# Patient Record
Sex: FEMALE | Race: WHITE | NOT HISPANIC OR LATINO | Employment: OTHER | URBAN - METROPOLITAN AREA
[De-identification: names, ages, dates, MRNs, and addresses within clinical notes are randomized per-mention and may not be internally consistent; named-entity substitution may affect disease eponyms.]

---

## 2017-01-09 ENCOUNTER — HOSPITAL ENCOUNTER (OUTPATIENT)
Dept: ONCOLOGY | Facility: CLINIC | Age: 77
Discharge: HOME | End: 2017-01-09
Attending: INTERNAL MEDICINE | Admitting: INTERNAL MEDICINE

## 2017-01-09 LAB
ALBUMIN SERPL-MCNC: 3.9 G/DL (ref 3.5–4.8)
ALBUMIN/GLOB SERPL: 1.3 {RATIO} (ref 1–1.7)
ALP SERPL-CCNC: 76 IU/L (ref 32–91)
ALT SERPL-CCNC: 17 IU/L (ref 14–54)
ANION GAP SERPL CALC-SCNC: 13.2 MMOL/L (ref 10–20)
AST SERPL-CCNC: 24 IU/L (ref 15–41)
BILIRUB SERPL-MCNC: 0.4 MG/DL (ref 0.3–1.2)
BUN SERPL-MCNC: 29 MG/DL (ref 8–20)
BUN/CREAT SERPL: 26.4 (ref 5.4–26.2)
CALCIUM SERPL-MCNC: 9.6 MG/DL (ref 8.9–10.3)
CHLORIDE SERPL-SCNC: 108 MMOL/L (ref 101–111)
CONV CO2: 26 MMOL/L (ref 22–32)
CONV TOTAL PROTEIN: 6.9 G/DL (ref 6.1–7.9)
CREAT UR-MCNC: 1.1 MG/DL (ref 0.4–1)
GLOBULIN UR ELPH-MCNC: 3 G/DL (ref 2.5–3.8)
GLUCOSE SERPL-MCNC: 93 MG/DL (ref 65–99)
POTASSIUM SERPL-SCNC: 4.2 MMOL/L (ref 3.6–5.1)
SODIUM SERPL-SCNC: 143 MMOL/L (ref 136–144)

## 2017-01-21 ENCOUNTER — HOSPITAL ENCOUNTER (OUTPATIENT)
Dept: OTHER | Facility: HOSPITAL | Age: 77
Discharge: HOME | End: 2017-01-21
Attending: INTERNAL MEDICINE | Admitting: INTERNAL MEDICINE

## 2017-01-21 LAB
ANION GAP SERPL CALC-SCNC: 11.9 MMOL/L (ref 10–20)
APTT BLD: 24.2 SEC (ref 24–31)
BASOPHILS # BLD AUTO: 0 10*3/UL (ref 0–0.2)
BASOPHILS NFR BLD AUTO: 1 % (ref 0–2)
BUN SERPL-MCNC: 27 MG/DL (ref 8–20)
BUN/CREAT SERPL: 20.8 (ref 5.4–26.2)
CALCIUM SERPL-MCNC: 9.6 MG/DL (ref 8.9–10.3)
CHLORIDE SERPL-SCNC: 108 MMOL/L (ref 101–111)
CONV CO2: 27 MMOL/L (ref 22–32)
CREAT UR-MCNC: 1.3 MG/DL (ref 0.4–1)
DIFFERENTIAL METHOD BLD: (no result)
EOSINOPHIL # BLD AUTO: 0.1 10*3/UL (ref 0–0.3)
EOSINOPHIL # BLD AUTO: 2 % (ref 0–3)
ERYTHROCYTE [DISTWIDTH] IN BLOOD BY AUTOMATED COUNT: 13.6 % (ref 11.5–14.5)
GLUCOSE SERPL-MCNC: 107 MG/DL (ref 65–99)
HCT VFR BLD AUTO: 40.2 % (ref 35–49)
HGB BLD-MCNC: 13.2 G/DL (ref 12–15)
INR PPP: 1 (ref 2–3)
LYMPHOCYTES # BLD AUTO: 1.6 10*3/UL (ref 0.8–4.8)
LYMPHOCYTES NFR BLD AUTO: 29 % (ref 18–42)
MCH RBC QN AUTO: 31 PG (ref 26–32)
MCHC RBC AUTO-ENTMCNC: 32.9 G/DL (ref 32–36)
MCV RBC AUTO: 94.3 FL (ref 80–94)
MONOCYTES # BLD AUTO: 0.5 10*3/UL (ref 0.1–1.3)
MONOCYTES NFR BLD AUTO: 9 % (ref 2–11)
NEUTROPHILS # BLD AUTO: 3.4 10*3/UL (ref 2.3–8.6)
NEUTROPHILS NFR BLD AUTO: 59 % (ref 50–75)
NRBC BLD AUTO-RTO: 0 /100{WBCS}
NRBC/RBC NFR BLD MANUAL: 0 10*3/UL
PLATELET # BLD AUTO: 248 10*3/UL (ref 150–450)
PMV BLD AUTO: 7.7 FL (ref 7.4–10.4)
POTASSIUM SERPL-SCNC: 3.9 MMOL/L (ref 3.6–5.1)
PROTHROMBIN TIME: 13 SEC (ref 19.4–28.5)
RBC # BLD AUTO: 4.27 10*6/UL (ref 4–5.4)
SODIUM SERPL-SCNC: 143 MMOL/L (ref 136–144)
WBC # BLD AUTO: 5.7 10*3/UL (ref 4.5–11.5)

## 2017-02-21 ENCOUNTER — HOSPITAL ENCOUNTER (OUTPATIENT)
Dept: OTHER | Facility: HOSPITAL | Age: 77
Discharge: HOME | End: 2017-02-21
Attending: UROLOGY | Admitting: UROLOGY

## 2017-02-21 LAB
ANION GAP SERPL CALC-SCNC: 13.7 MMOL/L (ref 10–20)
BUN SERPL-MCNC: 35 MG/DL (ref 8–20)
BUN/CREAT SERPL: 26.9 (ref 5.4–26.2)
CALCIUM SERPL-MCNC: 9.7 MG/DL (ref 8.9–10.3)
CHLORIDE SERPL-SCNC: 111 MMOL/L (ref 101–111)
CONV CO2: 23 MMOL/L (ref 22–32)
CREAT UR-MCNC: 1.3 MG/DL (ref 0.4–1)
GLUCOSE SERPL-MCNC: 129 MG/DL (ref 65–99)
POTASSIUM SERPL-SCNC: 3.7 MMOL/L (ref 3.6–5.1)
SODIUM SERPL-SCNC: 144 MMOL/L (ref 136–144)

## 2017-08-29 ENCOUNTER — HOSPITAL ENCOUNTER (OUTPATIENT)
Dept: LAB | Facility: HOSPITAL | Age: 77
Discharge: HOME | End: 2017-08-29
Attending: INTERNAL MEDICINE | Admitting: INTERNAL MEDICINE

## 2017-08-29 LAB
INR PPP: ABNORMAL (ref 2–3)
PROTHROMBIN TIME: 61.3 SEC (ref 19.4–28.5)

## 2017-08-30 ENCOUNTER — HOSPITAL ENCOUNTER (OUTPATIENT)
Dept: CT IMAGING | Facility: HOSPITAL | Age: 77
Discharge: HOME | End: 2017-08-30
Attending: UROLOGY | Admitting: UROLOGY

## 2017-08-30 LAB — CREAT BLDA-MCNC: 1.2 MG/DL (ref 0.6–1.3)

## 2017-12-29 ENCOUNTER — HOSPITAL ENCOUNTER (OUTPATIENT)
Dept: MAMMOGRAPHY | Facility: HOSPITAL | Age: 77
Discharge: HOME | End: 2017-12-29
Attending: INTERNAL MEDICINE | Admitting: INTERNAL MEDICINE

## 2018-01-08 ENCOUNTER — HOSPITAL ENCOUNTER (OUTPATIENT)
Dept: ONCOLOGY | Facility: CLINIC | Age: 78
Setting detail: INFUSION SERIES
Discharge: HOME | End: 2018-01-08
Attending: INTERNAL MEDICINE | Admitting: INTERNAL MEDICINE

## 2018-01-08 ENCOUNTER — CLINICAL SUPPORT (OUTPATIENT)
Dept: ONCOLOGY | Facility: HOSPITAL | Age: 78
End: 2018-01-08

## 2018-01-08 NOTE — PROGRESS NOTES
PATIENTS ONCOLOGY RECORD LOCATED IN [facility]      Subjective     Name:  NELL LIU     Date:  2018  Address:  [place] [address]  Home: [phone]  :  1940 AGE:  77 y.o.        RECORDS OBTAINED:  Patients Oncology Record is located in [facility]

## 2018-01-19 ENCOUNTER — HOSPITAL ENCOUNTER (OUTPATIENT)
Dept: ONCOLOGY | Facility: HOSPITAL | Age: 78
Discharge: HOME | End: 2018-01-19
Attending: INTERNAL MEDICINE | Admitting: INTERNAL MEDICINE

## 2018-01-19 ENCOUNTER — CLINICAL SUPPORT (OUTPATIENT)
Dept: ONCOLOGY | Facility: HOSPITAL | Age: 78
End: 2018-01-19

## 2018-01-19 ENCOUNTER — HOSPITAL ENCOUNTER (OUTPATIENT)
Dept: ONCOLOGY | Facility: CLINIC | Age: 78
Setting detail: INFUSION SERIES
Discharge: HOME | End: 2018-01-19
Attending: INTERNAL MEDICINE | Admitting: INTERNAL MEDICINE

## 2018-01-19 LAB
ALBUMIN SERPL-MCNC: 3.8 G/DL (ref 3.5–4.8)
ALBUMIN/GLOB SERPL: 1.4 {RATIO} (ref 1–1.7)
ALP SERPL-CCNC: 81 IU/L (ref 32–91)
ALT SERPL-CCNC: 15 IU/L (ref 14–54)
ANION GAP SERPL CALC-SCNC: 16.5 MMOL/L (ref 10–20)
AST SERPL-CCNC: 25 IU/L (ref 15–41)
BILIRUB SERPL-MCNC: 0.6 MG/DL (ref 0.3–1.2)
BUN SERPL-MCNC: 27 MG/DL (ref 8–20)
BUN/CREAT SERPL: 22.5 (ref 5.4–26.2)
CALCIUM SERPL-MCNC: 9.4 MG/DL (ref 8.9–10.3)
CHLORIDE SERPL-SCNC: 104 MMOL/L (ref 101–111)
CONV CO2: 24 MMOL/L (ref 22–32)
CONV TOTAL PROTEIN: 6.5 G/DL (ref 6.1–7.9)
CREAT UR-MCNC: 1.2 MG/DL (ref 0.4–1)
GLOBULIN UR ELPH-MCNC: 2.7 G/DL (ref 2.5–3.8)
GLUCOSE SERPL-MCNC: 87 MG/DL (ref 65–99)
POTASSIUM SERPL-SCNC: 4.5 MMOL/L (ref 3.6–5.1)
SODIUM SERPL-SCNC: 140 MMOL/L (ref 136–144)

## 2018-01-19 NOTE — PROGRESS NOTES
PATIENTS ONCOLOGY RECORD LOCATED IN [facility]      Subjective     Name:  NELL LIU     Date:  2018  Address:  [place] [address]  Home: [phone]  :  1940 AGE:  77 y.o.        RECORDS OBTAINED:  Patients Oncology Record is located in [facility]

## 2018-09-04 ENCOUNTER — HOSPITAL ENCOUNTER (OUTPATIENT)
Dept: OTHER | Facility: HOSPITAL | Age: 78
Discharge: HOME | End: 2018-09-04
Attending: UROLOGY | Admitting: UROLOGY

## 2018-09-04 LAB
ANION GAP SERPL CALC-SCNC: 16.4 MMOL/L (ref 10–20)
BUN SERPL-MCNC: 40 MG/DL (ref 8–20)
BUN/CREAT SERPL: 36.4 (ref 5.4–26.2)
CALCIUM SERPL-MCNC: 9.7 MG/DL (ref 8.9–10.3)
CHLORIDE SERPL-SCNC: 107 MMOL/L (ref 101–111)
CONV CO2: 22 MMOL/L (ref 22–32)
CREAT BLDA-MCNC: 1.2 MG/DL (ref 0.6–1.3)
CREAT UR-MCNC: 1.1 MG/DL (ref 0.4–1)
GLUCOSE SERPL-MCNC: 135 MG/DL (ref 65–99)
POTASSIUM SERPL-SCNC: 4.4 MMOL/L (ref 3.6–5.1)
SODIUM SERPL-SCNC: 141 MMOL/L (ref 136–144)

## 2018-09-11 ENCOUNTER — HOSPITAL ENCOUNTER (OUTPATIENT)
Dept: INTERVENTIONAL RADIOLOGY/VASCULAR | Facility: HOSPITAL | Age: 78
Discharge: HOME | End: 2018-09-11
Attending: UROLOGY | Admitting: UROLOGY

## 2018-09-19 ENCOUNTER — HOSPITAL ENCOUNTER (OUTPATIENT)
Dept: OTHER | Facility: HOSPITAL | Age: 78
Setting detail: SPECIMEN
Discharge: HOME | End: 2018-09-19
Attending: INTERNAL MEDICINE | Admitting: INTERNAL MEDICINE

## 2018-09-19 ENCOUNTER — ON CAMPUS - OUTPATIENT (AMBULATORY)
Dept: URBAN - METROPOLITAN AREA HOSPITAL 2 | Facility: HOSPITAL | Age: 78
End: 2018-09-19
Payer: COMMERCIAL

## 2018-09-19 ENCOUNTER — OFFICE (AMBULATORY)
Dept: URBAN - METROPOLITAN AREA PATHOLOGY 4 | Facility: PATHOLOGY | Age: 78
End: 2018-09-19

## 2018-09-19 VITALS
SYSTOLIC BLOOD PRESSURE: 123 MMHG | SYSTOLIC BLOOD PRESSURE: 186 MMHG | OXYGEN SATURATION: 95 % | DIASTOLIC BLOOD PRESSURE: 73 MMHG | SYSTOLIC BLOOD PRESSURE: 172 MMHG | DIASTOLIC BLOOD PRESSURE: 71 MMHG | RESPIRATION RATE: 18 BRPM | OXYGEN SATURATION: 96 % | DIASTOLIC BLOOD PRESSURE: 70 MMHG | SYSTOLIC BLOOD PRESSURE: 146 MMHG | HEART RATE: 60 BPM | SYSTOLIC BLOOD PRESSURE: 142 MMHG | DIASTOLIC BLOOD PRESSURE: 65 MMHG | OXYGEN SATURATION: 97 % | RESPIRATION RATE: 16 BRPM | SYSTOLIC BLOOD PRESSURE: 157 MMHG | SYSTOLIC BLOOD PRESSURE: 182 MMHG | HEIGHT: 66 IN | DIASTOLIC BLOOD PRESSURE: 67 MMHG | DIASTOLIC BLOOD PRESSURE: 99 MMHG | DIASTOLIC BLOOD PRESSURE: 78 MMHG | RESPIRATION RATE: 20 BRPM | HEART RATE: 81 BPM | DIASTOLIC BLOOD PRESSURE: 75 MMHG | WEIGHT: 179 LBS | OXYGEN SATURATION: 98 % | TEMPERATURE: 98.6 F | DIASTOLIC BLOOD PRESSURE: 104 MMHG | SYSTOLIC BLOOD PRESSURE: 135 MMHG | DIASTOLIC BLOOD PRESSURE: 69 MMHG | OXYGEN SATURATION: 99 % | SYSTOLIC BLOOD PRESSURE: 178 MMHG | SYSTOLIC BLOOD PRESSURE: 203 MMHG

## 2018-09-19 DIAGNOSIS — D12.2 BENIGN NEOPLASM OF ASCENDING COLON: ICD-10-CM

## 2018-09-19 DIAGNOSIS — Z86.010 PERSONAL HISTORY OF COLONIC POLYPS: ICD-10-CM

## 2018-09-19 DIAGNOSIS — K62.1 RECTAL POLYP: ICD-10-CM

## 2018-09-19 DIAGNOSIS — D12.5 BENIGN NEOPLASM OF SIGMOID COLON: ICD-10-CM

## 2018-09-19 DIAGNOSIS — K57.30 DIVERTICULOSIS OF LARGE INTESTINE WITHOUT PERFORATION OR ABS: ICD-10-CM

## 2018-09-19 LAB
GI HISTOLOGY: A. UNSPECIFIED: (no result)
GI HISTOLOGY: B. UNSPECIFIED: (no result)
GI HISTOLOGY: C. UNSPECIFIED: (no result)
GI HISTOLOGY: PDF REPORT: (no result)

## 2018-09-19 PROCEDURE — 45385 COLONOSCOPY W/LESION REMOVAL: CPT | Mod: PT | Performed by: INTERNAL MEDICINE

## 2018-09-19 PROCEDURE — 45380 COLONOSCOPY AND BIOPSY: CPT | Mod: 59,PT | Performed by: INTERNAL MEDICINE

## 2018-09-19 PROCEDURE — 88305 TISSUE EXAM BY PATHOLOGIST: CPT | Mod: 26 | Performed by: INTERNAL MEDICINE

## 2018-09-19 PROCEDURE — 45380 COLONOSCOPY AND BIOPSY: CPT | Mod: PT,59 | Performed by: INTERNAL MEDICINE

## 2019-02-12 ENCOUNTER — HOSPITAL ENCOUNTER (OUTPATIENT)
Dept: ONCOLOGY | Facility: CLINIC | Age: 79
Setting detail: INFUSION SERIES
Discharge: HOME | End: 2019-02-12
Attending: INTERNAL MEDICINE | Admitting: INTERNAL MEDICINE

## 2019-02-12 ENCOUNTER — CLINICAL SUPPORT (OUTPATIENT)
Dept: ONCOLOGY | Facility: HOSPITAL | Age: 79
End: 2019-02-12

## 2019-02-12 NOTE — PROGRESS NOTES
PATIENTS ONCOLOGY RECORD LOCATED IN [facility]      Subjective     Name:  NELL LIU     Date:  2019  Address:  [place] [address]  Home: [unfilled]  :  1940 AGE:  78 y.o.        RECORDS OBTAINED:  Patients Oncology Record is located in [facility]

## 2019-03-11 ENCOUNTER — HOSPITAL ENCOUNTER (OUTPATIENT)
Dept: CT IMAGING | Facility: HOSPITAL | Age: 79
Discharge: HOME | End: 2019-03-11
Attending: UROLOGY | Admitting: UROLOGY

## 2019-03-11 LAB — CREAT BLDA-MCNC: 1.3 MG/DL (ref 0.6–1.3)

## 2019-06-01 ENCOUNTER — TRANSCRIBE ORDERS (OUTPATIENT)
Dept: ADMINISTRATIVE | Facility: HOSPITAL | Age: 79
End: 2019-06-01

## 2019-06-01 DIAGNOSIS — C67.9 MALIGNANT NEOPLASM OF URINARY BLADDER, UNSPECIFIED SITE (HCC): Primary | ICD-10-CM

## 2019-06-24 ENCOUNTER — OFFICE VISIT (OUTPATIENT)
Dept: CARDIOLOGY | Facility: CLINIC | Age: 79
End: 2019-06-24

## 2019-06-24 DIAGNOSIS — I48.20 CHRONIC ATRIAL FIBRILLATION (HCC): ICD-10-CM

## 2019-06-24 LAB — INR PPP: 3.4 (ref 0.9–1.1)

## 2019-06-24 PROCEDURE — 36416 COLLJ CAPILLARY BLOOD SPEC: CPT | Performed by: INTERNAL MEDICINE

## 2019-06-24 PROCEDURE — 85610 PROTHROMBIN TIME: CPT | Performed by: INTERNAL MEDICINE

## 2019-06-24 RX ORDER — FUROSEMIDE 20 MG/1
20 TABLET ORAL DAILY
Qty: 90 TABLET | Refills: 3 | Status: SHIPPED | OUTPATIENT
Start: 2019-06-24 | End: 2019-07-19

## 2019-06-26 PROBLEM — I48.91 ATRIAL FIBRILLATION: Status: ACTIVE | Noted: 2019-06-26

## 2019-06-28 ENCOUNTER — TELEPHONE (OUTPATIENT)
Dept: CARDIOLOGY | Facility: CLINIC | Age: 79
End: 2019-06-28

## 2019-06-28 RX ORDER — WARFARIN SODIUM 2 MG/1
2 TABLET ORAL EVERY 24 HOURS
Qty: 30 TABLET | Refills: 0 | Status: SHIPPED | OUTPATIENT
Start: 2019-06-28 | End: 2019-07-29 | Stop reason: SDUPTHER

## 2019-06-28 RX ORDER — WARFARIN SODIUM 2 MG/1
TABLET ORAL EVERY 24 HOURS
COMMUNITY
Start: 2018-05-14 | End: 2019-06-28 | Stop reason: SDUPTHER

## 2019-07-08 ENCOUNTER — ANTICOAGULATION VISIT (OUTPATIENT)
Dept: CARDIOLOGY | Facility: CLINIC | Age: 79
End: 2019-07-08

## 2019-07-08 DIAGNOSIS — I48.19 PERSISTENT ATRIAL FIBRILLATION (HCC): ICD-10-CM

## 2019-07-08 LAB — INR PPP: 2.6 (ref 2–3)

## 2019-07-08 PROCEDURE — 36416 COLLJ CAPILLARY BLOOD SPEC: CPT | Performed by: INTERNAL MEDICINE

## 2019-07-08 PROCEDURE — 85610 PROTHROMBIN TIME: CPT | Performed by: INTERNAL MEDICINE

## 2019-07-19 ENCOUNTER — HOSPITAL ENCOUNTER (EMERGENCY)
Facility: HOSPITAL | Age: 79
Discharge: HOME | End: 2019-07-19
Attending: EMERGENCY MEDICINE | Admitting: EMERGENCY MEDICINE

## 2019-07-19 ENCOUNTER — APPOINTMENT (OUTPATIENT)
Dept: GENERAL RADIOLOGY | Facility: HOSPITAL | Age: 79
End: 2019-07-19

## 2019-07-19 VITALS
BODY MASS INDEX: 26.66 KG/M2 | HEART RATE: 78 BPM | DIASTOLIC BLOOD PRESSURE: 87 MMHG | OXYGEN SATURATION: 98 % | TEMPERATURE: 98 F | SYSTOLIC BLOOD PRESSURE: 149 MMHG | RESPIRATION RATE: 18 BRPM | HEIGHT: 65 IN | WEIGHT: 160 LBS

## 2019-07-19 DIAGNOSIS — L03.116 CELLULITIS OF LEFT FOOT: Primary | ICD-10-CM

## 2019-07-19 PROCEDURE — 99284 EMERGENCY DEPT VISIT MOD MDM: CPT

## 2019-07-19 PROCEDURE — 73630 X-RAY EXAM OF FOOT: CPT

## 2019-07-19 RX ORDER — HYDROCODONE BITARTRATE AND ACETAMINOPHEN 5; 325 MG/1; MG/1
1 TABLET ORAL ONCE
Status: COMPLETED | OUTPATIENT
Start: 2019-07-19 | End: 2019-07-19

## 2019-07-19 RX ORDER — HYDRALAZINE HYDROCHLORIDE 10 MG/1
10 TABLET, FILM COATED ORAL 2 TIMES DAILY
COMMUNITY
End: 2019-11-04 | Stop reason: DRUGHIGH

## 2019-07-19 RX ORDER — LANSOPRAZOLE 30 MG/1
30 CAPSULE, DELAYED RELEASE ORAL 2 TIMES DAILY
Status: ON HOLD | COMMUNITY
End: 2020-09-07

## 2019-07-19 RX ORDER — CEPHALEXIN 500 MG/1
500 CAPSULE ORAL 3 TIMES DAILY
Qty: 21 CAPSULE | Refills: 0 | Status: SHIPPED | OUTPATIENT
Start: 2019-07-19 | End: 2019-07-26

## 2019-07-19 RX ORDER — FEXOFENADINE HCL 180 MG/1
180 TABLET ORAL DAILY
Status: ON HOLD | COMMUNITY
End: 2020-09-07

## 2019-07-19 RX ORDER — CHOLECALCIFEROL (VITAMIN D3) 125 MCG
1 CAPSULE ORAL DAILY
COMMUNITY
End: 2021-05-17 | Stop reason: ALTCHOICE

## 2019-07-19 RX ORDER — HYDROCODONE BITARTRATE AND ACETAMINOPHEN 5; 325 MG/1; MG/1
1 TABLET ORAL EVERY 6 HOURS PRN
Qty: 10 TABLET | Refills: 0 | Status: SHIPPED | OUTPATIENT
Start: 2019-07-19 | End: 2020-04-28

## 2019-07-19 RX ORDER — TOPIRAMATE 100 MG/1
100 TABLET, FILM COATED ORAL
COMMUNITY

## 2019-07-19 RX ORDER — VALSARTAN AND HYDROCHLOROTHIAZIDE 320; 12.5 MG/1; MG/1
1 TABLET, FILM COATED ORAL DAILY
COMMUNITY
End: 2020-05-01 | Stop reason: HOSPADM

## 2019-07-19 RX ORDER — GLIMEPIRIDE 1 MG/1
0.5 TABLET ORAL
COMMUNITY
End: 2019-11-04 | Stop reason: ALTCHOICE

## 2019-07-19 RX ORDER — ACETAMINOPHEN 325 MG/1
650 TABLET ORAL EVERY 6 HOURS PRN
COMMUNITY
End: 2020-08-31 | Stop reason: HOSPADM

## 2019-07-19 RX ADMIN — HYDROCODONE BITARTRATE AND ACETAMINOPHEN 1 TABLET: 5; 325 TABLET ORAL at 20:08

## 2019-07-26 ENCOUNTER — TRANSCRIBE ORDERS (OUTPATIENT)
Dept: ADMINISTRATIVE | Facility: HOSPITAL | Age: 79
End: 2019-07-26

## 2019-07-26 DIAGNOSIS — Z78.0 POSTMENOPAUSAL: Primary | ICD-10-CM

## 2019-07-29 RX ORDER — WARFARIN SODIUM 2 MG/1
2 TABLET ORAL EVERY 24 HOURS
Qty: 30 TABLET | Refills: 0 | Status: SHIPPED | OUTPATIENT
Start: 2019-07-29 | End: 2019-08-25 | Stop reason: SDUPTHER

## 2019-08-01 ENCOUNTER — HOSPITAL ENCOUNTER (OUTPATIENT)
Dept: BONE DENSITY | Facility: HOSPITAL | Age: 79
Discharge: HOME | End: 2019-08-01
Admitting: FAMILY MEDICINE

## 2019-08-01 DIAGNOSIS — Z78.0 POSTMENOPAUSAL: ICD-10-CM

## 2019-08-01 PROCEDURE — 77080 DXA BONE DENSITY AXIAL: CPT

## 2019-08-08 ENCOUNTER — ANTICOAGULATION VISIT (OUTPATIENT)
Dept: CARDIOLOGY | Facility: CLINIC | Age: 79
End: 2019-08-08

## 2019-08-08 DIAGNOSIS — I48.0 PAROXYSMAL ATRIAL FIBRILLATION (HCC): ICD-10-CM

## 2019-08-08 LAB — INR PPP: 6 (ref 2–3)

## 2019-08-08 PROCEDURE — 36416 COLLJ CAPILLARY BLOOD SPEC: CPT | Performed by: INTERNAL MEDICINE

## 2019-08-08 PROCEDURE — 85610 PROTHROMBIN TIME: CPT | Performed by: INTERNAL MEDICINE

## 2019-08-14 DIAGNOSIS — E53.8 B12 DEFICIENCY: Primary | ICD-10-CM

## 2019-08-14 RX ORDER — CYANOCOBALAMIN 1000 UG/ML
1000 INJECTION, SOLUTION INTRAMUSCULAR; SUBCUTANEOUS
Qty: 3 ML | Refills: 1 | Status: SHIPPED | OUTPATIENT
Start: 2019-08-14 | End: 2019-09-12 | Stop reason: SDUPTHER

## 2019-08-14 NOTE — TELEPHONE ENCOUNTER
Patient called stating her insurance changed, which also changed the pharmacy she needs to use. She is due for B12 injection refills and asked that they now be sent to [facility]. Updated her pharmacy info and sent rx.

## 2019-08-22 ENCOUNTER — CLINICAL SUPPORT NO REQUIREMENTS (OUTPATIENT)
Dept: CARDIOLOGY | Facility: CLINIC | Age: 79
End: 2019-08-22

## 2019-08-22 ENCOUNTER — ANTICOAGULATION VISIT (OUTPATIENT)
Dept: CARDIOLOGY | Facility: CLINIC | Age: 79
End: 2019-08-22

## 2019-08-22 DIAGNOSIS — I48.0 PAROXYSMAL ATRIAL FIBRILLATION (HCC): ICD-10-CM

## 2019-08-22 DIAGNOSIS — Z95.0 PRESENCE OF CARDIAC PACEMAKER: ICD-10-CM

## 2019-08-22 DIAGNOSIS — I49.5 SICK SINUS SYNDROME (HCC): Primary | ICD-10-CM

## 2019-08-22 LAB — INR PPP: 4 (ref 2–3)

## 2019-08-22 PROCEDURE — 36416 COLLJ CAPILLARY BLOOD SPEC: CPT | Performed by: INTERNAL MEDICINE

## 2019-08-22 PROCEDURE — 93296 REM INTERROG EVL PM/IDS: CPT | Performed by: NURSE PRACTITIONER

## 2019-08-22 PROCEDURE — 85610 PROTHROMBIN TIME: CPT | Performed by: INTERNAL MEDICINE

## 2019-08-22 PROCEDURE — 93294 REM INTERROG EVL PM/LDLS PM: CPT | Performed by: NURSE PRACTITIONER

## 2019-08-22 NOTE — PROGRESS NOTES
REMOTE DEVICE INTERROGATION    Remote device interrogation is reviewed.     Device Company: [employer]    Battery Stable.  Time to SACHIN 4.5 years    Presenting EGM: A paced V sensed    Arrhythmia Logbook Reviewed: PAF episodes detected: On sotalol and warfarin    Device function appears Stable    Continue remote device interrogations every 3 months.

## 2019-08-26 RX ORDER — WARFARIN SODIUM 2 MG/1
2 TABLET ORAL EVERY 24 HOURS
Qty: 30 TABLET | Refills: 0 | Status: SHIPPED | OUTPATIENT
Start: 2019-08-26 | End: 2019-09-24 | Stop reason: SDUPTHER

## 2019-09-12 DIAGNOSIS — E53.8 B12 DEFICIENCY: ICD-10-CM

## 2019-09-12 RX ORDER — CYANOCOBALAMIN 1000 UG/ML
1000 INJECTION, SOLUTION INTRAMUSCULAR; SUBCUTANEOUS
Qty: 3 ML | Refills: 1 | Status: SHIPPED | OUTPATIENT
Start: 2019-09-12 | End: 2020-03-23

## 2019-09-12 NOTE — TELEPHONE ENCOUNTER
Patient requesting B12 injections along with syringes and is [facility] [address] and not [facility].  Pharmacy location changed.  Patient with macrocytosis. Patient saw Dr. Church on 2-12-19 and is scheduled to see her on 2-11-20.

## 2019-09-19 ENCOUNTER — ANTICOAGULATION VISIT (OUTPATIENT)
Dept: CARDIOLOGY | Facility: CLINIC | Age: 79
End: 2019-09-19

## 2019-09-19 DIAGNOSIS — I48.0 PAROXYSMAL ATRIAL FIBRILLATION (HCC): ICD-10-CM

## 2019-09-19 LAB — INR PPP: 1.6 (ref 2–3)

## 2019-09-19 PROCEDURE — 85610 PROTHROMBIN TIME: CPT | Performed by: INTERNAL MEDICINE

## 2019-09-19 PROCEDURE — 36416 COLLJ CAPILLARY BLOOD SPEC: CPT | Performed by: INTERNAL MEDICINE

## 2019-09-24 RX ORDER — WARFARIN SODIUM 2 MG/1
2 TABLET ORAL EVERY 24 HOURS
Qty: 30 TABLET | Refills: 0 | Status: SHIPPED | OUTPATIENT
Start: 2019-09-24 | End: 2019-10-19 | Stop reason: SDUPTHER

## 2019-10-03 ENCOUNTER — ANTICOAGULATION VISIT (OUTPATIENT)
Dept: CARDIOLOGY | Facility: CLINIC | Age: 79
End: 2019-10-03

## 2019-10-03 DIAGNOSIS — I48.0 PAROXYSMAL ATRIAL FIBRILLATION (HCC): ICD-10-CM

## 2019-10-03 LAB — INR PPP: 1.3 (ref 2–3)

## 2019-10-03 PROCEDURE — 85610 PROTHROMBIN TIME: CPT | Performed by: INTERNAL MEDICINE

## 2019-10-03 PROCEDURE — 36416 COLLJ CAPILLARY BLOOD SPEC: CPT | Performed by: INTERNAL MEDICINE

## 2019-10-21 RX ORDER — WARFARIN SODIUM 2 MG/1
2 TABLET ORAL EVERY 24 HOURS
Qty: 30 TABLET | Refills: 0 | Status: SHIPPED | OUTPATIENT
Start: 2019-10-21 | End: 2019-10-22 | Stop reason: SDUPTHER

## 2019-10-22 RX ORDER — WARFARIN SODIUM 2 MG/1
2 TABLET ORAL EVERY 24 HOURS
Qty: 30 TABLET | Refills: 0 | Status: SHIPPED | OUTPATIENT
Start: 2019-10-22 | End: 2019-11-17 | Stop reason: SDUPTHER

## 2019-11-04 ENCOUNTER — CLINICAL SUPPORT NO REQUIREMENTS (OUTPATIENT)
Dept: CARDIOLOGY | Facility: CLINIC | Age: 79
End: 2019-11-04

## 2019-11-04 ENCOUNTER — OFFICE VISIT (OUTPATIENT)
Dept: CARDIOLOGY | Facility: CLINIC | Age: 79
End: 2019-11-04

## 2019-11-04 VITALS
BODY MASS INDEX: 26.83 KG/M2 | HEIGHT: 68 IN | SYSTOLIC BLOOD PRESSURE: 132 MMHG | OXYGEN SATURATION: 96 % | DIASTOLIC BLOOD PRESSURE: 72 MMHG | WEIGHT: 177 LBS | HEART RATE: 60 BPM

## 2019-11-04 DIAGNOSIS — Z79.01 LONG TERM CURRENT USE OF ANTICOAGULANT: ICD-10-CM

## 2019-11-04 DIAGNOSIS — I10 ESSENTIAL HYPERTENSION: ICD-10-CM

## 2019-11-04 DIAGNOSIS — I48.0 PAF (PAROXYSMAL ATRIAL FIBRILLATION) (HCC): Primary | ICD-10-CM

## 2019-11-04 DIAGNOSIS — Z95.0 PRESENCE OF CARDIAC PACEMAKER: ICD-10-CM

## 2019-11-04 DIAGNOSIS — I49.5 SICK SINUS SYNDROME (HCC): Primary | ICD-10-CM

## 2019-11-04 PROBLEM — E78.5 DYSLIPIDEMIA: Status: ACTIVE | Noted: 2017-01-17

## 2019-11-04 PROCEDURE — 93000 ELECTROCARDIOGRAM COMPLETE: CPT | Performed by: INTERNAL MEDICINE

## 2019-11-04 PROCEDURE — 99213 OFFICE O/P EST LOW 20 MIN: CPT | Performed by: INTERNAL MEDICINE

## 2019-11-04 PROCEDURE — 93280 PM DEVICE PROGR EVAL DUAL: CPT | Performed by: NURSE PRACTITIONER

## 2019-11-04 RX ORDER — FUROSEMIDE 20 MG/1
TABLET ORAL EVERY 24 HOURS
COMMUNITY
Start: 2019-04-18 | End: 2020-04-28

## 2019-11-04 RX ORDER — GLIMEPIRIDE 2 MG/1
TABLET ORAL
COMMUNITY
Start: 2014-11-03 | End: 2019-11-04 | Stop reason: ALTCHOICE

## 2019-11-04 RX ORDER — LORAZEPAM 0.5 MG/1
TABLET ORAL EVERY 24 HOURS
COMMUNITY
Start: 2017-11-12 | End: 2019-11-04

## 2019-11-04 RX ORDER — NEEDLES, DISPOSABLE 25GX5/8"
NEEDLE, DISPOSABLE MISCELLANEOUS
COMMUNITY
Start: 2019-08-14 | End: 2020-04-28

## 2019-11-04 RX ORDER — SOTALOL HYDROCHLORIDE 120 MG/1
120 TABLET ORAL EVERY 12 HOURS
Qty: 180 TABLET | Refills: 3 | Status: SHIPPED | OUTPATIENT
Start: 2019-11-04 | End: 2020-07-14

## 2019-11-04 RX ORDER — SOTALOL HYDROCHLORIDE 120 MG/1
TABLET ORAL EVERY 12 HOURS
COMMUNITY
Start: 2018-12-07 | End: 2019-11-04 | Stop reason: SDUPTHER

## 2019-11-04 NOTE — PROGRESS NOTES
DEVICE INTERROGATION:  IN OFFICE    DEVICE TYPE: [employer]    :   Chamber pacemaker    BATTERY:  Stable    TIME TO ELECTIVE REPLACEMENT INDICATORS:   4.5 years    CHARGE TIME:   Not applicable        LEAD DATA:    Atrial:   5.0 mV, 434 ohms, 0.5 V@0.4 ms    Ventricular:     19.2 mV, 700 ohms, 0.7 V@0.4 ms    LV:      Atrial pacing percentage: 40 %    Ventricular pacing percentage: 5 %      Arrhythmia Logbook Reviewed: 18% A. fib        Summary:    Stable Device Function    18% burden of A. fib    Battery status is stable.      NEXT IN OFFICE DEVICE CHECK DUE: 1 year    REMOTE DEVICE INTERROGATIONS: 3 months

## 2019-11-04 NOTE — PROGRESS NOTES
"Cardiology Office Visit Note      Referring physician:  Penny    Reason For Followup: 6 month with device check    HPI:  Hazel Bucio is a 79 y.o. female presents for FU history of sick sinus syndrome with BS PM placed 2014 with pocket revision 1/2017,PAF, peripheral neuropathy, DM  and hypertensive heart disease.     INR today 2.0    Interrogation of pacemaker today shows 2 episodes of PAF breakthroughs since her last device interrogation.  Battery status remains satisfactory.      She denies chest pain, dyspnea, PND, orthopnea, palpitations, near syncope,  or feelings of her heart racing.    She reports she has been compliant with prescribed medications.         Past Medical History:   Diagnosis Date   • Essential hypertension 1/17/2017   • PAF (paroxysmal atrial fibrillation) (CMS/[facility]) 6/26/2019   • Presence of cardiac pacemaker 11/03/2014    BS dual chamber PM placed 10/2014 with pocket revision 1/25/17.  HX tachy rob syndrome   • Sick sinus syndrome (CMS/HCC) 11/3/2014       History reviewed. No pertinent surgical history.      Current Outpatient Medications:   •  acetaminophen (TYLENOL) 325 MG tablet, Take 650 mg by mouth Every 6 (Six) Hours As Needed for Mild Pain ., Disp: , Rfl:   •  B-D DISP NEEDLE 25GX1\" 25G X 1\" misc, , Disp: , Rfl:   •  Calcium Carbonate-Vit D-Min (CALCIUM 1200 PO), Take  by mouth Daily., Disp: , Rfl:   •  Cholecalciferol (VITAMIN D3) 2000 units tablet, Take  by mouth., Disp: , Rfl:   •  Cyanocobalamin (VITAMIN B-12 IJ), Inject  as directed Every 30 (Thirty) Days., Disp: , Rfl:   •  cyanocobalamin 1000 MCG/ML injection, Inject 1 mL into the appropriate muscle as directed by prescriber Every 28 (Twenty-Eight) Days., Disp: 3 mL, Rfl: 1  •  fexofenadine (ALLEGRA) 180 MG tablet, Take 180 mg by mouth Daily., Disp: , Rfl:   •  furosemide (LASIX) 20 MG tablet, Daily., Disp: , Rfl:   •  HYDROcodone-acetaminophen (NORCO) 5-325 MG per tablet, Take 1 tablet by mouth Every 6 (Six) Hours As Needed " for Moderate Pain ., Disp: 10 tablet, Rfl: 0  •  lansoprazole (PREVACID) 30 MG capsule, Take 30 mg by mouth Daily., Disp: , Rfl:   •  Magnesium 400 MG capsule, Daily., Disp: , Rfl:   •  Polyethylene Glycol 3350 (MIRALAX PO), MIRALAX POWD, Disp: , Rfl:   •  psyllium (METAMUCIL) 0.52 g capsule, METAMUCIL CAPSULE, Disp: , Rfl:   •  topiramate (TOPAMAX) 100 MG tablet, Take 100 mg by mouth 2 (Two) Times a Day., Disp: , Rfl:   •  Valerian 450 MG capsule, Take 450 mg by mouth Every Night., Disp: , Rfl:   •  valsartan-hydrochlorothiazide (DIOVAN HCT) 320-12.5 MG per tablet, Take 1 tablet by mouth Daily., Disp: , Rfl:   •  verapamil SR (CALAN-SR) 180 MG CR tablet, Take 1 tablet by mouth 2 (Two) Times a Day., Disp: 180 tablet, Rfl: 3  •  warfarin (COUMADIN) 2 MG tablet, Take 1 tablet by mouth Daily., Disp: 30 tablet, Rfl: 0  •  sotalol (BETAPACE) 120 MG tablet, Take 1 tablet by mouth Every 12 (Twelve) Hours., Disp: 180 tablet, Rfl: 3    Social History     Socioeconomic History   • Marital status:      Spouse name: Not on file   • Number of children: Not on file   • Years of education: Not on file   • Highest education level: Not on file   Tobacco Use   • Smoking status: Never Smoker       No family history on file.      Review of Systems   General: denies fever, chills, anorexia, weight loss  Eyes: denies blurring, diplopia  Ear/Nose/Throat: denies ear pain, nosebleeds, hoarseness  Cardiovascular: See HPI  Respiratory: denies excessive sputum, hemoptysis, wheezing  Gastrointestinal: denies nausea, vomiting, change in bowel habits, abdominal pain  Genitourinary: denies dysuria and hematuria  Musculoskeletal: Has mild to moderate weightbearing arthralgias  Skin: denies rashes, itching, suspicious lesions  Neurologic: denies focal neuro deficits  Psychiatric: denies depression, anxiety  Endocrine: denies cold intolerance, heat intolerance  Hematologic/Lymphatic: denies abnormal bruising, bleeding  Allergic/Immunologic:  "denies urticaria or persistent infections      Objective     Visit Vitals  /72   Pulse 60   Ht 172.7 cm (68\")   Wt 80.3 kg (177 lb)   SpO2 96% Comment: room air   BMI 26.91 kg/m²           Physical Exam  General:    Mildly  Obese, well developed,, in no acute distress.    Head:     normocephalic and atraumatic.    Eyes:    PERRL/EOM intact, conjunctiva and sclera clear with out nystagmus.    Neck:    no jvd or bruits  Chest Wall:    no deformities   Lungs:    clear bilaterally to auscultation with adequate global airflow   Heart:    non-displaced PMI; regular rate and rhythm, normal S1, S2 without murmurs, rubs, or gallops  Abdomen:  Soft, nontender without HSM  Msk:    no deformity; adequate R OM; mildly antalgic gait; uses a walker routinely  Pulses:    pulses normal in all 4 extremities.    Extremities:    no clubbing, cyanosis, edema   Neurologic:    no focal sensory or motor deficits  Skin:    intact without lesions or rashes.    Psych:    alert and cooperative; normal mood and affect; normal attention span and concentration.            ECG 12 Lead  Date/Time: 11/4/2019 2:32 PM  Performed by: MARIELA Carrizales MD  Authorized by: MARIELA Carrizales MD   Comparison: compared with previous ECG from 4/18/2019  Similar to previous ECG  Comments: Exclusively atrial paced rhythm with 100% V sensing.  No other abnormalities; no change from previous              Assessment:   1. PAF (paroxysmal atrial fibrillation) (CMS/HCC)  -Modest A. fib burden, less than 1%.    2. Presence of cardiac pacemaker  -Satisfactory device site and functioning    3. Long term current use of anticoagulant  Will maintain in therapeutic range; INR today is 2.0    4. Essential hypertension  Is well regulated on current medications        Plan:  1.  Discontinue a Apresoline 10 mg p.o. twice daily  2.  Increase sotalol from 80 mg to 120 mg p.o. twice daily  3.  Return to clinic for device reinterrogation and clinical follow-up in 6 months or " sooner if needed    MARIELA Carrizales MD  11/4/2019 8:55 PM

## 2019-11-18 RX ORDER — WARFARIN SODIUM 2 MG/1
2 TABLET ORAL EVERY 24 HOURS
Qty: 30 TABLET | Refills: 0 | Status: SHIPPED | OUTPATIENT
Start: 2019-11-18 | End: 2020-04-28

## 2019-11-21 ENCOUNTER — CLINICAL SUPPORT NO REQUIREMENTS (OUTPATIENT)
Dept: CARDIOLOGY | Facility: CLINIC | Age: 79
End: 2019-11-21

## 2019-11-21 DIAGNOSIS — I49.5 SICK SINUS SYNDROME (HCC): Primary | ICD-10-CM

## 2019-11-21 DIAGNOSIS — Z95.0 PRESENCE OF CARDIAC PACEMAKER: ICD-10-CM

## 2019-11-21 PROCEDURE — 93294 REM INTERROG EVL PM/LDLS PM: CPT | Performed by: NURSE PRACTITIONER

## 2019-11-21 PROCEDURE — 93296 REM INTERROG EVL PM/IDS: CPT | Performed by: NURSE PRACTITIONER

## 2019-11-21 NOTE — PROGRESS NOTES
REMOTE DEVICE INTERROGATION    Remote device interrogation is reviewed.     Device Company: [employer]    Battery Stable.  Time to SACHIN for years    Presenting EGM: Ventricular pacing with underlying atrial fibrillation    Arrhythmia Logbook Reviewed: PAF noted on sotalol and warfarin    Device function appears Stable    Continue remote device interrogations every 3 months.

## 2019-12-04 ENCOUNTER — ANTICOAGULATION VISIT (OUTPATIENT)
Dept: CARDIOLOGY | Facility: CLINIC | Age: 79
End: 2019-12-04

## 2019-12-04 DIAGNOSIS — I48.0 PAF (PAROXYSMAL ATRIAL FIBRILLATION) (HCC): ICD-10-CM

## 2019-12-04 LAB — INR PPP: 2.1 (ref 2–3)

## 2019-12-04 PROCEDURE — 85610 PROTHROMBIN TIME: CPT | Performed by: INTERNAL MEDICINE

## 2019-12-04 PROCEDURE — 36416 COLLJ CAPILLARY BLOOD SPEC: CPT | Performed by: INTERNAL MEDICINE

## 2019-12-16 RX ORDER — WARFARIN SODIUM 2 MG/1
2 TABLET ORAL EVERY 24 HOURS
Qty: 30 TABLET | Refills: 0 | Status: SHIPPED | OUTPATIENT
Start: 2019-12-16 | End: 2020-04-28

## 2020-01-08 ENCOUNTER — ANTICOAGULATION VISIT (OUTPATIENT)
Dept: CARDIOLOGY | Facility: CLINIC | Age: 80
End: 2020-01-08

## 2020-01-08 DIAGNOSIS — I48.0 PAF (PAROXYSMAL ATRIAL FIBRILLATION) (HCC): ICD-10-CM

## 2020-01-08 LAB — INR PPP: 2.1 (ref 2–3)

## 2020-01-08 PROCEDURE — 85610 PROTHROMBIN TIME: CPT | Performed by: INTERNAL MEDICINE

## 2020-01-08 PROCEDURE — 36416 COLLJ CAPILLARY BLOOD SPEC: CPT | Performed by: INTERNAL MEDICINE

## 2020-01-14 DIAGNOSIS — Z12.31 SCREENING MAMMOGRAM FOR HIGH-RISK PATIENT: Primary | ICD-10-CM

## 2020-01-21 ENCOUNTER — APPOINTMENT (OUTPATIENT)
Dept: MAMMOGRAPHY | Facility: HOSPITAL | Age: 80
End: 2020-01-21

## 2020-02-10 ENCOUNTER — HOSPITAL ENCOUNTER (OUTPATIENT)
Dept: MAMMOGRAPHY | Facility: HOSPITAL | Age: 80
Discharge: HOME | End: 2020-02-10
Admitting: NURSE PRACTITIONER

## 2020-02-10 DIAGNOSIS — Z12.31 SCREENING MAMMOGRAM FOR HIGH-RISK PATIENT: ICD-10-CM

## 2020-02-10 PROCEDURE — 77063 BREAST TOMOSYNTHESIS BI: CPT

## 2020-02-10 PROCEDURE — 77067 SCR MAMMO BI INCL CAD: CPT

## 2020-02-13 ENCOUNTER — ANTICOAGULATION VISIT (OUTPATIENT)
Dept: CARDIOLOGY | Facility: CLINIC | Age: 80
End: 2020-02-13

## 2020-02-13 DIAGNOSIS — I48.0 PAF (PAROXYSMAL ATRIAL FIBRILLATION) (HCC): ICD-10-CM

## 2020-02-13 PROBLEM — H35.373 EPIRETINAL MEMBRANE (ERM) OF BOTH EYES: Status: ACTIVE | Noted: 2020-02-13

## 2020-02-13 PROBLEM — H04.123 TEAR FILM INSUFFICIENCY, BILATERAL: Status: ACTIVE | Noted: 2020-02-13

## 2020-02-13 PROBLEM — H35.373 EPIRETINAL MEMBRANE, BILATERAL: Status: ACTIVE | Noted: 2020-02-13

## 2020-02-13 PROBLEM — Z96.1 PSEUDOPHAKIA, BOTH EYES: Status: ACTIVE | Noted: 2020-02-13

## 2020-02-13 LAB
INR PPP: 1.1 (ref 2–3)
INR PPP: 1.1 (ref 2–3)

## 2020-02-13 RX ORDER — ATORVASTATIN CALCIUM 10 MG/1
TABLET, FILM COATED ORAL
COMMUNITY
End: 2020-04-28

## 2020-02-13 RX ORDER — HYDRALAZINE HYDROCHLORIDE 10 MG/1
TABLET, FILM COATED ORAL
COMMUNITY
Start: 2020-01-03 | End: 2020-04-28

## 2020-02-20 ENCOUNTER — CLINICAL SUPPORT NO REQUIREMENTS (OUTPATIENT)
Dept: CARDIOLOGY | Facility: CLINIC | Age: 80
End: 2020-02-20

## 2020-02-20 DIAGNOSIS — Z95.0 PRESENCE OF CARDIAC PACEMAKER: ICD-10-CM

## 2020-02-20 DIAGNOSIS — I49.5 SICK SINUS SYNDROME (HCC): Primary | ICD-10-CM

## 2020-02-20 PROCEDURE — 93294 REM INTERROG EVL PM/LDLS PM: CPT | Performed by: NURSE PRACTITIONER

## 2020-02-20 PROCEDURE — 93296 REM INTERROG EVL PM/IDS: CPT | Performed by: NURSE PRACTITIONER

## 2020-02-24 ENCOUNTER — ANTICOAGULATION VISIT (OUTPATIENT)
Dept: CARDIOLOGY | Facility: CLINIC | Age: 80
End: 2020-02-24

## 2020-02-24 DIAGNOSIS — I48.0 PAF (PAROXYSMAL ATRIAL FIBRILLATION) (HCC): ICD-10-CM

## 2020-02-24 LAB — INR PPP: 1.8 (ref 2–3)

## 2020-02-24 PROCEDURE — 85610 PROTHROMBIN TIME: CPT | Performed by: NURSE PRACTITIONER

## 2020-02-24 PROCEDURE — 36416 COLLJ CAPILLARY BLOOD SPEC: CPT | Performed by: NURSE PRACTITIONER

## 2020-02-25 NOTE — PROGRESS NOTES
REMOTE DEVICE INTERROGATION    Received and reviewed on:   February 20, 2020    Remote device interrogation is reviewed.     Device Company: [employer]    Battery Stable.  Time to SACHIN 4.5 years    Presenting EGM: A paced V sensed    Arrhythmia Logbook Reviewed: Brief PAF noted.  Remains on sotalol and warfarin.    Device function appears Stable    Continue remote device interrogations every 3 months.

## 2020-02-26 ENCOUNTER — APPOINTMENT (OUTPATIENT)
Dept: LAB | Facility: HOSPITAL | Age: 80
End: 2020-02-26

## 2020-03-02 ENCOUNTER — ANTICOAGULATION VISIT (OUTPATIENT)
Dept: CARDIOLOGY | Facility: CLINIC | Age: 80
End: 2020-03-02

## 2020-03-02 DIAGNOSIS — I48.0 PAF (PAROXYSMAL ATRIAL FIBRILLATION) (HCC): ICD-10-CM

## 2020-03-02 LAB — INR PPP: 2.7 (ref 2–3)

## 2020-03-02 PROCEDURE — 85610 PROTHROMBIN TIME: CPT | Performed by: NURSE PRACTITIONER

## 2020-03-02 PROCEDURE — 36416 COLLJ CAPILLARY BLOOD SPEC: CPT | Performed by: NURSE PRACTITIONER

## 2020-03-09 NOTE — PROGRESS NOTES
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 Right-sided breast cancer diagnosed in .  • 09 - Bilateral screening digital mammogram - 6 mm irregular density with ill-defined margins in the right breast medial position.  This is new compared to the mammogram from 2008.  • 09 - Ultrasound of the right breast - 6 mm nodule in the six o’clock position in the right breast.   • 09 - Ultrasound localization of the nodule.  The previously seen nodule was not identified.    • 06/15/09 - Right breast mass excision/biopsy - infiltrating ductal carcinoma of the breast, 6 mm in size, margin of resection focally incomplete, tumor is ER+, CA+, HER-2/mitul positive by FISH, grade 3 histologic grade.     • 09 - Right breast lumpectomy with sentinel node dissection - negative for residual tumor, surgical margins free of tumor, 2 sentinel lymph nodes benign.  The patient was sent to the [facility] for further management.     • 09 - MUGA scan - normal.  Left ventricular ejection fraction at 74% and right ventricle ejection fraction at 51%.    • 08/10/09 - pet/ct scan - mass at the inferior aspect of the right breast measures 3.5 x 6.5 x 5 cm with a small rim of increased activity.  Most likely post surgical changes and the lump most likely due to hematoma/seroma.    • 09 - Treatment with Taxotere, Cytoxan and Herceptin q 3 weeks was started and she completed it on 10/29/09 after 4 cycles.  • 09 - Treatment with weekly Herceptin was started.  • 09 - MUGA scan,  normal left ventricular EF of 79%.  • 12/7/09 - 1/19/10 - Received radiation treatment to right breast.   • 2/12/10 - MUGA scan normal, with LVEF at 79%. RVES quantitative at 50%.  • 2/17/10 - Patient started Femara 2.5mg qd, but was stopped in a week secondary to unable to sleep.  • 3/17/10 - DEXA scan (N).  No osteoporosis.    • 3/20/10 - Arimidex was initiated.   • 5/14/10 - Whole body PET/CT scan, negative for metastatic disease, unremarkable.   • 6/4/10 - MUGA scan showed EF of 85%.   • 8/25/10 - Completed one year of Herceptin treatment.  • 12/1/10 - PET scan with no evidence suggestive of metastatic disease.    • 6/23/11 - Bilateral screening mammogram, benign.   • 11/30/11 - Excision of the right breast mass showed a breast biopsy activity with surrounding reactive fibrosis and chronic inflammation, negative for residual malignancy.   • 6/29/12 - Bilateral screening mammogram showed abnormal 25 mm mass in the central right breast 11 cm from the nipple that was completely evaluated. Ultrasound was recommended.   • 7/19/12 - Ultrasound of the right breast showed an oval hypoechoic area with some internal debris, most likely a cyst or post procedural or treatment related hematoma, seroma. Short interval follow-up is recommended.   • 11/2/12 - Ultrasound of the right breast showed persistent findings that were seen on the ultrasound done six months ago, but it looked a little bigger than before, and six month follow-up is recommended.   • 11/3/12 - CT scan of the chest, abdomen and pelvis negative for evidence of metastatic disease.   • 5/10/13 - Abnormal mammogram on the right breast, followed by ultrasound. The lesion at 5 o’clock appeared to develop between 2010 and 2012, with a soft tissue component since November 2012.   • May 2013 - Biopsy of the abnormal lesion seen no ultrasound was negative for malignancy.  • 8/20/14 - Bilateral screening mammogram benign, stable exam, with no signs of malignancy by  mammography.   • March 2015 - Patient completed five years of antihormonal therapy and was taken off the Arimidex.    • 12/4/15 - Bilateral screening mammogram showed stable exam without mammographic evidence of malignancy.  Recommend screening in one year.    • 12/13/17 - Decreased Lisinopril to 5 mg a day secondary to dizziness.   • 12/13/17 - Vitamin B12 300.  Methylmalonic acid 225.  Iron studies showed iron 65.  Iron binding capacity 352, normal.  Iron saturation normal at 18.  Haptoglobin normal.  Reticulocyte count normal, not elevated.  CMP showed creatinine of 0.7.  FOBT negative x 3.    2. Invasive bladder cancer diagnosed in March 2011 by Dr. Gupta, and received BCG treatments. Patient is undergoing every three months cystoscopy.   • 9/10/10 - was diagnosed urothelial carcinoma in situ on bladder wall biopsy.    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 Gupta.   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  Treatment with BCG the 3rd time.    • October 2014 - Patient underwent cystectomy.    • January 2015 - Patient underwent cystectomy with nephrostomy tube placement.    • December 2015 - Patient underwent right kidney nephrectomy.  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  No malignancy.        Past Medical History:   Diagnosis Date   • Essential hypertension 1/17/2017   • PAF (paroxysmal atrial fibrillation) (CMS/HCC) 6/26/2019   • Presence of cardiac pacemaker 11/03/2014    BS dual chamber PM placed 10/2014 with pocket revision 1/25/17.  HX tachy rob syndrome   • Sick sinus syndrome (CMS/HCC) 11/3/2014       Past Surgical History:   Procedure Laterality Date   • BREAST LUMPECTOMY     • HYSTERECTOMY           Current Outpatient Medications:   •  acetaminophen (TYLENOL) 325 MG tablet, Take 650 mg by  "mouth Every 6 (Six) Hours As Needed for Mild Pain ., Disp: , Rfl:   •  cyanocobalamin 1000 MCG/ML injection, Inject 1 mL into the appropriate muscle as directed by prescriber Every 28 (Twenty-Eight) Days., Disp: 3 mL, Rfl: 1  •  lansoprazole (PREVACID) 30 MG capsule, Take 30 mg by mouth Daily., Disp: , Rfl:   •  Magnesium 400 MG capsule, Daily., Disp: , Rfl:   •  sotalol (BETAPACE) 120 MG tablet, Take 1 tablet by mouth Every 12 (Twelve) Hours., Disp: 180 tablet, Rfl: 3  •  topiramate (TOPAMAX) 100 MG tablet, Take 100 mg by mouth 2 (Two) Times a Day., Disp: , Rfl:   •  valsartan-hydrochlorothiazide (DIOVAN HCT) 320-12.5 MG per tablet, Take 1 tablet by mouth Daily., Disp: , Rfl:   •  verapamil SR (CALAN-SR) 180 MG CR tablet, Take 1 tablet by mouth 2 (Two) Times a Day., Disp: 180 tablet, Rfl: 3  •  warfarin (COUMADIN) 2 MG tablet, TAKE 1 TABLET BY MOUTH DAILY, Disp: 30 tablet, Rfl: 0  •  warfarin (COUMADIN) 2 MG tablet, TAKE 1 TABLET BY MOUTH DAILY, Disp: 30 tablet, Rfl: 0  •  atorvastatin (LIPITOR) 10 MG tablet, , Disp: , Rfl:   •  B-D DISP NEEDLE 25GX1\" 25G X 1\" misc, , Disp: , Rfl:   •  Calcium Carbonate-Vit D-Min (CALCIUM 1200 PO), Take  by mouth Daily., Disp: , Rfl:   •  Cholecalciferol (VITAMIN D3) 2000 units tablet, Take  by mouth., Disp: , Rfl:   •  Cyanocobalamin (VITAMIN B-12 IJ), Inject  as directed Every 30 (Thirty) Days., Disp: , Rfl:   •  fexofenadine (ALLEGRA) 180 MG tablet, Take 180 mg by mouth Daily., Disp: , Rfl:   •  furosemide (LASIX) 20 MG tablet, Daily., Disp: , Rfl:   •  hydrALAZINE (APRESOLINE) 10 MG tablet, , Disp: , Rfl:   •  HYDROcodone-acetaminophen (NORCO) 5-325 MG per tablet, Take 1 tablet by mouth Every 6 (Six) Hours As Needed for Moderate Pain ., Disp: 10 tablet, Rfl: 0  •  Polyethylene Glycol 3350 (MIRALAX PO), MIRALAX POWD, Disp: , Rfl:   •  psyllium (METAMUCIL) 0.52 g capsule, METAMUCIL CAPSULE, Disp: , Rfl:   •  Valerian 450 MG capsule, Take 450 mg by mouth Every Night., Disp: , Rfl: "     Allergies   Allergen Reactions   • Amoxicillin Shortness Of Breath   • Ciprofloxacin Hives   • Penicillins Rash   • Sulfa Antibiotics Hives   • Cephalexin Other (See Comments)   • Clavulanic Acid Other (See Comments)   • Codeine Other (See Comments)   • Contrast Dye Other (See Comments)   • Doxycycline Other (See Comments)   • Fluconazole Other (See Comments)   • Iodine Hives   • Macrobid [Nitrofurantoin] Hives   • Tobramycin Other (See Comments)   • Trimethoprim Other (See Comments)       No family history on file.    Cancer-related family history is not on file.    Social History     Tobacco Use   • Smoking status: Never Smoker   Substance Use Topics   • Alcohol use: Not on file   • Drug use: Not on file       I have reviewed the history of present illness, past medical history, family history, social history, lab results, all notes and other records since the patient was last seen at the cancer care center.  SUBJECTIVE:  Patient is my office for follow-up of her history of breast cancer.    She reports to neuropathy in her feet and fingertips.  She reports she is not diabetic anymore after weight loss.  No fevers night sweats        REVIEW OF SYSTEMS:  Review of Systems   Constitutional: Negative for fever.   HENT: Negative for nosebleeds and trouble swallowing.    Eyes: Negative for visual disturbance.   Respiratory: Negative for cough, shortness of breath and wheezing.    Cardiovascular: Negative for chest pain.   Gastrointestinal: Negative for abdominal pain and blood in stool.   Endocrine: Negative for cold intolerance.   Genitourinary: Negative for dysuria and hematuria.   Musculoskeletal: Negative for joint swelling.   Skin: Negative for rash.   Allergic/Immunologic: Negative for environmental allergies.   Neurological: Negative for seizures.   Hematological: Does not bruise/bleed easily.   Psychiatric/Behavioral: The patient is not nervous/anxious.        OBJECTIVE:    Vitals:    03/10/20 1330   BP:  "152/80   Pulse: 60   Resp: 20   Temp: 98.3 °F (36.8 °C)   Weight: 71.8 kg (158 lb 6.4 oz)   Height: 165.1 cm (65\")     Body mass index is 26.36 kg/m².        Physical Exam   Constitutional: She is oriented to person, place, and time. No distress.   Well-built well-nourished   HENT:   Head: Normocephalic and atraumatic.   Eyes: Conjunctivae and EOM are normal. Right eye exhibits no discharge. Left eye exhibits no discharge. No scleral icterus.   Neck: Normal range of motion. Neck supple. No thyromegaly present.   Cardiovascular: Normal rate, regular rhythm and normal heart sounds. Exam reveals no gallop and no friction rub.   Pulmonary/Chest: Effort normal. No stridor. No respiratory distress. She has no wheezes.   Abdominal: Soft. Bowel sounds are normal. She exhibits no mass. There is no tenderness. There is no rebound and no guarding.   Right-sided urostomy bag   Musculoskeletal: Normal range of motion. She exhibits no tenderness.   Lymphadenopathy:     She has no cervical adenopathy.   Neurological: She is alert and oriented to person, place, and time. She exhibits normal muscle tone.   Skin: Skin is warm. No rash noted. She is not diaphoretic. No erythema.   Psychiatric: She has a normal mood and affect. Her behavior is normal.   Nursing note and vitals reviewed.      RECENT LABS  WBC   Date Value Ref Range Status   03/10/2020 7.12 3.40 - 10.80 10*3/mm3 Final     RBC   Date Value Ref Range Status   03/10/2020 4.68 3.77 - 5.28 10*6/mm3 Final     Hemoglobin   Date Value Ref Range Status   03/10/2020 14.7 12.0 - 15.9 g/dL Final     Hematocrit   Date Value Ref Range Status   03/10/2020 46.3 34.0 - 46.6 % Final     MCV   Date Value Ref Range Status   03/10/2020 98.9 (H) 79.0 - 97.0 fL Final     MCH   Date Value Ref Range Status   03/10/2020 31.4 26.6 - 33.0 pg Final     MCHC   Date Value Ref Range Status   03/10/2020 31.7 31.5 - 35.7 g/dL Final     RDW   Date Value Ref Range Status   03/10/2020 14.1 12.3 - 15.4 % " Final     RDW-SD   Date Value Ref Range Status   03/10/2020 49.8 37.0 - 54.0 fl Final     MPV   Date Value Ref Range Status   03/10/2020 8.9 6.0 - 12.0 fL Final     Platelets   Date Value Ref Range Status   03/10/2020 228 140 - 450 10*3/mm3 Final     Neutrophil %   Date Value Ref Range Status   03/10/2020 71.8 42.7 - 76.0 % Final     Lymphocyte %   Date Value Ref Range Status   03/10/2020 20.2 19.6 - 45.3 % Final     Monocyte %   Date Value Ref Range Status   03/10/2020 7.2 5.0 - 12.0 % Final     Eosinophil %   Date Value Ref Range Status   03/10/2020 0.7 0.3 - 6.2 % Final     Basophil %   Date Value Ref Range Status   03/10/2020 0.1 0.0 - 1.5 % Final     Neutrophils, Absolute   Date Value Ref Range Status   03/10/2020 5.11 1.70 - 7.00 10*3/mm3 Final     Lymphocytes, Absolute   Date Value Ref Range Status   03/10/2020 1.44 0.70 - 3.10 10*3/mm3 Final     Monocytes, Absolute   Date Value Ref Range Status   03/10/2020 0.51 0.10 - 0.90 10*3/mm3 Final     Eosinophils, Absolute   Date Value Ref Range Status   03/10/2020 0.05 0.00 - 0.40 10*3/mm3 Final     Basophils, Absolute   Date Value Ref Range Status   03/10/2020 0.01 0.00 - 0.20 10*3/mm3 Final     nRBC   Date Value Ref Range Status   01/21/2017 0 0 /100[WBCs] Final       Lab Results   Component Value Date    GLUCOSE 135 (H) 09/04/2018    BUN 40 (H) 09/04/2018    CREATININE 1.1 (H) 09/04/2018    EGFRIFAFRI 48 (L) 03/11/2019    BCR 36.4 (H) 09/04/2018    K 4.4 09/04/2018    CO2 22 09/04/2018    CALCIUM 9.7 09/04/2018    ALBUMIN 3.8 01/19/2018    LABIL2 1.4 01/19/2018    AST 25 01/19/2018    ALT 15 01/19/2018         Assessment/Plan     There are no diagnoses linked to this encounter.    ASSESSMENT:  1. Right breast infiltrating ductal carcinoma ER positive MT positive HER-2/mitul positive history of  2. macrocytosis.   3. Invasive bladder carcinoma history of  4. Diabetic peripheral neuropathy  5. ECOG 1    PLAN:  1. I reviewed the CBC results with the patient CBC is  within normal range except elevated MCV.  Will observe at this time patient to continue multivitamin tablet with vitamin B12 and iron in it.  2. Patient undergoes MRI exam after her cystectomy and a right nephrectomy.  She follows with Dr. Gupta.  3. Check CMP CA-27-29  4. Continue to use Topamax for peripheral neuropathy she reports that it helps her during nighttime  5. I will see her back in my office in 1 year recheck CBC CMP and CA-27-29 at that time.  Electronically signed by Jammie Chairez MD, 03/10/20, 1:54 PM.           I have reviewed labs results, imaging, vitals, and medications with the patient today.    I counselled Hazel of the risks of continuing to use tobacco and cessation.    During this visit, I spent 3-10 minutes counseling the patient regarding tobacco cessation.     Patient verbalized understanding and is in agreement of the above plan.

## 2020-03-10 ENCOUNTER — OFFICE VISIT (OUTPATIENT)
Dept: ONCOLOGY | Facility: CLINIC | Age: 80
End: 2020-03-10

## 2020-03-10 ENCOUNTER — OFFICE VISIT (OUTPATIENT)
Dept: LAB | Facility: HOSPITAL | Age: 80
End: 2020-03-10

## 2020-03-10 VITALS
DIASTOLIC BLOOD PRESSURE: 80 MMHG | WEIGHT: 158.4 LBS | SYSTOLIC BLOOD PRESSURE: 152 MMHG | HEART RATE: 60 BPM | BODY MASS INDEX: 26.39 KG/M2 | TEMPERATURE: 98.3 F | HEIGHT: 65 IN | RESPIRATION RATE: 20 BRPM

## 2020-03-10 DIAGNOSIS — I48.0 PAF (PAROXYSMAL ATRIAL FIBRILLATION) (HCC): Primary | ICD-10-CM

## 2020-03-10 DIAGNOSIS — Z85.3 HISTORY OF RIGHT BREAST CANCER: Primary | ICD-10-CM

## 2020-03-10 LAB
BASOPHILS # BLD AUTO: 0.01 10*3/MM3 (ref 0–0.2)
BASOPHILS NFR BLD AUTO: 0.1 % (ref 0–1.5)
DEPRECATED RDW RBC AUTO: 49.8 FL (ref 37–54)
EOSINOPHIL # BLD AUTO: 0.05 10*3/MM3 (ref 0–0.4)
EOSINOPHIL NFR BLD AUTO: 0.7 % (ref 0.3–6.2)
ERYTHROCYTE [DISTWIDTH] IN BLOOD BY AUTOMATED COUNT: 14.1 % (ref 12.3–15.4)
HCT VFR BLD AUTO: 46.3 % (ref 34–46.6)
HGB BLD-MCNC: 14.7 G/DL (ref 12–15.9)
LYMPHOCYTES # BLD AUTO: 1.44 10*3/MM3 (ref 0.7–3.1)
LYMPHOCYTES NFR BLD AUTO: 20.2 % (ref 19.6–45.3)
MCH RBC QN AUTO: 31.4 PG (ref 26.6–33)
MCHC RBC AUTO-ENTMCNC: 31.7 G/DL (ref 31.5–35.7)
MCV RBC AUTO: 98.9 FL (ref 79–97)
MONOCYTES # BLD AUTO: 0.51 10*3/MM3 (ref 0.1–0.9)
MONOCYTES NFR BLD AUTO: 7.2 % (ref 5–12)
NEUTROPHILS # BLD AUTO: 5.11 10*3/MM3 (ref 1.7–7)
NEUTROPHILS NFR BLD AUTO: 71.8 % (ref 42.7–76)
PLATELET # BLD AUTO: 228 10*3/MM3 (ref 140–450)
PMV BLD AUTO: 8.9 FL (ref 6–12)
RBC # BLD AUTO: 4.68 10*6/MM3 (ref 3.77–5.28)
WBC NRBC COR # BLD: 7.12 10*3/MM3 (ref 3.4–10.8)

## 2020-03-10 PROCEDURE — 85025 COMPLETE CBC W/AUTO DIFF WBC: CPT

## 2020-03-10 PROCEDURE — 36415 COLL VENOUS BLD VENIPUNCTURE: CPT

## 2020-03-10 PROCEDURE — 99214 OFFICE O/P EST MOD 30 MIN: CPT | Performed by: INTERNAL MEDICINE

## 2020-03-13 ENCOUNTER — HOSPITAL ENCOUNTER (OUTPATIENT)
Dept: CT IMAGING | Facility: HOSPITAL | Age: 80
Discharge: HOME | End: 2020-03-13
Admitting: UROLOGY

## 2020-03-13 DIAGNOSIS — C67.9 MALIGNANT NEOPLASM OF URINARY BLADDER, UNSPECIFIED SITE (HCC): ICD-10-CM

## 2020-03-13 LAB — CREAT BLDA-MCNC: 1.2 MG/DL (ref 0.6–1.3)

## 2020-03-13 PROCEDURE — 74178 CT ABD&PLV WO CNTR FLWD CNTR: CPT

## 2020-03-13 PROCEDURE — 0 IOPAMIDOL PER 1 ML: Performed by: UROLOGY

## 2020-03-13 PROCEDURE — 82565 ASSAY OF CREATININE: CPT

## 2020-03-13 RX ADMIN — IOPAMIDOL 50 ML: 755 INJECTION, SOLUTION INTRAVENOUS at 15:03

## 2020-03-21 DIAGNOSIS — E53.8 B12 DEFICIENCY: ICD-10-CM

## 2020-03-23 RX ORDER — CYANOCOBALAMIN 1000 UG/ML
INJECTION, SOLUTION INTRAMUSCULAR; SUBCUTANEOUS
Qty: 3 ML | Refills: 1 | Status: SHIPPED | OUTPATIENT
Start: 2020-03-23 | End: 2020-04-28

## 2020-03-24 RX ORDER — WARFARIN SODIUM 2 MG/1
2 TABLET ORAL EVERY 24 HOURS
Qty: 30 TABLET | Refills: 5 | Status: SHIPPED | OUTPATIENT
Start: 2020-03-24 | End: 2020-05-01 | Stop reason: HOSPADM

## 2020-03-30 ENCOUNTER — TELEPHONE (OUTPATIENT)
Dept: CARDIOLOGY | Facility: CLINIC | Age: 80
End: 2020-03-30

## 2020-03-30 NOTE — TELEPHONE ENCOUNTER
Patient discussed that she was noted to have atrial fibrillation on home pacemaker monitor.  She remains on sotalol, warfarin and verapamil.  She reports she has been feeling well.  We will continue current treatment if anything changes have asked her to contact the office

## 2020-04-28 ENCOUNTER — HOSPITAL ENCOUNTER (OUTPATIENT)
Facility: HOSPITAL | Age: 80
Setting detail: OBSERVATION
Discharge: HOME HEALTH | End: 2020-05-01
Attending: HOSPITALIST | Admitting: HOSPITALIST

## 2020-04-28 ENCOUNTER — APPOINTMENT (OUTPATIENT)
Dept: GENERAL RADIOLOGY | Facility: HOSPITAL | Age: 80
End: 2020-04-28

## 2020-04-28 DIAGNOSIS — I48.91 ATRIAL FIBRILLATION, UNSPECIFIED TYPE (HCC): ICD-10-CM

## 2020-04-28 DIAGNOSIS — B96.20 E. COLI UTI (URINARY TRACT INFECTION): ICD-10-CM

## 2020-04-28 DIAGNOSIS — S20.212A CONTUSION OF LEFT CHEST WALL, INITIAL ENCOUNTER: ICD-10-CM

## 2020-04-28 DIAGNOSIS — R07.9 CHEST PAIN, UNSPECIFIED TYPE: Primary | ICD-10-CM
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
## 2020-04-28 DIAGNOSIS — N39.0 E. COLI UTI (URINARY TRACT INFECTION): ICD-10-CM

## 2020-04-28 PROBLEM — I48.0 PAF (PAROXYSMAL ATRIAL FIBRILLATION): Chronic | Status: ACTIVE | Noted: 2019-06-26

## 2020-04-28 PROBLEM — I10 ESSENTIAL HYPERTENSION: Chronic | Status: ACTIVE | Noted: 2017-01-17

## 2020-04-28 PROBLEM — E78.5 DYSLIPIDEMIA: Chronic | Status: ACTIVE | Noted: 2017-01-17

## 2020-04-28 LAB
ANION GAP SERPL CALCULATED.3IONS-SCNC: 14 MMOL/L (ref 5–15)
APTT PPP: 41.3 SECONDS (ref 24–31)
BASOPHILS # BLD AUTO: 0.1 10*3/MM3 (ref 0–0.2)
BASOPHILS NFR BLD AUTO: 0.7 % (ref 0–1.5)
BUN BLD-MCNC: 32 MG/DL (ref 8–23)
BUN/CREAT SERPL: 29.4 (ref 7–25)
CALCIUM SPEC-SCNC: 9.4 MG/DL (ref 8.6–10.5)
CHLORIDE SERPL-SCNC: 107 MMOL/L (ref 98–107)
CO2 SERPL-SCNC: 23 MMOL/L (ref 22–29)
CREAT BLD-MCNC: 1.09 MG/DL (ref 0.57–1)
DEPRECATED RDW RBC AUTO: 54.7 FL (ref 37–54)
EOSINOPHIL # BLD AUTO: 0.1 10*3/MM3 (ref 0–0.4)
EOSINOPHIL NFR BLD AUTO: 0.9 % (ref 0.3–6.2)
ERYTHROCYTE [DISTWIDTH] IN BLOOD BY AUTOMATED COUNT: 15.8 % (ref 12.3–15.4)
GFR SERPL CREATININE-BSD FRML MDRD: 48 ML/MIN/1.73
GLUCOSE BLD-MCNC: 119 MG/DL (ref 65–99)
HCT VFR BLD AUTO: 39.4 % (ref 34–46.6)
HGB BLD-MCNC: 13.2 G/DL (ref 12–15.9)
INR PPP: 3.95 (ref 2–3)
LYMPHOCYTES # BLD AUTO: 1.3 10*3/MM3 (ref 0.7–3.1)
LYMPHOCYTES NFR BLD AUTO: 12.3 % (ref 19.6–45.3)
MAGNESIUM SERPL-MCNC: 2.3 MG/DL (ref 1.6–2.4)
MCH RBC QN AUTO: 32.7 PG (ref 26.6–33)
MCHC RBC AUTO-ENTMCNC: 33.5 G/DL (ref 31.5–35.7)
MCV RBC AUTO: 97.7 FL (ref 79–97)
MONOCYTES # BLD AUTO: 0.9 10*3/MM3 (ref 0.1–0.9)
MONOCYTES NFR BLD AUTO: 8.8 % (ref 5–12)
NEUTROPHILS # BLD AUTO: 7.9 10*3/MM3 (ref 1.7–7)
NEUTROPHILS NFR BLD AUTO: 77.3 % (ref 42.7–76)
NRBC BLD AUTO-RTO: 0 /100 WBC (ref 0–0.2)
NT-PROBNP SERPL-MCNC: 914 PG/ML (ref 5–1800)
PLATELET # BLD AUTO: 248 10*3/MM3 (ref 140–450)
PMV BLD AUTO: 7.2 FL (ref 6–12)
POTASSIUM BLD-SCNC: 4.1 MMOL/L (ref 3.5–5.2)
PROTHROMBIN TIME: 37.1 SECONDS (ref 19.4–28.5)
RBC # BLD AUTO: 4.04 10*6/MM3 (ref 3.77–5.28)
SODIUM BLD-SCNC: 144 MMOL/L (ref 136–145)
TROPONIN T SERPL-MCNC: <0.01 NG/ML (ref 0–0.03)
TROPONIN T SERPL-MCNC: <0.01 NG/ML (ref 0–0.03)
WBC NRBC COR # BLD: 10.3 10*3/MM3 (ref 3.4–10.8)

## 2020-04-28 PROCEDURE — 99285 EMERGENCY DEPT VISIT HI MDM: CPT

## 2020-04-28 PROCEDURE — 85610 PROTHROMBIN TIME: CPT | Performed by: NURSE PRACTITIONER

## 2020-04-28 PROCEDURE — 99220 PR INITIAL OBSERVATION CARE/DAY 70 MINUTES: CPT | Performed by: HOSPITALIST

## 2020-04-28 PROCEDURE — 96365 THER/PROPH/DIAG IV INF INIT: CPT

## 2020-04-28 PROCEDURE — 71101 X-RAY EXAM UNILAT RIBS/CHEST: CPT

## 2020-04-28 PROCEDURE — 93005 ELECTROCARDIOGRAM TRACING: CPT | Performed by: HOSPITALIST

## 2020-04-28 PROCEDURE — 84484 ASSAY OF TROPONIN QUANT: CPT | Performed by: NURSE PRACTITIONER

## 2020-04-28 PROCEDURE — 96366 THER/PROPH/DIAG IV INF ADDON: CPT

## 2020-04-28 PROCEDURE — 93005 ELECTROCARDIOGRAM TRACING: CPT

## 2020-04-28 PROCEDURE — 84484 ASSAY OF TROPONIN QUANT: CPT | Performed by: PHYSICIAN ASSISTANT

## 2020-04-28 PROCEDURE — G0378 HOSPITAL OBSERVATION PER HR: HCPCS

## 2020-04-28 PROCEDURE — 83735 ASSAY OF MAGNESIUM: CPT | Performed by: NURSE PRACTITIONER

## 2020-04-28 PROCEDURE — 83880 ASSAY OF NATRIURETIC PEPTIDE: CPT | Performed by: NURSE PRACTITIONER

## 2020-04-28 PROCEDURE — 80048 BASIC METABOLIC PNL TOTAL CA: CPT | Performed by: NURSE PRACTITIONER

## 2020-04-28 PROCEDURE — 96368 THER/DIAG CONCURRENT INF: CPT

## 2020-04-28 PROCEDURE — 85730 THROMBOPLASTIN TIME PARTIAL: CPT | Performed by: NURSE PRACTITIONER

## 2020-04-28 PROCEDURE — 99214 OFFICE O/P EST MOD 30 MIN: CPT | Performed by: INTERNAL MEDICINE

## 2020-04-28 PROCEDURE — 85025 COMPLETE CBC W/AUTO DIFF WBC: CPT | Performed by: NURSE PRACTITIONER

## 2020-04-28 RX ORDER — SOTALOL HYDROCHLORIDE 120 MG/1
120 TABLET ORAL EVERY 12 HOURS SCHEDULED
Status: DISCONTINUED | OUTPATIENT
Start: 2020-04-28 | End: 2020-05-01 | Stop reason: HOSPADM

## 2020-04-28 RX ORDER — ALUMINA, MAGNESIA, AND SIMETHICONE 2400; 2400; 240 MG/30ML; MG/30ML; MG/30ML
15 SUSPENSION ORAL EVERY 6 HOURS PRN
Status: DISCONTINUED | OUTPATIENT
Start: 2020-04-28 | End: 2020-05-01 | Stop reason: HOSPADM

## 2020-04-28 RX ORDER — SODIUM CHLORIDE 0.9 % (FLUSH) 0.9 %
10 SYRINGE (ML) INJECTION AS NEEDED
Status: DISCONTINUED | OUTPATIENT
Start: 2020-04-28 | End: 2020-05-01 | Stop reason: HOSPADM

## 2020-04-28 RX ORDER — FUROSEMIDE 20 MG/1
20 TABLET ORAL AS NEEDED
COMMUNITY
End: 2020-06-01

## 2020-04-28 RX ORDER — ONDANSETRON 2 MG/ML
4 INJECTION INTRAMUSCULAR; INTRAVENOUS EVERY 6 HOURS PRN
Status: DISCONTINUED | OUTPATIENT
Start: 2020-04-28 | End: 2020-05-01 | Stop reason: HOSPADM

## 2020-04-28 RX ORDER — DILTIAZEM HCL IN NACL,ISO-OSM 125 MG/125
5-15 PLASTIC BAG, INJECTION (ML) INTRAVENOUS
Status: DISCONTINUED | OUTPATIENT
Start: 2020-04-28 | End: 2020-04-29

## 2020-04-28 RX ORDER — MELATONIN
2000 DAILY
Status: DISCONTINUED | OUTPATIENT
Start: 2020-04-28 | End: 2020-05-01 | Stop reason: HOSPADM

## 2020-04-28 RX ORDER — ONDANSETRON 4 MG/1
4 TABLET, FILM COATED ORAL EVERY 6 HOURS PRN
Status: DISCONTINUED | OUTPATIENT
Start: 2020-04-28 | End: 2020-05-01 | Stop reason: HOSPADM

## 2020-04-28 RX ORDER — BISACODYL 10 MG
10 SUPPOSITORY, RECTAL RECTAL DAILY PRN
Status: DISCONTINUED | OUTPATIENT
Start: 2020-04-28 | End: 2020-05-01 | Stop reason: HOSPADM

## 2020-04-28 RX ORDER — MAGNESIUM SULFATE HEPTAHYDRATE 40 MG/ML
2 INJECTION, SOLUTION INTRAVENOUS AS NEEDED
Status: DISCONTINUED | OUTPATIENT
Start: 2020-04-28 | End: 2020-05-01 | Stop reason: HOSPADM

## 2020-04-28 RX ORDER — MAGNESIUM SULFATE 1 G/100ML
1 INJECTION INTRAVENOUS AS NEEDED
Status: DISCONTINUED | OUTPATIENT
Start: 2020-04-28 | End: 2020-05-01 | Stop reason: HOSPADM

## 2020-04-28 RX ORDER — PANTOPRAZOLE SODIUM 40 MG/1
40 TABLET, DELAYED RELEASE ORAL EVERY MORNING
Status: DISCONTINUED | OUTPATIENT
Start: 2020-04-29 | End: 2020-05-01 | Stop reason: HOSPADM

## 2020-04-28 RX ORDER — CHOLECALCIFEROL (VITAMIN D3) 125 MCG
5 CAPSULE ORAL NIGHTLY PRN
Status: DISCONTINUED | OUTPATIENT
Start: 2020-04-28 | End: 2020-05-01 | Stop reason: HOSPADM

## 2020-04-28 RX ORDER — NITROGLYCERIN 0.4 MG/1
0.4 TABLET SUBLINGUAL
Status: COMPLETED | OUTPATIENT
Start: 2020-04-28 | End: 2020-04-28

## 2020-04-28 RX ORDER — NITROGLYCERIN 20 MG/100ML
5-200 INJECTION INTRAVENOUS
Status: DISCONTINUED | OUTPATIENT
Start: 2020-04-28 | End: 2020-04-29

## 2020-04-28 RX ORDER — ACETAMINOPHEN 325 MG/1
650 TABLET ORAL EVERY 6 HOURS PRN
Status: DISCONTINUED | OUTPATIENT
Start: 2020-04-28 | End: 2020-05-01 | Stop reason: HOSPADM

## 2020-04-28 RX ORDER — CETIRIZINE HYDROCHLORIDE 10 MG/1
10 TABLET ORAL DAILY
Status: DISCONTINUED | OUTPATIENT
Start: 2020-04-28 | End: 2020-04-28

## 2020-04-28 RX ORDER — POTASSIUM CHLORIDE 20 MEQ/1
40 TABLET, EXTENDED RELEASE ORAL AS NEEDED
Status: DISCONTINUED | OUTPATIENT
Start: 2020-04-28 | End: 2020-05-01 | Stop reason: HOSPADM

## 2020-04-28 RX ORDER — POLYETHYLENE GLYCOL 3350 17 G/17G
17 POWDER, FOR SOLUTION ORAL DAILY PRN
COMMUNITY
End: 2021-05-17

## 2020-04-28 RX ORDER — ACETAMINOPHEN 325 MG/1
650 TABLET ORAL EVERY 4 HOURS PRN
Status: DISCONTINUED | OUTPATIENT
Start: 2020-04-28 | End: 2020-05-01 | Stop reason: HOSPADM

## 2020-04-28 RX ORDER — POLYETHYLENE GLYCOL 3350 17 G/17G
17 POWDER, FOR SOLUTION ORAL DAILY PRN
Status: DISCONTINUED | OUTPATIENT
Start: 2020-04-28 | End: 2020-05-01 | Stop reason: HOSPADM

## 2020-04-28 RX ORDER — ACETAMINOPHEN 160 MG/5ML
650 SOLUTION ORAL EVERY 4 HOURS PRN
Status: DISCONTINUED | OUTPATIENT
Start: 2020-04-28 | End: 2020-05-01 | Stop reason: HOSPADM

## 2020-04-28 RX ORDER — ACETAMINOPHEN 650 MG/1
650 SUPPOSITORY RECTAL EVERY 4 HOURS PRN
Status: DISCONTINUED | OUTPATIENT
Start: 2020-04-28 | End: 2020-05-01 | Stop reason: HOSPADM

## 2020-04-28 RX ORDER — SODIUM CHLORIDE 0.9 % (FLUSH) 0.9 %
10 SYRINGE (ML) INJECTION EVERY 12 HOURS SCHEDULED
Status: DISCONTINUED | OUTPATIENT
Start: 2020-04-28 | End: 2020-05-01 | Stop reason: HOSPADM

## 2020-04-28 RX ORDER — FUROSEMIDE 20 MG/1
20 TABLET ORAL DAILY
Status: DISCONTINUED | OUTPATIENT
Start: 2020-04-28 | End: 2020-04-29

## 2020-04-28 RX ORDER — NITROGLYCERIN 0.4 MG/1
0.4 TABLET SUBLINGUAL
Status: DISCONTINUED | OUTPATIENT
Start: 2020-04-28 | End: 2020-05-01 | Stop reason: HOSPADM

## 2020-04-28 RX ORDER — TOPIRAMATE 100 MG/1
100 TABLET, FILM COATED ORAL NIGHTLY
Status: DISCONTINUED | OUTPATIENT
Start: 2020-04-28 | End: 2020-05-01 | Stop reason: HOSPADM

## 2020-04-28 RX ORDER — CALCIUM POLYCARBOPHIL 625 MG
625 TABLET ORAL DAILY PRN
Status: DISCONTINUED | OUTPATIENT
Start: 2020-04-28 | End: 2020-05-01 | Stop reason: HOSPADM

## 2020-04-28 RX ORDER — WARFARIN SODIUM 2 MG/1
2 TABLET ORAL EVERY 24 HOURS
Status: DISCONTINUED | OUTPATIENT
Start: 2020-04-28 | End: 2020-04-28

## 2020-04-28 RX ORDER — LIDOCAINE 50 MG/G
1 PATCH TOPICAL
Status: DISCONTINUED | OUTPATIENT
Start: 2020-04-28 | End: 2020-05-01 | Stop reason: HOSPADM

## 2020-04-28 RX ADMIN — LIDOCAINE 1 PATCH: 50 PATCH TOPICAL at 20:38

## 2020-04-28 RX ADMIN — NITROGLYCERIN 0.4 MG: 0.4 TABLET SUBLINGUAL at 11:06

## 2020-04-28 RX ADMIN — SOTALOL HYDROCHLORIDE 120 MG: 120 TABLET ORAL at 20:38

## 2020-04-28 RX ADMIN — CETIRIZINE HYDROCHLORIDE 10 MG: 10 TABLET, FILM COATED ORAL at 15:23

## 2020-04-28 RX ADMIN — Medication 10 ML: at 20:38

## 2020-04-28 RX ADMIN — DILTIAZEM HYDROCHLORIDE 5 MG/HR: 5 INJECTION INTRAVENOUS at 20:39

## 2020-04-28 RX ADMIN — MELATONIN 2000 UNITS: at 15:22

## 2020-04-28 RX ADMIN — OYSTER SHELL CALCIUM WITH VITAMIN D 1 TABLET: 500; 200 TABLET, FILM COATED ORAL at 15:22

## 2020-04-28 RX ADMIN — NITROGLYCERIN 5 MCG/MIN: 20 INJECTION INTRAVENOUS at 12:01

## 2020-04-28 RX ADMIN — FUROSEMIDE 20 MG: 20 TABLET ORAL at 15:23

## 2020-04-28 RX ADMIN — TOPIRAMATE 100 MG: 100 TABLET, FILM COATED ORAL at 20:38

## 2020-04-28 RX ADMIN — NITROGLYCERIN 0.4 MG: 0.4 TABLET SUBLINGUAL at 11:25

## 2020-04-28 RX ADMIN — HYDROCHLOROTHIAZIDE: 12.5 TABLET ORAL at 15:22

## 2020-04-28 RX ADMIN — MAGNESIUM OXIDE TAB 400 MG (241.3 MG ELEMENTAL MG) 400 MG: 400 (241.3 MG) TAB at 20:38

## 2020-04-28 RX ADMIN — NITROGLYCERIN 0.4 MG: 0.4 TABLET SUBLINGUAL at 11:00

## 2020-04-29 ENCOUNTER — APPOINTMENT (OUTPATIENT)
Dept: CARDIOLOGY | Facility: HOSPITAL | Age: 80
End: 2020-04-29

## 2020-04-29 ENCOUNTER — APPOINTMENT (OUTPATIENT)
Dept: GENERAL RADIOLOGY | Facility: HOSPITAL | Age: 80
End: 2020-04-29

## 2020-04-29 PROBLEM — T45.511A COUMADIN TOXICITY, ACCIDENTAL OR UNINTENTIONAL, INITIAL ENCOUNTER: Status: ACTIVE | Noted: 2020-04-29

## 2020-04-29 LAB
ANION GAP SERPL CALCULATED.3IONS-SCNC: 13 MMOL/L (ref 5–15)
BACTERIA UR QL AUTO: ABNORMAL /HPF
BASOPHILS # BLD AUTO: 0.2 10*3/MM3 (ref 0–0.2)
BASOPHILS NFR BLD AUTO: 1.4 % (ref 0–1.5)
BILIRUB UR QL STRIP: NEGATIVE
BUN BLD-MCNC: 28 MG/DL (ref 8–23)
BUN/CREAT SERPL: 25 (ref 7–25)
CALCIUM SPEC-SCNC: 9.7 MG/DL (ref 8.6–10.5)
CHLORIDE SERPL-SCNC: 105 MMOL/L (ref 98–107)
CLARITY UR: ABNORMAL
CO2 SERPL-SCNC: 23 MMOL/L (ref 22–29)
COLOR UR: YELLOW
CREAT BLD-MCNC: 1.12 MG/DL (ref 0.57–1)
DEPRECATED RDW RBC AUTO: 52.5 FL (ref 37–54)
EOSINOPHIL # BLD AUTO: 0 10*3/MM3 (ref 0–0.4)
EOSINOPHIL NFR BLD AUTO: 0.2 % (ref 0.3–6.2)
ERYTHROCYTE [DISTWIDTH] IN BLOOD BY AUTOMATED COUNT: 15.6 % (ref 12.3–15.4)
GFR SERPL CREATININE-BSD FRML MDRD: 47 ML/MIN/1.73
GLUCOSE BLD-MCNC: 148 MG/DL (ref 65–99)
GLUCOSE UR STRIP-MCNC: NEGATIVE MG/DL
HCT VFR BLD AUTO: 39 % (ref 34–46.6)
HGB BLD-MCNC: 13.2 G/DL (ref 12–15.9)
HGB UR QL STRIP.AUTO: ABNORMAL
HYALINE CASTS UR QL AUTO: ABNORMAL /LPF
INR PPP: 3.25 (ref 2–3)
KETONES UR QL STRIP: NEGATIVE
LEUKOCYTE ESTERASE UR QL STRIP.AUTO: ABNORMAL
LYMPHOCYTES # BLD AUTO: 1.6 10*3/MM3 (ref 0.7–3.1)
LYMPHOCYTES NFR BLD AUTO: 13 % (ref 19.6–45.3)
MAGNESIUM SERPL-MCNC: 2.2 MG/DL (ref 1.6–2.4)
MCH RBC QN AUTO: 32.3 PG (ref 26.6–33)
MCHC RBC AUTO-ENTMCNC: 33.7 G/DL (ref 31.5–35.7)
MCV RBC AUTO: 95.7 FL (ref 79–97)
MONOCYTES # BLD AUTO: 1.3 10*3/MM3 (ref 0.1–0.9)
MONOCYTES NFR BLD AUTO: 10.5 % (ref 5–12)
NEUTROPHILS # BLD AUTO: 9 10*3/MM3 (ref 1.7–7)
NEUTROPHILS NFR BLD AUTO: 74.9 % (ref 42.7–76)
NITRITE UR QL STRIP: NEGATIVE
NRBC BLD AUTO-RTO: 0.1 /100 WBC (ref 0–0.2)
PH UR STRIP.AUTO: 8.5 [PH] (ref 5–8)
PLATELET # BLD AUTO: 285 10*3/MM3 (ref 140–450)
PMV BLD AUTO: 7.4 FL (ref 6–12)
POTASSIUM BLD-SCNC: 4.4 MMOL/L (ref 3.5–5.2)
PROT UR QL STRIP: ABNORMAL
PROTHROMBIN TIME: 30.6 SECONDS (ref 19.4–28.5)
RBC # BLD AUTO: 4.08 10*6/MM3 (ref 3.77–5.28)
RBC # UR: ABNORMAL /HPF
REF LAB TEST METHOD: ABNORMAL
SODIUM BLD-SCNC: 141 MMOL/L (ref 136–145)
SP GR UR STRIP: 1.02 (ref 1–1.03)
SQUAMOUS #/AREA URNS HPF: ABNORMAL /HPF
TRI-PHOS CRY URNS QL MICRO: ABNORMAL /HPF
TROPONIN T SERPL-MCNC: <0.01 NG/ML (ref 0–0.03)
UNIDENT CRYS URNS QL MICRO: ABNORMAL /HPF
UROBILINOGEN UR QL STRIP: ABNORMAL
WBC NRBC COR # BLD: 12 10*3/MM3 (ref 3.4–10.8)
WBC UR QL AUTO: ABNORMAL /HPF

## 2020-04-29 PROCEDURE — 99215 OFFICE O/P EST HI 40 MIN: CPT | Performed by: INTERNAL MEDICINE

## 2020-04-29 PROCEDURE — G0378 HOSPITAL OBSERVATION PER HR: HCPCS

## 2020-04-29 PROCEDURE — 87186 SC STD MICRODIL/AGAR DIL: CPT | Performed by: NURSE PRACTITIONER

## 2020-04-29 PROCEDURE — 87040 BLOOD CULTURE FOR BACTERIA: CPT | Performed by: NURSE PRACTITIONER

## 2020-04-29 PROCEDURE — 71045 X-RAY EXAM CHEST 1 VIEW: CPT

## 2020-04-29 PROCEDURE — 25010000002 MEROPENEM PER 100 MG: Performed by: NURSE PRACTITIONER

## 2020-04-29 PROCEDURE — 93306 TTE W/DOPPLER COMPLETE: CPT

## 2020-04-29 PROCEDURE — 85025 COMPLETE CBC W/AUTO DIFF WBC: CPT | Performed by: PHYSICIAN ASSISTANT

## 2020-04-29 PROCEDURE — 96366 THER/PROPH/DIAG IV INF ADDON: CPT

## 2020-04-29 PROCEDURE — 96367 TX/PROPH/DG ADDL SEQ IV INF: CPT

## 2020-04-29 PROCEDURE — C1751 CATH, INF, PER/CENT/MIDLINE: HCPCS

## 2020-04-29 PROCEDURE — 84484 ASSAY OF TROPONIN QUANT: CPT | Performed by: PHYSICIAN ASSISTANT

## 2020-04-29 PROCEDURE — 87086 URINE CULTURE/COLONY COUNT: CPT | Performed by: NURSE PRACTITIONER

## 2020-04-29 PROCEDURE — 81001 URINALYSIS AUTO W/SCOPE: CPT | Performed by: NURSE PRACTITIONER

## 2020-04-29 PROCEDURE — 99225 PR SBSQ OBSERVATION CARE/DAY 25 MINUTES: CPT | Performed by: HOSPITALIST

## 2020-04-29 PROCEDURE — 85610 PROTHROMBIN TIME: CPT | Performed by: HOSPITALIST

## 2020-04-29 PROCEDURE — 83735 ASSAY OF MAGNESIUM: CPT | Performed by: PHYSICIAN ASSISTANT

## 2020-04-29 PROCEDURE — 80048 BASIC METABOLIC PNL TOTAL CA: CPT | Performed by: PHYSICIAN ASSISTANT

## 2020-04-29 RX ORDER — IBUPROFEN 200 MG
200 TABLET ORAL ONCE
Status: DISCONTINUED | OUTPATIENT
Start: 2020-04-29 | End: 2020-05-01 | Stop reason: HOSPADM

## 2020-04-29 RX ORDER — DILTIAZEM HYDROCHLORIDE 240 MG/1
240 CAPSULE, COATED, EXTENDED RELEASE ORAL
Status: DISCONTINUED | OUTPATIENT
Start: 2020-04-29 | End: 2020-05-01 | Stop reason: HOSPADM

## 2020-04-29 RX ADMIN — DILTIAZEM HYDROCHLORIDE 10 MG/HR: 5 INJECTION INTRAVENOUS at 08:20

## 2020-04-29 RX ADMIN — ACETAMINOPHEN 650 MG: 325 TABLET, FILM COATED ORAL at 18:28

## 2020-04-29 RX ADMIN — OYSTER SHELL CALCIUM WITH VITAMIN D 1 TABLET: 500; 200 TABLET, FILM COATED ORAL at 08:57

## 2020-04-29 RX ADMIN — MEROPENEM 500 MG: 500 INJECTION, POWDER, FOR SOLUTION INTRAVENOUS at 11:33

## 2020-04-29 RX ADMIN — PANTOPRAZOLE SODIUM 40 MG: 40 TABLET, DELAYED RELEASE ORAL at 08:57

## 2020-04-29 RX ADMIN — Medication 10 ML: at 20:22

## 2020-04-29 RX ADMIN — FUROSEMIDE 20 MG: 20 TABLET ORAL at 08:57

## 2020-04-29 RX ADMIN — MEROPENEM 500 MG: 500 INJECTION, POWDER, FOR SOLUTION INTRAVENOUS at 20:21

## 2020-04-29 RX ADMIN — SOTALOL HYDROCHLORIDE 120 MG: 120 TABLET ORAL at 08:57

## 2020-04-29 RX ADMIN — TOPIRAMATE 100 MG: 100 TABLET, FILM COATED ORAL at 20:21

## 2020-04-29 RX ADMIN — LIDOCAINE 1 PATCH: 50 PATCH TOPICAL at 08:57

## 2020-04-29 RX ADMIN — ACETAMINOPHEN 650 MG: 325 TABLET, FILM COATED ORAL at 03:07

## 2020-04-29 RX ADMIN — MAGNESIUM OXIDE TAB 400 MG (241.3 MG ELEMENTAL MG) 400 MG: 400 (241.3 MG) TAB at 20:21

## 2020-04-29 RX ADMIN — Medication 10 ML: at 08:57

## 2020-04-29 RX ADMIN — SOTALOL HYDROCHLORIDE 120 MG: 120 TABLET ORAL at 20:21

## 2020-04-29 RX ADMIN — MELATONIN 2000 UNITS: at 08:57

## 2020-04-29 RX ADMIN — ACETAMINOPHEN 650 MG: 325 TABLET, FILM COATED ORAL at 23:23

## 2020-04-29 RX ADMIN — DILTIAZEM HYDROCHLORIDE 240 MG: 240 CAPSULE, COATED, EXTENDED RELEASE ORAL at 11:29

## 2020-04-30 ENCOUNTER — APPOINTMENT (OUTPATIENT)
Dept: GENERAL RADIOLOGY | Facility: HOSPITAL | Age: 80
End: 2020-04-30

## 2020-04-30 ENCOUNTER — APPOINTMENT (OUTPATIENT)
Dept: NUCLEAR MEDICINE | Facility: HOSPITAL | Age: 80
End: 2020-04-30

## 2020-04-30 PROBLEM — B96.20 E. COLI URINARY TRACT INFECTION: Status: ACTIVE | Noted: 2020-04-30

## 2020-04-30 PROBLEM — N39.0 E. COLI URINARY TRACT INFECTION: Status: ACTIVE | Noted: 2020-04-30

## 2020-04-30 PROBLEM — R07.9 CHEST PAIN: Status: RESOLVED | Noted: 2020-04-28 | Resolved: 2020-04-30

## 2020-04-30 LAB
ANION GAP SERPL CALCULATED.3IONS-SCNC: 11 MMOL/L (ref 5–15)
BASOPHILS # BLD AUTO: 0.1 10*3/MM3 (ref 0–0.2)
BASOPHILS NFR BLD AUTO: 0.8 % (ref 0–1.5)
BH CV ECHO MEAS - ACS: 1.9 CM
BH CV ECHO MEAS - AO MAX PG (FULL): 1.8 MMHG
BH CV ECHO MEAS - AO MAX PG: 3.1 MMHG
BH CV ECHO MEAS - AO MEAN PG (FULL): 1.2 MMHG
BH CV ECHO MEAS - AO MEAN PG: 1.9 MMHG
BH CV ECHO MEAS - AO ROOT AREA (BSA CORRECTED): 2.2
BH CV ECHO MEAS - AO ROOT AREA: 12.3 CM^2
BH CV ECHO MEAS - AO ROOT DIAM: 4 CM
BH CV ECHO MEAS - AO V2 MAX: 88.5 CM/SEC
BH CV ECHO MEAS - AO V2 MEAN: 66.3 CM/SEC
BH CV ECHO MEAS - AO V2 VTI: 19.2 CM
BH CV ECHO MEAS - AORTIC HR: 90.1 BPM
BH CV ECHO MEAS - AORTIC R-R: 0.67 SEC
BH CV ECHO MEAS - AVA(I,A): 1.6 CM^2
BH CV ECHO MEAS - AVA(I,D): 1.6 CM^2
BH CV ECHO MEAS - AVA(V,A): 1.7 CM^2
BH CV ECHO MEAS - AVA(V,D): 1.7 CM^2
BH CV ECHO MEAS - BSA(HAYCOCK): 1.8 M^2
BH CV ECHO MEAS - BSA: 1.8 M^2
BH CV ECHO MEAS - BZI_BMI: 26 KILOGRAMS/M^2
BH CV ECHO MEAS - BZI_METRIC_HEIGHT: 165.1 CM
BH CV ECHO MEAS - BZI_METRIC_WEIGHT: 70.8 KG
BH CV ECHO MEAS - CI(AO): 12 L/MIN/M^2
BH CV ECHO MEAS - CI(LVOT): 1.5 L/MIN/M^2
BH CV ECHO MEAS - CO(AO): 21.3 L/MIN
BH CV ECHO MEAS - CO(LVOT): 2.8 L/MIN
BH CV ECHO MEAS - EDV(CUBED): 79.8 ML
BH CV ECHO MEAS - EDV(MOD-SP4): 86.5 ML
BH CV ECHO MEAS - EDV(TEICH): 83.3 ML
BH CV ECHO MEAS - EF(CUBED): 74.6 %
BH CV ECHO MEAS - EF(MOD-BP): 58 %
BH CV ECHO MEAS - EF(MOD-SP4): 58.4 %
BH CV ECHO MEAS - EF(TEICH): 66.8 %
BH CV ECHO MEAS - ESV(CUBED): 20.2 ML
BH CV ECHO MEAS - ESV(MOD-SP4): 35.9 ML
BH CV ECHO MEAS - ESV(TEICH): 27.6 ML
BH CV ECHO MEAS - FS: 36.7 %
BH CV ECHO MEAS - IVS/LVPW: 0.91
BH CV ECHO MEAS - IVSD: 1 CM
BH CV ECHO MEAS - LA DIMENSION(2D): 2.8 CM
BH CV ECHO MEAS - LV DIASTOLIC VOL/BSA (35-75): 48.6 ML/M^2
BH CV ECHO MEAS - LV MASS(C)D: 154.8 GRAMS
BH CV ECHO MEAS - LV MASS(C)DI: 87 GRAMS/M^2
BH CV ECHO MEAS - LV MAX PG: 1.4 MMHG
BH CV ECHO MEAS - LV MEAN PG: 0.72 MMHG
BH CV ECHO MEAS - LV SYSTOLIC VOL/BSA (12-30): 20.2 ML/M^2
BH CV ECHO MEAS - LV V1 MAX: 58.2 CM/SEC
BH CV ECHO MEAS - LV V1 MEAN: 40.3 CM/SEC
BH CV ECHO MEAS - LV V1 VTI: 12 CM
BH CV ECHO MEAS - LVIDD: 4.3 CM
BH CV ECHO MEAS - LVIDS: 2.7 CM
BH CV ECHO MEAS - LVOT AREA: 2.6 CM^2
BH CV ECHO MEAS - LVOT DIAM: 1.8 CM
BH CV ECHO MEAS - LVPWD: 1.1 CM
BH CV ECHO MEAS - MV DEC TIME: 0.15 SEC
BH CV ECHO MEAS - MV E MAX VEL: 108.3 CM/SEC
BH CV ECHO MEAS - MV MAX PG: 7 MMHG
BH CV ECHO MEAS - MV MEAN PG: 1.7 MMHG
BH CV ECHO MEAS - MV V2 MAX: 131.9 CM/SEC
BH CV ECHO MEAS - MV V2 MEAN: 49.3 CM/SEC
BH CV ECHO MEAS - MV V2 VTI: 27.4 CM
BH CV ECHO MEAS - MVA(VTI): 1.1 CM^2
BH CV ECHO MEAS - PA MAX PG (FULL): 1.4 MMHG
BH CV ECHO MEAS - PA MAX PG: 3 MMHG
BH CV ECHO MEAS - PA V2 MAX: 86.5 CM/SEC
BH CV ECHO MEAS - PVA(V,A): 2.5 CM^2
BH CV ECHO MEAS - PVA(V,D): 2.5 CM^2
BH CV ECHO MEAS - QP/QS: 1.2
BH CV ECHO MEAS - RAP SYSTOLE: 3 MMHG
BH CV ECHO MEAS - RV MAX PG: 1.6 MMHG
BH CV ECHO MEAS - RV MEAN PG: 0.71 MMHG
BH CV ECHO MEAS - RV V1 MAX: 62.3 CM/SEC
BH CV ECHO MEAS - RV V1 MEAN: 38.8 CM/SEC
BH CV ECHO MEAS - RV V1 VTI: 10.5 CM
BH CV ECHO MEAS - RVDD: 2.9 CM
BH CV ECHO MEAS - RVOT AREA: 3.5 CM^2
BH CV ECHO MEAS - RVOT DIAM: 2.1 CM
BH CV ECHO MEAS - RVSP: 35.9 MMHG
BH CV ECHO MEAS - SI(AO): 133.1 ML/M^2
BH CV ECHO MEAS - SI(CUBED): 33.5 ML/M^2
BH CV ECHO MEAS - SI(LVOT): 17.2 ML/M^2
BH CV ECHO MEAS - SI(MOD-SP4): 28.4 ML/M^2
BH CV ECHO MEAS - SI(TEICH): 31.3 ML/M^2
BH CV ECHO MEAS - SV(AO): 236.9 ML
BH CV ECHO MEAS - SV(CUBED): 59.6 ML
BH CV ECHO MEAS - SV(LVOT): 30.6 ML
BH CV ECHO MEAS - SV(MOD-SP4): 50.5 ML
BH CV ECHO MEAS - SV(RVOT): 37.1 ML
BH CV ECHO MEAS - SV(TEICH): 55.7 ML
BH CV ECHO MEAS - TR MAX VEL: 284.4 CM/SEC
BH CV NUCLEAR PRIOR STUDY: 3
BH CV STRESS BP STAGE 1: NORMAL
BH CV STRESS BP STAGE 2: NORMAL
BH CV STRESS COMMENTS STAGE 1: NORMAL
BH CV STRESS COMMENTS STAGE 2: NORMAL
BH CV STRESS DOSE REGADENOSON STAGE 1: 0.4
BH CV STRESS DURATION MIN STAGE 1: 0
BH CV STRESS DURATION MIN STAGE 2: 4
BH CV STRESS DURATION SEC STAGE 1: 10
BH CV STRESS DURATION SEC STAGE 2: 0
BH CV STRESS HR STAGE 1: 119
BH CV STRESS HR STAGE 2: 113
BH CV STRESS PROTOCOL 1: NORMAL
BH CV STRESS RECOVERY BP: NORMAL MMHG
BH CV STRESS RECOVERY HR: 108 BPM
BH CV STRESS STAGE 1: 1
BH CV STRESS STAGE 2: 2
BUN BLD-MCNC: 30 MG/DL (ref 8–23)
BUN/CREAT SERPL: 26.5 (ref 7–25)
CALCIUM SPEC-SCNC: 9.1 MG/DL (ref 8.6–10.5)
CHLORIDE SERPL-SCNC: 106 MMOL/L (ref 98–107)
CO2 SERPL-SCNC: 26 MMOL/L (ref 22–29)
CREAT BLD-MCNC: 1.13 MG/DL (ref 0.57–1)
DEPRECATED RDW RBC AUTO: 50.8 FL (ref 37–54)
EOSINOPHIL # BLD AUTO: 0 10*3/MM3 (ref 0–0.4)
EOSINOPHIL NFR BLD AUTO: 0.4 % (ref 0.3–6.2)
ERYTHROCYTE [DISTWIDTH] IN BLOOD BY AUTOMATED COUNT: 15.2 % (ref 12.3–15.4)
GFR SERPL CREATININE-BSD FRML MDRD: 46 ML/MIN/1.73
GLUCOSE BLD-MCNC: 125 MG/DL (ref 65–99)
HCT VFR BLD AUTO: 33.3 % (ref 34–46.6)
HGB BLD-MCNC: 11.5 G/DL (ref 12–15.9)
INR PPP: 2.11 (ref 2–3)
LV EF NUC BP: 70 %
LYMPHOCYTES # BLD AUTO: 1.5 10*3/MM3 (ref 0.7–3.1)
LYMPHOCYTES NFR BLD AUTO: 14.9 % (ref 19.6–45.3)
MAGNESIUM SERPL-MCNC: 2.2 MG/DL (ref 1.6–2.4)
MAXIMAL PREDICTED HEART RATE: 141 BPM
MCH RBC QN AUTO: 33 PG (ref 26.6–33)
MCHC RBC AUTO-ENTMCNC: 34.4 G/DL (ref 31.5–35.7)
MCV RBC AUTO: 95.9 FL (ref 79–97)
MONOCYTES # BLD AUTO: 1 10*3/MM3 (ref 0.1–0.9)
MONOCYTES NFR BLD AUTO: 10.1 % (ref 5–12)
NEUTROPHILS # BLD AUTO: 7.2 10*3/MM3 (ref 1.7–7)
NEUTROPHILS NFR BLD AUTO: 73.8 % (ref 42.7–76)
NRBC BLD AUTO-RTO: 0 /100 WBC (ref 0–0.2)
PERCENT MAX PREDICTED HR: 86.52 %
PLATELET # BLD AUTO: 223 10*3/MM3 (ref 140–450)
PMV BLD AUTO: 7.3 FL (ref 6–12)
POTASSIUM BLD-SCNC: 3.6 MMOL/L (ref 3.5–5.2)
PROTHROMBIN TIME: 20.1 SECONDS (ref 19.4–28.5)
RBC # BLD AUTO: 3.48 10*6/MM3 (ref 3.77–5.28)
SODIUM BLD-SCNC: 143 MMOL/L (ref 136–145)
STRESS BASELINE BP: NORMAL MMHG
STRESS BASELINE HR: 119 BPM
STRESS PERCENT HR: 102 %
STRESS POST PEAK BP: NORMAL MMHG
STRESS POST PEAK HR: 122 BPM
STRESS TARGET HR: 120 BPM
WBC NRBC COR # BLD: 9.7 10*3/MM3 (ref 3.4–10.8)

## 2020-04-30 PROCEDURE — 25010000002 REGADENOSON 0.4 MG/5ML SOLUTION: Performed by: HOSPITALIST

## 2020-04-30 PROCEDURE — 83735 ASSAY OF MAGNESIUM: CPT | Performed by: PHYSICIAN ASSISTANT

## 2020-04-30 PROCEDURE — 78452 HT MUSCLE IMAGE SPECT MULT: CPT

## 2020-04-30 PROCEDURE — 85610 PROTHROMBIN TIME: CPT | Performed by: HOSPITALIST

## 2020-04-30 PROCEDURE — 93017 CV STRESS TEST TRACING ONLY: CPT

## 2020-04-30 PROCEDURE — G0378 HOSPITAL OBSERVATION PER HR: HCPCS

## 2020-04-30 PROCEDURE — 25010000002 MEROPENEM PER 100 MG: Performed by: NURSE PRACTITIONER

## 2020-04-30 PROCEDURE — 93018 CV STRESS TEST I&R ONLY: CPT | Performed by: NURSE PRACTITIONER

## 2020-04-30 PROCEDURE — 96366 THER/PROPH/DIAG IV INF ADDON: CPT

## 2020-04-30 PROCEDURE — 99225 PR SBSQ OBSERVATION CARE/DAY 25 MINUTES: CPT | Performed by: HOSPITALIST

## 2020-04-30 PROCEDURE — 99215 OFFICE O/P EST HI 40 MIN: CPT | Performed by: INTERNAL MEDICINE

## 2020-04-30 PROCEDURE — 0 TECHNETIUM SESTAMIBI: Performed by: HOSPITALIST

## 2020-04-30 PROCEDURE — 73620 X-RAY EXAM OF FOOT: CPT

## 2020-04-30 PROCEDURE — 78452 HT MUSCLE IMAGE SPECT MULT: CPT | Performed by: INTERNAL MEDICINE

## 2020-04-30 PROCEDURE — 85025 COMPLETE CBC W/AUTO DIFF WBC: CPT | Performed by: HOSPITALIST

## 2020-04-30 PROCEDURE — 93306 TTE W/DOPPLER COMPLETE: CPT | Performed by: INTERNAL MEDICINE

## 2020-04-30 PROCEDURE — A9500 TC99M SESTAMIBI: HCPCS | Performed by: HOSPITALIST

## 2020-04-30 PROCEDURE — 80048 BASIC METABOLIC PNL TOTAL CA: CPT | Performed by: HOSPITALIST

## 2020-04-30 RX ORDER — LIDOCAINE 50 MG/G
1 PATCH TOPICAL
Qty: 30 PATCH | Refills: 0 | Status: SHIPPED | OUTPATIENT
Start: 2020-05-01 | End: 2020-05-19

## 2020-04-30 RX ORDER — DILTIAZEM HYDROCHLORIDE 240 MG/1
240 CAPSULE, COATED, EXTENDED RELEASE ORAL
Qty: 30 CAPSULE | Refills: 0 | Status: SHIPPED | OUTPATIENT
Start: 2020-05-01 | End: 2020-05-26 | Stop reason: SDUPTHER

## 2020-04-30 RX ORDER — WARFARIN SODIUM 3 MG/1
1.5 TABLET ORAL
Status: DISCONTINUED | OUTPATIENT
Start: 2020-04-30 | End: 2020-05-01 | Stop reason: HOSPADM

## 2020-04-30 RX ORDER — LIDOCAINE 50 MG/G
1 PATCH TOPICAL
Status: DISCONTINUED | OUTPATIENT
Start: 2020-04-30 | End: 2020-05-01 | Stop reason: HOSPADM

## 2020-04-30 RX ORDER — WARFARIN SODIUM 1 MG/1
1.5 TABLET ORAL NIGHTLY
Qty: 45 TABLET | Refills: 0 | OUTPATIENT
Start: 2020-04-30 | End: 2020-08-31 | Stop reason: HOSPADM

## 2020-04-30 RX ADMIN — MEROPENEM 500 MG: 500 INJECTION, POWDER, FOR SOLUTION INTRAVENOUS at 19:32

## 2020-04-30 RX ADMIN — DICLOFENAC SODIUM 2 G: 10 GEL TOPICAL at 16:16

## 2020-04-30 RX ADMIN — MEROPENEM 500 MG: 500 INJECTION, POWDER, FOR SOLUTION INTRAVENOUS at 13:27

## 2020-04-30 RX ADMIN — WARFARIN SODIUM 1.5 MG: 3 TABLET ORAL at 18:50

## 2020-04-30 RX ADMIN — MAGNESIUM OXIDE TAB 400 MG (241.3 MG ELEMENTAL MG) 400 MG: 400 (241.3 MG) TAB at 21:10

## 2020-04-30 RX ADMIN — DILTIAZEM HYDROCHLORIDE 240 MG: 240 CAPSULE, COATED, EXTENDED RELEASE ORAL at 11:03

## 2020-04-30 RX ADMIN — LIDOCAINE 1 PATCH: 50 PATCH TOPICAL at 11:04

## 2020-04-30 RX ADMIN — DICLOFENAC SODIUM 2 G: 10 GEL TOPICAL at 21:10

## 2020-04-30 RX ADMIN — PANTOPRAZOLE SODIUM 40 MG: 40 TABLET, DELAYED RELEASE ORAL at 11:03

## 2020-04-30 RX ADMIN — REGADENOSON 0.4 MG: 0.08 INJECTION, SOLUTION INTRAVENOUS at 12:35

## 2020-04-30 RX ADMIN — TOPIRAMATE 100 MG: 100 TABLET, FILM COATED ORAL at 21:10

## 2020-04-30 RX ADMIN — TECHNETIUM TC 99M SESTAMIBI 1 DOSE: 1 INJECTION INTRAVENOUS at 12:35

## 2020-04-30 RX ADMIN — MELATONIN 2000 UNITS: at 11:03

## 2020-04-30 RX ADMIN — SOTALOL HYDROCHLORIDE 120 MG: 120 TABLET ORAL at 21:10

## 2020-04-30 RX ADMIN — Medication 10 ML: at 21:10

## 2020-04-30 RX ADMIN — OYSTER SHELL CALCIUM WITH VITAMIN D 1 TABLET: 500; 200 TABLET, FILM COATED ORAL at 11:03

## 2020-04-30 RX ADMIN — MEROPENEM 500 MG: 500 INJECTION, POWDER, FOR SOLUTION INTRAVENOUS at 04:02

## 2020-04-30 RX ADMIN — Medication 10 ML: at 11:05

## 2020-04-30 RX ADMIN — SOTALOL HYDROCHLORIDE 120 MG: 120 TABLET ORAL at 11:21

## 2020-04-30 RX ADMIN — TECHNETIUM TC 99M SESTAMIBI 1 DOSE: 1 INJECTION INTRAVENOUS at 11:25

## 2020-05-01 VITALS
HEIGHT: 65 IN | DIASTOLIC BLOOD PRESSURE: 82 MMHG | SYSTOLIC BLOOD PRESSURE: 157 MMHG | BODY MASS INDEX: 27.11 KG/M2 | TEMPERATURE: 98 F | HEART RATE: 88 BPM | OXYGEN SATURATION: 97 % | WEIGHT: 162.7 LBS | RESPIRATION RATE: 20 BRPM

## 2020-05-01 LAB
BACTERIA SPEC AEROBE CULT: ABNORMAL
INR PPP: 1.44 (ref 2–3)
MAGNESIUM SERPL-MCNC: 2.3 MG/DL (ref 1.6–2.4)
POTASSIUM BLD-SCNC: 3.7 MMOL/L (ref 3.5–5.2)
PROTHROMBIN TIME: 14.2 SECONDS (ref 19.4–28.5)

## 2020-05-01 PROCEDURE — G0378 HOSPITAL OBSERVATION PER HR: HCPCS

## 2020-05-01 PROCEDURE — 83735 ASSAY OF MAGNESIUM: CPT | Performed by: PHYSICIAN ASSISTANT

## 2020-05-01 PROCEDURE — 25010000002 MEROPENEM PER 100 MG: Performed by: NURSE PRACTITIONER

## 2020-05-01 PROCEDURE — 96366 THER/PROPH/DIAG IV INF ADDON: CPT

## 2020-05-01 PROCEDURE — 99217 PR OBSERVATION CARE DISCHARGE MANAGEMENT: CPT | Performed by: HOSPITALIST

## 2020-05-01 PROCEDURE — 84132 ASSAY OF SERUM POTASSIUM: CPT | Performed by: PHYSICIAN ASSISTANT

## 2020-05-01 PROCEDURE — 85610 PROTHROMBIN TIME: CPT | Performed by: HOSPITALIST

## 2020-05-01 RX ORDER — LIDOCAINE 50 MG/G
1 PATCH TOPICAL
Status: DISCONTINUED | OUTPATIENT
Start: 2020-05-01 | End: 2020-05-01 | Stop reason: HOSPADM

## 2020-05-01 RX ORDER — ERTAPENEM 1 G/1
1 INJECTION, POWDER, LYOPHILIZED, FOR SOLUTION INTRAMUSCULAR; INTRAVENOUS EVERY 24 HOURS
Qty: 1000 MG | Refills: 0
Start: 2020-05-02 | End: 2020-05-01 | Stop reason: SDUPTHER

## 2020-05-01 RX ORDER — ERTAPENEM 1 G/1
1 INJECTION, POWDER, LYOPHILIZED, FOR SOLUTION INTRAMUSCULAR; INTRAVENOUS EVERY 24 HOURS
Qty: 1000 MG | Refills: 0
Start: 2020-05-02 | End: 2020-05-04

## 2020-05-01 RX ADMIN — DICLOFENAC SODIUM 2 G: 10 GEL TOPICAL at 08:13

## 2020-05-01 RX ADMIN — LIDOCAINE 1 PATCH: 50 PATCH TOPICAL at 08:13

## 2020-05-01 RX ADMIN — DILTIAZEM HYDROCHLORIDE 240 MG: 240 CAPSULE, COATED, EXTENDED RELEASE ORAL at 08:11

## 2020-05-01 RX ADMIN — MELATONIN 2000 UNITS: at 08:10

## 2020-05-01 RX ADMIN — OYSTER SHELL CALCIUM WITH VITAMIN D 1 TABLET: 500; 200 TABLET, FILM COATED ORAL at 08:11

## 2020-05-01 RX ADMIN — MEROPENEM 500 MG: 500 INJECTION, POWDER, FOR SOLUTION INTRAVENOUS at 03:32

## 2020-05-01 RX ADMIN — PANTOPRAZOLE SODIUM 40 MG: 40 TABLET, DELAYED RELEASE ORAL at 08:10

## 2020-05-01 RX ADMIN — MEROPENEM 500 MG: 500 INJECTION, POWDER, FOR SOLUTION INTRAVENOUS at 11:26

## 2020-05-01 RX ADMIN — ACETAMINOPHEN 650 MG: 325 TABLET, FILM COATED ORAL at 03:34

## 2020-05-01 RX ADMIN — LIDOCAINE 1 PATCH: 50 PATCH TOPICAL at 08:12

## 2020-05-01 RX ADMIN — ACETAMINOPHEN 650 MG: 325 TABLET, FILM COATED ORAL at 10:05

## 2020-05-01 RX ADMIN — Medication 10 ML: at 08:10

## 2020-05-01 RX ADMIN — SOTALOL HYDROCHLORIDE 120 MG: 120 TABLET ORAL at 08:10

## 2020-05-01 RX ADMIN — LIDOCAINE 1 PATCH: 50 PATCH TOPICAL at 11:26

## 2020-05-01 NOTE — NURSING NOTE
Talked with pharmacy pt will be going home later today.  I am to give Merrem dose today before discharge. And infusion therapy will deliver antibiotics here for the next 2 days and home health will be taking of

## 2020-05-01 NOTE — NURSING NOTE
Infusion nurse here to deliver her antibiotics. Per nurse she is ok for discharge and home health will be at house in am for first transfusion.  Pt will go home with right upper arm PICC 3 lumens

## 2020-05-01 NOTE — PROGRESS NOTES
"Pharmacy dosing service  Anticoagulant  Warfarin     Subjective:    Hazel Bucio is a 80 y.o.female being continued on warfarin for atrial fibrillation.    INR Goal: 2 - 3  Home medication?: Yes, warfarin 2 mg PO every day at 1800  Bridge Therapy Present?:  No  Interacting Medications Evaluation (New/Present/Discontinued): NA  Additional Contributing Factors: NA      Assessment/Plan:  Warfarin resumed yesterday, patient received warfarin 1.5 mg last night. INR has fallen to 1.41, likely from previously held doses. Will continue with current regimen of warfarin 1.5 mg daily, since patient did present at admission with an elevated INR on home dose.    Continue to monitor and adjust based on INR.         Date 4/28 4/29 4/30 5/1        INR 3.95 3.25 2.11 1.44        Dose -- -- 1.5 mg             Objective:  [Ht: 165.1 cm (65\"); Wt: 73.8 kg (162 lb 11.2 oz); BMI: Body mass index is 27.07 kg/m².]  Lab Results   Component Value Date    ALBUMIN 3.8 01/19/2018     Lab Results   Component Value Date    INR 1.44 (L) 05/01/2020    INR 2.11 04/30/2020    INR 3.25 (H) 04/29/2020    PROTIME 14.2 (L) 05/01/2020    PROTIME 20.1 04/30/2020    PROTIME 30.6 (H) 04/29/2020     Lab Results   Component Value Date    HGB 11.5 (L) 04/30/2020    HGB 13.2 04/29/2020    HGB 13.2 04/28/2020     Lab Results   Component Value Date    HCT 33.3 (L) 04/30/2020    HCT 39.0 04/29/2020    HCT 39.4 04/28/2020       Lm López, [facility]  05/01/20 08:48           "

## 2020-05-01 NOTE — PROGRESS NOTES
Continued Stay Note   [facility]     Patient Name: Hazel Bucio  MRN: [id number]  Today's Date: 5/1/2020    Admit Date: 4/28/2020    Discharge Plan     Row Name 05/01/20 0825       Plan    Plan  DC Plan Update: [facility] [place] [facility] (pending acceptance) and [facility] for IV antibiotics at home.    Plan Comments  Attempted to call son at home, not taking calls per recorded message. Spoke to pt per phone for selection.               Expected Discharge Date and Time     Expected Discharge Date Expected Discharge Time    Apr 30, 2020             Matt Elizabeth RN

## 2020-05-01 NOTE — PROGRESS NOTES
Continued Stay Note  [facility]     Patient Name: Hazel Bucio  MRN: [id number]  Today's Date: 4/30/2020    Admit Date: 4/28/2020    Discharge Plan     Row Name 04/30/20 1951       Plan    Plan  D/C pending Home IV Abx set up. Anticipate d/c on 5/1.    Plan Comments  SW attempted to arrange home IV antibiotics for pt, but no home infusion company was able to start a new patient set up this evening. CALIN placed calls to [facility], [facility], [facility], [facility], [facility] & [facility] All agencies stated they could assist with set up first thing on 5/1.  will arrange home IV infusion in the AM on Friday, 5/1. CALIN notified pt's RN & pt's son.      Floridalma Alfaro, MSSW, LSW  PRN   Phone: [phone]

## 2020-05-01 NOTE — PLAN OF CARE
Pt not discharged yesterday due to abx treatment. Will be discharged today when home infusion therapy is initiated per case management. Complaint of right foot pain throughout the night and was treated with Voltaren gel.  Vitals stable and no other complaints. Will continue to monitor.     Problem: Fall Risk (Adult)  Goal: Identify Related Risk Factors and Signs and Symptoms  Outcome: Ongoing (interventions implemented as appropriate)  Goal: Absence of Fall  Outcome: Ongoing (interventions implemented as appropriate)     Problem: Urinary Tract Infection (Adult)  Goal: Signs and Symptoms of Listed Potential Problems Will be Absent, Minimized or Managed (Urinary Tract Infection)  Outcome: Ongoing (interventions implemented as appropriate)

## 2020-05-01 NOTE — PROGRESS NOTES
"      [facility] Medicine Services Daily Progress Note      Hospitalist Team  LOS 0 days      Patient Care Team:  Lizzy Francis MD as PCP - General (Family Medicine)    Patient Location: 110/1      Subjective   Subjective     Chief Complaint / Subjective  Chief Complaint   Patient presents with   • Chest Pain         Brief Synopsis of Hospital Course/HPI  The patient is a 80-year-old female who presented to the emergency room planing of intermittent dull substernal chest pain radiating to her left arm that began the morning of admission and was relieved with nitroglycerin after arrival to the emergency department.  She reports no shortness of breath, sweats, nausea, fever or cough.  She reports that she had a fall on 4/17/2020 and sustained an injury to her left side rib cage.  X-rays on admission showed no evidence of a displaced fracture.  Initial troponin negative, and EKG atrial flutter with a rate of 95.      Date: 4/29/2020: The patient reports feeling better with no specific complaints at this time.  4/30/2020: Patient denies complaints. She will need home antibiotics arranged before she can discharge.      ROS  12 point review of systems was reviewed and was negative except as above.    Objective   Objective      Vital Signs  Temp:  [97.9 °F (36.6 °C)-98.4 °F (36.9 °C)] 98.1 °F (36.7 °C)  Heart Rate:  [] 83  Resp:  [16-18] 17  BP: (137-170)/(70-94) 155/78  Oxygen Therapy  SpO2: 97 %  Pulse Oximetry Type: Intermittent  Device (Oxygen Therapy): room air  Flowsheet Rows      First Filed Value   Admission Height  165.1 cm (65\") Documented at 04/28/2020 1033   Admission Weight  73 kg (160 lb 15 oz) Documented at 04/28/2020 1033        Intake & Output (last 3 days)       04/28 0701 - 04/29 0700 04/29 0701 - 04/30 0700 04/30 0701 - 05/01 0700 05/01 0701 - 05/02 0700    P.O. 240 700      I.V. (mL/kg)  32.2 (0.5)      Total Intake(mL/kg) 240 (3.3) 732.2 (10.3)      Urine (mL/kg/hr) 1300 " 1050 (0.6) 925 (0.5)     Total Output 1300 1050 925     Net -1060 -317.8 -925                 Lines, Drains & Airways    Active LDAs     Name:   Placement date:   Placement time:   Site:   Days:    PICC Triple Lumen 04/29/20 Left Brachial   04/29/20    1300    Brachial   less than 1    Urostomy RLQ   02/28/11    0000    RLQ   3348                  Physical Exam:    Physical Exam    Well-developed well-nourished female in no acute distress sitting up in bed awake and alert; mucous membranes moist; sclerae anicteric; lungs clear to auscultation bilaterally; CV irregular; abdomen soft nontender nondistended with active bowel sounds, right-sided urostomy bag; extremities with no edema, cyanosis or calf tenderness; palpable pedal pulses bilaterally; no Kaplan catheter.      Procedures:              Results Review:     I reviewed the patient's new clinical results.      Lab Results (last 24 hours)     Procedure Component Value Units Date/Time    Blood Culture - Blood, Arm, Left [[id number]] Collected:  04/29/20 0604    Specimen:  Blood from Arm, Left Updated:  05/01/20 0615     Blood Culture No growth at 2 days    Potassium [[id number]]  (Normal) Collected:  05/01/20 0345    Specimen:  Blood Updated:  05/01/20 0415     Potassium 3.7 mmol/L     Magnesium [[id number]]  (Normal) Collected:  05/01/20 0345    Specimen:  Blood Updated:  05/01/20 0414     Magnesium 2.3 mg/dL     Protime-INR [[id number]]  (Abnormal) Collected:  05/01/20 0345    Specimen:  Blood Updated:  05/01/20 0409     Protime 14.2 Seconds      Comment: Result checked         INR 1.44    Urine Culture - Urine, Urine, Clean Catch [[id number]]  (Abnormal)  (Susceptibility) Collected:  04/29/20 0503    Specimen:  Urine, Clean Catch Updated:  05/01/20 0246     Urine Culture >100,000 CFU/mL Escherichia coli    Susceptibility      Escherichia coli     ASHLEY     Ampicillin Susceptible     Ampicillin + Sulbactam Susceptible     Cefazolin Susceptible     Cefepime Susceptible        Ceftazidime Susceptible     Ceftriaxone Susceptible     Gentamicin Susceptible     Levofloxacin Susceptible     Nitrofurantoin Susceptible     Piperacillin + Tazobactam Susceptible     Tetracycline Resistant     Trimethoprim + Sulfamethoxazole Susceptible                        No results found for: HGBA1C  Results from last 7 days   Lab Units 05/01/20  0345 04/30/20  0400 04/29/20  0254   INR  1.44* 2.11 3.25*           No results found for: LIPASE  No results found for: CHOL, CHLPL, TRIG, HDL, LDL, LDLDIRECT    No results found for: INTRAOP, PREDX, FINALDX, COMDX    Microbiology Results (last 10 days)     Procedure Component Value - Date/Time    Blood Culture - Blood, Arm, Left [[id number]] Collected:  04/29/20 0604    Lab Status:  Preliminary result Specimen:  Blood from Arm, Left Updated:  05/01/20 0615     Blood Culture No growth at 2 days    Urine Culture - Urine, Urine, Clean Catch [[id number]]  (Abnormal)  (Susceptibility) Collected:  04/29/20 0503    Lab Status:  Final result Specimen:  Urine, Clean Catch Updated:  05/01/20 0246     Urine Culture >100,000 CFU/mL Escherichia coli    Susceptibility      Escherichia coli     ASHLEY     Ampicillin Susceptible     Ampicillin + Sulbactam Susceptible     Cefazolin Susceptible     Cefepime Susceptible     Ceftazidime Susceptible     Ceftriaxone Susceptible     Gentamicin Susceptible     Levofloxacin Susceptible     Nitrofurantoin Susceptible     Piperacillin + Tazobactam Susceptible     Tetracycline Resistant     Trimethoprim + Sulfamethoxazole Susceptible                          ECG/EMG Results (most recent)     Procedure Component Value Units Date/Time    ECG 12 Lead [[id number]] Resulted:  04/28/20 1229     Updated:  04/28/20 1229    ECG 12 Lead [[id number]] Collected:  04/28/20 1042     Updated:  04/29/20 0956    Narrative:       HEART RATE= 95  bpm  RR Interval= 600  ms  MA Interval=   ms  P Horizontal Axis=   deg  P Front Axis=   deg  QRSD Interval= 75  ms  QT  Interval= 385  ms  QRS Axis= 43  deg  T Wave Axis= 29  deg  - ABNORMAL ECG -  Atrial flutter  Low voltage, precordial leads  When compared with ECG of 11-Oct-2014 17:46:57,  Significant rate decrease  Significant axis, voltage or hypertrophy change  Electronically Signed By: Go Burnham (ANNY) 29-Apr-2020 09:55:51  Date and Time of Study: 2020-04-28 10:42:14    Adult Transthoracic Echo Complete W/ Cont if Necessary Per Protocol [[id number]] Collected:  04/29/20 0821     Updated:  04/30/20 2204     BSA 1.8 m^2      RVIDd 2.9 cm      IVSd 1.0 cm      LVIDd 4.3 cm      LVIDs 2.7 cm      LVPWd 1.1 cm      IVS/LVPW 0.91     FS 36.7 %      EDV(Teich) 83.3 ml      ESV(Teich) 27.6 ml      EF(Teich) 66.8 %      EDV(cubed) 79.8 ml      ESV(cubed) 20.2 ml      EF(cubed) 74.6 %      LV mass(C)d 154.8 grams      LV mass(C)dI 87.0 grams/m^2      SV(Teich) 55.7 ml      SI(Teich) 31.3 ml/m^2      SV(cubed) 59.6 ml      SI(cubed) 33.5 ml/m^2      Ao root diam 4.0 cm      Ao root area 12.3 cm^2      ACS 1.9 cm      LVOT diam 1.8 cm      LVOT area 2.6 cm^2      RVOT diam 2.1 cm      RVOT area 3.5 cm^2      EDV(MOD-sp4) 86.5 ml      ESV(MOD-sp4) 35.9 ml      EF(MOD-sp4) 58.4 %      SV(MOD-sp4) 50.5 ml      SI(MOD-sp4) 28.4 ml/m^2      Ao root area (BSA corrected) 2.2     LV Victoria Vol (BSA corrected) 48.6 ml/m^2      LV Sys Vol (BSA corrected) 20.2 ml/m^2      Aortic R-R 0.67 sec      Aortic HR 90.1 BPM      MV E max kenya 108.3 cm/sec      MV V2 max 131.9 cm/sec      MV max PG 7.0 mmHg      MV V2 mean 49.3 cm/sec      MV mean PG 1.7 mmHg      MV V2 VTI 27.4 cm      MVA(VTI) 1.1 cm^2      MV dec time 0.15 sec      Ao pk kenya 88.5 cm/sec      Ao max PG 3.1 mmHg      Ao max PG (full) 1.8 mmHg      Ao V2 mean 66.3 cm/sec      Ao mean PG 1.9 mmHg      Ao mean PG (full) 1.2 mmHg      Ao V2 VTI 19.2 cm      KRIS(I,A) 1.6 cm^2      KRIS(I,D) 1.6 cm^2      KRIS(V,A) 1.7 cm^2      KRIS(V,D) 1.7 cm^2      LV V1 max PG 1.4 mmHg      LV V1 mean PG 0.72 mmHg       LV V1 max 58.2 cm/sec      LV V1 mean 40.3 cm/sec      LV V1 VTI 12.0 cm      CO(Ao) 21.3 l/min      CI(Ao) 12.0 l/min/m^2      SV(Ao) 236.9 ml      SI(Ao) 133.1 ml/m^2      CO(LVOT) 2.8 l/min      CI(LVOT) 1.5 l/min/m^2      SV(LVOT) 30.6 ml      SV(RVOT) 37.1 ml      SI(LVOT) 17.2 ml/m^2      PA V2 max 86.5 cm/sec      PA max PG 3.0 mmHg      PA max PG (full) 1.4 mmHg       CV ECHO LEA - PVA(V,A) 2.5 cm^2       CV ECHO LEA - PVA(V,D) 2.5 cm^2      RV V1 max PG 1.6 mmHg      RV V1 mean PG 0.71 mmHg      RV V1 max 62.3 cm/sec      RV V1 mean 38.8 cm/sec      RV V1 VTI 10.5 cm      TR max kenya 284.4 cm/sec      RVSP(TR) 35.9 mmHg      RAP systole 3.0 mmHg      Qp/Qs 1.2      CV ECHO LEA - BZI_BMI 26.0 kilograms/m^2       CV ECHO LEA - BSA(HAYCOCK) 1.8 m^2       CV ECHO LEA - BZI_METRIC_WEIGHT 70.8 kg       CV ECHO LEA - BZI_METRIC_HEIGHT 165.1 cm      EF(MOD-bp) 58.0 %      LA dimension(2D) 2.8 cm     Narrative:         · Left ventricular systolic function is normal.  · Right ventricular cavity is borderline dilated.  · Mild pulmonic valve regurgitation is present.     Grossly normal echocardiographic exam for age demonstrating normal chamber   dimensions aside from borderline RVE with good global contractility no   specific valvular pathology.  Doppler exam shows mild pulmonic insufficiency and trivial to mild TR with   borderline RV systolic pressure, otherwise normal study for age                 Results for orders placed during the hospital encounter of 04/28/20   Adult Transthoracic Echo Complete W/ Cont if Necessary Per Protocol    Narrative · Left ventricular systolic function is normal.  · Right ventricular cavity is borderline dilated.  · Mild pulmonic valve regurgitation is present.     Grossly normal echocardiographic exam for age demonstrating normal chamber   dimensions aside from borderline RVE with good global contractility no   specific valvular pathology.  Doppler exam shows  mild pulmonic insufficiency and trivial to mild TR with   borderline RV systolic pressure, otherwise normal study for age         Xr Ribs Left With Pa Chest    Result Date: 4/28/2020  1.No acute displaced rib fractures are seen. Nondisplaced fractures can be missed on radiograph. 2.Subsegmental atelectasis lateral left lower lobe. 3.Cardiomegaly. 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  Electronically Signed By-Sonya Vital MD On:4/28/2020 12:14 PM This report was finalized on [id number] by  Sonya Vital MD.    Xr Foot 2 View Right    Result Date: 4/30/2020   1. No acute fracture or dislocation. 2. Degenerative changes and bony demineralization.  Electronically Signed By-Hilario Gonzalez On:4/30/2020 12:02 PM This report was finalized on [id number] by  Hilario Gonzalez, .    Xr Chest 1 View    Result Date: 4/29/2020  1.Tip of the left upper extremity PICC is obscured by pacemaker leads, but appears to terminate in the mid SVC. 2.Enlarged cardiac silhouette with suspected patchy left basilar opacities, likely atelectasis.  Electronically Signed By-Floridalma East On:4/29/2020 1:35 PM This report was finalized on [id number] by  Floridalma East, .          Xrays, labs reviewed personally by physician.    Medication Review:   I have reviewed the patient's current medication list      Scheduled Meds    calcium-vitamin D 1 tablet Oral Daily   cholecalciferol 2,000 Units Oral Daily   diclofenac 2 g Topical 4x Daily   dilTIAZem  mg Oral Q24H   ibuprofen 200 mg Oral Once   lidocaine 1 patch Transdermal Q24H   lidocaine 1 patch Transdermal Q24H   magnesium oxide 400 mg Oral Nightly   meropenem 500 mg Intravenous Q8H   pantoprazole 40 mg Oral QAM   sodium chloride 10 mL Intravenous Q12H   sotalol 120 mg Oral Q12H   topiramate 100 mg Oral Nightly   warfarin 1.5 mg Oral Daily       Meds Infusions    Pharmacy to Dose meropenem (MERREM)    Pharmacy to dose warfarin        Meds PRN  •  acetaminophen  **OR** acetaminophen **OR** acetaminophen  •  acetaminophen  •  aluminum-magnesium hydroxide-simethicone  •  bisacodyl  •  magnesium hydroxide  •  magnesium sulfate **OR** magnesium sulfate in D5W 1g/100mL (PREMIX)  •  melatonin  •  nitroglycerin  •  ondansetron **OR** ondansetron  •  [COMPLETED] meropenem **AND** Pharmacy to Dose meropenem (MERREM)  •  Pharmacy to dose warfarin  •  polycarbophil  •  polyethylene glycol  •  potassium chloride  •  [COMPLETED] Insert peripheral IV **AND** sodium chloride  •  sodium chloride      I personally reviewed patient's EKG strips.  Assessment/Plan   Assessment/Plan     Active Hospital Problems    Diagnosis  POA   • E. coli urinary tract infection [N39.0, B96.20]  Yes   • Coumadin toxicity, accidental or unintentional, initial encounter [T45.511A]  Yes   • PAF (paroxysmal atrial fibrillation) (CMS/HCC) [I48.0]  Yes   • Dyslipidemia [E78.5]  Yes   • Essential hypertension [I10]  Yes   • Long term current use of anticoagulant [Z79.01]  Not Applicable   • Presence of cardiac pacemaker [Z95.0]  Yes   • Sick sinus syndrome (CMS/HCC) [I49.5]  Yes   • Type 2 diabetes mellitus (CMS/HCC) [E11.9]  Yes      Resolved Hospital Problems    Diagnosis Date Resolved POA   • **Chest pain [R07.9] 04/30/2020 Yes     Priority: High       MEDICAL DECISION MAKING COMPLEXITY BY PROBLEM:     Acute chest pain rule out acute coronary syndrome  -Pain resolved  -Cardiology consulted  - echocardiogram and stress Myoview performed.  - per Dr. Carrizales, no further cardiac work-up indicated at this time.     Paroxysmal atrial fibrillation  -Now rate controlled on Cardizem drip  -Continue sotalol      Sick sinus syndrome status post PPM     Coumadin toxicity with no signs of bleeding  -Pharmacy dosing for INR 2-3  -home dose reduced from 2mg per day to 1.5mg per day    Acute urinary tract infection likely etiology of high-grade fever of 101.2 during the night  -Urinalysis abnormal  -Merrem ordered due to  patient's multiple drug allergies and await culture     Essential hypertension, chronic and controlled  -Continue valsartan, hydrochlorothiazide and verapamil     Hyperlipidemia  -Patient is not on a statin at home     Type 2 diabetes mellitus with diabetic peripheral neuropathy  -Patient is not on any diabetes medications at home  -SSI     GERD  -Continue PPI     Chronic constipation  -Continue Metamucil and MiraLAX     History of infiltrating ductal carcinoma right breast 2009     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      Chronic insomnia  -Continue Topamax    VTE Prophylaxis -   Mechanical Order History:      Ordered        04/28/20 1409  Place Sequential Compression Device  Once         04/28/20 1409  Maintain Sequential Compression Device  Continuous                 Pharmalogical Order History:     Ordered     Dose Route Frequency Stop    04/28/20 1409  warfarin (COUMADIN) tablet 2 mg  Status:  Discontinued     Question:  Target INR  Answer:  2 - 3    2 mg PO Every 24 Hours 04/28/20 1410    04/28/20 1409  Pharmacy to dose warfarin     Question:  Target INR  Answer:  2 - 3    -- XX Continuous PRN --            Code Status -   Code Status and Medical Interventions:   Ordered at: 04/28/20 1409     Code Status:    CPR     Medical Interventions (Level of Support Prior to Arrest):    Full       This patient has been examined wearing appropriate Personal Protective Equipment  4/30/2020        Discharge Planning    Home once home IV anibiotic is arranged.      Destination      Coordination has not been started for this encounter.      Durable Medical Equipment      Coordination has not been started for this encounter.      Dialysis/Infusion      Coordination has not been started for this encounter.      Home Medical Care      Coordination has not been started for this encounter.      Therapy      Coordination has not been started for this encounter.      [facility]         Coordination has not been started for this encounter.            Electronically signed by Tamara Palacios MD, 05/01/20, 09:01.  [facility] Hospitalist Team

## 2020-05-01 NOTE — NURSING NOTE
Talked with patient in regards to her foot and hip pain as well as did Dr Palacios.  Pt states that she does not want any further testing at this time states that she believes it is just from being stuck in bed.  Did let patient know that if it did not get any better to make sure and call her primary MD to get it looked at.  She states that she definitely would

## 2020-05-01 NOTE — PLAN OF CARE
Problem: Fall Risk (Adult)  Goal: Identify Related Risk Factors and Signs and Symptoms  Outcome: Ongoing (interventions implemented as appropriate)  Goal: Absence of Fall  Outcome: Ongoing (interventions implemented as appropriate)     Problem: Urinary Tract Infection (Adult)  Goal: Signs and Symptoms of Listed Potential Problems Will be Absent, Minimized or Managed (Urinary Tract Infection)  Outcome: Ongoing (interventions implemented as appropriate)

## 2020-05-04 ENCOUNTER — ANTICOAGULATION VISIT (OUTPATIENT)
Dept: CARDIOLOGY | Facility: CLINIC | Age: 80
End: 2020-05-04

## 2020-05-04 DIAGNOSIS — I48.0 PAF (PAROXYSMAL ATRIAL FIBRILLATION) (HCC): ICD-10-CM

## 2020-05-04 LAB
BACTERIA SPEC AEROBE CULT: NORMAL
INR PPP: 2.4 (ref 2–3)

## 2020-05-04 PROCEDURE — 85610 PROTHROMBIN TIME: CPT | Performed by: INTERNAL MEDICINE

## 2020-05-04 PROCEDURE — 36416 COLLJ CAPILLARY BLOOD SPEC: CPT | Performed by: INTERNAL MEDICINE

## 2020-05-04 NOTE — PROGRESS NOTES
Case Management Discharge Note      Final Note: d/c home with [facility] [place]    Provided Post Acute Provider List?: Yes  Delivered To: Support Person  Support Person: Son: Vitaly Bucio [phone]  Method of Delivery: Telephone         Final Discharge Disposition Code: 06 - home with home health care

## 2020-05-19 ENCOUNTER — OFFICE VISIT (OUTPATIENT)
Dept: CARDIOLOGY | Facility: CLINIC | Age: 80
End: 2020-05-19

## 2020-05-19 VITALS — BODY MASS INDEX: 26.99 KG/M2 | WEIGHT: 162 LBS | HEIGHT: 65 IN

## 2020-05-19 DIAGNOSIS — I48.0 PAF (PAROXYSMAL ATRIAL FIBRILLATION) (HCC): Primary | Chronic | ICD-10-CM

## 2020-05-19 DIAGNOSIS — Z79.01 LONG TERM CURRENT USE OF ANTICOAGULANT: Chronic | ICD-10-CM

## 2020-05-19 DIAGNOSIS — I49.5 SICK SINUS SYNDROME (HCC): Chronic | ICD-10-CM

## 2020-05-19 DIAGNOSIS — Z95.0 PRESENCE OF CARDIAC PACEMAKER: Chronic | ICD-10-CM

## 2020-05-19 DIAGNOSIS — I10 ESSENTIAL HYPERTENSION: Chronic | ICD-10-CM

## 2020-05-19 PROCEDURE — 99442 PR PHYS/QHP TELEPHONE EVALUATION 11-20 MIN: CPT | Performed by: INTERNAL MEDICINE

## 2020-05-19 NOTE — PROGRESS NOTES
Cardiology Office Visit Note      Referring physician:  Dr. Lizzy Francis     Reason For Followup: 6 month follow up/stress test/echo  You have chosen to receive care through a telephone visit. Do you consent to use a telephone visit for your medical care today? yes  HPI:  Hazel Bucio is a 80 y.o. female reports today for a telephone visit. Patient has  history of sick sinus syndrome with BS PM placed 2014 with pocket revision 1/2017,PAF, peripheral neuropathy, DM  and hypertensive heart disease.  Her last EKG in the hospital actually showed atrial flutter with heart rate in the 70s.  She was subsequently placed on diltiazem  mg in place of her previous verapamil prescription.  She is also continuing to take Betapace 120 mg p.o. twice daily.    Overall, Hazel claims she is doing well since discharge from [facility] around 1 May.  She is unaware of any breakthrough heart rhythm disorders, palpitations, dizziness or lightheadedness.  She has not had any additional chest pain or increased shortness of air and denies PND, orthopnea or bipedal edema.    Stress test 04/30/2020 showed low risk study, no reversible ischemia and an EF 70%    Echo 04/29/2020 shows left ventricular systolic function is normal, right ventricular cavity is borderline dilated,mild pulmonic valve regurgitation is present and EF 58%      She reports she has been compliant with prescribed medications. This visit has been rescheduled as a phone visit to comply with patient safety concerns in accordance with CDC recommendations. Total time of discussion was 14 minutes.      Past Medical History:   Diagnosis Date   • Essential hypertension 1/17/2017   • Essential hypertension 1/17/2017   • Long term current use of anticoagulant 1/9/2015   • PAF (paroxysmal atrial fibrillation) (CMS/[facility]) 6/26/2019   • Presence of cardiac pacemaker 11/03/2014    BS dual chamber PM placed 10/2014 with pocket revision 1/25/17.  HX tachy rob syndrome    • Sick sinus syndrome (CMS/HCC) 11/3/2014       Past Surgical History:   Procedure Laterality Date   • BREAST LUMPECTOMY     • HYSTERECTOMY     • INSERT / REPLACE / REMOVE PACEMAKER           Current Outpatient Medications:   •  acetaminophen (TYLENOL) 325 MG tablet, Take 650 mg by mouth Every 6 (Six) Hours As Needed for Mild Pain ., Disp: , Rfl:   •  Calcium Carbonate-Vit D-Min (CALCIUM 1200 PO), Take  by mouth Daily., Disp: , Rfl:   •  Cholecalciferol (VITAMIN D3) 2000 units tablet, Take  by mouth., Disp: , Rfl:   •  Cyanocobalamin (VITAMIN B-12 IJ), Inject  as directed Every 30 (Thirty) Days., Disp: , Rfl:   •  diclofenac (VOLTAREN) 1 % gel gel, Apply 2 g topically to the appropriate area as directed 4 (Four) Times a Day., Disp: , Rfl:   •  dilTIAZem CD (CARDIZEM CD) 240 MG 24 hr capsule, Take 1 capsule by mouth Daily., Disp: 30 capsule, Rfl: 0  •  fexofenadine (ALLEGRA) 180 MG tablet, Take 180 mg by mouth Daily., Disp: , Rfl:   •  furosemide (LASIX) 20 MG tablet, Take 20 mg by mouth As Needed., Disp: , Rfl:   •  lansoprazole (PREVACID) 30 MG capsule, Take 30 mg by mouth Daily., Disp: , Rfl:   •  Magnesium 400 MG capsule, Take 2 tablets by mouth every night at bedtime., Disp: , Rfl:   •  polyethylene glycol (MIRALAX) packet, Take 17 g by mouth Daily As Needed., Disp: , Rfl:   •  psyllium (METAMUCIL) 58.6 % packet, Take 1 packet by mouth Daily As Needed., Disp: , Rfl:   •  sotalol (BETAPACE) 120 MG tablet, Take 1 tablet by mouth Every 12 (Twelve) Hours., Disp: 180 tablet, Rfl: 3  •  topiramate (TOPAMAX) 100 MG tablet, Take 100 mg by mouth every night at bedtime., Disp: , Rfl:   •  warfarin (COUMADIN) 1 MG tablet, Take 1.5 tablets by mouth Every Night for 30 days. Indications: Atrial Fibrillation, Disp: 45 tablet, Rfl: 0    Social History     Socioeconomic History   • Marital status:      Spouse name: Not on file   • Number of children: Not on file   • Years of education: Not on file   • Highest education  "level: Not on file   Tobacco Use   • Smoking status: Never Smoker   • Smokeless tobacco: Never Used   Substance and Sexual Activity   • Alcohol use: Not Currently   • Drug use: Never   • Sexual activity: Defer       Family History   Problem Relation Age of Onset   • COPD Father          Review of Systems   General: denies fever, chills, anorexia, weight loss  Eyes: denies blurring, diplopia  Ear/Nose/Throat: denies ear pain, nosebleeds, hoarseness  Cardiovascular: See HPI  Respiratory: denies excessive sputum, hemoptysis, wheezing  Gastrointestinal: denies nausea, vomiting, change in bowel habits, abdominal pain  Genitourinary: No hematuria or dysuria  Musculoskeletal: Modest generalized arthralgias but maintaining satisfactory functional status.  Skin: denies rashes, itching, suspicious lesions  Neurologic: denies focal neuro deficits  Psychiatric: denies depression, anxiety  Endocrine: denies cold intolerance, heat intolerance  Hematologic/Lymphatic: denies abnormal bruising, bleeding  Allergic/Immunologic: denies urticaria or persistent infections      Objective     Visit Vitals  Ht 165.1 cm (65\")   Wt 73.5 kg (162 lb)   BMI 26.96 kg/m²           Physical Exam-this is a limited physical exam description based on the patient's own observations as she reported to me today on telephone interview.  General:    Mildly  Obese, well developed,, in no acute distress.    HEENT: Unremarkable without scleral icterus or changes in visual acuity.  Neck adequate ROM without JVD or neck masses noted.  Lungs: Satisfactory [place] rate and pattern with no conversational dyspnea.  Cardiac satisfactory heart rhythm and rate with no obvious ectopy or breakthrough dysrhythmias.  Pacemaker site satisfactory.  Abdomen: Soft without localized tenderness or masses.  Msk:    no deformity; adequate R OM  Pulses:    pulses normal in all 4 extremities.    Extremities:    no clubbing, cyanosis, edema   Neurologic:    no focal sensory or " motor deficits  Skin:    intact without lesions or rashes.    Psych:    alert and cooperative; normal mood and affect; normal attention span and concentration.          Procedures      Assessment:   Problems Addressed this Visit        Cardiovascular and Mediastinum    PAF (paroxysmal atrial fibrillation) (CMS/HCC) - Primary (Chronic)     Now maintained on diltiazem  mg plus sotalol 120 mg p.o. twice daily and has permanent AV sequential pacemaker.  She is fully anticoagulated using Coumadin.         Essential hypertension (Chronic)     Hypertension is thought to be well regulated on current medications though she does not have any recent home blood pressure readings to verify         Presence of cardiac pacemaker (Chronic)     Thought to have satisfactory device site and functioning.         Sick sinus syndrome (CMS/HCC) (Chronic)       Other    Long term current use of anticoagulant (Chronic)     Generally maintains satisfactory anticoagulation profile through this office.                 Plan:  Overall, Hazel is thought to be doing reasonably well post discharge from [facility] for breakthrough atrial flutter.  At discharge her verapamil was changed to diltiazem CD and she is sufficiently rate controlled and fully anticoagulated.  She is advised to return to clinic in 1 month to reassess heart rate rhythm, as well as blood pressure and INR.    MARIELA Carrizales MD  5/19/2020 14:59    This report was generated using the Dragon voice recognition system.

## 2020-05-19 NOTE — ASSESSMENT & PLAN NOTE
Now maintained on diltiazem  mg plus sotalol 120 mg p.o. twice daily and has permanent AV sequential pacemaker.  She is fully anticoagulated using Coumadin.

## 2020-05-19 NOTE — ASSESSMENT & PLAN NOTE
Hypertension is thought to be well regulated on current medications though she does not have any recent home blood pressure readings to verify

## 2020-05-20 ENCOUNTER — ANTICOAGULATION VISIT (OUTPATIENT)
Dept: CARDIOLOGY | Facility: CLINIC | Age: 80
End: 2020-05-20

## 2020-05-20 DIAGNOSIS — I48.0 PAF (PAROXYSMAL ATRIAL FIBRILLATION) (HCC): ICD-10-CM

## 2020-05-20 LAB — INR PPP: 2.9 (ref 2–3)

## 2020-05-21 ENCOUNTER — CLINICAL SUPPORT NO REQUIREMENTS (OUTPATIENT)
Dept: CARDIOLOGY | Facility: CLINIC | Age: 80
End: 2020-05-21

## 2020-05-21 DIAGNOSIS — Z95.0 PRESENCE OF CARDIAC PACEMAKER: Chronic | ICD-10-CM

## 2020-05-21 DIAGNOSIS — I49.5 SICK SINUS SYNDROME (HCC): Primary | Chronic | ICD-10-CM

## 2020-05-21 PROCEDURE — 93296 REM INTERROG EVL PM/IDS: CPT | Performed by: NURSE PRACTITIONER

## 2020-05-21 PROCEDURE — 93294 REM INTERROG EVL PM/LDLS PM: CPT | Performed by: NURSE PRACTITIONER

## 2020-05-21 NOTE — PROGRESS NOTES
REMOTE DEVICE INTERROGATION    Received and reviewed on:   May 21, 2020    Remote device interrogation is reviewed.     Device Company: [employer]    Battery Stable.  Time to SACHIN 3.5 years    Presenting EGM: A paced V sense    Arrhythmia Logbook Reviewed: PAF episodes noted.  On sotalol and warfarin.  A. fib burden is 28%    Device function appears Stable    Continue remote device interrogations every 3 months.

## 2020-05-26 RX ORDER — DILTIAZEM HYDROCHLORIDE 240 MG/1
240 CAPSULE, COATED, EXTENDED RELEASE ORAL
Qty: 30 CAPSULE | Refills: 1 | Status: SHIPPED | OUTPATIENT
Start: 2020-05-26 | End: 2020-07-14

## 2020-06-01 RX ORDER — FUROSEMIDE 20 MG/1
TABLET ORAL
Qty: 90 TABLET | Refills: 3 | Status: SHIPPED | OUTPATIENT
Start: 2020-06-01 | End: 2020-08-31 | Stop reason: HOSPADM

## 2020-06-19 ENCOUNTER — TRANSCRIBE ORDERS (OUTPATIENT)
Dept: ADMINISTRATIVE | Facility: HOSPITAL | Age: 80
End: 2020-06-19

## 2020-06-19 DIAGNOSIS — Z85.51 HISTORY OF BLADDER CANCER: Primary | ICD-10-CM

## 2020-07-10 NOTE — PROGRESS NOTES
Remote device interrogation reviewed.   Premature for billing  Had PAF with RVR on 7/6  Please review medications with patient and check compliance  With diltiazem  mg daily and sotalol 120mg po BID.  Thanks

## 2020-07-14 ENCOUNTER — ANTICOAGULATION VISIT (OUTPATIENT)
Dept: CARDIOLOGY | Facility: CLINIC | Age: 80
End: 2020-07-14

## 2020-07-14 ENCOUNTER — OFFICE VISIT (OUTPATIENT)
Dept: CARDIOLOGY | Facility: CLINIC | Age: 80
End: 2020-07-14

## 2020-07-14 VITALS
HEIGHT: 65 IN | WEIGHT: 160 LBS | HEART RATE: 60 BPM | OXYGEN SATURATION: 96 % | DIASTOLIC BLOOD PRESSURE: 80 MMHG | SYSTOLIC BLOOD PRESSURE: 172 MMHG | BODY MASS INDEX: 26.66 KG/M2

## 2020-07-14 DIAGNOSIS — I10 ESSENTIAL HYPERTENSION: Chronic | ICD-10-CM

## 2020-07-14 DIAGNOSIS — I48.0 PAF (PAROXYSMAL ATRIAL FIBRILLATION) (HCC): ICD-10-CM

## 2020-07-14 DIAGNOSIS — E78.5 DYSLIPIDEMIA: Chronic | ICD-10-CM

## 2020-07-14 DIAGNOSIS — I49.5 SICK SINUS SYNDROME (HCC): Chronic | ICD-10-CM

## 2020-07-14 DIAGNOSIS — I48.0 PAF (PAROXYSMAL ATRIAL FIBRILLATION) (HCC): Primary | Chronic | ICD-10-CM

## 2020-07-14 DIAGNOSIS — Z95.0 PRESENCE OF CARDIAC PACEMAKER: Chronic | ICD-10-CM

## 2020-07-14 LAB — INR PPP: 6.4 (ref 2–3)

## 2020-07-14 PROCEDURE — 36416 COLLJ CAPILLARY BLOOD SPEC: CPT | Performed by: INTERNAL MEDICINE

## 2020-07-14 PROCEDURE — 99214 OFFICE O/P EST MOD 30 MIN: CPT | Performed by: INTERNAL MEDICINE

## 2020-07-14 PROCEDURE — 85610 PROTHROMBIN TIME: CPT | Performed by: INTERNAL MEDICINE

## 2020-07-14 PROCEDURE — 93000 ELECTROCARDIOGRAM COMPLETE: CPT | Performed by: INTERNAL MEDICINE

## 2020-07-14 RX ORDER — WARFARIN SODIUM 2 MG/1
2 TABLET ORAL DAILY
COMMUNITY
Start: 2020-07-10 | End: 2020-08-31 | Stop reason: HOSPADM

## 2020-07-14 RX ORDER — DILTIAZEM HYDROCHLORIDE 300 MG/1
300 CAPSULE, COATED, EXTENDED RELEASE ORAL DAILY
Qty: 30 CAPSULE | Refills: 1 | Status: SHIPPED | OUTPATIENT
Start: 2020-07-14 | End: 2021-09-30 | Stop reason: HOSPADM

## 2020-07-15 NOTE — PROGRESS NOTES
She seen Sofie yesterday -note is not finished yet but looks as if he may have changed her med's in med list .    He sent in Diltiazem  mg QD and Sotalol HCI AF 80 mg 1 and 1/2 pill in the AM and 1 pill in the PM     Yesterday.

## 2020-07-22 RX ORDER — DILTIAZEM HYDROCHLORIDE 240 MG/1
240 CAPSULE, COATED, EXTENDED RELEASE ORAL
Qty: 30 CAPSULE | Refills: 1 | OUTPATIENT
Start: 2020-07-22

## 2020-08-03 ENCOUNTER — ANTICOAGULATION VISIT (OUTPATIENT)
Dept: CARDIOLOGY | Facility: CLINIC | Age: 80
End: 2020-08-03

## 2020-08-03 DIAGNOSIS — I48.0 PAF (PAROXYSMAL ATRIAL FIBRILLATION) (HCC): ICD-10-CM

## 2020-08-03 LAB — INR PPP: 2 (ref 2–3)

## 2020-08-03 PROCEDURE — 36416 COLLJ CAPILLARY BLOOD SPEC: CPT | Performed by: INTERNAL MEDICINE

## 2020-08-03 PROCEDURE — 85610 PROTHROMBIN TIME: CPT | Performed by: INTERNAL MEDICINE

## 2020-08-18 ENCOUNTER — ANTICOAGULATION VISIT (OUTPATIENT)
Dept: CARDIOLOGY | Facility: CLINIC | Age: 80
End: 2020-08-18

## 2020-08-18 DIAGNOSIS — I48.0 PAF (PAROXYSMAL ATRIAL FIBRILLATION) (HCC): ICD-10-CM

## 2020-08-18 LAB — INR PPP: 2.9 (ref 2–3)

## 2020-08-18 PROCEDURE — 85610 PROTHROMBIN TIME: CPT | Performed by: INTERNAL MEDICINE

## 2020-08-18 PROCEDURE — 36416 COLLJ CAPILLARY BLOOD SPEC: CPT | Performed by: INTERNAL MEDICINE

## 2020-08-27 ENCOUNTER — APPOINTMENT (OUTPATIENT)
Dept: GENERAL RADIOLOGY | Facility: HOSPITAL | Age: 80
End: 2020-08-27

## 2020-08-27 ENCOUNTER — HOSPITAL ENCOUNTER (INPATIENT)
Facility: HOSPITAL | Age: 80
LOS: 4 days | Discharge: NURSING, REHAB OR LONG-TERM CARE | End: 2020-08-31
Attending: EMERGENCY MEDICINE | Admitting: INTERNAL MEDICINE

## 2020-08-27 DIAGNOSIS — S82.201A CLOSED FRACTURE OF RIGHT TIBIA AND FIBULA, INITIAL ENCOUNTER: ICD-10-CM

## 2020-08-27 DIAGNOSIS — S82.401A FRACTURE TIBIA/FIBULA, RIGHT, CLOSED, INITIAL ENCOUNTER: Primary | ICD-10-CM

## 2020-08-27 DIAGNOSIS — S82.201A FRACTURE TIBIA/FIBULA, RIGHT, CLOSED, INITIAL ENCOUNTER: Primary | ICD-10-CM

## 2020-08-27 DIAGNOSIS — I48.20 ATRIAL FIBRILLATION, CHRONIC (HCC): ICD-10-CM

## 2020-08-27 DIAGNOSIS — S82.401A CLOSED FRACTURE OF RIGHT TIBIA AND FIBULA, INITIAL ENCOUNTER: ICD-10-CM

## 2020-08-27 DIAGNOSIS — N17.9 ACUTE KIDNEY INJURY (HCC): ICD-10-CM

## 2020-08-27 DIAGNOSIS — R13.10 DYSPHAGIA, UNSPECIFIED TYPE: ICD-10-CM

## 2020-08-27 DIAGNOSIS — Z79.01 LONG TERM CURRENT USE OF ANTICOAGULANT: Chronic | ICD-10-CM

## 2020-08-27 PROBLEM — C66.9 URETERAL CANCER: Chronic | Status: ACTIVE | Noted: 2020-08-27

## 2020-08-27 PROBLEM — Z86.000 HISTORY OF CARCINOMA IN SITU OF BREAST: Chronic | Status: ACTIVE | Noted: 2020-08-27

## 2020-08-27 PROBLEM — F51.04 CHRONIC INSOMNIA: Chronic | Status: ACTIVE | Noted: 2020-08-27

## 2020-08-27 PROBLEM — G93.40 ACUTE ENCEPHALOPATHY: Status: ACTIVE | Noted: 2020-08-27

## 2020-08-27 PROBLEM — J30.2 SEASONAL ALLERGIES: Chronic | Status: ACTIVE | Noted: 2020-08-27

## 2020-08-27 LAB
ANION GAP SERPL CALCULATED.3IONS-SCNC: 14 MMOL/L (ref 5–15)
BASOPHILS # BLD AUTO: 0.1 10*3/MM3 (ref 0–0.2)
BASOPHILS NFR BLD AUTO: 1 % (ref 0–1.5)
BUN SERPL-MCNC: 28 MG/DL (ref 8–23)
BUN SERPL-MCNC: ABNORMAL MG/DL
BUN/CREAT SERPL: ABNORMAL
CALCIUM SPEC-SCNC: 9.6 MG/DL (ref 8.6–10.5)
CHLORIDE SERPL-SCNC: 107 MMOL/L (ref 98–107)
CK SERPL-CCNC: 83 U/L (ref 20–180)
CO2 SERPL-SCNC: 23 MMOL/L (ref 22–29)
CREAT SERPL-MCNC: 0.98 MG/DL (ref 0.57–1)
DEPRECATED RDW RBC AUTO: 49.4 FL (ref 37–54)
EOSINOPHIL # BLD AUTO: 0 10*3/MM3 (ref 0–0.4)
EOSINOPHIL NFR BLD AUTO: 0.1 % (ref 0.3–6.2)
ERYTHROCYTE [DISTWIDTH] IN BLOOD BY AUTOMATED COUNT: 14.4 % (ref 12.3–15.4)
GFR SERPL CREATININE-BSD FRML MDRD: 55 ML/MIN/1.73
GLUCOSE BLDC GLUCOMTR-MCNC: 139 MG/DL (ref 70–105)
GLUCOSE SERPL-MCNC: 143 MG/DL (ref 65–99)
HCT VFR BLD AUTO: 47.8 % (ref 34–46.6)
HGB BLD-MCNC: 15.4 G/DL (ref 12–15.9)
INR PPP: 1.8 (ref 2–3)
LYMPHOCYTES # BLD AUTO: 1 10*3/MM3 (ref 0.7–3.1)
LYMPHOCYTES NFR BLD AUTO: 11.2 % (ref 19.6–45.3)
MAGNESIUM SERPL-MCNC: 2 MG/DL (ref 1.6–2.4)
MCH RBC QN AUTO: 31.4 PG (ref 26.6–33)
MCHC RBC AUTO-ENTMCNC: 32.3 G/DL (ref 31.5–35.7)
MCV RBC AUTO: 97.5 FL (ref 79–97)
MONOCYTES # BLD AUTO: 0.7 10*3/MM3 (ref 0.1–0.9)
MONOCYTES NFR BLD AUTO: 7.8 % (ref 5–12)
NEUTROPHILS NFR BLD AUTO: 7.3 10*3/MM3 (ref 1.7–7)
NEUTROPHILS NFR BLD AUTO: 79.9 % (ref 42.7–76)
NRBC BLD AUTO-RTO: 0.1 /100 WBC (ref 0–0.2)
PLATELET # BLD AUTO: 268 10*3/MM3 (ref 140–450)
PMV BLD AUTO: 6.9 FL (ref 6–12)
POTASSIUM SERPL-SCNC: 4.1 MMOL/L (ref 3.5–5.2)
PROTHROMBIN TIME: 18.8 SECONDS (ref 19.4–28.5)
RBC # BLD AUTO: 4.9 10*6/MM3 (ref 3.77–5.28)
SODIUM SERPL-SCNC: 144 MMOL/L (ref 136–145)
WBC # BLD AUTO: 9.1 10*3/MM3 (ref 3.4–10.8)

## 2020-08-27 PROCEDURE — 83735 ASSAY OF MAGNESIUM: CPT | Performed by: NURSE PRACTITIONER

## 2020-08-27 PROCEDURE — 93005 ELECTROCARDIOGRAM TRACING: CPT | Performed by: EMERGENCY MEDICINE

## 2020-08-27 PROCEDURE — 87635 SARS-COV-2 COVID-19 AMP PRB: CPT | Performed by: INTERNAL MEDICINE

## 2020-08-27 PROCEDURE — 73562 X-RAY EXAM OF KNEE 3: CPT

## 2020-08-27 PROCEDURE — 99223 1ST HOSP IP/OBS HIGH 75: CPT | Performed by: INTERNAL MEDICINE

## 2020-08-27 PROCEDURE — 85610 PROTHROMBIN TIME: CPT | Performed by: EMERGENCY MEDICINE

## 2020-08-27 PROCEDURE — 85025 COMPLETE CBC W/AUTO DIFF WBC: CPT | Performed by: EMERGENCY MEDICINE

## 2020-08-27 PROCEDURE — 25010000002 MORPHINE PER 10 MG: Performed by: NURSE PRACTITIONER

## 2020-08-27 PROCEDURE — 82550 ASSAY OF CK (CPK): CPT | Performed by: NURSE PRACTITIONER

## 2020-08-27 PROCEDURE — 25010000002 HYDROMORPHONE PER 4 MG: Performed by: EMERGENCY MEDICINE

## 2020-08-27 PROCEDURE — 83036 HEMOGLOBIN GLYCOSYLATED A1C: CPT | Performed by: NURSE PRACTITIONER

## 2020-08-27 PROCEDURE — 99285 EMERGENCY DEPT VISIT HI MDM: CPT

## 2020-08-27 PROCEDURE — 25010000002 ONDANSETRON PER 1 MG: Performed by: EMERGENCY MEDICINE

## 2020-08-27 PROCEDURE — 82962 GLUCOSE BLOOD TEST: CPT

## 2020-08-27 PROCEDURE — 71045 X-RAY EXAM CHEST 1 VIEW: CPT

## 2020-08-27 PROCEDURE — 80048 BASIC METABOLIC PNL TOTAL CA: CPT | Performed by: EMERGENCY MEDICINE

## 2020-08-27 RX ORDER — SOTALOL HYDROCHLORIDE 120 MG/1
120 TABLET ORAL DAILY
Status: DISCONTINUED | OUTPATIENT
Start: 2020-08-28 | End: 2020-08-31 | Stop reason: HOSPADM

## 2020-08-27 RX ORDER — TOPIRAMATE 100 MG/1
100 TABLET, FILM COATED ORAL NIGHTLY
Status: DISCONTINUED | OUTPATIENT
Start: 2020-08-27 | End: 2020-08-31 | Stop reason: HOSPADM

## 2020-08-27 RX ORDER — SODIUM CHLORIDE 0.9 % (FLUSH) 0.9 %
10 SYRINGE (ML) INJECTION AS NEEDED
Status: DISCONTINUED | OUTPATIENT
Start: 2020-08-27 | End: 2020-08-31 | Stop reason: HOSPADM

## 2020-08-27 RX ORDER — DEXTROSE MONOHYDRATE 25 G/50ML
25 INJECTION, SOLUTION INTRAVENOUS
Status: DISCONTINUED | OUTPATIENT
Start: 2020-08-27 | End: 2020-08-31 | Stop reason: HOSPADM

## 2020-08-27 RX ORDER — SOTALOL HYDROCHLORIDE 80 MG/1
120 TABLET ORAL EVERY MORNING
COMMUNITY
End: 2020-09-14 | Stop reason: HOSPADM

## 2020-08-27 RX ORDER — FUROSEMIDE 20 MG/1
20 TABLET ORAL DAILY
Status: DISCONTINUED | OUTPATIENT
Start: 2020-08-27 | End: 2020-08-30

## 2020-08-27 RX ORDER — ONDANSETRON 4 MG/1
4 TABLET, FILM COATED ORAL EVERY 6 HOURS PRN
Status: DISCONTINUED | OUTPATIENT
Start: 2020-08-27 | End: 2020-08-31 | Stop reason: HOSPADM

## 2020-08-27 RX ORDER — SOTALOL HYDROCHLORIDE 80 MG/1
80 TABLET ORAL NIGHTLY
COMMUNITY
End: 2020-09-14 | Stop reason: HOSPADM

## 2020-08-27 RX ORDER — VALSARTAN AND HYDROCHLOROTHIAZIDE 320; 12.5 MG/1; MG/1
1 TABLET, FILM COATED ORAL DAILY
COMMUNITY
End: 2020-08-31 | Stop reason: HOSPADM

## 2020-08-27 RX ORDER — MAGNESIUM SULFATE 1 G/100ML
1 INJECTION INTRAVENOUS AS NEEDED
Status: DISCONTINUED | OUTPATIENT
Start: 2020-08-27 | End: 2020-08-31 | Stop reason: HOSPADM

## 2020-08-27 RX ORDER — MAGNESIUM SULFATE HEPTAHYDRATE 40 MG/ML
2 INJECTION, SOLUTION INTRAVENOUS AS NEEDED
Status: DISCONTINUED | OUTPATIENT
Start: 2020-08-27 | End: 2020-08-31 | Stop reason: HOSPADM

## 2020-08-27 RX ORDER — ONDANSETRON 2 MG/ML
4 INJECTION INTRAMUSCULAR; INTRAVENOUS EVERY 6 HOURS PRN
Status: DISCONTINUED | OUTPATIENT
Start: 2020-08-27 | End: 2020-08-31 | Stop reason: HOSPADM

## 2020-08-27 RX ORDER — ACETAMINOPHEN 650 MG/1
650 SUPPOSITORY RECTAL EVERY 4 HOURS PRN
Status: DISCONTINUED | OUTPATIENT
Start: 2020-08-27 | End: 2020-08-31 | Stop reason: HOSPADM

## 2020-08-27 RX ORDER — MORPHINE SULFATE 4 MG/ML
2 INJECTION, SOLUTION INTRAMUSCULAR; INTRAVENOUS EVERY 4 HOURS PRN
Status: DISCONTINUED | OUTPATIENT
Start: 2020-08-27 | End: 2020-08-29

## 2020-08-27 RX ORDER — HYDRALAZINE HYDROCHLORIDE 10 MG/1
10 TABLET, FILM COATED ORAL 2 TIMES DAILY
Status: ON HOLD | COMMUNITY
End: 2020-09-07

## 2020-08-27 RX ORDER — NICOTINE POLACRILEX 4 MG
15 LOZENGE BUCCAL
Status: DISCONTINUED | OUTPATIENT
Start: 2020-08-27 | End: 2020-08-31 | Stop reason: HOSPADM

## 2020-08-27 RX ORDER — SOTALOL HYDROCHLORIDE 80 MG/1
80 TABLET ORAL NIGHTLY
Status: DISCONTINUED | OUTPATIENT
Start: 2020-08-27 | End: 2020-08-31 | Stop reason: HOSPADM

## 2020-08-27 RX ORDER — ACETAMINOPHEN 325 MG/1
650 TABLET ORAL EVERY 4 HOURS PRN
Status: DISCONTINUED | OUTPATIENT
Start: 2020-08-27 | End: 2020-08-31 | Stop reason: HOSPADM

## 2020-08-27 RX ORDER — POTASSIUM CHLORIDE 20 MEQ/1
40 TABLET, EXTENDED RELEASE ORAL AS NEEDED
Status: DISCONTINUED | OUTPATIENT
Start: 2020-08-27 | End: 2020-08-31 | Stop reason: HOSPADM

## 2020-08-27 RX ORDER — HYDRALAZINE HYDROCHLORIDE 10 MG/1
10 TABLET, FILM COATED ORAL EVERY 6 HOURS PRN
Status: DISCONTINUED | OUTPATIENT
Start: 2020-08-27 | End: 2020-08-31 | Stop reason: HOSPADM

## 2020-08-27 RX ORDER — HYDROMORPHONE HCL 110MG/55ML
0.5 PATIENT CONTROLLED ANALGESIA SYRINGE INTRAVENOUS ONCE
Status: COMPLETED | OUTPATIENT
Start: 2020-08-27 | End: 2020-08-27

## 2020-08-27 RX ORDER — SODIUM CHLORIDE 0.9 % (FLUSH) 0.9 %
10 SYRINGE (ML) INJECTION EVERY 12 HOURS SCHEDULED
Status: DISCONTINUED | OUTPATIENT
Start: 2020-08-27 | End: 2020-08-31 | Stop reason: HOSPADM

## 2020-08-27 RX ORDER — ONDANSETRON 2 MG/ML
4 INJECTION INTRAMUSCULAR; INTRAVENOUS ONCE
Status: COMPLETED | OUTPATIENT
Start: 2020-08-27 | End: 2020-08-27

## 2020-08-27 RX ORDER — HYDRALAZINE HYDROCHLORIDE 10 MG/1
10 TABLET, FILM COATED ORAL 2 TIMES DAILY
Status: DISCONTINUED | OUTPATIENT
Start: 2020-08-27 | End: 2020-08-31 | Stop reason: HOSPADM

## 2020-08-27 RX ORDER — PANTOPRAZOLE SODIUM 40 MG/1
40 TABLET, DELAYED RELEASE ORAL
Status: DISCONTINUED | OUTPATIENT
Start: 2020-08-28 | End: 2020-08-31 | Stop reason: HOSPADM

## 2020-08-27 RX ORDER — CETIRIZINE HYDROCHLORIDE 10 MG/1
10 TABLET ORAL DAILY
Status: DISCONTINUED | OUTPATIENT
Start: 2020-08-27 | End: 2020-08-31 | Stop reason: HOSPADM

## 2020-08-27 RX ORDER — ACETAMINOPHEN 160 MG/5ML
650 SOLUTION ORAL EVERY 4 HOURS PRN
Status: DISCONTINUED | OUTPATIENT
Start: 2020-08-27 | End: 2020-08-31 | Stop reason: HOSPADM

## 2020-08-27 RX ADMIN — TOPIRAMATE 100 MG: 100 TABLET, FILM COATED ORAL at 21:01

## 2020-08-27 RX ADMIN — CETIRIZINE HYDROCHLORIDE 10 MG: 10 TABLET, FILM COATED ORAL at 21:01

## 2020-08-27 RX ADMIN — HYDROCHLOROTHIAZIDE: 12.5 TABLET ORAL at 20:59

## 2020-08-27 RX ADMIN — Medication 10 ML: at 21:01

## 2020-08-27 RX ADMIN — SOTALOL HYDROCHLORIDE 80 MG: 80 TABLET ORAL at 21:01

## 2020-08-27 RX ADMIN — DILTIAZEM HYDROCHLORIDE 300 MG: 180 CAPSULE, COATED, EXTENDED RELEASE ORAL at 21:00

## 2020-08-27 RX ADMIN — MAGNESIUM OXIDE TAB 400 MG (241.3 MG ELEMENTAL MG) 400 MG: 400 (241.3 MG) TAB at 21:01

## 2020-08-27 RX ADMIN — HYDROMORPHONE HYDROCHLORIDE 0.5 MG: 2 INJECTION, SOLUTION INTRAMUSCULAR; INTRAVENOUS; SUBCUTANEOUS at 16:32

## 2020-08-27 RX ADMIN — MORPHINE SULFATE 2 MG: 4 INJECTION INTRAVENOUS at 21:31

## 2020-08-27 RX ADMIN — FUROSEMIDE 20 MG: 20 TABLET ORAL at 21:01

## 2020-08-27 RX ADMIN — HYDRALAZINE HYDROCHLORIDE 10 MG: 10 TABLET, FILM COATED ORAL at 21:01

## 2020-08-27 RX ADMIN — ONDANSETRON 4 MG: 2 INJECTION INTRAMUSCULAR; INTRAVENOUS at 16:31

## 2020-08-28 ENCOUNTER — APPOINTMENT (OUTPATIENT)
Dept: GENERAL RADIOLOGY | Facility: HOSPITAL | Age: 80
End: 2020-08-28

## 2020-08-28 ENCOUNTER — ANESTHESIA (OUTPATIENT)
Dept: PERIOP | Facility: HOSPITAL | Age: 80
End: 2020-08-28

## 2020-08-28 ENCOUNTER — ANESTHESIA EVENT (OUTPATIENT)
Dept: PERIOP | Facility: HOSPITAL | Age: 80
End: 2020-08-28

## 2020-08-28 LAB
ANION GAP SERPL CALCULATED.3IONS-SCNC: 15 MMOL/L (ref 5–15)
BACTERIA UR QL AUTO: ABNORMAL /HPF
BILIRUB UR QL STRIP: NEGATIVE
BUN SERPL-MCNC: 32 MG/DL (ref 8–23)
BUN SERPL-MCNC: ABNORMAL MG/DL
BUN/CREAT SERPL: ABNORMAL
CALCIUM SPEC-SCNC: 9.1 MG/DL (ref 8.6–10.5)
CHLORIDE SERPL-SCNC: 106 MMOL/L (ref 98–107)
CLARITY UR: CLEAR
CO2 SERPL-SCNC: 20 MMOL/L (ref 22–29)
COLOR UR: YELLOW
CREAT SERPL-MCNC: 1.08 MG/DL (ref 0.57–1)
DEPRECATED RDW RBC AUTO: 51.6 FL (ref 37–54)
ERYTHROCYTE [DISTWIDTH] IN BLOOD BY AUTOMATED COUNT: 14.8 % (ref 12.3–15.4)
GFR SERPL CREATININE-BSD FRML MDRD: 49 ML/MIN/1.73
GLUCOSE BLDC GLUCOMTR-MCNC: 121 MG/DL (ref 70–105)
GLUCOSE BLDC GLUCOMTR-MCNC: 145 MG/DL (ref 70–105)
GLUCOSE BLDC GLUCOMTR-MCNC: 203 MG/DL (ref 70–105)
GLUCOSE SERPL-MCNC: 126 MG/DL (ref 65–99)
GLUCOSE UR STRIP-MCNC: NEGATIVE MG/DL
HBA1C MFR BLD: 6.1 % (ref 3.5–5.6)
HCT VFR BLD AUTO: 43.3 % (ref 34–46.6)
HGB BLD-MCNC: 14 G/DL (ref 12–15.9)
HGB UR QL STRIP.AUTO: ABNORMAL
HYALINE CASTS UR QL AUTO: ABNORMAL /LPF
INR PPP: 1.84 (ref 2–3)
KETONES UR QL STRIP: NEGATIVE
LEUKOCYTE ESTERASE UR QL STRIP.AUTO: ABNORMAL
MAGNESIUM SERPL-MCNC: 2 MG/DL (ref 1.6–2.4)
MCH RBC QN AUTO: 31.7 PG (ref 26.6–33)
MCHC RBC AUTO-ENTMCNC: 32.3 G/DL (ref 31.5–35.7)
MCV RBC AUTO: 98.3 FL (ref 79–97)
NITRITE UR QL STRIP: NEGATIVE
PH UR STRIP.AUTO: 6 [PH] (ref 5–8)
PLATELET # BLD AUTO: 215 10*3/MM3 (ref 140–450)
PMV BLD AUTO: 7.3 FL (ref 6–12)
POTASSIUM SERPL-SCNC: 4.1 MMOL/L (ref 3.5–5.2)
PROT UR QL STRIP: ABNORMAL
PROTHROMBIN TIME: 19.2 SECONDS (ref 19.4–28.5)
RBC # BLD AUTO: 4.41 10*6/MM3 (ref 3.77–5.28)
RBC # UR: ABNORMAL /HPF
REF LAB TEST METHOD: ABNORMAL
SARS-COV-2 RNA PNL SPEC NAA+PROBE: NOT DETECTED
SODIUM SERPL-SCNC: 141 MMOL/L (ref 136–145)
SP GR UR STRIP: 1.01 (ref 1–1.03)
SQUAMOUS #/AREA URNS HPF: ABNORMAL /HPF
UROBILINOGEN UR QL STRIP: ABNORMAL
WBC # BLD AUTO: 10.1 10*3/MM3 (ref 3.4–10.8)
WBC UR QL AUTO: ABNORMAL /HPF

## 2020-08-28 PROCEDURE — 73590 X-RAY EXAM OF LOWER LEG: CPT

## 2020-08-28 PROCEDURE — 25010000002 ONDANSETRON PER 1 MG: Performed by: ANESTHESIOLOGY

## 2020-08-28 PROCEDURE — 85610 PROTHROMBIN TIME: CPT | Performed by: NURSE PRACTITIONER

## 2020-08-28 PROCEDURE — 25010000002 MORPHINE PER 10 MG: Performed by: ANESTHESIOLOGY

## 2020-08-28 PROCEDURE — 83735 ASSAY OF MAGNESIUM: CPT | Performed by: NURSE PRACTITIONER

## 2020-08-28 PROCEDURE — C1713 ANCHOR/SCREW BN/BN,TIS/BN: HCPCS | Performed by: ORTHOPAEDIC SURGERY

## 2020-08-28 PROCEDURE — 25010000002 PROPOFOL 10 MG/ML EMULSION: Performed by: ANESTHESIOLOGY

## 2020-08-28 PROCEDURE — 25010000002 MORPHINE PER 10 MG: Performed by: NURSE PRACTITIONER

## 2020-08-28 PROCEDURE — 76000 FLUOROSCOPY <1 HR PHYS/QHP: CPT

## 2020-08-28 PROCEDURE — 87086 URINE CULTURE/COLONY COUNT: CPT | Performed by: NURSE PRACTITIONER

## 2020-08-28 PROCEDURE — 85027 COMPLETE CBC AUTOMATED: CPT | Performed by: NURSE PRACTITIONER

## 2020-08-28 PROCEDURE — 25010000002 DEXAMETHASONE PER 1 MG: Performed by: ANESTHESIOLOGY

## 2020-08-28 PROCEDURE — 82962 GLUCOSE BLOOD TEST: CPT

## 2020-08-28 PROCEDURE — 80048 BASIC METABOLIC PNL TOTAL CA: CPT | Performed by: NURSE PRACTITIONER

## 2020-08-28 PROCEDURE — 25010000002 FENTANYL CITRATE (PF) 100 MCG/2ML SOLUTION: Performed by: ANESTHESIOLOGY

## 2020-08-28 PROCEDURE — C1769 GUIDE WIRE: HCPCS | Performed by: ORTHOPAEDIC SURGERY

## 2020-08-28 PROCEDURE — 87077 CULTURE AEROBIC IDENTIFY: CPT | Performed by: NURSE PRACTITIONER

## 2020-08-28 PROCEDURE — 99233 SBSQ HOSP IP/OBS HIGH 50: CPT | Performed by: INTERNAL MEDICINE

## 2020-08-28 PROCEDURE — 0QSG06Z REPOSITION RIGHT TIBIA WITH INTRAMEDULLARY INTERNAL FIXATION DEVICE, OPEN APPROACH: ICD-10-PCS | Performed by: ORTHOPAEDIC SURGERY

## 2020-08-28 PROCEDURE — 81001 URINALYSIS AUTO W/SCOPE: CPT | Performed by: NURSE PRACTITIONER

## 2020-08-28 PROCEDURE — 87186 SC STD MICRODIL/AGAR DIL: CPT | Performed by: NURSE PRACTITIONER

## 2020-08-28 DEVICE — TRIGEN LOW PROFILE SCREW 5.0MM X 32.5MM
Type: IMPLANTABLE DEVICE | Site: TIBIA | Status: FUNCTIONAL
Brand: TRIGEN

## 2020-08-28 DEVICE — TRIGEN META TIBIAL NAIL 13MM X 32CM
Type: IMPLANTABLE DEVICE | Site: TIBIA | Status: FUNCTIONAL
Brand: TRIGEN

## 2020-08-28 DEVICE — TRIGEN LOW PROFILE SCREW 5.0MM X 40MM
Type: IMPLANTABLE DEVICE | Site: TIBIA | Status: FUNCTIONAL
Brand: TRIGEN

## 2020-08-28 DEVICE — TRIGEN LOW PROFILE SCREW 5.0MM X 50MM
Type: IMPLANTABLE DEVICE | Site: TIBIA | Status: FUNCTIONAL
Brand: TRIGEN

## 2020-08-28 DEVICE — TRIGEN LOW PROFILE SCREW 5.0MM X 65MM
Type: IMPLANTABLE DEVICE | Site: TIBIA | Status: FUNCTIONAL
Brand: TRIGEN

## 2020-08-28 RX ORDER — CLINDAMYCIN PHOSPHATE 900 MG/50ML
900 INJECTION INTRAVENOUS ONCE
Status: COMPLETED | OUTPATIENT
Start: 2020-08-28 | End: 2020-08-28

## 2020-08-28 RX ORDER — DEXAMETHASONE SODIUM PHOSPHATE 4 MG/ML
INJECTION, SOLUTION INTRA-ARTICULAR; INTRALESIONAL; INTRAMUSCULAR; INTRAVENOUS; SOFT TISSUE AS NEEDED
Status: DISCONTINUED | OUTPATIENT
Start: 2020-08-28 | End: 2020-08-28 | Stop reason: SURG

## 2020-08-28 RX ORDER — ONDANSETRON 2 MG/ML
4 INJECTION INTRAMUSCULAR; INTRAVENOUS ONCE AS NEEDED
Status: DISCONTINUED | OUTPATIENT
Start: 2020-08-28 | End: 2020-08-28 | Stop reason: HOSPADM

## 2020-08-28 RX ORDER — ONDANSETRON 2 MG/ML
INJECTION INTRAMUSCULAR; INTRAVENOUS AS NEEDED
Status: DISCONTINUED | OUTPATIENT
Start: 2020-08-28 | End: 2020-08-28 | Stop reason: SURG

## 2020-08-28 RX ORDER — SODIUM CHLORIDE, SODIUM LACTATE, POTASSIUM CHLORIDE, AND CALCIUM CHLORIDE .6; .31; .03; .02 G/100ML; G/100ML; G/100ML; G/100ML
20 INJECTION, SOLUTION INTRAVENOUS CONTINUOUS
Status: DISCONTINUED | OUTPATIENT
Start: 2020-08-28 | End: 2020-08-29

## 2020-08-28 RX ORDER — ROCURONIUM BROMIDE 10 MG/ML
INJECTION, SOLUTION INTRAVENOUS AS NEEDED
Status: DISCONTINUED | OUTPATIENT
Start: 2020-08-28 | End: 2020-08-28 | Stop reason: SURG

## 2020-08-28 RX ORDER — CLINDAMYCIN PHOSPHATE 600 MG/50ML
600 INJECTION, SOLUTION INTRAVENOUS EVERY 8 HOURS
Status: COMPLETED | OUTPATIENT
Start: 2020-08-28 | End: 2020-08-29

## 2020-08-28 RX ORDER — BUPIVACAINE HYDROCHLORIDE AND EPINEPHRINE 2.5; 5 MG/ML; UG/ML
INJECTION, SOLUTION EPIDURAL; INFILTRATION; INTRACAUDAL; PERINEURAL AS NEEDED
Status: DISCONTINUED | OUTPATIENT
Start: 2020-08-28 | End: 2020-08-28 | Stop reason: HOSPADM

## 2020-08-28 RX ORDER — MAGNESIUM HYDROXIDE 1200 MG/15ML
LIQUID ORAL AS NEEDED
Status: DISCONTINUED | OUTPATIENT
Start: 2020-08-28 | End: 2020-08-28 | Stop reason: HOSPADM

## 2020-08-28 RX ORDER — FENTANYL CITRATE 50 UG/ML
25 INJECTION, SOLUTION INTRAMUSCULAR; INTRAVENOUS
Status: DISCONTINUED | OUTPATIENT
Start: 2020-08-28 | End: 2020-08-28 | Stop reason: HOSPADM

## 2020-08-28 RX ORDER — FENTANYL CITRATE 50 UG/ML
INJECTION, SOLUTION INTRAMUSCULAR; INTRAVENOUS AS NEEDED
Status: DISCONTINUED | OUTPATIENT
Start: 2020-08-28 | End: 2020-08-28 | Stop reason: SURG

## 2020-08-28 RX ORDER — HYDROCODONE BITARTRATE AND ACETAMINOPHEN 5; 325 MG/1; MG/1
1 TABLET ORAL EVERY 6 HOURS PRN
Status: DISCONTINUED | OUTPATIENT
Start: 2020-08-28 | End: 2020-08-31 | Stop reason: HOSPADM

## 2020-08-28 RX ORDER — MORPHINE SULFATE 4 MG/ML
0.5 INJECTION, SOLUTION INTRAMUSCULAR; INTRAVENOUS
Status: DISCONTINUED | OUTPATIENT
Start: 2020-08-28 | End: 2020-08-28 | Stop reason: HOSPADM

## 2020-08-28 RX ORDER — EPHEDRINE SULFATE/0.9% NACL/PF 25 MG/5 ML
SYRINGE (ML) INTRAVENOUS AS NEEDED
Status: DISCONTINUED | OUTPATIENT
Start: 2020-08-28 | End: 2020-08-28 | Stop reason: SURG

## 2020-08-28 RX ORDER — NEOSTIGMINE METHYLSULFATE 5 MG/5 ML
SYRINGE (ML) INTRAVENOUS AS NEEDED
Status: DISCONTINUED | OUTPATIENT
Start: 2020-08-28 | End: 2020-08-28 | Stop reason: SURG

## 2020-08-28 RX ORDER — ASPIRIN 325 MG
325 TABLET ORAL DAILY
Status: DISCONTINUED | OUTPATIENT
Start: 2020-08-29 | End: 2020-08-30

## 2020-08-28 RX ORDER — PROPOFOL 10 MG/ML
VIAL (ML) INTRAVENOUS AS NEEDED
Status: DISCONTINUED | OUTPATIENT
Start: 2020-08-28 | End: 2020-08-28 | Stop reason: SURG

## 2020-08-28 RX ORDER — GLYCOPYRROLATE 1 MG/5 ML
SYRINGE (ML) INTRAVENOUS AS NEEDED
Status: DISCONTINUED | OUTPATIENT
Start: 2020-08-28 | End: 2020-08-28 | Stop reason: SURG

## 2020-08-28 RX ADMIN — Medication 0.4 MG: at 13:57

## 2020-08-28 RX ADMIN — PANTOPRAZOLE SODIUM 40 MG: 40 TABLET, DELAYED RELEASE ORAL at 05:34

## 2020-08-28 RX ADMIN — Medication 5 MG: at 13:19

## 2020-08-28 RX ADMIN — HYDRALAZINE HYDROCHLORIDE 10 MG: 10 TABLET, FILM COATED ORAL at 20:08

## 2020-08-28 RX ADMIN — Medication 5 MG: at 13:14

## 2020-08-28 RX ADMIN — FUROSEMIDE 20 MG: 20 TABLET ORAL at 10:43

## 2020-08-28 RX ADMIN — SODIUM CHLORIDE, SODIUM LACTATE, POTASSIUM CHLORIDE, AND CALCIUM CHLORIDE 600 ML: 600; 310; 30; 20 INJECTION, SOLUTION INTRAVENOUS at 14:04

## 2020-08-28 RX ADMIN — CETIRIZINE HYDROCHLORIDE 10 MG: 10 TABLET, FILM COATED ORAL at 10:43

## 2020-08-28 RX ADMIN — HYDRALAZINE HYDROCHLORIDE 10 MG: 10 TABLET, FILM COATED ORAL at 10:43

## 2020-08-28 RX ADMIN — SUGAMMADEX 200 MG: 100 INJECTION, SOLUTION INTRAVENOUS at 14:04

## 2020-08-28 RX ADMIN — FENTANYL CITRATE 50 MCG: 50 INJECTION, SOLUTION INTRAMUSCULAR; INTRAVENOUS at 13:04

## 2020-08-28 RX ADMIN — ROCURONIUM BROMIDE 40 MG: 10 INJECTION, SOLUTION INTRAVENOUS at 13:04

## 2020-08-28 RX ADMIN — HYDROCODONE BITARTRATE AND ACETAMINOPHEN 1 TABLET: 5; 325 TABLET ORAL at 20:18

## 2020-08-28 RX ADMIN — Medication 10 ML: at 20:14

## 2020-08-28 RX ADMIN — CLINDAMYCIN PHOSPHATE 600 MG: 600 INJECTION, SOLUTION INTRAVENOUS at 20:12

## 2020-08-28 RX ADMIN — MORPHINE SULFATE 2 MG: 4 INJECTION INTRAVENOUS at 05:34

## 2020-08-28 RX ADMIN — MORPHINE SULFATE 2 MG: 4 INJECTION INTRAVENOUS at 10:49

## 2020-08-28 RX ADMIN — DEXAMETHASONE SODIUM PHOSPHATE 4 MG: 4 INJECTION, SOLUTION INTRAMUSCULAR; INTRAVENOUS at 13:51

## 2020-08-28 RX ADMIN — Medication 4 MG: at 13:57

## 2020-08-28 RX ADMIN — ONDANSETRON 4 MG: 2 INJECTION INTRAMUSCULAR; INTRAVENOUS at 13:51

## 2020-08-28 RX ADMIN — SOTALOL HYDROCHLORIDE 80 MG: 80 TABLET ORAL at 20:09

## 2020-08-28 RX ADMIN — TOPIRAMATE 100 MG: 100 TABLET, FILM COATED ORAL at 20:09

## 2020-08-28 RX ADMIN — Medication 10 ML: at 10:44

## 2020-08-28 RX ADMIN — MAGNESIUM OXIDE TAB 400 MG (241.3 MG ELEMENTAL MG) 400 MG: 400 (241.3 MG) TAB at 10:43

## 2020-08-28 RX ADMIN — HYDROCHLOROTHIAZIDE: 12.5 TABLET ORAL at 10:42

## 2020-08-28 RX ADMIN — PROPOFOL 100 MG: 10 INJECTION, EMULSION INTRAVENOUS at 13:04

## 2020-08-28 RX ADMIN — CLINDAMYCIN IN 5 PERCENT DEXTROSE 900 MG: 18 INJECTION, SOLUTION INTRAVENOUS at 13:12

## 2020-08-28 RX ADMIN — FENTANYL CITRATE 25 MCG: 50 INJECTION, SOLUTION INTRAMUSCULAR; INTRAVENOUS at 13:49

## 2020-08-28 RX ADMIN — FENTANYL CITRATE 25 MCG: 50 INJECTION, SOLUTION INTRAMUSCULAR; INTRAVENOUS at 13:59

## 2020-08-28 RX ADMIN — SODIUM CHLORIDE, SODIUM LACTATE, POTASSIUM CHLORIDE, AND CALCIUM CHLORIDE 20 ML/HR: 600; 310; 30; 20 INJECTION, SOLUTION INTRAVENOUS at 12:02

## 2020-08-28 RX ADMIN — MORPHINE SULFATE 2 MG: 4 INJECTION INTRAVENOUS at 14:46

## 2020-08-28 RX ADMIN — MORPHINE SULFATE 2 MG: 4 INJECTION INTRAVENOUS at 14:31

## 2020-08-28 RX ADMIN — DILTIAZEM HYDROCHLORIDE 300 MG: 180 CAPSULE, COATED, EXTENDED RELEASE ORAL at 10:44

## 2020-08-28 NOTE — ANESTHESIA PREPROCEDURE EVALUATION
Anesthesia Evaluation     Patient summary reviewed and Nursing notes reviewed   NPO Solid Status: > 8 hours  NPO Liquid Status: > 8 hours           Airway   Mallampati: I  TM distance: >3 FB  Neck ROM: full  No difficulty expected  Dental - normal exam   (+) upper dentures    Pulmonary - negative pulmonary ROS and normal exam   Cardiovascular - normal exam    (+) hypertension, dysrhythmias Atrial Fib, Atrial Flutter,       Neuro/Psych- negative ROS  GI/Hepatic/Renal/Endo    (+)   diabetes mellitus type 2,     Musculoskeletal (-) negative ROS    Abdominal  - normal exam    Bowel sounds: normal.   Substance History - negative use     OB/GYN negative ob/gyn ROS         Other                        Anesthesia Plan    ASA 3     general     intravenous induction     Anesthetic plan, all risks, benefits, and alternatives have been provided, discussed and informed consent has been obtained with: patient.

## 2020-08-28 NOTE — ANESTHESIA POSTPROCEDURE EVALUATION
Patient: Hazel Bucio    Procedure Summary     Date:  08/28/20 Room / Location:  [facility] OR 11 / [facility] MAIN OR    Anesthesia Start:  1259 Anesthesia Stop:  1411    Procedure:  TIBIA INTRAMEDULLARY NAIL/ABRAHAM INSERTION (Right Leg Lower) Diagnosis:       Fracture tibia/fibula, right, closed, initial encounter      (Fracture tibia/fibula, right, closed, initial encounter [S82.201A, S82.401A])    Surgeon:  Geoff Shah II, MD Provider:  Harry Uriarte MD    Anesthesia Type:  general ASA Status:  3          Anesthesia Type: general    Vitals  Vitals Value Taken Time   /69 8/28/2020  2:25 PM   Temp     Pulse 60 8/28/2020  2:29 PM   Resp     SpO2 100 % 8/28/2020  2:29 PM   Vitals shown include unvalidated device data.        Post Anesthesia Care and Evaluation    Patient location during evaluation: PACU  Patient participation: complete - patient participated  Level of consciousness: awake  Pain scale: See nurse's notes for pain score.  Pain management: adequate  Airway patency: patent  Anesthetic complications: No anesthetic complications  PONV Status: none  Cardiovascular status: acceptable  Respiratory status: acceptable  Hydration status: acceptable    Comments: Patient seen and examined postoperatively; vital signs stable; SpO2 greater than or equal to 90%; cardiopulmonary status stable; nausea/vomiting adequately controlled; pain adequately controlled; no apparent anesthesia complications; patient discharged from anesthesia care when discharge criteria were met

## 2020-08-28 NOTE — ANESTHESIA PROCEDURE NOTES
Airway  Urgency: elective    Date/Time: 8/28/2020 1:05 PM  Airway not difficult    General Information and Staff    Patient location during procedure: OR  Anesthesiologist: Harry Uriarte MD    Indications and Patient Condition  Indications for airway management: airway protection    Preoxygenated: yes  Mask difficulty assessment: 1 - vent by mask    Final Airway Details  Final airway type: endotracheal airway      Successful airway: ETT  Cuffed: yes   Successful intubation technique: direct laryngoscopy  Endotracheal tube insertion site: oral  Blade: Day  Blade size: 3  ETT size (mm): 7.0  Cormack-Lehane Classification: grade I - full view of glottis  Placement verified by: chest auscultation   Measured from: lips  ETT/EBT  to lips (cm): 19  Number of attempts at approach: 1  Assessment: lips, teeth, and gum same as pre-op and atraumatic intubation

## 2020-08-29 LAB
ANION GAP SERPL CALCULATED.3IONS-SCNC: 13 MMOL/L (ref 5–15)
BUN SERPL-MCNC: 36 MG/DL (ref 8–23)
BUN SERPL-MCNC: ABNORMAL MG/DL
BUN/CREAT SERPL: ABNORMAL
CALCIUM SPEC-SCNC: 8.8 MG/DL (ref 8.6–10.5)
CHLORIDE SERPL-SCNC: 101 MMOL/L (ref 98–107)
CO2 SERPL-SCNC: 22 MMOL/L (ref 22–29)
CREAT SERPL-MCNC: 1.46 MG/DL (ref 0.57–1)
GFR SERPL CREATININE-BSD FRML MDRD: 35 ML/MIN/1.73
GLUCOSE BLDC GLUCOMTR-MCNC: 130 MG/DL (ref 70–105)
GLUCOSE BLDC GLUCOMTR-MCNC: 133 MG/DL (ref 70–105)
GLUCOSE BLDC GLUCOMTR-MCNC: 140 MG/DL (ref 70–105)
GLUCOSE BLDC GLUCOMTR-MCNC: 143 MG/DL (ref 70–105)
GLUCOSE SERPL-MCNC: 168 MG/DL (ref 65–99)
HCT VFR BLD AUTO: 42.7 % (ref 34–46.6)
HGB BLD-MCNC: 13.6 G/DL (ref 12–15.9)
INR PPP: 2.43 (ref 2–3)
MAGNESIUM SERPL-MCNC: 2.1 MG/DL (ref 1.6–2.4)
POTASSIUM SERPL-SCNC: 4.3 MMOL/L (ref 3.5–5.2)
PROTHROMBIN TIME: 25.2 SECONDS (ref 19.4–28.5)
SODIUM SERPL-SCNC: 136 MMOL/L (ref 136–145)

## 2020-08-29 PROCEDURE — 80048 BASIC METABOLIC PNL TOTAL CA: CPT | Performed by: ORTHOPAEDIC SURGERY

## 2020-08-29 PROCEDURE — 85014 HEMATOCRIT: CPT | Performed by: ORTHOPAEDIC SURGERY

## 2020-08-29 PROCEDURE — 97162 PT EVAL MOD COMPLEX 30 MIN: CPT

## 2020-08-29 PROCEDURE — 85018 HEMOGLOBIN: CPT | Performed by: ORTHOPAEDIC SURGERY

## 2020-08-29 PROCEDURE — 82962 GLUCOSE BLOOD TEST: CPT

## 2020-08-29 PROCEDURE — 85610 PROTHROMBIN TIME: CPT | Performed by: INTERNAL MEDICINE

## 2020-08-29 PROCEDURE — 83735 ASSAY OF MAGNESIUM: CPT | Performed by: ORTHOPAEDIC SURGERY

## 2020-08-29 PROCEDURE — 99233 SBSQ HOSP IP/OBS HIGH 50: CPT | Performed by: INTERNAL MEDICINE

## 2020-08-29 PROCEDURE — 97530 THERAPEUTIC ACTIVITIES: CPT

## 2020-08-29 RX ORDER — WARFARIN SODIUM 2 MG/1
2 TABLET ORAL
Status: DISCONTINUED | OUTPATIENT
Start: 2020-08-30 | End: 2020-08-30 | Stop reason: DRUGHIGH

## 2020-08-29 RX ORDER — WARFARIN SODIUM 3 MG/1
1.5 TABLET ORAL
Status: COMPLETED | OUTPATIENT
Start: 2020-08-29 | End: 2020-08-29

## 2020-08-29 RX ORDER — WARFARIN SODIUM 2 MG/1
2 TABLET ORAL
Status: DISCONTINUED | OUTPATIENT
Start: 2020-08-29 | End: 2020-08-29

## 2020-08-29 RX ADMIN — CETIRIZINE HYDROCHLORIDE 10 MG: 10 TABLET, FILM COATED ORAL at 09:38

## 2020-08-29 RX ADMIN — SOTALOL HYDROCHLORIDE 80 MG: 80 TABLET ORAL at 20:31

## 2020-08-29 RX ADMIN — Medication 10 ML: at 20:30

## 2020-08-29 RX ADMIN — Medication 10 ML: at 09:38

## 2020-08-29 RX ADMIN — SODIUM CHLORIDE, SODIUM LACTATE, POTASSIUM CHLORIDE, AND CALCIUM CHLORIDE 20 ML/HR: 600; 310; 30; 20 INJECTION, SOLUTION INTRAVENOUS at 10:23

## 2020-08-29 RX ADMIN — CLINDAMYCIN PHOSPHATE 600 MG: 600 INJECTION, SOLUTION INTRAVENOUS at 05:54

## 2020-08-29 RX ADMIN — WARFARIN 1.5 MG: 3 TABLET ORAL at 17:11

## 2020-08-29 RX ADMIN — TOPIRAMATE 100 MG: 100 TABLET, FILM COATED ORAL at 20:35

## 2020-08-29 RX ADMIN — PANTOPRAZOLE SODIUM 40 MG: 40 TABLET, DELAYED RELEASE ORAL at 05:55

## 2020-08-29 RX ADMIN — FUROSEMIDE 20 MG: 20 TABLET ORAL at 09:37

## 2020-08-29 RX ADMIN — HYDROCHLOROTHIAZIDE: 12.5 TABLET ORAL at 09:36

## 2020-08-29 RX ADMIN — CLINDAMYCIN PHOSPHATE 600 MG: 600 INJECTION, SOLUTION INTRAVENOUS at 14:17

## 2020-08-29 RX ADMIN — DILTIAZEM HYDROCHLORIDE 300 MG: 180 CAPSULE, COATED, EXTENDED RELEASE ORAL at 09:37

## 2020-08-29 RX ADMIN — MAGNESIUM OXIDE TAB 400 MG (241.3 MG ELEMENTAL MG) 400 MG: 400 (241.3 MG) TAB at 09:38

## 2020-08-29 RX ADMIN — SOTALOL HYDROCHLORIDE 120 MG: 120 TABLET ORAL at 09:39

## 2020-08-29 RX ADMIN — HYDROCODONE BITARTRATE AND ACETAMINOPHEN 1 TABLET: 5; 325 TABLET ORAL at 09:40

## 2020-08-29 RX ADMIN — HYDRALAZINE HYDROCHLORIDE 10 MG: 10 TABLET, FILM COATED ORAL at 09:36

## 2020-08-29 RX ADMIN — HYDROCODONE BITARTRATE AND ACETAMINOPHEN 1 TABLET: 5; 325 TABLET ORAL at 21:54

## 2020-08-29 RX ADMIN — HYDRALAZINE HYDROCHLORIDE 10 MG: 10 TABLET, FILM COATED ORAL at 20:30

## 2020-08-29 RX ADMIN — ASPIRIN 325 MG ORAL TABLET 325 MG: 325 PILL ORAL at 09:36

## 2020-08-30 LAB
ANION GAP SERPL CALCULATED.3IONS-SCNC: 13 MMOL/L (ref 5–15)
BACTERIA SPEC AEROBE CULT: ABNORMAL
BACTERIA SPEC AEROBE CULT: ABNORMAL
BASOPHILS # BLD AUTO: 0 10*3/MM3 (ref 0–0.2)
BASOPHILS NFR BLD AUTO: 0.4 % (ref 0–1.5)
BUN SERPL-MCNC: 43 MG/DL (ref 8–23)
BUN SERPL-MCNC: ABNORMAL MG/DL
BUN/CREAT SERPL: ABNORMAL
CALCIUM SPEC-SCNC: 9.6 MG/DL (ref 8.6–10.5)
CHLORIDE SERPL-SCNC: 101 MMOL/L (ref 98–107)
CO2 SERPL-SCNC: 23 MMOL/L (ref 22–29)
CREAT SERPL-MCNC: 1.75 MG/DL (ref 0.57–1)
DEPRECATED RDW RBC AUTO: 49.4 FL (ref 37–54)
EOSINOPHIL # BLD AUTO: 0 10*3/MM3 (ref 0–0.4)
EOSINOPHIL NFR BLD AUTO: 0 % (ref 0.3–6.2)
ERYTHROCYTE [DISTWIDTH] IN BLOOD BY AUTOMATED COUNT: 14.6 % (ref 12.3–15.4)
GFR SERPL CREATININE-BSD FRML MDRD: 28 ML/MIN/1.73
GLUCOSE BLDC GLUCOMTR-MCNC: 108 MG/DL (ref 70–105)
GLUCOSE BLDC GLUCOMTR-MCNC: 128 MG/DL (ref 70–105)
GLUCOSE BLDC GLUCOMTR-MCNC: 149 MG/DL (ref 70–105)
GLUCOSE SERPL-MCNC: 128 MG/DL (ref 65–99)
HCT VFR BLD AUTO: 39.6 % (ref 34–46.6)
HGB BLD-MCNC: 13.1 G/DL (ref 12–15.9)
INR PPP: 2.73 (ref 2–3)
LYMPHOCYTES # BLD AUTO: 1 10*3/MM3 (ref 0.7–3.1)
LYMPHOCYTES NFR BLD AUTO: 9.4 % (ref 19.6–45.3)
MAGNESIUM SERPL-MCNC: 2.2 MG/DL (ref 1.6–2.4)
MCH RBC QN AUTO: 32 PG (ref 26.6–33)
MCHC RBC AUTO-ENTMCNC: 33.1 G/DL (ref 31.5–35.7)
MCV RBC AUTO: 96.8 FL (ref 79–97)
MONOCYTES # BLD AUTO: 1 10*3/MM3 (ref 0.1–0.9)
MONOCYTES NFR BLD AUTO: 9.1 % (ref 5–12)
NEUTROPHILS NFR BLD AUTO: 8.6 10*3/MM3 (ref 1.7–7)
NEUTROPHILS NFR BLD AUTO: 81.1 % (ref 42.7–76)
NRBC BLD AUTO-RTO: 0.1 /100 WBC (ref 0–0.2)
PLATELET # BLD AUTO: 276 10*3/MM3 (ref 140–450)
PMV BLD AUTO: 7.7 FL (ref 6–12)
POTASSIUM SERPL-SCNC: 4.2 MMOL/L (ref 3.5–5.2)
PROTHROMBIN TIME: 28.1 SECONDS (ref 19.4–28.5)
RBC # BLD AUTO: 4.09 10*6/MM3 (ref 3.77–5.28)
SODIUM SERPL-SCNC: 137 MMOL/L (ref 136–145)
WBC # BLD AUTO: 10.6 10*3/MM3 (ref 3.4–10.8)

## 2020-08-30 PROCEDURE — 82962 GLUCOSE BLOOD TEST: CPT

## 2020-08-30 PROCEDURE — 97166 OT EVAL MOD COMPLEX 45 MIN: CPT

## 2020-08-30 PROCEDURE — 97530 THERAPEUTIC ACTIVITIES: CPT

## 2020-08-30 PROCEDURE — 80048 BASIC METABOLIC PNL TOTAL CA: CPT | Performed by: INTERNAL MEDICINE

## 2020-08-30 PROCEDURE — 97110 THERAPEUTIC EXERCISES: CPT

## 2020-08-30 PROCEDURE — 83735 ASSAY OF MAGNESIUM: CPT | Performed by: INTERNAL MEDICINE

## 2020-08-30 PROCEDURE — 85610 PROTHROMBIN TIME: CPT | Performed by: INTERNAL MEDICINE

## 2020-08-30 PROCEDURE — 97535 SELF CARE MNGMENT TRAINING: CPT

## 2020-08-30 PROCEDURE — 92610 EVALUATE SWALLOWING FUNCTION: CPT

## 2020-08-30 PROCEDURE — 99232 SBSQ HOSP IP/OBS MODERATE 35: CPT | Performed by: INTERNAL MEDICINE

## 2020-08-30 PROCEDURE — 85025 COMPLETE CBC W/AUTO DIFF WBC: CPT | Performed by: INTERNAL MEDICINE

## 2020-08-30 RX ORDER — WARFARIN SODIUM 3 MG/1
1.5 TABLET ORAL
Status: DISCONTINUED | OUTPATIENT
Start: 2020-08-30 | End: 2020-08-31

## 2020-08-30 RX ORDER — LEVOFLOXACIN 500 MG/1
500 TABLET, FILM COATED ORAL EVERY OTHER DAY
Status: DISCONTINUED | OUTPATIENT
Start: 2020-08-30 | End: 2020-08-31 | Stop reason: HOSPADM

## 2020-08-30 RX ORDER — SODIUM CHLORIDE 9 MG/ML
75 INJECTION, SOLUTION INTRAVENOUS CONTINUOUS
Status: DISCONTINUED | OUTPATIENT
Start: 2020-08-30 | End: 2020-08-31 | Stop reason: HOSPADM

## 2020-08-30 RX ADMIN — CETIRIZINE HYDROCHLORIDE 10 MG: 10 TABLET, FILM COATED ORAL at 09:00

## 2020-08-30 RX ADMIN — HYDRALAZINE HYDROCHLORIDE 10 MG: 10 TABLET, FILM COATED ORAL at 21:07

## 2020-08-30 RX ADMIN — WARFARIN 1.5 MG: 3 TABLET ORAL at 17:51

## 2020-08-30 RX ADMIN — HYDROCODONE BITARTRATE AND ACETAMINOPHEN 1 TABLET: 5; 325 TABLET ORAL at 17:51

## 2020-08-30 RX ADMIN — FUROSEMIDE 20 MG: 20 TABLET ORAL at 09:00

## 2020-08-30 RX ADMIN — SODIUM CHLORIDE 75 ML/HR: 0.9 INJECTION, SOLUTION INTRAVENOUS at 14:35

## 2020-08-30 RX ADMIN — HYDRALAZINE HYDROCHLORIDE 10 MG: 10 TABLET, FILM COATED ORAL at 09:00

## 2020-08-30 RX ADMIN — PANTOPRAZOLE SODIUM 40 MG: 40 TABLET, DELAYED RELEASE ORAL at 05:03

## 2020-08-30 RX ADMIN — DILTIAZEM HYDROCHLORIDE 300 MG: 180 CAPSULE, COATED, EXTENDED RELEASE ORAL at 09:00

## 2020-08-30 RX ADMIN — HYDROCODONE BITARTRATE AND ACETAMINOPHEN 1 TABLET: 5; 325 TABLET ORAL at 05:03

## 2020-08-30 RX ADMIN — ASPIRIN 325 MG ORAL TABLET 325 MG: 325 PILL ORAL at 09:00

## 2020-08-30 RX ADMIN — LEVOFLOXACIN 500 MG: 500 TABLET, FILM COATED ORAL at 11:21

## 2020-08-30 RX ADMIN — SOTALOL HYDROCHLORIDE 120 MG: 120 TABLET ORAL at 09:00

## 2020-08-30 RX ADMIN — SOTALOL HYDROCHLORIDE 80 MG: 80 TABLET ORAL at 21:07

## 2020-08-30 RX ADMIN — Medication 10 ML: at 21:07

## 2020-08-30 RX ADMIN — HYDROCODONE BITARTRATE AND ACETAMINOPHEN 1 TABLET: 5; 325 TABLET ORAL at 11:22

## 2020-08-30 RX ADMIN — TOPIRAMATE 100 MG: 100 TABLET, FILM COATED ORAL at 21:07

## 2020-08-30 RX ADMIN — MAGNESIUM OXIDE TAB 400 MG (241.3 MG ELEMENTAL MG) 400 MG: 400 (241.3 MG) TAB at 09:00

## 2020-08-30 RX ADMIN — Medication 10 ML: at 09:00

## 2020-08-30 RX ADMIN — SODIUM CHLORIDE 75 ML/HR: 0.9 INJECTION, SOLUTION INTRAVENOUS at 20:30

## 2020-08-31 ENCOUNTER — APPOINTMENT (OUTPATIENT)
Dept: GENERAL RADIOLOGY | Facility: HOSPITAL | Age: 80
End: 2020-08-31

## 2020-08-31 VITALS
TEMPERATURE: 98.3 F | WEIGHT: 169.09 LBS | BODY MASS INDEX: 28.17 KG/M2 | DIASTOLIC BLOOD PRESSURE: 66 MMHG | RESPIRATION RATE: 16 BRPM | SYSTOLIC BLOOD PRESSURE: 117 MMHG | HEIGHT: 65 IN | OXYGEN SATURATION: 96 % | HEART RATE: 100 BPM

## 2020-08-31 LAB
ANION GAP SERPL CALCULATED.3IONS-SCNC: 11 MMOL/L (ref 5–15)
BUN SERPL-MCNC: 40 MG/DL (ref 8–23)
BUN SERPL-MCNC: ABNORMAL MG/DL
BUN/CREAT SERPL: ABNORMAL
CALCIUM SPEC-SCNC: 9.1 MG/DL (ref 8.6–10.5)
CHLORIDE SERPL-SCNC: 104 MMOL/L (ref 98–107)
CO2 SERPL-SCNC: 23 MMOL/L (ref 22–29)
CREAT SERPL-MCNC: 1.52 MG/DL (ref 0.57–1)
GFR SERPL CREATININE-BSD FRML MDRD: 33 ML/MIN/1.73
GLUCOSE BLDC GLUCOMTR-MCNC: 103 MG/DL (ref 70–105)
GLUCOSE BLDC GLUCOMTR-MCNC: 128 MG/DL (ref 70–105)
GLUCOSE BLDC GLUCOMTR-MCNC: 165 MG/DL (ref 70–105)
GLUCOSE SERPL-MCNC: 106 MG/DL (ref 65–99)
INR PPP: 3.37 (ref 2–3)
POTASSIUM SERPL-SCNC: 4.1 MMOL/L (ref 3.5–5.2)
PROTHROMBIN TIME: 34 SECONDS (ref 19.4–28.5)
SODIUM SERPL-SCNC: 138 MMOL/L (ref 136–145)

## 2020-08-31 PROCEDURE — 82962 GLUCOSE BLOOD TEST: CPT

## 2020-08-31 PROCEDURE — 85610 PROTHROMBIN TIME: CPT | Performed by: INTERNAL MEDICINE

## 2020-08-31 PROCEDURE — 92611 MOTION FLUOROSCOPY/SWALLOW: CPT

## 2020-08-31 PROCEDURE — 74230 X-RAY XM SWLNG FUNCJ C+: CPT

## 2020-08-31 PROCEDURE — 80048 BASIC METABOLIC PNL TOTAL CA: CPT | Performed by: INTERNAL MEDICINE

## 2020-08-31 PROCEDURE — 97530 THERAPEUTIC ACTIVITIES: CPT

## 2020-08-31 PROCEDURE — 99239 HOSP IP/OBS DSCHRG MGMT >30: CPT | Performed by: INTERNAL MEDICINE

## 2020-08-31 RX ORDER — WARFARIN SODIUM 3 MG/1
1.5 TABLET ORAL NIGHTLY
Status: ON HOLD
Start: 2020-08-31 | End: 2020-09-07

## 2020-08-31 RX ORDER — WARFARIN SODIUM 3 MG/1
1.5 TABLET ORAL
Status: DISCONTINUED | OUTPATIENT
Start: 2020-09-01 | End: 2020-08-31 | Stop reason: HOSPADM

## 2020-08-31 RX ORDER — HYDROCODONE BITARTRATE AND ACETAMINOPHEN 5; 325 MG/1; MG/1
1 TABLET ORAL EVERY 6 HOURS PRN
Qty: 15 TABLET | Refills: 0 | Status: ON HOLD | OUTPATIENT
Start: 2020-08-31 | End: 2020-09-07

## 2020-08-31 RX ORDER — LEVOFLOXACIN 500 MG/1
500 TABLET, FILM COATED ORAL EVERY OTHER DAY
Qty: 1 TABLET | Refills: 0 | Status: SHIPPED | OUTPATIENT
Start: 2020-09-01 | End: 2020-09-02

## 2020-08-31 RX ADMIN — BARIUM SULFATE 50 ML: 400 SUSPENSION ORAL at 11:00

## 2020-08-31 RX ADMIN — PANTOPRAZOLE SODIUM 40 MG: 40 TABLET, DELAYED RELEASE ORAL at 05:07

## 2020-08-31 RX ADMIN — Medication 10 ML: at 10:36

## 2020-08-31 RX ADMIN — HYDROCODONE BITARTRATE AND ACETAMINOPHEN 1 TABLET: 5; 325 TABLET ORAL at 06:30

## 2020-08-31 RX ADMIN — HYDROCODONE BITARTRATE AND ACETAMINOPHEN 1 TABLET: 5; 325 TABLET ORAL at 00:30

## 2020-08-31 RX ADMIN — CETIRIZINE HYDROCHLORIDE 10 MG: 10 TABLET, FILM COATED ORAL at 10:34

## 2020-08-31 RX ADMIN — DILTIAZEM HYDROCHLORIDE 300 MG: 180 CAPSULE, COATED, EXTENDED RELEASE ORAL at 10:32

## 2020-08-31 RX ADMIN — SOTALOL HYDROCHLORIDE 120 MG: 120 TABLET ORAL at 10:33

## 2020-08-31 RX ADMIN — MAGNESIUM OXIDE TAB 400 MG (241.3 MG ELEMENTAL MG) 400 MG: 400 (241.3 MG) TAB at 10:34

## 2020-08-31 RX ADMIN — HYDROCODONE BITARTRATE AND ACETAMINOPHEN 1 TABLET: 5; 325 TABLET ORAL at 16:59

## 2020-09-01 ENCOUNTER — LAB REQUISITION (OUTPATIENT)
Dept: LAB | Facility: HOSPITAL | Age: 80
End: 2020-09-01

## 2020-09-01 DIAGNOSIS — Z00.00 ENCOUNTER FOR GENERAL ADULT MEDICAL EXAMINATION WITHOUT ABNORMAL FINDINGS: ICD-10-CM

## 2020-09-01 LAB
ALBUMIN SERPL-MCNC: 2.7 G/DL (ref 3.5–5.2)
ALBUMIN/GLOB SERPL: 0.9 G/DL
ALP SERPL-CCNC: 109 U/L (ref 39–117)
ALT SERPL W P-5'-P-CCNC: 14 U/L (ref 1–33)
ANION GAP SERPL CALCULATED.3IONS-SCNC: 9 MMOL/L (ref 5–15)
AST SERPL-CCNC: 15 U/L (ref 1–32)
BASOPHILS # BLD AUTO: 0 10*3/MM3 (ref 0–0.2)
BASOPHILS NFR BLD AUTO: 0.4 % (ref 0–1.5)
BILIRUB SERPL-MCNC: 0.5 MG/DL (ref 0–1.2)
BUN SERPL-MCNC: 40 MG/DL (ref 8–23)
BUN SERPL-MCNC: ABNORMAL MG/DL
BUN/CREAT SERPL: ABNORMAL
CALCIUM SPEC-SCNC: 9.2 MG/DL (ref 8.6–10.5)
CHLORIDE SERPL-SCNC: 107 MMOL/L (ref 98–107)
CO2 SERPL-SCNC: 22 MMOL/L (ref 22–29)
CREAT SERPL-MCNC: 1.43 MG/DL (ref 0.57–1)
DEPRECATED RDW RBC AUTO: 49.4 FL (ref 37–54)
EOSINOPHIL # BLD AUTO: 0.1 10*3/MM3 (ref 0–0.4)
EOSINOPHIL NFR BLD AUTO: 0.9 % (ref 0.3–6.2)
ERYTHROCYTE [DISTWIDTH] IN BLOOD BY AUTOMATED COUNT: 14.4 % (ref 12.3–15.4)
GFR SERPL CREATININE-BSD FRML MDRD: 35 ML/MIN/1.73
GLOBULIN UR ELPH-MCNC: 2.9 GM/DL
GLUCOSE SERPL-MCNC: 133 MG/DL (ref 65–99)
HCT VFR BLD AUTO: 35.5 % (ref 34–46.6)
HGB BLD-MCNC: 11.3 G/DL (ref 12–15.9)
LYMPHOCYTES # BLD AUTO: 1.4 10*3/MM3 (ref 0.7–3.1)
LYMPHOCYTES NFR BLD AUTO: 20.2 % (ref 19.6–45.3)
MAGNESIUM SERPL-MCNC: 2.3 MG/DL (ref 1.6–2.4)
MCH RBC QN AUTO: 31.4 PG (ref 26.6–33)
MCHC RBC AUTO-ENTMCNC: 31.9 G/DL (ref 31.5–35.7)
MCV RBC AUTO: 98.3 FL (ref 79–97)
MONOCYTES # BLD AUTO: 0.9 10*3/MM3 (ref 0.1–0.9)
MONOCYTES NFR BLD AUTO: 12.5 % (ref 5–12)
NEUTROPHILS NFR BLD AUTO: 4.6 10*3/MM3 (ref 1.7–7)
NEUTROPHILS NFR BLD AUTO: 66 % (ref 42.7–76)
NRBC BLD AUTO-RTO: 0.1 /100 WBC (ref 0–0.2)
PHOSPHATE SERPL-MCNC: 2.8 MG/DL (ref 2.5–4.5)
PLATELET # BLD AUTO: 265 10*3/MM3 (ref 140–450)
PMV BLD AUTO: 7.8 FL (ref 6–12)
POTASSIUM SERPL-SCNC: 4.1 MMOL/L (ref 3.5–5.2)
PROT SERPL-MCNC: 5.6 G/DL (ref 6–8.5)
RBC # BLD AUTO: 3.61 10*6/MM3 (ref 3.77–5.28)
SODIUM SERPL-SCNC: 138 MMOL/L (ref 136–145)
WBC # BLD AUTO: 6.9 10*3/MM3 (ref 3.4–10.8)

## 2020-09-01 PROCEDURE — 84100 ASSAY OF PHOSPHORUS: CPT

## 2020-09-01 PROCEDURE — 83735 ASSAY OF MAGNESIUM: CPT

## 2020-09-01 PROCEDURE — 80053 COMPREHEN METABOLIC PANEL: CPT

## 2020-09-01 PROCEDURE — 85025 COMPLETE CBC W/AUTO DIFF WBC: CPT

## 2020-09-01 NOTE — PROGRESS NOTES
Case Management Discharge Note      Final Note: sirh acute    Provided Post Acute Provider List?: N/A  N/A Provider List Comment: pending post op surgical intervention    Destination - Selection Complete      Service Provider Request Status Selected Services Address Phone Number Fax Number    [facility] Selected Inpatient Rehabilitation [address] [phone]             Final Discharge Disposition Code: 62 - inpatient rehab facility

## 2020-09-03 ENCOUNTER — LAB REQUISITION (OUTPATIENT)
Dept: LAB | Facility: HOSPITAL | Age: 80
End: 2020-09-03

## 2020-09-03 DIAGNOSIS — Z00.00 ENCOUNTER FOR GENERAL ADULT MEDICAL EXAMINATION WITHOUT ABNORMAL FINDINGS: ICD-10-CM

## 2020-09-03 LAB
ANION GAP SERPL CALCULATED.3IONS-SCNC: 10 MMOL/L (ref 5–15)
BUN SERPL-MCNC: 38 MG/DL (ref 8–23)
BUN SERPL-MCNC: ABNORMAL MG/DL
BUN/CREAT SERPL: ABNORMAL
CALCIUM SPEC-SCNC: 9.1 MG/DL (ref 8.6–10.5)
CHLORIDE SERPL-SCNC: 107 MMOL/L (ref 98–107)
CO2 SERPL-SCNC: 25 MMOL/L (ref 22–29)
CREAT SERPL-MCNC: 1.27 MG/DL (ref 0.57–1)
GFR SERPL CREATININE-BSD FRML MDRD: 41 ML/MIN/1.73
GLUCOSE SERPL-MCNC: 129 MG/DL (ref 65–99)
POTASSIUM SERPL-SCNC: 4.9 MMOL/L (ref 3.5–5.2)
SODIUM SERPL-SCNC: 142 MMOL/L (ref 136–145)

## 2020-09-03 PROCEDURE — 80048 BASIC METABOLIC PNL TOTAL CA: CPT

## 2020-09-04 ENCOUNTER — HOSPITAL ENCOUNTER (OUTPATIENT)
Dept: GENERAL RADIOLOGY | Facility: HOSPITAL | Age: 80
Discharge: HOME | End: 2020-09-04

## 2020-09-04 ENCOUNTER — LAB REQUISITION (OUTPATIENT)
Dept: LAB | Facility: HOSPITAL | Age: 80
End: 2020-09-04

## 2020-09-04 DIAGNOSIS — Z00.00 ENCOUNTER FOR GENERAL ADULT MEDICAL EXAMINATION WITHOUT ABNORMAL FINDINGS: ICD-10-CM

## 2020-09-04 DIAGNOSIS — R09.02 HYPOXIA: ICD-10-CM

## 2020-09-04 LAB
ANION GAP SERPL CALCULATED.3IONS-SCNC: 11 MMOL/L (ref 5–15)
BUN SERPL-MCNC: 36 MG/DL (ref 8–23)
BUN SERPL-MCNC: ABNORMAL MG/DL
BUN/CREAT SERPL: ABNORMAL
CALCIUM SPEC-SCNC: 8.9 MG/DL (ref 8.6–10.5)
CHLORIDE SERPL-SCNC: 102 MMOL/L (ref 98–107)
CK SERPL-CCNC: 19 U/L (ref 20–180)
CO2 SERPL-SCNC: 23 MMOL/L (ref 22–29)
CREAT SERPL-MCNC: 1.24 MG/DL (ref 0.57–1)
DEPRECATED RDW RBC AUTO: 52.9 FL (ref 37–54)
ERYTHROCYTE [DISTWIDTH] IN BLOOD BY AUTOMATED COUNT: 15.1 % (ref 12.3–15.4)
GFR SERPL CREATININE-BSD FRML MDRD: 42 ML/MIN/1.73
GLUCOSE SERPL-MCNC: 131 MG/DL (ref 65–99)
HCT VFR BLD AUTO: 39.6 % (ref 34–46.6)
HGB BLD-MCNC: 12.5 G/DL (ref 12–15.9)
MCH RBC QN AUTO: 31.5 PG (ref 26.6–33)
MCHC RBC AUTO-ENTMCNC: 31.6 G/DL (ref 31.5–35.7)
MCV RBC AUTO: 99.7 FL (ref 79–97)
PLATELET # BLD AUTO: 328 10*3/MM3 (ref 140–450)
PMV BLD AUTO: 7.9 FL (ref 6–12)
POTASSIUM SERPL-SCNC: 4.9 MMOL/L (ref 3.5–5.2)
RBC # BLD AUTO: 3.97 10*6/MM3 (ref 3.77–5.28)
SODIUM SERPL-SCNC: 136 MMOL/L (ref 136–145)
URATE SERPL-MCNC: 8.9 MG/DL (ref 2.4–5.7)
WBC # BLD AUTO: 11.2 10*3/MM3 (ref 3.4–10.8)

## 2020-09-04 PROCEDURE — 82550 ASSAY OF CK (CPK): CPT | Performed by: PHYSICAL MEDICINE & REHABILITATION

## 2020-09-04 PROCEDURE — 84550 ASSAY OF BLOOD/URIC ACID: CPT | Performed by: PHYSICAL MEDICINE & REHABILITATION

## 2020-09-04 PROCEDURE — 71046 X-RAY EXAM CHEST 2 VIEWS: CPT

## 2020-09-04 PROCEDURE — 80048 BASIC METABOLIC PNL TOTAL CA: CPT | Performed by: PHYSICAL MEDICINE & REHABILITATION

## 2020-09-04 PROCEDURE — 81003 URINALYSIS AUTO W/O SCOPE: CPT | Performed by: PHYSICAL MEDICINE & REHABILITATION

## 2020-09-04 PROCEDURE — 85027 COMPLETE CBC AUTOMATED: CPT | Performed by: PHYSICAL MEDICINE & REHABILITATION

## 2020-09-05 ENCOUNTER — LAB REQUISITION (OUTPATIENT)
Dept: LAB | Facility: HOSPITAL | Age: 80
End: 2020-09-05

## 2020-09-05 DIAGNOSIS — S82.401A UNSPECIFIED FRACTURE OF SHAFT OF RIGHT FIBULA, INITIAL ENCOUNTER FOR CLOSED FRACTURE: ICD-10-CM

## 2020-09-05 LAB
ANION GAP SERPL CALCULATED.3IONS-SCNC: 10 MMOL/L (ref 5–15)
BILIRUB UR QL STRIP: NEGATIVE
BUN SERPL-MCNC: 54 MG/DL (ref 8–23)
BUN SERPL-MCNC: ABNORMAL MG/DL
BUN/CREAT SERPL: ABNORMAL
CALCIUM SPEC-SCNC: 8.8 MG/DL (ref 8.6–10.5)
CHLORIDE SERPL-SCNC: 100 MMOL/L (ref 98–107)
CLARITY UR: CLEAR
CO2 SERPL-SCNC: 25 MMOL/L (ref 22–29)
COLOR UR: YELLOW
CREAT SERPL-MCNC: 2.4 MG/DL (ref 0.57–1)
DEPRECATED RDW RBC AUTO: 51.6 FL (ref 37–54)
ERYTHROCYTE [DISTWIDTH] IN BLOOD BY AUTOMATED COUNT: 14.8 % (ref 12.3–15.4)
GFR SERPL CREATININE-BSD FRML MDRD: 19 ML/MIN/1.73
GLUCOSE SERPL-MCNC: 99 MG/DL (ref 65–99)
GLUCOSE UR STRIP-MCNC: NEGATIVE MG/DL
HCT VFR BLD AUTO: 33.3 % (ref 34–46.6)
HGB BLD-MCNC: 11 G/DL (ref 12–15.9)
HGB UR QL STRIP.AUTO: NEGATIVE
KETONES UR QL STRIP: NEGATIVE
LEUKOCYTE ESTERASE UR QL STRIP.AUTO: NEGATIVE
MCH RBC QN AUTO: 32.9 PG (ref 26.6–33)
MCHC RBC AUTO-ENTMCNC: 33.2 G/DL (ref 31.5–35.7)
MCV RBC AUTO: 99.1 FL (ref 79–97)
NITRITE UR QL STRIP: NEGATIVE
PH UR STRIP.AUTO: 7.5 [PH] (ref 5–8)
PLATELET # BLD AUTO: 318 10*3/MM3 (ref 140–450)
PMV BLD AUTO: 7.6 FL (ref 6–12)
POTASSIUM SERPL-SCNC: 4.5 MMOL/L (ref 3.5–5.2)
PROT UR QL STRIP: ABNORMAL
RBC # BLD AUTO: 3.36 10*6/MM3 (ref 3.77–5.28)
SODIUM SERPL-SCNC: 135 MMOL/L (ref 136–145)
SP GR UR STRIP: 1.01 (ref 1–1.03)
UROBILINOGEN UR QL STRIP: ABNORMAL
WBC # BLD AUTO: 9 10*3/MM3 (ref 3.4–10.8)

## 2020-09-05 PROCEDURE — 85027 COMPLETE CBC AUTOMATED: CPT | Performed by: PHYSICAL MEDICINE & REHABILITATION

## 2020-09-05 PROCEDURE — 80048 BASIC METABOLIC PNL TOTAL CA: CPT | Performed by: PHYSICAL MEDICINE & REHABILITATION

## 2020-09-06 ENCOUNTER — LAB REQUISITION (OUTPATIENT)
Dept: LAB | Facility: HOSPITAL | Age: 80
End: 2020-09-06

## 2020-09-06 DIAGNOSIS — Z00.00 ENCOUNTER FOR GENERAL ADULT MEDICAL EXAMINATION WITHOUT ABNORMAL FINDINGS: ICD-10-CM

## 2020-09-06 DIAGNOSIS — S82.401A UNSPECIFIED FRACTURE OF SHAFT OF RIGHT FIBULA, INITIAL ENCOUNTER FOR CLOSED FRACTURE: ICD-10-CM

## 2020-09-06 DIAGNOSIS — E11.8 TYPE 2 DIABETES MELLITUS WITH UNSPECIFIED COMPLICATIONS (HCC): ICD-10-CM

## 2020-09-06 LAB
ANION GAP SERPL CALCULATED.3IONS-SCNC: 10 MMOL/L (ref 5–15)
BUN SERPL-MCNC: 74 MG/DL (ref 8–23)
BUN SERPL-MCNC: ABNORMAL MG/DL
BUN/CREAT SERPL: ABNORMAL
CALCIUM SPEC-SCNC: 8.7 MG/DL (ref 8.6–10.5)
CHLORIDE SERPL-SCNC: 101 MMOL/L (ref 98–107)
CO2 SERPL-SCNC: 24 MMOL/L (ref 22–29)
CREAT SERPL-MCNC: 3.32 MG/DL (ref 0.57–1)
DEPRECATED RDW RBC AUTO: 51.2 FL (ref 37–54)
ERYTHROCYTE [DISTWIDTH] IN BLOOD BY AUTOMATED COUNT: 14.7 % (ref 12.3–15.4)
GFR SERPL CREATININE-BSD FRML MDRD: 13 ML/MIN/1.73
GFR SERPL CREATININE-BSD FRML MDRD: ABNORMAL ML/MIN/{1.73_M2}
GLUCOSE SERPL-MCNC: 130 MG/DL (ref 65–99)
HCT VFR BLD AUTO: 32 % (ref 34–46.6)
HGB BLD-MCNC: 10.4 G/DL (ref 12–15.9)
MCH RBC QN AUTO: 32.2 PG (ref 26.6–33)
MCHC RBC AUTO-ENTMCNC: 32.6 G/DL (ref 31.5–35.7)
MCV RBC AUTO: 98.8 FL (ref 79–97)
PLATELET # BLD AUTO: 297 10*3/MM3 (ref 140–450)
PMV BLD AUTO: 7.3 FL (ref 6–12)
POTASSIUM SERPL-SCNC: 5.1 MMOL/L (ref 3.5–5.2)
RBC # BLD AUTO: 3.23 10*6/MM3 (ref 3.77–5.28)
SODIUM SERPL-SCNC: 135 MMOL/L (ref 136–145)
WBC # BLD AUTO: 6.8 10*3/MM3 (ref 3.4–10.8)

## 2020-09-06 PROCEDURE — 85027 COMPLETE CBC AUTOMATED: CPT | Performed by: PHYSICAL MEDICINE & REHABILITATION

## 2020-09-06 PROCEDURE — 80048 BASIC METABOLIC PNL TOTAL CA: CPT

## 2020-09-07 ENCOUNTER — APPOINTMENT (OUTPATIENT)
Dept: CT IMAGING | Facility: HOSPITAL | Age: 80
End: 2020-09-07

## 2020-09-07 ENCOUNTER — APPOINTMENT (OUTPATIENT)
Dept: GENERAL RADIOLOGY | Facility: HOSPITAL | Age: 80
End: 2020-09-07

## 2020-09-07 ENCOUNTER — HOSPITAL ENCOUNTER (INPATIENT)
Facility: HOSPITAL | Age: 80
LOS: 5 days | Discharge: NURSING, REHAB OR LONG-TERM CARE | End: 2020-09-14
Attending: INTERNAL MEDICINE | Admitting: HOSPITALIST

## 2020-09-07 ENCOUNTER — APPOINTMENT (OUTPATIENT)
Dept: CARDIOLOGY | Facility: HOSPITAL | Age: 80
End: 2020-09-07

## 2020-09-07 ENCOUNTER — LAB REQUISITION (OUTPATIENT)
Dept: LAB | Facility: HOSPITAL | Age: 80
End: 2020-09-07

## 2020-09-07 DIAGNOSIS — N17.9 AKI (ACUTE KIDNEY INJURY) (HCC): ICD-10-CM

## 2020-09-07 DIAGNOSIS — G93.40 ENCEPHALOPATHY: ICD-10-CM

## 2020-09-07 DIAGNOSIS — R41.0 CONFUSION: Primary | ICD-10-CM

## 2020-09-07 DIAGNOSIS — Z00.00 ENCOUNTER FOR GENERAL ADULT MEDICAL EXAMINATION WITHOUT ABNORMAL FINDINGS: ICD-10-CM

## 2020-09-07 PROBLEM — R41.82 ALTERED MENTAL STATUS: Status: ACTIVE | Noted: 2020-09-07

## 2020-09-07 LAB
ABO GROUP BLD: NORMAL
ALBUMIN SERPL-MCNC: 3.1 G/DL (ref 3.5–5.2)
ALBUMIN/GLOB SERPL: 1.1 G/DL
ALP SERPL-CCNC: 136 U/L (ref 39–117)
ALT SERPL W P-5'-P-CCNC: 16 U/L (ref 1–33)
AMMONIA BLD-SCNC: 15 UMOL/L (ref 11–51)
ANION GAP SERPL CALCULATED.3IONS-SCNC: 13 MMOL/L (ref 5–15)
ANION GAP SERPL CALCULATED.3IONS-SCNC: 8 MMOL/L (ref 5–15)
ANION GAP SERPL CALCULATED.3IONS-SCNC: 9 MMOL/L (ref 5–15)
APTT PPP: 56.5 SECONDS (ref 24–31)
ARTERIAL PATENCY WRIST A: POSITIVE
AST SERPL-CCNC: 19 U/L (ref 1–32)
ATMOSPHERIC PRESS: ABNORMAL MM[HG]
BACTERIA UR QL AUTO: ABNORMAL /HPF
BASE EXCESS BLDA CALC-SCNC: -4.5 MMOL/L (ref 0–3)
BASOPHILS # BLD AUTO: 0.1 10*3/MM3 (ref 0–0.2)
BASOPHILS NFR BLD AUTO: 0.8 % (ref 0–1.5)
BDY SITE: ABNORMAL
BH CV XLRA MEAS LEFT CCA RATIO VEL: 81.4 CM/SEC
BH CV XLRA MEAS LEFT DIST CCA EDV: 21.1 CM/SEC
BH CV XLRA MEAS LEFT DIST CCA PSV: 64.6 CM/SEC
BH CV XLRA MEAS LEFT DIST ICA EDV: -26.6 CM/SEC
BH CV XLRA MEAS LEFT DIST ICA PSV: -89 CM/SEC
BH CV XLRA MEAS LEFT ICA RATIO VEL: -89 CM/SEC
BH CV XLRA MEAS LEFT ICA/CCA RATIO: -1.1
BH CV XLRA MEAS LEFT PROX CCA EDV: 20.5 CM/SEC
BH CV XLRA MEAS LEFT PROX CCA PSV: 81.4 CM/SEC
BH CV XLRA MEAS LEFT PROX ECA PSV: -77 CM/SEC
BH CV XLRA MEAS LEFT PROX ICA EDV: -23 CM/SEC
BH CV XLRA MEAS LEFT PROX ICA PSV: -85.1 CM/SEC
BH CV XLRA MEAS LEFT PROX SCLA PSV: 146 CM/SEC
BH CV XLRA MEAS LEFT VERTEBRAL A PSV: -67.3 CM/SEC
BH CV XLRA MEAS RIGHT CCA RATIO VEL: 74.9 CM/SEC
BH CV XLRA MEAS RIGHT DIST CCA EDV: 21.6 CM/SEC
BH CV XLRA MEAS RIGHT DIST CCA PSV: 74.9 CM/SEC
BH CV XLRA MEAS RIGHT DIST ICA EDV: -46.5 CM/SEC
BH CV XLRA MEAS RIGHT DIST ICA PSV: -174 CM/SEC
BH CV XLRA MEAS RIGHT ICA RATIO VEL: -174 CM/SEC
BH CV XLRA MEAS RIGHT ICA/CCA RATIO: -2.3
BH CV XLRA MEAS RIGHT PROX CCA EDV: 16.1 CM/SEC
BH CV XLRA MEAS RIGHT PROX CCA PSV: 57.6 CM/SEC
BH CV XLRA MEAS RIGHT PROX ECA PSV: -109 CM/SEC
BH CV XLRA MEAS RIGHT PROX ICA EDV: -24.9 CM/SEC
BH CV XLRA MEAS RIGHT PROX ICA PSV: -69.6 CM/SEC
BH CV XLRA MEAS RIGHT PROX SCLA PSV: 112 CM/SEC
BH CV XLRA MEAS RIGHT VERTEBRAL A PSV: -61.9 CM/SEC
BILIRUB SERPL-MCNC: 0.7 MG/DL (ref 0–1.2)
BILIRUB UR QL STRIP: NEGATIVE
BLD GP AB SCN SERPL QL: NEGATIVE
BUN SERPL-MCNC: 76 MG/DL (ref 8–23)
BUN SERPL-MCNC: 76 MG/DL (ref 8–23)
BUN SERPL-MCNC: ABNORMAL MG/DL
BUN/CREAT SERPL: ABNORMAL
CALCIUM SPEC-SCNC: 8.4 MG/DL (ref 8.6–10.5)
CALCIUM SPEC-SCNC: 8.7 MG/DL (ref 8.6–10.5)
CALCIUM SPEC-SCNC: 8.9 MG/DL (ref 8.6–10.5)
CHLORIDE SERPL-SCNC: 102 MMOL/L (ref 98–107)
CHLORIDE SERPL-SCNC: 103 MMOL/L (ref 98–107)
CHLORIDE SERPL-SCNC: 104 MMOL/L (ref 98–107)
CK SERPL-CCNC: 21 U/L (ref 20–180)
CLARITY UR: CLEAR
CO2 BLDA-SCNC: 23.5 MMOL/L (ref 22–29)
CO2 SERPL-SCNC: 21 MMOL/L (ref 22–29)
CO2 SERPL-SCNC: 22 MMOL/L (ref 22–29)
CO2 SERPL-SCNC: 24 MMOL/L (ref 22–29)
COLOR UR: YELLOW
CREAT SERPL-MCNC: 2.53 MG/DL (ref 0.57–1)
CREAT SERPL-MCNC: 2.72 MG/DL (ref 0.57–1)
CREAT SERPL-MCNC: 2.76 MG/DL (ref 0.57–1)
DEPRECATED RDW RBC AUTO: 50.3 FL (ref 37–54)
DEPRECATED RDW RBC AUTO: 52.5 FL (ref 37–54)
EOSINOPHIL # BLD AUTO: 0.1 10*3/MM3 (ref 0–0.4)
EOSINOPHIL NFR BLD AUTO: 1.5 % (ref 0.3–6.2)
ERYTHROCYTE [DISTWIDTH] IN BLOOD BY AUTOMATED COUNT: 14.6 % (ref 12.3–15.4)
ERYTHROCYTE [DISTWIDTH] IN BLOOD BY AUTOMATED COUNT: 14.8 % (ref 12.3–15.4)
GFR SERPL CREATININE-BSD FRML MDRD: 17 ML/MIN/1.73
GFR SERPL CREATININE-BSD FRML MDRD: 17 ML/MIN/1.73
GFR SERPL CREATININE-BSD FRML MDRD: 18 ML/MIN/1.73
GLOBULIN UR ELPH-MCNC: 2.8 GM/DL
GLUCOSE SERPL-MCNC: 115 MG/DL (ref 65–99)
GLUCOSE SERPL-MCNC: 177 MG/DL (ref 65–99)
GLUCOSE SERPL-MCNC: 97 MG/DL (ref 65–99)
GLUCOSE UR STRIP-MCNC: NEGATIVE MG/DL
HCO3 BLDA-SCNC: 22.1 MMOL/L (ref 21–28)
HCT VFR BLD AUTO: 32.5 % (ref 34–46.6)
HCT VFR BLD AUTO: 33.4 % (ref 34–46.6)
HEMODILUTION: NO
HGB BLD-MCNC: 10.5 G/DL (ref 12–15.9)
HGB BLD-MCNC: 10.8 G/DL (ref 12–15.9)
HGB UR QL STRIP.AUTO: ABNORMAL
HOLD SPECIMEN: NORMAL
HOLD SPECIMEN: NORMAL
HYALINE CASTS UR QL AUTO: ABNORMAL /LPF
INR PPP: 4.2 (ref 0.93–1.1)
KETONES UR QL STRIP: NEGATIVE
LEUKOCYTE ESTERASE UR QL STRIP.AUTO: NEGATIVE
LYMPHOCYTES # BLD AUTO: 1 10*3/MM3 (ref 0.7–3.1)
LYMPHOCYTES NFR BLD AUTO: 12.3 % (ref 19.6–45.3)
MAGNESIUM SERPL-MCNC: 3.2 MG/DL (ref 1.6–2.4)
MCH RBC QN AUTO: 32 PG (ref 26.6–33)
MCH RBC QN AUTO: 32 PG (ref 26.6–33)
MCHC RBC AUTO-ENTMCNC: 32.2 G/DL (ref 31.5–35.7)
MCHC RBC AUTO-ENTMCNC: 32.3 G/DL (ref 31.5–35.7)
MCV RBC AUTO: 99 FL (ref 79–97)
MCV RBC AUTO: 99.3 FL (ref 79–97)
MODALITY: ABNORMAL
MONOCYTES # BLD AUTO: 0.8 10*3/MM3 (ref 0.1–0.9)
MONOCYTES NFR BLD AUTO: 10.2 % (ref 5–12)
NEUTROPHILS NFR BLD AUTO: 5.9 10*3/MM3 (ref 1.7–7)
NEUTROPHILS NFR BLD AUTO: 75.2 % (ref 42.7–76)
NITRITE UR QL STRIP: NEGATIVE
NRBC BLD AUTO-RTO: 0.1 /100 WBC (ref 0–0.2)
NT-PROBNP SERPL-MCNC: 2405 PG/ML (ref 0–1800)
PCO2 BLDA: 46.1 MM HG (ref 35–48)
PH BLDA: 7.29 PH UNITS (ref 7.35–7.45)
PH UR STRIP.AUTO: 6.5 [PH] (ref 5–8)
PLATELET # BLD AUTO: 330 10*3/MM3 (ref 140–450)
PLATELET # BLD AUTO: 353 10*3/MM3 (ref 140–450)
PMV BLD AUTO: 6.5 FL (ref 6–12)
PMV BLD AUTO: 7.4 FL (ref 6–12)
PO2 BLDA: 82.5 MM HG (ref 83–108)
POTASSIUM SERPL-SCNC: 4.8 MMOL/L (ref 3.5–5.2)
POTASSIUM SERPL-SCNC: 5.2 MMOL/L (ref 3.5–5.2)
POTASSIUM SERPL-SCNC: 5.5 MMOL/L (ref 3.5–5.2)
PROT SERPL-MCNC: 5.9 G/DL (ref 6–8.5)
PROT UR QL STRIP: ABNORMAL
PROTHROMBIN TIME: 41.8 SECONDS (ref 9.6–11.7)
RBC # BLD AUTO: 3.27 10*6/MM3 (ref 3.77–5.28)
RBC # BLD AUTO: 3.38 10*6/MM3 (ref 3.77–5.28)
RBC # UR: ABNORMAL /HPF
REF LAB TEST METHOD: ABNORMAL
RH BLD: POSITIVE
SAO2 % BLDCOA: 94.6 % (ref 94–98)
SARS-COV-2 RNA PNL SPEC NAA+PROBE: NOT DETECTED
SODIUM SERPL-SCNC: 133 MMOL/L (ref 136–145)
SODIUM SERPL-SCNC: 136 MMOL/L (ref 136–145)
SODIUM SERPL-SCNC: 137 MMOL/L (ref 136–145)
SODIUM UR-SCNC: 29 MMOL/L
SP GR UR STRIP: 1.01 (ref 1–1.03)
SQUAMOUS #/AREA URNS HPF: ABNORMAL /HPF
T&S EXPIRATION DATE: NORMAL
TROPONIN T SERPL-MCNC: 0.01 NG/ML (ref 0–0.03)
UROBILINOGEN UR QL STRIP: ABNORMAL
WBC # BLD AUTO: 7.1 10*3/MM3 (ref 3.4–10.8)
WBC # BLD AUTO: 7.9 10*3/MM3 (ref 3.4–10.8)
WBC UR QL AUTO: ABNORMAL /HPF
WHOLE BLOOD HOLD SPECIMEN: NORMAL
WHOLE BLOOD HOLD SPECIMEN: NORMAL

## 2020-09-07 PROCEDURE — 80053 COMPREHEN METABOLIC PANEL: CPT | Performed by: PHYSICIAN ASSISTANT

## 2020-09-07 PROCEDURE — 85730 THROMBOPLASTIN TIME PARTIAL: CPT | Performed by: PHYSICIAN ASSISTANT

## 2020-09-07 PROCEDURE — 86901 BLOOD TYPING SEROLOGIC RH(D): CPT

## 2020-09-07 PROCEDURE — 81001 URINALYSIS AUTO W/SCOPE: CPT | Performed by: PHYSICIAN ASSISTANT

## 2020-09-07 PROCEDURE — 25010000002 METHYLPREDNISOLONE PER 40 MG: Performed by: PHYSICIAN ASSISTANT

## 2020-09-07 PROCEDURE — 82803 BLOOD GASES ANY COMBINATION: CPT

## 2020-09-07 PROCEDURE — 83880 ASSAY OF NATRIURETIC PEPTIDE: CPT | Performed by: PHYSICIAN ASSISTANT

## 2020-09-07 PROCEDURE — 99214 OFFICE O/P EST MOD 30 MIN: CPT | Performed by: INTERNAL MEDICINE

## 2020-09-07 PROCEDURE — 99285 EMERGENCY DEPT VISIT HI MDM: CPT

## 2020-09-07 PROCEDURE — 86900 BLOOD TYPING SEROLOGIC ABO: CPT | Performed by: PHYSICIAN ASSISTANT

## 2020-09-07 PROCEDURE — 99221 1ST HOSP IP/OBS SF/LOW 40: CPT | Performed by: PSYCHIATRY & NEUROLOGY

## 2020-09-07 PROCEDURE — G0378 HOSPITAL OBSERVATION PER HR: HCPCS

## 2020-09-07 PROCEDURE — 92610 EVALUATE SWALLOWING FUNCTION: CPT

## 2020-09-07 PROCEDURE — 86850 RBC ANTIBODY SCREEN: CPT | Performed by: PHYSICIAN ASSISTANT

## 2020-09-07 PROCEDURE — 84520 ASSAY OF UREA NITROGEN: CPT | Performed by: INTERNAL MEDICINE

## 2020-09-07 PROCEDURE — 70450 CT HEAD/BRAIN W/O DYE: CPT

## 2020-09-07 PROCEDURE — 87086 URINE CULTURE/COLONY COUNT: CPT | Performed by: PHYSICIAN ASSISTANT

## 2020-09-07 PROCEDURE — 93005 ELECTROCARDIOGRAM TRACING: CPT | Performed by: PHYSICIAN ASSISTANT

## 2020-09-07 PROCEDURE — 25010000002 DIPHENHYDRAMINE PER 50 MG: Performed by: PHYSICIAN ASSISTANT

## 2020-09-07 PROCEDURE — 80048 BASIC METABOLIC PNL TOTAL CA: CPT

## 2020-09-07 PROCEDURE — 71045 X-RAY EXAM CHEST 1 VIEW: CPT

## 2020-09-07 PROCEDURE — 85025 COMPLETE CBC W/AUTO DIFF WBC: CPT | Performed by: PHYSICIAN ASSISTANT

## 2020-09-07 PROCEDURE — 87635 SARS-COV-2 COVID-19 AMP PRB: CPT | Performed by: PHYSICIAN ASSISTANT

## 2020-09-07 PROCEDURE — 86901 BLOOD TYPING SEROLOGIC RH(D): CPT | Performed by: PHYSICIAN ASSISTANT

## 2020-09-07 PROCEDURE — 86900 BLOOD TYPING SEROLOGIC ABO: CPT

## 2020-09-07 PROCEDURE — 82140 ASSAY OF AMMONIA: CPT | Performed by: PHYSICIAN ASSISTANT

## 2020-09-07 PROCEDURE — P9612 CATHETERIZE FOR URINE SPEC: HCPCS

## 2020-09-07 PROCEDURE — 84484 ASSAY OF TROPONIN QUANT: CPT | Performed by: PHYSICIAN ASSISTANT

## 2020-09-07 PROCEDURE — 83735 ASSAY OF MAGNESIUM: CPT | Performed by: PHYSICIAN ASSISTANT

## 2020-09-07 PROCEDURE — 99220 PR INITIAL OBSERVATION CARE/DAY 70 MINUTES: CPT | Performed by: INTERNAL MEDICINE

## 2020-09-07 PROCEDURE — 36600 WITHDRAWAL OF ARTERIAL BLOOD: CPT

## 2020-09-07 PROCEDURE — 85027 COMPLETE CBC AUTOMATED: CPT

## 2020-09-07 PROCEDURE — 93880 EXTRACRANIAL BILAT STUDY: CPT

## 2020-09-07 PROCEDURE — 85610 PROTHROMBIN TIME: CPT | Performed by: PHYSICIAN ASSISTANT

## 2020-09-07 PROCEDURE — 82550 ASSAY OF CK (CPK): CPT

## 2020-09-07 PROCEDURE — 84300 ASSAY OF URINE SODIUM: CPT | Performed by: INTERNAL MEDICINE

## 2020-09-07 RX ORDER — BISACODYL 10 MG
10 SUPPOSITORY, RECTAL RECTAL DAILY PRN
Status: DISCONTINUED | OUTPATIENT
Start: 2020-09-07 | End: 2020-09-14 | Stop reason: HOSPADM

## 2020-09-07 RX ORDER — LACTULOSE 10 G/15ML
20 SOLUTION ORAL
COMMUNITY
End: 2021-05-17

## 2020-09-07 RX ORDER — DIPHENHYDRAMINE HCL 25 MG
12.5 TABLET ORAL EVERY 8 HOURS PRN
COMMUNITY
End: 2020-09-14 | Stop reason: HOSPADM

## 2020-09-07 RX ORDER — ALUMINA, MAGNESIA, AND SIMETHICONE 2400; 2400; 240 MG/30ML; MG/30ML; MG/30ML
15 SUSPENSION ORAL EVERY 6 HOURS PRN
Status: DISCONTINUED | OUTPATIENT
Start: 2020-09-07 | End: 2020-09-14 | Stop reason: HOSPADM

## 2020-09-07 RX ORDER — BISACODYL 5 MG/1
5 TABLET, DELAYED RELEASE ORAL DAILY PRN
Status: DISCONTINUED | OUTPATIENT
Start: 2020-09-07 | End: 2020-09-14 | Stop reason: HOSPADM

## 2020-09-07 RX ORDER — ACETAMINOPHEN 325 MG/1
650 TABLET ORAL EVERY 6 HOURS PRN
COMMUNITY

## 2020-09-07 RX ORDER — DOCUSATE SODIUM 100 MG/1
200 CAPSULE, LIQUID FILLED ORAL 2 TIMES DAILY
COMMUNITY
End: 2021-01-19

## 2020-09-07 RX ORDER — SOTALOL HYDROCHLORIDE 120 MG/1
120 TABLET ORAL EVERY MORNING
Status: DISCONTINUED | OUTPATIENT
Start: 2020-09-07 | End: 2020-09-07

## 2020-09-07 RX ORDER — ACETAMINOPHEN 650 MG/1
650 SUPPOSITORY RECTAL EVERY 4 HOURS PRN
Status: DISCONTINUED | OUTPATIENT
Start: 2020-09-07 | End: 2020-09-14 | Stop reason: HOSPADM

## 2020-09-07 RX ORDER — CETIRIZINE HYDROCHLORIDE 10 MG/1
10 TABLET ORAL DAILY
Status: DISCONTINUED | OUTPATIENT
Start: 2020-09-07 | End: 2020-09-14 | Stop reason: HOSPADM

## 2020-09-07 RX ORDER — SODIUM POLYSTYRENE SULFONATE 15 G/60ML
15 SUSPENSION ORAL; RECTAL ONCE
Status: COMPLETED | OUTPATIENT
Start: 2020-09-07 | End: 2020-09-07

## 2020-09-07 RX ORDER — ASPIRIN 81 MG
1 TABLET, DELAYED RELEASE (ENTERIC COATED) ORAL DAILY
COMMUNITY
End: 2021-05-17

## 2020-09-07 RX ORDER — SIMETHICONE 80 MG
80 TABLET,CHEWABLE ORAL EVERY 6 HOURS PRN
COMMUNITY
End: 2021-05-17

## 2020-09-07 RX ORDER — ONDANSETRON 2 MG/ML
4 INJECTION INTRAMUSCULAR; INTRAVENOUS EVERY 6 HOURS PRN
Status: DISCONTINUED | OUTPATIENT
Start: 2020-09-07 | End: 2020-09-14 | Stop reason: HOSPADM

## 2020-09-07 RX ORDER — ONDANSETRON 4 MG/1
4 TABLET, FILM COATED ORAL EVERY 6 HOURS PRN
Status: DISCONTINUED | OUTPATIENT
Start: 2020-09-07 | End: 2020-09-14 | Stop reason: HOSPADM

## 2020-09-07 RX ORDER — ACETAMINOPHEN 325 MG/1
650 TABLET ORAL EVERY 4 HOURS PRN
Status: DISCONTINUED | OUTPATIENT
Start: 2020-09-07 | End: 2020-09-14 | Stop reason: HOSPADM

## 2020-09-07 RX ORDER — SODIUM CHLORIDE 0.9 % (FLUSH) 0.9 %
10 SYRINGE (ML) INJECTION AS NEEDED
Status: DISCONTINUED | OUTPATIENT
Start: 2020-09-07 | End: 2020-09-14 | Stop reason: HOSPADM

## 2020-09-07 RX ORDER — ONDANSETRON 4 MG/1
4 TABLET, ORALLY DISINTEGRATING ORAL EVERY 4 HOURS PRN
COMMUNITY
End: 2021-08-17

## 2020-09-07 RX ORDER — PANTOPRAZOLE SODIUM 40 MG/1
40 TABLET, DELAYED RELEASE ORAL EVERY MORNING
COMMUNITY
End: 2021-05-17

## 2020-09-07 RX ORDER — BISACODYL 5 MG/1
5 TABLET, DELAYED RELEASE ORAL DAILY PRN
COMMUNITY
End: 2021-05-17

## 2020-09-07 RX ORDER — DOCUSATE SODIUM 100 MG/1
200 CAPSULE, LIQUID FILLED ORAL 2 TIMES DAILY
Status: DISCONTINUED | OUTPATIENT
Start: 2020-09-07 | End: 2020-09-14 | Stop reason: HOSPADM

## 2020-09-07 RX ORDER — SODIUM CHLORIDE 0.9 % (FLUSH) 0.9 %
10 SYRINGE (ML) INJECTION EVERY 12 HOURS SCHEDULED
Status: DISCONTINUED | OUTPATIENT
Start: 2020-09-07 | End: 2020-09-14 | Stop reason: HOSPADM

## 2020-09-07 RX ORDER — DIPHENHYDRAMINE HYDROCHLORIDE 50 MG/ML
50 INJECTION INTRAMUSCULAR; INTRAVENOUS ONCE
Status: COMPLETED | OUTPATIENT
Start: 2020-09-07 | End: 2020-09-07

## 2020-09-07 RX ORDER — POLYETHYLENE GLYCOL 3350 17 G/17G
17 POWDER, FOR SOLUTION ORAL DAILY PRN
Status: DISCONTINUED | OUTPATIENT
Start: 2020-09-07 | End: 2020-09-14 | Stop reason: HOSPADM

## 2020-09-07 RX ORDER — MULTIPLE VITAMINS W/ MINERALS TAB 2148-113
1 TAB ORAL DAILY
Status: DISCONTINUED | OUTPATIENT
Start: 2020-09-07 | End: 2020-09-14 | Stop reason: HOSPADM

## 2020-09-07 RX ORDER — ACETAMINOPHEN 160 MG/5ML
650 SOLUTION ORAL EVERY 4 HOURS PRN
Status: DISCONTINUED | OUTPATIENT
Start: 2020-09-07 | End: 2020-09-14 | Stop reason: HOSPADM

## 2020-09-07 RX ORDER — LACTULOSE 10 G/15ML
20 SOLUTION ORAL
Status: DISCONTINUED | OUTPATIENT
Start: 2020-09-07 | End: 2020-09-14 | Stop reason: HOSPADM

## 2020-09-07 RX ORDER — OXYCODONE HYDROCHLORIDE 5 MG/1
5 TABLET ORAL EVERY 4 HOURS PRN
COMMUNITY
End: 2020-10-10

## 2020-09-07 RX ORDER — ONDANSETRON 4 MG/1
4 TABLET, ORALLY DISINTEGRATING ORAL EVERY 4 HOURS PRN
Status: DISCONTINUED | OUTPATIENT
Start: 2020-09-07 | End: 2020-09-14 | Stop reason: HOSPADM

## 2020-09-07 RX ORDER — SOTALOL HYDROCHLORIDE 80 MG/1
80 TABLET ORAL NIGHTLY
Status: DISCONTINUED | OUTPATIENT
Start: 2020-09-07 | End: 2020-09-07

## 2020-09-07 RX ORDER — PANTOPRAZOLE SODIUM 40 MG/1
40 TABLET, DELAYED RELEASE ORAL EVERY MORNING
Status: DISCONTINUED | OUTPATIENT
Start: 2020-09-07 | End: 2020-09-14 | Stop reason: HOSPADM

## 2020-09-07 RX ORDER — CETIRIZINE HYDROCHLORIDE 10 MG/1
10 TABLET ORAL DAILY
COMMUNITY
End: 2021-01-19 | Stop reason: ALTCHOICE

## 2020-09-07 RX ORDER — BISACODYL 5 MG/1
5 TABLET, DELAYED RELEASE ORAL DAILY PRN
Status: DISCONTINUED | OUTPATIENT
Start: 2020-09-07 | End: 2020-09-07

## 2020-09-07 RX ORDER — CHOLECALCIFEROL (VITAMIN D3) 125 MCG
5 CAPSULE ORAL NIGHTLY PRN
Status: DISCONTINUED | OUTPATIENT
Start: 2020-09-07 | End: 2020-09-14 | Stop reason: HOSPADM

## 2020-09-07 RX ORDER — SODIUM CHLORIDE 9 MG/ML
75 INJECTION, SOLUTION INTRAVENOUS CONTINUOUS
Status: DISCONTINUED | OUTPATIENT
Start: 2020-09-07 | End: 2020-09-09

## 2020-09-07 RX ORDER — TOPIRAMATE 100 MG/1
100 TABLET, FILM COATED ORAL NIGHTLY
Status: DISCONTINUED | OUTPATIENT
Start: 2020-09-07 | End: 2020-09-14 | Stop reason: HOSPADM

## 2020-09-07 RX ORDER — BISACODYL 10 MG
10 SUPPOSITORY, RECTAL RECTAL DAILY PRN
COMMUNITY
End: 2021-05-17 | Stop reason: ALTCHOICE

## 2020-09-07 RX ORDER — CHOLECALCIFEROL (VITAMIN D3) 125 MCG
6 CAPSULE ORAL NIGHTLY PRN
COMMUNITY
End: 2021-05-17

## 2020-09-07 RX ORDER — METHYLPREDNISOLONE SODIUM SUCCINATE 40 MG/ML
40 INJECTION, POWDER, LYOPHILIZED, FOR SOLUTION INTRAMUSCULAR; INTRAVENOUS EVERY 4 HOURS
Status: DISCONTINUED | OUTPATIENT
Start: 2020-09-07 | End: 2020-09-08

## 2020-09-07 RX ORDER — OXYCODONE HYDROCHLORIDE 10 MG/1
10 TABLET ORAL EVERY 4 HOURS PRN
COMMUNITY
End: 2020-09-14 | Stop reason: HOSPADM

## 2020-09-07 RX ADMIN — TOPIRAMATE 100 MG: 100 TABLET, FILM COATED ORAL at 21:48

## 2020-09-07 RX ADMIN — METHYLPREDNISOLONE SODIUM SUCCINATE 40 MG: 40 INJECTION, POWDER, FOR SOLUTION INTRAMUSCULAR; INTRAVENOUS at 11:43

## 2020-09-07 RX ADMIN — SODIUM POLYSTYRENE SULFONATE 15 G: 15 SUSPENSION ORAL; RECTAL at 22:46

## 2020-09-07 RX ADMIN — METHYLPREDNISOLONE SODIUM SUCCINATE 40 MG: 40 INJECTION, POWDER, FOR SOLUTION INTRAMUSCULAR; INTRAVENOUS at 19:32

## 2020-09-07 RX ADMIN — MULTIPLE VITAMINS W/ MINERALS TAB 1 TABLET: TAB at 14:25

## 2020-09-07 RX ADMIN — MAGNESIUM OXIDE TAB 400 MG (241.3 MG ELEMENTAL MG) 400 MG: 400 (241.3 MG) TAB at 21:48

## 2020-09-07 RX ADMIN — SODIUM POLYSTYRENE SULFONATE 15 G: 15 SUSPENSION ORAL; RECTAL at 21:48

## 2020-09-07 RX ADMIN — DOCUSATE SODIUM 200 MG: 100 CAPSULE, LIQUID FILLED ORAL at 21:48

## 2020-09-07 RX ADMIN — DILTIAZEM HYDROCHLORIDE 300 MG: 180 CAPSULE, COATED, EXTENDED RELEASE ORAL at 14:25

## 2020-09-07 RX ADMIN — DIPHENHYDRAMINE HYDROCHLORIDE 50 MG: 50 INJECTION, SOLUTION INTRAMUSCULAR; INTRAVENOUS at 07:45

## 2020-09-07 RX ADMIN — DIPHENHYDRAMINE HYDROCHLORIDE 12.5 MG: 25 LIQUID ORAL at 21:48

## 2020-09-07 RX ADMIN — PANTOPRAZOLE SODIUM 40 MG: 40 TABLET, DELAYED RELEASE ORAL at 14:26

## 2020-09-07 RX ADMIN — METHYLPREDNISOLONE SODIUM SUCCINATE 40 MG: 40 INJECTION, POWDER, FOR SOLUTION INTRAMUSCULAR; INTRAVENOUS at 15:06

## 2020-09-07 RX ADMIN — METHYLPREDNISOLONE SODIUM SUCCINATE 40 MG: 40 INJECTION, POWDER, FOR SOLUTION INTRAMUSCULAR; INTRAVENOUS at 07:43

## 2020-09-07 RX ADMIN — SODIUM CHLORIDE 100 ML/HR: 900 INJECTION, SOLUTION INTRAVENOUS at 14:52

## 2020-09-07 RX ADMIN — Medication 10 ML: at 11:43

## 2020-09-07 RX ADMIN — DOCUSATE SODIUM 200 MG: 100 CAPSULE, LIQUID FILLED ORAL at 14:25

## 2020-09-07 RX ADMIN — Medication 10 ML: at 21:48

## 2020-09-07 RX ADMIN — METHYLPREDNISOLONE SODIUM SUCCINATE 40 MG: 40 INJECTION, POWDER, FOR SOLUTION INTRAMUSCULAR; INTRAVENOUS at 23:33

## 2020-09-07 RX ADMIN — CETIRIZINE HYDROCHLORIDE 10 MG: 10 TABLET, FILM COATED ORAL at 14:24

## 2020-09-07 RX ADMIN — ACETAMINOPHEN 650 MG: 160 SUSPENSION ORAL at 15:49

## 2020-09-08 ENCOUNTER — APPOINTMENT (OUTPATIENT)
Dept: ULTRASOUND IMAGING | Facility: HOSPITAL | Age: 80
End: 2020-09-08

## 2020-09-08 LAB
ANION GAP SERPL CALCULATED.3IONS-SCNC: 13 MMOL/L (ref 5–15)
APTT PPP: 55.1 SECONDS (ref 24–31)
BACTERIA SPEC AEROBE CULT: NO GROWTH
BASOPHILS # BLD AUTO: 0 10*3/MM3 (ref 0–0.2)
BASOPHILS NFR BLD AUTO: 0.1 % (ref 0–1.5)
BUN SERPL-MCNC: 74 MG/DL (ref 8–23)
BUN SERPL-MCNC: 77 MG/DL (ref 8–23)
BUN SERPL-MCNC: ABNORMAL MG/DL
BUN/CREAT SERPL: ABNORMAL
CALCIUM SPEC-SCNC: 8.7 MG/DL (ref 8.6–10.5)
CHLORIDE SERPL-SCNC: 105 MMOL/L (ref 98–107)
CHOLEST SERPL-MCNC: 189 MG/DL (ref 0–200)
CO2 SERPL-SCNC: 19 MMOL/L (ref 22–29)
CREAT SERPL-MCNC: 2.46 MG/DL (ref 0.57–1)
DEPRECATED RDW RBC AUTO: 50.8 FL (ref 37–54)
EOSINOPHIL # BLD AUTO: 0 10*3/MM3 (ref 0–0.4)
EOSINOPHIL NFR BLD AUTO: 0 % (ref 0.3–6.2)
ERYTHROCYTE [DISTWIDTH] IN BLOOD BY AUTOMATED COUNT: 14.5 % (ref 12.3–15.4)
GFR SERPL CREATININE-BSD FRML MDRD: 19 ML/MIN/1.73
GLUCOSE SERPL-MCNC: 155 MG/DL (ref 65–99)
HBA1C MFR BLD: 6.2 % (ref 3.5–5.6)
HCT VFR BLD AUTO: 33.9 % (ref 34–46.6)
HDLC SERPL-MCNC: 48 MG/DL (ref 40–60)
HGB BLD-MCNC: 10.8 G/DL (ref 12–15.9)
INR PPP: 5.29 (ref 0.93–1.1)
INR PPP: 5.61 (ref 2–3)
LDLC SERPL CALC-MCNC: 113 MG/DL (ref 0–100)
LDLC/HDLC SERPL: 2.36 {RATIO}
LYMPHOCYTES # BLD AUTO: 0.5 10*3/MM3 (ref 0.7–3.1)
LYMPHOCYTES NFR BLD AUTO: 7.6 % (ref 19.6–45.3)
MCH RBC QN AUTO: 31.6 PG (ref 26.6–33)
MCHC RBC AUTO-ENTMCNC: 31.8 G/DL (ref 31.5–35.7)
MCV RBC AUTO: 99.3 FL (ref 79–97)
MONOCYTES # BLD AUTO: 0.1 10*3/MM3 (ref 0.1–0.9)
MONOCYTES NFR BLD AUTO: 0.9 % (ref 5–12)
NEUTROPHILS NFR BLD AUTO: 6.1 10*3/MM3 (ref 1.7–7)
NEUTROPHILS NFR BLD AUTO: 91.4 % (ref 42.7–76)
NRBC BLD AUTO-RTO: 0.2 /100 WBC (ref 0–0.2)
PLATELET # BLD AUTO: 372 10*3/MM3 (ref 140–450)
PMV BLD AUTO: 7.4 FL (ref 6–12)
POTASSIUM SERPL-SCNC: 4.6 MMOL/L (ref 3.5–5.2)
PROTHROMBIN TIME: 52.5 SECONDS (ref 9.6–11.7)
PROTHROMBIN TIME: 55.5 SECONDS (ref 19.4–28.5)
RBC # BLD AUTO: 3.41 10*6/MM3 (ref 3.77–5.28)
SODIUM SERPL-SCNC: 137 MMOL/L (ref 136–145)
TRIGL SERPL-MCNC: 138 MG/DL (ref 0–150)
TSH SERPL DL<=0.05 MIU/L-ACNC: 0.45 UIU/ML (ref 0.27–4.2)
VIT B12 BLD-MCNC: >2000 PG/ML (ref 211–946)
VLDLC SERPL-MCNC: 27.6 MG/DL
WBC # BLD AUTO: 6.7 10*3/MM3 (ref 3.4–10.8)

## 2020-09-08 PROCEDURE — G0378 HOSPITAL OBSERVATION PER HR: HCPCS

## 2020-09-08 PROCEDURE — 82607 VITAMIN B-12: CPT | Performed by: PSYCHIATRY & NEUROLOGY

## 2020-09-08 PROCEDURE — 85025 COMPLETE CBC W/AUTO DIFF WBC: CPT | Performed by: INTERNAL MEDICINE

## 2020-09-08 PROCEDURE — 80048 BASIC METABOLIC PNL TOTAL CA: CPT | Performed by: INTERNAL MEDICINE

## 2020-09-08 PROCEDURE — 76775 US EXAM ABDO BACK WALL LIM: CPT

## 2020-09-08 PROCEDURE — 99214 OFFICE O/P EST MOD 30 MIN: CPT | Performed by: INTERNAL MEDICINE

## 2020-09-08 PROCEDURE — 25010000002 METHYLPREDNISOLONE PER 40 MG: Performed by: PHYSICIAN ASSISTANT

## 2020-09-08 PROCEDURE — 83036 HEMOGLOBIN GLYCOSYLATED A1C: CPT | Performed by: PSYCHIATRY & NEUROLOGY

## 2020-09-08 PROCEDURE — 92526 ORAL FUNCTION THERAPY: CPT

## 2020-09-08 PROCEDURE — 85730 THROMBOPLASTIN TIME PARTIAL: CPT | Performed by: INTERNAL MEDICINE

## 2020-09-08 PROCEDURE — 99231 SBSQ HOSP IP/OBS SF/LOW 25: CPT | Performed by: PSYCHIATRY & NEUROLOGY

## 2020-09-08 PROCEDURE — 85610 PROTHROMBIN TIME: CPT | Performed by: INTERNAL MEDICINE

## 2020-09-08 PROCEDURE — 99226 PR SBSQ OBSERVATION CARE/DAY 35 MINUTES: CPT | Performed by: INTERNAL MEDICINE

## 2020-09-08 PROCEDURE — 84443 ASSAY THYROID STIM HORMONE: CPT | Performed by: PSYCHIATRY & NEUROLOGY

## 2020-09-08 PROCEDURE — 25010000003 PHYTONADIONE 10 MG/ML SOLUTION: Performed by: INTERNAL MEDICINE

## 2020-09-08 PROCEDURE — 80061 LIPID PANEL: CPT | Performed by: PSYCHIATRY & NEUROLOGY

## 2020-09-08 RX ORDER — PHYTONADIONE 2 MG/ML
5 INJECTION, EMULSION INTRAMUSCULAR; INTRAVENOUS; SUBCUTANEOUS ONCE
Status: COMPLETED | OUTPATIENT
Start: 2020-09-08 | End: 2020-09-08

## 2020-09-08 RX ORDER — WARFARIN SODIUM 3 MG/1
1.5 TABLET ORAL
Status: ON HOLD | COMMUNITY
End: 2020-10-13 | Stop reason: SDUPTHER

## 2020-09-08 RX ADMIN — DOCUSATE SODIUM 200 MG: 100 CAPSULE, LIQUID FILLED ORAL at 08:24

## 2020-09-08 RX ADMIN — DILTIAZEM HYDROCHLORIDE 300 MG: 180 CAPSULE, COATED, EXTENDED RELEASE ORAL at 08:24

## 2020-09-08 RX ADMIN — MAGNESIUM OXIDE TAB 400 MG (241.3 MG ELEMENTAL MG) 400 MG: 400 (241.3 MG) TAB at 20:38

## 2020-09-08 RX ADMIN — METHYLPREDNISOLONE SODIUM SUCCINATE 40 MG: 40 INJECTION, POWDER, FOR SOLUTION INTRAMUSCULAR; INTRAVENOUS at 08:25

## 2020-09-08 RX ADMIN — MULTIPLE VITAMINS W/ MINERALS TAB 1 TABLET: TAB at 08:24

## 2020-09-08 RX ADMIN — CETIRIZINE HYDROCHLORIDE 10 MG: 10 TABLET, FILM COATED ORAL at 08:24

## 2020-09-08 RX ADMIN — PHYTONADIONE 5 MG: 10 INJECTION, EMULSION INTRAMUSCULAR; INTRAVENOUS; SUBCUTANEOUS at 12:29

## 2020-09-08 RX ADMIN — SODIUM CHLORIDE 100 ML/HR: 900 INJECTION, SOLUTION INTRAVENOUS at 00:00

## 2020-09-08 RX ADMIN — METHYLPREDNISOLONE SODIUM SUCCINATE 40 MG: 40 INJECTION, POWDER, FOR SOLUTION INTRAMUSCULAR; INTRAVENOUS at 03:21

## 2020-09-08 RX ADMIN — Medication 10 ML: at 08:25

## 2020-09-08 RX ADMIN — TOPIRAMATE 100 MG: 100 TABLET, FILM COATED ORAL at 20:38

## 2020-09-08 RX ADMIN — DOCUSATE SODIUM 200 MG: 100 CAPSULE, LIQUID FILLED ORAL at 20:38

## 2020-09-08 RX ADMIN — PANTOPRAZOLE SODIUM 40 MG: 40 TABLET, DELAYED RELEASE ORAL at 08:24

## 2020-09-09 ENCOUNTER — APPOINTMENT (OUTPATIENT)
Dept: GENERAL RADIOLOGY | Facility: HOSPITAL | Age: 80
End: 2020-09-09

## 2020-09-09 LAB
ANION GAP SERPL CALCULATED.3IONS-SCNC: 10 MMOL/L (ref 5–15)
APTT PPP: 42.7 SECONDS (ref 24–31)
BASOPHILS # BLD AUTO: 0 10*3/MM3 (ref 0–0.2)
BASOPHILS NFR BLD AUTO: 0.1 % (ref 0–1.5)
BUN SERPL-MCNC: 72 MG/DL (ref 8–23)
BUN SERPL-MCNC: ABNORMAL MG/DL
BUN/CREAT SERPL: ABNORMAL
CALCIUM SPEC-SCNC: 8.5 MG/DL (ref 8.6–10.5)
CHLORIDE SERPL-SCNC: 113 MMOL/L (ref 98–107)
CO2 SERPL-SCNC: 20 MMOL/L (ref 22–29)
CREAT SERPL-MCNC: 2.06 MG/DL (ref 0.57–1)
DEPRECATED RDW RBC AUTO: 52.1 FL (ref 37–54)
EOSINOPHIL # BLD AUTO: 0 10*3/MM3 (ref 0–0.4)
EOSINOPHIL NFR BLD AUTO: 0 % (ref 0.3–6.2)
ERYTHROCYTE [DISTWIDTH] IN BLOOD BY AUTOMATED COUNT: 14.8 % (ref 12.3–15.4)
GFR SERPL CREATININE-BSD FRML MDRD: 23 ML/MIN/1.73
GLUCOSE SERPL-MCNC: 150 MG/DL (ref 65–99)
HCT VFR BLD AUTO: 29.6 % (ref 34–46.6)
HGB BLD-MCNC: 9.7 G/DL (ref 12–15.9)
INR PPP: 2.69 (ref 2–3)
LYMPHOCYTES # BLD AUTO: 0.6 10*3/MM3 (ref 0.7–3.1)
LYMPHOCYTES NFR BLD AUTO: 6.2 % (ref 19.6–45.3)
MCH RBC QN AUTO: 32.6 PG (ref 26.6–33)
MCHC RBC AUTO-ENTMCNC: 32.8 G/DL (ref 31.5–35.7)
MCV RBC AUTO: 99.4 FL (ref 79–97)
MONOCYTES # BLD AUTO: 0.4 10*3/MM3 (ref 0.1–0.9)
MONOCYTES NFR BLD AUTO: 4.9 % (ref 5–12)
NEUTROPHILS NFR BLD AUTO: 7.9 10*3/MM3 (ref 1.7–7)
NEUTROPHILS NFR BLD AUTO: 88.8 % (ref 42.7–76)
NRBC BLD AUTO-RTO: 0.2 /100 WBC (ref 0–0.2)
NT-PROBNP SERPL-MCNC: ABNORMAL PG/ML (ref 0–1800)
PLATELET # BLD AUTO: 333 10*3/MM3 (ref 140–450)
PMV BLD AUTO: 7.3 FL (ref 6–12)
POTASSIUM SERPL-SCNC: 4.3 MMOL/L (ref 3.5–5.2)
PROTHROMBIN TIME: 27.7 SECONDS (ref 19.4–28.5)
RBC # BLD AUTO: 2.98 10*6/MM3 (ref 3.77–5.28)
SODIUM SERPL-SCNC: 143 MMOL/L (ref 136–145)
WBC # BLD AUTO: 8.9 10*3/MM3 (ref 3.4–10.8)

## 2020-09-09 PROCEDURE — 99232 SBSQ HOSP IP/OBS MODERATE 35: CPT | Performed by: INTERNAL MEDICINE

## 2020-09-09 PROCEDURE — 80048 BASIC METABOLIC PNL TOTAL CA: CPT | Performed by: INTERNAL MEDICINE

## 2020-09-09 PROCEDURE — 71045 X-RAY EXAM CHEST 1 VIEW: CPT

## 2020-09-09 PROCEDURE — 97162 PT EVAL MOD COMPLEX 30 MIN: CPT

## 2020-09-09 PROCEDURE — 85025 COMPLETE CBC W/AUTO DIFF WBC: CPT | Performed by: INTERNAL MEDICINE

## 2020-09-09 PROCEDURE — 63710000001 ONDANSETRON PER 8 MG: Performed by: NURSE PRACTITIONER

## 2020-09-09 PROCEDURE — 97167 OT EVAL HIGH COMPLEX 60 MIN: CPT

## 2020-09-09 PROCEDURE — 85730 THROMBOPLASTIN TIME PARTIAL: CPT | Performed by: INTERNAL MEDICINE

## 2020-09-09 PROCEDURE — 92526 ORAL FUNCTION THERAPY: CPT

## 2020-09-09 PROCEDURE — 83880 ASSAY OF NATRIURETIC PEPTIDE: CPT | Performed by: INTERNAL MEDICINE

## 2020-09-09 PROCEDURE — 85610 PROTHROMBIN TIME: CPT | Performed by: INTERNAL MEDICINE

## 2020-09-09 RX ORDER — LINEZOLID 600 MG/1
600 TABLET, FILM COATED ORAL EVERY 12 HOURS SCHEDULED
Status: COMPLETED | OUTPATIENT
Start: 2020-09-09 | End: 2020-09-13

## 2020-09-09 RX ORDER — WARFARIN SODIUM 1 MG/1
1 TABLET ORAL
Status: DISCONTINUED | OUTPATIENT
Start: 2020-09-09 | End: 2020-09-10

## 2020-09-09 RX ORDER — ALBUTEROL SULFATE 2.5 MG/3ML
2.5 SOLUTION RESPIRATORY (INHALATION) EVERY 4 HOURS PRN
Status: DISCONTINUED | OUTPATIENT
Start: 2020-09-09 | End: 2020-09-14 | Stop reason: HOSPADM

## 2020-09-09 RX ADMIN — DOCUSATE SODIUM 200 MG: 100 CAPSULE, LIQUID FILLED ORAL at 08:38

## 2020-09-09 RX ADMIN — Medication: at 21:54

## 2020-09-09 RX ADMIN — SODIUM CHLORIDE 75 ML/HR: 900 INJECTION, SOLUTION INTRAVENOUS at 02:37

## 2020-09-09 RX ADMIN — ACETAMINOPHEN 650 MG: 325 TABLET, FILM COATED ORAL at 14:23

## 2020-09-09 RX ADMIN — TOPIRAMATE 100 MG: 100 TABLET, FILM COATED ORAL at 21:42

## 2020-09-09 RX ADMIN — METOPROLOL TARTRATE 25 MG: 25 TABLET, FILM COATED ORAL at 21:42

## 2020-09-09 RX ADMIN — ACETAMINOPHEN 650 MG: 325 TABLET, FILM COATED ORAL at 21:49

## 2020-09-09 RX ADMIN — METOPROLOL TARTRATE 25 MG: 25 TABLET, FILM COATED ORAL at 13:41

## 2020-09-09 RX ADMIN — Medication 10 ML: at 08:39

## 2020-09-09 RX ADMIN — MULTIPLE VITAMINS W/ MINERALS TAB 1 TABLET: TAB at 08:38

## 2020-09-09 RX ADMIN — CETIRIZINE HYDROCHLORIDE 10 MG: 10 TABLET, FILM COATED ORAL at 08:39

## 2020-09-09 RX ADMIN — MAGNESIUM OXIDE TAB 400 MG (241.3 MG ELEMENTAL MG) 400 MG: 400 (241.3 MG) TAB at 21:42

## 2020-09-09 RX ADMIN — PANTOPRAZOLE SODIUM 40 MG: 40 TABLET, DELAYED RELEASE ORAL at 08:39

## 2020-09-09 RX ADMIN — DOCUSATE SODIUM 200 MG: 100 CAPSULE, LIQUID FILLED ORAL at 21:42

## 2020-09-09 RX ADMIN — Medication 10 ML: at 21:54

## 2020-09-09 RX ADMIN — WARFARIN SODIUM 1 MG: 1 TABLET ORAL at 17:44

## 2020-09-09 RX ADMIN — LINEZOLID 600 MG: 600 TABLET, FILM COATED ORAL at 12:15

## 2020-09-09 RX ADMIN — Medication: at 17:44

## 2020-09-09 RX ADMIN — Medication 5 MG: at 21:49

## 2020-09-09 RX ADMIN — LINEZOLID 600 MG: 600 TABLET, FILM COATED ORAL at 21:42

## 2020-09-09 RX ADMIN — ACETAMINOPHEN 650 MG: 325 TABLET, FILM COATED ORAL at 06:04

## 2020-09-09 RX ADMIN — ONDANSETRON HYDROCHLORIDE 4 MG: 4 TABLET, FILM COATED ORAL at 14:23

## 2020-09-09 RX ADMIN — DILTIAZEM HYDROCHLORIDE 300 MG: 180 CAPSULE, COATED, EXTENDED RELEASE ORAL at 08:38

## 2020-09-10 ENCOUNTER — APPOINTMENT (OUTPATIENT)
Dept: GENERAL RADIOLOGY | Facility: HOSPITAL | Age: 80
End: 2020-09-10

## 2020-09-10 LAB
AMYLASE SERPL-CCNC: 102 U/L (ref 28–100)
ANION GAP SERPL CALCULATED.3IONS-SCNC: 7 MMOL/L (ref 5–15)
ARTERIAL PATENCY WRIST A: POSITIVE
ATMOSPHERIC PRESS: ABNORMAL MM[HG]
BASE EXCESS BLDA CALC-SCNC: -4.7 MMOL/L (ref 0–3)
BASOPHILS # BLD AUTO: 0.1 10*3/MM3 (ref 0–0.2)
BASOPHILS NFR BLD AUTO: 1.2 % (ref 0–1.5)
BDY SITE: ABNORMAL
BUN SERPL-MCNC: 61 MG/DL (ref 8–23)
BUN SERPL-MCNC: ABNORMAL MG/DL
BUN/CREAT SERPL: ABNORMAL
CALCIUM SPEC-SCNC: 8.9 MG/DL (ref 8.6–10.5)
CHLORIDE SERPL-SCNC: 112 MMOL/L (ref 98–107)
CO2 BLDA-SCNC: 22.4 MMOL/L (ref 22–29)
CO2 SERPL-SCNC: 23 MMOL/L (ref 22–29)
CREAT SERPL-MCNC: 1.68 MG/DL (ref 0.57–1)
D-LACTATE SERPL-SCNC: 0.9 MMOL/L (ref 0.5–2)
DEPRECATED RDW RBC AUTO: 51.2 FL (ref 37–54)
EOSINOPHIL # BLD AUTO: 0 10*3/MM3 (ref 0–0.4)
EOSINOPHIL NFR BLD AUTO: 0.2 % (ref 0.3–6.2)
ERYTHROCYTE [DISTWIDTH] IN BLOOD BY AUTOMATED COUNT: 14.6 % (ref 12.3–15.4)
GFR SERPL CREATININE-BSD FRML MDRD: 29 ML/MIN/1.73
GLUCOSE SERPL-MCNC: 133 MG/DL (ref 65–99)
HCO3 BLDA-SCNC: 21.1 MMOL/L (ref 21–28)
HCT VFR BLD AUTO: 31.6 % (ref 34–46.6)
HEMODILUTION: NO
HGB BLD-MCNC: 10.2 G/DL (ref 12–15.9)
INHALED O2 CONCENTRATION: 30 %
INR PPP: 2.84 (ref 2–3)
LIPASE SERPL-CCNC: 58 U/L (ref 13–60)
LYMPHOCYTES # BLD AUTO: 0.7 10*3/MM3 (ref 0.7–3.1)
LYMPHOCYTES NFR BLD AUTO: 7 % (ref 19.6–45.3)
MCH RBC QN AUTO: 32.1 PG (ref 26.6–33)
MCHC RBC AUTO-ENTMCNC: 32.4 G/DL (ref 31.5–35.7)
MCV RBC AUTO: 99 FL (ref 79–97)
MODALITY: ABNORMAL
MONOCYTES # BLD AUTO: 0.9 10*3/MM3 (ref 0.1–0.9)
MONOCYTES NFR BLD AUTO: 8.4 % (ref 5–12)
NEUTROPHILS NFR BLD AUTO: 8.9 10*3/MM3 (ref 1.7–7)
NEUTROPHILS NFR BLD AUTO: 83.2 % (ref 42.7–76)
NRBC BLD AUTO-RTO: 0.2 /100 WBC (ref 0–0.2)
NT-PROBNP SERPL-MCNC: ABNORMAL PG/ML (ref 0–1800)
PCO2 BLDA: 41 MM HG (ref 35–48)
PH BLDA: 7.32 PH UNITS (ref 7.35–7.45)
PLATELET # BLD AUTO: 358 10*3/MM3 (ref 140–450)
PMV BLD AUTO: 7.1 FL (ref 6–12)
PO2 BLDA: 71.4 MM HG (ref 83–108)
POTASSIUM SERPL-SCNC: 4.4 MMOL/L (ref 3.5–5.2)
PROCALCITONIN SERPL-MCNC: 0.08 NG/ML (ref 0–0.25)
PROTHROMBIN TIME: 28.9 SECONDS (ref 19.4–28.5)
RBC # BLD AUTO: 3.19 10*6/MM3 (ref 3.77–5.28)
SAO2 % BLDCOA: 92.8 % (ref 94–98)
SODIUM SERPL-SCNC: 142 MMOL/L (ref 136–145)
WBC # BLD AUTO: 10.7 10*3/MM3 (ref 3.4–10.8)

## 2020-09-10 PROCEDURE — 85610 PROTHROMBIN TIME: CPT | Performed by: INTERNAL MEDICINE

## 2020-09-10 PROCEDURE — 83880 ASSAY OF NATRIURETIC PEPTIDE: CPT | Performed by: INTERNAL MEDICINE

## 2020-09-10 PROCEDURE — 71045 X-RAY EXAM CHEST 1 VIEW: CPT

## 2020-09-10 PROCEDURE — 84145 PROCALCITONIN (PCT): CPT | Performed by: INTERNAL MEDICINE

## 2020-09-10 PROCEDURE — 83605 ASSAY OF LACTIC ACID: CPT | Performed by: INTERNAL MEDICINE

## 2020-09-10 PROCEDURE — 83690 ASSAY OF LIPASE: CPT | Performed by: INTERNAL MEDICINE

## 2020-09-10 PROCEDURE — 74022 RADEX COMPL AQT ABD SERIES: CPT

## 2020-09-10 PROCEDURE — 80048 BASIC METABOLIC PNL TOTAL CA: CPT | Performed by: INTERNAL MEDICINE

## 2020-09-10 PROCEDURE — 85025 COMPLETE CBC W/AUTO DIFF WBC: CPT | Performed by: INTERNAL MEDICINE

## 2020-09-10 PROCEDURE — 99232 SBSQ HOSP IP/OBS MODERATE 35: CPT | Performed by: INTERNAL MEDICINE

## 2020-09-10 PROCEDURE — 36600 WITHDRAWAL OF ARTERIAL BLOOD: CPT

## 2020-09-10 PROCEDURE — 82803 BLOOD GASES ANY COMBINATION: CPT

## 2020-09-10 PROCEDURE — 97530 THERAPEUTIC ACTIVITIES: CPT

## 2020-09-10 PROCEDURE — 82150 ASSAY OF AMYLASE: CPT | Performed by: INTERNAL MEDICINE

## 2020-09-10 RX ORDER — METOPROLOL TARTRATE 50 MG/1
50 TABLET, FILM COATED ORAL EVERY 12 HOURS SCHEDULED
Status: DISCONTINUED | OUTPATIENT
Start: 2020-09-10 | End: 2020-09-10

## 2020-09-10 RX ORDER — METOPROLOL TARTRATE 50 MG/1
50 TABLET, FILM COATED ORAL EVERY 12 HOURS SCHEDULED
Status: DISCONTINUED | OUTPATIENT
Start: 2020-09-11 | End: 2020-09-10

## 2020-09-10 RX ORDER — WARFARIN SODIUM 1 MG/1
0.5 TABLET ORAL
Status: DISCONTINUED | OUTPATIENT
Start: 2020-09-11 | End: 2020-09-11

## 2020-09-10 RX ORDER — HYDRALAZINE HYDROCHLORIDE 20 MG/ML
20 INJECTION INTRAMUSCULAR; INTRAVENOUS EVERY 4 HOURS PRN
Status: DISCONTINUED | OUTPATIENT
Start: 2020-09-10 | End: 2020-09-14 | Stop reason: HOSPADM

## 2020-09-10 RX ORDER — METOPROLOL TARTRATE 5 MG/5ML
5 INJECTION INTRAVENOUS ONCE
Status: DISCONTINUED | OUTPATIENT
Start: 2020-09-10 | End: 2020-09-10

## 2020-09-10 RX ORDER — METOPROLOL TARTRATE 50 MG/1
50 TABLET, FILM COATED ORAL EVERY 12 HOURS SCHEDULED
Status: DISCONTINUED | OUTPATIENT
Start: 2020-09-11 | End: 2020-09-12

## 2020-09-10 RX ADMIN — Medication: at 21:42

## 2020-09-10 RX ADMIN — METOPROLOL TARTRATE 50 MG: 50 TABLET, FILM COATED ORAL at 23:58

## 2020-09-10 RX ADMIN — LINEZOLID 600 MG: 600 TABLET, FILM COATED ORAL at 21:41

## 2020-09-10 RX ADMIN — DOCUSATE SODIUM 200 MG: 100 CAPSULE, LIQUID FILLED ORAL at 08:40

## 2020-09-10 RX ADMIN — PANTOPRAZOLE SODIUM 40 MG: 40 TABLET, DELAYED RELEASE ORAL at 08:40

## 2020-09-10 RX ADMIN — Medication 10 ML: at 21:41

## 2020-09-10 RX ADMIN — MULTIPLE VITAMINS W/ MINERALS TAB 1 TABLET: TAB at 08:40

## 2020-09-10 RX ADMIN — CETIRIZINE HYDROCHLORIDE 10 MG: 10 TABLET, FILM COATED ORAL at 08:39

## 2020-09-10 RX ADMIN — TOPIRAMATE 100 MG: 100 TABLET, FILM COATED ORAL at 21:41

## 2020-09-10 RX ADMIN — Medication 10 ML: at 08:40

## 2020-09-10 RX ADMIN — ACETAMINOPHEN 650 MG: 325 TABLET, FILM COATED ORAL at 12:21

## 2020-09-10 RX ADMIN — METOPROLOL TARTRATE 50 MG: 50 TABLET, FILM COATED ORAL at 21:41

## 2020-09-10 RX ADMIN — Medication: at 08:41

## 2020-09-10 RX ADMIN — METOPROLOL TARTRATE 25 MG: 25 TABLET, FILM COATED ORAL at 08:40

## 2020-09-10 RX ADMIN — DOCUSATE SODIUM 200 MG: 100 CAPSULE, LIQUID FILLED ORAL at 21:41

## 2020-09-10 RX ADMIN — MAGNESIUM OXIDE TAB 400 MG (241.3 MG ELEMENTAL MG) 400 MG: 400 (241.3 MG) TAB at 21:41

## 2020-09-10 RX ADMIN — DILTIAZEM HYDROCHLORIDE 300 MG: 180 CAPSULE, COATED, EXTENDED RELEASE ORAL at 08:39

## 2020-09-10 RX ADMIN — LINEZOLID 600 MG: 600 TABLET, FILM COATED ORAL at 08:40

## 2020-09-11 LAB
ANION GAP SERPL CALCULATED.3IONS-SCNC: 11 MMOL/L (ref 5–15)
BACTERIA UR QL AUTO: NORMAL
BASOPHILS # BLD AUTO: 0.1 10*3/MM3 (ref 0–0.2)
BASOPHILS NFR BLD AUTO: 1 % (ref 0–1.5)
BILIRUB UR QL STRIP: NORMAL
BUN SERPL-MCNC: 47 MG/DL (ref 8–23)
BUN SERPL-MCNC: ABNORMAL MG/DL
BUN/CREAT SERPL: ABNORMAL
CALCIUM SPEC-SCNC: 9.1 MG/DL (ref 8.6–10.5)
CHLORIDE SERPL-SCNC: 110 MMOL/L (ref 98–107)
CLARITY UR: NORMAL
CO2 SERPL-SCNC: 22 MMOL/L (ref 22–29)
COLOR UR: NORMAL
CREAT SERPL-MCNC: 1.34 MG/DL (ref 0.57–1)
DEPRECATED RDW RBC AUTO: 51.2 FL (ref 37–54)
EOSINOPHIL # BLD AUTO: 0 10*3/MM3 (ref 0–0.4)
EOSINOPHIL NFR BLD AUTO: 0.2 % (ref 0.3–6.2)
ERYTHROCYTE [DISTWIDTH] IN BLOOD BY AUTOMATED COUNT: 14.7 % (ref 12.3–15.4)
GFR SERPL CREATININE-BSD FRML MDRD: 38 ML/MIN/1.73
GLUCOSE SERPL-MCNC: 132 MG/DL (ref 65–99)
GLUCOSE UR STRIP-MCNC: NORMAL MG/DL
HCT VFR BLD AUTO: 33.2 % (ref 34–46.6)
HGB BLD-MCNC: 10.9 G/DL (ref 12–15.9)
HGB UR QL STRIP.AUTO: NORMAL
INR PPP: 4.7 (ref 2–3)
KETONES UR QL STRIP: NORMAL
LEUKOCYTE ESTERASE UR QL STRIP.AUTO: NORMAL
LYMPHOCYTES # BLD AUTO: 0.9 10*3/MM3 (ref 0.7–3.1)
LYMPHOCYTES NFR BLD AUTO: 7.9 % (ref 19.6–45.3)
MCH RBC QN AUTO: 32.2 PG (ref 26.6–33)
MCHC RBC AUTO-ENTMCNC: 32.7 G/DL (ref 31.5–35.7)
MCV RBC AUTO: 98.3 FL (ref 79–97)
MONOCYTES # BLD AUTO: 0.7 10*3/MM3 (ref 0.1–0.9)
MONOCYTES NFR BLD AUTO: 5.8 % (ref 5–12)
NEUTROPHILS NFR BLD AUTO: 85.1 % (ref 42.7–76)
NEUTROPHILS NFR BLD AUTO: 9.6 10*3/MM3 (ref 1.7–7)
NITRITE UR QL STRIP: NORMAL
NRBC BLD AUTO-RTO: 0.4 /100 WBC (ref 0–0.2)
NT-PROBNP SERPL-MCNC: ABNORMAL PG/ML (ref 0–1800)
PH UR STRIP.AUTO: NORMAL [PH]
PLATELET # BLD AUTO: 382 10*3/MM3 (ref 140–450)
PMV BLD AUTO: 7.1 FL (ref 6–12)
POTASSIUM SERPL-SCNC: 4.1 MMOL/L (ref 3.5–5.2)
PROT UR QL STRIP: NORMAL
PROTHROMBIN TIME: 46.9 SECONDS (ref 19.4–28.5)
RBC # BLD AUTO: 3.37 10*6/MM3 (ref 3.77–5.28)
RBC # UR: NORMAL /UL
REF LAB TEST METHOD: NORMAL
SODIUM SERPL-SCNC: 143 MMOL/L (ref 136–145)
SP GR UR STRIP: NORMAL
SQUAMOUS #/AREA URNS HPF: NORMAL /[HPF]
UROBILINOGEN UR QL STRIP: NORMAL
WBC # BLD AUTO: 11.3 10*3/MM3 (ref 3.4–10.8)
WBC UR QL AUTO: NORMAL

## 2020-09-11 PROCEDURE — 80048 BASIC METABOLIC PNL TOTAL CA: CPT | Performed by: INTERNAL MEDICINE

## 2020-09-11 PROCEDURE — 92526 ORAL FUNCTION THERAPY: CPT

## 2020-09-11 PROCEDURE — 85610 PROTHROMBIN TIME: CPT | Performed by: INTERNAL MEDICINE

## 2020-09-11 PROCEDURE — 25010000002 ONDANSETRON PER 1 MG: Performed by: NURSE PRACTITIONER

## 2020-09-11 PROCEDURE — 83880 ASSAY OF NATRIURETIC PEPTIDE: CPT | Performed by: INTERNAL MEDICINE

## 2020-09-11 PROCEDURE — 85025 COMPLETE CBC W/AUTO DIFF WBC: CPT | Performed by: INTERNAL MEDICINE

## 2020-09-11 PROCEDURE — 25010000002 HYDRALAZINE PER 20 MG: Performed by: INTERNAL MEDICINE

## 2020-09-11 PROCEDURE — 99232 SBSQ HOSP IP/OBS MODERATE 35: CPT | Performed by: NURSE PRACTITIONER

## 2020-09-11 RX ADMIN — HYDRALAZINE HYDROCHLORIDE 20 MG: 20 INJECTION INTRAMUSCULAR; INTRAVENOUS at 02:30

## 2020-09-11 RX ADMIN — PANTOPRAZOLE SODIUM 40 MG: 40 TABLET, DELAYED RELEASE ORAL at 08:49

## 2020-09-11 RX ADMIN — Medication 10 ML: at 21:40

## 2020-09-11 RX ADMIN — CETIRIZINE HYDROCHLORIDE 10 MG: 10 TABLET, FILM COATED ORAL at 08:49

## 2020-09-11 RX ADMIN — LINEZOLID 600 MG: 600 TABLET, FILM COATED ORAL at 21:40

## 2020-09-11 RX ADMIN — DILTIAZEM HYDROCHLORIDE 300 MG: 180 CAPSULE, COATED, EXTENDED RELEASE ORAL at 08:48

## 2020-09-11 RX ADMIN — METOPROLOL TARTRATE 50 MG: 50 TABLET, FILM COATED ORAL at 08:49

## 2020-09-11 RX ADMIN — DOCUSATE SODIUM 200 MG: 100 CAPSULE, LIQUID FILLED ORAL at 21:40

## 2020-09-11 RX ADMIN — LINEZOLID 600 MG: 600 TABLET, FILM COATED ORAL at 08:49

## 2020-09-11 RX ADMIN — MULTIPLE VITAMINS W/ MINERALS TAB 1 TABLET: TAB at 08:49

## 2020-09-11 RX ADMIN — Medication: at 10:17

## 2020-09-11 RX ADMIN — TOPIRAMATE 100 MG: 100 TABLET, FILM COATED ORAL at 21:40

## 2020-09-11 RX ADMIN — ONDANSETRON 4 MG: 2 INJECTION INTRAMUSCULAR; INTRAVENOUS at 17:00

## 2020-09-11 RX ADMIN — Medication: at 21:41

## 2020-09-11 RX ADMIN — Medication 10 ML: at 08:48

## 2020-09-11 RX ADMIN — METOPROLOL TARTRATE 25 MG: 25 TABLET, FILM COATED ORAL at 23:23

## 2020-09-11 RX ADMIN — MAGNESIUM OXIDE TAB 400 MG (241.3 MG ELEMENTAL MG) 400 MG: 400 (241.3 MG) TAB at 21:40

## 2020-09-11 RX ADMIN — DOCUSATE SODIUM 200 MG: 100 CAPSULE, LIQUID FILLED ORAL at 08:49

## 2020-09-11 RX ADMIN — METOPROLOL TARTRATE 50 MG: 50 TABLET, FILM COATED ORAL at 21:40

## 2020-09-12 LAB
ANION GAP SERPL CALCULATED.3IONS-SCNC: 11 MMOL/L (ref 5–15)
BUN SERPL-MCNC: 40 MG/DL (ref 8–23)
BUN SERPL-MCNC: ABNORMAL MG/DL
BUN/CREAT SERPL: ABNORMAL
CALCIUM SPEC-SCNC: 9.2 MG/DL (ref 8.6–10.5)
CHLORIDE SERPL-SCNC: 110 MMOL/L (ref 98–107)
CO2 SERPL-SCNC: 23 MMOL/L (ref 22–29)
CREAT SERPL-MCNC: 1.26 MG/DL (ref 0.57–1)
DEPRECATED RDW RBC AUTO: 51.2 FL (ref 37–54)
ERYTHROCYTE [DISTWIDTH] IN BLOOD BY AUTOMATED COUNT: 14.8 % (ref 12.3–15.4)
GFR SERPL CREATININE-BSD FRML MDRD: 41 ML/MIN/1.73
GLUCOSE SERPL-MCNC: 135 MG/DL (ref 65–99)
HCT VFR BLD AUTO: 34 % (ref 34–46.6)
HGB BLD-MCNC: 11.1 G/DL (ref 12–15.9)
INR PPP: 3.8 (ref 2–3)
LYMPHOCYTES # BLD MANUAL: 0.97 10*3/MM3 (ref 0.7–3.1)
LYMPHOCYTES NFR BLD MANUAL: 1 % (ref 5–12)
LYMPHOCYTES NFR BLD MANUAL: 11 % (ref 19.6–45.3)
MCH RBC QN AUTO: 31.7 PG (ref 26.6–33)
MCHC RBC AUTO-ENTMCNC: 32.5 G/DL (ref 31.5–35.7)
MCV RBC AUTO: 97.6 FL (ref 79–97)
MONOCYTES # BLD AUTO: 0.09 10*3/MM3 (ref 0.1–0.9)
NEUTROPHILS # BLD AUTO: 7.66 10*3/MM3 (ref 1.7–7)
NEUTROPHILS NFR BLD MANUAL: 86 % (ref 42.7–76)
NEUTS BAND NFR BLD MANUAL: 1 % (ref 0–5)
NT-PROBNP SERPL-MCNC: ABNORMAL PG/ML (ref 0–1800)
OTHER CELLS %: 1 % (ref 0–0)
PLAT MORPH BLD: NORMAL
PLATELET # BLD AUTO: 407 10*3/MM3 (ref 140–450)
PMV BLD AUTO: 7.1 FL (ref 6–12)
POTASSIUM SERPL-SCNC: 4 MMOL/L (ref 3.5–5.2)
PROTHROMBIN TIME: 38.1 SECONDS (ref 19.4–28.5)
RBC # BLD AUTO: 3.49 10*6/MM3 (ref 3.77–5.28)
RBC MORPH BLD: NORMAL
SCAN SLIDE: NORMAL
SODIUM SERPL-SCNC: 144 MMOL/L (ref 136–145)
WBC # BLD AUTO: 8.8 10*3/MM3 (ref 3.4–10.8)
WBC MORPH BLD: NORMAL

## 2020-09-12 PROCEDURE — 97530 THERAPEUTIC ACTIVITIES: CPT

## 2020-09-12 PROCEDURE — 99233 SBSQ HOSP IP/OBS HIGH 50: CPT | Performed by: INTERNAL MEDICINE

## 2020-09-12 PROCEDURE — 97112 NEUROMUSCULAR REEDUCATION: CPT

## 2020-09-12 PROCEDURE — 85025 COMPLETE CBC W/AUTO DIFF WBC: CPT | Performed by: INTERNAL MEDICINE

## 2020-09-12 PROCEDURE — 85007 BL SMEAR W/DIFF WBC COUNT: CPT | Performed by: INTERNAL MEDICINE

## 2020-09-12 PROCEDURE — 85610 PROTHROMBIN TIME: CPT | Performed by: INTERNAL MEDICINE

## 2020-09-12 PROCEDURE — 25010000002 HYDRALAZINE PER 20 MG: Performed by: INTERNAL MEDICINE

## 2020-09-12 PROCEDURE — 83880 ASSAY OF NATRIURETIC PEPTIDE: CPT | Performed by: INTERNAL MEDICINE

## 2020-09-12 PROCEDURE — 80048 BASIC METABOLIC PNL TOTAL CA: CPT | Performed by: INTERNAL MEDICINE

## 2020-09-12 RX ORDER — METOPROLOL TARTRATE 50 MG/1
50 TABLET, FILM COATED ORAL EVERY 8 HOURS SCHEDULED
Status: DISCONTINUED | OUTPATIENT
Start: 2020-09-12 | End: 2020-09-14 | Stop reason: HOSPADM

## 2020-09-12 RX ORDER — HYDRALAZINE HYDROCHLORIDE 10 MG/1
10 TABLET, FILM COATED ORAL EVERY 6 HOURS SCHEDULED
Status: DISCONTINUED | OUTPATIENT
Start: 2020-09-12 | End: 2020-09-14 | Stop reason: HOSPADM

## 2020-09-12 RX ADMIN — Medication 10 ML: at 20:40

## 2020-09-12 RX ADMIN — Medication: at 08:49

## 2020-09-12 RX ADMIN — PANTOPRAZOLE SODIUM 40 MG: 40 TABLET, DELAYED RELEASE ORAL at 08:48

## 2020-09-12 RX ADMIN — ACETAMINOPHEN 650 MG: 325 TABLET, FILM COATED ORAL at 18:24

## 2020-09-12 RX ADMIN — LINEZOLID 600 MG: 600 TABLET, FILM COATED ORAL at 08:48

## 2020-09-12 RX ADMIN — HYDRALAZINE HYDROCHLORIDE 10 MG: 10 TABLET, FILM COATED ORAL at 13:15

## 2020-09-12 RX ADMIN — MULTIPLE VITAMINS W/ MINERALS TAB 1 TABLET: TAB at 08:48

## 2020-09-12 RX ADMIN — Medication 10 ML: at 08:48

## 2020-09-12 RX ADMIN — METOPROLOL TARTRATE 50 MG: 50 TABLET, FILM COATED ORAL at 20:39

## 2020-09-12 RX ADMIN — HYDRALAZINE HYDROCHLORIDE 10 MG: 10 TABLET, FILM COATED ORAL at 18:24

## 2020-09-12 RX ADMIN — ACETAMINOPHEN 650 MG: 325 TABLET, FILM COATED ORAL at 11:00

## 2020-09-12 RX ADMIN — DILTIAZEM HYDROCHLORIDE 300 MG: 180 CAPSULE, COATED, EXTENDED RELEASE ORAL at 08:47

## 2020-09-12 RX ADMIN — MAGNESIUM OXIDE TAB 400 MG (241.3 MG ELEMENTAL MG) 400 MG: 400 (241.3 MG) TAB at 20:39

## 2020-09-12 RX ADMIN — HYDRALAZINE HYDROCHLORIDE 20 MG: 20 INJECTION INTRAMUSCULAR; INTRAVENOUS at 20:39

## 2020-09-12 RX ADMIN — TOPIRAMATE 100 MG: 100 TABLET, FILM COATED ORAL at 20:39

## 2020-09-12 RX ADMIN — LINEZOLID 600 MG: 600 TABLET, FILM COATED ORAL at 20:52

## 2020-09-12 RX ADMIN — Medication: at 21:09

## 2020-09-12 RX ADMIN — METOPROLOL TARTRATE 50 MG: 50 TABLET, FILM COATED ORAL at 08:48

## 2020-09-12 RX ADMIN — METOPROLOL TARTRATE 50 MG: 50 TABLET, FILM COATED ORAL at 13:15

## 2020-09-12 RX ADMIN — DOCUSATE SODIUM 200 MG: 100 CAPSULE, LIQUID FILLED ORAL at 20:39

## 2020-09-12 RX ADMIN — DOCUSATE SODIUM 200 MG: 100 CAPSULE, LIQUID FILLED ORAL at 08:48

## 2020-09-12 RX ADMIN — CETIRIZINE HYDROCHLORIDE 10 MG: 10 TABLET, FILM COATED ORAL at 08:47

## 2020-09-13 LAB
ANION GAP SERPL CALCULATED.3IONS-SCNC: 7 MMOL/L (ref 5–15)
BUN SERPL-MCNC: 33 MG/DL (ref 8–23)
BUN SERPL-MCNC: ABNORMAL MG/DL
BUN/CREAT SERPL: ABNORMAL
CALCIUM SPEC-SCNC: 8.7 MG/DL (ref 8.6–10.5)
CHLORIDE SERPL-SCNC: 110 MMOL/L (ref 98–107)
CO2 SERPL-SCNC: 25 MMOL/L (ref 22–29)
CREAT SERPL-MCNC: 1.1 MG/DL (ref 0.57–1)
DEPRECATED RDW RBC AUTO: 51.2 FL (ref 37–54)
ERYTHROCYTE [DISTWIDTH] IN BLOOD BY AUTOMATED COUNT: 15 % (ref 12.3–15.4)
GFR SERPL CREATININE-BSD FRML MDRD: 48 ML/MIN/1.73
GLUCOSE SERPL-MCNC: 120 MG/DL (ref 65–99)
HCT VFR BLD AUTO: 32.8 % (ref 34–46.6)
HGB BLD-MCNC: 10.7 G/DL (ref 12–15.9)
INR PPP: 3.56 (ref 2–3)
LYMPHOCYTES # BLD MANUAL: 0.75 10*3/MM3 (ref 0.7–3.1)
LYMPHOCYTES NFR BLD MANUAL: 10 % (ref 19.6–45.3)
LYMPHOCYTES NFR BLD MANUAL: 2 % (ref 5–12)
MCH RBC QN AUTO: 31.5 PG (ref 26.6–33)
MCHC RBC AUTO-ENTMCNC: 32.5 G/DL (ref 31.5–35.7)
MCV RBC AUTO: 96.9 FL (ref 79–97)
MONOCYTES # BLD AUTO: 0.15 10*3/MM3 (ref 0.1–0.9)
NEUTROPHILS # BLD AUTO: 6.6 10*3/MM3 (ref 1.7–7)
NEUTROPHILS NFR BLD MANUAL: 84 % (ref 42.7–76)
NEUTS BAND NFR BLD MANUAL: 4 % (ref 0–5)
NT-PROBNP SERPL-MCNC: ABNORMAL PG/ML (ref 0–1800)
PLAT MORPH BLD: NORMAL
PLATELET # BLD AUTO: 404 10*3/MM3 (ref 140–450)
PMV BLD AUTO: 7.1 FL (ref 6–12)
POTASSIUM SERPL-SCNC: 3.7 MMOL/L (ref 3.5–5.2)
PROTHROMBIN TIME: 36.1 SECONDS (ref 19.4–28.5)
RBC # BLD AUTO: 3.38 10*6/MM3 (ref 3.77–5.28)
RBC MORPH BLD: NORMAL
SCAN SLIDE: NORMAL
SODIUM SERPL-SCNC: 142 MMOL/L (ref 136–145)
WBC # BLD AUTO: 7.5 10*3/MM3 (ref 3.4–10.8)
WBC MORPH BLD: NORMAL

## 2020-09-13 PROCEDURE — 85025 COMPLETE CBC W/AUTO DIFF WBC: CPT | Performed by: INTERNAL MEDICINE

## 2020-09-13 PROCEDURE — 80048 BASIC METABOLIC PNL TOTAL CA: CPT | Performed by: INTERNAL MEDICINE

## 2020-09-13 PROCEDURE — 85007 BL SMEAR W/DIFF WBC COUNT: CPT | Performed by: INTERNAL MEDICINE

## 2020-09-13 PROCEDURE — 99233 SBSQ HOSP IP/OBS HIGH 50: CPT | Performed by: INTERNAL MEDICINE

## 2020-09-13 PROCEDURE — 83880 ASSAY OF NATRIURETIC PEPTIDE: CPT | Performed by: INTERNAL MEDICINE

## 2020-09-13 PROCEDURE — 85610 PROTHROMBIN TIME: CPT | Performed by: INTERNAL MEDICINE

## 2020-09-13 PROCEDURE — 93005 ELECTROCARDIOGRAM TRACING: CPT | Performed by: HOSPITALIST

## 2020-09-13 RX ADMIN — LINEZOLID 600 MG: 600 TABLET, FILM COATED ORAL at 20:02

## 2020-09-13 RX ADMIN — METOPROLOL TARTRATE 50 MG: 50 TABLET, FILM COATED ORAL at 06:18

## 2020-09-13 RX ADMIN — Medication: at 08:48

## 2020-09-13 RX ADMIN — TOPIRAMATE 100 MG: 100 TABLET, FILM COATED ORAL at 20:02

## 2020-09-13 RX ADMIN — MAGNESIUM OXIDE TAB 400 MG (241.3 MG ELEMENTAL MG) 400 MG: 400 (241.3 MG) TAB at 20:02

## 2020-09-13 RX ADMIN — Medication: at 20:05

## 2020-09-13 RX ADMIN — HYDRALAZINE HYDROCHLORIDE 10 MG: 10 TABLET, FILM COATED ORAL at 06:18

## 2020-09-13 RX ADMIN — HYDRALAZINE HYDROCHLORIDE 10 MG: 10 TABLET, FILM COATED ORAL at 11:57

## 2020-09-13 RX ADMIN — DOCUSATE SODIUM 200 MG: 100 CAPSULE, LIQUID FILLED ORAL at 08:45

## 2020-09-13 RX ADMIN — Medication 10 ML: at 08:47

## 2020-09-13 RX ADMIN — DOCUSATE SODIUM 200 MG: 100 CAPSULE, LIQUID FILLED ORAL at 20:02

## 2020-09-13 RX ADMIN — CETIRIZINE HYDROCHLORIDE 10 MG: 10 TABLET, FILM COATED ORAL at 08:47

## 2020-09-13 RX ADMIN — DILTIAZEM HYDROCHLORIDE 300 MG: 180 CAPSULE, COATED, EXTENDED RELEASE ORAL at 08:45

## 2020-09-13 RX ADMIN — MULTIPLE VITAMINS W/ MINERALS TAB 1 TABLET: TAB at 08:45

## 2020-09-13 RX ADMIN — HYDRALAZINE HYDROCHLORIDE 10 MG: 10 TABLET, FILM COATED ORAL at 01:46

## 2020-09-13 RX ADMIN — METOPROLOL TARTRATE 50 MG: 50 TABLET, FILM COATED ORAL at 20:02

## 2020-09-13 RX ADMIN — HYDRALAZINE HYDROCHLORIDE 10 MG: 10 TABLET, FILM COATED ORAL at 17:15

## 2020-09-13 RX ADMIN — ACETAMINOPHEN 650 MG: 325 TABLET, FILM COATED ORAL at 17:15

## 2020-09-13 RX ADMIN — ACETAMINOPHEN 650 MG: 325 TABLET, FILM COATED ORAL at 08:45

## 2020-09-13 RX ADMIN — Medication 10 ML: at 20:03

## 2020-09-13 RX ADMIN — METOPROLOL TARTRATE 50 MG: 50 TABLET, FILM COATED ORAL at 14:51

## 2020-09-13 RX ADMIN — LINEZOLID 600 MG: 600 TABLET, FILM COATED ORAL at 08:47

## 2020-09-13 RX ADMIN — PANTOPRAZOLE SODIUM 40 MG: 40 TABLET, DELAYED RELEASE ORAL at 06:18

## 2020-09-14 VITALS
WEIGHT: 179.23 LBS | HEIGHT: 65 IN | DIASTOLIC BLOOD PRESSURE: 75 MMHG | TEMPERATURE: 98.1 F | OXYGEN SATURATION: 94 % | SYSTOLIC BLOOD PRESSURE: 140 MMHG | HEART RATE: 112 BPM | RESPIRATION RATE: 18 BRPM | BODY MASS INDEX: 29.86 KG/M2

## 2020-09-14 LAB
ANION GAP SERPL CALCULATED.3IONS-SCNC: 8 MMOL/L (ref 5–15)
BASOPHILS # BLD AUTO: 0.1 10*3/MM3 (ref 0–0.2)
BASOPHILS NFR BLD AUTO: 1.3 % (ref 0–1.5)
BUN SERPL-MCNC: 31 MG/DL (ref 8–23)
BUN SERPL-MCNC: ABNORMAL MG/DL
BUN/CREAT SERPL: ABNORMAL
CALCIUM SPEC-SCNC: 8.6 MG/DL (ref 8.6–10.5)
CHLORIDE SERPL-SCNC: 110 MMOL/L (ref 98–107)
CO2 SERPL-SCNC: 24 MMOL/L (ref 22–29)
CREAT SERPL-MCNC: 1.15 MG/DL (ref 0.57–1)
DEPRECATED RDW RBC AUTO: 51.2 FL (ref 37–54)
EOSINOPHIL # BLD AUTO: 0.1 10*3/MM3 (ref 0–0.4)
EOSINOPHIL NFR BLD AUTO: 1.5 % (ref 0.3–6.2)
ERYTHROCYTE [DISTWIDTH] IN BLOOD BY AUTOMATED COUNT: 15 % (ref 12.3–15.4)
GFR SERPL CREATININE-BSD FRML MDRD: 46 ML/MIN/1.73
GLUCOSE SERPL-MCNC: 104 MG/DL (ref 65–99)
HCT VFR BLD AUTO: 33 % (ref 34–46.6)
HGB BLD-MCNC: 10.7 G/DL (ref 12–15.9)
INR PPP: 3.21 (ref 2–3)
LYMPHOCYTES # BLD AUTO: 1.2 10*3/MM3 (ref 0.7–3.1)
LYMPHOCYTES NFR BLD AUTO: 16.7 % (ref 19.6–45.3)
MCH RBC QN AUTO: 31.9 PG (ref 26.6–33)
MCHC RBC AUTO-ENTMCNC: 32.5 G/DL (ref 31.5–35.7)
MCV RBC AUTO: 98.1 FL (ref 79–97)
MONOCYTES # BLD AUTO: 0.4 10*3/MM3 (ref 0.1–0.9)
MONOCYTES NFR BLD AUTO: 4.8 % (ref 5–12)
NEUTROPHILS NFR BLD AUTO: 5.6 10*3/MM3 (ref 1.7–7)
NEUTROPHILS NFR BLD AUTO: 75.7 % (ref 42.7–76)
NRBC BLD AUTO-RTO: 0.3 /100 WBC (ref 0–0.2)
NT-PROBNP SERPL-MCNC: 8109 PG/ML (ref 0–1800)
PLATELET # BLD AUTO: 385 10*3/MM3 (ref 140–450)
PMV BLD AUTO: 7.2 FL (ref 6–12)
POTASSIUM SERPL-SCNC: 3.9 MMOL/L (ref 3.5–5.2)
PROTHROMBIN TIME: 32.5 SECONDS (ref 19.4–28.5)
RBC # BLD AUTO: 3.36 10*6/MM3 (ref 3.77–5.28)
SODIUM SERPL-SCNC: 142 MMOL/L (ref 136–145)
WBC # BLD AUTO: 7.4 10*3/MM3 (ref 3.4–10.8)

## 2020-09-14 PROCEDURE — 93010 ELECTROCARDIOGRAM REPORT: CPT | Performed by: INTERNAL MEDICINE

## 2020-09-14 PROCEDURE — 97530 THERAPEUTIC ACTIVITIES: CPT

## 2020-09-14 PROCEDURE — 99232 SBSQ HOSP IP/OBS MODERATE 35: CPT | Performed by: NURSE PRACTITIONER

## 2020-09-14 PROCEDURE — 85025 COMPLETE CBC W/AUTO DIFF WBC: CPT | Performed by: INTERNAL MEDICINE

## 2020-09-14 PROCEDURE — 85610 PROTHROMBIN TIME: CPT | Performed by: INTERNAL MEDICINE

## 2020-09-14 PROCEDURE — 92526 ORAL FUNCTION THERAPY: CPT

## 2020-09-14 PROCEDURE — 80048 BASIC METABOLIC PNL TOTAL CA: CPT | Performed by: INTERNAL MEDICINE

## 2020-09-14 PROCEDURE — 83880 ASSAY OF NATRIURETIC PEPTIDE: CPT | Performed by: INTERNAL MEDICINE

## 2020-09-14 PROCEDURE — 97535 SELF CARE MNGMENT TRAINING: CPT

## 2020-09-14 RX ORDER — METOPROLOL TARTRATE 50 MG/1
50 TABLET, FILM COATED ORAL EVERY 8 HOURS SCHEDULED
Status: ON HOLD
Start: 2020-09-14 | End: 2020-10-13 | Stop reason: SDUPTHER

## 2020-09-14 RX ORDER — HYDRALAZINE HYDROCHLORIDE 10 MG/1
20 TABLET, FILM COATED ORAL 3 TIMES DAILY
Start: 2020-09-14 | End: 2021-05-17

## 2020-09-14 RX ADMIN — DOCUSATE SODIUM 200 MG: 100 CAPSULE, LIQUID FILLED ORAL at 08:58

## 2020-09-14 RX ADMIN — HYDRALAZINE HYDROCHLORIDE 10 MG: 10 TABLET, FILM COATED ORAL at 12:59

## 2020-09-14 RX ADMIN — METOPROLOL TARTRATE 50 MG: 50 TABLET, FILM COATED ORAL at 16:17

## 2020-09-14 RX ADMIN — Medication 10 ML: at 08:59

## 2020-09-14 RX ADMIN — CETIRIZINE HYDROCHLORIDE 10 MG: 10 TABLET, FILM COATED ORAL at 08:58

## 2020-09-14 RX ADMIN — HYDRALAZINE HYDROCHLORIDE 10 MG: 10 TABLET, FILM COATED ORAL at 01:21

## 2020-09-14 RX ADMIN — METOPROLOL TARTRATE 50 MG: 50 TABLET, FILM COATED ORAL at 05:21

## 2020-09-14 RX ADMIN — ACETAMINOPHEN 650 MG: 325 TABLET, FILM COATED ORAL at 08:57

## 2020-09-14 RX ADMIN — DILTIAZEM HYDROCHLORIDE 300 MG: 180 CAPSULE, COATED, EXTENDED RELEASE ORAL at 08:58

## 2020-09-14 RX ADMIN — Medication: at 08:59

## 2020-09-14 RX ADMIN — MULTIPLE VITAMINS W/ MINERALS TAB 1 TABLET: TAB at 08:58

## 2020-09-14 RX ADMIN — HYDRALAZINE HYDROCHLORIDE 10 MG: 10 TABLET, FILM COATED ORAL at 05:21

## 2020-09-14 RX ADMIN — PANTOPRAZOLE SODIUM 40 MG: 40 TABLET, DELAYED RELEASE ORAL at 05:21

## 2020-10-10 ENCOUNTER — HOSPITAL ENCOUNTER (OUTPATIENT)
Facility: HOSPITAL | Age: 80
Discharge: NURSING, REHAB OR LONG-TERM CARE | End: 2020-10-13
Attending: EMERGENCY MEDICINE | Admitting: SURGERY

## 2020-10-10 ENCOUNTER — APPOINTMENT (OUTPATIENT)
Dept: CT IMAGING | Facility: HOSPITAL | Age: 80
End: 2020-10-10

## 2020-10-10 DIAGNOSIS — Z79.01 LONG TERM CURRENT USE OF ANTICOAGULANT: Chronic | ICD-10-CM

## 2020-10-10 DIAGNOSIS — R10.9 ABDOMINAL PAIN, UNSPECIFIED ABDOMINAL LOCATION: Primary | ICD-10-CM

## 2020-10-10 DIAGNOSIS — K80.20 CALCULUS OF GALLBLADDER WITHOUT CHOLECYSTITIS WITHOUT OBSTRUCTION: ICD-10-CM

## 2020-10-10 DIAGNOSIS — N28.9 RENAL INSUFFICIENCY: ICD-10-CM

## 2020-10-10 DIAGNOSIS — K80.10 CHOLECYSTITIS WITH CHOLELITHIASIS: ICD-10-CM

## 2020-10-10 DIAGNOSIS — R10.11 RIGHT UPPER QUADRANT ABDOMINAL PAIN: ICD-10-CM

## 2020-10-10 LAB
ALBUMIN SERPL-MCNC: 3.1 G/DL (ref 3.5–5.2)
ALBUMIN/GLOB SERPL: 1.3 G/DL
ALP SERPL-CCNC: 146 U/L (ref 39–117)
ALT SERPL W P-5'-P-CCNC: 5 U/L (ref 1–33)
ANION GAP SERPL CALCULATED.3IONS-SCNC: 10 MMOL/L (ref 5–15)
AST SERPL-CCNC: 18 U/L (ref 1–32)
BASOPHILS # BLD AUTO: 0.2 10*3/MM3 (ref 0–0.2)
BASOPHILS NFR BLD AUTO: 2.8 % (ref 0–1.5)
BILIRUB SERPL-MCNC: 0.3 MG/DL (ref 0–1.2)
BUN SERPL-MCNC: 15 MG/DL (ref 8–23)
BUN SERPL-MCNC: 16 MG/DL (ref 8–23)
BUN SERPL-MCNC: ABNORMAL MG/DL
BUN/CREAT SERPL: ABNORMAL
CALCIUM SPEC-SCNC: 8.7 MG/DL (ref 8.6–10.5)
CHLORIDE SERPL-SCNC: 113 MMOL/L (ref 98–107)
CO2 SERPL-SCNC: 20 MMOL/L (ref 22–29)
CREAT SERPL-MCNC: 0.97 MG/DL (ref 0.57–1)
DEPRECATED RDW RBC AUTO: 59.5 FL (ref 37–54)
EOSINOPHIL # BLD AUTO: 0.1 10*3/MM3 (ref 0–0.4)
EOSINOPHIL NFR BLD AUTO: 1.1 % (ref 0.3–6.2)
ERYTHROCYTE [DISTWIDTH] IN BLOOD BY AUTOMATED COUNT: 17.2 % (ref 12.3–15.4)
GFR SERPL CREATININE-BSD FRML MDRD: 55 ML/MIN/1.73
GLOBULIN UR ELPH-MCNC: 2.3 GM/DL
GLUCOSE SERPL-MCNC: 189 MG/DL (ref 65–99)
HCT VFR BLD AUTO: 33.2 % (ref 34–46.6)
HGB BLD-MCNC: 10.6 G/DL (ref 12–15.9)
INR PPP: 1.02 (ref 2–3)
LIPASE SERPL-CCNC: 23 U/L (ref 13–60)
LYMPHOCYTES # BLD AUTO: 1.2 10*3/MM3 (ref 0.7–3.1)
LYMPHOCYTES NFR BLD AUTO: 18.2 % (ref 19.6–45.3)
MCH RBC QN AUTO: 31.9 PG (ref 26.6–33)
MCHC RBC AUTO-ENTMCNC: 31.9 G/DL (ref 31.5–35.7)
MCV RBC AUTO: 100 FL (ref 79–97)
MONOCYTES # BLD AUTO: 0.6 10*3/MM3 (ref 0.1–0.9)
MONOCYTES NFR BLD AUTO: 8.9 % (ref 5–12)
NEUTROPHILS NFR BLD AUTO: 4.5 10*3/MM3 (ref 1.7–7)
NEUTROPHILS NFR BLD AUTO: 69 % (ref 42.7–76)
NRBC BLD AUTO-RTO: 0.2 /100 WBC (ref 0–0.2)
PLATELET # BLD AUTO: 256 10*3/MM3 (ref 140–450)
PMV BLD AUTO: 7.1 FL (ref 6–12)
POTASSIUM SERPL-SCNC: 3.6 MMOL/L (ref 3.5–5.2)
PROT SERPL-MCNC: 5.4 G/DL (ref 6–8.5)
PROTHROMBIN TIME: 11.2 SECONDS (ref 19.4–28.5)
RBC # BLD AUTO: 3.32 10*6/MM3 (ref 3.77–5.28)
SODIUM SERPL-SCNC: 143 MMOL/L (ref 136–145)
WBC # BLD AUTO: 6.5 10*3/MM3 (ref 3.4–10.8)

## 2020-10-10 PROCEDURE — 96375 TX/PRO/DX INJ NEW DRUG ADDON: CPT

## 2020-10-10 PROCEDURE — 84520 ASSAY OF UREA NITROGEN: CPT | Performed by: EMERGENCY MEDICINE

## 2020-10-10 PROCEDURE — 85610 PROTHROMBIN TIME: CPT | Performed by: EMERGENCY MEDICINE

## 2020-10-10 PROCEDURE — G0378 HOSPITAL OBSERVATION PER HR: HCPCS

## 2020-10-10 PROCEDURE — 80053 COMPREHEN METABOLIC PANEL: CPT | Performed by: EMERGENCY MEDICINE

## 2020-10-10 PROCEDURE — 83690 ASSAY OF LIPASE: CPT | Performed by: EMERGENCY MEDICINE

## 2020-10-10 PROCEDURE — 25010000002 MORPHINE PER 10 MG: Performed by: EMERGENCY MEDICINE

## 2020-10-10 PROCEDURE — 96374 THER/PROPH/DIAG INJ IV PUSH: CPT

## 2020-10-10 PROCEDURE — 74176 CT ABD & PELVIS W/O CONTRAST: CPT

## 2020-10-10 PROCEDURE — 25010000002 ONDANSETRON PER 1 MG: Performed by: EMERGENCY MEDICINE

## 2020-10-10 PROCEDURE — 85025 COMPLETE CBC W/AUTO DIFF WBC: CPT | Performed by: EMERGENCY MEDICINE

## 2020-10-10 PROCEDURE — 99284 EMERGENCY DEPT VISIT MOD MDM: CPT

## 2020-10-10 RX ORDER — ACETAMINOPHEN 650 MG/1
650 SUPPOSITORY RECTAL EVERY 4 HOURS PRN
Status: DISCONTINUED | OUTPATIENT
Start: 2020-10-10 | End: 2020-10-12

## 2020-10-10 RX ORDER — SODIUM CHLORIDE 0.9 % (FLUSH) 0.9 %
10 SYRINGE (ML) INJECTION AS NEEDED
Status: DISCONTINUED | OUTPATIENT
Start: 2020-10-10 | End: 2020-10-13 | Stop reason: HOSPADM

## 2020-10-10 RX ORDER — ACETAMINOPHEN 325 MG/1
650 TABLET ORAL EVERY 4 HOURS PRN
Status: DISCONTINUED | OUTPATIENT
Start: 2020-10-10 | End: 2020-10-12

## 2020-10-10 RX ORDER — HYDROCODONE BITARTRATE AND ACETAMINOPHEN 5; 325 MG/1; MG/1
1 TABLET ORAL EVERY 6 HOURS PRN
COMMUNITY
End: 2021-05-17 | Stop reason: ALTCHOICE

## 2020-10-10 RX ORDER — ONDANSETRON 4 MG/1
4 TABLET, FILM COATED ORAL EVERY 6 HOURS PRN
Status: DISCONTINUED | OUTPATIENT
Start: 2020-10-10 | End: 2020-10-12

## 2020-10-10 RX ORDER — ACETAMINOPHEN 160 MG/5ML
650 SOLUTION ORAL EVERY 4 HOURS PRN
Status: DISCONTINUED | OUTPATIENT
Start: 2020-10-10 | End: 2020-10-12

## 2020-10-10 RX ORDER — TRAMADOL HYDROCHLORIDE 50 MG/1
50 TABLET ORAL EVERY 4 HOURS PRN
COMMUNITY
End: 2020-10-13 | Stop reason: HOSPADM

## 2020-10-10 RX ORDER — SODIUM CHLORIDE 0.9 % (FLUSH) 0.9 %
10 SYRINGE (ML) INJECTION EVERY 12 HOURS SCHEDULED
Status: DISCONTINUED | OUTPATIENT
Start: 2020-10-10 | End: 2020-10-13 | Stop reason: HOSPADM

## 2020-10-10 RX ORDER — ONDANSETRON 2 MG/ML
4 INJECTION INTRAMUSCULAR; INTRAVENOUS EVERY 6 HOURS PRN
Status: DISCONTINUED | OUTPATIENT
Start: 2020-10-10 | End: 2020-10-12

## 2020-10-10 RX ORDER — ONDANSETRON 2 MG/ML
4 INJECTION INTRAMUSCULAR; INTRAVENOUS ONCE
Status: COMPLETED | OUTPATIENT
Start: 2020-10-10 | End: 2020-10-10

## 2020-10-10 RX ORDER — MORPHINE SULFATE 4 MG/ML
4 INJECTION, SOLUTION INTRAMUSCULAR; INTRAVENOUS ONCE
Status: COMPLETED | OUTPATIENT
Start: 2020-10-10 | End: 2020-10-10

## 2020-10-10 RX ADMIN — MORPHINE SULFATE 4 MG: 4 INJECTION INTRAVENOUS at 20:19

## 2020-10-10 RX ADMIN — ONDANSETRON 4 MG: 2 INJECTION INTRAMUSCULAR; INTRAVENOUS at 20:19

## 2020-10-10 NOTE — ED PROVIDER NOTES
Subjective   Chief complaint: Abdominal pain    80-year-old female presents with abdominal pain.  Patient has a history of bladder cancer with urostomy presents with right-sided abdominal pain.  Patient states the pain started about 6 hours prior to arrival.  She has had no associated symptoms.  She denies any nausea, vomiting, diarrhea.  She has had no fever.  She denies any alleviating or exacerbating factors.  Pain is described as moderate in intensity.      History provided by:  Patient      Review of Systems   Constitutional: Negative for fever.   HENT: Negative for congestion and sore throat.    Eyes: Negative for redness.   Respiratory: Negative for cough and shortness of breath.    Cardiovascular: Negative for chest pain.   Gastrointestinal: Positive for abdominal pain. Negative for diarrhea and vomiting.   Genitourinary: Negative for flank pain.        Urostomy   Musculoskeletal: Negative for back pain.   Skin: Negative for rash.   Neurological: Negative for dizziness and headaches.   Psychiatric/Behavioral: Negative for confusion.       Past Medical History:   Diagnosis Date   • Cancer (CMS/[facility])     breast, bladder   • Essential hypertension 1/17/2017   • Essential hypertension 1/17/2017   • Fall 08/27/2020   • Long term current use of anticoagulant 1/9/2015   • PAF (paroxysmal atrial fibrillation) (CMS/[facility]) 6/26/2019   • Presence of cardiac pacemaker 11/03/2014    BS dual chamber PM placed 10/2014 with pocket revision 1/25/17.  HX tachy rob syndrome   • Sick sinus syndrome (CMS/[facility]) 11/3/2014       Allergies   Allergen Reactions   • Amoxicillin Shortness Of Breath   • Ciprofloxacin Hives   • Penicillins Rash   • Sulfa Antibiotics Hives   • Cephalexin Other (See Comments)   • Clavulanic Acid Other (See Comments)   • Codeine Other (See Comments)   • Contrast Dye Other (See Comments)   • Doxycycline Other (See Comments)   • Fluconazole Other (See Comments)   • Iodine Hives   • Macrobid [Nitrofurantoin]  "Hives   • Tobramycin Other (See Comments)   • Trimethoprim Other (See Comments)       Past Surgical History:   Procedure Laterality Date   • BREAST LUMPECTOMY     • HYSTERECTOMY     • INSERT / REPLACE / REMOVE PACEMAKER     • TIBIA IM NAILING/RODDING Right 8/28/2020    Procedure: TIBIA INTRAMEDULLARY NAIL/ABRAHAM INSERTION;  Surgeon: Geoff Shah II, MD;  Location: [facility] MAIN OR;  Service: Orthopedics;  Laterality: Right;       Family History   Problem Relation Age of Onset   • COPD Father        Social History     Socioeconomic History   • Marital status:      Spouse name: Not on file   • Number of children: Not on file   • Years of education: Not on file   • Highest education level: Not on file   Tobacco Use   • Smoking status: Never Smoker   • Smokeless tobacco: Never Used   Substance and Sexual Activity   • Alcohol use: Not Currently   • Drug use: Never   • Sexual activity: Defer       /65   Pulse 103   Temp 98.7 °F (37.1 °C) (Oral)   Resp 16   Ht 165.1 cm (65\")   Wt 71.7 kg (158 lb)   SpO2 96%   BMI 26.29 kg/m²       Objective   Physical Exam  Constitutional:       Appearance: She is well-developed.   HENT:      Head: Normocephalic and atraumatic.   Eyes:      Extraocular Movements: Extraocular movements intact.      Pupils: Pupils are equal, round, and reactive to light.   Neck:      Musculoskeletal: Normal range of motion and neck supple.   Cardiovascular:      Rate and Rhythm: Normal rate and regular rhythm.      Heart sounds: Normal heart sounds.   Pulmonary:      Effort: Pulmonary effort is normal. No respiratory distress.      Breath sounds: Normal breath sounds.   Abdominal:      General: Bowel sounds are normal.      Palpations: Abdomen is soft.      Tenderness: There is abdominal tenderness in the right upper quadrant and right lower quadrant. There is no right CVA tenderness, left CVA tenderness, guarding or rebound.      Comments: Urostomy bag in right lower abdomen "   Musculoskeletal: Normal range of motion.   Skin:     General: Skin is warm and dry.   Neurological:      General: No focal deficit present.      Mental Status: She is alert and oriented to person, place, and time.         Procedures           ED Course      Results for orders placed or performed during the hospital encounter of 10/10/20   Comprehensive Metabolic Panel    Specimen: Blood   Result Value Ref Range    Glucose 189 (H) 65 - 99 mg/dL    BUN      Creatinine 0.97 0.57 - 1.00 mg/dL    Sodium 143 136 - 145 mmol/L    Potassium 3.6 3.5 - 5.2 mmol/L    Chloride 113 (H) 98 - 107 mmol/L    CO2 20.0 (L) 22.0 - 29.0 mmol/L    Calcium 8.7 8.6 - 10.5 mg/dL    Total Protein 5.4 (L) 6.0 - 8.5 g/dL    Albumin 3.10 (L) 3.50 - 5.20 g/dL    ALT (SGPT) 5 1 - 33 U/L    AST (SGOT) 18 1 - 32 U/L    Alkaline Phosphatase 146 (H) 39 - 117 U/L    Total Bilirubin 0.3 0.0 - 1.2 mg/dL    eGFR Non African Amer 55 (L) >60 mL/min/1.73    Globulin 2.3 gm/dL    A/G Ratio 1.3 g/dL    BUN/Creatinine Ratio      Anion Gap 10.0 5.0 - 15.0 mmol/L   Protime-INR    Specimen: Blood   Result Value Ref Range    Protime 11.2 (L) 19.4 - 28.5 Seconds    INR 1.02 (L) 2.00 - 3.00   Lipase    Specimen: Blood   Result Value Ref Range    Lipase 23 13 - 60 U/L   CBC Auto Differential    Specimen: Blood   Result Value Ref Range    WBC 6.50 3.40 - 10.80 10*3/mm3    RBC 3.32 (L) 3.77 - 5.28 10*6/mm3    Hemoglobin 10.6 (L) 12.0 - 15.9 g/dL    Hematocrit 33.2 (L) 34.0 - 46.6 %    .0 (H) 79.0 - 97.0 fL    MCH 31.9 26.6 - 33.0 pg    MCHC 31.9 31.5 - 35.7 g/dL    RDW 17.2 (H) 12.3 - 15.4 %    RDW-SD 59.5 (H) 37.0 - 54.0 fl    MPV 7.1 6.0 - 12.0 fL    Platelets 256 140 - 450 10*3/mm3    Neutrophil % 69.0 42.7 - 76.0 %    Lymphocyte % 18.2 (L) 19.6 - 45.3 %    Monocyte % 8.9 5.0 - 12.0 %    Eosinophil % 1.1 0.3 - 6.2 %    Basophil % 2.8 (H) 0.0 - 1.5 %    Neutrophils, Absolute 4.50 1.70 - 7.00 10*3/mm3    Lymphocytes, Absolute 1.20 0.70 - 3.10 10*3/mm3     Monocytes, Absolute 0.60 0.10 - 0.90 10*3/mm3    Eosinophils, Absolute 0.10 0.00 - 0.40 10*3/mm3    Basophils, Absolute 0.20 0.00 - 0.20 10*3/mm3    nRBC 0.2 0.0 - 0.2 /100 WBC   BUN    Specimen: Blood   Result Value Ref Range    BUN 16 8 - 23 mg/dL   BUN    Specimen: Blood   Result Value Ref Range    BUN 15 8 - 23 mg/dL     Ct Abdomen Pelvis Without Contrast    Result Date: 10/10/2020  1. Distended, stone containing gallbladder with subtle pericholecystic edema. Correlation with LFTs is recommended. Consider ultrasound or HIDA scan for further evaluation. 2. Parastomal hernia containing nonobstructed large bowel. 3. Status post cystectomy with urinary diversion and right lower quadrant urostomy. 4. Status post right nephrectomy and hysterectomy as well. 5. Additional chronic findings as above. Electronically signed by:  Anoop Fernandez M.D.  10/10/2020 7:43 PM                                         MDM   Patient had the above evaluation.  Results were discussed with the patient.  CT scan is showing a distended, stone containing gallbladder with subtle pericholecystic edema.  Alk phos is elevated at 146.  LFTs are otherwise normal.  White blood cell count is normal.  Patient will be admitted for further evaluation and management.  I discussed with the nurse practitioner on-call for the hospitalist who agreed to admit.      Final diagnoses:   Abdominal pain, unspecified abdominal location   Calculus of gallbladder without cholecystitis without obstruction            Rudy Kolb MD  10/10/20 0048

## 2020-10-11 ENCOUNTER — APPOINTMENT (OUTPATIENT)
Dept: ULTRASOUND IMAGING | Facility: HOSPITAL | Age: 80
End: 2020-10-11

## 2020-10-11 LAB
ANION GAP SERPL CALCULATED.3IONS-SCNC: 10 MMOL/L (ref 5–15)
BASOPHILS # BLD AUTO: 0 10*3/MM3 (ref 0–0.2)
BASOPHILS NFR BLD AUTO: 0.6 % (ref 0–1.5)
BUN SERPL-MCNC: 17 MG/DL (ref 8–23)
BUN SERPL-MCNC: ABNORMAL MG/DL
BUN/CREAT SERPL: ABNORMAL
CALCIUM SPEC-SCNC: 8.7 MG/DL (ref 8.6–10.5)
CHLORIDE SERPL-SCNC: 113 MMOL/L (ref 98–107)
CHOLEST SERPL-MCNC: 156 MG/DL (ref 0–200)
CO2 SERPL-SCNC: 22 MMOL/L (ref 22–29)
CREAT SERPL-MCNC: 0.95 MG/DL (ref 0.57–1)
DEPRECATED RDW RBC AUTO: 62.6 FL (ref 37–54)
EOSINOPHIL # BLD AUTO: 0.1 10*3/MM3 (ref 0–0.4)
EOSINOPHIL NFR BLD AUTO: 0.9 % (ref 0.3–6.2)
ERYTHROCYTE [DISTWIDTH] IN BLOOD BY AUTOMATED COUNT: 17.8 % (ref 12.3–15.4)
GFR SERPL CREATININE-BSD FRML MDRD: 57 ML/MIN/1.73
GLUCOSE SERPL-MCNC: 158 MG/DL (ref 65–99)
HCT VFR BLD AUTO: 32 % (ref 34–46.6)
HDLC SERPL-MCNC: 63 MG/DL (ref 40–60)
HGB BLD-MCNC: 10.2 G/DL (ref 12–15.9)
LDLC SERPL CALC-MCNC: 76 MG/DL (ref 0–100)
LDLC/HDLC SERPL: 1.19 {RATIO}
LYMPHOCYTES # BLD AUTO: 1.1 10*3/MM3 (ref 0.7–3.1)
LYMPHOCYTES NFR BLD AUTO: 19 % (ref 19.6–45.3)
MCH RBC QN AUTO: 32.1 PG (ref 26.6–33)
MCHC RBC AUTO-ENTMCNC: 31.8 G/DL (ref 31.5–35.7)
MCV RBC AUTO: 100.8 FL (ref 79–97)
MONOCYTES # BLD AUTO: 0.6 10*3/MM3 (ref 0.1–0.9)
MONOCYTES NFR BLD AUTO: 9.5 % (ref 5–12)
NEUTROPHILS NFR BLD AUTO: 4.2 10*3/MM3 (ref 1.7–7)
NEUTROPHILS NFR BLD AUTO: 70 % (ref 42.7–76)
NRBC BLD AUTO-RTO: 0.1 /100 WBC (ref 0–0.2)
PLATELET # BLD AUTO: 228 10*3/MM3 (ref 140–450)
PMV BLD AUTO: 7.3 FL (ref 6–12)
POTASSIUM SERPL-SCNC: 3.6 MMOL/L (ref 3.5–5.2)
POTASSIUM SERPL-SCNC: 5.3 MMOL/L (ref 3.5–5.2)
RBC # BLD AUTO: 3.17 10*6/MM3 (ref 3.77–5.28)
SODIUM SERPL-SCNC: 145 MMOL/L (ref 136–145)
TRIGL SERPL-MCNC: 91 MG/DL (ref 0–150)
VLDLC SERPL-MCNC: 17 MG/DL (ref 5–40)
WBC # BLD AUTO: 6.1 10*3/MM3 (ref 3.4–10.8)

## 2020-10-11 PROCEDURE — 97162 PT EVAL MOD COMPLEX 30 MIN: CPT

## 2020-10-11 PROCEDURE — 99203 OFFICE O/P NEW LOW 30 MIN: CPT | Performed by: SURGERY

## 2020-10-11 PROCEDURE — 84132 ASSAY OF SERUM POTASSIUM: CPT | Performed by: INTERNAL MEDICINE

## 2020-10-11 PROCEDURE — 96361 HYDRATE IV INFUSION ADD-ON: CPT

## 2020-10-11 PROCEDURE — 96376 TX/PRO/DX INJ SAME DRUG ADON: CPT

## 2020-10-11 PROCEDURE — 25010000002 ONDANSETRON PER 1 MG: Performed by: NURSE PRACTITIONER

## 2020-10-11 PROCEDURE — 96375 TX/PRO/DX INJ NEW DRUG ADDON: CPT

## 2020-10-11 PROCEDURE — 85025 COMPLETE CBC W/AUTO DIFF WBC: CPT | Performed by: NURSE PRACTITIONER

## 2020-10-11 PROCEDURE — 25010000002 MORPHINE PER 10 MG: Performed by: NURSE PRACTITIONER

## 2020-10-11 PROCEDURE — 25010000002 DIPHENHYDRAMINE PER 50 MG: Performed by: NURSE PRACTITIONER

## 2020-10-11 PROCEDURE — 76705 ECHO EXAM OF ABDOMEN: CPT

## 2020-10-11 PROCEDURE — 83036 HEMOGLOBIN GLYCOSYLATED A1C: CPT | Performed by: NURSE PRACTITIONER

## 2020-10-11 PROCEDURE — 80048 BASIC METABOLIC PNL TOTAL CA: CPT | Performed by: NURSE PRACTITIONER

## 2020-10-11 PROCEDURE — 25010000002 MORPHINE PER 10 MG: Performed by: INTERNAL MEDICINE

## 2020-10-11 PROCEDURE — 99220 PR INITIAL OBSERVATION CARE/DAY 70 MINUTES: CPT | Performed by: INTERNAL MEDICINE

## 2020-10-11 PROCEDURE — G0378 HOSPITAL OBSERVATION PER HR: HCPCS

## 2020-10-11 PROCEDURE — 80061 LIPID PANEL: CPT | Performed by: NURSE PRACTITIONER

## 2020-10-11 RX ORDER — MAGNESIUM SULFATE HEPTAHYDRATE 40 MG/ML
4 INJECTION, SOLUTION INTRAVENOUS AS NEEDED
Status: DISCONTINUED | OUTPATIENT
Start: 2020-10-11 | End: 2020-10-13 | Stop reason: HOSPADM

## 2020-10-11 RX ORDER — DEXTROSE MONOHYDRATE 25 G/50ML
25 INJECTION, SOLUTION INTRAVENOUS
Status: DISCONTINUED | OUTPATIENT
Start: 2020-10-11 | End: 2020-10-13 | Stop reason: HOSPADM

## 2020-10-11 RX ORDER — POTASSIUM CHLORIDE 7.45 MG/ML
10 INJECTION INTRAVENOUS
Status: DISCONTINUED | OUTPATIENT
Start: 2020-10-11 | End: 2020-10-13 | Stop reason: HOSPADM

## 2020-10-11 RX ORDER — DEXTROSE AND SODIUM CHLORIDE 5; .9 G/100ML; G/100ML
100 INJECTION, SOLUTION INTRAVENOUS CONTINUOUS
Status: DISCONTINUED | OUTPATIENT
Start: 2020-10-11 | End: 2020-10-13 | Stop reason: HOSPADM

## 2020-10-11 RX ORDER — MAGNESIUM SULFATE HEPTAHYDRATE 40 MG/ML
2 INJECTION, SOLUTION INTRAVENOUS AS NEEDED
Status: DISCONTINUED | OUTPATIENT
Start: 2020-10-11 | End: 2020-10-13 | Stop reason: HOSPADM

## 2020-10-11 RX ORDER — DIPHENHYDRAMINE HYDROCHLORIDE 50 MG/ML
25 INJECTION INTRAMUSCULAR; INTRAVENOUS ONCE
Status: COMPLETED | OUTPATIENT
Start: 2020-10-11 | End: 2020-10-11

## 2020-10-11 RX ORDER — POTASSIUM CHLORIDE 20 MEQ/1
40 TABLET, EXTENDED RELEASE ORAL AS NEEDED
Status: DISCONTINUED | OUTPATIENT
Start: 2020-10-11 | End: 2020-10-13 | Stop reason: HOSPADM

## 2020-10-11 RX ORDER — NICOTINE POLACRILEX 4 MG
15 LOZENGE BUCCAL
Status: DISCONTINUED | OUTPATIENT
Start: 2020-10-11 | End: 2020-10-13 | Stop reason: HOSPADM

## 2020-10-11 RX ORDER — OXYCODONE HYDROCHLORIDE 5 MG/1
5 TABLET ORAL EVERY 4 HOURS PRN
Status: DISCONTINUED | OUTPATIENT
Start: 2020-10-11 | End: 2020-10-13 | Stop reason: HOSPADM

## 2020-10-11 RX ORDER — INSULIN LISPRO 100 [IU]/ML
0-7 INJECTION, SOLUTION INTRAVENOUS; SUBCUTANEOUS
Status: DISCONTINUED | OUTPATIENT
Start: 2020-10-11 | End: 2020-10-11

## 2020-10-11 RX ORDER — HYDRALAZINE HYDROCHLORIDE 10 MG/1
20 TABLET, FILM COATED ORAL EVERY 8 HOURS SCHEDULED
Status: DISCONTINUED | OUTPATIENT
Start: 2020-10-12 | End: 2020-10-13 | Stop reason: HOSPADM

## 2020-10-11 RX ORDER — TOPIRAMATE 100 MG/1
100 TABLET, FILM COATED ORAL NIGHTLY
Status: DISCONTINUED | OUTPATIENT
Start: 2020-10-12 | End: 2020-10-13 | Stop reason: HOSPADM

## 2020-10-11 RX ORDER — POTASSIUM CHLORIDE 1.5 G/1.77G
40 POWDER, FOR SOLUTION ORAL AS NEEDED
Status: DISCONTINUED | OUTPATIENT
Start: 2020-10-11 | End: 2020-10-13 | Stop reason: HOSPADM

## 2020-10-11 RX ORDER — METOPROLOL TARTRATE 50 MG/1
50 TABLET, FILM COATED ORAL EVERY 8 HOURS SCHEDULED
Status: DISCONTINUED | OUTPATIENT
Start: 2020-10-12 | End: 2020-10-13 | Stop reason: HOSPADM

## 2020-10-11 RX ORDER — INSULIN LISPRO 100 [IU]/ML
0-7 INJECTION, SOLUTION INTRAVENOUS; SUBCUTANEOUS AS NEEDED
Status: DISCONTINUED | OUTPATIENT
Start: 2020-10-11 | End: 2020-10-11

## 2020-10-11 RX ORDER — METOPROLOL TARTRATE 50 MG/1
50 TABLET, FILM COATED ORAL EVERY 8 HOURS SCHEDULED
Status: DISCONTINUED | OUTPATIENT
Start: 2020-10-12 | End: 2020-10-12 | Stop reason: SDUPTHER

## 2020-10-11 RX ORDER — MORPHINE SULFATE 4 MG/ML
2 INJECTION, SOLUTION INTRAMUSCULAR; INTRAVENOUS
Status: DISCONTINUED | OUTPATIENT
Start: 2020-10-11 | End: 2020-10-12

## 2020-10-11 RX ORDER — OXYCODONE HYDROCHLORIDE 5 MG/1
10 TABLET ORAL EVERY 4 HOURS PRN
Status: DISCONTINUED | OUTPATIENT
Start: 2020-10-11 | End: 2020-10-12

## 2020-10-11 RX ADMIN — Medication 10 ML: at 08:48

## 2020-10-11 RX ADMIN — Medication 10 ML: at 22:34

## 2020-10-11 RX ADMIN — MORPHINE SULFATE 2 MG: 4 INJECTION INTRAVENOUS at 03:16

## 2020-10-11 RX ADMIN — DEXTROSE MONOHYDRATE AND SODIUM CHLORIDE 100 ML/HR: 5; .9 INJECTION, SOLUTION INTRAVENOUS at 16:40

## 2020-10-11 RX ADMIN — OXYCODONE 10 MG: 5 TABLET ORAL at 15:18

## 2020-10-11 RX ADMIN — POTASSIUM CHLORIDE 40 MEQ: 1500 TABLET, EXTENDED RELEASE ORAL at 09:41

## 2020-10-11 RX ADMIN — OXYCODONE 5 MG: 5 TABLET ORAL at 02:03

## 2020-10-11 RX ADMIN — DIPHENHYDRAMINE HYDROCHLORIDE 25 MG: 50 INJECTION, SOLUTION INTRAMUSCULAR; INTRAVENOUS at 04:37

## 2020-10-11 RX ADMIN — MORPHINE SULFATE 2 MG: 4 INJECTION INTRAVENOUS at 18:21

## 2020-10-11 RX ADMIN — ONDANSETRON 4 MG: 2 INJECTION INTRAMUSCULAR; INTRAVENOUS at 03:17

## 2020-10-11 NOTE — PLAN OF CARE
Goal Outcome Evaluation:  Plan of Care Reviewed With: patient  Progress: improving  Patient is alert and able to make needs known to staff. Patient has no complaints of pain or discomfort at this time. Patient remains NPO at this time.   Problem: Adult Inpatient Plan of Care  Goal: Plan of Care Review  Outcome: Ongoing, Progressing   Will continue to monitor.

## 2020-10-11 NOTE — CONSULTS
Subjective   Hazel Bucio is a 80 y.o. female.     History of present illness  I was asked to see this pleasant 80-year-old in consultation for right upper quadrant pain consistent with cholecystitis.  Her work-up shows scant amount of pericholecystic fluid with sludge and small stones.  He has a normal bilirubin and normal common duct size.    Past Medical History:   Diagnosis Date   • Cancer (CMS/Formerly [facility])     breast, bladder   • Essential hypertension 1/17/2017   • Essential hypertension 1/17/2017   • Fall 08/27/2020   • Long term current use of anticoagulant 1/9/2015   • PAF (paroxysmal atrial fibrillation) (CMS/Formerly [facility]) 6/26/2019   • Presence of cardiac pacemaker 11/03/2014    BS dual chamber PM placed 10/2014 with pocket revision 1/25/17.  HX tachy rob syndrome   • Sick sinus syndrome (CMS/Formerly [facility]) 11/3/2014       Past Surgical History:   Procedure Laterality Date   • BREAST LUMPECTOMY     • HYSTERECTOMY     • INSERT / REPLACE / REMOVE PACEMAKER     • TIBIA IM NAILING/RODDING Right 8/28/2020    Procedure: TIBIA INTRAMEDULLARY NAIL/ABRAHAM INSERTION;  Surgeon: Geoff Shah II, MD;  Location: [facility] OR;  Service: Orthopedics;  Laterality: Right;       [unfilled]    Allergies   Allergen Reactions   • Amoxicillin Shortness Of Breath   • Ciprofloxacin Hives   • Penicillins Rash   • Sulfa Antibiotics Hives   • Cephalexin Other (See Comments)   • Clavulanic Acid Other (See Comments)   • Codeine Other (See Comments)   • Contrast Dye Other (See Comments)   • Doxycycline Other (See Comments)   • Fluconazole Other (See Comments)   • Iodine Hives   • Macrobid [Nitrofurantoin] Hives   • Tobramycin Other (See Comments)   • Trimethoprim Other (See Comments)       Family History   Problem Relation Age of Onset   • COPD Father        Social History     Socioeconomic History   • Marital status:      Spouse name: Not on file   • Number of children: Not on file   • Years of education: Not on file   • Highest education  level: Not on file   Tobacco Use   • Smoking status: Never Smoker   • Smokeless tobacco: Never Used   Substance and Sexual Activity   • Alcohol use: Not Currently   • Drug use: Never   • Sexual activity: Defer       The following portions of the patient's history were reviewed and updated as appropriate: allergies, current medications, past family history, past medical history, past social history, past surgical history and problem list.    Objective       Assessment/Plan   [unfilled]    Complete review of systems is done and unremarkable with exception of the chief complaint    Physical exam shows a pleasant 80-year-old female.  HEENT is negative.  Heart regular.  Lungs clear.  Abdomen is soft.  She does have tenderness in the right upper quadrant.  There is no palpable mass other than her parastomal hernia.  Extremities show equal range of motion in the upper and lower extremities.  She has symmetrical strength and usage.  Neuro shows no obvious focal deficit.    Impression: Cholecystitis cholelithiasis, parastomal hernia    Recommendation: Laparoscopic cholecystectomy.  I would not repair her hernia at the same setting as the gallbladder that is inflamed.  She could come back at a later time for a hernia repair if that bothers her.  We will add her to the schedule for tomorrow           Go Pereira,   10/11/2020  18:32 EDT

## 2020-10-11 NOTE — H&P
[facility] Medicine Services      Patient Name: Hazel Bucio  : 1940  MRN: [id number]  Primary Care Physician: Lizzy Francis MD  Date of admission: 10/10/2020    Patient Care Team:  Lizzy Francis MD as PCP - General (Family Medicine)          Subjective   History Present Illness     Chief Complaint:   Chief Complaint   Patient presents with   • Abdominal Pain         Ms. Bucio is a 80 y.o.  p[facility] complaining of right-sided abdominal pain.          80-year-old female presents to the ER with a chief complaint of moderate to severe waxing and waning right sided abdominal pain which began in the right lower quadrant and radiated to the right upper quadrant starting Saturday evening as the patient was going about her usual activity.  Denies any nausea, vomiting or diarrhea.  The patient has a history of bladder cancer  with removal of the bladder and ureter with subsequent right nephrectomy approximately 1 year later.  She states she got a few radiation treatments for the bladder cancer.  The patient also has a history of breast cancer with lumpectomy status post chemotherapy and radiation.  The patient denies any recent subjective fever or chills.      Review of records with summary: The patient is currently a resident at an inpatient physical rehab center after falling several months ago and fracturing her right lower extremity in 4 places with hospitalization on 2020.  Prior to that the patient lived in her home alone.  She has a grandson who is around 32 years old who plans to move in with her and assist her once she is discharged home.  She also had a recent hospitalization 2020 through 2020 with length of stay 5 days for confusion, encephalopathy acute kidney injury.  Started on hydralazine, Roxicodone, stop taking Benadryl, sotalol, follow-up with Lizzy Holloway, pacemaker placed , [employer] in office   Jack Carrizales.  She also had recent hospitalization echo 4/28/2020 showing normal LV function.            Review of Systems   Constitution: Negative for chills and fever.   Endocrine: Positive for cold intolerance.   Gastrointestinal: Negative for constipation, diarrhea, nausea and vomiting.   Genitourinary:        The patient has an ileal conduit urostomy tube   All other systems reviewed and are negative.          Personal History     Past Medical History:   Past Medical History:   Diagnosis Date   • Cancer (CMS/Formerly [facility])     breast, bladder   • Essential hypertension 1/17/2017   • Essential hypertension 1/17/2017   • Fall 08/27/2020   • Long term current use of anticoagulant 1/9/2015   • PAF (paroxysmal atrial fibrillation) (CMS/Formerly [facility]) 6/26/2019   • Presence of cardiac pacemaker 11/03/2014    BS dual chamber PM placed 10/2014 with pocket revision 1/25/17.  HX tachy rob syndrome   • Sick sinus syndrome (CMS/Formerly [facility]) 11/3/2014       Surgical History:      Past Surgical History:   Procedure Laterality Date   • BREAST LUMPECTOMY     • HYSTERECTOMY     • INSERT / REPLACE / REMOVE PACEMAKER     • TIBIA IM NAILING/RODDING Right 8/28/2020    Procedure: TIBIA INTRAMEDULLARY NAIL/ABRAHAM INSERTION;  Surgeon: Geoff Shah II, MD;  Location: [facility];  Service: Orthopedics;  Laterality: Right;           Family History: family history includes COPD in her father. Otherwise pertinent FHx was reviewed and unremarkable.     Social History:  reports that she has never smoked. She has never used smokeless tobacco. She reports previous alcohol use. She reports that she does not use drugs.      Medications:  Prior to Admission medications    Medication Sig Start Date End Date Taking? Authorizing Provider   acetaminophen (TYLENOL) 325 MG tablet Take 650 mg by mouth Every 6 (Six) Hours As Needed for Mild Pain .   Yes Provider, MD Gino   bisacodyl (Bisacodyl Laxative) 10 MG suppository Insert 10 mg into the rectum Daily As  Needed for Constipation.   Yes Gino Leos MD   bisacodyl (DULCOLAX) 5 MG EC tablet Take 5 mg by mouth Daily As Needed for Constipation.   Yes Gino Leos MD   cetirizine (zyrTEC) 10 MG tablet Take 10 mg by mouth Daily.   Yes Gino Leos MD   Cholecalciferol (VITAMIN D3) 2000 units tablet Take 1 tablet by mouth Daily.   Yes Gino Leos MD   dilTIAZem CD (Cardizem CD) 300 MG 24 hr capsule Take 1 capsule by mouth Daily. 7/14/20  Yes MARIELA Carrizales MD   diphenhydrAMINE-zinc acetate (BENADRYL ITCH RELIEF) 2-0.1 % stick stick Apply 1 application topically to the appropriate area as directed 2 (Two) Times a Day As Needed.   Yes Gino Leos MD   docusate sodium (COLACE) 100 MG capsule Take 200 mg by mouth 2 (Two) Times a Day.   Yes Gino Leos MD   hydrALAZINE (APRESOLINE) 10 MG tablet Take 2 tablets by mouth 3 (Three) Times a Day. 9/14/20  Yes Taisha Serrato MD   HYDROcodone-acetaminophen (NORCO) 5-325 MG per tablet Take 1 tablet by mouth Every 6 (Six) Hours As Needed.   Yes Gino Leos MD   lactulose (CHRONULAC) 10 GM/15ML solution Take 20 g by mouth Every 3 (Three) Hours As Needed.   Yes Gino Leos MD   Magnesium 400 MG capsule Take 1 tablet by mouth every night at bedtime. 4/18/19  Yes Gino Leos MD   melatonin 5 MG tablet tablet Take 6 mg by mouth At Night As Needed.   Yes Gino Leos MD   metoprolol tartrate (LOPRESSOR) 50 MG tablet Take 1 tablet by mouth Every 8 (Eight) Hours.  Patient taking differently: Take 50 mg by mouth 2 (Two) Times a Day. 9/14/20  Yes Taisha Serrato MD   multivitamin-minerals (THERA-M) tablet tablet Take 1 tablet by mouth Daily.   Yes Gino Leos MD   ondansetron ODT (ZOFRAN-ODT) 4 MG disintegrating tablet Place 4 mg on the tongue Every 4 (Four) Hours As Needed for Nausea or Vomiting.   Yes Gino Leos MD   pantoprazole (PROTONIX) 40 MG EC tablet Take 40 mg by  mouth Every Morning.   Yes Gino Leos MD   polyethylene glycol (MIRALAX) packet Take 17 g by mouth Daily As Needed.   Yes Gino Leos MD   simethicone (MYLICON) 80 MG chewable tablet Chew 80 mg Every 6 (Six) Hours As Needed for Flatulence.   Yes Gino Leos MD   topiramate (TOPAMAX) 100 MG tablet Take 100 mg by mouth every night at bedtime.   Yes Gino Leos MD   traMADol (ULTRAM) 50 MG tablet Take 50 mg by mouth Every 4 (Four) Hours As Needed for Moderate Pain .   Yes Gino Leos MD   warfarin (COUMADIN) 3 MG tablet Take 1.5 mg by mouth Daily. Had been on hold at rehab for several days d/t elevated INR.  (Last dose was 1.5mg on 9/4, w/VitK 5mg given 9/6.)   Yes Gino Leos MD   insulin lispro (humaLOG) 100 UNIT/ML injection Inject  under the skin into the appropriate area as directed 4 (Four) Times a Day Before Meals & at Bedtime. SSI- 101-150=0 u, 151-200=2u, 201-250=4u, 251-300=6u, 301-350=8u, 351-400=10u, Above 400 give 12 u and notify physician  10/10/20  Gino Leos MD   oxyCODONE (ROXICODONE) 5 MG immediate release tablet Take 5 mg by mouth Every 4 (Four) Hours As Needed for Moderate Pain  (Scale 4-6).  10/10/20  Gino Leos MD       Allergies:    Allergies   Allergen Reactions   • Amoxicillin Shortness Of Breath   • Ciprofloxacin Hives   • Penicillins Rash   • Sulfa Antibiotics Hives   • Cephalexin Other (See Comments)   • Clavulanic Acid Other (See Comments)   • Codeine Other (See Comments)   • Contrast Dye Other (See Comments)   • Doxycycline Other (See Comments)   • Fluconazole Other (See Comments)   • Iodine Hives   • Macrobid [Nitrofurantoin] Hives   • Tobramycin Other (See Comments)   • Trimethoprim Other (See Comments)       Objective   Objective     Vital Signs  Temp:  [98.7 °F (37.1 °C)] 98.7 °F (37.1 °C)  Heart Rate:  [] 107  Resp:  [16] 16  BP: (122-132)/(63-79) 132/75  SpO2:  [96 %-98 %] 96 %  on   ;   Device  (Oxygen Therapy): room air  Body mass index is 26.29 kg/m².    Physical Exam  Vitals signs reviewed.   Constitutional:       General: She is not in acute distress.     Appearance: Normal appearance. She is normal weight. She is not ill-appearing, toxic-appearing or diaphoretic.   HENT:      Head: Normocephalic and atraumatic.      Nose: Nose normal. No congestion or rhinorrhea.      Mouth/Throat:      Mouth: Mucous membranes are moist.   Eyes:      General: No scleral icterus.     Extraocular Movements: Extraocular movements intact.      Pupils: Pupils are equal, round, and reactive to light.   Cardiovascular:      Rate and Rhythm: Normal rate and regular rhythm.      Pulses: Normal pulses.      Heart sounds: Normal heart sounds. No murmur.   Pulmonary:      Effort: Pulmonary effort is normal. No respiratory distress.      Breath sounds: Normal breath sounds.   Abdominal:      General: Bowel sounds are normal. There is no distension.      Palpations: Abdomen is soft.      Tenderness: There is abdominal tenderness. There is no rebound.   Musculoskeletal:         General: Tenderness present.      Right lower leg: Edema present.      Comments: There is a well-healed surgical incision on the upper right kneecap without any erythema or drainage.  There is mild bruising at the area.  There is mild swelling to the right ankle.  The entire lower extremity is tender to palpation without positive Homans sign.   Skin:     General: Skin is warm and dry.      Capillary Refill: Capillary refill takes less than 2 seconds.   Neurological:      General: No focal deficit present.      Mental Status: She is alert and oriented to person, place, and time.   Psychiatric:         Mood and Affect: Mood normal.         Behavior: Behavior normal.         Thought Content: Thought content normal.         Judgment: Judgment normal.         Results Review:  I have personally reviewed most recent lab results and radiology images and  interpretations and agree with findings.    Results from last 7 days   Lab Units 10/10/20  2018   WBC 10*3/mm3 6.50   HEMOGLOBIN g/dL 10.6*   HEMATOCRIT % 33.2*   PLATELETS 10*3/mm3 256   INR  1.02*     Results from last 7 days   Lab Units 10/10/20  2107   SODIUM mmol/L 143   POTASSIUM mmol/L 3.6   CHLORIDE mmol/L 113*   CO2 mmol/L 20.0*   BUN  15   CREATININE mg/dL 0.97   GLUCOSE mg/dL 189*   CALCIUM mg/dL 8.7   ALT (SGPT) U/L 5   AST (SGOT) U/L 18     Estimated Creatinine Clearance: 46.7 mL/min (by C-G formula based on SCr of 0.97 mg/dL).  Brief Urine Lab Results  (Last result in the past 365 days)      Color   Clarity   Blood   Leuk Est   Nitrite   Protein   CREAT   Urine HCG        09/11/20 1822 --  Comment:  Specimen double labeled with two different patients  Corrected result. Previous result was Yellow on 9/11/2020 at 1843 EDT.[C] --  Comment:  Specimen double labeled with two different patients  Corrected result. Previous result was Clear on 9/11/2020 at 1843 EDT.[C] --  Comment:  Specimen double labeled with two different patients  Corrected result. Previous result was Negative on 9/11/2020 at 1843 EDT.[C] --  Comment:  Specimen double labeled with two different patients  Corrected result. Previous result was Trace on 9/11/2020 at 1843 EDT.[C] --  Comment:  Specimen double labeled with two different patients  Corrected result. Previous result was Negative on 9/11/2020 at 1843 EDT.[C] --  Comment:  Specimen double labeled with two different patients  Corrected result. Previous result was Negative on 9/11/2020 at 1843 EDT.[C]               Microbiology Results (last 10 days)     ** No results found for the last 240 hours. **          ECG/EMG Results (most recent)     None          Results for orders placed during the hospital encounter of 09/07/20   Duplex Carotid Ultrasound CAR    Narrative · Right internal carotid artery is normal. Elevated velocities are noted   in the distal right internal carotid artery.  However, this is felt to be   related to tortuosity as opposed to a diseased segment.  · Left internal carotid artery is normal.          Results for orders placed during the hospital encounter of 04/28/20   Adult Transthoracic Echo Complete W/ Cont if Necessary Per Protocol    Narrative · Left ventricular systolic function is normal.  · Right ventricular cavity is borderline dilated.  · Mild pulmonic valve regurgitation is present.     Grossly normal echocardiographic exam for age demonstrating normal chamber   dimensions aside from borderline RVE with good global contractility no   specific valvular pathology.  Doppler exam shows mild pulmonic insufficiency and trivial to mild TR with   borderline RV systolic pressure, otherwise normal study for age         Ct Abdomen Pelvis Without Contrast    Result Date: 10/10/2020  1. Distended, stone containing gallbladder with subtle pericholecystic edema. Correlation with LFTs is recommended. Consider ultrasound or HIDA scan for further evaluation. 2. Parastomal hernia containing nonobstructed large bowel. 3. Status post cystectomy with urinary diversion and right lower quadrant urostomy. 4. Status post right nephrectomy and hysterectomy as well. 5. Additional chronic findings as above. Electronically signed by:  Anoop Fernandez M.D.  10/10/2020 7:43 PM        Estimated Creatinine Clearance: 46.7 mL/min (by C-G formula based on SCr of 0.97 mg/dL).    Assessment/Plan   Assessment/Plan       Active Hospital Problems    Diagnosis  POA   • Abdominal pain [R10.9]  Yes      Resolved Hospital Problems   No resolved problems to display.     1.  Acute abdominal pain, right-sided--Likely secondary to acute cholelithiasis: Ultrasound in a.m.; analgesics and antiemetics    --CT abdomen pelvis also shows a parastomal hernia containing nonobstructed large bowel.             -Surgery consult has been placed.  The patient also has gallstones present            -Surgery is asked that the  patient remain n.p.o.  We will start her on D5 half-normal saline to run at 100 mL an hour.    --No recent subjective fever or chills; normal white count  Hemoglobin 10.6 with comparison 10.7 on 9/14/2020    2.  Diabetes type 2, chronic, diet controlled:   -Blood glucose 189;   -check hemoglobin A1c;  -add sliding scale    3.  History of bladder cancer  -Status post right nephrectomy and ileal conduit  -Patient was treated with chemotherapy as well as radiation    4.  Atrial fibrillation  -Controlled ventricular response  -Patient had been on Coumadin back in September but the Coumadin was placed on hold due to an elevated INR.  The patient has a normal INR currently.  Will need to evaluate for anticoagulation after the surgery decision has been made.  -She does have a history of recent falling for which she is now in rehab so that may need to be taken into consideration.    5.  History of renal insufficiency  -Currently patient has normal renal function.    VTE Prophylaxis -   Mechanical Order History:      Ordered        10/10/20 2251  Place Sequential Compression Device  Once         10/10/20 2251  Maintain Sequential Compression Device  Continuous                 Pharmalogical Order History:     None          CODE STATUS:    Code Status and Medical Interventions:   Ordered at: 10/10/20 2251     Code Status:    CPR     Medical Interventions (Level of Support Prior to Arrest):    Full       This patient has been examined wearing appropriate Personal Protective Equipment and discussed with hospital infection control department. 10/10/20      I discussed the patient's findings and my recommendations with patient who agrees to proceed as outlined above..        Electronically signed by HERO Talavera, 10/10/20, 10:51 PM EDT.  [facility] Hospitalist Team

## 2020-10-11 NOTE — PLAN OF CARE
Problem: Adult Inpatient Plan of Care  Goal: Plan of Care Review  Recent Flowsheet Documentation  Taken 10/11/2020 1111 by Aylin Matias PT  Progress: improving  Plan of Care Reviewed With: patient    Pt is a 79 yo female ADMM with calculus of gallbladder, cholecystitis, altered mental status. Pt was in LTC since right LE FX and surgical repair.  Pt was having therapy at [facility] but reports surgeon has not released her for wt bearing yet. Prior to FX pt reports she lived with her spouse. Pt was independent with mobility in the home, dressing, grooming, bathing. Today pt was alert and oriented x 3. Pt c/o moderate pain in abdomen Pt needed MIN A rolling, MOD A supine to sit and MOD  A pivot to chair NWB right LE.  Pt is below baseline recommendation is IP Rehab. PPE: Mask, gloves, eyeshield.

## 2020-10-11 NOTE — THERAPY EVALUATION
Patient Name: Hazel Bucio  : 1940    MRN: [id number]                              Today's Date: 10/11/2020       Admit Date: 10/10/2020    Visit Dx:     ICD-10-CM ICD-9-CM   1. Abdominal pain, unspecified abdominal location  R10.9 789.00   2. Calculus of gallbladder without cholecystitis without obstruction  K80.20 574.20     Patient Active Problem List   Diagnosis   • PAF (paroxysmal atrial fibrillation) (CMS/Formerly [facility])   • Dyslipidemia   • Essential hypertension   • Long term current use of anticoagulant   • Presence of cardiac pacemaker   • Sick sinus syndrome (CMS/Formerly [facility])   • Type 2 diabetes mellitus (CMS/Formerly [facility])   • Tear film insufficiency   • Presbyopia   • Macular puckering of retina   • Tear film insufficiency, bilateral   • Pseudophakia, both eyes   • Lens replaced by other means   • Epiretinal membrane (ERM) of both eyes   • Epiretinal membrane, bilateral   • Pseudophakia   • Coumadin toxicity, accidental or unintentional, initial encounter   • E. 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[facility])   • Chronic insomnia   • Seasonal allergies   • Altered mental status   • MACRINA (acute kidney injury) (CMS/Formerly [facility])   • Abdominal pain     Past Medical History:   Diagnosis Date   • Cancer (CMS/Formerly [facility])     breast, bladder   • Essential hypertension 2017   • Essential hypertension 2017   • Fall 2020   • Long term current use of anticoagulant 2015   • PAF (paroxysmal atrial fibrillation) (CMS/Formerly [facility]) 2019   • Presence of cardiac pacemaker 2014    BS dual chamber PM placed 10/2014 with pocket revision 17.  HX tachy rob syndrome   • Sick sinus syndrome (CMS/Formerly [facility]) 11/3/2014     Past Surgical History:   Procedure Laterality Date   • BREAST LUMPECTOMY     • HYSTERECTOMY     • INSERT / REPLACE / REMOVE PACEMAKER     • TIBIA IM NAILING/RODDING Right 2020     Procedure: TIBIA INTRAMEDULLARY NAIL/ABRAHAM INSERTION;  Surgeon: Geoff Shah II, MD;  Location: [facility] MAIN OR;  Service: Orthopedics;  Laterality: Right;     General Information     Row Name 10/11/20 1104          Physical Therapy Time and Intention    Document Type  evaluation  -     Mode of Treatment  physical therapy  -     Row Name 10/11/20 1106 10/11/20 1104       General Information    Patient Profile Reviewed  --  yes  -    Prior Level of Function  independent:;transfer;gait;bed mobility;ADL's  -  --    Row Name 10/11/20 1106          Living Environment    Lives With  facility resident  -     Row Name 10/11/20 1106          Home Main Entrance    Number of Stairs, Main Entrance  none  -     Stair Railings, Main Entrance  none  -     Row Name 10/11/20 1106          Stairs Within Home, Primary    Number of Stairs, Within Home, Primary  none  -     Row Name 10/11/20 1106          Cognition    Orientation Status (Cognition)  oriented x 3  -     Row Name 10/11/20 1106          Safety Issues, Functional Mobility    Impairments Affecting Function (Mobility)  cognition;coordination;endurance/activity tolerance;balance;strength;pain;motor planning  -       User Key  (r) = Recorded By, (t) = Taken By, (c) = Cosigned By    Initials Name Provider Type    Aylin Gross, REKHA Physical Therapist        Mobility     Row Name 10/11/20 1107          Bed-Chair Transfer    Bed-Chair [place] (Transfers)  moderate assist (50% patient effort)  -[facility] Name 10/11/20 1107          Sit-Stand Transfer    Sit-Stand [place] (Transfers)  unable to assess  -[facility] Name 10/11/20 1107          Gait/Stairs (Locomotion)    [place] Level (Gait)  unable to assess  -[facility] Name 10/11/20 1107          Mobility    Extremity Weight-bearing Status  right lower extremity  -     Right Lower Extremity (Weight-bearing Status)  non weight-bearing (NWB) Per pt she is stil NWB right LE since FX  8/27/20 and sugical repair.  -       User Key  (r) = Recorded By, (t) = Taken By, (c) = Cosigned By    Initials Name Provider Type    Aylin Gross PT Physical Therapist        Obj/Interventions     Row Name 10/11/20 1109          Range of Motion Comprehensive    General Range of Motion  bilateral lower extremity ROM WFL  -     Row Name 10/11/20 1109          Strength Comprehensive (MMT)    Comment, General Manual Muscle Testing (MMT) Assessment  Left LE 4/5 grossly, right  3-/5 hips and knees.  -     Row Name 10/11/20 1109          Motor Skills    Therapeutic Exercise  ankle  -     Row Name 10/11/20 1109          Ankle (Therapeutic Exercise)    Ankle (Therapeutic Exercise)  AROM (active range of motion)  -     Ankle AROM (Therapeutic Exercise)  bilateral;dorsiflexion;plantarflexion;10 repetitions  -     Row Name 10/11/20 1109          Balance    Balance Assessment  sitting static balance;sitting dynamic balance  -     Static Sitting Balance  WFL  -     Dynamic Sitting Balance  mild impairment  -     Balance Interventions  sitting;supported;static;dynamic;minimal challenge  -       User Key  (r) = Recorded By, (t) = Taken By, (c) = Cosigned By    Initials Name Provider Type    Aylin Gross PT Physical Therapist        Goals/Plan     Row Name 10/11/20 1112          Bed Mobility Goal 1 (PT)    Activity/Assistive Device (Bed Mobility Goal 1, PT)  rolling to left;rolling to right;sit to supine;supine to sit;scooting  -     [place] Level/Cues Needed (Bed Mobility Goal 1, PT)  modified independence  -     Time Frame (Bed Mobility Goal 1, PT)  short term goal (STG);2 weeks  -     Row Name 10/11/20 1112          Transfer Goal 1 (PT)    Activity/Assistive Device (Transfer Goal 1, PT)  sit-to-stand/stand-to-sit;bed-to-chair/chair-to-bed;toilet  -     [place] Level/Cues Needed (Transfer Goal 1, PT)  standby assist  -     Time Frame (Transfer Goal 1, PT)  short term goal (STG);2  weeks  -WC     Row Name 10/11/20 1112          Patient Education Goal (PT)    Activity (Patient Education Goal, PT)  LE exercises  -     [place]/Cues/Accuracy (Memory Goal 2, PT)  demonstrates adequately;verbalizes understanding  -WC     Time Frame (Patient Education Goal, PT)  short term goal (STG);2 weeks  -WC       User Key  (r) = Recorded By, (t) = Taken By, (c) = Cosigned By    Initials Name Provider Type    Aylin Gross, PT Physical Therapist        Clinical Impression     Row Name 10/11/20 1111          Pain    Additional Documentation  Pain Scale: FACES Pre/Post-Treatment (Group)  -WC     Row Name 10/11/20 1111          Pain Scale: FACES Pre/Post-Treatment    Pain: FACES Scale, Pretreatment  2-->hurts little bit  -WC     Posttreatment Pain Rating  2-->hurts little bit  -WC     Pain Location  abdomen  -WC     Row Name 10/11/20 1111          Plan of Care Review    Plan of Care Reviewed With  patient  -WC     Progress  improving  -     Row Name 10/11/20 1111          Therapy Assessment/Plan (PT)    Rehab Potential (PT)  fair, will monitor progress closely  -     Predicted Duration of Therapy Intervention (PT)  Until D/C  -WC     Row Name 10/11/20 1111          Vital Signs    Pre Patient Position  Supine  -WC     Intra Patient Position  Sitting  -WC     Post Patient Position  Sitting  -WC     Row Name 10/11/20 1111          Positioning and Restraints    Pre-Treatment Position  in bed  -WC     Post Treatment Position  chair  -WC       User Key  (r) = Recorded By, (t) = Taken By, (c) = Cosigned By    Initials Name Provider Type    Aylin Gross, REKHA Physical Therapist        Outcome Measures     Row Name 10/11/20 1113          How much help from another person do you currently need...    Turning from your back to your side while in flat bed without using bedrails?  3  -WC     Moving from lying on back to sitting on the side of a flat bed without bedrails?  2  -WC     Moving to and from a bed to a  chair (including a wheelchair)?  2  -WC     Standing up from a chair using your arms (e.g., wheelchair, bedside chair)?  1  -WC     Climbing 3-5 steps with a railing?  1  -WC     To walk in hospital room?  1  -     AM-PAC 6 Clicks Score (PT)  10  -     Row Name 10/11/20 1113          Modified [place] Scale    Pre-Stroke Modified [place] Scale  5 - Severe disability.  Bedridden, incontinent, and requiring constant nursing care and attention.  -     Row Name 10/11/20 [facility]3          Functional Assessment    Outcome Measure Options  AM-PAC 6 Clicks Basic Mobility (PT);Modified [place]  -       User Key  (r) = Recorded By, (t) = Taken By, (c) = Cosigned By    Initials Name Provider Type     Aylin Matias PT Physical Therapist        Physical Therapy Education                 Title: PT OT SLP Therapies (Done)     Topic: Physical Therapy (Done)     Point: Mobility training (Done)     Learning Progress Summary           Patient Acceptance, E,TB, VU by  at 10/11/2020 1114                   Point: Home exercise program (Done)     Learning Progress Summary           Patient Acceptance, E,TB, VU by  at 10/11/2020 1114                   Point: Body mechanics (Done)     Learning Progress Summary           Patient Acceptance, E,TB, VU by  at 10/11/2020 1114                   Point: Precautions (Done)     Learning Progress Summary           Patient Acceptance, E,TB, VU by  at 10/11/2020 1114                               User Key     Initials Effective Dates Name Provider Type Discipline     01/07/20 -  Aylin Matias PT Physical Therapist PT              PT Recommendation and Plan  Pt is a 79 yo female ADMM with calculus of gallbladder, cholecystitis, altered mental status. Pt was in LTC since right LE FX and surgical repair.  Pt was having therapy at [place] but reports surgeon has not released her for wt bearing yet. Prior to FX pt reports she lived with her spouse. Pt was independent with mobility in the  home, dressing, grooming, bathing. Today pt was alert and oriented x 3. Pt c/o moderate pain in abdomen Pt needed MIN A rolling, MOD A supine to sit and MOD  A pivot to chair NWB right LE.  Pt is below baseline.  Recommendation is IP Rehab. PPE: Mask, gloves, eyeshield.     Planned Therapy Interventions (PT): balance training, bed mobility training, gait training, home exercise program, strengthening, postural re-education, neuromuscular re-education  Plan of Care Reviewed With: patient  Progress: improving     Time Calculation:   PT Charges     Row Name 10/11/20 1120             Time Calculation    Start Time  1001  -      Stop Time  1020  -      Time Calculation (min)  19 min  -      PT Received On  10/11/20  -      PT - Next Appointment  10/12/20  -      PT Goal Re-Cert Due Date  10/25/20  -        User Key  (r) = Recorded By, (t) = Taken By, (c) = Cosigned By    Initials Name Provider Type     Aylin Matias PT Physical Therapist        Therapy Charges for Today     Code Description Service Date Service Provider Modifiers Qty    64290285458 HC PT EVAL MOD COMPLEXITY 3 10/11/2020 Aylin Matias, PT GP 1          PT G-Codes  Outcome Measure Options: AM-PAC 6 Clicks Basic Mobility (PT), Modified [place]  AM-PAC 6 Clicks Score (PT): 10    Aylin Matias PT  10/11/2020

## 2020-10-12 ENCOUNTER — ANESTHESIA EVENT (OUTPATIENT)
Dept: PERIOP | Facility: HOSPITAL | Age: 80
End: 2020-10-12

## 2020-10-12 ENCOUNTER — APPOINTMENT (OUTPATIENT)
Dept: GENERAL RADIOLOGY | Facility: HOSPITAL | Age: 80
End: 2020-10-12

## 2020-10-12 ENCOUNTER — ANESTHESIA (OUTPATIENT)
Dept: PERIOP | Facility: HOSPITAL | Age: 80
End: 2020-10-12

## 2020-10-12 LAB
ABO GROUP BLD: NORMAL
ANION GAP SERPL CALCULATED.3IONS-SCNC: 9 MMOL/L (ref 5–15)
ANISOCYTOSIS BLD QL: NORMAL
APTT PPP: 25.9 SECONDS (ref 24–31)
BASOPHILS # BLD MANUAL: 0.07 10*3/MM3 (ref 0–0.2)
BASOPHILS NFR BLD AUTO: 1 % (ref 0–1.5)
BLD GP AB SCN SERPL QL: NEGATIVE
BUN SERPL-MCNC: 12 MG/DL (ref 8–23)
BUN SERPL-MCNC: ABNORMAL MG/DL
BUN/CREAT SERPL: ABNORMAL
CALCIUM SPEC-SCNC: 8.8 MG/DL (ref 8.6–10.5)
CHLORIDE SERPL-SCNC: 112 MMOL/L (ref 98–107)
CO2 SERPL-SCNC: 22 MMOL/L (ref 22–29)
CREAT SERPL-MCNC: 0.94 MG/DL (ref 0.57–1)
DEPRECATED RDW RBC AUTO: 62.1 FL (ref 37–54)
EOSINOPHIL # BLD MANUAL: 0.13 10*3/MM3 (ref 0–0.4)
EOSINOPHIL NFR BLD MANUAL: 2 % (ref 0.3–6.2)
ERYTHROCYTE [DISTWIDTH] IN BLOOD BY AUTOMATED COUNT: 17.5 % (ref 12.3–15.4)
GFR SERPL CREATININE-BSD FRML MDRD: 57 ML/MIN/1.73
GLUCOSE BLDC GLUCOMTR-MCNC: 126 MG/DL (ref 70–105)
GLUCOSE SERPL-MCNC: 124 MG/DL (ref 65–99)
HBA1C MFR BLD: 5.4 % (ref 3.5–5.6)
HCT VFR BLD AUTO: 33.2 % (ref 34–46.6)
HGB BLD-MCNC: 10.5 G/DL (ref 12–15.9)
INR PPP: 1.06 (ref 2–3)
LYMPHOCYTES # BLD MANUAL: 1.72 10*3/MM3 (ref 0.7–3.1)
LYMPHOCYTES NFR BLD MANUAL: 26 % (ref 19.6–45.3)
LYMPHOCYTES NFR BLD MANUAL: 8 % (ref 5–12)
MCH RBC QN AUTO: 31.8 PG (ref 26.6–33)
MCHC RBC AUTO-ENTMCNC: 31.7 G/DL (ref 31.5–35.7)
MCV RBC AUTO: 100.1 FL (ref 79–97)
MONOCYTES # BLD AUTO: 0.53 10*3/MM3 (ref 0.1–0.9)
NEUTROPHILS # BLD AUTO: 4.16 10*3/MM3 (ref 1.7–7)
NEUTROPHILS NFR BLD MANUAL: 63 % (ref 42.7–76)
PLAT MORPH BLD: NORMAL
PLATELET # BLD AUTO: 203 10*3/MM3 (ref 140–450)
PMV BLD AUTO: 7.3 FL (ref 6–12)
POTASSIUM SERPL-SCNC: 4.1 MMOL/L (ref 3.5–5.2)
PROTHROMBIN TIME: 11.6 SECONDS (ref 19.4–28.5)
RBC # BLD AUTO: 3.31 10*6/MM3 (ref 3.77–5.28)
RH BLD: POSITIVE
SCAN SLIDE: NORMAL
SODIUM SERPL-SCNC: 143 MMOL/L (ref 136–145)
T&S EXPIRATION DATE: NORMAL
WBC # BLD AUTO: 6.6 10*3/MM3 (ref 3.4–10.8)
WBC MORPH BLD: NORMAL

## 2020-10-12 PROCEDURE — A9270 NON-COVERED ITEM OR SERVICE: HCPCS | Performed by: SURGERY

## 2020-10-12 PROCEDURE — 85007 BL SMEAR W/DIFF WBC COUNT: CPT | Performed by: INTERNAL MEDICINE

## 2020-10-12 PROCEDURE — 96361 HYDRATE IV INFUSION ADD-ON: CPT

## 2020-10-12 PROCEDURE — 63710000001 METOPROLOL TARTRATE 50 MG TABLET: Performed by: SURGERY

## 2020-10-12 PROCEDURE — 25010000002 KETOROLAC TROMETHAMINE PER 15 MG: Performed by: STUDENT IN AN ORGANIZED HEALTH CARE EDUCATION/TRAINING PROGRAM

## 2020-10-12 PROCEDURE — 99226 PR SBSQ OBSERVATION CARE/DAY 35 MINUTES: CPT | Performed by: INTERNAL MEDICINE

## 2020-10-12 PROCEDURE — 88304 TISSUE EXAM BY PATHOLOGIST: CPT | Performed by: SURGERY

## 2020-10-12 PROCEDURE — 85025 COMPLETE CBC W/AUTO DIFF WBC: CPT | Performed by: INTERNAL MEDICINE

## 2020-10-12 PROCEDURE — 82962 GLUCOSE BLOOD TEST: CPT

## 2020-10-12 PROCEDURE — 25010000002 ONDANSETRON PER 1 MG: Performed by: NURSE PRACTITIONER

## 2020-10-12 PROCEDURE — 47562 LAPAROSCOPIC CHOLECYSTECTOMY: CPT | Performed by: SURGERY

## 2020-10-12 PROCEDURE — 71045 X-RAY EXAM CHEST 1 VIEW: CPT

## 2020-10-12 PROCEDURE — 63710000001 DILTIAZEM CD 120 MG CAPSULE SUSTAINED-RELEASE 24 HR: Performed by: SURGERY

## 2020-10-12 PROCEDURE — 80048 BASIC METABOLIC PNL TOTAL CA: CPT | Performed by: INTERNAL MEDICINE

## 2020-10-12 PROCEDURE — 96376 TX/PRO/DX INJ SAME DRUG ADON: CPT

## 2020-10-12 PROCEDURE — 99214 OFFICE O/P EST MOD 30 MIN: CPT | Performed by: INTERNAL MEDICINE

## 2020-10-12 PROCEDURE — 25010000002 HEPARIN (PORCINE) PER 1000 UNITS: Performed by: SURGERY

## 2020-10-12 PROCEDURE — G0378 HOSPITAL OBSERVATION PER HR: HCPCS

## 2020-10-12 PROCEDURE — 25010000002 ONDANSETRON PER 1 MG: Performed by: SURGERY

## 2020-10-12 PROCEDURE — 63710000001 TOPIRAMATE 100 MG TABLET: Performed by: SURGERY

## 2020-10-12 PROCEDURE — 25010000002 FENTANYL CITRATE (PF) 100 MCG/2ML SOLUTION: Performed by: ANESTHESIOLOGY

## 2020-10-12 PROCEDURE — 86901 BLOOD TYPING SEROLOGIC RH(D): CPT | Performed by: SURGERY

## 2020-10-12 PROCEDURE — 85610 PROTHROMBIN TIME: CPT | Performed by: SURGERY

## 2020-10-12 PROCEDURE — 63710000001 HYDRALAZINE 10 MG TABLET: Performed by: SURGERY

## 2020-10-12 PROCEDURE — 86900 BLOOD TYPING SEROLOGIC ABO: CPT | Performed by: SURGERY

## 2020-10-12 PROCEDURE — 85730 THROMBOPLASTIN TIME PARTIAL: CPT | Performed by: SURGERY

## 2020-10-12 PROCEDURE — 63710000001 HYDROCODONE-ACETAMINOPHEN 5-325 MG TABLET: Performed by: SURGERY

## 2020-10-12 PROCEDURE — 86850 RBC ANTIBODY SCREEN: CPT | Performed by: SURGERY

## 2020-10-12 PROCEDURE — 25010000002 PROPOFOL 10 MG/ML EMULSION: Performed by: ANESTHESIOLOGY

## 2020-10-12 PROCEDURE — 25010000002 MORPHINE PER 10 MG: Performed by: INTERNAL MEDICINE

## 2020-10-12 DEVICE — LIGACLIP 10-M/L, 10MM ENDOSCOPIC ROTATING MULTIPLE CLIP APPLIERS
Type: IMPLANTABLE DEVICE | Site: ABDOMEN | Status: FUNCTIONAL
Brand: LIGACLIP

## 2020-10-12 RX ORDER — ACETAMINOPHEN 325 MG/1
650 TABLET ORAL EVERY 4 HOURS PRN
Status: DISCONTINUED | OUTPATIENT
Start: 2020-10-12 | End: 2020-10-13 | Stop reason: HOSPADM

## 2020-10-12 RX ORDER — ACETAMINOPHEN 650 MG/1
650 SUPPOSITORY RECTAL EVERY 4 HOURS PRN
Status: DISCONTINUED | OUTPATIENT
Start: 2020-10-12 | End: 2020-10-13 | Stop reason: HOSPADM

## 2020-10-12 RX ORDER — SODIUM CHLORIDE 9 MG/ML
20 INJECTION, SOLUTION INTRAVENOUS ONCE
Status: COMPLETED | OUTPATIENT
Start: 2020-10-12 | End: 2020-10-12

## 2020-10-12 RX ORDER — MORPHINE SULFATE 4 MG/ML
4 INJECTION, SOLUTION INTRAMUSCULAR; INTRAVENOUS
Status: DISCONTINUED | OUTPATIENT
Start: 2020-10-12 | End: 2020-10-13 | Stop reason: HOSPADM

## 2020-10-12 RX ORDER — PROMETHAZINE HYDROCHLORIDE 12.5 MG/1
12.5 TABLET ORAL EVERY 6 HOURS PRN
Status: DISCONTINUED | OUTPATIENT
Start: 2020-10-12 | End: 2020-10-13 | Stop reason: HOSPADM

## 2020-10-12 RX ORDER — ONDANSETRON 4 MG/1
4 TABLET, FILM COATED ORAL EVERY 6 HOURS PRN
Status: DISCONTINUED | OUTPATIENT
Start: 2020-10-12 | End: 2020-10-13 | Stop reason: HOSPADM

## 2020-10-12 RX ORDER — NALOXONE HCL 0.4 MG/ML
0.4 VIAL (ML) INJECTION
Status: DISCONTINUED | OUTPATIENT
Start: 2020-10-12 | End: 2020-10-13 | Stop reason: HOSPADM

## 2020-10-12 RX ORDER — PROMETHAZINE HYDROCHLORIDE 12.5 MG/1
12.5 SUPPOSITORY RECTAL EVERY 6 HOURS PRN
Status: DISCONTINUED | OUTPATIENT
Start: 2020-10-12 | End: 2020-10-13 | Stop reason: HOSPADM

## 2020-10-12 RX ORDER — SODIUM CHLORIDE 9 MG/ML
INJECTION, SOLUTION INTRAVENOUS CONTINUOUS PRN
Status: DISCONTINUED | OUTPATIENT
Start: 2020-10-12 | End: 2020-10-12 | Stop reason: SURG

## 2020-10-12 RX ORDER — BUPIVACAINE HYDROCHLORIDE 2.5 MG/ML
INJECTION, SOLUTION EPIDURAL; INFILTRATION; INTRACAUDAL AS NEEDED
Status: DISCONTINUED | OUTPATIENT
Start: 2020-10-12 | End: 2020-10-12 | Stop reason: HOSPADM

## 2020-10-12 RX ORDER — PHENYLEPHRINE HCL IN 0.9% NACL 0.5 MG/5ML
SYRINGE (ML) INTRAVENOUS AS NEEDED
Status: DISCONTINUED | OUTPATIENT
Start: 2020-10-12 | End: 2020-10-12 | Stop reason: SURG

## 2020-10-12 RX ORDER — HYDROCODONE BITARTRATE AND ACETAMINOPHEN 5; 325 MG/1; MG/1
1 TABLET ORAL EVERY 4 HOURS PRN
Status: DISCONTINUED | OUTPATIENT
Start: 2020-10-12 | End: 2020-10-13 | Stop reason: HOSPADM

## 2020-10-12 RX ORDER — PROPOFOL 10 MG/ML
VIAL (ML) INTRAVENOUS AS NEEDED
Status: DISCONTINUED | OUTPATIENT
Start: 2020-10-12 | End: 2020-10-12 | Stop reason: SURG

## 2020-10-12 RX ORDER — FENTANYL CITRATE 50 UG/ML
25 INJECTION, SOLUTION INTRAMUSCULAR; INTRAVENOUS
Status: DISCONTINUED | OUTPATIENT
Start: 2020-10-12 | End: 2020-10-12 | Stop reason: HOSPADM

## 2020-10-12 RX ORDER — CLINDAMYCIN PHOSPHATE 150 MG/ML
300 INJECTION, SOLUTION INTRAVENOUS EVERY 8 HOURS
Status: DISCONTINUED | OUTPATIENT
Start: 2020-10-12 | End: 2020-10-13 | Stop reason: HOSPADM

## 2020-10-12 RX ORDER — NALOXONE HCL 0.4 MG/ML
0.1 VIAL (ML) INJECTION
Status: DISCONTINUED | OUTPATIENT
Start: 2020-10-12 | End: 2020-10-13 | Stop reason: HOSPADM

## 2020-10-12 RX ORDER — HYDROMORPHONE HCL 110MG/55ML
0.5 PATIENT CONTROLLED ANALGESIA SYRINGE INTRAVENOUS
Status: DISCONTINUED | OUTPATIENT
Start: 2020-10-12 | End: 2020-10-13 | Stop reason: HOSPADM

## 2020-10-12 RX ORDER — OXYCODONE HYDROCHLORIDE 5 MG/1
10 TABLET ORAL EVERY 4 HOURS PRN
Status: DISCONTINUED | OUTPATIENT
Start: 2020-10-12 | End: 2020-10-13 | Stop reason: HOSPADM

## 2020-10-12 RX ORDER — FENTANYL CITRATE 50 UG/ML
INJECTION, SOLUTION INTRAMUSCULAR; INTRAVENOUS AS NEEDED
Status: DISCONTINUED | OUTPATIENT
Start: 2020-10-12 | End: 2020-10-12 | Stop reason: SURG

## 2020-10-12 RX ORDER — LIDOCAINE HYDROCHLORIDE 20 MG/ML
INJECTION, SOLUTION EPIDURAL; INFILTRATION; INTRACAUDAL; PERINEURAL AS NEEDED
Status: DISCONTINUED | OUTPATIENT
Start: 2020-10-12 | End: 2020-10-12 | Stop reason: SURG

## 2020-10-12 RX ORDER — KETOROLAC TROMETHAMINE 30 MG/ML
INJECTION, SOLUTION INTRAMUSCULAR; INTRAVENOUS AS NEEDED
Status: DISCONTINUED | OUTPATIENT
Start: 2020-10-12 | End: 2020-10-12 | Stop reason: SURG

## 2020-10-12 RX ORDER — DILTIAZEM HYDROCHLORIDE 5 MG/ML
10 INJECTION INTRAVENOUS
Status: DISCONTINUED | OUTPATIENT
Start: 2020-10-12 | End: 2020-10-13 | Stop reason: HOSPADM

## 2020-10-12 RX ORDER — ROCURONIUM BROMIDE 10 MG/ML
INJECTION, SOLUTION INTRAVENOUS AS NEEDED
Status: DISCONTINUED | OUTPATIENT
Start: 2020-10-12 | End: 2020-10-12 | Stop reason: SURG

## 2020-10-12 RX ORDER — ONDANSETRON 2 MG/ML
4 INJECTION INTRAMUSCULAR; INTRAVENOUS ONCE AS NEEDED
Status: DISCONTINUED | OUTPATIENT
Start: 2020-10-12 | End: 2020-10-12 | Stop reason: HOSPADM

## 2020-10-12 RX ORDER — ONDANSETRON 2 MG/ML
4 INJECTION INTRAMUSCULAR; INTRAVENOUS EVERY 6 HOURS PRN
Status: DISCONTINUED | OUTPATIENT
Start: 2020-10-12 | End: 2020-10-13 | Stop reason: HOSPADM

## 2020-10-12 RX ORDER — PANTOPRAZOLE SODIUM 40 MG/1
40 TABLET, DELAYED RELEASE ORAL
Status: DISCONTINUED | OUTPATIENT
Start: 2020-10-13 | End: 2020-10-13 | Stop reason: HOSPADM

## 2020-10-12 RX ADMIN — ROCURONIUM BROMIDE 40 MG: 10 INJECTION, SOLUTION INTRAVENOUS at 16:58

## 2020-10-12 RX ADMIN — METOPROLOL TARTRATE 50 MG: 50 TABLET, FILM COATED ORAL at 00:08

## 2020-10-12 RX ADMIN — CLINDAMYCIN 300 MG: 150 INJECTION, SOLUTION INTRAMUSCULAR; INTRAVENOUS at 21:33

## 2020-10-12 RX ADMIN — DILTIAZEM HYDROCHLORIDE 300 MG: 180 CAPSULE, COATED, EXTENDED RELEASE ORAL at 00:08

## 2020-10-12 RX ADMIN — SUGAMMADEX 200 MG: 100 INJECTION, SOLUTION INTRAVENOUS at 17:55

## 2020-10-12 RX ADMIN — METOPROLOL TARTRATE 50 MG: 50 TABLET, FILM COATED ORAL at 07:27

## 2020-10-12 RX ADMIN — SODIUM CHLORIDE: 0.9 INJECTION, SOLUTION INTRAVENOUS at 16:50

## 2020-10-12 RX ADMIN — ROCURONIUM BROMIDE 10 MG: 10 INJECTION, SOLUTION INTRAVENOUS at 17:23

## 2020-10-12 RX ADMIN — MORPHINE SULFATE 2 MG: 4 INJECTION INTRAVENOUS at 01:40

## 2020-10-12 RX ADMIN — FENTANYL CITRATE 50 MCG: 50 INJECTION, SOLUTION INTRAMUSCULAR; INTRAVENOUS at 17:26

## 2020-10-12 RX ADMIN — DILTIAZEM HYDROCHLORIDE 300 MG: 180 CAPSULE, COATED, EXTENDED RELEASE ORAL at 21:53

## 2020-10-12 RX ADMIN — Medication 10 ML: at 21:34

## 2020-10-12 RX ADMIN — KETOROLAC TROMETHAMINE 30 MG: 30 INJECTION, SOLUTION INTRAMUSCULAR at 17:59

## 2020-10-12 RX ADMIN — DEXTROSE MONOHYDRATE AND SODIUM CHLORIDE 100 ML/HR: 5; .9 INJECTION, SOLUTION INTRAVENOUS at 05:58

## 2020-10-12 RX ADMIN — LIDOCAINE HYDROCHLORIDE 100 MG: 20 INJECTION, SOLUTION EPIDURAL; INFILTRATION; INTRACAUDAL; PERINEURAL at 16:58

## 2020-10-12 RX ADMIN — METOPROLOL TARTRATE 50 MG: 50 TABLET, FILM COATED ORAL at 21:32

## 2020-10-12 RX ADMIN — FENTANYL CITRATE 25 MCG: 50 INJECTION, SOLUTION INTRAMUSCULAR; INTRAVENOUS at 18:01

## 2020-10-12 RX ADMIN — TOPIRAMATE 100 MG: 100 TABLET, FILM COATED ORAL at 00:08

## 2020-10-12 RX ADMIN — HYDRALAZINE HYDROCHLORIDE 20 MG: 10 TABLET, FILM COATED ORAL at 21:32

## 2020-10-12 RX ADMIN — SODIUM CHLORIDE 20 ML/HR: 9 INJECTION, SOLUTION INTRAVENOUS at 14:44

## 2020-10-12 RX ADMIN — PHENYLEPHRINE HYDROCHLORIDE 100 MCG: 10 INJECTION INTRAVENOUS at 17:08

## 2020-10-12 RX ADMIN — TOPIRAMATE 100 MG: 100 TABLET, FILM COATED ORAL at 21:36

## 2020-10-12 RX ADMIN — HYDRALAZINE HYDROCHLORIDE 20 MG: 10 TABLET, FILM COATED ORAL at 07:26

## 2020-10-12 RX ADMIN — HYDRALAZINE HYDROCHLORIDE 20 MG: 10 TABLET, FILM COATED ORAL at 00:07

## 2020-10-12 RX ADMIN — ONDANSETRON 4 MG: 2 INJECTION INTRAMUSCULAR; INTRAVENOUS at 17:56

## 2020-10-12 RX ADMIN — HYDROCODONE BITARTRATE AND ACETAMINOPHEN 1 TABLET: 5; 325 TABLET ORAL at 21:33

## 2020-10-12 RX ADMIN — MORPHINE SULFATE 2 MG: 4 INJECTION INTRAVENOUS at 04:44

## 2020-10-12 RX ADMIN — Medication 10 ML: at 07:28

## 2020-10-12 RX ADMIN — PROPOFOL 120 MG: 10 INJECTION, EMULSION INTRAVENOUS at 16:58

## 2020-10-12 RX ADMIN — FENTANYL CITRATE 25 MCG: 50 INJECTION, SOLUTION INTRAMUSCULAR; INTRAVENOUS at 17:57

## 2020-10-12 NOTE — PLAN OF CARE
Goal Outcome Evaluation:  Plan of Care Reviewed With: patient  Progress: no change     Patient lying quietly with eyes closed. Pt is in A-fib, but has been asymptomatic. HR is now in the 90 - 100s, but was in the 160s earlier. B/P has improved. Pt is now a 2 on the MEWS, but was a 4 earlier. Home meds had not been reviewed and/or ordered since patient was admitted on 10/10/20 at 1934. Pt pain has been high tonight, but has been controlled by IV pain med. Will continue to monitor.

## 2020-10-12 NOTE — PLAN OF CARE
Defer OT eval until tomorrow, after cholecystectomy. Pt still having ABD pain & going for Sx in 30 min. Provided oral & denture care items to pt & grndson at bedside. PPE worn: mask, gloves, safety glasses.

## 2020-10-12 NOTE — PROGRESS NOTES
Discharge Planning Assessment   Luis Felipe     Patient Name: Hazel Bucio  MRN: [id number]  Today's Date: 10/12/2020    Admit Date: 10/10/2020    Discharge Needs Assessment     Row Name 10/12/20 4586       Living Environment    Lives With  facility resident    Name(s) of Who Lives With Patient  Patient is from [facility]    Current Living Arrangements  extended care facility    Primary Care Provided by  other (see comments)    Provides Primary Care For  no one;no one, unable/limited ability to care for self    Family Caregiver if Needed  none    Able to Return to Prior Arrangements  yes       Resource/Environmental Concerns    Resource/Environmental Concerns  none    Transportation Concerns  car, none       Transition Planning    Patient/Family Anticipates Transition to  inpatient rehabilitation facility    Patient/Family Anticipated Services at Transition  none    Transportation Anticipated  health plan transportation       Discharge Needs Assessment    Readmission Within the Last 30 Days  no previous admission in last 30 days    Equipment Currently Used at Home  none    Concerns to be Addressed  discharge planning    Anticipated Changes Related to Illness  inability to care for self    Equipment Needed After Discharge  none    Patient's Choice of Community Agency(s)  Patient is a return to [facility].    Discharge Coordination/Progress  Patient is a return to [facility]. No PASRR or Precert required.        Discharge Plan     Row Name 10/12/20 0515       Plan    Plan  Patient is a return to [facility]. No PASRR or Precert required.    Patient/Family in Agreement with Plan  yes    Plan Comments  Spoke with patient's son over the telephone. Son verified that patient is at [facility] for Rehab. He also verified that she is supposed to return to [facility] for rehab. Barriers to discharge: surgery.        Continued Care and Services - Admitted Since 10/10/2020     Destination     Service Provider Request Status Selected  Services Address Phone Fax    [facility]  [facility] [address] [facility] [place] [address] [phone]                    Expected Discharge Date and Time     Expected Discharge Date Expected Discharge Time    Oct 13, 2020         Demographic Summary     Row Name 10/12/20 1303       General Information    Admission Type  observation    Arrived From  emergency department    Required Notices Provided  Observation Status Notice    Referral Source  admission list    Preferred Language  English     Used During This Interaction  no       Contact Information    Permission Granted to Share Info With              Patient Forms     Row Name 10/12/20 1316       Patient Forms    Patient Observation Letter  Delivered Moon given 10/10              Anna Naegele RN Case Manager  [address]  [address] [phone]  office  [phone]  fax  [email or web address]  [email or web address]

## 2020-10-12 NOTE — PROGRESS NOTES
[facility] Daily Progress Note      Hospitalist Team  LOS 0 days      Patient Care Team:  Lizzy Francis MD as PCP - General (Family Medicine)    Patient Location: 4101/1      Subjective   Subjective     Chief Complaint / Subjective  Chief Complaint   Patient presents with   • Abdominal Pain         Brief Synopsis of Hospital Course/HPI    80-year-old woman with multiple comorbidities including diabetes mellitus type 2-diet controlled, bladder cancer status post right nephrectomy and cystectomy and paroxysmal atrial fibrillation.    She presented to the emergency room on 10/11/2020 with complaints of abdominal pain.  Abdominal pain is mostly in the right upper and lower quadrants.  It is intermittent and of moderate intensity.  Pain started a day prior to presentation.  No known aggravating or relieving factors.  After evaluating in the ED, patient was admitted for further care    Review of records with summary: The patient is currently a resident at an inpatient physical rehab center after falling several months ago and fracturing her right lower extremity in 4 places with hospitalization on 8/27/2020.  Prior to that the patient lived in her home alone.  She has a grandson who is around 32 years old who plans to move in with her and assist her once she is discharged home.  She also had a recent hospitalization 9/7/2020 through 9/14/2020 with length of stay 5 days for confusion, encephalopathy acute kidney injury.  Started on hydralazine, Roxicodone, stop taking Benadryl, sotalol, follow-up with Lizzy Holloway, pacemaker placed 2014, [employer] in office November 17, Jack Carrizales.  She also had recent hospitalization echo 4/28/2020 showing normal LV function      Date::    10/12/2020  Patient seen and examined  Continues with intermittent right upper quadrant abdominal pain  Has been evaluated by the general surgeon and laparoscopic cholecystectomy planned for  "today      Review of Systems   Constitution: Negative for chills and fever.   HENT: Negative for congestion.    Eyes: Negative for blurred vision.   Cardiovascular: Negative for chest pain.   Respiratory: Negative for shortness of breath.    Endocrine: Negative for cold intolerance and heat intolerance.   Musculoskeletal: Positive for arthritis.   Gastrointestinal: Positive for abdominal pain. Negative for nausea and vomiting.   Genitourinary: Negative for dysuria.   Neurological: Positive for weakness.   Psychiatric/Behavioral: Negative for altered mental status.         Objective   Objective      Vital Signs  Temp:  [98.4 °F (36.9 °C)-98.8 °F (37.1 °C)] 98.4 °F (36.9 °C)  Heart Rate:  [100-166] 100  Resp:  [16-18] 16  BP: (135-157)/() 157/81  Oxygen Therapy  SpO2: 96 %  Pulse Oximetry Type: Intermittent  Device (Oxygen Therapy): room air  Flowsheet Rows      First Filed Value   Admission Height  165.1 cm (65\") Documented at 10/10/2020 1937   Admission Weight  71.7 kg (158 lb) Documented at 10/10/2020 1937        Intake & Output (last 3 days)       10/09 0701 - 10/10 0700 10/10 0701 - 10/11 0700 10/11 0701 - 10/12 0700 10/12 0701 - 10/13 0700    P.O.  240      I.V. (mL/kg)   985 (13.6)     Total Intake(mL/kg)  240 (3.2) 985 (13.6)     Urine (mL/kg/hr)   1250 (0.7)     Total Output   1250     Net  +240 -265                 Lines, Drains & Airways    Active LDAs     Name:   Placement date:   Placement time:   Site:   Days:    Peripheral IV 10/12/20 0135 Anterior;Distal;Left;Upper Arm   10/12/20    0135    Arm   less than 1    Urostomy RLQ   02/28/11    0000    RLQ   3514                  Physical Exam:    Physical Exam  Vitals signs reviewed.   Constitutional:       General: She is not in acute distress.  HENT:      Head: Normocephalic and atraumatic.      Nose: Nose normal.      Mouth/Throat:      Mouth: Mucous membranes are moist.   Eyes:      Extraocular Movements: Extraocular movements intact.      " Conjunctiva/sclera: Conjunctivae normal.      Pupils: Pupils are equal, round, and reactive to light.   Neck:      Musculoskeletal: Neck supple.   Cardiovascular:      Rate and Rhythm: Normal rate and regular rhythm.   Pulmonary:      Effort: No respiratory distress.      Breath sounds: Normal breath sounds.   Abdominal:      General: Bowel sounds are normal.      Palpations: Abdomen is soft.      Tenderness: There is abdominal tenderness.      Comments: Urostomy bag in place with yellow urine.  Also has parastomal hernia   Musculoskeletal:      Comments: Degenerative changes   Skin:     General: Skin is warm and dry.   Neurological:      Mental Status: She is alert and oriented to person, place, and time.   Psychiatric:         Mood and Affect: Mood normal.              Wounds (last 24 hours)      LDA Wound     Row Name 10/12/20 0716 10/12/20 0319 10/11/20 1917       Wound 08/28/20 1720 sacral spine    Wound - Properties Group Placement Date: 08/28/20  -LB Placement Time: 1720  -LB Present on Hospital Admission: --  -LB, this was discovered when pt arrived to floor from surgery, pt was a transfer from [facility]   Location: sacral spine  -LB    Dressing Appearance  no drainage  -JE  --  no drainage  -CP    Closure  Adhesive bandage  -JE  --  Adhesive bandage  -CP    Base  moist;pink  -JE  --  moist;pink  -CP    Periwound  moist;pink  -JE  --  --    Periwound Temperature  warm  -JE  --  --    Drainage Amount  --  --  none  -CP    Dressing Care  --  --  dressing changed;foam;silicone  -CP    Retired Wound - Properties Group Date first assessed: 08/28/20  -LB Time first assessed: 1720  -LB Present on Hospital Admission: --  -LB, this was discovered when pt arrived to floor from surgery, pt was a transfer from [facility]   Location: sacral spine  -LB Retired Stage, Pressure Injury : Stage 2  -LB       Wound 10/11/20 2230 Left lower arm Other (comment)    Wound - Properties Group Placement Date: 10/11/20  -CP Placement Time: 2230   -CP Present on Hospital Admission: N  -CP Side: Left  -CP Orientation: lower  -CP Location: arm  -CP Primary Wound Type: Other  -CP, Infiltrated IV site     Dressing Appearance  open to air  -JE  open to air  -CP  --    Closure  Open to air  -JE  Open to air  -CP  --    Base  blanchable;moist;other (see comments)  -JE  blanchable;moist;other (see comments) swollen  -CP  --    Retired Wound - Properties Group Date first assessed: 10/11/20  -CP Time first assessed: 2230  -CP Present on Hospital Admission: N  -CP Side: Left  -CP Location: arm  -CP Primary Wound Type: Other  -CP, Infiltrated IV site       User Key  (r) = Recorded By, (t) = Taken By, (c) = Cosigned By    Initials Name Provider Type    Bella Macias RN Registered Nurse    Alexander Whitehead RN Registered Nurse    Mary Ellen Patterson RN Registered Nurse          Procedures:    Procedure(s):  CHOLECYSTECTOMY LAPAROSCOPIC          Results Review:     I reviewed the patient's new clinical results.      Lab Results (last 24 hours)     Procedure Component Value Units Date/Time    Protime-INR [[id number]] Updated: 10/12/20 0806    Specimen: Blood     aPTT [[id number]] Updated: 10/12/20 0806    Specimen: Blood     CBC & Differential [[id number]]  (Abnormal) Collected: 10/12/20 0710    Specimen: Blood Updated: 10/12/20 0802    Narrative:      The following orders were created for panel order CBC & Differential.  Procedure                               Abnormality         Status                     ---------                               -----------         ------                     CBC Auto Differential[[id number]]        Abnormal            Final result                 Please view results for these tests on the individual orders.    CBC Auto Differential [[id number]]  (Abnormal) Collected: 10/12/20 0710    Specimen: Blood Updated: 10/12/20 0802     WBC 6.60 10*3/mm3      RBC 3.31 10*6/mm3      Hemoglobin 10.5 g/dL      Hematocrit 33.2 %      MCV  100.1 fL      MCH 31.8 pg      MCHC 31.7 g/dL      RDW 17.5 %      RDW-SD 62.1 fl      MPV 7.3 fL      Platelets 203 10*3/mm3     Scan Slide [[id number]] Collected: 10/12/20 0710    Specimen: Blood Updated: 10/12/20 0802     Scan Slide --     Comment: See Manual Differential Results       Manual Differential [[id number]] Collected: 10/12/20 0710    Specimen: Blood Updated: 10/12/20 0802     Neutrophil % 63.0 %      Lymphocyte % 26.0 %      Monocyte % 8.0 %      Eosinophil % 2.0 %      Basophil % 1.0 %      Neutrophils Absolute 4.16 10*3/mm3      Lymphocytes Absolute 1.72 10*3/mm3      Monocytes Absolute 0.53 10*3/mm3      Eosinophils Absolute 0.13 10*3/mm3      Basophils Absolute 0.07 10*3/mm3      Anisocytosis Slight/1+     WBC Morphology Normal     Platelet Morphology Normal    Basic Metabolic Panel [[id number]]  (Abnormal) Collected: 10/12/20 0710    Specimen: Blood Updated: 10/12/20 0750     Glucose 124 mg/dL      BUN --     Comment: Testing performed by alternate method        Creatinine 0.94 mg/dL      Sodium 143 mmol/L      Potassium 4.1 mmol/L      Chloride 112 mmol/L      CO2 22.0 mmol/L      Calcium 8.8 mg/dL      eGFR Non African Amer 57 mL/min/1.73      BUN/Creatinine Ratio --     Comment: Testing not performed        Anion Gap 9.0 mmol/L     Narrative:      GFR Normal >60  Chronic Kidney Disease <60  Kidney Failure <15      BUN [[id number]] Collected: 10/12/20 0710    Specimen: Blood Updated: 10/12/20 0750    Potassium [[id number]]  (Abnormal) Collected: 10/11/20 1503    Specimen: Blood Updated: 10/11/20 1601     Potassium 5.3 mmol/L      Comment: Result checked            No results found for: HGBA1C  Results from last 7 days   Lab Units 10/10/20  2018   INR  1.02*           Lab Results   Component Value Date    LIPASE 23 10/10/2020     Lab Results   Component Value Date    CHOL 156 10/11/2020    TRIG 91 10/11/2020    HDL 63 (H) 10/11/2020    LDL 76 10/11/2020       No results found for: INTRAOP, PREDX,  FINALDX, COMDX    Microbiology Results (last 10 days)     ** No results found for the last 240 hours. **          ECG/EMG Results (most recent)     None          Results for orders placed during the hospital encounter of 09/07/20   Duplex Carotid Ultrasound CAR    Narrative · Right internal carotid artery is normal. Elevated velocities are noted   in the distal right internal carotid artery. However, this is felt to be   related to tortuosity as opposed to a diseased segment.  · Left internal carotid artery is normal.          Results for orders placed during the hospital encounter of 04/28/20   Adult Transthoracic Echo Complete W/ Cont if Necessary Per Protocol    Narrative · Left ventricular systolic function is normal.  · Right ventricular cavity is borderline dilated.  · Mild pulmonic valve regurgitation is present.     Grossly normal echocardiographic exam for age demonstrating normal chamber   dimensions aside from borderline RVE with good global contractility no   specific valvular pathology.  Doppler exam shows mild pulmonic insufficiency and trivial to mild TR with   borderline RV systolic pressure, otherwise normal study for age         Ct Abdomen Pelvis Without Contrast    Result Date: 10/10/2020  1. Distended, stone containing gallbladder with subtle pericholecystic edema. Correlation with LFTs is recommended. Consider ultrasound or HIDA scan for further evaluation. 2. Parastomal hernia containing nonobstructed large bowel. 3. Status post cystectomy with urinary diversion and right lower quadrant urostomy. 4. Status post right nephrectomy and hysterectomy as well. 5. Additional chronic findings as above. Electronically signed by:  Anoop Fernandez M.D.  10/10/2020 7:43 PM    Xr Chest 1 View    Result Date: 10/12/2020  1.Enlarged cardiac silhouette. 2.No acute pulmonary abnormality is seen.  Electronically Signed By-Floridalma East On:10/12/2020 8:39 AM This report was finalized on [id number] by  Floridalma  Cruzito, .    Us Abdomen Limited    Result Date: 10/11/2020   1. There is echogenic sludge and small stones within the gallbladder. 2. The right kidney is surgically absent.  Electronically Signed By-Hilario Gonzalez On:10/11/2020 10:20 AM This report was finalized on [id number] by  Hilario Gonzalez, .          Xrays, labs reviewed personally by physician.    Medication Review:   I have reviewed the patient's current medication list      Scheduled Meds  dilTIAZem CD, 300 mg, Oral, Daily  hydrALAZINE, 20 mg, Oral, Q8H  metoprolol tartrate, 50 mg, Oral, Q8H  sodium chloride, 10 mL, Intravenous, Q12H  topiramate, 100 mg, Oral, Nightly        Meds Infusions  dextrose 5 % and sodium chloride 0.9 %, 100 mL/hr, Last Rate: 100 mL/hr (10/12/20 0558)        Meds PRN  •  acetaminophen **OR** acetaminophen **OR** acetaminophen  •  dextrose  •  dextrose  •  dilTIAZem  •  glucagon (human recombinant)  •  influenza vaccine  •  magnesium sulfate **OR** magnesium sulfate **OR** magnesium sulfate  •  Morphine  •  ondansetron **OR** ondansetron  •  oxyCODONE  •  oxyCODONE  •  potassium chloride **OR** potassium chloride **OR** potassium chloride  •  [COMPLETED] Insert peripheral IV **AND** sodium chloride  •  sodium chloride        Assessment/Plan   Assessment/Plan     Active Hospital Problems    Diagnosis  POA   • Abdominal pain [R10.9]  Yes      Resolved Hospital Problems   No resolved problems to display.       MEDICAL DECISION MAKING COMPLEXITY BY PROBLEM:     Abdominal pain  Due to acute cholecystitis with cholelithiasis   She has multi-antibiotics allergies  Will give clindamycin  General surgeon following and plans laparoscopic cholecystectomy later today today    Reported diabetes mellitus type 2-diet controlled  A1c 5.4  Discontinue sliding scale insulin     History of bladder cancer  Status post right nephrectomy, cystectomy and ileal conduit  Patient was treated with chemotherapy as well as radiation     Paroxysmal Atrial  fibrillation  Rate controlled  Patient had been on Coumadin back in September but the Coumadin was placed on hold due to an elevated INR.   INR on presentation 1.02  We reevaluate need for continued anticoagulation after laparoscopic cholecystectomy especially as patient has a history of falls         VTE Prophylaxis -SCD      Mechanical Order History:      Ordered        10/11/20 1850  Place Sequential Compression Device  Once         10/11/20 1850  Maintain Sequential Compression Device  Continuous         10/10/20 2251  Place Sequential Compression Device  Once         10/10/20 2251  Maintain Sequential Compression Device  Continuous                 Pharmalogical Order History:     None            Code Status -   Code Status and Medical Interventions:   Ordered at: 10/10/20 2251     Code Status:    CPR     Medical Interventions (Level of Support Prior to Arrest):    Full       This patient has been examined with appropriate PPE . 10/12/20        Discharge Planning  Pending clinical course          Electronically signed by Brian Burnett MD, 10/12/20, 10:01 EDT.  [facility] Hospitalist Team

## 2020-10-12 NOTE — ANESTHESIA PROCEDURE NOTES
Airway  Urgency: elective    Date/Time: 10/12/2020 5:00 PM  Airway not difficult    General Information and Staff    Patient location during procedure: OR    Indications and Patient Condition  Indications for airway management: airway protection    Preoxygenated: yes  Mask difficulty assessment: 0 - not attempted    Final Airway Details  Final airway type: endotracheal airway      Successful airway: ETT  Cuffed: yes   Successful intubation technique: direct laryngoscopy  Facilitating devices/methods: intubating stylet  Endotracheal tube insertion site: oral  Blade: Day  Blade size: 3  ETT size (mm): 7.0  Cormack-Lehane Classification: grade I - full view of glottis  Placement verified by: capnometry   Cuff volume (mL): 7  Measured from: lips  ETT/EBT  to lips (cm): 21  Number of attempts at approach: 1  Assessment: lips, teeth, and gum same as pre-op and atraumatic intubation

## 2020-10-12 NOTE — DISCHARGE PLACEMENT REQUEST
"Nell Liu (80 y.o. Female)     Date of Birth Social Security Number Address Home Phone MRN    1940  [address] [place] [address]  [address]   [place] [address] [phone] [id number]    Amish Marital Status          Buddhism        Admission Date Admission Type Admitting Provider Attending Provider Department, Room/Bed    10/10/20 Emergency Brian Burnett MD Ozor, Uchenna, MD [facility] SURGICAL INPATIENT, 4101/1    Discharge Date Discharge Disposition Discharge Destination                       Attending Provider: Brian Burnett MD    Allergies: Amoxicillin, Ciprofloxacin, Penicillins, Sulfa Antibiotics, Cephalexin, Clavulanic Acid, Codeine, Contrast Dye, Doxycycline, Fluconazole, Iodine, Macrobid [Nitrofurantoin], Tobramycin, Trimethoprim    Isolation: None   Infection: None   Code Status: CPR    Ht: 165.1 cm (65\")   Wt: 72.4 kg (159 lb 9.8 oz)    Admission Cmt: None   Principal Problem: None                Active Insurance as of 10/10/2020     Primary Coverage     Payor Plan Insurance Group Employer/Plan Group    MEDICARE MEDICARE A & B      Payor Plan Address Payor Plan Phone Number Payor Plan Fax Number Effective Dates    [address] [phone]  4/1/2006 - None Entered    [facility] [id number]       Subscriber Name Subscriber Birth Date Member ID       NELL LIU 1940 [id number]           Secondary Coverage     Payor Plan Insurance Group Employer/Plan Group    ANTHEM BLUE CROSS ANTHEM BLUE CROSS BLUE SHIELD PPO [id number]     Payor Plan Address Payor Plan Phone Number Payor Plan Fax Number Effective Dates    [address] [phone]  7/16/2016 - None Entered    [facility] [id number]       Subscriber Name Subscriber Birth Date Member ID       NELL LIU 1940 [id number]                 Emergency Contacts      (Rel.) Home Phone Work Phone Mobile Phone    OZZY GARAY (Friend) [phone] -- [phone]    Manish Liu (Son) [phone] -- [phone]          "         Emergency Contact Information     Name Relation Home Work Mobile    OZZY GARAY Friend [phone]    Manish Bucio Son [phone]          Insurance Information                MEDICARE/MEDICARE A & B Phone: [phone]    Subscriber: Hazel Bucio Subscriber#: [id number]    Group#:  Precert#:         ANTHEM BLUE CROSS/ANTHEM BLUE CROSS BLUE SHIELD PPO Phone: [phone]    Subscriber: Hzael Bucio Subscriber#: [id number]    Group#: [id number] Precert#:              History & Physical      Iris Rodriguez MD at 10/11/20 1706                [facility] Medicine Services      Patient Name: Hazel Bucio  : 1940  MRN: [id number]  Primary Care Physician: Lizzy Francis MD  Date of admission: 10/10/2020    Patient Care Team:  Lizzy Francis MD as PCP - General (Family Medicine)          Subjective   History Present Illness     Chief Complaint:   Chief Complaint   Patient presents with   • Abdominal Pain         Ms. Bucio is a 80 y.o.  presents to [facility] complaining of right-sided abdominal pain.          80-year-old female presents to the ER with a chief complaint of moderate to severe waxing and waning right sided abdominal pain which began in the right lower quadrant and radiated to the right upper quadrant starting Saturday evening as the patient was going about her usual activity.  Denies any nausea, vomiting or diarrhea.  The patient has a history of bladder cancer  with removal of the bladder and ureter with subsequent right nephrectomy approximately 1 year later.  She states she got a few radiation treatments for the bladder cancer.  The patient also has a history of breast cancer with lumpectomy status post chemotherapy and radiation.  The patient denies any recent subjective fever or chills.      Review of records with summary: The patient is currently a resident at an inpatient physical rehab center after falling  several months ago and fracturing her right lower extremity in 4 places with hospitalization on 8/27/2020.  Prior to that the patient lived in her home alone.  She has a grandson who is around 32 years old who plans to move in with her and assist her once she is discharged home.  She also had a recent hospitalization 9/7/2020 through 9/14/2020 with length of stay 5 days for confusion, encephalopathy acute kidney injury.  Started on hydralazine, Roxicodone, stop taking Benadryl, sotalol, follow-up with Lizzy Holloway, pacemaker placed 2014, [employer] in office November 17, Jack Carrizales.  She also had recent hospitalization echo 4/28/2020 showing normal LV function.            Review of Systems   Constitution: Negative for chills and fever.   Endocrine: Positive for cold intolerance.   Gastrointestinal: Negative for constipation, diarrhea, nausea and vomiting.   Genitourinary:        The patient has an ileal conduit urostomy tube   All other systems reviewed and are negative.          Personal History     Past Medical History:   Past Medical History:   Diagnosis Date   • Cancer (CMS/HCC)     breast, bladder   • Essential hypertension 1/17/2017   • Essential hypertension 1/17/2017   • Fall 08/27/2020   • Long term current use of anticoagulant 1/9/2015   • PAF (paroxysmal atrial fibrillation) (CMS/HCC) 6/26/2019   • Presence of cardiac pacemaker 11/03/2014    BS dual chamber PM placed 10/2014 with pocket revision 1/25/17.  HX tachy rob syndrome   • Sick sinus syndrome (CMS/HCC) 11/3/2014       Surgical History:      Past Surgical History:   Procedure Laterality Date   • BREAST LUMPECTOMY     • HYSTERECTOMY     • INSERT / REPLACE / REMOVE PACEMAKER     • TIBIA IM NAILING/RODDING Right 8/28/2020    Procedure: TIBIA INTRAMEDULLARY NAIL/ABRAHAM INSERTION;  Surgeon: Geoff Shah II, MD;  Location: [facility] MAIN OR;  Service: Orthopedics;  Laterality: Right;           Family History: family history includes  COPD in her father. Otherwise pertinent FHx was reviewed and unremarkable.     Social History:  reports that she has never smoked. She has never used smokeless tobacco. She reports previous alcohol use. She reports that she does not use drugs.      Medications:  Prior to Admission medications    Medication Sig Start Date End Date Taking? Authorizing Provider   acetaminophen (TYLENOL) 325 MG tablet Take 650 mg by mouth Every 6 (Six) Hours As Needed for Mild Pain .   Yes Gino Leos MD   bisacodyl (Bisacodyl Laxative) 10 MG suppository Insert 10 mg into the rectum Daily As Needed for Constipation.   Yes Gino Leos MD   bisacodyl (DULCOLAX) 5 MG EC tablet Take 5 mg by mouth Daily As Needed for Constipation.   Yes Gino Leos MD   cetirizine (zyrTEC) 10 MG tablet Take 10 mg by mouth Daily.   Yes Gino Leos MD   Cholecalciferol (VITAMIN D3) 2000 units tablet Take 1 tablet by mouth Daily.   Yes Gino Leos MD   dilTIAZem CD (Cardizem CD) 300 MG 24 hr capsule Take 1 capsule by mouth Daily. 7/14/20  Yes MARIELA Carrizales MD   diphenhydrAMINE-zinc acetate (BENADRYL ITCH RELIEF) 2-0.1 % stick stick Apply 1 application topically to the appropriate area as directed 2 (Two) Times a Day As Needed.   Yes Gino Leos MD   docusate sodium (COLACE) 100 MG capsule Take 200 mg by mouth 2 (Two) Times a Day.   Yes Gino Leos MD   hydrALAZINE (APRESOLINE) 10 MG tablet Take 2 tablets by mouth 3 (Three) Times a Day. 9/14/20  Yes Taisha Serrato MD   HYDROcodone-acetaminophen (NORCO) 5-325 MG per tablet Take 1 tablet by mouth Every 6 (Six) Hours As Needed.   Yes Gino Leos MD   lactulose (CHRONULAC) 10 GM/15ML solution Take 20 g by mouth Every 3 (Three) Hours As Needed.   Yes Gino Leos MD   Magnesium 400 MG capsule Take 1 tablet by mouth every night at bedtime. 4/18/19  Yes Gino Leos MD   melatonin 5 MG tablet tablet Take 6 mg by  mouth At Night As Needed.   Yes Gino Leos MD   metoprolol tartrate (LOPRESSOR) 50 MG tablet Take 1 tablet by mouth Every 8 (Eight) Hours.  Patient taking differently: Take 50 mg by mouth 2 (Two) Times a Day. 9/14/20  Yes Taisha Serrato MD   multivitamin-minerals (THERA-M) tablet tablet Take 1 tablet by mouth Daily.   Yes Gino Leos MD   ondansetron ODT (ZOFRAN-ODT) 4 MG disintegrating tablet Place 4 mg on the tongue Every 4 (Four) Hours As Needed for Nausea or Vomiting.   Yes Gino Leos MD   pantoprazole (PROTONIX) 40 MG EC tablet Take 40 mg by mouth Every Morning.   Yes Gino Leos MD   polyethylene glycol (MIRALAX) packet Take 17 g by mouth Daily As Needed.   Yes Gino Leos MD   simethicone (MYLICON) 80 MG chewable tablet Chew 80 mg Every 6 (Six) Hours As Needed for Flatulence.   Yes Gino Leos MD   topiramate (TOPAMAX) 100 MG tablet Take 100 mg by mouth every night at bedtime.   Yes Gino Leos MD   traMADol (ULTRAM) 50 MG tablet Take 50 mg by mouth Every 4 (Four) Hours As Needed for Moderate Pain .   Yes Gino Leos MD   warfarin (COUMADIN) 3 MG tablet Take 1.5 mg by mouth Daily. Had been on hold at rehab for several days d/t elevated INR.  (Last dose was 1.5mg on 9/4, w/VitK 5mg given 9/6.)   Yes Gino Leos MD   insulin lispro (humaLOG) 100 UNIT/ML injection Inject  under the skin into the appropriate area as directed 4 (Four) Times a Day Before Meals & at Bedtime. SSI- 101-150=0 u, 151-200=2u, 201-250=4u, 251-300=6u, 301-350=8u, 351-400=10u, Above 400 give 12 u and notify physician  10/10/20  Gino Leos MD   oxyCODONE (ROXICODONE) 5 MG immediate release tablet Take 5 mg by mouth Every 4 (Four) Hours As Needed for Moderate Pain  (Scale 4-6).  10/10/20  Gino Leos MD       Allergies:    Allergies   Allergen Reactions   • Amoxicillin Shortness Of Breath   • Ciprofloxacin Hives   • Penicillins  Rash   • Sulfa Antibiotics Hives   • Cephalexin Other (See Comments)   • Clavulanic Acid Other (See Comments)   • Codeine Other (See Comments)   • Contrast Dye Other (See Comments)   • Doxycycline Other (See Comments)   • Fluconazole Other (See Comments)   • Iodine Hives   • Macrobid [Nitrofurantoin] Hives   • Tobramycin Other (See Comments)   • Trimethoprim Other (See Comments)       Objective   Objective     Vital Signs  Temp:  [98.7 °F (37.1 °C)] 98.7 °F (37.1 °C)  Heart Rate:  [] 107  Resp:  [16] 16  BP: (122-132)/(63-79) 132/75  SpO2:  [96 %-98 %] 96 %  on   ;   Device (Oxygen Therapy): room air  Body mass index is 26.29 kg/m².    Physical Exam  Vitals signs reviewed.   Constitutional:       General: She is not in acute distress.     Appearance: Normal appearance. She is normal weight. She is not ill-appearing, toxic-appearing or diaphoretic.   HENT:      Head: Normocephalic and atraumatic.      Nose: Nose normal. No congestion or rhinorrhea.      Mouth/Throat:      Mouth: Mucous membranes are moist.   Eyes:      General: No scleral icterus.     Extraocular Movements: Extraocular movements intact.      Pupils: Pupils are equal, round, and reactive to light.   Cardiovascular:      Rate and Rhythm: Normal rate and regular rhythm.      Pulses: Normal pulses.      Heart sounds: Normal heart sounds. No murmur.   Pulmonary:      Effort: Pulmonary effort is normal. No respiratory distress.      Breath sounds: Normal breath sounds.   Abdominal:      General: Bowel sounds are normal. There is no distension.      Palpations: Abdomen is soft.      Tenderness: There is abdominal tenderness. There is no rebound.   Musculoskeletal:         General: Tenderness present.      Right lower leg: Edema present.      Comments: There is a well-healed surgical incision on the upper right kneecap without any erythema or drainage.  There is mild bruising at the area.  There is mild swelling to the right ankle.  The entire lower  extremity is tender to palpation without positive Homans sign.   Skin:     General: Skin is warm and dry.      Capillary Refill: Capillary refill takes less than 2 seconds.   Neurological:      General: No focal deficit present.      Mental Status: She is alert and oriented to person, place, and time.   Psychiatric:         Mood and Affect: Mood normal.         Behavior: Behavior normal.         Thought Content: Thought content normal.         Judgment: Judgment normal.         Results Review:  I have personally reviewed most recent lab results and radiology images and interpretations and agree with findings.    Results from last 7 days   Lab Units 10/10/20  2018   WBC 10*3/mm3 6.50   HEMOGLOBIN g/dL 10.6*   HEMATOCRIT % 33.2*   PLATELETS 10*3/mm3 256   INR  1.02*     Results from last 7 days   Lab Units 10/10/20  2107   SODIUM mmol/L 143   POTASSIUM mmol/L 3.6   CHLORIDE mmol/L 113*   CO2 mmol/L 20.0*   BUN  15   CREATININE mg/dL 0.97   GLUCOSE mg/dL 189*   CALCIUM mg/dL 8.7   ALT (SGPT) U/L 5   AST (SGOT) U/L 18     Estimated Creatinine Clearance: 46.7 mL/min (by C-G formula based on SCr of 0.97 mg/dL).  Brief Urine Lab Results  (Last result in the past 365 days)      Color   Clarity   Blood   Leuk Est   Nitrite   Protein   CREAT   Urine HCG        09/11/20 1822 --  Comment:  Specimen double labeled with two different patients  Corrected result. Previous result was Yellow on 9/11/2020 at 1843 EDT.[C] --  Comment:  Specimen double labeled with two different patients  Corrected result. Previous result was Clear on 9/11/2020 at 1843 EDT.[C] --  Comment:  Specimen double labeled with two different patients  Corrected result. Previous result was Negative on 9/11/2020 at 1843 EDT.[C] --  Comment:  Specimen double labeled with two different patients  Corrected result. Previous result was Trace on 9/11/2020 at 1843 EDT.[C] --  Comment:  Specimen double labeled with two different patients  Corrected result. Previous result  was Negative on 9/11/2020 at 1843 EDT.[C] --  Comment:  Specimen double labeled with two different patients  Corrected result. Previous result was Negative on 9/11/2020 at 1843 EDT.[C]               Microbiology Results (last 10 days)     ** No results found for the last 240 hours. **          ECG/EMG Results (most recent)     None          Results for orders placed during the hospital encounter of 09/07/20   Duplex Carotid Ultrasound CAR    Narrative · Right internal carotid artery is normal. Elevated velocities are noted   in the distal right internal carotid artery. However, this is felt to be   related to tortuosity as opposed to a diseased segment.  · Left internal carotid artery is normal.          Results for orders placed during the hospital encounter of 04/28/20   Adult Transthoracic Echo Complete W/ Cont if Necessary Per Protocol    Narrative · Left ventricular systolic function is normal.  · Right ventricular cavity is borderline dilated.  · Mild pulmonic valve regurgitation is present.     Grossly normal echocardiographic exam for age demonstrating normal chamber   dimensions aside from borderline RVE with good global contractility no   specific valvular pathology.  Doppler exam shows mild pulmonic insufficiency and trivial to mild TR with   borderline RV systolic pressure, otherwise normal study for age         Ct Abdomen Pelvis Without Contrast    Result Date: 10/10/2020  1. Distended, stone containing gallbladder with subtle pericholecystic edema. Correlation with LFTs is recommended. Consider ultrasound or HIDA scan for further evaluation. 2. Parastomal hernia containing nonobstructed large bowel. 3. Status post cystectomy with urinary diversion and right lower quadrant urostomy. 4. Status post right nephrectomy and hysterectomy as well. 5. Additional chronic findings as above. Electronically signed by:  Anoop Fernandez M.D.  10/10/2020 7:43 PM        Estimated Creatinine Clearance: 46.7 mL/min (by  C-G formula based on SCr of 0.97 mg/dL).    Assessment/Plan   Assessment/Plan       Active Hospital Problems    Diagnosis  POA   • Abdominal pain [R10.9]  Yes      Resolved Hospital Problems   No resolved problems to display.     1.  Acute abdominal pain, right-sided--Likely secondary to acute cholelithiasis: Ultrasound in a.m.; analgesics and antiemetics    --CT abdomen pelvis also shows a parastomal hernia containing nonobstructed large bowel.             -Surgery consult has been placed.  The patient also has gallstones present            -Surgery is asked that the patient remain n.p.o.  We will start her on D5 half-normal saline to run at 100 mL an hour.    --No recent subjective fever or chills; normal white count  Hemoglobin 10.6 with comparison 10.7 on 9/14/2020    2.  Diabetes type 2, chronic, diet controlled:   -Blood glucose 189;   -check hemoglobin A1c;  -add sliding scale    3.  History of bladder cancer  -Status post right nephrectomy and ileal conduit  -Patient was treated with chemotherapy as well as radiation    4.  Atrial fibrillation  -Controlled ventricular response  -Patient had been on Coumadin back in September but the Coumadin was placed on hold due to an elevated INR.  The patient has a normal INR currently.  Will need to evaluate for anticoagulation after the surgery decision has been made.  -She does have a history of recent falling for which she is now in rehab so that may need to be taken into consideration.    5.  History of renal insufficiency  -Currently patient has normal renal function.    VTE Prophylaxis -   Mechanical Order History:      Ordered        10/10/20 2251  Place Sequential Compression Device  Once         10/10/20 2251  Maintain Sequential Compression Device  Continuous                 Pharmalogical Order History:     None          CODE STATUS:    Code Status and Medical Interventions:   Ordered at: 10/10/20 2251     Code Status:    CPR     Medical Interventions (Level  of Support Prior to Arrest):    Full       This patient has been examined wearing appropriate Personal Protective Equipment and discussed with hospital infection control department. 10/10/20      I discussed the patient's findings and my recommendations with patient who agrees to proceed as outlined above..        Electronically signed by HERO Talavera, 10/10/20, 10:51 PM EDT.  [facility] Hospitalist Team          Electronically signed by Iris Rodriguez MD at 10/11/20 0829       {Outbreak/Travel/Exposure Documentation......;  Question Available Choices Patient Response   Outbreak Screen: Do you currently have a new onset of the following symptoms?        Fever/Chils, Cough, Shortness of air, Loss of taste or smell, No, Unknown  No (10/10/20 3829)   Outbreak Screen: In the last 14 days, have you had contact with anyone who is ill, has show any of the symptoms listed above and/or has been diagnosis with the 2019 Novel Coronavirus? This includes any immediate household members but excludes any patients with whom you have been in contact within your normal work duties wearing proper PPE, if you are a healthcare worker.  Yes, No, Unknown              No (10/10/20 9898)   Outbreak Screen: Who was notified?    Free text  (not recorded)   Travel Screen: Have you traveled in the last month? If so, to what country have you traveled? If [place] what state? Yes, No, Unknown  List of all countries  List of all States No (10/10/20 8686)  (not recorded)  (not recorded)   Infection Risk: Do you currently have the following symptoms?  (If cough is selected, the Tuberculosis Screen is performed.) Cough, Fever, Rash, No No (10/10/20 4221)   Tuberculosis Screen: Do you have any of the following Tuberculosis Risks?  · Have you lived or spent time with anyone who had or may have TB?  · Have you lived in or visited any of the following areas for more than one month: [place], [place], [place], [place] or [place], [place] or [place]?  · Do you have HIV/AIDS?  · Have you lived in or worked in a nursing home, homeless shelter, correctional facility, or substance abuse treatment facility?   · No    If Yes do you have any of the following symptoms? Yes responses display to the right    If Yes, symptoms listed are:  Cough greater than or equal to 3 weeks, Loss of appetite, Unexplained weight loss, Night sweats, Bloody sputum or hemoptysis, Hoarseness, Fever, Fatigue, Chest pain, No (not recorded)  (not recorded)   Exposure Screen: Have you been exposed to any of these contagious diseases in the last month? Measles, Chickenpox, Meningitis, Pertussis, Whooping Cough, No No (10/10/20 4091)         Current Facility-Administered Medications   Medication Dose Route Frequency Provider Last Rate Last Dose   • acetaminophen (TYLENOL) tablet 650 mg  650 mg Oral Q4H PRN Phoebe Abraham FNP        Or   • acetaminophen (TYLENOL) 160 MG/5ML solution 650 mg  650 mg Oral Q4H PRN Phoebe Abraham FNP        Or   • acetaminophen (TYLENOL) suppository 650 mg  650 mg Rectal Q4H PRN Phoebe Abraham FNP       • dextrose (D50W) 25 g/ 50mL Intravenous Solution 25 g  25 g Intravenous Q15 Min PRN Phoebe Abraham FNP       • dextrose (GLUTOSE) oral gel 15 g  15 g Oral Q15 Min PRN Phoebe Abraham FNP       • dextrose 5 % and sodium chloride 0.9 % infusion  100 mL/hr Intravenous Continuous Iris Rodriguez  mL/hr at 10/12/20 0558 100 mL/hr at 10/12/20 0558   • dilTIAZem (CARDIZEM) injection 10 mg  10 mg Intravenous Q2H PRN Angela Lozada APRN       • dilTIAZem CD (CARDIZEM CD) 24 hr capsule 300 mg  300 mg Oral Daily Ananya Wray APRN       • glucagon (human recombinant) (GLUCAGEN DIAGNOSTIC) injection 1 mg  1 mg Subcutaneous Q15 Min PRN Phoebe Abraham FNP       • hydrALAZINE (APRESOLINE) tablet 20 mg  20 mg Oral Q8H Ananya Wray APRN   20 mg at 10/12/20 0726   • influenza vac split quad (FLUZONE,FLUARIX,AFLURIA,FLULAVAL) injection 0.5 mL  0.5 mL Intramuscular  During Hospitalization Iris Rodriguez MD       • Magnesium Sulfate 2 gram Bolus, followed by 8 gram infusion (total Mg dose 10 grams)- Mg less than or equal to 1mg/dL  2 g Intravenous PRN Iris Rodriguez MD        Or   • Magnesium Sulfate 2 gram / 50mL Infusion (GIVE X 3 BAGS TO EQUAL 6GM TOTAL DOSE) - Mg 1.1 - 1.5 mg/dl  2 g Intravenous PRN Iris Rodriguez MD        Or   • Magnesium Sulfate 4 gram infusion- Mg 1.6-1.9 mg/dL  4 g Intravenous PRN Iris Rodriguez MD       • metoprolol tartrate (LOPRESSOR) tablet 50 mg  50 mg Oral Q8H Ananya Wray APRN   50 mg at 10/12/20 0727   • Morphine sulfate (PF) injection 2 mg  2 mg Intravenous Q2H PRN Iris Rodriguez MD   2 mg at 10/12/20 0444   • ondansetron (ZOFRAN) tablet 4 mg  4 mg Oral Q6H PRN Phoebe Abraham FNP        Or   • ondansetron (ZOFRAN) injection 4 mg  4 mg Intravenous Q6H PRN Phoebe Abraham FNP   4 mg at 10/11/20 0317   • oxyCODONE (ROXICODONE) immediate release tablet 10 mg  10 mg Oral Q4H PRN Phoebe Abraham FNP   10 mg at 10/11/20 1518   • oxyCODONE (ROXICODONE) immediate release tablet 5 mg  5 mg Oral Q4H PRN Phoebe Abraham FNP   5 mg at 10/11/20 0203   • potassium chloride (K-DUR,KLOR-CON) CR tablet 40 mEq  40 mEq Oral PRN Iris Rodriguez MD   40 mEq at 10/11/20 0941    Or   • potassium chloride (KLOR-CON) packet 40 mEq  40 mEq Oral PRN Iris Rodriguez MD        Or   • potassium chloride 10 mEq in 100 mL IVPB  10 mEq Intravenous Q1H PRN Iris Rodriguez MD       • sodium chloride 0.9 % flush 10 mL  10 mL Intravenous PRN Rudy Kolb MD       • sodium chloride 0.9 % flush 10 mL  10 mL Intravenous Q12H Phoebe Abraham FNP   10 mL at 10/12/20 0728   • sodium chloride 0.9 % flush 10 mL  10 mL Intravenous PRN Phoebe Abraham FNP       • topiramate (TOPAMAX) tablet 100 mg  100 mg Oral Nightly Ananya Wray APRN   100 mg at 10/12/20 0008     Physician Progress Notes (last 24 hours) (Notes from 10/11/20 0903 through 10/12/20 0903)    No notes of this type exist for  this encounter.            Physical Therapy Notes (most recent note)      Aylin Matias, PT at 10/11/20 1121  Version 1 of 1         Patient Name: Hazel Bucio  : 1940    MRN: [id number]                              Today's Date: 10/11/2020       Admit Date: 10/10/2020    Visit Dx:     ICD-10-CM ICD-9-CM   1. Abdominal pain, unspecified abdominal location  R10.9 789.00   2. Calculus of gallbladder without cholecystitis without obstruction  K80.20 574.20     Patient Active Problem List   Diagnosis   • PAF (paroxysmal atrial fibrillation) (CMS/HCC)   • Dyslipidemia   • Essential hypertension   • Long term current use of anticoagulant   • Presence of cardiac pacemaker   • Sick sinus syndrome (CMS/HCC)   • Type 2 diabetes mellitus (CMS/HCC)   • Tear film insufficiency   • Presbyopia   • Macular puckering of retina   • Tear film insufficiency, bilateral   • Pseudophakia, both eyes   • Lens replaced by other means   • Epiretinal membrane (ERM) of both eyes   • Epiretinal membrane, bilateral   • Pseudophakia   • Coumadin toxicity, accidental or unintentional, initial encounter   • E. coli urinary tract infection   • Fracture tibia/fibula, right, closed, initial encounter   • Acute encephalopathy   • History of carcinoma in situ of breast   • Ureteral cancer (CMS/HCC)   • Chronic insomnia   • Seasonal allergies   • Altered mental status   • MACRINA (acute kidney injury) (CMS/HCC)   • Abdominal pain     Past Medical History:   Diagnosis Date   • Cancer (CMS/HCC)     breast, bladder   • Essential hypertension 2017   • Essential hypertension 2017   • Fall 2020   • Long term current use of anticoagulant 2015   • PAF (paroxysmal atrial fibrillation) (CMS/HCC) 2019   • Presence of cardiac pacemaker 2014    BS dual chamber PM placed 10/2014 with pocket revision 17.  HX tachy rob syndrome   • Sick sinus syndrome (CMS/HCC) 11/3/2014     Past Surgical History:   Procedure Laterality Date   •  BREAST LUMPECTOMY     • HYSTERECTOMY     • INSERT / REPLACE / REMOVE PACEMAKER     • TIBIA IM NAILING/RODDING Right 8/28/2020    Procedure: TIBIA INTRAMEDULLARY NAIL/ABRAHAM INSERTION;  Surgeon: Geoff Shah II, MD;  Location: [facility] MAIN OR;  Service: Orthopedics;  Laterality: Right;     General Information     Row Name 10/11/20 1104          Physical Therapy Time and Intention    Document Type  evaluation  -     Mode of Treatment  physical therapy  -     Row Name 10/11/20 1106 10/11/20 1104       General Information    Patient Profile Reviewed  --  yes  -    Prior Level of Function  independent:;transfer;gait;bed mobility;ADL's  -  --    Row Name 10/11/20 1106          Living Environment    Lives With  facility resident  -     Row Name 10/11/20 1106          Home Main Entrance    Number of Stairs, Main Entrance  none  -     Stair Railings, Main Entrance  none  -     Row Name 10/11/20 1106          Stairs Within Home, Primary    Number of Stairs, Within Home, Primary  none  -     Row Name 10/11/20 1106          Cognition    Orientation Status (Cognition)  oriented x 3  -     Row Name 10/11/20 1106          Safety Issues, Functional Mobility    Impairments Affecting Function (Mobility)  cognition;coordination;endurance/activity tolerance;balance;strength;pain;motor planning  -       User Key  (r) = Recorded By, (t) = Taken By, (c) = Cosigned By    Initials Name Provider Type     Aylin Matias PT Physical Therapist        Mobility     Row Name 10/11/20 1107          Bed-Chair Transfer    Bed-Chair [place] (Transfers)  moderate assist (50% patient effort)  -     Row Name 10/11/20 1107          Sit-Stand Transfer    Sit-Stand [place] (Transfers)  unable to assess  -     Row Name 10/11/20 1107          Gait/Stairs (Locomotion)    [place] Level (Gait)  unable to assess  -     Row Name 10/11/20 1107          Mobility    Extremity Weight-bearing Status  right lower  extremity  -     Right Lower Extremity (Weight-bearing Status)  non weight-bearing (NWB) Per pt she is stil NWB right LE since FX 8/27/20 and sugical repair.  -       User Key  (r) = Recorded By, (t) = Taken By, (c) = Cosigned By    Initials Name Provider Type     Aylin Matias PT Physical Therapist        Obj/Interventions     Row Name 10/11/20 1109          Range of Motion Comprehensive    General Range of Motion  bilateral lower extremity ROM WFL  -     Row Name 10/11/20 1109          Strength Comprehensive (MMT)    Comment, General Manual Muscle Testing (MMT) Assessment  Left LE 4/5 grossly, right  3-/5 hips and knees.  -     Row Name 10/11/20 1109          Motor Skills    Therapeutic Exercise  ankle  -     Row Name 10/11/20 1109          Ankle (Therapeutic Exercise)    Ankle (Therapeutic Exercise)  AROM (active range of motion)  -     Ankle AROM (Therapeutic Exercise)  bilateral;dorsiflexion;plantarflexion;10 repetitions  -     Row Name 10/11/20 1109          Balance    Balance Assessment  sitting static balance;sitting dynamic balance  -     Static Sitting Balance  WFL  -     Dynamic Sitting Balance  mild impairment  -     Balance Interventions  sitting;supported;static;dynamic;minimal challenge  -       User Key  (r) = Recorded By, (t) = Taken By, (c) = Cosigned By    Initials Name Provider Type     Aylin Matias PT Physical Therapist        Goals/Plan     Row Name 10/11/20 1112          Bed Mobility Goal 1 (PT)    Activity/Assistive Device (Bed Mobility Goal 1, PT)  rolling to left;rolling to right;sit to supine;supine to sit;scooting  -     [place] Level/Cues Needed (Bed Mobility Goal 1, PT)  modified independence  -     Time Frame (Bed Mobility Goal 1, PT)  short term goal (STG);2 weeks  -     Row Name 10/11/20 1112          Transfer Goal 1 (PT)    Activity/Assistive Device (Transfer Goal 1, PT)  sit-to-stand/stand-to-sit;bed-to-chair/chair-to-bed;toilet  -      [place] Level/Cues Needed (Transfer Goal 1, PT)  standby assist  -     Time Frame (Transfer Goal 1, PT)  short term goal (STG);2 weeks  -     Row Name 10/11/20 1112          Patient Education Goal (PT)    Activity (Patient Education Goal, PT)  LE exercises  -     [place]/Cues/Accuracy (Memory Goal 2, PT)  demonstrates adequately;verbalizes understanding  -     Time Frame (Patient Education Goal, PT)  short term goal (STG);2 weeks  -       User Key  (r) = Recorded By, (t) = Taken By, (c) = Cosigned By    Initials Name Provider Type    Aylin Gross, PT Physical Therapist        Clinical Impression     Row Name 10/11/20 1111          Pain    Additional Documentation  Pain Scale: FACES Pre/Post-Treatment (Group)  -     Row Name 10/11/20 1111          Pain Scale: FACES Pre/Post-Treatment    Pain: FACES Scale, Pretreatment  2-->hurts little bit  -     Posttreatment Pain Rating  2-->hurts little bit  -     Pain Location  abdomen  -     Row Name 10/11/20 1111          Plan of Care Review    Plan of Care Reviewed With  patient  -     Progress  improving  -     Row Name 10/11/20 1111          Therapy Assessment/Plan (PT)    Rehab Potential (PT)  fair, will monitor progress closely  -     Predicted Duration of Therapy Intervention (PT)  Until D/C  -     Row Name 10/11/20 1111          Vital Signs    Pre Patient Position  Supine  -     Intra Patient Position  Sitting  -     Post Patient Position  Sitting  -     Row Name 10/11/20 1111          Positioning and Restraints    Pre-Treatment Position  in bed  -     Post Treatment Position  chair  -       User Key  (r) = Recorded By, (t) = Taken By, (c) = Cosigned By    Initials Name Provider Type    Aylin Gross, REKHA Physical Therapist        Outcome Measures     Row Name 10/11/20 1113          How much help from another person do you currently need...    Turning from your back to your side while in flat bed without using bedrails?   3  -WC     Moving from lying on back to sitting on the side of a flat bed without bedrails?  2  -WC     Moving to and from a bed to a chair (including a wheelchair)?  2  -WC     Standing up from a chair using your arms (e.g., wheelchair, bedside chair)?  1  -WC     Climbing 3-5 steps with a railing?  1  -WC     To walk in hospital room?  1  -     AM-PAC 6 Clicks Score (PT)  10  -     Row Name 10/11/20 1113          Modified Marshall Scale    Pre-Stroke Modified Marshall Scale  5 - Severe disability.  Bedridden, incontinent, and requiring constant nursing care and attention.  -     Row Name 10/11/20 1113          Functional Assessment    Outcome Measure Options  AM-PAC 6 Clicks Basic Mobility (PT);Modified [place]  -       User Key  (r) = Recorded By, (t) = Taken By, (c) = Cosigned By    Initials Name Provider Type     Aylin Matias PT Physical Therapist        Physical Therapy Education                 Title: PT OT SLP Therapies (Done)     Topic: Physical Therapy (Done)     Point: Mobility training (Done)     Learning Progress Summary           Patient Acceptance, E,TB, VU by  at 10/11/2020 1114                   Point: Home exercise program (Done)     Learning Progress Summary           Patient Acceptance, E,TB, VU by  at 10/11/2020 1114                   Point: Body mechanics (Done)     Learning Progress Summary           Patient Acceptance, E,TB, VU by  at 10/11/2020 1114                   Point: Precautions (Done)     Learning Progress Summary           Patient Acceptance, E,TB, VU by  at 10/11/2020 1114                               User Key     Initials Effective Dates Name Provider Type Discipline     01/07/20 -  Aylin Matias PT Physical Therapist PT              PT Recommendation and Plan  Pt is a 79 yo female ADMM with calculus of gallbladder, cholecystitis, altered mental status. Pt was in LTC since right LE FX and surgical repair.  Pt was having therapy at [facility] but reports surgeon  has not released her for wt bearing yet. Prior to FX pt reports she lived with her spouse. Pt was independent with mobility in the home, dressing, grooming, bathing. Today pt was alert and oriented x 3. Pt c/o moderate pain in abdomen Pt needed MIN A rolling, MOD A supine to sit and MOD  A pivot to chair NWB right LE.  Pt is below baseline.  Recommendation is IP Rehab. PPE: Mask, gloves, eyeshield.     Planned Therapy Interventions (PT): balance training, bed mobility training, gait training, home exercise program, strengthening, postural re-education, neuromuscular re-education  Plan of Care Reviewed With: patient  Progress: improving     Time Calculation:   PT Charges     Row Name 10/11/20 1120             Time Calculation    Start Time  1001  -WC      Stop Time  1020  -      Time Calculation (min)  19 min  -      PT Received On  10/11/20  -      PT - Next Appointment  10/12/20  -      PT Goal Re-Cert Due Date  10/25/20  -        User Key  (r) = Recorded By, (t) = Taken By, (c) = Cosigned By    Initials Name Provider Type     Aylin Matias PT Physical Therapist        Therapy Charges for Today     Code Description Service Date Service Provider Modifiers Qty    01388009106 HC PT EVAL MOD COMPLEXITY 3 10/11/2020 Aylin Matias PT GP 1          PT G-Codes  Outcome Measure Options: AM-PAC 6 Clicks Basic Mobility (PT), Modified [place]  AM-PAC 6 Clicks Score (PT): 10    Aylin Matias PT  10/11/2020      Electronically signed by Aylin Matias PT at 10/11/20 1122       Occupational Therapy Notes (most recent note)    No notes exist for this encounter.

## 2020-10-12 NOTE — CONSULTS
CARDIOLOGY CONSULT NOTE      Referring Provider: Dr. Burnett    Reason for Consultation: Preop cardiovascular risk assessment requested    Attending: Brian Burnett MD    Chief complaint    Abdominal pain    Subjective .     History of present illness:  Hazel Bucio is a 80 y.o. female who presents with complaints of abdominal pain to the emergency room.  The patient reports about 6 hours of right-sided abdominal pain prior to arrival in the ER.  There is no associated fever, nausea, vomiting or diarrhea.    The patient has a history of sick sinus syndrome with dual-chamber pacemaker implanted in 2014 with pocket revision in 2017, paroxysmal atrial fibrillation, diabetes and hypertension.    Her last stress test was in April 2020 that showed no reversible ischemia.  Echocardiogram at that time also showed preserved LV function with EF of 58%.  Mild pulmonic valve regurgitation and borderline right ventricular dilatation.    CT scan on admission showed distended stone containing gallbladder with subtle pericholecystic edema.  There is a parastomal hernia containing nonobstructed large bowel.  There is evidence of previous right nephrectomy and hysterectomy as well as cystectomy with urinary diversion and right lower quadrant urostomy.  .  Admission labs have been reviewed.  There is no leukocytosis.  LFTs are within normal limits.  INR is 1.0    The patient denies any current or recent chest pain, worsening dyspnea, PND, orthopnea, she reports compliance with medication.    Review of Systems   Constitution: Negative for decreased appetite and diaphoresis.   HENT: Negative for congestion, hearing loss and nosebleeds.    Cardiovascular: Negative for chest pain, claudication, dyspnea on exertion, leg swelling, near-syncope, orthopnea, palpitations, paroxysmal nocturnal dyspnea and syncope.   Respiratory: Negative for cough, shortness of breath and sleep disturbances due to breathing.    Endocrine: Negative for polyuria.    Hematologic/Lymphatic: Does not bruise/bleed easily.   Skin: Negative for itching and rash.   Musculoskeletal: Negative for back pain, muscle weakness and myalgias.   Gastrointestinal: Positive for bloating and abdominal pain. Negative for change in bowel habit and nausea.   Genitourinary: Negative for dysuria, flank pain, frequency and hesitancy.   Neurological: Negative for dizziness, tremors and weakness.   Psychiatric/Behavioral: Negative for altered mental status. The patient does not have insomnia.        History  Past Medical History:   Diagnosis Date   • Cancer (CMS/[facility])     breast, bladder   • Essential hypertension 1/17/2017   • Essential hypertension 1/17/2017   • Fall 08/27/2020   • Long term current use of anticoagulant 1/9/2015   • PAF (paroxysmal atrial fibrillation) (CMS/[facility]) 6/26/2019   • Presence of cardiac pacemaker 11/03/2014    BS dual chamber PM placed 10/2014 with pocket revision 1/25/17.  HX tachy rob syndrome   • Sick sinus syndrome (CMS/[facility]) 11/3/2014       Past Surgical History:   Procedure Laterality Date   • BREAST LUMPECTOMY     • HYSTERECTOMY     • INSERT / REPLACE / REMOVE PACEMAKER     • TIBIA IM NAILING/RODDING Right 8/28/2020    Procedure: TIBIA INTRAMEDULLARY NAIL/ABRAHAM INSERTION;  Surgeon: Geoff Shah II, MD;  Location: [facility] MAIN OR;  Service: Orthopedics;  Laterality: Right;       Family History   Problem Relation Age of Onset   • COPD Father        Social History     Tobacco Use   • Smoking status: Never Smoker   • Smokeless tobacco: Never Used   Substance Use Topics   • Alcohol use: Not Currently   • Drug use: Never        Medications Prior to Admission   Medication Sig Dispense Refill Last Dose   • cetirizine (zyrTEC) 10 MG tablet Take 10 mg by mouth Daily. Pt states she does not take   Patient Taking Differently at Unknown time   • Cholecalciferol (VITAMIN D3) 2000 units tablet Take 1 tablet by mouth Daily.   Past Week at Unknown time   • dilTIAZem CD  (Cardizem CD) 300 MG 24 hr capsule Take 1 capsule by mouth Daily. 30 capsule 1 Past Week at Unknown time   • Magnesium 400 MG capsule Take 1 tablet by mouth every night at bedtime.   Past Week at Unknown time   • metoprolol tartrate (LOPRESSOR) 50 MG tablet Take 1 tablet by mouth Every 8 (Eight) Hours. (Patient taking differently: Take 50 mg by mouth 2 (Two) Times a Day.)   Past Week at Unknown time   • multivitamin-minerals (THERA-M) tablet tablet Take 1 tablet by mouth Daily.   Past Week at Unknown time   • ondansetron ODT (ZOFRAN-ODT) 4 MG disintegrating tablet Place 4 mg on the tongue Every 4 (Four) Hours As Needed for Nausea or Vomiting.   Past Week at Unknown time   • pantoprazole (PROTONIX) 40 MG EC tablet Take 40 mg by mouth Every Morning.   Past Week at Unknown time   • polyethylene glycol (MIRALAX) packet Take 17 g by mouth Daily As Needed.   Past Week at Unknown time   • simethicone (MYLICON) 80 MG chewable tablet Chew 80 mg Every 6 (Six) Hours As Needed for Flatulence.   Past Week at Unknown time   • topiramate (TOPAMAX) 100 MG tablet Take 100 mg by mouth every night at bedtime.   Past Week at Unknown time   • warfarin (COUMADIN) 3 MG tablet Take 1.5 mg by mouth Daily. Had been on hold at rehab for several days d/t elevated INR.  (Last dose was 1.5mg on 9/4, w/VitK 5mg given 9/6.)   10/10/2020 at Unknown time   • acetaminophen (TYLENOL) 325 MG tablet Take 650 mg by mouth Every 6 (Six) Hours As Needed for Mild Pain .   More than a month at Unknown time   • bisacodyl (Bisacodyl Laxative) 10 MG suppository Insert 10 mg into the rectum Daily As Needed for Constipation.   Unknown at Unknown time   • bisacodyl (DULCOLAX) 5 MG EC tablet Take 5 mg by mouth Daily As Needed for Constipation.   Unknown at Unknown time   • diphenhydrAMINE-zinc acetate (BENADRYL ITCH RELIEF) 2-0.1 % stick stick Apply 1 application topically to the appropriate area as directed 2 (Two) Times a Day As Needed.   Unknown at Unknown time    • docusate sodium (COLACE) 100 MG capsule Take 200 mg by mouth 2 (Two) Times a Day.   More than a month at Unknown time   • hydrALAZINE (APRESOLINE) 10 MG tablet Take 2 tablets by mouth 3 (Three) Times a Day.   Unknown at Unknown time   • HYDROcodone-acetaminophen (NORCO) 5-325 MG per tablet Take 1 tablet by mouth Every 6 (Six) Hours As Needed.   Unknown at Unknown time   • lactulose (CHRONULAC) 10 GM/15ML solution Take 20 g by mouth Every 3 (Three) Hours As Needed.   Unknown at Unknown time   • melatonin 5 MG tablet tablet Take 6 mg by mouth At Night As Needed.   More than a month at Unknown time   • traMADol (ULTRAM) 50 MG tablet Take 50 mg by mouth Every 4 (Four) Hours As Needed for Moderate Pain .   Unknown at Unknown time         Amoxicillin, Ciprofloxacin, Penicillins, Sulfa antibiotics, Cephalexin, Clavulanic acid, Codeine, Contrast dye, Doxycycline, Fluconazole, Iodine, Macrobid [nitrofurantoin], Tobramycin, and Trimethoprim    Scheduled Meds:dilTIAZem CD, 300 mg, Oral, Daily  hydrALAZINE, 20 mg, Oral, Q8H  metoprolol tartrate, 50 mg, Oral, Q8H  sodium chloride, 10 mL, Intravenous, Q12H  topiramate, 100 mg, Oral, Nightly      Continuous Infusions:dextrose 5 % and sodium chloride 0.9 %, 100 mL/hr, Last Rate: 100 mL/hr (10/12/20 0558)      PRN Meds:.•  acetaminophen **OR** acetaminophen **OR** acetaminophen  •  dextrose  •  dextrose  •  glucagon (human recombinant)  •  influenza vaccine  •  magnesium sulfate **OR** magnesium sulfate **OR** magnesium sulfate  •  Morphine  •  ondansetron **OR** ondansetron  •  oxyCODONE  •  oxyCODONE  •  potassium chloride **OR** potassium chloride **OR** potassium chloride  •  [COMPLETED] Insert peripheral IV **AND** sodium chloride  •  sodium chloride    Objective     VITAL SIGNS  Vitals:    10/12/20 0108 10/12/20 0457 10/12/20 0500 10/12/20 0752   BP: 137/83 135/83  157/81   BP Location: Left leg Right leg  Right arm   Patient Position: Lying Lying  Lying   Pulse: 120 105   "100   Resp: 16 18  16   Temp: 98.8 °F (37.1 °C) 98.4 °F (36.9 °C)  98.4 °F (36.9 °C)   TempSrc: Oral Oral  Oral   SpO2: 96% 95%  96%   Weight:   72.4 kg (159 lb 9.8 oz)    Height:           Flowsheet Rows      First Filed Value   Admission Height  165.1 cm (65\") Documented at 10/10/2020 1937   Admission Weight  71.7 kg (158 lb) Documented at 10/10/2020 1937           TELEMETRY: afib    Physical Exam:  Constitutional:       General: Not in acute distress.     Appearance: Normal appearance. Well-developed.   Eyes:      Pupils: Pupils are equal, round, and reactive to light.   HENT:      Head: Normocephalic and atraumatic.   Neck:      Musculoskeletal: Normal range of motion and neck supple.      Vascular: No JVD.   Pulmonary:      Effort: Pulmonary effort is normal.      Breath sounds: Normal breath sounds.   Cardiovascular:      Normal rate. Irregularly irregular rhythm.   Pulses:     Intact distal pulses.   Edema:     Peripheral edema absent.   Abdominal:      General: There is no distension.      Palpations: Abdomen is soft.      Tenderness: There is abdominal tenderness.   Musculoskeletal: Normal range of motion.   Skin:     General: Skin is warm and dry.   Neurological:      Mental Status: Alert and oriented to person, place, and time.          Results Review:   I reviewed the patient's new clinical results.    CBC    Results from last 7 days   Lab Units 10/12/20  0710 10/11/20  0235 10/10/20  2018   WBC 10*3/mm3 6.60 6.10 6.50   HEMOGLOBIN g/dL 10.5* 10.2* 10.6*   PLATELETS 10*3/mm3 203 228 256     BMP   Results from last 7 days   Lab Units 10/12/20  0710 10/11/20  1503 10/11/20  0235 10/10/20  2107 10/10/20  2018   SODIUM mmol/L 143  --  145 143  --    POTASSIUM mmol/L 4.1 5.3* 3.6 3.6  --    CHLORIDE mmol/L 112*  --  113* 113*  --    CO2 mmol/L 22.0  --  22.0 20.0*  --    BUN   --   --  17 15 16   CREATININE mg/dL 0.94  --  0.95 0.97  --    GLUCOSE mg/dL 124*  --  158* 189*  --      Cr Clearance Estimated " Creatinine Clearance: 48.4 mL/min (by C-G formula based on SCr of 0.94 mg/dL).  Coag   Results from last 7 days   Lab Units 10/10/20  2018   INR  1.02*     HbA1C   Lab Results   Component Value Date    HGBA1C 6.2 (H) 09/08/2020    HGBA1C 6.1 (H) 08/27/2020     Blood Glucose No results found for: POCGLU  Infection     CMP   Results from last 7 days   Lab Units 10/12/20  0710 10/11/20  1503 10/11/20  0235 10/10/20  2107 10/10/20  2018   SODIUM mmol/L 143  --  145 143  --    POTASSIUM mmol/L 4.1 5.3* 3.6 3.6  --    CHLORIDE mmol/L 112*  --  113* 113*  --    CO2 mmol/L 22.0  --  22.0 20.0*  --    BUN   --   --  17 15 16   CREATININE mg/dL 0.94  --  0.95 0.97  --    GLUCOSE mg/dL 124*  --  158* 189*  --    ALBUMIN g/dL  --   --   --  3.10*  --    BILIRUBIN mg/dL  --   --   --  0.3  --    ALK PHOS U/L  --   --   --  146*  --    AST (SGOT) U/L  --   --   --  18  --    ALT (SGPT) U/L  --   --   --  5  --    LIPASE U/L  --   --   --   --  23     ABG      UA      DEUCE  No results found for: POCMETH, POCAMPHET, POCBARBITUR, POCBENZO, POCCOCAINE, POCOPIATES, POCOXYCODO, POCPHENCYC, POCPROPOXY, POCTHC, POCTRICYC  Lysis Labs   Results from last 7 days   Lab Units 10/12/20  0710 10/11/20  0235 10/10/20  2107 10/10/20  2018   INR   --   --   --  1.02*   HEMOGLOBIN g/dL 10.5* 10.2*  --  10.6*   PLATELETS 10*3/mm3 203 228  --  256   CREATININE mg/dL 0.94 0.95 0.97  --      Radiology(recent) Ct Abdomen Pelvis Without Contrast    Result Date: 10/10/2020  1. Distended, stone containing gallbladder with subtle pericholecystic edema. Correlation with LFTs is recommended. Consider ultrasound or HIDA scan for further evaluation. 2. Parastomal hernia containing nonobstructed large bowel. 3. Status post cystectomy with urinary diversion and right lower quadrant urostomy. 4. Status post right nephrectomy and hysterectomy as well. 5. Additional chronic findings as above. Electronically signed by:  Anoop Fernandez M.D.  10/10/2020 7:43 PM    Us  Abdomen Limited    Result Date: 10/11/2020   1. There is echogenic sludge and small stones within the gallbladder. 2. The right kidney is surgically absent.  Electronically Signed ByTobin Gonzalez On:10/11/2020 10:20 AM This report was finalized on [id number] by  Hilario Gonzalez, .            Imaging Results (Last 24 Hours)     Procedure Component Value Units Date/Time    XR Chest 1 View [[id number]] Resulted: 10/12/20 0715     Updated: 10/12/20 0721    US Abdomen Limited [[id number]] Collected: 10/11/20 1018     Updated: 10/11/20 1022    Narrative:      DATE OF EXAM:  10/11/2020 8:08 AM     PROCEDURE:  US ABDOMEN LIMITED-     INDICATIONS:  Cholelithiasis, abnormal abdominal CT, right-sided abdominal pain.     COMPARISON:  CT of the abdomen performed on 10/10/2020.     TECHNIQUE:   Grayscale and color Doppler ultrasound evaluation of the limited abdomen  was performed.     FINDINGS:  The liver parenchyma is normal. There are no focal hepatic masses. There  is hepatopedal flow in the main portal vein and hepatofugal flow in the  hepatic veins. There are echogenic shadowing stones and layering sludge  within the gallbladder. The common bile duct measures 4 mm. The right  kidney is surgically absent. The visualized pancreas is normal.          Impression:         1. There is echogenic sludge and small stones within the gallbladder.  2. The right kidney is surgically absent.     Electronically Signed ByTobin Gonzalez On:10/11/2020 10:20 AM  This report was finalized on [id number] by  Hilario Gonzalez, .          EKG      I personally viewed and interpreted the patient's EKG/Telemetry data:    ECHOCARDIOGRAM:   4/2020  Interpretation Summary       · Left ventricular systolic function is normal.  · Right ventricular cavity is borderline dilated.  · Mild pulmonic valve regurgitation is present.     Grossly normal echocardiographic exam for age demonstrating normal chamber dimensions aside from borderline RVE with good global  contractility no specific valvular pathology.  Doppler exam shows mild pulmonic insufficiency and trivial to mild TR with borderline RV systolic pressure, otherwise normal study for age            STRESS MYOVIEW:   4/2020    Interpretation Summary       · Findings consistent with a normal ECG stress test.  · Left ventricular ejection fraction is normal (Calculated EF = 70%).  · Impressions are consistent with a low risk study.  · Relatively small septal and lateral wall defects appear fixed with no reversible ischemia appreciated.     Moderate defect involving the septal and lateral wall segments with no reversible ischemia appreciated.  Global LV systolic function appears normal with no segmental abnormalities; LVEF 70% or greater by gated SPECT analysis            CARDIAC CATHETERIZATION:    OTHER:         Assessment/Plan       Abdominal pain      Assessment:    Preop cardiovascular risk assessment requested  [place] cystitis/cholelithiasis  Sick sinus syndrome  Paroxysmal atrial fibrillation appears to currently be in A. fib with controlled rates  Long-term anticoagulation with warfarin but is recently been off due to elevated INR.  INR 1.0 on admission  Dual-chamber [employer] pacemaker  Preserved LV function  Recent stress Myoview negative for ischemia in April 2020  Hypertension    Plan:    Twelve-lead EKG  Patient has no signs of acute cardiac decompensation.  No signs or symptoms to suggest unstable angina, recent MI or heart failure.    The patient will be an acceptable risk to proceed with cholecystectomy.    We will continue cardiac monitoring and use IV beta-blockers in interim while n.p.o. for HR control.     Additional recommendations per Dr. Carrizales    I discussed the patients findings and my recommendations with patient and RN    CHELLE Monte  10/12/20  08:30 EDT     Cardiology attending addendum note:    Ms. Bucio is very pleasant 80-year-old female well-known to me from longstanding  evaluation of sick sinus syndrome with PAF on chronic anticoagulation and AV sequential pacemaker with hypertensive heart disease, who presented night before last with acute cholecystitis.  We are asked to provide cardiac preop clearance.    I have examined the patient personally and reviewed clinical events as well as data and images earlier today.  I also reviewed for accuracy the cardiology consultation note scribed for me by GISSELL CORBETT, and concur with clinical exam description as well as assessment and plan as outlined and discussed with her earlier today.  Hazel is no evidence of unstable angina pectoris, acute myocardial infarction or congestive heart failure that would prohibit elective cholecystectomy.  Moreover, she had negative stress Myoview examination in April of this year.    Hazel was seen by me postoperatively and appears to be doing well at this time with stable vital signs and paced rhythm.  We will continue to follow heart rate/rhythm and hemodynamics closely and reinitiate anticoagulant therapy when okay with Dr. Pereira.

## 2020-10-12 NOTE — PLAN OF CARE
Goal Outcome Evaluation:  Plan of Care Reviewed With: patient  Progress: no change  Outcome Summary: Possible surgery today. Cardiology consult with Dr. Carrizales.

## 2020-10-12 NOTE — ANESTHESIA PREPROCEDURE EVALUATION
Anesthesia Evaluation     Patient summary reviewed and Nursing notes reviewed   no history of anesthetic complications:  NPO Solid Status: > 8 hours  NPO Liquid Status: > 8 hours           Airway   Dental      Pulmonary    Cardiovascular     ECG reviewed  PT is on anticoagulation therapy  Patient on routine beta blocker    (+) hypertension, dysrhythmias Atrial Fib, hyperlipidemia,       Neuro/Psych  GI/Hepatic/Renal/Endo    (+)   renal disease ARF, diabetes mellitus,     Musculoskeletal     Abdominal    Substance History      OB/GYN          Other   blood dyscrasia anemia,   history of cancer    ROS/Med Hx Other: Sick sinus syndrome, tachy-rob syndrome, allergies, encephalopathy, breast cancer, abd pain, altered mental status, insomnia, uterine cancer    Echo  Echocardiogram Findings    Left Ventricle Left ventricular systolic function is normal. Calculated EF = 58.0%. Estimated EF was in disagreement with the calculated EF. Estimated EF appears to be in the range of 66 - 70%. Normal left ventricular cavity size and wall thickness noted. All left ventricular wall segments contract normally. Septal wall motion is normal. Left ventricular diastolic function is normal.  Right Ventricle Normal right ventricular wall thickness, systolic function and septal motion noted. Right ventricular cavity is borderline dilated.  Left Atrium Normal left atrial size and volume noted.  Right Atrium Normal right atrial size noted.  Aortic Valve The aortic valve is structurally normal. No aortic valve regurgitation is present. No aortic valve stenosis is present.  Mitral Valve The mitral valve is normal in structure. No mitral valve regurgitation is present. No significant mitral valve stenosis is present.  Tricuspid Valve The tricuspid valve is normal.  No evidence of tricuspid valve stenosis is present. No tricuspid valve regurgitation is present. Estimated right ventricular systolic pressure from tricuspid regurgitation is normal  (<35 mmHg).  Pulmonic Valve The pulmonic valve is grossly normal in structure. There is no significant pulmonic valve stenosis present. There is mild pulmonic valve regurgitation present.  Greater Vessels No dilation of the aortic root is present.  Pericardium The pericardium is normal. There is no evidence of pericardial effusion.    Stress  Interpretation Summary       · Findings consistent with a normal ECG stress test.  · Left ventricular ejection fraction is normal (Calculated EF = 70%).  · Impressions are consistent with a low risk study.  · Relatively small septal and lateral wall defects appear fixed with no reversible ischemia appreciated.     Moderate defect involving the septal and lateral wall segments with no reversible ischemia appreciated.  Global LV systolic function appears normal with no segmental abnormalities; LVEF 70% or greater by gated SPECT analysis        PSH  HYSTERECTOMY BREAST LUMPECTOMY  INSERT / REPLACE / REMOVE PACEMAKER TIBIA IM NAILING/RODDING                Anesthesia Plan    ASA 4     general   (Patient identified; pre-operative vital signs, all relevant labs/studies, complete medical/surgical/anesthetic history, full medication list, full allergy list, and NPO status obtained/reviewed; physical assessment performed; anesthetic options, side effects, potential complications, risks, and benefits discussed; questions answered; written anesthesia consent obtained; patient cleared for procedure; anesthesia machine and equipment checked and functioning)  intravenous induction     Anesthetic plan, all risks, benefits, and alternatives have been provided, discussed and informed consent has been obtained with: patient.

## 2020-10-12 NOTE — OP NOTE
CHOLECYSTECTOMY LAPAROSCOPIC  Procedure Report    Patient Name:  Hazel Bucio  YOB: 1940    Date of Surgery:  10/12/2020     Indications: Cholecystitis cholelithiasis    Pre-op Diagnosis:   Same       Post-Op Diagnosis Codes:  Same    Procedure/CPT® Codes:      Procedure(s):  CHOLECYSTECTOMY LAPAROSCOPIC    Staff:  Surgeon(s):  Go Pereira DO    Assistant: Shantelle Cabrales, RN    Anesthesia: General    Anesthetist: Dr. Barnes    Estimated Blood Loss: minimal    Implants:    Nothing was implanted during the procedure    Specimen:          Specimens     ID Source Type Tests Collected By Collected At Frozen?      A Gallbladder Tissue · TISSUE PATHOLOGY EXAM   Go Pereira DO 10/12/20 7167 No     Description: gallbladder    This specimen was not marked as sent.              Findings: Cholecystitis cholelithiasis    Complications: None    Description of Procedure: Hazel is a pleasant 80-year-old I was asked to see in consultation for right upper quadrant pain consistent with gallbladder disease with stones.  Ultrasound showed sludge and layering small stones with some pericholecystic fluid.  We recommended that she consider a lap angie.  After discussion of the procedure and the risks she agreed to proceed and is for that reason presents.    Patient was taken operating room placed in supine position.  General was done by Dr. Barnes.  18-minute wait time was done for COVID protocol.  Abdomen was then prepped and draped after 3-minute dry time.  A supraumbilical incision was made.  Varies needle passed through all layers and saline drop test was done.  It was negative and the abdomen insufflated with CO2 to 15 mmHg pressure.  Disposable 11 mm trocar sheath was passed and the laparoscope introduced confirming intra-abdominal positioning with no injury.  Subxiphoid and 2 lateral ports were then placed under direct vision.  Gallbladder is markedly distended and was quite abnormal in its appearance.  We grasped  at about mid body retracted in a cephalad manner the area and a Cuevas's pouch was then grasped tenting the cholecystoduodenal ligament.  This was explored and the cystic duct identified and traced to its junction with the gallbladder.  2 clips were placed proximally to distally and the duct divided.  Artery was likewise identified clipped and divided.  Gallbladder then removed from liver bed using J-hook electrocautery.  It was placed in an Endo Catch bag and retrieved out the subxiphoid incision.  Liver bed was inspected it was found to be hemostatic with no bile leak.  We then removed all trochars allowing CO2 to escape to the atmosphere proving no bleeding from the trocar sites.  We used 0 Vicryl stitches to close the fascia at both the subxiphoid area and the umbilicus.  We had to enlarge the subxiphoid incision 3 times as normal sized to the size of the gallbladder.  Skin was then closed with 4-0 Vicryl in a subcu manner.  Sterile OpSite dressings applied over Mastisol and Steri-Strips.  Quarter percent Marcaine used for topical pain control.  She was awakened and transferred recovery in satisfactory condition.  Final sponge instrument needle counts were correct.    Assistant: Shantelle Cabrales, RN  was responsible for performing the following activities: Retraction, Suturing, Closing and Held/Positioned Camera and their skilled assistance was necessary for the success of this case.    Go Pereira,      Date: 10/12/2020  Time: 18:00 EDT

## 2020-10-12 NOTE — ANESTHESIA POSTPROCEDURE EVALUATION
Patient: Hazel Bucio    Procedure Summary     Date: 10/12/20 Room / Location: [facility] OR 08 / [facility] MAIN OR    Anesthesia Start: 1650 Anesthesia Stop: 1837    Procedure: CHOLECYSTECTOMY LAPAROSCOPIC (N/A Abdomen) Diagnosis:     Surgeon: Go Pereira DO Provider: Stephan Newell MD    Anesthesia Type: general ASA Status: 4          Anesthesia Type: general    Vitals  No vitals data found for the desired time range.          Post Anesthesia Care and Evaluation    Patient location during evaluation: PACU  Patient participation: complete - patient participated  Level of consciousness: awake  Pain score: 0  Pain management: adequate  Airway patency: patent  Anesthetic complications: No anesthetic complications  PONV Status: none  Cardiovascular status: acceptable  Respiratory status: acceptable  Hydration status: acceptable    Comments: Patient seen and examined postoperatively; vital signs stable; SpO2 greater than or equal to 90%; cardiopulmonary status stable; nausea/vomiting adequately controlled; pain adequately controlled; no apparent anesthesia complications; patient discharged from anesthesia care when discharge criteria were met

## 2020-10-13 VITALS
WEIGHT: 161.16 LBS | DIASTOLIC BLOOD PRESSURE: 70 MMHG | OXYGEN SATURATION: 93 % | BODY MASS INDEX: 26.85 KG/M2 | RESPIRATION RATE: 16 BRPM | TEMPERATURE: 98.6 F | HEART RATE: 68 BPM | SYSTOLIC BLOOD PRESSURE: 129 MMHG | HEIGHT: 65 IN

## 2020-10-13 LAB
ANION GAP SERPL CALCULATED.3IONS-SCNC: 6 MMOL/L (ref 5–15)
BASOPHILS # BLD AUTO: 0.1 10*3/MM3 (ref 0–0.2)
BASOPHILS NFR BLD AUTO: 0.9 % (ref 0–1.5)
BUN SERPL-MCNC: 13 MG/DL (ref 8–23)
BUN SERPL-MCNC: ABNORMAL MG/DL
BUN/CREAT SERPL: ABNORMAL
CALCIUM SPEC-SCNC: 8.3 MG/DL (ref 8.6–10.5)
CHLORIDE SERPL-SCNC: 116 MMOL/L (ref 98–107)
CO2 SERPL-SCNC: 21 MMOL/L (ref 22–29)
CREAT SERPL-MCNC: 1.09 MG/DL (ref 0.57–1)
DEPRECATED RDW RBC AUTO: 62.6 FL (ref 37–54)
EOSINOPHIL # BLD AUTO: 0.1 10*3/MM3 (ref 0–0.4)
EOSINOPHIL NFR BLD AUTO: 0.8 % (ref 0.3–6.2)
ERYTHROCYTE [DISTWIDTH] IN BLOOD BY AUTOMATED COUNT: 17.5 % (ref 12.3–15.4)
GFR SERPL CREATININE-BSD FRML MDRD: 48 ML/MIN/1.73
GLUCOSE SERPL-MCNC: 135 MG/DL (ref 65–99)
HCT VFR BLD AUTO: 29.9 % (ref 34–46.6)
HGB BLD-MCNC: 9.4 G/DL (ref 12–15.9)
LYMPHOCYTES # BLD AUTO: 1.3 10*3/MM3 (ref 0.7–3.1)
LYMPHOCYTES NFR BLD AUTO: 17.8 % (ref 19.6–45.3)
MCH RBC QN AUTO: 32.1 PG (ref 26.6–33)
MCHC RBC AUTO-ENTMCNC: 31.3 G/DL (ref 31.5–35.7)
MCV RBC AUTO: 102.4 FL (ref 79–97)
MONOCYTES # BLD AUTO: 0.7 10*3/MM3 (ref 0.1–0.9)
MONOCYTES NFR BLD AUTO: 9 % (ref 5–12)
NEUTROPHILS NFR BLD AUTO: 5.3 10*3/MM3 (ref 1.7–7)
NEUTROPHILS NFR BLD AUTO: 71.5 % (ref 42.7–76)
NRBC BLD AUTO-RTO: 0.2 /100 WBC (ref 0–0.2)
PLATELET # BLD AUTO: 198 10*3/MM3 (ref 140–450)
PMV BLD AUTO: 7 FL (ref 6–12)
POTASSIUM SERPL-SCNC: 4.9 MMOL/L (ref 3.5–5.2)
RBC # BLD AUTO: 2.92 10*6/MM3 (ref 3.77–5.28)
SODIUM SERPL-SCNC: 143 MMOL/L (ref 136–145)
WBC # BLD AUTO: 7.4 10*3/MM3 (ref 3.4–10.8)

## 2020-10-13 PROCEDURE — 63710000001 DILTIAZEM CD 180 MG CAPSULE SUSTAINED-RELEASE 24 HR: Performed by: SURGERY

## 2020-10-13 PROCEDURE — 80048 BASIC METABOLIC PNL TOTAL CA: CPT | Performed by: SURGERY

## 2020-10-13 PROCEDURE — 99217 PR OBSERVATION CARE DISCHARGE MANAGEMENT: CPT | Performed by: INTERNAL MEDICINE

## 2020-10-13 PROCEDURE — 85025 COMPLETE CBC W/AUTO DIFF WBC: CPT | Performed by: SURGERY

## 2020-10-13 PROCEDURE — A9270 NON-COVERED ITEM OR SERVICE: HCPCS | Performed by: SURGERY

## 2020-10-13 PROCEDURE — 63710000001 HYDROCODONE-ACETAMINOPHEN 5-325 MG TABLET: Performed by: SURGERY

## 2020-10-13 PROCEDURE — 63710000001 DILTIAZEM CD 120 MG CAPSULE SUSTAINED-RELEASE 24 HR: Performed by: SURGERY

## 2020-10-13 PROCEDURE — 63710000001 PANTOPRAZOLE 40 MG TABLET DELAYED-RELEASE: Performed by: SURGERY

## 2020-10-13 PROCEDURE — G0378 HOSPITAL OBSERVATION PER HR: HCPCS

## 2020-10-13 PROCEDURE — 63710000001 METOPROLOL TARTRATE 50 MG TABLET: Performed by: SURGERY

## 2020-10-13 RX ORDER — WARFARIN SODIUM 3 MG/1
1.5 TABLET ORAL
Start: 2020-10-13 | End: 2021-01-04 | Stop reason: SDUPTHER

## 2020-10-13 RX ORDER — METOPROLOL TARTRATE 50 MG/1
50 TABLET, FILM COATED ORAL 2 TIMES DAILY
Start: 2020-10-13 | End: 2021-04-23 | Stop reason: SDUPTHER

## 2020-10-13 RX ADMIN — PANTOPRAZOLE SODIUM 40 MG: 40 TABLET, DELAYED RELEASE ORAL at 05:19

## 2020-10-13 RX ADMIN — CLINDAMYCIN 300 MG: 150 INJECTION, SOLUTION INTRAMUSCULAR; INTRAVENOUS at 05:19

## 2020-10-13 RX ADMIN — DEXTROSE MONOHYDRATE AND SODIUM CHLORIDE 100 ML/HR: 5; .9 INJECTION, SOLUTION INTRAVENOUS at 10:52

## 2020-10-13 RX ADMIN — HYDROCODONE BITARTRATE AND ACETAMINOPHEN 1 TABLET: 5; 325 TABLET ORAL at 05:19

## 2020-10-13 RX ADMIN — Medication 10 ML: at 11:31

## 2020-10-13 RX ADMIN — METOPROLOL TARTRATE 50 MG: 50 TABLET, FILM COATED ORAL at 05:19

## 2020-10-13 RX ADMIN — DEXTROSE MONOHYDRATE AND SODIUM CHLORIDE 100 ML/HR: 5; .9 INJECTION, SOLUTION INTRAVENOUS at 00:41

## 2020-10-13 RX ADMIN — METOPROLOL TARTRATE 50 MG: 50 TABLET, FILM COATED ORAL at 17:34

## 2020-10-13 RX ADMIN — DILTIAZEM HYDROCHLORIDE 300 MG: 180 CAPSULE, COATED, EXTENDED RELEASE ORAL at 11:33

## 2020-10-13 NOTE — PROGRESS NOTES
LOS: 0 days   Patient Care Team:  Lizzy Francis MD as PCP - General (Family Medicine)    Reason for follow-up: Postop    Subjective   Patient seen and examined.  Feels better    Objective   Dressings are clean dry and intact.  Abdomen is soft.  Tolerating diet    Vital Signs  Vitals:    10/13/20 0434 10/13/20 0546 10/13/20 0805 10/13/20 1215   BP: 101/64  121/75 118/56   BP Location: Left arm  Right arm Left arm   Patient Position: Lying  Lying Lying   Pulse: 73  99 75   Resp: 16  16 16   Temp: 98.1 °F (36.7 °C)  98.3 °F (36.8 °C) 98.2 °F (36.8 °C)   TempSrc: Oral  Oral Oral   SpO2: 98%  99% 94%   Weight:  73.1 kg (161 lb 2.5 oz)     Height:             Results Review:       Lab Results (last 24 hours)     Procedure Component Value Units Date/Time    Tissue Pathology Exam [[id number]] Collected: 10/12/20 1742    Specimen: Tissue from Gallbladder Updated: 10/13/20 0840    BUN [[id number]]  (Normal) Collected: 10/13/20 0224    Specimen: Blood Updated: 10/13/20 0710     BUN 13 mg/dL     Basic Metabolic Panel [[id number]]  (Abnormal) Collected: 10/13/20 0224    Specimen: Blood Updated: 10/13/20 0259     Glucose 135 mg/dL      BUN --     Comment: Testing performed by alternate method        Creatinine 1.09 mg/dL      Sodium 143 mmol/L      Potassium 4.9 mmol/L      Chloride 116 mmol/L      CO2 21.0 mmol/L      Calcium 8.3 mg/dL      eGFR Non African Amer 48 mL/min/1.73      BUN/Creatinine Ratio --     Comment: Testing not performed        Anion Gap 6.0 mmol/L     Narrative:      GFR Normal >60  Chronic Kidney Disease <60  Kidney Failure <15      CBC & Differential [[id number]]  (Abnormal) Collected: 10/13/20 0224    Specimen: Blood Updated: 10/13/20 0238    Narrative:      The following orders were created for panel order CBC & Differential.  Procedure                               Abnormality         Status                     ---------                               -----------         ------                     CBC  Auto Differential[[id number]]        Abnormal            Final result                 Please view results for these tests on the individual orders.    CBC Auto Differential [[id number]]  (Abnormal) Collected: 10/13/20 0224    Specimen: Blood Updated: 10/13/20 0238     WBC 7.40 10*3/mm3      RBC 2.92 10*6/mm3      Hemoglobin 9.4 g/dL      Hematocrit 29.9 %      .4 fL      MCH 32.1 pg      MCHC 31.3 g/dL      RDW 17.5 %      RDW-SD 62.6 fl      MPV 7.0 fL      Platelets 198 10*3/mm3      Neutrophil % 71.5 %      Lymphocyte % 17.8 %      Monocyte % 9.0 %      Eosinophil % 0.8 %      Basophil % 0.9 %      Neutrophils, Absolute 5.30 10*3/mm3      Lymphocytes, Absolute 1.30 10*3/mm3      Monocytes, Absolute 0.70 10*3/mm3      Eosinophils, Absolute 0.10 10*3/mm3      Basophils, Absolute 0.10 10*3/mm3      nRBC 0.2 /100 WBC            Imaging Results (Last 24 Hours)     ** No results found for the last 24 hours. **          Medication Review:   Current Facility-Administered Medications:   •  acetaminophen (TYLENOL) tablet 650 mg, 650 mg, Oral, Q4H PRN **OR** acetaminophen (TYLENOL) suppository 650 mg, 650 mg, Rectal, Q4H PRN, Go Pereira DO  •  clindamycin (CLEOCIN) injection 300 mg, 300 mg, Intramuscular, Q8H, Go Pereira DO, 300 mg at 10/13/20 0519  •  dextrose (D50W) 25 g/ 50mL Intravenous Solution 25 g, 25 g, Intravenous, Q15 Min PRN, Go Pereira DO  •  dextrose (GLUTOSE) oral gel 15 g, 15 g, Oral, Q15 Min PRN, Go Pereira DO  •  dextrose 5 % and sodium chloride 0.9 % infusion, 100 mL/hr, Intravenous, Continuous, Go Pereira DO, Last Rate: 100 mL/hr at 10/13/20 1052, 100 mL/hr at 10/13/20 1052  •  dilTIAZem (CARDIZEM) injection 10 mg, 10 mg, Intravenous, Q2H PRN, Go Pereira DO  •  dilTIAZem CD (CARDIZEM CD) 24 hr capsule 300 mg, 300 mg, Oral, Daily, Go Pereira DO, 300 mg at 10/13/20 1133  •  glucagon (human recombinant) (GLUCAGEN DIAGNOSTIC) injection 1 mg, 1 mg, Subcutaneous,  Q15 Min PRN, Go Pereira,   •  hydrALAZINE (APRESOLINE) tablet 20 mg, 20 mg, Oral, Q8H, Go Pereira DO, 20 mg at 10/12/20 2132  •  HYDROcodone-acetaminophen (NORCO) 5-325 MG per tablet 1 tablet, 1 tablet, Oral, Q4H PRN, Go Pereira, , 1 tablet at 10/13/20 0519  •  HYDROmorphone (DILAUDID) injection 0.5 mg, 0.5 mg, Intravenous, Q2H PRN **AND** naloxone (NARCAN) injection 0.1 mg, 0.1 mg, Intravenous, Q5 Min PRN, Go Pereira DO  •  influenza vac split quad (FLUZONE,FLUARIX,AFLURIA,FLULAVAL) injection 0.5 mL, 0.5 mL, Intramuscular, During Hospitalization, Go Pereira,   •  Magnesium Sulfate 2 gram Bolus, followed by 8 gram infusion (total Mg dose 10 grams)- Mg less than or equal to 1mg/dL, 2 g, Intravenous, PRN **OR** Magnesium Sulfate 2 gram / 50mL Infusion (GIVE X 3 BAGS TO EQUAL 6GM TOTAL DOSE) - Mg 1.1 - 1.5 mg/dl, 2 g, Intravenous, PRN **OR** Magnesium Sulfate 4 gram infusion- Mg 1.6-1.9 mg/dL, 4 g, Intravenous, PRN, Go Pereira, DO  •  metoprolol tartrate (LOPRESSOR) tablet 50 mg, 50 mg, Oral, Q8H, Go Pereira DO, 50 mg at 10/13/20 0519  •  Morphine sulfate (PF) injection 4 mg, 4 mg, Intravenous, Q2H PRN **AND** naloxone (NARCAN) injection 0.4 mg, 0.4 mg, Intravenous, Q5 Min PRN, Go Pereira,   •  ondansetron (ZOFRAN) tablet 4 mg, 4 mg, Oral, Q6H PRN **OR** ondansetron (ZOFRAN) injection 4 mg, 4 mg, Intravenous, Q6H PRN, Go Pereira, DO  •  oxyCODONE (ROXICODONE) immediate release tablet 10 mg, 10 mg, Oral, Q4H PRN, Go Pereira, DO  •  oxyCODONE (ROXICODONE) immediate release tablet 5 mg, 5 mg, Oral, Q4H PRN, Go Pereira, DO, 5 mg at 10/11/20 0203  •  pantoprazole (PROTONIX) EC tablet 40 mg, 40 mg, Oral, Q AM, Go Pereira, DO, 40 mg at 10/13/20 0519  •  potassium chloride (K-DUR,KLOR-CON) CR tablet 40 mEq, 40 mEq, Oral, PRN, 40 mEq at 10/11/20 0941 **OR** potassium chloride (KLOR-CON) packet 40 mEq, 40 mEq, Oral, PRN **OR** potassium chloride 10 mEq in 100  mL IVPB, 10 mEq, Intravenous, Q1H PRN, Go Pereira DO  •  promethazine (PHENERGAN) tablet 12.5 mg, 12.5 mg, Oral, Q6H PRN **OR** promethazine (PHENERGAN) suppository 12.5 mg, 12.5 mg, Rectal, Q6H PRN, Go Pereira DO  •  [COMPLETED] Insert peripheral IV, , , Once **AND** sodium chloride 0.9 % flush 10 mL, 10 mL, Intravenous, PRN, Go Pereira DO  •  sodium chloride 0.9 % flush 10 mL, 10 mL, Intravenous, Q12H, Go Pereira DO, 10 mL at 10/13/20 1131  •  sodium chloride 0.9 % flush 10 mL, 10 mL, Intravenous, PRN, Go Pereira DO  •  topiramate (TOPAMAX) tablet 100 mg, 100 mg, Oral, Nightly, Go Pereira DO, 100 mg at 10/12/20 4697    Assessment/Plan         Abdominal pain    Impression: Postop day #1 laparoscopic cholecystectomy    Plan: Okay to go back to rehab.  See me in the office in 2 weeks          Go Pereira DO  10/13/20  15:31 EDT

## 2020-10-13 NOTE — DISCHARGE SUMMARY
[facility]  DISCHARGE SUMMARY        Prepared For PCP:  Lizzy Francis MD    Patient Name: Hazel Bucio  : 1940  MRN: [id number]      Date of Admission:   10/10/2020    Date of Discharge:  10/13/2020    Length of stay:  LOS: 0 days     Hospital Course     Presenting Problem:   Calculus of gallbladder without cholecystitis without obstruction [K80.20]  Abdominal pain, unspecified abdominal location [R10.9]  Abdominal pain [R10.9]      Active Hospital Problems    Diagnosis  POA   • Abdominal pain [R10.9]  Yes      Resolved Hospital Problems   No resolved problems to display.           Hospital Course:    80-year-old woman with multiple comorbidities including diabetes mellitus type 2-diet controlled, bladder cancer status post right nephrectomy and cystectomy and paroxysmal atrial fibrillation.     She presented to the emergency room on 10/11/2020 with complaints of abdominal pain.  Abdominal pain is mostly in the right upper and lower quadrants.  It is intermittent and of moderate intensity.  Pain started a day prior to presentation.  No known aggravating or relieving factors.  After evaluating in the ED, patient was admitted for further care     Review of records with summary: The patient is currently a resident at an inpatient physical rehab center after falling several months ago and fracturing her right lower extremity in 4 places with hospitalization on 2020.  Prior to that the patient lived in her home alone.  She has a grandson who is around 32 years old who plans to move in with her and assist her once she is discharged home.  She also had a recent hospitalization 2020 through 2020 with length of stay 5 days for confusion, encephalopathy acute kidney injury.  Started on hydralazine, Roxicodone, stop taking Benadryl, sotalol, follow-up with Lizzy Holloway, pacemaker placed , [employer] in office , Jack Carrizales.  She also  had recent hospitalization echo 4/28/2020 showing normal LV function       Conditions addressed while inpatient as stated below    Abdominal pain  Due to acute cholecystitis with cholelithiasis   She has multi-antibiotics allergies  Was on clindamycin while inpatient  Status post laparoscopic cholecystectomy 10/12/2020  Tolerated procedure well       Reported diabetes mellitus type 2-diet controlled  A1c 5.4       History of bladder cancer  Status post right nephrectomy, cystectomy and ileal conduit  Patient was treated with chemotherapy as well as radiation     Paroxysmal Atrial fibrillation  Rate controlled  Patient had been on Coumadin back in September but the Coumadin was placed on hold due to an elevated INR.   INR on presentation 1.02  Restarted on Coumadin at discharge  INR check in the rehab facility       Recommendation for Outpatient Providers:       Mildly elevated serum creatinine-repeat BMP in 1 week recommended    Reasons For Change In Medications and Indications for New Medications:        Day of Discharge     HPI:   On the morning of discharge, patient was seen and examined  Tolerating diet  Postsurgical pain optimally controlled  She had no new complaints    Vital Signs:   Temp:  [97.7 °F (36.5 °C)-98.5 °F (36.9 °C)] 98.2 °F (36.8 °C)  Heart Rate:  [] 75  Resp:  [12-18] 16  BP: (101-142)/(56-96) 118/56     Physical Exam:  Physical Exam  Vitals signs reviewed.   Constitutional:       General: She is not in acute distress.  HENT:      Head: Normocephalic and atraumatic.      Nose: Nose normal.      Mouth/Throat:      Mouth: Mucous membranes are moist.   Eyes:      Extraocular Movements: Extraocular movements intact.      Conjunctiva/sclera: Conjunctivae normal.      Pupils: Pupils are equal, round, and reactive to light.   Neck:      Musculoskeletal: Neck supple.   Cardiovascular:      Rate and Rhythm: Normal rate and regular rhythm.   Pulmonary:      Effort: Pulmonary effort is normal. No  respiratory distress.      Breath sounds: Normal breath sounds.   Abdominal:      General: Bowel sounds are normal. There is no distension.      Palpations: Abdomen is soft.      Comments: Appropriate post incisional tenderness  Urostomy bag intact   Musculoskeletal:      Comments: Degenerative changes   Skin:     General: Skin is warm and dry.   Neurological:      Mental Status: She is alert and oriented to person, place, and time.   Psychiatric:         Mood and Affect: Mood normal.         Pertinent  and/or Most Recent Results     Results from last 7 days   Lab Units 10/13/20  0224 10/12/20  0710 10/11/20  1503 10/11/20  0235 10/10/20  2107 10/10/20  2018   WBC 10*3/mm3 7.40 6.60  --  6.10  --  6.50   HEMOGLOBIN g/dL 9.4* 10.5*  --  10.2*  --  10.6*   HEMATOCRIT % 29.9* 33.2*  --  32.0*  --  33.2*   PLATELETS 10*3/mm3 198 203  --  228  --  256   SODIUM mmol/L 143 143  --  145 143  --    POTASSIUM mmol/L 4.9 4.1 5.3* 3.6 3.6  --    CHLORIDE mmol/L 116* 112*  --  113* 113*  --    CO2 mmol/L 21.0* 22.0  --  22.0 20.0*  --    BUN  13 12  --  17 15 16   CREATININE mg/dL 1.09* 0.94  --  0.95 0.97  --    GLUCOSE mg/dL 135* 124*  --  158* 189*  --    CALCIUM mg/dL 8.3* 8.8  --  8.7 8.7  --      Results from last 7 days   Lab Units 10/12/20  1044 10/10/20  2107 10/10/20  2018   BILIRUBIN mg/dL  --  0.3  --    ALK PHOS U/L  --  146*  --    ALT (SGPT) U/L  --  5  --    AST (SGOT) U/L  --  18  --    PROTIME Seconds 11.6*  --  11.2*   INR  1.06*  --  1.02*   APTT seconds 25.9  --   --      Results from last 7 days   Lab Units 10/11/20  0235   CHOLESTEROL mg/dL 156   TRIGLYCERIDES mg/dL 91   HDL CHOL mg/dL 63*     Results from last 7 days   Lab Units 10/11/20  0235   HEMOGLOBIN A1C % 5.4       Brief Urine Lab Results  (Last result in the past 365 days)      Color   Clarity   Blood   Leuk Est   Nitrite   Protein   CREAT   Urine HCG        09/11/20 1822 --  Comment:  Specimen double labeled with two different patients  Corrected  result. Previous result was Yellow on 9/11/2020 at 1843 EDT.[C] --  Comment:  Specimen double labeled with two different patients  Corrected result. Previous result was Clear on 9/11/2020 at 1843 EDT.[C] --  Comment:  Specimen double labeled with two different patients  Corrected result. Previous result was Negative on 9/11/2020 at 1843 EDT.[C] --  Comment:  Specimen double labeled with two different patients  Corrected result. Previous result was Trace on 9/11/2020 at 1843 EDT.[C] --  Comment:  Specimen double labeled with two different patients  Corrected result. Previous result was Negative on 9/11/2020 at 1843 EDT.[C] --  Comment:  Specimen double labeled with two different patients  Corrected result. Previous result was Negative on 9/11/2020 at 1843 EDT.[C]               Microbiology Results Abnormal     None          Ct Abdomen Pelvis Without Contrast    Result Date: 10/10/2020  Impression: 1. Distended, stone containing gallbladder with subtle pericholecystic edema. Correlation with LFTs is recommended. Consider ultrasound or HIDA scan for further evaluation. 2. Parastomal hernia containing nonobstructed large bowel. 3. Status post cystectomy with urinary diversion and right lower quadrant urostomy. 4. Status post right nephrectomy and hysterectomy as well. 5. Additional chronic findings as above. Electronically signed by:  Anoop Fernandez M.D.  10/10/2020 7:43 PM    Xr Chest 1 View    Result Date: 10/12/2020  Impression: 1.Enlarged cardiac silhouette. 2.No acute pulmonary abnormality is seen.  Electronically Signed By-Floridalma East On:10/12/2020 8:39 AM This report was finalized on [id number] by  Floridalma East, .    Us Abdomen Limited    Result Date: 10/11/2020  Impression:  1. There is echogenic sludge and small stones within the gallbladder. 2. The right kidney is surgically absent.  Electronically Signed By-Hilario Gonzalez On:10/11/2020 10:20 AM This report was finalized on [id number] by  Hilario Gonzalez,  .      Results for orders placed during the hospital encounter of 09/07/20   Duplex Carotid Ultrasound CAR    Narrative · Right internal carotid artery is normal. Elevated velocities are noted   in the distal right internal carotid artery. However, this is felt to be   related to tortuosity as opposed to a diseased segment.  · Left internal carotid artery is normal.          Results for orders placed during the hospital encounter of 09/07/20   Duplex Carotid Ultrasound CAR    Narrative · Right internal carotid artery is normal. Elevated velocities are noted   in the distal right internal carotid artery. However, this is felt to be   related to tortuosity as opposed to a diseased segment.  · Left internal carotid artery is normal.          Results for orders placed during the hospital encounter of 04/28/20   Adult Transthoracic Echo Complete W/ Cont if Necessary Per Protocol    Narrative · Left ventricular systolic function is normal.  · Right ventricular cavity is borderline dilated.  · Mild pulmonic valve regurgitation is present.     Grossly normal echocardiographic exam for age demonstrating normal chamber   dimensions aside from borderline RVE with good global contractility no   specific valvular pathology.  Doppler exam shows mild pulmonic insufficiency and trivial to mild TR with   borderline RV systolic pressure, otherwise normal study for age                 Test Results Pending at Discharge  Pending Labs     Order Current Status    Tissue Pathology Exam In process            Procedures Performed  Procedure(s):  CHOLECYSTECTOMY LAPAROSCOPIC         Consults:   Consults     Date and Time Order Name Status Description    10/12/2020 0642 Inpatient Cardiology Consult Completed     10/11/2020 1510 Inpatient General Surgery Consult      10/10/2020 2147 Hospitalist (on-call MD unless specified) Completed     9/7/2020 1148 Inpatient Cardiology Consult Completed     9/7/2020 1137 Inpatient Nephrology Consult  Completed     9/7/2020 0900 Hospitalist (on-call MD unless specified) Completed             Discharge Details        Discharge Medications      Changes to Medications      Instructions Start Date   warfarin 3 MG tablet  Commonly known as: COUMADIN  What changed: additional instructions   1.5 mg, Oral, Daily Warfarin         Continue These Medications      Instructions Start Date   acetaminophen 325 MG tablet  Commonly known as: TYLENOL   650 mg, Oral, Every 6 Hours PRN      bisacodyl 5 MG EC tablet  Commonly known as: DULCOLAX   5 mg, Oral, Daily PRN      Bisacodyl Laxative 10 MG suppository  Generic drug: bisacodyl   10 mg, Rectal, Daily PRN      cetirizine 10 MG tablet  Commonly known as: zyrTEC   10 mg, Oral, Daily, Pt states she does not take      dilTIAZem  MG 24 hr capsule  Commonly known as: Cardizem CD   300 mg, Oral, Daily      diphenhydrAMINE-zinc acetate 2-0.1 % stick stick  Commonly known as: BENADRYL ITCH RELIEF   1 application, Topical, 2 Times Daily PRN      docusate sodium 100 MG capsule  Commonly known as: COLACE   200 mg, Oral, 2 Times Daily      hydrALAZINE 10 MG tablet  Commonly known as: APRESOLINE   20 mg, Oral, 3 Times Daily      HYDROcodone-acetaminophen 5-325 MG per tablet  Commonly known as: NORCO   1 tablet, Oral, Every 6 Hours PRN      lactulose 10 GM/15ML solution  Commonly known as: CHRONULAC   20 g, Oral, Every 3 Hours PRN      Magnesium 400 MG capsule   1 tablet, Oral, Every Night at Bedtime      melatonin 5 MG tablet tablet   6 mg, Oral, Nightly PRN      metoprolol tartrate 50 MG tablet  Commonly known as: LOPRESSOR   50 mg, Oral, 2 Times Daily      multivitamin-minerals tablet tablet   1 tablet, Oral, Daily      ondansetron ODT 4 MG disintegrating tablet  Commonly known as: ZOFRAN-ODT   4 mg, Translingual, Every 4 Hours PRN      pantoprazole 40 MG EC tablet  Commonly known as: PROTONIX   40 mg, Oral, Every Morning      polyethylene glycol packet  Commonly known as: MIRALAX    17 g, Oral, Daily PRN      simethicone 80 MG chewable tablet  Commonly known as: MYLICON   80 mg, Oral, Every 6 Hours PRN      topiramate 100 MG tablet  Commonly known as: TOPAMAX   100 mg, Oral, Every Night at Bedtime      Vitamin D3 50 MCG (2000 UT) tablet   1 tablet, Oral, Daily         Stop These Medications    traMADol 50 MG tablet  Commonly known as: ULTRAM            Allergies   Allergen Reactions   • Amoxicillin Shortness Of Breath   • Ciprofloxacin Hives   • Penicillins Rash   • Sulfa Antibiotics Hives   • Cephalexin Other (See Comments)   • Clavulanic Acid Other (See Comments)   • Codeine Other (See Comments)   • Contrast Dye Other (See Comments)   • Doxycycline Other (See Comments)   • Fluconazole Other (See Comments)   • Iodine Hives   • Macrobid [Nitrofurantoin] Hives   • Tobramycin Other (See Comments)   • Trimethoprim Other (See Comments)         Discharge Disposition:  Rehab Facility or Unit (DC - External)    Diet:  Hospital:  Diet Order   Procedures   • Diet Gastrointestinal; Low Residue         Discharge Activity:   Activity Instructions     Gradually Increase Activity Until at Pre-Hospitalization Level              CODE STATUS:    Code Status and Medical Interventions:   Ordered at: 10/12/20 1942     Code Status:    CPR     Medical Interventions (Level of Support Prior to Arrest):    Full         Follow-up Appointments  Future Appointments   Date Time Provider Department Center   11/17/2020  1:00 PM MARIELA Carrizales MD [facility] CAR NA P [facility] NA   [facility]  [facility]   3/9/2021  1:30 PM Jammie Chairez MD [facility] ONC NA [facility]   3/9/2021  1:30 PM LAB MD [facility] ONC LAB NA  [facility] ONAL ANNY   6/7/2021  3:00 PM ANNY US 2 BH ANNY US ANNY       Additional Instructions for the Follow-ups that You Need to Schedule     Discharge Follow-up with PCP   As directed       Currently Documented PCP:    Lizzy Francis MD    PCP Phone Number:    [phone]      Follow Up Details: Follow-up with PCP as needed         Discharge Follow-up with Specified Provider: Follow-up with general surgeon next scheduled appointment   As directed      To: Follow-up with general surgeon next scheduled appointment         Basic Metabolic Panel    Oct 18, 2020 (Approximate)      Protime-INR    Oct 18, 2020 (Approximate)      Is Patient on anti-coag: Yes                 Condition on Discharge:      Stable      This patient has been examined with appropriate PPE 10/13/20      Electronically signed by Brian Burnett MD, 10/13/20, 3:28 PM EDT.      Time: I spent  32  minutes on this discharge activity which included face-to-face encounter with the patient/reviewing the data in the system/coordination of the care with the nursing staff as well as consultants/documentation/entering orders.

## 2020-10-13 NOTE — PLAN OF CARE
Goal Outcome Evaluation:  Plan of Care Reviewed With: patient  Progress: no change  Outcome Summary: Can return to [facility] when ready. No PASRR or Precert required

## 2020-10-13 NOTE — PLAN OF CARE
Goal Outcome Evaluation:  Plan of Care Reviewed With: patient  Progress: no change  Outcome Summary: Patient had a lap angie and was placed on a full liquid diet. Tolerating diet without complications. P.O. pain rx. Plan of care ongoing

## 2020-10-13 NOTE — NURSING NOTE
0600 hydralazine held r/t patients  and metoprolol and hydrocodone being given at the time it was due.   Patient is resting without complication.    Will continue to monitor.

## 2020-10-13 NOTE — PLAN OF CARE
Goal Outcome Evaluation:  Plan of Care Reviewed With: patient  Progress: no change  Outcome Summary: Can return to [facility] when ready. No PASRR or Precert required. Patient doing well this morning.

## 2020-10-14 LAB
LAB AP CASE REPORT: NORMAL
PATH REPORT.FINAL DX SPEC: NORMAL
PATH REPORT.GROSS SPEC: NORMAL

## 2020-10-14 NOTE — PROGRESS NOTES
Case Management Discharge Note      Final Note: return to [facility]. No PASRR or Precert required         Selected Continued Care - Discharged on 10/13/2020 Admission date: 10/10/2020 - Discharge disposition: Rehab Facility or Unit (DC - External)    Destination Coordination complete    Service Provider Selected Services Address Phone Fax    [facility] [address], [place] [address] [phone]                             Final Discharge Disposition Code: 03 - skilled nursing facility (SNF)

## 2020-11-08 ENCOUNTER — HOSPITAL ENCOUNTER (EMERGENCY)
Facility: HOSPITAL | Age: 80
Discharge: HOME | End: 2020-11-08
Admitting: EMERGENCY MEDICINE

## 2020-11-08 VITALS
DIASTOLIC BLOOD PRESSURE: 74 MMHG | HEIGHT: 65 IN | TEMPERATURE: 98.1 F | BODY MASS INDEX: 27.16 KG/M2 | RESPIRATION RATE: 16 BRPM | OXYGEN SATURATION: 96 % | HEART RATE: 89 BPM | SYSTOLIC BLOOD PRESSURE: 108 MMHG | WEIGHT: 163 LBS

## 2020-11-08 DIAGNOSIS — M25.561 ACUTE PAIN OF RIGHT KNEE: Primary | ICD-10-CM

## 2020-11-08 DIAGNOSIS — T85.848A PAIN FROM IMPLANTED HARDWARE, INITIAL ENCOUNTER: ICD-10-CM

## 2020-11-08 PROCEDURE — 99283 EMERGENCY DEPT VISIT LOW MDM: CPT

## 2020-11-08 RX ORDER — HYDROCODONE BITARTRATE AND ACETAMINOPHEN 7.5; 325 MG/1; MG/1
1 TABLET ORAL ONCE
Status: COMPLETED | OUTPATIENT
Start: 2020-11-08 | End: 2020-11-08

## 2020-11-08 RX ADMIN — HYDROCODONE BITARTRATE AND ACETAMINOPHEN 1 TABLET: 7.5; 325 TABLET ORAL at 10:47

## 2020-11-08 NOTE — DISCHARGE INSTRUCTIONS
Take pain medication as prescribed and follow-up with Dr. Shah in the a.m.  May use ice for discomfort 20 minutes at a time every 3-4 hours.

## 2020-11-08 NOTE — ED PROVIDER NOTES
Subjective   History: Patient is a 80-year-old female complains of pain to right knee where there is a visible screw.  Patient states she had surgical intervention to repair right lower extremity fracture a few months ago.  She states she currently resides at [facility] is doing rehab when she had increased pain to right knee area last night.  She states there is a screw working its way out.  States she is supposed to be getting pain medication at the facility but is unsure of what she is getting or how often.    Ortho: dr becker    Onset: 1 day  Location: Right knee  Duration: Constant  Character: Ache  Aggravating/Alleviating factors: None  Radiation not applicable  Severity: Moderate            Review of Systems   Constitutional: Negative for chills, fatigue and fever.   HENT: Negative for congestion, sore throat, tinnitus and trouble swallowing.    Eyes: Negative for photophobia, discharge and visual disturbance.   Respiratory: Negative for cough and shortness of breath.    Cardiovascular: Negative for chest pain.   Gastrointestinal: Negative for abdominal pain, diarrhea, nausea and vomiting.   Genitourinary: Negative for dysuria, frequency and urgency.   Musculoskeletal: Positive for myalgias. Negative for back pain.   Skin: Negative for rash.   Neurological: Negative for dizziness and headaches.   Psychiatric/Behavioral: Negative for confusion.       Past Medical History:   Diagnosis Date   • Cancer (CMS/[facility])     breast, bladder   • Essential hypertension 1/17/2017   • Essential hypertension 1/17/2017   • Fall 08/27/2020   • Long term current use of anticoagulant 1/9/2015   • PAF (paroxysmal atrial fibrillation) (CMS/[facility]) 6/26/2019   • Presence of cardiac pacemaker 11/03/2014    BS dual chamber PM placed 10/2014 with pocket revision 1/25/17.  HX tachy rob syndrome   • Sick sinus syndrome (CMS/[facility]) 11/3/2014       Allergies   Allergen Reactions   • Amoxicillin Shortness Of Breath   • Ciprofloxacin Hives   •  Penicillins Rash   • Sulfa Antibiotics Hives   • Cephalexin Other (See Comments)   • Clavulanic Acid Other (See Comments)   • Codeine Other (See Comments)   • Contrast Dye Other (See Comments)   • Doxycycline Other (See Comments)   • Fluconazole Other (See Comments)   • Iodine Hives   • Macrobid [Nitrofurantoin] Hives   • Tobramycin Other (See Comments)   • Trimethoprim Other (See Comments)       Past Surgical History:   Procedure Laterality Date   • BREAST LUMPECTOMY     • CHOLECYSTECTOMY N/A 10/12/2020    Procedure: CHOLECYSTECTOMY LAPAROSCOPIC;  Surgeon: Go Pereira DO;  Location: [facility] MAIN OR;  Service: General;  Laterality: N/A;   • HYSTERECTOMY     • INSERT / REPLACE / REMOVE PACEMAKER     • TIBIA IM NAILING/RODDING Right 8/28/2020    Procedure: TIBIA INTRAMEDULLARY NAIL/ABRAHAM INSERTION;  Surgeon: Geoff Shah II, MD;  Location: [facility] MAIN OR;  Service: Orthopedics;  Laterality: Right;       Family History   Problem Relation Age of Onset   • COPD Father        Social History     Socioeconomic History   • Marital status:      Spouse name: Not on file   • Number of children: Not on file   • Years of education: Not on file   • Highest education level: Not on file   Tobacco Use   • Smoking status: Never Smoker   • Smokeless tobacco: Never Used   Substance and Sexual Activity   • Alcohol use: Not Currently   • Drug use: Never   • Sexual activity: Defer           Objective   Physical Exam  Constitutional:       Appearance: Normal appearance. She is not toxic-appearing.   HENT:      Head: Normocephalic.   Eyes:      Pupils: Pupils are equal, round, and reactive to light.   Neck:      Musculoskeletal: Normal range of motion.   Cardiovascular:      Rate and Rhythm: Normal rate.      Heart sounds: Normal heart sounds. No murmur.   Pulmonary:      Effort: Pulmonary effort is normal.      Breath sounds: Normal breath sounds.   Musculoskeletal:         General: Swelling present.      Comments: Mild  "edema right knee, no erythema warmth crepitus.  No open wound surface trauma or drainage.  Visible hardware proximal tibia has not broken skin surface.  Tenderness around screw.  Distal neurovascular sensation intact.  2+ palpable dorsalis pedal pulse.  Dorsiflexion and extension strength appropriate.   Skin:     General: Skin is warm and dry.      Findings: No rash.   Neurological:      Mental Status: She is alert and oriented to person, place, and time.   Psychiatric:         Mood and Affect: Mood normal.         Behavior: Behavior normal.         Thought Content: Thought content normal.         Judgment: Judgment normal.         Procedures           ED Course    /78 (BP Location: Left arm, Patient Position: Lying)   Pulse 89   Temp 98.4 °F (36.9 °C) (Oral)   Resp 16   Ht 165.1 cm (65\")   Wt 73.9 kg (163 lb)   SpO2 97%   BMI 27.12 kg/m²   Labs Reviewed - No data to display  Medications   HYDROcodone-acetaminophen (NORCO) 7.5-325 MG per tablet 1 tablet (1 tablet Oral Given 11/8/20 1047)     No radiology results for the last day                                             MDM     I examined the patient using the appropriate personal protective equipment.      DISPOSITION:     Chart Review: 8/28/2020: Right intramedullary nailing of tibial fracture with 3 proximal screws and 2 distal screws performed by Dr. Shah.      Comorbidity:  has a past medical history of Cancer (CMS/[facility]), Essential hypertension (1/17/2017), Essential hypertension (1/17/2017), Fall (08/27/2020), Long term current use of anticoagulant (1/9/2015), PAF (paroxysmal atrial fibrillation) (CMS/[facility]) (6/26/2019), Presence of cardiac pacemaker (11/03/2014), and Sick sinus syndrome (CMS/[facility]) (11/3/2014).      No radiology results for the last day    Disposition/Treatment:    Patient had right intramedullary nailing of tibia fracture with proximal and distal interlocking screws placed on 8/28/2020 by Dr. Shah. Patient presents to ER from " [facility] with increased pain to proximal tibial region where there is a visible screw attempting to work its way out but has not broken the skin yet.   There is mild edema to the right knee patient states that is unchanged from baseline.  Denies any injury or recent trauma to right knee.  There is no warmth or erythema, no open wound no drainage.  Patient denies any fever chills nausea vomiting.  Spoke with on-call Ortho, Dr. Banuelos stated follow-up in the a.m. in the office was appropriate.  Discussed with patient continue taking pain medication until able to call Dr. Shah's office in the a.m. for follow-up.    Prescription: None    Final diagnoses:   Acute pain of right knee   Pain from implanted hardware, initial encounter            Sudha Kaplan, APRN  11/08/20 1709

## 2020-11-08 NOTE — ED NOTES
Pt c/o pain around the R knee since having surgery and states it has since worsened last night.      Ruth Plunkett, PCT  11/08/20 1026

## 2020-11-12 ENCOUNTER — OFFICE VISIT (OUTPATIENT)
Dept: SURGERY | Facility: CLINIC | Age: 80
End: 2020-11-12

## 2020-11-12 VITALS
OXYGEN SATURATION: 97 % | WEIGHT: 160 LBS | HEIGHT: 65 IN | DIASTOLIC BLOOD PRESSURE: 73 MMHG | BODY MASS INDEX: 26.66 KG/M2 | HEART RATE: 70 BPM | TEMPERATURE: 97.3 F | SYSTOLIC BLOOD PRESSURE: 117 MMHG

## 2020-11-12 DIAGNOSIS — K80.10 CALCULUS OF GALLBLADDER WITH CHOLECYSTITIS WITHOUT BILIARY OBSTRUCTION, UNSPECIFIED CHOLECYSTITIS ACUITY: Primary | ICD-10-CM

## 2020-11-12 PROCEDURE — 99024 POSTOP FOLLOW-UP VISIT: CPT | Performed by: SURGERY

## 2020-11-12 NOTE — PROGRESS NOTES
SUBJECTIVE:    Hazel is a pleasant 80-year-old seen in the office today after a lap angie for acute disease in the hospital.  She is doing well.  No fevers or chills.  No nausea or vomiting.    OBJECTIVE:    Incisions have healed nicely.  There is no sign of infection    ASSESSMENT:    Satisfactory postop progress    PLAN:    Recheck in the office as needed

## 2020-11-17 ENCOUNTER — CLINICAL SUPPORT NO REQUIREMENTS (OUTPATIENT)
Dept: CARDIOLOGY | Facility: CLINIC | Age: 80
End: 2020-11-17

## 2020-11-17 ENCOUNTER — OFFICE VISIT (OUTPATIENT)
Dept: CARDIOLOGY | Facility: CLINIC | Age: 80
End: 2020-11-17

## 2020-11-17 VITALS
OXYGEN SATURATION: 98 % | HEIGHT: 65 IN | BODY MASS INDEX: 23.66 KG/M2 | DIASTOLIC BLOOD PRESSURE: 70 MMHG | HEART RATE: 78 BPM | WEIGHT: 142 LBS | SYSTOLIC BLOOD PRESSURE: 118 MMHG

## 2020-11-17 DIAGNOSIS — Z95.0 PRESENCE OF CARDIAC PACEMAKER: Chronic | ICD-10-CM

## 2020-11-17 DIAGNOSIS — I48.0 PAF (PAROXYSMAL ATRIAL FIBRILLATION) (HCC): Primary | Chronic | ICD-10-CM

## 2020-11-17 DIAGNOSIS — I49.5 SICK SINUS SYNDROME (HCC): Primary | Chronic | ICD-10-CM

## 2020-11-17 DIAGNOSIS — I49.5 SICK SINUS SYNDROME (HCC): Chronic | ICD-10-CM

## 2020-11-17 DIAGNOSIS — I10 ESSENTIAL HYPERTENSION: Chronic | ICD-10-CM

## 2020-11-17 PROCEDURE — 93000 ELECTROCARDIOGRAM COMPLETE: CPT | Performed by: INTERNAL MEDICINE

## 2020-11-17 PROCEDURE — 93280 PM DEVICE PROGR EVAL DUAL: CPT | Performed by: NURSE PRACTITIONER

## 2020-11-17 PROCEDURE — 99213 OFFICE O/P EST LOW 20 MIN: CPT | Performed by: INTERNAL MEDICINE

## 2020-11-18 NOTE — PROGRESS NOTES
DEVICE INTERROGATION:  IN OFFICE    DEVICE TYPE:   Dual-chamber pacemaker    :   Roost    BATTERY:  Stable    TIME TO ELECTIVE REPLACEMENT INDICATORS:   2.5 years    CHARGE TIME:   Nonapplicable        LEAD DATA:       DEVICE REPROGRAMMED FOR TESTING      Atrial:   0.7 mV, 460 ohms, currently in A. fib    Ventricular:     22.2 mV, 606 ohms, 0.7 V@0.4 ms    LV:      Atrial pacing percentage: 18%    Ventricular pacing percentage: 9%      Arrhythmia Logbook Reviewed: A. fib burden 48% of the time        Summary:    Stable Device Function    No significant arhythmia burden.     Battery status is stable.    Reviewed with: Dr. Carrizales      NEXT IN OFFICE DEVICE CHECK DUE: 1 year    REMOTE DEVICE INTERROGATIONS: 3 months

## 2020-12-12 ENCOUNTER — LAB REQUISITION (OUTPATIENT)
Dept: LAB | Facility: HOSPITAL | Age: 80
End: 2020-12-12

## 2020-12-12 DIAGNOSIS — Z00.00 ROUTINE GENERAL MEDICAL EXAMINATION AT A HEALTH CARE FACILITY: ICD-10-CM

## 2020-12-12 LAB
INR PPP: 1.78 (ref 0.9–1.1)
PROTHROMBIN TIME: 20.5 SECONDS (ref 11.7–14.2)

## 2020-12-12 PROCEDURE — 85610 PROTHROMBIN TIME: CPT

## 2020-12-21 ENCOUNTER — TELEPHONE (OUTPATIENT)
Dept: CARDIOLOGY | Facility: CLINIC | Age: 80
End: 2020-12-21

## 2020-12-21 ENCOUNTER — ANTICOAGULATION VISIT (OUTPATIENT)
Dept: CARDIOLOGY | Facility: CLINIC | Age: 80
End: 2020-12-21

## 2020-12-21 DIAGNOSIS — I48.0 PAF (PAROXYSMAL ATRIAL FIBRILLATION) (HCC): ICD-10-CM

## 2020-12-21 LAB — INR PPP: 6.7 (ref 2–3)

## 2021-01-04 RX ORDER — WARFARIN SODIUM 2 MG/1
2 TABLET ORAL
Qty: 90 TABLET | Refills: 0
Start: 2021-01-04 | End: 2021-08-16 | Stop reason: SDUPTHER

## 2021-01-05 ENCOUNTER — TELEPHONE (OUTPATIENT)
Dept: CARDIOLOGY | Facility: CLINIC | Age: 81
End: 2021-01-05

## 2021-01-05 NOTE — TELEPHONE ENCOUNTER
[facility] requesting refill for Sotalol. Medication is not on medication list or on list from nursing facility in October. Per Dr Carrizales- patient needs to come in for a follow up to determine if medication needs to be resumed. Message left for patient to call.

## 2021-01-08 ENCOUNTER — ANTICOAGULATION VISIT (OUTPATIENT)
Dept: CARDIOLOGY | Facility: CLINIC | Age: 81
End: 2021-01-08

## 2021-01-08 ENCOUNTER — TELEPHONE (OUTPATIENT)
Dept: CARDIOLOGY | Facility: CLINIC | Age: 81
End: 2021-01-08

## 2021-01-08 DIAGNOSIS — I48.0 PAF (PAROXYSMAL ATRIAL FIBRILLATION) (HCC): ICD-10-CM

## 2021-01-08 LAB — INR PPP: 3.4 (ref 2–3)

## 2021-01-08 NOTE — TELEPHONE ENCOUNTER
Pt/INR 3.4  Suppose to be taking 1.5mg and she got confused and started to taking 3mg at some point in the past 10 days.

## 2021-01-15 ENCOUNTER — TELEPHONE (OUTPATIENT)
Dept: CARDIOLOGY | Facility: CLINIC | Age: 81
End: 2021-01-15

## 2021-01-15 ENCOUNTER — ANTICOAGULATION VISIT (OUTPATIENT)
Dept: CARDIOLOGY | Facility: CLINIC | Age: 81
End: 2021-01-15

## 2021-01-15 DIAGNOSIS — I48.0 PAF (PAROXYSMAL ATRIAL FIBRILLATION) (HCC): ICD-10-CM

## 2021-01-15 LAB — INR PPP: 2.2 (ref 2–3)

## 2021-01-19 ENCOUNTER — OFFICE VISIT (OUTPATIENT)
Dept: CARDIOLOGY | Facility: CLINIC | Age: 81
End: 2021-01-19

## 2021-01-19 VITALS
HEIGHT: 65 IN | HEART RATE: 110 BPM | BODY MASS INDEX: 23.63 KG/M2 | OXYGEN SATURATION: 98 % | DIASTOLIC BLOOD PRESSURE: 96 MMHG | SYSTOLIC BLOOD PRESSURE: 170 MMHG

## 2021-01-19 DIAGNOSIS — Z95.0 PRESENCE OF CARDIAC PACEMAKER: Chronic | ICD-10-CM

## 2021-01-19 DIAGNOSIS — I49.5 SICK SINUS SYNDROME (HCC): Chronic | ICD-10-CM

## 2021-01-19 DIAGNOSIS — I48.0 PAF (PAROXYSMAL ATRIAL FIBRILLATION) (HCC): Primary | Chronic | ICD-10-CM

## 2021-01-19 DIAGNOSIS — I10 ESSENTIAL HYPERTENSION: Chronic | ICD-10-CM

## 2021-01-19 PROCEDURE — 93000 ELECTROCARDIOGRAM COMPLETE: CPT | Performed by: INTERNAL MEDICINE

## 2021-01-19 PROCEDURE — 99214 OFFICE O/P EST MOD 30 MIN: CPT | Performed by: INTERNAL MEDICINE

## 2021-01-19 RX ORDER — FUROSEMIDE 20 MG/1
TABLET ORAL
COMMUNITY
Start: 2020-11-27 | End: 2021-01-19

## 2021-01-19 RX ORDER — LANSOPRAZOLE 30 MG/1
30 CAPSULE, DELAYED RELEASE ORAL 2 TIMES DAILY
COMMUNITY
End: 2021-11-02

## 2021-01-19 RX ORDER — SOTALOL HYDROCHLORIDE 80 MG/1
80 TABLET ORAL 2 TIMES DAILY
COMMUNITY
End: 2021-04-23

## 2021-01-19 RX ORDER — FEXOFENADINE HCL 180 MG/1
180 TABLET ORAL DAILY PRN
COMMUNITY
End: 2021-09-30 | Stop reason: HOSPADM

## 2021-01-19 NOTE — PROGRESS NOTES
Cardiology Office Visit Note      Referring physician:  Lizzy Francis MD    Reason For Followup: Follow up from Rehab    HPI:  Hazel Bucio is a 80 y.o. female with a known history of Afib, pacemaker placement and hypertention who presents today for a follow up. She was released from Rehab in December and has been doing well. She has some questions about medications she had been taking prior to going into the hospital. She denies any recent falls, no chest pain, shortness of air or dizziness. She has rare episodes of palpitations and some swelling in her leg.    Her medication list was reviewed at today's visit.     Past Medical History:   Diagnosis Date   • Cancer (CMS/HCC)     breast, bladder   • Deep vein thrombosis (CMS/[facility])    • Essential hypertension 1/17/2017   • Essential hypertension 1/17/2017   • Fall 08/27/2020   • Long term current use of anticoagulant 1/9/2015   • PAF (paroxysmal atrial fibrillation) (CMS/[facility]) 6/26/2019   • Presence of cardiac pacemaker 11/03/2014    BS dual chamber PM placed 10/2014 with pocket revision 1/25/17.  HX tachy rob syndrome   • Sick sinus syndrome (CMS/[facility]) 11/3/2014       Past Surgical History:   Procedure Laterality Date   • BREAST LUMPECTOMY     • CHOLECYSTECTOMY N/A 10/12/2020    Procedure: CHOLECYSTECTOMY LAPAROSCOPIC;  Surgeon: Go Pereira DO;  Location: [facility];  Service: General;  Laterality: N/A;   • HYSTERECTOMY     • INSERT / REPLACE / REMOVE PACEMAKER     • TIBIA IM NAILING/RODDING Right 8/28/2020    Procedure: TIBIA INTRAMEDULLARY NAIL/ABRAHAM INSERTION;  Surgeon: Geoff Shah II, MD;  Location: [facility] OR;  Service: Orthopedics;  Laterality: Right;         Current Outpatient Medications:   •  acetaminophen (TYLENOL) 325 MG tablet, Take 650 mg by mouth Every 6 (Six) Hours As Needed for Mild Pain ., Disp: , Rfl:   •  Calcium Carb-Cholecalciferol (Calcium 1000 + D) 1000-800 MG-UNIT tablet, Take  by mouth Daily., Disp: , Rfl:   •   dilTIAZem CD (Cardizem CD) 300 MG 24 hr capsule, Take 1 capsule by mouth Daily., Disp: 30 capsule, Rfl: 1  •  fexofenadine (ALLEGRA) 180 MG tablet, Take 180 mg by mouth Daily., Disp: , Rfl:   •  lansoprazole (PREVACID) 30 MG capsule, Take 30 mg by mouth Daily., Disp: , Rfl:   •  sotalol (BETAPACE) 80 MG tablet, Take 80 mg by mouth 2 (Two) Times a Day. 120mg in the AM, 80mg in the PM, Disp: , Rfl:   •  topiramate (TOPAMAX) 100 MG tablet, Take 100 mg by mouth every night at bedtime., Disp: , Rfl:   •  warfarin (COUMADIN) 2 MG tablet, Take 1 tablet by mouth Daily., Disp: 90 tablet, Rfl: 0  •  bisacodyl (Bisacodyl Laxative) 10 MG suppository, Insert 10 mg into the rectum Daily As Needed for Constipation., Disp: , Rfl:   •  bisacodyl (DULCOLAX) 5 MG EC tablet, Take 5 mg by mouth Daily As Needed for Constipation., Disp: , Rfl:   •  Cholecalciferol (VITAMIN D3) 2000 units tablet, Take 1 tablet by mouth Daily., Disp: , Rfl:   •  hydrALAZINE (APRESOLINE) 10 MG tablet, Take 2 tablets by mouth 3 (Three) Times a Day., Disp: , Rfl:   •  HYDROcodone-acetaminophen (NORCO) 5-325 MG per tablet, Take 1 tablet by mouth Every 6 (Six) Hours As Needed., Disp: , Rfl:   •  lactulose (CHRONULAC) 10 GM/15ML solution, Take 20 g by mouth Every 3 (Three) Hours As Needed., Disp: , Rfl:   •  Magnesium 400 MG capsule, Take 1 tablet by mouth every night at bedtime., Disp: , Rfl:   •  melatonin 5 MG tablet tablet, Take 6 mg by mouth At Night As Needed., Disp: , Rfl:   •  metoprolol tartrate (LOPRESSOR) 50 MG tablet, Take 1 tablet by mouth 2 (Two) Times a Day., Disp: , Rfl:   •  multivitamin-minerals (THERA-M) tablet tablet, Take 1 tablet by mouth Daily., Disp: , Rfl:   •  ondansetron ODT (ZOFRAN-ODT) 4 MG disintegrating tablet, Place 4 mg on the tongue Every 4 (Four) Hours As Needed for Nausea or Vomiting., Disp: , Rfl:   •  pantoprazole (PROTONIX) 40 MG EC tablet, Take 40 mg by mouth Every Morning., Disp: , Rfl:   •  polyethylene glycol (MIRALAX)  "packet, Take 17 g by mouth Daily As Needed., Disp: , Rfl:   •  simethicone (MYLICON) 80 MG chewable tablet, Chew 80 mg Every 6 (Six) Hours As Needed for Flatulence., Disp: , Rfl:     Social History     Socioeconomic History   • Marital status:      Spouse name: Not on file   • Number of children: Not on file   • Years of education: Not on file   • Highest education level: Not on file   Tobacco Use   • Smoking status: Never Smoker   • Smokeless tobacco: Never Used   Substance and Sexual Activity   • Alcohol use: Not Currently   • Drug use: Never   • Sexual activity: Defer       Family History   Problem Relation Age of Onset   • COPD Father          Review of Systems   General: denies fever, chills, anorexia, weight loss  Eyes: denies blurring, diplopia  Ear/Nose/Throat: denies ear pain, nosebleeds, hoarseness  Cardiovascular: See HPI  Respiratory: denies excessive sputum, hemoptysis, wheezing  Gastrointestinal: denies nausea, vomiting, change in bowel habits, abdominal pain  Genitourinary: Denies hematuria dysuria  Musculoskeletal: Modest arthralgias; mostly affecting shoulders and weightbearing lower extremity joints.  Mild to moderate functional limitations  Skin: denies rashes, itching, suspicious lesions  Neurologic: denies focal neuro deficits  Psychiatric: denies depression, anxiety  Endocrine: denies cold intolerance, heat intolerance  Hematologic/Lymphatic: denies abnormal bruising, bleeding  Allergic/Immunologic: denies urticaria or persistent infections      Objective     Visit Vitals  /96   Pulse 110   Ht 165.1 cm (65\")   SpO2 98%   BMI 23.63 kg/m²           Physical Exam  General:     Obese, well developed,, in no acute distress.    Head:     normocephalic and atraumatic.    Eyes:    PERRL/EOM intact, conjunctiva and sclera clear with out nystagmus.    Neck:    no jvd or bruits  Chest Wall:    no deformities   Lungs:    clear bilaterally to auscultation with adequate global airflow   Heart:   "  non-displaced PMI; regular rate and rhythm, normal S1, S2 without murmurs, rubs, or gallops  Abdomen:  Soft, nontender without HSM  Msk:    no deformity; adequate R OM  Pulses:    pulses normal in all 4 extremities.    Extremities:    no clubbing, cyanosis, edema   Neurologic:    no focal sensory or motor deficits  Skin:    intact without lesions or rashes.    Psych:    alert and cooperative; normal mood and affect; normal attention span and concentration.            ECG 12 Lead    Date/Time: 1/19/2021 7:55 AM  Performed by: MARIELA Carrizales MD  Authorized by: MARIELA Carrizales MD   Comparison: compared with previous ECG from 11/17/2020                Assessment:   Problems Addressed this Visit        Other    PAF (paroxysmal atrial fibrillation) (CMS/HCC) - Primary (Chronic)    -Currently maintaining rate control strategy not rhythm control; consequently, will change to metoprolol and discontinue sotalol today  -Fully anticoagulated on Coumadin therapy                Essential hypertension (Chronic)    -Marginally well-regulated; will change sotalol to metoprolol tartrate 100 mg p.o. twice daily                Presence of cardiac pacemaker (Chronic)    -Satisfactory device site and functioning        Sick sinus syndrome (CMS/HCC) (Chronic)    -Adequately regulated with beta-blocker diltiazem and permanent pacemaker            ECG 12 Lead      Diagnoses       Codes Comments    PAF (paroxysmal atrial fibrillation) (CMS/HCC)    -  Primary ICD-10-CM: I48.0  ICD-9-CM: 427.31     Essential hypertension     ICD-10-CM: I10  ICD-9-CM: 401.9     Sick sinus syndrome (CMS/HCC)     ICD-10-CM: I49.5  ICD-9-CM: 427.81     Presence of cardiac pacemaker     ICD-10-CM: Z95.0  ICD-9-CM: V45.01             Plan:  I am recommending discontinuing sotalol; will change to metoprolol tartrate 100 mg p.o. twice daily  Return to clinic 3 months for clinical follow-up with pacemaker check  Continue anticoagulation profiles; currently being  checked at home by JG Carrizales MD  1/19/2021 10:52 EST    This report was generated using the Dragon voice recognition system.

## 2021-01-21 ENCOUNTER — TELEPHONE (OUTPATIENT)
Dept: CARDIOLOGY | Facility: CLINIC | Age: 81
End: 2021-01-21

## 2021-01-21 NOTE — TELEPHONE ENCOUNTER
Ashley with [facility] calling  She takes melatonin 5 mg nightly  She has been doing this since Monday  She is still not sleeping Can she increase to 10 mg  If not Is there something else she can take

## 2021-01-21 NOTE — TELEPHONE ENCOUNTER
Spoke with [facility]- needs to call PCP to discuss. There were medications she was given for sleep that she was not taking and I removed from her list at her visit.

## 2021-01-22 ENCOUNTER — TELEPHONE (OUTPATIENT)
Dept: CARDIOLOGY | Facility: CLINIC | Age: 81
End: 2021-01-22

## 2021-01-22 NOTE — TELEPHONE ENCOUNTER
Patient was in and saw Dr Carrizales on Tuesday she stated that Dr Carrizales was going to call her in two new medications she stated she called her pharmacy and nothing has been called in yet.

## 2021-02-12 ENCOUNTER — ANTICOAGULATION VISIT (OUTPATIENT)
Dept: CARDIOLOGY | Facility: CLINIC | Age: 81
End: 2021-02-12

## 2021-02-12 DIAGNOSIS — I48.0 PAF (PAROXYSMAL ATRIAL FIBRILLATION) (HCC): ICD-10-CM

## 2021-02-12 LAB — INR PPP: 3.3 (ref 2–3)

## 2021-02-12 NOTE — PATIENT INSTRUCTIONS
Spoke with Neida at [facility]- hold todays dose then resume 2mg daily, check again in 1 month per Dr Carrizales

## 2021-02-22 ENCOUNTER — TELEPHONE (OUTPATIENT)
Dept: ONCOLOGY | Facility: CLINIC | Age: 81
End: 2021-02-22

## 2021-02-22 NOTE — TELEPHONE ENCOUNTER
Contacted patient regarding appointment. She states that she has had an accident and is unable to walk. She is currently in rehab. Patient would like to wait to see Dr. Griffin as she is unable to move around and is hopeful she can regain her strength. She will not be able to come to the scheduled lab appointment, either.

## 2021-03-09 ENCOUNTER — APPOINTMENT (OUTPATIENT)
Dept: LAB | Facility: HOSPITAL | Age: 81
End: 2021-03-09

## 2021-03-09 ENCOUNTER — ANTICOAGULATION VISIT (OUTPATIENT)
Dept: CARDIOLOGY | Facility: CLINIC | Age: 81
End: 2021-03-09

## 2021-03-09 DIAGNOSIS — I48.0 PAF (PAROXYSMAL ATRIAL FIBRILLATION) (HCC): ICD-10-CM

## 2021-03-09 LAB — INR PPP: 1.7 (ref 2–3)

## 2021-03-15 ENCOUNTER — TELEPHONE (OUTPATIENT)
Dept: ONCOLOGY | Facility: CLINIC | Age: 81
End: 2021-03-15

## 2021-03-15 NOTE — TELEPHONE ENCOUNTER
----- Message from Nicole Alarcon sent at 3/15/2021  2:41 PM EDT -----  Regarding: FW: NADIR  Check to see how this one is doing and transfer her to Nadir's schedule   ----- Message -----  From: Martha Youngblood MA  Sent: 2/22/2021   1:05 PM EDT  To: Nicole Alarcon  Subject: NADIR                                             Contacted patient regarding appointment. She states that she has had an accident and is unable to walk. She is currently in rehab. Patient would like to wait to see Dr. Griffin as she is unable to move around and is hopeful she can regain her strength. She will not be able to come to the scheduled lab appointment, either.

## 2021-03-23 ENCOUNTER — ANTICOAGULATION VISIT (OUTPATIENT)
Dept: CARDIOLOGY | Facility: CLINIC | Age: 81
End: 2021-03-23

## 2021-03-23 DIAGNOSIS — I48.0 PAF (PAROXYSMAL ATRIAL FIBRILLATION) (HCC): ICD-10-CM

## 2021-03-23 LAB — INR PPP: 1.5 (ref 2–3)

## 2021-03-30 ENCOUNTER — TELEPHONE (OUTPATIENT)
Dept: CARDIOLOGY | Facility: CLINIC | Age: 81
End: 2021-03-30

## 2021-03-30 ENCOUNTER — ANTICOAGULATION VISIT (OUTPATIENT)
Dept: CARDIOLOGY | Facility: CLINIC | Age: 81
End: 2021-03-30

## 2021-03-30 DIAGNOSIS — I48.0 PAF (PAROXYSMAL ATRIAL FIBRILLATION) (HCC): ICD-10-CM

## 2021-03-30 LAB — INR PPP: 2.2 (ref 2–3)

## 2021-04-06 ENCOUNTER — ANTICOAGULATION VISIT (OUTPATIENT)
Dept: CARDIOLOGY | Facility: CLINIC | Age: 81
End: 2021-04-06

## 2021-04-06 DIAGNOSIS — I48.0 PAF (PAROXYSMAL ATRIAL FIBRILLATION) (HCC): ICD-10-CM

## 2021-04-06 LAB — INR PPP: 2.2 (ref 2–3)

## 2021-04-21 RX ORDER — FUROSEMIDE 20 MG/1
TABLET ORAL
Qty: 90 TABLET | Refills: 3 | OUTPATIENT
Start: 2021-04-21

## 2021-04-23 ENCOUNTER — ANTICOAGULATION VISIT (OUTPATIENT)
Dept: CARDIOLOGY | Facility: CLINIC | Age: 81
End: 2021-04-23

## 2021-04-23 DIAGNOSIS — I48.0 PAF (PAROXYSMAL ATRIAL FIBRILLATION) (HCC): Primary | ICD-10-CM

## 2021-04-23 LAB — INR PPP: 4 (ref 2–3)

## 2021-04-23 RX ORDER — METOPROLOL TARTRATE 100 MG/1
100 TABLET ORAL 2 TIMES DAILY
Start: 2021-04-23 | End: 2021-06-03 | Stop reason: SDUPTHER

## 2021-04-23 NOTE — TELEPHONE ENCOUNTER
INR today was 4.0 on 3mg MWF, 2mg AOD. Per Dr Carrizales hold x2 doses then resume at 2mg daily. Patient is scheduled for an appt on Monday 4/26/21.

## 2021-04-23 NOTE — TELEPHONE ENCOUNTER
Per Home Health- HR up to 122 over the last couple of weeks She has been short of air at time her heart is out of rhythm today and she has been feeling lightheaded. Her next appt isnt until May. Any new orders ? I did ask them to check her INR and do a remote device transmission

## 2021-04-29 DIAGNOSIS — I48.0 PAF (PAROXYSMAL ATRIAL FIBRILLATION) (HCC): Primary | ICD-10-CM

## 2021-04-30 ENCOUNTER — TELEPHONE (OUTPATIENT)
Dept: CARDIOLOGY | Facility: CLINIC | Age: 81
End: 2021-04-30

## 2021-04-30 NOTE — TELEPHONE ENCOUNTER
----- Message from CHELLE Monte sent at 4/29/2021  3:49 PM EDT -----  I ordered a bmp  Have them collect please  I would like to add digoxin but I don't see a recent BMP in epic.   IF her kidney function is normal will add dig  ----- Message -----  From: Fransisca Snyder MA  Sent: 4/29/2021   3:32 PM EDT  To: CHELLE Monte    I reviewed her medication with the home health nurse on the 23rd- what is on the chart is correct.   ----- Message -----  From: Angela Lozada APRN  Sent: 4/29/2021   2:51 PM EDT  To: Fransisca Snyder MA    Please confirm with pt what med for afib she is currently taking and dose    Metoprolol, diltiazem?  Has she been out of anything?

## 2021-05-04 ENCOUNTER — ANTICOAGULATION VISIT (OUTPATIENT)
Dept: CARDIOLOGY | Facility: CLINIC | Age: 81
End: 2021-05-04

## 2021-05-04 DIAGNOSIS — I48.0 PAF (PAROXYSMAL ATRIAL FIBRILLATION) (HCC): Primary | ICD-10-CM

## 2021-05-04 LAB — INR PPP: 2.1 (ref 2–3)

## 2021-05-17 ENCOUNTER — CLINICAL SUPPORT NO REQUIREMENTS (OUTPATIENT)
Dept: CARDIOLOGY | Facility: CLINIC | Age: 81
End: 2021-05-17

## 2021-05-17 ENCOUNTER — OFFICE VISIT (OUTPATIENT)
Dept: CARDIOLOGY | Facility: CLINIC | Age: 81
End: 2021-05-17

## 2021-05-17 VITALS
OXYGEN SATURATION: 98 % | HEART RATE: 109 BPM | SYSTOLIC BLOOD PRESSURE: 138 MMHG | DIASTOLIC BLOOD PRESSURE: 98 MMHG | BODY MASS INDEX: 23.63 KG/M2 | HEIGHT: 65 IN

## 2021-05-17 DIAGNOSIS — I49.5 SICK SINUS SYNDROME (HCC): Primary | Chronic | ICD-10-CM

## 2021-05-17 DIAGNOSIS — I48.0 PAF (PAROXYSMAL ATRIAL FIBRILLATION) (HCC): Primary | Chronic | ICD-10-CM

## 2021-05-17 DIAGNOSIS — Z95.0 PRESENCE OF CARDIAC PACEMAKER: Chronic | ICD-10-CM

## 2021-05-17 DIAGNOSIS — I10 ESSENTIAL HYPERTENSION: Chronic | ICD-10-CM

## 2021-05-17 DIAGNOSIS — I49.5 SICK SINUS SYNDROME (HCC): Chronic | ICD-10-CM

## 2021-05-17 PROCEDURE — 93000 ELECTROCARDIOGRAM COMPLETE: CPT | Performed by: INTERNAL MEDICINE

## 2021-05-17 PROCEDURE — 99214 OFFICE O/P EST MOD 30 MIN: CPT | Performed by: INTERNAL MEDICINE

## 2021-05-17 PROCEDURE — 93280 PM DEVICE PROGR EVAL DUAL: CPT | Performed by: NURSE PRACTITIONER

## 2021-05-17 RX ORDER — DIGOXIN 250 MCG
TABLET ORAL
Qty: 90 TABLET | Refills: 3 | Status: SHIPPED | OUTPATIENT
Start: 2021-05-17 | End: 2021-09-25

## 2021-05-17 NOTE — PROGRESS NOTES
Cardiology Office Visit Note      Referring physician:  Lizzy Francis MD    Reason For Followup: Afib, Pacemaker check    HPI:  Hazel Bucio is a 81 y.o. female who presents to the office today for device check and follow up. She has a known Afib, pacemaker placement, hypertention and breast cancer. Stress test 04/30/2020 showed low risk study, no reversible ischemia and an EF 70%    Echo 04/29/2020 shows left ventricular systolic function is normal, right ventricular cavity is borderline dilated,mild pulmonic valve regurgitation is present and EF 58%     Device interrogation today shows permanent A. fib with frequent episodes of RVR.  Satisfactory battery status and lead function.    She is doing well clinically; claims she is getting stronger and doing more in rehab with no specific constitutional symptoms.  She is unaware of rapid heart rates at times.  She denies any dizziness, lightheadedness or near syncopal episodes.  She is improving in terms of return to ADLs.    Her medications were reviewed in detail today      Past Medical History:   Diagnosis Date   • Cancer (CMS/HCC)     breast, bladder   • Deep vein thrombosis (CMS/HCC)    • Essential hypertension 1/17/2017   • Essential hypertension 1/17/2017   • Fall 08/27/2020   • Long term current use of anticoagulant 1/9/2015   • PAF (paroxysmal atrial fibrillation) (CMS/HCC) 6/26/2019   • Presence of cardiac pacemaker 11/03/2014    BS dual chamber PM placed 10/2014 with pocket revision 1/25/17.  HX tachy rob syndrome   • Sick sinus syndrome (CMS/HCC) 11/3/2014       Past Surgical History:   Procedure Laterality Date   • BREAST LUMPECTOMY     • CHOLECYSTECTOMY N/A 10/12/2020    Procedure: CHOLECYSTECTOMY LAPAROSCOPIC;  Surgeon: Go Pereira DO;  Location: [facility] MAIN OR;  Service: General;  Laterality: N/A;   • HYSTERECTOMY     • INSERT / REPLACE / REMOVE PACEMAKER     • TIBIA IM NAILING/RODDING Right 8/28/2020    Procedure: TIBIA INTRAMEDULLARY  NAIL/ABRAHAM INSERTION;  Surgeon: Geoff Shah II, MD;  Location: [facility] MAIN OR;  Service: Orthopedics;  Laterality: Right;         Current Outpatient Medications:   •  acetaminophen (TYLENOL) 325 MG tablet, Take 650 mg by mouth Every 6 (Six) Hours As Needed for Mild Pain ., Disp: , Rfl:   •  Diclofenac Sodium (VOLTAREN) 1 % gel gel, Apply 4 g topically to the appropriate area as directed 4 (Four) Times a Day As Needed., Disp: , Rfl:   •  dilTIAZem CD (Cardizem CD) 300 MG 24 hr capsule, Take 1 capsule by mouth Daily., Disp: 30 capsule, Rfl: 1  •  fexofenadine (ALLEGRA) 180 MG tablet, Take 180 mg by mouth Daily., Disp: , Rfl:   •  lansoprazole (PREVACID) 30 MG capsule, Take 30 mg by mouth Daily., Disp: , Rfl:   •  metoprolol tartrate (LOPRESSOR) 100 MG tablet, Take 1 tablet by mouth 2 (Two) Times a Day., Disp: , Rfl:   •  topiramate (TOPAMAX) 100 MG tablet, Take 100 mg by mouth every night at bedtime., Disp: , Rfl:   •  warfarin (COUMADIN) 2 MG tablet, Take 1 tablet by mouth Daily., Disp: 90 tablet, Rfl: 0  •  digoxin (LANOXIN) 250 MCG tablet, Take 2 tablets today and tomorrow, then take 1 tablet daily, except skip sundays, Disp: 90 tablet, Rfl: 3  •  ondansetron ODT (ZOFRAN-ODT) 4 MG disintegrating tablet, Place 4 mg on the tongue Every 4 (Four) Hours As Needed for Nausea or Vomiting., Disp: , Rfl:     Social History     Socioeconomic History   • Marital status:      Spouse name: Not on file   • Number of children: Not on file   • Years of education: Not on file   • Highest education level: Not on file   Tobacco Use   • Smoking status: Never Smoker   • Smokeless tobacco: Never Used   Vaping Use   • Vaping Use: Never used   Substance and Sexual Activity   • Alcohol use: Not Currently   • Drug use: Never   • Sexual activity: Defer       Family History   Problem Relation Age of Onset   • COPD Father          Review of Systems   General: denies fever, chills, anorexia, weight loss  Eyes: denies blurring,  "diplopia  Ear/Nose/Throat: denies ear pain, nosebleeds, hoarseness  Cardiovascular: See HPI  Respiratory: denies excessive sputum, hemoptysis, wheezing  Gastrointestinal: denies nausea, vomiting, change in bowel habits, abdominal pain  Genitourinary: Denies hematuria dysuria  Musculoskeletal: Moderate weightbearing joint arthralgias with antalgic gait, presents today in a wheelchair though claims she is improving in rehab-see HPI  Skin: denies rashes, itching, suspicious lesions  Neurologic: denies focal neuro deficits  Psychiatric: denies depression, anxiety  Endocrine: denies cold intolerance, heat intolerance  Hematologic/Lymphatic: denies abnormal bruising, bleeding  Allergic/Immunologic: denies urticaria or persistent infections      Objective     Visit Vitals  /98   Pulse 109   Ht 165.1 cm (65\")   SpO2 98%   BMI 23.63 kg/m²           Physical Exam  General:     Pleasant, elderly lady appears well-nourished, well developed,, in no acute distress.    Head:     normocephalic and atraumatic.    Eyes:    PERRL/EOM intact, conjunctiva and sclera clear with out nystagmus.    Neck:    no jvd or bruits  Chest Wall:    no deformities   Lungs:    clear bilaterally to auscultation with adequate global airflow   Heart:    non-displaced PMI; irregularly irregular rhythm with RVR, normal S1, S2 without murmurs, rubs, or gallops  Abdomen:  Soft, nontender without HSM  Msk:    no deformity; adequate R OM?  Patient presents in wheelchair; Station and gait were not tested  Pulses:    pulses normal in all 4 extremities.    Extremities:    no clubbing, cyanosis, edema   Neurologic:    no focal sensory or motor deficits  Skin:    intact without lesions or rashes.    Psych:    alert and cooperative; normal mood and affect; normal attention span and concentration.            ECG 12 Lead    Date/Time: 5/17/2021 9:43 AM  Performed by: MARIELA Carrizales MD  Authorized by: MARIELA Carrizales MD   Comparison: compared with previous ECG " from 1/19/2021  Similar to previous ECG  Rhythm: atrial fibrillation  Rate: tachycardic  Other findings: non-specific ST-T wave changes    Clinical impression: abnormal EKG              Assessment:   Problems Addressed this Visit        Other    PAF (paroxysmal atrial fibrillation) (CMS/HCC) - Primary (Chronic)  -Now appears to be in permanent atrial fibrillation with episodic RVR despite substantial doses of both metoprolol and diltiazem CD  -We will add digoxin for rate control today      Essential hypertension (Chronic)  -Well-regulated on current medication listed and reviewed in detail      Presence of cardiac pacemaker (Chronic)  -Satisfactory device site and functioning though interrogation today shows frequent episodes of RVR with what appears to be permanent atrial fibrillation      Sick sinus syndrome (CMS/HCC) (Chronic)  -Marginally well rate controlled on combination diltiazem plus metoprolol tartrate with VVI pacemaker  -Remains anticoagulated on Coumadin therapy      Diagnoses       Codes Comments    PAF (paroxysmal atrial fibrillation) (CMS/HCC)    -  Primary ICD-10-CM: I48.0  ICD-9-CM: 427.31     Essential hypertension     ICD-10-CM: I10  ICD-9-CM: 401.9     Sick sinus syndrome (CMS/HCC)     ICD-10-CM: I49.5  ICD-9-CM: 427.81     Presence of cardiac pacemaker     ICD-10-CM: Z95.0  ICD-9-CM: V45.01             Plan:  Overall Ms. Bucio looks good from a clinical standpoint with improvement at rehab from recent fall resulting in hip fracture.  Hemodynamically she is stable though has now what appears to be permanent atrial fibrillation with current RVR despite metoprolol tartrate and diltiazem CD and substantial doses.  She will remain on current dose of Coumadin with close anticoagulation profiles.  I am adding digoxin 0.25 mg 2 p.o. today and to p.o. tomorrow followed by 1 p.o. daily with drug holiday on Sundays to avoid toxicity in this age group.  Return to clinic 3 months to reassess heart  rate/rhythm management.    She should be started back on vitamin D3 at least 2000 international units a day and perhaps more pending vitamin D3 levels.   I am also recommending biphosphonate therapy with either Fosamax or Prolia.    D Jack Carrizales MD  5/17/2021 20:32 EDT    This report was generated using the Dragon voice recognition system.

## 2021-05-20 NOTE — PROGRESS NOTES
DEVICE INTERROGATION:  IN OFFICE    DEVICE TYPE:   Dual-chamber pacemaker    :   Makoti Jemstep    BATTERY:  Stable    TIME TO ELECTIVE REPLACEMENT INDICATORS:   2 years    CHARGE TIME:   Not applicable        LEAD DATA:       DEVICE REPROGRAMMED FOR TESTING      Atrial:   1.7 mV, 434 ohms, 0.5 V@0.4 ms    Ventricular:     25 mV, 583 ohms, 1.4 V@0.4 ms    LV:      Atrial pacing percentage: Less than 1%    Ventricular pacing percentage: 17%      Arrhythmia Logbook Reviewed: ROLANDA fib burden 98%        Summary:    Stable Device Function    Ongoing atrial fibrillation with intermittent RVR    Battery status is stable.    Reviewed with: Dr. Carrizales      NEXT IN OFFICE DEVICE CHECK DUE: 1 year    REMOTE DEVICE INTERROGATIONS: 3 months

## 2021-06-03 ENCOUNTER — LAB (OUTPATIENT)
Dept: LAB | Facility: HOSPITAL | Age: 81
End: 2021-06-03

## 2021-06-03 RX ORDER — METOPROLOL TARTRATE 100 MG/1
100 TABLET ORAL 2 TIMES DAILY
Qty: 180 TABLET | Refills: 2
Start: 2021-06-03 | End: 2021-07-13 | Stop reason: SDUPTHER

## 2021-06-03 NOTE — PRE-PROCEDURE INSTRUCTIONS
Pt stated that she was told by Dr Shah that she did not need to hold Warfarin for this surgery.  6/2 Call to Flores at Dr Shah office by Hortensia seeking warfarin stop date with no response.  Pt was instructed NPO and meds. Pt stated she wrote everything down. Dates for testing and meds to take/hold. Pt verbalized understanding of all instructions for her surgery.

## 2021-06-07 ENCOUNTER — LAB (OUTPATIENT)
Dept: LAB | Facility: HOSPITAL | Age: 81
End: 2021-06-07

## 2021-06-07 ENCOUNTER — HOSPITAL ENCOUNTER (OUTPATIENT)
Dept: CARDIOLOGY | Facility: HOSPITAL | Age: 81
Discharge: HOME | End: 2021-06-07

## 2021-06-07 ENCOUNTER — HOSPITAL ENCOUNTER (OUTPATIENT)
Dept: GENERAL RADIOLOGY | Facility: HOSPITAL | Age: 81
Discharge: HOME | End: 2021-06-07

## 2021-06-07 ENCOUNTER — HOSPITAL ENCOUNTER (OUTPATIENT)
Dept: ULTRASOUND IMAGING | Facility: HOSPITAL | Age: 81
Discharge: HOME | End: 2021-06-07

## 2021-06-07 DIAGNOSIS — Z85.51 HISTORY OF BLADDER CANCER: ICD-10-CM

## 2021-06-07 LAB
ANION GAP SERPL CALCULATED.3IONS-SCNC: 7.9 MMOL/L (ref 5–15)
APTT PPP: 34.2 SECONDS (ref 24–31)
BASOPHILS # BLD AUTO: 0.02 10*3/MM3 (ref 0–0.2)
BASOPHILS NFR BLD AUTO: 0.2 % (ref 0–1.5)
BUN SERPL-MCNC: 45 MG/DL (ref 8–23)
BUN/CREAT SERPL: 41.7 (ref 7–25)
CALCIUM SPEC-SCNC: 9.3 MG/DL (ref 8.6–10.5)
CHLORIDE SERPL-SCNC: 110 MMOL/L (ref 98–107)
CO2 SERPL-SCNC: 23.1 MMOL/L (ref 22–29)
CREAT SERPL-MCNC: 1.08 MG/DL (ref 0.57–1)
DEPRECATED RDW RBC AUTO: 47.5 FL (ref 37–54)
EOSINOPHIL # BLD AUTO: 0.05 10*3/MM3 (ref 0–0.4)
EOSINOPHIL NFR BLD AUTO: 0.6 % (ref 0.3–6.2)
ERYTHROCYTE [DISTWIDTH] IN BLOOD BY AUTOMATED COUNT: 15.3 % (ref 12.3–15.4)
GFR SERPL CREATININE-BSD FRML MDRD: 49 ML/MIN/1.73
GLUCOSE SERPL-MCNC: 134 MG/DL (ref 65–99)
HCT VFR BLD AUTO: 38.8 % (ref 34–46.6)
HGB BLD-MCNC: 12.5 G/DL (ref 12–15.9)
IMM GRANULOCYTES # BLD AUTO: 0.03 10*3/MM3 (ref 0–0.05)
IMM GRANULOCYTES NFR BLD AUTO: 0.4 % (ref 0–0.5)
INR PPP: 2.01 (ref 2–3)
LYMPHOCYTES # BLD AUTO: 1.53 10*3/MM3 (ref 0.7–3.1)
LYMPHOCYTES NFR BLD AUTO: 18.1 % (ref 19.6–45.3)
MCH RBC QN AUTO: 28 PG (ref 26.6–33)
MCHC RBC AUTO-ENTMCNC: 32.2 G/DL (ref 31.5–35.7)
MCV RBC AUTO: 86.8 FL (ref 79–97)
MONOCYTES # BLD AUTO: 0.54 10*3/MM3 (ref 0.1–0.9)
MONOCYTES NFR BLD AUTO: 6.4 % (ref 5–12)
NEUTROPHILS NFR BLD AUTO: 6.27 10*3/MM3 (ref 1.7–7)
NEUTROPHILS NFR BLD AUTO: 74.3 % (ref 42.7–76)
NRBC BLD AUTO-RTO: 0.1 /100 WBC (ref 0–0.2)
PLATELET # BLD AUTO: 272 10*3/MM3 (ref 140–450)
PMV BLD AUTO: 9.6 FL (ref 6–12)
POTASSIUM SERPL-SCNC: 4.4 MMOL/L (ref 3.5–5.2)
PROTHROMBIN TIME: 21.4 SECONDS (ref 19.4–28.5)
RBC # BLD AUTO: 4.47 10*6/MM3 (ref 3.77–5.28)
SODIUM SERPL-SCNC: 141 MMOL/L (ref 136–145)
WBC # BLD AUTO: 8.44 10*3/MM3 (ref 3.4–10.8)

## 2021-06-07 PROCEDURE — 93005 ELECTROCARDIOGRAM TRACING: CPT | Performed by: ORTHOPAEDIC SURGERY

## 2021-06-07 PROCEDURE — 85610 PROTHROMBIN TIME: CPT | Performed by: ORTHOPAEDIC SURGERY

## 2021-06-07 PROCEDURE — 80048 BASIC METABOLIC PNL TOTAL CA: CPT

## 2021-06-07 PROCEDURE — 76775 US EXAM ABDO BACK WALL LIM: CPT

## 2021-06-07 PROCEDURE — 85730 THROMBOPLASTIN TIME PARTIAL: CPT | Performed by: ORTHOPAEDIC SURGERY

## 2021-06-07 PROCEDURE — 85025 COMPLETE CBC W/AUTO DIFF WBC: CPT

## 2021-06-07 PROCEDURE — 93010 ELECTROCARDIOGRAM REPORT: CPT | Performed by: INTERNAL MEDICINE

## 2021-06-07 PROCEDURE — 71046 X-RAY EXAM CHEST 2 VIEWS: CPT

## 2021-06-09 ENCOUNTER — LAB (OUTPATIENT)
Dept: LAB | Facility: HOSPITAL | Age: 81
End: 2021-06-09

## 2021-06-09 LAB
QT INTERVAL: 395 MS
SARS-COV-2 ORF1AB RESP QL NAA+PROBE: NOT DETECTED

## 2021-06-09 PROCEDURE — C9803 HOPD COVID-19 SPEC COLLECT: HCPCS

## 2021-06-09 PROCEDURE — U0005 INFEC AGEN DETEC AMPLI PROBE: HCPCS

## 2021-06-09 PROCEDURE — U0004 COV-19 TEST NON-CDC HGH THRU: HCPCS

## 2021-06-11 ENCOUNTER — ANESTHESIA EVENT (OUTPATIENT)
Dept: PERIOP | Facility: HOSPITAL | Age: 81
End: 2021-06-11

## 2021-06-11 ENCOUNTER — HOSPITAL ENCOUNTER (OUTPATIENT)
Facility: HOSPITAL | Age: 81
Setting detail: HOSPITAL OUTPATIENT SURGERY
Discharge: HOME | End: 2021-06-11
Attending: ORTHOPAEDIC SURGERY | Admitting: ORTHOPAEDIC SURGERY

## 2021-06-11 ENCOUNTER — ANESTHESIA (OUTPATIENT)
Dept: PERIOP | Facility: HOSPITAL | Age: 81
End: 2021-06-11

## 2021-06-11 ENCOUNTER — ANTICOAGULATION VISIT (OUTPATIENT)
Dept: CARDIOLOGY | Facility: CLINIC | Age: 81
End: 2021-06-11

## 2021-06-11 VITALS
BODY MASS INDEX: 24.16 KG/M2 | DIASTOLIC BLOOD PRESSURE: 89 MMHG | HEIGHT: 65 IN | OXYGEN SATURATION: 94 % | RESPIRATION RATE: 14 BRPM | WEIGHT: 145 LBS | HEART RATE: 123 BPM | TEMPERATURE: 98.1 F | SYSTOLIC BLOOD PRESSURE: 140 MMHG

## 2021-06-11 DIAGNOSIS — I48.0 PAF (PAROXYSMAL ATRIAL FIBRILLATION) (HCC): Primary | ICD-10-CM

## 2021-06-11 LAB
APTT PPP: 33.3 SECONDS (ref 24–31)
INR PPP: 1.9 (ref 2–3)
INR PPP: 1.93 (ref 2–3)
PROTHROMBIN TIME: 20.6 SECONDS (ref 19.4–28.5)

## 2021-06-11 PROCEDURE — 25010000002 PROPOFOL 10 MG/ML EMULSION: Performed by: NURSE ANESTHETIST, CERTIFIED REGISTERED

## 2021-06-11 PROCEDURE — 85610 PROTHROMBIN TIME: CPT | Performed by: ORTHOPAEDIC SURGERY

## 2021-06-11 PROCEDURE — 85730 THROMBOPLASTIN TIME PARTIAL: CPT | Performed by: ORTHOPAEDIC SURGERY

## 2021-06-11 RX ORDER — LIDOCAINE HYDROCHLORIDE 10 MG/ML
0.5 INJECTION, SOLUTION INFILTRATION; PERINEURAL ONCE AS NEEDED
Status: DISCONTINUED | OUTPATIENT
Start: 2021-06-11 | End: 2021-06-11 | Stop reason: HOSPADM

## 2021-06-11 RX ORDER — CLINDAMYCIN PHOSPHATE 600 MG/50ML
INJECTION, SOLUTION INTRAVENOUS AS NEEDED
Status: DISCONTINUED | OUTPATIENT
Start: 2021-06-11 | End: 2021-06-11 | Stop reason: SURG

## 2021-06-11 RX ORDER — SODIUM CHLORIDE, SODIUM LACTATE, POTASSIUM CHLORIDE, CALCIUM CHLORIDE 600; 310; 30; 20 MG/100ML; MG/100ML; MG/100ML; MG/100ML
1000 INJECTION, SOLUTION INTRAVENOUS CONTINUOUS
Status: DISCONTINUED | OUTPATIENT
Start: 2021-06-11 | End: 2021-06-11 | Stop reason: HOSPADM

## 2021-06-11 RX ORDER — ACETAMINOPHEN 650 MG/1
650 SUPPOSITORY RECTAL ONCE AS NEEDED
Status: DISCONTINUED | OUTPATIENT
Start: 2021-06-11 | End: 2021-06-11 | Stop reason: HOSPADM

## 2021-06-11 RX ORDER — HYDROMORPHONE HCL 110MG/55ML
0.2 PATIENT CONTROLLED ANALGESIA SYRINGE INTRAVENOUS
Status: DISCONTINUED | OUTPATIENT
Start: 2021-06-11 | End: 2021-06-11 | Stop reason: HOSPADM

## 2021-06-11 RX ORDER — PROPOFOL 10 MG/ML
VIAL (ML) INTRAVENOUS AS NEEDED
Status: DISCONTINUED | OUTPATIENT
Start: 2021-06-11 | End: 2021-06-11 | Stop reason: SURG

## 2021-06-11 RX ORDER — CLINDAMYCIN PHOSPHATE 600 MG/50ML
600 INJECTION, SOLUTION INTRAVENOUS
Status: DISCONTINUED | OUTPATIENT
Start: 2021-06-12 | End: 2021-06-11 | Stop reason: HOSPADM

## 2021-06-11 RX ORDER — ACETAMINOPHEN 325 MG/1
650 TABLET ORAL ONCE AS NEEDED
Status: DISCONTINUED | OUTPATIENT
Start: 2021-06-11 | End: 2021-06-11 | Stop reason: HOSPADM

## 2021-06-11 RX ORDER — HYDROCODONE BITARTRATE AND ACETAMINOPHEN 5; 325 MG/1; MG/1
1 TABLET ORAL ONCE AS NEEDED
Status: DISCONTINUED | OUTPATIENT
Start: 2021-06-11 | End: 2021-06-11 | Stop reason: HOSPADM

## 2021-06-11 RX ORDER — BUPIVACAINE HYDROCHLORIDE 2.5 MG/ML
INJECTION, SOLUTION EPIDURAL; INFILTRATION; INTRACAUDAL AS NEEDED
Status: DISCONTINUED | OUTPATIENT
Start: 2021-06-11 | End: 2021-06-11 | Stop reason: HOSPADM

## 2021-06-11 RX ORDER — SODIUM CHLORIDE 0.9 % (FLUSH) 0.9 %
10 SYRINGE (ML) INJECTION AS NEEDED
Status: DISCONTINUED | OUTPATIENT
Start: 2021-06-11 | End: 2021-06-11 | Stop reason: HOSPADM

## 2021-06-11 RX ORDER — HYDROCODONE BITARTRATE AND ACETAMINOPHEN 5; 325 MG/1; MG/1
1 TABLET ORAL EVERY 4 HOURS PRN
Qty: 20 TABLET | Refills: 0 | Status: SHIPPED | OUTPATIENT
Start: 2021-06-11 | End: 2021-09-30 | Stop reason: HOSPADM

## 2021-06-11 RX ORDER — ONDANSETRON 2 MG/ML
4 INJECTION INTRAMUSCULAR; INTRAVENOUS ONCE AS NEEDED
Status: DISCONTINUED | OUTPATIENT
Start: 2021-06-11 | End: 2021-06-11 | Stop reason: HOSPADM

## 2021-06-11 RX ADMIN — PROPOFOL 20 MG: 10 INJECTION, EMULSION INTRAVENOUS at 11:46

## 2021-06-11 RX ADMIN — PROPOFOL 20 MG: 10 INJECTION, EMULSION INTRAVENOUS at 11:44

## 2021-06-11 RX ADMIN — SODIUM CHLORIDE, SODIUM LACTATE, POTASSIUM CHLORIDE, AND CALCIUM CHLORIDE 1000 ML: .6; .31; .03; .02 INJECTION, SOLUTION INTRAVENOUS at 10:23

## 2021-06-11 RX ADMIN — SODIUM CHLORIDE, SODIUM LACTATE, POTASSIUM CHLORIDE, AND CALCIUM CHLORIDE: .6; .31; .03; .02 INJECTION, SOLUTION INTRAVENOUS at 11:34

## 2021-06-11 RX ADMIN — PROPOFOL 20 MG: 10 INJECTION, EMULSION INTRAVENOUS at 11:42

## 2021-06-11 RX ADMIN — PROPOFOL 20 MG: 10 INJECTION, EMULSION INTRAVENOUS at 11:40

## 2021-06-11 RX ADMIN — CLINDAMYCIN PHOSPHATE 600 MG: 600 INJECTION, SOLUTION INTRAVENOUS at 11:39

## 2021-06-11 NOTE — H&P
Orthopaedic Surgery  History & Physical  Dr. LENCHO Shah II  [phone]    HPI:  Patient is a 81 y.o. Not  or  female who presents with Symptomatic hardware in her right tibia with one of her interlocking screws of the tibial nail from prior surgery beginning to back out being prominent through the skin.  She is here today for hardware removal    MEDICAL HISTORY  Past Medical History:   Diagnosis Date   • Bladder cancer (CMS/HCC)    • Cancer (CMS/HCC)     breast, bladder   • Deep vein thrombosis (CMS/HCC)    • Essential hypertension 1/17/2017   • Essential hypertension 1/17/2017   • Fall 08/27/2020   • GERD (gastroesophageal reflux disease)    • History of urostomy    • Immobility 08/27/2020    r/t broken Tibia    • Kidney carcinoma, right (CMS/HCC)     removed    • Long term current use of anticoagulant 1/9/2015   • PAF (paroxysmal atrial fibrillation) (CMS/HCC) 6/26/2019   • Presence of cardiac pacemaker 11/03/2014    BS dual chamber PM placed 10/2014 with pocket revision 1/25/17.  HX tachy rob syndrome   • Sick sinus syndrome (CMS/HCC) 11/3/2014   ·   Past Surgical History:   Procedure Laterality Date   • BREAST LUMPECTOMY     • CHOLECYSTECTOMY N/A 10/12/2020    Procedure: CHOLECYSTECTOMY LAPAROSCOPIC;  Surgeon: Go Pereira DO;  Location: [facility] OR;  Service: General;  Laterality: N/A;   • CYSTECTOMY W/ URETEROILEAL CONDUIT     • HERNIA REPAIR     • HYSTERECTOMY     • INSERT / REPLACE / REMOVE PACEMAKER     • NEPHRECTOMY Right    • TIBIA IM NAILING/RODDING Right 8/28/2020    Procedure: TIBIA INTRAMEDULLARY NAIL/ABRAHAM INSERTION;  Surgeon: Geoff Shah II, MD;  Location: [facility] MAIN OR;  Service: Orthopedics;  Laterality: Right;   ·   Prior to Admission medications    Medication Sig Start Date End Date Taking? Authorizing Provider   acetaminophen (TYLENOL) 325 MG tablet Take 650 mg by mouth Every 6 (Six) Hours As Needed for Mild Pain .   Yes Provider, MD Gino    Calcium Carbonate-Vitamin D (CALCIUM-VITAMIN D3 PO) Take 1 capsule by mouth Daily.   Yes Gino Leos MD   Diclofenac Sodium (VOLTAREN) 1 % gel gel Apply 4 g topically to the appropriate area as directed 4 (Four) Times a Day As Needed.   Yes Gino Leos MD   dilTIAZem CD (Cardizem CD) 300 MG 24 hr capsule Take 1 capsule by mouth Daily.  Patient taking differently: Take 300 mg by mouth Daily. Take DOS 7/14/20  Yes MARIELA Carrizales MD   fexofenadine (ALLEGRA) 180 MG tablet Take 180 mg by mouth Daily.   Yes Gino Leos MD   lansoprazole (PREVACID) 30 MG capsule Take 30 mg by mouth Daily. Take DOS   Yes Gino Leos MD   ondansetron ODT (ZOFRAN-ODT) 4 MG disintegrating tablet Place 4 mg on the tongue Every 4 (Four) Hours As Needed for Nausea or Vomiting.   Yes Gino Leos MD   topiramate (TOPAMAX) 100 MG tablet Take 100 mg by mouth every night at bedtime.   Yes Gino Leos MD   warfarin (COUMADIN) 2 MG tablet Take 1 tablet by mouth Daily.  Patient taking differently: Take 2 mg by mouth Daily. Pt stated she was told by Dr Shah to continue her Warfarin not to hold 1/4/21  Yes MARIELA Carrizales MD   digoxin (LANOXIN) 250 MCG tablet Take 2 tablets today and tomorrow, then take 1 tablet daily, except skip sundays  Patient taking differently: Pt states no longer taking 5/17/21   MARIELA Carrizales MD   metoprolol tartrate (LOPRESSOR) 100 MG tablet Take 1 tablet by mouth 2 (Two) Times a Day. 6/3/21   MARIELA Carrizales MD   ·   Allergies   Allergen Reactions   • Amoxicillin Shortness Of Breath   • Ciprofloxacin Hives   • Penicillins Rash   • Sulfa Antibiotics Hives   • Cephalexin Other (See Comments)   • Clavulanic Acid Other (See Comments)   • Codeine Other (See Comments)   • Contrast Dye Other (See Comments)   • Doxycycline Other (See Comments)   • Fluconazole Other (See Comments)   • Iodine Hives   • Macrobid [Nitrofurantoin] Hives   • Tobramycin Other (See Comments)  "  • Trimethoprim Other (See Comments)   ·   · There is no immunization history for the selected administration types on file for this patient.  Social History     Tobacco Use   • Smoking status: Never Smoker   • Smokeless tobacco: Never Used   Substance Use Topics   • Alcohol use: Not Currently   ·    Social History     Substance and Sexual Activity   Drug Use Never   ·     REVIEW OF SYSTEMS:  · Head: negative for headache  · Respiratory: negative for shortness of breath.   · Cardiovascular: negative for chest pain.   · Gastrointestinal: negative abdominal pain.   · Neurological: negative for LOC  · Psychiatric/Behavioral: negative for memory loss.   · All other systems reviewed and are negative    VITALS: Ht 165.1 cm (65\")   Wt 65.8 kg (145 lb)   BMI 24.13 kg/m²  Body mass index is 24.13 kg/m².    PHYSICAL EXAM:   · CONSTITUTIONAL: A&Ox3, No acute distress  · LUNGS: Equal chest rise, no shortness of air  · CARDIOVASCULAR: palpable peripheral pulses  · SKIN: no skin lesions in the area examined  · LYMPH: no lymphadenopathy in the area examined    RADIOLOGY REVIEW:   US Renal Bilateral    Result Date: 6/7/2021  1. Status post right nephrectomy and cystectomy. 2. No acute sonographic abnormality of the left kidney.  Electronically Signed By-Jacoby Elise MD On:6/7/2021 3:35 PM This report was finalized on [id number] by  Jacoby Elise MD.    XR Chest PA & Lateral    Result Date: 6/7/2021  Stable cardiac enlargement. No acute chest findings.  Electronically Signed By-Ivy Valenzuela MD On:6/7/2021 3:41 PM This report was finalized on [id number] by  Ivy Valenzuela MD.      LABS:   Results for the past 24 hours: No results found for this or any previous visit (from the past 24 hour(s)).    IMPRESSION:  Patient is a 81 y.o. Not  or  female with  right tibia symptomatic hardware    PLAN:   · Admited to: Geoff Shah II, MD   · Disposition:  surgery today for right tibia hardware removal    R " "\"Quoc\" Alyssa العراقي MD  Orthopaedic Surgery  [facility]  [phone] - [place] Office  [phone] - [place] Office    "

## 2021-06-11 NOTE — OP NOTE
Hardware Removal Operative Note  Dr. LENCHO Shah II  [phone]    PATIENT NAME: Hazel Bucio  MRN: [id number]  : 1940 AGE: 81 y.o. GENDER: female  DATE OF OPERATION: 2021  PREOPERATIVE DIAGNOSIS: Symptomatic Hardware Right Lower Leg  POSTOPERATIVE DIAGNOSIS: Same  OPERATION PERFORMED: Hardware Removal Right Lower Leg  SURGEON: Geoff Shah MD  Circulator: Ashely Walker RN; Iman Hough RN  Scrub Person: Faisal Holman  ANESTHESIA: General  ASSISTANT: None   ESTIMATED BLOOD LOSS: Minimal  SPONGE AND NEEDLE COUNT: Correct  INDICATIONS: Symptomatic: This patient was found to have symptomatic hardware that failed conservative management.. The risks of surgery were discussed and included the risk of anesthesia, infection, damage to neurovascular structures, fracture, inability to removal all hardware, the need for further procedures, medical complications, and others. No guarantees were made. The patient wished to proceed with surgery and a surgical consent was signed.    PERTINENT FINDINGS: Symptomatic Hardware    DETAILS OF PROCEDURE:  The patient was met in the preoperative area. The site was marked. The consent and H&P were reviewed. The patient was then wheeled back to the operative suite and transferred to the operative table. The patient underwent anesthesia. Surgical alcohol was used to thoroughly clean the entire operative extremity.    The extremity was then prepped in the normal sterile fashion, which included Chloroprep and multiple layers of sterile drapes. After a surgical timeout in which administration of preoperative antibiotics and the surgical site were confirmed, the surgery was begun.     The symptomatic screw could be palpated under the skin.  A small incision was made and I was easily able to identify the head.  Using the power screwdriver, I was able to easily extract the screw.  It was inspected to be intact.  The incision was then closed and a sterile dressing  "was applied.    The patient was awoken from anesthesia, [place] and taken to the recovery room in stable condition. Sponge and needle count were correct. There were no complications. Patient tolerated the procedure well.    R \"Quoc\"Alyssa العراقي MD  Orthopaedic Surgery  [facility]  [phone]    "

## 2021-06-11 NOTE — ANESTHESIA PREPROCEDURE EVALUATION
Anesthesia Evaluation     Patient summary reviewed and Nursing notes reviewed   NPO Solid Status: > 8 hours  NPO Liquid Status: > 8 hours           Airway   Mallampati: III  TM distance: >3 FB  Neck ROM: full  No difficulty expected  Dental    (+) upper dentures    Pulmonary    Cardiovascular     (+) pacemaker pacemaker, hypertension, dysrhythmias Atrial Fib, DVT,       Neuro/Psych  GI/Hepatic/Renal/Endo    (+)  GERD,  renal disease CRI, diabetes mellitus type 2,     Musculoskeletal     Abdominal    Substance History      OB/GYN          Other      history of cancer    ROS/Med Hx Other: One kidney with urostomy bag (bladder ca), never dialysis                  Anesthesia Plan    ASA 4     general     intravenous induction     Anesthetic plan, all risks, benefits, and alternatives have been provided, discussed and informed consent has been obtained with: patient.

## 2021-06-11 NOTE — ANESTHESIA POSTPROCEDURE EVALUATION
Patient: Hazel Bucio    Procedure Summary     Date: 06/11/21 Room / [facility] 12 [facility]    Anesthesia Start: 1134 Anesthesia Stop: 1201    Procedure: TIBIA HARDWARE REMOVAL (Right Thigh) Diagnosis:       Delayed union of tibial shaft fracture      (RIGHT PROXIMAL TIBIAL SHAFT FRACTURE STATUS POST INTRAMEDULLARY NAIL WITH FAILURE OF THE MOST PROXIMAL SCREW AND BACKING OUT)    Surgeons: Geoff Shah II, MD Provider: Yoel Dewey MD    Anesthesia Type: general ASA Status: 4          Anesthesia Type: general    Vitals  Vitals Value Taken Time   /89 06/11/21 1219   Temp 98.1 °F (36.7 °C) 06/11/21 1219   Pulse 123 06/11/21 1219   Resp 14 06/11/21 1219   SpO2 94 % 06/11/21 1219           Post Anesthesia Care and Evaluation    Patient location during evaluation: PACU  Patient participation: complete - patient participated  Level of consciousness: awake  Pain scale: See nurse's notes for pain score.  Pain management: adequate  Airway patency: patent  Anesthetic complications: No anesthetic complications  PONV Status: none  Cardiovascular status: acceptable  Respiratory status: acceptable  Hydration status: acceptable    Comments: Patient seen and examined postoperatively; vital signs stable; SpO2 greater than or equal to 90%; cardiopulmonary status stable; nausea/vomiting adequately controlled; pain adequately controlled; no apparent anesthesia complications; patient discharged from anesthesia care when discharge criteria were met

## 2021-06-18 PROCEDURE — 93296 REM INTERROG EVL PM/IDS: CPT | Performed by: NURSE PRACTITIONER

## 2021-06-18 PROCEDURE — 93294 REM INTERROG EVL PM/LDLS PM: CPT | Performed by: NURSE PRACTITIONER

## 2021-07-14 RX ORDER — METOPROLOL TARTRATE 100 MG/1
100 TABLET ORAL 2 TIMES DAILY
Qty: 180 TABLET | Refills: 2
Start: 2021-07-14 | End: 2021-07-29 | Stop reason: SDUPTHER

## 2021-07-29 RX ORDER — METOPROLOL TARTRATE 100 MG/1
100 TABLET ORAL 2 TIMES DAILY
Qty: 180 TABLET | Refills: 0 | Status: SHIPPED | OUTPATIENT
Start: 2021-07-29 | End: 2021-12-07

## 2021-08-16 RX ORDER — WARFARIN SODIUM 2 MG/1
2 TABLET ORAL
Qty: 90 TABLET | Refills: 0
Start: 2021-08-16 | End: 2021-08-17 | Stop reason: SDUPTHER

## 2021-08-17 ENCOUNTER — ANTICOAGULATION VISIT (OUTPATIENT)
Dept: CARDIOLOGY | Facility: CLINIC | Age: 81
End: 2021-08-17

## 2021-08-17 ENCOUNTER — OFFICE VISIT (OUTPATIENT)
Dept: CARDIOLOGY | Facility: CLINIC | Age: 81
End: 2021-08-17

## 2021-08-17 VITALS
OXYGEN SATURATION: 100 % | SYSTOLIC BLOOD PRESSURE: 114 MMHG | HEIGHT: 65 IN | DIASTOLIC BLOOD PRESSURE: 80 MMHG | WEIGHT: 151 LBS | BODY MASS INDEX: 25.16 KG/M2 | HEART RATE: 76 BPM

## 2021-08-17 DIAGNOSIS — I48.0 PAF (PAROXYSMAL ATRIAL FIBRILLATION) (HCC): Primary | ICD-10-CM

## 2021-08-17 DIAGNOSIS — I48.91 ATRIAL FIBRILLATION WITH RVR (HCC): Primary | ICD-10-CM

## 2021-08-17 PROBLEM — N39.0 E. COLI URINARY TRACT INFECTION: Status: RESOLVED | Noted: 2020-04-30 | Resolved: 2021-08-17

## 2021-08-17 PROBLEM — B96.20 E. COLI URINARY TRACT INFECTION: Status: RESOLVED | Noted: 2020-04-30 | Resolved: 2021-08-17

## 2021-08-17 PROBLEM — R10.9 ABDOMINAL PAIN: Status: RESOLVED | Noted: 2020-10-10 | Resolved: 2021-08-17

## 2021-08-17 PROBLEM — T45.511A COUMADIN TOXICITY, ACCIDENTAL OR UNINTENTIONAL, INITIAL ENCOUNTER: Status: RESOLVED | Noted: 2020-04-29 | Resolved: 2021-08-17

## 2021-08-17 PROBLEM — S82.401A FRACTURE TIBIA/FIBULA, RIGHT, CLOSED, INITIAL ENCOUNTER: Status: RESOLVED | Noted: 2020-08-27 | Resolved: 2021-08-17

## 2021-08-17 PROBLEM — S82.201A FRACTURE TIBIA/FIBULA, RIGHT, CLOSED, INITIAL ENCOUNTER: Status: RESOLVED | Noted: 2020-08-27 | Resolved: 2021-08-17

## 2021-08-17 LAB — INR PPP: 2.6 (ref 2–3)

## 2021-08-17 PROCEDURE — 93000 ELECTROCARDIOGRAM COMPLETE: CPT | Performed by: INTERNAL MEDICINE

## 2021-08-17 PROCEDURE — 85610 PROTHROMBIN TIME: CPT | Performed by: INTERNAL MEDICINE

## 2021-08-17 PROCEDURE — 99214 OFFICE O/P EST MOD 30 MIN: CPT | Performed by: INTERNAL MEDICINE

## 2021-08-17 PROCEDURE — 36416 COLLJ CAPILLARY BLOOD SPEC: CPT | Performed by: INTERNAL MEDICINE

## 2021-08-17 RX ORDER — WARFARIN SODIUM 2 MG/1
2 TABLET ORAL
Qty: 90 TABLET | Refills: 0
Start: 2021-08-17 | End: 2021-08-17 | Stop reason: SDUPTHER

## 2021-08-17 RX ORDER — WARFARIN SODIUM 2 MG/1
2 TABLET ORAL
Qty: 90 TABLET | Refills: 0
Start: 2021-08-17 | End: 2021-08-19 | Stop reason: SDUPTHER

## 2021-08-17 NOTE — PROGRESS NOTES
Cardiology Office Visit Note      Referring physician:  Lizzy Francis MD    Reason For Followup: Afib, HTN    HPI:  Hazel Bucio is a 81 y.o. female who presents to he office for follow up. She has a known Afib, pacemaker placement, hypertention and breast cancer. Stress test 04/30/2020 showed low risk study, no reversible ischemia and an EF 70%    Echo 04/29/2020 shows left ventricular systolic function is normal, right ventricular cavity is borderline dilated,mild pulmonic valve regurgitation is present and EF 58%.    Hazel currently has no specific cardiopulmonary symptoms and is now recovering nicely from complex orthopedic surgery with removal of hardware from left tibia in June.    INR today is 2.6 in 2mg daily.  Ms. Bucio is compliant with medications as listed and reviewed in detail today.          Past Medical History:   Diagnosis Date   • Bladder cancer (CMS/HCC)    • Cancer (CMS/HCC)     breast, bladder   • Deep vein thrombosis (CMS/HCC)    • Essential hypertension 1/17/2017   • Fracture tibia/fibula, right, closed, initial encounter 8/27/2020   • GERD (gastroesophageal reflux disease)    • History of urostomy    • Immobility 08/27/2020    r/t broken Tibia    • Kidney carcinoma, right (CMS/HCC)     removed    • Long term current use of anticoagulant 1/9/2015   • PAF (paroxysmal atrial fibrillation) (CMS/HCC) 6/26/2019   • Presence of cardiac pacemaker 11/03/2014    BS dual chamber PM placed 10/2014 with pocket revision 1/25/17.  HX tachy rob syndrome   • Sick sinus syndrome (CMS/HCC) 11/3/2014       Past Surgical History:   Procedure Laterality Date   • BREAST LUMPECTOMY     • CHOLECYSTECTOMY N/A 10/12/2020    Procedure: CHOLECYSTECTOMY LAPAROSCOPIC;  Surgeon: Go Pereira DO;  Location: [facility] MAIN OR;  Service: General;  Laterality: N/A;   • CYSTECTOMY W/ URETEROILEAL CONDUIT     • HARDWARE REMOVAL Right 6/11/2021    Procedure: TIBIA HARDWARE REMOVAL;  Surgeon: Geoff Shah II, MD;   Location: [facility] MAIN OR;  Service: Orthopedics;  Laterality: Right;   • HERNIA REPAIR     • HYSTERECTOMY     • INSERT / REPLACE / REMOVE PACEMAKER     • NEPHRECTOMY Right    • TIBIA IM NAILING/RODDING Right 8/28/2020    Procedure: TIBIA INTRAMEDULLARY NAIL/ABRAHAM INSERTION;  Surgeon: Geoff Shah II, MD;  Location: [facility] MAIN OR;  Service: Orthopedics;  Laterality: Right;         Current Outpatient Medications:   •  acetaminophen (TYLENOL) 325 MG tablet, Take 650 mg by mouth Every 6 (Six) Hours As Needed for Mild Pain ., Disp: , Rfl:   •  Calcium Carbonate-Vitamin D (CALCIUM-VITAMIN D3 PO), Take 1 capsule by mouth Daily., Disp: , Rfl:   •  digoxin (LANOXIN) 250 MCG tablet, Take 2 tablets today and tomorrow, then take 1 tablet daily, except skip sundays (Patient taking differently: No sig reported), Disp: 90 tablet, Rfl: 3  •  dilTIAZem CD (Cardizem CD) 300 MG 24 hr capsule, Take 1 capsule by mouth Daily., Disp: 30 capsule, Rfl: 1  •  fexofenadine (ALLEGRA) 180 MG tablet, Take 180 mg by mouth Daily., Disp: , Rfl:   •  HYDROcodone-acetaminophen (NORCO) 5-325 MG per tablet, Take 1 tablet by mouth Every 4 (Four) Hours As Needed for Moderate Pain ., Disp: 20 tablet, Rfl: 0  •  lansoprazole (PREVACID) 30 MG capsule, Take 30 mg by mouth 2 (two) times a day. Take DOS, Disp: , Rfl:   •  metoprolol tartrate (LOPRESSOR) 100 MG tablet, Take 1 tablet by mouth 2 (Two) Times a Day., Disp: 180 tablet, Rfl: 0  •  topiramate (TOPAMAX) 100 MG tablet, Take 100 mg by mouth every night at bedtime., Disp: , Rfl:   •  warfarin (COUMADIN) 2 MG tablet, Take 1 tablet by mouth Daily., Disp: 90 tablet, Rfl: 0    Social History     Socioeconomic History   • Marital status:      Spouse name: Not on file   • Number of children: Not on file   • Years of education: Not on file   • Highest education level: Not on file   Tobacco Use   • Smoking status: Never Smoker   • Smokeless tobacco: Never Used   Vaping Use   • Vaping Use: Never  "used   Substance and Sexual Activity   • Alcohol use: Not Currently   • Drug use: Never   • Sexual activity: Defer       Family History   Problem Relation Age of Onset   • COPD Father          Review of Systems   General: denies fever, chills, anorexia, weight loss  Eyes: denies blurring, diplopia  Ear/Nose/Throat: denies ear pain, nosebleeds, hoarseness  Cardiovascular: See HPI  Respiratory: denies excessive sputum, hemoptysis, wheezing  Gastrointestinal: denies nausea, vomiting, change in bowel habits, abdominal pain  Genitourinary: Denies hematuria dysuria  Musculoskeletal: Minor arthralgias with recent complex tibia surgery-see above  Skin: denies rashes, itching, suspicious lesions  Neurologic: denies focal neuro deficits  Psychiatric: denies depression, anxiety  Endocrine: denies cold intolerance, heat intolerance  Hematologic/Lymphatic: denies abnormal bruising, bleeding  Allergic/Immunologic: denies urticaria or persistent infections      Objective     Visit Vitals  /80   Pulse 76   Ht 165.1 cm (65\")   Wt 68.5 kg (151 lb)   SpO2 100%   BMI 25.13 kg/m²           Physical Exam  General:     Very pleasant, well-nourished, well developed,, in no acute distress.    Head:     normocephalic and atraumatic.    Eyes:    PERRL/EOM intact, conjunctiva and sclera clear with out nystagmus.    Neck:    no jvd or bruits  Chest Wall:    no deformities   Lungs:    clear bilaterally to auscultation with adequate global airflow   Heart:    non-displaced PMI; regular rate and rhythm, normal S1, S2 without murmurs, rubs, or gallops  Abdomen:  Soft, nontender without HSM  Msk:    no deformity; marginally adequate R OM, convalescing from recent tibia surgery  Pulses:    pulses normal in all 4 extremities.    Extremities:    no clubbing, cyanosis, edema   Neurologic:    no focal sensory or motor deficits  Skin:    intact without lesions or rashes.    Psych:    alert and cooperative; normal mood and affect; normal attention " span and concentration.            ECG 12 Lead    Date/Time: 8/17/2021 12:13 PM  Performed by: MARIELA Carrizales MD  Authorized by: MARIELA Carrizales MD   Comparison: compared with previous ECG from 5/17/2021  Similar to previous ECG  Rhythm: atrial fibrillation and paced  Pacing: ventricular pacing  Clinical impression: abnormal EKG  Comments: Underlying atrial fibrillation with appropriate demand V pacing              Assessment:   Problems Addressed this Visit     None      Diagnoses    None.     Persistent atrial fibrillation-sufficiently rate controlled and fully anticoagulated on Coumadin therapy    Long-term anticoagulant management-on Coumadin with well managed INR through this office    Essential hypertension-remains well-regulated on current medication listed reviewed    Test post VVI pacemaker in situ with appropriate demand pacing    Status post recent complex tibia surgery with hardware removal-convalescing satisfactorily      Plan:  Cardiac chen, Hazel appears to be doing well with satisfactory ventricular rate control using metoprolol and demand VVI pacemaker.  She is maintaining satisfactory anticoagulation profiles through our office per usual.  She is advised to continue the current medication listed reviewed detail today and return to clinic for follow-up in 6 months or sooner if needed.    MARIELA Carrizales MD  8/30/2021 21:47 EDT    This report was generated using the Dragon voice recognition system.

## 2021-08-19 ENCOUNTER — TELEPHONE (OUTPATIENT)
Dept: CARDIOLOGY | Facility: CLINIC | Age: 81
End: 2021-08-19

## 2021-08-19 RX ORDER — WARFARIN SODIUM 2 MG/1
2 TABLET ORAL
Qty: 90 TABLET | Refills: 0
Start: 2021-08-19 | End: 2021-08-19 | Stop reason: SDUPTHER

## 2021-08-19 RX ORDER — WARFARIN SODIUM 2 MG/1
2 TABLET ORAL
Qty: 90 TABLET | Refills: 0 | Status: SHIPPED | OUTPATIENT
Start: 2021-08-19 | End: 2021-09-30 | Stop reason: HOSPADM

## 2021-08-19 NOTE — TELEPHONE ENCOUNTER
She was in on the 17 th  Warfarin 2 mg was suppose to be sent to [facility] on [address]  They haven't received it Can this be resent

## 2021-09-13 ENCOUNTER — ANTICOAGULATION VISIT (OUTPATIENT)
Dept: CARDIOLOGY | Facility: CLINIC | Age: 81
End: 2021-09-13

## 2021-09-13 DIAGNOSIS — I48.0 PAF (PAROXYSMAL ATRIAL FIBRILLATION) (HCC): Primary | ICD-10-CM

## 2021-09-13 LAB — INR PPP: 1.6 (ref 2–3)

## 2021-09-13 PROCEDURE — 36416 COLLJ CAPILLARY BLOOD SPEC: CPT | Performed by: INTERNAL MEDICINE

## 2021-09-13 PROCEDURE — 85610 PROTHROMBIN TIME: CPT | Performed by: INTERNAL MEDICINE

## 2021-09-17 PROCEDURE — 93296 REM INTERROG EVL PM/IDS: CPT | Performed by: NURSE PRACTITIONER

## 2021-09-17 PROCEDURE — 93294 REM INTERROG EVL PM/LDLS PM: CPT | Performed by: NURSE PRACTITIONER

## 2021-09-22 ENCOUNTER — TELEPHONE (OUTPATIENT)
Dept: CARDIOLOGY | Facility: CLINIC | Age: 81
End: 2021-09-22

## 2021-09-22 NOTE — TELEPHONE ENCOUNTER
Noted to have periods of Afib with RVR starting yesterday.     Please move up f/u with Dr. Ellsworth due to AF RVR    She is a/c with warfarin

## 2021-09-23 ENCOUNTER — ANTICOAGULATION VISIT (OUTPATIENT)
Dept: CARDIOLOGY | Facility: CLINIC | Age: 81
End: 2021-09-23

## 2021-09-23 DIAGNOSIS — I48.0 PAF (PAROXYSMAL ATRIAL FIBRILLATION) (HCC): Primary | ICD-10-CM

## 2021-09-23 LAB — INR PPP: 2.5 (ref 2–3)

## 2021-09-23 PROCEDURE — 85610 PROTHROMBIN TIME: CPT | Performed by: NURSE PRACTITIONER

## 2021-09-23 PROCEDURE — 36416 COLLJ CAPILLARY BLOOD SPEC: CPT | Performed by: NURSE PRACTITIONER

## 2021-09-24 ENCOUNTER — APPOINTMENT (OUTPATIENT)
Dept: GENERAL RADIOLOGY | Facility: HOSPITAL | Age: 81
End: 2021-09-24

## 2021-09-24 ENCOUNTER — APPOINTMENT (OUTPATIENT)
Dept: CT IMAGING | Facility: HOSPITAL | Age: 81
End: 2021-09-24

## 2021-09-24 ENCOUNTER — HOSPITAL ENCOUNTER (INPATIENT)
Facility: HOSPITAL | Age: 81
LOS: 3 days | Discharge: HOME HEALTH | End: 2021-09-30
Attending: INTERNAL MEDICINE | Admitting: INTERNAL MEDICINE

## 2021-09-24 DIAGNOSIS — I48.0 PAF (PAROXYSMAL ATRIAL FIBRILLATION) (HCC): ICD-10-CM

## 2021-09-24 DIAGNOSIS — R55 NEAR SYNCOPE: ICD-10-CM

## 2021-09-24 DIAGNOSIS — N39.0 ACUTE UTI: Primary | ICD-10-CM

## 2021-09-24 LAB
ALBUMIN SERPL-MCNC: 4.2 G/DL (ref 3.5–5.2)
ALBUMIN/GLOB SERPL: 1.3 G/DL
ALP SERPL-CCNC: 149 U/L (ref 39–117)
ALT SERPL W P-5'-P-CCNC: 21 U/L (ref 1–33)
ANION GAP SERPL CALCULATED.3IONS-SCNC: 14 MMOL/L (ref 5–15)
AST SERPL-CCNC: 23 U/L (ref 1–32)
BACTERIA UR QL AUTO: ABNORMAL /HPF
BASOPHILS # BLD AUTO: 0.1 10*3/MM3 (ref 0–0.2)
BASOPHILS NFR BLD AUTO: 1 % (ref 0–1.5)
BILIRUB SERPL-MCNC: 0.2 MG/DL (ref 0–1.2)
BILIRUB UR QL STRIP: NEGATIVE
BUN SERPL-MCNC: 29 MG/DL (ref 8–23)
BUN/CREAT SERPL: 32.6 (ref 7–25)
CALCIUM SPEC-SCNC: 9.2 MG/DL (ref 8.6–10.5)
CHLORIDE SERPL-SCNC: 110 MMOL/L (ref 98–107)
CLARITY UR: CLEAR
CO2 SERPL-SCNC: 20 MMOL/L (ref 22–29)
COLOR UR: YELLOW
CREAT SERPL-MCNC: 0.89 MG/DL (ref 0.57–1)
DEPRECATED RDW RBC AUTO: 52.9 FL (ref 37–54)
DIGOXIN SERPL-MCNC: <0.3 NG/ML (ref 0.6–1.2)
EOSINOPHIL # BLD AUTO: 0.1 10*3/MM3 (ref 0–0.4)
EOSINOPHIL NFR BLD AUTO: 0.7 % (ref 0.3–6.2)
ERYTHROCYTE [DISTWIDTH] IN BLOOD BY AUTOMATED COUNT: 16.8 % (ref 12.3–15.4)
GFR SERPL CREATININE-BSD FRML MDRD: 61 ML/MIN/1.73
GLOBULIN UR ELPH-MCNC: 3.3 GM/DL
GLUCOSE SERPL-MCNC: 106 MG/DL (ref 65–99)
GLUCOSE UR STRIP-MCNC: NEGATIVE MG/DL
HCT VFR BLD AUTO: 46.1 % (ref 34–46.6)
HGB BLD-MCNC: 14.9 G/DL (ref 12–15.9)
HGB UR QL STRIP.AUTO: ABNORMAL
HOLD SPECIMEN: NORMAL
HYALINE CASTS UR QL AUTO: ABNORMAL /LPF
INR PPP: 2.47 (ref 2–3)
KETONES UR QL STRIP: NEGATIVE
LEUKOCYTE ESTERASE UR QL STRIP.AUTO: ABNORMAL
LYMPHOCYTES # BLD AUTO: 1.5 10*3/MM3 (ref 0.7–3.1)
LYMPHOCYTES NFR BLD AUTO: 17.4 % (ref 19.6–45.3)
MCH RBC QN AUTO: 29.3 PG (ref 26.6–33)
MCHC RBC AUTO-ENTMCNC: 32.3 G/DL (ref 31.5–35.7)
MCV RBC AUTO: 90.8 FL (ref 79–97)
MONOCYTES # BLD AUTO: 0.7 10*3/MM3 (ref 0.1–0.9)
MONOCYTES NFR BLD AUTO: 8.3 % (ref 5–12)
NEUTROPHILS NFR BLD AUTO: 6.5 10*3/MM3 (ref 1.7–7)
NEUTROPHILS NFR BLD AUTO: 72.6 % (ref 42.7–76)
NITRITE UR QL STRIP: POSITIVE
NRBC BLD AUTO-RTO: 0 /100 WBC (ref 0–0.2)
NT-PROBNP SERPL-MCNC: 1974 PG/ML (ref 0–1800)
PH UR STRIP.AUTO: 7 [PH] (ref 5–8)
PLATELET # BLD AUTO: 261 10*3/MM3 (ref 140–450)
PMV BLD AUTO: 7.1 FL (ref 6–12)
POTASSIUM SERPL-SCNC: 4 MMOL/L (ref 3.5–5.2)
PROT SERPL-MCNC: 7.5 G/DL (ref 6–8.5)
PROT UR QL STRIP: ABNORMAL
PROTHROMBIN TIME: 25.6 SECONDS (ref 19.4–28.5)
RBC # BLD AUTO: 5.07 10*6/MM3 (ref 3.77–5.28)
RBC # UR: ABNORMAL /HPF
REF LAB TEST METHOD: ABNORMAL
SODIUM SERPL-SCNC: 144 MMOL/L (ref 136–145)
SP GR UR STRIP: 1.01 (ref 1–1.03)
SQUAMOUS #/AREA URNS HPF: ABNORMAL /HPF
TROPONIN T SERPL-MCNC: <0.01 NG/ML (ref 0–0.03)
UROBILINOGEN UR QL STRIP: ABNORMAL
WBC # BLD AUTO: 8.9 10*3/MM3 (ref 3.4–10.8)
WBC UR QL AUTO: ABNORMAL /HPF

## 2021-09-24 PROCEDURE — 83880 ASSAY OF NATRIURETIC PEPTIDE: CPT | Performed by: NURSE PRACTITIONER

## 2021-09-24 PROCEDURE — 99285 EMERGENCY DEPT VISIT HI MDM: CPT

## 2021-09-24 PROCEDURE — 87186 SC STD MICRODIL/AGAR DIL: CPT | Performed by: NURSE PRACTITIONER

## 2021-09-24 PROCEDURE — 87086 URINE CULTURE/COLONY COUNT: CPT | Performed by: NURSE PRACTITIONER

## 2021-09-24 PROCEDURE — 85025 COMPLETE CBC W/AUTO DIFF WBC: CPT | Performed by: NURSE PRACTITIONER

## 2021-09-24 PROCEDURE — 84443 ASSAY THYROID STIM HORMONE: CPT | Performed by: INTERNAL MEDICINE

## 2021-09-24 PROCEDURE — 80162 ASSAY OF DIGOXIN TOTAL: CPT | Performed by: NURSE PRACTITIONER

## 2021-09-24 PROCEDURE — 85610 PROTHROMBIN TIME: CPT | Performed by: NURSE PRACTITIONER

## 2021-09-24 PROCEDURE — 74176 CT ABD & PELVIS W/O CONTRAST: CPT

## 2021-09-24 PROCEDURE — 80053 COMPREHEN METABOLIC PANEL: CPT | Performed by: NURSE PRACTITIONER

## 2021-09-24 PROCEDURE — 93005 ELECTROCARDIOGRAM TRACING: CPT | Performed by: NURSE PRACTITIONER

## 2021-09-24 PROCEDURE — 84484 ASSAY OF TROPONIN QUANT: CPT | Performed by: NURSE PRACTITIONER

## 2021-09-24 PROCEDURE — 71045 X-RAY EXAM CHEST 1 VIEW: CPT

## 2021-09-24 PROCEDURE — 87077 CULTURE AEROBIC IDENTIFY: CPT | Performed by: NURSE PRACTITIONER

## 2021-09-24 PROCEDURE — 81001 URINALYSIS AUTO W/SCOPE: CPT | Performed by: NURSE PRACTITIONER

## 2021-09-25 PROBLEM — N39.0 ACUTE UTI: Status: ACTIVE | Noted: 2021-09-25

## 2021-09-25 LAB
GLUCOSE BLDC GLUCOMTR-MCNC: 146 MG/DL (ref 70–105)
QT INTERVAL: 411 MS
SARS-COV-2 RNA PNL SPEC NAA+PROBE: NOT DETECTED
TSH SERPL DL<=0.05 MIU/L-ACNC: 1.22 UIU/ML (ref 0.27–4.2)

## 2021-09-25 PROCEDURE — G0378 HOSPITAL OBSERVATION PER HR: HCPCS

## 2021-09-25 PROCEDURE — 93005 ELECTROCARDIOGRAM TRACING: CPT | Performed by: INTERNAL MEDICINE

## 2021-09-25 PROCEDURE — 25010000002 MEROPENEM PER 100 MG: Performed by: INTERNAL MEDICINE

## 2021-09-25 PROCEDURE — 25010000002 ONDANSETRON PER 1 MG: Performed by: INTERNAL MEDICINE

## 2021-09-25 PROCEDURE — 25010000002 HYDROMORPHONE PER 4 MG: Performed by: INTERNAL MEDICINE

## 2021-09-25 PROCEDURE — 87635 SARS-COV-2 COVID-19 AMP PRB: CPT | Performed by: INTERNAL MEDICINE

## 2021-09-25 PROCEDURE — 82962 GLUCOSE BLOOD TEST: CPT

## 2021-09-25 PROCEDURE — 93010 ELECTROCARDIOGRAM REPORT: CPT | Performed by: INTERNAL MEDICINE

## 2021-09-25 RX ORDER — TOPIRAMATE 100 MG/1
100 TABLET, FILM COATED ORAL NIGHTLY
Status: DISCONTINUED | OUTPATIENT
Start: 2021-09-25 | End: 2021-09-30 | Stop reason: HOSPADM

## 2021-09-25 RX ORDER — MAGNESIUM SULFATE HEPTAHYDRATE 40 MG/ML
4 INJECTION, SOLUTION INTRAVENOUS AS NEEDED
Status: DISCONTINUED | OUTPATIENT
Start: 2021-09-25 | End: 2021-09-30 | Stop reason: HOSPADM

## 2021-09-25 RX ORDER — DICYCLOMINE HYDROCHLORIDE 10 MG/1
10 CAPSULE ORAL 4 TIMES DAILY
Status: DISCONTINUED | OUTPATIENT
Start: 2021-09-25 | End: 2021-09-30 | Stop reason: HOSPADM

## 2021-09-25 RX ORDER — WARFARIN SODIUM 2 MG/1
2 TABLET ORAL
Status: DISCONTINUED | OUTPATIENT
Start: 2021-09-25 | End: 2021-09-30

## 2021-09-25 RX ORDER — LINEZOLID 600 MG/1
600 TABLET, FILM COATED ORAL ONCE
Status: COMPLETED | OUTPATIENT
Start: 2021-09-25 | End: 2021-09-25

## 2021-09-25 RX ORDER — CHOLECALCIFEROL (VITAMIN D3) 125 MCG
5 CAPSULE ORAL NIGHTLY PRN
Status: DISCONTINUED | OUTPATIENT
Start: 2021-09-25 | End: 2021-09-30 | Stop reason: HOSPADM

## 2021-09-25 RX ORDER — METOPROLOL TARTRATE 50 MG/1
100 TABLET, FILM COATED ORAL 2 TIMES DAILY
Status: DISCONTINUED | OUTPATIENT
Start: 2021-09-25 | End: 2021-09-30 | Stop reason: HOSPADM

## 2021-09-25 RX ORDER — MAGNESIUM SULFATE HEPTAHYDRATE 40 MG/ML
2 INJECTION, SOLUTION INTRAVENOUS AS NEEDED
Status: DISCONTINUED | OUTPATIENT
Start: 2021-09-25 | End: 2021-09-30 | Stop reason: HOSPADM

## 2021-09-25 RX ORDER — SODIUM CHLORIDE 0.9 % (FLUSH) 0.9 %
3 SYRINGE (ML) INJECTION EVERY 12 HOURS SCHEDULED
Status: DISCONTINUED | OUTPATIENT
Start: 2021-09-25 | End: 2021-09-30 | Stop reason: HOSPADM

## 2021-09-25 RX ORDER — POTASSIUM CHLORIDE 20 MEQ/1
40 TABLET, EXTENDED RELEASE ORAL AS NEEDED
Status: DISCONTINUED | OUTPATIENT
Start: 2021-09-25 | End: 2021-09-30 | Stop reason: HOSPADM

## 2021-09-25 RX ORDER — ACETAMINOPHEN 325 MG/1
650 TABLET ORAL EVERY 6 HOURS PRN
Status: DISCONTINUED | OUTPATIENT
Start: 2021-09-25 | End: 2021-09-30 | Stop reason: HOSPADM

## 2021-09-25 RX ORDER — ONDANSETRON 2 MG/ML
4 INJECTION INTRAMUSCULAR; INTRAVENOUS EVERY 6 HOURS PRN
Status: DISCONTINUED | OUTPATIENT
Start: 2021-09-25 | End: 2021-09-30 | Stop reason: HOSPADM

## 2021-09-25 RX ORDER — POTASSIUM CHLORIDE 1.5 G/1.77G
40 POWDER, FOR SOLUTION ORAL AS NEEDED
Status: DISCONTINUED | OUTPATIENT
Start: 2021-09-25 | End: 2021-09-30 | Stop reason: HOSPADM

## 2021-09-25 RX ORDER — SODIUM CHLORIDE 0.9 % (FLUSH) 0.9 %
3-10 SYRINGE (ML) INJECTION AS NEEDED
Status: DISCONTINUED | OUTPATIENT
Start: 2021-09-25 | End: 2021-09-30 | Stop reason: HOSPADM

## 2021-09-25 RX ORDER — HYDROCODONE BITARTRATE AND ACETAMINOPHEN 5; 325 MG/1; MG/1
1 TABLET ORAL EVERY 4 HOURS PRN
Status: DISCONTINUED | OUTPATIENT
Start: 2021-09-25 | End: 2021-09-28

## 2021-09-25 RX ORDER — HYDROMORPHONE HCL 110MG/55ML
0.5 PATIENT CONTROLLED ANALGESIA SYRINGE INTRAVENOUS
Status: DISCONTINUED | OUTPATIENT
Start: 2021-09-25 | End: 2021-09-28

## 2021-09-25 RX ORDER — PANTOPRAZOLE SODIUM 40 MG/1
40 TABLET, DELAYED RELEASE ORAL
Status: DISCONTINUED | OUTPATIENT
Start: 2021-09-25 | End: 2021-09-30 | Stop reason: HOSPADM

## 2021-09-25 RX ADMIN — ONDANSETRON 4 MG: 2 INJECTION INTRAMUSCULAR; INTRAVENOUS at 21:28

## 2021-09-25 RX ADMIN — DILTIAZEM HYDROCHLORIDE 300 MG: 180 CAPSULE, COATED, EXTENDED RELEASE ORAL at 13:29

## 2021-09-25 RX ADMIN — METOPROLOL TARTRATE 100 MG: 50 TABLET, FILM COATED ORAL at 21:05

## 2021-09-25 RX ADMIN — PANTOPRAZOLE SODIUM 40 MG: 40 TABLET, DELAYED RELEASE ORAL at 13:29

## 2021-09-25 RX ADMIN — MEROPENEM 500 MG: 500 INJECTION, POWDER, FOR SOLUTION INTRAVENOUS at 21:05

## 2021-09-25 RX ADMIN — TOPIRAMATE 100 MG: 100 TABLET, FILM COATED ORAL at 21:05

## 2021-09-25 RX ADMIN — WARFARIN 2 MG: 2 TABLET ORAL at 17:03

## 2021-09-25 RX ADMIN — Medication 3 ML: at 21:05

## 2021-09-25 RX ADMIN — ACETAMINOPHEN 650 MG: 325 TABLET, FILM COATED ORAL at 12:09

## 2021-09-25 RX ADMIN — HYDROCODONE BITARTRATE AND ACETAMINOPHEN 1 TABLET: 5; 325 TABLET ORAL at 16:30

## 2021-09-25 RX ADMIN — DICYCLOMINE HYDROCHLORIDE 10 MG: 10 CAPSULE ORAL at 21:05

## 2021-09-25 RX ADMIN — Medication 3 ML: at 13:30

## 2021-09-25 RX ADMIN — MEROPENEM 500 MG: 500 INJECTION, POWDER, FOR SOLUTION INTRAVENOUS at 16:30

## 2021-09-25 RX ADMIN — HYDROMORPHONE HYDROCHLORIDE 0.5 MG: 2 INJECTION, SOLUTION INTRAMUSCULAR; INTRAVENOUS; SUBCUTANEOUS at 21:11

## 2021-09-25 RX ADMIN — LINEZOLID 600 MG: 600 TABLET, FILM COATED ORAL at 01:16

## 2021-09-25 RX ADMIN — METOPROLOL TARTRATE 100 MG: 50 TABLET, FILM COATED ORAL at 12:09

## 2021-09-25 RX ADMIN — DICYCLOMINE HYDROCHLORIDE 10 MG: 10 CAPSULE ORAL at 17:03

## 2021-09-25 RX ADMIN — HYDROMORPHONE HYDROCHLORIDE 0.5 MG: 2 INJECTION, SOLUTION INTRAMUSCULAR; INTRAVENOUS; SUBCUTANEOUS at 18:40

## 2021-09-26 LAB
ANION GAP SERPL CALCULATED.3IONS-SCNC: 11 MMOL/L (ref 5–15)
BACTERIA SPEC AEROBE CULT: ABNORMAL
BASOPHILS # BLD AUTO: 0 10*3/MM3 (ref 0–0.2)
BASOPHILS NFR BLD AUTO: 0.6 % (ref 0–1.5)
BUN SERPL-MCNC: 26 MG/DL (ref 8–23)
BUN/CREAT SERPL: 27.7 (ref 7–25)
CALCIUM SPEC-SCNC: 8.8 MG/DL (ref 8.6–10.5)
CHLORIDE SERPL-SCNC: 109 MMOL/L (ref 98–107)
CO2 SERPL-SCNC: 22 MMOL/L (ref 22–29)
CREAT SERPL-MCNC: 0.94 MG/DL (ref 0.57–1)
DEPRECATED RDW RBC AUTO: 53.4 FL (ref 37–54)
EOSINOPHIL # BLD AUTO: 0 10*3/MM3 (ref 0–0.4)
EOSINOPHIL NFR BLD AUTO: 0.2 % (ref 0.3–6.2)
ERYTHROCYTE [DISTWIDTH] IN BLOOD BY AUTOMATED COUNT: 16.7 % (ref 12.3–15.4)
GFR SERPL CREATININE-BSD FRML MDRD: 57 ML/MIN/1.73
GLUCOSE SERPL-MCNC: 135 MG/DL (ref 65–99)
HCT VFR BLD AUTO: 40.6 % (ref 34–46.6)
HGB BLD-MCNC: 13.1 G/DL (ref 12–15.9)
INR PPP: 1.66 (ref 2–3)
LYMPHOCYTES # BLD AUTO: 1.2 10*3/MM3 (ref 0.7–3.1)
LYMPHOCYTES NFR BLD AUTO: 16.7 % (ref 19.6–45.3)
MAGNESIUM SERPL-MCNC: 1.8 MG/DL (ref 1.6–2.4)
MCH RBC QN AUTO: 29.3 PG (ref 26.6–33)
MCHC RBC AUTO-ENTMCNC: 32.1 G/DL (ref 31.5–35.7)
MCV RBC AUTO: 91.3 FL (ref 79–97)
MONOCYTES # BLD AUTO: 0.6 10*3/MM3 (ref 0.1–0.9)
MONOCYTES NFR BLD AUTO: 8 % (ref 5–12)
NEUTROPHILS NFR BLD AUTO: 5.3 10*3/MM3 (ref 1.7–7)
NEUTROPHILS NFR BLD AUTO: 74.5 % (ref 42.7–76)
NRBC BLD AUTO-RTO: 0.1 /100 WBC (ref 0–0.2)
PLATELET # BLD AUTO: 247 10*3/MM3 (ref 140–450)
PMV BLD AUTO: 6.9 FL (ref 6–12)
POTASSIUM SERPL-SCNC: 4.4 MMOL/L (ref 3.5–5.2)
PROTHROMBIN TIME: 17.7 SECONDS (ref 19.4–28.5)
RBC # BLD AUTO: 4.45 10*6/MM3 (ref 3.77–5.28)
SODIUM SERPL-SCNC: 142 MMOL/L (ref 136–145)
WBC # BLD AUTO: 7.1 10*3/MM3 (ref 3.4–10.8)

## 2021-09-26 PROCEDURE — 25010000002 PROMETHAZINE PER 50 MG: Performed by: NURSE PRACTITIONER

## 2021-09-26 PROCEDURE — 80048 BASIC METABOLIC PNL TOTAL CA: CPT | Performed by: INTERNAL MEDICINE

## 2021-09-26 PROCEDURE — 85025 COMPLETE CBC W/AUTO DIFF WBC: CPT | Performed by: INTERNAL MEDICINE

## 2021-09-26 PROCEDURE — 99214 OFFICE O/P EST MOD 30 MIN: CPT | Performed by: INTERNAL MEDICINE

## 2021-09-26 PROCEDURE — 36415 COLL VENOUS BLD VENIPUNCTURE: CPT | Performed by: INTERNAL MEDICINE

## 2021-09-26 PROCEDURE — 85610 PROTHROMBIN TIME: CPT | Performed by: INTERNAL MEDICINE

## 2021-09-26 PROCEDURE — 25010000002 HYDROMORPHONE PER 4 MG: Performed by: INTERNAL MEDICINE

## 2021-09-26 PROCEDURE — 25010000002 MEROPENEM PER 100 MG: Performed by: INTERNAL MEDICINE

## 2021-09-26 PROCEDURE — G0378 HOSPITAL OBSERVATION PER HR: HCPCS

## 2021-09-26 PROCEDURE — 83735 ASSAY OF MAGNESIUM: CPT | Performed by: INTERNAL MEDICINE

## 2021-09-26 RX ADMIN — DICYCLOMINE HYDROCHLORIDE 10 MG: 10 CAPSULE ORAL at 08:42

## 2021-09-26 RX ADMIN — MEROPENEM 500 MG: 500 INJECTION, POWDER, FOR SOLUTION INTRAVENOUS at 04:07

## 2021-09-26 RX ADMIN — HYDROCODONE BITARTRATE AND ACETAMINOPHEN 1 TABLET: 5; 325 TABLET ORAL at 20:27

## 2021-09-26 RX ADMIN — HYDROCODONE BITARTRATE AND ACETAMINOPHEN 1 TABLET: 5; 325 TABLET ORAL at 08:42

## 2021-09-26 RX ADMIN — METOPROLOL TARTRATE 100 MG: 50 TABLET, FILM COATED ORAL at 20:25

## 2021-09-26 RX ADMIN — MEROPENEM 500 MG: 500 INJECTION, POWDER, FOR SOLUTION INTRAVENOUS at 17:43

## 2021-09-26 RX ADMIN — MEROPENEM 500 MG: 500 INJECTION, POWDER, FOR SOLUTION INTRAVENOUS at 22:06

## 2021-09-26 RX ADMIN — DICYCLOMINE HYDROCHLORIDE 10 MG: 10 CAPSULE ORAL at 11:49

## 2021-09-26 RX ADMIN — TOPIRAMATE 100 MG: 100 TABLET, FILM COATED ORAL at 20:25

## 2021-09-26 RX ADMIN — Medication 3 ML: at 20:23

## 2021-09-26 RX ADMIN — WARFARIN 2 MG: 2 TABLET ORAL at 17:43

## 2021-09-26 RX ADMIN — DICYCLOMINE HYDROCHLORIDE 10 MG: 10 CAPSULE ORAL at 17:43

## 2021-09-26 RX ADMIN — METOPROLOL TARTRATE 100 MG: 50 TABLET, FILM COATED ORAL at 08:42

## 2021-09-26 RX ADMIN — DICYCLOMINE HYDROCHLORIDE 10 MG: 10 CAPSULE ORAL at 20:25

## 2021-09-26 RX ADMIN — PROMETHAZINE HYDROCHLORIDE 12.5 MG: 25 INJECTION INTRAMUSCULAR; INTRAVENOUS at 11:49

## 2021-09-26 RX ADMIN — Medication 3 ML: at 08:44

## 2021-09-26 RX ADMIN — HYDROMORPHONE HYDROCHLORIDE 0.5 MG: 2 INJECTION, SOLUTION INTRAMUSCULAR; INTRAVENOUS; SUBCUTANEOUS at 11:49

## 2021-09-26 RX ADMIN — PROMETHAZINE HYDROCHLORIDE 12.5 MG: 25 INJECTION INTRAMUSCULAR; INTRAVENOUS at 00:17

## 2021-09-26 RX ADMIN — PANTOPRAZOLE SODIUM 40 MG: 40 TABLET, DELAYED RELEASE ORAL at 04:07

## 2021-09-26 RX ADMIN — HYDROCODONE BITARTRATE AND ACETAMINOPHEN 1 TABLET: 5; 325 TABLET ORAL at 16:31

## 2021-09-26 RX ADMIN — DILTIAZEM HYDROCHLORIDE 300 MG: 180 CAPSULE, COATED, EXTENDED RELEASE ORAL at 08:42

## 2021-09-27 ENCOUNTER — INPATIENT HOSPITAL (AMBULATORY)
Dept: URBAN - METROPOLITAN AREA HOSPITAL 84 | Facility: HOSPITAL | Age: 81
End: 2021-09-27

## 2021-09-27 DIAGNOSIS — G89.29 OTHER CHRONIC PAIN: ICD-10-CM

## 2021-09-27 DIAGNOSIS — N39.0 URINARY TRACT INFECTION, SITE NOT SPECIFIED: ICD-10-CM

## 2021-09-27 DIAGNOSIS — Z93.6 OTHER ARTIFICIAL OPENINGS OF URINARY TRACT STATUS: ICD-10-CM

## 2021-09-27 DIAGNOSIS — R19.4 CHANGE IN BOWEL HABIT: ICD-10-CM

## 2021-09-27 DIAGNOSIS — Z85.51 PERSONAL HISTORY OF MALIGNANT NEOPLASM OF BLADDER: ICD-10-CM

## 2021-09-27 DIAGNOSIS — R10.30 LOWER ABDOMINAL PAIN, UNSPECIFIED: ICD-10-CM

## 2021-09-27 LAB
ANION GAP SERPL CALCULATED.3IONS-SCNC: 12 MMOL/L (ref 5–15)
BUN SERPL-MCNC: 32 MG/DL (ref 8–23)
BUN/CREAT SERPL: 29.4 (ref 7–25)
CALCIUM SPEC-SCNC: 9.1 MG/DL (ref 8.6–10.5)
CHLORIDE SERPL-SCNC: 108 MMOL/L (ref 98–107)
CO2 SERPL-SCNC: 23 MMOL/L (ref 22–29)
CREAT SERPL-MCNC: 1.09 MG/DL (ref 0.57–1)
GFR SERPL CREATININE-BSD FRML MDRD: 48 ML/MIN/1.73
GLUCOSE SERPL-MCNC: 118 MG/DL (ref 65–99)
INR PPP: 1.95 (ref 2–3)
POTASSIUM SERPL-SCNC: 4.4 MMOL/L (ref 3.5–5.2)
PROTHROMBIN TIME: 20.6 SECONDS (ref 19.4–28.5)
SODIUM SERPL-SCNC: 143 MMOL/L (ref 136–145)

## 2021-09-27 PROCEDURE — 85610 PROTHROMBIN TIME: CPT | Performed by: INTERNAL MEDICINE

## 2021-09-27 PROCEDURE — 99222 1ST HOSP IP/OBS MODERATE 55: CPT | Performed by: NURSE PRACTITIONER

## 2021-09-27 PROCEDURE — 25010000002 MEROPENEM PER 100 MG: Performed by: INTERNAL MEDICINE

## 2021-09-27 PROCEDURE — 25010000002 HYDROMORPHONE PER 4 MG: Performed by: INTERNAL MEDICINE

## 2021-09-27 PROCEDURE — 80048 BASIC METABOLIC PNL TOTAL CA: CPT | Performed by: INTERNAL MEDICINE

## 2021-09-27 PROCEDURE — 99214 OFFICE O/P EST MOD 30 MIN: CPT | Performed by: INTERNAL MEDICINE

## 2021-09-27 RX ORDER — SORBITOL SOLUTION 70 %
50 SOLUTION, ORAL MISCELLANEOUS ONCE
Status: COMPLETED | OUTPATIENT
Start: 2021-09-27 | End: 2021-09-27

## 2021-09-27 RX ORDER — POLYETHYLENE GLYCOL 3350 17 G/17G
17 POWDER, FOR SOLUTION ORAL DAILY
Status: DISCONTINUED | OUTPATIENT
Start: 2021-09-27 | End: 2021-09-30 | Stop reason: HOSPADM

## 2021-09-27 RX ORDER — BISACODYL 10 MG
10 SUPPOSITORY, RECTAL RECTAL ONCE
Status: COMPLETED | OUTPATIENT
Start: 2021-09-27 | End: 2021-09-27

## 2021-09-27 RX ADMIN — POLYETHYLENE GLYCOL 3350 17 G: 17 POWDER, FOR SOLUTION ORAL at 12:00

## 2021-09-27 RX ADMIN — Medication 3 ML: at 09:11

## 2021-09-27 RX ADMIN — HYDROMORPHONE HYDROCHLORIDE 0.5 MG: 2 INJECTION, SOLUTION INTRAMUSCULAR; INTRAVENOUS; SUBCUTANEOUS at 19:24

## 2021-09-27 RX ADMIN — BISACODYL 10 MG: 10 SUPPOSITORY RECTAL at 17:30

## 2021-09-27 RX ADMIN — Medication 3 ML: at 19:59

## 2021-09-27 RX ADMIN — SORBITOL SOLUTION (BULK) 50 ML: 70 SOLUTION at 12:00

## 2021-09-27 RX ADMIN — WARFARIN 2 MG: 2 TABLET ORAL at 17:30

## 2021-09-27 RX ADMIN — MEROPENEM 500 MG: 500 INJECTION, POWDER, FOR SOLUTION INTRAVENOUS at 09:11

## 2021-09-27 RX ADMIN — PANTOPRAZOLE SODIUM 40 MG: 40 TABLET, DELAYED RELEASE ORAL at 06:01

## 2021-09-27 RX ADMIN — SORBITOL SOLUTION (BULK) 50 ML: 70 SOLUTION at 17:29

## 2021-09-27 RX ADMIN — HYDROCODONE BITARTRATE AND ACETAMINOPHEN 1 TABLET: 5; 325 TABLET ORAL at 21:30

## 2021-09-27 RX ADMIN — DICYCLOMINE HYDROCHLORIDE 10 MG: 10 CAPSULE ORAL at 09:00

## 2021-09-27 RX ADMIN — HYDROCODONE BITARTRATE AND ACETAMINOPHEN 1 TABLET: 5; 325 TABLET ORAL at 06:01

## 2021-09-27 RX ADMIN — HYDROMORPHONE HYDROCHLORIDE 0.5 MG: 2 INJECTION, SOLUTION INTRAMUSCULAR; INTRAVENOUS; SUBCUTANEOUS at 23:22

## 2021-09-27 RX ADMIN — HYDROMORPHONE HYDROCHLORIDE 0.5 MG: 2 INJECTION, SOLUTION INTRAMUSCULAR; INTRAVENOUS; SUBCUTANEOUS at 09:14

## 2021-09-27 RX ADMIN — DICYCLOMINE HYDROCHLORIDE 10 MG: 10 CAPSULE ORAL at 17:30

## 2021-09-27 RX ADMIN — METOPROLOL TARTRATE 100 MG: 50 TABLET, FILM COATED ORAL at 09:10

## 2021-09-27 RX ADMIN — MEROPENEM 500 MG: 500 INJECTION, POWDER, FOR SOLUTION INTRAVENOUS at 04:18

## 2021-09-27 RX ADMIN — DILTIAZEM HYDROCHLORIDE 300 MG: 180 CAPSULE, COATED, EXTENDED RELEASE ORAL at 09:10

## 2021-09-27 RX ADMIN — TOPIRAMATE 100 MG: 100 TABLET, FILM COATED ORAL at 20:00

## 2021-09-27 RX ADMIN — DICYCLOMINE HYDROCHLORIDE 10 MG: 10 CAPSULE ORAL at 12:35

## 2021-09-27 RX ADMIN — MEROPENEM 500 MG: 500 INJECTION, POWDER, FOR SOLUTION INTRAVENOUS at 17:29

## 2021-09-27 RX ADMIN — METOPROLOL TARTRATE 100 MG: 50 TABLET, FILM COATED ORAL at 20:00

## 2021-09-27 RX ADMIN — Medication 3 ML: at 20:07

## 2021-09-27 RX ADMIN — HYDROCODONE BITARTRATE AND ACETAMINOPHEN 1 TABLET: 5; 325 TABLET ORAL at 17:30

## 2021-09-27 RX ADMIN — DICYCLOMINE HYDROCHLORIDE 10 MG: 10 CAPSULE ORAL at 20:00

## 2021-09-28 ENCOUNTER — APPOINTMENT (OUTPATIENT)
Dept: CARDIOLOGY | Facility: HOSPITAL | Age: 81
End: 2021-09-28

## 2021-09-28 ENCOUNTER — INPATIENT HOSPITAL (AMBULATORY)
Dept: URBAN - METROPOLITAN AREA HOSPITAL 84 | Facility: HOSPITAL | Age: 81
End: 2021-09-28

## 2021-09-28 ENCOUNTER — APPOINTMENT (OUTPATIENT)
Dept: GENERAL RADIOLOGY | Facility: HOSPITAL | Age: 81
End: 2021-09-28

## 2021-09-28 DIAGNOSIS — K59.00 CONSTIPATION, UNSPECIFIED: ICD-10-CM

## 2021-09-28 DIAGNOSIS — R19.4 CHANGE IN BOWEL HABIT: ICD-10-CM

## 2021-09-28 DIAGNOSIS — R10.30 LOWER ABDOMINAL PAIN, UNSPECIFIED: ICD-10-CM

## 2021-09-28 DIAGNOSIS — Z93.6 OTHER ARTIFICIAL OPENINGS OF URINARY TRACT STATUS: ICD-10-CM

## 2021-09-28 DIAGNOSIS — N39.0 URINARY TRACT INFECTION, SITE NOT SPECIFIED: ICD-10-CM

## 2021-09-28 DIAGNOSIS — Z85.51 PERSONAL HISTORY OF MALIGNANT NEOPLASM OF BLADDER: ICD-10-CM

## 2021-09-28 DIAGNOSIS — G89.29 OTHER CHRONIC PAIN: ICD-10-CM

## 2021-09-28 LAB
ALBUMIN SERPL-MCNC: 3.6 G/DL (ref 3.5–5.2)
ALBUMIN/GLOB SERPL: 1.3 G/DL
ALP SERPL-CCNC: 145 U/L (ref 39–117)
ALT SERPL W P-5'-P-CCNC: 70 U/L (ref 1–33)
ANION GAP SERPL CALCULATED.3IONS-SCNC: 14 MMOL/L (ref 5–15)
AST SERPL-CCNC: 80 U/L (ref 1–32)
BASOPHILS # BLD AUTO: 0.1 10*3/MM3 (ref 0–0.2)
BASOPHILS NFR BLD AUTO: 0.5 % (ref 0–1.5)
BILIRUB SERPL-MCNC: 0.6 MG/DL (ref 0–1.2)
BUN SERPL-MCNC: 30 MG/DL (ref 8–23)
BUN/CREAT SERPL: 23.6 (ref 7–25)
CALCIUM SPEC-SCNC: 8.6 MG/DL (ref 8.6–10.5)
CHLORIDE SERPL-SCNC: 110 MMOL/L (ref 98–107)
CO2 SERPL-SCNC: 21 MMOL/L (ref 22–29)
CREAT SERPL-MCNC: 1.27 MG/DL (ref 0.57–1)
DEPRECATED RDW RBC AUTO: 53.8 FL (ref 37–54)
EOSINOPHIL # BLD AUTO: 0 10*3/MM3 (ref 0–0.4)
EOSINOPHIL NFR BLD AUTO: 0 % (ref 0.3–6.2)
ERYTHROCYTE [DISTWIDTH] IN BLOOD BY AUTOMATED COUNT: 16.6 % (ref 12.3–15.4)
GFR SERPL CREATININE-BSD FRML MDRD: 40 ML/MIN/1.73
GLOBULIN UR ELPH-MCNC: 2.8 GM/DL
GLUCOSE SERPL-MCNC: 128 MG/DL (ref 65–99)
HCT VFR BLD AUTO: 43.9 % (ref 34–46.6)
HGB BLD-MCNC: 13.9 G/DL (ref 12–15.9)
INR PPP: 2.13 (ref 2–3)
LYMPHOCYTES # BLD AUTO: 1.8 10*3/MM3 (ref 0.7–3.1)
LYMPHOCYTES NFR BLD AUTO: 16.1 % (ref 19.6–45.3)
MCH RBC QN AUTO: 29.2 PG (ref 26.6–33)
MCHC RBC AUTO-ENTMCNC: 31.6 G/DL (ref 31.5–35.7)
MCV RBC AUTO: 92.5 FL (ref 79–97)
MONOCYTES # BLD AUTO: 1.2 10*3/MM3 (ref 0.1–0.9)
MONOCYTES NFR BLD AUTO: 10.5 % (ref 5–12)
NEUTROPHILS NFR BLD AUTO: 72.9 % (ref 42.7–76)
NEUTROPHILS NFR BLD AUTO: 8.2 10*3/MM3 (ref 1.7–7)
NRBC BLD AUTO-RTO: 0.1 /100 WBC (ref 0–0.2)
PLATELET # BLD AUTO: 270 10*3/MM3 (ref 140–450)
PMV BLD AUTO: 7.6 FL (ref 6–12)
POTASSIUM SERPL-SCNC: 3.6 MMOL/L (ref 3.5–5.2)
PROT SERPL-MCNC: 6.4 G/DL (ref 6–8.5)
PROTHROMBIN TIME: 22.4 SECONDS (ref 19.4–28.5)
RBC # BLD AUTO: 4.75 10*6/MM3 (ref 3.77–5.28)
SODIUM SERPL-SCNC: 145 MMOL/L (ref 136–145)
WBC # BLD AUTO: 11.2 10*3/MM3 (ref 3.4–10.8)

## 2021-09-28 PROCEDURE — 85025 COMPLETE CBC W/AUTO DIFF WBC: CPT | Performed by: NURSE PRACTITIONER

## 2021-09-28 PROCEDURE — 93306 TTE W/DOPPLER COMPLETE: CPT | Performed by: INTERNAL MEDICINE

## 2021-09-28 PROCEDURE — 25010000002 MEROPENEM PER 100 MG: Performed by: INTERNAL MEDICINE

## 2021-09-28 PROCEDURE — 36415 COLL VENOUS BLD VENIPUNCTURE: CPT | Performed by: INTERNAL MEDICINE

## 2021-09-28 PROCEDURE — 85610 PROTHROMBIN TIME: CPT | Performed by: INTERNAL MEDICINE

## 2021-09-28 PROCEDURE — 99232 SBSQ HOSP IP/OBS MODERATE 35: CPT | Performed by: NURSE PRACTITIONER

## 2021-09-28 PROCEDURE — 99233 SBSQ HOSP IP/OBS HIGH 50: CPT | Performed by: INTERNAL MEDICINE

## 2021-09-28 PROCEDURE — 25010000002 ONDANSETRON PER 1 MG: Performed by: INTERNAL MEDICINE

## 2021-09-28 PROCEDURE — 74018 RADEX ABDOMEN 1 VIEW: CPT

## 2021-09-28 PROCEDURE — 93306 TTE W/DOPPLER COMPLETE: CPT

## 2021-09-28 PROCEDURE — 80053 COMPREHEN METABOLIC PANEL: CPT | Performed by: NURSE PRACTITIONER

## 2021-09-28 PROCEDURE — 25010000002 METHYLNALTREXONE 12 MG/0.6ML SOLUTION: Performed by: NURSE PRACTITIONER

## 2021-09-28 RX ORDER — SODIUM, POTASSIUM,MAG SULFATES 17.5-3.13G
1 SOLUTION, RECONSTITUTED, ORAL ORAL EVERY 12 HOURS
Status: COMPLETED | OUTPATIENT
Start: 2021-09-28 | End: 2021-09-29

## 2021-09-28 RX ORDER — DILTIAZEM HYDROCHLORIDE 60 MG/1
60 TABLET, FILM COATED ORAL EVERY 8 HOURS SCHEDULED
Status: DISCONTINUED | OUTPATIENT
Start: 2021-09-28 | End: 2021-09-28

## 2021-09-28 RX ORDER — BISACODYL 10 MG
10 SUPPOSITORY, RECTAL RECTAL ONCE
Status: COMPLETED | OUTPATIENT
Start: 2021-09-28 | End: 2021-09-28

## 2021-09-28 RX ADMIN — PANTOPRAZOLE SODIUM 40 MG: 40 TABLET, DELAYED RELEASE ORAL at 05:34

## 2021-09-28 RX ADMIN — METHYLNALTREXONE BROMIDE 12 MG: 12 INJECTION, SOLUTION SUBCUTANEOUS at 13:09

## 2021-09-28 RX ADMIN — MEROPENEM 500 MG: 500 INJECTION, POWDER, FOR SOLUTION INTRAVENOUS at 17:05

## 2021-09-28 RX ADMIN — Medication 3 ML: at 19:48

## 2021-09-28 RX ADMIN — SODIUM SULFATE, POTASSIUM SULFATE, MAGNESIUM SULFATE 1 BOTTLE: 17.5; 3.13; 1.6 SOLUTION, CONCENTRATE ORAL at 14:11

## 2021-09-28 RX ADMIN — ONDANSETRON 4 MG: 2 INJECTION INTRAMUSCULAR; INTRAVENOUS at 16:58

## 2021-09-28 RX ADMIN — METOPROLOL TARTRATE 100 MG: 50 TABLET, FILM COATED ORAL at 19:48

## 2021-09-28 RX ADMIN — POLYETHYLENE GLYCOL 3350 17 G: 17 POWDER, FOR SOLUTION ORAL at 09:18

## 2021-09-28 RX ADMIN — SODIUM CHLORIDE 1000 ML: 0.9 INJECTION, SOLUTION INTRAVENOUS at 01:04

## 2021-09-28 RX ADMIN — TOPIRAMATE 100 MG: 100 TABLET, FILM COATED ORAL at 19:48

## 2021-09-28 RX ADMIN — ONDANSETRON 4 MG: 2 INJECTION INTRAMUSCULAR; INTRAVENOUS at 09:12

## 2021-09-28 RX ADMIN — BISACODYL 10 MG: 10 SUPPOSITORY RECTAL at 16:58

## 2021-09-28 RX ADMIN — Medication 3 ML: at 09:18

## 2021-09-28 RX ADMIN — MEROPENEM 500 MG: 500 INJECTION, POWDER, FOR SOLUTION INTRAVENOUS at 09:19

## 2021-09-28 RX ADMIN — MEROPENEM 500 MG: 500 INJECTION, POWDER, FOR SOLUTION INTRAVENOUS at 01:15

## 2021-09-28 RX ADMIN — WARFARIN 2 MG: 2 TABLET ORAL at 17:05

## 2021-09-28 RX ADMIN — DICYCLOMINE HYDROCHLORIDE 10 MG: 10 CAPSULE ORAL at 19:49

## 2021-09-29 LAB
ALBUMIN SERPL-MCNC: 3.5 G/DL (ref 3.5–5.2)
ALBUMIN/GLOB SERPL: 1.1 G/DL
ALP SERPL-CCNC: 158 U/L (ref 39–117)
ALT SERPL W P-5'-P-CCNC: 62 U/L (ref 1–33)
ANION GAP SERPL CALCULATED.3IONS-SCNC: 17 MMOL/L (ref 5–15)
AST SERPL-CCNC: 47 U/L (ref 1–32)
BASOPHILS # BLD AUTO: 0.1 10*3/MM3 (ref 0–0.2)
BASOPHILS NFR BLD AUTO: 0.7 % (ref 0–1.5)
BH CV ECHO MEAS - % IVS THICK: 49.2 %
BH CV ECHO MEAS - % LVPW THICK: 33.6 %
BH CV ECHO MEAS - ACS: 2 CM
BH CV ECHO MEAS - AO MAX PG (FULL): 1.4 MMHG
BH CV ECHO MEAS - AO MAX PG: 3.6 MMHG
BH CV ECHO MEAS - AO MEAN PG (FULL): 0.83 MMHG
BH CV ECHO MEAS - AO MEAN PG: 1.9 MMHG
BH CV ECHO MEAS - AO ROOT AREA (BSA CORRECTED): 2.1
BH CV ECHO MEAS - AO ROOT AREA: 11.2 CM^2
BH CV ECHO MEAS - AO ROOT DIAM: 3.8 CM
BH CV ECHO MEAS - AO V2 MAX: 94.9 CM/SEC
BH CV ECHO MEAS - AO V2 MEAN: 65.5 CM/SEC
BH CV ECHO MEAS - AO V2 VTI: 17 CM
BH CV ECHO MEAS - ASC AORTA: 2.9 CM
BH CV ECHO MEAS - AVA(I,A): 2.7 CM^2
BH CV ECHO MEAS - AVA(I,D): 2.7 CM^2
BH CV ECHO MEAS - AVA(V,A): 2.7 CM^2
BH CV ECHO MEAS - AVA(V,D): 2.7 CM^2
BH CV ECHO MEAS - BSA(HAYCOCK): 1.8 M^2
BH CV ECHO MEAS - BSA: 1.8 M^2
BH CV ECHO MEAS - BZI_BMI: 27.5 KILOGRAMS/M^2
BH CV ECHO MEAS - BZI_METRIC_HEIGHT: 162.6 CM
BH CV ECHO MEAS - BZI_METRIC_WEIGHT: 72.6 KG
BH CV ECHO MEAS - EDV(CUBED): 98.2 ML
BH CV ECHO MEAS - EDV(MOD-SP4): 47 ML
BH CV ECHO MEAS - EDV(TEICH): 98 ML
BH CV ECHO MEAS - EF(CUBED): 75.1 %
BH CV ECHO MEAS - EF(MOD-BP): 62 %
BH CV ECHO MEAS - EF(MOD-SP4): 61.5 %
BH CV ECHO MEAS - EF(TEICH): 67 %
BH CV ECHO MEAS - ESV(CUBED): 24.5 ML
BH CV ECHO MEAS - ESV(MOD-SP4): 18.1 ML
BH CV ECHO MEAS - ESV(TEICH): 32.3 ML
BH CV ECHO MEAS - FS: 37 %
BH CV ECHO MEAS - IVS/LVPW: 1
BH CV ECHO MEAS - IVSD: 1.1 CM
BH CV ECHO MEAS - IVSS: 1.6 CM
BH CV ECHO MEAS - LA DIMENSION(2D): 4.6 CM
BH CV ECHO MEAS - LV DIASTOLIC VOL/BSA (35-75): 26.4 ML/M^2
BH CV ECHO MEAS - LV MASS(C)D: 178.4 GRAMS
BH CV ECHO MEAS - LV MASS(C)DI: 100.3 GRAMS/M^2
BH CV ECHO MEAS - LV MASS(C)S: 157.3 GRAMS
BH CV ECHO MEAS - LV MASS(C)SI: 88.4 GRAMS/M^2
BH CV ECHO MEAS - LV MAX PG: 2.2 MMHG
BH CV ECHO MEAS - LV MEAN PG: 1.1 MMHG
BH CV ECHO MEAS - LV SYSTOLIC VOL/BSA (12-30): 10.2 ML/M^2
BH CV ECHO MEAS - LV V1 MAX: 73.8 CM/SEC
BH CV ECHO MEAS - LV V1 MEAN: 49.9 CM/SEC
BH CV ECHO MEAS - LV V1 VTI: 13.3 CM
BH CV ECHO MEAS - LVIDD: 4.6 CM
BH CV ECHO MEAS - LVIDS: 2.9 CM
BH CV ECHO MEAS - LVOT AREA: 3.4 CM^2
BH CV ECHO MEAS - LVOT DIAM: 2.1 CM
BH CV ECHO MEAS - LVPWD: 1.1 CM
BH CV ECHO MEAS - LVPWS: 1.4 CM
BH CV ECHO MEAS - MR MAX PG: 110.6 MMHG
BH CV ECHO MEAS - MR MAX VEL: 525.9 CM/SEC
BH CV ECHO MEAS - MV A MAX VEL: 0.44 CM/SEC
BH CV ECHO MEAS - MV DEC SLOPE: 755.8 CM/SEC^2
BH CV ECHO MEAS - MV DEC TIME: 0.16 SEC
BH CV ECHO MEAS - MV E MAX VEL: 122.5 CM/SEC
BH CV ECHO MEAS - MV E/A: 280.1
BH CV ECHO MEAS - MV MAX PG: 6 MMHG
BH CV ECHO MEAS - MV MEAN PG: 2 MMHG
BH CV ECHO MEAS - MV V2 MAX: 122.8 CM/SEC
BH CV ECHO MEAS - MV V2 MEAN: 62.7 CM/SEC
BH CV ECHO MEAS - MV V2 VTI: 20.2 CM
BH CV ECHO MEAS - MVA(VTI): 2.2 CM^2
BH CV ECHO MEAS - PA ACC TIME: 0.11 SEC
BH CV ECHO MEAS - PA MAX PG (FULL): 0.9 MMHG
BH CV ECHO MEAS - PA MAX PG: 2.4 MMHG
BH CV ECHO MEAS - PA MEAN PG (FULL): 0.59 MMHG
BH CV ECHO MEAS - PA MEAN PG: 1.4 MMHG
BH CV ECHO MEAS - PA PR(ACCEL): 29.2 MMHG
BH CV ECHO MEAS - PA V2 MAX: 77.9 CM/SEC
BH CV ECHO MEAS - PA V2 MEAN: 56.8 CM/SEC
BH CV ECHO MEAS - PA V2 VTI: 13.6 CM
BH CV ECHO MEAS - RAP SYSTOLE: 3 MMHG
BH CV ECHO MEAS - RV MAX PG: 1.5 MMHG
BH CV ECHO MEAS - RV MEAN PG: 0.79 MMHG
BH CV ECHO MEAS - RV V1 MAX: 61.9 CM/SEC
BH CV ECHO MEAS - RV V1 MEAN: 42.2 CM/SEC
BH CV ECHO MEAS - RV V1 VTI: 11.4 CM
BH CV ECHO MEAS - RVDD: 2.5 CM
BH CV ECHO MEAS - RVSP: 33.8 MMHG
BH CV ECHO MEAS - SI(AO): 107 ML/M^2
BH CV ECHO MEAS - SI(CUBED): 41.4 ML/M^2
BH CV ECHO MEAS - SI(LVOT): 25.5 ML/M^2
BH CV ECHO MEAS - SI(MOD-SP4): 16.3 ML/M^2
BH CV ECHO MEAS - SI(TEICH): 36.9 ML/M^2
BH CV ECHO MEAS - SV(AO): 190.3 ML
BH CV ECHO MEAS - SV(CUBED): 73.7 ML
BH CV ECHO MEAS - SV(LVOT): 45.4 ML
BH CV ECHO MEAS - SV(MOD-SP4): 28.9 ML
BH CV ECHO MEAS - SV(TEICH): 65.7 ML
BH CV ECHO MEAS - TR MAX VEL: 277.4 CM/SEC
BILIRUB SERPL-MCNC: 0.5 MG/DL (ref 0–1.2)
BUN SERPL-MCNC: 33 MG/DL (ref 8–23)
BUN/CREAT SERPL: 36.3 (ref 7–25)
CALCIUM SPEC-SCNC: 9.4 MG/DL (ref 8.6–10.5)
CHLORIDE SERPL-SCNC: 104 MMOL/L (ref 98–107)
CO2 SERPL-SCNC: 19 MMOL/L (ref 22–29)
CREAT SERPL-MCNC: 0.91 MG/DL (ref 0.57–1)
DEPRECATED RDW RBC AUTO: 55.6 FL (ref 37–54)
EOSINOPHIL # BLD AUTO: 0 10*3/MM3 (ref 0–0.4)
EOSINOPHIL NFR BLD AUTO: 0 % (ref 0.3–6.2)
ERYTHROCYTE [DISTWIDTH] IN BLOOD BY AUTOMATED COUNT: 16.9 % (ref 12.3–15.4)
GFR SERPL CREATININE-BSD FRML MDRD: 59 ML/MIN/1.73
GLOBULIN UR ELPH-MCNC: 3.2 GM/DL
GLUCOSE SERPL-MCNC: 143 MG/DL (ref 65–99)
HCT VFR BLD AUTO: 47.6 % (ref 34–46.6)
HGB BLD-MCNC: 15 G/DL (ref 12–15.9)
INR PPP: 3.06 (ref 2–3)
LYMPHOCYTES # BLD AUTO: 0.9 10*3/MM3 (ref 0.7–3.1)
LYMPHOCYTES NFR BLD AUTO: 5.3 % (ref 19.6–45.3)
MAGNESIUM SERPL-MCNC: 2 MG/DL (ref 1.6–2.4)
MAXIMAL PREDICTED HEART RATE: 140 BPM
MCH RBC QN AUTO: 29.2 PG (ref 26.6–33)
MCHC RBC AUTO-ENTMCNC: 31.5 G/DL (ref 31.5–35.7)
MCV RBC AUTO: 92.9 FL (ref 79–97)
MONOCYTES # BLD AUTO: 1 10*3/MM3 (ref 0.1–0.9)
MONOCYTES NFR BLD AUTO: 5.9 % (ref 5–12)
NEUTROPHILS NFR BLD AUTO: 15.1 10*3/MM3 (ref 1.7–7)
NEUTROPHILS NFR BLD AUTO: 88.1 % (ref 42.7–76)
NRBC BLD AUTO-RTO: 0 /100 WBC (ref 0–0.2)
PLATELET # BLD AUTO: 292 10*3/MM3 (ref 140–450)
PMV BLD AUTO: 7.5 FL (ref 6–12)
POTASSIUM SERPL-SCNC: 2.9 MMOL/L (ref 3.5–5.2)
PROT SERPL-MCNC: 6.7 G/DL (ref 6–8.5)
PROTHROMBIN TIME: 31.3 SECONDS (ref 19.4–28.5)
RBC # BLD AUTO: 5.12 10*6/MM3 (ref 3.77–5.28)
SODIUM SERPL-SCNC: 140 MMOL/L (ref 136–145)
STRESS TARGET HR: 119 BPM
WBC # BLD AUTO: 17.1 10*3/MM3 (ref 3.4–10.8)

## 2021-09-29 PROCEDURE — 25010000002 MEROPENEM PER 100 MG: Performed by: INTERNAL MEDICINE

## 2021-09-29 PROCEDURE — 99231 SBSQ HOSP IP/OBS SF/LOW 25: CPT | Performed by: NURSE PRACTITIONER

## 2021-09-29 PROCEDURE — 99233 SBSQ HOSP IP/OBS HIGH 50: CPT | Performed by: INTERNAL MEDICINE

## 2021-09-29 PROCEDURE — 93010 ELECTROCARDIOGRAM REPORT: CPT | Performed by: INTERNAL MEDICINE

## 2021-09-29 PROCEDURE — 25010000002 HYDRALAZINE PER 20 MG: Performed by: FAMILY MEDICINE

## 2021-09-29 PROCEDURE — 36415 COLL VENOUS BLD VENIPUNCTURE: CPT | Performed by: INTERNAL MEDICINE

## 2021-09-29 PROCEDURE — 83735 ASSAY OF MAGNESIUM: CPT | Performed by: INTERNAL MEDICINE

## 2021-09-29 PROCEDURE — 85610 PROTHROMBIN TIME: CPT | Performed by: INTERNAL MEDICINE

## 2021-09-29 PROCEDURE — 85025 COMPLETE CBC W/AUTO DIFF WBC: CPT | Performed by: NURSE PRACTITIONER

## 2021-09-29 PROCEDURE — 80053 COMPREHEN METABOLIC PANEL: CPT | Performed by: NURSE PRACTITIONER

## 2021-09-29 PROCEDURE — 93005 ELECTROCARDIOGRAM TRACING: CPT | Performed by: INTERNAL MEDICINE

## 2021-09-29 RX ORDER — HYDRALAZINE HYDROCHLORIDE 20 MG/ML
10 INJECTION INTRAMUSCULAR; INTRAVENOUS EVERY 6 HOURS PRN
Status: DISCONTINUED | OUTPATIENT
Start: 2021-09-29 | End: 2021-09-30 | Stop reason: HOSPADM

## 2021-09-29 RX ORDER — DILTIAZEM HYDROCHLORIDE 60 MG/1
60 TABLET, FILM COATED ORAL EVERY 8 HOURS SCHEDULED
Status: DISCONTINUED | OUTPATIENT
Start: 2021-09-29 | End: 2021-09-30 | Stop reason: HOSPADM

## 2021-09-29 RX ORDER — POTASSIUM CHLORIDE 20 MEQ/1
40 TABLET, EXTENDED RELEASE ORAL ONCE
Status: DISCONTINUED | OUTPATIENT
Start: 2021-09-29 | End: 2021-09-29 | Stop reason: SDUPTHER

## 2021-09-29 RX ORDER — POTASSIUM CHLORIDE 20 MEQ/1
40 TABLET, EXTENDED RELEASE ORAL ONCE
Status: COMPLETED | OUTPATIENT
Start: 2021-09-29 | End: 2021-09-29

## 2021-09-29 RX ADMIN — DICYCLOMINE HYDROCHLORIDE 10 MG: 10 CAPSULE ORAL at 21:49

## 2021-09-29 RX ADMIN — SODIUM SULFATE, POTASSIUM SULFATE, MAGNESIUM SULFATE 1 BOTTLE: 17.5; 3.13; 1.6 SOLUTION, CONCENTRATE ORAL at 02:04

## 2021-09-29 RX ADMIN — DICYCLOMINE HYDROCHLORIDE 10 MG: 10 CAPSULE ORAL at 11:36

## 2021-09-29 RX ADMIN — METOPROLOL TARTRATE 100 MG: 50 TABLET, FILM COATED ORAL at 21:49

## 2021-09-29 RX ADMIN — DILTIAZEM HYDROCHLORIDE 60 MG: 60 TABLET, FILM COATED ORAL at 13:17

## 2021-09-29 RX ADMIN — Medication 3 ML: at 21:49

## 2021-09-29 RX ADMIN — DICYCLOMINE HYDROCHLORIDE 10 MG: 10 CAPSULE ORAL at 08:43

## 2021-09-29 RX ADMIN — POLYETHYLENE GLYCOL 3350 17 G: 17 POWDER, FOR SOLUTION ORAL at 08:43

## 2021-09-29 RX ADMIN — METOPROLOL TARTRATE 100 MG: 50 TABLET, FILM COATED ORAL at 08:43

## 2021-09-29 RX ADMIN — DICYCLOMINE HYDROCHLORIDE 10 MG: 10 CAPSULE ORAL at 17:00

## 2021-09-29 RX ADMIN — POTASSIUM CHLORIDE 40 MEQ: 1500 TABLET, EXTENDED RELEASE ORAL at 16:36

## 2021-09-29 RX ADMIN — WARFARIN 2 MG: 2 TABLET ORAL at 17:00

## 2021-09-29 RX ADMIN — MEROPENEM 500 MG: 500 INJECTION, POWDER, FOR SOLUTION INTRAVENOUS at 08:42

## 2021-09-29 RX ADMIN — POTASSIUM CHLORIDE 40 MEQ: 1500 TABLET, EXTENDED RELEASE ORAL at 13:00

## 2021-09-29 RX ADMIN — MEROPENEM 500 MG: 500 INJECTION, POWDER, FOR SOLUTION INTRAVENOUS at 02:05

## 2021-09-29 RX ADMIN — ACETAMINOPHEN 650 MG: 325 TABLET, FILM COATED ORAL at 09:11

## 2021-09-29 RX ADMIN — DILTIAZEM HYDROCHLORIDE 60 MG: 60 TABLET, FILM COATED ORAL at 21:49

## 2021-09-29 RX ADMIN — PANTOPRAZOLE SODIUM 40 MG: 40 TABLET, DELAYED RELEASE ORAL at 05:23

## 2021-09-29 RX ADMIN — MEROPENEM 500 MG: 500 INJECTION, POWDER, FOR SOLUTION INTRAVENOUS at 18:46

## 2021-09-29 RX ADMIN — Medication 3 ML: at 08:43

## 2021-09-29 RX ADMIN — TOPIRAMATE 100 MG: 100 TABLET, FILM COATED ORAL at 21:49

## 2021-09-29 RX ADMIN — HYDRALAZINE HYDROCHLORIDE 10 MG: 20 INJECTION INTRAMUSCULAR; INTRAVENOUS at 03:35

## 2021-09-30 VITALS
SYSTOLIC BLOOD PRESSURE: 99 MMHG | TEMPERATURE: 98.5 F | DIASTOLIC BLOOD PRESSURE: 63 MMHG | HEIGHT: 64 IN | HEART RATE: 73 BPM | WEIGHT: 156.31 LBS | OXYGEN SATURATION: 97 % | BODY MASS INDEX: 26.69 KG/M2 | RESPIRATION RATE: 18 BRPM

## 2021-09-30 LAB
ANION GAP SERPL CALCULATED.3IONS-SCNC: 14 MMOL/L (ref 5–15)
BASOPHILS # BLD AUTO: 0.2 10*3/MM3 (ref 0–0.2)
BASOPHILS NFR BLD AUTO: 1.1 % (ref 0–1.5)
BUN SERPL-MCNC: 38 MG/DL (ref 8–23)
BUN/CREAT SERPL: 36.9 (ref 7–25)
CALCIUM SPEC-SCNC: 9.1 MG/DL (ref 8.6–10.5)
CHLORIDE SERPL-SCNC: 108 MMOL/L (ref 98–107)
CO2 SERPL-SCNC: 17 MMOL/L (ref 22–29)
CREAT SERPL-MCNC: 1.03 MG/DL (ref 0.57–1)
DEPRECATED RDW RBC AUTO: 54.3 FL (ref 37–54)
EOSINOPHIL # BLD AUTO: 0.1 10*3/MM3 (ref 0–0.4)
EOSINOPHIL NFR BLD AUTO: 0.4 % (ref 0.3–6.2)
ERYTHROCYTE [DISTWIDTH] IN BLOOD BY AUTOMATED COUNT: 17 % (ref 12.3–15.4)
GFR SERPL CREATININE-BSD FRML MDRD: 52 ML/MIN/1.73
GLUCOSE SERPL-MCNC: 104 MG/DL (ref 65–99)
HCT VFR BLD AUTO: 47.5 % (ref 34–46.6)
HGB BLD-MCNC: 15.2 G/DL (ref 12–15.9)
INR PPP: 4.06 (ref 2–3)
LYMPHOCYTES # BLD AUTO: 1.5 10*3/MM3 (ref 0.7–3.1)
LYMPHOCYTES NFR BLD AUTO: 10 % (ref 19.6–45.3)
MCH RBC QN AUTO: 28.9 PG (ref 26.6–33)
MCHC RBC AUTO-ENTMCNC: 32 G/DL (ref 31.5–35.7)
MCV RBC AUTO: 90.3 FL (ref 79–97)
MONOCYTES # BLD AUTO: 0.8 10*3/MM3 (ref 0.1–0.9)
MONOCYTES NFR BLD AUTO: 5.6 % (ref 5–12)
NEUTROPHILS NFR BLD AUTO: 12.5 10*3/MM3 (ref 1.7–7)
NEUTROPHILS NFR BLD AUTO: 82.9 % (ref 42.7–76)
NRBC BLD AUTO-RTO: 0.1 /100 WBC (ref 0–0.2)
PLATELET # BLD AUTO: 314 10*3/MM3 (ref 140–450)
PMV BLD AUTO: 7.2 FL (ref 6–12)
POTASSIUM SERPL-SCNC: 3.5 MMOL/L (ref 3.5–5.2)
PROTHROMBIN TIME: 40.6 SECONDS (ref 19.4–28.5)
RBC # BLD AUTO: 5.26 10*6/MM3 (ref 3.77–5.28)
SODIUM SERPL-SCNC: 139 MMOL/L (ref 136–145)
WBC # BLD AUTO: 15.1 10*3/MM3 (ref 3.4–10.8)

## 2021-09-30 PROCEDURE — 85025 COMPLETE CBC W/AUTO DIFF WBC: CPT | Performed by: NURSE PRACTITIONER

## 2021-09-30 PROCEDURE — 80048 BASIC METABOLIC PNL TOTAL CA: CPT | Performed by: INTERNAL MEDICINE

## 2021-09-30 PROCEDURE — 97530 THERAPEUTIC ACTIVITIES: CPT

## 2021-09-30 PROCEDURE — 97161 PT EVAL LOW COMPLEX 20 MIN: CPT

## 2021-09-30 PROCEDURE — 85610 PROTHROMBIN TIME: CPT | Performed by: INTERNAL MEDICINE

## 2021-09-30 PROCEDURE — 25010000002 MEROPENEM PER 100 MG: Performed by: INTERNAL MEDICINE

## 2021-09-30 RX ORDER — DILTIAZEM HYDROCHLORIDE 120 MG/1
120 CAPSULE, COATED, EXTENDED RELEASE ORAL DAILY
Qty: 30 CAPSULE | Refills: 0 | Status: SHIPPED | OUTPATIENT
Start: 2021-09-30 | End: 2021-11-02 | Stop reason: SDUPTHER

## 2021-09-30 RX ORDER — WARFARIN SODIUM 2 MG/1
2 TABLET ORAL
Status: DISCONTINUED | OUTPATIENT
Start: 2021-10-02 | End: 2021-09-30 | Stop reason: HOSPADM

## 2021-09-30 RX ORDER — WARFARIN SODIUM 2 MG/1
TABLET ORAL
Qty: 30 TABLET | Refills: 1 | Status: SHIPPED | OUTPATIENT
Start: 2021-10-02 | End: 2021-11-12 | Stop reason: SDUPTHER

## 2021-09-30 RX ORDER — DICYCLOMINE HYDROCHLORIDE 10 MG/1
10 CAPSULE ORAL 4 TIMES DAILY
Qty: 120 CAPSULE | Refills: 0 | Status: SHIPPED | OUTPATIENT
Start: 2021-09-30 | End: 2021-11-02 | Stop reason: SINTOL

## 2021-09-30 RX ADMIN — DILTIAZEM HYDROCHLORIDE 60 MG: 60 TABLET, FILM COATED ORAL at 13:34

## 2021-09-30 RX ADMIN — METOPROLOL TARTRATE 100 MG: 50 TABLET, FILM COATED ORAL at 09:37

## 2021-09-30 RX ADMIN — MEROPENEM 500 MG: 500 INJECTION, POWDER, FOR SOLUTION INTRAVENOUS at 09:37

## 2021-09-30 RX ADMIN — MEROPENEM 500 MG: 500 INJECTION, POWDER, FOR SOLUTION INTRAVENOUS at 03:29

## 2021-09-30 RX ADMIN — Medication 3 ML: at 09:38

## 2021-09-30 RX ADMIN — PANTOPRAZOLE SODIUM 40 MG: 40 TABLET, DELAYED RELEASE ORAL at 05:33

## 2021-09-30 RX ADMIN — DILTIAZEM HYDROCHLORIDE 60 MG: 60 TABLET, FILM COATED ORAL at 05:33

## 2021-09-30 RX ADMIN — DICYCLOMINE HYDROCHLORIDE 10 MG: 10 CAPSULE ORAL at 12:07

## 2021-09-30 RX ADMIN — DICYCLOMINE HYDROCHLORIDE 10 MG: 10 CAPSULE ORAL at 09:37

## 2021-09-30 RX ADMIN — POLYETHYLENE GLYCOL 3350 17 G: 17 POWDER, FOR SOLUTION ORAL at 09:37

## 2021-10-01 ENCOUNTER — READMISSION MANAGEMENT (OUTPATIENT)
Dept: CALL CENTER | Facility: HOSPITAL | Age: 81
End: 2021-10-01

## 2021-10-01 NOTE — OUTREACH NOTE
Prep Survey      Responses   [facility] patient discharged from?  [facility]   Is LACE score < 7 ?  No   Emergency Room discharge w/ pulse ox?  No   Eligibility  Readm Mgmt   Discharge diagnosis  UTI-    Does the patient have one of the following disease processes/diagnoses(primary or secondary)?  Other   Does the patient have Home health ordered?  Yes   What is the Home health agency?   [facility]    Is there a DME ordered?  No   Prep survey completed?  Yes          Ciera Vilchis RN

## 2021-10-01 NOTE — CASE MANAGEMENT/SOCIAL WORK
Case Management Discharge Note      Final Note: [facility] - notified of DC.    Provided Post Acute Provider List?: Yes  Post Acute Provider List: Nursing Home, Home Health, Inpatient Rehab  Provided Post Acute Provider Quality & Resource List?: Yes  Post Acute Provider Quality and Resource List: Home Health, Inpatient Rehab, Nursing Home  Delivered To: Patient  Method of Delivery: In person    Selected Continued Care - Discharged on 9/30/2021 Admission date: 9/24/2021 - Discharge disposition: Home-Health Care [facility]                      Home Medical Care Coordination complete.    Service Provider Selected Services Address Phone Fax Patient Preferred    [facility] [address] [place] [address] [phone] --                       Final Discharge Disposition Code: 06 - home with home health care

## 2021-10-04 ENCOUNTER — READMISSION MANAGEMENT (OUTPATIENT)
Dept: CALL CENTER | Facility: HOSPITAL | Age: 81
End: 2021-10-04

## 2021-10-04 NOTE — OUTREACH NOTE
Medical Week 1 Survey      Responses   [facility] patient discharged from?  [facility]   Does the patient have one of the following disease processes/diagnoses(primary or secondary)?  Other   Week 1 attempt successful?  Yes   Call start time  1609   Call end time  1612   Discharge diagnosis  UTI-    Meds reviewed with patient/caregiver?  Yes   Is the patient having any side effects they believe may be caused by any medication additions or changes?  No   Does the patient have all medications ordered at discharge?  Yes   Is the patient taking all medications as directed (includes completed medication regime)?  Yes   Does the patient have a primary care provider?   Yes   Does the patient have an appointment with their PCP within 7 days of discharge?  No   What is preventing the patient from scheduling follow up appointments within 7 days of discharge?  Haven't had time   Nursing Interventions  Advised patient to make appointment   Has the patient kept scheduled appointments due by today?  N/A   What is the Home health agency?   [facility]     Has home health visited the patient within 72 hours of discharge?  Yes   Psychosocial issues?  No   Did the patient receive a copy of their discharge instructions?  Yes   What is the patient's perception of their health status since discharge?  Improving   Is the patient/caregiver able to teach back the hierarchy of who to call/visit for symptoms/problems? PCP, Specialist, Home health nurse, Urgent Care, ED, 911  Yes   If the patient is a current smoker, are they able to teach back resources for cessation?  Not a smoker   Week 1 call completed?  Yes   Wrap up additional comments  Improving.          Ananya Burch RN

## 2021-10-06 LAB — QT INTERVAL: 323 MS

## 2021-10-13 ENCOUNTER — READMISSION MANAGEMENT (OUTPATIENT)
Dept: CALL CENTER | Facility: HOSPITAL | Age: 81
End: 2021-10-13

## 2021-10-13 NOTE — OUTREACH NOTE
Medical Week 2 Survey      Responses   [facility] patient discharged from? [facility]   Does the patient have one of the following disease processes/diagnoses(primary or secondary)? Other   Week 2 attempt successful? Yes   Call start time 1756   Discharge diagnosis UTI-    Call end time 1800   Is the patient taking all medications as directed (includes completed medication regime)? Yes   Does the patient have an appointment with their PCP within 7 days of discharge? No   Nursing Interventions Advised patient to make appointment   Has the patient kept scheduled appointments due by today? N/A   Comments States she has tried several times to make an appt and has not been able to.     What is the Home health agency?  [facility]    Has home health visited the patient within 72 hours of discharge? Yes   Psychosocial issues? No   What is the patient's perception of their health status since discharge? New symptoms unrelated to diagnosis   Is the patient/caregiver able to teach back signs and symptoms related to disease process for when to call PCP? Yes   Is the patient/caregiver able to teach back signs and symptoms related to disease process for when to call 911? Yes   Is the patient/caregiver able to teach back the hierarchy of who to call/visit for symptoms/problems? PCP, Specialist, Home health nurse, Urgent Care, ED, 911 Yes   If the patient is a current smoker, are they able to teach back resources for cessation? Not a smoker   Additional teach back comments States since she has been home she has had diarrhea.  States she was given enemas durring her stay.  She states [facility] is aware of issues and PCP office.  She is drinking fluids and vitamin water to stay hydrated.  Also had complaints durring her stay but declined to speak with a pt representative.     Week 2 Call Completed? Yes   Wrap up additional comments Denies any questions or needs.          Phoebe Frazier LPN

## 2021-10-14 LAB — QT INTERVAL: 415 MS

## 2021-10-20 ENCOUNTER — READMISSION MANAGEMENT (OUTPATIENT)
Dept: CALL CENTER | Facility: HOSPITAL | Age: 81
End: 2021-10-20

## 2021-10-20 NOTE — OUTREACH NOTE
Medical Week 3 Survey      Responses   [facility] patient discharged from? [facility]   Does the patient have one of the following disease processes/diagnoses(primary or secondary)? Other   Week 3 attempt successful? No   Unsuccessful attempts Attempt 1          Traci Shafer LPN

## 2021-10-22 ENCOUNTER — READMISSION MANAGEMENT (OUTPATIENT)
Dept: CALL CENTER | Facility: HOSPITAL | Age: 81
End: 2021-10-22

## 2021-10-22 NOTE — OUTREACH NOTE
Medical Week 3 Survey      Responses   [facility] patient discharged from? [facility]   Does the patient have one of the following disease processes/diagnoses(primary or secondary)? Other   Week 3 attempt successful? Yes   Call start time 1056   Call end time 1058   Discharge diagnosis UTI-    Meds reviewed with patient/caregiver? Yes   Is the patient having any side effects they believe may be caused by any medication additions or changes? No   Does the patient have all medications ordered at discharge? Yes   Is the patient taking all medications as directed (includes completed medication regime)? Yes   Does the patient have a primary care provider?  Yes   Does the patient have an appointment with their PCP within 7 days of discharge? No   What is preventing the patient from scheduling follow up appointments within 7 days of discharge? Haven't had time   Nursing Interventions Advised patient to make appointment,  Educated patient on importance of making appointment   Has the patient kept scheduled appointments due by today? N/A   What is the Home health agency?  [facility]    Has home health visited the patient within 72 hours of discharge? Yes   Psychosocial issues? No   Did the patient receive a copy of their discharge instructions? Yes   What is the patient's perception of their health status since discharge? Improving   Week 3 Call Completed? Yes   Is the patient interested in additional calls from an ambulatory ?  NOTE:  applies to high risk patients requiring additional follow-up. No   Revoked No further contact(revokes)-requires comment   Wrap up additional comments PATIENT KEPT CALL BRIEF, AND VOICED NO NEED FOR FOURTH WEEK CALL.           Traci Shafer LPN

## 2021-11-02 ENCOUNTER — OFFICE VISIT (OUTPATIENT)
Dept: CARDIOLOGY | Facility: CLINIC | Age: 81
End: 2021-11-02

## 2021-11-02 ENCOUNTER — ANTICOAGULATION VISIT (OUTPATIENT)
Dept: CARDIOLOGY | Facility: CLINIC | Age: 81
End: 2021-11-02

## 2021-11-02 ENCOUNTER — CLINICAL SUPPORT NO REQUIREMENTS (OUTPATIENT)
Dept: CARDIOLOGY | Facility: CLINIC | Age: 81
End: 2021-11-02

## 2021-11-02 VITALS
HEART RATE: 119 BPM | DIASTOLIC BLOOD PRESSURE: 80 MMHG | BODY MASS INDEX: 25.52 KG/M2 | RESPIRATION RATE: 18 BRPM | SYSTOLIC BLOOD PRESSURE: 146 MMHG | HEIGHT: 65 IN | WEIGHT: 153.2 LBS

## 2021-11-02 DIAGNOSIS — I48.0 PAF (PAROXYSMAL ATRIAL FIBRILLATION) (HCC): Primary | ICD-10-CM

## 2021-11-02 DIAGNOSIS — Z79.01 LONG TERM CURRENT USE OF ANTICOAGULANT: ICD-10-CM

## 2021-11-02 DIAGNOSIS — I49.5 SICK SINUS SYNDROME (HCC): ICD-10-CM

## 2021-11-02 DIAGNOSIS — I49.5 SICK SINUS SYNDROME (HCC): Primary | Chronic | ICD-10-CM

## 2021-11-02 DIAGNOSIS — Z95.0 PRESENCE OF CARDIAC PACEMAKER: Chronic | ICD-10-CM

## 2021-11-02 DIAGNOSIS — Z95.0 PRESENCE OF CARDIAC PACEMAKER: ICD-10-CM

## 2021-11-02 DIAGNOSIS — I10 ESSENTIAL HYPERTENSION: ICD-10-CM

## 2021-11-02 LAB — INR PPP: 3 (ref 2–3)

## 2021-11-02 PROCEDURE — 36416 COLLJ CAPILLARY BLOOD SPEC: CPT | Performed by: NURSE PRACTITIONER

## 2021-11-02 PROCEDURE — 93280 PM DEVICE PROGR EVAL DUAL: CPT | Performed by: NURSE PRACTITIONER

## 2021-11-02 PROCEDURE — 99214 OFFICE O/P EST MOD 30 MIN: CPT | Performed by: INTERNAL MEDICINE

## 2021-11-02 PROCEDURE — 85610 PROTHROMBIN TIME: CPT | Performed by: NURSE PRACTITIONER

## 2021-11-02 RX ORDER — CHOLECALCIFEROL (VITAMIN D3) 125 MCG
2000 CAPSULE ORAL DAILY
COMMUNITY

## 2021-11-02 RX ORDER — DILTIAZEM HYDROCHLORIDE 240 MG/1
240 CAPSULE, COATED, EXTENDED RELEASE ORAL DAILY
Qty: 30 CAPSULE | Refills: 5 | Status: SHIPPED | OUTPATIENT
Start: 2021-11-02 | End: 2022-03-02 | Stop reason: DRUGHIGH

## 2021-11-02 RX ORDER — HYDROCODONE BITARTRATE AND ACETAMINOPHEN 5; 325 MG/1; MG/1
1 TABLET ORAL EVERY 8 HOURS PRN
Status: ON HOLD | COMMUNITY
End: 2023-03-21

## 2021-11-02 RX ORDER — DIGOXIN 125 MCG
125 TABLET ORAL DAILY
Qty: 30 TABLET | Refills: 3 | Status: SHIPPED | OUTPATIENT
Start: 2021-11-02 | End: 2022-03-11

## 2021-11-03 NOTE — PROGRESS NOTES
Cardiology clinic note  Augustin Ellsworth MD, PhD  Subjective:     Encounter Date:11/02/2021      Patient ID: Hazel Bucio is a 81 y.o. female.    Chief Complaint:  Chief Complaint   Patient presents with   • Hospital Follow Up Visit       HPI:  History of Present Illness  I the pleasure to see this 81-year-old female today in follow-up from hospitalization who was admitted with urinary tract infection abdominal pain now on antibiotics.  She has cardiac history of dual-chamber pacemaker placed back in 2014 with revision 2017 history of tachybradycardia syndrome is presently in atrial fibrillation which is rate controlled with underlying backup ventricular pacing intermittently as reviewed and interpreted by me with respect to her telemetry.  She reports some occasional tachycardia and palpitations.  Her INR is slightly subtherapeutic at 1.6-1.7 at that time she is found to be in A. fib RVR.  Her medicines were optimized and she was ultimately discharged.  Device was interrogated as well which revealed normal functioning.  She was mildly hypotensive and Cardizem was held temporarily which was ultimately then restarted.  She is back in clinic today for follow-up blood pressure 146/80 heart rates are in the 1 teens to 120s and she is has uncontrolled atrial fibrillation on her device interrogation and on EKG today.  2D echo reviewed interpreted by me recently demonstrated preserved LV systolic function 60 to 65%, moderately increased biatrial enlargement, normal pulmonary pressures with some diastolic dysfunction.    Review of systems otherwise negative as of 14 point review of systems except was mentioned above    Historical data copied forward from previous encounters numerous unchanged    Assessment plan per my encounter  No heart failure signs or symptoms today  Uncontrolled atrial fibrillation    Device interrogation performed, intermittent A. fib RVR, presently rate uncontrolled backup ventricular pacing on demand  VVI  Blood pressures are slightly high today 146/80,   Restart Cardizem 240 daily with increase appropriately  Continue metoprolol  Continue Coumadin, management per pharmacy, INR therapeutic goal 2-3  Start digoxin 0.125 daily  Follow-up labs for digoxin level on high risk medicine, avoid hypokalemia  Repeat labs in 7 days BMP and digoxin level  Return to clinic in 2 weeks for further follow-up responsiveness to therapy, titrate rate control further if indicated        2D echo reviewed interpreted by me demonstrates normal EF 60 to 65%, normal RV size and function  Moderate biatrial enlargement  Mild to moderate posterior directed MR, mild AI, mild to moderate TR  Grade 1 diastolic dysfunction most likely     Augustin FRIEDMAN PhD        The following portions of the patient's history were reviewed and updated as appropriate: allergies, current medications, past family history, past medical history, past social history, past surgical history and problem list.    Problem List:  Patient Active Problem List   Diagnosis   • PAF (paroxysmal atrial fibrillation) ([facility])   • Dyslipidemia   • Essential hypertension   • Long term current use of anticoagulant   • Presence of cardiac pacemaker   • Sick sinus syndrome (HCC)   • Type 2 diabetes mellitus (HCC)   • Tear film insufficiency   • Presbyopia   • Macular puckering of retina   • Tear film insufficiency, bilateral   • Pseudophakia, both eyes   • Lens replaced by other means   • Epiretinal membrane, bilateral   • Acute encephalopathy   • History of carcinoma in situ of breast   • Ureteral cancer (HCC)   • Chronic insomnia   • Seasonal allergies   • Altered mental status   • MACRINA (acute kidney injury) (HCC)   • Cholecystitis with cholelithiasis   • Acute UTI       Past Medical History:  Past Medical History:   Diagnosis Date   • Bladder cancer (HCC)    • Cancer (HCC)     breast, bladder   • Deep vein thrombosis (HCC)    • Essential hypertension 1/17/2017   • Fracture  "tibia/fibula, right, closed, initial encounter 8/27/2020   • GERD (gastroesophageal reflux disease)    • History of urostomy    • Immobility 08/27/2020    r/t broken Tibia    • Kidney carcinoma, right (HCC)     removed    • Long term current use of anticoagulant 1/9/2015   • PAF (paroxysmal atrial fibrillation) (HCC) 6/26/2019   • Presence of cardiac pacemaker 11/03/2014    BS dual chamber PM placed 10/2014 with pocket revision 1/25/17.  HX tachy rob syndrome   • Sick sinus syndrome (HCC) 11/3/2014       Past Surgical History:  Past Surgical History:   Procedure Laterality Date   • BREAST LUMPECTOMY     • CHOLECYSTECTOMY N/A 10/12/2020    Procedure: CHOLECYSTECTOMY LAPAROSCOPIC;  Surgeon: Go Pereira DO;  Location: [facility];  Service: General;  Laterality: N/A;   • CYSTECTOMY W/ URETEROILEAL CONDUIT     • HARDWARE REMOVAL Right 6/11/2021    Procedure: TIBIA HARDWARE REMOVAL;  Surgeon: Geoff Shah II, MD;  Location: [facility];  Service: Orthopedics;  Laterality: Right;   • HERNIA REPAIR     • HYSTERECTOMY     • INSERT / REPLACE / REMOVE PACEMAKER     • NEPHRECTOMY Right    • TIBIA IM NAILING/RODDING Right 8/28/2020    Procedure: TIBIA INTRAMEDULLARY NAIL/ABRAHAM INSERTION;  Surgeon: Geoff Shah II, MD;  Location: [facility];  Service: Orthopedics;  Laterality: Right;       Social History:  Social History     Socioeconomic History   • Marital status:    Tobacco Use   • Smoking status: Never Smoker   • Smokeless tobacco: Never Used   Vaping Use   • Vaping Use: Never used   Substance and Sexual Activity   • Alcohol use: Not Currently   • Drug use: Never   • Sexual activity: Defer       Allergies:  Allergies   Allergen Reactions   • Amoxicillin Shortness Of Breath   • Ciprofloxacin Hives   • Oxycodone-Acetaminophen Unknown - High Severity     Pt's son stated when pt fell and broke her leg she was put on oxycodone; pt's son stated pt's \"vitals went all out of wack, she " "couldn't remember things.\"   • Penicillins Rash   • Sulfa Antibiotics Hives   • Cephalexin Other (See Comments)   • Clavulanic Acid Other (See Comments)   • Codeine Other (See Comments)   • Contrast Dye Other (See Comments)   • Doxycycline Other (See Comments)   • Fluconazole Other (See Comments)   • Iodine Hives   • Macrobid [Nitrofurantoin] Hives   • Tobramycin Other (See Comments)   • Trimethoprim Other (See Comments)       Immunizations:  Immunization History   Administered Date(s) Administered   • FLUAD TRI 65YR+ 09/07/2012, 09/24/2014   • Fluzone High Dose =>65 Years ([employer] ONLY) 12/16/2015, 11/04/2016, 10/31/2017, 10/18/2018, 09/27/2019   • Pneumococcal Conjugate 13-Valent (PCV13) 11/04/2016   • Tdap 04/01/2016       ROS:  ROS       Objective:         /80 (BP Location: Left arm, Patient Position: Sitting)   Pulse 119   Resp 18   Ht 165.1 cm (65\")   Wt 69.5 kg (153 lb 3.2 oz)   BMI 25.49 kg/m²     Physical Exam    In-Office Procedure(s):  Procedures    ASCVD RIsk Score::  The ASCVD Risk score (Rob NANCY Jr., et al., 2013) failed to calculate for the following reasons:    The 2013 ASCVD risk score is only valid for ages 40 to 79    The patient has a prior MI or stroke diagnosis    Recent Radiology:  Imaging Results (Most Recent)     None          Lab Review:   Anticoagulation Visit on 11/02/2021   Component Date Value   • INR 11/02/2021 3.00    No results displayed because visit has over 200 results.      Anticoagulation Visit on 09/23/2021   Component Date Value   • INR 09/23/2021 2.50    Anticoagulation Visit on 09/13/2021   Component Date Value   • INR 09/13/2021 1.60*   Anticoagulation Visit on 08/17/2021   Component Date Value   • INR 08/17/2021 2.60    Anticoagulation Visit on 06/11/2021   Component Date Value   • INR 06/11/2021 1.90*   Admission on 06/11/2021, Discharged on 06/11/2021   Component Date Value   • Protime 06/07/2021 21.4    • INR 06/07/2021 2.01    • PTT 06/07/2021 34.2*   • " Protime 06/11/2021 20.6    • INR 06/11/2021 1.93*   • PTT 06/11/2021 33.3*   Lab on 06/09/2021   Component Date Value   • COVID19 06/09/2021 Not Detected    Hospital Outpatient Visit on 06/07/2021   Component Date Value   • QT Interval 06/07/2021 395    Lab on 06/07/2021   Component Date Value   • Glucose 06/07/2021 134*   • BUN 06/07/2021 45*   • Creatinine 06/07/2021 1.08*   • Sodium 06/07/2021 141    • Potassium 06/07/2021 4.4    • Chloride 06/07/2021 110*   • CO2 06/07/2021 23.1    • Calcium 06/07/2021 9.3    • eGFR Non  Amer 06/07/2021 49*   • BUN/Creatinine Ratio 06/07/2021 41.7*   • Anion Gap 06/07/2021 7.9    • WBC 06/07/2021 8.44    • RBC 06/07/2021 4.47    • Hemoglobin 06/07/2021 12.5    • Hematocrit 06/07/2021 38.8    • MCV 06/07/2021 86.8    • MCH 06/07/2021 28.0    • MCHC 06/07/2021 32.2    • RDW 06/07/2021 15.3    • RDW-SD 06/07/2021 47.5    • MPV 06/07/2021 9.6    • Platelets 06/07/2021 272    • Neutrophil % 06/07/2021 74.3    • Lymphocyte % 06/07/2021 18.1*   • Monocyte % 06/07/2021 6.4    • Eosinophil % 06/07/2021 0.6    • Basophil % 06/07/2021 0.2    • Immature Grans % 06/07/2021 0.4    • Neutrophils, Absolute 06/07/2021 6.27    • Lymphocytes, Absolute 06/07/2021 1.53    • Monocytes, Absolute 06/07/2021 0.54    • Eosinophils, Absolute 06/07/2021 0.05    • Basophils, Absolute 06/07/2021 0.02    • Immature Grans, Absolute 06/07/2021 0.03    • nRBC 06/07/2021 0.1    There may be more visits with results that are not included.               Augustin Ellsworth MD  11/03/21  .

## 2021-11-04 NOTE — PROGRESS NOTES
DEVICE INTERROGATION:  IN OFFICE    DEVICE TYPE:   Dual-chamber pacemaker    :   [email or web address]    BATTERY:  Stable    TIME TO ELECTIVE REPLACEMENT INDICATORS:   2 years    CHARGE TIME:   Not applicable        LEAD DATA:       DEVICE REPROGRAMMED FOR TESTING      Atrial:   0.7 mV, 464 ohms, in A. fib    Ventricular:     25 mV, 631 ohms, not tested due to RVR    LV:      Atrial pacing percentage: Less than 1%    Ventricular pacing percentage: 3%      Arrhythmia Logbook Reviewed: Currently in A. fib with RVR        Summary:    Stable Device Function    Ongoing A. fib with RVR    Battery status is stable.    Reviewed with: Dr. Ellsworth      NEXT IN OFFICE DEVICE CHECK DUE: As scheduled    REMOTE DEVICE INTERROGATIONS: Ongoing

## 2021-11-11 ENCOUNTER — TELEPHONE (OUTPATIENT)
Dept: CARDIOLOGY | Facility: CLINIC | Age: 81
End: 2021-11-11

## 2021-11-12 RX ORDER — WARFARIN SODIUM 2 MG/1
TABLET ORAL
Qty: 30 TABLET | Refills: 1 | Status: ON HOLD | OUTPATIENT
Start: 2021-11-12 | End: 2021-12-09

## 2021-11-15 ENCOUNTER — TRANSCRIBE ORDERS (OUTPATIENT)
Dept: ADMINISTRATIVE | Facility: HOSPITAL | Age: 81
End: 2021-11-15

## 2021-11-15 DIAGNOSIS — M85.89 OSTEOPENIA OF MULTIPLE SITES: Primary | ICD-10-CM

## 2021-11-17 ENCOUNTER — ANTICOAGULATION VISIT (OUTPATIENT)
Dept: PHARMACY | Facility: HOSPITAL | Age: 81
End: 2021-11-17

## 2021-11-17 ENCOUNTER — CLINICAL SUPPORT NO REQUIREMENTS (OUTPATIENT)
Dept: CARDIOLOGY | Facility: CLINIC | Age: 81
End: 2021-11-17

## 2021-11-17 DIAGNOSIS — I48.0 PAF (PAROXYSMAL ATRIAL FIBRILLATION) (HCC): Primary | ICD-10-CM

## 2021-11-17 DIAGNOSIS — I49.5 SICK SINUS SYNDROME (HCC): Primary | Chronic | ICD-10-CM

## 2021-11-17 DIAGNOSIS — Z95.0 PRESENCE OF CARDIAC PACEMAKER: Chronic | ICD-10-CM

## 2021-11-17 LAB — INR PPP: 1.7 (ref 2–3)

## 2021-11-17 PROCEDURE — 36416 COLLJ CAPILLARY BLOOD SPEC: CPT

## 2021-11-17 PROCEDURE — G0463 HOSPITAL OUTPT CLINIC VISIT: HCPCS

## 2021-11-17 PROCEDURE — 93280 PM DEVICE PROGR EVAL DUAL: CPT | Performed by: NURSE PRACTITIONER

## 2021-11-17 PROCEDURE — 85610 PROTHROMBIN TIME: CPT

## 2021-11-17 NOTE — PROGRESS NOTES
DEVICE INTERROGATION:  IN OFFICE    DEVICE TYPE:   Dual-chamber pacemaker    :   [employer]    BATTERY:  Stable    TIME TO ELECTIVE REPLACEMENT INDICATORS:   2    CHARGE TIME:   Not applicable        LEAD DATA:       DEVICE REPROGRAMMED FOR TESTING      Atrial:   0.3 mV, 474 ohms, in atrial fibrillation    Ventricular:     18.5 mV, 573 ohms, 0.7 V@0.4 ms    LV:      Atrial pacing percentage: 0%    Ventricular pacing percentage:  7%      Arrhythmia Logbook Reviewed: Ventricular rates persist at times but less than last        Summary:    Stable Device Function    No significant arhythmia burden.     Battery status is stable.    Reviewed with: Dr. Ellsworth      NEXT IN OFFICE DEVICE CHECK DUE: At next appointment    REMOTE DEVICE INTERROGATIONS: Not applicable

## 2021-11-17 NOTE — PROGRESS NOTES
Anticoagulation Clinic Initial Visit Progress Note    Today was Hazel Bucio's first visit to the Anticoagulation Clinic following transition from Dr. Ellsworth's office for anticoagulation monitoring regarding atrial fibrillation.    Warfarin Initiation Date: 19    Planned Duration of Therapy: Indefinite    INR Goal: 2-3    Today's INR: 1.7 (subtherapeutic)    Current warfarin dose: warfarin 2 mg PO daily. Current total weekly dose = 14 mg per week.     Clinic Interview:   Patient Findings     Negatives:  Signs/symptoms of thrombosis, Signs/symptoms of bleeding,   Laboratory test error suspected, Change in health, Change in alcohol use,   Change in activity, Upcoming invasive procedure, Emergency department   visit, Upcoming dental procedure, Missed doses, Extra doses, Change in   medications, Change in diet/appetite, Hospital admission, Bruising, Other   complaints      Clinical Outcomes     Negatives:  Major bleeding event, Thromboembolic event,   Anticoagulation-related hospital admission, Anticoagulation-related ED   visit, Anticoagulation-related fatality        INR History:  Anticoagulation Monitoring 2021   INR 2.50 3.00 1.70   INR Date 2021   INR Goal 2.0-3.0 2.0-3.0 2.0-3.0   Trend Same Same Up   Last Week Total 14 mg 14 mg 14 mg   Next Week Total 14 mg 14 mg 15 mg   Sun 2 mg 2 mg 2 mg   Mon 2 mg 2 mg 2 mg   Tue 2 mg 2 mg 2 mg   Wed 2 mg 2 mg 3 mg   Thu 2 mg 2 mg 2 mg   Fri 2 mg 2 mg 2 mg   Sat 2 mg 2 mg 2 mg   Visit Report - - -   Some recent data might be hidden       Anticoagulation Summary  As of 2021    INR goal:  2.0-3.0   TTR:  41.2 % (2.2 y)   INR used for dosin.70 (2021)   Warfarin maintenance plan:  3 mg every Wed; 2 mg all other days   Weekly warfarin total:  15 mg   Plan last modified:  Kayleigh Horvath, PharmD (2021)   Next INR check:  2021   Priority:  Maintenance   Target end date:      Indications    PAF  (paroxysmal atrial fibrillation) ([facility]) [I48.0]             Anticoagulation Episode Summary     INR check location:      Preferred lab:      Send INR reminders to:  [facility] CLINICAL POOL    Comments:  Patient contract signed 11/17/21.      Anticoagulation Care Providers     Provider Role Specialty Phone number    Augustin Ellsworth MD  Cardiology [phone]          Current Medications:   Prior to Admission medications    Medication Sig Start Date End Date Taking? Authorizing Provider   acetaminophen (TYLENOL) 325 MG tablet Take 650 mg by mouth Every 6 (Six) Hours As Needed for Mild Pain .    ProviderGino MD   Cholecalciferol (Vitamin D3) 50 MCG (2000 UT) tablet Take 2,000 Units by mouth Daily.    Gino Leos MD   digoxin (LANOXIN) 125 MCG tablet Take 1 tablet by mouth Daily. 11/2/21   Augustin Ellsworth MD   dilTIAZem CD (Cardizem CD) 240 MG 24 hr capsule Take 1 capsule by mouth Daily. 11/2/21   Augustin Ellsworth MD   HYDROcodone-acetaminophen (NORCO) 5-325 MG per tablet Take 1 tablet by mouth Every 6 (Six) Hours As Needed.    ProviderGino MD   metoprolol tartrate (LOPRESSOR) 100 MG tablet Take 1 tablet by mouth 2 (Two) Times a Day. 7/29/21   MARIELA Carrizales MD   Probiotic Product (PROBIOTIC PO) Take  by mouth Daily.    ProviderGino MD   topiramate (TOPAMAX) 100 MG tablet Take 100 mg by mouth every night at bedtime.    ProviderGino MD   warfarin (COUMADIN) 2 MG tablet Hold coumadin thurs and Friday INR Saturday at [facility] with OPTUM- restart at 2 mg Saturday  Indications: Atrial Fibrillation 11/12/21   Augustin Ellsworth MD       Allergies:    Amoxicillin, Ciprofloxacin, Oxycodone-acetaminophen, Penicillins, Sulfa antibiotics, Cephalexin, Clavulanic acid, Codeine, Contrast dye, Doxycycline, Fluconazole, Iodine, Macrobid [nitrofurantoin], Tobramycin, and Trimethoprim    This patient is managed via a collaborative agreement,  pursuant to [id number]. The signed protocol is kept in the [facility] at [address] [place] [address].    Medical history:   Past Medical History:   Diagnosis Date   • Bladder cancer (HCC)    • Cancer (HCC)     breast, bladder   • Deep vein thrombosis (HCC)    • Essential hypertension 1/17/2017   • Fracture tibia/fibula, right, closed, initial encounter 8/27/2020   • GERD (gastroesophageal reflux disease)    • History of urostomy    • Immobility 08/27/2020    r/t broken Tibia    • Kidney carcinoma, right (HCC)     removed    • Long term current use of anticoagulant 1/9/2015   • PAF (paroxysmal atrial fibrillation) ([facility]) 6/26/2019   • Presence of cardiac pacemaker 11/03/2014    BS dual chamber PM placed 10/2014 with pocket revision 1/25/17.  HX tachy rob syndrome   • Sick sinus syndrome (HCC) 11/3/2014     CHADS2-VASc score for atrial fibrillation:  Age >75 (2)  Sex Female (1)  CHF history  No (0)  HTN history  Yes (1)  Vascular disease history  No (0)  DM history  Yes (1)  Stroke/TIA/Thromboembolism history No (0)  Total Score 5  Adjusted Stroke Risk (% per year): 5: 6.7%    HAS-BLED score for atrial fibrillation:  HTN Yes (1)  Abnormal renal/liver function None (0)  Stroke No (0)  Bleeding tendency or predisposition No (0)  Labile INR No (0)  Age >65 (1)  Drugs or alcohol No (0)  Total Score 2  Bleeding Risk: Moderate Risk (2)    Social history:   Social History     Tobacco Use   • Smoking status: Never Smoker   • Smokeless tobacco: Never Used   Substance Use Topics   • Alcohol use: Not Currently       Vaccine History:   Immunization History   Administered Date(s) Administered   • FLUAD TRI 65YR+ 09/07/2012, 09/24/2014   • Fluzone High Dose =>65 Years ([employer] ONLY) 12/16/2015, 11/04/2016, 10/31/2017, 10/18/2018, 09/27/2019   • Pneumococcal Conjugate 13-Valent (PCV13) 11/04/2016   • Tdap 04/01/2016       Education: Provided verbal and written education that included, but is not  limited to, dosing, side effects, signs/symptoms of bleeding and clotting, when to seek medical attention, food/drug interactions, and importance of wearing medical identification bracelet.    Clinic Contract:  Reviewed the clinic's patient contract; patient/caregiver verbalized understanding and signed contract.    Plan:  1. increase by 7 %; warfarin 2 mg PO daily, except warfarin 3 mg PO on Wednesday. . New total weekly dose = 15 mg per week.  - Educated patient on signs/symptoms to monitor for and when to seek medical attention.  2. RTC in 2 week(s).      Kayleigh Horvath, Annie  11/17/2021  15:14 EST

## 2021-11-18 ENCOUNTER — TELEPHONE (OUTPATIENT)
Dept: CARDIOLOGY | Facility: CLINIC | Age: 81
End: 2021-11-18

## 2021-11-18 NOTE — TELEPHONE ENCOUNTER
Spoke with patient. She was very upset still on the phone with her experience yesterday. I spoke with dr taylor he would like her to continue present therapy. Her afib is better controlled on her device check yesterday. He would like to see her in 3 months to see how she is doing and will check her device again then. If there is in increase in her afib then will refer her at that time for an ablation. He would like to give meds time to work.

## 2021-11-18 NOTE — TELEPHONE ENCOUNTER
Patient was seen in the office yesterday just for a pacemaker check patient said she left here very disappointed and confused on weather or not she is going to need a procedure and she never even saw the doctor and she doesn't know what she should be doing.

## 2021-11-30 ENCOUNTER — TELEPHONE (OUTPATIENT)
Dept: PHARMACY | Facility: HOSPITAL | Age: 81
End: 2021-11-30

## 2021-11-30 NOTE — TELEPHONE ENCOUNTER
Patient called to state she was started on Bactrim x10 days yesterday and is having her INR checked at Dr. Francis's office tomorrow. She is unsure if Dr. Francis is going to adjust her INR dose, but she will call back tomorrow and let us know. I told her that her appointment for the clinic tomorrow can be changed based on what her INR is and if Dr. Francis is going to adjust her dose.

## 2021-12-01 ENCOUNTER — APPOINTMENT (OUTPATIENT)
Dept: PHARMACY | Facility: HOSPITAL | Age: 81
End: 2021-12-01

## 2021-12-01 ENCOUNTER — ANTICOAGULATION VISIT (OUTPATIENT)
Dept: PHARMACY | Facility: HOSPITAL | Age: 81
End: 2021-12-01

## 2021-12-01 DIAGNOSIS — I48.0 PAF (PAROXYSMAL ATRIAL FIBRILLATION) (HCC): Primary | ICD-10-CM

## 2021-12-01 LAB — INR PPP: 3 (ref 2–3)

## 2021-12-01 NOTE — PROGRESS NOTES
Anticoagulation Clinic Progress Note - Home INR Testing     INR Goal: 2-3  Today's INR: 3.0 (therapeutic). INR result was obtained at Dr. Francis's office today and called in to clinic by someone at their office. Patient also called result in to clinic.   Current warfarin dose: warfarin 2 mg PO daily. Of note, this differs from what patient was previously instructed which was warfarin 2 mg daily except 3 mg on Wednesday. Current total weekly dose = 14 mg per week.     Of note, patient was originally scheduled to have INR checked in clinic today. Patient called yesterday to report she had been prescribed Bactrim x 10 days and was having INR checked at Dr. Francis's office. When [facility] spoke with staff at Dr. Francis's office today they said they did not see where the patient was prescribed Bactrim but spoke with patient again who said she had been taking Bactrim since 11/29 and that Dr. Francis had prescribed it.     INR History:  Anticoagulation Monitoring 11/2/2021 11/17/2021 12/1/2021   INR 3.00 1.70 3.00   INR Date 11/2/2021 11/17/2021 12/1/2021   INR Goal 2.0-3.0 2.0-3.0 2.0-3.0   Trend Same Up Down   Last Week Total 14 mg 14 mg 14 mg   Next Week Total 14 mg 15 mg 13 mg   Sun 2 mg 2 mg -   Mon 2 mg 2 mg -   Tue 2 mg 2 mg -   Wed 2 mg 3 mg 1 mg (12/1)   Thu 2 mg 2 mg 2 mg   Fri 2 mg 2 mg -   Sat 2 mg 2 mg -   Visit Report - - -   Some recent data might be hidden       Anticoagulation Summary  As of 12/1/2021    INR goal:  2.0-3.0   TTR:  41.8 % (2.2 y)   INR used for dosing:  3.00 (12/1/2021)   Warfarin maintenance plan:  2 mg every day   Weekly warfarin total:  14 mg   Plan last modified:  Kayleigh Horvath, PharmD (12/1/2021)   Next INR check:  12/3/2021   Priority:  Maintenance   Target end date:      Indications    PAF (paroxysmal atrial fibrillation) (HCC) [I48.0]             Anticoagulation Episode Summary     INR check location:      Preferred lab:      Send INR reminders to:  [facility]    Comments:  Patient contract signed 11/17/21.      Anticoagulation Care Providers     Provider Role Specialty Phone number    Augustin Ellsworth MD  Cardiology [phone]          Clinic Interview:   Patient Findings     Positives:  Change in medications (Patient states she is now taking   Bactrim x 10 days (first dose on 11/29, may increase INR).)    Negatives:  Signs/symptoms of thrombosis, Signs/symptoms of bleeding,   Laboratory test error suspected, Change in health, Change in alcohol use,   Change in activity, Upcoming invasive procedure, Emergency department   visit, Upcoming dental procedure, Missed doses, Extra doses, Change in   diet/appetite, Hospital admission, Bruising, Other complaints      Clinical Outcomes     Negatives:  Major bleeding event, Thromboembolic event,   Anticoagulation-related hospital admission, Anticoagulation-related ED   visit, Anticoagulation-related fatality        Current Medications:   Prior to Admission medications    Medication Sig Start Date End Date Taking? Authorizing Provider   acetaminophen (TYLENOL) 325 MG tablet Take 650 mg by mouth Every 6 (Six) Hours As Needed for Mild Pain .    ProviderGino MD   Cholecalciferol (Vitamin D3) 50 MCG (2000 UT) tablet Take 2,000 Units by mouth Daily.    ProviderGino MD   digoxin (LANOXIN) 125 MCG tablet Take 1 tablet by mouth Daily. 11/2/21   Augustin Ellsworth MD   dilTIAZem CD (Cardizem CD) 240 MG 24 hr capsule Take 1 capsule by mouth Daily. 11/2/21   Augustin Ellsworth MD   HYDROcodone-acetaminophen (NORCO) 5-325 MG per tablet Take 1 tablet by mouth Every 6 (Six) Hours As Needed.    ProviderGino MD   metoprolol tartrate (LOPRESSOR) 100 MG tablet Take 1 tablet by mouth 2 (Two) Times a Day. 7/29/21   MARIELA Carrizales MD   Probiotic Product (PROBIOTIC PO) Take  by mouth Daily.    Gino Leos MD   topiramate (TOPAMAX) 100 MG tablet Take 100 mg by mouth every  night at bedtime.    Provider, MD Gino   warfarin (COUMADIN) 2 MG tablet Hold coumadin thurs and Friday INR Saturday at [facility] care with [facility]- restart at 2 mg Saturday  Indications: Atrial Fibrillation 11/12/21   Augustin Ellsworth MD       Medical history:   Past Medical History:   Diagnosis Date   • Bladder cancer (HCC)    • Cancer (HCC)     breast, bladder   • Deep vein thrombosis (HCC)    • Essential hypertension 1/17/2017   • Fracture tibia/fibula, right, closed, initial encounter 8/27/2020   • GERD (gastroesophageal reflux disease)    • History of urostomy    • Immobility 08/27/2020    r/t broken Tibia    • Kidney carcinoma, right (HCC)     removed    • Long term current use of anticoagulant 1/9/2015   • PAF (paroxysmal atrial fibrillation) (HCC) 6/26/2019   • Presence of cardiac pacemaker 11/03/2014    BS dual chamber PM placed 10/2014 with pocket revision 1/25/17.  HX tachy rob syndrome   • Sick sinus syndrome (HCC) 11/3/2014       Social history:   Social History     Tobacco Use   • Smoking status: Never Smoker   • Smokeless tobacco: Never Used   Substance Use Topics   • Alcohol use: Not Currently       Allergies:    Amoxicillin, Ciprofloxacin, Oxycodone-acetaminophen, Penicillins, Sulfa antibiotics, Cephalexin, Clavulanic acid, Codeine, Contrast dye, Doxycycline, Fluconazole, Iodine, Macrobid [nitrofurantoin], Tobramycin, and Trimethoprim    This patient is managed via a collaborative agreement, pursuant to IC [id number]. The signed protocol is kept in the [facility] at [address] [place] [address].    Education: Hazel Bucio has been instructed to call if any changes in medications, doses, concerns, etc. Patient was provided dosing instructions and expressed verbal understanding and has no further questions at this time.    Plan:  1. Instructed patient to take lower dose of 1 mg today then warfarin 2 mg PO daily. Total weekly dose of next 7 days = 13 mg (~7%  decrease in total weekly dose from previous regimen).   - Educated patient on signs/symptoms to monitor for and when to seek medical attention.  2. Given large increase in INR and since patient states she is taking Bactrim, INR should be tested in 2 days. Appointment scheduled in clinic and patient informed.     Kayleigh Horvath, PharmD  12/1/2021  16:11 EST

## 2021-12-03 ENCOUNTER — ANTICOAGULATION VISIT (OUTPATIENT)
Dept: PHARMACY | Facility: HOSPITAL | Age: 81
End: 2021-12-03

## 2021-12-03 DIAGNOSIS — I48.0 PAF (PAROXYSMAL ATRIAL FIBRILLATION) (HCC): Primary | ICD-10-CM

## 2021-12-03 LAB — INR PPP: 2.5 (ref 2–3)

## 2021-12-03 PROCEDURE — 85610 PROTHROMBIN TIME: CPT

## 2021-12-03 PROCEDURE — 36416 COLLJ CAPILLARY BLOOD SPEC: CPT

## 2021-12-03 PROCEDURE — G0463 HOSPITAL OUTPT CLINIC VISIT: HCPCS

## 2021-12-03 NOTE — PROGRESS NOTES
Anticoagulation Clinic Progress Note    INR Goal: 2 - 3  Today's INR: 2.5 (therapeutic)  Current warfarin dose: warfarin 2 mg PO daily, except warfarin 1 mg PO on Wednesday.  Of note, patient was previously taking warfarin 2 mg daily but was instructed to take 1 mg on Wednesday due to interaction with Bactrim. Total weekly dose of past 7 days = 13 mg (~8% decrease from previous regimen).     INR History:  Anticoagulation Monitoring 2021 2021 12/3/2021   INR 1.70 3.00 2.50   INR Date 2021 2021 12/3/2021   INR Goal 2.0-3.0 2.0-3.0 2.0-3.0   Trend Up Down Same   Last Week Total 14 mg 14 mg 13 mg   Next Week Total 15 mg 13 mg 13 mg   Sun 2 mg - 1 mg ()   Mon 2 mg - -   Tue 2 mg - -   Wed 3 mg 1 mg () -   Thu 2 mg 2 mg -   Fri 2 mg - 2 mg   Sat 2 mg - 2 mg   Visit Report - - -   Some recent data might be hidden       Anticoagulation Summary  As of 12/3/2021    INR goal:  2.0-3.0   TTR:  41.9 % (2.2 y)   INR used for dosin.50 (12/3/2021)   Warfarin maintenance plan:  2 mg every day   Weekly warfarin total:  14 mg   Plan last modified:  Kayleigh Horvath, PharmD (2021)   Next INR check:  2021   Priority:  Maintenance   Target end date:      Indications    PAF (paroxysmal atrial fibrillation) (HCC) [I48.0]             Anticoagulation Episode Summary     INR check location:      Preferred lab:      Send INR reminders to:  [facility] CLINICAL POOL    Comments:  Patient contract signed 21.      Anticoagulation Care Providers     Provider Role Specialty Phone number    Augustin Ellsworth MD  Cardiology [phone]          Clinic Interview:   Patient Findings     Positives:  Change in medications (Patient prescribed Bactrim on 11/29 x   10 days (may increase INR).)    Negatives:  Signs/symptoms of thrombosis, Signs/symptoms of bleeding,   Laboratory test error suspected, Change in health, Change in alcohol use,   Change in activity, Upcoming invasive procedure,  Emergency department   visit, Upcoming dental procedure, Missed doses, Extra doses, Change in   diet/appetite, Hospital admission, Bruising, Other complaints      Clinical Outcomes     Negatives:  Major bleeding event, Thromboembolic event,   Anticoagulation-related hospital admission, Anticoagulation-related ED   visit, Anticoagulation-related fatality        Current Medications:   Prior to Admission medications    Medication Sig Start Date End Date Taking? Authorizing Provider   acetaminophen (TYLENOL) 325 MG tablet Take 650 mg by mouth Every 6 (Six) Hours As Needed for Mild Pain .    Gino Leos MD   Cholecalciferol (Vitamin D3) 50 MCG (2000 UT) tablet Take 2,000 Units by mouth Daily.    Gino Leos MD   digoxin (LANOXIN) 125 MCG tablet Take 1 tablet by mouth Daily. 11/2/21   Augustin Ellsworth MD   dilTIAZem CD (Cardizem CD) 240 MG 24 hr capsule Take 1 capsule by mouth Daily. 11/2/21   Augustin Ellsworth MD   HYDROcodone-acetaminophen (NORCO) 5-325 MG per tablet Take 1 tablet by mouth Every 6 (Six) Hours As Needed.    ProviderGino MD   metoprolol tartrate (LOPRESSOR) 100 MG tablet Take 1 tablet by mouth 2 (Two) Times a Day. 7/29/21   MARIELA Carrizales MD   Probiotic Product (PROBIOTIC PO) Take  by mouth Daily.    ProviderGino MD   topiramate (TOPAMAX) 100 MG tablet Take 100 mg by mouth every night at bedtime.    Gino Leos MD   warfarin (COUMADIN) 2 MG tablet Hold coumadin thurs and Friday INR Saturday at [facility] with [facility]- restart at 2 mg Saturday  Indications: Atrial Fibrillation 11/12/21   Augustin Ellsworth MD       Medical history:   Past Medical History:   Diagnosis Date   • Bladder cancer (HCC)    • Cancer (HCC)     breast, bladder   • Deep vein thrombosis (HCC)    • Essential hypertension 1/17/2017   • Fracture tibia/fibula, right, closed, initial encounter 8/27/2020   • GERD (gastroesophageal reflux disease)    • History of urostomy     • Immobility 08/27/2020    r/t broken Tibia    • Kidney carcinoma, right (HCC)     removed    • Long term current use of anticoagulant 1/9/2015   • PAF (paroxysmal atrial fibrillation) (HCC) 6/26/2019   • Presence of cardiac pacemaker 11/03/2014    BS dual chamber PM placed 10/2014 with pocket revision 1/25/17.  HX tachy rob syndrome   • Sick sinus syndrome (HCC) 11/3/2014       Social history:   Social History     Tobacco Use   • Smoking status: Never Smoker   • Smokeless tobacco: Never Used   Substance Use Topics   • Alcohol use: Not Currently       Allergies:    Amoxicillin, Ciprofloxacin, Oxycodone-acetaminophen, Penicillins, Sulfa antibiotics, Cephalexin, Clavulanic acid, Codeine, Contrast dye, Doxycycline, Fluconazole, Iodine, Macrobid [nitrofurantoin], Tobramycin, and Trimethoprim    Vaccine History:   Immunization History   Administered Date(s) Administered   • FLUAD TRI 65YR+ 09/07/2012, 09/24/2014   • Fluzone High Dose =>65 Years ([employer] ONLY) 12/16/2015, 11/04/2016, 10/31/2017, 10/18/2018, 09/27/2019   • Pneumococcal Conjugate 13-Valent (PCV13) 11/04/2016   • Tdap 04/01/2016       This patient is managed via a collaborative agreement, pursuant to IC [id number]. The signed protocol is kept in the [facility] at [address] [place] [address].    Education: Hazel Bucio has been instructed to call if any changes in medications, doses, concerns, etc. Patient was provided dosing instructions and expressed verbal understanding and has no further questions at this time.    Plan:  1. Anticoagulation   - warfarin 2 mg PO daily, except warfarin 1 mg PO on Sunday.    - Educated patient on signs/symptoms to monitor for and when to seek medical attention (given present interaction with Bactrim)  - RTC in 4 days    Kayleigh Horvath PharmD  12/3/2021  12:12 EST

## 2021-12-06 ENCOUNTER — APPOINTMENT (OUTPATIENT)
Dept: CT IMAGING | Facility: HOSPITAL | Age: 81
End: 2021-12-06

## 2021-12-06 ENCOUNTER — HOSPITAL ENCOUNTER (INPATIENT)
Facility: HOSPITAL | Age: 81
LOS: 1 days | Discharge: HOME | End: 2021-12-10
Attending: INTERNAL MEDICINE | Admitting: INTERNAL MEDICINE

## 2021-12-06 DIAGNOSIS — I48.91 ATRIAL FIBRILLATION, UNSPECIFIED TYPE (HCC): ICD-10-CM

## 2021-12-06 DIAGNOSIS — N39.0 ACUTE UTI: ICD-10-CM

## 2021-12-06 DIAGNOSIS — R10.9 ABDOMINAL PAIN, UNSPECIFIED ABDOMINAL LOCATION: Primary | ICD-10-CM

## 2021-12-06 LAB
ALBUMIN SERPL-MCNC: 3.8 G/DL (ref 3.5–5.2)
ALBUMIN/GLOB SERPL: 1 G/DL
ALP SERPL-CCNC: 143 U/L (ref 39–117)
ALT SERPL W P-5'-P-CCNC: 68 U/L (ref 1–33)
ANION GAP SERPL CALCULATED.3IONS-SCNC: 14 MMOL/L (ref 5–15)
AST SERPL-CCNC: 60 U/L (ref 1–32)
BACTERIA UR QL AUTO: ABNORMAL /HPF
BASOPHILS # BLD AUTO: 0 10*3/MM3 (ref 0–0.2)
BASOPHILS NFR BLD AUTO: 0.3 % (ref 0–1.5)
BILIRUB SERPL-MCNC: 0.5 MG/DL (ref 0–1.2)
BILIRUB UR QL STRIP: NEGATIVE
BUN SERPL-MCNC: 31 MG/DL (ref 8–23)
BUN/CREAT SERPL: 32.3 (ref 7–25)
CALCIUM SPEC-SCNC: 9.3 MG/DL (ref 8.6–10.5)
CHLORIDE SERPL-SCNC: 105 MMOL/L (ref 98–107)
CLARITY UR: ABNORMAL
CO2 SERPL-SCNC: 18 MMOL/L (ref 22–29)
COLOR UR: YELLOW
CREAT SERPL-MCNC: 0.96 MG/DL (ref 0.57–1)
D-LACTATE SERPL-SCNC: 1.1 MMOL/L (ref 0.5–2)
DEPRECATED RDW RBC AUTO: 53.4 FL (ref 37–54)
EOSINOPHIL # BLD AUTO: 0 10*3/MM3 (ref 0–0.4)
EOSINOPHIL NFR BLD AUTO: 0 % (ref 0.3–6.2)
ERYTHROCYTE [DISTWIDTH] IN BLOOD BY AUTOMATED COUNT: 16.2 % (ref 12.3–15.4)
GFR SERPL CREATININE-BSD FRML MDRD: 56 ML/MIN/1.73
GLOBULIN UR ELPH-MCNC: 3.7 GM/DL
GLUCOSE SERPL-MCNC: 112 MG/DL (ref 65–99)
GLUCOSE UR STRIP-MCNC: NEGATIVE MG/DL
HCT VFR BLD AUTO: 48 % (ref 34–46.6)
HGB BLD-MCNC: 15.8 G/DL (ref 12–15.9)
HGB UR QL STRIP.AUTO: ABNORMAL
HOLD SPECIMEN: NORMAL
HYALINE CASTS UR QL AUTO: ABNORMAL /LPF
INR PPP: 2.75 (ref 2–3)
KETONES UR QL STRIP: ABNORMAL
LEUKOCYTE ESTERASE UR QL STRIP.AUTO: ABNORMAL
LIPASE SERPL-CCNC: 40 U/L (ref 13–60)
LYMPHOCYTES # BLD AUTO: 0.7 10*3/MM3 (ref 0.7–3.1)
LYMPHOCYTES NFR BLD AUTO: 5.9 % (ref 19.6–45.3)
MCH RBC QN AUTO: 31.4 PG (ref 26.6–33)
MCHC RBC AUTO-ENTMCNC: 32.9 G/DL (ref 31.5–35.7)
MCV RBC AUTO: 95.6 FL (ref 79–97)
MONOCYTES # BLD AUTO: 0.3 10*3/MM3 (ref 0.1–0.9)
MONOCYTES NFR BLD AUTO: 2.7 % (ref 5–12)
NEUTROPHILS NFR BLD AUTO: 10.3 10*3/MM3 (ref 1.7–7)
NEUTROPHILS NFR BLD AUTO: 91.1 % (ref 42.7–76)
NITRITE UR QL STRIP: POSITIVE
NRBC BLD AUTO-RTO: 0 /100 WBC (ref 0–0.2)
PH UR STRIP.AUTO: 5.5 [PH] (ref 5–8)
PLATELET # BLD AUTO: 138 10*3/MM3 (ref 140–450)
PMV BLD AUTO: 7.3 FL (ref 6–12)
POTASSIUM SERPL-SCNC: 4.1 MMOL/L (ref 3.5–5.2)
PROT SERPL-MCNC: 7.5 G/DL (ref 6–8.5)
PROT UR QL STRIP: ABNORMAL
PROTHROMBIN TIME: 28.3 SECONDS (ref 19.4–28.5)
RBC # BLD AUTO: 5.02 10*6/MM3 (ref 3.77–5.28)
RBC # UR STRIP: ABNORMAL /HPF
REF LAB TEST METHOD: ABNORMAL
SODIUM SERPL-SCNC: 137 MMOL/L (ref 136–145)
SP GR UR STRIP: 1.02 (ref 1–1.03)
SQUAMOUS #/AREA URNS HPF: ABNORMAL /HPF
TROPONIN T SERPL-MCNC: <0.01 NG/ML (ref 0–0.03)
UROBILINOGEN UR QL STRIP: ABNORMAL
WBC # UR STRIP: ABNORMAL /HPF
WBC NRBC COR # BLD: 11.3 10*3/MM3 (ref 3.4–10.8)

## 2021-12-06 PROCEDURE — 85025 COMPLETE CBC W/AUTO DIFF WBC: CPT | Performed by: PHYSICIAN ASSISTANT

## 2021-12-06 PROCEDURE — 87040 BLOOD CULTURE FOR BACTERIA: CPT | Performed by: PHYSICIAN ASSISTANT

## 2021-12-06 PROCEDURE — 25010000002 METHYLPREDNISOLONE PER 40 MG: Performed by: PHYSICIAN ASSISTANT

## 2021-12-06 PROCEDURE — 85610 PROTHROMBIN TIME: CPT | Performed by: PHYSICIAN ASSISTANT

## 2021-12-06 PROCEDURE — 87186 SC STD MICRODIL/AGAR DIL: CPT | Performed by: PHYSICIAN ASSISTANT

## 2021-12-06 PROCEDURE — 83605 ASSAY OF LACTIC ACID: CPT

## 2021-12-06 PROCEDURE — 25010000002 DIPHENHYDRAMINE PER 50 MG: Performed by: PHYSICIAN ASSISTANT

## 2021-12-06 PROCEDURE — 87086 URINE CULTURE/COLONY COUNT: CPT | Performed by: PHYSICIAN ASSISTANT

## 2021-12-06 PROCEDURE — 0 IOPAMIDOL PER 1 ML: Performed by: PHYSICIAN ASSISTANT

## 2021-12-06 PROCEDURE — 87077 CULTURE AEROBIC IDENTIFY: CPT | Performed by: PHYSICIAN ASSISTANT

## 2021-12-06 PROCEDURE — 81001 URINALYSIS AUTO W/SCOPE: CPT | Performed by: PHYSICIAN ASSISTANT

## 2021-12-06 PROCEDURE — 84484 ASSAY OF TROPONIN QUANT: CPT | Performed by: PHYSICIAN ASSISTANT

## 2021-12-06 PROCEDURE — 63710000001 ONDANSETRON ODT 4 MG TABLET DISPERSIBLE: Performed by: PHYSICIAN ASSISTANT

## 2021-12-06 PROCEDURE — 83690 ASSAY OF LIPASE: CPT | Performed by: PHYSICIAN ASSISTANT

## 2021-12-06 PROCEDURE — 80053 COMPREHEN METABOLIC PANEL: CPT | Performed by: PHYSICIAN ASSISTANT

## 2021-12-06 PROCEDURE — 74177 CT ABD & PELVIS W/CONTRAST: CPT

## 2021-12-06 PROCEDURE — 93005 ELECTROCARDIOGRAM TRACING: CPT | Performed by: PHYSICIAN ASSISTANT

## 2021-12-06 PROCEDURE — 99285 EMERGENCY DEPT VISIT HI MDM: CPT

## 2021-12-06 RX ORDER — DILTIAZEM HYDROCHLORIDE 5 MG/ML
10 INJECTION INTRAVENOUS ONCE
Status: COMPLETED | OUTPATIENT
Start: 2021-12-06 | End: 2021-12-06

## 2021-12-06 RX ORDER — MORPHINE SULFATE 4 MG/ML
4 INJECTION, SOLUTION INTRAMUSCULAR; INTRAVENOUS ONCE
Status: DISCONTINUED | OUTPATIENT
Start: 2021-12-06 | End: 2021-12-06

## 2021-12-06 RX ORDER — DIPHENHYDRAMINE HYDROCHLORIDE 50 MG/ML
50 INJECTION INTRAMUSCULAR; INTRAVENOUS ONCE
Status: COMPLETED | OUTPATIENT
Start: 2021-12-06 | End: 2021-12-06

## 2021-12-06 RX ORDER — METHYLPREDNISOLONE SODIUM SUCCINATE 40 MG/ML
40 INJECTION, POWDER, LYOPHILIZED, FOR SOLUTION INTRAMUSCULAR; INTRAVENOUS EVERY 4 HOURS
Status: DISCONTINUED | OUTPATIENT
Start: 2021-12-06 | End: 2021-12-07

## 2021-12-06 RX ORDER — SODIUM CHLORIDE 0.9 % (FLUSH) 0.9 %
10 SYRINGE (ML) INJECTION AS NEEDED
Status: DISCONTINUED | OUTPATIENT
Start: 2021-12-06 | End: 2021-12-10 | Stop reason: HOSPADM

## 2021-12-06 RX ORDER — ONDANSETRON 4 MG/1
4 TABLET, ORALLY DISINTEGRATING ORAL ONCE
Status: COMPLETED | OUTPATIENT
Start: 2021-12-06 | End: 2021-12-06

## 2021-12-06 RX ADMIN — METHYLPREDNISOLONE SODIUM SUCCINATE 40 MG: 40 INJECTION, POWDER, FOR SOLUTION INTRAMUSCULAR; INTRAVENOUS at 22:22

## 2021-12-06 RX ADMIN — ONDANSETRON 4 MG: 4 TABLET, ORALLY DISINTEGRATING ORAL at 22:23

## 2021-12-06 RX ADMIN — DIPHENHYDRAMINE HYDROCHLORIDE 50 MG: 50 INJECTION, SOLUTION INTRAMUSCULAR; INTRAVENOUS at 22:22

## 2021-12-06 RX ADMIN — DILTIAZEM HYDROCHLORIDE 10 MG: 5 INJECTION INTRAVENOUS at 23:28

## 2021-12-06 RX ADMIN — IOPAMIDOL 100 ML: 755 INJECTION, SOLUTION INTRAVENOUS at 23:54

## 2021-12-07 ENCOUNTER — APPOINTMENT (OUTPATIENT)
Dept: PHARMACY | Facility: HOSPITAL | Age: 81
End: 2021-12-07

## 2021-12-07 PROBLEM — R10.9 ABDOMINAL PAIN: Status: ACTIVE | Noted: 2021-12-07

## 2021-12-07 LAB — SARS-COV-2 RNA PNL SPEC NAA+PROBE: DETECTED

## 2021-12-07 PROCEDURE — G0378 HOSPITAL OBSERVATION PER HR: HCPCS

## 2021-12-07 PROCEDURE — 87635 SARS-COV-2 COVID-19 AMP PRB: CPT | Performed by: INTERNAL MEDICINE

## 2021-12-07 PROCEDURE — 0 MORPHINE SULFATE 4 MG/ML SOLUTION: Performed by: PHYSICIAN ASSISTANT

## 2021-12-07 PROCEDURE — 25010000002 MEROPENEM PER 100 MG: Performed by: PHYSICIAN ASSISTANT

## 2021-12-07 RX ORDER — TOPIRAMATE 100 MG/1
100 TABLET, FILM COATED ORAL NIGHTLY
Status: DISCONTINUED | OUTPATIENT
Start: 2021-12-07 | End: 2021-12-10 | Stop reason: HOSPADM

## 2021-12-07 RX ORDER — NITROGLYCERIN 0.4 MG/1
0.4 TABLET SUBLINGUAL
Status: DISCONTINUED | OUTPATIENT
Start: 2021-12-07 | End: 2021-12-10 | Stop reason: HOSPADM

## 2021-12-07 RX ORDER — DILTIAZEM HYDROCHLORIDE 240 MG/1
240 CAPSULE, COATED, EXTENDED RELEASE ORAL DAILY
Status: DISCONTINUED | OUTPATIENT
Start: 2021-12-07 | End: 2021-12-10 | Stop reason: HOSPADM

## 2021-12-07 RX ORDER — DIGOXIN 125 MCG
125 TABLET ORAL DAILY
Status: DISCONTINUED | OUTPATIENT
Start: 2021-12-07 | End: 2021-12-10 | Stop reason: HOSPADM

## 2021-12-07 RX ORDER — ONDANSETRON 4 MG/1
4 TABLET, FILM COATED ORAL EVERY 6 HOURS PRN
Status: DISCONTINUED | OUTPATIENT
Start: 2021-12-07 | End: 2021-12-10 | Stop reason: HOSPADM

## 2021-12-07 RX ORDER — MORPHINE SULFATE 4 MG/ML
4 INJECTION, SOLUTION INTRAMUSCULAR; INTRAVENOUS ONCE
Status: COMPLETED | OUTPATIENT
Start: 2021-12-07 | End: 2021-12-07

## 2021-12-07 RX ORDER — SODIUM CHLORIDE 0.9 % (FLUSH) 0.9 %
3-10 SYRINGE (ML) INJECTION AS NEEDED
Status: DISCONTINUED | OUTPATIENT
Start: 2021-12-07 | End: 2021-12-10 | Stop reason: HOSPADM

## 2021-12-07 RX ORDER — METOPROLOL TARTRATE 50 MG/1
100 TABLET, FILM COATED ORAL 2 TIMES DAILY
Status: DISCONTINUED | OUTPATIENT
Start: 2021-12-07 | End: 2021-12-10 | Stop reason: HOSPADM

## 2021-12-07 RX ORDER — ONDANSETRON 2 MG/ML
4 INJECTION INTRAMUSCULAR; INTRAVENOUS EVERY 6 HOURS PRN
Status: DISCONTINUED | OUTPATIENT
Start: 2021-12-07 | End: 2021-12-10 | Stop reason: HOSPADM

## 2021-12-07 RX ORDER — SODIUM CHLORIDE 0.9 % (FLUSH) 0.9 %
3 SYRINGE (ML) INJECTION EVERY 12 HOURS SCHEDULED
Status: DISCONTINUED | OUTPATIENT
Start: 2021-12-07 | End: 2021-12-10 | Stop reason: HOSPADM

## 2021-12-07 RX ORDER — WARFARIN SODIUM 2 MG/1
2 TABLET ORAL
Status: DISCONTINUED | OUTPATIENT
Start: 2021-12-07 | End: 2021-12-09

## 2021-12-07 RX ORDER — SACCHAROMYCES BOULARDII 250 MG
250 CAPSULE ORAL 2 TIMES DAILY
Status: DISCONTINUED | OUTPATIENT
Start: 2021-12-07 | End: 2021-12-10 | Stop reason: HOSPADM

## 2021-12-07 RX ADMIN — WARFARIN 2 MG: 2 TABLET ORAL at 20:47

## 2021-12-07 RX ADMIN — METOPROLOL TARTRATE 100 MG: 50 TABLET, FILM COATED ORAL at 20:47

## 2021-12-07 RX ADMIN — DILTIAZEM HYDROCHLORIDE 240 MG: 240 CAPSULE, COATED, EXTENDED RELEASE ORAL at 20:47

## 2021-12-07 RX ADMIN — TOPIRAMATE 100 MG: 100 TABLET, FILM COATED ORAL at 20:47

## 2021-12-07 RX ADMIN — DIGOXIN 125 MCG: 125 TABLET ORAL at 20:47

## 2021-12-07 RX ADMIN — MEROPENEM 1 G: 1 INJECTION INTRAVENOUS at 00:41

## 2021-12-07 RX ADMIN — SODIUM CHLORIDE, PRESERVATIVE FREE 3 ML: 5 INJECTION INTRAVENOUS at 20:48

## 2021-12-07 RX ADMIN — Medication 250 MG: at 20:47

## 2021-12-07 RX ADMIN — MORPHINE SULFATE 4 MG: 4 INJECTION INTRAVENOUS at 00:32

## 2021-12-07 RX ADMIN — SODIUM CHLORIDE, PRESERVATIVE FREE 3 ML: 5 INJECTION INTRAVENOUS at 11:00

## 2021-12-07 NOTE — PLAN OF CARE
Problem: Adult Inpatient Plan of Care  Goal: Plan of Care Review  Outcome: Ongoing, Progressing  Flowsheets (Taken 12/7/2021 1300)  Plan of Care Reviewed With: patient  Goal: Patient-Specific Goal (Individualized)  Outcome: Ongoing, Progressing  Goal: Absence of Hospital-Acquired Illness or Injury  Outcome: Ongoing, Progressing  Intervention: Identify and Manage Fall Risk  Recent Flowsheet Documentation  Taken 12/7/2021 1300 by April Fuentes RN  Safety Promotion/Fall Prevention:   safety round/check completed   room organization consistent   clutter free environment maintained   assistive device/personal items within reach   nonskid shoes/slippers when out of bed   fall prevention program maintained  Intervention: Prevent Skin Injury  Recent Flowsheet Documentation  Taken 12/7/2021 1300 by April Fuentes RN  Body Position:   weight shift assistance provided   turned   tilted, left  Skin Protection:   tubing/devices free from skin contact   skin-to-device areas padded  Intervention: Prevent and Manage VTE (venous thromboembolism) Risk  Recent Flowsheet Documentation  Taken 12/7/2021 1300 by April Fuentes RN  VTE Prevention/Management: (not available in ED) sequential compression devices off  Intervention: Prevent Infection  Recent Flowsheet Documentation  Taken 12/7/2021 1300 by April Fuentes RN  Infection Prevention:   single patient room provided   rest/sleep promoted   hand hygiene promoted  Goal: Optimal Comfort and Wellbeing  Outcome: Ongoing, Progressing  Intervention: Provide Person-Centered Care  Recent Flowsheet Documentation  Taken 12/7/2021 1300 by April Fuentes RN  Trust Relationship/Rapport:   care explained   questions encouraged   questions answered   thoughts/feelings acknowledged   reassurance provided  Goal: Readiness for Transition of Care  Outcome: Ongoing, Progressing     Problem: UTI (Urinary Tract Infection)  Goal: Improved Infection Symptoms  Outcome: Ongoing, Progressing   Goal  Outcome Evaluation:  Plan of Care Reviewed With: patient         Pt brought to ED for c/o abdominal pain in RLQ/LLQ. Pt has urostomy on RLQ that was placed in 2012 per patient. Pt feet are cold to touch and maroon/purple color present. Pt is wheelchair bound at home and lives with her nephew whom assist with her daily living. Pt is A & O x4. Pt COVID positive and asymptomatic. Pt placed on hospital bed for comfort with a waffle mat placed on left side.

## 2021-12-07 NOTE — ED PROVIDER NOTES
Subjective     Patient is an 81-year-old female comes in complaining of abdominal pain for the past 2 days.  Patient states that she has been having pain in the area of her urostomy and follows with urology, Dr. Gupta, as patient has a history of bladder cancer.  Patient states that she is currently on Bactrim for UTI in which she is on her eighth day of antibiotics for this.  Patient has a history of recurrent UTIs.  Patient states the pain in prior UTIs are not as bad as her symptoms today.  Patient states her pain is a 9 out of town that radiates bilaterally in her lower abdomen from her urostomy site.  Patient reports some chills at home but otherwise denies any fever at home.  Patient denies any nausea, vomiting, diarrhea or swelling in her legs bilaterally.  Patient denies any chest pain or shortness of breath or cough.      Upon chart review, patient has a history of A. fib status post pacemaker is on warfarin.      Review of Systems   Constitutional: Negative for chills, fatigue and fever.   HENT: Negative for congestion, sore throat, tinnitus and trouble swallowing.    Eyes: Negative for photophobia, discharge and visual disturbance.   Respiratory: Negative for cough, chest tightness, shortness of breath and wheezing.    Cardiovascular: Negative for chest pain, palpitations and leg swelling.   Gastrointestinal: Positive for abdominal pain. Negative for blood in stool, diarrhea, nausea and vomiting.   Genitourinary: Negative for dysuria, flank pain, frequency and urgency.   Musculoskeletal: Negative for arthralgias and myalgias.   Skin: Negative for rash.   Neurological: Negative for dizziness, syncope, speech difficulty, weakness, light-headedness, numbness and headaches.   Psychiatric/Behavioral: Negative for confusion.       Past Medical History:   Diagnosis Date   • Bladder cancer (HCC)    • Cancer (HCC)     breast, bladder   • Deep vein thrombosis (HCC)    • Essential hypertension 1/17/2017   • Fracture  "tibia/fibula, right, closed, initial encounter 8/27/2020   • GERD (gastroesophageal reflux disease)    • History of urostomy    • Immobility 08/27/2020    r/t broken Tibia    • Kidney carcinoma, right ([facility])     removed    • Long term current use of anticoagulant 1/9/2015   • PAF (paroxysmal atrial fibrillation) ([facility]) 6/26/2019   • Presence of cardiac pacemaker 11/03/2014    BS dual chamber PM placed 10/2014 with pocket revision 1/25/17.  HX tachy rob syndrome   • Sick sinus syndrome ([facility]) 11/3/2014       Allergies   Allergen Reactions   • Amoxicillin Shortness Of Breath   • Ciprofloxacin Hives   • Oxycodone-Acetaminophen Unknown - High Severity     Pt's son stated when pt fell and broke her leg she was put on oxycodone; pt's son stated pt's \"vitals went all out of wack, she couldn't remember things.\"   • Penicillins Rash   • Sulfa Antibiotics Hives   • Cephalexin Other (See Comments)   • Clavulanic Acid Other (See Comments)   • Codeine Other (See Comments)   • Contrast Dye Other (See Comments)   • Doxycycline Other (See Comments)   • Fluconazole Other (See Comments)   • Iodine Hives   • Macrobid [Nitrofurantoin] Hives   • Tobramycin Other (See Comments)   • Trimethoprim Other (See Comments)       Past Surgical History:   Procedure Laterality Date   • BREAST LUMPECTOMY     • CHOLECYSTECTOMY N/A 10/12/2020    Procedure: CHOLECYSTECTOMY LAPAROSCOPIC;  Surgeon: Go Pereira DO;  Location: [facility] MAIN OR;  Service: General;  Laterality: N/A;   • CYSTECTOMY W/ URETEROILEAL CONDUIT     • HARDWARE REMOVAL Right 6/11/2021    Procedure: TIBIA HARDWARE REMOVAL;  Surgeon: Geoff Shah II, MD;  Location: [facility] MAIN OR;  Service: Orthopedics;  Laterality: Right;   • HERNIA REPAIR     • HYSTERECTOMY     • INSERT / REPLACE / REMOVE PACEMAKER     • NEPHRECTOMY Right    • TIBIA IM NAILING/RODDING Right 8/28/2020    Procedure: TIBIA INTRAMEDULLARY NAIL/ABRAHAM INSERTION;  Surgeon: Geoff Shah II, MD;  " Location: [facility] MAIN OR;  Service: Orthopedics;  Laterality: Right;       Family History   Problem Relation Age of Onset   • COPD Father        Social History     Socioeconomic History   • Marital status:    Tobacco Use   • Smoking status: Never Smoker   • Smokeless tobacco: Never Used   Vaping Use   • Vaping Use: Never used   Substance and Sexual Activity   • Alcohol use: Not Currently   • Drug use: Never   • Sexual activity: Defer           Objective   Physical Exam  Vitals and nursing note reviewed.   Constitutional:       General: She is not in acute distress.     Appearance: She is well-developed. She is not diaphoretic.   HENT:      Head: Normocephalic and atraumatic.      Nose: Nose normal.      Mouth/Throat:      Pharynx: No oropharyngeal exudate.   Eyes:      Extraocular Movements: Extraocular movements intact.      Conjunctiva/sclera: Conjunctivae normal.      Pupils: Pupils are equal, round, and reactive to light.   Cardiovascular:      Rate and Rhythm: Normal rate and regular rhythm.      Heart sounds: Normal heart sounds.      Comments: S1, S2 audible.  Pulmonary:      Effort: Pulmonary effort is normal. No respiratory distress.      Breath sounds: Normal breath sounds. No wheezing.      Comments: On room air.  Abdominal:      General: Bowel sounds are normal. There is no distension.      Palpations: Abdomen is soft.      Tenderness: There is generalized abdominal tenderness and tenderness in the periumbilical area and suprapubic area. There is no right CVA tenderness, left CVA tenderness, guarding or rebound. Negative signs include Zurita's sign, Rovsing's sign and McBurney's sign.   Musculoskeletal:         General: No tenderness or deformity. Normal range of motion.      Cervical back: Normal range of motion.   Skin:     General: Skin is warm.      Capillary Refill: Capillary refill takes less than 2 seconds.      Findings: No erythema or rash.   Neurological:      Mental Status: She is alert  "and oriented to person, place, and time.      Cranial Nerves: No cranial nerve deficit.   Psychiatric:         Mood and Affect: Mood normal.         Behavior: Behavior normal.         Procedures           ED Course      /94   Pulse 118   Temp 97.9 °F (36.6 °C)   Resp 16   Ht 162.6 cm (64\")   Wt 69.4 kg (153 lb)   SpO2 96%   BMI 26.26 kg/m²   Labs Reviewed   COMPREHENSIVE METABOLIC PANEL - Abnormal; Notable for the following components:       Result Value    Glucose 112 (*)     BUN 31 (*)     CO2 18.0 (*)     ALT (SGPT) 68 (*)     AST (SGOT) 60 (*)     Alkaline Phosphatase 143 (*)     eGFR Non African Amer 56 (*)     BUN/Creatinine Ratio 32.3 (*)     All other components within normal limits    Narrative:     GFR Normal >60  Chronic Kidney Disease <60  Kidney Failure <15     URINALYSIS W/ CULTURE IF INDICATED - Abnormal; Notable for the following components:    Appearance, UA Turbid (*)     Ketones, UA 15 mg/dL (1+) (*)     Blood, UA Trace (*)     Protein,  mg/dL (2+) (*)     Leuk Esterase, UA Trace (*)     Nitrite, UA Positive (*)     All other components within normal limits   CBC WITH AUTO DIFFERENTIAL - Abnormal; Notable for the following components:    WBC 11.30 (*)     Hematocrit 48.0 (*)     RDW 16.2 (*)     Platelets 138 (*)     Neutrophil % 91.1 (*)     Lymphocyte % 5.9 (*)     Monocyte % 2.7 (*)     Eosinophil % 0.0 (*)     Neutrophils, Absolute 10.30 (*)     All other components within normal limits   URINALYSIS, MICROSCOPIC ONLY - Abnormal; Notable for the following components:    RBC, UA 0-2 (*)     WBC, UA Too Numerous to Count (*)     Bacteria, UA 2+ (*)     All other components within normal limits   PROTIME-INR - Normal   LIPASE - Normal   TROPONIN (IN-HOUSE) - Normal    Narrative:     Troponin T Reference Range:  <= 0.03 ng/mL-   Negative for AMI  >0.03 ng/mL-     Abnormal for myocardial necrosis.  Clinicians would have to utilize clinical acumen, EKG, Troponin and serial changes " to determine if it is an Acute Myocardial Infarction or myocardial injury due to an underlying chronic condition.       Results may be falsely decreased if patient taking Biotin.     POC LACTATE - Normal   BLOOD CULTURE   BLOOD CULTURE   URINE CULTURE   POC LACTATE   CBC AND DIFFERENTIAL    Narrative:     The following orders were created for panel order CBC & Differential.  Procedure                               Abnormality         Status                     ---------                               -----------         ------                     CBC Auto Differential[[id number]]        Abnormal            Final result                 Please view results for these tests on the individual orders.   EXTRA TUBES    Narrative:     The following orders were created for panel order Extra Tubes.  Procedure                               Abnormality         Status                     ---------                               -----------         ------                     Gold Top - SST[[id number]]                                   Final result                 Please view results for these tests on the individual orders.   GOLD TOP - SST     CT Abdomen Pelvis With Contrast   Final Result      No acute process seen within the abdomen or pelvis with multiple incidental findings and chronic changes as above.               Electronically signed by:  Denys Mendosa D.O.     12/6/2021 10:10 PM                                                   MDM  Number of Diagnoses or Management Options     Amount and/or Complexity of Data Reviewed  Clinical lab tests: reviewed  Tests in the radiology section of CPT®: reviewed  Tests in the medicine section of CPT®: reviewed    Risk of Complications, Morbidity, and/or Mortality  Presenting problems: low  Diagnostic procedures: low  Management options: low    Patient Progress  Patient progress: stable       Chart review:    EKG: EKG reviewed by myself and interpreted by Dr. Rudy Kolb shows atrial  "fibrillation rate 117 bpm, no ST elevation apparent, similar to previous.    Imaging:    CT Abdomen Pelvis With Contrast   Final Result      No acute process seen within the abdomen or pelvis with multiple incidental findings and chronic changes as above.               Electronically signed by:  Denys Mendosa D.O.     12/6/2021 10:10 PM          Labs: UA shows trace leukocyte esterase, positive nitrites, 2+ bacteria and too numerous to count WBCs.  Lipase normal.  Urine culture pending.  Initial troponin negative, blood cultures x2 pending.  Initial lactic normal 1.1.  CBC shows slightly low platelets at 138 and slightly elevated white blood cell count 11.3 otherwise unremarkable.  CMP shows slightly elevated AST and ALT as well as alk phos.    Vitals:  /94   Pulse 118   Temp 97.9 °F (36.6 °C)   Resp 16   Ht 162.6 cm (64\")   Wt 69.4 kg (153 lb)   SpO2 96%   BMI 26.26 kg/m²     Medications given:    Medications   sodium chloride 0.9 % flush 10 mL (has no administration in time range)   methylPREDNISolone sodium succinate (SOLU-Medrol) injection 40 mg (40 mg Intravenous Given 12/6/21 2222)   meropenem (MERREM) 1 g in sodium chloride 0.9 % 100 mL IVPB (has no administration in time range)   Morphine sulfate (PF) injection 4 mg (has no administration in time range)   ondansetron ODT (ZOFRAN-ODT) disintegrating tablet 4 mg (4 mg Oral Given 12/6/21 2223)   diphenhydrAMINE (BENADRYL) injection 50 mg (50 mg Intravenous Given 12/6/21 2222)   dilTIAZem (CARDIZEM) injection 10 mg (10 mg Intravenous Given 12/6/21 2328)   iopamidol (ISOVUE-370) 76 % injection 100 mL (100 mL Intravenous Given 12/6/21 8094)       Procedures:    MDM: Patient is an 81-year-old female comes in complaining of abdominal pain near her urostomy which she has had recurrent UTIs and history of bladder cancer and follows with Dr. Gupta with urology.  UA shows positive nitrites, trace leukocyte esterase, 2+ bacteria, too numerous to count WBCs " as well as trace ketones.  White blood cell count was essentially normal however patient was tachycardic during ER stay.  Initial lactic normal 1.1, blood cultures x2 pending.  AST and ALT slightly elevated at 60 and 68 respectively.  Alk phos slightly elevated at 143.  Otherwise largely unremarkable CMP.  Initial troponin negative, lipase negative, urine culture pending.  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  Antibiotics given her several allergies to antibiotics.  Spoke with pharmacy who agreed with this plan of action at this time in regards to antibiotics.  Patient was given morphine for pain control with little relief.  Patient was given Zofran for nausea as well.  Patient was given Cardizem for heart rate over 130 and A. fib as she has a history of this.  Patient's INR was 2.75 in which she is on warfarin for history of A. fib.  Platelets slightly low at 138.  Patient was treated with Benadryl as well as Solu-Medrol prior to giving her IV contrast for CT abdomen pelvis.  CT abdomen pelvis was unremarkable for acute findings.  Patient to be admitted to Dr. Knowles who is on-call for Lizzy Holloway NP, who is the patient's primary care provider.  Patient will need IV antibiotics as she has failed outpatient therapy as she has been on Bactrim the past 8 days.  Results and plan discussed with patient and is agreeable with plan.    Final diagnoses:   Abdominal pain, unspecified abdominal location   Acute UTI   Atrial fibrillation, unspecified type (HCC)       ED Disposition  ED Disposition     ED Disposition Condition Comment    Decision to Admit  Level of Care: Med/Surg [1]   Admitting Physician: BRIELLE KNOWLES [[id number]]   Attending Physician: BRIELLE KNOWLES [[id number]]            No follow-up provider specified.       Medication List      No changes were made to your prescriptions during this visit.          Josias Anders PA  12/07/21 0031

## 2021-12-07 NOTE — ED NOTES
Having troubles getting an IV. Another nurse is going to try to get one     Jeyson Lozano RN  12/06/21 8309

## 2021-12-07 NOTE — ED NOTES
Pt reports Abdominal discomfort since this morning.States she has been being treated for a UTI the past couple weeks.     Jeyson Lozano RN  12/06/21 2041

## 2021-12-07 NOTE — H&P
Patient Care Team:  Lizzy Francis MD as PCP - General (Family Medicine)    Chief complaint Abdominal discomfort    Subjective     Patient is a 81 y.o. 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 She presented to the ED with abdominal pain. She has been taking bactrim for recurrent uti and is on day 8. Of note patient was recently discharged on 9/30 due to same diagnosis and treatment regimen. She has multiple allergies and was treated with merrem and currently taking merrem on this admission. She states that she has been fully vaccinated with covid 19 but has tested positive on this admission will require isolation. Bilateral feet with discoloration and cool. Patient states this is normal state of poor perfusion and requires her to use a wheelchair. She lives with a family member.    Review of Systems   Constitutional: Positive for activity change, chills and fatigue.   Gastrointestinal: Positive for abdominal pain.          History  Past Medical History:   Diagnosis Date   • Bladder cancer (HCC)    • Cancer (HCC)     breast, bladder   • Deep vein thrombosis (HCC)    • Essential hypertension 1/17/2017   • Fracture tibia/fibula, right, closed, initial encounter 8/27/2020   • GERD (gastroesophageal reflux disease)    • History of urostomy    • Immobility 08/27/2020    r/t broken Tibia    • Kidney carcinoma, right (HCC)     removed    • Long term current use of anticoagulant 1/9/2015   • PAF (paroxysmal atrial fibrillation) ([facility]) 6/26/2019   • Presence of cardiac pacemaker 11/03/2014    BS dual chamber PM placed 10/2014 with pocket revision 1/25/17.  HX tachy rob syndrome   • Sick sinus syndrome (HCC) 11/3/2014     Past Surgical History:   Procedure Laterality Date   • BREAST LUMPECTOMY     • CHOLECYSTECTOMY N/A 10/12/2020    Procedure: CHOLECYSTECTOMY  LAPAROSCOPIC;  Surgeon: Go Pereira DO;  Location: [facility] MAIN OR;  Service: General;  Laterality: N/A;   • CYSTECTOMY W/ URETEROILEAL CONDUIT     • HARDWARE REMOVAL Right 6/11/2021    Procedure: TIBIA HARDWARE REMOVAL;  Surgeon: Geoff Shah II, MD;  Location: [facility] MAIN OR;  Service: Orthopedics;  Laterality: Right;   • HERNIA REPAIR     • HYSTERECTOMY     • INSERT / REPLACE / REMOVE PACEMAKER     • NEPHRECTOMY Right    • TIBIA IM NAILING/RODDING Right 8/28/2020    Procedure: TIBIA INTRAMEDULLARY NAIL/ABRAHAM INSERTION;  Surgeon: Geoff Shah II, MD;  Location: [facility] MAIN OR;  Service: Orthopedics;  Laterality: Right;     Family History   Problem Relation Age of Onset   • COPD Father      Social History     Tobacco Use   • Smoking status: Never Smoker   • Smokeless tobacco: Never Used   Vaping Use   • Vaping Use: Never used   Substance Use Topics   • Alcohol use: Not Currently   • Drug use: Never     (Not in a hospital admission)    Allergies:  Amoxicillin, Ciprofloxacin, Oxycodone-acetaminophen, Penicillins, Sulfa antibiotics, Cephalexin, Clavulanic acid, Codeine, Contrast dye, Doxycycline, Fluconazole, Iodine, Macrobid [nitrofurantoin], Tobramycin, and Trimethoprim    Objective     Vital Signs  Temp:  [97.9 °F (36.6 °C)] 97.9 °F (36.6 °C)  Heart Rate:  [103-128] 112  Resp:  [16-26] 18  BP: (117-172)/(69-96) 141/91       Physical Exam  Vitals reviewed.   Constitutional:       Appearance: Normal appearance.   HENT:      Head: Normocephalic and atraumatic.      Right Ear: External ear normal.      Left Ear: External ear normal.      Nose: Nose normal.      Mouth/Throat:      Mouth: Mucous membranes are moist.      Pharynx: Oropharynx is clear.   Eyes:      Conjunctiva/sclera: Conjunctivae normal.   Cardiovascular:      Rate and Rhythm: Normal rate and regular rhythm.      Pulses: Normal pulses.      Heart sounds: Normal heart sounds.   Pulmonary:      Effort: Pulmonary effort is normal.       Breath sounds: Normal breath sounds.   Abdominal:      General: Bowel sounds are normal.      Palpations: Abdomen is soft.   Musculoskeletal:         General: Swelling present. Normal range of motion.      Cervical back: Normal range of motion.   Feet:      Comments: Bilateral feet ends and toes bluish and cool, pt states that is normal for her with poor circulation   Skin:     General: Skin is warm and dry.   Neurological:      Mental Status: She is alert and oriented to person, place, and time.   Psychiatric:         Behavior: Behavior normal.          Results Review:     Imaging Results (Last 24 Hours)     Procedure Component Value Units Date/Time    CT Abdomen Pelvis With Contrast [[id number]] Collected: 12/07/21 0006     Updated: 12/07/21 0012    Narrative:      EXAMINATION: CT ABDOMEN AND PELVIS WITH IV CONTRAST       DATE OF EXAMINATION: 12/6/2021.    COMPARISON: 9/24/2021.    INDICATION: Abdominal and suprapubic pain.    PROCEDURE:  Axial CT of the abdomen and pelvis was performed with contrast and sagittal and coronal reformatted images were performed.  100 mL of Isovue-370 was given intravenously. CT dose lowering techniques were used, to include: automated exposure   control, adjustment for patient size, and/or use of iterative reconstruction.    FINDINGS:    LOWER CHEST :  There are some patchy and linear bands of opacities at the lung bases bilaterally which are likely atelectasis or scarring. There are no pleural or pericardial effusions.    ABDOMEN:    Liver and Biliary system:  Normal.    Adrenal glands:  Normal.    Kidneys and ureters: Prior right nephrectomy. The left kidney and ureter otherwise appear unremarkable. There is a right lower quadrant loop ileostomy with prior cystectomy.    Spleen:  Several calcified granulomas are seen in the spleen.    Pancreas:  Normal.    Gallbladder:  Surgically absent.    Lymph nodes, Peritoneum and mesentery:  There is no mesenteric or retroperitoneal  lymphadenopathy.    Gastrointestinal tract:  There are no dilated loops of bowel or free intraperitoneal air.    The appendix is not clearly seen.     Aorta/IVC:   There is moderate vascular calcification throughout the abdominal aorta without evidence of aneurysmal dilation or dissection.  IVC normal.    Abdominal wall:  Multiple abdominal wall hernias are unchanged.    PELVIS:    Fluid: There is no free fluid in the pelvis.    Lymph Nodes:  There is no pelvic or inguinal lymphadenopathy..    Urinary bladder:  Prior cystectomy with loop ileostomy in the right lower quadrant..    BONES:  There are no osseous destructive lesions..    ADDITIONAL  SIGNIFICANT FINDINGS:  None.      Impression:        No acute process seen within the abdomen or pelvis with multiple incidental findings and chronic changes as above.          Electronically signed by:  Denys Mendosa D.O.    12/6/2021 10:10 PM           Lab Results (last 24 hours)     Procedure Component Value Units Date/Time    COVID PRE-OP / PRE-PROCEDURE SCREENING ORDER (NO ISOLATION) - Swab, Nasopharynx [[id number]]  (Abnormal) Collected: 12/07/21 0050    Specimen: Swab from Nasopharynx Updated: 12/07/21 0155    Narrative:      The following orders were created for panel order COVID PRE-OP / PRE-PROCEDURE SCREENING ORDER (NO ISOLATION) - Swab, Nasopharynx.  Procedure                               Abnormality         Status                     ---------                               -----------         ------                     COVID-19,CEPHEID/OSORIO/BD...[[id number]]  Abnormal            Final result                 Please view results for these tests on the individual orders.    COVID-19,CEPHEID/OSORIO/BDMAX,COR/ANNY/PAD/GENNY IN-HOUSE(OR EMERGENT/ADD-ON),NP SWAB IN TRANSPORT MEDIA 3-4 HR TAT, RT-PCR - Swab, Nasopharynx [[id number]]  (Abnormal) Collected: 12/07/21 0050    Specimen: Swab from Nasopharynx Updated: 12/07/21 0155     COVID19 Detected    Narrative:      Fact sheet  for providers: https://www.fda.gov/media/908893/download     Fact sheet for patients: https://www.fda.gov/media/149429/download  Fact sheet for providers: https://www.fda.gov/media/323040/download    Fact sheet for patients: https://www.fda.gov/media/117963/download    Test performed by PCR.    Urinalysis, Microscopic Only - Urine, Clean Catch [[id number]]  (Abnormal) Collected: 12/06/21 2315    Specimen: Urine, Clean Catch Updated: 12/06/21 2342     RBC, UA 0-2 /HPF      WBC, UA Too Numerous to Count /HPF      Bacteria, UA 2+ /HPF      Squamous Epithelial Cells, UA 0-2 /HPF      Hyaline Casts, UA 3-6 /LPF      Methodology Manual Light Microscopy    Urine Culture - Urine, Urine, Clean Catch [[id number]] Collected: 12/06/21 2315    Specimen: Urine, Clean Catch Updated: 12/06/21 2342    Extra Tubes [[id number]] Collected: 12/06/21 2226    Specimen: Blood, Venous Line Updated: 12/06/21 2330    Narrative:      The following orders were created for panel order Extra Tubes.  Procedure                               Abnormality         Status                     ---------                               -----------         ------                     Gold Top - SST[[id number]]                                   Final result                 Please view results for these tests on the individual orders.    Gold Top - SST [[id number]] Collected: 12/06/21 2226    Specimen: Blood Updated: 12/06/21 2330     Extra Tube Hold for add-ons.     Comment: Auto resulted.       Urinalysis With Culture If Indicated - Urine, Clean Catch [[id number]]  (Abnormal) Collected: 12/06/21 2315    Specimen: Urine, Clean Catch Updated: 12/06/21 2327     Color, UA Yellow     Appearance, UA Turbid     Comment: Result checked         pH, UA 5.5     Specific Gravity, UA 1.025     Glucose, UA Negative     Ketones, UA 15 mg/dL (1+)     Bilirubin, UA Negative     Blood, UA Trace     Protein,  mg/dL (2+)     Leuk Esterase, UA Trace     Nitrite, UA Positive      Urobilinogen, UA 0.2 E.U./dL    Comprehensive Metabolic Panel [[id number]]  (Abnormal) Collected: 12/06/21 2226    Specimen: Blood Updated: 12/06/21 2305     Glucose 112 mg/dL      BUN 31 mg/dL      Creatinine 0.96 mg/dL      Sodium 137 mmol/L      Potassium 4.1 mmol/L      Chloride 105 mmol/L      CO2 18.0 mmol/L      Calcium 9.3 mg/dL      Total Protein 7.5 g/dL      Albumin 3.80 g/dL      ALT (SGPT) 68 U/L      AST (SGOT) 60 U/L      Alkaline Phosphatase 143 U/L      Total Bilirubin 0.5 mg/dL      eGFR Non African Amer 56 mL/min/1.73      Globulin 3.7 gm/dL      A/G Ratio 1.0 g/dL      BUN/Creatinine Ratio 32.3     Anion Gap 14.0 mmol/L     Narrative:      GFR Normal >60  Chronic Kidney Disease <60  Kidney Failure <15      Lipase [[id number]]  (Normal) Collected: 12/06/21 2226    Specimen: Blood Updated: 12/06/21 2305     Lipase 40 U/L     Troponin [[id number]]  (Normal) Collected: 12/06/21 2226    Specimen: Blood Updated: 12/06/21 2305     Troponin T <0.010 ng/mL     Narrative:      Troponin T Reference Range:  <= 0.03 ng/mL-   Negative for AMI  >0.03 ng/mL-     Abnormal for myocardial necrosis.  Clinicians would have to utilize clinical acumen, EKG, Troponin and serial changes to determine if it is an Acute Myocardial Infarction or myocardial injury due to an underlying chronic condition.       Results may be falsely decreased if patient taking Biotin.      Protime-INR [[id number]]  (Normal) Collected: 12/06/21 2226    Specimen: Blood Updated: 12/06/21 2259     Protime 28.3 Seconds      INR 2.75    CBC & Differential [[id number]]  (Abnormal) Collected: 12/06/21 2226    Specimen: Blood Updated: 12/06/21 2235    Narrative:      The following orders were created for panel order CBC & Differential.  Procedure                               Abnormality         Status                     ---------                               -----------         ------                     CBC Auto Differential[[id number]]        Abnormal             Final result                 Please view results for these tests on the individual orders.    CBC Auto Differential [[id number]]  (Abnormal) Collected: 12/06/21 2226    Specimen: Blood Updated: 12/06/21 2235     WBC 11.30 10*3/mm3      RBC 5.02 10*6/mm3      Hemoglobin 15.8 g/dL      Hematocrit 48.0 %      MCV 95.6 fL      MCH 31.4 pg      MCHC 32.9 g/dL      RDW 16.2 %      RDW-SD 53.4 fl      MPV 7.3 fL      Platelets 138 10*3/mm3      Neutrophil % 91.1 %      Lymphocyte % 5.9 %      Monocyte % 2.7 %      Eosinophil % 0.0 %      Basophil % 0.3 %      Neutrophils, Absolute 10.30 10*3/mm3      Lymphocytes, Absolute 0.70 10*3/mm3      Monocytes, Absolute 0.30 10*3/mm3      Eosinophils, Absolute 0.00 10*3/mm3      Basophils, Absolute 0.00 10*3/mm3      nRBC 0.0 /100 WBC     Blood Culture - Blood, Arm, Left [[id number]] Collected: 12/06/21 2226    Specimen: Blood from Arm, Left Updated: 12/06/21 2231    Blood Culture - Blood, Arm, Left [[id number]] Collected: 12/06/21 2226    Specimen: Blood from Arm, Left Updated: 12/06/21 2231    POC Lactate [[id number]]  (Normal) Collected: 12/06/21 2122    Specimen: Blood Updated: 12/06/21 2124     Lactate 1.1 mmol/L      Comment: Serial Number: [id number]:  [id number]              I reviewed the patient's new clinical results.    Assessment/Plan     UTI with abdominal discomfort  -continue merrem  -urine culture pending  -CT abdomen without acute finding  -previous UTI admission has associated abdominal pain    COVID 19 + continue isolation    HTN  -continue home medication    Atrial fib  -continue home medication    Diet: HH  DVT prophylaxis: warfarin  GI prophylaxis: protonix  Code status: full      I discussed the patient's findings and my recommendations with patient.     CHELLE Harrison  12/07/21  11:28 EST

## 2021-12-07 NOTE — PROGRESS NOTES
81-year-old female comes in complaining of abdominal pain for last 2 days.  Patient does have a urostomy the pouch is intact with yellow urine.  Consult received to assess patient sacral area.    Patient has nonblanchable erythema to the sacrum she does have a stage I pressure injury patient states she spends most of her time in the wheelchair and she thinks that how this is started.  She does complain of the area being tender.  No induration warmth noted.      Silicone foam border dressing was applied to the area patient is currently on a stretcher will recommend getting patient changed over to a hospital bed where she can be turned and positioned or at least place a waffle chair cushion under the stretcher.  Will implement pressure injury prevention strategies for patient such as frequent turning position changes.  Will follow as needed

## 2021-12-08 LAB
ANION GAP SERPL CALCULATED.3IONS-SCNC: 9 MMOL/L (ref 5–15)
BACTERIA UR QL AUTO: ABNORMAL /HPF
BASOPHILS # BLD AUTO: 0.1 10*3/MM3 (ref 0–0.2)
BASOPHILS NFR BLD AUTO: 0.6 % (ref 0–1.5)
BILIRUB UR QL STRIP: NEGATIVE
BUN SERPL-MCNC: 54 MG/DL (ref 8–23)
BUN/CREAT SERPL: 54 (ref 7–25)
CALCIUM SPEC-SCNC: 8.4 MG/DL (ref 8.6–10.5)
CHLORIDE SERPL-SCNC: 110 MMOL/L (ref 98–107)
CLARITY UR: ABNORMAL
CO2 SERPL-SCNC: 16 MMOL/L (ref 22–29)
COLOR UR: YELLOW
CREAT SERPL-MCNC: 1 MG/DL (ref 0.57–1)
DEPRECATED RDW RBC AUTO: 54.3 FL (ref 37–54)
EOSINOPHIL # BLD AUTO: 0 10*3/MM3 (ref 0–0.4)
EOSINOPHIL NFR BLD AUTO: 0.2 % (ref 0.3–6.2)
ERYTHROCYTE [DISTWIDTH] IN BLOOD BY AUTOMATED COUNT: 16.6 % (ref 12.3–15.4)
GFR SERPL CREATININE-BSD FRML MDRD: 53 ML/MIN/1.73
GLUCOSE SERPL-MCNC: 167 MG/DL (ref 65–99)
GLUCOSE UR STRIP-MCNC: NEGATIVE MG/DL
HCT VFR BLD AUTO: 45.6 % (ref 34–46.6)
HGB BLD-MCNC: 15.2 G/DL (ref 12–15.9)
HGB UR QL STRIP.AUTO: ABNORMAL
HYALINE CASTS UR QL AUTO: ABNORMAL /LPF
INR PPP: 3.92 (ref 2–3)
KETONES UR QL STRIP: NEGATIVE
LEUKOCYTE ESTERASE UR QL STRIP.AUTO: NEGATIVE
LYMPHOCYTES # BLD AUTO: 1 10*3/MM3 (ref 0.7–3.1)
LYMPHOCYTES NFR BLD AUTO: 10.4 % (ref 19.6–45.3)
MCH RBC QN AUTO: 31.4 PG (ref 26.6–33)
MCHC RBC AUTO-ENTMCNC: 33.3 G/DL (ref 31.5–35.7)
MCV RBC AUTO: 94.4 FL (ref 79–97)
MONOCYTES # BLD AUTO: 0.4 10*3/MM3 (ref 0.1–0.9)
MONOCYTES NFR BLD AUTO: 4 % (ref 5–12)
NEUTROPHILS NFR BLD AUTO: 7.8 10*3/MM3 (ref 1.7–7)
NEUTROPHILS NFR BLD AUTO: 84.8 % (ref 42.7–76)
NITRITE UR QL STRIP: NEGATIVE
NRBC BLD AUTO-RTO: 0.1 /100 WBC (ref 0–0.2)
PH UR STRIP.AUTO: 6 [PH] (ref 5–8)
PLATELET # BLD AUTO: 164 10*3/MM3 (ref 140–450)
PMV BLD AUTO: 7.7 FL (ref 6–12)
POTASSIUM SERPL-SCNC: 4.4 MMOL/L (ref 3.5–5.2)
PROT UR QL STRIP: ABNORMAL
PROTHROMBIN TIME: 39.3 SECONDS (ref 19.4–28.5)
RBC # BLD AUTO: 4.83 10*6/MM3 (ref 3.77–5.28)
RBC # UR STRIP: ABNORMAL /HPF
REF LAB TEST METHOD: ABNORMAL
SODIUM SERPL-SCNC: 135 MMOL/L (ref 136–145)
SP GR UR STRIP: 1.02 (ref 1–1.03)
SQUAMOUS #/AREA URNS HPF: ABNORMAL /HPF
TRANS CELLS #/AREA URNS HPF: ABNORMAL /HPF
UROBILINOGEN UR QL STRIP: ABNORMAL
WBC # UR STRIP: ABNORMAL /HPF
WBC NRBC COR # BLD: 9.2 10*3/MM3 (ref 3.4–10.8)

## 2021-12-08 PROCEDURE — 25010000002 MORPHINE PER 10 MG: Performed by: FAMILY MEDICINE

## 2021-12-08 PROCEDURE — 81001 URINALYSIS AUTO W/SCOPE: CPT | Performed by: UROLOGY

## 2021-12-08 PROCEDURE — 87086 URINE CULTURE/COLONY COUNT: CPT | Performed by: UROLOGY

## 2021-12-08 PROCEDURE — 80048 BASIC METABOLIC PNL TOTAL CA: CPT | Performed by: NURSE PRACTITIONER

## 2021-12-08 PROCEDURE — 85610 PROTHROMBIN TIME: CPT | Performed by: NURSE PRACTITIONER

## 2021-12-08 PROCEDURE — 36415 COLL VENOUS BLD VENIPUNCTURE: CPT | Performed by: NURSE PRACTITIONER

## 2021-12-08 PROCEDURE — 99223 1ST HOSP IP/OBS HIGH 75: CPT | Performed by: SURGERY

## 2021-12-08 PROCEDURE — G0378 HOSPITAL OBSERVATION PER HR: HCPCS

## 2021-12-08 PROCEDURE — 85025 COMPLETE CBC W/AUTO DIFF WBC: CPT | Performed by: NURSE PRACTITIONER

## 2021-12-08 RX ORDER — MORPHINE SULFATE 2 MG/ML
2 INJECTION, SOLUTION INTRAMUSCULAR; INTRAVENOUS
Status: DISCONTINUED | OUTPATIENT
Start: 2021-12-08 | End: 2021-12-10 | Stop reason: HOSPADM

## 2021-12-08 RX ORDER — MORPHINE SULFATE 2 MG/ML
1 INJECTION, SOLUTION INTRAMUSCULAR; INTRAVENOUS
Status: DISCONTINUED | OUTPATIENT
Start: 2021-12-08 | End: 2021-12-10 | Stop reason: HOSPADM

## 2021-12-08 RX ORDER — LINEZOLID 600 MG/1
600 TABLET, FILM COATED ORAL EVERY 12 HOURS SCHEDULED
Status: DISCONTINUED | OUTPATIENT
Start: 2021-12-08 | End: 2021-12-10 | Stop reason: HOSPADM

## 2021-12-08 RX ORDER — ACETAMINOPHEN 325 MG/1
650 TABLET ORAL EVERY 6 HOURS PRN
Status: DISCONTINUED | OUTPATIENT
Start: 2021-12-08 | End: 2021-12-10 | Stop reason: HOSPADM

## 2021-12-08 RX ORDER — MORPHINE SULFATE 2 MG/ML
2 INJECTION, SOLUTION INTRAMUSCULAR; INTRAVENOUS
Status: DISCONTINUED | OUTPATIENT
Start: 2021-12-08 | End: 2021-12-08

## 2021-12-08 RX ADMIN — MORPHINE SULFATE 2 MG: 2 INJECTION, SOLUTION INTRAMUSCULAR; INTRAVENOUS at 12:36

## 2021-12-08 RX ADMIN — METOPROLOL TARTRATE 100 MG: 50 TABLET, FILM COATED ORAL at 20:18

## 2021-12-08 RX ADMIN — ACETAMINOPHEN 650 MG: 325 TABLET, FILM COATED ORAL at 01:00

## 2021-12-08 RX ADMIN — MORPHINE SULFATE 2 MG: 2 INJECTION, SOLUTION INTRAMUSCULAR; INTRAVENOUS at 16:40

## 2021-12-08 RX ADMIN — DILTIAZEM HYDROCHLORIDE 240 MG: 240 CAPSULE, COATED, EXTENDED RELEASE ORAL at 08:40

## 2021-12-08 RX ADMIN — SODIUM CHLORIDE, PRESERVATIVE FREE 3 ML: 5 INJECTION INTRAVENOUS at 08:45

## 2021-12-08 RX ADMIN — MORPHINE SULFATE 2 MG: 2 INJECTION, SOLUTION INTRAMUSCULAR; INTRAVENOUS at 02:06

## 2021-12-08 RX ADMIN — DIGOXIN 125 MCG: 125 TABLET ORAL at 12:36

## 2021-12-08 RX ADMIN — SODIUM CHLORIDE, PRESERVATIVE FREE 3 ML: 5 INJECTION INTRAVENOUS at 20:18

## 2021-12-08 RX ADMIN — MORPHINE SULFATE 2 MG: 2 INJECTION, SOLUTION INTRAMUSCULAR; INTRAVENOUS at 23:50

## 2021-12-08 RX ADMIN — TOPIRAMATE 100 MG: 100 TABLET, FILM COATED ORAL at 20:18

## 2021-12-08 RX ADMIN — Medication 250 MG: at 20:18

## 2021-12-08 RX ADMIN — WARFARIN 2 MG: 2 TABLET ORAL at 17:38

## 2021-12-08 RX ADMIN — Medication 250 MG: at 08:40

## 2021-12-08 RX ADMIN — METOPROLOL TARTRATE 100 MG: 50 TABLET, FILM COATED ORAL at 08:40

## 2021-12-08 RX ADMIN — MORPHINE SULFATE 2 MG: 2 INJECTION, SOLUTION INTRAMUSCULAR; INTRAVENOUS at 09:07

## 2021-12-08 RX ADMIN — LINEZOLID 600 MG: 600 TABLET, FILM COATED ORAL at 17:38

## 2021-12-08 NOTE — PROGRESS NOTES
LOS: 0 days   Patient Care Team:  Lizzy Francis MD as PCP - General (Family Medicine)    Subjective     Patient states continues to have pain at ileostomy site    Review of Systems        Objective     Vital Signs  Temp:  [97.8 °F (36.6 °C)-98.3 °F (36.8 °C)] 98 °F (36.7 °C)  Heart Rate:  [] 86  Resp:  [16-20] 16  BP: (136-166)/(70-90) 136/70      Physical Exam         Results Review:    Lab Results (last 24 hours)     Procedure Component Value Units Date/Time    Urine Culture - Urine, Urine, Clean Catch [[id number]]  (Abnormal)  (Susceptibility) Collected: 12/06/21 2315    Specimen: Urine, Clean Catch Updated: 12/08/21 0943     Urine Culture >100,000 CFU/mL Enterococcus faecalis      25,000 CFU/mL Mixed Becca Isolated    Narrative:      Specimen contains mixed organisms of questionable pathogenicity which indicates contamination with commensal becca.  Further identification is unlikely to provide clinically useful information.  Suggest recollection.    Susceptibility      Enterococcus faecalis     ASHLEY     Ampicillin Susceptible     Levofloxacin Susceptible     Nitrofurantoin Susceptible     Tetracycline Resistant     Vancomycin Susceptible                  Linear View                   Blood Culture - Blood, Arm, Left [[id number]]  (Normal) Collected: 12/06/21 2226    Specimen: Blood from Arm, Left Updated: 12/07/21 2245     Blood Culture No growth at 24 hours    Blood Culture - Blood, Arm, Left [[id number]]  (Normal) Collected: 12/06/21 2226    Specimen: Blood from Arm, Left Updated: 12/07/21 2245     Blood Culture No growth at 24 hours           Imaging Results (Last 24 Hours)     ** No results found for the last 24 hours. **               I reviewed the patient's new clinical results.    Medication Review:   Scheduled Meds:digoxin, 125 mcg, Oral, Daily  dilTIAZem CD, 240 mg, Oral, Daily  metoprolol tartrate, 100 mg, Oral, BID  saccharomyces boulardii, 250 mg, Oral, BID  sodium chloride, 3 mL,  Intravenous, Q12H  topiramate, 100 mg, Oral, Nightly  warfarin, 2 mg, Oral, Daily      Continuous Infusions:   PRN Meds:.•  acetaminophen  •  Morphine **OR** Morphine  •  nitroglycerin  •  ondansetron **OR** ondansetron  •  sodium chloride  •  sodium chloride     Interval History:    Assessment/Plan     UTI with abdominal discomfort  -continue merrem  -urine culture pending  -CT abdomen without acute finding  -previous UTI admission has associated abdominal pain  -urology following  -consult to general surgery to determine conduit is associated with pain     COVID 19 + continue isolation     HTN  -continue home medication     Atrial fib  -continue home medication     Diet: HH  DVT prophylaxis: warfarin  GI prophylaxis: protonix  Code status: full    Plan for disposition: SRINIVAS James, CHELLE  12/08/21  13:39 EST

## 2021-12-08 NOTE — CASE MANAGEMENT/SOCIAL WORK
Discharge Planning Assessment   Luis Felipe     Patient Name: Hazel Bucio  MRN: [id number]  Today's Date: 12/8/2021    Admit Date: 12/6/2021     Discharge Needs Assessment     Row Name 12/08/21 1506       Living Environment    Lives With other (see comments)    Unique Family Situation Nephew Wm Agudelo lives with pt.    Current Living Arrangements home/apartment/condo    Primary Care Provided by self; other (see comments)    Provides Primary Care For no one, unable/limited ability to care for self    Caregiving Concerns Pt spends most of time in wheelchair for mobility.    Family Caregiver if Needed other (see comments)    Quality of Family Relationships unable to assess    Able to Return to Prior Arrangements yes       Resource/Environmental Concerns    Resource/Environmental Concerns none    Transportation Concerns car, none       Transition Planning    Patient/Family Anticipates Transition to home with family    Patient/Family Anticipated Services at Transition     Transportation Anticipated family or friend will provide       Discharge Needs Assessment    Readmission Within the Last 30 Days no previous admission in last 30 days    Equipment Currently Used at Home shower chair; wheelchair; walker, rolling    Concerns to be Addressed discharge planning    Concerns Comments Family members assist with groceries.    Anticipated Changes Related to Illness inability to care for self    Equipment Needed After Discharge --  consider hospital bed with specialized mattress, agility bed?    Patient's Choice of Community Agency(s) Pt has used [facility] in the past.    Discharge Coordination/Progress DC Plan: From home with nephew, primarily wheelchair bound. Currently on agility bed. IV antibiotics.               Discharge Plan     Row Name 12/08/21 4408       Plan    Plan DC Plan: From home with nephew, primarily wheelchair bound. Currently on agility bed. IV antibiotics.    Plan Comments Urostomy, Stage II  decubiti per buttocks, wound care following. General Surgery consult. Pt has used [facility] in he past. Pharmacy [facility] [id number].              Continued Care and Services - Admitted Since 12/6/2021    Coordination has not been started for this encounter.     Selected Continued Care - Prior Encounters Includes selections from prior encounters from 9/7/2021 to 12/8/2021    Discharged on 9/30/2021 Admission date: 9/24/2021 - Discharge disposition: Home-Health Care [facility]    Home Medical Care     Service Provider Selected Services Address Phone Fax Patient Preferred    [facility] [address] [place] [address] [phone] --                       Demographic Summary     Row Name 12/08/21 1502       General Information    Admission Type observation    Arrived From emergency department    Required Notices Provided Observation Status Notice    Referral Source admission list    Reason for Consult discharge planning    Preferred Language English     Used During This Interaction no    General Information Comments Spoke to friend Sawyer Louise per phone. Attempted to call pt in room, no answer. VM left for granddaughter Rachel Cuevas, no answer at sonManish's phone.               Functional Status     Row Name 12/08/21 1505       Functional Status, IADL    Medications assistive person; assistive equipment and person    Meal Preparation assistive equipment and person    Housekeeping assistive equipment and person    Laundry assistive equipment and person    Shopping assistive equipment and person       Mental Status    General Appearance WDL --  COVID 19 isolation              Phone communication or documentation only - no physical contact with patient or family.           Matt Elizabeth RN

## 2021-12-08 NOTE — PLAN OF CARE
Goal Outcome Evaluation:  Plan of Care Reviewed With: patient        Progress: no change  Outcome Summary: pt c/o pain, called  got an order for meds, gave the prn med, pt has not rested during the shift

## 2021-12-08 NOTE — CONSULTS
GENERAL SURGERY CONSULTATION NOTE    Consult requested by: Dr. Gupta    Patient Care Team:  Lizzy Francis MD as PCP - General (Family Medicine)    Reason for consult: abdominal pain    Subjective     Patient is a 81 y.o. female presents with pain in her suprapubic region below her ileal conduit. She reports that she has been taking antibiotics for presumed urinary tract infections when she presents with abdominal pain like this in the past. She has a history of bladder cancer status post cystectomy with urostomy, hypertension, GERD, right nephrectomy, sick sinus syndrome with pacemaker, atrial fibrillation on Coumadin and immobility due to peripheral vascular disease. The patient was admitted as recently as 930 for similar symptoms and treated with Merrem. On her admission, the patient tested positive for COVID-19, but is asymptomatic. She is fully vaccinated. Patient was evaluated by urology who examined the patient and reviewed her imaging, and asked for my opinion with regards to her lower abdominal discomfort.    Review of Systems   Constitutional: Positive for fatigue. Negative for appetite change, chills and fever.   HENT: Negative for congestion and sore throat.    Respiratory: Negative for cough and shortness of breath.    Cardiovascular: Negative for chest pain and palpitations.   Gastrointestinal: Positive for abdominal pain. Negative for constipation, diarrhea, nausea, vomiting and GERD.   Genitourinary: Negative for difficulty urinating, dysuria and frequency.   Musculoskeletal: Negative for arthralgias and back pain.   Skin: Negative for rash and skin lesions.   Neurological: Negative for dizziness, seizures and memory problem.   Hematological: Negative for adenopathy. Does not bruise/bleed easily.   Psychiatric/Behavioral: Negative for sleep disturbance and depressed mood.        History  Past Medical History:   Diagnosis Date   • Bladder cancer (HCC)    • Cancer (HCC)     breast, bladder   • Deep  vein thrombosis (HCC)    • Essential hypertension 1/17/2017   • Fracture tibia/fibula, right, closed, initial encounter 8/27/2020   • GERD (gastroesophageal reflux disease)    • History of urostomy    • Immobility 08/27/2020    r/t broken Tibia    • Kidney carcinoma, right (HCC)     removed    • Long term current use of anticoagulant 1/9/2015   • PAF (paroxysmal atrial fibrillation) ([facility]) 6/26/2019   • Presence of cardiac pacemaker 11/03/2014    BS dual chamber PM placed 10/2014 with pocket revision 1/25/17.  HX tachy rob syndrome   • Sick sinus syndrome ([facility]) 11/3/2014     Past Surgical History:   Procedure Laterality Date   • BREAST LUMPECTOMY     • CHOLECYSTECTOMY N/A 10/12/2020    Procedure: CHOLECYSTECTOMY LAPAROSCOPIC;  Surgeon: Go Pereira DO;  Location: [facility] MAIN OR;  Service: General;  Laterality: N/A;   • CYSTECTOMY W/ URETEROILEAL CONDUIT     • HARDWARE REMOVAL Right 6/11/2021    Procedure: TIBIA HARDWARE REMOVAL;  Surgeon: Geoff Shah II, MD;  Location: [facility] MAIN OR;  Service: Orthopedics;  Laterality: Right;   • HERNIA REPAIR     • HYSTERECTOMY     • INSERT / REPLACE / REMOVE PACEMAKER     • NEPHRECTOMY Right    • TIBIA IM NAILING/RODDING Right 8/28/2020    Procedure: TIBIA INTRAMEDULLARY NAIL/ABRAHAM INSERTION;  Surgeon: Geoff Shah II, MD;  Location: [facility] MAIN OR;  Service: Orthopedics;  Laterality: Right;     Family History   Problem Relation Age of Onset   • COPD Father      Social History     Tobacco Use   • Smoking status: Never Smoker   • Smokeless tobacco: Never Used   Vaping Use   • Vaping Use: Never used   Substance Use Topics   • Alcohol use: Not Currently   • Drug use: Never     Medications Prior to Admission   Medication Sig Dispense Refill Last Dose   • acetaminophen (TYLENOL) 325 MG tablet Take 650 mg by mouth Every 6 (Six) Hours As Needed for Mild Pain .      • Cholecalciferol (Vitamin D3) 50 MCG (2000 UT) tablet Take 2,000 Units by mouth Daily.      •  digoxin (LANOXIN) 125 MCG tablet Take 1 tablet by mouth Daily. 30 tablet 3    • dilTIAZem CD (Cardizem CD) 240 MG 24 hr capsule Take 1 capsule by mouth Daily. 30 capsule 5    • HYDROcodone-acetaminophen (NORCO) 5-325 MG per tablet Take 1 tablet by mouth Every 6 (Six) Hours As Needed.      • Probiotic Product (PROBIOTIC PO) Take  by mouth Daily.      • topiramate (TOPAMAX) 100 MG tablet Take 100 mg by mouth every night at bedtime.      • warfarin (COUMADIN) 2 MG tablet Hold coumadin thurs and Friday INR Saturday at [facility] with OPTUM- restart at 2 mg Saturday  Indications: Atrial Fibrillation 30 tablet 1      Allergies:  Amoxicillin, Ciprofloxacin, Oxycodone-acetaminophen, Penicillins, Sulfa antibiotics, Cephalexin, Clavulanic acid, Codeine, Contrast dye, Doxycycline, Fluconazole, Iodine, Macrobid [nitrofurantoin], Tobramycin, and Trimethoprim    Objective     Vital Signs  Temp:  [97.8 °F (36.6 °C)-98.3 °F (36.8 °C)] 98 °F (36.7 °C)  Heart Rate:  [] 86  Resp:  [16-20] 16  BP: (136-166)/(70-90) 136/70    Physical Exam  Vitals reviewed.   Constitutional:       Appearance: She is well-developed.   HENT:      Head: Normocephalic and atraumatic.   Eyes:      Pupils: Pupils are equal, round, and reactive to light.   Cardiovascular:      Rate and Rhythm: Normal rate and regular rhythm.   Pulmonary:      Effort: Pulmonary effort is normal.      Breath sounds: Normal breath sounds.   Abdominal:      General: There is no distension.      Palpations: Abdomen is soft.      Tenderness: There is abdominal tenderness in the right lower quadrant, suprapubic area and left lower quadrant. There is no guarding or rebound.      Hernia: A hernia is present. Hernia is present in the ventral area.      Comments: Patient has a urostomy in the right midabdomen which is productive of clear yellow urine. Mildly tender to palpation around the urostomy. No overlying skin changes such as maceration, bleeding, bruising, or breakdown.    Musculoskeletal:         General: Normal range of motion.      Cervical back: Normal range of motion.   Lymphadenopathy:      Cervical: No cervical adenopathy.   Skin:     General: Skin is warm and dry.      Findings: No rash.   Neurological:      Mental Status: She is alert and oriented to person, place, and time.         Results Review:   Lab Results (last 24 hours)     Procedure Component Value Units Date/Time    Urine Culture - Urine, Urine, Clean Catch [[id number]]  (Abnormal)  (Susceptibility) Collected: 12/06/21 2315    Specimen: Urine, Clean Catch Updated: 12/08/21 0943     Urine Culture >100,000 CFU/mL Enterococcus faecalis      25,000 CFU/mL Mixed Becca Isolated    Narrative:      Specimen contains mixed organisms of questionable pathogenicity which indicates contamination with commensal becca.  Further identification is unlikely to provide clinically useful information.  Suggest recollection.    Susceptibility      Enterococcus faecalis     ASHLEY     Ampicillin Susceptible     Levofloxacin Susceptible     Nitrofurantoin Susceptible     Tetracycline Resistant     Vancomycin Susceptible                  Linear View                   Blood Culture - Blood, Arm, Left [[id number]]  (Normal) Collected: 12/06/21 2226    Specimen: Blood from Arm, Left Updated: 12/07/21 2245     Blood Culture No growth at 24 hours    Blood Culture - Blood, Arm, Left [[id number]]  (Normal) Collected: 12/06/21 2226    Specimen: Blood from Arm, Left Updated: 12/07/21 2245     Blood Culture No growth at 24 hours        CT Abdomen Pelvis With Contrast    Result Date: 12/7/2021  No acute process seen within the abdomen or pelvis with multiple incidental findings and chronic changes as above. Electronically signed by:  Denys Mendosa D.O.  12/6/2021 10:10 PM        I reviewed the patient's new imaging results and agree with the interpretation.  I reviewed the patient's other test results and agree with the interpretation    Assessment/Plan      Active Problems:    Abdominal pain  Sick sinus syndrome  GERD  Peripheral vascular disease  Atrial fibrillation on Coumadin  History of bladder cancer status post urostomy    Upon review of the patient's CT scan images the patient does have some hernias from her prior incision. These appear to be wide-based, and without any evidence of obstruction. I doubt that these are the source of her abdominal pain. Nothing else stands out which would account for the patient's lower abdominal pain.  Regardless, could consider placing patient in an abdominal binder as this may help with her abdominal pain. If these hernias are the source of her abdominal pain.  No plans for acute surgical intervention as the CT scan has essentially ruled out any acute findings which would necessitate urgent surgical intervention in her lower abdomen. She has multiple medical comorbidities which make her high risk for any surgical intervention on her abdomen. She also has COVID-19, which makes her not a surgical candidate for elective surgeries at this time.  Continue to treat urinary tract infection, although agree with urology that this may not be a true urinary tract infection.    I discussed the patients findings and my recommendations with the patient.     Go Wadsworth MD  12/08/21  15:16 EST

## 2021-12-08 NOTE — CONSULTS
Urology Consult Note    Patient:Hazel Bucio :1940  Room:[facility]  Admit Date2021  Age:81 y.o.     SEX:female     DOS:2021     MR:[id number]     Visit:[id number]       Attending: Lora Henderson MD  Referring Provider: Dr. Henderson  Reason for Consultation: UTI    Patient Care Team:  Lizzy Francis MD as PCP - General (Family Medicine)    Chief complaint abdominal pain    Subjective .     History of present illness: 81-year-old woman with history of bladder cancer for which she had a cystectomy.  She then had a right nephrectomy for recurrent cancer as well as chronic pyelonephritis.  Patient presented with pain in the suprapubic region below her ileal conduit.  Patient states she has been taking multiple courses of antibiotics for presumed urinary tract infections.  She states that the urine specimens for her cultures have always been taken from her back and not from the stoma with a catheter as is necessary.  Current urine culture is growing Enterococcus.  Sensitivities are pending.  She has been treated with Merrem.  I am not sure that this is a true urinary tract infection given the collection method.  CT scan does not show any obstruction or stones or other abnormalities to the kidney or conduit.    Review of Systems  10 point review of systems were reviewed and are negative except for:  Constitution:  positive for See HPI    History  Past Medical History:   Diagnosis Date   • Bladder cancer (HCC)    • Cancer (HCC)     breast, bladder   • Deep vein thrombosis (HCC)    • Essential hypertension 2017   • Fracture tibia/fibula, right, closed, initial encounter 2020   • GERD (gastroesophageal reflux disease)    • History of urostomy    • Immobility 2020    r/t broken Tibia    • Kidney carcinoma, right (HCC)     removed    • Long term current use of anticoagulant 2015   • PAF (paroxysmal atrial fibrillation) (HCC) 2019   • Presence of cardiac pacemaker 2014    BS dual  "chamber PM placed 10/2014 with pocket revision 1/25/17.  HX tachy rob syndrome   • Sick sinus syndrome (HCC) 11/3/2014     Past Surgical History:   Procedure Laterality Date   • BREAST LUMPECTOMY     • CHOLECYSTECTOMY N/A 10/12/2020    Procedure: CHOLECYSTECTOMY LAPAROSCOPIC;  Surgeon: Go Pereira DO;  Location: [facility] MAIN OR;  Service: General;  Laterality: N/A;   • CYSTECTOMY W/ URETEROILEAL CONDUIT     • HARDWARE REMOVAL Right 6/11/2021    Procedure: TIBIA HARDWARE REMOVAL;  Surgeon: Geoff Shah II, MD;  Location: [facility] MAIN OR;  Service: Orthopedics;  Laterality: Right;   • HERNIA REPAIR     • HYSTERECTOMY     • INSERT / REPLACE / REMOVE PACEMAKER     • NEPHRECTOMY Right    • TIBIA IM NAILING/RODDING Right 8/28/2020    Procedure: TIBIA INTRAMEDULLARY NAIL/ABRAHAM INSERTION;  Surgeon: Geoff Shah II, MD;  Location: [facility] MAIN OR;  Service: Orthopedics;  Laterality: Right;     Social History     Socioeconomic History   • Marital status:    Tobacco Use   • Smoking status: Never Smoker   • Smokeless tobacco: Never Used   Vaping Use   • Vaping Use: Never used   Substance and Sexual Activity   • Alcohol use: Not Currently   • Drug use: Never   • Sexual activity: Defer     Family History   Problem Relation Age of Onset   • COPD Father      Allergy  Allergies   Allergen Reactions   • Amoxicillin Shortness Of Breath   • Ciprofloxacin Hives   • Oxycodone-Acetaminophen Unknown - High Severity     Pt's son stated when pt fell and broke her leg she was put on oxycodone; pt's son stated pt's \"vitals went all out of wack, she couldn't remember things.\"   • Penicillins Rash   • Sulfa Antibiotics Hives   • Cephalexin Other (See Comments)   • Clavulanic Acid Other (See Comments)   • Codeine Other (See Comments)   • Contrast Dye Other (See Comments)   • Doxycycline Other (See Comments)   • Fluconazole Other (See Comments)   • Iodine Hives   • Macrobid [Nitrofurantoin] Hives   • Tobramycin " Other (See Comments)   • Trimethoprim Other (See Comments)     Prior to Admission medications    Medication Sig Start Date End Date Taking? Authorizing Provider   acetaminophen (TYLENOL) 325 MG tablet Take 650 mg by mouth Every 6 (Six) Hours As Needed for Mild Pain .    Gino Leos MD   Cholecalciferol (Vitamin D3) 50 MCG (2000 UT) tablet Take 2,000 Units by mouth Daily.    Gino Leos MD   digoxin (LANOXIN) 125 MCG tablet Take 1 tablet by mouth Daily. 21   Augustin Ellsworth MD   dilTIAZem CD (Cardizem CD) 240 MG 24 hr capsule Take 1 capsule by mouth Daily. 21   Augustin Ellsworth MD   HYDROcodone-acetaminophen (NORCO) 5-325 MG per tablet Take 1 tablet by mouth Every 6 (Six) Hours As Needed.    Gino Leos MD   Probiotic Product (PROBIOTIC PO) Take  by mouth Daily.    Gino Leos MD   topiramate (TOPAMAX) 100 MG tablet Take 100 mg by mouth every night at bedtime.    Gino Leos MD   warfarin (COUMADIN) 2 MG tablet Hold coumadin thurs and Friday INR Saturday at [facility] with OPTUM- restart at 2 mg Saturday  Indications: Atrial Fibrillation 21   Augustin Ellsworth MD         Objective     tMax 24 hours:  Temp (24hrs), Av °F (36.7 °C), Min:97.8 °F (36.6 °C), Max:98.3 °F (36.8 °C)    Vital Sign Ranges:  Temp:  [97.8 °F (36.6 °C)-98.3 °F (36.8 °C)] 97.8 °F (36.6 °C)  Heart Rate:  [] 87  Resp:  [16-20] 16  BP: (138-166)/(77-90) 145/80  Intake and Output Last 3 Shifts:  I/O last 3 completed shifts:  In: 480 [P.O.:480]  Out: 250 [Urine:250]      Physical Exam:   General Appearance: alert, appears stated age and cooperative  Head: normocephalic, without obvious abnormality and atraumatic  Eyes: lids and lashes normal, conjunctivae and sclerae normal, no icterus, no pallor and corneas clear  Lungs: respirations regular, respirations even and respirations unlabored  Heart: regular rhythm & normal rate  Abdomen: no guarding, no  rebound tenderness and Tender to palpation in the right lower quadrant below the conduit.  Extremities: moves extremities well, no edema, no cyanosis and no redness  Skin: no bleeding, bruising or rash  Neurologic: Mental Status orientated to person, place, time and situation    Results Review:     Lab Results (last 24 hours)     Procedure Component Value Units Date/Time    Blood Culture - Blood, Arm, Left [[id number]]  (Normal) Collected: 12/06/21 2226    Specimen: Blood from Arm, Left Updated: 12/07/21 2245     Blood Culture No growth at 24 hours    Blood Culture - Blood, Arm, Left [[id number]]  (Normal) Collected: 12/06/21 2226    Specimen: Blood from Arm, Left Updated: 12/07/21 2245     Blood Culture No growth at 24 hours    Urine Culture - Urine, Urine, Clean Catch [[id number]]  (Abnormal) Collected: 12/06/21 2315    Specimen: Urine, Clean Catch Updated: 12/07/21 1740     Urine Culture >100,000 CFU/mL Enterococcus species           Urine Culture   Date Value Ref Range Status   12/06/2021 >100,000 CFU/mL Enterococcus species (A)  Preliminary        Imaging Results (Last 7 Days)     Procedure Component Value Units Date/Time    CT Abdomen Pelvis With Contrast [[id number]] Collected: 12/07/21 0006     Updated: 12/07/21 0012    Narrative:      EXAMINATION: CT ABDOMEN AND PELVIS WITH IV CONTRAST       DATE OF EXAMINATION: 12/6/2021.    COMPARISON: 9/24/2021.    INDICATION: Abdominal and suprapubic pain.    PROCEDURE:  Axial CT of the abdomen and pelvis was performed with contrast and sagittal and coronal reformatted images were performed.  100 mL of Isovue-370 was given intravenously. CT dose lowering techniques were used, to include: automated exposure   control, adjustment for patient size, and/or use of iterative reconstruction.    FINDINGS:    LOWER CHEST :  There are some patchy and linear bands of opacities at the lung bases bilaterally which are likely atelectasis or scarring. There are no pleural or pericardial  effusions.    ABDOMEN:    Liver and Biliary system:  Normal.    Adrenal glands:  Normal.    Kidneys and ureters: Prior right nephrectomy. The left kidney and ureter otherwise appear unremarkable. There is a right lower quadrant loop ileostomy with prior cystectomy.    Spleen:  Several calcified granulomas are seen in the spleen.    Pancreas:  Normal.    Gallbladder:  Surgically absent.    Lymph nodes, Peritoneum and mesentery:  There is no mesenteric or retroperitoneal lymphadenopathy.    Gastrointestinal tract:  There are no dilated loops of bowel or free intraperitoneal air.    The appendix is not clearly seen.     Aorta/IVC:   There is moderate vascular calcification throughout the abdominal aorta without evidence of aneurysmal dilation or dissection.  IVC normal.    Abdominal wall:  Multiple abdominal wall hernias are unchanged.    PELVIS:    Fluid: There is no free fluid in the pelvis.    Lymph Nodes:  There is no pelvic or inguinal lymphadenopathy..    Urinary bladder:  Prior cystectomy with loop ileostomy in the right lower quadrant..    BONES:  There are no osseous destructive lesions..    ADDITIONAL  SIGNIFICANT FINDINGS:  None.      Impression:        No acute process seen within the abdomen or pelvis with multiple incidental findings and chronic changes as above.          Electronically signed by:  Denys Mendosa D.O.    12/6/2021 10:10 PM          Inpatient Meds:   Scheduled Meds:digoxin, 125 mcg, Oral, Daily  dilTIAZem CD, 240 mg, Oral, Daily  metoprolol tartrate, 100 mg, Oral, BID  saccharomyces boulardii, 250 mg, Oral, BID  sodium chloride, 3 mL, Intravenous, Q12H  topiramate, 100 mg, Oral, Nightly  warfarin, 2 mg, Oral, Daily       Continuous Infusions:    PRN Meds:.•  acetaminophen  •  Morphine **OR** Morphine  •  nitroglycerin  •  ondansetron **OR** ondansetron  •  sodium chloride  •  sodium chloride      Assessment/Plan     Active Problems:    Abdominal pain    UTI which may be chronic.  Specimen  not collected properly but would still treat the Enterococcus infection if no other cause for her pain is found  Lower abdominal pain which does not seem to be associated with the kidney or conduit    Plan  We will get nursing staff to catheterize stoma for a urinalysis.  This may be negative because of the antibiotic she is already received also.  We will ask general surgery to see the patient given that she has abdominal pain that does not appear to be associated with the conduit itself.      I discussed the patients findings and my recommendations with patient    Thank you for this  consult    Sarath Gupta MD  12/08/21  07:36 EST

## 2021-12-09 LAB
ANION GAP SERPL CALCULATED.3IONS-SCNC: 10 MMOL/L (ref 5–15)
BACTERIA SPEC AEROBE CULT: ABNORMAL
BACTERIA SPEC AEROBE CULT: ABNORMAL
BACTERIA SPEC AEROBE CULT: NO GROWTH
BASOPHILS # BLD AUTO: 0 10*3/MM3 (ref 0–0.2)
BASOPHILS NFR BLD AUTO: 0.1 % (ref 0–1.5)
BUN SERPL-MCNC: 49 MG/DL (ref 8–23)
BUN/CREAT SERPL: 51.6 (ref 7–25)
CALCIUM SPEC-SCNC: 9.4 MG/DL (ref 8.6–10.5)
CHLORIDE SERPL-SCNC: 107 MMOL/L (ref 98–107)
CO2 SERPL-SCNC: 22 MMOL/L (ref 22–29)
CREAT SERPL-MCNC: 0.95 MG/DL (ref 0.57–1)
DEPRECATED RDW RBC AUTO: 54.3 FL (ref 37–54)
EOSINOPHIL # BLD AUTO: 0 10*3/MM3 (ref 0–0.4)
EOSINOPHIL NFR BLD AUTO: 0.5 % (ref 0.3–6.2)
ERYTHROCYTE [DISTWIDTH] IN BLOOD BY AUTOMATED COUNT: 16.3 % (ref 12.3–15.4)
GFR SERPL CREATININE-BSD FRML MDRD: 57 ML/MIN/1.73
GLUCOSE SERPL-MCNC: 124 MG/DL (ref 65–99)
HCT VFR BLD AUTO: 45.7 % (ref 34–46.6)
HGB BLD-MCNC: 14.9 G/DL (ref 12–15.9)
INR PPP: 4.57 (ref 2–3)
LYMPHOCYTES # BLD AUTO: 0.9 10*3/MM3 (ref 0.7–3.1)
LYMPHOCYTES NFR BLD AUTO: 14.1 % (ref 19.6–45.3)
MCH RBC QN AUTO: 31.1 PG (ref 26.6–33)
MCHC RBC AUTO-ENTMCNC: 32.5 G/DL (ref 31.5–35.7)
MCV RBC AUTO: 95.6 FL (ref 79–97)
MONOCYTES # BLD AUTO: 0.3 10*3/MM3 (ref 0.1–0.9)
MONOCYTES NFR BLD AUTO: 5.6 % (ref 5–12)
NEUTROPHILS NFR BLD AUTO: 4.9 10*3/MM3 (ref 1.7–7)
NEUTROPHILS NFR BLD AUTO: 79.7 % (ref 42.7–76)
NRBC BLD AUTO-RTO: 0.2 /100 WBC (ref 0–0.2)
PLATELET # BLD AUTO: 181 10*3/MM3 (ref 140–450)
PMV BLD AUTO: 7.6 FL (ref 6–12)
POTASSIUM SERPL-SCNC: 4.6 MMOL/L (ref 3.5–5.2)
POTASSIUM SERPL-SCNC: 5.5 MMOL/L (ref 3.5–5.2)
PROTHROMBIN TIME: 45.3 SECONDS (ref 19.4–28.5)
QT INTERVAL: 310 MS
RBC # BLD AUTO: 4.79 10*6/MM3 (ref 3.77–5.28)
SODIUM SERPL-SCNC: 139 MMOL/L (ref 136–145)
WBC NRBC COR # BLD: 6.2 10*3/MM3 (ref 3.4–10.8)

## 2021-12-09 PROCEDURE — 80048 BASIC METABOLIC PNL TOTAL CA: CPT | Performed by: NURSE PRACTITIONER

## 2021-12-09 PROCEDURE — 25010000002 MORPHINE PER 10 MG: Performed by: FAMILY MEDICINE

## 2021-12-09 PROCEDURE — 85610 PROTHROMBIN TIME: CPT | Performed by: NURSE PRACTITIONER

## 2021-12-09 PROCEDURE — 0 PHYTONADIONE 10 MG/ML SOLUTION 1 ML AMPULE: Performed by: FAMILY MEDICINE

## 2021-12-09 PROCEDURE — 84132 ASSAY OF SERUM POTASSIUM: CPT | Performed by: INTERNAL MEDICINE

## 2021-12-09 PROCEDURE — 85025 COMPLETE CBC W/AUTO DIFF WBC: CPT | Performed by: NURSE PRACTITIONER

## 2021-12-09 RX ORDER — WARFARIN SODIUM 2 MG/1
2 TABLET ORAL
COMMUNITY
End: 2022-01-12

## 2021-12-09 RX ORDER — DICYCLOMINE HYDROCHLORIDE 10 MG/1
10 CAPSULE ORAL
COMMUNITY
End: 2022-02-22

## 2021-12-09 RX ORDER — METOPROLOL TARTRATE 100 MG/1
100 TABLET ORAL 2 TIMES DAILY
COMMUNITY
End: 2022-02-22

## 2021-12-09 RX ORDER — HYDRALAZINE HYDROCHLORIDE 20 MG/ML
10 INJECTION INTRAMUSCULAR; INTRAVENOUS EVERY 6 HOURS PRN
Status: DISCONTINUED | OUTPATIENT
Start: 2021-12-09 | End: 2021-12-10 | Stop reason: HOSPADM

## 2021-12-09 RX ADMIN — Medication 250 MG: at 08:29

## 2021-12-09 RX ADMIN — DIGOXIN 125 MCG: 125 TABLET ORAL at 12:14

## 2021-12-09 RX ADMIN — LINEZOLID 600 MG: 600 TABLET, FILM COATED ORAL at 20:04

## 2021-12-09 RX ADMIN — MORPHINE SULFATE 2 MG: 2 INJECTION, SOLUTION INTRAMUSCULAR; INTRAVENOUS at 20:14

## 2021-12-09 RX ADMIN — DILTIAZEM HYDROCHLORIDE 240 MG: 240 CAPSULE, COATED, EXTENDED RELEASE ORAL at 08:29

## 2021-12-09 RX ADMIN — LINEZOLID 600 MG: 600 TABLET, FILM COATED ORAL at 08:29

## 2021-12-09 RX ADMIN — Medication 250 MG: at 20:05

## 2021-12-09 RX ADMIN — METOPROLOL TARTRATE 100 MG: 50 TABLET, FILM COATED ORAL at 20:05

## 2021-12-09 RX ADMIN — SODIUM CHLORIDE, PRESERVATIVE FREE 3 ML: 5 INJECTION INTRAVENOUS at 08:33

## 2021-12-09 RX ADMIN — SODIUM CHLORIDE, PRESERVATIVE FREE 3 ML: 5 INJECTION INTRAVENOUS at 20:05

## 2021-12-09 RX ADMIN — METOPROLOL TARTRATE 100 MG: 50 TABLET, FILM COATED ORAL at 08:29

## 2021-12-09 RX ADMIN — TOPIRAMATE 100 MG: 100 TABLET, FILM COATED ORAL at 20:05

## 2021-12-09 RX ADMIN — PHYTONADIONE 5 MG: 10 INJECTION, EMULSION INTRAMUSCULAR; INTRAVENOUS; SUBCUTANEOUS at 11:02

## 2021-12-09 NOTE — CONSULTS
Infectious Diseases Consult Note    Referring Provider:     Reason for Consultation: UTI    Patient Care Team:  Lizzy Francis MD as PCP - General (Family Medicine)    Chief complaint abdominal pain around urostomy site    Subjective     The patient has been afebrile for the last 24 hours.  The patient is on room air, hemodynamically stable, and is tolerating antimicrobial therapy.    History of present illness:      This is a 81-year-old female presents the hospital 12/06/2021 with complaints of abdominal discomfort this been going on for approximately a week.  The pain is centered around her urostomy site.  Patient states that she is has frequent UTIs and is been on a course of p.o. Bactrim before admission.  Patient was also tested positive for COVID-19 on 12/07/2021 and has had both vaccinations and the booster.  She is currently not having any respiratory symptoms.  Patient denies fever, chills, diaphoresis, or GI symptoms.    Patient is currently on room air and does not appear to be in acute distress.  Patient's been afebrile since admission.  Patient has a creatinine 0.95, white blood cell count of 6.2, hemoglobin 14.9, platelets of 181.  Patient's first urinalysis shows 2+ bacteria with pyuria but the repeat urinalysis was negative.  The patient's first urinalysis cultures grew Enterococcus faecalis.  Blood cultures are negative.  CT did not show any acute processes in the abdomen to explain the abdominal pain patient has chronic abdominal wall hernias.  She is currently on p.o. Zyvox and has allergies to amoxicillin, Cipro, penicillin, Keflex, doxycycline, Diflucan, Macrobid, tobramycin, and sulfa antibiotics.    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 fib, pacemaker placement, sick sinus syndrome, and recent tibial hardware removal.  Patient denies tobacco,  alcohol, illicit drug abuse.    Review of Systems   Review of Systems   Constitutional: Negative.    HENT: Negative.    Eyes: Negative.    Respiratory: Negative.    Cardiovascular: Negative.    Gastrointestinal: Positive for abdominal pain.   Endocrine: Negative.    Genitourinary: Negative.    Musculoskeletal: Negative.    Skin: Negative.    Neurological: Negative.    Psychiatric/Behavioral: Negative.    All other systems reviewed and are negative.      Medications  Medications Prior to Admission   Medication Sig Dispense Refill Last Dose   • dicyclomine (BENTYL) 10 MG capsule Take 10 mg by mouth 4 (Four) Times a Day Before Meals & at Bedtime.      • metoprolol tartrate (LOPRESSOR) 100 MG tablet Take 100 mg by mouth 2 (Two) Times a Day.      • warfarin (COUMADIN) 2 MG tablet Take 2 mg by mouth Daily.      • acetaminophen (TYLENOL) 325 MG tablet Take 650 mg by mouth Every 6 (Six) Hours As Needed for Mild Pain .      • Cholecalciferol (Vitamin D3) 50 MCG (2000 UT) tablet Take 2,000 Units by mouth Daily.      • digoxin (LANOXIN) 125 MCG tablet Take 1 tablet by mouth Daily. 30 tablet 3    • dilTIAZem CD (Cardizem CD) 240 MG 24 hr capsule Take 1 capsule by mouth Daily. 30 capsule 5    • HYDROcodone-acetaminophen (NORCO) 5-325 MG per tablet Take 1 tablet by mouth Every 8 (Eight) Hours As Needed.      • Probiotic Product (PROBIOTIC PO) Take  by mouth Daily.      • topiramate (TOPAMAX) 100 MG tablet Take 100 mg by mouth every night at bedtime.          History  Past Medical History:   Diagnosis Date   • Bladder cancer (HCC)    • Cancer (HCC)     breast, bladder   • Deep vein thrombosis (HCC)    • Essential hypertension 1/17/2017   • Fracture tibia/fibula, right, closed, initial encounter 8/27/2020   • GERD (gastroesophageal reflux disease)    • History of urostomy    • Immobility 08/27/2020    r/t broken Tibia    • Kidney carcinoma, right (HCC)     removed    • Long term current use of anticoagulant 1/9/2015   • PAF  (paroxysmal atrial fibrillation) ([facility]) 6/26/2019   • Presence of cardiac pacemaker 11/03/2014    BS dual chamber PM placed 10/2014 with pocket revision 1/25/17.  HX tachy rob syndrome   • Sick sinus syndrome ([facility]) 11/3/2014     Past Surgical History:   Procedure Laterality Date   • BREAST LUMPECTOMY     • CHOLECYSTECTOMY N/A 10/12/2020    Procedure: CHOLECYSTECTOMY LAPAROSCOPIC;  Surgeon: Go Pereira DO;  Location: [facility] MAIN OR;  Service: General;  Laterality: N/A;   • CYSTECTOMY W/ URETEROILEAL CONDUIT     • HARDWARE REMOVAL Right 6/11/2021    Procedure: TIBIA HARDWARE REMOVAL;  Surgeon: Geoff Shah II, MD;  Location: [facility] MAIN OR;  Service: Orthopedics;  Laterality: Right;   • HERNIA REPAIR     • HYSTERECTOMY     • INSERT / REPLACE / REMOVE PACEMAKER     • NEPHRECTOMY Right    • TIBIA IM NAILING/RODDING Right 8/28/2020    Procedure: TIBIA INTRAMEDULLARY NAIL/ABRAHAM INSERTION;  Surgeon: Geoff Shah II, MD;  Location: [facility] MAIN OR;  Service: Orthopedics;  Laterality: Right;       Family History  Family History   Problem Relation Age of Onset   • COPD Father        Social History   reports that she has never smoked. She has never used smokeless tobacco. She reports previous alcohol use. She reports that she does not use drugs.    Allergies  Amoxicillin, Ciprofloxacin, Oxycodone-acetaminophen, Penicillins, Sulfa antibiotics, Cephalexin, Clavulanic acid, Codeine, Contrast dye, Doxycycline, Fluconazole, Iodine, Macrobid [nitrofurantoin], Tobramycin, and Trimethoprim    Objective     Vital Signs   Vital Signs (last 24 hours)       12/08 0700  12/09 0659 12/09 0700  12/09 1426   Most Recent      Temp (°F) 97.4 -  98.2    97.4 -  97.6     97.4 (36.3) 12/09 1219    Heart Rate 59 -  92      62     62 12/09 1219    Resp 16 -  18      16     16 12/09 1219    /71 -  138/77    124/57 -  197/68     124/57 12/09 1219    SpO2 (%) 96 -  98    93 -  95     95 12/09 1219          Physical  Exam:  Physical Exam  Vitals and nursing note reviewed.   Constitutional:       General: She is not in acute distress.     Appearance: Normal appearance. She is well-developed and normal weight. She is not diaphoretic.   HENT:      Head: Normocephalic and atraumatic.   Eyes:      General: No scleral icterus.     Extraocular Movements: Extraocular movements intact.      Conjunctiva/sclera: Conjunctivae normal.      Pupils: Pupils are equal, round, and reactive to light.   Cardiovascular:      Rate and Rhythm: Normal rate and regular rhythm.      Heart sounds: Normal heart sounds, S1 normal and S2 normal. No murmur heard.      Pulmonary:      Effort: Pulmonary effort is normal. No respiratory distress.      Breath sounds: Normal breath sounds. No stridor. No wheezing or rales.   Chest:      Chest wall: No tenderness.   Abdominal:      General: Bowel sounds are normal. There is no distension.      Palpations: Abdomen is soft. There is no mass.      Tenderness: There is no abdominal tenderness. There is no guarding.      Comments: Urostomy in place with pink healthy looking stoma.  Patient has a lot of tenderness around the urostomy site  Urine is clear yellow   Musculoskeletal:         General: No swelling, tenderness or deformity. Normal range of motion.      Cervical back: Neck supple.   Skin:     General: Skin is warm and dry.      Coloration: Skin is not pale.      Findings: No bruising, erythema or rash.      Comments: Patient has discoloration to the distal aspects of both feet due to poor circulation-pulses are very difficult to palpate   Neurological:      General: No focal deficit present.      Mental Status: She is alert and oriented to person, place, and time. Mental status is at baseline.      Cranial Nerves: No cranial nerve deficit.   Psychiatric:         Mood and Affect: Mood normal.         Microbiology  Microbiology Results (last 10 days)     Procedure Component Value - Date/Time    Urine Culture -  Urine, Urine, Catheter In/Out [[id number]]  (Normal) Collected: 12/08/21 1742    Lab Status: Preliminary result Specimen: Urine, Catheter In/Out Updated: 12/09/21 1038     Urine Culture No growth    COVID PRE-OP / PRE-PROCEDURE SCREENING ORDER (NO ISOLATION) - Swab, Nasopharynx [[id number]]  (Abnormal) Collected: 12/07/21 0050    Lab Status: Final result Specimen: Swab from Nasopharynx Updated: 12/07/21 0155    Narrative:      The following orders were created for panel order COVID PRE-OP / PRE-PROCEDURE SCREENING ORDER (NO ISOLATION) - Swab, Nasopharynx.  Procedure                               Abnormality         Status                     ---------                               -----------         ------                     COVID-19,CEPHEID/OSORIO/BD...[[id number]]  Abnormal            Final result                 Please view results for these tests on the individual orders.    COVID-19,CEPHEID/OSORIO/BDMAX,COR/ANNY/PAD/GENNY IN-HOUSE(OR EMERGENT/ADD-ON),NP SWAB IN TRANSPORT MEDIA 3-4 HR TAT, RT-PCR - Swab, Nasopharynx [[id number]]  (Abnormal) Collected: 12/07/21 0050    Lab Status: Final result Specimen: Swab from Nasopharynx Updated: 12/07/21 0155     COVID19 Detected    Narrative:      Fact sheet for providers: https://www.fda.gov/media/099199/download     Fact sheet for patients: https://www.fda.gov/media/715420/download  Fact sheet for providers: https://www.fda.gov/media/397402/download    Fact sheet for patients: https://www.fda.gov/media/478206/download    Test performed by PCR.    Urine Culture - Urine, Urine, Clean Catch [[id number]]  (Abnormal)  (Susceptibility) Collected: 12/06/21 2315    Lab Status: Final result Specimen: Urine, Clean Catch Updated: 12/08/21 0943     Urine Culture >100,000 CFU/mL Enterococcus faecalis      25,000 CFU/mL Mixed Becca Isolated    Narrative:      Specimen contains mixed organisms of questionable pathogenicity which indicates contamination with commensal becca.  Further  identification is unlikely to provide clinically useful information.  Suggest recollection.    Susceptibility      Enterococcus faecalis     ASHLEY     Ampicillin Susceptible     Levofloxacin Susceptible     Nitrofurantoin Susceptible     Tetracycline Resistant     Vancomycin Susceptible                         Blood Culture - Blood, Arm, Left [[id number]]  (Normal) Collected: 12/06/21 2226    Lab Status: Preliminary result Specimen: Blood from Arm, Left Updated: 12/08/21 2245     Blood Culture No growth at 2 days    Blood Culture - Blood, Arm, Left [[id number]]  (Normal) Collected: 12/06/21 2226    Lab Status: Preliminary result Specimen: Blood from Arm, Left Updated: 12/08/21 2245     Blood Culture No growth at 2 days          Laboratory  Results from last 7 days   Lab Units 12/09/21  0438   WBC 10*3/mm3 6.20   HEMOGLOBIN g/dL 14.9   HEMATOCRIT % 45.7   PLATELETS 10*3/mm3 181     Results from last 7 days   Lab Units 12/09/21  0438   SODIUM mmol/L 139   POTASSIUM mmol/L 5.5*   CHLORIDE mmol/L 107   CO2 mmol/L 22.0   BUN mg/dL 49*   CREATININE mg/dL 0.95   GLUCOSE mg/dL 124*   CALCIUM mg/dL 9.4     Results from last 7 days   Lab Units 12/09/21  0438   SODIUM mmol/L 139   POTASSIUM mmol/L 5.5*   CHLORIDE mmol/L 107   CO2 mmol/L 22.0   BUN mg/dL 49*   CREATININE mg/dL 0.95   GLUCOSE mg/dL 124*   CALCIUM mg/dL 9.4                   Radiology  Imaging Results (Last 72 Hours)     Procedure Component Value Units Date/Time    CT Abdomen Pelvis With Contrast [[id number]] Collected: 12/07/21 0006     Updated: 12/07/21 0012    Narrative:      EXAMINATION: CT ABDOMEN AND PELVIS WITH IV CONTRAST       DATE OF EXAMINATION: 12/6/2021.    COMPARISON: 9/24/2021.    INDICATION: Abdominal and suprapubic pain.    PROCEDURE:  Axial CT of the abdomen and pelvis was performed with contrast and sagittal and coronal reformatted images were performed.  100 mL of Isovue-370 was given intravenously. CT dose lowering techniques were used, to  include: automated exposure   control, adjustment for patient size, and/or use of iterative reconstruction.    FINDINGS:    LOWER CHEST :  There are some patchy and linear bands of opacities at the lung bases bilaterally which are likely atelectasis or scarring. There are no pleural or pericardial effusions.    ABDOMEN:    Liver and Biliary system:  Normal.    Adrenal glands:  Normal.    Kidneys and ureters: Prior right nephrectomy. The left kidney and ureter otherwise appear unremarkable. There is a right lower quadrant loop ileostomy with prior cystectomy.    Spleen:  Several calcified granulomas are seen in the spleen.    Pancreas:  Normal.    Gallbladder:  Surgically absent.    Lymph nodes, Peritoneum and mesentery:  There is no mesenteric or retroperitoneal lymphadenopathy.    Gastrointestinal tract:  There are no dilated loops of bowel or free intraperitoneal air.    The appendix is not clearly seen.     Aorta/IVC:   There is moderate vascular calcification throughout the abdominal aorta without evidence of aneurysmal dilation or dissection.  IVC normal.    Abdominal wall:  Multiple abdominal wall hernias are unchanged.    PELVIS:    Fluid: There is no free fluid in the pelvis.    Lymph Nodes:  There is no pelvic or inguinal lymphadenopathy..    Urinary bladder:  Prior cystectomy with loop ileostomy in the right lower quadrant..    BONES:  There are no osseous destructive lesions..    ADDITIONAL  SIGNIFICANT FINDINGS:  None.      Impression:        No acute process seen within the abdomen or pelvis with multiple incidental findings and chronic changes as above.          Electronically signed by:  Denys Mendosa D.O.    12/6/2021 10:10 PM          Cardiology      Results Review:  I have reviewed all clinical data, test, lab, and imaging results.       Schedule Meds  digoxin, 125 mcg, Oral, Daily  dilTIAZem CD, 240 mg, Oral, Daily  linezolid, 600 mg, Oral, Q12H  metoprolol tartrate, 100 mg, Oral,  BID  saccharomyces boulardii, 250 mg, Oral, BID  sodium chloride, 3 mL, Intravenous, Q12H  topiramate, 100 mg, Oral, Nightly        Infusion Meds       PRN Meds  •  acetaminophen  •  hydrALAZINE  •  Morphine **OR** Morphine  •  nitroglycerin  •  ondansetron **OR** ondansetron  •  sodium chloride  •  sodium chloride      Assessment/Plan       Assessment    Urinary tract infection with Enterococcus faecalis.  Patient has a urostomy so it is difficult to tell if this is a real infection.  Patient's only symptom is fairly severe abdominal pain around the urostomy site.  Patient's first urinalysis showed 2+ bacteria with pyuria and grew Enterococcus faecalis however the second culture done 2 days later showed no significant infection.  However patient had been on antibiotics  -CT the abdomen pelvis did not show any acute findings  -She states she had been on a course of p.o. Bactrim before admission    COVID-19 infection in a vaccinated patient.  Patient tested positive on 12/07/2021 but currently has no symptoms.  She has had both oxygenation and a booster.  Patient is currently on room air.    History of bladder cancer with a cystectomy and ureteroileal conduit    Right renal cancer with nephrectomy    Breast cancer    Significant peripheral vascular disease with immobility syndrome    History of DVT    A. fib, sick sinus syndrome, pacemaker placement    Recent hardware removal-right tibia in June 2021    Plan    Continue p.o. Zyvox for 7 days  No need for treatment for COVID-19 since patient is asymptomatic  Continue supportive care  Patient will need to be on enhanced airborne isolation for 10 days from the first COVID-19 positive test on 12/07/2021  Not much more to add from infectious disease standpoint-we will sign off at this time-please call with any questions.    Appropriate PPE's disease during this assessment    Sarina Jon, APRN  12/09/21  14:26 EST    Note is dictated utilizing voice recognition  software/Vin

## 2021-12-09 NOTE — PLAN OF CARE
Goal Outcome Evaluation:  Plan of Care Reviewed With: patient        Progress: no change  Outcome Summary: pt c/o pain, gave prn med, pt did not rest well

## 2021-12-09 NOTE — PROGRESS NOTES
Urology Progress Note    Patient Identification:  Name:  Hazel Bucio  Age:  81 y.o.  Sex:  female  :  1940  MRN:  [id number]    Chief Complaint: Feels a little better    History of Present Illness: Still having some right lower quadrant pain.  Her urine is very clear.  The catheterized specimen was sent for urinalysis.  This does show 6-12 white cells and trace of bacteria.  Culture is pending.  Previous culture from the bag is growing Enterococcus that is sensitive to mostly antibiotics she is allergic to.  The only antibiotic she is not allergic to is vancomycin.  Appreciate general surgery's input.  They do not feel that there is an acute issue though there are some hernias present.    Problem List:  Patient Active Problem List   Diagnosis   • PAF (paroxysmal atrial fibrillation) ([facility])   • Dyslipidemia   • Essential hypertension   • Long term current use of anticoagulant   • Presence of cardiac pacemaker   • Sick sinus syndrome (HCC)   • Type 2 diabetes mellitus (HCC)   • Tear film insufficiency   • Presbyopia   • Macular puckering of retina   • Tear film insufficiency, bilateral   • Pseudophakia, both eyes   • Lens replaced by other means   • Epiretinal membrane, bilateral   • Acute encephalopathy   • History of carcinoma in situ of breast   • Ureteral cancer (HCC)   • Chronic insomnia   • Seasonal allergies   • Altered mental status   • MACRINA (acute kidney injury) ([facility])   • Cholecystitis with cholelithiasis   • Acute UTI   • Abdominal pain     Past Medical History:  Past Medical History:   Diagnosis Date   • Bladder cancer (HCC)    • Cancer (HCC)     breast, bladder   • Deep vein thrombosis (HCC)    • Essential hypertension 2017   • Fracture tibia/fibula, right, closed, initial encounter 2020   • GERD (gastroesophageal reflux disease)    • History of urostomy    • Immobility 2020    r/t broken Tibia    • Kidney carcinoma, right (HCC)     removed    • Long term current use of anticoagulant  1/9/2015   • PAF (paroxysmal atrial fibrillation) ([facility]) 6/26/2019   • Presence of cardiac pacemaker 11/03/2014    BS dual chamber PM placed 10/2014 with pocket revision 1/25/17.  HX tachy rob syndrome   • Sick sinus syndrome ([facility]) 11/3/2014     Past Surgical History:  Past Surgical History:   Procedure Laterality Date   • BREAST LUMPECTOMY     • CHOLECYSTECTOMY N/A 10/12/2020    Procedure: CHOLECYSTECTOMY LAPAROSCOPIC;  Surgeon: Go Pereira DO;  Location: [facility] MAIN OR;  Service: General;  Laterality: N/A;   • CYSTECTOMY W/ URETEROILEAL CONDUIT     • HARDWARE REMOVAL Right 6/11/2021    Procedure: TIBIA HARDWARE REMOVAL;  Surgeon: Geoff Shah II, MD;  Location: [facility] MAIN OR;  Service: Orthopedics;  Laterality: Right;   • HERNIA REPAIR     • HYSTERECTOMY     • INSERT / REPLACE / REMOVE PACEMAKER     • NEPHRECTOMY Right    • TIBIA IM NAILING/RODDING Right 8/28/2020    Procedure: TIBIA INTRAMEDULLARY NAIL/ABRAAHM INSERTION;  Surgeon: Geoff Shah II, MD;  Location: [facility] MAIN OR;  Service: Orthopedics;  Laterality: Right;     Home Meds:  Medications Prior to Admission   Medication Sig Dispense Refill Last Dose   • acetaminophen (TYLENOL) 325 MG tablet Take 650 mg by mouth Every 6 (Six) Hours As Needed for Mild Pain .      • Cholecalciferol (Vitamin D3) 50 MCG (2000 UT) tablet Take 2,000 Units by mouth Daily.      • digoxin (LANOXIN) 125 MCG tablet Take 1 tablet by mouth Daily. 30 tablet 3    • dilTIAZem CD (Cardizem CD) 240 MG 24 hr capsule Take 1 capsule by mouth Daily. 30 capsule 5    • HYDROcodone-acetaminophen (NORCO) 5-325 MG per tablet Take 1 tablet by mouth Every 6 (Six) Hours As Needed.      • Probiotic Product (PROBIOTIC PO) Take  by mouth Daily.      • topiramate (TOPAMAX) 100 MG tablet Take 100 mg by mouth every night at bedtime.      • warfarin (COUMADIN) 2 MG tablet Hold coumadin thurs and Friday INR Saturday at [facility] with OPTUM- restart at 2 mg Saturday  Indications:  Atrial Fibrillation 30 tablet 1      Current Meds:    Current Facility-Administered Medications:   •  acetaminophen (TYLENOL) tablet 650 mg, 650 mg, Oral, Q6H PRN, Hilario Esqueda MD, 650 mg at 12/08/21 0100  •  digoxin (LANOXIN) tablet 125 mcg, 125 mcg, Oral, Daily, [place] Preeti M, APRN, 125 mcg at 12/08/21 1236  •  dilTIAZem CD (CARDIZEM CD) 24 hr capsule 240 mg, 240 mg, Oral, Daily, [place], Preeti M, APRN, 240 mg at 12/08/21 0840  •  linezolid (ZYVOX) tablet 600 mg, 600 mg, Oral, Q12H, [place] M, APRN, 600 mg at 12/08/21 1738  •  metoprolol tartrate (LOPRESSOR) tablet 100 mg, 100 mg, Oral, BID, [place], Preeti M, APRN, 100 mg at 12/08/21 2018  •  morphine injection 1 mg, 1 mg, Intravenous, Q2H PRN **OR** morphine injection 2 mg, 2 mg, Intravenous, Q2H PRN, Hilario Esqueda MD, 2 mg at 12/08/21 2350  •  nitroglycerin (NITROSTAT) SL tablet 0.4 mg, 0.4 mg, Sublingual, Q5 Min PRN, [place] Preeti M, APRN  •  ondansetron (ZOFRAN) tablet 4 mg, 4 mg, Oral, Q6H PRN **OR** ondansetron (ZOFRAN) injection 4 mg, 4 mg, Intravenous, Q6H PRN, [place] Preeti M, APRN  •  saccharomyces boulardii (FLORASTOR) capsule 250 mg, 250 mg, Oral, BID, [place], Preeti M, APRN, 250 mg at 12/08/21 2018  •  sodium chloride 0.9 % flush 10 mL, 10 mL, Intravenous, PRN, Josias Anders PA  •  sodium chloride 0.9 % flush 3 mL, 3 mL, Intravenous, Q12H, [place], Preeti M, APRN, 3 mL at 12/08/21 2018  •  sodium chloride 0.9 % flush 3-10 mL, 3-10 mL, Intravenous, PRN, Preeti James APRN  •  topiramate (TOPAMAX) tablet 100 mg, 100 mg, Oral, Nightly, Preeti Jamse APRN, 100 mg at 12/08/21 2018  •  warfarin (COUMADIN) tablet 2 mg, 2 mg, Oral, Daily, Preeti James APRN, 2 mg at 12/08/21 1738  Allergies:  Amoxicillin, Ciprofloxacin, Oxycodone-acetaminophen, Penicillins, Sulfa antibiotics, Cephalexin, Clavulanic acid, Codeine, Contrast dye, Doxycycline, Fluconazole, Iodine, Macrobid [nitrofurantoin], Tobramycin, and Trimethoprim    Review of Systems no  "fevers or chills.  No congestion or nasal discharge.    Objective:  tMax 24 hours:  Temp (24hrs), Av.8 °F (36.6 °C), Min:97.4 °F (36.3 °C), Max:98.2 °F (36.8 °C)    Vital Sign Ranges:  Temp:  [97.4 °F (36.3 °C)-98.2 °F (36.8 °C)] 97.6 °F (36.4 °C)  Heart Rate:  [59-92] 59  Resp:  [16-18] 16  BP: (120-138)/(70-81) 133/77  Intake and Output Last 3 Shifts:  I/O last 3 completed shifts:  In: 2400 [P.O.:2400]  Out: 900 [Urine:900]    Exam:  /77 (BP Location: Left arm, Patient Position: Lying)   Pulse 59   Temp 97.6 °F (36.4 °C) (Oral)   Resp 16   Ht 162.6 cm (64\")   Wt 69.1 kg (152 lb 6.4 oz)   SpO2 96%   BMI 26.16 kg/m²    General Appearance:    Alert, cooperative, no acute distress, general         appearance is normal   Head:    Normocephalic, without obvious abnormality, atraumatic   Eyes:            Pupils/Irises normal. Exterior inspection conjunctivae       and lids normal.   Ears:    Normal external inspection   Nose:   Exterior inspection of nose is normal   Throat:   Lips, mucosa, and tongue normal   Lungs:     Respirations unlabored; normal effort, no audible     abnormality   CV:   Regular rhythm and normal rate, no edema   Abdomen:    Mild tenderness in the right lower quadrant suprapubic region on the right.  Stoma is draining clear urine.   :        Data Review:  All labs (24hrs):    Lab Results (last 24 hours)     Procedure Component Value Units Date/Time    Basic Metabolic Panel [[id number]]  (Abnormal) Collected: 21 0438    Specimen: Blood Updated: 21 0608     Glucose 124 mg/dL      BUN 49 mg/dL      Creatinine 0.95 mg/dL      Sodium 139 mmol/L      Potassium 5.5 mmol/L      Comment: Slight hemolysis detected by analyzer. Results may be affected.  Result checked         Chloride 107 mmol/L      CO2 22.0 mmol/L      Calcium 9.4 mg/dL      eGFR Non African Amer 57 mL/min/1.73      BUN/Creatinine Ratio 51.6     Anion Gap 10.0 mmol/L     Narrative:      GFR Normal >60  Chronic " Kidney Disease <60  Kidney Failure <15      Protime-INR [[id number]]  (Abnormal) Collected: 12/09/21 0438    Specimen: Blood Updated: 12/09/21 0537     Protime 45.3 Seconds      INR 4.57    CBC & Differential [[id number]]  (Abnormal) Collected: 12/09/21 0438    Specimen: Blood Updated: 12/09/21 0527    Narrative:      The following orders were created for panel order CBC & Differential.  Procedure                               Abnormality         Status                     ---------                               -----------         ------                     CBC Auto Differential[[id number]]        Abnormal            Final result                 Please view results for these tests on the individual orders.    CBC Auto Differential [[id number]]  (Abnormal) Collected: 12/09/21 0438    Specimen: Blood Updated: 12/09/21 0527     WBC 6.20 10*3/mm3      RBC 4.79 10*6/mm3      Hemoglobin 14.9 g/dL      Hematocrit 45.7 %      MCV 95.6 fL      MCH 31.1 pg      MCHC 32.5 g/dL      RDW 16.3 %      RDW-SD 54.3 fl      MPV 7.6 fL      Platelets 181 10*3/mm3      Neutrophil % 79.7 %      Lymphocyte % 14.1 %      Monocyte % 5.6 %      Eosinophil % 0.5 %      Basophil % 0.1 %      Neutrophils, Absolute 4.90 10*3/mm3      Lymphocytes, Absolute 0.90 10*3/mm3      Monocytes, Absolute 0.30 10*3/mm3      Eosinophils, Absolute 0.00 10*3/mm3      Basophils, Absolute 0.00 10*3/mm3      nRBC 0.2 /100 WBC     Blood Culture - Blood, Arm, Left [[id number]]  (Normal) Collected: 12/06/21 2226    Specimen: Blood from Arm, Left Updated: 12/08/21 2245     Blood Culture No growth at 2 days    Blood Culture - Blood, Arm, Left [[id number]]  (Normal) Collected: 12/06/21 2226    Specimen: Blood from Arm, Left Updated: 12/08/21 2245     Blood Culture No growth at 2 days    Basic Metabolic Panel [[id number]]  (Abnormal) Collected: 12/08/21 2134    Specimen: Blood Updated: 12/08/21 2215     Glucose 167 mg/dL      BUN 54 mg/dL      Creatinine 1.00 mg/dL       Sodium 135 mmol/L      Potassium 4.4 mmol/L      Comment: Slight hemolysis detected by analyzer. Results may be affected.        Chloride 110 mmol/L      CO2 16.0 mmol/L      Calcium 8.4 mg/dL      eGFR Non African Amer 53 mL/min/1.73      BUN/Creatinine Ratio 54.0     Anion Gap 9.0 mmol/L     Narrative:      GFR Normal >60  Chronic Kidney Disease <60  Kidney Failure <15      Protime-INR [[id number]]  (Abnormal) Collected: 12/08/21 1912    Specimen: Blood Updated: 12/08/21 1957     Protime 39.3 Seconds      INR 3.92    Urinalysis, Microscopic Only - Urine, Catheter In/Out [[id number]]  (Abnormal) Collected: 12/08/21 1742    Specimen: Urine, Catheter In/Out Updated: 12/08/21 1939     RBC, UA 0-2 /HPF      WBC, UA 6-12 /HPF      Bacteria, UA Trace /HPF      Squamous Epithelial Cells, UA 0-2 /HPF      Transitional Epithelial Cells, UA 0-2 /HPF      Hyaline Casts, UA 0-2 /LPF      Methodology Manual Light Microscopy    Urine Culture - Urine, Urine, Catheter In/Out [[id number]] Collected: 12/08/21 1742    Specimen: Urine, Catheter In/Out Updated: 12/08/21 1939    Urinalysis With Culture If Indicated - Urine, Catheter In/Out [[id number]]  (Abnormal) Collected: 12/08/21 1742    Specimen: Urine, Catheter In/Out Updated: 12/08/21 1930     Color, UA Yellow     Appearance, UA Slightly Cloudy     Comment: Result checked         pH, UA 6.0     Specific Gravity, UA 1.017     Glucose, UA Negative     Ketones, UA Negative     Bilirubin, UA Negative     Blood, UA Small (1+)     Protein, UA Trace     Leuk Esterase, UA Negative     Nitrite, UA Negative     Urobilinogen, UA 0.2 E.U./dL    CBC & Differential [[id number]]  (Abnormal) Collected: 12/08/21 1912    Specimen: Blood Updated: 12/08/21 1929    Narrative:      The following orders were created for panel order CBC & Differential.  Procedure                               Abnormality         Status                     ---------                               -----------         ------                      CBC Auto Differential[[id number]]        Abnormal            Final result                 Please view results for these tests on the individual orders.    CBC Auto Differential [[id number]]  (Abnormal) Collected: 12/08/21 1912    Specimen: Blood Updated: 12/08/21 1929     WBC 9.20 10*3/mm3      RBC 4.83 10*6/mm3      Hemoglobin 15.2 g/dL      Hematocrit 45.6 %      MCV 94.4 fL      MCH 31.4 pg      MCHC 33.3 g/dL      RDW 16.6 %      RDW-SD 54.3 fl      MPV 7.7 fL      Platelets 164 10*3/mm3      Neutrophil % 84.8 %      Lymphocyte % 10.4 %      Monocyte % 4.0 %      Eosinophil % 0.2 %      Basophil % 0.6 %      Neutrophils, Absolute 7.80 10*3/mm3      Lymphocytes, Absolute 1.00 10*3/mm3      Monocytes, Absolute 0.40 10*3/mm3      Eosinophils, Absolute 0.00 10*3/mm3      Basophils, Absolute 0.10 10*3/mm3      nRBC 0.1 /100 WBC     Urine Culture - Urine, Urine, Clean Catch [[id number]]  (Abnormal)  (Susceptibility) Collected: 12/06/21 2315    Specimen: Urine, Clean Catch Updated: 12/08/21 0943     Urine Culture >100,000 CFU/mL Enterococcus faecalis      25,000 CFU/mL Mixed Becca Isolated    Narrative:      Specimen contains mixed organisms of questionable pathogenicity which indicates contamination with commensal becca.  Further identification is unlikely to provide clinically useful information.  Suggest recollection.    Susceptibility      Enterococcus faecalis     ASHLEY     Ampicillin Susceptible     Levofloxacin Susceptible     Nitrofurantoin Susceptible     Tetracycline Resistant     Vancomycin Susceptible                  Linear View                       Radiology:   Imaging Results (Last 72 Hours)     Procedure Component Value Units Date/Time    CT Abdomen Pelvis With Contrast [[id number]] Collected: 12/07/21 0006     Updated: 12/07/21 0012    Narrative:      EXAMINATION: CT ABDOMEN AND PELVIS WITH IV CONTRAST       DATE OF EXAMINATION: 12/6/2021.    COMPARISON: 9/24/2021.    INDICATION: Abdominal  and suprapubic pain.    PROCEDURE:  Axial CT of the abdomen and pelvis was performed with contrast and sagittal and coronal reformatted images were performed.  100 mL of Isovue-370 was given intravenously. CT dose lowering techniques were used, to include: automated exposure   control, adjustment for patient size, and/or use of iterative reconstruction.    FINDINGS:    LOWER CHEST :  There are some patchy and linear bands of opacities at the lung bases bilaterally which are likely atelectasis or scarring. There are no pleural or pericardial effusions.    ABDOMEN:    Liver and Biliary system:  Normal.    Adrenal glands:  Normal.    Kidneys and ureters: Prior right nephrectomy. The left kidney and ureter otherwise appear unremarkable. There is a right lower quadrant loop ileostomy with prior cystectomy.    Spleen:  Several calcified granulomas are seen in the spleen.    Pancreas:  Normal.    Gallbladder:  Surgically absent.    Lymph nodes, Peritoneum and mesentery:  There is no mesenteric or retroperitoneal lymphadenopathy.    Gastrointestinal tract:  There are no dilated loops of bowel or free intraperitoneal air.    The appendix is not clearly seen.     Aorta/IVC:   There is moderate vascular calcification throughout the abdominal aorta without evidence of aneurysmal dilation or dissection.  IVC normal.    Abdominal wall:  Multiple abdominal wall hernias are unchanged.    PELVIS:    Fluid: There is no free fluid in the pelvis.    Lymph Nodes:  There is no pelvic or inguinal lymphadenopathy..    Urinary bladder:  Prior cystectomy with loop ileostomy in the right lower quadrant..    BONES:  There are no osseous destructive lesions..    ADDITIONAL  SIGNIFICANT FINDINGS:  None.      Impression:        No acute process seen within the abdomen or pelvis with multiple incidental findings and chronic changes as above.          Electronically signed by:  Denys Mendosa D.O.    12/6/2021 10:10 PM           Assessment/Plan:    Active Problems:    Abdominal pain     Right lower quadrant pain of unknown etiology.  Would not be due to a urinary tract infection even if there is one.  Possibly due to hernias but nothing acute on CT scan and she would be a very difficult surgical case.  Possible urinary tract infection.  Await results of catheterized specimen.  Consider switching to vancomycin.  Consider ID consult.    Plan  Await results of culture on catheterized specimen.      Sarath Gupta MD  12/9/2021  07:44 EST

## 2021-12-09 NOTE — PROGRESS NOTES
LOS: 0 days   Patient Care Team:  Lizzy Farncis MD as PCP - General (Family Medicine)    Subjective     Interval History:     Patient Complaints: pt is feeling a little better but still rates her pain 7/10.  She has been seen by general surgery who have r/o cause for acute abd and need for surgery    History taken from: patient    Review of Systems   Constitutional: Positive for activity change, appetite change and fatigue. Negative for fever.   Gastrointestinal: Positive for abdominal pain.   Musculoskeletal: Positive for arthralgias.   Neurological: Positive for weakness.           Objective     Vital Signs  Temp:  [97.4 °F (36.3 °C)-98 °F (36.7 °C)] 97.6 °F (36.4 °C)  Heart Rate:  [59-86] 62  Resp:  [16] 16  BP: (120-197)/(68-81) 197/68    Physical Exam:     General Appearance:    Alert, cooperative, in no acute distress   Head:    Normocephalic, without obvious abnormality, atraumatic   Eyes:            Lids and lashes normal, conjunctivae and sclerae normal, no   icterus, no pallor, corneas clear, PERRLA   Ears:    Ears appear intact with no abnormalities noted   Throat:   No oral lesions, no thrush, oral mucosa moist   Neck:   No adenopathy, supple, trachea midline, no thyromegaly, no   carotid bruit, no JVD   Lungs:     Clear to auscultation,respirations regular, even and                  unlabored    Heart:    Regular rhythm and normal rate, normal S1 and S2, no            murmur, no gallop, no rub, no click   Chest Wall:    No abnormalities observed   Abdomen:     Normal bowel sounds, no masses, no organomegaly, soft        tender, non-distended, no guarding, no rebound                tenderness   Extremities:   Moves all extremities well, no edema, no cyanosis, no             redness   Pulses:   Pulses palpable and equal bilaterally   Skin:   No bleeding, bruising or rash   Lymph nodes:   No palpable adenopathy   Neurologic:   Cranial nerves 2 - 12 grossly intact, sensation intact, DTR       present  and equal bilaterally        Results Review:    Lab Results (last 24 hours)     Procedure Component Value Units Date/Time    Basic Metabolic Panel [[id number]]  (Abnormal) Collected: 12/09/21 0438    Specimen: Blood Updated: 12/09/21 0608     Glucose 124 mg/dL      BUN 49 mg/dL      Creatinine 0.95 mg/dL      Sodium 139 mmol/L      Potassium 5.5 mmol/L      Comment: Slight hemolysis detected by analyzer. Results may be affected.  Result checked         Chloride 107 mmol/L      CO2 22.0 mmol/L      Calcium 9.4 mg/dL      eGFR Non African Amer 57 mL/min/1.73      BUN/Creatinine Ratio 51.6     Anion Gap 10.0 mmol/L     Narrative:      GFR Normal >60  Chronic Kidney Disease <60  Kidney Failure <15      Protime-INR [[id number]]  (Abnormal) Collected: 12/09/21 0438    Specimen: Blood Updated: 12/09/21 0537     Protime 45.3 Seconds      INR 4.57    CBC & Differential [[id number]]  (Abnormal) Collected: 12/09/21 0438    Specimen: Blood Updated: 12/09/21 0527    Narrative:      The following orders were created for panel order CBC & Differential.  Procedure                               Abnormality         Status                     ---------                               -----------         ------                     CBC Auto Differential[[id number]]        Abnormal            Final result                 Please view results for these tests on the individual orders.    CBC Auto Differential [[id number]]  (Abnormal) Collected: 12/09/21 0438    Specimen: Blood Updated: 12/09/21 0527     WBC 6.20 10*3/mm3      RBC 4.79 10*6/mm3      Hemoglobin 14.9 g/dL      Hematocrit 45.7 %      MCV 95.6 fL      MCH 31.1 pg      MCHC 32.5 g/dL      RDW 16.3 %      RDW-SD 54.3 fl      MPV 7.6 fL      Platelets 181 10*3/mm3      Neutrophil % 79.7 %      Lymphocyte % 14.1 %      Monocyte % 5.6 %      Eosinophil % 0.5 %      Basophil % 0.1 %      Neutrophils, Absolute 4.90 10*3/mm3      Lymphocytes, Absolute 0.90 10*3/mm3      Monocytes, Absolute  0.30 10*3/mm3      Eosinophils, Absolute 0.00 10*3/mm3      Basophils, Absolute 0.00 10*3/mm3      nRBC 0.2 /100 WBC     Blood Culture - Blood, Arm, Left [[id number]]  (Normal) Collected: 12/06/21 2226    Specimen: Blood from Arm, Left Updated: 12/08/21 2245     Blood Culture No growth at 2 days    Blood Culture - Blood, Arm, Left [[id number]]  (Normal) Collected: 12/06/21 2226    Specimen: Blood from Arm, Left Updated: 12/08/21 2245     Blood Culture No growth at 2 days    Basic Metabolic Panel [[id number]]  (Abnormal) Collected: 12/08/21 2134    Specimen: Blood Updated: 12/08/21 2215     Glucose 167 mg/dL      BUN 54 mg/dL      Creatinine 1.00 mg/dL      Sodium 135 mmol/L      Potassium 4.4 mmol/L      Comment: Slight hemolysis detected by analyzer. Results may be affected.        Chloride 110 mmol/L      CO2 16.0 mmol/L      Calcium 8.4 mg/dL      eGFR Non African Amer 53 mL/min/1.73      BUN/Creatinine Ratio 54.0     Anion Gap 9.0 mmol/L     Narrative:      GFR Normal >60  Chronic Kidney Disease <60  Kidney Failure <15      Protime-INR [[id number]]  (Abnormal) Collected: 12/08/21 1912    Specimen: Blood Updated: 12/08/21 1957     Protime 39.3 Seconds      INR 3.92    Urinalysis, Microscopic Only - Urine, Catheter In/Out [[id number]]  (Abnormal) Collected: 12/08/21 1742    Specimen: Urine, Catheter In/Out Updated: 12/08/21 1939     RBC, UA 0-2 /HPF      WBC, UA 6-12 /HPF      Bacteria, UA Trace /HPF      Squamous Epithelial Cells, UA 0-2 /HPF      Transitional Epithelial Cells, UA 0-2 /HPF      Hyaline Casts, UA 0-2 /LPF      Methodology Manual Light Microscopy    Urine Culture - Urine, Urine, Catheter In/Out [[id number]] Collected: 12/08/21 1742    Specimen: Urine, Catheter In/Out Updated: 12/08/21 1939    Urinalysis With Culture If Indicated - Urine, Catheter In/Out [[id number]]  (Abnormal) Collected: 12/08/21 1742    Specimen: Urine, Catheter In/Out Updated: 12/08/21 1930     Color, UA Yellow     Appearance, UA  Slightly Cloudy     Comment: Result checked         pH, UA 6.0     Specific Gravity, UA 1.017     Glucose, UA Negative     Ketones, UA Negative     Bilirubin, UA Negative     Blood, UA Small (1+)     Protein, UA Trace     Leuk Esterase, UA Negative     Nitrite, UA Negative     Urobilinogen, UA 0.2 E.U./dL    CBC & Differential [[id number]]  (Abnormal) Collected: 12/08/21 1912    Specimen: Blood Updated: 12/08/21 1929    Narrative:      The following orders were created for panel order CBC & Differential.  Procedure                               Abnormality         Status                     ---------                               -----------         ------                     CBC Auto Differential[[id number]]        Abnormal            Final result                 Please view results for these tests on the individual orders.    CBC Auto Differential [[id number]]  (Abnormal) Collected: 12/08/21 1912    Specimen: Blood Updated: 12/08/21 1929     WBC 9.20 10*3/mm3      RBC 4.83 10*6/mm3      Hemoglobin 15.2 g/dL      Hematocrit 45.6 %      MCV 94.4 fL      MCH 31.4 pg      MCHC 33.3 g/dL      RDW 16.6 %      RDW-SD 54.3 fl      MPV 7.7 fL      Platelets 164 10*3/mm3      Neutrophil % 84.8 %      Lymphocyte % 10.4 %      Monocyte % 4.0 %      Eosinophil % 0.2 %      Basophil % 0.6 %      Neutrophils, Absolute 7.80 10*3/mm3      Lymphocytes, Absolute 1.00 10*3/mm3      Monocytes, Absolute 0.40 10*3/mm3      Eosinophils, Absolute 0.00 10*3/mm3      Basophils, Absolute 0.10 10*3/mm3      nRBC 0.1 /100 WBC     Urine Culture - Urine, Urine, Clean Catch [[id number]]  (Abnormal)  (Susceptibility) Collected: 12/06/21 2315    Specimen: Urine, Clean Catch Updated: 12/08/21 0943     Urine Culture >100,000 CFU/mL Enterococcus faecalis      25,000 CFU/mL Mixed Becca Isolated    Narrative:      Specimen contains mixed organisms of questionable pathogenicity which indicates contamination with commensal becca.  Further identification is  unlikely to provide clinically useful information.  Suggest recollection.    Susceptibility      Enterococcus faecalis     ASHLEY     Ampicillin Susceptible     Levofloxacin Susceptible     Nitrofurantoin Susceptible     Tetracycline Resistant     Vancomycin Susceptible                  Linear View                          Imaging Results (Last 24 Hours)     ** No results found for the last 24 hours. **               I reviewed the patient's new clinical results.    Medication Review:   Scheduled Meds:digoxin, 125 mcg, Oral, Daily  dilTIAZem CD, 240 mg, Oral, Daily  linezolid, 600 mg, Oral, Q12H  metoprolol tartrate, 100 mg, Oral, BID  phytonadione (VITAMIN K) IVPB, 5 mg, Intravenous, Once  saccharomyces boulardii, 250 mg, Oral, BID  sodium chloride, 3 mL, Intravenous, Q12H  topiramate, 100 mg, Oral, Nightly      Continuous Infusions:   PRN Meds:.•  acetaminophen  •  hydrALAZINE  •  Morphine **OR** Morphine  •  nitroglycerin  •  ondansetron **OR** ondansetron  •  sodium chloride  •  sodium chloride     Assessment/Plan       Abdominal pain    UTI with abdominal discomfort  -continue merrem  -urine culture pending  -CT abdomen without acute finding  -urology following - don't think that UTI is the cause of her pain  -general surgery evaluated pt - no surgical need  - consult ID     COVID 19 + continue isolation     HTN  -continue home medication     Atrial fib  -continue home medication     Diet: HH  DVT prophylaxis: warfarin  GI prophylaxis: protonix  Code status: full        Plan for disposition:SRINIVAS Mena MD  12/09/21  08:47 EST

## 2021-12-09 NOTE — NURSING NOTE
Pt was given abdominal binder but the height is too long to fit due to the placement of her urostomy

## 2021-12-10 ENCOUNTER — READMISSION MANAGEMENT (OUTPATIENT)
Dept: CALL CENTER | Facility: HOSPITAL | Age: 81
End: 2021-12-10

## 2021-12-10 VITALS
HEART RATE: 62 BPM | OXYGEN SATURATION: 98 % | TEMPERATURE: 97.5 F | DIASTOLIC BLOOD PRESSURE: 71 MMHG | BODY MASS INDEX: 26.8 KG/M2 | SYSTOLIC BLOOD PRESSURE: 127 MMHG | RESPIRATION RATE: 18 BRPM | HEIGHT: 64 IN | WEIGHT: 157 LBS

## 2021-12-10 PROBLEM — N39.0 ACUTE UTI: Status: ACTIVE | Noted: 2021-12-10

## 2021-12-10 LAB
ANION GAP SERPL CALCULATED.3IONS-SCNC: 9 MMOL/L (ref 5–15)
BASOPHILS # BLD AUTO: 0 10*3/MM3 (ref 0–0.2)
BASOPHILS NFR BLD AUTO: 0.1 % (ref 0–1.5)
BUN SERPL-MCNC: 41 MG/DL (ref 8–23)
BUN/CREAT SERPL: 42.3 (ref 7–25)
CALCIUM SPEC-SCNC: 8.8 MG/DL (ref 8.6–10.5)
CHLORIDE SERPL-SCNC: 106 MMOL/L (ref 98–107)
CO2 SERPL-SCNC: 22 MMOL/L (ref 22–29)
CREAT SERPL-MCNC: 0.97 MG/DL (ref 0.57–1)
DEPRECATED RDW RBC AUTO: 53.8 FL (ref 37–54)
EOSINOPHIL # BLD AUTO: 0 10*3/MM3 (ref 0–0.4)
EOSINOPHIL NFR BLD AUTO: 0.2 % (ref 0.3–6.2)
ERYTHROCYTE [DISTWIDTH] IN BLOOD BY AUTOMATED COUNT: 16 % (ref 12.3–15.4)
GFR SERPL CREATININE-BSD FRML MDRD: 55 ML/MIN/1.73
GLUCOSE SERPL-MCNC: 127 MG/DL (ref 65–99)
HCT VFR BLD AUTO: 45.4 % (ref 34–46.6)
HGB BLD-MCNC: 14.8 G/DL (ref 12–15.9)
INR PPP: 1.19 (ref 2–3)
LYMPHOCYTES # BLD AUTO: 0.9 10*3/MM3 (ref 0.7–3.1)
LYMPHOCYTES NFR BLD AUTO: 16.3 % (ref 19.6–45.3)
MCH RBC QN AUTO: 30.9 PG (ref 26.6–33)
MCHC RBC AUTO-ENTMCNC: 32.7 G/DL (ref 31.5–35.7)
MCV RBC AUTO: 94.7 FL (ref 79–97)
MONOCYTES # BLD AUTO: 0.3 10*3/MM3 (ref 0.1–0.9)
MONOCYTES NFR BLD AUTO: 5.9 % (ref 5–12)
NEUTROPHILS NFR BLD AUTO: 4.4 10*3/MM3 (ref 1.7–7)
NEUTROPHILS NFR BLD AUTO: 77.5 % (ref 42.7–76)
NRBC BLD AUTO-RTO: 0.2 /100 WBC (ref 0–0.2)
PLATELET # BLD AUTO: 199 10*3/MM3 (ref 140–450)
PMV BLD AUTO: 7.2 FL (ref 6–12)
POTASSIUM SERPL-SCNC: 4.7 MMOL/L (ref 3.5–5.2)
PROTHROMBIN TIME: 13 SECONDS (ref 19.4–28.5)
RBC # BLD AUTO: 4.8 10*6/MM3 (ref 3.77–5.28)
SODIUM SERPL-SCNC: 137 MMOL/L (ref 136–145)
WBC NRBC COR # BLD: 5.7 10*3/MM3 (ref 3.4–10.8)

## 2021-12-10 PROCEDURE — 80048 BASIC METABOLIC PNL TOTAL CA: CPT | Performed by: NURSE PRACTITIONER

## 2021-12-10 PROCEDURE — 85025 COMPLETE CBC W/AUTO DIFF WBC: CPT | Performed by: NURSE PRACTITIONER

## 2021-12-10 PROCEDURE — 25010000002 MORPHINE PER 10 MG: Performed by: FAMILY MEDICINE

## 2021-12-10 PROCEDURE — 36415 COLL VENOUS BLD VENIPUNCTURE: CPT | Performed by: NURSE PRACTITIONER

## 2021-12-10 PROCEDURE — 85610 PROTHROMBIN TIME: CPT | Performed by: NURSE PRACTITIONER

## 2021-12-10 RX ORDER — LINEZOLID 600 MG/1
600 TABLET, FILM COATED ORAL EVERY 12 HOURS SCHEDULED
Qty: 10 TABLET | Refills: 0 | Status: SHIPPED | OUTPATIENT
Start: 2021-12-10 | End: 2021-12-15

## 2021-12-10 RX ADMIN — SODIUM CHLORIDE, PRESERVATIVE FREE 3 ML: 5 INJECTION INTRAVENOUS at 08:13

## 2021-12-10 RX ADMIN — METOPROLOL TARTRATE 100 MG: 50 TABLET, FILM COATED ORAL at 08:13

## 2021-12-10 RX ADMIN — Medication 250 MG: at 08:13

## 2021-12-10 RX ADMIN — MORPHINE SULFATE 2 MG: 2 INJECTION, SOLUTION INTRAMUSCULAR; INTRAVENOUS at 08:35

## 2021-12-10 RX ADMIN — DIGOXIN 125 MCG: 125 TABLET ORAL at 12:38

## 2021-12-10 RX ADMIN — MORPHINE SULFATE 2 MG: 2 INJECTION, SOLUTION INTRAMUSCULAR; INTRAVENOUS at 04:37

## 2021-12-10 RX ADMIN — MORPHINE SULFATE 2 MG: 2 INJECTION, SOLUTION INTRAMUSCULAR; INTRAVENOUS at 01:47

## 2021-12-10 RX ADMIN — LINEZOLID 600 MG: 600 TABLET, FILM COATED ORAL at 08:13

## 2021-12-10 RX ADMIN — DILTIAZEM HYDROCHLORIDE 240 MG: 240 CAPSULE, COATED, EXTENDED RELEASE ORAL at 08:13

## 2021-12-10 NOTE — CASE MANAGEMENT/SOCIAL WORK
Continued Stay Note   Luis Felipe     Patient Name: Hazel Bucio  MRN: [id number]  Today's Date: 12/10/2021    Admit Date: 12/6/2021     Discharge Plan     Row Name 12/10/21 1517       Plan    Plan Comments Pt declines hospital bed per nsg, will not fit in her home.    Row Name 12/10/21 1421       Plan    Plan Comments VM left for both son and granddaughter. Lives with nephew.    Row Name 12/10/21 3664       Plan    Plan DC Plan: return home with nephew, declines home health. Pt does not qualify for agility bed at home.    Plan Comments Pt may want gel overlay hospital bed at home, pending.            .Chart review only.                Expected Discharge Date and Time     Expected Discharge Date Expected Discharge Time    Dec 10, 2021             Matt Elizabeth RN

## 2021-12-10 NOTE — PROGRESS NOTES
Urology Progress Note    Patient Identification:  Name:  Hazel Bucio  Age:  81 y.o.  Sex:  female  :  1940  MRN:  [id number]    Chief Complaint: Feels a little better    History of Present Illness: Still having right lower quadrant pain.  Now radiating down her right leg.  Her urine is very clear.  The catheterized specimen was sent for urinalysis.  This does show 6-12 white cells and trace of bacteria.  Culture is no growth.  Previous culture from the bag is growing Enterococcus that is sensitive to mostly antibiotics she is allergic to.  The only antibiotic she is not allergic to is vancomycin.  Appreciate general surgery's input.  They do not feel that there is an acute issue though there are some hernias present.    Problem List:  Patient Active Problem List   Diagnosis   • PAF (paroxysmal atrial fibrillation) (HCC)   • Dyslipidemia   • Essential hypertension   • Long term current use of anticoagulant   • Presence of cardiac pacemaker   • Sick sinus syndrome (HCC)   • Type 2 diabetes mellitus (HCC)   • Tear film insufficiency   • Presbyopia   • Macular puckering of retina   • Tear film insufficiency, bilateral   • Pseudophakia, both eyes   • Lens replaced by other means   • Epiretinal membrane, bilateral   • Acute encephalopathy   • History of carcinoma in situ of breast   • Ureteral cancer (HCC)   • Chronic insomnia   • Seasonal allergies   • Altered mental status   • MACRINA (acute kidney injury) (HCC)   • Cholecystitis with cholelithiasis   • Acute UTI   • Abdominal pain     Past Medical History:  Past Medical History:   Diagnosis Date   • Bladder cancer (HCC)    • Cancer (HCC)     breast, bladder   • Deep vein thrombosis (HCC)    • Essential hypertension 2017   • Fracture tibia/fibula, right, closed, initial encounter 2020   • GERD (gastroesophageal reflux disease)    • History of urostomy    • Immobility 2020    r/t broken Tibia    • Kidney carcinoma, right (HCC)     removed    • Long  term current use of anticoagulant 1/9/2015   • PAF (paroxysmal atrial fibrillation) ([facility]) 6/26/2019   • Presence of cardiac pacemaker 11/03/2014    BS dual chamber PM placed 10/2014 with pocket revision 1/25/17.  HX tachy orb syndrome   • Sick sinus syndrome (HCC) 11/3/2014     Past Surgical History:  Past Surgical History:   Procedure Laterality Date   • BREAST LUMPECTOMY     • CHOLECYSTECTOMY N/A 10/12/2020    Procedure: CHOLECYSTECTOMY LAPAROSCOPIC;  Surgeon: Go Pereira DO;  Location: [facility] MAIN OR;  Service: General;  Laterality: N/A;   • CYSTECTOMY W/ URETEROILEAL CONDUIT     • HARDWARE REMOVAL Right 6/11/2021    Procedure: TIBIA HARDWARE REMOVAL;  Surgeon: Geoff Shah II, MD;  Location: [facility] MAIN OR;  Service: Orthopedics;  Laterality: Right;   • HERNIA REPAIR     • HYSTERECTOMY     • INSERT / REPLACE / REMOVE PACEMAKER     • NEPHRECTOMY Right    • TIBIA IM NAILING/RODDING Right 8/28/2020    Procedure: TIBIA INTRAMEDULLARY NAIL/ABRAHAM INSERTION;  Surgeon: Geoff Shah II, MD;  Location: [facility] MAIN OR;  Service: Orthopedics;  Laterality: Right;     Home Meds:  Medications Prior to Admission   Medication Sig Dispense Refill Last Dose   • dicyclomine (BENTYL) 10 MG capsule Take 10 mg by mouth 4 (Four) Times a Day Before Meals & at Bedtime.      • metoprolol tartrate (LOPRESSOR) 100 MG tablet Take 100 mg by mouth 2 (Two) Times a Day.      • warfarin (COUMADIN) 2 MG tablet Take 2 mg by mouth Daily. Pt takes 2mg daily except 1mg on Wednesday as of 12/9/21      • acetaminophen (TYLENOL) 325 MG tablet Take 650 mg by mouth Every 6 (Six) Hours As Needed for Mild Pain .      • Cholecalciferol (Vitamin D3) 50 MCG (2000 UT) tablet Take 2,000 Units by mouth Daily.      • digoxin (LANOXIN) 125 MCG tablet Take 1 tablet by mouth Daily. 30 tablet 3    • dilTIAZem CD (Cardizem CD) 240 MG 24 hr capsule Take 1 capsule by mouth Daily. 30 capsule 5    • HYDROcodone-acetaminophen (NORCO) 5-325 MG per  tablet Take 1 tablet by mouth Every 8 (Eight) Hours As Needed.      • Probiotic Product (PROBIOTIC PO) Take  by mouth Daily.      • topiramate (TOPAMAX) 100 MG tablet Take 100 mg by mouth every night at bedtime.        Current Meds:    Current Facility-Administered Medications:   •  acetaminophen (TYLENOL) tablet 650 mg, 650 mg, Oral, Q6H PRN, Hilario Esqueda MD, 650 mg at 12/08/21 0100  •  digoxin (LANOXIN) tablet 125 mcg, 125 mcg, Oral, Daily, Preeti James, APRN, 125 mcg at 12/09/21 1214  •  dilTIAZem CD (CARDIZEM CD) 24 hr capsule 240 mg, 240 mg, Oral, Daily, Preeti James, APRN, 240 mg at 12/09/21 0829  •  hydrALAZINE (APRESOLINE) injection 10 mg, 10 mg, Intravenous, Q6H PRN, Bethanie Mena MD  •  linezolid (ZYVOX) tablet 600 mg, 600 mg, Oral, Q12H, Sarina Jon, APRN, 600 mg at 12/09/21 2004  •  metoprolol tartrate (LOPRESSOR) tablet 100 mg, 100 mg, Oral, BID, Preeti James, APRN, 100 mg at 12/09/21 2005  •  morphine injection 1 mg, 1 mg, Intravenous, Q2H PRN **OR** morphine injection 2 mg, 2 mg, Intravenous, Q2H PRN, Hilario Esqueda MD, 2 mg at 12/10/21 0437  •  nitroglycerin (NITROSTAT) SL tablet 0.4 mg, 0.4 mg, Sublingual, Q5 Min PRN, Preeti James, APRN  •  ondansetron (ZOFRAN) tablet 4 mg, 4 mg, Oral, Q6H PRN **OR** ondansetron (ZOFRAN) injection 4 mg, 4 mg, Intravenous, Q6H PRN, Preeti James, APRN  •  saccharomyces boulardii (FLORASTOR) capsule 250 mg, 250 mg, Oral, BID, Preeti James, APRN, 250 mg at 12/09/21 2005  •  sodium chloride 0.9 % flush 10 mL, 10 mL, Intravenous, PRN, Josias Anders PA  •  sodium chloride 0.9 % flush 3 mL, 3 mL, Intravenous, Q12H, Preeti James APRN, 3 mL at 12/09/21 2005  •  sodium chloride 0.9 % flush 3-10 mL, 3-10 mL, Intravenous, PRN, Preeti James, APRN  •  topiramate (TOPAMAX) tablet 100 mg, 100 mg, Oral, Nightly, Preeti James APRN, 100 mg at 12/09/21 2005  Allergies:  Amoxicillin, Ciprofloxacin, Oxycodone-acetaminophen, Penicillins, Sulfa  "antibiotics, Cephalexin, Clavulanic acid, Codeine, Contrast dye, Doxycycline, Fluconazole, Iodine, Macrobid [nitrofurantoin], Tobramycin, and Trimethoprim    Review of Systems   no fevers or chills.  No congestion or nasal discharge.    Objective:  tMax 24 hours:  Temp (24hrs), Av.6 °F (36.4 °C), Min:97.3 °F (36.3 °C), Max:98 °F (36.7 °C)    Vital Sign Ranges:  Temp:  [97.3 °F (36.3 °C)-98 °F (36.7 °C)] 98 °F (36.7 °C)  Heart Rate:  [60-66] 60  Resp:  [16] 16  BP: (124-197)/(57-80) 154/80  Intake and Output Last 3 Shifts:  I/O last 3 completed shifts:  In:  [P.O.:1920; IV Piggyback:50]  Out: 650 [Urine:650]    Exam:  /80 (BP Location: Left arm, Patient Position: Lying)   Pulse 60   Temp 98 °F (36.7 °C) (Oral)   Resp 16   Ht 162.6 cm (64\")   Wt 71.2 kg (157 lb)   SpO2 96%   BMI 26.95 kg/m²    General Appearance:    Alert, cooperative, no acute distress, general         appearance is normal   Head:    Normocephalic, without obvious abnormality, atraumatic   Eyes:            Pupils/Irises normal. Exterior inspection conjunctivae       and lids normal.   Ears:    Normal external inspection   Nose:   Exterior inspection of nose is normal   Throat:   Lips, mucosa, and tongue normal   Lungs:     Respirations unlabored; normal effort, no audible     abnormality   CV:   Regular rhythm and normal rate, no edema   Abdomen:    Mild tenderness in the right lower quadrant suprapubic region on the right.  Stoma is draining clear urine.   :        Data Review:  All labs (24hrs):    Lab Results (last 24 hours)     Procedure Component Value Units Date/Time    Protime-INR [[id number]]  (Abnormal) Collected: 12/10/21 0426    Specimen: Blood Updated: 12/10/21 0522     Protime 13.0 Seconds      INR 1.19    Basic Metabolic Panel [[id number]]  (Abnormal) Collected: 12/10/21 0426    Specimen: Blood Updated: 12/10/21 0520     Glucose 127 mg/dL      BUN 41 mg/dL      Creatinine 0.97 mg/dL      Sodium 137 mmol/L      " Potassium 4.7 mmol/L      Chloride 106 mmol/L      CO2 22.0 mmol/L      Calcium 8.8 mg/dL      eGFR Non African Amer 55 mL/min/1.73      BUN/Creatinine Ratio 42.3     Anion Gap 9.0 mmol/L     Narrative:      GFR Normal >60  Chronic Kidney Disease <60  Kidney Failure <15      CBC & Differential [[id number]]  (Abnormal) Collected: 12/10/21 0426    Specimen: Blood Updated: 12/10/21 0501    Narrative:      The following orders were created for panel order CBC & Differential.  Procedure                               Abnormality         Status                     ---------                               -----------         ------                     CBC Auto Differential[[id number]]        Abnormal            Final result                 Please view results for these tests on the individual orders.    CBC Auto Differential [[id number]]  (Abnormal) Collected: 12/10/21 0426    Specimen: Blood Updated: 12/10/21 0501     WBC 5.70 10*3/mm3      RBC 4.80 10*6/mm3      Hemoglobin 14.8 g/dL      Hematocrit 45.4 %      MCV 94.7 fL      MCH 30.9 pg      MCHC 32.7 g/dL      RDW 16.0 %      RDW-SD 53.8 fl      MPV 7.2 fL      Platelets 199 10*3/mm3      Neutrophil % 77.5 %      Lymphocyte % 16.3 %      Monocyte % 5.9 %      Eosinophil % 0.2 %      Basophil % 0.1 %      Neutrophils, Absolute 4.40 10*3/mm3      Lymphocytes, Absolute 0.90 10*3/mm3      Monocytes, Absolute 0.30 10*3/mm3      Eosinophils, Absolute 0.00 10*3/mm3      Basophils, Absolute 0.00 10*3/mm3      nRBC 0.2 /100 WBC     Blood Culture - Blood, Arm, Left [[id number]]  (Normal) Collected: 12/06/21 2226    Specimen: Blood from Arm, Left Updated: 12/09/21 2245     Blood Culture No growth at 3 days    Blood Culture - Blood, Arm, Left [[id number]]  (Normal) Collected: 12/06/21 2226    Specimen: Blood from Arm, Left Updated: 12/09/21 2245     Blood Culture No growth at 3 days    Potassium [[id number]]  (Normal) Collected: 12/09/21 2033    Specimen: Blood Updated: 12/09/21 2055      Potassium 4.6 mmol/L      Comment: Slight hemolysis detected by analyzer. Results may be affected.       Urine Culture - Urine, Urine, Catheter In/Out [[id number]]  (Normal) Collected: 12/08/21 1742    Specimen: Urine, Catheter In/Out Updated: 12/09/21 1914     Urine Culture No growth    Urine Culture - Urine, Urine, Clean Catch [[id number]]  (Abnormal)  (Susceptibility) Collected: 12/06/21 2315    Specimen: Urine, Clean Catch Updated: 12/09/21 1439     Urine Culture >100,000 CFU/mL Enterococcus faecalis      25,000 CFU/mL Mixed Becca Isolated    Narrative:      Specimen contains mixed organisms of questionable pathogenicity which indicates contamination with commensal becca.  Further identification is unlikely to provide clinically useful information.  Suggest recollection.    Susceptibility      Enterococcus faecalis     ASHLEY     Ampicillin Susceptible     Levofloxacin Susceptible     Linezolid Susceptible (C)  [1]      Nitrofurantoin Susceptible     Tetracycline Resistant     Vancomycin Susceptible                 [1]  Appended report. These results have been appended to a previously final verified report.          Linear View                       Radiology:   Imaging Results (Last 72 Hours)     ** No results found for the last 72 hours. **          Assessment/Plan:    Active Problems:    Abdominal pain     Right lower quadrant pain of unknown etiology.  Would not be due to a urinary tract infection even if there is one.  Possibly due to hernias but nothing acute on CT scan and she would be a very difficult surgical case.  Possible urinary tract infection.  Repeat urine culture is negative so it has been treated if it was present..    Plan  Nothing further to add  I will sign off  Patient to follow-up with me in 1 month to recheck urine  Please let me know if I can be of further assistance.      Sarath Gupta MD  12/10/2021  06:56 EST

## 2021-12-10 NOTE — CASE MANAGEMENT/SOCIAL WORK
Continued Stay Note  NYDIA Briscoe     Patient Name: Hazel Bucio  MRN: [id number]  Today's Date: 12/10/2021    Admit Date: 12/6/2021     Discharge Plan     Row Name 12/10/21 1429       Plan    Plan Comments VM left for both son and granddaughter. Lives with nephew.    Row Name 12/10/21 3924       Plan    Plan DC Plan: return home with nephew, declines home health. Pt does not qualify for agility bed at home.    Plan Comments Pt may want gel overlay hospital bed at home, pending.               Discharge Codes    No documentation.                     Matt Elizabeth RN

## 2021-12-10 NOTE — PLAN OF CARE
Pharmacist Future Drug COST Assessment ([facility]):     Pharmacy ran test claim for future high-cost drug therapy. Indication: Infection    Drug Covered/PA required Patient Copay per month   Zyvox Covered without PA $ 5     For questions, reach out to [facility] at [phone]    Denise Tao PharmD   12/10/2021 10:17 EST

## 2021-12-10 NOTE — CASE MANAGEMENT/SOCIAL WORK
Continued Stay Note  NYDIA Briscoe     Patient Name: Hazel Bucio  MRN: [id number]  Today's Date: 12/10/2021    Admit Date: 12/6/2021     Discharge Plan     Row Name 12/10/21 1404       Plan    Plan DC Plan: return home with nephew, declines home health. Pt does not qualify for agility bed at home.    Plan Comments Pt may want gel overlay hospital bed at home, pending.         Spoke to pt per phone re Home Health and hospital bed. Pt is not eligible for the mattress she requested, attempted to reach pt once again, no answer in [facility] isolation room from pt.      Phone communication or documentation only - no physical contact with patient or family.              Matt Elizabeth RN

## 2021-12-10 NOTE — PLAN OF CARE
Goal Outcome Evaluation:  Plan of Care Reviewed With: patient        Progress: no change  Outcome Summary: pt c/o pain gave prn med, did not rest at all during the shift

## 2021-12-10 NOTE — DISCHARGE SUMMARY
Date of Discharge:  12/10/2021    Discharge Diagnosis:   UTI  Abdominal pain  Atrial fibrillation  Hypertension  SSS with pacemaker      Presenting Problem/History of Present Illness  Active Hospital Problems    Diagnosis  POA   • Acute UTI [N39.0]  Unknown   • Abdominal pain [R10.9]  Yes   • Atrial fibrillation (HCC) [I48.91]  Unknown   • Essential hypertension [I10]  Yes   • Long term current use of anticoagulant [Z79.01]  Not Applicable   • Sick sinus syndrome (HCC) [I49.5]  Yes      Resolved Hospital Problems   No resolved problems to display.          Hospital Course    Patient is a 81 y.o. 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 She presented to the ED with abdominal pain. She has been taking bactrim for recurrent uti and is on day 8. Of note patient was recently discharged on 9/30 due to same diagnosis and treatment regimen. She has multiple allergies and was treated with merrem and currently taking merrem on this admission. She states that she has been fully vaccinated with covid 19 but has tested positive on this admission will require isolation. Bilateral feet with discoloration and cool. Patient states this is normal state of poor perfusion and requires her to use a wheelchair. She lives with a family member. Urology was consulted due to given history of patient and previous UTI history. General surgery was consulted for possible abdominal pain related to conduit from ileostomy but it was not related and CT abdomen did not find any acute findings. Coumadin was initially held for possible procedures. Urine was likely contaminated due to ileostomy with a second culture obtained that did not yield any contaminates. She was treated prophylactically. She will follow up with urology in one month and PCP in one week for follow up and INR check. Today  patient is alert oriented and eager to be discharged home.     Procedures Performed      EXAMINATION: CT ABDOMEN AND PELVIS WITH IV CONTRAST        DATE OF EXAMINATION: 12/6/2021.     COMPARISON: 9/24/2021.     INDICATION: Abdominal and suprapubic pain.     PROCEDURE:  Axial CT of the abdomen and pelvis was performed with contrast and sagittal and coronal reformatted images were performed.  100 mL of Isovue-370 was given intravenously. CT dose lowering techniques were used, to include: automated exposure   control, adjustment for patient size, and/or use of iterative reconstruction.     FINDINGS:     LOWER CHEST :  There are some patchy and linear bands of opacities at the lung bases bilaterally which are likely atelectasis or scarring. There are no pleural or pericardial effusions.     ABDOMEN:     Liver and Biliary system:  Normal.     Adrenal glands:  Normal.     Kidneys and ureters: Prior right nephrectomy. The left kidney and ureter otherwise appear unremarkable. There is a right lower quadrant loop ileostomy with prior cystectomy.     Spleen:  Several calcified granulomas are seen in the spleen.     Pancreas:  Normal.     Gallbladder:  Surgically absent.     Lymph nodes, Peritoneum and mesentery:  There is no mesenteric or retroperitoneal lymphadenopathy.     Gastrointestinal tract:  There are no dilated loops of bowel or free intraperitoneal air.    The appendix is not clearly seen.      Aorta/IVC:   There is moderate vascular calcification throughout the abdominal aorta without evidence of aneurysmal dilation or dissection.  IVC normal.     Abdominal wall:  Multiple abdominal wall hernias are unchanged.     PELVIS:     Fluid: There is no free fluid in the pelvis.     Lymph Nodes:  There is no pelvic or inguinal lymphadenopathy..     Urinary bladder:  Prior cystectomy with loop ileostomy in the right lower quadrant..     BONES:  There are no osseous destructive lesions..     ADDITIONAL  SIGNIFICANT FINDINGS:   None.     IMPRESSION:     No acute process seen within the abdomen or pelvis with multiple incidental findings and chronic changes as above    Consults:   Consults     Date and Time Order Name Status Description    12/9/2021  8:47 AM Inpatient Infectious Diseases Consult Completed     12/8/2021  7:29 AM Inpatient General Surgery Consult Completed           Pertinent Test Results:    Lab Results (most recent)     Procedure Component Value Units Date/Time    Protime-INR [[id number]]  (Abnormal) Collected: 12/10/21 0426    Specimen: Blood Updated: 12/10/21 0522     Protime 13.0 Seconds      INR 1.19    Basic Metabolic Panel [[id number]]  (Abnormal) Collected: 12/10/21 0426    Specimen: Blood Updated: 12/10/21 0520     Glucose 127 mg/dL      BUN 41 mg/dL      Creatinine 0.97 mg/dL      Sodium 137 mmol/L      Potassium 4.7 mmol/L      Chloride 106 mmol/L      CO2 22.0 mmol/L      Calcium 8.8 mg/dL      eGFR Non African Amer 55 mL/min/1.73      BUN/Creatinine Ratio 42.3     Anion Gap 9.0 mmol/L     Narrative:      GFR Normal >60  Chronic Kidney Disease <60  Kidney Failure <15      CBC & Differential [[id number]]  (Abnormal) Collected: 12/10/21 0426    Specimen: Blood Updated: 12/10/21 0501    Narrative:      The following orders were created for panel order CBC & Differential.  Procedure                               Abnormality         Status                     ---------                               -----------         ------                     CBC Auto Differential[[id number]]        Abnormal            Final result                 Please view results for these tests on the individual orders.    CBC Auto Differential [[id number]]  (Abnormal) Collected: 12/10/21 0426    Specimen: Blood Updated: 12/10/21 0501     WBC 5.70 10*3/mm3      RBC 4.80 10*6/mm3      Hemoglobin 14.8 g/dL      Hematocrit 45.4 %      MCV 94.7 fL      MCH 30.9 pg      MCHC 32.7 g/dL      RDW 16.0 %      RDW-SD 53.8 fl      MPV 7.2 fL      Platelets 199  10*3/mm3      Neutrophil % 77.5 %      Lymphocyte % 16.3 %      Monocyte % 5.9 %      Eosinophil % 0.2 %      Basophil % 0.1 %      Neutrophils, Absolute 4.40 10*3/mm3      Lymphocytes, Absolute 0.90 10*3/mm3      Monocytes, Absolute 0.30 10*3/mm3      Eosinophils, Absolute 0.00 10*3/mm3      Basophils, Absolute 0.00 10*3/mm3      nRBC 0.2 /100 WBC     Blood Culture - Blood, Arm, Left [[id number]]  (Normal) Collected: 12/06/21 2226    Specimen: Blood from Arm, Left Updated: 12/09/21 2245     Blood Culture No growth at 3 days    Blood Culture - Blood, Arm, Left [[id number]]  (Normal) Collected: 12/06/21 2226    Specimen: Blood from Arm, Left Updated: 12/09/21 2245     Blood Culture No growth at 3 days    Potassium [[id number]]  (Normal) Collected: 12/09/21 2033    Specimen: Blood Updated: 12/09/21 2055     Potassium 4.6 mmol/L      Comment: Slight hemolysis detected by analyzer. Results may be affected.       Urine Culture - Urine, Urine, Catheter In/Out [[id number]]  (Normal) Collected: 12/08/21 1742    Specimen: Urine, Catheter In/Out Updated: 12/09/21 1914     Urine Culture No growth    Urine Culture - Urine, Urine, Clean Catch [[id number]]  (Abnormal)  (Susceptibility) Collected: 12/06/21 2315    Specimen: Urine, Clean Catch Updated: 12/09/21 1439     Urine Culture >100,000 CFU/mL Enterococcus faecalis      25,000 CFU/mL Mixed Becca Isolated    Narrative:      Specimen contains mixed organisms of questionable pathogenicity which indicates contamination with commensal becca.  Further identification is unlikely to provide clinically useful information.  Suggest recollection.    Susceptibility      Enterococcus faecalis     ASHLEY     Ampicillin Susceptible     Levofloxacin Susceptible     Linezolid Susceptible (C)  [1]      Nitrofurantoin Susceptible     Tetracycline Resistant     Vancomycin Susceptible                 [1]  Appended report. These results have been appended to a previously final verified report.           Linear View                   Basic Metabolic Panel [[id number]]  (Abnormal) Collected: 12/09/21 0438    Specimen: Blood Updated: 12/09/21 0608     Glucose 124 mg/dL      BUN 49 mg/dL      Creatinine 0.95 mg/dL      Sodium 139 mmol/L      Potassium 5.5 mmol/L      Comment: Slight hemolysis detected by analyzer. Results may be affected.  Result checked         Chloride 107 mmol/L      CO2 22.0 mmol/L      Calcium 9.4 mg/dL      eGFR Non African Amer 57 mL/min/1.73      BUN/Creatinine Ratio 51.6     Anion Gap 10.0 mmol/L     Narrative:      GFR Normal >60  Chronic Kidney Disease <60  Kidney Failure <15      Protime-INR [[id number]]  (Abnormal) Collected: 12/09/21 0438    Specimen: Blood Updated: 12/09/21 0537     Protime 45.3 Seconds      INR 4.57    CBC & Differential [[id number]]  (Abnormal) Collected: 12/09/21 0438    Specimen: Blood Updated: 12/09/21 0527    Narrative:      The following orders were created for panel order CBC & Differential.  Procedure                               Abnormality         Status                     ---------                               -----------         ------                     CBC Auto Differential[[id number]]        Abnormal            Final result                 Please view results for these tests on the individual orders.    CBC Auto Differential [[id number]]  (Abnormal) Collected: 12/09/21 0438    Specimen: Blood Updated: 12/09/21 0527     WBC 6.20 10*3/mm3      RBC 4.79 10*6/mm3      Hemoglobin 14.9 g/dL      Hematocrit 45.7 %      MCV 95.6 fL      MCH 31.1 pg      MCHC 32.5 g/dL      RDW 16.3 %      RDW-SD 54.3 fl      MPV 7.6 fL      Platelets 181 10*3/mm3      Neutrophil % 79.7 %      Lymphocyte % 14.1 %      Monocyte % 5.6 %      Eosinophil % 0.5 %      Basophil % 0.1 %      Neutrophils, Absolute 4.90 10*3/mm3      Lymphocytes, Absolute 0.90 10*3/mm3      Monocytes, Absolute 0.30 10*3/mm3      Eosinophils, Absolute 0.00 10*3/mm3      Basophils, Absolute 0.00 10*3/mm3       nRBC 0.2 /100 WBC     Urinalysis, Microscopic Only - Urine, Catheter In/Out [[id number]]  (Abnormal) Collected: 12/08/21 1742    Specimen: Urine, Catheter In/Out Updated: 12/08/21 1939     RBC, UA 0-2 /HPF      WBC, UA 6-12 /HPF      Bacteria, UA Trace /HPF      Squamous Epithelial Cells, UA 0-2 /HPF      Transitional Epithelial Cells, UA 0-2 /HPF      Hyaline Casts, UA 0-2 /LPF      Methodology Manual Light Microscopy    Urinalysis With Culture If Indicated - Urine, Catheter In/Out [[id number]]  (Abnormal) Collected: 12/08/21 1742    Specimen: Urine, Catheter In/Out Updated: 12/08/21 1930     Color, UA Yellow     Appearance, UA Slightly Cloudy     Comment: Result checked         pH, UA 6.0     Specific Gravity, UA 1.017     Glucose, UA Negative     Ketones, UA Negative     Bilirubin, UA Negative     Blood, UA Small (1+)     Protein, UA Trace     Leuk Esterase, UA Negative     Nitrite, UA Negative     Urobilinogen, UA 0.2 E.U./dL    COVID PRE-OP / PRE-PROCEDURE SCREENING ORDER (NO ISOLATION) - Swab, Nasopharynx [[id number]]  (Abnormal) Collected: 12/07/21 0050    Specimen: Swab from Nasopharynx Updated: 12/07/21 0155    Narrative:      The following orders were created for panel order COVID PRE-OP / PRE-PROCEDURE SCREENING ORDER (NO ISOLATION) - Swab, Nasopharynx.  Procedure                               Abnormality         Status                     ---------                               -----------         ------                     COVID-19,CEPHEID/OSORIO/BD...[[id number]]  Abnormal            Final result                 Please view results for these tests on the individual orders.    COVID-19,CEPHEID/OSORIO/BDMAX,COR/ANNY/PAD/GENNY IN-HOUSE(OR EMERGENT/ADD-ON),NP SWAB IN TRANSPORT MEDIA 3-4 HR TAT, RT-PCR - Swab, Nasopharynx [[id number]]  (Abnormal) Collected: 12/07/21 0050    Specimen: Swab from Nasopharynx Updated: 12/07/21 0155     COVID19 Detected    Narrative:      Fact sheet for providers:  https://www.fda.gov/media/182978/download     Fact sheet for patients: https://www.fda.gov/media/962431/download  Fact sheet for providers: https://www.fda.gov/media/855688/download    Fact sheet for patients: https://www.fda.gov/media/643055/download    Test performed by PCR.    Urinalysis, Microscopic Only - Urine, Clean Catch [[id number]]  (Abnormal) Collected: 12/06/21 2315    Specimen: Urine, Clean Catch Updated: 12/06/21 2342     RBC, UA 0-2 /HPF      WBC, UA Too Numerous to Count /HPF      Bacteria, UA 2+ /HPF      Squamous Epithelial Cells, UA 0-2 /HPF      Hyaline Casts, UA 3-6 /LPF      Methodology Manual Light Microscopy    Extra Tubes [[id number]] Collected: 12/06/21 2226    Specimen: Blood, Venous Line Updated: 12/06/21 2330    Narrative:      The following orders were created for panel order Extra Tubes.  Procedure                               Abnormality         Status                     ---------                               -----------         ------                     Gold Top - SST[[id number]]                                   Final result                 Please view results for these tests on the individual orders.    Gold Top - SST [[id number]] Collected: 12/06/21 2226    Specimen: Blood Updated: 12/06/21 2330     Extra Tube Hold for add-ons.     Comment: Auto resulted.       Urinalysis With Culture If Indicated - Urine, Clean Catch [[id number]]  (Abnormal) Collected: 12/06/21 2315    Specimen: Urine, Clean Catch Updated: 12/06/21 2327     Color, UA Yellow     Appearance, UA Turbid     Comment: Result checked         pH, UA 5.5     Specific Gravity, UA 1.025     Glucose, UA Negative     Ketones, UA 15 mg/dL (1+)     Bilirubin, UA Negative     Blood, UA Trace     Protein,  mg/dL (2+)     Leuk Esterase, UA Trace     Nitrite, UA Positive     Urobilinogen, UA 0.2 E.U./dL    Comprehensive Metabolic Panel [[id number]]  (Abnormal) Collected: 12/06/21 2226    Specimen: Blood Updated: 12/06/21 2305      Glucose 112 mg/dL      BUN 31 mg/dL      Creatinine 0.96 mg/dL      Sodium 137 mmol/L      Potassium 4.1 mmol/L      Chloride 105 mmol/L      CO2 18.0 mmol/L      Calcium 9.3 mg/dL      Total Protein 7.5 g/dL      Albumin 3.80 g/dL      ALT (SGPT) 68 U/L      AST (SGOT) 60 U/L      Alkaline Phosphatase 143 U/L      Total Bilirubin 0.5 mg/dL      eGFR Non African Amer 56 mL/min/1.73      Globulin 3.7 gm/dL      A/G Ratio 1.0 g/dL      BUN/Creatinine Ratio 32.3     Anion Gap 14.0 mmol/L     Narrative:      GFR Normal >60  Chronic Kidney Disease <60  Kidney Failure <15      Lipase [[id number]]  (Normal) Collected: 12/06/21 2226    Specimen: Blood Updated: 12/06/21 2305     Lipase 40 U/L     Troponin [[id number]]  (Normal) Collected: 12/06/21 2226    Specimen: Blood Updated: 12/06/21 2305     Troponin T <0.010 ng/mL     Narrative:      Troponin T Reference Range:  <= 0.03 ng/mL-   Negative for AMI  >0.03 ng/mL-     Abnormal for myocardial necrosis.  Clinicians would have to utilize clinical acumen, EKG, Troponin and serial changes to determine if it is an Acute Myocardial Infarction or myocardial injury due to an underlying chronic condition.       Results may be falsely decreased if patient taking Biotin.      POC Lactate [[id number]]  (Normal) Collected: 12/06/21 2122    Specimen: Blood Updated: 12/06/21 2124     Lactate 1.1 mmol/L      Comment: Serial Number: [id number]:  [id number]              Results for orders placed during the hospital encounter of 09/24/21    Adult Transthoracic Echo Complete W/ Cont if Necessary Per Protocol    Interpretation Summary  · Estimated left ventricular EF was in agreement with the calculated left ventricular EF. Left ventricular ejection fraction appears to be 61 - 65%. Left ventricular systolic function is normal.  · Left atrial volume is moderately increased.  · The right atrial cavity is moderately dilated.  · Estimated right ventricular systolic pressure from tricuspid  regurgitation is normal (<35 mmHg).       Condition on Discharge:  Stable    Vital Signs  Temp:  [97.3 °F (36.3 °C)-98 °F (36.7 °C)] 97.5 °F (36.4 °C)  Heart Rate:  [60-66] 62  Resp:  [16-18] 18  BP: (124-154)/(63-80) 127/71      Physical Exam  Vitals reviewed.   Constitutional:       Appearance: She is obese.   HENT:      Head: Normocephalic and atraumatic.      Right Ear: External ear normal.      Left Ear: External ear normal.      Nose: Nose normal.      Mouth/Throat:      Mouth: Mucous membranes are moist.   Eyes:      Conjunctiva/sclera: Conjunctivae normal.   Cardiovascular:      Rate and Rhythm: Normal rate and regular rhythm.      Pulses: Normal pulses.      Heart sounds: Normal heart sounds.   Pulmonary:      Effort: Pulmonary effort is normal.      Breath sounds: Normal breath sounds.   Abdominal:      General: Bowel sounds are normal.      Palpations: Abdomen is soft.   Musculoskeletal:         General: Normal range of motion.      Cervical back: Normal range of motion.   Skin:     General: Skin is warm and dry.   Neurological:      Mental Status: She is alert and oriented to person, place, and time.   Psychiatric:         Behavior: Behavior normal.              Discharge Disposition  Home or Self Care    Discharge Medications     Discharge Medications      New Medications      Instructions Start Date   linezolid 600 MG tablet  Commonly known as: ZYVOX   600 mg, Oral, Every 12 Hours Scheduled         Continue These Medications      Instructions Start Date   acetaminophen 325 MG tablet  Commonly known as: TYLENOL   650 mg, Oral, Every 6 Hours PRN      dicyclomine 10 MG capsule  Commonly known as: BENTYL   10 mg, Oral, 4 Times Daily Before Meals & Nightly      digoxin 125 MCG tablet  Commonly known as: LANOXIN   125 mcg, Oral, Daily      dilTIAZem  MG 24 hr capsule  Commonly known as: Cardizem CD   240 mg, Oral, Daily      HYDROcodone-acetaminophen 5-325 MG per tablet  Commonly known as: NORCO   1  tablet, Oral, Every 8 Hours PRN      metoprolol tartrate 100 MG tablet  Commonly known as: LOPRESSOR   100 mg, Oral, 2 Times Daily      PROBIOTIC PO   Oral, Daily      topiramate 100 MG tablet  Commonly known as: TOPAMAX   100 mg, Oral, Every Night at Bedtime      Vitamin D3 50 MCG (2000 UT) tablet   2,000 Units, Oral, Daily      warfarin 2 MG tablet  Commonly known as: COUMADIN   2 mg, Oral, Daily Warfarin, Pt takes 2mg daily except 1mg on Wednesday as of 12/9/21             Discharge Diet: HH    Activity at Discharge: as tolerated    Follow-up Appointments  Future Appointments   Date Time Provider Department Center   12/16/2021  1:30 PM [facility]   2/22/2022  1:00 PM Augustin Ellsworth MD [facility]     Additional Instructions for the Follow-ups that You Need to Schedule     Discharge Follow-up with PCP   As directed       Currently Documented PCP:    Lizzy Francis MD    PCP Phone Number:    [phone]     Follow Up Details: one week have INR checked         Discharge Follow-up with Specified Provider: Dr Gupta; 1 Month   As directed      To: Dr Gupta    Follow Up: 1 Month               Test Results Pending at Discharge  Pending Labs     Order Current Status    Blood Culture - Blood, Arm, Left Preliminary result    Blood Culture - Blood, Arm, Left Preliminary result           CHELLE Harrison  12/10/21  15:09 EST    Time: Discharge 25 min

## 2021-12-11 LAB
BACTERIA SPEC AEROBE CULT: NORMAL
BACTERIA SPEC AEROBE CULT: NORMAL

## 2021-12-11 NOTE — OUTREACH NOTE
Prep Survey      Responses   Religion facility patient discharged from? [facility]   Is LACE score < 7 ? No   Emergency Room discharge w/ pulse ox? No   Eligibility Readm Mgmt   Discharge diagnosis UTI   Does the patient have one of the following disease processes/diagnoses(primary or secondary)? Other   Does the patient have Home health ordered? Yes   [facility]   Is there a DME ordered? 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 Yes          Myrna Valentine RN

## 2021-12-13 ENCOUNTER — TELEPHONE (OUTPATIENT)
Dept: PHARMACY | Facility: HOSPITAL | Age: 81
End: 2021-12-13

## 2021-12-13 ENCOUNTER — READMISSION MANAGEMENT (OUTPATIENT)
Dept: CALL CENTER | Facility: HOSPITAL | Age: 81
End: 2021-12-13

## 2021-12-13 NOTE — TELEPHONE ENCOUNTER
Patient hospitalized at [facility] 12/6-12/10. Discharge note says patient needs follow-up with PCP and INR check in 1 week. Spoke with patient who has called PCP's office and is waiting on call back to set appointment. Instructed patient to have INR checked at appointment with PCP to avoid multiple trips for patient. Patient discharged from hospital on linezolid (may increase INR). Informed patient of this interaction and instructed patient to continue previous regimen of warfarin 2 mg daily. Also educated patient on signs/symptoms of abnormal bleeding/bruising to monitor for. Instructed patient to contact [facility] if these occur. Patient expressed understanding.

## 2021-12-13 NOTE — OUTREACH NOTE
Medical Week 1 Survey      Responses   [facility] patient discharged from? [facility]   Does the patient have one of the following disease processes/diagnoses(primary or secondary)? Other   Week 1 attempt successful? 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 Yes   Is the patient having any side effects they believe may be caused by any medication additions or changes? No   Does the patient have all medications ordered at discharge? Yes   Is the patient taking all medications as directed (includes completed medication regime)? Yes   Does the patient have a primary care provider?  Yes   Does the patient have an appointment with their PCP within 7 days of discharge? No   What is preventing the patient from scheduling follow up appointments within 7 days of discharge? Waiting on return call   Has the patient kept scheduled appointments due by today? N/A   [facility]   Has home health visited the patient within 72 hours of discharge? No   Home health comments PATIENT HAS DECIDED SHE DOESN'T NEED HOME HEALTH   Psychosocial issues? No   Did the patient receive a copy of their discharge instructions? Yes   Nursing interventions Reviewed instructions with patient   What is the patient's perception of their health status since discharge? Improving   Is the patient/caregiver able to teach back signs and symptoms related to disease process for when to call PCP? Yes   Is the patient/caregiver able to teach back signs and symptoms related to disease process for when to call 911? Yes   Is the patient/caregiver able to teach back the hierarchy of who to call/visit for symptoms/problems? PCP, Specialist, Home health nurse, Urgent Care, ED, 911 Yes   If the patient is a current smoker, are they able to teach back  resources for cessation? Not a smoker   Week 1 call completed? Yes          Traci Shafer LPN

## 2021-12-16 ENCOUNTER — APPOINTMENT (OUTPATIENT)
Dept: BONE DENSITY | Facility: HOSPITAL | Age: 81
End: 2021-12-16

## 2021-12-17 PROCEDURE — 93296 REM INTERROG EVL PM/IDS: CPT | Performed by: NURSE PRACTITIONER

## 2021-12-17 PROCEDURE — 93294 REM INTERROG EVL PM/LDLS PM: CPT | Performed by: NURSE PRACTITIONER

## 2021-12-20 ENCOUNTER — ANTICOAGULATION VISIT (OUTPATIENT)
Dept: PHARMACY | Facility: HOSPITAL | Age: 81
End: 2021-12-20

## 2021-12-20 LAB — INR PPP: 4.1 (ref 2–3)

## 2021-12-20 PROCEDURE — G0463 HOSPITAL OUTPT CLINIC VISIT: HCPCS

## 2021-12-20 PROCEDURE — 85610 PROTHROMBIN TIME: CPT

## 2021-12-20 PROCEDURE — 36416 COLLJ CAPILLARY BLOOD SPEC: CPT

## 2021-12-20 NOTE — PROGRESS NOTES
Anticoagulation Clinic Progress Note    INR Goal: 2 - 3  Today's INR: 4.1 (supratherapeutic)  Current warfarin dose: warfarin 2 mg PO daily Current total weekly dose = 14 mg per week.   -Completed 5 DOT of linezolid     INR History:  Anticoagulation Monitoring 2021 12/3/2021 2021   INR 3.00 2.50 4.10   INR Date 2021 12/3/2021 2021   INR Goal 2.0-3.0 2.0-3.0 2.0-3.0   Trend Down Same Same   Last Week Total 14 mg 13 mg 14 mg   Next Week Total 13 mg 13 mg 12 mg   Sun - 1 mg () 2 mg   Mon - - Hold ()   Tu - - 2 mg   Wed 1 mg () - 2 mg   Thu 2 mg - 2 mg   Fri - 2 mg 2 mg   Sat - 2 mg 2 mg   Visit Report - - -   Some recent data might be hidden       Anticoagulation Summary  As of 2021    INR goal:  2.0-3.0   TTR:  42.1 % (2.2 y)   INR used for dosin.10 (2021)   Warfarin maintenance plan:  2 mg every day   Weekly warfarin total:  14 mg   Plan last modified:  Kayleigh Horvath, PharmD (2021)   Next INR check:  2021   Priority:  Maintenance   Target end date:      Indications    Atrial fibrillation (HCC) [I48.91]             Anticoagulation Episode Summary     INR check location:      Preferred lab:      Send INR reminders to:  [facility] CLINICAL POOL    Comments:  Patient contract signed 21.      Anticoagulation Care Providers     Provider Role Specialty Phone number    Seng Augustin Cm Barnes MD  Cardiology [phone]          Clinic Interview:   Patient Findings     Positives:  Change in medications    Negatives:  Signs/symptoms of thrombosis, Signs/symptoms of bleeding,   Laboratory test error suspected, Change in health, Change in alcohol use,   Change in activity, Upcoming invasive procedure, Emergency department   visit, Upcoming dental procedure, Missed doses, Extra doses, Change in   diet/appetite, Hospital admission, Bruising, Other complaints    Comments:  Took last dose of Linezolid Saturday for UTI ( drug-drug   interactions -  increases INR)      Clinical Outcomes     Negatives:  Major bleeding event, Thromboembolic event,   Anticoagulation-related hospital admission, Anticoagulation-related ED   visit, Anticoagulation-related fatality    Comments:  Took last dose of Linezolid Saturday for UTI ( drug-drug   interactions - increases INR)        Current Medications:   Prior to Admission medications    Medication Sig Start Date End Date Taking? Authorizing Provider   acetaminophen (TYLENOL) 325 MG tablet Take 650 mg by mouth Every 6 (Six) Hours As Needed for Mild Pain .    Gino Leos MD   Cholecalciferol (Vitamin D3) 50 MCG (2000 UT) tablet Take 2,000 Units by mouth Daily.    Gino Leos MD   dicyclomine (BENTYL) 10 MG capsule Take 10 mg by mouth 4 (Four) Times a Day Before Meals & at Bedtime.    Gino Leos MD   digoxin (LANOXIN) 125 MCG tablet Take 1 tablet by mouth Daily. 11/2/21   Augustin Ellsworth MD   dilTIAZem CD (Cardizem CD) 240 MG 24 hr capsule Take 1 capsule by mouth Daily. 11/2/21   Augustin Ellsworth MD   HYDROcodone-acetaminophen (NORCO) 5-325 MG per tablet Take 1 tablet by mouth Every 8 (Eight) Hours As Needed.    Gino Leos MD   metoprolol tartrate (LOPRESSOR) 100 MG tablet Take 100 mg by mouth 2 (Two) Times a Day.    Gino Leos MD   Probiotic Product (PROBIOTIC PO) Take  by mouth Daily.    Gino Leos MD   topiramate (TOPAMAX) 100 MG tablet Take 100 mg by mouth every night at bedtime.    Gino Leos MD   warfarin (COUMADIN) 2 MG tablet Take 2 mg by mouth Daily. Pt takes 2mg daily except 1mg on Wednesday as of 12/9/21    Gino Leos MD       Medical history:   Past Medical History:   Diagnosis Date   • Bladder cancer (HCC)    • Cancer (HCC)     breast, bladder   • Deep vein thrombosis (HCC)    • Essential hypertension 1/17/2017   • Fracture tibia/fibula, right, closed, initial encounter 8/27/2020   • GERD (gastroesophageal reflux  disease)    • History of urostomy    • Immobility 08/27/2020    r/t broken Tibia    • Kidney carcinoma, right (HCC)     removed    • Long term current use of anticoagulant 1/9/2015   • PAF (paroxysmal atrial fibrillation) (HCC) 6/26/2019   • Presence of cardiac pacemaker 11/03/2014    BS dual chamber PM placed 10/2014 with pocket revision 1/25/17.  HX tachy rob syndrome   • Sick sinus syndrome (HCC) 11/3/2014       Social history:   Social History     Tobacco Use   • Smoking status: Never Smoker   • Smokeless tobacco: Never Used   Substance Use Topics   • Alcohol use: Not Currently       Allergies:    Amoxicillin, Ciprofloxacin, Oxycodone-acetaminophen, Penicillins, Sulfa antibiotics, Cephalexin, Clavulanic acid, Codeine, Contrast dye, Doxycycline, Fluconazole, Iodine, Macrobid [nitrofurantoin], Tobramycin, and Trimethoprim    Vaccine History:   Immunization History   Administered Date(s) Administered   • FLUAD TRI 65YR+ 09/07/2012, 09/24/2014   • Fluzone High Dose =>65 Years ([employer] ONLY) 12/16/2015, 11/04/2016, 10/31/2017, 10/18/2018, 09/27/2019   • Pneumococcal Conjugate 13-Valent (PCV13) 11/04/2016   • Tdap 04/01/2016       This patient is managed via a collaborative agreement, pursuant to IC 25-26-16. The signed protocol is kept in the [facility] at [address] [place] [address].    Education: Hazel Bucio has been instructed to call if any changes in medications, doses, concerns, etc. Patient was provided dosing instructions and expressed verbal understanding and has no further questions at this time.    Plan:  1. Anticoagulation   - Hold today 12/20; and restart 2 mg daily Tuesday  -Instructed to eat greens today and watch for signs/symptoms of bleeding  - RTC in 1 week(s)        Basil Mcmillan, PharmD  12/20/2021  14:08 EST

## 2021-12-27 ENCOUNTER — ANTICOAGULATION VISIT (OUTPATIENT)
Dept: PHARMACY | Facility: HOSPITAL | Age: 81
End: 2021-12-27

## 2021-12-27 LAB — INR PPP: 1.1 (ref 2–3)

## 2021-12-27 PROCEDURE — 36416 COLLJ CAPILLARY BLOOD SPEC: CPT

## 2021-12-27 PROCEDURE — G0463 HOSPITAL OUTPT CLINIC VISIT: HCPCS

## 2021-12-27 PROCEDURE — 85610 PROTHROMBIN TIME: CPT

## 2021-12-27 NOTE — PROGRESS NOTES
Anticoagulation Clinic Progress Note    INR Goal: 2 - 3  Today's INR: 1.1 (subtherapeutic)  Current warfarin dose: Held , then 2mg daily except 1mg on Wednesday.  Missed dose on .      INR History:  Anticoagulation Monitoring 12/3/2021 2021 2021   INR 2.50 4.10 1.10   INR Date 12/3/2021 2021 2021   INR Goal 2.0-3.0 2.0-3.0 2.0-3.0   Trend Same Same Same   Last Week Total 13 mg 14 mg 12 mg   Next Week Total 13 mg 12 mg 15 mg   Sun 1 mg () 2 mg 2 mg   Mon - Hold () 4 mg ()   Tue - 2 mg 2 mg   Wed - 2 mg 1 mg ()   Thu - 2 mg 2 mg   Fri 2 mg 2 mg 2 mg   Sat 2 mg 2 mg 2 mg   Visit Report - - -   Some recent data might be hidden       Anticoagulation Summary  As of 2021    INR goal:  2.0-3.0   TTR:  42.1 % (2.2 y)   INR used for dosin.10 (2021)   Warfarin maintenance plan:  2 mg every day   Weekly warfarin total:  14 mg   Plan last modified:  Kayleigh Horvath, PharmD (2021)   Next INR check:  1/3/2022   Priority:  Maintenance   Target end date:      Indications    Atrial fibrillation (HCC) [I48.91]             Anticoagulation Episode Summary     INR check location:      Preferred lab:      Send INR reminders to:  [facility] CLINICAL POOL    Comments:  Patient contract signed 21.      Anticoagulation Care Providers     Provider Role Specialty Phone number    Augustin Ellsworth MD  Cardiology [phone]          Clinic Interview:   Patient Findings     Positives:  Missed doses    Comments:  Missed dose on , but picked up warfarin today so   should be good with supply      Clinical Outcomes     Comments:  Missed dose on , but picked up warfarin today so   should be good with supply        Current Medications:   Prior to Admission medications    Medication Sig Start Date End Date Taking? Authorizing Provider   acetaminophen (TYLENOL) 325 MG tablet Take 650 mg by mouth Every 6 (Six) Hours As Needed for Mild  Pain .    Gino Leos MD   Cholecalciferol (Vitamin D3) 50 MCG (2000 UT) tablet Take 2,000 Units by mouth Daily.    Gino Leos MD   dicyclomine (BENTYL) 10 MG capsule Take 10 mg by mouth 4 (Four) Times a Day Before Meals & at Bedtime.    Gino Leos MD   digoxin (LANOXIN) 125 MCG tablet Take 1 tablet by mouth Daily. 11/2/21   Augustin Ellsworth MD   dilTIAZem CD (Cardizem CD) 240 MG 24 hr capsule Take 1 capsule by mouth Daily. 11/2/21   Augustin Ellsworth MD   HYDROcodone-acetaminophen (NORCO) 5-325 MG per tablet Take 1 tablet by mouth Every 8 (Eight) Hours As Needed.    Gino Leos MD   metoprolol tartrate (LOPRESSOR) 100 MG tablet Take 100 mg by mouth 2 (Two) Times a Day.    Gino Leos MD   Probiotic Product (PROBIOTIC PO) Take  by mouth Daily.    Gino Leos MD   topiramate (TOPAMAX) 100 MG tablet Take 100 mg by mouth every night at bedtime.    Gino Leos MD   warfarin (COUMADIN) 2 MG tablet Take 2 mg by mouth Daily. Pt takes 2mg daily except 1mg on Wednesday as of 12/9/21    Gino Leos MD       Medical history:   Past Medical History:   Diagnosis Date   • Bladder cancer (HCC)    • Cancer (HCC)     breast, bladder   • Deep vein thrombosis (HCC)    • Essential hypertension 1/17/2017   • Fracture tibia/fibula, right, closed, initial encounter 8/27/2020   • GERD (gastroesophageal reflux disease)    • History of urostomy    • Immobility 08/27/2020    r/t broken Tibia    • Kidney carcinoma, right (HCC)     removed    • Long term current use of anticoagulant 1/9/2015   • PAF (paroxysmal atrial fibrillation) (HCC) 6/26/2019   • Presence of cardiac pacemaker 11/03/2014    BS dual chamber PM placed 10/2014 with pocket revision 1/25/17.  HX tachy rob syndrome   • Sick sinus syndrome (HCC) 11/3/2014       Social history:   Social History     Tobacco Use   • Smoking status: Never Smoker   • Smokeless tobacco: Never Used    Substance Use Topics   • Alcohol use: Not Currently       Allergies:    Amoxicillin, Ciprofloxacin, Oxycodone-acetaminophen, Penicillins, Sulfa antibiotics, Cephalexin, Clavulanic acid, Codeine, Contrast dye, Doxycycline, Fluconazole, Iodine, Macrobid [nitrofurantoin], Tobramycin, and Trimethoprim    Vaccine History:   Immunization History   Administered Date(s) Administered   • FLUAD TRI 65YR+ 09/07/2012, 09/24/2014   • Fluzone High Dose =>65 Years ([employer] ONLY) 12/16/2015, 11/04/2016, 10/31/2017, 10/18/2018, 09/27/2019   • Pneumococcal Conjugate 13-Valent (PCV13) 11/04/2016   • Tdap 04/01/2016       This patient is managed via a collaborative agreement, pursuant to IC 25-26-16. The signed protocol is kept in the [facility] at [address] [place] [address].    Education: Hazel CASILLAS Alysonmann has been instructed to call if any changes in medications, doses, concerns, etc. Patient was provided dosing instructions and expressed verbal understanding and has no further questions at this time.    Plan:  1. Anticoagulation   - Loaded 4 mg today 12/27; warfarin 2 mg PO daily, except warfarin 1 mg PO on Wednesday.  Total weekly dose = 15 mg.   - RTC in 1 week(s)        Basil Mcmillan, PharmD  12/27/2021  14:29 EST

## 2022-01-03 ENCOUNTER — ANTICOAGULATION VISIT (OUTPATIENT)
Dept: PHARMACY | Facility: HOSPITAL | Age: 82
End: 2022-01-03

## 2022-01-03 LAB — INR PPP: 2.1 (ref 2–3)

## 2022-01-03 PROCEDURE — 85610 PROTHROMBIN TIME: CPT

## 2022-01-03 PROCEDURE — G0463 HOSPITAL OUTPT CLINIC VISIT: HCPCS

## 2022-01-03 PROCEDURE — 36416 COLLJ CAPILLARY BLOOD SPEC: CPT

## 2022-01-03 NOTE — PROGRESS NOTES
Anticoagulation Clinic Progress Note    INR Goal: 2 - 3  Today's INR: 2.1 (therapeutic)  Current warfarin dose:  took 4 mg then continued 2mg daily except 1 mg wednesday Current total weekly dose = 15 mg per week.     INR History:  Anticoagulation Monitoring 2021 2021 1/3/2022   INR 4.10 1.10 2.10   INR Date 2021 2021 1/3/2022   INR Goal 2.0-3.0 2.0-3.0 2.0-3.0   Trend Same Same Same   Last Week Total 14 mg 12 mg 15 mg   Next Week Total 12 mg 15 mg 15 mg   Sun 2 mg 2 mg 2 mg   Mon Hold () 4 mg () 3 mg (1/3)   Tue 2 mg 2 mg 2 mg   Wed 2 mg 1 mg () 2 mg   Thu 2 mg 2 mg 2 mg   Fri 2 mg 2 mg 2 mg   Sat 2 mg 2 mg 2 mg   Visit Report - - -   Some recent data might be hidden       Anticoagulation Summary  As of 1/3/2022    INR goal:  2.0-3.0   TTR:  41.8 % (2.3 y)   INR used for dosin.10 (1/3/2022)   Warfarin maintenance plan:  2 mg every day   Weekly warfarin total:  14 mg   Plan last modified:  Kayleigh Horvath, PharmD (2021)   Next INR check:     Priority:  Maintenance   Target end date:      Indications    Atrial fibrillation (HCC) [I48.91]             Anticoagulation Episode Summary     INR check location:      Preferred lab:      Send INR reminders to:  [facility] CLINICAL POOL    Comments:  Patient contract signed 21.      Anticoagulation Care Providers     Provider Role Specialty Phone number    Augustin Ellsworth MD  Cardiology [phone]          Clinic Interview:   Patient Findings     Negatives:  Signs/symptoms of thrombosis, Signs/symptoms of bleeding,   Laboratory test error suspected, Change in health, Change in alcohol use,   Change in activity, Upcoming invasive procedure, Emergency department   visit, Upcoming dental procedure, Missed doses, Extra doses, Change in   medications, Change in diet/appetite, Hospital admission, Bruising, Other   complaints      Clinical Outcomes     Negatives:  Major bleeding event, Thromboembolic  event,   Anticoagulation-related hospital admission, Anticoagulation-related ED   visit, Anticoagulation-related fatality        Current Medications:   Prior to Admission medications    Medication Sig Start Date End Date Taking? Authorizing Provider   acetaminophen (TYLENOL) 325 MG tablet Take 650 mg by mouth Every 6 (Six) Hours As Needed for Mild Pain .    Gino Leos MD   Cholecalciferol (Vitamin D3) 50 MCG (2000 UT) tablet Take 2,000 Units by mouth Daily.    Gino Leos MD   dicyclomine (BENTYL) 10 MG capsule Take 10 mg by mouth 4 (Four) Times a Day Before Meals & at Bedtime.    Gino Leos MD   digoxin (LANOXIN) 125 MCG tablet Take 1 tablet by mouth Daily. 11/2/21   Augustin Ellsworth MD   dilTIAZem CD (Cardizem CD) 240 MG 24 hr capsule Take 1 capsule by mouth Daily. 11/2/21   Augustin Ellsworth MD   HYDROcodone-acetaminophen (NORCO) 5-325 MG per tablet Take 1 tablet by mouth Every 8 (Eight) Hours As Needed.    Gino Leos MD   metoprolol tartrate (LOPRESSOR) 100 MG tablet Take 100 mg by mouth 2 (Two) Times a Day.    Gino Leos MD   Probiotic Product (PROBIOTIC PO) Take  by mouth Daily.    Gino Leos MD   topiramate (TOPAMAX) 100 MG tablet Take 100 mg by mouth every night at bedtime.    Gino Leos MD   warfarin (COUMADIN) 2 MG tablet Take 2 mg by mouth Daily. Pt takes 2mg daily except 1mg on Wednesday as of 12/9/21    Gino Leos MD       Medical history:   Past Medical History:   Diagnosis Date   • Bladder cancer (HCC)    • Cancer (HCC)     breast, bladder   • Deep vein thrombosis (HCC)    • Essential hypertension 1/17/2017   • Fracture tibia/fibula, right, closed, initial encounter 8/27/2020   • GERD (gastroesophageal reflux disease)    • History of urostomy    • Immobility 08/27/2020    r/t broken Tibia    • Kidney carcinoma, right (HCC)     removed    • Long term current use of anticoagulant 1/9/2015   • PAF  (paroxysmal atrial fibrillation) ([facility]) 6/26/2019   • Presence of cardiac pacemaker 11/03/2014    BS dual chamber PM placed 10/2014 with pocket revision 1/25/17.  HX tachy rob syndrome   • Sick sinus syndrome (HCC) 11/3/2014       Social history:   Social History     Tobacco Use   • Smoking status: Never Smoker   • Smokeless tobacco: Never Used   Substance Use Topics   • Alcohol use: Not Currently       Allergies:    Amoxicillin, Ciprofloxacin, Oxycodone-acetaminophen, Penicillins, Sulfa antibiotics, Cephalexin, Clavulanic acid, Codeine, Contrast dye, Doxycycline, Fluconazole, Iodine, Macrobid [nitrofurantoin], Tobramycin, and Trimethoprim    Vaccine History:   Immunization History   Administered Date(s) Administered   • FLUAD TRI 65YR+ 09/07/2012, 09/24/2014   • Fluzone High Dose =>65 Years ([employer] ONLY) 12/16/2015, 11/04/2016, 10/31/2017, 10/18/2018, 09/27/2019   • Pneumococcal Conjugate 13-Valent (PCV13) 11/04/2016   • Tdap 04/01/2016       This patient is managed via a collaborative agreement, pursuant to IC 25-26-16. The signed protocol is kept in the [facility] at [address] [place] [address].    Education: Hazel Bucio has been instructed to call if any changes in medications, doses, concerns, etc. Patient was provided dosing instructions and expressed verbal understanding and has no further questions at this time.    Plan:  1. Anticoagulation   - increase by 1% ; warfarin 2 mg PO daily, except warfarin 3 mg PO on Mondays.   Total weekly dose = 15 mg.   - RTC in 4 week(s)    2  Basil Mcmillan, PharmD  1/3/2022  11:58 EST

## 2022-01-12 RX ORDER — WARFARIN SODIUM 2 MG/1
TABLET ORAL
Qty: 30 TABLET | Refills: 0 | Status: SHIPPED | OUTPATIENT
Start: 2022-01-12 | End: 2022-02-24

## 2022-01-31 ENCOUNTER — APPOINTMENT (OUTPATIENT)
Dept: PHARMACY | Facility: HOSPITAL | Age: 82
End: 2022-01-31

## 2022-02-10 ENCOUNTER — ANTICOAGULATION VISIT (OUTPATIENT)
Dept: PHARMACY | Facility: HOSPITAL | Age: 82
End: 2022-02-10

## 2022-02-10 DIAGNOSIS — I48.91 ATRIAL FIBRILLATION, UNSPECIFIED TYPE: Primary | ICD-10-CM

## 2022-02-10 LAB — INR PPP: 2.3 (ref 2–3)

## 2022-02-10 PROCEDURE — 85610 PROTHROMBIN TIME: CPT

## 2022-02-10 PROCEDURE — 36416 COLLJ CAPILLARY BLOOD SPEC: CPT

## 2022-02-10 PROCEDURE — G0463 HOSPITAL OUTPT CLINIC VISIT: HCPCS

## 2022-02-10 NOTE — PROGRESS NOTES
Anticoagulation Clinic Progress Note    INR Goal: 2 - 3  Today's INR: 2.3 (therapeutic)  Current warfarin dose: warfarin 2 mg PO daily. Patient took one time dose of 3 mg on 1/3. Current total weekly dose = 14 mg per week.     INR History:  Anticoagulation Monitoring 2021 1/3/2022 2/10/2022   INR 1.10 2.10 2.30   INR Date 2021 1/3/2022 2/10/2022   INR Goal 2.0-3.0 2.0-3.0 2.0-3.0   Trend Same Same Same   Last Week Total 12 mg 15 mg 14 mg   Next Week Total 15 mg 15 mg 14 mg   Sun 2 mg 2 mg 2 mg   Mon 4 mg () 3 mg (1/3, 1/10, ); Otherwise 2 mg 2 mg   Tue 2 mg 2 mg 2 mg   Wed 1 mg () 2 mg 2 mg   Thu 2 mg 2 mg 2 mg   Fri 2 mg 2 mg 2 mg   Sat 2 mg 2 mg 2 mg   Visit Report - - -   Some recent data might be hidden       Anticoagulation Summary  As of 2/10/2022    INR goal:  2.0-3.0   TTR:  44.3 % (2.4 y)   INR used for dosin.30 (2/10/2022)   Warfarin maintenance plan:  2 mg every day   Weekly warfarin total:  14 mg   No change documented:  Kayleigh Horvath, PharmD   Plan last modified:  Kayleigh Horvath, PharmD (2021)   Next INR check:  3/10/2022   Priority:  Maintenance   Target end date:      Indications    Atrial fibrillation (HCC) [I48.91]             Anticoagulation Episode Summary     INR check location:      Preferred lab:      Send INR reminders to:  [facility] CLINICAL POOL    Comments:  Patient contract signed 21.      Anticoagulation Care Providers     Provider Role Specialty Phone number    Augustin Ellsworth MD  Cardiology [phone]          Clinic Interview:   Patient Findings     Negatives:  Signs/symptoms of thrombosis, Signs/symptoms of bleeding,   Laboratory test error suspected, Change in health, Change in alcohol use,   Change in activity, Upcoming invasive procedure, Emergency department   visit, Upcoming dental procedure, Missed doses, Extra doses, Change in   medications, Change in diet/appetite, Hospital admission, Bruising, Other   complaints       Clinical Outcomes     Negatives:  Major bleeding event, Thromboembolic event,   Anticoagulation-related hospital admission, Anticoagulation-related ED   visit, Anticoagulation-related fatality        Current Medications:   Prior to Admission medications    Medication Sig Start Date End Date Taking? Authorizing Provider   acetaminophen (TYLENOL) 325 MG tablet Take 650 mg by mouth Every 6 (Six) Hours As Needed for Mild Pain .    Gino Leos MD   Cholecalciferol (Vitamin D3) 50 MCG (2000 UT) tablet Take 2,000 Units by mouth Daily.    Gino Leos MD   dicyclomine (BENTYL) 10 MG capsule Take 10 mg by mouth 4 (Four) Times a Day Before Meals & at Bedtime.    Gino Leos MD   digoxin (LANOXIN) 125 MCG tablet Take 1 tablet by mouth Daily. 11/2/21   Augustin Ellsworth MD   dilTIAZem CD (Cardizem CD) 240 MG 24 hr capsule Take 1 capsule by mouth Daily. 11/2/21   Augustin Ellsworth MD   HYDROcodone-acetaminophen (NORCO) 5-325 MG per tablet Take 1 tablet by mouth Every 8 (Eight) Hours As Needed.    Gino Leos MD   metoprolol tartrate (LOPRESSOR) 100 MG tablet Take 100 mg by mouth 2 (Two) Times a Day.    Gino Leos MD   Probiotic Product (PROBIOTIC PO) Take  by mouth Daily.    Gino Leos MD   topiramate (TOPAMAX) 100 MG tablet Take 100 mg by mouth every night at bedtime.    Gino Leos MD   warfarin (COUMADIN) 2 MG tablet HOLD COUMADIN THURSDAY AND FRIDAY. INR SATURDAY AT [facility] WITH [facility]. RESTART AT 2 MG SATURDAY 1/12/22   Augustni Ellsworth MD       Medical history:   Past Medical History:   Diagnosis Date   • Bladder cancer (HCC)    • Cancer (HCC)     breast, bladder   • Deep vein thrombosis (HCC)    • Essential hypertension 1/17/2017   • Fracture tibia/fibula, right, closed, initial encounter 8/27/2020   • GERD (gastroesophageal reflux disease)    • History of urostomy    • Immobility 08/27/2020    r/t broken Tibia    •  Kidney carcinoma, right (HCC)     removed    • Long term current use of anticoagulant 1/9/2015   • PAF (paroxysmal atrial fibrillation) (HCC) 6/26/2019   • Presence of cardiac pacemaker 11/03/2014    BS dual chamber PM placed 10/2014 with pocket revision 1/25/17.  HX tachy rob syndrome   • Sick sinus syndrome (HCC) 11/3/2014       Social history:   Social History     Tobacco Use   • Smoking status: Never Smoker   • Smokeless tobacco: Never Used   Substance Use Topics   • Alcohol use: Not Currently       Allergies:    Amoxicillin, Ciprofloxacin, Oxycodone-acetaminophen, Penicillins, Sulfa antibiotics, Cephalexin, Clavulanic acid, Codeine, Contrast dye, Doxycycline, Fluconazole, Iodine, Macrobid [nitrofurantoin], Tobramycin, and Trimethoprim    Vaccine History:   Immunization History   Administered Date(s) Administered   • FLUAD TRI 65YR+ 09/07/2012, 09/24/2014   • Fluzone High Dose =>65 Years ([employer] ONLY) 12/16/2015, 11/04/2016, 10/31/2017, 10/18/2018, 09/27/2019   • Pneumococcal Conjugate 13-Valent (PCV13) 11/04/2016   • Tdap 04/01/2016       This patient is managed via a collaborative agreement, pursuant to IC 25-26-16. The signed protocol is kept in the [facility] at [address] [place] [address].    Education: Hazel Bucio has been instructed to call if any changes in medications, doses, concerns, etc. Patient was provided dosing instructions and expressed verbal understanding and has no further questions at this time.    Plan:  1. Anticoagulation   - no change; warfarin 2 mg PO daily. Total weekly dose = 14 mg.   - RTC in 4 week(s)    Ulices AmbroseD  2/10/2022  15:40 EST

## 2022-02-22 ENCOUNTER — OFFICE VISIT (OUTPATIENT)
Dept: CARDIOLOGY | Facility: CLINIC | Age: 82
End: 2022-02-22

## 2022-02-22 VITALS
RESPIRATION RATE: 18 BRPM | SYSTOLIC BLOOD PRESSURE: 158 MMHG | DIASTOLIC BLOOD PRESSURE: 82 MMHG | WEIGHT: 152.6 LBS | HEART RATE: 68 BPM | BODY MASS INDEX: 26.05 KG/M2 | HEIGHT: 64 IN

## 2022-02-22 DIAGNOSIS — I10 ESSENTIAL HYPERTENSION: ICD-10-CM

## 2022-02-22 DIAGNOSIS — Z79.01 LONG TERM CURRENT USE OF ANTICOAGULANT: ICD-10-CM

## 2022-02-22 DIAGNOSIS — I49.5 SICK SINUS SYNDROME: Primary | ICD-10-CM

## 2022-02-22 DIAGNOSIS — I48.21 PERMANENT ATRIAL FIBRILLATION: ICD-10-CM

## 2022-02-22 PROCEDURE — 99214 OFFICE O/P EST MOD 30 MIN: CPT | Performed by: INTERNAL MEDICINE

## 2022-02-22 RX ORDER — METOPROLOL SUCCINATE 50 MG/1
50 TABLET, EXTENDED RELEASE ORAL 2 TIMES DAILY
Qty: 60 TABLET | Refills: 5 | Status: SHIPPED | OUTPATIENT
Start: 2022-02-22 | End: 2022-05-19 | Stop reason: SDUPTHER

## 2022-02-22 NOTE — PROGRESS NOTES
Cardiology Clinic Note  Augustin Ellsworth MD, PhD    Subjective:     Encounter Date:02/22/2022      Patient ID: Hazel Bucio is a 81 y.o. female.    Chief Complaint:  Chief Complaint   Patient presents with   • Follow-up       HPI:    I the pleasure to see this 81-year-old female today in follow-up from hospitalization who was admitted with urinary tract infection abdominal pain now on antibiotics.  She has cardiac history of dual-chamber pacemaker placed back in 2014 with revision 2017 history of tachybradycardia syndrome is presently in atrial fibrillation which is rate controlled with underlying backup ventricular pacing intermittently as reviewed and interpreted by me with respect to her telemetry.  She reports some occasional tachycardia and palpitations.  Her INR is slightly subtherapeutic at 1.6-1.7 at that time she is found to be in A. fib RVR.  Her medicines were optimized and she was ultimately discharged.  Device was interrogated as well which revealed normal functioning.  She was mildly hypotensive and Cardizem was held temporarily which was ultimately then restarted.  She is back in clinic today for follow-up blood pressure 146/80 heart rates are in the 1 teens to 120s and she is has uncontrolled atrial fibrillation on her device interrogation and on EKG today.  2D echo reviewed interpreted by me recently demonstrated preserved LV systolic function 60 to 65%, moderately increased biatrial enlargement, normal pulmonary pressures with some diastolic dysfunction.    Since she was last seen ultimately she has had runs of paroxysmal A. fib RVR, she is irregular today with heart rates in the 110s to 120s again.  She is not on beta-blockade and was previously on metoprolol but this was discontinued secondary to hypotension.  She denies any chest pain shortness of breath heart failure signs or symptoms, blood pressure is elevated 158/82 today and has been this way at home as well.  She is on diltiazem and digoxin  "and low-dose     Review of systems otherwise negative as of 14 point review of systems except was mentioned above     Historical data copied forward from previous encounters numerous unchanged     Assessment plan per my encounter  No heart failure signs or symptoms today  Uncontrolled atrial fibrillation     Device interrogation performed, intermittent A. fib RVR, presently rate uncontrolled backup ventricular pacing on demand VVI  Blood pressures are slightly high today 150 systolic  Continue Cardizem 240 daily with increase appropriately  Restart metoprolol XL 50 twice daily  Continue Coumadin, management per pharmacy, INR therapeutic goal 2-3  Continue digoxin 0.125 daily  Normal renal function, normal potassium  Follow-up 30 days for device interrogation and assessment of her heart rates  Further medical titration based on A. fib RVR with uncontrolled rates on follow-up encounter        2D echo reviewed interpreted by me demonstrates normal EF 60 to 65%, normal RV size and function  Moderate biatrial enlargement  Mild to moderate posterior directed MR, mild AI, mild to moderate TR  Grade 1 diastolic dysfunction most likely     Augustin FRIEDMAN PhD         Historical data copied forward from previous encounters in EMR including the history, exam, and assessment/plan has been reviewed and is unchanged unless noted otherwise.    Cardiac medicines reviewed with risk, benefits, and necessity of each discussed.    Risk and benefit of cardiac testing reviewed including death heart attack stroke pain bleeding infection need for vascular /cardiovascular surgery were discussed and the patient     Objective:         /82 (BP Location: Left arm, Patient Position: Sitting)   Pulse 68   Resp 18   Ht 162.6 cm (64\")   Wt 69.2 kg (152 lb 9.6 oz)   BMI 26.19 kg/m²     Physical Exam    Assessment:         There are no diagnoses linked to this encounter.       Plan:              The pleasure to be involved in this " patient's cardiovascular care.  Please call with any questions or concerns  Augustin Ellsworth MD, PhD    Most recent EKG as reviewed and interpreted by me:  Procedures     Most recent echo as reviewed and interpreted by me:  Results for orders placed during the hospital encounter of 09/24/21    Adult Transthoracic Echo Complete W/ Cont if Necessary Per Protocol    Interpretation Summary  · Estimated left ventricular EF was in agreement with the calculated left ventricular EF. Left ventricular ejection fraction appears to be 61 - 65%. Left ventricular systolic function is normal.  · Left atrial volume is moderately increased.  · The right atrial cavity is moderately dilated.  · Estimated right ventricular systolic pressure from tricuspid regurgitation is normal (<35 mmHg).      Most recent stress test as reviewed and interpreted by me:  Results for orders placed during the hospital encounter of 04/28/20    Stress Test With Myocardial Perfusion One Day    Interpretation Summary  · Findings consistent with a normal ECG stress test.  · Left ventricular ejection fraction is normal (Calculated EF = 70%).  · Impressions are consistent with a low risk study.  · Relatively small septal and lateral wall defects appear fixed with no reversible ischemia appreciated.    Moderate defect involving the septal and lateral wall segments with no reversible ischemia appreciated.  Global LV systolic function appears normal with no segmental abnormalities; LVEF 70% or greater by gated SPECT analysis      Most recent cardiac catheterization as reviewed interpreted by me:  No results found for this or any previous visit.    The following portions of the patient's history were reviewed and updated as appropriate: allergies, current medications, past family history, past medical history, past social history, past surgical history and problem list.      ROS:  14 point review of systems negative except as mentioned above    Current Outpatient  Medications:   •  acetaminophen (TYLENOL) 325 MG tablet, Take 650 mg by mouth Every 6 (Six) Hours As Needed for Mild Pain ., Disp: , Rfl:   •  Cholecalciferol (Vitamin D3) 50 MCG (2000 UT) tablet, Take 2,000 Units by mouth Daily., Disp: , Rfl:   •  digoxin (LANOXIN) 125 MCG tablet, Take 1 tablet by mouth Daily., Disp: 30 tablet, Rfl: 3  •  dilTIAZem CD (Cardizem CD) 240 MG 24 hr capsule, Take 1 capsule by mouth Daily., Disp: 30 capsule, Rfl: 5  •  HYDROcodone-acetaminophen (NORCO) 5-325 MG per tablet, Take 1 tablet by mouth Every 8 (Eight) Hours As Needed., Disp: , Rfl:   •  Probiotic Product (PROBIOTIC PO), Take  by mouth Daily., Disp: , Rfl:   •  topiramate (TOPAMAX) 100 MG tablet, Take 100 mg by mouth every night at bedtime., Disp: , Rfl:   •  warfarin (COUMADIN) 2 MG tablet, HOLD COUMADIN THURSDAY AND FRIDAY. INR SATURDAY AT [facility] WITH OPTUM. RESTART AT 2 MG SATURDAY, Disp: 30 tablet, Rfl: 0  •  metoprolol succinate XL (TOPROL-XL) 50 MG 24 hr tablet, Take 1 tablet by mouth 2 (Two) Times a Day., Disp: 60 tablet, Rfl: 5    Problem List:  Patient Active Problem List   Diagnosis   • Atrial fibrillation (HCC)   • Dyslipidemia   • Essential hypertension   • Long term current use of anticoagulant   • Presence of cardiac pacemaker   • Sick sinus syndrome (HCC)   • Type 2 diabetes mellitus (HCC)   • Tear film insufficiency   • Presbyopia   • Macular puckering of retina   • Tear film insufficiency, bilateral   • Pseudophakia, both eyes   • Lens replaced by other means   • Epiretinal membrane, bilateral   • Acute encephalopathy   • History of carcinoma in situ of breast   • Ureteral cancer (HCC)   • Chronic insomnia   • Seasonal allergies   • Altered mental status   • MACRINA (acute kidney injury) (HCC)   • Cholecystitis with cholelithiasis   • Acute UTI   • Abdominal pain   • Acute UTI     Past Medical History:  Past Medical History:   Diagnosis Date   • Bladder cancer (HCC)    • Cancer (HCC)     breast, bladder   •  Deep vein thrombosis (HCC)    • Essential hypertension 1/17/2017   • Fracture tibia/fibula, right, closed, initial encounter 8/27/2020   • GERD (gastroesophageal reflux disease)    • History of urostomy    • Immobility 08/27/2020    r/t broken Tibia    • Kidney carcinoma, right (HCC)     removed    • Long term current use of anticoagulant 1/9/2015   • PAF (paroxysmal atrial fibrillation) ([facility]) 6/26/2019   • Presence of cardiac pacemaker 11/03/2014    BS dual chamber PM placed 10/2014 with pocket revision 1/25/17.  HX tachy rob syndrome   • Sick sinus syndrome ([facility]) 11/3/2014     Past Surgical History:  Past Surgical History:   Procedure Laterality Date   • BREAST LUMPECTOMY     • CHOLECYSTECTOMY N/A 10/12/2020    Procedure: CHOLECYSTECTOMY LAPAROSCOPIC;  Surgeon: Go Pereira DO;  Location: [facility];  Service: General;  Laterality: N/A;   • CYSTECTOMY W/ URETEROILEAL CONDUIT     • HARDWARE REMOVAL Right 6/11/2021    Procedure: TIBIA HARDWARE REMOVAL;  Surgeon: Geoff Shah II, MD;  Location: [facility] OR;  Service: Orthopedics;  Laterality: Right;   • HERNIA REPAIR     • HYSTERECTOMY     • INSERT / REPLACE / REMOVE PACEMAKER     • NEPHRECTOMY Right    • TIBIA IM NAILING/RODDING Right 8/28/2020    Procedure: TIBIA INTRAMEDULLARY NAIL/ABRAHAM INSERTION;  Surgeon: Geoff Shah II, MD;  Location: [facility];  Service: Orthopedics;  Laterality: Right;     Social History:  Social History     Socioeconomic History   • Marital status:    Tobacco Use   • Smoking status: Never Smoker   • Smokeless tobacco: Never Used   Vaping Use   • Vaping Use: Never used   Substance and Sexual Activity   • Alcohol use: Not Currently   • Drug use: Never   • Sexual activity: Defer     Allergies:  Allergies   Allergen Reactions   • Amoxicillin Shortness Of Breath   • Ciprofloxacin Hives   • Oxycodone-Acetaminophen Unknown - High Severity     Pt's son stated when pt fell and broke her leg she was put  "on oxycodone; pt's son stated pt's \"vitals went all out of wack, she couldn't remember things.\"   • Penicillins Rash   • Sulfa Antibiotics Hives   • Cephalexin Other (See Comments)   • Clavulanic Acid Other (See Comments)   • Codeine Other (See Comments)   • Contrast Dye Other (See Comments)   • Doxycycline Other (See Comments)   • Fluconazole Other (See Comments)   • Iodine Hives   • Macrobid [Nitrofurantoin] Hives   • Tobramycin Other (See Comments)   • Trimethoprim Other (See Comments)     Immunizations:  Immunization History   Administered Date(s) Administered   • FLUAD TRI 65YR+ 09/07/2012, 09/24/2014   • Fluzone High Dose =>65 Years ([employer] ONLY) 12/16/2015, 11/04/2016, 10/31/2017, 10/18/2018, 09/27/2019   • Pneumococcal Conjugate 13-Valent (PCV13) 11/04/2016   • Tdap 04/01/2016            In-Office Procedure(s):  No orders to display        ASCVD RIsk Score::  The ASCVD Risk score (Rob NANCY Jr., et al., 2013) failed to calculate for the following reasons:    The 2013 ASCVD risk score is only valid for ages 40 to 79    The patient has a prior MI or stroke diagnosis    Imaging:    Results for orders placed during the hospital encounter of 09/24/21    XR Abdomen KUB    Narrative  DATE OF EXAM:  9/28/2021 10:36 AM    PROCEDURE:  XR ABDOMEN KUB-    INDICATIONS:  abdominal distention, constipation, nausea withvomiting; N39.0-Urinary  tract infection, site not specified; R55-Syncope and collapse    COMPARISON:  KUB 9/10/2020.    TECHNIQUE:  Single radiographic view of the abdomen was obtained.      FINDINGS:  Large stool burden is seen within the colon particularly in the  ascending and the distal transverse segments. There is moderate gaseous  distention of the colon particularly in the proximal transverse segment,  descending segment and sigmoid segments. No pneumatosis is identified.  No gross free air is seen on this supine examination. Surgical clips are  present in the right upper quadrant. Osteopenic changes " are present.  Marginal spurring is present in the lumbar spine.    Impression  Large colonic stool burden with moderate generalized gaseous distention  of the colon. Correlate for symptoms of constipation.    Electronically Signed By-Ivy Valenzuela MD On:9/28/2021 10:41 AM  This report was finalized on [id number] by  Ivy Valenzuela MD.       Results for orders placed during the hospital encounter of 12/06/21    CT Abdomen Pelvis With Contrast    Narrative  EXAMINATION: CT ABDOMEN AND PELVIS WITH IV CONTRAST    DATE OF EXAMINATION: 12/6/2021.    COMPARISON: 9/24/2021.    INDICATION: Abdominal and suprapubic pain.    PROCEDURE:  Axial CT of the abdomen and pelvis was performed with contrast and sagittal and coronal reformatted images were performed.  100 mL of Isovue-370 was given intravenously. CT dose lowering techniques were used, to include: automated exposure  control, adjustment for patient size, and/or use of iterative reconstruction.    FINDINGS:    LOWER CHEST :  There are some patchy and linear bands of opacities at the lung bases bilaterally which are likely atelectasis or scarring. There are no pleural or pericardial effusions.    ABDOMEN:    Liver and Biliary system:  Normal.    Adrenal glands:  Normal.    Kidneys and ureters: Prior right nephrectomy. The left kidney and ureter otherwise appear unremarkable. There is a right lower quadrant loop ileostomy with prior cystectomy.    Spleen:  Several calcified granulomas are seen in the spleen.    Pancreas:  Normal.    Gallbladder:  Surgically absent.    Lymph nodes, Peritoneum and mesentery:  There is no mesenteric or retroperitoneal lymphadenopathy.    Gastrointestinal tract:  There are no dilated loops of bowel or free intraperitoneal air.    The appendix is not clearly seen.    Aorta/IVC:   There is moderate vascular calcification throughout the abdominal aorta without evidence of aneurysmal dilation or dissection.  IVC normal.    Abdominal wall:   Multiple abdominal wall hernias are unchanged.    PELVIS:    Fluid: There is no free fluid in the pelvis.    Lymph Nodes:  There is no pelvic or inguinal lymphadenopathy..    Urinary bladder:  Prior cystectomy with loop ileostomy in the right lower quadrant..    BONES:  There are no osseous destructive lesions..    ADDITIONAL  SIGNIFICANT FINDINGS:  None.    Impression  No acute process seen within the abdomen or pelvis with multiple incidental findings and chronic changes as above.          Electronically signed by:  Denys Mendosa D.O.  12/6/2021 10:10 PM      Results for orders placed during the hospital encounter of 12/06/21    CT Abdomen Pelvis With Contrast    Narrative  EXAMINATION: CT ABDOMEN AND PELVIS WITH IV CONTRAST    DATE OF EXAMINATION: 12/6/2021.    COMPARISON: 9/24/2021.    INDICATION: Abdominal and suprapubic pain.    PROCEDURE:  Axial CT of the abdomen and pelvis was performed with contrast and sagittal and coronal reformatted images were performed.  100 mL of Isovue-370 was given intravenously. CT dose lowering techniques were used, to include: automated exposure  control, adjustment for patient size, and/or use of iterative reconstruction.    FINDINGS:    LOWER CHEST :  There are some patchy and linear bands of opacities at the lung bases bilaterally which are likely atelectasis or scarring. There are no pleural or pericardial effusions.    ABDOMEN:    Liver and Biliary system:  Normal.    Adrenal glands:  Normal.    Kidneys and ureters: Prior right nephrectomy. The left kidney and ureter otherwise appear unremarkable. There is a right lower quadrant loop ileostomy with prior cystectomy.    Spleen:  Several calcified granulomas are seen in the spleen.    Pancreas:  Normal.    Gallbladder:  Surgically absent.    Lymph nodes, Peritoneum and mesentery:  There is no mesenteric or retroperitoneal lymphadenopathy.    Gastrointestinal tract:  There are no dilated loops of bowel or free intraperitoneal  air.    The appendix is not clearly seen.    Aorta/IVC:   There is moderate vascular calcification throughout the abdominal aorta without evidence of aneurysmal dilation or dissection.  IVC normal.    Abdominal wall:  Multiple abdominal wall hernias are unchanged.    PELVIS:    Fluid: There is no free fluid in the pelvis.    Lymph Nodes:  There is no pelvic or inguinal lymphadenopathy..    Urinary bladder:  Prior cystectomy with loop ileostomy in the right lower quadrant..    BONES:  There are no osseous destructive lesions..    ADDITIONAL  SIGNIFICANT FINDINGS:  None.    Impression  No acute process seen within the abdomen or pelvis with multiple incidental findings and chronic changes as above.          Electronically signed by:  Denys Mendosa D.O.  12/6/2021 10:10 PM      Lab Review:   Anticoagulation Visit on 02/10/2022   Component Date Value   • INR 02/10/2022 2.30    Anticoagulation Visit on 01/03/2022   Component Date Value   • INR 01/03/2022 2.10*   Anticoagulation Visit on 12/27/2021   Component Date Value   • INR 12/27/2021 1.10*   Anticoagulation Visit on 12/20/2021   Component Date Value   • INR 12/20/2021 4.10*   Admission on 12/06/2021, Discharged on 12/10/2021   Component Date Value   • Protime 12/06/2021 28.3    • INR 12/06/2021 2.75    • Glucose 12/06/2021 112*   • BUN 12/06/2021 31*   • Creatinine 12/06/2021 0.96    • Sodium 12/06/2021 137    • Potassium 12/06/2021 4.1    • Chloride 12/06/2021 105    • CO2 12/06/2021 18.0*   • Calcium 12/06/2021 9.3    • Total Protein 12/06/2021 7.5    • Albumin 12/06/2021 3.80    • ALT (SGPT) 12/06/2021 68*   • AST (SGOT) 12/06/2021 60*   • Alkaline Phosphatase 12/06/2021 143*   • Total Bilirubin 12/06/2021 0.5    • eGFR Non  Amer 12/06/2021 56*   • Globulin 12/06/2021 3.7    • A/G Ratio 12/06/2021 1.0    • BUN/Creatinine Ratio 12/06/2021 32.3*   • Anion Gap 12/06/2021 14.0    • Lipase 12/06/2021 40    • Color, UA 12/06/2021 Yellow    • Appearance, UA  12/06/2021 Turbid*   • pH, UA 12/06/2021 5.5    • Specific Gravity, UA 12/06/2021 1.025    • Glucose, UA 12/06/2021 Negative    • Ketones, UA 12/06/2021 15 mg/dL (1+)*   • Bilirubin, UA 12/06/2021 Negative    • Blood, UA 12/06/2021 Trace*   • Protein, UA 12/06/2021 100 mg/dL (2+)*   • Leuk Esterase, UA 12/06/2021 Trace*   • Nitrite, UA 12/06/2021 Positive*   • Urobilinogen, UA 12/06/2021 0.2 E.U./dL    • Blood Culture 12/06/2021 No growth at 5 days    • Blood Culture 12/06/2021 No growth at 5 days    • QT Interval 12/06/2021 310    • Troponin T 12/06/2021 <0.010    • WBC 12/06/2021 11.30*   • RBC 12/06/2021 5.02    • Hemoglobin 12/06/2021 15.8    • Hematocrit 12/06/2021 48.0*   • MCV 12/06/2021 95.6    • MCH 12/06/2021 31.4    • MCHC 12/06/2021 32.9    • RDW 12/06/2021 16.2*   • RDW-SD 12/06/2021 53.4    • MPV 12/06/2021 7.3    • Platelets 12/06/2021 138*   • Neutrophil % 12/06/2021 91.1*   • Lymphocyte % 12/06/2021 5.9*   • Monocyte % 12/06/2021 2.7*   • Eosinophil % 12/06/2021 0.0*   • Basophil % 12/06/2021 0.3    • Neutrophils, Absolute 12/06/2021 10.30*   • Lymphocytes, Absolute 12/06/2021 0.70    • Monocytes, Absolute 12/06/2021 0.30    • Eosinophils, Absolute 12/06/2021 0.00    • Basophils, Absolute 12/06/2021 0.00    • nRBC 12/06/2021 0.0    • Lactate 12/06/2021 1.1    • Extra Tube 12/06/2021 Hold for add-ons.    • RBC, UA 12/06/2021 0-2*   • WBC, UA 12/06/2021 Too Numerous to Count*   • Bacteria, UA 12/06/2021 2+*   • Squamous Epithelial Cell* 12/06/2021 0-2    • Hyaline Casts, UA 12/06/2021 3-6    • Methodology 12/06/2021 Manual Light Microscopy    • Urine Culture 12/06/2021 >100,000 CFU/mL Enterococcus faecalis*   • Urine Culture 12/06/2021 25,000 CFU/mL Mixed Hien Isolated    • COVID19 12/07/2021 Detected*   • Glucose 12/08/2021 167*   • BUN 12/08/2021 54*   • Creatinine 12/08/2021 1.00    • Sodium 12/08/2021 135*   • Potassium 12/08/2021 4.4    • Chloride 12/08/2021 110*   • CO2 12/08/2021 16.0*   •  Calcium 12/08/2021 8.4*   • eGFR Non  Amer 12/08/2021 53*   • BUN/Creatinine Ratio 12/08/2021 54.0*   • Anion Gap 12/08/2021 9.0    • Protime 12/08/2021 39.3*   • INR 12/08/2021 3.92*   • WBC 12/08/2021 9.20    • RBC 12/08/2021 4.83    • Hemoglobin 12/08/2021 15.2    • Hematocrit 12/08/2021 45.6    • MCV 12/08/2021 94.4    • MCH 12/08/2021 31.4    • MCHC 12/08/2021 33.3    • RDW 12/08/2021 16.6*   • RDW-SD 12/08/2021 54.3*   • MPV 12/08/2021 7.7    • Platelets 12/08/2021 164    • Neutrophil % 12/08/2021 84.8*   • Lymphocyte % 12/08/2021 10.4*   • Monocyte % 12/08/2021 4.0*   • Eosinophil % 12/08/2021 0.2*   • Basophil % 12/08/2021 0.6    • Neutrophils, Absolute 12/08/2021 7.80*   • Lymphocytes, Absolute 12/08/2021 1.00    • Monocytes, Absolute 12/08/2021 0.40    • Eosinophils, Absolute 12/08/2021 0.00    • Basophils, Absolute 12/08/2021 0.10    • nRBC 12/08/2021 0.1    • Color, UA 12/08/2021 Yellow    • Appearance, UA 12/08/2021 Slightly Cloudy*   • pH, UA 12/08/2021 6.0    • Specific Gravity, UA 12/08/2021 1.017    • Glucose, UA 12/08/2021 Negative    • Ketones, UA 12/08/2021 Negative    • Bilirubin, UA 12/08/2021 Negative    • Blood, UA 12/08/2021 Small (1+)*   • Protein, UA 12/08/2021 Trace*   • Leuk Esterase, UA 12/08/2021 Negative    • Nitrite, UA 12/08/2021 Negative    • Urobilinogen, UA 12/08/2021 0.2 E.U./dL    • RBC, UA 12/08/2021 0-2*   • WBC, UA 12/08/2021 6-12*   • Bacteria, UA 12/08/2021 Trace*   • Squamous Epithelial Cell* 12/08/2021 0-2    • Transitional Epithelial * 12/08/2021 0-2    • Hyaline Casts, UA 12/08/2021 0-2    • Methodology 12/08/2021 Manual Light Microscopy    • Urine Culture 12/08/2021 No growth    • Glucose 12/09/2021 124*   • BUN 12/09/2021 49*   • Creatinine 12/09/2021 0.95    • Sodium 12/09/2021 139    • Potassium 12/09/2021 5.5*   • Chloride 12/09/2021 107    • CO2 12/09/2021 22.0    • Calcium 12/09/2021 9.4    • eGFR Non African Amer 12/09/2021 57*   • BUN/Creatinine Ratio  12/09/2021 51.6*   • Anion Gap 12/09/2021 10.0    • Protime 12/09/2021 45.3*   • INR 12/09/2021 4.57*   • WBC 12/09/2021 6.20    • RBC 12/09/2021 4.79    • Hemoglobin 12/09/2021 14.9    • Hematocrit 12/09/2021 45.7    • MCV 12/09/2021 95.6    • MCH 12/09/2021 31.1    • MCHC 12/09/2021 32.5    • RDW 12/09/2021 16.3*   • RDW-SD 12/09/2021 54.3*   • MPV 12/09/2021 7.6    • Platelets 12/09/2021 181    • Neutrophil % 12/09/2021 79.7*   • Lymphocyte % 12/09/2021 14.1*   • Monocyte % 12/09/2021 5.6    • Eosinophil % 12/09/2021 0.5    • Basophil % 12/09/2021 0.1    • Neutrophils, Absolute 12/09/2021 4.90    • Lymphocytes, Absolute 12/09/2021 0.90    • Monocytes, Absolute 12/09/2021 0.30    • Eosinophils, Absolute 12/09/2021 0.00    • Basophils, Absolute 12/09/2021 0.00    • nRBC 12/09/2021 0.2    • Potassium 12/09/2021 4.6    • Glucose 12/10/2021 127*   • BUN 12/10/2021 41*   • Creatinine 12/10/2021 0.97    • Sodium 12/10/2021 137    • Potassium 12/10/2021 4.7    • Chloride 12/10/2021 106    • CO2 12/10/2021 22.0    • Calcium 12/10/2021 8.8    • eGFR Non  Amer 12/10/2021 55*   • BUN/Creatinine Ratio 12/10/2021 42.3*   • Anion Gap 12/10/2021 9.0    • Protime 12/10/2021 13.0*   • INR 12/10/2021 1.19*   • WBC 12/10/2021 5.70    • RBC 12/10/2021 4.80    • Hemoglobin 12/10/2021 14.8    • Hematocrit 12/10/2021 45.4    • MCV 12/10/2021 94.7    • MCH 12/10/2021 30.9    • MCHC 12/10/2021 32.7    • RDW 12/10/2021 16.0*   • RDW-SD 12/10/2021 53.8    • MPV 12/10/2021 7.2    • Platelets 12/10/2021 199    • Neutrophil % 12/10/2021 77.5*   • Lymphocyte % 12/10/2021 16.3*   • Monocyte % 12/10/2021 5.9    • Eosinophil % 12/10/2021 0.2*   • Basophil % 12/10/2021 0.1    • Neutrophils, Absolute 12/10/2021 4.40    • Lymphocytes, Absolute 12/10/2021 0.90    • Monocytes, Absolute 12/10/2021 0.30    • Eosinophils, Absolute 12/10/2021 0.00    • Basophils, Absolute 12/10/2021 0.00    • nRBC 12/10/2021 0.2    Anticoagulation Visit on  12/03/2021   Component Date Value   • INR 12/03/2021 2.50*   Anticoagulation Visit on 12/01/2021   Component Date Value   • INR 12/01/2021 3.00    Anticoagulation Visit on 11/17/2021   Component Date Value   • INR 11/17/2021 1.70*   Anticoagulation Visit on 11/02/2021   Component Date Value   • INR 11/02/2021 3.00    No results displayed because visit has over 200 results.      There may be more visits with results that are not included.     Recent labs reviewed and interpreted for clinical significance and application            Level of Care:           Augustin Ellsworth MD  02/22/22  .

## 2022-02-23 DIAGNOSIS — I48.21 PERMANENT ATRIAL FIBRILLATION: Primary | ICD-10-CM

## 2022-02-23 RX ORDER — WARFARIN SODIUM 2 MG/1
TABLET ORAL
Qty: 30 TABLET | Refills: 0 | Status: CANCELLED | OUTPATIENT
Start: 2022-02-23

## 2022-02-24 RX ORDER — WARFARIN SODIUM 2 MG/1
TABLET ORAL
Qty: 90 TABLET | Refills: 1 | Status: SHIPPED | OUTPATIENT
Start: 2022-02-24 | End: 2022-05-27 | Stop reason: SDUPTHER

## 2022-03-01 ENCOUNTER — TELEPHONE (OUTPATIENT)
Dept: CARDIOLOGY | Facility: CLINIC | Age: 82
End: 2022-03-01

## 2022-03-01 NOTE — TELEPHONE ENCOUNTER
Patient was in last week and saw Dr. Ellsworth and she stated that he started her on metoprolol succinate xl 50 mg and she stated that all she wants to do now is sleep and she was wondering what she should do.

## 2022-03-02 RX ORDER — DILTIAZEM HYDROCHLORIDE 360 MG/1
360 CAPSULE, EXTENDED RELEASE ORAL DAILY
Qty: 30 CAPSULE | Refills: 5 | Status: SHIPPED | OUTPATIENT
Start: 2022-03-02 | End: 2022-05-19 | Stop reason: SDUPTHER

## 2022-03-02 NOTE — TELEPHONE ENCOUNTER
Metoprolol decrease to one time a day and diltiazem increase to 360mg daily. Spoke with patient. Will send to pharmacy.

## 2022-03-09 ENCOUNTER — ANTICOAGULATION VISIT (OUTPATIENT)
Dept: PHARMACY | Facility: HOSPITAL | Age: 82
End: 2022-03-09

## 2022-03-09 LAB — INR PPP: 3.4 (ref 2–3)

## 2022-03-09 PROCEDURE — 36416 COLLJ CAPILLARY BLOOD SPEC: CPT

## 2022-03-09 PROCEDURE — G0463 HOSPITAL OUTPT CLINIC VISIT: HCPCS

## 2022-03-09 PROCEDURE — 85610 PROTHROMBIN TIME: CPT

## 2022-03-09 NOTE — PROGRESS NOTES
Anticoagulation Clinic Progress Note    INR Goal: 2 - 3  Today's INR: 3.4 (supratherapeutic)  Current warfarin dose: warfarin 2 mg PO daily. Current total weekly dose = 14 mg per week.     Patient was prescribed fosfomycin x 1 on 2/22 which should not affect INR. Infection could potentially affect INR but since this was ~2 weeks ago this is unlikely.    INR History:  Anticoagulation Monitoring 1/3/2022 2/10/2022 3/9/2022   INR 2.10 2.30 3.40   INR Date 1/3/2022 2/10/2022 3/9/2022   INR Goal 2.0-3.0 2.0-3.0 2.0-3.0   Trend Same Same Down   Last Week Total 15 mg 14 mg 14 mg   Next Week Total 15 mg 14 mg 13 mg   Sun 2 mg 2 mg 2 mg   Mon 3 mg (1/3, 1/10, 1/17); Otherwise 2 mg 2 mg 2 mg   Tue 2 mg 2 mg 2 mg   Wed 2 mg 2 mg 1 mg   Thu 2 mg 2 mg 2 mg   Fri 2 mg 2 mg 2 mg   Sat 2 mg 2 mg 2 mg   Visit Report - - -   Some recent data might be hidden       Anticoagulation Summary  As of 3/9/2022    INR goal:  2.0-3.0   TTR:  44.7 % (2.4 y)   INR used for dosing:  3.40 (3/9/2022)   Warfarin maintenance plan:  1 mg every Wed; 2 mg all other days   Weekly warfarin total:  13 mg   Plan last modified:  Kayleigh Horvath, PharmD (3/9/2022)   Next INR check:  3/23/2022   Priority:  Maintenance   Target end date:      Indications    Atrial fibrillation (HCC) [I48.91]             Anticoagulation Episode Summary     INR check location:      Preferred lab:      Send INR reminders to:  [facility] CLINICAL POOL    Comments:  Patient contract signed 11/17/21.      Anticoagulation Care Providers     Provider Role Specialty Phone number    Augustin Ellsworth MD  Cardiology [phone]          Clinic Interview:   Patient Findings     Positives:  Change in health (Recent infection (may affect INR)), Change   in medications (Fosfomycin x 1 on 2/22 (should not have effect on INR)),   Bruising (Patient reports increased bruising.)    Negatives:  Signs/symptoms of thrombosis, Signs/symptoms of bleeding,   Laboratory test error  suspected, Change in alcohol use, Change in   activity, Upcoming invasive procedure, Emergency department visit,   Upcoming dental procedure, Missed doses, Extra doses, Change in   diet/appetite, Hospital admission, Other complaints      Clinical Outcomes     Negatives:  Major bleeding event, Thromboembolic event,   Anticoagulation-related hospital admission, Anticoagulation-related ED   visit, Anticoagulation-related fatality        Current Medications:   Prior to Admission medications    Medication Sig Start Date End Date Taking? Authorizing Provider   acetaminophen (TYLENOL) 325 MG tablet Take 650 mg by mouth Every 6 (Six) Hours As Needed for Mild Pain .    Gino Leos MD   Cholecalciferol (Vitamin D3) 50 MCG (2000 UT) tablet Take 2,000 Units by mouth Daily.    Gino Leos MD   digoxin (LANOXIN) 125 MCG tablet Take 1 tablet by mouth Daily. 11/2/21   Augustin Ellsworth MD   dilTIAZem CD (Cardizem CD) 360 MG 24 hr capsule Take 1 capsule by mouth Daily. 3/2/22   Augustin Ellsworth MD   HYDROcodone-acetaminophen (NORCO) 5-325 MG per tablet Take 1 tablet by mouth Every 8 (Eight) Hours As Needed.    ProviderGino MD   metoprolol succinate XL (TOPROL-XL) 50 MG 24 hr tablet Take 1 tablet by mouth 2 (Two) Times a Day. 2/22/22   Augustin Ellsworth MD   Probiotic Product (PROBIOTIC PO) Take  by mouth Daily.    Gino Leos MD   topiramate (TOPAMAX) 100 MG tablet Take 100 mg by mouth every night at bedtime.    Gino Leos MD   warfarin (COUMADIN) 2 MG tablet Take 2 mg by mouth daily or as otherwise directed. 2/24/22   Augustin Ellsworth MD       Medical history:   Past Medical History:   Diagnosis Date   • Bladder cancer (HCC)    • Cancer (HCC)     breast, bladder   • Deep vein thrombosis (HCC)    • Essential hypertension 1/17/2017   • Fracture tibia/fibula, right, closed, initial encounter 8/27/2020   • GERD (gastroesophageal reflux disease)    • History  of urostomy    • Immobility 08/27/2020    r/t broken Tibia    • Kidney carcinoma, right (HCC)     removed    • Long term current use of anticoagulant 1/9/2015   • PAF (paroxysmal atrial fibrillation) (HCC) 6/26/2019   • Presence of cardiac pacemaker 11/03/2014    BS dual chamber PM placed 10/2014 with pocket revision 1/25/17.  HX tachy rob syndrome   • Sick sinus syndrome (HCC) 11/3/2014       Social history:   Social History     Tobacco Use   • Smoking status: Never Smoker   • Smokeless tobacco: Never Used   Substance Use Topics   • Alcohol use: Not Currently       Allergies:    Amoxicillin, Ciprofloxacin, Oxycodone-acetaminophen, Penicillins, Sulfa antibiotics, Cephalexin, Clavulanic acid, Codeine, Contrast dye, Doxycycline, Fluconazole, Iodine, Macrobid [nitrofurantoin], Tobramycin, and Trimethoprim    Vaccine History:   Immunization History   Administered Date(s) Administered   • FLUAD TRI 65YR+ 09/07/2012, 09/24/2014   • Fluzone High Dose =>65 Years ([employer] ONLY) 12/16/2015, 11/04/2016, 10/31/2017, 10/18/2018, 09/27/2019   • Pneumococcal Conjugate 13-Valent (PCV13) 11/04/2016   • Tdap 04/01/2016       This patient is managed via a collaborative agreement, pursuant to IC [id number]. The signed protocol is kept in the [facility] at [address] [place] [address].    Education: Hazel Bucio has been instructed to call if any changes in medications, doses, concerns, etc. Patient was provided dosing instructions and expressed verbal understanding and has no further questions at this time.    Plan:  1. Anticoagulation   - decrease by 7 %; warfarin 2 mg PO daily, except warfarin 1 mg PO on Wednesday.  New total weekly dose = 13 mg.   - Instructed patient to monitor bruising to ensure it is healing appropriately. If this does not occur, instructed patient to contact [facility].  - RTC in 2 week(s)    Kayleigh Horvath, PharmD  3/9/2022  15:00 EST

## 2022-03-10 ENCOUNTER — APPOINTMENT (OUTPATIENT)
Dept: PHARMACY | Facility: HOSPITAL | Age: 82
End: 2022-03-10

## 2022-03-11 PROBLEM — Z93.6 OTHER ARTIFICIAL OPENINGS OF URINARY TRACT STATUS: Status: ACTIVE | Noted: 2021-10-04

## 2022-03-11 PROBLEM — Z85.828 PERSONAL HISTORY OF OTHER MALIGNANT NEOPLASM OF SKIN: Status: ACTIVE | Noted: 2021-10-04

## 2022-03-11 PROBLEM — Z90.5 ACQUIRED ABSENCE OF KIDNEY: Status: ACTIVE | Noted: 2021-10-04

## 2022-03-11 RX ORDER — GRANULES FOR ORAL 3 G/1
POWDER ORAL
COMMUNITY
Start: 2022-02-23 | End: 2022-03-22

## 2022-03-11 RX ORDER — ESCITALOPRAM OXALATE 5 MG/1
TABLET ORAL
COMMUNITY
Start: 2022-01-11 | End: 2022-03-22

## 2022-03-11 RX ORDER — DIGOXIN 125 MCG
125 TABLET ORAL DAILY
Qty: 30 TABLET | Refills: 3 | Status: SHIPPED | OUTPATIENT
Start: 2022-03-11 | End: 2022-05-19 | Stop reason: SDUPTHER

## 2022-03-18 PROCEDURE — 93296 REM INTERROG EVL PM/IDS: CPT | Performed by: NURSE PRACTITIONER

## 2022-03-18 PROCEDURE — 93294 REM INTERROG EVL PM/LDLS PM: CPT | Performed by: NURSE PRACTITIONER

## 2022-03-22 ENCOUNTER — CLINICAL SUPPORT NO REQUIREMENTS (OUTPATIENT)
Dept: CARDIOLOGY | Facility: CLINIC | Age: 82
End: 2022-03-22

## 2022-03-22 ENCOUNTER — APPOINTMENT (OUTPATIENT)
Dept: PHARMACY | Facility: HOSPITAL | Age: 82
End: 2022-03-22

## 2022-03-22 ENCOUNTER — ANTICOAGULATION VISIT (OUTPATIENT)
Dept: PHARMACY | Facility: HOSPITAL | Age: 82
End: 2022-03-22

## 2022-03-22 ENCOUNTER — OFFICE VISIT (OUTPATIENT)
Dept: CARDIOLOGY | Facility: CLINIC | Age: 82
End: 2022-03-22

## 2022-03-22 VITALS
OXYGEN SATURATION: 98 % | HEART RATE: 60 BPM | WEIGHT: 154 LBS | DIASTOLIC BLOOD PRESSURE: 76 MMHG | BODY MASS INDEX: 26.29 KG/M2 | SYSTOLIC BLOOD PRESSURE: 120 MMHG | HEIGHT: 64 IN

## 2022-03-22 DIAGNOSIS — Z95.0 PRESENCE OF CARDIAC PACEMAKER: Chronic | ICD-10-CM

## 2022-03-22 DIAGNOSIS — I48.0 PAROXYSMAL ATRIAL FIBRILLATION: Primary | ICD-10-CM

## 2022-03-22 DIAGNOSIS — I48.21 PERMANENT ATRIAL FIBRILLATION: Primary | ICD-10-CM

## 2022-03-22 DIAGNOSIS — I10 ESSENTIAL HYPERTENSION: Chronic | ICD-10-CM

## 2022-03-22 DIAGNOSIS — I49.5 SICK SINUS SYNDROME: Chronic | ICD-10-CM

## 2022-03-22 LAB — INR PPP: 2.9 (ref 2–3)

## 2022-03-22 PROCEDURE — 93280 PM DEVICE PROGR EVAL DUAL: CPT | Performed by: NURSE PRACTITIONER

## 2022-03-22 PROCEDURE — 93000 ELECTROCARDIOGRAM COMPLETE: CPT | Performed by: NURSE PRACTITIONER

## 2022-03-22 PROCEDURE — G0463 HOSPITAL OUTPT CLINIC VISIT: HCPCS

## 2022-03-22 PROCEDURE — 85610 PROTHROMBIN TIME: CPT

## 2022-03-22 PROCEDURE — 36416 COLLJ CAPILLARY BLOOD SPEC: CPT

## 2022-03-22 PROCEDURE — 99213 OFFICE O/P EST LOW 20 MIN: CPT | Performed by: NURSE PRACTITIONER

## 2022-03-22 NOTE — PROGRESS NOTES
Anticoagulation Clinic Progress Note    INR Goal: 2 - 3  Today's INR: 2.9 (therapeutic)  Current warfarin dose: warfarin 2 mg PO daily, except warfarin 1 mg PO on Wednesday.  Current total weekly dose = 13 mg per week.     INR History:  Anticoagulation Monitoring 2/10/2022 3/9/2022 3/22/2022   INR 2.30 3.40 2.90   INR Date 2/10/2022 3/9/2022 3/22/2022   INR Goal 2.0-3.0 2.0-3.0 2.0-3.0   Trend Same Down Same   Last Week Total 14 mg 14 mg 13 mg   Next Week Total 14 mg 13 mg 13 mg   Sun 2 mg 2 mg 2 mg   Mon 2 mg 2 mg 2 mg   Tue 2 mg 2 mg 2 mg   Wed 2 mg 1 mg 1 mg   Thu 2 mg 2 mg 2 mg   Fri 2 mg 2 mg 2 mg   Sat 2 mg 2 mg 2 mg   Visit Report - - -   Some recent data might be hidden       Anticoagulation Summary  As of 3/22/2022    INR goal:  2.0-3.0   TTR:  44.3 % (2.5 y)   INR used for dosin.90 (3/22/2022)   Warfarin maintenance plan:  1 mg every Wed; 2 mg all other days   Weekly warfarin total:  13 mg   No change documented:  Kayleigh Horvath, PharmD   Plan last modified:  Kayleigh Horvath, PharmD (3/9/2022)   Next INR check:  2022   Priority:  Maintenance   Target end date:      Indications    Atrial fibrillation (HCC) [I48.91]             Anticoagulation Episode Summary     INR check location:      Preferred lab:      Send INR reminders to:  [facility] CLINICAL POOL    Comments:  Patient contract signed 21.      Anticoagulation Care Providers     Provider Role Specialty Phone number    Augustin Ellsworth MD  Cardiology [phone]          Clinic Interview:   Patient Findings     Negatives:  Signs/symptoms of thrombosis, Signs/symptoms of bleeding,   Laboratory test error suspected, Change in health, Change in alcohol use,   Change in activity, Upcoming invasive procedure, Emergency department   visit, Upcoming dental procedure, Missed doses, Extra doses, Change in   medications, Change in diet/appetite, Hospital admission, Bruising, Other   complaints      Clinical Outcomes     Negatives:   Major bleeding event, Thromboembolic event,   Anticoagulation-related hospital admission, Anticoagulation-related ED   visit, Anticoagulation-related fatality        Current Medications:   Prior to Admission medications    Medication Sig Start Date End Date Taking? Authorizing Provider   acetaminophen (TYLENOL) 325 MG tablet Take 650 mg by mouth Every 6 (Six) Hours As Needed for Mild Pain .    Gino Leos MD   Cholecalciferol (Vitamin D3) 50 MCG (2000 UT) tablet Take 2,000 Units by mouth Daily.    Gino Leos MD   digoxin (LANOXIN) 125 MCG tablet TAKE 1 TABLET BY MOUTH DAILY 3/11/22   Augustin Ellsworth MD   dilTIAZem CD (Cardizem CD) 360 MG 24 hr capsule Take 1 capsule by mouth Daily. 3/2/22   Augustin Ellsworth MD   HYDROcodone-acetaminophen (NORCO) 5-325 MG per tablet Take 1 tablet by mouth Every 8 (Eight) Hours As Needed.    Gion Leos MD   metoprolol succinate XL (TOPROL-XL) 50 MG 24 hr tablet Take 1 tablet by mouth 2 (Two) Times a Day. 2/22/22   Augustin Ellsworth MD   Probiotic Product (PROBIOTIC PO) Take  by mouth Daily.    Gino Leos MD   topiramate (TOPAMAX) 100 MG tablet Take 100 mg by mouth every night at bedtime.    Gino Leos MD   warfarin (COUMADIN) 2 MG tablet Take 2 mg by mouth daily or as otherwise directed. 2/24/22   Augustin Ellsworth MD   escitalopram (LEXAPRO) 5 MG tablet  1/11/22 3/22/22  Gino Leos MD   fosfomycin (MONUROL) 3 g pack MIX AND USE 1 PACKET ACCORDING TO PACKAGE DIRECTIONS 2/23/22 3/22/22  Gino Leos MD       Medical history:   Past Medical History:   Diagnosis Date   • Bladder cancer (HCC)    • Cancer (HCC)     breast, bladder   • Deep vein thrombosis (HCC)    • Essential hypertension 1/17/2017   • Fracture tibia/fibula, right, closed, initial encounter 8/27/2020   • GERD (gastroesophageal reflux disease)    • History of urostomy    • Immobility 08/27/2020    r/t broken Tibia    •  Kidney carcinoma, right (HCC)     removed    • Long term current use of anticoagulant 1/9/2015   • PAF (paroxysmal atrial fibrillation) (HCC) 6/26/2019   • Presence of cardiac pacemaker 11/03/2014    BS dual chamber PM placed 10/2014 with pocket revision 1/25/17.  HX tachy rob syndrome   • Sick sinus syndrome (HCC) 11/3/2014       Social history:   Social History     Tobacco Use   • Smoking status: Never Smoker   • Smokeless tobacco: Never Used   Substance Use Topics   • Alcohol use: Not Currently       Allergies:    Amoxicillin, Ciprofloxacin, Oxycodone-acetaminophen, Penicillins, Sulfa antibiotics, Cephalexin, Clavulanic acid, Codeine, Contrast dye, Doxycycline, Fluconazole, Iodine, Macrobid [nitrofurantoin], Tobramycin, and Trimethoprim    Vaccine History:   Immunization History   Administered Date(s) Administered   • COVID-19 (PFIZER) PURPLE CAP 02/03/2021, 02/26/2021   • FLUAD TRI 65YR+ 09/07/2012, 09/24/2014   • Fluzone High Dose =>65 Years ([employer] ONLY) 12/16/2015, 11/04/2016, 10/31/2017, 10/18/2018, 09/27/2019   • Pneumococcal Conjugate 13-Valent (PCV13) 11/04/2016   • Tdap 04/01/2016       This patient is managed via a collaborative agreement, pursuant to IC [id number]. The signed protocol is kept in the [facility] at [address] [place] [address].    Education: Hazel Bucio has been instructed to call if any changes in medications, doses, concerns, etc. Patient was provided dosing instructions and expressed verbal understanding and has no further questions at this time.    Plan:  1. Anticoagulation   - no change; warfarin 2 mg PO daily, except warfarin 1 mg PO on Wednesday.  Total weekly dose = 13 mg.   - RTC in 3 week(s)    Kayleigh Horvath PharmD  3/22/2022  14:35 EDT

## 2022-03-23 ENCOUNTER — APPOINTMENT (OUTPATIENT)
Dept: PHARMACY | Facility: HOSPITAL | Age: 82
End: 2022-03-23

## 2022-03-27 NOTE — PROGRESS NOTES
Cardiology Office Follow Up Visit      Primary Care Provider:  Lizzy Francis MD    Reason for f/u:     Paroxysmal atrial fibrillation  Hypertension  Sick sinus syndrome  Dual-chamber pacemaker      Subjective     CC:    Denies chest pain or dyspnea    History of Present Illness       Hazel Bucio is a 81 y.o. female.  Patient is known to have sick sinus syndrome, paroxysmal atrial fibrillation, hypertension.  She has a previous [employer] dual-chamber pacemaker implanted in October 2014.    She is recently evaluated by Dr. Ellsworth due to recurrent episodes of atrial fibrillation with RVR.  On her last visit she was placed back on metoprolol XL 50 mg twice daily.  Digoxin and Cardizem were continued.  She is anticoagulated with warfarin.    Last echocardiogram showed her ejection fraction to be 60 to 65% with normal RV size and function.  Moderate biatrial enlargement.  Mild to moderate MR, mild AI, mild to moderate TR, diastolic dysfunction.    ASSESSMENT/PLAN:        Diagnoses and all orders for this visit:    1. Paroxysmal atrial fibrillation (HCC) (Primary)  Comments:  No recent recurrent atrial fibrillation or rapid rates    2. Essential hypertension  Comments:  Stable on current medical therapy    3. Sick sinus syndrome (HCC)  Comments:  Stable since pacemaker implant    4. Presence of cardiac pacemaker  Comments:  Dual-chamber [employer] pacemaker with stable device function           MEDICAL DECISION MAKING:    Patient is here to follow-up after recent medication adjustments for her A. fib with RVR.  She is in sinus rhythm currently.  Her pacemaker is working well.  She has had no recurrent atrial fibrillation or RVR.    We will continue current medications as prescribed.  Have made no changes.  She will have follow-up with Dr. Ellsworth in 6 months.  We will continue to monitor her pacemaker via [employer].  If there are any new or worsening problems of asked her to contact the office      Past  Medical History:   Diagnosis Date   • Bladder cancer (HCC)    • Cancer (HCC)     breast, bladder   • Deep vein thrombosis (HCC)    • Essential hypertension 1/17/2017   • Fracture tibia/fibula, right, closed, initial encounter 8/27/2020   • GERD (gastroesophageal reflux disease)    • History of urostomy    • Immobility 08/27/2020    r/t broken Tibia    • Kidney carcinoma, right (HCC)     removed    • Long term current use of anticoagulant 1/9/2015   • PAF (paroxysmal atrial fibrillation) (HCC) 6/26/2019   • Presence of cardiac pacemaker 11/03/2014    BS dual chamber PM placed 10/2014 with pocket revision 1/25/17.  HX tachy rob syndrome   • Sick sinus syndrome (HCC) 11/3/2014       Past Surgical History:   Procedure Laterality Date   • BREAST LUMPECTOMY     • CHOLECYSTECTOMY N/A 10/12/2020    Procedure: CHOLECYSTECTOMY LAPAROSCOPIC;  Surgeon: Go Pereira DO;  Location: [facility] OR;  Service: General;  Laterality: N/A;   • CYSTECTOMY W/ URETEROILEAL CONDUIT     • HARDWARE REMOVAL Right 6/11/2021    Procedure: TIBIA HARDWARE REMOVAL;  Surgeon: Geoff Shah II, MD;  Location: [facility] OR;  Service: Orthopedics;  Laterality: Right;   • HERNIA REPAIR     • HYSTERECTOMY     • INSERT / REPLACE / REMOVE PACEMAKER     • NEPHRECTOMY Right    • TIBIA IM NAILING/RODDING Right 8/28/2020    Procedure: TIBIA INTRAMEDULLARY NAIL/ABRAHAM INSERTION;  Surgeon: Geoff Shah II, MD;  Location: [facility] OR;  Service: Orthopedics;  Laterality: Right;         Current Outpatient Medications:   •  acetaminophen (TYLENOL) 325 MG tablet, Take 650 mg by mouth Every 6 (Six) Hours As Needed for Mild Pain ., Disp: , Rfl:   •  Cholecalciferol (Vitamin D3) 50 MCG (2000 UT) tablet, Take 2,000 Units by mouth Daily., Disp: , Rfl:   •  digoxin (LANOXIN) 125 MCG tablet, TAKE 1 TABLET BY MOUTH DAILY, Disp: 30 tablet, Rfl: 3  •  dilTIAZem CD (Cardizem CD) 360 MG 24 hr capsule, Take 1 capsule by mouth Daily., Disp: 30  capsule, Rfl: 5  •  HYDROcodone-acetaminophen (NORCO) 5-325 MG per tablet, Take 1 tablet by mouth Every 8 (Eight) Hours As Needed., Disp: , Rfl:   •  metoprolol succinate XL (TOPROL-XL) 50 MG 24 hr tablet, Take 1 tablet by mouth 2 (Two) Times a Day., Disp: 60 tablet, Rfl: 5  •  Probiotic Product (PROBIOTIC PO), Take  by mouth Daily., Disp: , Rfl:   •  topiramate (TOPAMAX) 100 MG tablet, Take 100 mg by mouth every night at bedtime., Disp: , Rfl:   •  warfarin (COUMADIN) 2 MG tablet, Take 2 mg by mouth daily or as otherwise directed., Disp: 90 tablet, Rfl: 1    Social History     Socioeconomic History   • Marital status:    Tobacco Use   • Smoking status: Never Smoker   • Smokeless tobacco: Never Used   Vaping Use   • Vaping Use: Never used   Substance and Sexual Activity   • Alcohol use: Not Currently   • Drug use: Never   • Sexual activity: Defer       Family History   Problem Relation Age of Onset   • COPD Father        The following portions of the patient's history were reviewed and updated as appropriate: allergies, current medications, past family history, past medical history, past social history, past surgical history and problem list.    Review of Systems   Constitutional: Positive for malaise/fatigue. Negative for decreased appetite and diaphoresis.   HENT: Negative for congestion, hearing loss and nosebleeds.    Cardiovascular: Positive for dyspnea on exertion. Negative for chest pain, claudication, irregular heartbeat, leg swelling, near-syncope, orthopnea, palpitations, paroxysmal nocturnal dyspnea and syncope.   Respiratory: Negative for cough, shortness of breath and sleep disturbances due to breathing.    Endocrine: Negative for polyuria.   Hematologic/Lymphatic: Does not bruise/bleed easily.   Skin: Negative for itching and rash.   Musculoskeletal: Negative for back pain, muscle weakness and myalgias.   Gastrointestinal: Negative for abdominal pain, change in bowel habit and nausea.  "  Genitourinary: Negative for dysuria, flank pain, frequency and hesitancy.   Neurological: Positive for weakness. Negative for dizziness and tremors.   Psychiatric/Behavioral: Negative for altered mental status. The patient does not have insomnia.        /76   Pulse 60   Ht 162.6 cm (64.02\")   Wt 69.9 kg (154 lb)   SpO2 98%   BMI 26.42 kg/m² .    Objective     Vitals reviewed.   Constitutional:       General: Not in acute distress.     Appearance: Normal appearance. Well-developed.   Eyes:      Pupils: Pupils are equal, round, and reactive to light.   HENT:      Head: Normocephalic and atraumatic.   Neck:      Vascular: No JVD.   Pulmonary:      Effort: Pulmonary effort is normal.      Breath sounds: Normal breath sounds.   Cardiovascular:      Normal rate. Regular rhythm.   Pulses:     Intact distal pulses.   Edema:     Peripheral edema absent.   Abdominal:      General: There is no distension.      Palpations: Abdomen is soft.      Tenderness: There is no abdominal tenderness.   Musculoskeletal: Normal range of motion.      Cervical back: Normal range of motion and neck supple. Skin:     General: Skin is warm and dry.   Neurological:      Mental Status: Alert and oriented to person, place, and time.           EKG ordered and reviewed by me in office    ECG 12 Lead    Date/Time: 3/22/2022 9:06 AM  Performed by: Angela Lozada APRN  Authorized by: Angela Lozada APRN   Rhythm: paced                In Office Device Interrogation: Reviewed    DEVICE INTERROGATION:  IN OFFICE    DEVICE TYPE:   Dual-chamber pacemaker    :   [employer]    BATTERY:  Stable    TIME TO ELECTIVE REPLACEMENT INDICATORS:   1.5 years    CHARGE TIME:   Not applicable        LEAD DATA:   LEADS Reprogrammed for testing purposes    Atrial:   1.3 mV, 503 ohms, 0.5 V@0.4 ms    Ventricular:     22.5 mV, 634 ohms, 0.6 V@0.4 ms    LV:      Pacemaker Dependent: No      Atrial pacing percentage: Less than " 1%    Ventricular pacing percentage: 12%      Arrhythmia Logbook Reviewed: No recurrent RVR        Summary:    Stable Device Function    No significant arhythmia burden.     Battery status is stable.      NEXT IN OFFICE DEVICE CHECK DUE: At next visit    REMOTE DEVICE INTERROGATIONS: 3 months

## 2022-04-13 ENCOUNTER — ANTICOAGULATION VISIT (OUTPATIENT)
Dept: PHARMACY | Facility: HOSPITAL | Age: 82
End: 2022-04-13

## 2022-04-13 DIAGNOSIS — I48.0 PAROXYSMAL ATRIAL FIBRILLATION: Primary | ICD-10-CM

## 2022-04-13 LAB — INR PPP: 2.9 (ref 2–3)

## 2022-04-13 PROCEDURE — G0463 HOSPITAL OUTPT CLINIC VISIT: HCPCS

## 2022-04-13 PROCEDURE — 85610 PROTHROMBIN TIME: CPT

## 2022-04-13 PROCEDURE — 36416 COLLJ CAPILLARY BLOOD SPEC: CPT

## 2022-04-13 NOTE — PROGRESS NOTES
Anticoagulation Clinic Progress Note    INR Goal: 2 - 3  Today's INR: 2.9 (therapeutic)  Current warfarin dose: warfarin 2 mg PO daily, except warfarin 1 mg PO on .  Current total weekly dose = 13 mg per week.     INR History:  Anticoagulation Monitoring 3/9/2022 3/22/2022 2022   INR 3.40 2.90 2.90   INR Date 3/9/2022 3/22/2022 2022   INR Goal 2.0-3.0 2.0-3.0 2.0-3.0   Trend Down Same Same   Last Week Total 14 mg 13 mg 13 mg   Next Week Total 13 mg 13 mg 13 mg   Sun 2 mg 2 mg 2 mg   Mon 2 mg 2 mg 2 mg   Tue 2 mg 2 mg 2 mg   Wed 1 mg 1 mg 1 mg   Thu 2 mg 2 mg 2 mg   Fri 2 mg 2 mg 2 mg   Sat 2 mg 2 mg 2 mg   Visit Report - - -   Some recent data might be hidden       Anticoagulation Summary  As of 2022    INR goal:  2.0-3.0   TTR:  45.7 % (2.5 y)   INR used for dosin.90 (2022)   Warfarin maintenance plan:  1 mg every Wed; 2 mg all other days   Weekly warfarin total:  13 mg   No change documented:  Renetta Mcmillan, PharmD   Plan last modified:  Kayleigh Horvath, PharmD (3/9/2022)   Next INR check:  2022   Priority:  Maintenance   Target end date:      Indications    Atrial fibrillation (HCC) [I48.91]             Anticoagulation Episode Summary     INR check location:      Preferred lab:      Send INR reminders to:  [facility] CLINICAL POOL    Comments:  Patient contract signed 21.      Anticoagulation Care Providers     Provider Role Specialty Phone number    Augustin Ellsworth MD  Cardiology [phone]          Clinic Interview:   Patient Findings     Negatives:  Signs/symptoms of thrombosis, Signs/symptoms of bleeding,   Laboratory test error suspected, Change in health, Change in alcohol use,   Change in activity, Upcoming invasive procedure, Emergency department   visit, Upcoming dental procedure, Missed doses, Extra doses, Change in   medications, Change in diet/appetite, Hospital admission, Bruising, Other   complaints      Clinical Outcomes      Negatives:  Major bleeding event, Thromboembolic event,   Anticoagulation-related hospital admission, Anticoagulation-related ED   visit, Anticoagulation-related fatality        Current Medications:   Prior to Admission medications    Medication Sig Start Date End Date Taking? Authorizing Provider   acetaminophen (TYLENOL) 325 MG tablet Take 650 mg by mouth Every 6 (Six) Hours As Needed for Mild Pain .    Gino Leos MD   Cholecalciferol (Vitamin D3) 50 MCG (2000 UT) tablet Take 2,000 Units by mouth Daily.    Gino Leos MD   digoxin (LANOXIN) 125 MCG tablet TAKE 1 TABLET BY MOUTH DAILY 3/11/22   Augustin Ellsworth MD   dilTIAZem CD (Cardizem CD) 360 MG 24 hr capsule Take 1 capsule by mouth Daily. 3/2/22   Augustin Ellsworth MD   HYDROcodone-acetaminophen (NORCO) 5-325 MG per tablet Take 1 tablet by mouth Every 8 (Eight) Hours As Needed.    Gino Leos MD   metoprolol succinate XL (TOPROL-XL) 50 MG 24 hr tablet Take 1 tablet by mouth 2 (Two) Times a Day. 2/22/22   Augustin Ellsworth MD   Probiotic Product (PROBIOTIC PO) Take  by mouth Daily.    Gino Leos MD   topiramate (TOPAMAX) 100 MG tablet Take 100 mg by mouth every night at bedtime.    Gino Leos MD   warfarin (COUMADIN) 2 MG tablet Take 2 mg by mouth daily or as otherwise directed. 2/24/22   Augustin Ellsworth MD       Medical history:   Past Medical History:   Diagnosis Date   • Bladder cancer (HCC)    • Cancer (HCC)     breast, bladder   • Deep vein thrombosis (HCC)    • Essential hypertension 1/17/2017   • Fracture tibia/fibula, right, closed, initial encounter 8/27/2020   • GERD (gastroesophageal reflux disease)    • History of urostomy    • Immobility 08/27/2020    r/t broken Tibia    • Kidney carcinoma, right (HCC)     removed    • Long term current use of anticoagulant 1/9/2015   • PAF (paroxysmal atrial fibrillation) (HCC) 6/26/2019   • Presence of cardiac pacemaker 11/03/2014     BS dual chamber PM placed 10/2014 with pocket revision 1/25/17.  HX tachy rob syndrome   • Sick sinus syndrome (HCC) 11/3/2014       Social history:   Social History     Tobacco Use   • Smoking status: Never Smoker   • Smokeless tobacco: Never Used   Substance Use Topics   • Alcohol use: Not Currently       Allergies:    Amoxicillin, Ciprofloxacin, Oxycodone-acetaminophen, Penicillins, Sulfa antibiotics, Cephalexin, Clavulanic acid, Codeine, Contrast dye, Doxycycline, Fluconazole, Iodine, Macrobid [nitrofurantoin], Tobramycin, and Trimethoprim    Vaccine History:   Immunization History   Administered Date(s) Administered   • COVID-19 (PFIZER) PURPLE CAP 02/03/2021, 02/26/2021   • FLUAD TRI 65YR+ 09/07/2012, 09/24/2014   • Fluzone High Dose =>65 Years ([employer] ONLY) 12/16/2015, 11/04/2016, 10/31/2017, 10/18/2018, 09/27/2019   • Pneumococcal Conjugate 13-Valent (PCV13) 11/04/2016   • Tdap 04/01/2016       This patient is managed via a collaborative agreement, pursuant to IC 25-26-16. The signed protocol is kept in the [facility] at [address] [place] [address].    Education: Hazel Bucio has been instructed to call if any changes in medications, doses, concerns, etc. Patient was provided dosing instructions and expressed verbal understanding and has no further questions at this time.    Plan:  1. Anticoagulation   - no change; warfarin 2 mg PO daily, except warfarin 1 mg PO on Wednesdays.   Total weekly dose = 13 mg.   - RTC in 4 week(s)      Basil Mcmillan, PharmD  4/13/2022  12:31 EDT

## 2022-05-12 ENCOUNTER — APPOINTMENT (OUTPATIENT)
Dept: PHARMACY | Facility: HOSPITAL | Age: 82
End: 2022-05-12

## 2022-05-12 ENCOUNTER — ANTICOAGULATION VISIT (OUTPATIENT)
Dept: PHARMACY | Facility: HOSPITAL | Age: 82
End: 2022-05-12

## 2022-05-12 DIAGNOSIS — I48.0 PAROXYSMAL ATRIAL FIBRILLATION: Primary | ICD-10-CM

## 2022-05-12 LAB — INR PPP: 3.3 (ref 2–3)

## 2022-05-12 PROCEDURE — 85610 PROTHROMBIN TIME: CPT

## 2022-05-12 PROCEDURE — 36416 COLLJ CAPILLARY BLOOD SPEC: CPT

## 2022-05-12 PROCEDURE — G0463 HOSPITAL OUTPT CLINIC VISIT: HCPCS

## 2022-05-12 NOTE — PROGRESS NOTES
Anticoagulation Clinic Progress Note    INR Goal: 2 - 3  Today's INR: 3.3 (supratherapeutic)  Current warfarin dose: warfarin 2 mg PO daily, except warfarin 0 mg PO on Wednesday.  Of note, this differs from what was previously instructed. Current total weekly dose = 12 mg per week.     Of note, despite INR being supratherapeutic today patient states her bruising has been less than usual.    INR History:  Anticoagulation Monitoring 3/22/2022 4/13/2022 5/12/2022   INR 2.90 2.90 3.30   INR Date 3/22/2022 4/13/2022 5/12/2022   INR Goal 2.0-3.0 2.0-3.0 2.0-3.0   Trend Same Same Down   Last Week Total 13 mg 13 mg 12 mg   Next Week Total 13 mg 13 mg 11 mg   Sun 2 mg 2 mg 2 mg   Mon 2 mg 2 mg 2 mg   Tue 2 mg 2 mg 1 mg   Wed 1 mg 1 mg 2 mg   Thu 2 mg 2 mg 1 mg   Fri 2 mg 2 mg 2 mg   Sat 2 mg 2 mg 1 mg   Visit Report - - -   Some recent data might be hidden       Anticoagulation Summary  As of 5/12/2022    INR goal:  2.0-3.0   TTR:  45.1 % (2.6 y)   INR used for dosing:  3.30 (5/12/2022)   Warfarin maintenance plan:  1 mg every Tue, Thu, Sat; 2 mg all other days   Weekly warfarin total:  11 mg   Plan last modified:  Kayleigh Horvath, PharmD (5/12/2022)   Next INR check:  6/2/2022   Priority:  Maintenance   Target end date:      Indications    Atrial fibrillation (HCC) [I48.91]             Anticoagulation Episode Summary     INR check location:      Preferred lab:      Send INR reminders to:  [facility] CLINICAL POOL    Comments:  Patient contract signed 11/17/21.      Anticoagulation Care Providers     Provider Role Specialty Phone number    Augustin Ellsworth MD  Cardiology [phone]          Clinic Interview:   Patient Findings     Positives:  Missed doses (Patient has been skipping doses on Wednesday   instead of taking 1 mg because she thought that was what was instructed.)    Negatives:  Signs/symptoms of thrombosis, Signs/symptoms of bleeding,   Laboratory test error suspected, Change in health, Change  in alcohol use,   Change in activity, Upcoming invasive procedure, Emergency department   visit, Upcoming dental procedure, Extra doses, Change in medications,   Change in diet/appetite, Hospital admission, Bruising, Other complaints      Clinical Outcomes     Negatives:  Major bleeding event, Thromboembolic event,   Anticoagulation-related hospital admission, Anticoagulation-related ED   visit, Anticoagulation-related fatality        Current Medications:   Prior to Admission medications    Medication Sig Start Date End Date Taking? Authorizing Provider   acetaminophen (TYLENOL) 325 MG tablet Take 650 mg by mouth Every 6 (Six) Hours As Needed for Mild Pain .    Gino Leos MD   Cholecalciferol (Vitamin D3) 50 MCG (2000 UT) tablet Take 2,000 Units by mouth Daily.    Gino Leos MD   digoxin (LANOXIN) 125 MCG tablet TAKE 1 TABLET BY MOUTH DAILY 3/11/22   Augustin Ellsworth MD   dilTIAZem CD (Cardizem CD) 360 MG 24 hr capsule Take 1 capsule by mouth Daily. 3/2/22   Augustin Ellsworth MD   HYDROcodone-acetaminophen (NORCO) 5-325 MG per tablet Take 1 tablet by mouth Every 8 (Eight) Hours As Needed.    Gino Leos MD   metoprolol succinate XL (TOPROL-XL) 50 MG 24 hr tablet Take 1 tablet by mouth 2 (Two) Times a Day. 2/22/22   Augustin Ellsworth MD   Probiotic Product (PROBIOTIC PO) Take  by mouth Daily.    Gino Leos MD   topiramate (TOPAMAX) 100 MG tablet Take 100 mg by mouth every night at bedtime.    Gino Leos MD   warfarin (COUMADIN) 2 MG tablet Take 2 mg by mouth daily or as otherwise directed. 2/24/22   Augustin Ellsworth MD       Medical history:   Past Medical History:   Diagnosis Date   • Bladder cancer (HCC)    • Cancer (HCC)     breast, bladder   • Deep vein thrombosis (HCC)    • Essential hypertension 1/17/2017   • Fracture tibia/fibula, right, closed, initial encounter 8/27/2020   • GERD (gastroesophageal reflux disease)    • History  of urostomy    • Immobility 08/27/2020    r/t broken Tibia    • Kidney carcinoma, right (HCC)     removed    • Long term current use of anticoagulant 1/9/2015   • PAF (paroxysmal atrial fibrillation) (HCC) 6/26/2019   • Presence of cardiac pacemaker 11/03/2014    BS dual chamber PM placed 10/2014 with pocket revision 1/25/17.  HX tachy rob syndrome   • Sick sinus syndrome (HCC) 11/3/2014       Social history:   Social History     Tobacco Use   • Smoking status: Never Smoker   • Smokeless tobacco: Never Used   Substance Use Topics   • Alcohol use: Not Currently       Allergies:    Amoxicillin, Ciprofloxacin, Oxycodone-acetaminophen, Penicillins, Sulfa antibiotics, Cephalexin, Clavulanic acid, Codeine, Contrast dye, Doxycycline, Fluconazole, Iodine, Macrobid [nitrofurantoin], Tobramycin, and Trimethoprim    Vaccine History:   Immunization History   Administered Date(s) Administered   • COVID-19 (PFIZER) PURPLE CAP 02/03/2021, 02/26/2021   • FLUAD TRI 65YR+ 09/07/2012, 09/24/2014   • Fluzone High Dose =>65 Years ([employer] ONLY) 12/16/2015, 11/04/2016, 10/31/2017, 10/18/2018, 09/27/2019   • Pneumococcal Conjugate 13-Valent (PCV13) 11/04/2016   • Tdap 04/01/2016       This patient is managed via a collaborative agreement, pursuant to IC 25-26-16. The signed protocol is kept in the [facility] at [address] [place] [address].    Education: Hazel Bucio has been instructed to call if any changes in medications, doses, concerns, etc. Patient was provided dosing instructions and expressed verbal understanding and has no further questions at this time.    Plan:  1. Anticoagulation   - decrease by 8 % from total weekly dose patient has been taking; warfarin 2 mg PO daily, except warfarin 1 mg PO on Tuesday, Thursday, and Saturday.  New total weekly dose = 11 mg.   - Discussed with patient to monitor for signs/symptoms of abnormal bleeding/bruising and to contact [facility] if  this occurs.  - RTC in 3 week(s)    Kayleigh Horvath PharmD  5/12/2022  12:17 EDT

## 2022-05-19 RX ORDER — METOPROLOL SUCCINATE 50 MG/1
50 TABLET, EXTENDED RELEASE ORAL 2 TIMES DAILY
Qty: 180 TABLET | Refills: 1 | Status: SHIPPED | OUTPATIENT
Start: 2022-05-19 | End: 2022-10-28

## 2022-05-19 RX ORDER — DIGOXIN 125 MCG
125 TABLET ORAL DAILY
Qty: 90 TABLET | Refills: 1 | Status: SHIPPED | OUTPATIENT
Start: 2022-05-19 | End: 2022-10-28

## 2022-05-19 RX ORDER — DILTIAZEM HYDROCHLORIDE 360 MG/1
360 CAPSULE, EXTENDED RELEASE ORAL DAILY
Qty: 90 CAPSULE | Refills: 1 | Status: SHIPPED | OUTPATIENT
Start: 2022-05-19 | End: 2022-10-24

## 2022-05-26 ENCOUNTER — TELEPHONE (OUTPATIENT)
Dept: CARDIOLOGY | Facility: CLINIC | Age: 82
End: 2022-05-26

## 2022-05-26 NOTE — TELEPHONE ENCOUNTER
Pt called asking about the status of her [facility]in medication through [facility]. I see where the pharmacy has not yet changed on the medication, so the pt wanted to request a short term order to hold her over if possible.

## 2022-05-27 DIAGNOSIS — I48.21 PERMANENT ATRIAL FIBRILLATION: ICD-10-CM

## 2022-05-27 RX ORDER — WARFARIN SODIUM 2 MG/1
TABLET ORAL
Qty: 22 TABLET | Refills: 1 | Status: SHIPPED | OUTPATIENT
Start: 2022-05-27 | End: 2022-06-09 | Stop reason: SDUPTHER

## 2022-06-02 ENCOUNTER — ANTICOAGULATION VISIT (OUTPATIENT)
Dept: PHARMACY | Facility: HOSPITAL | Age: 82
End: 2022-06-02

## 2022-06-02 DIAGNOSIS — I48.0 PAROXYSMAL ATRIAL FIBRILLATION: Primary | ICD-10-CM

## 2022-06-02 LAB — INR PPP: 1.1 (ref 2–3)

## 2022-06-02 PROCEDURE — 85610 PROTHROMBIN TIME: CPT

## 2022-06-02 PROCEDURE — G0463 HOSPITAL OUTPT CLINIC VISIT: HCPCS

## 2022-06-02 PROCEDURE — 36416 COLLJ CAPILLARY BLOOD SPEC: CPT

## 2022-06-02 NOTE — PROGRESS NOTES
Anticoagulation Clinic Progress Note    INR Goal: 2 - 3  Today's INR: 1.1 (subtherapeutic)  Current warfarin dose: warfarin 2 mg PO daily but patient missed doses on  and  due to running out of medication. Total weekly dose of past 7 days = 10 mg.     - Of note, regimen reported above differs from previously instructed regimen of warfarin 2 mg daily except 1 mg on Tuesday, Thursday, and Saturday (total weekly dose of 11 mg). Prior to missing doses, patient was taking total weekly dose of 14 mg. Therefore, with missed doses patient had a 29% unintentional decrease. Unable to assess whether 2 mg PO daily is too high of a dose for patient given missed doses.     INR History:  Anticoagulation Monitoring 2022   INR 2.90 3.30 1.10   INR Date 2022   INR Goal 2.0-3.0 2.0-3.0 2.0-3.0   Trend Same Down Up   Last Week Total 13 mg 12 mg 10 mg   Next Week Total 13 mg 11 mg 14 mg   Sun 2 mg 2 mg 2 mg   Mon 2 mg 2 mg 2 mg   Tue 2 mg 1 mg 2 mg   Wed 1 mg 2 mg 2 mg   Thu 2 mg 1 mg 2 mg   Fri 2 mg 2 mg 2 mg   Sat 2 mg 1 mg 2 mg   Visit Report - - -   Some recent data might be hidden       Anticoagulation Summary  As of 2022    INR goal:  2.0-3.0   TTR:  45.1 % (2.7 y)   INR used for dosin.10 (2022)   Warfarin maintenance plan:  2 mg every day   Weekly warfarin total:  14 mg   Plan last modified:  Kayleigh Horvath, PharmD (2022)   Next INR check:  2022   Priority:  Maintenance   Target end date:      Indications    Atrial fibrillation (HCC) [I48.91]             Anticoagulation Episode Summary     INR check location:      Preferred lab:      Send INR reminders to:  [facility] CLINICAL POOL    Comments:  Patient contract signed 21.      Anticoagulation Care Providers     Provider Role Specialty Phone number    Seng Augustin Cm Barnes MD  Cardiology [phone]          Clinic Interview:   Patient Findings     Positives:  Missed doses (Patient  missed doses on 5/30 and 5/31 due to   running out of medication.), Extra doses (Patient has been taking 2 mg   daily instead of previous instructed regimen.), Change in medications   (Patient has been getting injections in her shoulder.)    Negatives:  Signs/symptoms of thrombosis, Signs/symptoms of bleeding,   Laboratory test error suspected, Change in health, Change in alcohol use,   Change in activity, Upcoming invasive procedure, Emergency department   visit, Upcoming dental procedure, Change in diet/appetite, Hospital   admission, Bruising, Other complaints      Clinical Outcomes     Negatives:  Major bleeding event, Thromboembolic event,   Anticoagulation-related hospital admission, Anticoagulation-related ED   visit, Anticoagulation-related fatality        Current Medications:   Prior to Admission medications    Medication Sig Start Date End Date Taking? Authorizing Provider   acetaminophen (TYLENOL) 325 MG tablet Take 650 mg by mouth Every 6 (Six) Hours As Needed for Mild Pain .    Gino Leos MD   Cholecalciferol (Vitamin D3) 50 MCG (2000 UT) tablet Take 2,000 Units by mouth Daily.    Gino Leos MD   digoxin (LANOXIN) 125 MCG tablet Take 1 tablet by mouth Daily. 5/19/22   Augustin Ellsworth MD   dilTIAZem CD (Cardizem CD) 360 MG 24 hr capsule Take 1 capsule by mouth Daily. 5/19/22   Augustin Ellsworth MD   HYDROcodone-acetaminophen (NORCO) 5-325 MG per tablet Take 1 tablet by mouth Every 8 (Eight) Hours As Needed.    Gino Leos MD   metoprolol succinate XL (TOPROL-XL) 50 MG 24 hr tablet Take 1 tablet by mouth 2 (Two) Times a Day. 5/19/22   Augustin Ellsworth MD   Probiotic Product (PROBIOTIC PO) Take  by mouth Daily.    Gino Leos MD   topiramate (TOPAMAX) 100 MG tablet Take 100 mg by mouth every night at bedtime.    Gino Leos MD   warfarin (COUMADIN) 2 MG tablet Take 2 mg by  Mouth daily except 1 mg Tuesday, Thursday and Saturdays or  as directed by [facility] ([phone]) 5/27/22   Augustin Ellsworth MD       Medical history:   Past Medical History:   Diagnosis Date   • Bladder cancer (HCC)    • Cancer (HCC)     breast, bladder   • Deep vein thrombosis (HCC)    • Essential hypertension 1/17/2017   • Fracture tibia/fibula, right, closed, initial encounter 8/27/2020   • GERD (gastroesophageal reflux disease)    • History of urostomy    • Immobility 08/27/2020    r/t broken Tibia    • Kidney carcinoma, right (HCC)     removed    • Long term current use of anticoagulant 1/9/2015   • PAF (paroxysmal atrial fibrillation) (HCC) 6/26/2019   • Presence of cardiac pacemaker 11/03/2014    BS dual chamber PM placed 10/2014 with pocket revision 1/25/17.  HX tachy rob syndrome   • Sick sinus syndrome (HCC) 11/3/2014       Social history:   Social History     Tobacco Use   • Smoking status: Never Smoker   • Smokeless tobacco: Never Used   Substance Use Topics   • Alcohol use: Not Currently       Allergies:    Amoxicillin, Ciprofloxacin, Oxycodone-acetaminophen, Penicillins, Sulfa antibiotics, Cephalexin, Clavulanic acid, Codeine, Contrast dye, Doxycycline, Fluconazole, Iodine, Macrobid [nitrofurantoin], Tobramycin, and Trimethoprim    Vaccine History:   Immunization History   Administered Date(s) Administered   • COVID-19 (PFIZER) PURPLE CAP 02/03/2021, 02/26/2021   • FLUAD TRI 65YR+ 09/07/2012, 09/24/2014   • Fluzone High Dose =>65 Years ([employer] ONLY) 12/16/2015, 11/04/2016, 10/31/2017, 10/18/2018, 09/27/2019   • Pneumococcal Conjugate 13-Valent (PCV13) 11/04/2016   • Tdap 04/01/2016       This patient is managed via a collaborative agreement, pursuant to IC 25-26-16. The signed protocol is kept in the [facility] at [address] [place] [address].    Education: Hazel Bucio has been instructed to call if any changes in medications, doses, concerns, etc. Patient was provided dosing instructions and  expressed verbal understanding and has no further questions at this time.    Plan:  1. Anticoagulation   - Continue regimen patient reported taking; warfarin 2 mg PO daily. Total weekly dose = 14 mg.   - Discussed with patient to monitor for signs/symptoms of stroke which include, but are not limited to, one-sided weakness, slurred, speech, and visual disturbances, but also to monitor for abnormal bleeding or bruising given uncertainty of dose for this patient due to patient taking higher than instructed dose but also missing doses.  - RTC in 2 week(s)    Ulices AmbroseD  6/2/2022  13:38 EDT

## 2022-06-09 DIAGNOSIS — I48.21 PERMANENT ATRIAL FIBRILLATION: ICD-10-CM

## 2022-06-09 RX ORDER — WARFARIN SODIUM 2 MG/1
TABLET ORAL
Qty: 90 TABLET | Refills: 1 | Status: SHIPPED | OUTPATIENT
Start: 2022-06-09 | End: 2022-10-11

## 2022-06-11 ENCOUNTER — APPOINTMENT (OUTPATIENT)
Dept: GENERAL RADIOLOGY | Facility: HOSPITAL | Age: 82
End: 2022-06-11

## 2022-06-11 ENCOUNTER — HOSPITAL ENCOUNTER (INPATIENT)
Facility: HOSPITAL | Age: 82
LOS: 5 days | Discharge: HOME HEALTH | End: 2022-06-17
Attending: EMERGENCY MEDICINE | Admitting: INTERNAL MEDICINE

## 2022-06-11 DIAGNOSIS — A41.9 SEPSIS, DUE TO UNSPECIFIED ORGANISM, UNSPECIFIED WHETHER ACUTE ORGAN DYSFUNCTION PRESENT: Primary | ICD-10-CM

## 2022-06-11 DIAGNOSIS — R53.1 WEAKNESS: ICD-10-CM

## 2022-06-11 DIAGNOSIS — N39.0 ACUTE UTI: ICD-10-CM

## 2022-06-11 LAB
BASOPHILS # BLD AUTO: 0 10*3/MM3 (ref 0–0.2)
BASOPHILS NFR BLD AUTO: 0.2 % (ref 0–1.5)
D-LACTATE SERPL-SCNC: 2.4 MMOL/L (ref 0.5–2)
DEPRECATED RDW RBC AUTO: 50.8 FL (ref 37–54)
EOSINOPHIL # BLD AUTO: 0 10*3/MM3 (ref 0–0.4)
EOSINOPHIL NFR BLD AUTO: 0.1 % (ref 0.3–6.2)
ERYTHROCYTE [DISTWIDTH] IN BLOOD BY AUTOMATED COUNT: 14.8 % (ref 12.3–15.4)
HCT VFR BLD AUTO: 45.3 % (ref 34–46.6)
HGB BLD-MCNC: 14.5 G/DL (ref 12–15.9)
INR PPP: 2.86 (ref 2–3)
LYMPHOCYTES # BLD AUTO: 0.6 10*3/MM3 (ref 0.7–3.1)
LYMPHOCYTES NFR BLD AUTO: 4 % (ref 19.6–45.3)
MCH RBC QN AUTO: 31.1 PG (ref 26.6–33)
MCHC RBC AUTO-ENTMCNC: 31.9 G/DL (ref 31.5–35.7)
MCV RBC AUTO: 97.5 FL (ref 79–97)
MONOCYTES # BLD AUTO: 0.9 10*3/MM3 (ref 0.1–0.9)
MONOCYTES NFR BLD AUTO: 5.8 % (ref 5–12)
NEUTROPHILS NFR BLD AUTO: 14.6 10*3/MM3 (ref 1.7–7)
NEUTROPHILS NFR BLD AUTO: 89.9 % (ref 42.7–76)
NRBC BLD AUTO-RTO: 0.1 /100 WBC (ref 0–0.2)
PLATELET # BLD AUTO: 206 10*3/MM3 (ref 140–450)
PMV BLD AUTO: 8.2 FL (ref 6–12)
PROTHROMBIN TIME: 27.8 SECONDS (ref 19.4–28.5)
RBC # BLD AUTO: 4.65 10*6/MM3 (ref 3.77–5.28)
WBC NRBC COR # BLD: 16.2 10*3/MM3 (ref 3.4–10.8)

## 2022-06-11 PROCEDURE — 93005 ELECTROCARDIOGRAM TRACING: CPT | Performed by: EMERGENCY MEDICINE

## 2022-06-11 PROCEDURE — 71045 X-RAY EXAM CHEST 1 VIEW: CPT

## 2022-06-11 PROCEDURE — 83605 ASSAY OF LACTIC ACID: CPT

## 2022-06-11 PROCEDURE — 85025 COMPLETE CBC W/AUTO DIFF WBC: CPT | Performed by: EMERGENCY MEDICINE

## 2022-06-11 PROCEDURE — 81001 URINALYSIS AUTO W/SCOPE: CPT | Performed by: EMERGENCY MEDICINE

## 2022-06-11 PROCEDURE — 99285 EMERGENCY DEPT VISIT HI MDM: CPT

## 2022-06-11 PROCEDURE — 0202U NFCT DS 22 TRGT SARS-COV-2: CPT | Performed by: EMERGENCY MEDICINE

## 2022-06-11 PROCEDURE — 87086 URINE CULTURE/COLONY COUNT: CPT | Performed by: EMERGENCY MEDICINE

## 2022-06-11 PROCEDURE — 85610 PROTHROMBIN TIME: CPT | Performed by: EMERGENCY MEDICINE

## 2022-06-11 RX ORDER — ACETAMINOPHEN 500 MG
1000 TABLET ORAL ONCE
Status: COMPLETED | OUTPATIENT
Start: 2022-06-11 | End: 2022-06-11

## 2022-06-11 RX ORDER — SODIUM CHLORIDE 0.9 % (FLUSH) 0.9 %
10 SYRINGE (ML) INJECTION AS NEEDED
Status: DISCONTINUED | OUTPATIENT
Start: 2022-06-11 | End: 2022-06-17 | Stop reason: HOSPADM

## 2022-06-11 RX ADMIN — SODIUM CHLORIDE 1000 ML: 9 INJECTION, SOLUTION INTRAVENOUS at 23:36

## 2022-06-11 RX ADMIN — ACETAMINOPHEN 1000 MG: 500 TABLET ORAL at 23:35

## 2022-06-12 PROBLEM — A41.9 SEPSIS, DUE TO UNSPECIFIED ORGANISM, UNSPECIFIED WHETHER ACUTE ORGAN DYSFUNCTION PRESENT: Status: ACTIVE | Noted: 2022-06-12

## 2022-06-12 LAB
ALBUMIN SERPL-MCNC: 3.4 G/DL (ref 3.5–5.2)
ALBUMIN/GLOB SERPL: 1.1 G/DL
ALP SERPL-CCNC: 98 U/L (ref 39–117)
ALT SERPL W P-5'-P-CCNC: 16 U/L (ref 1–33)
ANION GAP SERPL CALCULATED.3IONS-SCNC: 12 MMOL/L (ref 5–15)
ANION GAP SERPL CALCULATED.3IONS-SCNC: 14 MMOL/L (ref 5–15)
AST SERPL-CCNC: 21 U/L (ref 1–32)
B PARAPERT DNA SPEC QL NAA+PROBE: NOT DETECTED
B PERT DNA SPEC QL NAA+PROBE: NOT DETECTED
BACTERIA UR QL AUTO: ABNORMAL /HPF
BILIRUB SERPL-MCNC: 0.8 MG/DL (ref 0–1.2)
BILIRUB UR QL STRIP: NEGATIVE
BUN SERPL-MCNC: 18 MG/DL (ref 8–23)
BUN SERPL-MCNC: 20 MG/DL (ref 8–23)
BUN/CREAT SERPL: 18.9 (ref 7–25)
BUN/CREAT SERPL: 22 (ref 7–25)
C PNEUM DNA NPH QL NAA+NON-PROBE: NOT DETECTED
CALCIUM SPEC-SCNC: 8.7 MG/DL (ref 8.6–10.5)
CALCIUM SPEC-SCNC: 9 MG/DL (ref 8.6–10.5)
CHLORIDE SERPL-SCNC: 106 MMOL/L (ref 98–107)
CHLORIDE SERPL-SCNC: 108 MMOL/L (ref 98–107)
CLARITY UR: ABNORMAL
CO2 SERPL-SCNC: 17 MMOL/L (ref 22–29)
CO2 SERPL-SCNC: 19 MMOL/L (ref 22–29)
COLOR UR: YELLOW
CREAT SERPL-MCNC: 0.82 MG/DL (ref 0.57–1)
CREAT SERPL-MCNC: 1.06 MG/DL (ref 0.57–1)
D-LACTATE SERPL-SCNC: 1.4 MMOL/L (ref 0.5–2)
D-LACTATE SERPL-SCNC: 1.7 MMOL/L (ref 0.5–2)
DEPRECATED RDW RBC AUTO: 49.4 FL (ref 37–54)
DIGOXIN SERPL-MCNC: 0.6 NG/ML (ref 0.6–1.2)
EGFRCR SERPLBLD CKD-EPI 2021: 52.9 ML/MIN/1.73
EGFRCR SERPLBLD CKD-EPI 2021: 72 ML/MIN/1.73
ERYTHROCYTE [DISTWIDTH] IN BLOOD BY AUTOMATED COUNT: 14.6 % (ref 12.3–15.4)
FLUAV SUBTYP SPEC NAA+PROBE: NOT DETECTED
FLUBV RNA ISLT QL NAA+PROBE: NOT DETECTED
GLOBULIN UR ELPH-MCNC: 3 GM/DL
GLUCOSE SERPL-MCNC: 142 MG/DL (ref 65–99)
GLUCOSE SERPL-MCNC: 177 MG/DL (ref 65–99)
GLUCOSE UR STRIP-MCNC: NEGATIVE MG/DL
HADV DNA SPEC NAA+PROBE: NOT DETECTED
HCOV 229E RNA SPEC QL NAA+PROBE: NOT DETECTED
HCOV HKU1 RNA SPEC QL NAA+PROBE: NOT DETECTED
HCOV NL63 RNA SPEC QL NAA+PROBE: NOT DETECTED
HCOV OC43 RNA SPEC QL NAA+PROBE: NOT DETECTED
HCT VFR BLD AUTO: 43.7 % (ref 34–46.6)
HGB BLD-MCNC: 13.9 G/DL (ref 12–15.9)
HGB UR QL STRIP.AUTO: ABNORMAL
HMPV RNA NPH QL NAA+NON-PROBE: NOT DETECTED
HOLD SPECIMEN: NORMAL
HPIV1 RNA ISLT QL NAA+PROBE: NOT DETECTED
HPIV2 RNA SPEC QL NAA+PROBE: NOT DETECTED
HPIV3 RNA NPH QL NAA+PROBE: NOT DETECTED
HPIV4 P GENE NPH QL NAA+PROBE: NOT DETECTED
HYALINE CASTS UR QL AUTO: ABNORMAL /LPF
INR PPP: 2.14 (ref 2–3)
KETONES UR QL STRIP: NEGATIVE
LEUKOCYTE ESTERASE UR QL STRIP.AUTO: ABNORMAL
LYMPHOCYTES # BLD MANUAL: 0.83 10*3/MM3 (ref 0.7–3.1)
LYMPHOCYTES NFR BLD MANUAL: 4 % (ref 5–12)
M PNEUMO IGG SER IA-ACNC: NOT DETECTED
MCH RBC QN AUTO: 30.9 PG (ref 26.6–33)
MCHC RBC AUTO-ENTMCNC: 31.8 G/DL (ref 31.5–35.7)
MCV RBC AUTO: 97.3 FL (ref 79–97)
METAMYELOCYTES NFR BLD MANUAL: 1 % (ref 0–0)
MONOCYTES # BLD: 0.66 10*3/MM3 (ref 0.1–0.9)
NEUTROPHILS # BLD AUTO: 14.85 10*3/MM3 (ref 1.7–7)
NEUTROPHILS NFR BLD MANUAL: 86 % (ref 42.7–76)
NEUTS BAND NFR BLD MANUAL: 4 % (ref 0–5)
NITRITE UR QL STRIP: POSITIVE
PH UR STRIP.AUTO: 6 [PH] (ref 5–8)
PLAT MORPH BLD: NORMAL
PLATELET # BLD AUTO: 167 10*3/MM3 (ref 140–450)
PMV BLD AUTO: 7.5 FL (ref 6–12)
POTASSIUM SERPL-SCNC: 3.7 MMOL/L (ref 3.5–5.2)
POTASSIUM SERPL-SCNC: 4 MMOL/L (ref 3.5–5.2)
PROCALCITONIN SERPL-MCNC: 0.94 NG/ML (ref 0–0.25)
PROT SERPL-MCNC: 6.4 G/DL (ref 6–8.5)
PROT UR QL STRIP: ABNORMAL
PROTHROMBIN TIME: 21.1 SECONDS (ref 19.4–28.5)
QT INTERVAL: 428 MS
RBC # BLD AUTO: 4.49 10*6/MM3 (ref 3.77–5.28)
RBC # UR STRIP: ABNORMAL /HPF
RBC MORPH BLD: NORMAL
REF LAB TEST METHOD: ABNORMAL
RHINOVIRUS RNA SPEC NAA+PROBE: NOT DETECTED
RSV RNA NPH QL NAA+NON-PROBE: NOT DETECTED
SARS-COV-2 RNA NPH QL NAA+NON-PROBE: NOT DETECTED
SCAN SLIDE: NORMAL
SODIUM SERPL-SCNC: 137 MMOL/L (ref 136–145)
SODIUM SERPL-SCNC: 139 MMOL/L (ref 136–145)
SP GR UR STRIP: 1.01 (ref 1–1.03)
SQUAMOUS #/AREA URNS HPF: ABNORMAL /HPF
TROPONIN T SERPL-MCNC: <0.01 NG/ML (ref 0–0.03)
UROBILINOGEN UR QL STRIP: ABNORMAL
VARIANT LYMPHS NFR BLD MANUAL: 5 % (ref 19.6–45.3)
WBC # UR STRIP: ABNORMAL /HPF
WBC MORPH BLD: NORMAL
WBC NRBC COR # BLD: 16.5 10*3/MM3 (ref 3.4–10.8)
WHOLE BLOOD HOLD COAG: NORMAL
WHOLE BLOOD HOLD SPECIMEN: NORMAL

## 2022-06-12 PROCEDURE — 87077 CULTURE AEROBIC IDENTIFY: CPT | Performed by: EMERGENCY MEDICINE

## 2022-06-12 PROCEDURE — 84145 PROCALCITONIN (PCT): CPT | Performed by: EMERGENCY MEDICINE

## 2022-06-12 PROCEDURE — 85007 BL SMEAR W/DIFF WBC COUNT: CPT | Performed by: NURSE PRACTITIONER

## 2022-06-12 PROCEDURE — 87150 DNA/RNA AMPLIFIED PROBE: CPT | Performed by: EMERGENCY MEDICINE

## 2022-06-12 PROCEDURE — 85025 COMPLETE CBC W/AUTO DIFF WBC: CPT | Performed by: NURSE PRACTITIONER

## 2022-06-12 PROCEDURE — 25010000002 VANCOMYCIN HCL 1.25 G RECONSTITUTED SOLUTION 1 EACH VIAL: Performed by: EMERGENCY MEDICINE

## 2022-06-12 PROCEDURE — 36415 COLL VENOUS BLD VENIPUNCTURE: CPT

## 2022-06-12 PROCEDURE — 84484 ASSAY OF TROPONIN QUANT: CPT | Performed by: EMERGENCY MEDICINE

## 2022-06-12 PROCEDURE — 87186 SC STD MICRODIL/AGAR DIL: CPT | Performed by: EMERGENCY MEDICINE

## 2022-06-12 PROCEDURE — 83605 ASSAY OF LACTIC ACID: CPT

## 2022-06-12 PROCEDURE — 87040 BLOOD CULTURE FOR BACTERIA: CPT | Performed by: EMERGENCY MEDICINE

## 2022-06-12 PROCEDURE — 25010000002 VANCOMYCIN 1 G RECONSTITUTED SOLUTION 1 EACH VIAL: Performed by: NURSE PRACTITIONER

## 2022-06-12 PROCEDURE — 80053 COMPREHEN METABOLIC PANEL: CPT | Performed by: EMERGENCY MEDICINE

## 2022-06-12 PROCEDURE — 25010000002 MEROPENEM PER 100 MG: Performed by: EMERGENCY MEDICINE

## 2022-06-12 PROCEDURE — G0378 HOSPITAL OBSERVATION PER HR: HCPCS

## 2022-06-12 PROCEDURE — 80162 ASSAY OF DIGOXIN TOTAL: CPT | Performed by: EMERGENCY MEDICINE

## 2022-06-12 PROCEDURE — 85610 PROTHROMBIN TIME: CPT | Performed by: NURSE PRACTITIONER

## 2022-06-12 RX ORDER — DIGOXIN 125 MCG
125 TABLET ORAL DAILY
Status: DISCONTINUED | OUTPATIENT
Start: 2022-06-12 | End: 2022-06-17 | Stop reason: HOSPADM

## 2022-06-12 RX ORDER — SODIUM CHLORIDE 0.9 % (FLUSH) 0.9 %
3 SYRINGE (ML) INJECTION EVERY 12 HOURS SCHEDULED
Status: DISCONTINUED | OUTPATIENT
Start: 2022-06-12 | End: 2022-06-17 | Stop reason: HOSPADM

## 2022-06-12 RX ORDER — WARFARIN SODIUM 2 MG/1
2 TABLET ORAL
Status: DISCONTINUED | OUTPATIENT
Start: 2022-06-12 | End: 2022-06-13

## 2022-06-12 RX ORDER — TOPIRAMATE 100 MG/1
100 TABLET, FILM COATED ORAL NIGHTLY
Status: DISCONTINUED | OUTPATIENT
Start: 2022-06-12 | End: 2022-06-17 | Stop reason: HOSPADM

## 2022-06-12 RX ORDER — FAMOTIDINE 20 MG/1
40 TABLET, FILM COATED ORAL DAILY
Status: DISCONTINUED | OUTPATIENT
Start: 2022-06-12 | End: 2022-06-12

## 2022-06-12 RX ORDER — SODIUM CHLORIDE 0.9 % (FLUSH) 0.9 %
3-10 SYRINGE (ML) INJECTION AS NEEDED
Status: DISCONTINUED | OUTPATIENT
Start: 2022-06-12 | End: 2022-06-17 | Stop reason: HOSPADM

## 2022-06-12 RX ORDER — ACETAMINOPHEN 325 MG/1
650 TABLET ORAL EVERY 6 HOURS PRN
Status: DISCONTINUED | OUTPATIENT
Start: 2022-06-12 | End: 2022-06-17 | Stop reason: HOSPADM

## 2022-06-12 RX ORDER — ONDANSETRON 2 MG/ML
4 INJECTION INTRAMUSCULAR; INTRAVENOUS EVERY 6 HOURS PRN
Status: DISCONTINUED | OUTPATIENT
Start: 2022-06-12 | End: 2022-06-17 | Stop reason: HOSPADM

## 2022-06-12 RX ORDER — DILTIAZEM HYDROCHLORIDE 180 MG/1
360 CAPSULE, COATED, EXTENDED RELEASE ORAL
Refills: 1 | Status: DISCONTINUED | OUTPATIENT
Start: 2022-06-12 | End: 2022-06-17 | Stop reason: HOSPADM

## 2022-06-12 RX ORDER — HYDROCODONE BITARTRATE AND ACETAMINOPHEN 5; 325 MG/1; MG/1
1 TABLET ORAL EVERY 8 HOURS PRN
Status: DISCONTINUED | OUTPATIENT
Start: 2022-06-12 | End: 2022-06-17 | Stop reason: HOSPADM

## 2022-06-12 RX ORDER — NITROGLYCERIN 0.4 MG/1
0.4 TABLET SUBLINGUAL
Status: DISCONTINUED | OUTPATIENT
Start: 2022-06-12 | End: 2022-06-17 | Stop reason: HOSPADM

## 2022-06-12 RX ORDER — METOPROLOL SUCCINATE 50 MG/1
50 TABLET, EXTENDED RELEASE ORAL 2 TIMES DAILY
Status: DISCONTINUED | OUTPATIENT
Start: 2022-06-12 | End: 2022-06-17 | Stop reason: HOSPADM

## 2022-06-12 RX ADMIN — AZTREONAM 1 G: 1 INJECTION, POWDER, LYOPHILIZED, FOR SOLUTION INTRAMUSCULAR; INTRAVENOUS at 11:42

## 2022-06-12 RX ADMIN — DILTIAZEM HYDROCHLORIDE 360 MG: 180 CAPSULE, COATED, EXTENDED RELEASE ORAL at 11:42

## 2022-06-12 RX ADMIN — VANCOMYCIN HYDROCHLORIDE 1000 MG: 1 INJECTION, POWDER, LYOPHILIZED, FOR SOLUTION INTRAVENOUS at 21:44

## 2022-06-12 RX ADMIN — HYDROCODONE BITARTRATE AND ACETAMINOPHEN 1 TABLET: 5; 325 TABLET ORAL at 12:33

## 2022-06-12 RX ADMIN — Medication 3 ML: at 21:44

## 2022-06-12 RX ADMIN — Medication 3 ML: at 11:47

## 2022-06-12 RX ADMIN — METOPROLOL SUCCINATE 50 MG: 50 TABLET, EXTENDED RELEASE ORAL at 21:44

## 2022-06-12 RX ADMIN — MEROPENEM 1 G: 1 INJECTION, POWDER, FOR SOLUTION INTRAVENOUS at 02:00

## 2022-06-12 RX ADMIN — METOPROLOL SUCCINATE 50 MG: 50 TABLET, EXTENDED RELEASE ORAL at 11:42

## 2022-06-12 RX ADMIN — DIGOXIN 125 MCG: 125 TABLET ORAL at 11:42

## 2022-06-12 RX ADMIN — HYDROCODONE BITARTRATE AND ACETAMINOPHEN 1 TABLET: 5; 325 TABLET ORAL at 21:47

## 2022-06-12 RX ADMIN — TOPIRAMATE 100 MG: 100 TABLET, FILM COATED ORAL at 21:44

## 2022-06-12 RX ADMIN — VANCOMYCIN HYDROCHLORIDE 1250 MG: 1.25 INJECTION, POWDER, LYOPHILIZED, FOR SOLUTION INTRAVENOUS at 02:01

## 2022-06-12 RX ADMIN — AZTREONAM 1 G: 1 INJECTION, POWDER, LYOPHILIZED, FOR SOLUTION INTRAMUSCULAR; INTRAVENOUS at 17:41

## 2022-06-12 RX ADMIN — WARFARIN 2 MG: 2 TABLET ORAL at 17:41

## 2022-06-12 RX ADMIN — ACETAMINOPHEN 650 MG: 325 TABLET, FILM COATED ORAL at 11:42

## 2022-06-12 NOTE — ED PROVIDER NOTES
"Subjective   82-year-old female with 1 week of fatigue associated with nausea and vomiting.  No known sick contacts, patient was unaware she was febrile.  Patient denies any cough or congestion or chest pain or shortness of air.  No abdominal pain or diarrhea.  Patient has urostomy secondary to remote bladder cancer.  Patient is wheelchair-bound and was transitioning from her recliner to her wheelchair and fell onto her buttocks.  Patient denies any injury or head trauma.  Fatigue has been moderate, no clear aggravating alleviating factors.          Review of Systems   Constitutional: Positive for fatigue and fever.   HENT: Negative.    Respiratory: Negative.    Cardiovascular: Negative.    Gastrointestinal: Positive for nausea and vomiting.   Genitourinary:        History of urostomy   Musculoskeletal: Negative.    Skin: Negative.    Neurological: Negative.    Psychiatric/Behavioral: Negative.    All other systems reviewed and are negative.      Past Medical History:   Diagnosis Date   • Bladder cancer (HCC)    • Cancer (HCC)     breast, bladder   • Deep vein thrombosis (HCC)    • Essential hypertension 1/17/2017   • Fracture tibia/fibula, right, closed, initial encounter 8/27/2020   • GERD (gastroesophageal reflux disease)    • History of urostomy    • Immobility 08/27/2020    r/t broken Tibia    • Kidney carcinoma, right (HCC)     removed    • Long term current use of anticoagulant 1/9/2015   • PAF (paroxysmal atrial fibrillation) ([facility]) 6/26/2019   • Presence of cardiac pacemaker 11/03/2014    BS dual chamber PM placed 10/2014 with pocket revision 1/25/17.  HX tachy rob syndrome   • Sick sinus syndrome ([facility]) 11/3/2014       Allergies   Allergen Reactions   • Amoxicillin Shortness Of Breath   • Ciprofloxacin Hives   • Oxycodone-Acetaminophen Unknown - High Severity     Pt's son stated when pt fell and broke her leg she was put on oxycodone; pt's son stated pt's \"vitals went all out of wack, she couldn't remember " "things.\"   • Penicillins Rash   • Sulfa Antibiotics Hives   • Cephalexin Other (See Comments)   • Clavulanic Acid Other (See Comments)   • Codeine Other (See Comments)   • Contrast Dye Other (See Comments)   • Doxycycline Other (See Comments)   • Fluconazole Other (See Comments)   • Iodine Hives   • Macrobid [Nitrofurantoin] Hives   • Tobramycin Other (See Comments)   • Trimethoprim Other (See Comments)       Past Surgical History:   Procedure Laterality Date   • BREAST LUMPECTOMY     • CHOLECYSTECTOMY N/A 10/12/2020    Procedure: CHOLECYSTECTOMY LAPAROSCOPIC;  Surgeon: Go Pereira DO;  Location: [facility] MAIN OR;  Service: General;  Laterality: N/A;   • CYSTECTOMY W/ URETEROILEAL CONDUIT     • HARDWARE REMOVAL Right 6/11/2021    Procedure: TIBIA HARDWARE REMOVAL;  Surgeon: Geoff Shah II, MD;  Location: [facility] MAIN OR;  Service: Orthopedics;  Laterality: Right;   • HERNIA REPAIR     • HYSTERECTOMY     • INSERT / REPLACE / REMOVE PACEMAKER     • NEPHRECTOMY Right    • TIBIA IM NAILING/RODDING Right 8/28/2020    Procedure: TIBIA INTRAMEDULLARY NAIL/ABRAHAM INSERTION;  Surgeon: Geoff Shah II, MD;  Location: [facility] MAIN OR;  Service: Orthopedics;  Laterality: Right;       Family History   Problem Relation Age of Onset   • COPD Father        Social History     Socioeconomic History   • Marital status:    Tobacco Use   • Smoking status: Never Smoker   • Smokeless tobacco: Never Used   Vaping Use   • Vaping Use: Never used   Substance and Sexual Activity   • Alcohol use: Not Currently   • Drug use: Never   • Sexual activity: Defer           Objective   Physical Exam  Constitutional:       Comments: Alert elderly female no acute distress   HENT:      Head: Normocephalic and atraumatic.      Mouth/Throat:      Mouth: Mucous membranes are moist.      Pharynx: Oropharynx is clear.   Eyes:      Extraocular Movements: Extraocular movements intact.      Conjunctiva/sclera: Conjunctivae normal.    "   Pupils: Pupils are equal, round, and reactive to light.   Cardiovascular:      Rate and Rhythm: Rhythm irregular.   Pulmonary:      Effort: Pulmonary effort is normal.      Breath sounds: Normal breath sounds.   Abdominal:      General: Bowel sounds are normal. There is no distension.      Palpations: Abdomen is soft.      Tenderness: There is no abdominal tenderness.   Musculoskeletal:         General: Normal range of motion.      Cervical back: Normal range of motion and neck supple.   Skin:     General: Skin is warm and dry.      Capillary Refill: Capillary refill takes less than 2 seconds.   Neurological:      General: No focal deficit present.      Mental Status: She is oriented to person, place, and time.   Psychiatric:         Mood and Affect: Mood normal.         Behavior: Behavior normal.         Procedures           ED Course  ED Course as of 06/12/22 0235   Sun Jun 12, 2022   0030 EKG interpretation: Atrial fibrillation, rate 96, T wave inversion, no STEMI seen [JR]      ED Course User Index  [JR] Go Vidal MD                                                 MDM  Number of Diagnoses or Management Options  Acute UTI  Sepsis, due to unspecified organism, unspecified whether acute organ dysfunction present (HCC)  Diagnosis management comments: Results for orders placed or performed during the hospital encounter of 06/11/22  -Respiratory Panel PCR w/COVID-19(SARS-CoV-2) [facility]/STEFANY/ANNY/[facility]/MAD/MANASA In-House, NP Swab in UTM/VTM, 3-4 HR TAT - Swab, Nasopharynx:   Specimen: Nasopharynx; Swab       Result                      Value             Ref Range           ADENOVIRUS, PCR             Not Detected      Not Detected        Coronavirus 229E            Not Detected      Not Detected        Coronavirus HKU1            Not Detected      Not Detected        Coronavirus NL63            Not Detected      Not Detected        Coronavirus OC43            Not Detected      Not Detected        COVID19                      Not Detected      Not Detected*       Human Metapneumovirus       Not Detected      Not Detected        Human Rhinovirus/Enter*     Not Detected      Not Detected        Influenza A PCR             Not Detected      Not Detected        Influenza B PCR             Not Detected      Not Detected        Parainfluenza Virus 1       Not Detected      Not Detected        Parainfluenza Virus 2       Not Detected      Not Detected        Parainfluenza Virus 3       Not Detected      Not Detected        Parainfluenza Virus 4       Not Detected      Not Detected        RSV, PCR                    Not Detected      Not Detected        Bordetella pertussis p*     Not Detected      Not Detected        Bordetella parapertuss*     Not Detected      Not Detected        Chlamydophila pneumoni*     Not Detected      Not Detected        Mycoplasma pneumo by P*     Not Detected      Not Detected   -Protime-INR:   Specimen: Blood       Result                      Value             Ref Range           Protime                     27.8              19.4 - 28.5 *       INR                         2.86              2.00 - 3.00    -Procalcitonin:   Specimen: Arm, Left; Blood       Result                      Value             Ref Range           Procalcitonin               0.94 (H)          0.00 - 0.25 *  -Comprehensive Metabolic Panel:   Specimen: Arm, Left; Blood       Result                      Value             Ref Range           Glucose                     177 (H)           65 - 99 mg/dL       BUN                         20                8 - 23 mg/dL        Creatinine                  1.06 (H)          0.57 - 1.00 *       Sodium                      137               136 - 145 mm*       Potassium                   3.7               3.5 - 5.2 mm*       Chloride                    106               98 - 107 mmo*       CO2                         17.0 (L)          22.0 - 29.0 *       Calcium                     8.7                8.6 - 10.5 m*       Total Protein               6.4               6.0 - 8.5 g/*       Albumin                     3.40 (L)          3.50 - 5.20 *       ALT (SGPT)                  16                1 - 33 U/L          AST (SGOT)                  21                1 - 32 U/L          Alkaline Phosphatase        98                39 - 117 U/L        Total Bilirubin             0.8               0.0 - 1.2 mg*       Globulin                    3.0               gm/dL               A/G Ratio                   1.1               g/dL                BUN/Creatinine Ratio        18.9              7.0 - 25.0          Anion Gap                   14.0              5.0 - 15.0 m*       eGFR                        52.9 (L)          >60.0 mL/min*  -Urinalysis With Culture If Indicated - Urine, Clean Catch:   Specimen: Urine, Clean Catch       Result                      Value             Ref Range           Color, UA                   Yellow            Yellow, Straw       Appearance, UA              Cloudy (A)        Clear               pH, UA                      6.0               5.0 - 8.0           Specific [place], UA        1.010             1.005 - 1.030       Glucose, UA                 Negative          Negative            Ketones, UA                 Negative          Negative            Bilirubin, UA               Negative          Negative            Blood, UA                                     Negative        Small (1+) (A)       Protein, UA                                   Negative        100 mg/dL (2+) (A)       Leuk Esterase, UA                             Negative        Large (3+) (A)       Nitrite, UA                 Positive (A)      Negative            Urobilinogen, UA            1.0 E.U./dL       0.2 - 1.0 E.*  -Digoxin Level:   Specimen: Arm, Left; Blood       Result                      Value             Ref Range           Digoxin                     0.60              0.60 - 1.20 *  -Troponin:   Specimen:  Arm, Left; Blood       Result                      Value             Ref Range           Troponin T                  <0.010            0.000 - 0.03*  -CBC Auto Differential:   Specimen: Blood       Result                      Value             Ref Range           WBC                         16.20 (H)         3.40 - 10.80*       RBC                         4.65              3.77 - 5.28 *       Hemoglobin                  14.5              12.0 - 15.9 *       Hematocrit                  45.3              34.0 - 46.6 %       MCV                         97.5 (H)          79.0 - 97.0 *       MCH                         31.1              26.6 - 33.0 *       MCHC                        31.9              31.5 - 35.7 *       RDW                         14.8              12.3 - 15.4 %       RDW-SD                      50.8              37.0 - 54.0 *       MPV                         8.2               6.0 - 12.0 fL       Platelets                   206               140 - 450 10*       Neutrophil %                89.9 (H)          42.7 - 76.0 %       Lymphocyte %                4.0 (L)           19.6 - 45.3 %       Monocyte %                  5.8               5.0 - 12.0 %        Eosinophil %                0.1 (L)           0.3 - 6.2 %         Basophil %                  0.2               0.0 - 1.5 %         Neutrophils, Absolute       14.60 (H)         1.70 - 7.00 *       Lymphocytes, Absolute       0.60 (L)          0.70 - 3.10 *       Monocytes, Absolute         0.90              0.10 - 0.90 *       Eosinophils, Absolute       0.00              0.00 - 0.40 *       Basophils, Absolute         0.00              0.00 - 0.20 *       nRBC                        0.1               0.0 - 0.2 /1*  -Urinalysis, Microscopic Only - Urine, Clean Catch:   Specimen: Urine, Clean Catch       Result                      Value             Ref Range           RBC, UA                     3-5 (A)           None Seen /H*       WBC, UA                                        None Seen /H*   Too Numerous to Count (A)       Bacteria, UA                4+ (A)            None Seen /H*       Squamous Epithelial Ce*     0-2               None Seen, 0*       Hyaline Casts, UA           None Seen         None Seen /L*       Methodology                                                   Manual Light Microscopy  -POC Lactate:   Specimen: Blood       Result                      Value             Ref Range           Lactate                     2.4 (C)           0.5 - 2.0 mm*  -POC Lactate:   Specimen: Blood       Result                      Value             Ref Range           Lactate                     1.7               0.5 - 2.0 mm*  -ECG 12 Lead:        Result                      Value             Ref Range           QT Interval                 428               ms             -Green Top (Gel):        Result                      Value             Ref Range           Extra Tube                                                    Hold for add-ons.  -Lavender Top:        Result                      Value             Ref Range           Extra Tube                                                    hold for add-on  -Light Blue Top:        Result                      Value             Ref Range           Extra Tube                                                    Hold for add-ons.    Patient with history of Enterococcus as well as Klebsiella/E. coli.  Patient with multiple antibiotic allergies/intolerances.  Will cover with meropenem and vancomycin until culture available.  Case discussed with Anne Luciano       Amount and/or Complexity of Data Reviewed  Clinical lab tests: ordered and reviewed  Tests in the radiology section of CPT®: ordered and reviewed  Tests in the medicine section of CPT®: reviewed and ordered  Decide to obtain previous medical records or to obtain history from someone other than the patient: yes  Discuss the patient with other providers:  yes        Final diagnoses:   Sepsis, due to unspecified organism, unspecified whether acute organ dysfunction present (HCC)   Acute UTI       ED Disposition  ED Disposition     ED Disposition   Decision to Admit    Condition   --    Comment   Level of Care: Telemetry [5]   Admitting Physician: BRIELLE KNOWLES [[id number]]               No follow-up provider specified.       Medication List      No changes were made to your prescriptions during this visit.          Go Vidal MD  06/12/22 0235

## 2022-06-12 NOTE — ED NOTES
Pt stuck several times to obtain blood- pt understandable- denies complaints at this time pt very pleasant

## 2022-06-12 NOTE — PROGRESS NOTES
"Pharmacy Antimicrobial Dosing Service    Subjective:  Hazel Bucio is a 82 y.o.female admitted with urosepsis. Pharmacy has been consulted to dose Vancomycin and Aztreonam for possible UTI.    PMH: bladder cancer + kidney cancer s/p R nephrectomy      Assessment/Plan    1. Day #1 Vancomycin: Goal -600 mcg*h/mL. Patient received 1250 mg (~19 mg/kg ABW) IV x1 in the ER this morning. Will continue now on 1000 mg (~15 mg/kg ABW) IV q24h. Bayesian modeling software predicts  with trough 15.1 mcg/mL on this regimen. Will obtain a random level on 6/14 with am labs.    2. Day #1 Aztreonam: 1g IV q8h for estCrCl > 30 mL/min.    Will continue to monitor drug levels, renal function, culture and sensitivities, and patient clinical status.       Objective:  Relevant clinical data and objective history reviewed:  170.2 cm (67\")   65.8 kg (145 lb)   Ideal body weight: 61.6 kg (135 lb 12.9 oz)  Adjusted ideal body weight: 63.3 kg (139 lb 7.7 oz)  Body mass index is 22.71 kg/m².        Results from last 7 days   Lab Units 06/12/22  0036   CREATININE mg/dL 1.06*     Estimated Creatinine Clearance: 43.2 mL/min (A) (by C-G formula based on SCr of 1.06 mg/dL (H)).  No intake/output data recorded.    Results from last 7 days   Lab Units 06/11/22  2331   WBC 10*3/mm3 16.20*     Temperature    06/11/22 2235 06/12/22 0747 06/12/22 1015   Temp: (!) 100.7 °F (38.2 °C) 98.4 °F (36.9 °C) 98.4 °F (36.9 °C)     Baseline culture/source/susceptibility:  Microbiology Results (last 10 days)       Procedure Component Value - Date/Time    Respiratory Panel PCR w/COVID-19(SARS-CoV-2) [facility]/STEFANY/ANNY/[facility] In-House, NP Swab in UTM/VTM, 3-4 HR TAT - Swab, Nasopharynx [[id number]]  (Normal) Collected: 06/11/22 2332    Lab Status: Final result Specimen: Swab from Nasopharynx Updated: 06/12/22 0028     ADENOVIRUS, PCR Not Detected     Coronavirus 229E Not Detected     Coronavirus HKU1 Not Detected     Coronavirus NL63 Not Detected     " Coronavirus OC43 Not Detected     COVID19 Not Detected     Human Metapneumovirus Not Detected     Human Rhinovirus/Enterovirus Not Detected     Influenza A PCR Not Detected     Influenza B PCR Not Detected     Parainfluenza Virus 1 Not Detected     Parainfluenza Virus 2 Not Detected     Parainfluenza Virus 3 Not Detected     Parainfluenza Virus 4 Not Detected     RSV, PCR Not Detected     Bordetella pertussis pcr Not Detected     Bordetella parapertussis PCR Not Detected     Chlamydophila pneumoniae PCR Not Detected     Mycoplasma pneumo by PCR Not Detected    Narrative:      In the setting of a positive respiratory panel with a viral infection PLUS a negative procalcitonin without other underlying concern for bacterial infection, consider observing off antibiotics or discontinuation of antibiotics and continue supportive care. If the respiratory panel is positive for atypical bacterial infection (Bordetella pertussis, Chlamydophila pneumoniae, or Mycoplasma pneumoniae), consider antibiotic de-escalation to target atypical bacterial infection.            Anti-Infectives (From admission, onward)      Ordered     Dose/Rate Route Frequency Start Stop    06/12/22 1045  aztreonam (AZACTAM) 1 g in sodium chloride 0.9 % 100 mL IVPB-MBP        Ordering Provider: Madelaine Cabral APRN    1 g  over 4 Hours Intravenous Every 8 Hours 06/12/22 1730 06/19/22 1729    06/12/22 1045  aztreonam (AZACTAM) 1 g in sodium chloride 0.9 % 100 mL IVPB-MBP        Ordering Provider: Madelaine Cabral APRN    1 g  200 mL/hr over 30 Minutes Intravenous Once 06/12/22 1145      06/12/22 1027  Pharmacy to dose vancomycin        Ordering Provider: Madelaine Cabral APRN     Does not apply Continuous PRN 06/12/22 1022 06/19/22 1021    06/12/22 0042  meropenem (MERREM) 1 g in sodium chloride 0.9 % 100 mL IVPB        Ordering Provider: Go Vidal MD    1 g  over 30 Minutes Intravenous Once 06/12/22 0044 06/12/22 0342    06/12/22 0042   Vancomycin HCl 1,250 mg in sodium chloride 0.9 % 250 mL IVPB        Ordering Provider: Go Vidal MD    20 mg/kg × 65.8 kg Intravenous Once 06/12/22 0044 06/12/22 0201            Marcy Kiran, PharmD  06/12/22 10:48 EDT

## 2022-06-12 NOTE — PLAN OF CARE
Goal Outcome Evaluation:  Plan of Care Reviewed With: patient        Progress: improving   Alert and oriented x 4. Able to verbalize needs and wants. Takes medication whole and tolerates well. Hard of hearing, hearing aides not present. Hx of breast cancer and lymph node removal to right breast, arm band placed. Does not require O2 therapy at this time. States she uses wheel chair at home for ambulation d/t hx of brake to right leg. Discoloration to bilateral toes noted. No c/o numbness/tingling at this time. C/o headache, PRN pain medication administered with positive effect. Currently in bed, eyes closed. Rise and fall of chest observed. Call bell in reach.

## 2022-06-12 NOTE — H&P
Patient Care Team:  Lizzy Francis MD as PCP - General (Family Medicine)    Chief Conplaint  Subjective    The patient is a 82 y.o. female presents with fatigue, nausea, and vomiting for 1 week.  She has a history of bladder cancer s/p urostomy and right kidney cancer s/p right nephrectomy.  She is followed as an outpatient by [facility].  She reports she has frequent UTIs.  She was not on oral antibiotics prior to this admission and is not able to recall last round of antibiotics.     Pt has a history of Afib and on warfarin therapy.   She is WC bound- lives at home with her nephew who assists with her care.           Review of Systems  Review of Systems   Constitutional: Positive for appetite change and fatigue. Negative for activity change and fever.   HENT: Negative for sinus pressure, sinus pain, sore throat and tinnitus.    Eyes: Negative for photophobia and visual disturbance.   Respiratory: Negative for cough, chest tightness, shortness of breath, wheezing and stridor.    Cardiovascular: Negative for chest pain, palpitations and leg swelling.   Gastrointestinal: Positive for abdominal pain, nausea and vomiting. Negative for abdominal distention, blood in stool, constipation and diarrhea.   Endocrine: Negative for cold intolerance, heat intolerance, polydipsia, polyphagia and polyuria.   Genitourinary: Negative for decreased urine volume, difficulty urinating, dysuria, flank pain and hematuria.   Musculoskeletal: Positive for arthralgias, back pain, gait problem and myalgias. Negative for neck pain and neck stiffness.   Skin: Negative.    Allergic/Immunologic: Positive for environmental allergies.   Neurological: Positive for weakness and headaches. Negative for dizziness, syncope and light-headedness.   Hematological: Negative for adenopathy. Bruises/bleeds easily.   Psychiatric/Behavioral: Negative for agitation, behavioral problems, confusion and sleep disturbance.       History  Past Medical  History:   Diagnosis Date   • Bladder cancer (HCC)    • Cancer (HCC)     breast, bladder   • Deep vein thrombosis (HCC)    • Essential hypertension 1/17/2017   • Fracture tibia/fibula, right, closed, initial encounter 8/27/2020   • GERD (gastroesophageal reflux disease)    • History of urostomy    • Immobility 08/27/2020    r/t broken Tibia    • Kidney carcinoma, right (HCC)     removed    • Long term current use of anticoagulant 1/9/2015   • PAF (paroxysmal atrial fibrillation) (HCC) 6/26/2019   • Presence of cardiac pacemaker 11/03/2014    BS dual chamber PM placed 10/2014 with pocket revision 1/25/17.  HX tachy rob syndrome   • Sick sinus syndrome (HCC) 11/3/2014     Past Surgical History:   Procedure Laterality Date   • BREAST LUMPECTOMY     • CHOLECYSTECTOMY N/A 10/12/2020    Procedure: CHOLECYSTECTOMY LAPAROSCOPIC;  Surgeon: Go Pereira DO;  Location: [facility];  Service: General;  Laterality: N/A;   • CYSTECTOMY W/ URETEROILEAL CONDUIT     • HARDWARE REMOVAL Right 6/11/2021    Procedure: TIBIA HARDWARE REMOVAL;  Surgeon: Geoff Shah II, MD;  Location: [facility] OR;  Service: Orthopedics;  Laterality: Right;   • HERNIA REPAIR     • HYSTERECTOMY     • INSERT / REPLACE / REMOVE PACEMAKER     • NEPHRECTOMY Right    • TIBIA IM NAILING/RODDING Right 8/28/2020    Procedure: TIBIA INTRAMEDULLARY NAIL/ABRAHAM INSERTION;  Surgeon: Geoff Shah II, MD;  Location: [facility] OR;  Service: Orthopedics;  Laterality: Right;     Family History   Problem Relation Age of Onset   • COPD Father      Social History     Tobacco Use   • Smoking status: Never Smoker   • Smokeless tobacco: Never Used   Vaping Use   • Vaping Use: Never used   Substance Use Topics   • Alcohol use: Not Currently   • Drug use: Never     Allergies:  Amoxicillin, Ciprofloxacin, Oxycodone-acetaminophen, Penicillins, Sulfa antibiotics, Cephalexin, Clavulanic acid, Codeine, Contrast dye, Doxycycline, Fluconazole, Iodine,  Macrobid [nitrofurantoin], Tobramycin, and Trimethoprim    Objective     Vital Signs  Temp:  [98.4 °F (36.9 °C)-100.7 °F (38.2 °C)] 98.4 °F (36.9 °C)  Heart Rate:  [] 109  Resp:  [18-20] 20  BP: (142-195)/() 195/98      Physical Exam:   Physical Exam  Constitutional:       General: She is not in acute distress.     Appearance: She is ill-appearing. She is not toxic-appearing or diaphoretic.   HENT:      Head: Normocephalic and atraumatic.      Nose: Nose normal.      Mouth/Throat:      Mouth: Mucous membranes are moist.   Eyes:      Conjunctiva/sclera: Conjunctivae normal.      Pupils: Pupils are equal, round, and reactive to light.   Cardiovascular:      Rate and Rhythm: Normal rate and regular rhythm.   Pulmonary:      Effort: Pulmonary effort is normal. No respiratory distress.      Breath sounds: Normal breath sounds. No rhonchi.   Chest:      Chest wall: No tenderness.   Abdominal:      General: Bowel sounds are normal.      Palpations: Abdomen is soft.      Tenderness: There is no abdominal tenderness. There is no right CVA tenderness, left CVA tenderness, guarding or rebound.      Comments: Urostomy intact and yellow urine noted in bag.    Genitourinary:     Comments: Urostomy  Musculoskeletal:         General: No swelling or tenderness.      Cervical back: No tenderness.   Lymphadenopathy:      Cervical: No cervical adenopathy.   Skin:     General: Skin is warm and dry.      Capillary Refill: Capillary refill takes less than 2 seconds.   Neurological:      General: No focal deficit present.      Mental Status: She is alert and oriented to person, place, and time. Mental status is at baseline.   Psychiatric:         Mood and Affect: Mood normal.         Behavior: Behavior normal.              Results Review:   CBC    Results from last 7 days   Lab Units 06/11/22  2331   WBC 10*3/mm3 16.20*   HEMOGLOBIN g/dL 14.5   PLATELETS 10*3/mm3 206     BMP   Results from last 7 days   Lab Units 06/12/22  0036    SODIUM mmol/L 137   POTASSIUM mmol/L 3.7   CHLORIDE mmol/L 106   CO2 mmol/L 17.0*   BUN mg/dL 20   CREATININE mg/dL 1.06*   GLUCOSE mg/dL 177*     Cr Clearance Estimated Creatinine Clearance: 43.2 mL/min (A) (by C-G formula based on SCr of 1.06 mg/dL (H)).  Coag   Results from last 7 days   Lab Units 06/11/22  2331   INR  2.86     HbA1C   Lab Results   Component Value Date    HGBA1C 5.4 10/11/2020    HGBA1C 6.2 (H) 09/08/2020    HGBA1C 6.1 (H) 08/27/2020     Blood Glucose No results found for: POCGLU  Infection   Results from last 7 days   Lab Units 06/12/22  0036   PROCALCITONIN ng/mL 0.94*     CMP   Results from last 7 days   Lab Units 06/12/22  0036   SODIUM mmol/L 137   POTASSIUM mmol/L 3.7   CHLORIDE mmol/L 106   CO2 mmol/L 17.0*   BUN mg/dL 20   CREATININE mg/dL 1.06*   GLUCOSE mg/dL 177*   ALBUMIN g/dL 3.40*   BILIRUBIN mg/dL 0.8   ALK PHOS U/L 98   AST (SGOT) U/L 21   ALT (SGPT) U/L 16     UA    Results from last 7 days   Lab Units 06/11/22  2332   NITRITE UA  Positive*   WBC UA /HPF Too Numerous to Count*   BACTERIA UA /HPF 4+*   SQUAM EPITHEL UA /HPF 0-2     Radiology(recent) XR Chest 1 View    Result Date: 6/12/2022  1. Cardiomegaly without acute pulmonary process. Linear subsegmental atelectasis or scarring within the left lung base.  Electronically Signed By-Aneudy Gupta MD On:6/12/2022 8:25 AM This report was finalized on [id number] by  Aneudy Gupta MD.       Assessment/PLAN:      Sepsis, due to unspecified organism, unspecified whether acute organ dysfunction present (HCC)  -Treating for UTI.  Urine cultures are pending.  Continue IV Vanc.  Stopped IV meropenum.  Start aztreonam.  Pharmacy is dosing antibiotics.      Afib:  On warfarin therapy.  Goal INR 2.0-3.0.  Held warfarin today due to INR 2.86 and on IV abx.  Requested pharmacy to dose warfarin while in hospital.  INR to be checked 6/12/2022.      HTN:  Home medications have been restarted.  Her BP's have been elevated- believe  this is due to patient not having her medications for 2 days.      History of Bladder Cancer:  S/p urostomy and is followed routinely by [facility]    History of right kidney caner: s/p right nephrectomy and is followed routinely by [facility].      DVT prophylaxis- warfarin  Stress Ulcer Management- famotidine.     Expected Length of Stay 2 days    I discussed the patients findings and my recommendations with patient and nursing staff.     Madelaine Cabral, CHELLE  06/12/22  10:29 EDT         None

## 2022-06-12 NOTE — PROGRESS NOTES
"Pharmacy dosing service  Anticoagulant  Warfarin     Subjective:    Hazel Bucio is a 82 y.o.female being continued on warfarin for atrial fibrillation.    INR Goal: 2 - 3  Home medication?: Yes, warfarin 2 mg PO every day at 1800  Bridge Therapy Present?:  No  Interacting Medications Evaluation (New/Present/Discontinued): none  Additional Contributing Factors: warfarin managed outpatient by [facility]. Last visit was 6/2      Assessment/Plan:    Unclear if pt received dose last night since would have been due prior to admission and pt having N/V. INR therapeutic today, will continue home dose.    Continue to monitor and adjust based on INR.         Date 6/11 6/12          INR 2.86 2.14          Dose ?, PTA 2 mg              Objective:  [Ht: 170.2 cm (67\"); Wt: 65.8 kg (145 lb); BMI: Body mass index is 22.71 kg/m².]    Lab Results   Component Value Date    ALBUMIN 3.40 (L) 06/12/2022     Lab Results   Component Value Date    INR 2.14 06/12/2022    INR 2.86 06/11/2022    INR 1.10 (A) 06/02/2022    PROTIME 21.1 06/12/2022    PROTIME 27.8 06/11/2022    PROTIME 13.0 (L) 12/10/2021     Lab Results   Component Value Date    HGB 13.9 06/12/2022    HGB 14.5 06/11/2022    HGB 14.8 12/10/2021     Lab Results   Component Value Date    HCT 43.7 06/12/2022    HCT 45.3 06/11/2022    HCT 45.4 12/10/2021       Marcy Kiran, PharmD  06/12/22 15:07 EDT   "

## 2022-06-13 LAB
ANION GAP SERPL CALCULATED.3IONS-SCNC: 15 MMOL/L (ref 5–15)
BACTERIA SPEC AEROBE CULT: NORMAL
BASOPHILS # BLD AUTO: 0.1 10*3/MM3 (ref 0–0.2)
BASOPHILS NFR BLD AUTO: 0.9 % (ref 0–1.5)
BUN SERPL-MCNC: 23 MG/DL (ref 8–23)
BUN/CREAT SERPL: 22.5 (ref 7–25)
CALCIUM SPEC-SCNC: 9.2 MG/DL (ref 8.6–10.5)
CHLORIDE SERPL-SCNC: 105 MMOL/L (ref 98–107)
CO2 SERPL-SCNC: 19 MMOL/L (ref 22–29)
CREAT SERPL-MCNC: 1.02 MG/DL (ref 0.57–1)
DEPRECATED RDW RBC AUTO: 49.9 FL (ref 37–54)
EGFRCR SERPLBLD CKD-EPI 2021: 55.4 ML/MIN/1.73
EOSINOPHIL # BLD AUTO: 0.1 10*3/MM3 (ref 0–0.4)
EOSINOPHIL NFR BLD AUTO: 0.6 % (ref 0.3–6.2)
ERYTHROCYTE [DISTWIDTH] IN BLOOD BY AUTOMATED COUNT: 14.9 % (ref 12.3–15.4)
GLUCOSE SERPL-MCNC: 114 MG/DL (ref 65–99)
HCT VFR BLD AUTO: 41.8 % (ref 34–46.6)
HGB BLD-MCNC: 13.5 G/DL (ref 12–15.9)
HOLD SPECIMEN: NORMAL
INR PPP: 1.84 (ref 2–3)
LYMPHOCYTES # BLD AUTO: 1.2 10*3/MM3 (ref 0.7–3.1)
LYMPHOCYTES NFR BLD AUTO: 8.4 % (ref 19.6–45.3)
MCH RBC QN AUTO: 31.3 PG (ref 26.6–33)
MCHC RBC AUTO-ENTMCNC: 32.4 G/DL (ref 31.5–35.7)
MCV RBC AUTO: 96.8 FL (ref 79–97)
MONOCYTES # BLD AUTO: 1.1 10*3/MM3 (ref 0.1–0.9)
MONOCYTES NFR BLD AUTO: 8.1 % (ref 5–12)
NEUTROPHILS NFR BLD AUTO: 11.3 10*3/MM3 (ref 1.7–7)
NEUTROPHILS NFR BLD AUTO: 82 % (ref 42.7–76)
NRBC BLD AUTO-RTO: 0 /100 WBC (ref 0–0.2)
PLATELET # BLD AUTO: 162 10*3/MM3 (ref 140–450)
PMV BLD AUTO: 7.7 FL (ref 6–12)
POTASSIUM SERPL-SCNC: 3.6 MMOL/L (ref 3.5–5.2)
PROTHROMBIN TIME: 18.3 SECONDS (ref 19.4–28.5)
RBC # BLD AUTO: 4.32 10*6/MM3 (ref 3.77–5.28)
SARS-COV-2 RNA PNL SPEC NAA+PROBE: NOT DETECTED
SODIUM SERPL-SCNC: 139 MMOL/L (ref 136–145)
WBC NRBC COR # BLD: 13.7 10*3/MM3 (ref 3.4–10.8)

## 2022-06-13 PROCEDURE — 85025 COMPLETE CBC W/AUTO DIFF WBC: CPT | Performed by: NURSE PRACTITIONER

## 2022-06-13 PROCEDURE — 85610 PROTHROMBIN TIME: CPT | Performed by: NURSE PRACTITIONER

## 2022-06-13 PROCEDURE — 80048 BASIC METABOLIC PNL TOTAL CA: CPT | Performed by: NURSE PRACTITIONER

## 2022-06-13 PROCEDURE — 97162 PT EVAL MOD COMPLEX 30 MIN: CPT

## 2022-06-13 PROCEDURE — 87635 SARS-COV-2 COVID-19 AMP PRB: CPT | Performed by: INTERNAL MEDICINE

## 2022-06-13 RX ORDER — WARFARIN SODIUM 2 MG/1
2 TABLET ORAL
Status: DISCONTINUED | OUTPATIENT
Start: 2022-06-14 | End: 2022-06-14

## 2022-06-13 RX ORDER — WARFARIN SODIUM 2.5 MG/1
2.5 TABLET ORAL
Status: COMPLETED | OUTPATIENT
Start: 2022-06-13 | End: 2022-06-13

## 2022-06-13 RX ADMIN — AZTREONAM 1 G: 1 INJECTION, POWDER, LYOPHILIZED, FOR SOLUTION INTRAMUSCULAR; INTRAVENOUS at 18:04

## 2022-06-13 RX ADMIN — HYDROCODONE BITARTRATE AND ACETAMINOPHEN 1 TABLET: 5; 325 TABLET ORAL at 05:31

## 2022-06-13 RX ADMIN — Medication 3 ML: at 20:05

## 2022-06-13 RX ADMIN — TOPIRAMATE 100 MG: 100 TABLET, FILM COATED ORAL at 20:05

## 2022-06-13 RX ADMIN — AZTREONAM 1 G: 1 INJECTION, POWDER, LYOPHILIZED, FOR SOLUTION INTRAMUSCULAR; INTRAVENOUS at 10:02

## 2022-06-13 RX ADMIN — METOPROLOL SUCCINATE 50 MG: 50 TABLET, EXTENDED RELEASE ORAL at 10:01

## 2022-06-13 RX ADMIN — Medication 3 ML: at 10:01

## 2022-06-13 RX ADMIN — AZTREONAM 1 G: 1 INJECTION, POWDER, LYOPHILIZED, FOR SOLUTION INTRAMUSCULAR; INTRAVENOUS at 00:35

## 2022-06-13 RX ADMIN — HYDROCODONE BITARTRATE AND ACETAMINOPHEN 1 TABLET: 5; 325 TABLET ORAL at 22:49

## 2022-06-13 RX ADMIN — DILTIAZEM HYDROCHLORIDE 360 MG: 180 CAPSULE, COATED, EXTENDED RELEASE ORAL at 10:01

## 2022-06-13 RX ADMIN — METOPROLOL SUCCINATE 50 MG: 50 TABLET, EXTENDED RELEASE ORAL at 20:05

## 2022-06-13 RX ADMIN — DIGOXIN 125 MCG: 125 TABLET ORAL at 10:01

## 2022-06-13 RX ADMIN — WARFARIN 2.5 MG: 2.5 TABLET ORAL at 18:04

## 2022-06-13 RX ADMIN — ACETAMINOPHEN 650 MG: 325 TABLET, FILM COATED ORAL at 11:25

## 2022-06-13 NOTE — DISCHARGE PLACEMENT REQUEST
"Nell Liu (82 y.o. Female)             Date of Birth   1940    Social Security Number       Address   [address]  [place] [address]    Home Phone   [phone]    MRN   [id number]       [facility]    Marital Status                               Admission Date   6/11/22    Admission Type   Emergency    Admitting Provider   Lora Henderson MD    Attending Provider   Lora Henderson MD    Department, Room/Bed   [address] MEDICAL INPATIENT, 377/1       Discharge Date       Discharge Disposition       Discharge Destination                               Attending Provider: Lora Henderson MD    Allergies: Amoxicillin, Ciprofloxacin, Oxycodone-acetaminophen, Penicillins, Sulfa Antibiotics, Cephalexin, Clavulanic Acid, Codeine, Contrast Dye, Doxycycline, Fluconazole, Iodine, Macrobid [Nitrofurantoin], Tobramycin, Trimethoprim    Isolation: None   Infection: None   Code Status: CPR   Advance Care Planning Activity    Ht: 170.2 cm (67\")   Wt: 65.8 kg (145 lb)    Admission Cmt: None   Principal Problem: None                Active Insurance as of 6/11/2022     Primary Coverage     Payor Plan Insurance Group Employer/Plan Group    MEDICARE MEDICARE A & B      Payor Plan Address Payor Plan Phone Number Payor Plan Fax Number Effective Dates    [address] [phone]  4/1/2006 - None Entered    [place] [address]       Subscriber Name Subscriber Birth Date Member ID       NELL LIU 1940 [id number]                 Emergency Contacts      (Rel.) Home Phone Work Phone Mobile Phone    OZZY GARAY (Friend) [phone] -- [phone]    Manish Liu (Son) [phone] -- [phone]    ROLAND RANJITH (Grandchild) -- -- [phone]        "

## 2022-06-13 NOTE — PLAN OF CARE
Goal Outcome Evaluation:  Plan of Care Reviewed With: patient        Progress: improving  Outcome Evaluation: Patient is alert and oriented.  C/o abdominal pain and nausea this shift; treated per MAR.  Falls precautions remain in place.

## 2022-06-13 NOTE — PLAN OF CARE
Goal Outcome Evaluation:              Outcome Evaluation: Pt is an 83 y/o female presenting to [facility] on 6/11 with fatigue, nausea/vomiting x1 week. PMH significant for bladder cancer, R nephrectomy, frequent UTIs.   Pt reports PLOF living in [facility] with ramp entry with her nephew who assists her; PLOF of ambulating up to 15-20 ft with Rwx and using w/c primarily at baseline (due to fear of falling with history of fall and RLE fractures). Pt with functional decline at time of PT eval requiring MIN A with bed mobility, SPS transfers and gait x10 ft with Rwx; gait distance limited by dizziness and BLE weakness. Due to fall history, functional decline, and to allow for increased safety with all mobility, pt would benefit from acute IP rehab at time of d/c from [facility] to allow for safe d/c home at [facility]; however, pt desires d/c home with follow-up HHPT.

## 2022-06-13 NOTE — PROGRESS NOTES
"Pharmacy dosing service  Anticoagulant  Warfarin     Subjective:    Hazel Bucio is a 82 y.o.female being continued on warfarin for atrial fibrillation (CHADS-VASc = 5).  PMH: HTN, DM, kidney CA s/p R nephrectomy    INR Goal: 2 - 3  Home medication?: Yes, warfarin 2 mg PO every day at 1800  Bridge Therapy Present?:  No  Interacting Medications Evaluation (New/Present/Discontinued): none  Additional Contributing Factors: nausea/vomiting PTA  Warfarin managed outpatient by [facility] clinic. Last visit was 6/2      Assessment/Plan:    INR slightly subtherapeutic today and trending down. Will give slightly higher dose today of 2.5 mg and anticipate resuming home dose tomorrow.    Continue to monitor and adjust based on INR.         Date 6/11 6/12 6/13         INR 2.86 2.14 1.84         Dose ?, PTA 2 mg 2.5 mg             Objective:  [Ht: 170.2 cm (67\"); Wt: 65.8 kg (145 lb); BMI: Body mass index is 22.71 kg/m².]    Lab Results   Component Value Date    ALBUMIN 3.40 (L) 06/12/2022     Lab Results   Component Value Date    INR 1.84 (L) 06/13/2022    INR 2.14 06/12/2022    INR 2.86 06/11/2022    PROTIME 18.3 (L) 06/13/2022    PROTIME 21.1 06/12/2022    PROTIME 27.8 06/11/2022     Lab Results   Component Value Date    HGB 13.5 06/13/2022    HGB 13.9 06/12/2022    HGB 14.5 06/11/2022     Lab Results   Component Value Date    HCT 41.8 06/13/2022    HCT 43.7 06/12/2022    HCT 45.3 06/11/2022       Erica Cedillo PharmD, BCPS  06/13/22 10:53 EDT    "

## 2022-06-13 NOTE — PROGRESS NOTES
LOS: 1 day   Patient Care Team:  Lizzy Francis MD as PCP - General (Family Medicine)    Subjective     Interval History:     Patient Complaints: pt still feels badly but is some better    History taken from: patient    Review of Systems   Constitutional: Positive for activity change, appetite change and fatigue. Negative for fever.   Respiratory: Negative.    Cardiovascular: Negative.    Gastrointestinal: Positive for abdominal pain and nausea.   Musculoskeletal: Positive for arthralgias and back pain.   Neurological: Positive for weakness.           Objective     Vital Signs  Temp:  [98.1 °F (36.7 °C)-99.8 °F (37.7 °C)] 98.2 °F (36.8 °C)  Heart Rate:  [] 67  Resp:  [16-20] 16  BP: (126-180)/() 148/76    Physical Exam:     General Appearance:    Alert, cooperative, in no acute distress, sitting up in bed   Head:    Normocephalic, without obvious abnormality, atraumatic   Eyes:            Lids and lashes normal, conjunctivae and sclerae normal, no   icterus, no pallor, corneas clear, PERRLA   Ears:    Ears appear intact with no abnormalities noted   Throat:   No oral lesions, no thrush, oral mucosa moist   Neck:   No adenopathy, supple, trachea midline, no thyromegaly, no   carotid bruit, no JVD   Lungs:     Clear to auscultation,respirations regular, even and                  unlabored    Heart:    Regular rhythm and normal rate, normal S1 and S2, no            murmur, no gallop, no rub, no click   Chest Wall:    No abnormalities observed   Abdomen:     Normal bowel sounds, no masses, no organomegaly, soft        Mild tenderness lower abdomen, non-distended, no guarding, no rebound tenderness   Extremities:   Moves all extremities well, no edema, no cyanosis, no             redness   Pulses:   Pulses palpable and equal bilaterally   Skin:   No bleeding, bruising or rash   Lymph nodes:   No palpable adenopathy   Neurologic:   Cranial nerves 2 - 12 grossly intact, sensation intact, DTR       present  and equal bilaterally        Results Review:    Lab Results (last 24 hours)     Procedure Component Value Units Date/Time    Blood Culture - Blood, Arm, Left [[id number]]  (Normal) Collected: 06/12/22 0047    Specimen: Blood from Arm, Left Updated: 06/13/22 1027     Blood Culture No growth at 24 hours    COVID PRE-OP / PRE-PROCEDURE SCREENING ORDER (NO ISOLATION) - Swab, Nasopharynx [[id number]]  (Normal) Collected: 06/13/22 0738    Specimen: Swab from Nasopharynx Updated: 06/13/22 0902    Narrative:      The following orders were created for panel order COVID PRE-OP / PRE-PROCEDURE SCREENING ORDER (NO ISOLATION) - Swab, Nasopharynx.  Procedure                               Abnormality         Status                     ---------                               -----------         ------                     COVID-19,CEPHEID/OSORIO,CO...[[id number]]  Normal              Final result                 Please view results for these tests on the individual orders.    COVID-19,CEPHEID/OSORIO,COR/ANNY/PAD/GENNY IN-HOUSE(OR EMERGENT/ADD-ON),NP SWAB IN TRANSPORT MEDIA 3-4 HR TAT, RT-PCR - Swab, Nasopharynx [[id number]]  (Normal) Collected: 06/13/22 0738    Specimen: Swab from Nasopharynx Updated: 06/13/22 0902     COVID19 Not Detected    Narrative:      Fact sheet for providers: https://www.fda.gov/media/051550/download     Fact sheet for patients: https://www.fda.gov/media/710547/download  Fact sheet for providers: https://www.fda.gov/media/823681/download    Fact sheet for patients: https://www.fda.gov/media/015391/download    Test performed by PCR.    Basic Metabolic Panel [[id number]]  (Abnormal) Collected: 06/13/22 0448    Specimen: Blood Updated: 06/13/22 0610     Glucose 114 mg/dL      BUN 23 mg/dL      Creatinine 1.02 mg/dL      Sodium 139 mmol/L      Potassium 3.6 mmol/L      Comment: Slight hemolysis detected by analyzer. Results may be affected.        Chloride 105 mmol/L      CO2 19.0 mmol/L      Calcium 9.2 mg/dL       BUN/Creatinine Ratio 22.5     Anion Gap 15.0 mmol/L      eGFR 55.4 mL/min/1.73      Comment: National Kidney Foundation and American Society of Nephrology (ASN) Task Force recommended calculation based on the Chronic Kidney Disease Epidemiology Collaboration (CKD-EPI) equation refit without adjustment for race.       Narrative:      GFR Normal >60  Chronic Kidney Disease <60  Kidney Failure <15      Extra Tubes [[id number]] Collected: 06/13/22 0448    Specimen: Blood, Venous Line Updated: 06/13/22 0603    Narrative:      The following orders were created for panel order Extra Tubes.  Procedure                               Abnormality         Status                     ---------                               -----------         ------                     Gold Top - SST[[id number]]                                   Final result                 Please view results for these tests on the individual orders.    Gold Top - SST [[id number]] Collected: 06/13/22 0448    Specimen: Blood Updated: 06/13/22 0603     Extra Tube Hold for add-ons.     Comment: Auto resulted.       CBC & Differential [[id number]]  (Abnormal) Collected: 06/13/22 0248    Specimen: Blood Updated: 06/13/22 0337    Narrative:      The following orders were created for panel order CBC & Differential.  Procedure                               Abnormality         Status                     ---------                               -----------         ------                     CBC Auto Differential[[id number]]        Abnormal            Final result                 Please view results for these tests on the individual orders.    CBC Auto Differential [[id number]]  (Abnormal) Collected: 06/13/22 0248    Specimen: Blood Updated: 06/13/22 0337     WBC 13.70 10*3/mm3      RBC 4.32 10*6/mm3      Hemoglobin 13.5 g/dL      Hematocrit 41.8 %      MCV 96.8 fL      MCH 31.3 pg      MCHC 32.4 g/dL      RDW 14.9 %      RDW-SD 49.9 fl      MPV 7.7 fL      Platelets 162 10*3/mm3       Neutrophil % 82.0 %      Lymphocyte % 8.4 %      Monocyte % 8.1 %      Eosinophil % 0.6 %      Basophil % 0.9 %      Neutrophils, Absolute 11.30 10*3/mm3      Lymphocytes, Absolute 1.20 10*3/mm3      Monocytes, Absolute 1.10 10*3/mm3      Eosinophils, Absolute 0.10 10*3/mm3      Basophils, Absolute 0.10 10*3/mm3      nRBC 0.0 /100 WBC     Protime-INR [[id number]]  (Abnormal) Collected: 06/13/22 0132    Specimen: Blood Updated: 06/13/22 0233     Protime 18.3 Seconds      INR 1.84    Blood Culture - Blood, Arm, Left [[id number]]  (Normal) Collected: 06/12/22 0036    Specimen: Blood from Arm, Left Updated: 06/13/22 0102     Blood Culture No growth at 24 hours    Manual Differential [[id number]]  (Abnormal) Collected: 06/12/22 1304    Specimen: Blood Updated: 06/12/22 1341     Neutrophil % 86.0 %      Lymphocyte % 5.0 %      Monocyte % 4.0 %      Bands %  4.0 %      Metamyelocyte % 1.0 %      Neutrophils Absolute 14.85 10*3/mm3      Lymphocytes Absolute 0.83 10*3/mm3      Monocytes Absolute 0.66 10*3/mm3      RBC Morphology Normal     WBC Morphology Normal     Platelet Morphology Normal    CBC & Differential [[id number]]  (Abnormal) Collected: 06/12/22 1304    Specimen: Blood Updated: 06/12/22 1341    Narrative:      The following orders were created for panel order CBC & Differential.  Procedure                               Abnormality         Status                     ---------                               -----------         ------                     CBC Auto Differential[[id number]]        Abnormal            Final result               Scan Slide[[id number]]                                       Final result                 Please view results for these tests on the individual orders.    Scan Slide [[id number]] Collected: 06/12/22 1304    Specimen: Blood Updated: 06/12/22 1341     Scan Slide --     Comment: See Manual Differential Results       CBC Auto Differential [[id number]]  (Abnormal) Collected: 06/12/22 1304     Specimen: Blood Updated: 06/12/22 1341     WBC 16.50 10*3/mm3      RBC 4.49 10*6/mm3      Hemoglobin 13.9 g/dL      Hematocrit 43.7 %      MCV 97.3 fL      MCH 30.9 pg      MCHC 31.8 g/dL      RDW 14.6 %      RDW-SD 49.4 fl      MPV 7.5 fL      Platelets 167 10*3/mm3     Basic Metabolic Panel [[id number]]  (Abnormal) Collected: 06/12/22 1304    Specimen: Blood Updated: 06/12/22 1341     Glucose 142 mg/dL      BUN 18 mg/dL      Creatinine 0.82 mg/dL      Sodium 139 mmol/L      Potassium 4.0 mmol/L      Comment: Slight hemolysis detected by analyzer. Results may be affected.        Chloride 108 mmol/L      CO2 19.0 mmol/L      Calcium 9.0 mg/dL      BUN/Creatinine Ratio 22.0     Anion Gap 12.0 mmol/L      eGFR 72.0 mL/min/1.73      Comment: National Kidney Foundation and American Society of Nephrology (ASN) Task Force recommended calculation based on the Chronic Kidney Disease Epidemiology Collaboration (CKD-EPI) equation refit without adjustment for race.       Narrative:      GFR Normal >60  Chronic Kidney Disease <60  Kidney Failure <15      Protime-INR [[id number]]  (Normal) Collected: 06/12/22 1304    Specimen: Blood Updated: 06/12/22 1334     Protime 21.1 Seconds      Comment: Result checked         INR 2.14           Imaging Results (Last 24 Hours)     ** No results found for the last 24 hours. **               I reviewed the patient's new clinical results.    Medication Review:   Scheduled Meds:aztreonam, 1 g, Intravenous, Q8H  digoxin, 125 mcg, Oral, Daily  dilTIAZem CD, 360 mg, Oral, Q24H  metoprolol succinate XL, 50 mg, Oral, BID  sodium chloride, 3 mL, Intravenous, Q12H  topiramate, 100 mg, Oral, Nightly  vancomycin, 1,000 mg, Intravenous, Q24H  [START ON 6/14/2022] warfarin, 2 mg, Oral, Daily  warfarin, 2.5 mg, Oral, Once      Continuous Infusions:Pharmacy to dose vancomycin,   Pharmacy to dose warfarin,       PRN Meds:.•  acetaminophen  •  HYDROcodone-acetaminophen  •  nitroglycerin  •  ondansetron  •   Pharmacy to dose vancomycin  •  Pharmacy to dose warfarin  •  sodium chloride  •  sodium chloride     Assessment & Plan       Sepsis, due to unspecified organism, unspecified whether acute organ dysfunction present (HCC)  -Treating for UTI.  Urine cultures are pending.  WBC improved.  Continue IV Vanc and aztreonam.  Pharmacy is dosing antibiotics.       Afib:  On warfarin therapy.  Goal INR 2.0-3.0.       HTN:  Home medications        History of Bladder Cancer:  S/p urostomy and is followed routinely by [facility].  Will consult Dr. Gupta     History of right kidney caner: s/p right nephrectomy and is followed routinely by [facility].       DVT prophylaxis- warfarin  Stress Ulcer Management- famotidine.        Plan for disposition:SRINIVAS Mena MD  06/13/22  11:23 EDT

## 2022-06-13 NOTE — CASE MANAGEMENT/SOCIAL WORK
Discharge Planning Assessment   Luis Felipe     Patient Name: Hazel Bucio  MRN: [id number]  Today's Date: 6/13/2022    Admit Date: 6/11/2022     Discharge Needs Assessment     Row Name 06/13/22 1659       Living Environment    People in Home other relative(s)    Name(s) of People in Home Andreia    Current Living Arrangements home    Primary Care Provided by self    Provides Primary Care For no one, unable/limited ability to care for self    Family Caregiver if Needed other relative(s)    Family Caregiver Names Nephew-Papito, Niece-Brianna    Quality of Family Relationships helpful;involved;supportive    Able to Return to Prior Arrangements yes       Resource/Environmental Concerns    Resource/Environmental Concerns none    Transportation Concerns none       Transition Planning    Patient/Family Anticipates Transition to home with family;home with help/services    Patient/Family Anticipated Services at Transition home health care    Transportation Anticipated family or friend will provide       Discharge Needs Assessment    Readmission Within the Last 30 Days no previous admission in last 30 days    Equipment Currently Used at Home wheelchair;walker, rolling;shower chair    Concerns to be Addressed care coordination/care conferences;discharge planning    Anticipated Changes Related to Illness none    Equipment Needed After Discharge none    Outpatient/Agency/Support Group Needs homecare agency    Discharge Facility/Level of Care Needs home with home health    Provided Post Acute Provider List? Yes    Post Acute Provider List Home Health    Delivered To Patient    Method of Delivery In person               Discharge Plan     Row Name 06/13/22 1701       Plan    Plan DC plan: home with nephew. Referral for [facility] pending.    Patient/Family in Agreement with Plan yes    Plan Comments Met with patient at bedside. Pt states she lives at home with her nephew Papito. She does not drive but is independent with ADL's.  PCP and pharmacy confirmed. Pt uses a wheelchair and has walker and shower chair. Pt is agreeable to [facility] services- provided agency list and pt chose [facility] as she has used them previously. Also provided REAL letter. Referral sent in [facility] and to liaison [place] for [facility].              Continued Care and Services - Admitted Since 6/11/2022     Home Medical Care     Service Provider Request Status Selected Services Address Phone Fax Patient Preferred    [facility]  Pending - Request Sent N/A [address] [address] [phone]                Demographic Summary     Row Name 06/13/22 1651       General Information    Admission Type observation    Arrived From emergency department    Required Notices Provided Observation Status Notice    Referral Source admission list    Reason for Consult discharge planning;care coordination/care conference    Preferred Language English       Contact Information    Permission Granted to Share Info With                Functional Status     Row Name 06/13/22 1658       Functional Status    Usual Activity Tolerance moderate    Current Activity Tolerance moderate       Functional Status, IADL    Medications assistive person    Meal Preparation assistive person    Housekeeping assistive person    Laundry assistive person    Shopping assistive person               Patient Forms     Row Name 06/13/22 1700       Patient Forms    Important Message from Medicare (IMM) Delivered  REAL 6/13 per CM    Delivered to Patient    Method of delivery In person              Met with patient in room wearing PPE: mask, face shield/goggles.      Maintained distance greater than six feet and spent less than 15 minutes in the room.      Megan Naegele, RN      Office Phone: [phone]  Office Cell: [phone]

## 2022-06-13 NOTE — CONSULTS
The pt shared an overview of why she was here.  She explained in detail the support she has from [facility] and family.  The pt wasn't feeling well this morning and requested prayer for her discomfort.  We prayed and she invited me back to for support.  I shared my availability and if needed to let the Rn know I would return.

## 2022-06-13 NOTE — NURSING NOTE
WOCN note:    82 yr old female admitted 6/11/22 with complaints of fatigue, nausea and vomiting. WOCN consult received for a sacral pressure injury noted upon admission.     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 There is a small area of dry skin/scabbing measuring approximately 0.4x0.4cm. No erythema or open areas are noted at this time.   Patient states she does not ambulate and spends most of her time in a wheelchair. She reports she has a cushion for the wheelchair and uses a barrier paste to her skin.     The patient was provided a waffle seat cushion and skin barrier paste to her coccyx. Continue pressure injury prevention measures and we will continue to follow as needed.

## 2022-06-13 NOTE — THERAPY EVALUATION
Patient Name: Hazel Bucio  : 1940    MRN: [id number]                              Today's Date: 2022       Admit Date: 2022    Visit Dx:     ICD-10-CM ICD-9-CM   1. Sepsis, due to unspecified organism, unspecified whether acute organ dysfunction present ([facility])  A41.9 038.9     995.91   2. Acute UTI  N39.0 599.0     Patient Active Problem List   Diagnosis   • Atrial fibrillation ([facility])   • Dyslipidemia   • Essential hypertension   • Long term current use of anticoagulant   • Presence of cardiac pacemaker   • Sick sinus syndrome ([facility])   • Type 2 diabetes mellitus (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)   • Chronic insomnia   • Seasonal allergies   • Altered mental status   • MACRINA (acute kidney injury) ([facility])   • Cholecystitis with cholelithiasis   • Acute UTI   • Abdominal pain   • Acute UTI   • Acquired absence of kidney   • Other artificial openings of urinary tract status ([facility])   • Personal history of other malignant neoplasm of skin   • Sepsis, due to unspecified organism, unspecified whether acute organ dysfunction present ([facility])     Past Medical History:   Diagnosis Date   • Bladder cancer ([facility])    • Cancer ([facility])     breast, bladder   • Deep vein thrombosis (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)     removed    • Long term current use of anticoagulant 2015   • PAF (paroxysmal atrial fibrillation) ([facility]) 2019   • Presence of cardiac pacemaker 2014    BS dual chamber PM placed 10/2014 with pocket  revision 1/25/17.  HX tachy rob syndrome   • Sick sinus syndrome ([facility]) 11/3/2014     Past Surgical History:   Procedure Laterality Date   • BREAST LUMPECTOMY     • CHOLECYSTECTOMY N/A 10/12/2020    Procedure: CHOLECYSTECTOMY LAPAROSCOPIC;  Surgeon: Go Pereira DO;  Location: [facility] MAIN OR;  Service: General;  Laterality: N/A;   • CYSTECTOMY W/ URETEROILEAL CONDUIT     • HARDWARE REMOVAL Right 6/11/2021    Procedure: TIBIA HARDWARE REMOVAL;  Surgeon: Geoff Shah II, MD;  Location: [facility] MAIN OR;  Service: Orthopedics;  Laterality: Right;   • HERNIA REPAIR     • HYSTERECTOMY     • INSERT / REPLACE / REMOVE PACEMAKER     • NEPHRECTOMY Right    • TIBIA IM NAILING/RODDING Right 8/28/2020    Procedure: TIBIA INTRAMEDULLARY NAIL/ABRAHAM INSERTION;  Surgeon: Geoff Sahh II, MD;  Location: [facility] MAIN OR;  Service: Orthopedics;  Laterality: Right;      General Information     Row Name 06/13/22 1057          Physical Therapy Time and Intention    Document Type evaluation  -JV     Mode of Treatment physical therapy  -JV     Row Name 06/13/22 1050          General Information    Patient Profile Reviewed yes  -JV     Prior Level of Function independent:;w/c or scooter;transfer;bed mobility;gait  primaily used w/c; could ambulate 15-20 ft with Rwx and Mod(I)  -JV     Existing Precautions/Restrictions fall;cardiac  HTN and afib  -JV     Barriers to Rehab hearing deficit;medically complex;previous functional deficit  -JV     Row Name 06/13/22 1054          Living Environment    People in Home other (see comments)  lives with nephew, good support from neighbors  -JV     Row Name 06/13/22 1059          Home Main Entrance    Number of Stairs, Main Entrance none  -JV     Row Name 06/13/22 1059          Stairs Within Home, Primary    Number of Stairs, Within Home, Primary none  -JV     Row Name 06/13/22 1058          Cognition    Orientation Status (Cognition) oriented x 4  -JV     Row Name 06/13/22 1056           Safety Issues, Functional Mobility    Impairments Affecting Function (Mobility) balance;endurance/activity tolerance;pain;strength  -JV           User Key  (r) = Recorded By, (t) = Taken By, (c) = Cosigned By    Initials Name Provider Type    Maryuri Wallace Physical Therapist               Mobility     Row Name 06/13/22 1319          Bed Mobility    Bed Mobility supine-sit  -JV     Supine-Sit Mathews (Bed Mobility) minimum assist (75% patient effort);verbal cues  -JV     Assistive Device (Bed Mobility) bed rails;head of bed elevated  -JV     Row Name 06/13/22 1319          Bed-Chair Transfer    Bed-Chair Mathews (Transfers) minimum assist (75% patient effort);verbal cues;nonverbal cues (demo/gesture)  -JV     Assistive Device (Bed-Chair Transfers) walker, front-wheeled  -JV     Row Name 06/13/22 1319          Sit-Stand Transfer    Sit-Stand Mathews (Transfers) verbal cues;nonverbal cues (demo/gesture);minimum assist (75% patient effort)  -JV     Assistive Device (Sit-Stand Transfers) walker, front-wheeled  -JV     Row Name 06/13/22 1319          Gait/Stairs (Locomotion)    Mathews Level (Gait) minimum assist (75% patient effort);verbal cues;nonverbal cues (demo/gesture)  -JV     Assistive Device (Gait) walker, platform  -JV     Distance in Feet (Gait) 10 ft with distance limited by dizziness - pt with HTN as noted below  -JV     Deviations/Abnormal Patterns (Gait) base of support, narrow;coco decreased;stride length decreased  -JV     Bilateral Gait Deviations forward flexed posture  -JV     Right Sided Gait Deviations weight shift ability decreased  -JV           User Key  (r) = Recorded By, (t) = Taken By, (c) = Cosigned By    Initials Name Provider Type    Maryuri Wallace Physical Therapist               Obj/Interventions     Row Name 06/13/22 1321          Range of Motion Comprehensive    General Range of Motion bilateral lower extremity ROM WFL  -JV     Row Name  06/13/22 1321          Strength Comprehensive (MMT)    Comment, General Manual Muscle Testing (MMT) Assessment RLE 3/5; LLE 3+/5 grossly  -JV     Row Name 06/13/22 1321          Balance    Balance Assessment standing static balance;standing dynamic balance  -JV     Static Standing Balance minimal assist  -JV     Dynamic Standing Balance minimal assist  -JV     Position/Device Used, Standing Balance supported;walker, rolling  -JV     Row Name 06/13/22 1321          Sensory Assessment (Somatosensory)    Sensory Assessment (Somatosensory) LE sensation intact  -JV           User Key  (r) = Recorded By, (t) = Taken By, (c) = Cosigned By    Initials Name Provider Type    Maryuri Wallace Physical Therapist               Goals/Plan     Row Name 06/13/22 1327          Bed Mobility Goal 1 (PT)    Activity/Assistive Device (Bed Mobility Goal 1, PT) bed mobility activities, all  -JV     Lynn Level/Cues Needed (Bed Mobility Goal 1, PT) modified independence  -JV     Time Frame (Bed Mobility Goal 1, PT) long term goal (LTG);2 weeks  -JV     Row Name 06/13/22 1327          Transfer Goal 1 (PT)    Activity/Assistive Device (Transfer Goal 1, PT) transfers, all;walker, rolling  -JV     Lynn Level/Cues Needed (Transfer Goal 1, PT) modified independence  -JV     Time Frame (Transfer Goal 1, PT) long term goal (LTG);2 weeks  -JV     Row Name 06/13/22 1327          Gait Training Goal 1 (PT)    Activity/Assistive Device (Gait Training Goal 1, PT) gait (walking locomotion);assistive device use  -JV     Lynn Level (Gait Training Goal 1, PT) modified independence  -JV     Distance (Gait Training Goal 1, PT) 30 ft  -JV     Time Frame (Gait Training Goal 1, PT) long term goal (LTG);2 weeks  -JV     Row Name 06/13/22 1327          Therapy Assessment/Plan (PT)    Planned Therapy Interventions (PT) balance training;bed mobility training;gait training;home exercise program;patient/family  education;strengthening;transfer training  -JV           User Key  (r) = Recorded By, (t) = Taken By, (c) = Cosigned By    Initials Name Provider Type    Maryuri Wallace Physical Therapist               Clinical Impression     Row Name 06/13/22 1112 06/13/22 1105       Pain    Pretreatment Pain Rating 8/10  -JV 8/10  -JV    Posttreatment Pain Rating 8/10  -JV 8/10  -JV    Pain Location - head  -JV head  -JV    Row Name 06/13/22 1112          Plan of Care Review    Outcome Evaluation Pt is an 81 y/o female presenting to [facility] on 6/11 with fatigue, nausea/vomiting x1 week. PMH significant for bladder cancer, R nephrectomy, frequent UTIs.   Pt reports PLOF living in [facility] with ramp entry with her nephew who assists her; PLOF of ambulating up to 15-20 ft with Rwx and using w/c primarily at baseline (due to fear of falling with history of fall and RLE fractures). Pt with functional decline at time of PT eval requiring MIN A with bed mobility, SPS transfers and gait x10 ft with Rwx; gait distance limited by dizziness and BLE weakness. Due to fall history, functional decline, and to allow for increased safety with all mobility, pt would benefit from acute IP rehab at time of d/c from [facility] to allow for safe d/c home at [facility]; however, pt desires d/c home with follow-up HHPT.  -JV     Row Name 06/13/22 1112          Therapy Assessment/Plan (PT)    Therapy Frequency (PT) 5 times/wk  -JV     Predicted Duration of Therapy Intervention (PT) until d/c  -JV     Row Name 06/13/22 1112          Vital Signs    Pre Systolic BP Rehab 157  -JV     Pre Treatment Diastolic BP 77  -JV     Post Systolic BP Rehab 163  -JV     Post Treatment Diastolic BP 73  -JV     Pretreatment Heart Rate (beats/min) 68  -JV     Posttreatment Heart Rate (beats/min) 72  -JV     Pre Patient Position Supine  -JV     Post Patient Position Sitting  -JV     Row Name 06/13/22 1112          Positioning and Restraints    Pre-Treatment Position in bed  -JV     Post  Treatment Position chair  -JV     In Chair notified nsg;reclined;call light within reach;encouraged to call for assist;exit alarm on  -JV           User Key  (r) = Recorded By, (t) = Taken By, (c) = Cosigned By    Initials Name Provider Type    Maryuri Wallace Physical Therapist               Outcome Measures     Row Name 06/13/22 0815          How much help from another person do you currently need...    Turning from your back to your side while in flat bed without using bedrails? 4  -DM     Moving from lying on back to sitting on the side of a flat bed without bedrails? 3  -DM     Moving to and from a bed to a chair (including a wheelchair)? 2  -DM     Standing up from a chair using your arms (e.g., wheelchair, bedside chair)? 1  -DM     Climbing 3-5 steps with a railing? 1  -DM     To walk in hospital room? 1  -DM     AM-PAC 6 Clicks Score (PT) 12  -DM     Highest level of mobility 4 --> Transferred to chair/commode  -DM           User Key  (r) = Recorded By, (t) = Taken By, (c) = Cosigned By    Initials Name Provider Type    Angeles Gil LPN Licensed Nurse                             Physical Therapy Education                 Title: PT OT SLP Therapies (Done)     Topic: Physical Therapy (Done)     Point: Mobility training (Done)     Learning Progress Summary           Patient Acceptance, E,TB, VU by ELIUD at 6/13/2022 1328                   Point: Precautions (Done)     Learning Progress Summary           Patient Acceptance, E,TB, VU by  at 6/13/2022 1328                               User Key     Initials Effective Dates Name Provider Type Discipline    PARKER 03/30/21 -  Maryuri Merino Physical Therapist PT              PT Recommendation and Plan  Planned Therapy Interventions (PT): balance training, bed mobility training, gait training, home exercise program, patient/family education, strengthening, transfer training  Outcome Evaluation: Pt is an 83 y/o female presenting to [facility] on 6/11 with  fatigue, nausea/vomiting x1 week. PMH significant for bladder cancer, R nephrectomy, frequent UTIs.   Pt reports PLOF living in [facility] with ramp entry with her nephew who assists her; PLOF of ambulating up to 15-20 ft with Rwx and using w/c primarily at baseline (due to fear of falling with history of fall and RLE fractures). Pt with functional decline at time of PT eval requiring MIN A with bed mobility, SPS transfers and gait x10 ft with Rwx; gait distance limited by dizziness and BLE weakness. Due to fall history, functional decline, and to allow for increased safety with all mobility, pt would benefit from acute IP rehab at time of d/c from [facility] to allow for safe d/c home at [facility]; however, pt desires d/c home with follow-up HHPT.     Time Calculation:    PT Charges     Row Name 06/13/22 1329             Time Calculation    Start Time 1054  -JV      Stop Time 1122  -JV      Time Calculation (min) 28 min  -JV      PT Received On 06/13/22  -ELIUDV      PT - Next Appointment 06/14/22  -PARKER      PT Goal Re-Cert Due Date 06/27/22  -JBERNIE              Time Calculation- PT    Total Timed Code Minutes- PT 0 minute(s)  -JV            User Key  (r) = Recorded By, (t) = Taken By, (c) = Cosigned By    Initials Name Provider Type    Maryuri Wallace Physical Therapist              Therapy Charges for Today     Code Description Service Date Service Provider Modifiers Qty    [id number] HC PT EVAL MOD COMPLEXITY 4 6/13/2022 Maryuri Merino GP 1          PT G-Codes  AM-PAC 6 Clicks Score (PT): 12    Maryuri Merino  6/13/2022

## 2022-06-13 NOTE — PLAN OF CARE
Goal Outcome Evaluation:      Pt slept well during the night.  Call bell within reach and pt instructed to call for assistance.  Will continue to monitor pt status.

## 2022-06-14 ENCOUNTER — TELEPHONE (OUTPATIENT)
Dept: PHARMACY | Facility: HOSPITAL | Age: 82
End: 2022-06-14

## 2022-06-14 ENCOUNTER — APPOINTMENT (OUTPATIENT)
Dept: ULTRASOUND IMAGING | Facility: HOSPITAL | Age: 82
End: 2022-06-14

## 2022-06-14 LAB
ANION GAP SERPL CALCULATED.3IONS-SCNC: 12 MMOL/L (ref 5–15)
BACTERIA BLD CULT: NORMAL
BASOPHILS # BLD AUTO: 0 10*3/MM3 (ref 0–0.2)
BASOPHILS NFR BLD AUTO: 0.2 % (ref 0–1.5)
BOTTLE TYPE: NORMAL
BUN SERPL-MCNC: 29 MG/DL (ref 8–23)
BUN/CREAT SERPL: 30.2 (ref 7–25)
CALCIUM SPEC-SCNC: 8.6 MG/DL (ref 8.6–10.5)
CHLORIDE SERPL-SCNC: 109 MMOL/L (ref 98–107)
CO2 SERPL-SCNC: 18 MMOL/L (ref 22–29)
CREAT SERPL-MCNC: 0.96 MG/DL (ref 0.57–1)
DEPRECATED RDW RBC AUTO: 50.3 FL (ref 37–54)
EGFRCR SERPLBLD CKD-EPI 2021: 59.6 ML/MIN/1.73
EOSINOPHIL # BLD AUTO: 0.1 10*3/MM3 (ref 0–0.4)
EOSINOPHIL NFR BLD AUTO: 1.5 % (ref 0.3–6.2)
ERYTHROCYTE [DISTWIDTH] IN BLOOD BY AUTOMATED COUNT: 14.7 % (ref 12.3–15.4)
GLUCOSE SERPL-MCNC: 122 MG/DL (ref 65–99)
HCT VFR BLD AUTO: 41 % (ref 34–46.6)
HGB BLD-MCNC: 13.4 G/DL (ref 12–15.9)
INR PPP: 2.4 (ref 2–3)
LYMPHOCYTES # BLD AUTO: 1.3 10*3/MM3 (ref 0.7–3.1)
LYMPHOCYTES NFR BLD AUTO: 15.8 % (ref 19.6–45.3)
MCH RBC QN AUTO: 31.6 PG (ref 26.6–33)
MCHC RBC AUTO-ENTMCNC: 32.8 G/DL (ref 31.5–35.7)
MCV RBC AUTO: 96.5 FL (ref 79–97)
MONOCYTES # BLD AUTO: 0.9 10*3/MM3 (ref 0.1–0.9)
MONOCYTES NFR BLD AUTO: 11.3 % (ref 5–12)
NEUTROPHILS NFR BLD AUTO: 5.8 10*3/MM3 (ref 1.7–7)
NEUTROPHILS NFR BLD AUTO: 71.2 % (ref 42.7–76)
NRBC BLD AUTO-RTO: 0 /100 WBC (ref 0–0.2)
PLATELET # BLD AUTO: 170 10*3/MM3 (ref 140–450)
PMV BLD AUTO: 7.7 FL (ref 6–12)
POTASSIUM SERPL-SCNC: 3.5 MMOL/L (ref 3.5–5.2)
PROTHROMBIN TIME: 23.5 SECONDS (ref 19.4–28.5)
RBC # BLD AUTO: 4.25 10*6/MM3 (ref 3.77–5.28)
SODIUM SERPL-SCNC: 139 MMOL/L (ref 136–145)
WBC NRBC COR # BLD: 8.1 10*3/MM3 (ref 3.4–10.8)

## 2022-06-14 PROCEDURE — 80048 BASIC METABOLIC PNL TOTAL CA: CPT | Performed by: NURSE PRACTITIONER

## 2022-06-14 PROCEDURE — 85610 PROTHROMBIN TIME: CPT | Performed by: NURSE PRACTITIONER

## 2022-06-14 PROCEDURE — 97530 THERAPEUTIC ACTIVITIES: CPT

## 2022-06-14 PROCEDURE — 76775 US EXAM ABDO BACK WALL LIM: CPT

## 2022-06-14 PROCEDURE — 85025 COMPLETE CBC W/AUTO DIFF WBC: CPT | Performed by: NURSE PRACTITIONER

## 2022-06-14 RX ORDER — WARFARIN SODIUM 3 MG/1
1.5 TABLET ORAL
Status: COMPLETED | OUTPATIENT
Start: 2022-06-14 | End: 2022-06-14

## 2022-06-14 RX ORDER — WARFARIN SODIUM 2 MG/1
2 TABLET ORAL
Status: DISCONTINUED | OUTPATIENT
Start: 2022-06-15 | End: 2022-06-15

## 2022-06-14 RX ADMIN — TOPIRAMATE 100 MG: 100 TABLET, FILM COATED ORAL at 21:00

## 2022-06-14 RX ADMIN — METOPROLOL SUCCINATE 50 MG: 50 TABLET, EXTENDED RELEASE ORAL at 21:00

## 2022-06-14 RX ADMIN — HYDROCODONE BITARTRATE AND ACETAMINOPHEN 1 TABLET: 5; 325 TABLET ORAL at 10:40

## 2022-06-14 RX ADMIN — AZTREONAM 1 G: 1 INJECTION, POWDER, LYOPHILIZED, FOR SOLUTION INTRAMUSCULAR; INTRAVENOUS at 02:43

## 2022-06-14 RX ADMIN — DILTIAZEM HYDROCHLORIDE 360 MG: 180 CAPSULE, COATED, EXTENDED RELEASE ORAL at 10:40

## 2022-06-14 RX ADMIN — AZTREONAM 2 G: 2 INJECTION, POWDER, LYOPHILIZED, FOR SOLUTION INTRAMUSCULAR; INTRAVENOUS at 10:45

## 2022-06-14 RX ADMIN — Medication 3 ML: at 10:41

## 2022-06-14 RX ADMIN — METOPROLOL SUCCINATE 50 MG: 50 TABLET, EXTENDED RELEASE ORAL at 10:40

## 2022-06-14 RX ADMIN — DIGOXIN 125 MCG: 125 TABLET ORAL at 10:40

## 2022-06-14 RX ADMIN — AZTREONAM 2 G: 2 INJECTION, POWDER, LYOPHILIZED, FOR SOLUTION INTRAMUSCULAR; INTRAVENOUS at 17:27

## 2022-06-14 RX ADMIN — WARFARIN 1.5 MG: 3 TABLET ORAL at 17:27

## 2022-06-14 RX ADMIN — Medication 3 ML: at 21:00

## 2022-06-14 NOTE — THERAPY TREATMENT NOTE
"Subjective: Pt agreeable to therapeutic plan of care.    Objective:     Bed mobility - Supervision  Transfers - Min-A and with rolling walker with additional time and cued on hand placement.   Ambulation - 40 feet CGA and with rolling walker. Pt presents with decreased coco, discontinuous steps, and B LE weakness, R>L.     Vitals: WNL    Pain: 6 VAS abdomen.     Education: Provided education on importance of mobility and skilled verbal / tactile cueing throughout intervention.     Assessment: Hazel Bucio presents with functional mobility impairments which indicate the need for skilled intervention. Tolerating session today without incident. Pt requires min A with functional mobility this date and is at a high risk for falls, and is not safe to return home. Pt would benefit from skilled rehab upon discharge; however, pt expresses she wishes to return home with Home Health. Will continue to follow and progress as tolerated.     Plan/Recommendations:   Moderate Intensity Therapy recommended post-acute care. This is recommended as therapy feels the patient would require 3-4 days per week and wouldn't tolerate \"3 hour daily\" rehab intensity. SNF would be the preferred choice. If the patient does not agree to SNF, arrange HH or OP depending on home bound status. If patient is medically complex, consider LTACH.. Pt requires no DME at discharge.     Pt desires Home with Home Health at discharge. Pt cooperative; agreeable to therapeutic recommendations and plan of care.         Basic Mobility 6-click:  Rollin = Total, A lot = 2, A little = 3; 4 = None  Supine>Sit:   1 = Total, A lot = 2, A little = 3; 4 = None   Sit>Stand with arms:  1 = Total, A lot = 2, A little = 3; 4 = None  Bed>Chair:   1 = Total, A lot = 2, A little = 3; 4 = None  Ambulate in room:  1 = Total, A lot = 2, A little = 3; 4 = None  3-5 Steps with railin = Total, A lot = 2, A little = 3; 4 = None  Score: 19    Modified [place]: N/A = No " pre-op stroke/TIA    Post-Tx Position: Up in Chair, Alarms activated and Call light and personal items within reach  PPE: gloves, surgical mask, eyewear protection

## 2022-06-14 NOTE — TELEPHONE ENCOUNTER
Patient called clinic to inform that she is currently admitted at [facility]. She is scheduled for an appointment on 6/16 that we canceled due to hospitalization. Patient will call to reschedule appointment upon discharge.

## 2022-06-14 NOTE — PLAN OF CARE
"Hazel Bucio presents with functional mobility impairments which indicate the need for skilled intervention. Tolerating session today without incident. Pt requires min A with functional mobility this date and is at a high risk for falls, and is not safe to return home. Pt would benefit from skilled rehab upon discharge; however, pt expresses she wishes to return home with Home Health. Will continue to follow and progress as tolerated.     Plan/Recommendations:   Moderate Intensity Therapy recommended post-acute care. This is recommended as therapy feels the patient would require 3-4 days per week and wouldn't tolerate \"3 hour daily\" rehab intensity. SNF would be the preferred choice. If the patient does not agree to SNF, arrange HH or OP depending on home bound status. If patient is medically complex, consider LTACH.. Pt requires no DME at discharge.     Pt desires Home with Home Health at discharge. Pt cooperative; agreeable to therapeutic recommendations and plan of care.   "

## 2022-06-14 NOTE — PLAN OF CARE
Goal Outcome Evaluation:      Pt rested well during the night.  Call bell within reach.  Will continue to monitor pt status.

## 2022-06-14 NOTE — PROGRESS NOTES
LOS: 2 days   Patient Care Team:  Lizzy Francis MD as PCP - General (Family Medicine)    Subjective     Interval History: Right LQ discomfort    Patient Complaints: Ongoing pain.    History taken from: patient    Review of Systems   Constitutional: Positive for activity change and appetite change.   HENT: Negative for facial swelling.    Eyes: Negative for visual disturbance.   Respiratory: Negative for cough and shortness of breath.    Gastrointestinal: Positive for abdominal pain. Negative for constipation, diarrhea, nausea and vomiting.   Endocrine: Negative for polyuria.   Genitourinary: Negative for dysuria.   Musculoskeletal: Negative for arthralgias and back pain.   Skin: Negative for rash.   Neurological: Negative for dizziness and headaches.   Psychiatric/Behavioral: Negative for confusion.           Objective     Vital Signs  Temp:  [97.3 °F (36.3 °C)-99 °F (37.2 °C)] 98.4 °F (36.9 °C)  Heart Rate:  [62-80] 64  Resp:  [18] 18  BP: (128-162)/(69-92) 128/71    Physical Exam:     General Appearance:    Alert, cooperative, in no acute distress,   Head:    Normocephalic, without obvious abnormality, atraumatic   Eyes:            Lids and lashes normal, conjunctivae and sclerae normal, no   icterus, no pallor, corneas clear, PERRLA   Ears:    Ears appear intact with no abnormalities noted   Throat:   No oral lesions, no thrush, oral mucosa moist   Neck:   No adenopathy, supple, trachea midline, no thyromegaly, no   carotid bruit, no JVD   Lungs:     Clear to auscultation,respirations regular, even and                  unlabored    Heart:    Regular rhythm and normal rate, normal S1 and S2, no            murmur, no gallop, no rub, no click   Chest Wall:    No abnormalities observed   Abdomen:     RLQ tenderness; clear urine out of ileal conduit,   Extremities:   Moves all extremities well, no edema, no cyanosis, no             Redness   Pulses:   Pulses palpable and equal bilaterally   Skin:   No  bleeding, bruising or rash   Lymph nodes:   No palpable adenopathy   Neurologic:   Cranial nerves 2 - 12 grossly intact, sensation intact, DTR       present and equal bilaterally        Results Review:    Lab Results (last 24 hours)     Procedure Component Value Units Date/Time    Blood Culture - Blood, Arm, Left [[id number]]  (Abnormal) Collected: 06/12/22 0047    Specimen: Blood from Arm, Left Updated: 06/14/22 1431     Blood Culture Culture in progress     Gram Stain Anaerobic Bottle Gram negative coccobacilli     Comment:         Blood Culture ID, PCR - Blood, Arm, Left [[id number]] Collected: 06/12/22 0047    Specimen: Blood from Arm, Left Updated: 06/14/22 0823     BCID, PCR Negative by BCID PCR. Culture to Follow.     BOTTLE TYPE Anaerobic Bottle    Basic Metabolic Panel [[id number]]  (Abnormal) Collected: 06/14/22 0527    Specimen: Blood Updated: 06/14/22 0637     Glucose 122 mg/dL      BUN 29 mg/dL      Creatinine 0.96 mg/dL      Sodium 139 mmol/L      Potassium 3.5 mmol/L      Chloride 109 mmol/L      CO2 18.0 mmol/L      Calcium 8.6 mg/dL      BUN/Creatinine Ratio 30.2     Anion Gap 12.0 mmol/L      eGFR 59.6 mL/min/1.73      Comment: National Kidney Foundation and American Society of Nephrology (ASN) Task Force recommended calculation based on the Chronic Kidney Disease Epidemiology Collaboration (CKD-EPI) equation refit without adjustment for race.       Narrative:      GFR Normal >60  Chronic Kidney Disease <60  Kidney Failure <15      Protime-INR [[id number]]  (Normal) Collected: 06/14/22 0527    Specimen: Blood Updated: 06/14/22 0635     Protime 23.5 Seconds      INR 2.40    CBC & Differential [[id number]]  (Abnormal) Collected: 06/14/22 0527    Specimen: Blood Updated: 06/14/22 0610    Narrative:      The following orders were created for panel order CBC & Differential.  Procedure                               Abnormality         Status                     ---------                                -----------         ------                     CBC Auto Differential[[id number]]        Abnormal            Final result                 Please view results for these tests on the individual orders.    CBC Auto Differential [[id number]]  (Abnormal) Collected: 06/14/22 0527    Specimen: Blood Updated: 06/14/22 0610     WBC 8.10 10*3/mm3      RBC 4.25 10*6/mm3      Hemoglobin 13.4 g/dL      Hematocrit 41.0 %      MCV 96.5 fL      MCH 31.6 pg      MCHC 32.8 g/dL      RDW 14.7 %      RDW-SD 50.3 fl      MPV 7.7 fL      Platelets 170 10*3/mm3      Neutrophil % 71.2 %      Lymphocyte % 15.8 %      Monocyte % 11.3 %      Eosinophil % 1.5 %      Basophil % 0.2 %      Neutrophils, Absolute 5.80 10*3/mm3      Lymphocytes, Absolute 1.30 10*3/mm3      Monocytes, Absolute 0.90 10*3/mm3      Eosinophils, Absolute 0.10 10*3/mm3      Basophils, Absolute 0.00 10*3/mm3      nRBC 0.0 /100 WBC     Blood Culture - Blood, Arm, Left [[id number]]  (Normal) Collected: 06/12/22 0036    Specimen: Blood from Arm, Left Updated: 06/14/22 0100     Blood Culture No growth at 2 days           Imaging Results (Last 24 Hours)     Procedure Component Value Units Date/Time    US Renal Bilateral [[id number]] Collected: 06/14/22 1114     Updated: 06/14/22 1118    Narrative:      DATE OF EXAM:  6/14/2022 10:26 AM     PROCEDURE:  US RENAL BILATERAL-     INDICATIONS:  Sepsis; A41.9-Sepsis, unspecified organism; N39.0-Urinary tract  infection, site not specified     COMPARISON:  CT abdomen and pelvis 12/6/2021.     TECHNIQUE:   Grayscale and color Doppler ultrasound evaluation of the kidneys and  urinary bladder was performed.        FINDINGS:  The right kidney is surgically absent. The urinary bladder is surgically  absent.     The left kidney measures 11.3 x 6.6 x 5.6 cm. The left kidney maintains  normal cortical thickness and cortical echotexture. 3 mm echogenic  structure without shadowing is seen at the left lower renal pole,  thought to correspond to a  small nonobstructing stone which can be seen  on prior CT abdomen. There is no left hydronephrosis.       Impression:         1. 3 mm nonobstructing stone in the left lower renal pole. Otherwise,  normal sonographic appearance of the left kidney.  2. Right nephrectomy. Cystectomy.     Electronically Signed By-Ivy Valenzuela MD On:6/14/2022 11:16 AM  This report was finalized on [id number] by  Ivy Valenzuela MD.               I reviewed the patient's new clinical results.    Medication Review:   Scheduled Meds:aztreonam, 2 g, Intravenous, Q8H  digoxin, 125 mcg, Oral, Daily  dilTIAZem CD, 360 mg, Oral, Q24H  metoprolol succinate XL, 50 mg, Oral, BID  sodium chloride, 3 mL, Intravenous, Q12H  topiramate, 100 mg, Oral, Nightly  [START ON 6/15/2022] warfarin, 2 mg, Oral, Daily      Continuous Infusions:Pharmacy to dose warfarin,       PRN Meds:.•  acetaminophen  •  HYDROcodone-acetaminophen  •  nitroglycerin  •  ondansetron  •  Pharmacy to dose warfarin  •  sodium chloride  •  sodium chloride     Assessment & Plan       Sepsis, due to unspecified organism, unspecified whether acute organ dysfunction present (HCC)  - urine culture shows mixed becca; pain not improving and urine appears clear.  Will order ct scan with oral contrast to evaluate for other pathology (aside from UTI).    Atrial fib - cardizem, digoxin, warfarin    RLS - topiramate        Plan for disposition:SRINIVAS Henderson MD  06/14/22  19:11 EDT

## 2022-06-14 NOTE — PROGRESS NOTES
"Pharmacy dosing service  Anticoagulant  Warfarin     Subjective:    Hazel Bucio is a 82 y.o.female being continued on warfarin for atrial fibrillation (CHADS-VASc = 5).  PMH: HTN, DM, kidney CA s/p R nephrectomy    INR Goal: 2 - 3  Home medication?: Yes, warfarin 2 mg PO every day at 1800  Bridge Therapy Present?:  No  Interacting Medications Evaluation (New/Present/Discontinued): none  Additional Contributing Factors: nausea/vomiting PTA  Warfarin managed outpatient by [facility] clinic. Last visit was 6/2      Assessment/Plan:    INR therapeutic today, significant increase from yesterday.  Will give lower dose today of 1.5 mg to hopefully help prevent increase to supratherapeutic level. Hopefully will be abl to resume home dose tomorrow.    Continue to monitor and adjust based on INR.         Date 6/11 6/12 6/13 6/14        INR 2.86 2.14 1.84 2.4        Dose ?, PTA 2 mg 2.5 mg 2 mg            Objective:  [Ht: 170.2 cm (67\"); Wt: 70.8 kg (156 lb 1.4 oz); BMI: Body mass index is 24.45 kg/m².]    Lab Results   Component Value Date    ALBUMIN 3.40 (L) 06/12/2022     Lab Results   Component Value Date    INR 2.40 06/14/2022    INR 1.84 (L) 06/13/2022    INR 2.14 06/12/2022    PROTIME 23.5 06/14/2022    PROTIME 18.3 (L) 06/13/2022    PROTIME 21.1 06/12/2022     Lab Results   Component Value Date    HGB 13.4 06/14/2022    HGB 13.5 06/13/2022    HGB 13.9 06/12/2022     Lab Results   Component Value Date    HCT 41.0 06/14/2022    HCT 41.8 06/13/2022    HCT 43.7 06/12/2022       Erica Cedillo PharmD, BCPS  06/14/22 09:39 EDT    "

## 2022-06-15 ENCOUNTER — APPOINTMENT (OUTPATIENT)
Dept: CT IMAGING | Facility: HOSPITAL | Age: 82
End: 2022-06-15

## 2022-06-15 LAB
ANION GAP SERPL CALCULATED.3IONS-SCNC: 14 MMOL/L (ref 5–15)
BASOPHILS # BLD AUTO: 0 10*3/MM3 (ref 0–0.2)
BASOPHILS NFR BLD AUTO: 0.5 % (ref 0–1.5)
BUN SERPL-MCNC: 30 MG/DL (ref 8–23)
BUN/CREAT SERPL: 27.5 (ref 7–25)
CALCIUM SPEC-SCNC: 8.5 MG/DL (ref 8.6–10.5)
CHLORIDE SERPL-SCNC: 108 MMOL/L (ref 98–107)
CO2 SERPL-SCNC: 17 MMOL/L (ref 22–29)
CREAT SERPL-MCNC: 1.09 MG/DL (ref 0.57–1)
DEPRECATED RDW RBC AUTO: 52.5 FL (ref 37–54)
EGFRCR SERPLBLD CKD-EPI 2021: 51.1 ML/MIN/1.73
EOSINOPHIL # BLD AUTO: 0.1 10*3/MM3 (ref 0–0.4)
EOSINOPHIL NFR BLD AUTO: 1.7 % (ref 0.3–6.2)
ERYTHROCYTE [DISTWIDTH] IN BLOOD BY AUTOMATED COUNT: 15.2 % (ref 12.3–15.4)
GLUCOSE SERPL-MCNC: 159 MG/DL (ref 65–99)
HCT VFR BLD AUTO: 42.2 % (ref 34–46.6)
HGB BLD-MCNC: 13.7 G/DL (ref 12–15.9)
INR PPP: 3.81 (ref 2–3)
LYMPHOCYTES # BLD AUTO: 1.2 10*3/MM3 (ref 0.7–3.1)
LYMPHOCYTES NFR BLD AUTO: 16.6 % (ref 19.6–45.3)
MCH RBC QN AUTO: 31.6 PG (ref 26.6–33)
MCHC RBC AUTO-ENTMCNC: 32.3 G/DL (ref 31.5–35.7)
MCV RBC AUTO: 97.7 FL (ref 79–97)
MONOCYTES # BLD AUTO: 0.7 10*3/MM3 (ref 0.1–0.9)
MONOCYTES NFR BLD AUTO: 9.9 % (ref 5–12)
NEUTROPHILS NFR BLD AUTO: 5 10*3/MM3 (ref 1.7–7)
NEUTROPHILS NFR BLD AUTO: 71.3 % (ref 42.7–76)
NRBC BLD AUTO-RTO: 0 /100 WBC (ref 0–0.2)
PLATELET # BLD AUTO: 189 10*3/MM3 (ref 140–450)
PMV BLD AUTO: 7.4 FL (ref 6–12)
POTASSIUM SERPL-SCNC: 3.5 MMOL/L (ref 3.5–5.2)
PROTHROMBIN TIME: 36.4 SECONDS (ref 19.4–28.5)
RBC # BLD AUTO: 4.32 10*6/MM3 (ref 3.77–5.28)
SODIUM SERPL-SCNC: 139 MMOL/L (ref 136–145)
WBC NRBC COR # BLD: 7.1 10*3/MM3 (ref 3.4–10.8)

## 2022-06-15 PROCEDURE — 80048 BASIC METABOLIC PNL TOTAL CA: CPT | Performed by: NURSE PRACTITIONER

## 2022-06-15 PROCEDURE — 97116 GAIT TRAINING THERAPY: CPT

## 2022-06-15 PROCEDURE — 85610 PROTHROMBIN TIME: CPT | Performed by: NURSE PRACTITIONER

## 2022-06-15 PROCEDURE — 85025 COMPLETE CBC W/AUTO DIFF WBC: CPT | Performed by: NURSE PRACTITIONER

## 2022-06-15 PROCEDURE — 74176 CT ABD & PELVIS W/O CONTRAST: CPT

## 2022-06-15 RX ORDER — MAGNESIUM CARB/ALUMINUM HYDROX 105-160MG
296 TABLET,CHEWABLE ORAL ONCE
Status: COMPLETED | OUTPATIENT
Start: 2022-06-15 | End: 2022-06-15

## 2022-06-15 RX ADMIN — AZTREONAM 2 G: 2 INJECTION, POWDER, LYOPHILIZED, FOR SOLUTION INTRAMUSCULAR; INTRAVENOUS at 18:23

## 2022-06-15 RX ADMIN — TOPIRAMATE 100 MG: 100 TABLET, FILM COATED ORAL at 21:09

## 2022-06-15 RX ADMIN — METOPROLOL SUCCINATE 50 MG: 50 TABLET, EXTENDED RELEASE ORAL at 09:14

## 2022-06-15 RX ADMIN — DIGOXIN 125 MCG: 125 TABLET ORAL at 09:15

## 2022-06-15 RX ADMIN — HYDROCODONE BITARTRATE AND ACETAMINOPHEN 1 TABLET: 5; 325 TABLET ORAL at 09:19

## 2022-06-15 RX ADMIN — Medication 296 ML: at 11:56

## 2022-06-15 RX ADMIN — METOPROLOL SUCCINATE 50 MG: 50 TABLET, EXTENDED RELEASE ORAL at 21:09

## 2022-06-15 RX ADMIN — Medication 3 ML: at 09:14

## 2022-06-15 RX ADMIN — DILTIAZEM HYDROCHLORIDE 360 MG: 180 CAPSULE, COATED, EXTENDED RELEASE ORAL at 09:14

## 2022-06-15 RX ADMIN — AZTREONAM 2 G: 2 INJECTION, POWDER, LYOPHILIZED, FOR SOLUTION INTRAMUSCULAR; INTRAVENOUS at 00:58

## 2022-06-15 RX ADMIN — AZTREONAM 2 G: 2 INJECTION, POWDER, LYOPHILIZED, FOR SOLUTION INTRAMUSCULAR; INTRAVENOUS at 09:14

## 2022-06-15 NOTE — PLAN OF CARE
Goal Outcome Evaluation:         Hazel Bucio presents with functional mobility impairments which indicate the need for skilled intervention. Pt required CGA-min A during supine to sitting eob. Pt performed STS with RW/CGA. Pt ambulated ~40' requiring RW/CGA with cuing for increasing hip/knee flexion and upright posture. Tolerating session today without incident. Will continue to follow and progress as tolerated.

## 2022-06-15 NOTE — PROGRESS NOTES
"Pharmacy dosing service  Anticoagulant  Warfarin     Subjective:    Hazel Bucio is a 82 y.o.female being continued on warfarin for atrial fibrillation (CHADS-VASc = 5).  PMH: HTN, DM, kidney CA s/p R nephrectomy    INR Goal: 2 - 3  Home medication?: Yes, warfarin 2 mg PO every day at 1800  Bridge Therapy Present?:  No  Interacting Medications Evaluation (New/Present/Discontinued): none  Additional Contributing Factors: nausea/vomiting PTA  Warfarin managed outpatient by [facility] clinic. Last visit was 6/2      Assessment/Plan:    INR supratherapeutic today, significant increase from yesterday. Will hold today's dose. Will obtain INR tomorrow and resume if INR < 3.    Continue to monitor and adjust based on INR.         Date 6/11 6/12 6/13 6/14 6/15       INR 2.86 2.14 1.84 2.4 3.81       Dose ?, PTA 2 mg 2.5 mg 1.5 mg HOLD           Objective:  [Ht: 170.2 cm (67\"); Wt: 69.8 kg (153 lb 14.1 oz); BMI: Body mass index is 24.1 kg/m².]    Lab Results   Component Value Date    ALBUMIN 3.40 (L) 06/12/2022     Lab Results   Component Value Date    INR 3.81 (H) 06/15/2022    INR 2.40 06/14/2022    INR 1.84 (L) 06/13/2022    PROTIME 36.4 (H) 06/15/2022    PROTIME 23.5 06/14/2022    PROTIME 18.3 (L) 06/13/2022     Lab Results   Component Value Date    HGB 13.7 06/15/2022    HGB 13.4 06/14/2022    HGB 13.5 06/13/2022     Lab Results   Component Value Date    HCT 42.2 06/15/2022    HCT 41.0 06/14/2022    HCT 41.8 06/13/2022       Bella Dixon, PharmD  06/15/22 14:43 EDT   "

## 2022-06-15 NOTE — CASE MANAGEMENT/SOCIAL WORK
Continued Stay Note  NYDIA Briscoe     Patient Name: Hazel Bucio  MRN: [id number]  Today's Date: 6/15/2022    Admit Date: 6/11/2022     Discharge Plan     Row Name 06/15/22 1520       Plan    Plan DC plan: home with nephew, accepted by [facility] (will need order).    Plan Comments Confirmed acceptance by [facility] liaison [place]. Awaiting final cultures. Watch for need for IV abx.              Phone communication or documentation only - no physical contact with patient or family.      Megan Naegele, RN      Office Phone: [phone]  Office Cell: [phone]

## 2022-06-15 NOTE — THERAPY TREATMENT NOTE
"Subjective: Pt agreeable to therapeutic plan of care.    Objective:     Bed mobility - CGA-min A  Transfers - CGA and with rolling walker  Ambulation - 40 feet CGA and with rolling walker    Vitals: WNL    Pain: 7 VAS in stomach  Education: Provided education on importance of mobility and skilled verbal / tactile cueing throughout intervention.     Assessment: Hazel Bucio presents with functional mobility impairments which indicate the need for skilled intervention. Pt required CGA-min A during supine to sitting eob. Pt performed STS with RW/CGA. Pt ambulated ~40' requiring RW/CGA with cuing for increasing hip/knee flexion and upright posture. Tolerating session today without incident. Will continue to follow and progress as tolerated.     Plan/Recommendations:   Moderate Intensity Therapy recommended post-acute care. This is recommended as therapy feels the patient would require 3-4 days per week and wouldn't tolerate \"3 hour daily\" rehab intensity. SNF would be the preferred choice. If the patient does not agree to SNF, arrange HH or OP depending on home bound status. If patient is medically complex, consider LTACH.. Pt requires no DME at discharge.     Pt desires Home with Home Health at discharge. Pt cooperative; agreeable to therapeutic recommendations and plan of care.         Basic Mobility 6-click:  Rollin = Total, A lot = 2, A little = 3; 4 = None  Supine>Sit:   1 = Total, A lot = 2, A little = 3; 4 = None   Sit>Stand with arms:  1 = Total, A lot = 2, A little = 3; 4 = None  Bed>Chair:   1 = Total, A lot = 2, A little = 3; 4 = None  Ambulate in room:  1 = Total, A lot = 2, A little = 3; 4 = None  3-5 Steps with railin = Total, A lot = 2, A little = 3; 4 = None  Score: 18      Post-Tx Position: Up in Chair, Alarms activated and Call light and personal items within reach  PPE: gloves, surgical mask, eyewear protection      "

## 2022-06-15 NOTE — PROGRESS NOTES
"     LOS: 3 days   Patient Care Team:  Lizzy Francis MD as PCP - General (Family Medicine)    Subjective     Interval History: No significant change    Patient Complaints: Continues to have \"deep\" abdominal pain around her urostomy site.  Also complains of some left lower quadrant pain.  She is tolerating regular diet.  She denies constipation but CT scan showed retained stool.    History taken from: patient    Review of Systems   Constitutional: Positive for activity change. Negative for appetite change, chills, diaphoresis, fatigue and fever.   HENT: Negative for facial swelling.    Eyes: Negative for visual disturbance.   Respiratory: Negative for cough, shortness of breath, wheezing and stridor.    Cardiovascular: Negative for chest pain, palpitations and leg swelling.   Gastrointestinal: Positive for abdominal pain. Negative for constipation, diarrhea, nausea and vomiting.   Endocrine: Negative for polyuria.   Genitourinary: Negative for dysuria.   Musculoskeletal: Negative for arthralgias and back pain.   Skin: Negative for rash and wound.   Neurological: Negative for dizziness, tremors, weakness, light-headedness and headaches.   Psychiatric/Behavioral: Negative for confusion.           Objective     Vital Signs  Temp:  [98.3 °F (36.8 °C)-99.1 °F (37.3 °C)] 98.9 °F (37.2 °C)  Heart Rate:  [60-71] 68  Resp:  [18] 18  BP: (128-162)/(71-92) 130/77    Physical Exam:     General Appearance:    Alert, cooperative, in no acute distress,   Head:    Normocephalic, without obvious abnormality, atraumatic   Eyes:            Lids and lashes normal, conjunctivae and sclerae normal, no   icterus, no pallor, corneas clear, PERRLA   Ears:    Ears appear intact with no abnormalities noted   Throat:   No oral lesions, no thrush, oral mucosa moist   Neck:   No adenopathy, supple, trachea midline, no thyromegaly, no   carotid bruit, no JVD   Lungs:     Clear to auscultation,respirations regular, even and                  " unlabored    Heart:   Irregularly irregular   Chest Wall:    No abnormalities observed   Abdomen:    Soft, mild right lower quadrant and left lower quadrant tenderness without rebound or guarding.  Ostomy is patent with clear urine output she has easily reducible parastomal hernia   Extremities:   Moves all extremities well, no edema, no cyanosis, no             Redness   Pulses:   Pulses palpable and equal bilaterally   Skin:   No bleeding, bruising or rash   Lymph nodes:   No palpable adenopathy   Neurologic:   Cranial nerves 2 - 12 grossly intact, sensation intact, DTR       present and equal bilaterally        Results Review:    Lab Results (last 24 hours)     Procedure Component Value Units Date/Time    Blood Culture - Blood, Arm, Left [[id number]]  (Abnormal) Collected: 06/12/22 0047    Specimen: Blood from Arm, Left Updated: 06/15/22 0924     Blood Culture Culture in progress     Gram Stain Anaerobic Bottle Gram negative coccobacilli     Comment:         Basic Metabolic Panel [[id number]]  (Abnormal) Collected: 06/15/22 0146    Specimen: Blood Updated: 06/15/22 0252     Glucose 159 mg/dL      BUN 30 mg/dL      Creatinine 1.09 mg/dL      Sodium 139 mmol/L      Potassium 3.5 mmol/L      Chloride 108 mmol/L      CO2 17.0 mmol/L      Calcium 8.5 mg/dL      BUN/Creatinine Ratio 27.5     Anion Gap 14.0 mmol/L      eGFR 51.1 mL/min/1.73      Comment: National Kidney Foundation and American Society of Nephrology (ASN) Task Force recommended calculation based on the Chronic Kidney Disease Epidemiology Collaboration (CKD-EPI) equation refit without adjustment for race.       Narrative:      GFR Normal >60  Chronic Kidney Disease <60  Kidney Failure <15      Protime-INR [[id number]]  (Abnormal) Collected: 06/15/22 0146    Specimen: Blood Updated: 06/15/22 0241     Protime 36.4 Seconds      INR 3.81    CBC & Differential [[id number]]  (Abnormal) Collected: 06/15/22 0145    Specimen: Blood Updated: 06/15/22 0219    Narrative:       The following orders were created for panel order CBC & Differential.  Procedure                               Abnormality         Status                     ---------                               -----------         ------                     CBC Auto Differential[[id number]]        Abnormal            Final result                 Please view results for these tests on the individual orders.    CBC Auto Differential [[id number]]  (Abnormal) Collected: 06/15/22 0145    Specimen: Blood Updated: 06/15/22 0219     WBC 7.10 10*3/mm3      RBC 4.32 10*6/mm3      Hemoglobin 13.7 g/dL      Hematocrit 42.2 %      MCV 97.7 fL      MCH 31.6 pg      MCHC 32.3 g/dL      RDW 15.2 %      RDW-SD 52.5 fl      MPV 7.4 fL      Platelets 189 10*3/mm3      Neutrophil % 71.3 %      Lymphocyte % 16.6 %      Monocyte % 9.9 %      Eosinophil % 1.7 %      Basophil % 0.5 %      Neutrophils, Absolute 5.00 10*3/mm3      Lymphocytes, Absolute 1.20 10*3/mm3      Monocytes, Absolute 0.70 10*3/mm3      Eosinophils, Absolute 0.10 10*3/mm3      Basophils, Absolute 0.00 10*3/mm3      nRBC 0.0 /100 WBC     Blood Culture - Blood, Arm, Left [[id number]]  (Normal) Collected: 06/12/22 0036    Specimen: Blood from Arm, Left Updated: 06/15/22 0102     Blood Culture No growth at 3 days           Imaging Results (Last 24 Hours)     Procedure Component Value Units Date/Time    CT Abdomen Pelvis Without Contrast [[id number]] Collected: 06/15/22 0119     Updated: 06/15/22 0125    Narrative:      Exam: CT abdomen and pelvis without contrast    Date: Johanna 15, 2022    History: Lower abdominal pain    Comparison: December 6, 2021    Technique: Contiguous axial CT images obtained of the abdomen and pelvis without contrast. Additionally, sagittal and coronal reformatted images were obtained. Oral contrast was administered. CT dose lowering techniques were used, to include: automated   exposure control, adjustment for patient size, and or use of iterative  reconstruction.    Findings:    Lung bases: The heart is enlarged. The lung bases are free of focal airspace consolidation.    Liver: There are a few calcified hepatic granulomas.    Spleen: There are calcified splenic granulomas.    Pancreas: Normal.    Adrenal glands: Normal.    Gallbladder: The gallbladder is surgically absent.    Kidneys: The right kidney is surgically absent. There is a nonobstructing 4 mm left renal calculus. There is mild perinephric edema around the left kidney. There is mild left-sided hydronephrosis. There is a diverting ileostomy in the right side of the   abdomen.    Abdominal mesentery: There is rectus abdominis diastasis. There is an ileostomy in the right lower quadrant with an associated parastomal hernia containing a portion of large bowel. The appearance is unchanged. There are additional small fat-containing   ventral abdominal wall hernias.    Bowel loops: There is a moderate to large amount of retained colonic fecal debris. There is oral contrast within the large bowel loops. There is no small bowel obstruction. There is no evidence of acute appendicitis.    CT PELVIS:  The uterus and urinary bladder are not identified.    Abdominal aorta: There are severe atherosclerotic changes. There is no aneurysm.    Osseous structures: Osseous structures are markedly demineralized. No acute fractures are identified.      Impression:      1. Mild left-sided hydronephrosis. Etiology is uncertain.  2. Prior right-sided nephrectomy.  3. Nonobstructing 4 mm left renal calculus.  5. Cardiomegaly.  6. Stable parastomal hernia in the right lower quadrant.  7. Constipation.  8. Extensive surgical changes as described.          Slot 67    Electronically signed by:  Kashmir Diaz M.D.    6/14/2022 11:24 PM    US Renal Bilateral [[id number]] Collected: 06/14/22 1114     Updated: 06/14/22 1118    Narrative:      DATE OF EXAM:  6/14/2022 10:26 AM     PROCEDURE:  US RENAL BILATERAL-      INDICATIONS:  Sepsis; A41.9-Sepsis, unspecified organism; N39.0-Urinary tract  infection, site not specified     COMPARISON:  CT abdomen and pelvis 12/6/2021.     TECHNIQUE:   Grayscale and color Doppler ultrasound evaluation of the kidneys and  urinary bladder was performed.        FINDINGS:  The right kidney is surgically absent. The urinary bladder is surgically  absent.     The left kidney measures 11.3 x 6.6 x 5.6 cm. The left kidney maintains  normal cortical thickness and cortical echotexture. 3 mm echogenic  structure without shadowing is seen at the left lower renal pole,  thought to correspond to a small nonobstructing stone which can be seen  on prior CT abdomen. There is no left hydronephrosis.       Impression:         1. 3 mm nonobstructing stone in the left lower renal pole. Otherwise,  normal sonographic appearance of the left kidney.  2. Right nephrectomy. Cystectomy.     Electronically Signed By-Ivy Valenzuela MD On:6/14/2022 11:16 AM  This report was finalized on [id number] by  Ivy Valenzuela MD.               I reviewed the patient's new clinical results.    Medication Review:   Scheduled Meds:aztreonam, 2 g, Intravenous, Q8H  digoxin, 125 mcg, Oral, Daily  dilTIAZem CD, 360 mg, Oral, Q24H  magnesium citrate, 296 mL, Oral, Once  metoprolol succinate XL, 50 mg, Oral, BID  sodium chloride, 3 mL, Intravenous, Q12H  topiramate, 100 mg, Oral, Nightly  warfarin, 2 mg, Oral, Daily      Continuous Infusions:Pharmacy to dose warfarin,       PRN Meds:.•  acetaminophen  •  HYDROcodone-acetaminophen  •  nitroglycerin  •  ondansetron  •  Pharmacy to dose warfarin  •  sodium chloride  •  sodium chloride     Assessment & Plan       Sepsis, due to unspecified organism, unspecified whether acute organ dysfunction present (HCC)  -Urine culture shows mixed becca and symptoms are not consistent with UTI.  1 out of 4 blood cultures is growing gram-negative coccobacilli.  I am considering this a contaminant as  she is not showing any signs of infection/bacteremia and it is 1 out of 4 bottles.  Will DC Azactam.    Abdominal pain -CT scan shows chronic hernias that clinically are easily reduced and not painful.  CT shows retained stool so I am giving her mag citrate today for bowel cleanout.  Physical exam is fairly     Left hydronephrosis -mild, etiology unclear; urology has been reconsulted.    Atrial fib - cardizem, digoxin, warfarin     RLS - topiramate    Patient appears weak and deconditioned.  We will pursue skilled rehab per physical therapy recommendations.    Plan for disposition: Skilled rehab    Lora Henderson MD  06/15/22  10:33 EDT

## 2022-06-15 NOTE — CONSULTS
[facility] CONSULT      Patient Identification:  NAME:  Hazel Bucio  Age:  82 y.o.   Sex:  female   :  1940   MRN:  [id number]       Chief complaint/Reason for consult: UTI    History of present illness:  82 y.o. female known to Dr Gupta, solitary kidney, admitted for poss UTI and sepsis. Having mod to severe abd pain as well. Urine culture mixed becca (contaminated) ,blood cultures prelim +. DANIEL no hydro, renal stone, CT with mild hydro and non obs stone.      Past medical history:  Past Medical History:   Diagnosis Date   • Bladder cancer (HCC)    • Cancer (HCC)     breast, bladder   • Deep vein thrombosis (HCC)    • Essential hypertension 2017   • Fracture tibia/fibula, right, closed, initial encounter 2020   • GERD (gastroesophageal reflux disease)    • History of urostomy    • Immobility 2020    r/t broken Tibia    • Kidney carcinoma, right (HCC)     removed    • Long term current use of anticoagulant 2015   • PAF (paroxysmal atrial fibrillation) ([facility]) 2019   • Presence of cardiac pacemaker 2014    BS dual chamber PM placed 10/2014 with pocket revision 17.  HX tachy rob syndrome   • Sick sinus syndrome ([facility]) 11/3/2014       Past surgical history:  Past Surgical History:   Procedure Laterality Date   • BREAST LUMPECTOMY     • CHOLECYSTECTOMY N/A 10/12/2020    Procedure: CHOLECYSTECTOMY LAPAROSCOPIC;  Surgeon: Go Pereira DO;  Location: [facility] MAIN OR;  Service: General;  Laterality: N/A;   • CYSTECTOMY W/ URETEROILEAL CONDUIT     • HARDWARE REMOVAL Right 2021    Procedure: TIBIA HARDWARE REMOVAL;  Surgeon: Geoff Shah II, MD;  Location: [facility] MAIN OR;  Service: Orthopedics;  Laterality: Right;   • HERNIA REPAIR     • HYSTERECTOMY     • INSERT / REPLACE / REMOVE PACEMAKER     • NEPHRECTOMY Right    • TIBIA IM NAILING/RODDING Right 2020    Procedure: TIBIA INTRAMEDULLARY NAIL/ABRAHAM INSERTION;  Surgeon: Geoff Shah II  MD;  Location: [facility] MAIN OR;  Service: Orthopedics;  Laterality: Right;       Allergies:  Amoxicillin, Ciprofloxacin, Oxycodone-acetaminophen, Penicillins, Sulfa antibiotics, Cephalexin, Clavulanic acid, Codeine, Contrast dye, Doxycycline, Fluconazole, Iodine, Macrobid [nitrofurantoin], Tobramycin, and Trimethoprim    Home medications:  Medications Prior to Admission   Medication Sig Dispense Refill Last Dose   • acetaminophen (TYLENOL) 325 MG tablet Take 650 mg by mouth Every 6 (Six) Hours As Needed for Mild Pain .   6/11/2022 at Unknown time   • digoxin (LANOXIN) 125 MCG tablet Take 1 tablet by mouth Daily. 90 tablet 1 6/10/2022 at 2100   • dilTIAZem CD (Cardizem CD) 360 MG 24 hr capsule Take 1 capsule by mouth Daily. 90 capsule 1    • HYDROcodone-acetaminophen (NORCO) 5-325 MG per tablet Take 1 tablet by mouth Every 8 (Eight) Hours As Needed.   Past Week at Unknown time   • metoprolol succinate XL (TOPROL-XL) 50 MG 24 hr tablet Take 1 tablet by mouth 2 (Two) Times a Day. 180 tablet 1    • topiramate (TOPAMAX) 100 MG tablet Take 100 mg by mouth every night at bedtime.      • warfarin (COUMADIN) 2 MG tablet Take 2 mg by mouth daily or as directed by [facility] ([phone]) 90 tablet 1    • Cholecalciferol (Vitamin D3) 50 MCG (2000 UT) tablet Take 2,000 Units by mouth Daily.   More than a month at Unknown time   • Probiotic Product (PROBIOTIC PO) Take  by mouth Daily.   6/9/2022 at 0900        Hospital medications:  aztreonam, 2 g, Intravenous, Q8H  digoxin, 125 mcg, Oral, Daily  dilTIAZem CD, 360 mg, Oral, Q24H  magnesium citrate, 296 mL, Oral, Once  metoprolol succinate XL, 50 mg, Oral, BID  sodium chloride, 3 mL, Intravenous, Q12H  topiramate, 100 mg, Oral, Nightly  warfarin, 2 mg, Oral, Daily      Pharmacy to dose warfarin,       •  acetaminophen  •  HYDROcodone-acetaminophen  •  nitroglycerin  •  ondansetron  •  Pharmacy to dose warfarin  •  sodium chloride  •  sodium chloride    Family  history:  Family History   Problem Relation Age of Onset   • COPD Father        Social history:  Social History     Tobacco Use   • Smoking status: Never Smoker   • Smokeless tobacco: Never Used   Vaping Use   • Vaping Use: Never used   Substance Use Topics   • Alcohol use: Not Currently   • Drug use: Never       REVIEW OF SYSTEMS:  Constitutional - Negative for fevers, chills, lethargy  Eyes/Ears/Nose/Mouth/Throat - Negative for changes in vision or hearing  Cardiovascular - Negative for increased edema or cyanosis  Respiratory - Negative for dyspnea or wheezing  Gastrointestinal - Negative for nausea or vomiting  Genitourinary - Negative for dysuria or hematuria  Hematologic/Lymphatic - Negative for bruising or cyanosis  Skin - Negative for erythema or rash  Psych - Negative     Objective:  TMax 24 hours:   Temp (24hrs), Av.6 °F (37 °C), Min:98 °F (36.7 °C), Max:99.1 °F (37.3 °C)      Vitals Ranges:   Temp:  [98 °F (36.7 °C)-99.1 °F (37.3 °C)] 98 °F (36.7 °C)  Heart Rate:  [60-71] 67  Resp:  [16-18] 16  BP: (120-162)/(61-92) 120/61    Intake/Output Last 3 shifts:  I/O last 3 completed shifts:  In:  [P.O.:1796; IV Piggyback:300]  Out: 1075 [Urine:1075]     Physical Exam:    General Appearance:    Alert, cooperative, NAD   HEENT:    No trauma, pupils reactive, hearing intact   Lungs:     Respirations unlabored, no audible wheezing    Heart:    No cyanosis, No significant edema   Abdomen:     Soft, ND    :   Urine clear   Extremities:   No significant edema, no deformity   Lymphatic:   No neck or groin LAD     Results review:   I reviewed the patient's new clinical results.    Data review:  Lab Results (last 24 hours)     Procedure Component Value Units Date/Time    Blood Culture - Blood, Arm, Left [[id number]]  (Abnormal) Collected: 22    Specimen: Blood from Arm, Left Updated: 06/15/22 0924     Blood Culture Culture in progress     Gram Stain Anaerobic Bottle Gram negative coccobacilli      Comment:         Basic Metabolic Panel [[id number]]  (Abnormal) Collected: 06/15/22 0146    Specimen: Blood Updated: 06/15/22 0252     Glucose 159 mg/dL      BUN 30 mg/dL      Creatinine 1.09 mg/dL      Sodium 139 mmol/L      Potassium 3.5 mmol/L      Chloride 108 mmol/L      CO2 17.0 mmol/L      Calcium 8.5 mg/dL      BUN/Creatinine Ratio 27.5     Anion Gap 14.0 mmol/L      eGFR 51.1 mL/min/1.73      Comment: National Kidney Foundation and American Society of Nephrology (ASN) Task Force recommended calculation based on the Chronic Kidney Disease Epidemiology Collaboration (CKD-EPI) equation refit without adjustment for race.       Narrative:      GFR Normal >60  Chronic Kidney Disease <60  Kidney Failure <15      Protime-INR [[id number]]  (Abnormal) Collected: 06/15/22 0146    Specimen: Blood Updated: 06/15/22 0241     Protime 36.4 Seconds      INR 3.81    CBC & Differential [[id number]]  (Abnormal) Collected: 06/15/22 0145    Specimen: Blood Updated: 06/15/22 0219    Narrative:      The following orders were created for panel order CBC & Differential.  Procedure                               Abnormality         Status                     ---------                               -----------         ------                     CBC Auto Differential[[id number]]        Abnormal            Final result                 Please view results for these tests on the individual orders.    CBC Auto Differential [[id number]]  (Abnormal) Collected: 06/15/22 0145    Specimen: Blood Updated: 06/15/22 0219     WBC 7.10 10*3/mm3      RBC 4.32 10*6/mm3      Hemoglobin 13.7 g/dL      Hematocrit 42.2 %      MCV 97.7 fL      MCH 31.6 pg      MCHC 32.3 g/dL      RDW 15.2 %      RDW-SD 52.5 fl      MPV 7.4 fL      Platelets 189 10*3/mm3      Neutrophil % 71.3 %      Lymphocyte % 16.6 %      Monocyte % 9.9 %      Eosinophil % 1.7 %      Basophil % 0.5 %      Neutrophils, Absolute 5.00 10*3/mm3      Lymphocytes, Absolute 1.20 10*3/mm3       Monocytes, Absolute 0.70 10*3/mm3      Eosinophils, Absolute 0.10 10*3/mm3      Basophils, Absolute 0.00 10*3/mm3      nRBC 0.0 /100 WBC     Blood Culture - Blood, Arm, Left [[id number]]  (Normal) Collected: 06/12/22 0036    Specimen: Blood from Arm, Left Updated: 06/15/22 0102     Blood Culture No growth at 3 days           Imaging:  Imaging Results (Last 24 Hours)     Procedure Component Value Units Date/Time    CT Abdomen Pelvis Without Contrast [[id number]] Collected: 06/15/22 0119     Updated: 06/15/22 0125    Narrative:      Exam: CT abdomen and pelvis without contrast    Date: Johanna 15, 2022    History: Lower abdominal pain    Comparison: December 6, 2021    Technique: Contiguous axial CT images obtained of the abdomen and pelvis without contrast. Additionally, sagittal and coronal reformatted images were obtained. Oral contrast was administered. CT dose lowering techniques were used, to include: automated   exposure control, adjustment for patient size, and or use of iterative reconstruction.    Findings:    Lung bases: The heart is enlarged. The lung bases are free of focal airspace consolidation.    Liver: There are a few calcified hepatic granulomas.    Spleen: There are calcified splenic granulomas.    Pancreas: Normal.    Adrenal glands: Normal.    Gallbladder: The gallbladder is surgically absent.    Kidneys: The right kidney is surgically absent. There is a nonobstructing 4 mm left renal calculus. There is mild perinephric edema around the left kidney. There is mild left-sided hydronephrosis. There is a diverting ileostomy in the right side of the   abdomen.    Abdominal mesentery: There is rectus abdominis diastasis. There is an ileostomy in the right lower quadrant with an associated parastomal hernia containing a portion of large bowel. The appearance is unchanged. There are additional small fat-containing   ventral abdominal wall hernias.    Bowel loops: There is a moderate to large amount of  retained colonic fecal debris. There is oral contrast within the large bowel loops. There is no small bowel obstruction. There is no evidence of acute appendicitis.    CT PELVIS:  The uterus and urinary bladder are not identified.    Abdominal aorta: There are severe atherosclerotic changes. There is no aneurysm.    Osseous structures: Osseous structures are markedly demineralized. No acute fractures are identified.      Impression:      1. Mild left-sided hydronephrosis. Etiology is uncertain.  2. Prior right-sided nephrectomy.  3. Nonobstructing 4 mm left renal calculus.  5. Cardiomegaly.  6. Stable parastomal hernia in the right lower quadrant.  7. Constipation.  8. Extensive surgical changes as described.          Slot 67    Electronically signed by:  Kashmir Diaz M.D.    6/14/2022 11:24 PM             Assessment:       Sepsis, due to unspecified organism, unspecified whether acute organ dysfunction present (HCC)    Solitary kidney  4mm Non obs stone  Bacteremia    Plan:     DANIEL and CT images reviewed, no obstruction, mild hydro expected with normal reflux and poss UTI  Await final cultures and complete two weeks of culture specific antibiotics  Follow up with Dr Gupta as planned    David Talley MD  [facility]  [facility], [address]  [place] [address]  [phone]  06/15/22  11:41 EDT

## 2022-06-15 NOTE — PLAN OF CARE
Problem: Skin Injury Risk Increased  Goal: Skin Health and Integrity  Outcome: Ongoing, Progressing     Problem: Pain Acute  Goal: Acceptable Pain Control and Functional Ability  Outcome: Ongoing, Progressing  Intervention: Prevent or Manage Pain  Recent Flowsheet Documentation  Taken 6/15/2022 1100 by Yasmeen Carey RN  Medication Review/Management: medications reviewed     Problem: Adult Inpatient Plan of Care  Goal: Plan of Care Review  Outcome: Ongoing, Progressing  Goal: Patient-Specific Goal (Individualized)  Outcome: Ongoing, Progressing  Goal: Absence of Hospital-Acquired Illness or Injury  Outcome: Ongoing, Progressing  Intervention: Identify and Manage Fall Risk  Recent Flowsheet Documentation  Taken 6/15/2022 1100 by Yasmeen Carey RN  Safety Promotion/Fall Prevention:   safety round/check completed   nonskid shoes/slippers when out of bed  Intervention: Prevent Infection  Recent Flowsheet Documentation  Taken 6/15/2022 1300 by Yasmeen Carey RN  Infection Prevention: personal protective equipment utilized  Goal: Optimal Comfort and Wellbeing  Outcome: Ongoing, Progressing  Goal: Readiness for Transition of Care  Outcome: Ongoing, Progressing     Problem: Fall Injury Risk  Goal: Absence of Fall and Fall-Related Injury  Outcome: Ongoing, Progressing  Intervention: Identify and Manage Contributors  Recent Flowsheet Documentation  Taken 6/15/2022 1100 by Yasmeen Carey RN  Medication Review/Management: medications reviewed  Intervention: Promote Injury-Free Environment  Recent Flowsheet Documentation  Taken 6/15/2022 1100 by Yasmeen Carey RN  Safety Promotion/Fall Prevention:   safety round/check completed   nonskid shoes/slippers when out of bed     Problem: UTI (Urinary Tract Infection)  Goal: Improved Infection Symptoms  Outcome: Ongoing, Progressing     Problem: Communication Impairment  Goal: Effective Communication Skills  Outcome: Ongoing, Progressing     Problem: Diabetes  Comorbidity  Goal: Blood Glucose Level Within Targeted Range  Outcome: Ongoing, Progressing     Problem: Hypertension Comorbidity  Goal: Blood Pressure in Desired Range  Outcome: Ongoing, Progressing  Intervention: Maintain Blood Pressure Management  Recent Flowsheet Documentation  Taken 6/15/2022 1100 by Yasmeen Carey RN  Medication Review/Management: medications reviewed   Goal Outcome Evaluation:

## 2022-06-15 NOTE — PLAN OF CARE
Goal Outcome Evaluation:              Outcome Evaluation: Pt rested well during the night.  CT of the abd completed.  PT stable at this time.

## 2022-06-16 ENCOUNTER — APPOINTMENT (OUTPATIENT)
Dept: PHARMACY | Facility: HOSPITAL | Age: 82
End: 2022-06-16

## 2022-06-16 LAB
ANION GAP SERPL CALCULATED.3IONS-SCNC: 12 MMOL/L (ref 5–15)
BASOPHILS # BLD AUTO: 0.1 10*3/MM3 (ref 0–0.2)
BASOPHILS NFR BLD AUTO: 0.8 % (ref 0–1.5)
BUN SERPL-MCNC: 28 MG/DL (ref 8–23)
BUN/CREAT SERPL: 31.1 (ref 7–25)
CALCIUM SPEC-SCNC: 8.4 MG/DL (ref 8.6–10.5)
CHLORIDE SERPL-SCNC: 111 MMOL/L (ref 98–107)
CO2 SERPL-SCNC: 19 MMOL/L (ref 22–29)
CREAT SERPL-MCNC: 0.9 MG/DL (ref 0.57–1)
DEPRECATED RDW RBC AUTO: 50.3 FL (ref 37–54)
EGFRCR SERPLBLD CKD-EPI 2021: 64.4 ML/MIN/1.73
EOSINOPHIL # BLD AUTO: 0.1 10*3/MM3 (ref 0–0.4)
EOSINOPHIL NFR BLD AUTO: 1.9 % (ref 0.3–6.2)
ERYTHROCYTE [DISTWIDTH] IN BLOOD BY AUTOMATED COUNT: 14.6 % (ref 12.3–15.4)
GLUCOSE SERPL-MCNC: 117 MG/DL (ref 65–99)
HCT VFR BLD AUTO: 39.9 % (ref 34–46.6)
HGB BLD-MCNC: 12.8 G/DL (ref 12–15.9)
INR PPP: 4.02 (ref 2–3)
LYMPHOCYTES # BLD AUTO: 1.4 10*3/MM3 (ref 0.7–3.1)
LYMPHOCYTES NFR BLD AUTO: 19.7 % (ref 19.6–45.3)
MCH RBC QN AUTO: 31.3 PG (ref 26.6–33)
MCHC RBC AUTO-ENTMCNC: 32 G/DL (ref 31.5–35.7)
MCV RBC AUTO: 97.6 FL (ref 79–97)
MONOCYTES # BLD AUTO: 0.7 10*3/MM3 (ref 0.1–0.9)
MONOCYTES NFR BLD AUTO: 9.2 % (ref 5–12)
NEUTROPHILS NFR BLD AUTO: 5 10*3/MM3 (ref 1.7–7)
NEUTROPHILS NFR BLD AUTO: 68.4 % (ref 42.7–76)
NRBC BLD AUTO-RTO: 0.2 /100 WBC (ref 0–0.2)
PLATELET # BLD AUTO: 227 10*3/MM3 (ref 140–450)
PMV BLD AUTO: 7.7 FL (ref 6–12)
POTASSIUM SERPL-SCNC: 4.1 MMOL/L (ref 3.5–5.2)
PROTHROMBIN TIME: 38.3 SECONDS (ref 19.4–28.5)
RBC # BLD AUTO: 4.08 10*6/MM3 (ref 3.77–5.28)
SODIUM SERPL-SCNC: 142 MMOL/L (ref 136–145)
WBC NRBC COR # BLD: 7.3 10*3/MM3 (ref 3.4–10.8)

## 2022-06-16 PROCEDURE — 80048 BASIC METABOLIC PNL TOTAL CA: CPT | Performed by: NURSE PRACTITIONER

## 2022-06-16 PROCEDURE — 85025 COMPLETE CBC W/AUTO DIFF WBC: CPT | Performed by: NURSE PRACTITIONER

## 2022-06-16 PROCEDURE — 97116 GAIT TRAINING THERAPY: CPT

## 2022-06-16 PROCEDURE — 85610 PROTHROMBIN TIME: CPT | Performed by: NURSE PRACTITIONER

## 2022-06-16 RX ADMIN — METOPROLOL SUCCINATE 50 MG: 50 TABLET, EXTENDED RELEASE ORAL at 07:17

## 2022-06-16 RX ADMIN — TOPIRAMATE 100 MG: 100 TABLET, FILM COATED ORAL at 20:48

## 2022-06-16 RX ADMIN — DILTIAZEM HYDROCHLORIDE 360 MG: 180 CAPSULE, COATED, EXTENDED RELEASE ORAL at 07:17

## 2022-06-16 RX ADMIN — HYDROCODONE BITARTRATE AND ACETAMINOPHEN 1 TABLET: 5; 325 TABLET ORAL at 15:48

## 2022-06-16 RX ADMIN — Medication 3 ML: at 08:30

## 2022-06-16 RX ADMIN — ACETAMINOPHEN 650 MG: 325 TABLET, FILM COATED ORAL at 19:28

## 2022-06-16 RX ADMIN — METOPROLOL SUCCINATE 50 MG: 50 TABLET, EXTENDED RELEASE ORAL at 20:48

## 2022-06-16 RX ADMIN — DIGOXIN 125 MCG: 125 TABLET ORAL at 07:17

## 2022-06-16 RX ADMIN — AZTREONAM 2 G: 2 INJECTION, POWDER, LYOPHILIZED, FOR SOLUTION INTRAMUSCULAR; INTRAVENOUS at 01:59

## 2022-06-16 RX ADMIN — HYDROCODONE BITARTRATE AND ACETAMINOPHEN 1 TABLET: 5; 325 TABLET ORAL at 07:17

## 2022-06-16 NOTE — PROGRESS NOTES
"Pharmacy dosing service  Anticoagulant  Warfarin     Subjective:    Hazel Bucio is a 82 y.o.female being continued on warfarin for atrial fibrillation (CHADS-VASc = 5).  PMH: HTN, DM, kidney CA s/p R nephrectomy    INR Goal: 2 - 3  Home medication?: Yes, warfarin 2 mg PO every day at 1800  Bridge Therapy Present?:  No  Interacting Medications Evaluation (New/Present/Discontinued): none  Additional Contributing Factors: nausea/vomiting PTA  Warfarin managed outpatient by [facility]. Last visit was 6/2      Assessment/Plan:    INR remains supratherapeutic today, so will continue to hold warfarin. Recommend resuming home dose when INR is <3.    Continue to monitor and adjust based on INR.         Date 6/11 6/12 6/13 6/14 6/15 6/16      INR 2.86 2.14 1.84 2.4 3.81 4.02      Dose ?, PTA 2 mg 2.5 mg 1.5 mg hold hold          Objective:  [Ht: 170.2 cm (67\"); Wt: 70.8 kg (156 lb 1.4 oz); BMI: Body mass index is 24.45 kg/m².]    Lab Results   Component Value Date    ALBUMIN 3.40 (L) 06/12/2022     Lab Results   Component Value Date    INR 4.02 (H) 06/16/2022    INR 3.81 (H) 06/15/2022    INR 2.40 06/14/2022    PROTIME 38.3 (H) 06/16/2022    PROTIME 36.4 (H) 06/15/2022    PROTIME 23.5 06/14/2022     Lab Results   Component Value Date    HGB 12.8 06/16/2022    HGB 13.7 06/15/2022    HGB 13.4 06/14/2022     Lab Results   Component Value Date    HCT 39.9 06/16/2022    HCT 42.2 06/15/2022    HCT 41.0 06/14/2022       Marcy Kiran, PharmD  06/16/22 09:15 EDT   "

## 2022-06-16 NOTE — THERAPY TREATMENT NOTE
"Subjective: Pt agreeable to therapeutic plan of care.    Objective:     Bed mobility - CGA  Transfers - Min-A and with rolling walker pt required extended time due to fwd flexed posture  Ambulation - 80 feet CGA and with rolling walker    Vitals: WNL    Pain: 7 VAS in abdomen   Education: Provided education on importance of mobility and skilled verbal / tactile cueing throughout intervention.     Assessment: Hazel Bucio presents with functional mobility impairments which indicate the need for skilled intervention. Pt ambulated ~80' requiring CGA/RW with verbal cuing for upright posture, proximity to RW, and increasing hip/knee flexion. Pt completed seated ther ex x10 upon sitting in bedside chair. Tolerating session today without incident. Will continue to follow and progress as tolerated.     Plan/Recommendations:   Moderate Intensity Therapy recommended post-acute care. This is recommended as therapy feels the patient would require 3-4 days per week and wouldn't tolerate \"3 hour daily\" rehab intensity. SNF would be the preferred choice. If the patient does not agree to SNF, arrange HH or OP depending on home bound status. If patient is medically complex, consider LTACH.. Pt requires no DME at discharge.     Pt desires Home with family assist and and Home Health at discharge. Pt cooperative; agreeable to therapeutic recommendations and plan of care.        Basic Mobility 6-click:  Rollin = Total, A lot = 2, A little = 3; 4 = None  Supine>Sit:   1 = Total, A lot = 2, A little = 3; 4 = None   Sit>Stand with arms:  1 = Total, A lot = 2, A little = 3; 4 = None  Bed>Chair:   1 = Total, A lot = 2, A little = 3; 4 = None  Ambulate in room:  1 = Total, A lot = 2, A little = 3; 4 = None  3-5 Steps with railin = Total, A lot = 2, A little = 3; 4 = None  Score: 18      Post-Tx Position: Up in Chair, Alarms activated and Call light and personal items within reach  PPE: gloves, surgical mask, eyewear " protection

## 2022-06-16 NOTE — PROGRESS NOTES
LOS: 4 days   Patient Care Team:  Lizzy Francis MD as PCP - General (Family Medicine)    Subjective     Interval History: Good result from bowel purge.     Patient Complaints: Abdominal pain resolved with bowel purge.  Urostomy functioning well.  Only complaint is just of generalized weakness.    History taken from: patient    Review of Systems   Constitutional: Positive for activity change and fatigue. Negative for appetite change, diaphoresis and fever.   HENT: Negative for nosebleeds.    Eyes: Negative for visual disturbance.   Respiratory: Negative for cough, shortness of breath, wheezing and stridor.    Cardiovascular: Negative for chest pain, palpitations and leg swelling.   Gastrointestinal: Negative for abdominal pain, constipation, diarrhea, nausea and vomiting.   Endocrine: Negative for polyuria.   Genitourinary: Negative for dysuria and flank pain.   Musculoskeletal: Negative for arthralgias, back pain and gait problem.   Skin: Negative for rash and wound.   Neurological: Positive for weakness. Negative for dizziness, numbness and headaches.   Psychiatric/Behavioral: Negative for confusion.           Objective     Vital Signs  Temp:  [97.8 °F (36.6 °C)-98.6 °F (37 °C)] 97.9 °F (36.6 °C)  Heart Rate:  [60-65] 65  Resp:  [17-19] 19  BP: (135-158)/(71-77) 149/76    Physical Exam:     General Appearance:    Alert, cooperative, in no acute distress,   Head:    Normocephalic, without obvious abnormality, atraumatic   Eyes:            Lids and lashes normal, conjunctivae and sclerae normal, no   icterus, no pallor, corneas clear, PERRLA   Ears:    Ears appear intact with no abnormalities noted   Throat:   No oral lesions, no thrush, oral mucosa moist   Neck:   No adenopathy, supple, trachea midline, no thyromegaly, no   carotid bruit, no JVD   Lungs:     Clear to auscultation,respirations regular, even and                  unlabored    Heart:   Irregularly irregular   Chest Wall:    No abnormalities  observed   Abdomen:    Urostomy patent with clear yellow urine output   Extremities:   Moves all extremities well, no edema, no cyanosis, no             Redness   Pulses:   Pulses palpable and equal bilaterally   Skin:   No bleeding, bruising or rash   Lymph nodes:   No palpable adenopathy   Neurologic:   Cranial nerves 2 - 12 grossly intact, sensation intact, DTR       present and equal bilaterally        Results Review:    Lab Results (last 24 hours)     Procedure Component Value Units Date/Time    Blood Culture - Blood, Arm, Left [[id number]]  (Abnormal) Collected: 06/12/22 0047    Specimen: Blood from Arm, Left Updated: 06/16/22 1148     Blood Culture Gram Negative Coccobacillus     Isolated from Anaerobic Bottle     Gram Stain Anaerobic Bottle Gram negative coccobacilli     Comment:         Narrative:      Organism under investigation.      Basic Metabolic Panel [[id number]]  (Abnormal) Collected: 06/16/22 0258    Specimen: Blood Updated: 06/16/22 0358     Glucose 117 mg/dL      BUN 28 mg/dL      Creatinine 0.90 mg/dL      Sodium 142 mmol/L      Potassium 4.1 mmol/L      Chloride 111 mmol/L      CO2 19.0 mmol/L      Calcium 8.4 mg/dL      BUN/Creatinine Ratio 31.1     Anion Gap 12.0 mmol/L      eGFR 64.4 mL/min/1.73      Comment: National Kidney Foundation and American Society of Nephrology (ASN) Task Force recommended calculation based on the Chronic Kidney Disease Epidemiology Collaboration (CKD-EPI) equation refit without adjustment for race.       Narrative:      GFR Normal >60  Chronic Kidney Disease <60  Kidney Failure <15      Protime-INR [[id number]]  (Abnormal) Collected: 06/16/22 0258    Specimen: Blood Updated: 06/16/22 0348     Protime 38.3 Seconds      INR 4.02    CBC & Differential [[id number]]  (Abnormal) Collected: 06/16/22 0258    Specimen: Blood Updated: 06/16/22 0336    Narrative:      The following orders were created for panel order CBC & Differential.  Procedure                                Abnormality         Status                     ---------                               -----------         ------                     CBC Auto Differential[[id number]]        Abnormal            Final result                 Please view results for these tests on the individual orders.    CBC Auto Differential [[id number]]  (Abnormal) Collected: 06/16/22 0258    Specimen: Blood Updated: 06/16/22 0336     WBC 7.30 10*3/mm3      RBC 4.08 10*6/mm3      Hemoglobin 12.8 g/dL      Hematocrit 39.9 %      MCV 97.6 fL      MCH 31.3 pg      MCHC 32.0 g/dL      RDW 14.6 %      RDW-SD 50.3 fl      MPV 7.7 fL      Platelets 227 10*3/mm3      Neutrophil % 68.4 %      Lymphocyte % 19.7 %      Monocyte % 9.2 %      Eosinophil % 1.9 %      Basophil % 0.8 %      Neutrophils, Absolute 5.00 10*3/mm3      Lymphocytes, Absolute 1.40 10*3/mm3      Monocytes, Absolute 0.70 10*3/mm3      Eosinophils, Absolute 0.10 10*3/mm3      Basophils, Absolute 0.10 10*3/mm3      nRBC 0.2 /100 WBC     Blood Culture - Blood, Arm, Left [[id number]]  (Normal) Collected: 06/12/22 0036    Specimen: Blood from Arm, Left Updated: 06/16/22 0102     Blood Culture No growth at 4 days           Imaging Results (Last 24 Hours)     ** No results found for the last 24 hours. **               I reviewed the patient's new clinical results.    Medication Review:   Scheduled Meds:digoxin, 125 mcg, Oral, Daily  dilTIAZem CD, 360 mg, Oral, Q24H  metoprolol succinate XL, 50 mg, Oral, BID  sodium chloride, 3 mL, Intravenous, Q12H  topiramate, 100 mg, Oral, Nightly      Continuous Infusions:Pharmacy to dose warfarin,       PRN Meds:.•  acetaminophen  •  HYDROcodone-acetaminophen  •  nitroglycerin  •  ondansetron  •  Pharmacy to dose warfarin  •  sodium chloride  •  sodium chloride     Assessment & Plan       Sepsis, due to unspecified organism, unspecified whether acute organ dysfunction present (HCC)  -Sepsis ruled out; antibiotics discontinued    Abdominal pain -resolved with  bowel purge  Generalized weakness -increase activity level today, PT involved, will arrange home health at discharge  Permanent atrial fib -continue digoxin, diltiazem, metoprolol, warfarin  Restless leg syndrome -Topamax          Plan for disposition: Patient has declined skilled rehab.  She will be discharged tomorrow with home health.    Lora Henderson MD  06/16/22  15:50 EDT

## 2022-06-16 NOTE — PLAN OF CARE
Goal Outcome Evaluation:         Hazel Bucio presents with functional mobility impairments which indicate the need for skilled intervention. Pt ambulated ~80' requiring CGA/RW with verbal cuing for upright posture, proximity to RW, and increasing hip/knee flexion. Pt completed seated ther ex x10 upon sitting in bedside chair. Tolerating session today without incident. Will continue to follow and progress as tolerated.

## 2022-06-16 NOTE — PLAN OF CARE
Goal Outcome Evaluation:            Patient is here due to UTI and sepsis. Plan is for patient to return home with home health.

## 2022-06-16 NOTE — PLAN OF CARE
Goal Outcome Evaluation:  Plan of Care Reviewed With: patient        Progress: no change  Outcome Evaluation: Patient has slept on and off tonight, still having some abd pain, one small BM after mag.citrate, no new complaints at this time, pt reminded to turn throughout the night.

## 2022-06-17 VITALS
DIASTOLIC BLOOD PRESSURE: 72 MMHG | RESPIRATION RATE: 16 BRPM | TEMPERATURE: 98.2 F | BODY MASS INDEX: 24.01 KG/M2 | SYSTOLIC BLOOD PRESSURE: 149 MMHG | WEIGHT: 153 LBS | HEIGHT: 67 IN | HEART RATE: 61 BPM | OXYGEN SATURATION: 97 %

## 2022-06-17 PROBLEM — N13.30 HYDRONEPHROSIS: Status: ACTIVE | Noted: 2022-06-17

## 2022-06-17 PROBLEM — A41.9 SEPSIS, DUE TO UNSPECIFIED ORGANISM, UNSPECIFIED WHETHER ACUTE ORGAN DYSFUNCTION PRESENT: Status: RESOLVED | Noted: 2022-06-12 | Resolved: 2022-06-17

## 2022-06-17 PROBLEM — G25.81 RLS (RESTLESS LEGS SYNDROME): Status: ACTIVE | Noted: 2022-06-17

## 2022-06-17 PROBLEM — Z85.51 HISTORY OF BLADDER CANCER: Status: ACTIVE | Noted: 2021-10-04

## 2022-06-17 PROBLEM — R53.1 WEAKNESS: Status: ACTIVE | Noted: 2022-06-17

## 2022-06-17 LAB
ANION GAP SERPL CALCULATED.3IONS-SCNC: 12 MMOL/L (ref 5–15)
BACTERIA SPEC AEROBE CULT: ABNORMAL
BACTERIA SPEC AEROBE CULT: NORMAL
BASOPHILS # BLD AUTO: 0.1 10*3/MM3 (ref 0–0.2)
BASOPHILS NFR BLD AUTO: 1.1 % (ref 0–1.5)
BUN SERPL-MCNC: 31 MG/DL (ref 8–23)
BUN/CREAT SERPL: 28.2 (ref 7–25)
CALCIUM SPEC-SCNC: 8.6 MG/DL (ref 8.6–10.5)
CHLORIDE SERPL-SCNC: 107 MMOL/L (ref 98–107)
CO2 SERPL-SCNC: 18 MMOL/L (ref 22–29)
CREAT SERPL-MCNC: 1.1 MG/DL (ref 0.57–1)
DEPRECATED RDW RBC AUTO: 47.7 FL (ref 37–54)
EGFRCR SERPLBLD CKD-EPI 2021: 50.6 ML/MIN/1.73
EOSINOPHIL # BLD AUTO: 0.1 10*3/MM3 (ref 0–0.4)
EOSINOPHIL NFR BLD AUTO: 1.8 % (ref 0.3–6.2)
ERYTHROCYTE [DISTWIDTH] IN BLOOD BY AUTOMATED COUNT: 14.3 % (ref 12.3–15.4)
GLUCOSE SERPL-MCNC: 112 MG/DL (ref 65–99)
GRAM STN SPEC: ABNORMAL
HCT VFR BLD AUTO: 40 % (ref 34–46.6)
HGB BLD-MCNC: 13 G/DL (ref 12–15.9)
INR PPP: 2.56 (ref 2–3)
ISOLATED FROM: ABNORMAL
LYMPHOCYTES # BLD AUTO: 1.6 10*3/MM3 (ref 0.7–3.1)
LYMPHOCYTES NFR BLD AUTO: 21.4 % (ref 19.6–45.3)
MCH RBC QN AUTO: 31.2 PG (ref 26.6–33)
MCHC RBC AUTO-ENTMCNC: 32.5 G/DL (ref 31.5–35.7)
MCV RBC AUTO: 95.8 FL (ref 79–97)
MONOCYTES # BLD AUTO: 0.7 10*3/MM3 (ref 0.1–0.9)
MONOCYTES NFR BLD AUTO: 9.9 % (ref 5–12)
NEUTROPHILS NFR BLD AUTO: 4.9 10*3/MM3 (ref 1.7–7)
NEUTROPHILS NFR BLD AUTO: 65.8 % (ref 42.7–76)
NRBC BLD AUTO-RTO: 0.1 /100 WBC (ref 0–0.2)
PLATELET # BLD AUTO: 222 10*3/MM3 (ref 140–450)
PMV BLD AUTO: 7.9 FL (ref 6–12)
POTASSIUM SERPL-SCNC: 4.2 MMOL/L (ref 3.5–5.2)
PROTHROMBIN TIME: 25 SECONDS (ref 19.4–28.5)
RBC # BLD AUTO: 4.17 10*6/MM3 (ref 3.77–5.28)
SODIUM SERPL-SCNC: 137 MMOL/L (ref 136–145)
WBC NRBC COR # BLD: 7.4 10*3/MM3 (ref 3.4–10.8)

## 2022-06-17 PROCEDURE — 80048 BASIC METABOLIC PNL TOTAL CA: CPT | Performed by: NURSE PRACTITIONER

## 2022-06-17 PROCEDURE — 85610 PROTHROMBIN TIME: CPT | Performed by: NURSE PRACTITIONER

## 2022-06-17 PROCEDURE — 85025 COMPLETE CBC W/AUTO DIFF WBC: CPT | Performed by: NURSE PRACTITIONER

## 2022-06-17 RX ORDER — WARFARIN SODIUM 2 MG/1
2 TABLET ORAL
Status: DISCONTINUED | OUTPATIENT
Start: 2022-06-17 | End: 2022-06-17 | Stop reason: HOSPADM

## 2022-06-17 RX ADMIN — DIGOXIN 125 MCG: 125 TABLET ORAL at 08:43

## 2022-06-17 RX ADMIN — DILTIAZEM HYDROCHLORIDE 360 MG: 180 CAPSULE, COATED, EXTENDED RELEASE ORAL at 08:43

## 2022-06-17 RX ADMIN — METOPROLOL SUCCINATE 50 MG: 50 TABLET, EXTENDED RELEASE ORAL at 08:43

## 2022-06-17 RX ADMIN — HYDROCODONE BITARTRATE AND ACETAMINOPHEN 1 TABLET: 5; 325 TABLET ORAL at 08:43

## 2022-06-17 NOTE — DISCHARGE SUMMARY
Date of Discharge:  6/17/2022    Discharge Diagnosis:    Weakness- home with PT/ OT for     Sepsis was dx on admission and was ruled out - Infections ruled out. Pt had no true sepsis- sepsis dx was not supported. Pt had no evidence of bacteriemia the organism identified in 1 out of 4 blood cultures  was considered a contaminant     RLS- she takes topamax for this     Hydronephrosis- Urology evaluated- this is a stable finding    HTN - stable continue meds      Presenting Problem/History of Present Illness  Active Hospital Problems    Diagnosis  POA   • Hydronephrosis [N13.30]  Yes   • RLS (restless legs syndrome) [G25.81]  Yes   • Weakness [R53.1]  Yes   • Sepsis, due to unspecified organism, unspecified whether acute organ dysfunction present (HCC) [A41.9]  Yes   • History of bladder cancer [Z85.51]  Not Applicable   • Atrial fibrillation (HCC) [I48.91]  Yes   • Essential hypertension [I10]  Yes      Resolved Hospital Problems   No resolved problems to display.          Hospital Course  Patient is a 82 y.o. female presented with 1 week of fatigue associated with n/v. She has a hx of bladder and renal cancer and has urostomy. She presented to the ER 6/11 after she fell on her buttocks transferring from chair to wheelchair. Er found UTI and she was admitted. She triggered sepsis and was admitted and vanc and aztreonam was continued. Her INR was elevated so coumadin was held and pharmacy managed her dosing. She had no n/v/d while inpatient and urine culture was followed. With the recent fall out of the wheel chair and weakness, PT was consulted. With hx of bladder and kidney cancer, Dr Gupta with urology was consulted. CT scan abd/ pelvis was done and found L hydronephrosis , R sided nephrectomy and 4 mm stone with constipation .  Urology reported that the hydronephrosis was mild and expected and stable. The pt had a bowel purge on the night of 6/15 and by 6/16 the abd pain was much improved. Today she is with very  mild pain in the abd. No N/V/D. Vitals and labs are stable and she will dc home with  Home health.     Procedures Performed         Consults:   Consults     Date and Time Order Name Status Description    6/13/2022 11:27 AM Inpatient Urology Consult Completed     6/12/2022  2:34 AM IP Consult to Family Practice            Pertinent Test Results:    Lab Results (most recent)     Procedure Component Value Units Date/Time    Blood Culture - Blood, Arm, Left [[id number]]  (Abnormal)  (Susceptibility) Collected: 06/12/22 0047    Specimen: Blood from Arm, Left Updated: 06/17/22 0654     Blood Culture Brevundimonas diminuta / vesicularis     Isolated from Anaerobic Bottle     Gram Stain Anaerobic Bottle Gram negative coccobacilli     Comment:         Narrative:            Susceptibility      Brevundimonas diminuta / vesicularis      ASHLEY      Amikacin Intermediate      Cefepime Susceptible      Ceftazidime Susceptible      Gentamicin Intermediate      Levofloxacin Susceptible      Meropenem Susceptible      Piperacillin + Tazobactam Susceptible      Tetracycline Susceptible      Tobramycin Resistant                   Linear View                   Protime-INR [[id number]]  (Normal) Collected: 06/17/22 0602    Specimen: Blood Updated: 06/17/22 0647     Protime 25.0 Seconds      INR 2.56    Basic Metabolic Panel [[id number]]  (Abnormal) Collected: 06/17/22 0345    Specimen: Blood Updated: 06/17/22 0450     Glucose 112 mg/dL      BUN 31 mg/dL      Creatinine 1.10 mg/dL      Sodium 137 mmol/L      Potassium 4.2 mmol/L      Comment: Slight hemolysis detected by analyzer. Results may be affected.        Chloride 107 mmol/L      CO2 18.0 mmol/L      Calcium 8.6 mg/dL      BUN/Creatinine Ratio 28.2     Anion Gap 12.0 mmol/L      eGFR 50.6 mL/min/1.73      Comment: National Kidney Foundation and American Society of Nephrology (ASN) Task Force recommended calculation based on the Chronic Kidney Disease Epidemiology Collaboration (CKD-EPI)  equation refit without adjustment for race.       Narrative:      GFR Normal >60  Chronic Kidney Disease <60  Kidney Failure <15      CBC & Differential [[id number]]  (Normal) Collected: 06/17/22 0345    Specimen: Blood Updated: 06/17/22 0425    Narrative:      The following orders were created for panel order CBC & Differential.  Procedure                               Abnormality         Status                     ---------                               -----------         ------                     CBC Auto Differential[[id number]]        Normal              Final result                 Please view results for these tests on the individual orders.    CBC Auto Differential [[id number]]  (Normal) Collected: 06/17/22 0345    Specimen: Blood Updated: 06/17/22 0425     WBC 7.40 10*3/mm3      RBC 4.17 10*6/mm3      Hemoglobin 13.0 g/dL      Hematocrit 40.0 %      MCV 95.8 fL      MCH 31.2 pg      MCHC 32.5 g/dL      RDW 14.3 %      RDW-SD 47.7 fl      MPV 7.9 fL      Platelets 222 10*3/mm3      Neutrophil % 65.8 %      Lymphocyte % 21.4 %      Monocyte % 9.9 %      Eosinophil % 1.8 %      Basophil % 1.1 %      Neutrophils, Absolute 4.90 10*3/mm3      Lymphocytes, Absolute 1.60 10*3/mm3      Monocytes, Absolute 0.70 10*3/mm3      Eosinophils, Absolute 0.10 10*3/mm3      Basophils, Absolute 0.10 10*3/mm3      nRBC 0.1 /100 WBC     Blood Culture - Blood, Arm, Left [[id number]]  (Normal) Collected: 06/12/22 0036    Specimen: Blood from Arm, Left Updated: 06/17/22 0102     Blood Culture No growth at 5 days    Basic Metabolic Panel [[id number]]  (Abnormal) Collected: 06/16/22 0258    Specimen: Blood Updated: 06/16/22 0358     Glucose 117 mg/dL      BUN 28 mg/dL      Creatinine 0.90 mg/dL      Sodium 142 mmol/L      Potassium 4.1 mmol/L      Chloride 111 mmol/L      CO2 19.0 mmol/L      Calcium 8.4 mg/dL      BUN/Creatinine Ratio 31.1     Anion Gap 12.0 mmol/L      eGFR 64.4 mL/min/1.73      Comment: National Kidney Foundation and  American Society of Nephrology (ASN) Task Force recommended calculation based on the Chronic Kidney Disease Epidemiology Collaboration (CKD-EPI) equation refit without adjustment for race.       Narrative:      GFR Normal >60  Chronic Kidney Disease <60  Kidney Failure <15      Protime-INR [[id number]]  (Abnormal) Collected: 06/16/22 0258    Specimen: Blood Updated: 06/16/22 0348     Protime 38.3 Seconds      INR 4.02    CBC & Differential [[id number]]  (Abnormal) Collected: 06/16/22 0258    Specimen: Blood Updated: 06/16/22 0336    Narrative:      The following orders were created for panel order CBC & Differential.  Procedure                               Abnormality         Status                     ---------                               -----------         ------                     CBC Auto Differential[[id number]]        Abnormal            Final result                 Please view results for these tests on the individual orders.    CBC Auto Differential [[id number]]  (Abnormal) Collected: 06/16/22 0258    Specimen: Blood Updated: 06/16/22 0336     WBC 7.30 10*3/mm3      RBC 4.08 10*6/mm3      Hemoglobin 12.8 g/dL      Hematocrit 39.9 %      MCV 97.6 fL      MCH 31.3 pg      MCHC 32.0 g/dL      RDW 14.6 %      RDW-SD 50.3 fl      MPV 7.7 fL      Platelets 227 10*3/mm3      Neutrophil % 68.4 %      Lymphocyte % 19.7 %      Monocyte % 9.2 %      Eosinophil % 1.9 %      Basophil % 0.8 %      Neutrophils, Absolute 5.00 10*3/mm3      Lymphocytes, Absolute 1.40 10*3/mm3      Monocytes, Absolute 0.70 10*3/mm3      Eosinophils, Absolute 0.10 10*3/mm3      Basophils, Absolute 0.10 10*3/mm3      nRBC 0.2 /100 WBC     Blood Culture ID, PCR - Blood, Arm, Left [[id number]] Collected: 06/12/22 0047    Specimen: Blood from Arm, Left Updated: 06/14/22 0823     BCID, PCR Negative by BCID PCR. Culture to Follow.     BOTTLE TYPE Anaerobic Bottle    Urine Culture - Urine, Urine, Clean Catch [[id number]] Collected: 06/11/22 6612     Specimen: Urine, Clean Catch Updated: 06/13/22 1204     Urine Culture >100,000 CFU/mL Mixed Hien Isolated    Narrative:      Specimen contains mixed organisms of questionable pathogenicity suggestive of contamination. If symptoms persist, suggest recollection.  Colonization of the urinary tract without infection is common. Treatment is discouraged unless the patient is symptomatic, pregnant, or undergoing an invasive urologic procedure.    COVID PRE-OP / PRE-PROCEDURE SCREENING ORDER (NO ISOLATION) - Swab, Nasopharynx [[id number]]  (Normal) Collected: 06/13/22 0738    Specimen: Swab from Nasopharynx Updated: 06/13/22 0902    Narrative:      The following orders were created for panel order COVID PRE-OP / PRE-PROCEDURE SCREENING ORDER (NO ISOLATION) - Swab, Nasopharynx.  Procedure                               Abnormality         Status                     ---------                               -----------         ------                     COVID-19,CEPHEID/OSORIO,CO...[[id number]]  Normal              Final result                 Please view results for these tests on the individual orders.    COVID-19,CEPHEID/OSORIO,COR/ANNY/PAD/GENNY IN-HOUSE(OR EMERGENT/ADD-ON),NP SWAB IN TRANSPORT MEDIA 3-4 HR TAT, RT-PCR - Swab, Nasopharynx [[id number]]  (Normal) Collected: 06/13/22 0738    Specimen: Swab from Nasopharynx Updated: 06/13/22 0902     COVID19 Not Detected    Narrative:      Fact sheet for providers: https://www.fda.gov/media/986994/download     Fact sheet for patients: https://www.fda.gov/media/176603/download  Fact sheet for providers: https://www.fda.gov/media/446731/download    Fact sheet for patients: https://www.fda.gov/media/952387/download    Test performed by PCR.    Extra Tubes [[id number]] Collected: 06/13/22 0448    Specimen: Blood, Venous Line Updated: 06/13/22 0603    Narrative:      The following orders were created for panel order Extra Tubes.  Procedure                               Abnormality          Status                     ---------                               -----------         ------                     Gold Top - SST[[id number]]                                   Final result                 Please view results for these tests on the individual orders.    Gold Top - SST [[id number]] Collected: 06/13/22 0448    Specimen: Blood Updated: 06/13/22 0603     Extra Tube Hold for add-ons.     Comment: Auto resulted.       Manual Differential [[id number]]  (Abnormal) Collected: 06/12/22 1304    Specimen: Blood Updated: 06/12/22 1341     Neutrophil % 86.0 %      Lymphocyte % 5.0 %      Monocyte % 4.0 %      Bands %  4.0 %      Metamyelocyte % 1.0 %      Neutrophils Absolute 14.85 10*3/mm3      Lymphocytes Absolute 0.83 10*3/mm3      Monocytes Absolute 0.66 10*3/mm3      RBC Morphology Normal     WBC Morphology Normal     Platelet Morphology Normal    Scan Slide [[id number]] Collected: 06/12/22 1304    Specimen: Blood Updated: 06/12/22 1341     Scan Slide --     Comment: See Manual Differential Results       STAT Lactic Acid, Reflex [[id number]]  (Normal) Collected: 06/12/22 0758    Specimen: Blood Updated: 06/12/22 0839     Lactate 1.4 mmol/L     [place] Draw [[id number]] Collected: 06/11/22 2331    Specimen: Blood Updated: 06/12/22 0148    Narrative:      The following orders were created for panel order [place] Draw.  Procedure                               Abnormality         Status                     ---------                               -----------         ------                     Green Top (Gel)[[id number]]                                  Final result               Lavender Top[[id number]]                                     Final result               Gold Top - SST[[id number]]                                                              Light Blue Top[[id number]]                                   Final result                 Please view results for these tests on the individual orders.    Green Top (Gel)  "[[id number]] Collected: 06/12/22 0036    Specimen: Blood from Arm, Left Updated: 06/12/22 0148     Extra Tube Hold for add-ons.     Comment: Auto resulted.       Procalcitonin [[id number]]  (Abnormal) Collected: 06/12/22 0036    Specimen: Blood from Arm, Left Updated: 06/12/22 0147     Procalcitonin 0.94 ng/mL     Narrative:      As a Marker for Sepsis (Non-Neonates):    1. <0.5 ng/mL represents a low risk of severe sepsis and/or septic shock.  2. >2 ng/mL represents a high risk of severe sepsis and/or septic shock.    As a Marker for Lower Respiratory Tract Infections that require antibiotic therapy:    PCT on Admission    Antibiotic Therapy       6-12 Hrs later    >0.5                Strongly Recommended  >0.25 - <0.5        Recommended   0.1 - 0.25          Discouraged              Remeasure/reassess PCT  <0.1                Strongly Discouraged     Remeasure/reassess PCT    As 28 day mortality risk marker: \"Change in Procalcitonin Result\" (>80% or <=80%) if Day 0 (or Day 1) and Day 4 values are available. Refer to http://www.[facility]-pct-calculator.com    Change in PCT <=80%  A decrease of PCT levels below or equal to 80% defines a positive change in PCT test result representing a higher risk for 28-day all-cause mortality of patients diagnosed with severe sepsis for septic shock.    Change in PCT >80%  A decrease of PCT levels of more than 80% defines a negative change in PCT result representing a lower risk for 28-day all-cause mortality of patients diagnosed with severe sepsis or septic shock.       Digoxin Level [[id number]]  (Normal) Collected: 06/12/22 0036    Specimen: Blood from Arm, Left Updated: 06/12/22 0142     Digoxin 0.60 ng/mL     Comprehensive Metabolic Panel [[id number]]  (Abnormal) Collected: 06/12/22 0036    Specimen: Blood from Arm, Left Updated: 06/12/22 0140     Glucose 177 mg/dL      BUN 20 mg/dL      Creatinine 1.06 mg/dL      Sodium 137 mmol/L      Potassium 3.7 mmol/L      Chloride 106 mmol/L "      CO2 17.0 mmol/L      Calcium 8.7 mg/dL      Total Protein 6.4 g/dL      Albumin 3.40 g/dL      ALT (SGPT) 16 U/L      AST (SGOT) 21 U/L      Alkaline Phosphatase 98 U/L      Total Bilirubin 0.8 mg/dL      Globulin 3.0 gm/dL      A/G Ratio 1.1 g/dL      BUN/Creatinine Ratio 18.9     Anion Gap 14.0 mmol/L      eGFR 52.9 mL/min/1.73      Comment: National Kidney Foundation and American Society of Nephrology (ASN) Task Force recommended calculation based on the Chronic Kidney Disease Epidemiology Collaboration (CKD-EPI) equation refit without adjustment for race.       Narrative:      GFR Normal >60  Chronic Kidney Disease <60  Kidney Failure <15      Troponin [[id number]]  (Normal) Collected: 06/12/22 0036    Specimen: Blood from Arm, Left Updated: 06/12/22 0140     Troponin T <0.010 ng/mL     Narrative:      Troponin T Reference Range:  <= 0.03 ng/mL-   Negative for AMI  >0.03 ng/mL-     Abnormal for myocardial necrosis.  Clinicians would have to utilize clinical acumen, EKG, Troponin and serial changes to determine if it is an Acute Myocardial Infarction or myocardial injury due to an underlying chronic condition.       Results may be falsely decreased if patient taking Biotin.      POC Lactate [[id number]]  (Normal) Collected: 06/12/22 0051    Specimen: Blood Updated: 06/12/22 0052     Lactate 1.7 mmol/L      Comment: Serial Number: [id number]:  [id number]       Lavender Top [[id number]] Collected: 06/11/22 2331    Specimen: Blood Updated: 06/12/22 0047     Extra Tube hold for add-on     Comment: Auto resulted       Light Blue Top [[id number]] Collected: 06/11/22 2331    Specimen: Blood Updated: 06/12/22 0047     Extra Tube Hold for add-ons.     Comment: Auto resulted       Respiratory Panel PCR w/COVID-19(SARS-CoV-2) VINCENT/STEFANY/ANNY/PAD/COR/MAD/MANASA In-House, NP Swab in UTM/VTM, 3-4 HR TAT - Swab, Nasopharynx [[id number]]  (Normal) Collected: 06/11/22 2332    Specimen: Swab from Nasopharynx Updated: 06/12/22 0028      ADENOVIRUS, PCR Not Detected     Coronavirus 229E Not Detected     Coronavirus HKU1 Not Detected     Coronavirus NL63 Not Detected     Coronavirus OC43 Not Detected     COVID19 Not Detected     Human Metapneumovirus Not Detected     Human Rhinovirus/Enterovirus Not Detected     Influenza A PCR Not Detected     Influenza B PCR Not Detected     Parainfluenza Virus 1 Not Detected     Parainfluenza Virus 2 Not Detected     Parainfluenza Virus 3 Not Detected     Parainfluenza Virus 4 Not Detected     RSV, PCR Not Detected     Bordetella pertussis pcr Not Detected     Bordetella parapertussis PCR Not Detected     Chlamydophila pneumoniae PCR Not Detected     Mycoplasma pneumo by PCR Not Detected    Narrative:      In the setting of a positive respiratory panel with a viral infection PLUS a negative procalcitonin without other underlying concern for bacterial infection, consider observing off antibiotics or discontinuation of antibiotics and continue supportive care. If the respiratory panel is positive for atypical bacterial infection (Bordetella pertussis, Chlamydophila pneumoniae, or Mycoplasma pneumoniae), consider antibiotic de-escalation to target atypical bacterial infection.    Urinalysis With Culture If Indicated - Urine, Clean Catch [[id number]]  (Abnormal) Collected: 06/11/22 2332    Specimen: Urine, Clean Catch Updated: 06/12/22 0009     Color, UA Yellow     Appearance, UA Cloudy     pH, UA 6.0     Specific Gravity, UA 1.010     Glucose, UA Negative     Ketones, UA Negative     Bilirubin, UA Negative     Blood, UA Small (1+)     Protein,  mg/dL (2+)     Leuk Esterase, UA Large (3+)     Nitrite, UA Positive     Urobilinogen, UA 1.0 E.U./dL    Narrative:      In absence of clinical symptoms, the presence of pyuria, bacteria, and/or nitrites on the urinalysis result does not correlate with infection.    Urinalysis, Microscopic Only - Urine, Clean Catch [[id number]]  (Abnormal) Collected: 06/11/22 2332     Specimen: Urine, Clean Catch Updated: 06/12/22 0009     RBC, UA 3-5 /HPF      WBC, UA Too Numerous to Count /HPF      Bacteria, UA 4+ /HPF      Squamous Epithelial Cells, UA 0-2 /HPF      Hyaline Casts, UA None Seen /LPF      Methodology Manual Light Microscopy    POC Lactate [[id number]]  (Abnormal) Collected: 06/11/22 2355    Specimen: Blood Updated: 06/11/22 2356     Lactate 2.4 mmol/L      Comment: Serial Number: [id number]:  [id number]              Results for orders placed during the hospital encounter of 09/24/21    Adult Transthoracic Echo Complete W/ Cont if Necessary Per Protocol    Interpretation Summary  · Estimated left ventricular EF was in agreement with the calculated left ventricular EF. Left ventricular ejection fraction appears to be 61 - 65%. Left ventricular systolic function is normal.  · Left atrial volume is moderately increased.  · The right atrial cavity is moderately dilated.  · Estimated right ventricular systolic pressure from tricuspid regurgitation is normal (<35 mmHg).          Condition on Discharge:  Stable     Vital Signs  Temp:  [97.9 °F (36.6 °C)-98.9 °F (37.2 °C)] 98.2 °F (36.8 °C)  Heart Rate:  [59-63] 61  Resp:  [16-18] 16  BP: (124-149)/(69-77) 149/72    Physical Exam:     General Appearance:    Alert, cooperative, in no acute distress   Head:    Normocephalic, without obvious abnormality, atraumatic   Eyes:            Lids and lashes normal, conjunctivae and sclerae normal, no   icterus, no pallor, corneas clear, PERRLA   Ears:    Ears appear intact with no abnormalities noted   Throat:   No oral lesions, no thrush, oral mucosa moist   Neck:   No adenopathy, supple, trachea midline, no thyromegaly, no   carotid bruit, no JVD   Lungs:     Clear to auscultation,respirations regular, even and                  unlabored    Heart:    Regular rhythm and normal rate, normal S1 and S2, no            murmur, no gallop, no rub, no click   Chest Wall:    No abnormalities observed    Abdomen:     - patent urostomy with yellow urine . Normal bowel sounds, no masses, no organomegaly, soft        non-tender, non-distended, no guarding, no rebound                tenderness   Extremities:   Moves all extremities well, no edema, no cyanosis, no             redness   Pulses:   Pulses palpable and equal bilaterally   Skin:   No bleeding, bruising or rash   Lymph nodes:   No palpable adenopathy   Neurologic:   Cranial nerves 2 - 12 grossly intact, sensation intact, DTR       present and equal bilaterally       Discharge Disposition  Home-Health Care [facility] – [place], [place]    Discharge Medications     Discharge Medications      Continue These Medications      Instructions Start Date   acetaminophen 325 MG tablet  Commonly known as: TYLENOL   650 mg, Oral, Every 6 Hours PRN      digoxin 125 MCG tablet  Commonly known as: LANOXIN   125 mcg, Oral, Daily      dilTIAZem  MG 24 hr capsule  Commonly known as: Cardizem CD   360 mg, Oral, Daily      HYDROcodone-acetaminophen 5-325 MG per tablet  Commonly known as: NORCO   1 tablet, Oral, Every 8 Hours PRN      metoprolol succinate XL 50 MG 24 hr tablet  Commonly known as: TOPROL-XL   50 mg, Oral, 2 Times Daily      PROBIOTIC PO   Oral, Daily      topiramate 100 MG tablet  Commonly known as: TOPAMAX   100 mg, Oral, Every Night at Bedtime      Vitamin D3 50 MCG (2000 UT) tablet   2,000 Units, Oral, Daily      warfarin 2 MG tablet  Commonly known as: COUMADIN   Take 2 mg by mouth daily or as directed by [facility] ([phone])             Discharge Diet:     Activity at Discharge:     Follow-up Appointments  Future Appointments   Date Time Provider Department Center   6/28/2022  1:45 PM Augustin Ellsworth MD [facility]   [facility]20[facility]  [facility] [place] [facility]     Additional Instructions for the Follow-ups that You Need to Schedule     Ambulatory Referral to Home Health (Hospital)   As directed      Face to  Face Visit Date: 6/17/2022    Follow-up provider for Plan of Care?: I treated the patient in an acute care facility and will not continue treatment after discharge.    Follow-up provider: SAE FRANCIS [[id number]]    Reason/Clinical Findings: weakness    Describe mobility limitations that make leaving home difficult: weakness    Nursing/Therapeutic Services Requested: Physical Therapy Occupational Therapy    Frequency: 1 Week 1         Ambulatory Referral to Home Health (Hospital)   As directed      Face to Face Visit Date: 6/17/2022    Follow-up provider for Plan of Care?: I treated the patient in an acute care facility and will not continue treatment after discharge.    Follow-up provider: SAE FRANCIS [[id number]]    Reason/Clinical Findings: weakness    Describe mobility limitations that make leaving home difficult: weakness    Nursing/Therapeutic Services Requested: Physical Therapy Occupational Therapy    Frequency: 1 Week 1         Discharge Follow-up with PCP   As directed       Currently Documented PCP:    Sae Francis MD    PCP Phone Number:    [phone]     Follow Up Details: You have an appointment with Dr francis on June 30 at 340 pm Please call the office and change this appointment if you cannot make this  one.         Discharge Follow-up with Specified Provider: Please call Dr Gupta ( urology ) office for hospital follow up appointment.   As directed      To: Please call Dr Gupta ( urology ) office for hospital follow up appointment.               Test Results Pending at Discharge       CHELLE Perez  06/17/22  12:53 EDT    Time: Discharge 30 min

## 2022-06-17 NOTE — PLAN OF CARE
Goal Outcome Evaluation:         Plan is for patient to discharge home today with home health. Family is unable to come patient until after 1500.

## 2022-06-17 NOTE — CASE MANAGEMENT/SOCIAL WORK
Continued Stay Note  NYDIA Briscoe     Patient Name: Hazel Bucio  MRN: [id number]  Today's Date: 6/17/2022    Admit Date: 6/11/2022     Discharge Plan     Row Name 06/17/22 1330       Plan    Plan DC plan: home with nephew and A [facility] (accepted, order in).    Plan Comments Notified [facility] liaison Gogo of discharge orders; she confirmed speaking with patient this morning regarding services.              Met with patient in room wearing PPE: mask, face shield/goggles.      Maintained distance greater than six feet and spent less than 15 minutes in the room.      Megan Naegele, RN      Office Phone: [phone]  Office Cell: [phone]

## 2022-06-17 NOTE — PROGRESS NOTES
"Pharmacy dosing service  Anticoagulant  Warfarin     Subjective:    Hazel Bucio is a 82 y.o.female being continued on warfarin for atrial fibrillation (CHADS-VASc = 5).  PMH: HTN, DM, kidney CA s/p R nephrectomy    INR Goal: 2 - 3  Home medication?: Yes, warfarin 2 mg PO every day at 1800  Bridge Therapy Present?:  No  Interacting Medications Evaluation (New/Present/Discontinued): none  Additional Contributing Factors: nausea/vomiting PTA  Warfarin managed outpatient by [facility] clinic. Last visit was 6/2      Assessment/Plan:    Warfarin was on hold due to supratherapeutic INR. INR now within therapeutic range, so resume home dose.    Continue to monitor and adjust based on INR.         Date 6/11 6/12 6/13 6/14 6/15 6/16 6/17     INR 2.86 2.14 1.84 2.4 3.81 4.02 2.56     Dose ?, PTA 2 mg 2.5 mg 1.5 mg hold hold 2 mg         Objective:  [Ht: 170.2 cm (67\"); Wt: 69.4 kg (153 lb); BMI: Body mass index is 23.96 kg/m².]    Lab Results   Component Value Date    ALBUMIN 3.40 (L) 06/12/2022     Lab Results   Component Value Date    INR 2.56 06/17/2022    INR 4.02 (H) 06/16/2022    INR 3.81 (H) 06/15/2022    PROTIME 25.0 06/17/2022    PROTIME 38.3 (H) 06/16/2022    PROTIME 36.4 (H) 06/15/2022     Lab Results   Component Value Date    HGB 13.0 06/17/2022    HGB 12.8 06/16/2022    HGB 13.7 06/15/2022     Lab Results   Component Value Date    HCT 40.0 06/17/2022    HCT 39.9 06/16/2022    HCT 42.2 06/15/2022       Marcy Kiran, PharmD  06/17/22 08:51 EDT   "

## 2022-06-17 NOTE — PLAN OF CARE
Goal Outcome Evaluation:  Plan of Care Reviewed With: patient        Progress: no change  Outcome Evaluation: Patient has slept on and off tonight, no new complaints at this time, pt reminded to turn throughout the night, to D/C home with home health

## 2022-06-18 ENCOUNTER — READMISSION MANAGEMENT (OUTPATIENT)
Dept: CALL CENTER | Facility: HOSPITAL | Age: 82
End: 2022-06-18

## 2022-06-18 NOTE — OUTREACH NOTE
Prep Survey    Flowsheet Row Responses   [facility] patient discharged from? [facility]   Is LACE score < 7 ? No   Emergency Room discharge w/ pulse ox? No   Eligibility Readm Mgmt   Discharge diagnosis weakness, sepsis, Hydronephrosis   Does the patient have one of the following disease processes/diagnoses(primary or secondary)? Sepsis   Does the patient have Home health ordered? Yes   What is the Home health agency?  [facility]    Is there a DME ordered? No   Prep survey completed? Yes          MELIA FRIEDMAN - Registered Nurse

## 2022-06-20 ENCOUNTER — TELEPHONE (OUTPATIENT)
Dept: PHARMACY | Facility: HOSPITAL | Age: 82
End: 2022-06-20

## 2022-06-20 NOTE — TELEPHONE ENCOUNTER
Left voicemail with Yari, the manager at [facility]. Patient called the [facility] and stated that home health would be drawing her INR. Calling home health to provide them with our contact information so they can call or fax us results.

## 2022-06-20 NOTE — CASE MANAGEMENT/SOCIAL WORK
Case Management Discharge Note      Final Note: [facility].      Selected Continued Care - Discharged on 6/17/2022 Admission date: 6/11/2022 - Discharge disposition: Home-Health Care Svc       Home Medical Care Coordination complete.    Service Provider Selected Services Address Phone Fax Patient Preferred    [facility]  [facility] Health Services [facility] [place]note 2[facility] [place] [id number] [phone]            Transportation Services  Private: Car    Final Discharge Disposition Code: 06 - home with home health care

## 2022-06-21 ENCOUNTER — READMISSION MANAGEMENT (OUTPATIENT)
Dept: CALL CENTER | Facility: HOSPITAL | Age: 82
End: 2022-06-21

## 2022-06-21 ENCOUNTER — ANTICOAGULATION VISIT (OUTPATIENT)
Dept: PHARMACY | Facility: HOSPITAL | Age: 82
End: 2022-06-21

## 2022-06-21 LAB — INR PPP: 3.1 (ref 2–3)

## 2022-06-21 NOTE — PROGRESS NOTES
Anticoagulation Clinic Progress Note - Home INR Testing     INR Goal: 2 - 3  Today's INR: 3.1 (supratherapeutic). INR result was obtained by [facility] home health nurse yesterday but was not communicated to [facility] until today. Spoke with Winifred with A today to provide dosing instructions and when next INR needs to be checked. Also contacted patient regarding dosing change.  Current warfarin dose: While admitted to [facility], patient was given the following doses:    6/12 - 2 mg  6/13 - 2.5 mg  6/14 - 1.5 mg  6/15 - 0 mg  6/16 - 0 mg  6/17 - 2 mg    Total weekly dose of past 7 days = 9.5 mg.      INR History:  Anticoagulation Monitoring 5/12/2022 6/2/2022 6/21/2022   INR 3.30 1.10 3.10   INR Date 5/12/2022 6/2/2022 6/20/2022   INR Goal 2.0-3.0 2.0-3.0 2.0-3.0   Trend Down Up Same   Last Week Total 12 mg 10 mg 9.5 mg   Next Week Total 11 mg 14 mg 14 mg   Sun 2 mg 2 mg -   Mon 2 mg 2 mg -   Tue 1 mg 2 mg 2 mg   Wed 2 mg 2 mg 2 mg   Thu 1 mg 2 mg -   Fri 2 mg 2 mg -   Sat 1 mg 2 mg -   Visit Report - - -   Some recent data might be hidden       Anticoagulation Summary  As of 6/21/2022    INR goal:  2.0-3.0   TTR:  45.1 % (2.7 y)   INR used for dosing:  3.10 (6/20/2022)   Warfarin maintenance plan:  2 mg every day   Weekly warfarin total:  14 mg   Plan last modified:  Kayleigh Horvath, PharmD (6/2/2022)   Next INR check:  6/23/2022   Priority:  Maintenance   Target end date:      Indications    Atrial fibrillation (HCC) [I48.91]             Anticoagulation Episode Summary     INR check location:      Preferred lab:      Send INR reminders to:  [facility] CLINICAL POOL    Comments:  Patient contract signed 11/17/21.      Anticoagulation Care Providers     Provider Role Specialty Phone number    Augustin Ellsworth MD  Cardiology [phone]          Clinic Interview:   Patient Findings     Positives:  Hospital admission ([facility] 6/11-6/17)    Negatives:  Signs/symptoms of thrombosis, Signs/symptoms  of bleeding,   Laboratory test error suspected, Change in health, Change in alcohol use,   Change in activity, Upcoming invasive procedure, Emergency department   visit, Upcoming dental procedure, Missed doses, Extra doses, Change in   medications, Change in diet/appetite, Bruising, Other complaints      Clinical Outcomes     Negatives:  Major bleeding event, Thromboembolic event,   Anticoagulation-related hospital admission, Anticoagulation-related ED   visit, Anticoagulation-related fatality        Current Medications:   Prior to Admission medications    Medication Sig Start Date End Date Taking? Authorizing Provider   acetaminophen (TYLENOL) 325 MG tablet Take 650 mg by mouth Every 6 (Six) Hours As Needed for Mild Pain .    Gino Leos MD   Cholecalciferol (Vitamin D3) 50 MCG (2000 UT) tablet Take 2,000 Units by mouth Daily.    Gino Leos MD   digoxin (LANOXIN) 125 MCG tablet Take 1 tablet by mouth Daily. 5/19/22   Augustin Ellsworth MD   dilTIAZem CD (Cardizem CD) 360 MG 24 hr capsule Take 1 capsule by mouth Daily. 5/19/22   Augustin Ellsworth MD   HYDROcodone-acetaminophen (NORCO) 5-325 MG per tablet Take 1 tablet by mouth Every 8 (Eight) Hours As Needed.    Gino Leos MD   metoprolol succinate XL (TOPROL-XL) 50 MG 24 hr tablet Take 1 tablet by mouth 2 (Two) Times a Day. 5/19/22   Augustin Ellsworth MD   Probiotic Product (PROBIOTIC PO) Take  by mouth Daily.    Gino Leos MD   topiramate (TOPAMAX) 100 MG tablet Take 100 mg by mouth every night at bedtime.    Gino Leos MD   warfarin (COUMADIN) 2 MG tablet Take 2 mg by mouth daily or as directed by [facility] ([phone]) 6/9/22   Augustin Ellsworth MD       Medical history:   Past Medical History:   Diagnosis Date   • Bladder cancer (HCC)    • Cancer (HCC)     breast, bladder   • Deep vein thrombosis (HCC)    • Essential hypertension 1/17/2017   • Fracture  tibia/fibula, right, closed, initial encounter 8/27/2020   • GERD (gastroesophageal reflux disease)    • History of urostomy    • Immobility 08/27/2020    r/t broken Tibia    • Kidney carcinoma, right (HCC)     removed    • Long term current use of anticoagulant 1/9/2015   • PAF (paroxysmal atrial fibrillation) ([facility]) 6/26/2019   • Presence of cardiac pacemaker 11/03/2014    BS dual chamber PM placed 10/2014 with pocket revision 1/25/17.  HX tachy rob syndrome   • Sick sinus syndrome ([facility]) 11/3/2014       Social history:   Social History     Tobacco Use   • Smoking status: Never Smoker   • Smokeless tobacco: Never Used   Substance Use Topics   • Alcohol use: Not Currently       Allergies:    Amoxicillin, Ciprofloxacin, Oxycodone-acetaminophen, Penicillins, Sulfa antibiotics, Cephalexin, Clavulanic acid, Codeine, Contrast dye, Doxycycline, Fluconazole, Iodine, Macrobid [nitrofurantoin], Tobramycin, and Trimethoprim    This patient is managed via a collaborative agreement, pursuant to IC 25-26-16. The signed protocol is kept in the [facility] at [address] [place] [address].    Education: Hazel Bucio has been instructed to call if any changes in medications, doses, concerns, etc. Patient was provided dosing instructions and expressed verbal understanding and has no further questions at this time.    Plan:  1. One time lower dose of warfarin 1 mg today then warfarin 2 mg PO daily.   2. INR should be tested by home health in 3 days and called into clinic.     Kayleigh Horvath, Annie  6/21/2022  08:51 EDT

## 2022-06-21 NOTE — OUTREACH NOTE
Sepsis Week 1 Survey    Flowsheet Row Responses   [facility] patient discharged from? [facility]   Does the patient have one of the following disease processes/diagnoses(primary or secondary)? Sepsis   Week 1 attempt successful? Yes   Call start time 1101   Call end time 1107   Discharge diagnosis weakness, sepsis, Hydronephrosis   Person spoke with today (if not patient) and relationship patient   Meds reviewed with patient/caregiver? Yes   Does the patient have all medications related to this admission filled (includes all antibiotics, inhalers, nebulizers,steroids,etc.) Yes   Is the patient taking all medications as directed (includes completed medication regime)? Yes   Medication comments Patient reports that  nurse advised for her to start fiber supplement.    Comments regarding appointments Patient seeing Dr Gupta urology, tomorrow 6/22   Does the patient have a primary care provider?  Yes   Comments regarding PCP Lizzy Francis MD June 30 at 340 pm   Does the patient have an appointment with their PCP within 7 days of discharge? Greater than 7 days   Nursing Interventions Verified appointment date/time/provider   Has the patient kept scheduled appointments due by today? N/A   What is the Home health agency?  [facility]    Has home health visited the patient within 72 hours of discharge? Yes   Psychosocial issues? No   Did the patient receive a copy of their discharge instructions? Yes   Nursing interventions Reviewed instructions with patient   What is the patient's perception of their health status since discharge? Same   Nursing interventions Nurse provided patient education   Is the patient/caregiver able to teach back Sepsis? S - Shivering,fever or very cold, E - Extreme pain or generalized discomfort (worst ever,especially abdomen)   Nursing interventions Nurse provided reassurance to patient, Nurse provided patient education   Is patient/caregiver able to teach back steps to recovery at home? Set  small, achievable goals for return to baseline health, Rest and regain strength, Eat a balanced diet   Is the patient/caregiver able to teach back signs and symptoms of worsening condition: Fever   If the patient is a current smoker, are they able to teach back resources for cessation? Not a smoker   Is the patient/caregiver able to teach back the hierarchy of who to call/visit for symptoms/problems? PCP, Specialist, Home health nurse, Urgent Care, ED, 911 Yes   Week 1 call completed? Yes          NELL MURRAY - Registered Nurse

## 2022-06-23 ENCOUNTER — ANTICOAGULATION VISIT (OUTPATIENT)
Dept: PHARMACY | Facility: HOSPITAL | Age: 82
End: 2022-06-23

## 2022-06-23 ENCOUNTER — TELEPHONE (OUTPATIENT)
Dept: CARDIOLOGY | Facility: CLINIC | Age: 82
End: 2022-06-23

## 2022-06-23 LAB — INR PPP: 4.4 (ref 2–3)

## 2022-06-23 NOTE — PROGRESS NOTES
Anticoagulation Clinic Progress Note - Home INR Testing     INR Goal: 2 - 3  Today's INR: 4.4 (supratherapeutic). INR result was called in today by Neida (home health nurse).   Current warfarin dose: warfarin 1 mg PO on  then warfarin 2 mg PO yesterday. Total weekly dose of past 7 days = 11 mg.     INR History:  Anticoagulation Monitoring 2022   INR 1.10 3.10 4.40   INR Date 2022   INR Goal 2.0-3.0 2.0-3.0 2.0-3.0   Trend Up Same Down   Last Week Total 10 mg 9.5 mg 11 mg   Next Week Total 14 mg 13 mg 6 mg   Sun 2 mg - 1 mg   Mon 2 mg - -   Tue 2 mg 1 mg () -   Wed 2 mg 2 mg -   Thu 2 mg - Hold ()   Fri 2 mg - 1 mg   Sat 2 mg - 1 mg   Visit Report - - -   Some recent data might be hidden       Anticoagulation Summary  As of 2022    INR goal:  2.0-3.0   TTR:  44.9 % (2.7 y)   INR used for dosin.40 (2022)   Warfarin maintenance plan:  1 mg every day   Weekly warfarin total:  7 mg   Plan last modified:  Kayleigh Horvath, PharmD (2022)   Next INR check:  2022   Priority:  Maintenance   Target end date:      Indications    Atrial fibrillation (HCC) [I48.91]             Anticoagulation Episode Summary     INR check location:      Preferred lab:      Send INR reminders to:  [facility] CLINICAL POOL    Comments:  Patient contract signed 21.      Anticoagulation Care Providers     Provider Role Specialty Phone number    Augustin Ellsworth MD  Cardiology [phone]          Clinic Interview:   Patient Findings     Negatives:  Signs/symptoms of thrombosis, Signs/symptoms of bleeding,   Laboratory test error suspected, Change in health, Change in alcohol use,   Change in activity, Upcoming invasive procedure, Emergency department   visit, Upcoming dental procedure, Missed doses, Extra doses, Change in   medications, Change in diet/appetite, Hospital admission, Bruising, Other   complaints      Clinical Outcomes      Negatives:  Major bleeding event, Thromboembolic event,   Anticoagulation-related hospital admission, Anticoagulation-related ED   visit, Anticoagulation-related fatality        Current Medications:   Prior to Admission medications    Medication Sig Start Date End Date Taking? Authorizing Provider   acetaminophen (TYLENOL) 325 MG tablet Take 650 mg by mouth Every 6 (Six) Hours As Needed for Mild Pain .    Gino Leos MD   Cholecalciferol (Vitamin D3) 50 MCG (2000 UT) tablet Take 2,000 Units by mouth Daily.    Gino Leos MD   digoxin (LANOXIN) 125 MCG tablet Take 1 tablet by mouth Daily. 5/19/22   Augustin Ellsworth MD   dilTIAZem CD (Cardizem CD) 360 MG 24 hr capsule Take 1 capsule by mouth Daily. 5/19/22   Augustin Ellsworth MD   HYDROcodone-acetaminophen (NORCO) 5-325 MG per tablet Take 1 tablet by mouth Every 8 (Eight) Hours As Needed.    Gino Leos MD   metoprolol succinate XL (TOPROL-XL) 50 MG 24 hr tablet Take 1 tablet by mouth 2 (Two) Times a Day. 5/19/22   Augustin Ellsworth MD   Probiotic Product (PROBIOTIC PO) Take  by mouth Daily.    ProviderGino MD   topiramate (TOPAMAX) 100 MG tablet Take 100 mg by mouth every night at bedtime.    Gino Leos MD   warfarin (COUMADIN) 2 MG tablet Take 2 mg by mouth daily or as directed by [facility] ([phone]) 6/9/22   Augustin Ellsworth MD       Medical history:   Past Medical History:   Diagnosis Date   • Bladder cancer (HCC)    • Cancer (HCC)     breast, bladder   • Deep vein thrombosis (HCC)    • Essential hypertension 1/17/2017   • Fracture tibia/fibula, right, closed, initial encounter 8/27/2020   • GERD (gastroesophageal reflux disease)    • History of urostomy    • Immobility 08/27/2020    r/t broken Tibia    • Kidney carcinoma, right (HCC)     removed    • Long term current use of anticoagulant 1/9/2015   • PAF (paroxysmal atrial fibrillation) (HCC) 6/26/2019   •  Presence of cardiac pacemaker 11/03/2014    BS dual chamber PM placed 10/2014 with pocket revision 1/25/17.  HX tachy rob syndrome   • Sick sinus syndrome (HCC) 11/3/2014       Social history:   Social History     Tobacco Use   • Smoking status: Never Smoker   • Smokeless tobacco: Never Used   Substance Use Topics   • Alcohol use: Not Currently       Allergies:    Amoxicillin, Ciprofloxacin, Oxycodone-acetaminophen, Penicillins, Sulfa antibiotics, Cephalexin, Clavulanic acid, Codeine, Contrast dye, Doxycycline, Fluconazole, Iodine, Macrobid [nitrofurantoin], Tobramycin, and Trimethoprim    This patient is managed via a collaborative agreement, pursuant to IC [id number]. The signed protocol is kept in the [facility] at [address] [place] [address].    Education: Hazel Bucio has been instructed to call if any changes in medications, doses, concerns, etc. Patient was provided dosing instructions and expressed verbal understanding and has no further questions at this time.    Plan:  1. Hold dose today then decrease to warfarin 1 mg PO daily. Total weekly dose for next 7 days = 6 mg.   - Patient was educated by home health nurse on signs/symptoms to monitor for and when to seek medical attention.  2. INR should be tested in 4 days by home health nurse and called into clinic.     Kayleigh Horvath, PharmD  6/23/2022  11:17 EDT

## 2022-06-27 ENCOUNTER — ANTICOAGULATION VISIT (OUTPATIENT)
Dept: PHARMACY | Facility: HOSPITAL | Age: 82
End: 2022-06-27

## 2022-06-27 LAB — INR PPP: 1.4 (ref 2–3)

## 2022-06-27 NOTE — PROGRESS NOTES
Anticoagulation Clinic Progress Note - Home INR Testing     INR Goal: 2 - 3  Today's INR: 1.4 (subtherapeutic). INR result was called in today by Neida (home health nurse).   Current warfarin dose: Held dose on  as instructed then warfarin 1 mg PO daily. Total weekly dose of past 7 days = 8 mg.     Discussed with home health regarding large fluctuations in INR. Patient has not had any medication, diet, or activity changes.    INR History:  Anticoagulation Monitoring 2022   INR 3.10 4.40 1.40   INR Date 2022   INR Goal 2.0-3.0 2.0-3.0 2.0-3.0   Trend Same Down Up   Last Week Total 9.5 mg 11 mg 8 mg   Next Week Total 13 mg 6 mg 10 mg   Sun - 1 mg -   Mon - - 2 mg   Tue 1 mg () - 1 mg   Wed 2 mg - 2 mg   Thu - Hold () -   Fri - 1 mg -   Sat - 1 mg -   Visit Report - - -   Some recent data might be hidden       Anticoagulation Summary  As of 2022    INR goal:  2.0-3.0   TTR:  44.8 % (2.7 y)   INR used for dosin.40 (2022)   Warfarin maintenance plan:  2 mg every Mon, Wed, Fri; 1 mg all other days   Weekly warfarin total:  10 mg   Plan last modified:  Kayleigh Horvath, PharmD (2022)   Next INR check:  2022   Priority:  Maintenance   Target end date:      Indications    Atrial fibrillation (HCC) [I48.91]             Anticoagulation Episode Summary     INR check location:      Preferred lab:      Send INR reminders to:  [facility] CLINICAL POOL    Comments:  Patient contract signed 21.  [facility] nurse Neida's phone number = [phone]      Anticoagulation Care Providers     Provider Role Specialty Phone number    Augustin Ellsworth MD  Cardiology [phone]          Clinic Interview:   Patient Findings     Negatives:  Signs/symptoms of thrombosis, Signs/symptoms of bleeding,   Laboratory test error suspected, Change in health, Change in alcohol use,   Change in activity, Upcoming invasive procedure, Emergency department    visit, Upcoming dental procedure, Missed doses, Extra doses, Change in   medications, Change in diet/appetite, Hospital admission, Bruising, Other   complaints      Clinical Outcomes     Negatives:  Major bleeding event, Thromboembolic event,   Anticoagulation-related hospital admission, Anticoagulation-related ED   visit, Anticoagulation-related fatality        Current Medications:   Prior to Admission medications    Medication Sig Start Date End Date Taking? Authorizing Provider   acetaminophen (TYLENOL) 325 MG tablet Take 650 mg by mouth Every 6 (Six) Hours As Needed for Mild Pain .    Gino Leos MD   Cholecalciferol (Vitamin D3) 50 MCG (2000 UT) tablet Take 2,000 Units by mouth Daily.    Gino Leos MD   digoxin (LANOXIN) 125 MCG tablet Take 1 tablet by mouth Daily. 5/19/22   Augustin Ellsworth MD   dilTIAZem CD (Cardizem CD) 360 MG 24 hr capsule Take 1 capsule by mouth Daily. 5/19/22   Augustin Ellsworth MD   HYDROcodone-acetaminophen (NORCO) 5-325 MG per tablet Take 1 tablet by mouth Every 8 (Eight) Hours As Needed.    Gino Leos MD   metoprolol succinate XL (TOPROL-XL) 50 MG 24 hr tablet Take 1 tablet by mouth 2 (Two) Times a Day. 5/19/22   Augustin Ellsworth MD   Probiotic Product (PROBIOTIC PO) Take  by mouth Daily.    Gino Leos MD   topiramate (TOPAMAX) 100 MG tablet Take 100 mg by mouth every night at bedtime.    Gino Leos MD   warfarin (COUMADIN) 2 MG tablet Take 2 mg by mouth daily or as directed by [facility] ([phone]) 6/9/22   Augustin Ellsworth MD       Medical history:   Past Medical History:   Diagnosis Date   • Bladder cancer (HCC)    • Cancer (HCC)     breast, bladder   • Deep vein thrombosis (HCC)    • Essential hypertension 1/17/2017   • Fracture tibia/fibula, right, closed, initial encounter 8/27/2020   • GERD (gastroesophageal reflux disease)    • History of urostomy    • Immobility  08/27/2020    r/t broken Tibia    • Kidney carcinoma, right (HCC)     removed    • Long term current use of anticoagulant 1/9/2015   • PAF (paroxysmal atrial fibrillation) (HCC) 6/26/2019   • Presence of cardiac pacemaker 11/03/2014    BS dual chamber PM placed 10/2014 with pocket revision 1/25/17.  HX tachy rob syndrome   • Sick sinus syndrome (HCC) 11/3/2014       Social history:   Social History     Tobacco Use   • Smoking status: Never Smoker   • Smokeless tobacco: Never Used   Substance Use Topics   • Alcohol use: Not Currently       Allergies:    Amoxicillin, Ciprofloxacin, Oxycodone-acetaminophen, Penicillins, Sulfa antibiotics, Cephalexin, Clavulanic acid, Codeine, Contrast dye, Doxycycline, Fluconazole, Iodine, Macrobid [nitrofurantoin], Tobramycin, and Trimethoprim    This patient is managed via a collaborative agreement, pursuant to IC [id number]. The signed protocol is kept in the [facility] at [address] [place] [address].    Education: Hazel Bucio has been instructed to call if any changes in medications, doses, concerns, etc. Patient was provided dosing instructions and expressed verbal understanding and has no further questions at this time.    Plan:  1. increase by 43 % (still 33% less than previous regimen of warfarin 2 mg PO daily); warfarin 1 mg PO daily, except warfarin 2 mg PO on Monday, Wednesday, and Friday.  New total weekly dose = 10 mg.   - Instructed patient's nurse to discuss with patient signs/symptoms to monitor for and when to seek medical attention.  2. INR should be tested in 3 days by home health and called into clinic.     Kayleigh Horvath, Annie  6/27/2022  14:05 EDT

## 2022-06-28 ENCOUNTER — OFFICE VISIT (OUTPATIENT)
Dept: CARDIOLOGY | Facility: CLINIC | Age: 82
End: 2022-06-28

## 2022-06-28 ENCOUNTER — CLINICAL SUPPORT NO REQUIREMENTS (OUTPATIENT)
Dept: CARDIOLOGY | Facility: CLINIC | Age: 82
End: 2022-06-28

## 2022-06-28 VITALS
DIASTOLIC BLOOD PRESSURE: 82 MMHG | WEIGHT: 154.2 LBS | SYSTOLIC BLOOD PRESSURE: 148 MMHG | HEIGHT: 64 IN | RESPIRATION RATE: 18 BRPM | BODY MASS INDEX: 26.32 KG/M2 | HEART RATE: 61 BPM

## 2022-06-28 DIAGNOSIS — Z95.0 PRESENCE OF CARDIAC PACEMAKER: Chronic | ICD-10-CM

## 2022-06-28 DIAGNOSIS — I49.5 SICK SINUS SYNDROME: ICD-10-CM

## 2022-06-28 DIAGNOSIS — I49.5 SICK SINUS SYNDROME: Primary | Chronic | ICD-10-CM

## 2022-06-28 DIAGNOSIS — I48.91 ATRIAL FIBRILLATION WITH RVR: ICD-10-CM

## 2022-06-28 DIAGNOSIS — I10 ESSENTIAL HYPERTENSION: ICD-10-CM

## 2022-06-28 DIAGNOSIS — I48.21 PERMANENT ATRIAL FIBRILLATION: ICD-10-CM

## 2022-06-28 DIAGNOSIS — Z95.0 PRESENCE OF CARDIAC PACEMAKER: ICD-10-CM

## 2022-06-28 DIAGNOSIS — I48.0 PAROXYSMAL ATRIAL FIBRILLATION: Primary | ICD-10-CM

## 2022-06-28 PROCEDURE — 93280 PM DEVICE PROGR EVAL DUAL: CPT | Performed by: NURSE PRACTITIONER

## 2022-06-28 PROCEDURE — 99214 OFFICE O/P EST MOD 30 MIN: CPT | Performed by: INTERNAL MEDICINE

## 2022-06-28 RX ORDER — FUROSEMIDE 20 MG/1
20 TABLET ORAL DAILY
Qty: 30 TABLET | Refills: 5 | Status: ON HOLD | OUTPATIENT
Start: 2022-06-28 | End: 2023-03-21

## 2022-06-28 NOTE — PROGRESS NOTES
Cardiology Clinic Note  Augustin Ellsworth MD, PhD    Subjective:     Encounter Date:06/28/2022      Patient ID: Hazel Bucio is a 82 y.o. female.    Chief Complaint:  Chief Complaint   Patient presents with   • Follow-up       HPI:    I the pleasure to see this 81-year-old female today in follow-up from hospitalization who was admitted with urinary tract infection abdominal pain now on antibiotics.  She has cardiac history of dual-chamber pacemaker placed back in 2014 with revision 2017 history of tachybradycardia syndrome is presently in atrial fibrillation which is rate controlled with underlying backup ventricular pacing intermittently as reviewed and interpreted by me with respect to her telemetry.  She reports some occasional tachycardia and palpitations.  Her INR is slightly subtherapeutic at 1.6-1.7 at that time she is found to be in A. fib RVR.  Her medicines were optimized and she was ultimately discharged.  Device was interrogated as well which revealed normal functioning.  She was mildly hypotensive and Cardizem was held temporarily which was ultimately then restarted.  She is back in clinic today for follow-up blood pressure 146/80 heart rates are in the 1 teens to 120s and she is has uncontrolled atrial fibrillation on her device interrogation and on EKG today.  2D echo reviewed interpreted by me recently demonstrated preserved LV systolic function 60 to 65%, moderately increased biatrial enlargement, normal pulmonary pressures with some diastolic dysfunction.    Since she was last seen ultimately she has had runs of paroxysmal A. fib RVR, she is irregular today with heart rates much improved today 70s to 80s, device interrogation are also reveals much improved heart rates on present medical therapy.  She is drinking a lot of water and has peripheral edema as well as some crackles on exam.  She is on a loop diuretics which will ultimately start today and increase her water intake by 20 to 30%.  Blood  pressure well controlled on present medical therapy which includes metoprolol extended release, digoxin diltiazem, Coumadin for stroke risk reduction.  She will be seen in 3 months, if her edema does not resolve or she is more short of breath she is to take extra Lasix in combination with decreasing her water intake, daughter present, voiced understanding    Review of systems otherwise negative as of 14 point review of systems except was mentioned above     Historical data copied forward from previous encounters numerous unchanged     Assessment plan per my encounter  Atrial fibrillation  Acute on chronic diastolic CHF  Shortness of breath dyspnea on exertion  Presents cardiac pacemaker  Sick sinus syndrome, tachybradycardia syndrome  Long-term use of anticoagulants  Essential hypertension  Dyslipidemia       Device interrogation performed, intermittent A. fib much better controlled, presently rate uncontrolled backup ventricular pacing on demand VVI  Blood pressures are much improved 1 30-1 40 systolic  Continue Cardizem 360 daily   Continue metoprolol XL 50 twice daily  Continue Coumadin, management per pharmacy, INR therapeutic goal 2-3  Continue digoxin 0.125 daily  Normal renal function, normal potassium  Start Lasix 20 daily, 40 daily for the first 3 days in combination with free water restriction given worsening lower extremity edema, acute on chronic diastolic CHF    Follow-up 30 days for device interrogation and assessment of her heart rates  Further medical titration based on A. fib RVR with uncontrolled rates on follow-up encounter        2D echo reviewed interpreted by me demonstrates normal EF 60 to 65%, normal RV size and function  Moderate biatrial enlargement  Mild to moderate posterior directed MR, mild AI, mild to moderate TR  Grade 1 diastolic dysfunction most likely     Augustin FRIEDMAN PhD         Historical data copied forward from previous encounters in EMR including the history, exam, and  "assessment/plan has been reviewed and is unchanged unless noted otherwise.    Cardiac medicines reviewed with risk, benefits, and necessity of each discussed.    Risk and benefit of cardiac testing reviewed including death heart attack stroke pain bleeding infection need for vascular /cardiovascular surgery were discussed and the patient     Objective:         /82 (BP Location: Left arm, Patient Position: Sitting)   Pulse 61   Resp 18   Ht 162.6 cm (64\")   Wt 69.9 kg (154 lb 3.2 oz)   BMI 26.47 kg/m²     Physical Exam    Assessment:         There are no diagnoses linked to this encounter.       Plan:              The pleasure to be involved in this patient's cardiovascular care.  Please call with any questions or concerns  Augustin Ellsworth MD, PhD    Most recent EKG as reviewed and interpreted by me:  Procedures     Most recent echo as reviewed and interpreted by me:  Results for orders placed during the hospital encounter of 09/24/21    Adult Transthoracic Echo Complete W/ Cont if Necessary Per Protocol    Interpretation Summary  · Estimated left ventricular EF was in agreement with the calculated left ventricular EF. Left ventricular ejection fraction appears to be 61 - 65%. Left ventricular systolic function is normal.  · Left atrial volume is moderately increased.  · The right atrial cavity is moderately dilated.  · Estimated right ventricular systolic pressure from tricuspid regurgitation is normal (<35 mmHg).      Most recent stress test as reviewed and interpreted by me:  Results for orders placed during the hospital encounter of 04/28/20    Stress Test With Myocardial Perfusion One Day    Interpretation Summary  · Findings consistent with a normal ECG stress test.  · Left ventricular ejection fraction is normal (Calculated EF = 70%).  · Impressions are consistent with a low risk study.  · Relatively small septal and lateral wall defects appear fixed with no reversible ischemia " appreciated.    Moderate defect involving the septal and lateral wall segments with no reversible ischemia appreciated.  Global LV systolic function appears normal with no segmental abnormalities; LVEF 70% or greater by gated SPECT analysis      Most recent cardiac catheterization as reviewed interpreted by me:  No results found for this or any previous visit.    The following portions of the patient's history were reviewed and updated as appropriate: allergies, current medications, past family history, past medical history, past social history, past surgical history and problem list.      ROS:  14 point review of systems negative except as mentioned above    Current Outpatient Medications:   •  acetaminophen (TYLENOL) 325 MG tablet, Take 650 mg by mouth Every 6 (Six) Hours As Needed for Mild Pain ., Disp: , Rfl:   •  Cholecalciferol (Vitamin D3) 50 MCG (2000 UT) tablet, Take 2,000 Units by mouth Daily., Disp: , Rfl:   •  digoxin (LANOXIN) 125 MCG tablet, Take 1 tablet by mouth Daily., Disp: 90 tablet, Rfl: 1  •  dilTIAZem CD (Cardizem CD) 360 MG 24 hr capsule, Take 1 capsule by mouth Daily., Disp: 90 capsule, Rfl: 1  •  HYDROcodone-acetaminophen (NORCO) 5-325 MG per tablet, Take 1 tablet by mouth Every 8 (Eight) Hours As Needed., Disp: , Rfl:   •  metoprolol succinate XL (TOPROL-XL) 50 MG 24 hr tablet, Take 1 tablet by mouth 2 (Two) Times a Day., Disp: 180 tablet, Rfl: 1  •  Probiotic Product (PROBIOTIC PO), Take  by mouth Daily., Disp: , Rfl:   •  topiramate (TOPAMAX) 100 MG tablet, Take 100 mg by mouth every night at bedtime., Disp: , Rfl:   •  warfarin (COUMADIN) 2 MG tablet, Take 2 mg by mouth daily or as directed by [facility] ([phone]), Disp: 90 tablet, Rfl: 1  •  furosemide (LASIX) 20 MG tablet, Take 1 tablet by mouth Daily., Disp: 30 tablet, Rfl: 5    Problem List:  Patient Active Problem List   Diagnosis   • Atrial fibrillation (HCC)   • Dyslipidemia   • Essential hypertension   •  Long term current use of anticoagulant   • Presence of cardiac pacemaker   • Sick sinus syndrome (HCC)   • Type 2 diabetes mellitus (HCC)   • Tear film insufficiency   • Presbyopia   • Macular puckering of retina   • Tear film insufficiency, bilateral   • Pseudophakia, both eyes   • Lens replaced by other means   • Epiretinal membrane, bilateral   • Acute encephalopathy   • History of carcinoma in situ of breast   • Ureteral cancer (HCC)   • Chronic insomnia   • Seasonal allergies   • Altered mental status   • MACRINA (acute kidney injury) ([facility])   • Cholecystitis with cholelithiasis   • Acute UTI   • Abdominal pain   • Acute UTI   • Acquired absence of kidney   • Other artificial openings of urinary tract status ([facility])   • History of bladder cancer   • Hydronephrosis   • RLS (restless legs syndrome)   • Weakness     Past Medical History:  Past Medical History:   Diagnosis Date   • Bladder cancer (HCC)    • Cancer (HCC)     breast, bladder   • Deep vein thrombosis ([facility])    • Essential hypertension 1/17/2017   • Fracture tibia/fibula, right, closed, initial encounter 8/27/2020   • GERD (gastroesophageal reflux disease)    • History of urostomy    • Immobility 08/27/2020    r/t broken Tibia    • Kidney carcinoma, right (HCC)     removed    • Long term current use of anticoagulant 1/9/2015   • PAF (paroxysmal atrial fibrillation) ([facility]) 6/26/2019   • Presence of cardiac pacemaker 11/03/2014    BS dual chamber PM placed 10/2014 with pocket revision 1/25/17.  HX tachy rob syndrome   • Sick sinus syndrome ([facility]) 11/3/2014     Past Surgical History:  Past Surgical History:   Procedure Laterality Date   • BREAST LUMPECTOMY     • CHOLECYSTECTOMY N/A 10/12/2020    Procedure: CHOLECYSTECTOMY LAPAROSCOPIC;  Surgeon: Go Pereira DO;  Location: [facility] MAIN OR;  Service: General;  Laterality: N/A;   • CYSTECTOMY W/ URETEROILEAL CONDUIT     • HARDWARE REMOVAL Right 6/11/2021    Procedure: TIBIA HARDWARE REMOVAL;  Surgeon: Alyssa  "Geoff Feng II, MD;  Location: [facility] MAIN OR;  Service: Orthopedics;  Laterality: Right;   • HERNIA REPAIR     • HYSTERECTOMY     • INSERT / REPLACE / REMOVE PACEMAKER     • NEPHRECTOMY Right    • TIBIA IM NAILING/RODDING Right 8/28/2020    Procedure: TIBIA INTRAMEDULLARY NAIL/ABRAHAM INSERTION;  Surgeon: Geoff Shah II, MD;  Location: [facility] MAIN OR;  Service: Orthopedics;  Laterality: Right;     Social History:  Social History     Socioeconomic History   • Marital status:    Tobacco Use   • Smoking status: Never Smoker   • Smokeless tobacco: Never Used   Vaping Use   • Vaping Use: Never used   Substance and Sexual Activity   • Alcohol use: Not Currently   • Drug use: Never   • Sexual activity: Defer     Allergies:  Allergies   Allergen Reactions   • Amoxicillin Shortness Of Breath   • Ciprofloxacin Hives   • Oxycodone-Acetaminophen Unknown - High Severity     Pt's son stated when pt fell and broke her leg she was put on oxycodone; pt's son stated pt's \"vitals went all out of wack, she couldn't remember things.\"   • Penicillins Rash   • Sulfa Antibiotics Hives   • Cephalexin Other (See Comments)   • Clavulanic Acid Other (See Comments)   • Codeine Other (See Comments)   • Contrast Dye Other (See Comments)   • Doxycycline Other (See Comments)   • Fluconazole Other (See Comments)   • Iodine Hives   • Macrobid [Nitrofurantoin] Hives   • Tobramycin Other (See Comments)   • Trimethoprim Other (See Comments)     Immunizations:  Immunization History   Administered Date(s) Administered   • COVID-19 (PFIZER) PURPLE CAP 02/03/2021, 02/26/2021   • FLUAD TRI 65YR+ 09/07/2012, 09/24/2014   • Fluzone High Dose =>65 Years ([employer] ONLY) 12/16/2015, 11/04/2016, 10/31/2017, 10/18/2018, 09/27/2019   • Pneumococcal Conjugate 13-Valent (PCV13) 11/04/2016   • Tdap 04/01/2016            In-Office Procedure(s):  No orders to display        ASCVD RIsk Score::  The ASCVD Risk score (Rob CRUZ Jr., et al., 2013) failed " to calculate for the following reasons:    The 2013 ASCVD risk score is only valid for ages 40 to 79    The patient has a prior MI or stroke diagnosis    Imaging:    Results for orders placed during the hospital encounter of 06/11/22    XR Chest 1 View    Narrative  EXAMINATION: XR CHEST 1 VW-    DATE OF EXAM: 6/11/2022 11:36 PM    INDICATION: Sepsis, atrial fibrillation, hypertension    COMPARISON: Chest radiograph dated 09/24/2021    TECHNIQUE: Portable AP view of the chest was obtained.    FINDINGS:  There is left chest wall pacemaker with leads in expected position.  There is mild cardiomegaly. There is aortic arch atherosclerotic  calcification. Pulmonary vascularity appears normal. There is no focal  airspace consolidation, pleural effusion, or pneumothorax. There is  linear subsegmental atelectasis or scarring within the left lung base.  There is no pneumothorax. There are degenerative changes of the thoracic  spine.    Impression  1. Cardiomegaly without acute pulmonary process. Linear subsegmental  atelectasis or scarring within the left lung base.    Electronically Signed By-Aneudy Gupta MD On:6/12/2022 8:25 AM  This report was finalized on [id number] by  Aneudy Gupta MD.       Results for orders placed during the hospital encounter of 06/11/22    CT Abdomen Pelvis Without Contrast    Narrative  Exam: CT abdomen and pelvis without contrast    Date: Johanna 15, 2022    History: Lower abdominal pain    Comparison: December 6, 2021    Technique: Contiguous axial CT images obtained of the abdomen and pelvis without contrast. Additionally, sagittal and coronal reformatted images were obtained. Oral contrast was administered. CT dose lowering techniques were used, to include: automated  exposure control, adjustment for patient size, and or use of iterative reconstruction.    Findings:    Lung bases: The heart is enlarged. The lung bases are free of focal airspace consolidation.    Liver: There are a few  calcified hepatic granulomas.    Spleen: There are calcified splenic granulomas.    Pancreas: Normal.    Adrenal glands: Normal.    Gallbladder: The gallbladder is surgically absent.    Kidneys: The right kidney is surgically absent. There is a nonobstructing 4 mm left renal calculus. There is mild perinephric edema around the left kidney. There is mild left-sided hydronephrosis. There is a diverting ileostomy in the right side of the  abdomen.    Abdominal mesentery: There is rectus abdominis diastasis. There is an ileostomy in the right lower quadrant with an associated parastomal hernia containing a portion of large bowel. The appearance is unchanged. There are additional small fat-containing  ventral abdominal wall hernias.    Bowel loops: There is a moderate to large amount of retained colonic fecal debris. There is oral contrast within the large bowel loops. There is no small bowel obstruction. There is no evidence of acute appendicitis.    CT PELVIS:  The uterus and urinary bladder are not identified.    Abdominal aorta: There are severe atherosclerotic changes. There is no aneurysm.    Osseous structures: Osseous structures are markedly demineralized. No acute fractures are identified.    Impression  1. Mild left-sided hydronephrosis. Etiology is uncertain.  2. Prior right-sided nephrectomy.  3. Nonobstructing 4 mm left renal calculus.  5. Cardiomegaly.  6. Stable parastomal hernia in the right lower quadrant.  7. Constipation.  8. Extensive surgical changes as described.          Slot 67    Electronically signed by:  Kashmir Diaz M.D.  6/14/2022 11:24 PM      Results for orders placed during the hospital encounter of 06/11/22    CT Abdomen Pelvis Without Contrast    Narrative  Exam: CT abdomen and pelvis without contrast    Date: Johanna 15, 2022    History: Lower abdominal pain    Comparison: December 6, 2021    Technique: Contiguous axial CT images obtained of the abdomen and pelvis without contrast.  Additionally, sagittal and coronal reformatted images were obtained. Oral contrast was administered. CT dose lowering techniques were used, to include: automated  exposure control, adjustment for patient size, and or use of iterative reconstruction.    Findings:    Lung bases: The heart is enlarged. The lung bases are free of focal airspace consolidation.    Liver: There are a few calcified hepatic granulomas.    Spleen: There are calcified splenic granulomas.    Pancreas: Normal.    Adrenal glands: Normal.    Gallbladder: The gallbladder is surgically absent.    Kidneys: The right kidney is surgically absent. There is a nonobstructing 4 mm left renal calculus. There is mild perinephric edema around the left kidney. There is mild left-sided hydronephrosis. There is a diverting ileostomy in the right side of the  abdomen.    Abdominal mesentery: There is rectus abdominis diastasis. There is an ileostomy in the right lower quadrant with an associated parastomal hernia containing a portion of large bowel. The appearance is unchanged. There are additional small fat-containing  ventral abdominal wall hernias.    Bowel loops: There is a moderate to large amount of retained colonic fecal debris. There is oral contrast within the large bowel loops. There is no small bowel obstruction. There is no evidence of acute appendicitis.    CT PELVIS:  The uterus and urinary bladder are not identified.    Abdominal aorta: There are severe atherosclerotic changes. There is no aneurysm.    Osseous structures: Osseous structures are markedly demineralized. No acute fractures are identified.    Impression  1. Mild left-sided hydronephrosis. Etiology is uncertain.  2. Prior right-sided nephrectomy.  3. Nonobstructing 4 mm left renal calculus.  5. Cardiomegaly.  6. Stable parastomal hernia in the right lower quadrant.  7. Constipation.  8. Extensive surgical changes as described.          Slot 67    Electronically signed by:  Kashmir  ANGELA Diaz  6/14/2022 11:24 PM      Lab Review:   Anticoagulation Visit on 06/27/2022   Component Date Value   • INR 06/27/2022 1.40 (A)   Anticoagulation Visit on 06/23/2022   Component Date Value   • INR 06/23/2022 4.40 (A)   Anticoagulation Visit on 06/21/2022   Component Date Value   • INR 06/20/2022 3.10 (A)   No results displayed because visit has over 200 results.      Anticoagulation Visit on 06/02/2022   Component Date Value   • INR 06/02/2022 1.10 (A)   Anticoagulation Visit on 05/12/2022   Component Date Value   • INR 05/12/2022 3.30 (A)   Anticoagulation Visit on 04/13/2022   Component Date Value   • INR 04/13/2022 2.90    Anticoagulation Visit on 03/22/2022   Component Date Value   • INR 03/22/2022 2.90    Anticoagulation Visit on 03/09/2022   Component Date Value   • INR 03/09/2022 3.40 (A)   Anticoagulation Visit on 02/10/2022   Component Date Value   • INR 02/10/2022 2.30    There may be more visits with results that are not included.     Recent labs reviewed and interpreted for clinical significance and application            Level of Care:           Augustin Ellsworth MD  06/28/22  .

## 2022-06-29 ENCOUNTER — READMISSION MANAGEMENT (OUTPATIENT)
Dept: CALL CENTER | Facility: HOSPITAL | Age: 82
End: 2022-06-29

## 2022-06-29 NOTE — PROGRESS NOTES
DEVICE INTERROGATION:  IN OFFICE    DEVICE TYPE:   Dual-chamber pacemaker    :   [employer]    BATTERY:  Stable    TIME TO ELECTIVE REPLACEMENT INDICATORS:   1.5 years    CHARGE TIME:   Not applicable        LEAD DATA:       DEVICE REPROGRAMMED FOR TESTING      Atrial:   0.4 mV, 454 ohms, and A. fib  Ventricular:     Paced mV, 600 ohms, 1.0 V@0.4 ms    LV:      Pacemaker dependent:   No      Atrial pacing percentage: A. fib %    Ventricular pacing percentage: 46%      Arrhythmia Logbook Reviewed: In A. fib.  Ventricular rates are stable        Summary:    Stable Device Function    In A. fib.  Ventricular rate stable    Battery status is stable.    Reviewed with: Dr. Ellsworth      NEXT IN OFFICE DEVICE CHECK DUE: At next visit    REMOTE DEVICE INTERROGATIONS: Ongoing

## 2022-06-29 NOTE — OUTREACH NOTE
Sepsis Week 2 Survey    Flowsheet Row Responses   [facility] patient discharged from? [facility]   Does the patient have one of the following disease processes/diagnoses(primary or secondary)? Sepsis   Week 2 attempt successful? Yes   Call start time 1203   Call end time 1204   Discharge diagnosis weakness, sepsis, Hydronephrosis   Meds reviewed with patient/caregiver? Yes   Is the patient having any side effects they believe may be caused by any medication additions or changes? No   Does the patient have all medications related to this admission filled (includes all antibiotics, inhalers, nebulizers,steroids,etc.) Yes   Is the patient taking all medications as directed (includes completed medication regime)? Yes   Does the patient have a primary care provider?  Yes   Comments regarding PCP Lizzy Francis MD June 30 at 340 pm   Has the patient kept scheduled appointments due by today? N/A   What is the Home health agency?  [facility]    Psychosocial issues? No   What is the patient's perception of their health status since discharge? Same  [Sore]   Is patient/caregiver able to teach back steps to recovery at home? Rest and regain strength, Eat a balanced diet   Is the patient/caregiver able to teach back signs and symptoms of worsening condition: Fever, Rapid heart rate (>90), Shortness of breath/rapid respiratory rate, Altered mental status(confusion/coma), Edema   Week 2 call completed? Yes          LLOYD RICHMOND - Registered Nurse

## 2022-06-30 ENCOUNTER — ANTICOAGULATION VISIT (OUTPATIENT)
Dept: PHARMACY | Facility: HOSPITAL | Age: 82
End: 2022-06-30

## 2022-06-30 LAB — INR PPP: 1.1 (ref 2–3)

## 2022-06-30 NOTE — PROGRESS NOTES
Anticoagulation Clinic Progress Note - Home INR Testing     INR Goal: 2 - 3  Today's INR: 1.1 (subtherapeutic). INR result was obtained and called in by home health nurseNeida. Appointment conducted with Neida via phone.   Current warfarin dose: warfarin 1 mg PO daily, except warfarin 2 mg PO on Monday, Wednesday, and Friday.  Total weekly dose of past 7 days = 8 mg.     INR remains subtherapeutic despite increase in dose on . Patient previously taking warfarin 2 mg PO daily prior to hospitalization.       INR History:  Anticoagulation Monitoring 2022   INR 4.40 1.40 1.10   INR Date 2022   INR Goal 2.0-3.0 2.0-3.0 2.0-3.0   Trend Down Up Same   Last Week Total 11 mg 8 mg 8 mg   Next Week Total 6 mg 10 mg 13 mg   Sun 1 mg - 2 mg (7/3)   Mon - 2 mg 2 mg   Tu - 1 mg -   Wed - 2 mg -   Thu Hold () - 2 mg ()   Fri 1 mg - 2 mg   Sat 1 mg - 2 mg ()   Visit Report - - -   Some recent data might be hidden       Anticoagulation Summary  As of 2022    INR goal:  2.0-3.0   TTR:  44.7 % (2.7 y)   INR used for dosin.10 (2022)   Warfarin maintenance plan:  2 mg every Mon, Wed, Fri; 1 mg all other days   Weekly warfarin total:  10 mg   Plan last modified:  Kayleigh Horvath, PharmD (2022)   Next INR check:  2022   Priority:  Maintenance   Target end date:      Indications    Atrial fibrillation (HCC) [I48.91]             Anticoagulation Episode Summary     INR check location:      Preferred lab:      Send INR reminders to:  [facility] CLINICAL POOL    Comments:  Patient contract signed 21.  [facility] nurse Neida's phone number = [phone]      Anticoagulation Care Providers     Provider Role Specialty Phone number    Augustin Ellsworth MD  Cardiology [phone]          Clinic Interview:   Patient Findings     Positives:  Change in medications (Started taking furosemide on  (no   effect on INR))    Negatives:   Signs/symptoms of thrombosis, Signs/symptoms of bleeding,   Laboratory test error suspected, Change in health, Change in alcohol use,   Change in activity, Upcoming invasive procedure, Emergency department   visit, Upcoming dental procedure, Missed doses, Extra doses, Change in   diet/appetite, Hospital admission, Bruising, Other complaints      Clinical Outcomes     Negatives:  Major bleeding event, Thromboembolic event,   Anticoagulation-related hospital admission, Anticoagulation-related ED   visit, Anticoagulation-related fatality        Current Medications:   Prior to Admission medications    Medication Sig Start Date End Date Taking? Authorizing Provider   acetaminophen (TYLENOL) 325 MG tablet Take 650 mg by mouth Every 6 (Six) Hours As Needed for Mild Pain .    Gino Leos MD   Cholecalciferol (Vitamin D3) 50 MCG (2000 UT) tablet Take 2,000 Units by mouth Daily.    Gino Leos MD   digoxin (LANOXIN) 125 MCG tablet Take 1 tablet by mouth Daily. 5/19/22   Augustin Ellsworth MD   dilTIAZem CD (Cardizem CD) 360 MG 24 hr capsule Take 1 capsule by mouth Daily. 5/19/22   Augustin Ellsworth MD   furosemide (LASIX) 20 MG tablet Take 1 tablet by mouth Daily. 6/28/22   Augustin Ellsworth MD   HYDROcodone-acetaminophen (NORCO) 5-325 MG per tablet Take 1 tablet by mouth Every 8 (Eight) Hours As Needed.    Gino Leos MD   metoprolol succinate XL (TOPROL-XL) 50 MG 24 hr tablet Take 1 tablet by mouth 2 (Two) Times a Day. 5/19/22   Augustin Ellsworth MD   Probiotic Product (PROBIOTIC PO) Take  by mouth Daily.    Gino Leos MD   topiramate (TOPAMAX) 100 MG tablet Take 100 mg by mouth every night at bedtime.    Gino Leos MD   warfarin (COUMADIN) 2 MG tablet Take 2 mg by mouth daily or as directed by [facility] ([phone]) 6/9/22   Augustin Ellsworth MD       Medical history:   Past Medical History:   Diagnosis Date   •  Bladder cancer (HCC)    • Cancer (HCC)     breast, bladder   • Deep vein thrombosis (HCC)    • Essential hypertension 1/17/2017   • Fracture tibia/fibula, right, closed, initial encounter 8/27/2020   • GERD (gastroesophageal reflux disease)    • History of urostomy    • Immobility 08/27/2020    r/t broken Tibia    • Kidney carcinoma, right (HCC)     removed    • Long term current use of anticoagulant 1/9/2015   • PAF (paroxysmal atrial fibrillation) (HCC) 6/26/2019   • Presence of cardiac pacemaker 11/03/2014    BS dual chamber PM placed 10/2014 with pocket revision 1/25/17.  HX tachy rob syndrome   • Sick sinus syndrome (HCC) 11/3/2014       Social history:   Social History     Tobacco Use   • Smoking status: Never Smoker   • Smokeless tobacco: Never Used   Substance Use Topics   • Alcohol use: Not Currently       Allergies:    Amoxicillin, Ciprofloxacin, Oxycodone-acetaminophen, Penicillins, Sulfa antibiotics, Cephalexin, Clavulanic acid, Codeine, Contrast dye, Doxycycline, Fluconazole, Iodine, Macrobid [nitrofurantoin], Tobramycin, and Trimethoprim    This patient is managed via a collaborative agreement, pursuant to IC [id number]. The signed protocol is kept in the [facility] at [address] [place] [address].    Education: Hazel Bucio has been instructed to call if any changes in medications, doses, concerns, etc. Patient was provided dosing instructions and expressed verbal understanding and has no further questions at this time.    Plan:  1. warfarin 2 mg PO daily until next INR check.  - Instructed Neida to discuss with patient signs/symptoms to monitor for and when to seek medical attention.  2. INR should be tested on 7/5 by home health nurse and called into clinic.     Kayleigh Horvath, UlicesD  6/30/2022  10:46 EDT

## 2022-07-05 ENCOUNTER — ANTICOAGULATION VISIT (OUTPATIENT)
Dept: PHARMACY | Facility: HOSPITAL | Age: 82
End: 2022-07-05

## 2022-07-05 LAB — INR PPP: 1.7 (ref 2–3)

## 2022-07-05 NOTE — PROGRESS NOTES
Anticoagulation Clinic Progress Note - Home INR Testing     INR Goal: 2 - 3  Today's INR: 1.7 (subtherapeutic). INR result was called in today by Neida (home health nurse).   Current warfarin dose: warfarin 2 mg PO daily, except warfarin 1 mg PO on Tuesday.  Total weekly dose of past 7 days = 13 mg.     Home health nurse states patient is no longer taking any antibiotics and has no other changes.     INR History:  Anticoagulation Monitoring 2022   INR 1.40 1.10 1.70   INR Date 2022   INR Goal 2.0-3.0 2.0-3.0 2.0-3.0   Trend Up Same Up   Last Week Total 8 mg 8 mg 13 mg   Next Week Total 10 mg 13 mg 14 mg   Sun - 2 mg (7/3) 2 mg   Mon 2 mg 2 mg -   Tue 1 mg - 2 mg   Wed 2 mg - 2 mg   Thu - 2 mg () 2 mg   Fri - 2 mg 2 mg   Sat - 2 mg () 2 mg   Visit Report - - -   Some recent data might be hidden       Anticoagulation Summary  As of 2022    INR goal:  2.0-3.0   TTR:  44.5 % (2.7 y)   INR used for dosin.70 (2022)   Warfarin maintenance plan:  2 mg every day   Weekly warfarin total:  14 mg   Plan last modified:  Kayleigh Horvath, PharmD (2022)   Next INR check:  2022   Priority:  Maintenance   Target end date:      Indications    Atrial fibrillation (HCC) [I48.91]             Anticoagulation Episode Summary     INR check location:      Preferred lab:      Send INR reminders to:  [facility] CLINICAL POOL    Comments:  Patient contract signed 21.  [facility] nurse Neida's phone number = [phone]      Anticoagulation Care Providers     Provider Role Specialty Phone number    Augustin Ellsworth MD  Cardiology [phone]          Clinic Interview:   Patient Findings     Negatives:  Signs/symptoms of thrombosis, Signs/symptoms of bleeding,   Laboratory test error suspected, Change in health, Change in alcohol use,   Change in activity, Upcoming invasive procedure, Emergency department   visit, Upcoming dental procedure, Missed  doses, Extra doses, Change in   medications, Change in diet/appetite, Hospital admission, Bruising, Other   complaints      Clinical Outcomes     Negatives:  Major bleeding event, Thromboembolic event,   Anticoagulation-related hospital admission, Anticoagulation-related ED   visit, Anticoagulation-related fatality        Current Medications:   Prior to Admission medications    Medication Sig Start Date End Date Taking? Authorizing Provider   acetaminophen (TYLENOL) 325 MG tablet Take 650 mg by mouth Every 6 (Six) Hours As Needed for Mild Pain .    ProviderGino MD   Cholecalciferol (Vitamin D3) 50 MCG (2000 UT) tablet Take 2,000 Units by mouth Daily.    Gino Leos MD   digoxin (LANOXIN) 125 MCG tablet Take 1 tablet by mouth Daily. 5/19/22   Augustin Ellsworth MD   dilTIAZem CD (Cardizem CD) 360 MG 24 hr capsule Take 1 capsule by mouth Daily. 5/19/22   Augustin Ellsworth MD   furosemide (LASIX) 20 MG tablet Take 1 tablet by mouth Daily. 6/28/22   Augustni Ellsworth MD   HYDROcodone-acetaminophen (NORCO) 5-325 MG per tablet Take 1 tablet by mouth Every 8 (Eight) Hours As Needed.    ProviderGino MD   metoprolol succinate XL (TOPROL-XL) 50 MG 24 hr tablet Take 1 tablet by mouth 2 (Two) Times a Day. 5/19/22   Augustin Ellsworth MD   Probiotic Product (PROBIOTIC PO) Take  by mouth Daily.    ProviderGino MD   topiramate (TOPAMAX) 100 MG tablet Take 100 mg by mouth every night at bedtime.    ProviderGino MD   warfarin (COUMADIN) 2 MG tablet Take 2 mg by mouth daily or as directed by [facility] ([phone]) 6/9/22   Augustin Ellsworth MD       Medical history:   Past Medical History:   Diagnosis Date   • Bladder cancer (HCC)    • Cancer (HCC)     breast, bladder   • Deep vein thrombosis (HCC)    • Essential hypertension 1/17/2017   • Fracture tibia/fibula, right, closed, initial encounter 8/27/2020   • GERD (gastroesophageal  reflux disease)    • History of urostomy    • Immobility 08/27/2020    r/t broken Tibia    • Kidney carcinoma, right (HCC)     removed    • Long term current use of anticoagulant 1/9/2015   • PAF (paroxysmal atrial fibrillation) (HCC) 6/26/2019   • Presence of cardiac pacemaker 11/03/2014    BS dual chamber PM placed 10/2014 with pocket revision 1/25/17.  HX tachy rob syndrome   • Sick sinus syndrome (HCC) 11/3/2014       Social history:   Social History     Tobacco Use   • Smoking status: Never Smoker   • Smokeless tobacco: Never Used   Substance Use Topics   • Alcohol use: Not Currently       Allergies:    Amoxicillin, Ciprofloxacin, Oxycodone-acetaminophen, Penicillins, Sulfa antibiotics, Cephalexin, Clavulanic acid, Codeine, Contrast dye, Doxycycline, Fluconazole, Iodine, Macrobid [nitrofurantoin], Tobramycin, and Trimethoprim    This patient is managed via a collaborative agreement, pursuant to IC [id number]. The signed protocol is kept in the [facility] at [address] [place] [address].    Education: Hazel Bucio has been instructed to call if any changes in medications, doses, concerns, etc. Patient was provided dosing instructions and expressed verbal understanding and has no further questions at this time.    Plan:  1. Continue previous therapeutic regimen at this time; warfarin 2 mg PO daily. New total weekly dose = 14 mg.   - Instructed home health nurse to discuss with patient signs/symptoms to monitor for and when to seek medical attention.  2. INR should be tested on 7/11 by home health nurse and called into clinic.     Kayleigh Horvath, PharmD  7/5/2022  16:57 EDT

## 2022-07-06 ENCOUNTER — READMISSION MANAGEMENT (OUTPATIENT)
Dept: CALL CENTER | Facility: HOSPITAL | Age: 82
End: 2022-07-06

## 2022-07-06 NOTE — OUTREACH NOTE
Sepsis Week 3 Survey    Flowsheet Row Responses   [facility] patient discharged from? [facility]   Does the patient have one of the following disease processes/diagnoses(primary or secondary)? Sepsis   Week 3 attempt successful? Yes   Call start time 1017   Call end time 1020   Discharge diagnosis weakness, sepsis, Hydronephrosis   Is the patient taking all medications as directed (includes completed medication regime)? Yes   Has the patient kept scheduled appointments due by today? Yes   Home health comments HH coming in to see her   Psychosocial issues? No   What is the patient's perception of their health status since discharge? Improving  [Improving health wise but lost her best friend today and she is very sad]   Is the patient/caregiver able to teach back Sepsis? S - Shivering,fever or very cold   Nursing interventions Nurse provided patient education   Is patient/caregiver able to teach back steps to recovery at home? Rest and regain strength, Eat a balanced diet   Is the patient/caregiver able to teach back signs and symptoms of worsening condition: Fever, Shortness of breath/rapid respiratory rate, Altered mental status(confusion/coma)   Is the patient/caregiver able to teach back the hierarchy of who to call/visit for symptoms/problems? PCP, Specialist, Home health nurse, Urgent Care, ED, 911 Yes   Week 3 call completed? Yes          CELSO WHITING - Registered Nurse

## 2022-07-11 ENCOUNTER — TELEPHONE (OUTPATIENT)
Dept: CARDIOLOGY | Facility: CLINIC | Age: 82
End: 2022-07-11

## 2022-07-11 ENCOUNTER — ANTICOAGULATION VISIT (OUTPATIENT)
Dept: PHARMACY | Facility: HOSPITAL | Age: 82
End: 2022-07-11

## 2022-07-11 LAB — INR PPP: 2.8 (ref 2–3)

## 2022-07-11 NOTE — TELEPHONE ENCOUNTER
----- Message from CHELLE Monte sent at 7/11/2022 12:56 PM EDT -----  She is in afib but rates do not look terribly fast  If symptoms of nausea etc can see pcp or schedule f/u with dr. Ellsworth   ----- Message -----  From: Sanna Peña MA  Sent: 7/11/2022  12:14 PM EDT  To: CHELLE Monte    Patients home health nurse Neida called to report that when she got to her house that the patient reported she had an episode of dizzy/lightheadedness and nauseated today. This lasted about 5-10 minutes. She says today she has felt like she has had periods of her heart racing. Her b/p is 160/78 HR 62 the nurse says it is irregular. They are going to send a transmission over for review.     Neida ALECIA [phone]

## 2022-07-11 NOTE — TELEPHONE ENCOUNTER
Patients home health nurse Neida called to report that when she got to her house that the patient reported she had an episode of dizzy/lightheadedness and nauseated today. This lasted about 5-10 minutes. She says today she has felt like she has had periods of her heart racing. Her b/p is 160/78 HR 62 the nurse says it is irregular. They are going to send a transmission over for review.     Neida [facility] [phone]

## 2022-07-11 NOTE — PROGRESS NOTES
Anticoagulation Clinic Progress Note - Home INR Testing     INR Goal: 2 - 3  Today's INR: 2.8 (therapeutic). INR result was called in today by Neida (home health nurse).   Current warfarin dose: warfarin 2 mg PO daily. Current total weekly dose = 14 mg per week.     INR History:  Anticoagulation Monitoring 2022   INR 1.10 1.70 2.80   INR Date 2022   INR Goal 2.0-3.0 2.0-3.0 2.0-3.0   Trend Same Up Down   Last Week Total 8 mg 13 mg 14 mg   Next Week Total 13 mg 14 mg 13 mg   Sun 2 mg (7/3) 2 mg 2 mg   Mon 2 mg - 1 mg   Tue - 2 mg 2 mg   Wed - 2 mg 2 mg   Thu 2 mg () 2 mg 2 mg   Fri 2 mg 2 mg 2 mg   Sat 2 mg () 2 mg 2 mg   Visit Report - - -   Some recent data might be hidden       Anticoagulation Summary  As of 2022    INR goal:  2.0-3.0   TTR:  44.7 % (2.8 y)   INR used for dosin.80 (2022)   Warfarin maintenance plan:  1 mg every Mon; 2 mg all other days   Weekly warfarin total:  13 mg   Plan last modified:  Kayleigh Horvath, PharmD (2022)   Next INR check:  2022   Priority:  Maintenance   Target end date:      Indications    Atrial fibrillation (HCC) [I48.91]             Anticoagulation Episode Summary     INR check location:      Preferred lab:      Send INR reminders to:  [facility] CLINICAL POOL    Comments:  Patient contract signed 21.  [facility] nurse Neida's phone number = [phone]      Anticoagulation Care Providers     Provider Role Specialty Phone number    Augustin Ellsworth MD  Cardiology [phone]          Clinic Interview:   Patient Findings     Negatives:  Signs/symptoms of thrombosis, Signs/symptoms of bleeding,   Laboratory test error suspected, Change in health, Change in alcohol use,   Change in activity, Upcoming invasive procedure, Emergency department   visit, Upcoming dental procedure, Missed doses, Extra doses, Change in   medications, Change in diet/appetite, Hospital admission, Bruising,  Other   complaints      Clinical Outcomes     Negatives:  Major bleeding event, Thromboembolic event,   Anticoagulation-related hospital admission, Anticoagulation-related ED   visit, Anticoagulation-related fatality        Current Medications:   Prior to Admission medications    Medication Sig Start Date End Date Taking? Authorizing Provider   acetaminophen (TYLENOL) 325 MG tablet Take 650 mg by mouth Every 6 (Six) Hours As Needed for Mild Pain .    Gino Leos MD   Cholecalciferol (Vitamin D3) 50 MCG (2000 UT) tablet Take 2,000 Units by mouth Daily.    Gino Leos MD   digoxin (LANOXIN) 125 MCG tablet Take 1 tablet by mouth Daily. 5/19/22   Augustin Ellsworth MD   dilTIAZem CD (Cardizem CD) 360 MG 24 hr capsule Take 1 capsule by mouth Daily. 5/19/22   Augustin Ellsworth MD   furosemide (LASIX) 20 MG tablet Take 1 tablet by mouth Daily. 6/28/22   Augustin Ellsworth MD   HYDROcodone-acetaminophen (NORCO) 5-325 MG per tablet Take 1 tablet by mouth Every 8 (Eight) Hours As Needed.    Gino Leos MD   metoprolol succinate XL (TOPROL-XL) 50 MG 24 hr tablet Take 1 tablet by mouth 2 (Two) Times a Day. 5/19/22   Augustin Ellsworth MD   Probiotic Product (PROBIOTIC PO) Take  by mouth Daily.    Gino Leos MD   topiramate (TOPAMAX) 100 MG tablet Take 100 mg by mouth every night at bedtime.    Gino Leos MD   warfarin (COUMADIN) 2 MG tablet Take 2 mg by mouth daily or as directed by [facility] ([phone]) 6/9/22   Augustin Ellsworth MD       Medical history:   Past Medical History:   Diagnosis Date   • Bladder cancer (HCC)    • Cancer (HCC)     breast, bladder   • Deep vein thrombosis (HCC)    • Essential hypertension 1/17/2017   • Fracture tibia/fibula, right, closed, initial encounter 8/27/2020   • GERD (gastroesophageal reflux disease)    • History of urostomy    • Immobility 08/27/2020    r/t broken Tibia    • Kidney  carcinoma, right (HCC)     removed    • Long term current use of anticoagulant 1/9/2015   • PAF (paroxysmal atrial fibrillation) (HCC) 6/26/2019   • Presence of cardiac pacemaker 11/03/2014    BS dual chamber PM placed 10/2014 with pocket revision 1/25/17.  HX tachy rob syndrome   • Sick sinus syndrome (HCC) 11/3/2014       Social history:   Social History     Tobacco Use   • Smoking status: Never Smoker   • Smokeless tobacco: Never Used   Substance Use Topics   • Alcohol use: Not Currently       Allergies:    Amoxicillin, Ciprofloxacin, Oxycodone-acetaminophen, Penicillins, Sulfa antibiotics, Cephalexin, Clavulanic acid, Codeine, Contrast dye, Doxycycline, Fluconazole, Iodine, Macrobid [nitrofurantoin], Tobramycin, and Trimethoprim    This patient is managed via a collaborative agreement, pursuant to IC 25-26-16. The signed protocol is kept in the [facility] at [address] [place] [address].    Education: Hazel Bucio has been instructed to call if any changes in medications, doses, concerns, etc. Patient was provided dosing instructions and expressed verbal understanding and has no further questions at this time.    Plan:  1. decrease by 7 % given large increase in INR (1.7 -> 2.8 in 6 days); warfarin 2 mg PO daily, except warfarin 1 mg PO on Monday.  New total weekly dose = 13 mg.   2. INR should be tested in 1 week by home health nurse and called into clinic.     Kayleigh Horvath, Annie  7/11/2022  12:32 EDT

## 2022-07-18 ENCOUNTER — ANTICOAGULATION VISIT (OUTPATIENT)
Dept: PHARMACY | Facility: HOSPITAL | Age: 82
End: 2022-07-18

## 2022-07-18 DIAGNOSIS — I48.0 PAROXYSMAL ATRIAL FIBRILLATION: Primary | ICD-10-CM

## 2022-07-18 LAB — INR PPP: 4 (ref 2–3)

## 2022-07-18 NOTE — PROGRESS NOTES
Anticoagulation Clinic Progress Note - Home INR Testing     INR Goal: 2 - 3  Today's INR: 4.0 (supratherapeutic). INR result was called in today by Home Health nurse, Ashley.   Current warfarin dose: warfarin 2 mg PO daily, except warfarin 1 mg PO on .  Current total weekly dose = 13 mg per week.     INR History:  Anticoagulation Monitoring 2022   INR 1.70 2.80 4.00   INR Date 2022   INR Goal 2.0-3.0 2.0-3.0 2.0-3.0   Trend Up Down Same   Last Week Total 13 mg 14 mg 13 mg   Next Week Total 14 mg 13 mg 12 mg   Sun 2 mg 2 mg -   Mon - 1 mg Hold ()   Tue 2 mg 2 mg 2 mg   Wed 2 mg 2 mg 2 mg   Thu 2 mg 2 mg 2 mg   Fri 2 mg 2 mg -   Sat 2 mg 2 mg -   Visit Report - - -   Some recent data might be hidden       Anticoagulation Summary  As of 2022    INR goal:  2.0-3.0   TTR:  44.5 % (2.8 y)   INR used for dosin.00 (2022)   Warfarin maintenance plan:  1 mg every Mon; 2 mg all other days   Weekly warfarin total:  13 mg   Plan last modified:  Kayleigh Horvath, PharmD (2022)   Next INR check:  2022   Priority:  Maintenance   Target end date:      Indications    Atrial fibrillation (HCC) [I48.91]             Anticoagulation Episode Summary     INR check location:      Preferred lab:      Send INR reminders to:  [facility] CLINICAL POOL    Comments:  Patient contract signed 21.  [facility] nurse Neida's phone number = [phone]      Anticoagulation Care Providers     Provider Role Specialty Phone number    Augustin Ellsworth MD  Cardiology [phone]          Clinic Interview:   Patient Findings     Negatives:  Signs/symptoms of thrombosis, Signs/symptoms of bleeding,   Laboratory test error suspected, Change in health, Change in alcohol use,   Change in activity, Upcoming invasive procedure, Emergency department   visit, Upcoming dental procedure, Missed doses, Extra doses, Change in   medications, Change in diet/appetite,  Hospital admission, Bruising, Other   complaints      Clinical Outcomes     Negatives:  Major bleeding event, Thromboembolic event,   Anticoagulation-related hospital admission, Anticoagulation-related ED   visit, Anticoagulation-related fatality        Current Medications:   Prior to Admission medications    Medication Sig Start Date End Date Taking? Authorizing Provider   acetaminophen (TYLENOL) 325 MG tablet Take 650 mg by mouth Every 6 (Six) Hours As Needed for Mild Pain .    Gino Leos MD   Cholecalciferol (Vitamin D3) 50 MCG (2000 UT) tablet Take 2,000 Units by mouth Daily.    Gino Leos MD   digoxin (LANOXIN) 125 MCG tablet Take 1 tablet by mouth Daily. 5/19/22   Augustin Ellsworth MD   dilTIAZem CD (Cardizem CD) 360 MG 24 hr capsule Take 1 capsule by mouth Daily. 5/19/22   Augustin Ellsworth MD   furosemide (LASIX) 20 MG tablet Take 1 tablet by mouth Daily. 6/28/22   Augustin Ellsworth MD   HYDROcodone-acetaminophen (NORCO) 5-325 MG per tablet Take 1 tablet by mouth Every 8 (Eight) Hours As Needed.    ProviderGino MD   metoprolol succinate XL (TOPROL-XL) 50 MG 24 hr tablet Take 1 tablet by mouth 2 (Two) Times a Day. 5/19/22   Augustin Ellsworth MD   Probiotic Product (PROBIOTIC PO) Take  by mouth Daily.    ProviderGino MD   topiramate (TOPAMAX) 100 MG tablet Take 100 mg by mouth every night at bedtime.    Gino Leos MD   warfarin (COUMADIN) 2 MG tablet Take 2 mg by mouth daily or as directed by [facility] ([phone]) 6/9/22   Augustin Ellsworth MD       Medical history:   Past Medical History:   Diagnosis Date   • Bladder cancer (HCC)    • Cancer (HCC)     breast, bladder   • Deep vein thrombosis (HCC)    • Essential hypertension 1/17/2017   • Fracture tibia/fibula, right, closed, initial encounter 8/27/2020   • GERD (gastroesophageal reflux disease)    • History of urostomy    • Immobility 08/27/2020    r/t  broken Tibia    • Kidney carcinoma, right (HCC)     removed    • Long term current use of anticoagulant 1/9/2015   • PAF (paroxysmal atrial fibrillation) (HCC) 6/26/2019   • Presence of cardiac pacemaker 11/03/2014    BS dual chamber PM placed 10/2014 with pocket revision 1/25/17.  HX tachy rob syndrome   • Sick sinus syndrome (HCC) 11/3/2014       Social history:   Social History     Tobacco Use   • Smoking status: Never Smoker   • Smokeless tobacco: Never Used   Substance Use Topics   • Alcohol use: Not Currently       Allergies:    Amoxicillin, Ciprofloxacin, Oxycodone-acetaminophen, Penicillins, Sulfa antibiotics, Cephalexin, Clavulanic acid, Codeine, Contrast dye, Doxycycline, Fluconazole, Iodine, Macrobid [nitrofurantoin], Tobramycin, and Trimethoprim    This patient is managed via a collaborative agreement, pursuant to IC [id number]. The signed protocol is kept in the [facility] at [address] [place] [facility].    Education: Hazel Bucio has been instructed to call if any changes in medications, doses, concerns, etc. Patient was provided dosing instructions and expressed verbal understanding and has no further questions at this time.    Plan:  1. Hold warfarin today; then 2 mg daily until INR re-check on Friday by Home Health  -Instructed patient to monitor for signs/symptoms of bleeding and eat leafy greens.          Basil Mcmillan, PharmD  7/18/2022  13:25 EDT

## 2022-07-22 ENCOUNTER — ANTICOAGULATION VISIT (OUTPATIENT)
Dept: PHARMACY | Facility: HOSPITAL | Age: 82
End: 2022-07-22

## 2022-07-22 LAB — INR PPP: 3.3 (ref 2–3)

## 2022-07-22 NOTE — PROGRESS NOTES
Anticoagulation Clinic Progress Note - Home INR Testing     INR Goal: 2 - 3  Today's INR: 3.3 (supratherapeutic). INR result was obtained by home health nurse today and [facility]ed to [facility]. Appointment conducted via phone today with home health nurse, Ashley. Home health nurse to discuss with patient.  Current warfarin dose: Held dose on 7/18 and warfarin 2 mg PO daily. Total weekly dose of past 7 days = 12 mg.    Of note, home health nurse states she scheduled appointment with patient's PCP for later next week in regards to increased shoulder pain. She also requested a steroid dose pack be prescribed for this pain but is unsure if this will occur or not. Discussed that most steroids can increase INR.    INR History:  Anticoagulation Monitoring 7/11/2022 7/18/2022 7/22/2022   INR 2.80 4.00 3.30   INR Date 7/11/2022 7/18/2022 7/22/2022   INR Goal 2.0-3.0 2.0-3.0 2.0-3.0   Trend Down Same Down   Last Week Total 14 mg 13 mg 12 mg   Next Week Total 13 mg 12 mg 12 mg   Sun 2 mg - 2 mg   Mon 1 mg Hold (7/18) 1 mg   Tue 2 mg 2 mg 2 mg   Wed 2 mg 2 mg -   Thu 2 mg 2 mg -   Fri 2 mg - 1 mg   Sat 2 mg - 2 mg   Visit Report - - -   Some recent data might be hidden       Anticoagulation Summary  As of 7/22/2022    INR goal:  2.0-3.0   TTR:  44.3 % (2.8 y)   INR used for dosing:  3.30 (7/22/2022)   Warfarin maintenance plan:  1 mg every Mon, Fri; 2 mg all other days   Weekly warfarin total:  12 mg   Plan last modified:  Kayleigh Horvath, PharmD (7/22/2022)   Next INR check:  7/27/2022   Priority:  Maintenance   Target end date:      Indications    Atrial fibrillation (HCC) [I48.91]             Anticoagulation Episode Summary     INR check location:      Preferred lab:      Send INR reminders to:  [facility] CLINICAL POOL    Comments:  Patient contract signed 11/17/21.  [facility] nurse Neida's phone number = [phone]      Anticoagulation Care Providers     Provider Role Specialty Phone number    Seng  Augustin Barnes MD  Cardiology [phone]          Clinic Interview:   Patient Findings     Positives:  Change in health (Increased shoulder pain (may increase INR))      Negatives:  Signs/symptoms of thrombosis, Signs/symptoms of bleeding,   Laboratory test error suspected, Change in alcohol use, Change in   activity, Upcoming invasive procedure, Emergency department visit,   Upcoming dental procedure, Missed doses, Extra doses, Change in   medications, Change in diet/appetite, Hospital admission, Bruising, Other   complaints      Clinical Outcomes     Negatives:  Major bleeding event, Thromboembolic event,   Anticoagulation-related hospital admission, Anticoagulation-related ED   visit, Anticoagulation-related fatality        Current Medications:   Prior to Admission medications    Medication Sig Start Date End Date Taking? Authorizing Provider   acetaminophen (TYLENOL) 325 MG tablet Take 650 mg by mouth Every 6 (Six) Hours As Needed for Mild Pain .    Gino Leos MD   Cholecalciferol (Vitamin D3) 50 MCG (2000 UT) tablet Take 2,000 Units by mouth Daily.    Gino Leos MD   digoxin (LANOXIN) 125 MCG tablet Take 1 tablet by mouth Daily. 5/19/22   Augustin Ellsworth MD   dilTIAZem CD (Cardizem CD) 360 MG 24 hr capsule Take 1 capsule by mouth Daily. 5/19/22   Augustin Ellsworth MD   furosemide (LASIX) 20 MG tablet Take 1 tablet by mouth Daily. 6/28/22   Augustin Ellsworth MD   HYDROcodone-acetaminophen (NORCO) 5-325 MG per tablet Take 1 tablet by mouth Every 8 (Eight) Hours As Needed.    Gino Leos MD   metoprolol succinate XL (TOPROL-XL) 50 MG 24 hr tablet Take 1 tablet by mouth 2 (Two) Times a Day. 5/19/22   Augustin Ellsworth MD   Probiotic Product (PROBIOTIC PO) Take  by mouth Daily.    Gino Leos MD   topiramate (TOPAMAX) 100 MG tablet Take 100 mg by mouth every night at bedtime.    Gino Leos MD   warfarin (COUMADIN) 2 MG tablet  Take 2 mg by mouth daily or as directed by [facility] ([phone]) 6/9/22   Augustin Ellsworth MD       Medical history:   Past Medical History:   Diagnosis Date   • Bladder cancer (HCC)    • Cancer (HCC)     breast, bladder   • Deep vein thrombosis (HCC)    • Essential hypertension 1/17/2017   • Fracture tibia/fibula, right, closed, initial encounter 8/27/2020   • GERD (gastroesophageal reflux disease)    • History of urostomy    • Immobility 08/27/2020    r/t broken Tibia    • Kidney carcinoma, right (HCC)     removed    • Long term current use of anticoagulant 1/9/2015   • PAF (paroxysmal atrial fibrillation) ([facility]) 6/26/2019   • Presence of cardiac pacemaker 11/03/2014    BS dual chamber PM placed 10/2014 with pocket revision 1/25/17.  HX tachy rob syndrome   • Sick sinus syndrome (HCC) 11/3/2014       Social history:   Social History     Tobacco Use   • Smoking status: Never Smoker   • Smokeless tobacco: Never Used   Substance Use Topics   • Alcohol use: Not Currently       Allergies:    Amoxicillin, Ciprofloxacin, Oxycodone-acetaminophen, Penicillins, Sulfa antibiotics, Cephalexin, Clavulanic acid, Codeine, Contrast dye, Doxycycline, Fluconazole, Iodine, Macrobid [nitrofurantoin], Tobramycin, and Trimethoprim    This patient is managed via a collaborative agreement, pursuant to IC [id number]. The signed protocol is kept in the [facility] at [address] [place] [address].    Education: Hazel Bucio has been instructed to call if any changes in medications, doses, concerns, etc. Patient was provided dosing instructions and expressed verbal understanding and has no further questions at this time.    Plan:  1. Continue total weekly dose of past 7 days; warfarin 2 mg PO daily, except warfarin 1 mg PO on Monday and Friday.  Total weekly dose = 12 mg.   2. Discussed with home health nurse that if steroid is initiated, INR should be checked on 7/25 but if not,  INR should be checked on 7/27.    Kayleigh Horvath PharmD  7/22/2022  14:17 EDT

## 2022-07-25 ENCOUNTER — ANTICOAGULATION VISIT (OUTPATIENT)
Dept: PHARMACY | Facility: HOSPITAL | Age: 82
End: 2022-07-25

## 2022-07-25 DIAGNOSIS — I48.0 PAROXYSMAL ATRIAL FIBRILLATION: Primary | ICD-10-CM

## 2022-07-25 LAB — INR PPP: 4.3 (ref 2–3)

## 2022-07-25 NOTE — PROGRESS NOTES
Anticoagulation Clinic Progress Note - Home INR Testing     INR Goal: 2 - 3  Today's INR: 4.3 (supratherapeutic). INR result was called in today by Neida home health nurse.   Current warfarin dose: warfarin 2 mg PO daily, except warfarin 1 mg PO on Monday and .  Current total weekly dose = 11 mg per week.   -Patient stated never started steroid and no other changes since last INR    INR History:  Anticoagulation Monitoring 2022   INR 4.00 3.30 4.30   INR Date 2022   INR Goal 2.0-3.0 2.0-3.0 2.0-3.0   Trend Same Down Same   Last Week Total 13 mg 12 mg 11 mg   Next Week Total 12 mg 12 mg 9 mg   Sun - 2 mg -   Mon Hold () 1 mg Hold ()   Tue 2 mg 2 mg Hold ()   Wed 2 mg - -   Thu 2 mg - -   Fri - 1 mg -   Sat - 2 mg -   Visit Report - - -   Some recent data might be hidden       Anticoagulation Summary  As of 2022    INR goal:  2.0-3.0   TTR:  44.2 % (2.8 y)   INR used for dosin.30 (2022)   Warfarin maintenance plan:  1 mg every Mon, Fri; 2 mg all other days   Weekly warfarin total:  12 mg   Plan last modified:  Kayleigh Horvath, PharmD (2022)   Next INR check:  2022   Priority:  Maintenance   Target end date:      Indications    Atrial fibrillation (HCC) [I48.91]             Anticoagulation Episode Summary     INR check location:      Preferred lab:      Send INR reminders to:  [facility] CLINICAL POOL    Comments:  Patient contract signed 21.  [facility] nurse Neida's phone number = [phone]      Anticoagulation Care Providers     Provider Role Specialty Phone number    Augustin Ellsworth MD  Cardiology [phone]          Clinic Interview:   Patient Findings     Negatives:  Signs/symptoms of thrombosis, Signs/symptoms of bleeding,   Laboratory test error suspected, Change in health, Change in alcohol use,   Change in activity, Upcoming invasive procedure, Emergency department   visit, Upcoming dental  procedure, Missed doses, Extra doses, Change in   medications, Change in diet/appetite, Hospital admission, Bruising, Other   complaints      Clinical Outcomes     Negatives:  Major bleeding event, Thromboembolic event,   Anticoagulation-related hospital admission, Anticoagulation-related ED   visit, Anticoagulation-related fatality        Current Medications:   Prior to Admission medications    Medication Sig Start Date End Date Taking? Authorizing Provider   acetaminophen (TYLENOL) 325 MG tablet Take 650 mg by mouth Every 6 (Six) Hours As Needed for Mild Pain .    ProviderGino MD   Cholecalciferol (Vitamin D3) 50 MCG (2000 UT) tablet Take 2,000 Units by mouth Daily.    ProviderGino MD   digoxin (LANOXIN) 125 MCG tablet Take 1 tablet by mouth Daily. 5/19/22   Augustin Ellsworth MD   dilTIAZem CD (Cardizem CD) 360 MG 24 hr capsule Take 1 capsule by mouth Daily. 5/19/22   Augustin Ellsworth MD   furosemide (LASIX) 20 MG tablet Take 1 tablet by mouth Daily. 6/28/22   Augustin Ellsworth MD   HYDROcodone-acetaminophen (NORCO) 5-325 MG per tablet Take 1 tablet by mouth Every 8 (Eight) Hours As Needed.    ProviderGino MD   metoprolol succinate XL (TOPROL-XL) 50 MG 24 hr tablet Take 1 tablet by mouth 2 (Two) Times a Day. 5/19/22   Augustin Ellsworth MD   Probiotic Product (PROBIOTIC PO) Take  by mouth Daily.    ProviderGino MD   topiramate (TOPAMAX) 100 MG tablet Take 100 mg by mouth every night at bedtime.    ProviderGino MD   warfarin (COUMADIN) 2 MG tablet Take 2 mg by mouth daily or as directed by [facility] ([phone]) 6/9/22   Augustin Ellsworth MD       Medical history:   Past Medical History:   Diagnosis Date   • Bladder cancer (HCC)    • Cancer (HCC)     breast, bladder   • Deep vein thrombosis (HCC)    • Essential hypertension 1/17/2017   • Fracture tibia/fibula, right, closed, initial encounter 8/27/2020   • GERD  (gastroesophageal reflux disease)    • History of urostomy    • Immobility 08/27/2020    r/t broken Tibia    • Kidney carcinoma, right (HCC)     removed    • Long term current use of anticoagulant 1/9/2015   • PAF (paroxysmal atrial fibrillation) ([facility]) 6/26/2019   • Presence of cardiac pacemaker 11/03/2014    BS dual chamber PM placed 10/2014 with pocket revision 1/25/17.  HX tachy rob syndrome   • Sick sinus syndrome ([facility]) 11/3/2014       Social history:   Social History     Tobacco Use   • Smoking status: Never Smoker   • Smokeless tobacco: Never Used   Substance Use Topics   • Alcohol use: Not Currently       Allergies:    Amoxicillin, Ciprofloxacin, Oxycodone-acetaminophen, Penicillins, Sulfa antibiotics, Cephalexin, Clavulanic acid, Codeine, Contrast dye, Doxycycline, Fluconazole, Iodine, Macrobid [nitrofurantoin], Tobramycin, and Trimethoprim    This patient is managed via a collaborative agreement, pursuant to IC [id number]. The signed protocol is kept in the [facility] at [address] [place] [facility].    Education: Hazel Bucio has been instructed to call if any changes in medications, doses, concerns, etc. Patient was provided dosing instructions and expressed verbal understanding and has no further questions at this time.    Plan:  1. Hold today and tomorrow then home health to test on Wednesday   2. INR should be tested Wednesday by home health and called into clinic.       Basil Mcmillan, PharmD  7/25/2022  12:50 EDT

## 2022-07-27 ENCOUNTER — ANTICOAGULATION VISIT (OUTPATIENT)
Dept: PHARMACY | Facility: HOSPITAL | Age: 82
End: 2022-07-27

## 2022-07-27 DIAGNOSIS — I48.0 PAROXYSMAL ATRIAL FIBRILLATION: Primary | ICD-10-CM

## 2022-07-27 LAB — INR PPP: 2.4 (ref 2–3)

## 2022-07-27 NOTE — PROGRESS NOTES
Anticoagulation Clinic Progress Note - Home INR Testing     INR Goal: 2 - 3  Today's INR: 2.4 (therapeutic). INR result was called in today by Neida Home health nurse.   Current warfarin dose:  (Held doses  &  ( Last 7 days = 10 mg)).      INR History:  Anticoagulation Monitoring 2022   INR 3.30 4.30 2.40   INR Date 2022   INR Goal 2.0-3.0 2.0-3.0 2.0-3.0   Trend Down Same Same   Last Week Total 12 mg 11 mg 9 mg   Next Week Total 12 mg 9 mg 12 mg   Sun 2 mg - 2 mg   Mon 1 mg Hold () 1 mg   Tue 2 mg Hold () -   Wed - - 2 mg   Thu - - 2 mg   Fri 1 mg - 1 mg   Sat 2 mg - 2 mg   Visit Report - - -   Some recent data might be hidden       Anticoagulation Summary  As of 2022    INR goal:  2.0-3.0   TTR:  44.2 % (2.8 y)   INR used for dosin.40 (2022)   Warfarin maintenance plan:  1 mg every Mon, Fri; 2 mg all other days   Weekly warfarin total:  12 mg   Plan last modified:  Kayleigh Horvath, PharmD (2022)   Next INR check:  2022   Priority:  Maintenance   Target end date:      Indications    Atrial fibrillation (HCC) [I48.91]             Anticoagulation Episode Summary     INR check location:      Preferred lab:      Send INR reminders to:  [facility] CLINICAL POOL    Comments:  Patient contract signed 21.  [facility] nurse Neida's phone number = [phone]      Anticoagulation Care Providers     Provider Role Specialty Phone number    Augustin Ellsworth MD  Cardiology [phone]          Clinic Interview:       Current Medications:   Prior to Admission medications    Medication Sig Start Date End Date Taking? Authorizing Provider   acetaminophen (TYLENOL) 325 MG tablet Take 650 mg by mouth Every 6 (Six) Hours As Needed for Mild Pain .    Provider, MD Gino   Cholecalciferol (Vitamin D3) 50 MCG (2000 UT) tablet Take 2,000 Units by mouth Daily.    Provider, MD Gino   digoxin (LANOXIN) 125 MCG tablet Take  1 tablet by mouth Daily. 5/19/22   Augustin Ellsworth MD   dilTIAZem CD (Cardizem CD) 360 MG 24 hr capsule Take 1 capsule by mouth Daily. 5/19/22   Augustin Ellsworth MD   furosemide (LASIX) 20 MG tablet Take 1 tablet by mouth Daily. 6/28/22   Augustin Ellsworth MD   HYDROcodone-acetaminophen (NORCO) 5-325 MG per tablet Take 1 tablet by mouth Every 8 (Eight) Hours As Needed.    Provider, MD Gino   metoprolol succinate XL (TOPROL-XL) 50 MG 24 hr tablet Take 1 tablet by mouth 2 (Two) Times a Day. 5/19/22   Augustin Ellsworth MD   Probiotic Product (PROBIOTIC PO) Take  by mouth Daily.    Provider, MD Gino   topiramate (TOPAMAX) 100 MG tablet Take 100 mg by mouth every night at bedtime.    Provider, MD Gino   warfarin (COUMADIN) 2 MG tablet Take 2 mg by mouth daily or as directed by [facility] ([phone]) 6/9/22   Augustin Ellsworth MD       Medical history:   Past Medical History:   Diagnosis Date   • Bladder cancer (HCC)    • Cancer (HCC)     breast, bladder   • Deep vein thrombosis (HCC)    • Essential hypertension 1/17/2017   • Fracture tibia/fibula, right, closed, initial encounter 8/27/2020   • GERD (gastroesophageal reflux disease)    • History of urostomy    • Immobility 08/27/2020    r/t broken Tibia    • Kidney carcinoma, right (HCC)     removed    • Long term current use of anticoagulant 1/9/2015   • PAF (paroxysmal atrial fibrillation) (HCC) 6/26/2019   • Presence of cardiac pacemaker 11/03/2014    BS dual chamber PM placed 10/2014 with pocket revision 1/25/17.  HX tachy rob syndrome   • Sick sinus syndrome (HCC) 11/3/2014       Social history:   Social History     Tobacco Use   • Smoking status: Never Smoker   • Smokeless tobacco: Never Used   Substance Use Topics   • Alcohol use: Not Currently       Allergies:    Amoxicillin, Ciprofloxacin, Oxycodone-acetaminophen, Penicillins, Sulfa antibiotics, Cephalexin, Clavulanic acid, Codeine,  Contrast dye, Doxycycline, Fluconazole, Iodine, Macrobid [nitrofurantoin], Tobramycin, and Trimethoprim    This patient is managed via a collaborative agreement, pursuant to IC [id number]. The signed protocol is kept in the [facility] at [address] [place] [address].    Education: Hazel Bucio has been instructed to call if any changes in medications, doses, concerns, etc. Patient was provided dosing instructions and expressed verbal understanding and has no further questions at this time.    Plan:  1. Warfarin 2 mg daily except 1 mg on Monday and Friday;  NEW Total weekly dose = 10 mg.   2. INR should be tested Monday and called into clinic.       Basil Mcmillan, PharmD  7/27/2022  13:43 EDT

## 2022-08-02 ENCOUNTER — ANTICOAGULATION VISIT (OUTPATIENT)
Dept: PHARMACY | Facility: HOSPITAL | Age: 82
End: 2022-08-02

## 2022-08-02 LAB — INR PPP: 3.4 (ref 2–3)

## 2022-08-02 NOTE — PROGRESS NOTES
I have reviewed the notes, assessments, and/or procedures performed by the pharmacy intern, I concur with her/his documentation of Hazel Bucio.

## 2022-08-02 NOTE — PROGRESS NOTES
Anticoagulation Clinic Progress Note - Home INR Testing     INR Goal: 2 - 3  Today's INR: 3.4 (supratherapeutic). INR result was called in today by Neida (home health nurse).   Current warfarin dose: doses of past 7 days are as follows:     7/25 - 7/26: 0 mg  7/27 - 7/28: warfarin 2 mg  7/29 : warfarin 1 mg  7/30 - 7/3: warfarin 2 mg  8/1: warfarin 1 mg    Total weekly dose of past 7 days = 10 mg    INR History:  Anticoagulation Monitoring 7/25/2022 7/27/2022 8/2/2022   INR 4.30 2.40 3.40   INR Date 7/25/2022 7/27/2022 8/2/2022   INR Goal 2.0-3.0 2.0-3.0 2.0-3.0   Trend Same Same Down   Last Week Total 11 mg 9 mg 10 mg   Next Week Total 9 mg 12 mg 10 mg   Sun - 2 mg 2 mg   Mon Hold (7/25) 1 mg 1 mg   Tue Hold (7/26) - 2 mg   Wed - 2 mg 1 mg   Thu - 2 mg 2 mg   Fri - 1 mg 1 mg   Sat - 2 mg 1 mg   Visit Report - - -   Some recent data might be hidden       Anticoagulation Summary  As of 8/2/2022    INR goal:  2.0-3.0   TTR:  44.3 % (2.8 y)   INR used for dosing:  3.40 (8/2/2022)   Warfarin maintenance plan:  2 mg every Sun, Tue, Thu; 1 mg all other days   Weekly warfarin total:  10 mg   Plan last modified:  Lindsay Finney, Pharmacy Intern (8/2/2022)   Next INR check:  8/9/2022   Priority:  Maintenance   Target end date:      Indications    Atrial fibrillation (HCC) [I48.91]             Anticoagulation Episode Summary     INR check location:      Preferred lab:      Send INR reminders to:  [facility] CLINICAL POOL    Comments:  Patient contract signed 11/17/21.  [facility] nurse Neida's phone number = [phone]      Anticoagulation Care Providers     Provider Role Specialty Phone number    Augustin Ellsworth MD  Cardiology [phone]          Clinic Interview:   Patient Findings     Positives:  Change in health (patient has been more stressed (may   increase INR), has not been feeling well due to stomach issues and hernias   (may increase INR)), Change in activity (less active (may increase INR))     Negatives:  Signs/symptoms of thrombosis, Signs/symptoms of bleeding,   Laboratory test error suspected, Change in alcohol use, Upcoming invasive   procedure, Emergency department visit, Upcoming dental procedure, Missed   doses, Extra doses, Change in medications, Change in diet/appetite,   Hospital admission, Bruising, Other complaints      Clinical Outcomes     Negatives:  Major bleeding event, Thromboembolic event,   Anticoagulation-related hospital admission, Anticoagulation-related ED   visit, Anticoagulation-related fatality        Current Medications:   Prior to Admission medications    Medication Sig Start Date End Date Taking? Authorizing Provider   acetaminophen (TYLENOL) 325 MG tablet Take 650 mg by mouth Every 6 (Six) Hours As Needed for Mild Pain .    Gino Leos MD   Cholecalciferol (Vitamin D3) 50 MCG (2000 UT) tablet Take 2,000 Units by mouth Daily.    Gino Leos MD   digoxin (LANOXIN) 125 MCG tablet Take 1 tablet by mouth Daily. 5/19/22   Augustin Ellsworth MD   dilTIAZem CD (Cardizem CD) 360 MG 24 hr capsule Take 1 capsule by mouth Daily. 5/19/22   Augustin Ellsworth MD   furosemide (LASIX) 20 MG tablet Take 1 tablet by mouth Daily. 6/28/22   Augustin Ellsworth MD   HYDROcodone-acetaminophen (NORCO) 5-325 MG per tablet Take 1 tablet by mouth Every 8 (Eight) Hours As Needed.    Gino Leos MD   metoprolol succinate XL (TOPROL-XL) 50 MG 24 hr tablet Take 1 tablet by mouth 2 (Two) Times a Day. 5/19/22   Augustin Ellsworth MD   Probiotic Product (PROBIOTIC PO) Take  by mouth Daily.    Gino Leos MD   topiramate (TOPAMAX) 100 MG tablet Take 100 mg by mouth every night at bedtime.    Gino Leos MD   warfarin (COUMADIN) 2 MG tablet Take 2 mg by mouth daily or as directed by [facility] ([phone]) 6/9/22   Augustin Ellsworth MD       Medical history:   Past Medical History:   Diagnosis Date   • Bladder  cancer (HCC)    • Cancer (HCC)     breast, bladder   • Deep vein thrombosis (HCC)    • Essential hypertension 1/17/2017   • Fracture tibia/fibula, right, closed, initial encounter 8/27/2020   • GERD (gastroesophageal reflux disease)    • History of urostomy    • Immobility 08/27/2020    r/t broken Tibia    • Kidney carcinoma, right (HCC)     removed    • Long term current use of anticoagulant 1/9/2015   • PAF (paroxysmal atrial fibrillation) (HCC) 6/26/2019   • Presence of cardiac pacemaker 11/03/2014    BS dual chamber PM placed 10/2014 with pocket revision 1/25/17.  HX tachy rob syndrome   • Sick sinus syndrome (HCC) 11/3/2014       Social history:   Social History     Tobacco Use   • Smoking status: Never Smoker   • Smokeless tobacco: Never Used   Substance Use Topics   • Alcohol use: Not Currently       Allergies:    Amoxicillin, Ciprofloxacin, Oxycodone-acetaminophen, Penicillins, Sulfa antibiotics, Cephalexin, Clavulanic acid, Codeine, Contrast dye, Doxycycline, Fluconazole, Iodine, Macrobid [nitrofurantoin], Tobramycin, and Trimethoprim    This patient is managed via a collaborative agreement, pursuant to IC [id number]. The signed protocol is kept in the [facility] at [address] [place] [address].    Education: Hazel Bucio has been instructed to call if any changes in medications, doses, concerns, etc. Patient was provided dosing instructions and expressed verbal understanding and has no further questions at this time.    Plan:  1. Given large increase in INR in 6 days (2.4 -> 3.4), will decrease by 20% from last 7 day dose; warfarin 1 mg PO daily except warfarin 2 mg PO on Sunday. New total weekly dose = 8 mg  - Patient has appt with surgeon tomorrow 8/3 in regards to hernias. Will let us know of any medication changes, date of surgery, and plan for warfarin in regards to surgery.   - Encouraged patient to consume extra dark leafy greens in the next few days to help lower  INR.  - Counseled patient on signs/symptoms to monitor for and when to seek medical attention.  2. INR should be tested in 1 week by home health and called into clinic.     RILEY CHAWLA, Pharmacy Intern  8/2/2022  15:38 EDT

## 2022-08-05 ENCOUNTER — TRANSCRIBE ORDERS (OUTPATIENT)
Dept: ADMINISTRATIVE | Facility: HOSPITAL | Age: 82
End: 2022-08-05

## 2022-08-05 DIAGNOSIS — K94.19 PARA-ILEOSTOMY HERNIA: Primary | ICD-10-CM

## 2022-08-05 DIAGNOSIS — K43.5 PARA-ILEOSTOMY HERNIA: Primary | ICD-10-CM

## 2022-08-09 ENCOUNTER — ANTICOAGULATION VISIT (OUTPATIENT)
Dept: PHARMACY | Facility: HOSPITAL | Age: 82
End: 2022-08-09

## 2022-08-09 LAB — INR PPP: 4.3 (ref 2–3)

## 2022-08-09 NOTE — PROGRESS NOTES
"Anticoagulation Clinic Progress Note - Home INR Testing     INR Goal: 2 - 3  Today's INR: 4.3 (supratherapeutic). INR result was called in today by Neida (home health nurse).   Current warfarin dose: warfarin 1 mg PO daily, except warfarin 2 mg PO on Tuesday, Thursday, and .  This differs from instructed regimen of warfarin 1 mg PO daily except 2 mg PO on . Patient states she forgot to write down correct regimen when home health nurse called her last week with updated regimen. Total weekly dose of past 7 days = 10 mg (25% increase from instructed regimen from last week).    Patient will be having hernia surgery at some point but is unsure of when. Per home health nurse, \"a few things\" have to occur prior to surgery but patient was unsure of exactly what. Assuming cardiac clearance will be obtained but will monitor for cardiac clearance form to be uploaded in Epic.    INR History:  Anticoagulation Monitoring 2022   INR 2.40 3.4 4.30   INR Date 2022   INR Goal 2.0-3.0 2.0-3.0 2.0-3.0   Trend Same Down Same   Last Week Total 9 mg 10 mg 8 mg   Next Week Total 12 mg 8 mg 6 mg   Sun 2 mg 2 mg -   Mon 1 mg 1 mg -   Tue - 1 mg Hold ()   Wed 2 mg 1 mg Hold (8/10)   Thu 2 mg 1 mg 1 mg   Fri 1 mg 1 mg -   Sat 2 mg 1 mg -   Visit Report - - -   Some recent data might be hidden       Anticoagulation Summary  As of 2022    INR goal:  2.0-3.0   TTR:  44.0 % (2.8 y)   INR used for dosin.30 (2022)   Warfarin maintenance plan:  2 mg every Sun; 1 mg all other days   Weekly warfarin total:  8 mg   Plan last modified:  Lindsay Finney, Pharmacy Intern (2022)   Next INR check:  2022   Priority:  Maintenance   Target end date:      Indications    Atrial fibrillation (HCC) [I48.91]             Anticoagulation Episode Summary     INR check location:      Preferred lab:      Send INR reminders to:  [facility] CLINICAL POOL    Comments:  Patient " contract signed 11/17/21.  ANAIDA nurse Neida's phone number = [phone]      Anticoagulation Care Providers     Provider Role Specialty Phone number    Augustin Ellsworth MD  Cardiology [phone]          Clinic Interview:   Patient Findings     Positives:  Upcoming invasive procedure (will have hernia surgery, most   likely next month), Extra doses (patient took 2mg on Tuesday and Thursday   last week instead of 1mg (may increase INR))    Negatives:  Signs/symptoms of thrombosis, Signs/symptoms of bleeding,   Laboratory test error suspected, Change in health, Change in alcohol use,   Change in activity, Emergency department visit, Upcoming dental procedure,   Missed doses, Change in medications, Change in diet/appetite, Hospital   admission, Bruising, Other complaints      Clinical Outcomes     Negatives:  Major bleeding event, Thromboembolic event,   Anticoagulation-related hospital admission, Anticoagulation-related ED   visit, Anticoagulation-related fatality        Current Medications:   Prior to Admission medications    Medication Sig Start Date End Date Taking? Authorizing Provider   acetaminophen (TYLENOL) 325 MG tablet Take 650 mg by mouth Every 6 (Six) Hours As Needed for Mild Pain .    ProviderGino MD   Cholecalciferol (Vitamin D3) 50 MCG (2000 UT) tablet Take 2,000 Units by mouth Daily.    ProviderGino MD   digoxin (LANOXIN) 125 MCG tablet Take 1 tablet by mouth Daily. 5/19/22   Augustin Ellsworth MD   dilTIAZem CD (Cardizem CD) 360 MG 24 hr capsule Take 1 capsule by mouth Daily. 5/19/22   Augustin Ellsworth MD   furosemide (LASIX) 20 MG tablet Take 1 tablet by mouth Daily. 6/28/22   Augustin Ellsworth MD   HYDROcodone-acetaminophen (NORCO) 5-325 MG per tablet Take 1 tablet by mouth Every 8 (Eight) Hours As Needed.    ProviderGino MD   metoprolol succinate XL (TOPROL-XL) 50 MG 24 hr tablet Take 1 tablet by mouth 2 (Two) Times a Day. 5/19/22   Seng  Augustin Barnes MD   Probiotic Product (PROBIOTIC PO) Take  by mouth Daily.    Provider, MD Gino   topiramate (TOPAMAX) 100 MG tablet Take 100 mg by mouth every night at bedtime.    Provider, MD Gino   warfarin (COUMADIN) 2 MG tablet Take 2 mg by mouth daily or as directed by [facility] ([phone]) 6/9/22   Augustin Ellsworth MD       Medical history:   Past Medical History:   Diagnosis Date   • Bladder cancer (HCC)    • Cancer (HCC)     breast, bladder   • Deep vein thrombosis (HCC)    • Essential hypertension 1/17/2017   • Fracture tibia/fibula, right, closed, initial encounter 8/27/2020   • GERD (gastroesophageal reflux disease)    • History of urostomy    • Immobility 08/27/2020    r/t broken Tibia    • Kidney carcinoma, right (HCC)     removed    • Long term current use of anticoagulant 1/9/2015   • PAF (paroxysmal atrial fibrillation) ([facility]) 6/26/2019   • Presence of cardiac pacemaker 11/03/2014    BS dual chamber PM placed 10/2014 with pocket revision 1/25/17.  HX tachy rob syndrome   • Sick sinus syndrome ([facility]) 11/3/2014       Social history:   Social History     Tobacco Use   • Smoking status: Never Smoker   • Smokeless tobacco: Never Used   Substance Use Topics   • Alcohol use: Not Currently       Allergies:    Amoxicillin, Ciprofloxacin, Oxycodone-acetaminophen, Penicillins, Sulfa antibiotics, Cephalexin, Clavulanic acid, Codeine, Contrast dye, Doxycycline, Fluconazole, Iodine, Macrobid [nitrofurantoin], Tobramycin, and Trimethoprim    This patient is managed via a collaborative agreement, pursuant to IC [id number]. The signed protocol is kept in the [facility] at [address] [place] [address].    Education: Hazel Bucio has been instructed to call if any changes in medications, doses, concerns, etc. Patient was provided dosing instructions and expressed verbal understanding and has no further questions at this time.    Plan:  1.  Hold doses today and tomorrow then take warfarin 1 mg PO on 8/11.   - Likely will plan to initiate previously instructed regimen of warfarin 1 mg PO daily except 2 mg PO on Sunday which is a 20% decrease from total weekly dose of past 7 days.  - Instructed home health nurse to discuss with patient signs/symptoms to monitor for and when to seek medical attention.  2. INR should be tested in 3 days by home health nurse and called into clinic.     RILEY CHAWLA, Pharmacy Intern  8/9/2022  13:41 EDT

## 2022-08-12 ENCOUNTER — ANTICOAGULATION VISIT (OUTPATIENT)
Dept: PHARMACY | Facility: HOSPITAL | Age: 82
End: 2022-08-12

## 2022-08-12 DIAGNOSIS — I48.0 PAROXYSMAL ATRIAL FIBRILLATION: Primary | ICD-10-CM

## 2022-08-12 LAB — INR PPP: 1.6 (ref 2–3)

## 2022-08-12 NOTE — PROGRESS NOTES
Anticoagulation Clinic Progress Note - Home INR Testing     INR Goal: 2 - 3  Today's INR: 1.6 (subtherapeutic). INR result was called in today by Neida home health nurse.   Current warfarin dose: Held dose  & 8/10 then 1 mg  Current total weekly dose = 6 mg in last 7 days.     INR History:  Anticoagulation Monitoring 2022   INR 3.4 4.3 1.60   INR Date 2022   INR Goal 2.0-3.0 2.0-3.0 2.0-3.0   Trend Down Same Same   Last Week Total 10 mg 10 mg 6 mg   Next Week Total 8 mg 6 mg 8 mg   Sun 2 mg - 2 mg   Mon 1 mg - 1 mg   Tue 1 mg Hold () -   Wed 1 mg Hold (8/10) -   Thu 1 mg 1 mg -   Fri 1 mg - 1 mg   Sat 1 mg - 1 mg   Visit Report - - -   Some recent data might be hidden       Anticoagulation Summary  As of 2022    INR goal:  2.0-3.0   TTR:  44.0 % (2.8 y)   INR used for dosin.60 (2022)   Warfarin maintenance plan:  2 mg every Sun; 1 mg all other days   Weekly warfarin total:  8 mg   Plan last modified:  Lindsay Finney, Pharmacy Intern (2022)   Next INR check:  2022   Priority:  Maintenance   Target end date:      Indications    Atrial fibrillation (HCC) [I48.91]             Anticoagulation Episode Summary     INR check location:      Preferred lab:      Send INR reminders to:  [facility] CLINICAL POOL    Comments:  Patient contract signed 21.  VITOR nurse Neida's phone number = [phone]      Anticoagulation Care Providers     Provider Role Specialty Phone number    Augustin Ellsworth MD  Cardiology [phone]          Clinic Interview:   Patient Findings     Negatives:  Signs/symptoms of thrombosis, Signs/symptoms of bleeding,   Laboratory test error suspected, Change in health, Change in alcohol use,   Change in activity, Upcoming invasive procedure, Emergency department   visit, Upcoming dental procedure, Missed doses, Extra doses, Change in   medications, Change in diet/appetite, Hospital admission, Bruising,  Other   complaints      Clinical Outcomes     Negatives:  Major bleeding event, Thromboembolic event,   Anticoagulation-related hospital admission, Anticoagulation-related ED   visit, Anticoagulation-related fatality        Current Medications:   Prior to Admission medications    Medication Sig Start Date End Date Taking? Authorizing Provider   acetaminophen (TYLENOL) 325 MG tablet Take 650 mg by mouth Every 6 (Six) Hours As Needed for Mild Pain .    Gino Leos MD   Cholecalciferol (Vitamin D3) 50 MCG (2000 UT) tablet Take 2,000 Units by mouth Daily.    Gino Leos MD   digoxin (LANOXIN) 125 MCG tablet Take 1 tablet by mouth Daily. 5/19/22   Augustin Ellsworth MD   dilTIAZem CD (Cardizem CD) 360 MG 24 hr capsule Take 1 capsule by mouth Daily. 5/19/22   Augustin Ellsworth MD   furosemide (LASIX) 20 MG tablet Take 1 tablet by mouth Daily. 6/28/22   Augustin Ellsworth MD   HYDROcodone-acetaminophen (NORCO) 5-325 MG per tablet Take 1 tablet by mouth Every 8 (Eight) Hours As Needed.    Gino Leos MD   metoprolol succinate XL (TOPROL-XL) 50 MG 24 hr tablet Take 1 tablet by mouth 2 (Two) Times a Day. 5/19/22   Augustin Ellsworth MD   Probiotic Product (PROBIOTIC PO) Take  by mouth Daily.    Gino Leos MD   topiramate (TOPAMAX) 100 MG tablet Take 100 mg by mouth every night at bedtime.    Gino Leos MD   warfarin (COUMADIN) 2 MG tablet Take 2 mg by mouth daily or as directed by [facility] ([phone]) 6/9/22   Augustin Ellsworth MD       Medical history:   Past Medical History:   Diagnosis Date   • Bladder cancer (HCC)    • Cancer (HCC)     breast, bladder   • Deep vein thrombosis (HCC)    • Essential hypertension 1/17/2017   • Fracture tibia/fibula, right, closed, initial encounter 8/27/2020   • GERD (gastroesophageal reflux disease)    • History of urostomy    • Immobility 08/27/2020    r/t broken Tibia    • Kidney  carcinoma, right (HCC)     removed    • Long term current use of anticoagulant 1/9/2015   • PAF (paroxysmal atrial fibrillation) (HCC) 6/26/2019   • Presence of cardiac pacemaker 11/03/2014    BS dual chamber PM placed 10/2014 with pocket revision 1/25/17.  HX tachy rob syndrome   • Sick sinus syndrome (HCC) 11/3/2014       Social history:   Social History     Tobacco Use   • Smoking status: Never Smoker   • Smokeless tobacco: Never Used   Substance Use Topics   • Alcohol use: Not Currently       Allergies:    Amoxicillin, Ciprofloxacin, Oxycodone-acetaminophen, Penicillins, Sulfa antibiotics, Cephalexin, Clavulanic acid, Codeine, Contrast dye, Doxycycline, Fluconazole, Iodine, Macrobid [nitrofurantoin], Tobramycin, and Trimethoprim    This patient is managed via a collaborative agreement, pursuant to IC [id number]. The signed protocol is kept in the [facility] at [address] [place] [address].    Education: Hazel Bucio has been instructed to call if any changes in medications, doses, concerns, etc. Patient was provided dosing instructions and expressed verbal understanding and has no further questions at this time.    Plan:  1. warfarin 1 mg PO daily, except warfarin 2 mg PO on Sundays.   Total weekly dose = 8 mg.   2. INR should be tested next Tuesday by Home Health and called into clinic.   .     Basil Mcmillan, PharmD  8/12/2022  12:11 EDT

## 2022-08-16 ENCOUNTER — ANTICOAGULATION VISIT (OUTPATIENT)
Dept: PHARMACY | Facility: HOSPITAL | Age: 82
End: 2022-08-16

## 2022-08-16 DIAGNOSIS — I48.0 PAROXYSMAL ATRIAL FIBRILLATION: Primary | ICD-10-CM

## 2022-08-16 LAB — INR PPP: 1.4 (ref 2–3)

## 2022-08-16 NOTE — PROGRESS NOTES
Anticoagulation Clinic Progress Note - Home Health INR Testing     INR Goal: 2 - 3  Today's INR: 1.4 (subtherapeutic). INR result was called in today by Neida Home Health Nurse.   Current warfarin dose: Warfarin 1 mg daily except 2 mg on Sundays.  Current total weekly dose = 6 mg per week.     INR History:  Anticoagulation Monitoring 2022   INR 4.3 1.60 1.40   INR Date 2022   INR Goal 2.0-3.0 2.0-3.0 2.0-3.0   Trend Same Same Up   Last Week Total 10 mg 6 mg 6 mg   Next Week Total 6 mg 8 mg 10 mg   Sun - 2 mg 2 mg   Mon - 1 mg 1 mg   Tue Hold () - 2 mg   Wed Hold (8/10) - 1 mg   Thu 1 mg - 2 mg   Fri - 1 mg 1 mg   Sat - 1 mg 1 mg   Visit Report - - -   Some recent data might be hidden       Anticoagulation Summary  As of 2022    INR goal:  2.0-3.0   TTR:  43.7 % (2.9 y)   INR used for dosin.40 (2022)   Warfarin maintenance plan:  2 mg every Sun, Tue, Thu; 1 mg all other days   Weekly warfarin total:  10 mg   Plan last modified:  Renetta Mcmillan, PharmD (2022)   Next INR check:  2022   Priority:  Maintenance   Target end date:      Indications    Atrial fibrillation (HCC) [I48.91]             Anticoagulation Episode Summary     INR check location:      Preferred lab:      Send INR reminders to:  [facility] CLINICAL POOL    Comments:  Patient contract signed 21.  [facility] nurse Neida's phone number = [phone]      Anticoagulation Care Providers     Provider Role Specialty Phone number    Augustin Ellsworth MD  Cardiology [phone]          Clinic Interview:   Patient Findings     Negatives:  Signs/symptoms of thrombosis, Signs/symptoms of bleeding,   Laboratory test error suspected, Change in health, Change in alcohol use,   Change in activity, Upcoming invasive procedure, Emergency department   visit, Upcoming dental procedure, Missed doses, Extra doses, Change in   medications, Change in diet/appetite, Hospital  admission, Bruising, Other   complaints      Clinical Outcomes     Negatives:  Major bleeding event, Thromboembolic event,   Anticoagulation-related hospital admission, Anticoagulation-related ED   visit, Anticoagulation-related fatality        Current Medications:   Prior to Admission medications    Medication Sig Start Date End Date Taking? Authorizing Provider   acetaminophen (TYLENOL) 325 MG tablet Take 650 mg by mouth Every 6 (Six) Hours As Needed for Mild Pain .    Gino Leos MD   Cholecalciferol (Vitamin D3) 50 MCG (2000 UT) tablet Take 2,000 Units by mouth Daily.    Gino Leos MD   digoxin (LANOXIN) 125 MCG tablet Take 1 tablet by mouth Daily. 5/19/22   Augustin Ellsworth MD   dilTIAZem CD (Cardizem CD) 360 MG 24 hr capsule Take 1 capsule by mouth Daily. 5/19/22   Augustin Ellsworth MD   furosemide (LASIX) 20 MG tablet Take 1 tablet by mouth Daily. 6/28/22   Augustin Ellsworth MD   HYDROcodone-acetaminophen (NORCO) 5-325 MG per tablet Take 1 tablet by mouth Every 8 (Eight) Hours As Needed.    ProviderGino MD   metoprolol succinate XL (TOPROL-XL) 50 MG 24 hr tablet Take 1 tablet by mouth 2 (Two) Times a Day. 5/19/22   Augustin Ellsworth MD   Probiotic Product (PROBIOTIC PO) Take  by mouth Daily.    Gino Leos MD   topiramate (TOPAMAX) 100 MG tablet Take 100 mg by mouth every night at bedtime.    Gino Leos MD   warfarin (COUMADIN) 2 MG tablet Take 2 mg by mouth daily or as directed by [facility] ([phone]) 6/9/22   Augustin Ellsworth MD       Medical history:   Past Medical History:   Diagnosis Date   • Bladder cancer (HCC)    • Cancer (HCC)     breast, bladder   • Deep vein thrombosis (HCC)    • Essential hypertension 1/17/2017   • Fracture tibia/fibula, right, closed, initial encounter 8/27/2020   • GERD (gastroesophageal reflux disease)    • History of urostomy    • Immobility 08/27/2020    r/t broken  Tibia    • Kidney carcinoma, right (HCC)     removed    • Long term current use of anticoagulant 1/9/2015   • PAF (paroxysmal atrial fibrillation) (HCC) 6/26/2019   • Presence of cardiac pacemaker 11/03/2014    BS dual chamber PM placed 10/2014 with pocket revision 1/25/17.  HX tachy rob syndrome   • Sick sinus syndrome (HCC) 11/3/2014       Social history:   Social History     Tobacco Use   • Smoking status: Never Smoker   • Smokeless tobacco: Never Used   Substance Use Topics   • Alcohol use: Not Currently       Allergies:    Amoxicillin, Ciprofloxacin, Oxycodone-acetaminophen, Penicillins, Sulfa antibiotics, Cephalexin, Clavulanic acid, Codeine, Contrast dye, Doxycycline, Fluconazole, Iodine, Macrobid [nitrofurantoin], Tobramycin, and Trimethoprim    This patient is managed via a collaborative agreement, pursuant to IC [id number]. The signed protocol is kept in the [facility] at [address] [place] [address].    Education: Hazel Bucio has been instructed to call if any changes in medications, doses, concerns, etc. Patient was provided dosing instructions and expressed verbal understanding and has no further questions at this time.    Plan:  1. increase by 25 %; warfarin 1 mg PO daily, except warfarin 2 mg PO on Tuesday, Thursday, and Sundays.  NEW Total weekly dose = 10 mg.   2. INR should be tested weekly and called into clinic.       Basil Mcmillan, PharmD  8/16/2022  14:46 EDT

## 2022-08-18 ENCOUNTER — HOSPITAL ENCOUNTER (OUTPATIENT)
Dept: CT IMAGING | Facility: HOSPITAL | Age: 82
Discharge: HOME | End: 2022-08-18
Admitting: SURGERY

## 2022-08-18 DIAGNOSIS — K43.5 PARA-ILEOSTOMY HERNIA: ICD-10-CM

## 2022-08-18 DIAGNOSIS — K94.19 PARA-ILEOSTOMY HERNIA: ICD-10-CM

## 2022-08-18 PROCEDURE — 74176 CT ABD & PELVIS W/O CONTRAST: CPT

## 2022-08-23 ENCOUNTER — ANTICOAGULATION VISIT (OUTPATIENT)
Dept: PHARMACY | Facility: HOSPITAL | Age: 82
End: 2022-08-23

## 2022-08-23 DIAGNOSIS — I48.0 PAROXYSMAL ATRIAL FIBRILLATION: Primary | ICD-10-CM

## 2022-08-23 LAB — INR PPP: 2 (ref 2–3)

## 2022-08-23 NOTE — PROGRESS NOTES
Anticoagulation Clinic Progress Note - Home INR Testing     INR Goal: 2 - 3  Today's INR: 2.0 (therapeutic). INR result was called in today by Neida (home health nurse).   Current warfarin dose: warfarin 1 mg PO daily, except warfarin 2 mg PO on Tuesday, Thursday, and .  Current total weekly dose = 10 mg per week.     INR History:  Anticoagulation Monitoring 2022   INR 1.60 1.40 2.00   INR Date 2022   INR Goal 2.0-3.0 2.0-3.0 2.0-3.0   Trend Same Up Same   Last Week Total 6 mg 6 mg 10 mg   Next Week Total 8 mg 10 mg 10 mg   Sun 2 mg 2 mg -   Mon 1 mg 1 mg -   Tue - 2 mg 2 mg   Wed - 1 mg 1 mg   Thu - 2 mg 2 mg   Fri 1 mg 1 mg -   Sat 1 mg 1 mg -   Visit Report - - -   Some recent data might be hidden       Anticoagulation Summary  As of 2022    INR goal:  2.0-3.0   TTR:  43.5 % (2.9 y)   INR used for dosin.00 (2022)   Warfarin maintenance plan:  2 mg every Sun, Tue, Thu; 1 mg all other days   Weekly warfarin total:  10 mg   No change documented:  Kayleigh Horvath, Annie   Plan last modified:  Renetta Mcmillan, PharmD (2022)   Next INR check:  2022   Priority:  Maintenance   Target end date:      Indications    Atrial fibrillation (HCC) [I48.91]             Anticoagulation Episode Summary     INR check location:      Preferred lab:      Send INR reminders to:  [facility] CLINICAL POOL    Comments:  Patient contract signed 21.  [facility] nurse Neida's phone number = [phone]      Anticoagulation Care Providers     Provider Role Specialty Phone number    Augustin Ellsworth MD  Cardiology [phone]          Clinic Interview:   Patient Findings     Negatives:  Signs/symptoms of thrombosis, Signs/symptoms of bleeding,   Laboratory test error suspected, Change in health, Change in alcohol use,   Change in activity, Upcoming invasive procedure, Emergency department   visit, Upcoming dental procedure, Missed doses, Extra  doses, Change in   medications, Change in diet/appetite, Hospital admission, Bruising, Other   complaints      Clinical Outcomes     Negatives:  Major bleeding event, Thromboembolic event,   Anticoagulation-related hospital admission, Anticoagulation-related ED   visit, Anticoagulation-related fatality        Current Medications:   Prior to Admission medications    Medication Sig Start Date End Date Taking? Authorizing Provider   acetaminophen (TYLENOL) 325 MG tablet Take 650 mg by mouth Every 6 (Six) Hours As Needed for Mild Pain .    Gino Leos MD   Cholecalciferol (Vitamin D3) 50 MCG (2000 UT) tablet Take 2,000 Units by mouth Daily.    Gino Leos MD   digoxin (LANOXIN) 125 MCG tablet Take 1 tablet by mouth Daily. 5/19/22   Augustin Ellsworth MD   dilTIAZem CD (Cardizem CD) 360 MG 24 hr capsule Take 1 capsule by mouth Daily. 5/19/22   Augustin Ellsworth MD   furosemide (LASIX) 20 MG tablet Take 1 tablet by mouth Daily. 6/28/22   Augustin Ellsworth MD   HYDROcodone-acetaminophen (NORCO) 5-325 MG per tablet Take 1 tablet by mouth Every 8 (Eight) Hours As Needed.    ProviderGino MD   metoprolol succinate XL (TOPROL-XL) 50 MG 24 hr tablet Take 1 tablet by mouth 2 (Two) Times a Day. 5/19/22   Augustin Ellsworth MD   Probiotic Product (PROBIOTIC PO) Take  by mouth Daily.    ProviderGino MD   topiramate (TOPAMAX) 100 MG tablet Take 100 mg by mouth every night at bedtime.    ProviderGino MD   warfarin (COUMADIN) 2 MG tablet Take 2 mg by mouth daily or as directed by [facility] ([phone]) 6/9/22   Augustin Ellsworth MD       Medical history:   Past Medical History:   Diagnosis Date   • Bladder cancer (HCC)    • Cancer (HCC)     breast, bladder   • Deep vein thrombosis (HCC)    • Essential hypertension 1/17/2017   • Fracture tibia/fibula, right, closed, initial encounter 8/27/2020   • GERD (gastroesophageal reflux disease)     • History of urostomy    • Immobility 08/27/2020    r/t broken Tibia    • Kidney carcinoma, right (HCC)     removed    • Long term current use of anticoagulant 1/9/2015   • PAF (paroxysmal atrial fibrillation) (HCC) 6/26/2019   • Presence of cardiac pacemaker 11/03/2014    BS dual chamber PM placed 10/2014 with pocket revision 1/25/17.  HX tachy rob syndrome   • Sick sinus syndrome (HCC) 11/3/2014       Social history:   Social History     Tobacco Use   • Smoking status: Never Smoker   • Smokeless tobacco: Never Used   Substance Use Topics   • Alcohol use: Not Currently       Allergies:    Amoxicillin, Ciprofloxacin, Oxycodone-acetaminophen, Penicillins, Sulfa antibiotics, Cephalexin, Clavulanic acid, Codeine, Contrast dye, Doxycycline, Fluconazole, Iodine, Macrobid [nitrofurantoin], Tobramycin, and Trimethoprim    This patient is managed via a collaborative agreement, pursuant to IC 25-26-16. The signed protocol is kept in the [facility] at [address] [place] [address].    Education: Hazel Bucio has been instructed to call if any changes in medications, doses, concerns, etc. Patient was provided dosing instructions and expressed verbal understanding and has no further questions at this time.    Plan:  1. no change; warfarin 1 mg PO daily, except warfarin 2 mg PO on Tuesday, Thursday, and Sunday.  Total weekly dose = 10 mg.   2. INR should be tested in 3 days and called into clinic. Having home health nurse check INR sooner due to large increase in INR in one week (1.4 -> 2.0) and recent supratherapeutic INR on 8/9 after taking total weekly dose of 10 mg.    Kayleigh Horvath, PharmD  8/23/2022  15:25 EDT

## 2022-08-26 ENCOUNTER — ANTICOAGULATION VISIT (OUTPATIENT)
Dept: PHARMACY | Facility: HOSPITAL | Age: 82
End: 2022-08-26

## 2022-08-26 DIAGNOSIS — I48.0 PAROXYSMAL ATRIAL FIBRILLATION: Primary | ICD-10-CM

## 2022-08-26 LAB — INR PPP: 2.4 (ref 2–3)

## 2022-08-26 NOTE — PROGRESS NOTES
Anticoagulation Clinic Progress Note - Home INR Testing     INR Goal: 2 - 3  Today's INR: 2.4 (therapeutic). INR result was called in today by Ashley (home health nurse).   Current warfarin dose: warfarin 1 mg PO daily, except warfarin 2 mg PO on Tuesday, Thursday, and .  Current total weekly dose = 10 mg per week.     INR History:  Anticoagulation Monitoring 2022   INR 1.40 2.00 2.40   INR Date 2022   INR Goal 2.0-3.0 2.0-3.0 2.0-3.0   Trend Up Same Down   Last Week Total 6 mg 10 mg 10 mg   Next Week Total 10 mg 10 mg 9 mg   Sun 2 mg - 1 mg   Mon 1 mg - 1 mg   Tue 2 mg 2 mg 2 mg   Wed 1 mg 1 mg -   Thu 2 mg 2 mg -   Fri 1 mg - 1 mg   Sat 1 mg - 1 mg   Visit Report - - -   Some recent data might be hidden       Anticoagulation Summary  As of 2022    INR goal:  2.0-3.0   TTR:  43.7 % (2.9 y)   INR used for dosin.40 (2022)   Warfarin maintenance plan:  2 mg every e, Thu; 1 mg all other days   Weekly warfarin total:  9 mg   Plan last modified:  Kayleigh Horvath, PharmD (2022)   Next INR check:  2022   Priority:  Maintenance   Target end date:      Indications    Atrial fibrillation (HCC) [I48.91]             Anticoagulation Episode Summary     INR check location:      Preferred lab:      Send INR reminders to:  [facility] CLINICAL POOL    Comments:  Patient contract signed 21.  [facility] nurse Neida's phone number = [phone]      Anticoagulation Care Providers     Provider Role Specialty Phone number    Augustin Ellsworth MD  Cardiology [phone]          Clinic Interview:   Patient Findings     Positives:  Change in health (Increased diarrhea lately (may affect   absorption therefore affecting INR))    Negatives:  Signs/symptoms of thrombosis, Signs/symptoms of bleeding,   Laboratory test error suspected, Change in alcohol use, Change in   activity, Upcoming invasive procedure, Emergency department visit,    Upcoming dental procedure, Missed doses, Extra doses, Change in   medications, Change in diet/appetite, Hospital admission, Bruising, Other   complaints      Clinical Outcomes     Negatives:  Major bleeding event, Thromboembolic event,   Anticoagulation-related hospital admission, Anticoagulation-related ED   visit, Anticoagulation-related fatality        Current Medications:   Prior to Admission medications    Medication Sig Start Date End Date Taking? Authorizing Provider   acetaminophen (TYLENOL) 325 MG tablet Take 650 mg by mouth Every 6 (Six) Hours As Needed for Mild Pain .    ProviderGino MD   Cholecalciferol (Vitamin D3) 50 MCG (2000 UT) tablet Take 2,000 Units by mouth Daily.    ProviderGino MD   digoxin (LANOXIN) 125 MCG tablet Take 1 tablet by mouth Daily. 5/19/22   Augustin Ellsworth MD   dilTIAZem CD (Cardizem CD) 360 MG 24 hr capsule Take 1 capsule by mouth Daily. 5/19/22   Augustin Ellsworth MD   furosemide (LASIX) 20 MG tablet Take 1 tablet by mouth Daily. 6/28/22   Augustin Ellsworth MD   HYDROcodone-acetaminophen (NORCO) 5-325 MG per tablet Take 1 tablet by mouth Every 8 (Eight) Hours As Needed.    ProviderGino MD   metoprolol succinate XL (TOPROL-XL) 50 MG 24 hr tablet Take 1 tablet by mouth 2 (Two) Times a Day. 5/19/22   Augustin Ellsworth MD   Probiotic Product (PROBIOTIC PO) Take  by mouth Daily.    ProviderGino MD   topiramate (TOPAMAX) 100 MG tablet Take 100 mg by mouth every night at bedtime.    ProviderGino MD   warfarin (COUMADIN) 2 MG tablet Take 2 mg by mouth daily or as directed by [facility] ([phone]) 6/9/22   Augustin Ellsworth MD       Medical history:   Past Medical History:   Diagnosis Date   • Bladder cancer (HCC)    • Cancer (HCC)     breast, bladder   • Deep vein thrombosis (HCC)    • Essential hypertension 1/17/2017   • Fracture tibia/fibula, right, closed, initial encounter  8/27/2020   • GERD (gastroesophageal reflux disease)    • History of urostomy    • Immobility 08/27/2020    r/t broken Tibia    • Kidney carcinoma, right (HCC)     removed    • Long term current use of anticoagulant 1/9/2015   • PAF (paroxysmal atrial fibrillation) ([facility]) 6/26/2019   • Presence of cardiac pacemaker 11/03/2014    BS dual chamber PM placed 10/2014 with pocket revision 1/25/17.  HX tachy rob syndrome   • Sick sinus syndrome ([facility]) 11/3/2014       Social history:   Social History     Tobacco Use   • Smoking status: Never Smoker   • Smokeless tobacco: Never Used   Substance Use Topics   • Alcohol use: Not Currently       Allergies:    Amoxicillin, Ciprofloxacin, Oxycodone-acetaminophen, Penicillins, Sulfa antibiotics, Cephalexin, Clavulanic acid, Codeine, Contrast dye, Doxycycline, Fluconazole, Iodine, Macrobid [nitrofurantoin], Tobramycin, and Trimethoprim    This patient is managed via a collaborative agreement, pursuant to IC [id number]. The signed protocol is kept in the [facility] at [address] [place] [address].    Education: Hazel Bucio has been instructed to call if any changes in medications, doses, concerns, etc. Patient was provided dosing instructions and expressed verbal understanding and has no further questions at this time.    Plan:  1. Due to INR continuing to trend up, will make slight dose adjustment (10% decrease); warfarin 1 mg PO daily, except warfarin 2 mg PO on Tuesday and Thursday.  New total weekly dose = 9 mg.   2. INR should be tested on 8/31 by home health nurse and called into clinic.     Kayleigh Horvath, UlicesD  8/26/2022  09:59 EDT

## 2022-08-31 ENCOUNTER — ANTICOAGULATION VISIT (OUTPATIENT)
Dept: PHARMACY | Facility: HOSPITAL | Age: 82
End: 2022-08-31

## 2022-08-31 DIAGNOSIS — I48.0 PAROXYSMAL ATRIAL FIBRILLATION: Primary | ICD-10-CM

## 2022-08-31 LAB — INR PPP: 2.4 (ref 2–3)

## 2022-08-31 NOTE — PROGRESS NOTES
Anticoagulation Clinic Progress Note - Home INR Testing     INR Goal: 2 - 3  Today's INR: 2.4 (therapeutic). INR result was called in today by Ashley (home health nurse).   Current warfarin dose: warfarin 1 mg PO daily, except warfarin 2 mg PO on Tuesday and Thursday.  Current total weekly dose = 9 mg per week.     Per Ashley, patient having hernia repair surgery on  but patient has not been given instructions on holding warfarin. Discussed with Dr. Ellsworth's office who said cardiac clearance form was faxed to their office but has not been signed by Dr. Ellsworth. Patient has appointment with CHELLE Lozada tomorrow. Per office staff, patient will be given instructions on holding tomorrow at appointment.    INR History:  Anticoagulation Monitoring 2022   INR 2.00 2.40 2.40   INR Date 2022   INR Goal 2.0-3.0 2.0-3.0 2.0-3.0   Trend Same Down Same   Last Week Total 10 mg 10 mg 9 mg   Next Week Total 10 mg 9 mg 9 mg   Sun - 1 mg 1 mg   Mon - 1 mg 1 mg   Tue 2 mg 2 mg -   Wed 1 mg - 1 mg   Thu 2 mg - 2 mg   Fri - 1 mg 1 mg   Sat - 1 mg 1 mg   Visit Report - - -   Some recent data might be hidden       Anticoagulation Summary  As of 2022    INR goal:  2.0-3.0   TTR:  43.9 % (2.9 y)   INR used for dosin.40 (2022)   Warfarin maintenance plan:  2 mg every Tue, Thu; 1 mg all other days   Weekly warfarin total:  9 mg   No change documented:  Kayleigh Horvath, PharmD   Plan last modified:  Kayleigh Horvath, PharmD (2022)   Next INR check:  2022   Priority:  Maintenance   Target end date:      Indications    Atrial fibrillation (HCC) [I48.91]             Anticoagulation Episode Summary     INR check location:      Preferred lab:      Send INR reminders to:  [facility] CLINICAL POOL    Comments:  Patient contract signed 21.  A nurse Neida's phone number = [phone]      Anticoagulation Care Providers     Provider Role Specialty Phone  number    Augustin Ellsworth MD  Cardiology [phone]          Clinic Interview:   Patient Findings     Positives:  Upcoming invasive procedure (Hernia repair 9/6)    Negatives:  Signs/symptoms of thrombosis, Signs/symptoms of bleeding,   Laboratory test error suspected, Change in health, Change in alcohol use,   Change in activity, Emergency department visit, Upcoming dental procedure,   Missed doses, Extra doses, Change in medications, Change in diet/appetite,   Hospital admission, Bruising, Other complaints      Clinical Outcomes     Negatives:  Major bleeding event, Thromboembolic event,   Anticoagulation-related hospital admission, Anticoagulation-related ED   visit, Anticoagulation-related fatality        Current Medications:   Prior to Admission medications    Medication Sig Start Date End Date Taking? Authorizing Provider   acetaminophen (TYLENOL) 325 MG tablet Take 650 mg by mouth Every 6 (Six) Hours As Needed for Mild Pain .    Gino Leos MD   Cholecalciferol (Vitamin D3) 50 MCG (2000 UT) tablet Take 2,000 Units by mouth Daily.    Gino Leos MD   digoxin (LANOXIN) 125 MCG tablet Take 1 tablet by mouth Daily. 5/19/22   Augustin Ellsworth MD   dilTIAZem CD (Cardizem CD) 360 MG 24 hr capsule Take 1 capsule by mouth Daily. 5/19/22   Augustin Ellsworth MD   furosemide (LASIX) 20 MG tablet Take 1 tablet by mouth Daily. 6/28/22   Augustin Ellsworth MD   HYDROcodone-acetaminophen (NORCO) 5-325 MG per tablet Take 1 tablet by mouth Every 8 (Eight) Hours As Needed.    Gino Leos MD   metoprolol succinate XL (TOPROL-XL) 50 MG 24 hr tablet Take 1 tablet by mouth 2 (Two) Times a Day. 5/19/22   Augustin Ellsworth MD   Probiotic Product (PROBIOTIC PO) Take  by mouth Daily.    Gino Leos MD   topiramate (TOPAMAX) 100 MG tablet Take 100 mg by mouth every night at bedtime.    Gino Leos MD   warfarin (COUMADIN) 2 MG tablet Take 2 mg by  mouth daily or as directed by [facility] ([phone]) 6/9/22   Augustin Ellsworth MD       Medical history:   Past Medical History:   Diagnosis Date   • Bladder cancer (HCC)    • Cancer (HCC)     breast, bladder   • Deep vein thrombosis (HCC)    • Essential hypertension 1/17/2017   • Fracture tibia/fibula, right, closed, initial encounter 8/27/2020   • GERD (gastroesophageal reflux disease)    • History of urostomy    • Immobility 08/27/2020    r/t broken Tibia    • Kidney carcinoma, right (HCC)     removed    • Long term current use of anticoagulant 1/9/2015   • PAF (paroxysmal atrial fibrillation) (HCC) 6/26/2019   • Presence of cardiac pacemaker 11/03/2014    BS dual chamber PM placed 10/2014 with pocket revision 1/25/17.  HX tachy rob syndrome   • Sick sinus syndrome (HCC) 11/3/2014       Social history:   Social History     Tobacco Use   • Smoking status: Never Smoker   • Smokeless tobacco: Never Used   Substance Use Topics   • Alcohol use: Not Currently       Allergies:    Amoxicillin, Ciprofloxacin, Oxycodone-acetaminophen, Penicillins, Sulfa antibiotics, Cephalexin, Clavulanic acid, Codeine, Contrast dye, Doxycycline, Fluconazole, Iodine, Macrobid [nitrofurantoin], Tobramycin, and Trimethoprim    This patient is managed via a collaborative agreement, pursuant to IC [id number]. The signed protocol is kept in the [facility] at [address] [place] [address].    Education: Hazel VINNY Shieldsmann has been instructed to call if any changes in medications, doses, concerns, etc. Patient was provided dosing instructions and expressed verbal understanding and has no further questions at this time.    Plan:  1. no change; warfarin 1 mg PO daily, except warfarin 2 mg PO on Tuesday and Thursday.  Total weekly dose = 9 mg.   2. Unsure of when next INR should be checked given uncertainty of when warfarin will be resumed post procedure. Informed Ashley of above and will call  her with further instructions after plan is in place regarding albaro-procedural anticoagulation.      Kayleigh Horvath, PharmMARIELA  8/31/2022  14:36 EDT

## 2022-09-01 ENCOUNTER — OFFICE VISIT (OUTPATIENT)
Dept: CARDIOLOGY | Facility: CLINIC | Age: 82
End: 2022-09-01

## 2022-09-01 ENCOUNTER — LAB (OUTPATIENT)
Dept: LAB | Facility: HOSPITAL | Age: 82
End: 2022-09-01

## 2022-09-01 ENCOUNTER — CLINICAL SUPPORT NO REQUIREMENTS (OUTPATIENT)
Dept: CARDIOLOGY | Facility: CLINIC | Age: 82
End: 2022-09-01

## 2022-09-01 ENCOUNTER — TRANSCRIBE ORDERS (OUTPATIENT)
Dept: ADMINISTRATIVE | Facility: HOSPITAL | Age: 82
End: 2022-09-01

## 2022-09-01 VITALS
HEART RATE: 60 BPM | HEIGHT: 64 IN | OXYGEN SATURATION: 98 % | DIASTOLIC BLOOD PRESSURE: 79 MMHG | BODY MASS INDEX: 25.61 KG/M2 | SYSTOLIC BLOOD PRESSURE: 158 MMHG | WEIGHT: 150 LBS

## 2022-09-01 DIAGNOSIS — I49.5 SICK SINUS SYNDROME: Chronic | ICD-10-CM

## 2022-09-01 DIAGNOSIS — Z01.818 PRE-OP TESTING: Primary | ICD-10-CM

## 2022-09-01 DIAGNOSIS — I10 ESSENTIAL HYPERTENSION: Chronic | ICD-10-CM

## 2022-09-01 DIAGNOSIS — I48.21 PERMANENT ATRIAL FIBRILLATION: ICD-10-CM

## 2022-09-01 DIAGNOSIS — Z01.810 PREOP CARDIOVASCULAR EXAM: Primary | ICD-10-CM

## 2022-09-01 DIAGNOSIS — Z95.0 PRESENCE OF CARDIAC PACEMAKER: Chronic | ICD-10-CM

## 2022-09-01 DIAGNOSIS — Z01.818 PRE-OP TESTING: ICD-10-CM

## 2022-09-01 DIAGNOSIS — K45.8 OTHER SPECIFIED ABDOMINAL HERNIA WITHOUT OBSTRUCTION OR GANGRENE: ICD-10-CM

## 2022-09-01 LAB
ALBUMIN SERPL-MCNC: 4.4 G/DL (ref 3.5–5.2)
ALBUMIN/GLOB SERPL: 1.6 G/DL
ALP SERPL-CCNC: 111 U/L (ref 39–117)
ALT SERPL W P-5'-P-CCNC: 20 U/L (ref 1–33)
ANION GAP SERPL CALCULATED.3IONS-SCNC: 11.8 MMOL/L (ref 5–15)
AST SERPL-CCNC: 20 U/L (ref 1–32)
BILIRUB SERPL-MCNC: 0.3 MG/DL (ref 0–1.2)
BUN SERPL-MCNC: 27 MG/DL (ref 8–23)
BUN/CREAT SERPL: 22.9 (ref 7–25)
CALCIUM SPEC-SCNC: 9.8 MG/DL (ref 8.6–10.5)
CHLORIDE SERPL-SCNC: 109 MMOL/L (ref 98–107)
CO2 SERPL-SCNC: 23.2 MMOL/L (ref 22–29)
CREAT SERPL-MCNC: 1.18 MG/DL (ref 0.57–1)
EGFRCR SERPLBLD CKD-EPI 2021: 46.5 ML/MIN/1.73
GLOBULIN UR ELPH-MCNC: 2.8 GM/DL
GLUCOSE SERPL-MCNC: 99 MG/DL (ref 65–99)
POTASSIUM SERPL-SCNC: 4.5 MMOL/L (ref 3.5–5.2)
PROT SERPL-MCNC: 7.2 G/DL (ref 6–8.5)
SODIUM SERPL-SCNC: 144 MMOL/L (ref 136–145)

## 2022-09-01 PROCEDURE — 99214 OFFICE O/P EST MOD 30 MIN: CPT | Performed by: NURSE PRACTITIONER

## 2022-09-01 PROCEDURE — 80053 COMPREHEN METABOLIC PANEL: CPT

## 2022-09-01 PROCEDURE — 36415 COLL VENOUS BLD VENIPUNCTURE: CPT

## 2022-09-01 PROCEDURE — 93280 PM DEVICE PROGR EVAL DUAL: CPT | Performed by: NURSE PRACTITIONER

## 2022-09-02 PROBLEM — K45.8 OTHER SPECIFIED ABDOMINAL HERNIA WITHOUT OBSTRUCTION OR GANGRENE: Status: ACTIVE | Noted: 2022-09-02

## 2022-09-02 PROBLEM — Z01.810 PREOP CARDIOVASCULAR EXAM: Status: ACTIVE | Noted: 2022-09-02

## 2022-09-02 PROCEDURE — 93000 ELECTROCARDIOGRAM COMPLETE: CPT | Performed by: NURSE PRACTITIONER

## 2022-09-02 NOTE — PROGRESS NOTES
Cardiology Office Follow Up Visit      Primary Care Provider:  Lizzy Francis MD    Reason for f/u:     Preoperative cardiovascular risk assessment  Abdominal hernia  Permanent atrial fibrillation  Sick sinus syndrome  Dual-chamber pacemaker  Hypertension      Subjective     CC:    Denies chest pain or dyspnea    History of Present Illness       Hazel Bucio is a 82 y.o. female.  Patient is a very pleasant 82-year-old female who presents the office today requesting preoperative cardiovascular risk assessment prior to upcomingAbdominal hernia repair planned by Dr. Devine on September 6.    Patient is known to have a history of sick sinus syndrome requiring previous dual-chamber [employer] pacemaker implant in October 2014.  She also has permanent atrial fibrillation.    Last echocardiogram showed her ejection fraction to be 60 to 65% with normal RV size and function.  There was moderate biatrial enlargement, mild to moderate MR, mild AI, mild to moderate TR, diastolic dysfunction.    In April 2020 the patient had a stress Myoview that showed no reversible ischemia.    She denies any current or recent chest pain, worsening dyspnea, PND, orthopnea, palpitations, near syncope, lower extremity edema or feelings of her heart racing.  She reports compliance with medical therapy    She has an abdominal hernia that has been growing in size and causing her significant symptoms.  She is also anticoagulated with Coumadin due to her A. fib        ASSESSMENT/PLAN:      Diagnoses and all orders for this visit:    1. Preop cardiovascular exam (Primary)  Comments:  Reoperative cardiovascular risk assessment requested prior to upcoming hernia repair by Dr. Devine    2. Other specified abdominal hernia without obstruction or gangrene    3. Permanent atrial fibrillation (HCC)  Comments:  Currently maintaining sinus rhythm    4. Sick sinus syndrome (HCC)  Comments:  Stable since pacemaker implant    5. Presence of cardiac  pacemaker  Comments:  Dual-chamber [employer] pacemaker with stable device function by an office interrogation    6. Essential hypertension  Comments:  Blood pressure is not ideally controlled    Other orders  -     ECG 12 Lead            MEDICAL DECISION MAKING:    Preoperative cardiovascular risk assessment is requested.  The patient does not have any signs or symptoms to suggest unstable angina, heart failure on exam or recent MI.    She does not appear to have any acute cardiac decompensation.    The patient appears to have no prohibitive risk for proceeding with hernia repair as planned.    Patient's been advised to hold her warfarin for 5 days prior to surgery.  I would recommend resuming warfarin as soon as possible postoperatively.    In addition the patient has a dual-chamber pacemaker.  I would recommend placing a magnet over the device during the surgical case and removing the magnet upon completion of surgery.    Patient will keep her scheduled follow-up otherwise in our office.  If the patient develops any cardiac issues in the perioperative period we would be happy to assist.        Past Medical History:   Diagnosis Date   • Bladder cancer (HCC)    • Cancer (HCC)     breast, bladder   • Deep vein thrombosis (HCC)    • Essential hypertension 1/17/2017   • Fracture tibia/fibula, right, closed, initial encounter 8/27/2020   • GERD (gastroesophageal reflux disease)    • History of urostomy    • Immobility 08/27/2020    r/t broken Tibia    • Kidney carcinoma, right (HCC)     removed    • Long term current use of anticoagulant 1/9/2015   • PAF (paroxysmal atrial fibrillation) ([facility]) 6/26/2019   • Presence of cardiac pacemaker 11/03/2014    BS dual chamber PM placed 10/2014 with pocket revision 1/25/17.  HX tachy rob syndrome   • Sick sinus syndrome (HCC) 11/3/2014       Past Surgical History:   Procedure Laterality Date   • BREAST LUMPECTOMY     • CHOLECYSTECTOMY N/A 10/12/2020    Procedure:  CHOLECYSTECTOMY LAPAROSCOPIC;  Surgeon: Go Pereira DO;  Location: [facility] MAIN OR;  Service: General;  Laterality: N/A;   • CYSTECTOMY W/ URETEROILEAL CONDUIT     • HARDWARE REMOVAL Right 6/11/2021    Procedure: TIBIA HARDWARE REMOVAL;  Surgeon: Geoff Shah II, MD;  Location: [facility] MAIN OR;  Service: Orthopedics;  Laterality: Right;   • HERNIA REPAIR     • HYSTERECTOMY     • INSERT / REPLACE / REMOVE PACEMAKER     • NEPHRECTOMY Right    • TIBIA IM NAILING/RODDING Right 8/28/2020    Procedure: TIBIA INTRAMEDULLARY NAIL/ABRAHAM INSERTION;  Surgeon: Geoff Shah II, MD;  Location: [facility] MAIN OR;  Service: Orthopedics;  Laterality: Right;         Current Outpatient Medications:   •  acetaminophen (TYLENOL) 325 MG tablet, Take 650 mg by mouth Every 6 (Six) Hours As Needed for Mild Pain ., Disp: , Rfl:   •  Cholecalciferol (Vitamin D3) 50 MCG (2000 UT) tablet, Take 2,000 Units by mouth Daily., Disp: , Rfl:   •  digoxin (LANOXIN) 125 MCG tablet, Take 1 tablet by mouth Daily., Disp: 90 tablet, Rfl: 1  •  dilTIAZem CD (Cardizem CD) 360 MG 24 hr capsule, Take 1 capsule by mouth Daily., Disp: 90 capsule, Rfl: 1  •  furosemide (LASIX) 20 MG tablet, Take 1 tablet by mouth Daily., Disp: 30 tablet, Rfl: 5  •  HYDROcodone-acetaminophen (NORCO) 5-325 MG per tablet, Take 1 tablet by mouth Every 8 (Eight) Hours As Needed., Disp: , Rfl:   •  metoprolol succinate XL (TOPROL-XL) 50 MG 24 hr tablet, Take 1 tablet by mouth 2 (Two) Times a Day., Disp: 180 tablet, Rfl: 1  •  Probiotic Product (PROBIOTIC PO), Take  by mouth Daily., Disp: , Rfl:   •  topiramate (TOPAMAX) 100 MG tablet, Take 100 mg by mouth every night at bedtime., Disp: , Rfl:   •  warfarin (COUMADIN) 2 MG tablet, Take 2 mg by mouth daily or as directed by [facility] ([phone]), Disp: 90 tablet, Rfl: 1    Social History     Socioeconomic History   • Marital status:    Tobacco Use   • Smoking status: Never Smoker   •  "Smokeless tobacco: Never Used   Vaping Use   • Vaping Use: Never used   Substance and Sexual Activity   • Alcohol use: Not Currently   • Drug use: Never   • Sexual activity: Defer       Family History   Problem Relation Age of Onset   • COPD Father        The following portions of the patient's history were reviewed and updated as appropriate: allergies, current medications, past family history, past medical history, past social history, past surgical history and problem list.    Review of Systems   Constitutional: Negative for decreased appetite and diaphoresis.   HENT: Negative for congestion, hearing loss and nosebleeds.    Cardiovascular: Negative for chest pain, claudication, dyspnea on exertion, irregular heartbeat, leg swelling, near-syncope, orthopnea, palpitations, paroxysmal nocturnal dyspnea and syncope.   Respiratory: Negative for cough, shortness of breath and sleep disturbances due to breathing.    Endocrine: Negative for polyuria.   Hematologic/Lymphatic: Does not bruise/bleed easily.   Skin: Negative for itching and rash.   Musculoskeletal: Negative for back pain, muscle weakness and myalgias.   Gastrointestinal: Positive for abdominal pain. Negative for change in bowel habit and nausea.   Genitourinary: Negative for dysuria, flank pain, frequency and hesitancy.   Neurological: Negative for dizziness, tremors and weakness.   Psychiatric/Behavioral: Negative for altered mental status. The patient does not have insomnia.      /79   Pulse 60   Ht 162.6 cm (64.02\")   Wt 68 kg (150 lb)   SpO2 98%   BMI 25.73 kg/m² .  Objective     Constitutional:       General: Not in acute distress.     Appearance: Normal appearance. Well-developed.   Eyes:      Pupils: Pupils are equal, round, and reactive to light.   HENT:      Head: Normocephalic and atraumatic.   Neck:      Vascular: No JVD.   Pulmonary:      Effort: Pulmonary effort is normal.      Breath sounds: Normal breath sounds.   Cardiovascular:    "   Normal rate. Regular rhythm.   Pulses:     Intact distal pulses.   Edema:     Peripheral edema absent.   Abdominal:      General: Abdomen is protuberant. There is no distension.      Palpations: Abdomen is soft.      Tenderness: There is no abdominal tenderness.   Musculoskeletal: Normal range of motion.      Cervical back: Normal range of motion and neck supple. Skin:     General: Skin is warm and dry.   Neurological:      Mental Status: Alert and oriented to person, place, and time.             ECG 12 Lead    Date/Time: 9/2/2022 1:49 PM  Performed by: Angela Lozada APRN  Authorized by: Angela Lozada APRN   Rhythm: atrial fibrillation and paced  BPM: 60              EKG ordered by and reviewed by me in office           DEVICE INTERROGATION:  IN OFFICE    DEVICE TYPE:   Dual-chamber pacemaker    :   [employer]    BATTERY:  Stable    TIME TO ELECTIVE REPLACEMENT INDICATORS:   1 year    CHARGE TIME:   Not applicable        LEAD DATA:       DEVICE REPROGRAMMED FOR TESTING      Atrial:   0.3 mV, 476 ohms,      Ventricular:     24.2 mV, 619 ohms, 1.1 V@0.4 ms    LV:      Pacemaker dependent:   No      Atrial pacing percentage: A. fib %    Ventricular pacing percentage: 62%      Arrhythmia Logbook Reviewed: No ventricular high rate episodes        Summary:    Stable Device Function    Ongoing atrial fibrillation    Battery status is stable.          NEXT IN OFFICE DEVICE CHECK DUE: At next visit    REMOTE DEVICE INTERROGATIONS: Ongoing

## 2022-09-07 ENCOUNTER — TELEPHONE (OUTPATIENT)
Dept: PHARMACY | Facility: HOSPITAL | Age: 82
End: 2022-09-07

## 2022-09-07 NOTE — TELEPHONE ENCOUNTER
Spoke with patient regarding procedure yesterday and she states she was not given instructions on when to resume taking warfarin. Call placed to Dr. Mike's office to determine this. Left message with MA.     Also contacted patient's home health nurse, Ashley ([phone]), to inform her of above. Told her [facility] will call her and patient with instructions as soon as surgery calls back.

## 2022-09-08 NOTE — TELEPHONE ENCOUNTER
Call received from Ashley (home health nurse) who states patient was admitted at [facility] after hernia surgery on 9/6 (patient did not report this yesterday when [facility] spoke with her). Instructed Ashley to contact [facility] once patient is discharged to determine plan for warfarin dosing and INR check.

## 2022-09-16 ENCOUNTER — ANTICOAGULATION VISIT (OUTPATIENT)
Dept: PHARMACY | Facility: HOSPITAL | Age: 82
End: 2022-09-16

## 2022-09-16 DIAGNOSIS — I48.21 PERMANENT ATRIAL FIBRILLATION: Primary | ICD-10-CM

## 2022-09-16 LAB — INR PPP: 5.6 (ref 2–3)

## 2022-09-16 PROCEDURE — 93296 REM INTERROG EVL PM/IDS: CPT | Performed by: NURSE PRACTITIONER

## 2022-09-16 PROCEDURE — 93294 REM INTERROG EVL PM/LDLS PM: CPT | Performed by: NURSE PRACTITIONER

## 2022-09-16 NOTE — PROGRESS NOTES
Anticoagulation Clinic Progress Note - Home INR Testing     INR Goal: 2 - 3  Today's INR: 5.6 (supratherapeutic). INR result was called in today by Neida (home health nurse).     Patient had procedure on 22. Was instructed to hold warfarin 5 days prior to . Was admitted after procedure and discharged on . Patient reports she does not believe she received any warfarin while admitted until , . She does not know what dose was given on . She states she did resume her prior regimen when discharged on  of warfarin 1 mg PO daily except warfarin 2 mg PO on Tuesday and Thursday.    INR History:  Anticoagulation Monitoring 2022   INR 2.40 2.40 5.6   INR Date 2022   INR Goal 2.0-3.0 2.0-3.0 2.0-3.0   Trend Down Same Down   Last Week Total 10 mg 9 mg 9 mg   Next Week Total 9 mg 9 mg 6 mg   Sun 1 mg 1 mg 0 mg   Mon 1 mg 1 mg -   Tue 2 mg - -   Wed - 1 mg -   Thu - 2 mg -   Fri 1 mg 1 mg 0 mg   Sat 1 mg 1 mg 0 mg   Visit Report - - -   Some recent data might be hidden       Anticoagulation Summary  As of 2022    INR goal:  2.0-3.0   TTR:  43.6 % (2.9 y)   INR used for dosin.6 (2022)   Warfarin maintenance plan:  1 mg every Mon, Wed; 2 mg every Tue, Thu; 0 mg all other days   Weekly warfarin total:  6 mg   Plan last modified:  David Brooks RPH (2022)   Next INR check:  2022   Priority:  Maintenance   Target end date:      Indications    Atrial fibrillation (HCC) [I48.91]             Anticoagulation Episode Summary     INR check location:      Preferred lab:      Send INR reminders to:  [facility] CLINICAL POOL    Comments:  Patient contract signed 21.  [facility] nurse Neida's phone number = [phone]      Anticoagulation Care Providers     Provider Role Specialty Phone number    Augustin Ellsworth MD  Cardiology [phone]          Clinic Interview:   Patient Findings     Negatives:  Signs/symptoms  of thrombosis, Signs/symptoms of bleeding,   Laboratory test error suspected, Change in health, Change in alcohol use,   Change in activity, Upcoming invasive procedure, Emergency department   visit, Upcoming dental procedure, Missed doses, Extra doses, Change in   medications, Change in diet/appetite, Hospital admission, Bruising, Other   complaints      Clinical Outcomes     Negatives:  Major bleeding event, Thromboembolic event,   Anticoagulation-related hospital admission, Anticoagulation-related ED   visit, Anticoagulation-related fatality        Current Medications:   Prior to Admission medications    Medication Sig Start Date End Date Taking? Authorizing Provider   acetaminophen (TYLENOL) 325 MG tablet Take 650 mg by mouth Every 6 (Six) Hours As Needed for Mild Pain .    Gino Leos MD   Cholecalciferol (Vitamin D3) 50 MCG (2000 UT) tablet Take 2,000 Units by mouth Daily.    Gino Leos MD   digoxin (LANOXIN) 125 MCG tablet Take 1 tablet by mouth Daily. 5/19/22   Augustin Ellsworth MD   dilTIAZem CD (Cardizem CD) 360 MG 24 hr capsule Take 1 capsule by mouth Daily. 5/19/22   Augustin Ellsworth MD   furosemide (LASIX) 20 MG tablet Take 1 tablet by mouth Daily. 6/28/22   Augustin Ellsworth MD   HYDROcodone-acetaminophen (NORCO) 5-325 MG per tablet Take 1 tablet by mouth Every 8 (Eight) Hours As Needed.    Gino Leos MD   metoprolol succinate XL (TOPROL-XL) 50 MG 24 hr tablet Take 1 tablet by mouth 2 (Two) Times a Day. 5/19/22   Augustin Ellsworth MD   Probiotic Product (PROBIOTIC PO) Take  by mouth Daily.    Gino Leos MD   topiramate (TOPAMAX) 100 MG tablet Take 100 mg by mouth every night at bedtime.    Gino Leos MD   warfarin (COUMADIN) 2 MG tablet Take 2 mg by mouth daily or as directed by [facility] ([phone]) 6/9/22   Augustin Ellsworth MD       Medical history:   Past Medical History:   Diagnosis Date    • Bladder cancer (HCC)    • Cancer (HCC)     breast, bladder   • Deep vein thrombosis (HCC)    • Essential hypertension 1/17/2017   • Fracture tibia/fibula, right, closed, initial encounter 8/27/2020   • GERD (gastroesophageal reflux disease)    • History of urostomy    • Immobility 08/27/2020    r/t broken Tibia    • Kidney carcinoma, right (HCC)     removed    • Long term current use of anticoagulant 1/9/2015   • PAF (paroxysmal atrial fibrillation) (HCC) 6/26/2019   • Presence of cardiac pacemaker 11/03/2014    BS dual chamber PM placed 10/2014 with pocket revision 1/25/17.  HX tachy rob syndrome   • Sick sinus syndrome (HCC) 11/3/2014       Social history:   Social History     Tobacco Use   • Smoking status: Never Smoker   • Smokeless tobacco: Never Used   Substance Use Topics   • Alcohol use: Not Currently       Allergies:    Amoxicillin, Ciprofloxacin, Oxycodone-acetaminophen, Penicillins, Sulfa antibiotics, Cephalexin, Clavulanic acid, Codeine, Contrast dye, Doxycycline, Fluconazole, Iodine, Macrobid [nitrofurantoin], Tobramycin, and Trimethoprim    This patient is managed via a collaborative agreement, pursuant to IC 25-26-16. The signed protocol is kept in the [facility] at [address] [place] [address].    Education: Hazel Bucio has been instructed to call if any changes in medications, doses, concerns, etc. Patient was provided dosing instructions and expressed verbal understanding and has no further questions at this time.    Plan:  1. Hold warfarin for 3 days (Friday, Saturday, and Sunday)  - Instructed home health nurse to discuss with patient signs/symptoms to monitor for and when to seek medical attention.  2. Recheck INR on Monday, 9/19 and call results into clinic.     David Brooks RPH  9/16/2022  12:47 EDT

## 2022-09-19 ENCOUNTER — ANTICOAGULATION VISIT (OUTPATIENT)
Dept: PHARMACY | Facility: HOSPITAL | Age: 82
End: 2022-09-19

## 2022-09-19 DIAGNOSIS — I48.21 PERMANENT ATRIAL FIBRILLATION: Primary | ICD-10-CM

## 2022-09-19 LAB — INR PPP: 3.9 (ref 2–3)

## 2022-09-19 NOTE — PROGRESS NOTES
Anticoagulation Clinic Progress Note - Home INR Testing     INR Goal: 2 - 3  Today's INR: 3.9 (supratherapeutic). INR result was called in today by Neida (home health nurse).   Current warfarin dose: Held doses 9/16-9/18. Total weekly dose of past 7 days = 4 mg (56% decrease from previous regimen with total weekly dose of 9 mg).     INR History:  Anticoagulation Monitoring 8/31/2022 9/16/2022 9/19/2022   INR 2.40 5.6 3.90   INR Date 8/31/2022 9/16/2022 9/19/2022   INR Goal 2.0-3.0 2.0-3.0 2.0-3.0   Trend Same Down Same   Last Week Total 9 mg 7 mg 6 mg   Next Week Total 9 mg 6 mg 4 mg   Sun 1 mg 0 mg -   Mon 1 mg - Hold (9/19)   Tue - - 1 mg (9/20)   Wed 1 mg - 1 mg   Thu 2 mg - -   Fri 1 mg 0 mg -   Sat 1 mg 0 mg -   Visit Report - - -   Some recent data might be hidden       Anticoagulation Summary  As of 9/19/2022    INR goal:  2.0-3.0   TTR:  43.5 % (2.9 y)   INR used for dosing:  3.90 (9/19/2022)   Warfarin maintenance plan:  1 mg every Mon, Wed; 2 mg every Tue, Thu; 0 mg all other days   Weekly warfarin total:  6 mg   Plan last modified:  David Brooks RPH (9/16/2022)   Next INR check:  9/22/2022   Priority:  Maintenance   Target end date:      Indications    Atrial fibrillation (HCC) [I48.91]             Anticoagulation Episode Summary     INR check location:      Preferred lab:      Send INR reminders to:  [facility] CLINICAL POOL    Comments:  Patient contract signed 11/17/21.  [facility] nurse Neida's phone number = [phone]      Anticoagulation Care Providers     Provider Role Specialty Phone number    Augustin Ellsworth MD  Cardiology [phone]          Clinic Interview:   Patient Findings     Positives:  Missed doses (Held doses 9/16-9/18 as instructed)    Negatives:  Signs/symptoms of thrombosis, Signs/symptoms of bleeding,   Laboratory test error suspected, Change in health, Change in alcohol use,   Change in activity, Upcoming invasive procedure, Emergency department   visit,  Upcoming dental procedure, Extra doses, Change in medications,   Change in diet/appetite, Hospital admission, Bruising, Other complaints      Clinical Outcomes     Negatives:  Major bleeding event, Thromboembolic event,   Anticoagulation-related hospital admission, Anticoagulation-related ED   visit, Anticoagulation-related fatality        Current Medications:   Prior to Admission medications    Medication Sig Start Date End Date Taking? Authorizing Provider   acetaminophen (TYLENOL) 325 MG tablet Take 650 mg by mouth Every 6 (Six) Hours As Needed for Mild Pain .    Gino Leos MD   Cholecalciferol (Vitamin D3) 50 MCG (2000 UT) tablet Take 2,000 Units by mouth Daily.    Gino Leos MD   digoxin (LANOXIN) 125 MCG tablet Take 1 tablet by mouth Daily. 5/19/22   Augustin Ellsworth MD   dilTIAZem CD (Cardizem CD) 360 MG 24 hr capsule Take 1 capsule by mouth Daily. 5/19/22   Augustin Ellsworth MD   furosemide (LASIX) 20 MG tablet Take 1 tablet by mouth Daily. 6/28/22   Augustin Ellsworth MD   HYDROcodone-acetaminophen (NORCO) 5-325 MG per tablet Take 1 tablet by mouth Every 8 (Eight) Hours As Needed.    Gino Leos MD   metoprolol succinate XL (TOPROL-XL) 50 MG 24 hr tablet Take 1 tablet by mouth 2 (Two) Times a Day. 5/19/22   Augustin Ellsworth MD   Probiotic Product (PROBIOTIC PO) Take  by mouth Daily.    ProviderGino MD   topiramate (TOPAMAX) 100 MG tablet Take 100 mg by mouth every night at bedtime.    ProviderGino MD   warfarin (COUMADIN) 2 MG tablet Take 2 mg by mouth daily or as directed by [facility] ([phone]) 6/9/22   Augustin Ellsworth MD       Medical history:   Past Medical History:   Diagnosis Date   • Bladder cancer (HCC)    • Cancer (HCC)     breast, bladder   • Deep vein thrombosis (HCC)    • Essential hypertension 1/17/2017   • Fracture tibia/fibula, right, closed, initial encounter 8/27/2020   • GERD  (gastroesophageal reflux disease)    • History of urostomy    • Immobility 08/27/2020    r/t broken Tibia    • Kidney carcinoma, right (HCC)     removed    • Long term current use of anticoagulant 1/9/2015   • PAF (paroxysmal atrial fibrillation) ([facility]) 6/26/2019   • Presence of cardiac pacemaker 11/03/2014    BS dual chamber PM placed 10/2014 with pocket revision 1/25/17.  HX tachy rob syndrome   • Sick sinus syndrome ([facility]) 11/3/2014       Social history:   Social History     Tobacco Use   • Smoking status: Never Smoker   • Smokeless tobacco: Never Used   Substance Use Topics   • Alcohol use: Not Currently       Allergies:    Amoxicillin, Ciprofloxacin, Oxycodone-acetaminophen, Penicillins, Sulfa antibiotics, Cephalexin, Clavulanic acid, Codeine, Contrast dye, Doxycycline, Fluconazole, Iodine, Macrobid [nitrofurantoin], Tobramycin, and Trimethoprim    This patient is managed via a collaborative agreement, pursuant to IC [id number]. The signed protocol is kept in the [facility] at [address] [place] [address].    Education: Hazel Bucio has been instructed to call if any changes in medications, doses, concerns, etc. Patient was provided dosing instructions and expressed verbal understanding and has no further questions at this time.    Plan:  1. Hold today then decreased dose of warfarin 1 mg PO daily .   - Instructed home health nurse to discuss with patient signs/symptoms to monitor for and when to seek medical attention.  2. INR should be tested in 3 days by home health nurse and called into clinic.     Kayleigh Horvath, UlicesD  9/19/2022  15:56 EDT

## 2022-09-22 ENCOUNTER — ANTICOAGULATION VISIT (OUTPATIENT)
Dept: PHARMACY | Facility: HOSPITAL | Age: 82
End: 2022-09-22

## 2022-09-22 DIAGNOSIS — I48.21 PERMANENT ATRIAL FIBRILLATION: Primary | ICD-10-CM

## 2022-09-22 LAB — INR PPP: 1.7 (ref 2–3)

## 2022-09-22 NOTE — PROGRESS NOTES
Anticoagulation Clinic Progress Note - Home INR Testing     INR Goal: 2 - 3  Today's INR: 1.7 (subtherapeutic). INR result was called in today by Neida (home health nurse).   Current warfarin dose: Held doses - then 1 mg PO daily -. Total weekly dose of past 7 days = 4 mg.     INR History:  Anticoagulation Monitoring 2022   INR 5.6 3.90 1.70   INR Date 2022   INR Goal 2.0-3.0 2.0-3.0 2.0-3.0   Trend Down Same Up   Last Week Total 7 mg 6 mg 4 mg   Next Week Total 6 mg 4 mg 8 mg   Sun 0 mg - 1 mg   Mon - Hold () 1 mg   Tue - 1 mg () 1 mg   Wed - 1 mg -   Thu - - 2 mg   Fri 0 mg - 1 mg   Sat 0 mg - 1 mg   Visit Report - - -   Some recent data might be hidden       Anticoagulation Summary  As of 2022    INR goal:  2.0-3.0   TTR:  43.5 % (3 y)   INR used for dosin.70 (2022)   Warfarin maintenance plan:  2 mg every Thu; 1 mg all other days   Weekly warfarin total:  8 mg   Plan last modified:  Kayleigh Horvath, PharmD (2022)   Next INR check:  2022   Priority:  Maintenance   Target end date:      Indications    Atrial fibrillation (HCC) [I48.91]             Anticoagulation Episode Summary     INR check location:      Preferred lab:      Send INR reminders to:  [facility] CLINICAL POOL    Comments:  Patient contract signed 21.  [facility] nurse Neida's phone number = [phone]      Anticoagulation Care Providers     Provider Role Specialty Phone number    Augustin Ellsworth MD  Cardiology [phone]          Clinic Interview:   Patient Findings     Positives:  Missed doses (Held dose  as instructed)    Negatives:  Signs/symptoms of thrombosis, Signs/symptoms of bleeding,   Laboratory test error suspected, Change in health, Change in alcohol use,   Change in activity, Upcoming invasive procedure, Emergency department   visit, Upcoming dental procedure, Extra doses, Change in medications,   Change in  diet/appetite, Hospital admission, Bruising, Other complaints      Clinical Outcomes     Negatives:  Major bleeding event, Thromboembolic event,   Anticoagulation-related hospital admission, Anticoagulation-related ED   visit, Anticoagulation-related fatality        Current Medications:   Prior to Admission medications    Medication Sig Start Date End Date Taking? Authorizing Provider   acetaminophen (TYLENOL) 325 MG tablet Take 650 mg by mouth Every 6 (Six) Hours As Needed for Mild Pain .    Gino Leos MD   Cholecalciferol (Vitamin D3) 50 MCG (2000 UT) tablet Take 2,000 Units by mouth Daily.    Gino Leos MD   digoxin (LANOXIN) 125 MCG tablet Take 1 tablet by mouth Daily. 5/19/22   Augustin Ellsworth MD   dilTIAZem CD (Cardizem CD) 360 MG 24 hr capsule Take 1 capsule by mouth Daily. 5/19/22   Augustin Ellsworth MD   furosemide (LASIX) 20 MG tablet Take 1 tablet by mouth Daily. 6/28/22   Augustin Ellsworth MD   HYDROcodone-acetaminophen (NORCO) 5-325 MG per tablet Take 1 tablet by mouth Every 8 (Eight) Hours As Needed.    ProviderGino MD   metoprolol succinate XL (TOPROL-XL) 50 MG 24 hr tablet Take 1 tablet by mouth 2 (Two) Times a Day. 5/19/22   Augustin Ellsworth MD   Probiotic Product (PROBIOTIC PO) Take  by mouth Daily.    ProviderGino MD   topiramate (TOPAMAX) 100 MG tablet Take 100 mg by mouth every night at bedtime.    Gino Leos MD   warfarin (COUMADIN) 2 MG tablet Take 2 mg by mouth daily or as directed by [facility] ([phone]) 6/9/22   Augustin Ellsworth MD       Medical history:   Past Medical History:   Diagnosis Date   • Bladder cancer (HCC)    • Cancer (HCC)     breast, bladder   • Deep vein thrombosis (HCC)    • Essential hypertension 1/17/2017   • Fracture tibia/fibula, right, closed, initial encounter 8/27/2020   • GERD (gastroesophageal reflux disease)    • History of urostomy    • Immobility  08/27/2020    r/t broken Tibia    • Kidney carcinoma, right (HCC)     removed    • Long term current use of anticoagulant 1/9/2015   • PAF (paroxysmal atrial fibrillation) (HCC) 6/26/2019   • Presence of cardiac pacemaker 11/03/2014    BS dual chamber PM placed 10/2014 with pocket revision 1/25/17.  HX tachy rob syndrome   • Sick sinus syndrome (HCC) 11/3/2014       Social history:   Social History     Tobacco Use   • Smoking status: Never Smoker   • Smokeless tobacco: Never Used   Substance Use Topics   • Alcohol use: Not Currently       Allergies:    Amoxicillin, Ciprofloxacin, Oxycodone-acetaminophen, Penicillins, Sulfa antibiotics, Cephalexin, Clavulanic acid, Codeine, Contrast dye, Doxycycline, Fluconazole, Iodine, Macrobid [nitrofurantoin], Tobramycin, and Trimethoprim    This patient is managed via a collaborative agreement, pursuant to IC 25-26-16. The signed protocol is kept in the [facility] at [address] [place] [address].    Education: Hazel Bucio has been instructed to call if any changes in medications, doses, concerns, etc. Patient was provided dosing instructions and expressed verbal understanding and has no further questions at this time.    Plan:  1. Bolus dose of warfarin 2 mg today then warfarin 1 mg PO daily. Total weekly dose of next 7 days = 8 mg.   - Instructed home health nurse to discuss with patient signs/symptoms to monitor for and when to seek medical attention.  2. INR should be tested on 9/28 by home health nurse and called into clinic.     Kayleigh Horvath PharmD  9/22/2022  12:34 EDT

## 2022-09-28 ENCOUNTER — ANTICOAGULATION VISIT (OUTPATIENT)
Dept: PHARMACY | Facility: HOSPITAL | Age: 82
End: 2022-09-28

## 2022-09-28 DIAGNOSIS — I48.0 PAROXYSMAL ATRIAL FIBRILLATION: Primary | ICD-10-CM

## 2022-09-28 LAB — INR PPP: 6.3 (ref 2–3)

## 2022-09-28 NOTE — PROGRESS NOTES
Anticoagulation Clinic Progress Note - Home Health INR Testing     INR Goal: 2 - 3  Today's INR: 6.3 (supratherapeutic). INR result was called in today by Neida ([facility] Nurse).   Current warfarin dose: warfarin 1 mg PO daily, except warfarin 2 mg PO on Thursday.  Last 7 days = 8 mg.     INR History:  Anticoagulation Monitoring 2022   INR 3.90 1.70 6.30   INR Date 2022   INR Goal 2.0-3.0 2.0-3.0 2.0-3.0   Trend Same Up Same   Last Week Total 6 mg 4 mg 8 mg   Next Week Total 4 mg 8 mg 5 mg   Sun - 1 mg -   Mon Hold () 1 mg -   Tue 1 mg () 1 mg -   Wed 1 mg - Hold ()   Thu - 2 mg Hold ()   Fri - 1 mg -   Sat - 1 mg -   Visit Report - - -   Some recent data might be hidden       Anticoagulation Summary  As of 2022    INR goal:  2.0-3.0   TTR:  43.3 % (3 y)   INR used for dosin.30 (2022)   Warfarin maintenance plan:  2 mg every Thu; 1 mg all other days   Weekly warfarin total:  8 mg   Plan last modified:  Kayleigh Horvath, PharmD (2022)   Next INR check:  2022   Priority:  Maintenance   Target end date:      Indications    Atrial fibrillation (HCC) [I48.91]             Anticoagulation Episode Summary     INR check location:      Preferred lab:      Send INR reminders to:  [facility] CLINICAL POOL    Comments:  Patient contract signed 21.  [facility] nurse Neida's phone number = [phone]      Anticoagulation Care Providers     Provider Role Specialty Phone number    Augustin Ellsworth MD  Cardiology [phone]          Clinic Interview:   Patient Findings     Negatives:  Signs/symptoms of thrombosis, Signs/symptoms of bleeding,   Laboratory test error suspected, Change in health, Change in alcohol use,   Change in activity, Upcoming invasive procedure, Emergency department   visit, Upcoming dental procedure, Missed doses, Extra doses, Change in   medications, Change in diet/appetite, Hospital admission,  Bruising, Other   complaints      Clinical Outcomes     Negatives:  Major bleeding event, Thromboembolic event,   Anticoagulation-related hospital admission, Anticoagulation-related ED   visit, Anticoagulation-related fatality        Current Medications:   Prior to Admission medications    Medication Sig Start Date End Date Taking? Authorizing Provider   acetaminophen (TYLENOL) 325 MG tablet Take 650 mg by mouth Every 6 (Six) Hours As Needed for Mild Pain .    Gino Leos MD   Cholecalciferol (Vitamin D3) 50 MCG (2000 UT) tablet Take 2,000 Units by mouth Daily.    Gino Leos MD   digoxin (LANOXIN) 125 MCG tablet Take 1 tablet by mouth Daily. 5/19/22   Augustin Ellsworth MD   dilTIAZem CD (Cardizem CD) 360 MG 24 hr capsule Take 1 capsule by mouth Daily. 5/19/22   Augustin Ellsworth MD   furosemide (LASIX) 20 MG tablet Take 1 tablet by mouth Daily. 6/28/22   Augustin Ellsworth MD   HYDROcodone-acetaminophen (NORCO) 5-325 MG per tablet Take 1 tablet by mouth Every 8 (Eight) Hours As Needed.    Gino Leos MD   metoprolol succinate XL (TOPROL-XL) 50 MG 24 hr tablet Take 1 tablet by mouth 2 (Two) Times a Day. 5/19/22   Augustin Ellsworth MD   Probiotic Product (PROBIOTIC PO) Take  by mouth Daily.    Gino Leos MD   topiramate (TOPAMAX) 100 MG tablet Take 100 mg by mouth every night at bedtime.    Gino Leos MD   warfarin (COUMADIN) 2 MG tablet Take 2 mg by mouth daily or as directed by [facility] ([phone]) 6/9/22   Augustin Ellsworth MD       Medical history:   Past Medical History:   Diagnosis Date   • Bladder cancer (HCC)    • Cancer (HCC)     breast, bladder   • Deep vein thrombosis (HCC)    • Essential hypertension 1/17/2017   • Fracture tibia/fibula, right, closed, initial encounter 8/27/2020   • GERD (gastroesophageal reflux disease)    • History of urostomy    • Immobility 08/27/2020    r/t broken Tibia    •  Kidney carcinoma, right (HCC)     removed    • Long term current use of anticoagulant 1/9/2015   • PAF (paroxysmal atrial fibrillation) (HCC) 6/26/2019   • Presence of cardiac pacemaker 11/03/2014    BS dual chamber PM placed 10/2014 with pocket revision 1/25/17.  HX tachy rob syndrome   • Sick sinus syndrome (HCC) 11/3/2014       Social history:   Social History     Tobacco Use   • Smoking status: Never Smoker   • Smokeless tobacco: Never Used   Substance Use Topics   • Alcohol use: Not Currently       Allergies:    Amoxicillin, Ciprofloxacin, Oxycodone-acetaminophen, Penicillins, Sulfa antibiotics, Cephalexin, Clavulanic acid, Codeine, Contrast dye, Doxycycline, Fluconazole, Iodine, Macrobid [nitrofurantoin], Tobramycin, and Trimethoprim    This patient is managed via a collaborative agreement, pursuant to IC 25-26-16. The signed protocol is kept in the [facility] at [address] [place] [address].    Education: Hazel Bucio has been instructed to call if any changes in medications, doses, concerns, etc. Patient was provided dosing instructions and expressed verbal understanding and has no further questions at this time.    Plan:  1. Hold today and tomorrow  2. INR should be tested 9/30 (Friday) and call into clinic by [facility] [employer].   -Instructed to monitor for signs/symptoms of bleeding     Basil Mcmillan, PharmMARIELA  9/28/2022  12:11 EDT

## 2022-09-30 ENCOUNTER — ANTICOAGULATION VISIT (OUTPATIENT)
Dept: PHARMACY | Facility: HOSPITAL | Age: 82
End: 2022-09-30

## 2022-09-30 DIAGNOSIS — I48.21 PERMANENT ATRIAL FIBRILLATION: Primary | ICD-10-CM

## 2022-09-30 LAB — INR PPP: 4 (ref 2–3)

## 2022-09-30 NOTE — PROGRESS NOTES
Anticoagulation Clinic Progress Note - Home INR Testing     INR Goal: 2 - 3  Today's INR: 4.0 (supratherapeutic). INR result was called in today by Neida ([facility] home health nurse).   Current warfarin dose: warfarin 1 mg PO daily, except warfarin 2 mg PO on Thursday.  Current total weekly dose = 8 mg per week.     Patient held warfarin  and  as instructed due to elevated INR. Last 7 day total = 5 mg (28% decrease)    Of note, patient passed kidney stone today    INR History:  Anticoagulation Monitoring 2022   INR 1.70 6.30 4.00   INR Date 2022   INR Goal 2.0-3.0 2.0-3.0 2.0-3.0   Trend Up Same Same   Last Week Total 4 mg 8 mg 5 mg   Next Week Total 8 mg 5 mg 6 mg   Sun 1 mg - 1 mg   Mon 1 mg - -   Tue 1 mg - -   Wed - Hold () -   Thu 2 mg Hold () -   Fri 1 mg - Hold ()   Sat 1 mg - Hold (10/1)   Visit Report - - -   Some recent data might be hidden       Anticoagulation Summary  As of 2022    INR goal:  2.0-3.0   TTR:  43.3 % (3 y)   INR used for dosin.00 (2022)   Warfarin maintenance plan:  2 mg every Thu; 1 mg all other days   Weekly warfarin total:  8 mg   Plan last modified:  Kayleigh Horvath, PharmD (2022)   Next INR check:  10/3/2022   Priority:  Maintenance   Target end date:      Indications    Atrial fibrillation (HCC) [I48.91]             Anticoagulation Episode Summary     INR check location:      Preferred lab:      Send INR reminders to:  [facility] CLINICAL POOL    Comments:  Patient contract signed 21.  [facility] nurse Neida's phone number = [phone]      Anticoagulation Care Providers     Provider Role Specialty Phone number    Augustin Ellsworth MD  Cardiology [phone]          Clinic Interview:   Patient Findings     Positives:  Missed doses (Held doses x2 days as instructed), Other   complaints (Patient passed kidney stone today)    Negatives:  Signs/symptoms of thrombosis, Signs/symptoms  of bleeding,   Laboratory test error suspected, Change in health, Change in alcohol use,   Change in activity, Upcoming invasive procedure, Emergency department   visit, Upcoming dental procedure, Extra doses, Change in medications,   Change in diet/appetite, Hospital admission, Bruising      Clinical Outcomes     Negatives:  Major bleeding event, Thromboembolic event,   Anticoagulation-related hospital admission, Anticoagulation-related ED   visit, Anticoagulation-related fatality        Current Medications:   Prior to Admission medications    Medication Sig Start Date End Date Taking? Authorizing Provider   acetaminophen (TYLENOL) 325 MG tablet Take 650 mg by mouth Every 6 (Six) Hours As Needed for Mild Pain .    Gino Leos MD   Cholecalciferol (Vitamin D3) 50 MCG (2000 UT) tablet Take 2,000 Units by mouth Daily.    Gino Leos MD   digoxin (LANOXIN) 125 MCG tablet Take 1 tablet by mouth Daily. 5/19/22   Augustin Ellsworth MD   dilTIAZem CD (Cardizem CD) 360 MG 24 hr capsule Take 1 capsule by mouth Daily. 5/19/22   Augustin Ellsworth MD   furosemide (LASIX) 20 MG tablet Take 1 tablet by mouth Daily. 6/28/22   Augustin Ellsworth MD   HYDROcodone-acetaminophen (NORCO) 5-325 MG per tablet Take 1 tablet by mouth Every 8 (Eight) Hours As Needed.    Gino Leos MD   metoprolol succinate XL (TOPROL-XL) 50 MG 24 hr tablet Take 1 tablet by mouth 2 (Two) Times a Day. 5/19/22   Augustin Ellsworth MD   Probiotic Product (PROBIOTIC PO) Take  by mouth Daily.    ProviderGino MD   topiramate (TOPAMAX) 100 MG tablet Take 100 mg by mouth every night at bedtime.    Gino Leos MD   warfarin (COUMADIN) 2 MG tablet Take 2 mg by mouth daily or as directed by [facility] ([phone]) 6/9/22   Augustin Ellsworth MD       Medical history:   Past Medical History:   Diagnosis Date   • Bladder cancer (HCC)    • Cancer (HCC)     breast, bladder    • Deep vein thrombosis (HCC)    • Essential hypertension 1/17/2017   • Fracture tibia/fibula, right, closed, initial encounter 8/27/2020   • GERD (gastroesophageal reflux disease)    • History of urostomy    • Immobility 08/27/2020    r/t broken Tibia    • Kidney carcinoma, right (HCC)     removed    • Long term current use of anticoagulant 1/9/2015   • PAF (paroxysmal atrial fibrillation) ([facility]) 6/26/2019   • Presence of cardiac pacemaker 11/03/2014    BS dual chamber PM placed 10/2014 with pocket revision 1/25/17.  HX tachy rob syndrome   • Sick sinus syndrome ([facility]) 11/3/2014       Social history:   Social History     Tobacco Use   • Smoking status: Never Smoker   • Smokeless tobacco: Never Used   Substance Use Topics   • Alcohol use: Not Currently       Allergies:    Amoxicillin, Ciprofloxacin, Oxycodone-acetaminophen, Penicillins, Sulfa antibiotics, Cephalexin, Clavulanic acid, Codeine, Contrast dye, Doxycycline, Fluconazole, Iodine, Macrobid [nitrofurantoin], Tobramycin, and Trimethoprim    This patient is managed via a collaborative agreement, pursuant to IC [id number]. The signed protocol is kept in the [facility] at [address] [place] [address].    Education: Hazel Bucio has been instructed to call if any changes in medications, doses, concerns, etc. Patient was provided dosing instructions and expressed verbal understanding and has no further questions at this time.    Plan:  1. Hold warfarin x2 days (9/30 and 10/1) then take warfarin 1 mg PO on Sunday. Retest INR on Monday 10/3. Next 7 day total = 6 mg.  2. INR should be tested weekly and called into clinic.     David Brooks RPH  9/30/2022  13:54 EDT

## 2022-10-04 ENCOUNTER — ANTICOAGULATION VISIT (OUTPATIENT)
Dept: PHARMACY | Facility: HOSPITAL | Age: 82
End: 2022-10-04

## 2022-10-04 ENCOUNTER — TELEPHONE (OUTPATIENT)
Dept: PHARMACY | Facility: HOSPITAL | Age: 82
End: 2022-10-04

## 2022-10-04 DIAGNOSIS — I48.21 PERMANENT ATRIAL FIBRILLATION: Primary | ICD-10-CM

## 2022-10-04 LAB — INR PPP: 4.3 (ref 2–3)

## 2022-10-04 NOTE — PROGRESS NOTES
Anticoagulation Clinic Progress Note - Home INR Testing     INR Goal: 2 - 3  Today's INR: 4.3 (supratherapeutic). INR result was called in today by Ashley ([facility] home health nurse).   Current warfarin dose: Previous regimen was warfarin 1 mg daily except 2 mg on . However, patient held -10/1 d/t supratherapeutic INR. Last 7 day total = 3 mg    INR History:  Anticoagulation Monitoring 2022 2022 10/4/2022   INR 6.30 4.00 4.30   INR Date 2022 2022 10/4/2022   INR Goal 2.0-3.0 2.0-3.0 2.0-3.0   Trend Same Same Same   Last Week Total 8 mg 5 mg 3 mg   Next Week Total 5 mg 6 mg 6 mg   Sun - 1 mg 1 mg   Mon - - 1 mg   Tue - - Hold (10/4)   Wed Hold () - Hold (10/5)   Thu Hold () - 2 mg   Fri - Hold () 1 mg   Sat - Hold (10/1) 1 mg   Visit Report - - -   Some recent data might be hidden       Anticoagulation Summary  As of 10/4/2022    INR goal:  2.0-3.0   TTR:  43.1 % (3 y)   INR used for dosin.30 (10/4/2022)   Warfarin maintenance plan:  2 mg every Thu; 1 mg all other days   Weekly warfarin total:  8 mg   Plan last modified:  Kayleigh Horvath, PharmD (2022)   Next INR check:     Priority:  Maintenance   Target end date:      Indications    Atrial fibrillation (HCC) [I48.91]             Anticoagulation Episode Summary     INR check location:      Preferred lab:      Send INR reminders to:  [facility] CLINICAL POOL    Comments:  Patient contract signed 21.  [facility] nurse Neida's phone number = [phone]      Anticoagulation Care Providers     Provider Role Specialty Phone number    Augustin Ellsworth MD  Cardiology [phone]          Clinic Interview:   Patient Findings     Negatives:  Signs/symptoms of thrombosis, Signs/symptoms of bleeding,   Laboratory test error suspected, Change in health, Change in alcohol use,   Change in activity, Upcoming invasive procedure, Emergency department   visit, Upcoming dental procedure, Missed doses, Extra doses,  Change in   medications, Change in diet/appetite, Hospital admission, Bruising, Other   complaints      Clinical Outcomes     Negatives:  Major bleeding event, Thromboembolic event,   Anticoagulation-related hospital admission, Anticoagulation-related ED   visit, Anticoagulation-related fatality        Current Medications:   Prior to Admission medications    Medication Sig Start Date End Date Taking? Authorizing Provider   acetaminophen (TYLENOL) 325 MG tablet Take 650 mg by mouth Every 6 (Six) Hours As Needed for Mild Pain .    ProviderGino MD   Cholecalciferol (Vitamin D3) 50 MCG (2000 UT) tablet Take 2,000 Units by mouth Daily.    ProviderGino MD   digoxin (LANOXIN) 125 MCG tablet Take 1 tablet by mouth Daily. 5/19/22   Augustin Ellsworth MD   dilTIAZem CD (Cardizem CD) 360 MG 24 hr capsule Take 1 capsule by mouth Daily. 5/19/22   Augustin Ellsworth MD   furosemide (LASIX) 20 MG tablet Take 1 tablet by mouth Daily. 6/28/22   Augustin Ellsworth MD   HYDROcodone-acetaminophen (NORCO) 5-325 MG per tablet Take 1 tablet by mouth Every 8 (Eight) Hours As Needed.    ProviderGino MD   metoprolol succinate XL (TOPROL-XL) 50 MG 24 hr tablet Take 1 tablet by mouth 2 (Two) Times a Day. 5/19/22   Augustin Ellsworth MD   Probiotic Product (PROBIOTIC PO) Take  by mouth Daily.    ProviderGino MD   topiramate (TOPAMAX) 100 MG tablet Take 100 mg by mouth every night at bedtime.    ProviderGino MD   warfarin (COUMADIN) 2 MG tablet Take 2 mg by mouth daily or as directed by [facility] ([phone]) 6/9/22   Augustin Ellsworth MD       Medical history:   Past Medical History:   Diagnosis Date   • Bladder cancer (HCC)    • Cancer (HCC)     breast, bladder   • Deep vein thrombosis (HCC)    • Essential hypertension 1/17/2017   • Fracture tibia/fibula, right, closed, initial encounter 8/27/2020   • GERD (gastroesophageal reflux disease)    •  History of urostomy    • Immobility 08/27/2020    r/t broken Tibia    • Kidney carcinoma, right (HCC)     removed    • Long term current use of anticoagulant 1/9/2015   • PAF (paroxysmal atrial fibrillation) (HCC) 6/26/2019   • Presence of cardiac pacemaker 11/03/2014    BS dual chamber PM placed 10/2014 with pocket revision 1/25/17.  HX tachy rob syndrome   • Sick sinus syndrome (HCC) 11/3/2014       Social history:   Social History     Tobacco Use   • Smoking status: Never Smoker   • Smokeless tobacco: Never Used   Substance Use Topics   • Alcohol use: Not Currently       Allergies:    Amoxicillin, Ciprofloxacin, Oxycodone-acetaminophen, Penicillins, Sulfa antibiotics, Cephalexin, Clavulanic acid, Codeine, Contrast dye, Doxycycline, Fluconazole, Iodine, Macrobid [nitrofurantoin], Tobramycin, and Trimethoprim    This patient is managed via a collaborative agreement, pursuant to IC [id number]. The signed protocol is kept in the [facility] at [address] [place] [facility].    Education: Hazel Bucio has been instructed to call if any changes in medications, doses, concerns, etc. Patient was provided dosing instructions and expressed verbal understanding and has no further questions at this time.    Plan:  - Hold x 2 days (10/4 and 10/5) then recheck INR on 10/6.   - Discussed signs and symptoms of bleeding/bruising. Instructed to call with any concerns.    David Brooks RPH  10/4/2022  13:13 EDT

## 2022-10-04 NOTE — TELEPHONE ENCOUNTER
Contacted Dr. Ellsworth's office to discuss patient's very labile INR recently. Of note, most recently INR increased to 6.3 (9/28) and patient was instructed to hold two doses at which point INR decreased to 4.0 (9/30). Patient was then instructed to hold two additional doses and take warfarin 1 mg on 10/2. Home Health supposed to check INR on 10/3 but did not go to patient's home until today so patient took warfarin 1 mg on 10/3. INR has now increased to 4.3. As of today patient has received a total of only 3 mg in the past 7 days and INR remains supratherapeutic. Unsure of reasoning for large increases in INR despite dose continuing to be decreased. Test claims ran for Eliquis and Xarelto and both are $15 for 30 day supply through patient's insurance.

## 2022-10-06 ENCOUNTER — ANTICOAGULATION VISIT (OUTPATIENT)
Dept: PHARMACY | Facility: HOSPITAL | Age: 82
End: 2022-10-06

## 2022-10-06 DIAGNOSIS — I48.0 PAROXYSMAL ATRIAL FIBRILLATION: Primary | ICD-10-CM

## 2022-10-06 LAB — INR PPP: 4.1 (ref 2–3)

## 2022-10-06 NOTE — PROGRESS NOTES
Anticoagulation Clinic Progress Note - Home Health INR Testing     INR Goal: 2 - 3  Today's INR: 4.1 (supratherapeutic). INR result was called in today by JORDAN Hare from [facility] ([phone]).   Current warfarin dose: Holding   -VITOR Hare did a POC and called us and INR was 4.4.  Gave verbal order to do a venipuncture to correlate since INR Is not decreasing as  Patient is currently holding.      INR History:  Anticoagulation Monitoring 2022 10/4/2022 10/6/2022   INR 4.00 4.30 4.10   INR Date 2022 10/4/2022 10/6/2022   INR Goal 2.0-3.0 2.0-3.0 2.0-3.0   Trend Same Same Same   Last Week Total 5 mg 3 mg 2 mg   Next Week Total 6 mg 6 mg 3 mg   Sun 1 mg - Hold (10/9)   Mon - - -   Tue - Hold (10/4) -   Wed - Hold (10/5) -   Thu - - Hold (10/6)   Fri Hold () - Hold (10/7)   Sat Hold (10/1) - Hold (10/8)   Visit Report - - -   Some recent data might be hidden       Anticoagulation Summary  As of 10/6/2022    INR goal:  2.0-3.0   TTR:  43.1 % (3 y)   INR used for dosin.10 (10/6/2022)   Warfarin maintenance plan:  2 mg every Thu; 1 mg all other days   Weekly warfarin total:  8 mg   Plan last modified:  Kayleigh Horvath, PharmD (2022)   Next INR check:  10/10/2022   Priority:  Maintenance   Target end date:      Indications    Atrial fibrillation (HCC) [I48.91]             Anticoagulation Episode Summary     INR check location:      Preferred lab:      Send INR reminders to:  [facility] CLINICAL POOL    Comments:  Patient contract signed 21.  [facility] nurse Neida's phone number = [phone]      Anticoagulation Care Providers     Provider Role Specialty Phone number    Augustin Ellsworth MD  Cardiology [phone]          Clinic Interview:   Patient Findings     Negatives:  Signs/symptoms of thrombosis, Signs/symptoms of bleeding,   Laboratory test error suspected, Change in health, Change in alcohol use,   Change in activity, Upcoming invasive procedure, Emergency department   visit,  Upcoming dental procedure, Missed doses, Extra doses, Change in   medications, Change in diet/appetite, Hospital admission, Bruising, Other   complaints      Clinical Outcomes     Negatives:  Major bleeding event, Thromboembolic event,   Anticoagulation-related hospital admission, Anticoagulation-related ED   visit, Anticoagulation-related fatality        Current Medications:   Prior to Admission medications    Medication Sig Start Date End Date Taking? Authorizing Provider   acetaminophen (TYLENOL) 325 MG tablet Take 650 mg by mouth Every 6 (Six) Hours As Needed for Mild Pain .    ProviderGino MD   Cholecalciferol (Vitamin D3) 50 MCG (2000 UT) tablet Take 2,000 Units by mouth Daily.    ProviderGino MD   digoxin (LANOXIN) 125 MCG tablet Take 1 tablet by mouth Daily. 5/19/22   Augustin Ellsworth MD   dilTIAZem CD (Cardizem CD) 360 MG 24 hr capsule Take 1 capsule by mouth Daily. 5/19/22   Augustin Ellsworth MD   furosemide (LASIX) 20 MG tablet Take 1 tablet by mouth Daily. 6/28/22   Augustin Ellsworth MD   HYDROcodone-acetaminophen (NORCO) 5-325 MG per tablet Take 1 tablet by mouth Every 8 (Eight) Hours As Needed.    ProviderGino MD   metoprolol succinate XL (TOPROL-XL) 50 MG 24 hr tablet Take 1 tablet by mouth 2 (Two) Times a Day. 5/19/22   Augustin Ellsworth MD   Probiotic Product (PROBIOTIC PO) Take  by mouth Daily.    ProviderGino MD   topiramate (TOPAMAX) 100 MG tablet Take 100 mg by mouth every night at bedtime.    ProviderGino MD   warfarin (COUMADIN) 2 MG tablet Take 2 mg by mouth daily or as directed by [facility] ([phone]) 6/9/22   Augustin Ellsworth MD       Medical history:   Past Medical History:   Diagnosis Date   • Bladder cancer (HCC)    • Cancer (HCC)     breast, bladder   • Deep vein thrombosis (HCC)    • Essential hypertension 1/17/2017   • Fracture tibia/fibula, right, closed, initial encounter  8/27/2020   • GERD (gastroesophageal reflux disease)    • History of urostomy    • Immobility 08/27/2020    r/t broken Tibia    • Kidney carcinoma, right (HCC)     removed    • Long term current use of anticoagulant 1/9/2015   • PAF (paroxysmal atrial fibrillation) ([facility]) 6/26/2019   • Presence of cardiac pacemaker 11/03/2014    BS dual chamber PM placed 10/2014 with pocket revision 1/25/17.  HX tachy rob syndrome   • Sick sinus syndrome ([facility]) 11/3/2014       Social history:   Social History     Tobacco Use   • Smoking status: Never Smoker   • Smokeless tobacco: Never Used   Substance Use Topics   • Alcohol use: Not Currently       Allergies:    Amoxicillin, Ciprofloxacin, Oxycodone-acetaminophen, Penicillins, Sulfa antibiotics, Cephalexin, Clavulanic acid, Codeine, Contrast dye, Doxycycline, Fluconazole, Iodine, Macrobid [nitrofurantoin], Tobramycin, and Trimethoprim    This patient is managed via a collaborative agreement, pursuant to IC [id number]. The signed protocol is kept in the [facility] at [address] [place] [address].    Education: Hazel Bucio has been instructed to call if any changes in medications, doses, concerns, etc. Patient was provided dosing instructions and expressed verbal understanding and has no further questions at this time.    Plan:  1. Continue to hold dose until Monday   2. INR should be tested Monday by A home health nurse and call into clinic.   -Called and talked to patient about switching to apixaban due to continually being out of range on warfarin therapy.  Discussed with Cardiology.     Basil Mcmillan, PharmD  10/6/2022  14:00 EDT

## 2022-10-11 ENCOUNTER — ANTICOAGULATION VISIT (OUTPATIENT)
Dept: PHARMACY | Facility: HOSPITAL | Age: 82
End: 2022-10-11

## 2022-10-11 DIAGNOSIS — I48.21 PERMANENT ATRIAL FIBRILLATION: Primary | ICD-10-CM

## 2022-10-11 LAB — INR PPP: 1.4 (ref 2–3)

## 2022-10-24 RX ORDER — DILTIAZEM HYDROCHLORIDE 360 MG/1
CAPSULE, EXTENDED RELEASE ORAL
Qty: 90 CAPSULE | Refills: 3 | Status: SHIPPED | OUTPATIENT
Start: 2022-10-24 | End: 2023-03-24 | Stop reason: HOSPADM

## 2022-10-28 RX ORDER — METOPROLOL SUCCINATE 50 MG/1
TABLET, EXTENDED RELEASE ORAL
Qty: 180 TABLET | Refills: 3 | Status: SHIPPED | OUTPATIENT
Start: 2022-10-28

## 2022-10-28 RX ORDER — DIGOXIN 125 MCG
TABLET ORAL
Qty: 90 TABLET | Refills: 3 | Status: SHIPPED | OUTPATIENT
Start: 2022-10-28 | End: 2023-03-24 | Stop reason: HOSPADM

## 2022-12-07 ENCOUNTER — APPOINTMENT (OUTPATIENT)
Dept: CT IMAGING | Facility: HOSPITAL | Age: 82
End: 2022-12-07

## 2022-12-07 ENCOUNTER — APPOINTMENT (OUTPATIENT)
Dept: GENERAL RADIOLOGY | Facility: HOSPITAL | Age: 82
End: 2022-12-07

## 2022-12-07 ENCOUNTER — HOSPITAL ENCOUNTER (EMERGENCY)
Facility: HOSPITAL | Age: 82
Discharge: HOME | End: 2022-12-07
Attending: EMERGENCY MEDICINE | Admitting: EMERGENCY MEDICINE

## 2022-12-07 VITALS
RESPIRATION RATE: 16 BRPM | HEART RATE: 77 BPM | TEMPERATURE: 98.6 F | WEIGHT: 145 LBS | HEIGHT: 66 IN | DIASTOLIC BLOOD PRESSURE: 74 MMHG | OXYGEN SATURATION: 99 % | BODY MASS INDEX: 23.3 KG/M2 | SYSTOLIC BLOOD PRESSURE: 164 MMHG

## 2022-12-07 DIAGNOSIS — N39.0 ACUTE URINARY TRACT INFECTION: ICD-10-CM

## 2022-12-07 DIAGNOSIS — R53.1 WEAKNESS: Primary | ICD-10-CM

## 2022-12-07 LAB
ALBUMIN SERPL-MCNC: 3.9 G/DL (ref 3.5–5.2)
ALBUMIN/GLOB SERPL: 1.1 G/DL
ALP SERPL-CCNC: 143 U/L (ref 39–117)
ALT SERPL W P-5'-P-CCNC: 13 U/L (ref 1–33)
AMMONIA BLD-SCNC: 11 UMOL/L (ref 11–51)
ANION GAP SERPL CALCULATED.3IONS-SCNC: 14 MMOL/L (ref 5–15)
AST SERPL-CCNC: 22 U/L (ref 1–32)
B PARAPERT DNA SPEC QL NAA+PROBE: NOT DETECTED
B PERT DNA SPEC QL NAA+PROBE: NOT DETECTED
BACTERIA UR QL AUTO: ABNORMAL /HPF
BASOPHILS # BLD AUTO: 0.1 10*3/MM3 (ref 0–0.2)
BASOPHILS NFR BLD AUTO: 0.8 % (ref 0–1.5)
BILIRUB SERPL-MCNC: 0.6 MG/DL (ref 0–1.2)
BILIRUB UR QL STRIP: NEGATIVE
BUN SERPL-MCNC: 22 MG/DL (ref 8–23)
BUN/CREAT SERPL: 14 (ref 7–25)
C PNEUM DNA NPH QL NAA+NON-PROBE: NOT DETECTED
CALCIUM SPEC-SCNC: 9.7 MG/DL (ref 8.6–10.5)
CHLORIDE SERPL-SCNC: 110 MMOL/L (ref 98–107)
CK SERPL-CCNC: 22 U/L (ref 20–180)
CLARITY UR: ABNORMAL
CO2 SERPL-SCNC: 18 MMOL/L (ref 22–29)
COLOR UR: YELLOW
CREAT SERPL-MCNC: 1.57 MG/DL (ref 0.57–1)
D-LACTATE SERPL-SCNC: 0.5 MMOL/L (ref 0.5–2)
DEPRECATED RDW RBC AUTO: 54.3 FL (ref 37–54)
DIGOXIN SERPL-MCNC: 1.3 NG/ML (ref 0.6–1.2)
EGFRCR SERPLBLD CKD-EPI 2021: 33 ML/MIN/1.73
EOSINOPHIL # BLD AUTO: 0 10*3/MM3 (ref 0–0.4)
EOSINOPHIL NFR BLD AUTO: 0.5 % (ref 0.3–6.2)
ERYTHROCYTE [DISTWIDTH] IN BLOOD BY AUTOMATED COUNT: 15.4 % (ref 12.3–15.4)
ERYTHROCYTE [SEDIMENTATION RATE] IN BLOOD: 30 MM/HR (ref 0–30)
FLUAV SUBTYP SPEC NAA+PROBE: NOT DETECTED
FLUBV RNA ISLT QL NAA+PROBE: NOT DETECTED
GLOBULIN UR ELPH-MCNC: 3.7 GM/DL
GLUCOSE SERPL-MCNC: 106 MG/DL (ref 65–99)
GLUCOSE UR STRIP-MCNC: NEGATIVE MG/DL
HADV DNA SPEC NAA+PROBE: NOT DETECTED
HCOV 229E RNA SPEC QL NAA+PROBE: NOT DETECTED
HCOV HKU1 RNA SPEC QL NAA+PROBE: NOT DETECTED
HCOV NL63 RNA SPEC QL NAA+PROBE: NOT DETECTED
HCOV OC43 RNA SPEC QL NAA+PROBE: NOT DETECTED
HCT VFR BLD AUTO: 40.2 % (ref 34–46.6)
HGB BLD-MCNC: 13.1 G/DL (ref 12–15.9)
HGB UR QL STRIP.AUTO: ABNORMAL
HMPV RNA NPH QL NAA+NON-PROBE: NOT DETECTED
HOLD SPECIMEN: NORMAL
HOLD SPECIMEN: NORMAL
HPIV1 RNA ISLT QL NAA+PROBE: NOT DETECTED
HPIV2 RNA SPEC QL NAA+PROBE: NOT DETECTED
HPIV3 RNA NPH QL NAA+PROBE: NOT DETECTED
HPIV4 P GENE NPH QL NAA+PROBE: NOT DETECTED
HYALINE CASTS UR QL AUTO: ABNORMAL /LPF
KETONES UR QL STRIP: NEGATIVE
LEUKOCYTE ESTERASE UR QL STRIP.AUTO: ABNORMAL
LIPASE SERPL-CCNC: 19 U/L (ref 13–60)
LYMPHOCYTES # BLD AUTO: 1.3 10*3/MM3 (ref 0.7–3.1)
LYMPHOCYTES NFR BLD AUTO: 15.5 % (ref 19.6–45.3)
M PNEUMO IGG SER IA-ACNC: NOT DETECTED
MAGNESIUM SERPL-MCNC: 2 MG/DL (ref 1.6–2.4)
MCH RBC QN AUTO: 30.5 PG (ref 26.6–33)
MCHC RBC AUTO-ENTMCNC: 32.5 G/DL (ref 31.5–35.7)
MCV RBC AUTO: 93.8 FL (ref 79–97)
MONOCYTES # BLD AUTO: 0.6 10*3/MM3 (ref 0.1–0.9)
MONOCYTES NFR BLD AUTO: 7.3 % (ref 5–12)
NEUTROPHILS NFR BLD AUTO: 6.2 10*3/MM3 (ref 1.7–7)
NEUTROPHILS NFR BLD AUTO: 75.9 % (ref 42.7–76)
NITRITE UR QL STRIP: POSITIVE
NRBC BLD AUTO-RTO: 0.1 /100 WBC (ref 0–0.2)
NT-PROBNP SERPL-MCNC: 6821 PG/ML (ref 0–1800)
PH UR STRIP.AUTO: <=5 [PH] (ref 5–8)
PHOSPHATE SERPL-MCNC: 2.8 MG/DL (ref 2.5–4.5)
PLATELET # BLD AUTO: 322 10*3/MM3 (ref 140–450)
PMV BLD AUTO: 7.6 FL (ref 6–12)
POTASSIUM SERPL-SCNC: 4 MMOL/L (ref 3.5–5.2)
PROT SERPL-MCNC: 7.6 G/DL (ref 6–8.5)
PROT UR QL STRIP: ABNORMAL
RBC # BLD AUTO: 4.29 10*6/MM3 (ref 3.77–5.28)
RBC # UR STRIP: ABNORMAL /HPF
REF LAB TEST METHOD: ABNORMAL
RHINOVIRUS RNA SPEC NAA+PROBE: NOT DETECTED
RSV RNA NPH QL NAA+NON-PROBE: NOT DETECTED
SARS-COV-2 RNA NPH QL NAA+NON-PROBE: NOT DETECTED
SODIUM SERPL-SCNC: 142 MMOL/L (ref 136–145)
SP GR UR STRIP: 1.01 (ref 1–1.03)
SQUAMOUS #/AREA URNS HPF: ABNORMAL /HPF
TROPONIN T SERPL-MCNC: 0.02 NG/ML (ref 0–0.03)
TSH SERPL DL<=0.05 MIU/L-ACNC: 0.9 UIU/ML (ref 0.27–4.2)
UROBILINOGEN UR QL STRIP: ABNORMAL
WBC # UR STRIP: ABNORMAL /HPF
WBC NRBC COR # BLD: 8.1 10*3/MM3 (ref 3.4–10.8)
WHOLE BLOOD HOLD COAG: NORMAL
WHOLE BLOOD HOLD SPECIMEN: NORMAL

## 2022-12-07 PROCEDURE — 87186 SC STD MICRODIL/AGAR DIL: CPT | Performed by: EMERGENCY MEDICINE

## 2022-12-07 PROCEDURE — 36415 COLL VENOUS BLD VENIPUNCTURE: CPT

## 2022-12-07 PROCEDURE — 99284 EMERGENCY DEPT VISIT MOD MDM: CPT

## 2022-12-07 PROCEDURE — 87086 URINE CULTURE/COLONY COUNT: CPT | Performed by: EMERGENCY MEDICINE

## 2022-12-07 PROCEDURE — 83605 ASSAY OF LACTIC ACID: CPT

## 2022-12-07 PROCEDURE — 80053 COMPREHEN METABOLIC PANEL: CPT | Performed by: EMERGENCY MEDICINE

## 2022-12-07 PROCEDURE — 83735 ASSAY OF MAGNESIUM: CPT | Performed by: EMERGENCY MEDICINE

## 2022-12-07 PROCEDURE — 84443 ASSAY THYROID STIM HORMONE: CPT | Performed by: EMERGENCY MEDICINE

## 2022-12-07 PROCEDURE — 93005 ELECTROCARDIOGRAM TRACING: CPT | Performed by: EMERGENCY MEDICINE

## 2022-12-07 PROCEDURE — 83690 ASSAY OF LIPASE: CPT | Performed by: EMERGENCY MEDICINE

## 2022-12-07 PROCEDURE — 0202U NFCT DS 22 TRGT SARS-COV-2: CPT | Performed by: EMERGENCY MEDICINE

## 2022-12-07 PROCEDURE — 84484 ASSAY OF TROPONIN QUANT: CPT | Performed by: EMERGENCY MEDICINE

## 2022-12-07 PROCEDURE — 83880 ASSAY OF NATRIURETIC PEPTIDE: CPT | Performed by: EMERGENCY MEDICINE

## 2022-12-07 PROCEDURE — 71045 X-RAY EXAM CHEST 1 VIEW: CPT

## 2022-12-07 PROCEDURE — 85025 COMPLETE CBC W/AUTO DIFF WBC: CPT | Performed by: EMERGENCY MEDICINE

## 2022-12-07 PROCEDURE — 82550 ASSAY OF CK (CPK): CPT | Performed by: EMERGENCY MEDICINE

## 2022-12-07 PROCEDURE — 84100 ASSAY OF PHOSPHORUS: CPT | Performed by: EMERGENCY MEDICINE

## 2022-12-07 PROCEDURE — 80162 ASSAY OF DIGOXIN TOTAL: CPT | Performed by: EMERGENCY MEDICINE

## 2022-12-07 PROCEDURE — 87040 BLOOD CULTURE FOR BACTERIA: CPT | Performed by: EMERGENCY MEDICINE

## 2022-12-07 PROCEDURE — 81001 URINALYSIS AUTO W/SCOPE: CPT | Performed by: EMERGENCY MEDICINE

## 2022-12-07 PROCEDURE — 70450 CT HEAD/BRAIN W/O DYE: CPT

## 2022-12-07 PROCEDURE — 87088 URINE BACTERIA CULTURE: CPT | Performed by: EMERGENCY MEDICINE

## 2022-12-07 PROCEDURE — 85652 RBC SED RATE AUTOMATED: CPT | Performed by: EMERGENCY MEDICINE

## 2022-12-07 PROCEDURE — 93005 ELECTROCARDIOGRAM TRACING: CPT

## 2022-12-07 PROCEDURE — 82140 ASSAY OF AMMONIA: CPT | Performed by: EMERGENCY MEDICINE

## 2022-12-07 PROCEDURE — 87181 SC STD AGAR DILUTION PER AGT: CPT | Performed by: EMERGENCY MEDICINE

## 2022-12-07 RX ORDER — ONDANSETRON 8 MG/1
8 TABLET, ORALLY DISINTEGRATING ORAL EVERY 8 HOURS PRN
Qty: 10 TABLET | Refills: 0 | Status: ON HOLD | OUTPATIENT
Start: 2022-12-07 | End: 2023-03-21

## 2022-12-07 RX ORDER — SODIUM CHLORIDE 0.9 % (FLUSH) 0.9 %
10 SYRINGE (ML) INJECTION AS NEEDED
Status: DISCONTINUED | OUTPATIENT
Start: 2022-12-07 | End: 2022-12-07 | Stop reason: HOSPADM

## 2022-12-07 RX ORDER — GRANULES FOR ORAL 3 G/1
POWDER ORAL
Qty: 2 PACKET | Refills: 0 | Status: ON HOLD | OUTPATIENT
Start: 2022-12-07 | End: 2023-03-21

## 2022-12-07 NOTE — DISCHARGE INSTRUCTIONS
Make sure to take all of the antibiotics  Tylenol as needed for fever  Plenty of fluids in the next 3 days  Follow-up with your primary care provider

## 2022-12-07 NOTE — ED PROVIDER NOTES
Subjective   History of Present Illness  82-year-old female 20 been globally weak for the last 3 weeks.  She reports that up to yesterday she had been in [facility].  She states that she was very tired today.  She reports that she has had no definite fever or chills but may have felt hot within the last week.  She reports that she has had no cough although she has had to clear her throat more than usual.  She denies sore throat or difficulty swallowing she reports no nausea vomiting or diarrhea.  She states she has had some decrease in appetite.  She reports no focal neurologic defects or lateralizing neurologic signs        Review of Systems   Constitutional: Positive for chills, fatigue and fever.   HENT: Positive for hearing loss. Negative for sore throat and trouble swallowing.    Eyes: Negative for discharge.   Respiratory: Positive for cough. Negative for chest tightness and shortness of breath.    Cardiovascular: Negative for palpitations and leg swelling.   Gastrointestinal: Positive for nausea. Negative for diarrhea and vomiting.   Endocrine: Negative for polydipsia, polyphagia and polyuria.   Genitourinary: Negative for dysuria.   Musculoskeletal: Positive for arthralgias and myalgias.   Skin: Negative for rash.   Allergic/Immunologic: Negative for immunocompromised state.   Hematological: Does not bruise/bleed easily.   All other systems reviewed and are negative.      Past Medical History:   Diagnosis Date   • Bladder cancer (HCC)    • Cancer (HCC)     breast, bladder   • Deep vein thrombosis (HCC)    • Essential hypertension 1/17/2017   • Fracture tibia/fibula, right, closed, initial encounter 8/27/2020   • GERD (gastroesophageal reflux disease)    • History of urostomy    • Immobility 08/27/2020    r/t broken Tibia    • Kidney carcinoma, right (HCC)     removed    • Long term current use of anticoagulant 1/9/2015   • PAF (paroxysmal atrial fibrillation) (HCC) 6/26/2019   • Presence of cardiac  "pacemaker 11/03/2014    BS dual chamber PM placed 10/2014 with pocket revision 1/25/17.  HX tachy rob syndrome   • Sick sinus syndrome (HCC) 11/3/2014       Allergies   Allergen Reactions   • Amoxicillin Shortness Of Breath   • Ciprofloxacin Hives   • Oxycodone-Acetaminophen Unknown - High Severity     Pt's son stated when pt fell and broke her leg she was put on oxycodone; pt's son stated pt's \"vitals went all out of wack, she couldn't remember things.\"   • Penicillins Rash   • Sulfa Antibiotics Hives   • Cephalexin Other (See Comments)   • Clavulanic Acid Other (See Comments)   • Codeine Other (See Comments)   • Contrast Dye Other (See Comments)     8/18/22    • Doxycycline Other (See Comments)   • Fluconazole Other (See Comments)   • Iodine Hives   • Macrobid [Nitrofurantoin] Hives   • Tobramycin Other (See Comments)   • Trimethoprim Other (See Comments)       Past Surgical History:   Procedure Laterality Date   • BREAST LUMPECTOMY     • CHOLECYSTECTOMY N/A 10/12/2020    Procedure: CHOLECYSTECTOMY LAPAROSCOPIC;  Surgeon: Go Pereira DO;  Location: [facility];  Service: General;  Laterality: N/A;   • CYSTECTOMY W/ URETEROILEAL CONDUIT     • HARDWARE REMOVAL Right 6/11/2021    Procedure: TIBIA HARDWARE REMOVAL;  Surgeon: Geoff Shah II, MD;  Location: [facility] OR;  Service: Orthopedics;  Laterality: Right;   • HERNIA REPAIR     • HYSTERECTOMY     • INSERT / REPLACE / REMOVE PACEMAKER     • NEPHRECTOMY Right    • TIBIA IM NAILING/RODDING Right 8/28/2020    Procedure: TIBIA INTRAMEDULLARY NAIL/ABRAHAM INSERTION;  Surgeon: Geoff Shah II, MD;  Location: [facility] MAIN OR;  Service: Orthopedics;  Laterality: Right;       Family History   Problem Relation Age of Onset   • COPD Father        Social History     Socioeconomic History   • Marital status:    Tobacco Use   • Smoking status: Never   • Smokeless tobacco: Never   Vaping Use   • Vaping Use: Never used   Substance and Sexual " Activity   • Alcohol use: Not Currently   • Drug use: Never   • Sexual activity: Defer     no reported safety issues      Objective   Physical Exam  Alert Gil Coma Scale 15 hard of hearing, vague historian   HEENT: Pupils equal and reactive to light. Conjunctivae are not injected. Normal tympanic membranes. Oropharynx and nares are normal.   Neck: Supple. Midline trachea. No JVD. No goiter.   Chest: Clear and equal breath sounds bilaterally, regular rate and rhythm without murmur or rub.   Abdomen: Positive bowel sounds suprapubic discomfort only the abdomen is mildly obese but, nondistended. No rebound or peritoneal signs. No CVA tenderness.   Extremities no clubbing. cyanosis or edema. Motor sensory exam is normal. The full range of motion is intact   Skin: Warm and dry, no rashes or petechia.   Lymphatic: No regional lymphadenopathy. No calf pain, swelling or Homans sign    Procedures           ED Course                Labs Reviewed   DIGOXIN LEVEL - Abnormal; Notable for the following components:       Result Value    Digoxin 1.30 (*)     All other components within normal limits   URINALYSIS W/ CULTURE IF INDICATED - Abnormal; Notable for the following components:    Appearance, UA Cloudy (*)     Blood, UA Trace (*)     Protein, UA 30 mg/dL (1+) (*)     Leuk Esterase, UA Large (3+) (*)     Nitrite, UA Positive (*)     All other components within normal limits    Narrative:     In absence of clinical symptoms, the presence of pyuria, bacteria, and/or nitrites on the urinalysis result does not correlate with infection.   BNP (IN-HOUSE) - Abnormal; Notable for the following components:    proBNP 6,821.0 (*)     All other components within normal limits    Narrative:     Among patients with dyspnea, NT-proBNP is highly sensitive for the detection of acute congestive heart failure. In addition NT-proBNP of <300 pg/ml effectively rules out acute congestive heart failure with 99% negative predictive value.     CBC  WITH AUTO DIFFERENTIAL - Abnormal; Notable for the following components:    RDW-SD 54.3 (*)     Lymphocyte % 15.5 (*)     All other components within normal limits   URINALYSIS, MICROSCOPIC ONLY - Abnormal; Notable for the following components:    RBC, UA 3-5 (*)     WBC, UA 21-30 (*)     Bacteria, UA 2+ (*)     All other components within normal limits   RESPIRATORY PANEL PCR W/ COVID-19 (SARS-COV-2) VINCENT/STEFANY/ANNY/PAD/COR/MAD/MANASA IN-HOUSE, NP SWAB IN [facility], 3-4 HR TAT - Normal    Narrative:     In the setting of a positive respiratory panel with a viral infection PLUS a negative procalcitonin without other underlying concern for bacterial infection, consider observing off antibiotics or discontinuation of antibiotics and continue supportive care. If the respiratory panel is positive for atypical bacterial infection (Bordetella pertussis, Chlamydophila pneumoniae, or Mycoplasma pneumoniae), consider antibiotic de-escalation to target atypical bacterial infection.   TROPONIN (IN-HOUSE) - Normal    Narrative:     Troponin T Reference Range:  <= 0.03 ng/mL-   Negative for AMI  >0.03 ng/mL-     Abnormal for myocardial necrosis.  Clinicians would have to utilize clinical acumen, EKG, Troponin and serial changes to determine if it is an Acute Myocardial Infarction or myocardial injury due to an underlying chronic condition.       Results may be falsely decreased if patient taking Biotin.     SEDIMENTATION RATE - Normal   CK - Normal   AMMONIA - Normal   PHOSPHORUS - Normal   TSH - Normal   POC LACTATE - Normal   BLOOD CULTURE   BLOOD CULTURE   URINE CULTURE   RAINBOW DRAW    Narrative:     The following orders were created for panel order [place] Draw.  Procedure                               Abnormality         Status                     ---------                               -----------         ------                     Green Top (Gel)[[id number]]                                  Final result               Lavender  Top[[id number]]                                     Final result               Gold Top - SST[[id number]]                                   Final result               Light Blue Top[[id number]]                                   Final result                 Please view results for these tests on the individual orders.   COMPREHENSIVE METABOLIC PANEL   LIPASE   MAGNESIUM   POC LACTATE   GREEN TOP   LAVENDER TOP   GOLD TOP - SST   LIGHT BLUE TOP   CBC AND DIFFERENTIAL    Narrative:     The following orders were created for panel order CBC & Differential.  Procedure                               Abnormality         Status                     ---------                               -----------         ------                     CBC Auto Differential[[id number]]        Abnormal            Final result                 Please view results for these tests on the individual orders.     Medications   sodium chloride 0.9 % flush 10 mL (has no administration in time range)     CT Head Without Contrast    Result Date: 12/7/2022   1. No acute intracranial finding. 2. Moderate chronic microvascular disease. 3. Mild atrophy.  Electronically Signed By-Ivy Valenzuela MD On:12/7/2022 3:10 PM This report was finalized on [id number] by  Ivy Valenzuela MD.    XR Chest 1 View    Result Date: 12/7/2022  Chronic changes, but no acute cardiopulmonary disease.  Electronically Signed By-Ge German MD On:12/7/2022 1:19 PM This report was finalized on [id number] by  Ge German MD.                               MDM  Number of Diagnoses or Management Options     Amount and/or Complexity of Data Reviewed  Clinical lab tests: reviewed  Tests in the radiology section of CPT®: reviewed  Tests in the medicine section of CPT®: reviewed  Discuss the patient with other providers: yes  Independent visualization of images, tracings, or specimens: yes    Risk of Complications, Morbidity, and/or Mortality  Presenting problems: high  Diagnostic  procedures: high  Management options: high  General comments: The patient was injected with ceftriaxone.  The patient is advised the test results.  Although the patient did not reported initially.  After I told her the results she stated that she was pretty sure she had a urinary tract infection but did not want to give additional details so we would look to see what else could be wrong with her.  The patient be discharged with a prescription for cefdinir and encouraged to follow-up with her primary care provider for long-term health concerns        Final diagnoses:   Weakness   Acute urinary tract infection       ED Disposition  ED Disposition     ED Disposition   Discharge    Condition   Stable    Comment   --             Lizzy Francis MD  [address]  [place] [address] [phone]               Medication List      New Prescriptions    fosfomycin 3 g pack  Commonly known as: MONUROL  Take p.o., repeat dose in 3 days     ondansetron ODT 8 MG disintegrating tablet  Commonly known as: ZOFRAN-ODT  Place 1 tablet on the tongue Every 8 (Eight) Hours As Needed for Nausea or Vomiting.           Where to Get Your Medications      These medications were sent to [id number] - [place], [place] - [address] AT [address] OF [place] [address] [address] - [phone]  - [phone]   [address] [facility] [place] [address]    Phone: [phone]   · fosfomycin 3 g pack  · ondansetron ODT 8 MG disintegrating tablet          Catarino Bowman MD  12/07/22 2457

## 2022-12-09 LAB — QT INTERVAL: 346 MS

## 2022-12-11 LAB — BACTERIA SPEC AEROBE CULT: ABNORMAL

## 2022-12-11 NOTE — PHARMACY RECOMMENDATION
Patient urine culture resulted with E. coli. Susceptible to  fosfomycin . Patient was given Rx for fosfomycin. Therapy is appropriate coverage. No further follow-up required..    Microbiology Results (last 10 days)       Procedure Component Value - Date/Time    Blood Culture - Blood, Arm, Left [[id number]]  (Normal) Collected: 12/07/22 1358    Lab Status: Preliminary result Specimen: Blood from Arm, Left Updated: 12/10/22 1415     Blood Culture No growth at 3 days    Blood Culture - Blood, Arm, Left [[id number]]  (Normal) Collected: 12/07/22 1358    Lab Status: Preliminary result Specimen: Blood from Arm, Left Updated: 12/10/22 1415     Blood Culture No growth at 3 days    Respiratory Panel PCR w/COVID-19(SARS-CoV-2) VINCENT/STEFANY/ANNY/PAD/COR/MAD/MANASA In-House, NP Swab in UTM/VTM, 3-4 HR TAT - Swab, Nasopharynx [[id number]]  (Normal) Collected: 12/07/22 1247    Lab Status: Final result Specimen: Swab from Nasopharynx Updated: 12/07/22 1346     ADENOVIRUS, PCR Not Detected     Coronavirus 229E Not Detected     Coronavirus HKU1 Not Detected     Coronavirus NL63 Not Detected     Coronavirus OC43 Not Detected     COVID19 Not Detected     Human Metapneumovirus Not Detected     Human Rhinovirus/Enterovirus Not Detected     Influenza A PCR Not Detected     Influenza B PCR Not Detected     Parainfluenza Virus 1 Not Detected     Parainfluenza Virus 2 Not Detected     Parainfluenza Virus 3 Not Detected     Parainfluenza Virus 4 Not Detected     RSV, PCR Not Detected     Bordetella pertussis pcr Not Detected     Bordetella parapertussis PCR Not Detected     Chlamydophila pneumoniae PCR Not Detected     Mycoplasma pneumo by PCR Not Detected    Narrative:      In the setting of a positive respiratory panel with a viral infection PLUS a negative procalcitonin without other underlying concern for bacterial infection, consider observing off antibiotics or discontinuation of antibiotics and continue supportive care. If the respiratory panel  is positive for atypical bacterial infection (Bordetella pertussis, Chlamydophila pneumoniae, or Mycoplasma pneumoniae), consider antibiotic de-escalation to target atypical bacterial infection.    Urine Culture - Urine, Urine, Clean Catch [[id number]]  (Abnormal)  (Susceptibility) Collected: 12/07/22 1243    Lab Status: Edited Result - FINAL Specimen: Urine, Clean Catch Updated: 12/11/22 1004     Urine Culture >100,000 CFU/mL Escherichia coli    Narrative:      Colonization of the urinary tract without infection is common. Treatment is discouraged unless the patient is symptomatic, pregnant, or undergoing an invasive urologic procedure.    Susceptibility        Escherichia coli      ASHLEY Not Specified      Ampicillin Resistant       Ampicillin + Sulbactam Intermediate       Cefazolin Susceptible       Cefepime Susceptible       Ceftazidime Susceptible       Ceftriaxone Susceptible       Fosfomycin  Susceptible      Gentamicin Susceptible       Levofloxacin Susceptible       Nitrofurantoin Susceptible       Piperacillin + Tazobactam Susceptible       Trimethoprim + Sulfamethoxazole Susceptible                                      Patsy Hampton RPH  12/11/2022 10:12 EST

## 2022-12-12 LAB
BACTERIA SPEC AEROBE CULT: NORMAL
BACTERIA SPEC AEROBE CULT: NORMAL

## 2022-12-16 PROCEDURE — 93294 REM INTERROG EVL PM/LDLS PM: CPT | Performed by: NURSE PRACTITIONER

## 2022-12-16 PROCEDURE — 93296 REM INTERROG EVL PM/IDS: CPT | Performed by: NURSE PRACTITIONER

## 2023-01-23 ENCOUNTER — TRANSCRIBE ORDERS (OUTPATIENT)
Dept: ADMINISTRATIVE | Facility: HOSPITAL | Age: 83
End: 2023-01-23
Payer: MEDICARE

## 2023-01-23 DIAGNOSIS — Z87.442 PERSONAL HISTORY OF KIDNEY STONES: Primary | ICD-10-CM

## 2023-01-24 ENCOUNTER — TELEPHONE (OUTPATIENT)
Dept: CARDIOLOGY | Facility: CLINIC | Age: 83
End: 2023-01-24
Payer: MEDICARE

## 2023-01-24 NOTE — TELEPHONE ENCOUNTER
Has an appointment on Feb 21st with   I needs to discuss insurance with her Send call to the office

## 2023-01-25 ENCOUNTER — TRANSCRIBE ORDERS (OUTPATIENT)
Dept: ADMINISTRATIVE | Facility: HOSPITAL | Age: 83
End: 2023-01-25
Payer: MEDICARE

## 2023-01-25 DIAGNOSIS — Z85.51 HISTORY OF BLADDER CANCER: ICD-10-CM

## 2023-01-25 DIAGNOSIS — N13.30 HYDRONEPHROSIS, UNSPECIFIED HYDRONEPHROSIS TYPE: Primary | ICD-10-CM

## 2023-01-31 ENCOUNTER — HOSPITAL ENCOUNTER (OUTPATIENT)
Dept: GENERAL RADIOLOGY | Facility: HOSPITAL | Age: 83
Discharge: HOME | End: 2023-01-31
Admitting: UROLOGY
Payer: MEDICARE

## 2023-01-31 DIAGNOSIS — N13.30 HYDRONEPHROSIS, UNSPECIFIED HYDRONEPHROSIS TYPE: ICD-10-CM

## 2023-01-31 DIAGNOSIS — Z85.51 HISTORY OF BLADDER CANCER: ICD-10-CM

## 2023-01-31 PROCEDURE — 0 DIATRIZOATE MEGLUMINE PER 1 ML: Performed by: UROLOGY

## 2023-01-31 PROCEDURE — 74425 UROGRAPHY ANTEGRADE RS&I: CPT

## 2023-01-31 RX ADMIN — DIATRIZOATE MEGLUMINE 100 ML: 300 INJECTION, SOLUTION INTRAVENOUS at 12:27

## 2023-03-07 ENCOUNTER — TELEPHONE (OUTPATIENT)
Dept: CARDIOLOGY | Facility: CLINIC | Age: 83
End: 2023-03-07
Payer: MEDICARE

## 2023-03-13 DIAGNOSIS — Z45.010 ELECTIVE REPLACEMENT INDICATED FOR CARDIAC PACEMAKER BATTERY AT END OF LIFESPAN: Primary | ICD-10-CM

## 2023-03-13 NOTE — PROGRESS NOTES
Pt has perm afib  Anticoagulated with Eliquis    Has dual chamber BS pm implanted 10/10/2014    RV pacing percentage by last check 64 %    Reached [facility] on 3/6/2023    Last in office RV threshold 1.1@ 0.4    Referral placed for EP for generator replacement.  Likely with Single chamber PM    Pt notified.

## 2023-03-14 ENCOUNTER — TELEPHONE (OUTPATIENT)
Dept: CARDIOLOGY | Facility: CLINIC | Age: 83
End: 2023-03-14
Payer: MEDICARE

## 2023-03-14 NOTE — TELEPHONE ENCOUNTER
Pt has perm afib  Anticoagulated with Eliquis     Has dual chamber BS pm implanted 10/10/2014     RV pacing percentage by last check 64 %     Reached SACHIN on 3/6/2023     Last in office RV threshold 1.1@ 0.4     Referral placed for EP for generator replacement.  Likely with Single chamber PM

## 2023-03-14 NOTE — TELEPHONE ENCOUNTER
Referral from Angela Lozada. First available for Dr. Cheng 4/4/23.  Can I overbook for this patient or give her apt on 4/4?  Please advise.

## 2023-03-17 PROCEDURE — 93296 REM INTERROG EVL PM/IDS: CPT | Performed by: NURSE PRACTITIONER

## 2023-03-17 PROCEDURE — 93294 REM INTERROG EVL PM/LDLS PM: CPT | Performed by: NURSE PRACTITIONER

## 2023-03-18 ENCOUNTER — APPOINTMENT (OUTPATIENT)
Dept: GENERAL RADIOLOGY | Facility: HOSPITAL | Age: 83
DRG: 698 | End: 2023-03-18
Payer: MEDICARE

## 2023-03-18 ENCOUNTER — HOSPITAL ENCOUNTER (INPATIENT)
Facility: HOSPITAL | Age: 83
LOS: 4 days | Discharge: NURSING, REHAB OR LONG-TERM CARE | DRG: 698 | End: 2023-03-24
Attending: EMERGENCY MEDICINE | Admitting: INTERNAL MEDICINE
Payer: MEDICARE

## 2023-03-18 DIAGNOSIS — I48.91 ATRIAL FIBRILLATION WITH RVR: Primary | ICD-10-CM

## 2023-03-18 DIAGNOSIS — R50.9 FEVER, UNSPECIFIED FEVER CAUSE: ICD-10-CM

## 2023-03-18 DIAGNOSIS — G89.4 CHRONIC PAIN SYNDROME: ICD-10-CM

## 2023-03-18 DIAGNOSIS — N39.0 URINARY TRACT INFECTION WITHOUT HEMATURIA, SITE UNSPECIFIED: ICD-10-CM

## 2023-03-18 DIAGNOSIS — R53.1 GENERALIZED WEAKNESS: ICD-10-CM

## 2023-03-18 LAB
ALBUMIN SERPL-MCNC: 4 G/DL (ref 3.5–5.2)
ALBUMIN/GLOB SERPL: 1 G/DL
ALP SERPL-CCNC: 111 U/L (ref 39–117)
ALT SERPL W P-5'-P-CCNC: 9 U/L (ref 1–33)
ANION GAP SERPL CALCULATED.3IONS-SCNC: 13 MMOL/L (ref 5–15)
APTT PPP: 22.5 SECONDS (ref 61–76.5)
AST SERPL-CCNC: 24 U/L (ref 1–32)
B PARAPERT DNA SPEC QL NAA+PROBE: NOT DETECTED
B PERT DNA SPEC QL NAA+PROBE: NOT DETECTED
BACTERIA UR QL AUTO: ABNORMAL /HPF
BILIRUB SERPL-MCNC: 0.6 MG/DL (ref 0–1.2)
BILIRUB UR QL STRIP: NEGATIVE
BUN SERPL-MCNC: 28 MG/DL (ref 8–23)
BUN/CREAT SERPL: 21.4 (ref 7–25)
C PNEUM DNA NPH QL NAA+NON-PROBE: NOT DETECTED
CALCIUM SPEC-SCNC: 9.8 MG/DL (ref 8.6–10.5)
CHLORIDE SERPL-SCNC: 106 MMOL/L (ref 98–107)
CK SERPL-CCNC: 29 U/L (ref 20–180)
CLARITY UR: CLEAR
CO2 SERPL-SCNC: 21 MMOL/L (ref 22–29)
COLOR UR: YELLOW
CREAT SERPL-MCNC: 1.31 MG/DL (ref 0.57–1)
D-LACTATE SERPL-SCNC: 1.4 MMOL/L (ref 0.3–2)
DEPRECATED RDW RBC AUTO: 56 FL (ref 37–54)
DIGOXIN SERPL-MCNC: <0.3 NG/ML (ref 0.6–1.2)
EGFRCR SERPLBLD CKD-EPI 2021: 41 ML/MIN/1.73
ERYTHROCYTE [DISTWIDTH] IN BLOOD BY AUTOMATED COUNT: 16.7 % (ref 12.3–15.4)
FLUAV SUBTYP SPEC NAA+PROBE: NOT DETECTED
FLUBV RNA ISLT QL NAA+PROBE: NOT DETECTED
GLOBULIN UR ELPH-MCNC: 4.2 GM/DL
GLUCOSE SERPL-MCNC: 119 MG/DL (ref 65–99)
GLUCOSE UR STRIP-MCNC: NEGATIVE MG/DL
HADV DNA SPEC NAA+PROBE: NOT DETECTED
HCOV 229E RNA SPEC QL NAA+PROBE: NOT DETECTED
HCOV HKU1 RNA SPEC QL NAA+PROBE: NOT DETECTED
HCOV NL63 RNA SPEC QL NAA+PROBE: NOT DETECTED
HCOV OC43 RNA SPEC QL NAA+PROBE: NOT DETECTED
HCT VFR BLD AUTO: 39.8 % (ref 34–46.6)
HGB BLD-MCNC: 12.3 G/DL (ref 12–15.9)
HGB UR QL STRIP.AUTO: ABNORMAL
HMPV RNA NPH QL NAA+NON-PROBE: NOT DETECTED
HPIV1 RNA ISLT QL NAA+PROBE: NOT DETECTED
HPIV2 RNA SPEC QL NAA+PROBE: NOT DETECTED
HPIV3 RNA NPH QL NAA+PROBE: NOT DETECTED
HPIV4 P GENE NPH QL NAA+PROBE: NOT DETECTED
HYALINE CASTS UR QL AUTO: ABNORMAL /LPF
INR PPP: 1.05 (ref 0.93–1.1)
KETONES UR QL STRIP: NEGATIVE
LEUKOCYTE ESTERASE UR QL STRIP.AUTO: ABNORMAL
LYMPHOCYTES # BLD MANUAL: 0.7 10*3/MM3 (ref 0.7–3.1)
LYMPHOCYTES NFR BLD MANUAL: 10 % (ref 5–12)
M PNEUMO IGG SER IA-ACNC: NOT DETECTED
MCH RBC QN AUTO: 29.8 PG (ref 26.6–33)
MCHC RBC AUTO-ENTMCNC: 30.8 G/DL (ref 31.5–35.7)
MCV RBC AUTO: 96.6 FL (ref 79–97)
MONOCYTES # BLD: 1.39 10*3/MM3 (ref 0.1–0.9)
NEUTROPHILS # BLD AUTO: 11.82 10*3/MM3 (ref 1.7–7)
NEUTROPHILS NFR BLD MANUAL: 82 % (ref 42.7–76)
NEUTS BAND NFR BLD MANUAL: 3 % (ref 0–5)
NITRITE UR QL STRIP: POSITIVE
NT-PROBNP SERPL-MCNC: 7303 PG/ML (ref 0–1800)
PH UR STRIP.AUTO: 6 [PH] (ref 5–8)
PLAT MORPH BLD: NORMAL
PLATELET # BLD AUTO: 291 10*3/MM3 (ref 140–450)
PMV BLD AUTO: 7.7 FL (ref 6–12)
POTASSIUM SERPL-SCNC: 4.4 MMOL/L (ref 3.5–5.2)
PROT SERPL-MCNC: 8.2 G/DL (ref 6–8.5)
PROT UR QL STRIP: ABNORMAL
PROTHROMBIN TIME: 10.8 SECONDS (ref 9.6–11.7)
RBC # BLD AUTO: 4.12 10*6/MM3 (ref 3.77–5.28)
RBC # UR STRIP: ABNORMAL /HPF
RBC MORPH BLD: NORMAL
REF LAB TEST METHOD: ABNORMAL
RHINOVIRUS RNA SPEC NAA+PROBE: NOT DETECTED
RSV RNA NPH QL NAA+NON-PROBE: NOT DETECTED
SARS-COV-2 RNA NPH QL NAA+NON-PROBE: NOT DETECTED
SCAN SLIDE: NORMAL
SODIUM SERPL-SCNC: 140 MMOL/L (ref 136–145)
SP GR UR STRIP: 1.01 (ref 1–1.03)
SQUAMOUS #/AREA URNS HPF: ABNORMAL /HPF
TROPONIN T SERPL HS-MCNC: 25 NG/L
TSH SERPL DL<=0.05 MIU/L-ACNC: 0.88 UIU/ML (ref 0.27–4.2)
UROBILINOGEN UR QL STRIP: ABNORMAL
VARIANT LYMPHS NFR BLD MANUAL: 5 % (ref 19.6–45.3)
WBC # UR STRIP: ABNORMAL /HPF
WBC MORPH BLD: NORMAL
WBC NRBC COR # BLD: 13.9 10*3/MM3 (ref 3.4–10.8)

## 2023-03-18 PROCEDURE — 84484 ASSAY OF TROPONIN QUANT: CPT | Performed by: PHYSICIAN ASSISTANT

## 2023-03-18 PROCEDURE — G0378 HOSPITAL OBSERVATION PER HR: HCPCS

## 2023-03-18 PROCEDURE — 81001 URINALYSIS AUTO W/SCOPE: CPT | Performed by: PHYSICIAN ASSISTANT

## 2023-03-18 PROCEDURE — 83880 ASSAY OF NATRIURETIC PEPTIDE: CPT | Performed by: PHYSICIAN ASSISTANT

## 2023-03-18 PROCEDURE — 87086 URINE CULTURE/COLONY COUNT: CPT | Performed by: PHYSICIAN ASSISTANT

## 2023-03-18 PROCEDURE — 85007 BL SMEAR W/DIFF WBC COUNT: CPT | Performed by: PHYSICIAN ASSISTANT

## 2023-03-18 PROCEDURE — 87040 BLOOD CULTURE FOR BACTERIA: CPT | Performed by: PHYSICIAN ASSISTANT

## 2023-03-18 PROCEDURE — 0202U NFCT DS 22 TRGT SARS-COV-2: CPT | Performed by: PHYSICIAN ASSISTANT

## 2023-03-18 PROCEDURE — 93005 ELECTROCARDIOGRAM TRACING: CPT | Performed by: EMERGENCY MEDICINE

## 2023-03-18 PROCEDURE — 85730 THROMBOPLASTIN TIME PARTIAL: CPT | Performed by: PHYSICIAN ASSISTANT

## 2023-03-18 PROCEDURE — 87147 CULTURE TYPE IMMUNOLOGIC: CPT | Performed by: PHYSICIAN ASSISTANT

## 2023-03-18 PROCEDURE — 87088 URINE BACTERIA CULTURE: CPT | Performed by: PHYSICIAN ASSISTANT

## 2023-03-18 PROCEDURE — 80162 ASSAY OF DIGOXIN TOTAL: CPT | Performed by: INTERNAL MEDICINE

## 2023-03-18 PROCEDURE — 87040 BLOOD CULTURE FOR BACTERIA: CPT | Performed by: INTERNAL MEDICINE

## 2023-03-18 PROCEDURE — 36415 COLL VENOUS BLD VENIPUNCTURE: CPT

## 2023-03-18 PROCEDURE — 99285 EMERGENCY DEPT VISIT HI MDM: CPT

## 2023-03-18 PROCEDURE — 87186 SC STD MICRODIL/AGAR DIL: CPT | Performed by: PHYSICIAN ASSISTANT

## 2023-03-18 PROCEDURE — 80053 COMPREHEN METABOLIC PANEL: CPT | Performed by: PHYSICIAN ASSISTANT

## 2023-03-18 PROCEDURE — 84443 ASSAY THYROID STIM HORMONE: CPT | Performed by: INTERNAL MEDICINE

## 2023-03-18 PROCEDURE — 83605 ASSAY OF LACTIC ACID: CPT

## 2023-03-18 PROCEDURE — 87150 DNA/RNA AMPLIFIED PROBE: CPT | Performed by: PHYSICIAN ASSISTANT

## 2023-03-18 PROCEDURE — 73030 X-RAY EXAM OF SHOULDER: CPT

## 2023-03-18 PROCEDURE — 85610 PROTHROMBIN TIME: CPT | Performed by: PHYSICIAN ASSISTANT

## 2023-03-18 PROCEDURE — 82550 ASSAY OF CK (CPK): CPT | Performed by: PHYSICIAN ASSISTANT

## 2023-03-18 PROCEDURE — 85025 COMPLETE CBC W/AUTO DIFF WBC: CPT | Performed by: PHYSICIAN ASSISTANT

## 2023-03-18 PROCEDURE — 71045 X-RAY EXAM CHEST 1 VIEW: CPT

## 2023-03-18 RX ORDER — HYDROCODONE BITARTRATE AND ACETAMINOPHEN 5; 325 MG/1; MG/1
1 TABLET ORAL EVERY 8 HOURS PRN
Status: DISCONTINUED | OUTPATIENT
Start: 2023-03-18 | End: 2023-03-24 | Stop reason: HOSPADM

## 2023-03-18 RX ORDER — METOPROLOL SUCCINATE 50 MG/1
50 TABLET, EXTENDED RELEASE ORAL 2 TIMES DAILY
Status: DISCONTINUED | OUTPATIENT
Start: 2023-03-18 | End: 2023-03-21

## 2023-03-18 RX ORDER — TOPIRAMATE 100 MG/1
100 TABLET, FILM COATED ORAL NIGHTLY
Status: DISCONTINUED | OUTPATIENT
Start: 2023-03-18 | End: 2023-03-24 | Stop reason: HOSPADM

## 2023-03-18 RX ORDER — FAMOTIDINE 20 MG/1
40 TABLET, FILM COATED ORAL DAILY
Status: DISCONTINUED | OUTPATIENT
Start: 2023-03-19 | End: 2023-03-19

## 2023-03-18 RX ORDER — ONDANSETRON 2 MG/ML
4 INJECTION INTRAMUSCULAR; INTRAVENOUS EVERY 6 HOURS PRN
Status: DISCONTINUED | OUTPATIENT
Start: 2023-03-18 | End: 2023-03-24 | Stop reason: HOSPADM

## 2023-03-18 RX ORDER — ACETAMINOPHEN 500 MG
1000 TABLET ORAL ONCE
Status: COMPLETED | OUTPATIENT
Start: 2023-03-18 | End: 2023-03-18

## 2023-03-18 RX ORDER — DIGOXIN 125 MCG
125 TABLET ORAL DAILY
Status: DISCONTINUED | OUTPATIENT
Start: 2023-03-19 | End: 2023-03-23

## 2023-03-18 RX ORDER — DILTIAZEM HYDROCHLORIDE 5 MG/ML
10 INJECTION INTRAVENOUS ONCE
Status: COMPLETED | OUTPATIENT
Start: 2023-03-18 | End: 2023-03-18

## 2023-03-18 RX ORDER — ACETAMINOPHEN 325 MG/1
650 TABLET ORAL EVERY 6 HOURS PRN
Status: DISCONTINUED | OUTPATIENT
Start: 2023-03-18 | End: 2023-03-24 | Stop reason: HOSPADM

## 2023-03-18 RX ORDER — SODIUM CHLORIDE 0.9 % (FLUSH) 0.9 %
3-10 SYRINGE (ML) INJECTION AS NEEDED
Status: DISCONTINUED | OUTPATIENT
Start: 2023-03-18 | End: 2023-03-24 | Stop reason: HOSPADM

## 2023-03-18 RX ORDER — SODIUM CHLORIDE 0.9 % (FLUSH) 0.9 %
3 SYRINGE (ML) INJECTION EVERY 12 HOURS SCHEDULED
Status: DISCONTINUED | OUTPATIENT
Start: 2023-03-18 | End: 2023-03-24 | Stop reason: HOSPADM

## 2023-03-18 RX ORDER — DILTIAZEM HCL/D5W 125 MG/125
5-15 PLASTIC BAG, INJECTION (ML) INTRAVENOUS
Status: DISCONTINUED | OUTPATIENT
Start: 2023-03-18 | End: 2023-03-21

## 2023-03-18 RX ORDER — FUROSEMIDE 20 MG/1
20 TABLET ORAL DAILY
Status: DISCONTINUED | OUTPATIENT
Start: 2023-03-19 | End: 2023-03-24 | Stop reason: HOSPADM

## 2023-03-18 RX ORDER — SODIUM CHLORIDE 9 MG/ML
40 INJECTION, SOLUTION INTRAVENOUS AS NEEDED
Status: DISCONTINUED | OUTPATIENT
Start: 2023-03-18 | End: 2023-03-24 | Stop reason: HOSPADM

## 2023-03-18 RX ADMIN — AZTREONAM 1 G: 1 INJECTION, POWDER, LYOPHILIZED, FOR SOLUTION INTRAMUSCULAR; INTRAVENOUS at 21:35

## 2023-03-18 RX ADMIN — Medication 5 MG/HR: at 22:03

## 2023-03-18 RX ADMIN — Medication 3 ML: at 21:35

## 2023-03-18 RX ADMIN — DILTIAZEM HYDROCHLORIDE 10 MG: 5 INJECTION, SOLUTION INTRAVENOUS at 21:34

## 2023-03-18 RX ADMIN — METOPROLOL SUCCINATE 50 MG: 50 TABLET, EXTENDED RELEASE ORAL at 21:34

## 2023-03-18 RX ADMIN — APIXABAN 5 MG: 5 TABLET, FILM COATED ORAL at 21:34

## 2023-03-18 RX ADMIN — TOPIRAMATE 100 MG: 100 TABLET, FILM COATED ORAL at 21:34

## 2023-03-18 RX ADMIN — ACETAMINOPHEN 1000 MG: 500 TABLET, FILM COATED ORAL at 21:34

## 2023-03-19 PROBLEM — E11.42 TYPE 2 DIABETES MELLITUS WITH DIABETIC POLYNEUROPATHY: Status: ACTIVE | Noted: 2022-09-09

## 2023-03-19 PROBLEM — I48.0 PAROXYSMAL ATRIAL FIBRILLATION: Status: ACTIVE | Noted: 2020-09-14

## 2023-03-19 PROBLEM — I49.5 SICK SINUS SYNDROME: Status: ACTIVE | Noted: 2020-09-14

## 2023-03-19 PROBLEM — E78.5 HYPERLIPIDEMIA, UNSPECIFIED: Status: ACTIVE | Noted: 2020-09-14

## 2023-03-19 PROBLEM — Z90.49 ACQUIRED ABSENCE OF OTHER SPECIFIED PARTS OF DIGESTIVE TRACT: Status: ACTIVE | Noted: 2022-09-09

## 2023-03-19 PROBLEM — I25.10 ATHSCL HEART DISEASE OF NATIVE CORONARY ARTERY W/O ANG PCTRS: Status: ACTIVE | Noted: 2022-09-09

## 2023-03-19 PROBLEM — K21.9 GASTRO-ESOPHAGEAL REFLUX DISEASE WITHOUT ESOPHAGITIS: Status: ACTIVE | Noted: 2020-09-14

## 2023-03-19 PROBLEM — Z95.0 PRESENCE OF CARDIAC PACEMAKER: Status: ACTIVE | Noted: 2020-09-14

## 2023-03-19 PROBLEM — I10 ESSENTIAL (PRIMARY) HYPERTENSION: Status: ACTIVE | Noted: 2020-09-14

## 2023-03-19 PROBLEM — C66.9 MALIGNANT NEOPLASM OF UNSPECIFIED URETER: Status: ACTIVE | Noted: 2020-09-14

## 2023-03-19 PROBLEM — N18.31 CHRONIC KIDNEY DISEASE, STAGE 3A: Status: ACTIVE | Noted: 2022-09-09

## 2023-03-19 PROBLEM — E11.22 TYPE 2 DIABETES MELLITUS WITH DIABETIC CHRONIC KIDNEY DISEASE: Status: ACTIVE | Noted: 2022-09-09

## 2023-03-19 PROBLEM — Z85.3 PERSONAL HISTORY OF BREAST CANCER: Status: ACTIVE | Noted: 2020-09-14

## 2023-03-19 LAB
BACTERIA BLD CULT: ABNORMAL
BOTTLE TYPE: ABNORMAL
QT INTERVAL: 314 MS

## 2023-03-19 PROCEDURE — 87040 BLOOD CULTURE FOR BACTERIA: CPT | Performed by: FAMILY MEDICINE

## 2023-03-19 PROCEDURE — 99232 SBSQ HOSP IP/OBS MODERATE 35: CPT | Performed by: INTERNAL MEDICINE

## 2023-03-19 PROCEDURE — G0378 HOSPITAL OBSERVATION PER HR: HCPCS

## 2023-03-19 RX ADMIN — APIXABAN 5 MG: 5 TABLET, FILM COATED ORAL at 08:00

## 2023-03-19 RX ADMIN — METOPROLOL SUCCINATE 50 MG: 50 TABLET, EXTENDED RELEASE ORAL at 07:59

## 2023-03-19 RX ADMIN — Medication 5 MG/HR: at 19:12

## 2023-03-19 RX ADMIN — APIXABAN 5 MG: 5 TABLET, FILM COATED ORAL at 20:36

## 2023-03-19 RX ADMIN — DIGOXIN 125 MCG: 250 TABLET ORAL at 08:00

## 2023-03-19 RX ADMIN — TOPIRAMATE 100 MG: 100 TABLET, FILM COATED ORAL at 20:36

## 2023-03-19 RX ADMIN — HYDROCODONE BITARTRATE AND ACETAMINOPHEN 1 TABLET: 5; 325 TABLET ORAL at 22:31

## 2023-03-19 RX ADMIN — Medication 3 ML: at 08:00

## 2023-03-19 RX ADMIN — AZTREONAM 1 G: 1 INJECTION, POWDER, LYOPHILIZED, FOR SOLUTION INTRAMUSCULAR; INTRAVENOUS at 20:36

## 2023-03-19 RX ADMIN — ACETAMINOPHEN 650 MG: 325 TABLET, FILM COATED ORAL at 08:13

## 2023-03-19 RX ADMIN — FAMOTIDINE 40 MG: 20 TABLET, FILM COATED ORAL at 07:59

## 2023-03-19 RX ADMIN — AZTREONAM 1 G: 1 INJECTION, POWDER, LYOPHILIZED, FOR SOLUTION INTRAMUSCULAR; INTRAVENOUS at 12:57

## 2023-03-19 RX ADMIN — METOPROLOL SUCCINATE 50 MG: 50 TABLET, EXTENDED RELEASE ORAL at 20:36

## 2023-03-19 RX ADMIN — Medication 3 ML: at 20:36

## 2023-03-19 RX ADMIN — FUROSEMIDE 20 MG: 40 TABLET ORAL at 07:59

## 2023-03-19 NOTE — H&P
Patient Care Team:  Lizzy Francis MD as PCP - General (Family Medicine)  Jose Carlos Cheng MD as Consulting Physician (Cardiology)    Chief Conplaint  Subjective     The patient is a 82 y.o. female presents with weakness and fevers with evidence of urinary tract infection and A-fib with RVR    HPI  Patient was in usual state of health until the day of presentation when she began to experience weakness and some fevers and was found upon visit to the emergency room to have A-fib with RVR as well as urinary tract infection.  She denies substernal chest pain orthopnea or paroxysmal nocturnal dyspnea       review of Systems  Review of Systems   Constitutional: Positive for activity change.   Respiratory: Negative.    Cardiovascular: Negative.    Neurological: Positive for weakness.       History  Past Medical History:   Diagnosis Date   • Bladder cancer (HCC)    • Cancer (HCC)     breast, bladder   • Deep vein thrombosis (HCC)    • Essential hypertension 1/17/2017   • Fracture tibia/fibula, right, closed, initial encounter 8/27/2020   • GERD (gastroesophageal reflux disease)    • History of urostomy    • Immobility 08/27/2020    r/t broken Tibia    • Kidney carcinoma, right (HCC)     removed    • Long term current use of anticoagulant 1/9/2015   • PAF (paroxysmal atrial fibrillation) ([facility]) 6/26/2019   • Presence of cardiac pacemaker 11/03/2014    BS dual chamber PM placed 10/2014 with pocket revision 1/25/17.  HX tachy rob syndrome   • Sick sinus syndrome (HCC) 11/3/2014     Past Surgical History:   Procedure Laterality Date   • BREAST LUMPECTOMY     • CHOLECYSTECTOMY N/A 10/12/2020    Procedure: CHOLECYSTECTOMY LAPAROSCOPIC;  Surgeon: Go Pereira DO;  Location: [facility] MAIN OR;  Service: General;  Laterality: N/A;   • CYSTECTOMY W/ URETEROILEAL CONDUIT     • HARDWARE REMOVAL Right 6/11/2021    Procedure: TIBIA HARDWARE REMOVAL;  Surgeon: Geoff Shah II, MD;  Location: [facility] MAIN OR;  Service:  Orthopedics;  Laterality: Right;   • HERNIA REPAIR     • HYSTERECTOMY     • INSERT / REPLACE / REMOVE PACEMAKER     • NEPHRECTOMY Right    • TIBIA IM NAILING/RODDING Right 8/28/2020    Procedure: TIBIA INTRAMEDULLARY NAIL/ABRAHAM INSERTION;  Surgeon: Geoff Shah II, MD;  Location: [facility] MAIN OR;  Service: Orthopedics;  Laterality: Right;     Family History   Problem Relation Age of Onset   • COPD Father      Social History     Tobacco Use   • Smoking status: Never   • Smokeless tobacco: Never   Vaping Use   • Vaping Use: Never used   Substance Use Topics   • Alcohol use: Not Currently   • Drug use: Never     Allergies:  Amoxicillin, Ciprofloxacin, Oxycodone-acetaminophen, Penicillins, Sulfa antibiotics, Cephalexin, Clavulanic acid, Codeine, Contrast dye (echo or unknown ct/mr), Doxycycline, Fluconazole, Iodine, Macrobid [nitrofurantoin], Tobramycin, and Trimethoprim    Objective     Vital Signs  Temp:  [98.2 °F (36.8 °C)-102 °F (38.9 °C)] 98.2 °F (36.8 °C)  Heart Rate:  [] 91  Resp:  [15-24] 15  BP: (114-202)/() 139/80      Physical Exam:   Physical Exam  Vitals and nursing note reviewed.   Constitutional:       Appearance: Normal appearance.   Cardiovascular:      Rate and Rhythm: Rhythm irregular.      Heart sounds: Normal heart sounds.   Pulmonary:      Breath sounds: Normal breath sounds.   Abdominal:      Palpations: Abdomen is soft.   Skin:     General: Skin is warm.   Neurological:      Mental Status: She is alert.              Results Review:   CBC    Results from last 7 days   Lab Units 03/18/23  2041   WBC 10*3/mm3 13.90*   HEMOGLOBIN g/dL 12.3   PLATELETS 10*3/mm3 291     BMP   Results from last 7 days   Lab Units 03/18/23  2041   SODIUM mmol/L 140   POTASSIUM mmol/L 4.4   CHLORIDE mmol/L 106   CO2 mmol/L 21.0*   BUN mg/dL 28*   CREATININE mg/dL 1.31*   GLUCOSE mg/dL 119*     Cr Clearance Estimated Creatinine Clearance: 30.9 mL/min (A) (by C-G formula based on SCr of 1.31 mg/dL  (H)).  Coag   Results from last 7 days   Lab Units 03/18/23 2041   INR  1.05   APTT seconds 22.5*     HbA1C   Lab Results   Component Value Date    HGBA1C 5.4 10/11/2020    HGBA1C 6.2 (H) 09/08/2020    HGBA1C 6.1 (H) 08/27/2020     Blood Glucose No results found for: POCGLU  Infection     CMP   Results from last 7 days   Lab Units 03/18/23 2041   SODIUM mmol/L 140   POTASSIUM mmol/L 4.4   CHLORIDE mmol/L 106   CO2 mmol/L 21.0*   BUN mg/dL 28*   CREATININE mg/dL 1.31*   GLUCOSE mg/dL 119*   ALBUMIN g/dL 4.0   BILIRUBIN mg/dL 0.6   ALK PHOS U/L 111   AST (SGOT) U/L 24   ALT (SGPT) U/L 9     UA    Results from last 7 days   Lab Units 03/18/23 2043   NITRITE UA  Positive*   WBC UA /HPF 21-30*   BACTERIA UA /HPF 4+*   SQUAM EPITHEL UA /HPF 0-2     Radiology(recent) XR Shoulder 2+ View Right    Result Date: 3/19/2023  Impression: Advanced degenerative changes. Generalized osteopenia. Electronically Signed: Issa Anibal  3/19/2023 7:28 AM EDT  Workstation ID: [id number]    XR Chest 1 View    Result Date: 3/19/2023  Impression: Hazy mixed interstitial alveolar opacities in the perihilar regions, right greater than left suggesting edema or atypical infection pattern. 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     Hilario Esqueda MD  03/19/23  10:55 EDT

## 2023-03-19 NOTE — CONSULTS
[facility] CARDIOLOGY CONSULT  [facility]      Cardiology assessment and plan      Permanent atrial fibrillation  Sick sinus syndrome status post pacemaker placement  Chronic anticoagulation therapy  Normal LV systolic function  Mild to moderate mitral regurgitation and tricuspid regurgitation  Stress test in 2020 within normal perfusion  Hypertension  Chronic kidney disease  History of bladder cancer  Urinary tract infection  A-fib with rapid response currently rate is well controlled  UTI from E. coli    Continue current medical therapy continue risk factor modification  Patient denies any cardiac symptoms  Tmax is 102 pulse is 76 respirations are 19 blood pressure is 122/72 sats are 97   Abnormal elevated proBNP at 7000 likely secondary to diastolic dysfunction  Normal troponin  Sodium is 140 potassium is 4.4 creatinine is 1.3 LFTs are normal TSH is 0.8 hemoglobin is 12.3  Twelve-lead EKG at the time of presentation shows atrial fibrillation with rapid response currently vital signs are stable  Her medications include digoxin 125 mcg p.o. once a day Toprol-XL 50 mg p.o. twice daily Lasix 20 mg daily  Wean off and discontinue Cardizem  Continue anticoagulation therapy with Eliquis  Continue current medical therapy  Further recommendations based on patient course                        Subjective:     Encounter Date:03/18/2023      Patient ID: Hazel Bucio is a 82 y.o. female.    Chief Complaint: weakness, fever      HPI:  Hazel Bucio is a 82 y.o. female unknown to us but known to Dr. Ellsworth with a past cardiac history of permanent afib (anticoaguled with coumadin) and SSS s/p [employer] PPM.  Last 2D echo 9/2022 showed an EF = 60-65% with mild to moderate MR/TR and mild AI.  Last nuclear stress test 2020 showed no ischemia.  PMH includes HTN, CKD stage 3, bladder cancer, and renal cancer. Patient presented with c/o weakness and fever and found with UTI as well as afib  with RVR.  She received IV diltiazem.  Cardiology was consulted for afib management.    Tele shows afib in the 70s currently.  Patient states that she did not have any palpitations, dizziness, chest pain, or SOA.  Prior to this admit, she has been able to perform activities such as housework without unusual difficulty.  She denies PND/orthopnea or edema.  Her BP was elevated on admit.  She reports compliance with medications.  She reports that she had her abdominal hernia repair 3-4 months ago without complications.        Past Medical History:   Diagnosis Date   • Bladder cancer (HCC)    • Cancer (HCC)     breast, bladder   • Deep vein thrombosis (HCC)    • Essential hypertension 1/17/2017   • Fracture tibia/fibula, right, closed, initial encounter 8/27/2020   • GERD (gastroesophageal reflux disease)    • History of urostomy    • Immobility 08/27/2020    r/t broken Tibia    • Kidney carcinoma, right (HCC)     removed    • Long term current use of anticoagulant 1/9/2015   • PAF (paroxysmal atrial fibrillation) (Former[facility]) 6/26/2019   • Presence of cardiac pacemaker 11/03/2014    BS dual chamber PM placed 10/2014 with pocket revision 1/25/17.  HX tachy rob syndrome   • Sick sinus syndrome (Former[facility]) 11/3/2014         Past Surgical History:   Procedure Laterality Date   • BREAST LUMPECTOMY     • CHOLECYSTECTOMY N/A 10/12/2020    Procedure: CHOLECYSTECTOMY LAPAROSCOPIC;  Surgeon: Go Pereira DO;  Location: [facility] OR;  Service: General;  Laterality: N/A;   • CYSTECTOMY W/ URETEROILEAL CONDUIT     • HARDWARE REMOVAL Right 6/11/2021    Procedure: TIBIA HARDWARE REMOVAL;  Surgeon: Geoff Shah II, MD;  Location: [facility] MAIN OR;  Service: Orthopedics;  Laterality: Right;   • HERNIA REPAIR     • HYSTERECTOMY     • INSERT / REPLACE / REMOVE PACEMAKER     • NEPHRECTOMY Right    • TIBIA IM NAILING/RODDING Right 8/28/2020    Procedure: TIBIA INTRAMEDULLARY NAIL/ABRAHAM INSERTION;  Surgeon: Geoff Shah II  "MD;  Location: [facility] MAIN OR;  Service: Orthopedics;  Laterality: Right;         Social History     Socioeconomic History   • Marital status:    Tobacco Use   • Smoking status: Never   • Smokeless tobacco: Never   Vaping Use   • Vaping Use: Never used   Substance and Sexual Activity   • Alcohol use: Not Currently   • Drug use: Never   • Sexual activity: Defer       Family History   Problem Relation Age of Onset   • COPD Father          Allergies   Allergen Reactions   • Amoxicillin Shortness Of Breath   • Ciprofloxacin Hives   • Oxycodone-Acetaminophen Unknown - High Severity     Pt's son stated when pt fell and broke her leg she was put on oxycodone; pt's son stated pt's \"vitals went all out of wack, she couldn't remember things.\"   • Penicillins Rash   • Sulfa Antibiotics Hives   • Cephalexin Other (See Comments)   • Clavulanic Acid Other (See Comments)   • Codeine Other (See Comments)   • Contrast Dye (Echo Or Unknown Ct/Mr) Other (See Comments)     8/18/22    • Doxycycline Other (See Comments)   • Fluconazole Other (See Comments)   • Iodine Hives   • Macrobid [Nitrofurantoin] Hives   • Tobramycin Other (See Comments)   • Trimethoprim Other (See Comments)       Current Medications:   Scheduled Meds:apixaban, 5 mg, Oral, BID  aztreonam, 1 g, Intravenous, Q8H  digoxin, 125 mcg, Oral, Daily  furosemide, 20 mg, Oral, Daily  metoprolol succinate XL, 50 mg, Oral, BID  sodium chloride, 3 mL, Intravenous, Q12H  topiramate, 100 mg, Oral, Nightly      Continuous Infusions:dilTIAZem, 5-15 mg/hr, Last Rate: 5 mg/hr (03/18/23 2203)        Review of Systems   Unable to perform ROS: mental status change     All other systems reviewed and are negative.       Objective:         /72   Pulse 76   Temp 97.9 °F (36.6 °C) (Axillary)   Resp 19   Ht 167.6 cm (66\")   Wt 63.4 kg (139 lb 12.4 oz) Comment: previous weight of 58.1kg must have been entered in error.  SpO2 97%   BMI 22.56 kg/m²       General: Frail, in " NAD.  Neuro: AAOx3. No gross deficits.  HEENT: sclera clear, no xanthalasmas.  CV: irregular S1S2 RRR. No murmurs.  Resp: Breathing is unlabored. Lungs coarse bases.  GI: BS+. Abdomen soft and NTTP.  Ext: Pedal pulses are palpable. Extremities are non-edematous.  MS: moves all extremities, no weakness.  Skin: warm, dry.  Psych: calm and cooperative.            Lab Review:     Results from last 7 days   Lab Units 03/18/23 2041   SODIUM mmol/L 140   POTASSIUM mmol/L 4.4   CHLORIDE mmol/L 106   CO2 mmol/L 21.0*   BUN mg/dL 28*   CREATININE mg/dL 1.31*   GLUCOSE mg/dL 119*   CALCIUM mg/dL 9.8   AST (SGOT) U/L 24   ALT (SGPT) U/L 9     Results from last 7 days   Lab Units 03/18/23 2041   CK TOTAL U/L 29   HSTROP T ng/L 25*     Results from last 7 days   Lab Units 03/18/23 2041   WBC 10*3/mm3 13.90*   HEMOGLOBIN g/dL 12.3   HEMATOCRIT % 39.8   PLATELETS 10*3/mm3 291     Results from last 7 days   Lab Units 03/18/23 2041   INR  1.05   APTT seconds 22.5*               Invalid input(s): LDLCALC  Results from last 7 days   Lab Units 03/18/23 2041   PROBNP pg/mL 7,303.0*     Results from last 7 days   Lab Units 03/18/23 2041   TSH uIU/mL 0.880       Recent Radiology:  Imaging Results (Most Recent)     Procedure Component Value Units Date/Time    XR Shoulder 2+ View Right [[id number]] Collected: 03/19/23 0727     Updated: 03/19/23 0730    Narrative:      XR SHOULDER 2+ VW RIGHT    Date of Exam: 3/18/2023 8:53 PM EDT    Indication: pain.  Right shoulder pain.     Comparison: None available.    Findings:  There is advanced degenerative change. There is generalized osteopenia. No acute fracture is evident.      Impression:      Impression:  Advanced degenerative changes. Generalized osteopenia.    Electronically Signed: Issa Barnett    3/19/2023 7:28 AM EDT    Workstation ID: [id number]    XR Chest 1 View [[id number]] Collected: 03/19/23 0724     Updated: 03/19/23 0729    Narrative:      XR CHEST 1 VW    Date of Exam: 3/18/2023  8:52 PM EDT    Indication: chest pain.    Comparison: 12/7/2022    Findings:  Left subclavian dual-lead cardiac stimulator device again noted. There is cardiomegaly. There is mixed interstitial and alveolar opacity in the perihilar regions, right greater than left. This may be due to pulmonary edema or atypical infection pattern.   No pleural fluid is seen. No pneumothorax is evident. Aortic vascular calcification is noted. Calcified lymph nodes again noted in the right paratracheal and hilar regions.      Impression:      Impression:  Hazy mixed interstitial alveolar opacities in the perihilar regions, right greater than left suggesting edema or atypical infection pattern.    Electronically Signed: Issa Barnett    3/19/2023 7:26 AM EDT    Workstation ID: [id number]            ECHOCARDIOGRAM:    Results for orders placed during the hospital encounter of 09/24/21    Adult Transthoracic Echo Complete W/ Cont if Necessary Per Protocol    Interpretation Summary  · Estimated left ventricular EF was in agreement with the calculated left ventricular EF. Left ventricular ejection fraction appears to be 61 - 65%. Left ventricular systolic function is normal.  · Left atrial volume is moderately increased.  · The right atrial cavity is moderately dilated.  · Estimated right ventricular systolic pressure from tricuspid regurgitation is normal (<35 mmHg).            Assessment:         Active Hospital Problems    Diagnosis  POA   • **Fever [R50.9]  Yes     1) Permanent atrial fibrillation with RVR --> CVR  - RVR in setting UTI  - she received IV diltiazem  - in CVR now on PO dig, beta blocker  - K 4.4  - TSH WNL  - BNP 7303  - anticoagulated with coumadin at home --> receiving eliquis here  - Last 2D echo 9/2022 showed an EF = 60-65% with mild to moderate MR/TR and mild AI.  Last nuclear stress test 2020 showed no ischemia.      2) SSS s/p [employer] PPM    3) UTI  - blood cultures pending  - on abx    4) HTN    5) CKD stage  3    6) Hx renal cancer s/p right nephrectomy           Plan:   Tele shows rate controlled afib currently.  Continue on PO dig and beta blocker. Check Mg.  On anticoagulation.  Further recommendations per attending cardiologist.         Electronically signed by CHELLE Mracano, 03/19/23, 4:14 PM EDT.

## 2023-03-19 NOTE — PLAN OF CARE
Goal Outcome Evaluation:      Pt arrived to unit approx 23:20. Pt A&Ox3, disoriented to time. Pt on Cardizem gtt of 5mg/hr. Pt on room air. No fever since pt arrived on unit. Pt c/o of shoulder pain that was resolved with repositioning and pillow support. Pt slept comfortably for remainder of shift.  Pending blood culture, UA culture, and shoulder xray results.

## 2023-03-19 NOTE — NURSING NOTE
2 person RN skin check completed with myself and Sanjuanita ORTEGA. Patient's skin intact upon admission.

## 2023-03-19 NOTE — ED PROVIDER NOTES
Subjective   History of Present Illness  Chief Complaint: Generalized pain    Patient is a 82-year-old  female with history of cancer, breast cancer, hypertension, GERD, paroxysmal A-fib with pacemaker placement presents to the ER per EMS with complaints of generalized pain specifically right shoulder pain.  She denies any fall or trauma.  Patient states that she has had pain for months.  She denies any chest pain palpitations or shortness of breath although patient appears to be in A-fib with RVR with a rate of 157.  She has complained of some abdominal fullness, she has urostomy placement.  No nausea vomiting diarrhea.  No dysuria.  No headache or lightheadedness.  Patient does have low-grade fever 100.6 but denies any cough congestion sore throat or other rash.  Patient is a poor historian.    PCP: Lizzy Francis    History provided by:  Patient      Review of Systems   Constitutional: Positive for fever. Negative for chills.   HENT: Negative for sore throat and trouble swallowing.    Eyes: Negative.    Respiratory: Positive for shortness of breath. Negative for wheezing.    Cardiovascular: Negative for chest pain.   Gastrointestinal: Positive for abdominal pain. Negative for diarrhea, nausea and vomiting.   Endocrine: Negative.    Genitourinary: Negative for dysuria.   Musculoskeletal: Positive for arthralgias. Negative for myalgias.        Right shoulder pain   Skin: Negative for rash.   Allergic/Immunologic: Negative.    Neurological: Negative for weakness, light-headedness and headaches.   Psychiatric/Behavioral: Negative for behavioral problems.   All other systems reviewed and are negative.      Past Medical History:   Diagnosis Date   • Bladder cancer (HCC)    • Cancer (HCC)     breast, bladder   • Deep vein thrombosis (HCC)    • Essential hypertension 1/17/2017   • Fracture tibia/fibula, right, closed, initial encounter 8/27/2020   • GERD (gastroesophageal reflux disease)    • History of urostomy  "   • Immobility 08/27/2020    r/t broken Tibia    • Kidney carcinoma, right (HCC)     removed    • Long term current use of anticoagulant 1/9/2015   • PAF (paroxysmal atrial fibrillation) ([facility]) 6/26/2019   • Presence of cardiac pacemaker 11/03/2014    BS dual chamber PM placed 10/2014 with pocket revision 1/25/17.  HX tachy rob syndrome   • Sick sinus syndrome ([facility]) 11/3/2014       Allergies   Allergen Reactions   • Amoxicillin Shortness Of Breath   • Ciprofloxacin Hives   • Oxycodone-Acetaminophen Unknown - High Severity     Pt's son stated when pt fell and broke her leg she was put on oxycodone; pt's son stated pt's \"vitals went all out of wack, she couldn't remember things.\"   • Penicillins Rash   • Sulfa Antibiotics Hives   • Cephalexin Other (See Comments)   • Clavulanic Acid Other (See Comments)   • Codeine Other (See Comments)   • Contrast Dye (Echo Or Unknown Ct/Mr) Other (See Comments)     8/18/22    • Doxycycline Other (See Comments)   • Fluconazole Other (See Comments)   • Iodine Hives   • Macrobid [Nitrofurantoin] Hives   • Tobramycin Other (See Comments)   • Trimethoprim Other (See Comments)       Past Surgical History:   Procedure Laterality Date   • BREAST LUMPECTOMY     • CHOLECYSTECTOMY N/A 10/12/2020    Procedure: CHOLECYSTECTOMY LAPAROSCOPIC;  Surgeon: Go Pereira DO;  Location: [facility];  Service: General;  Laterality: N/A;   • CYSTECTOMY W/ URETEROILEAL CONDUIT     • HARDWARE REMOVAL Right 6/11/2021    Procedure: TIBIA HARDWARE REMOVAL;  Surgeon: Geoff Shah II, MD;  Location: [facility] MAIN OR;  Service: Orthopedics;  Laterality: Right;   • HERNIA REPAIR     • HYSTERECTOMY     • INSERT / REPLACE / REMOVE PACEMAKER     • NEPHRECTOMY Right    • TIBIA IM NAILING/RODDING Right 8/28/2020    Procedure: TIBIA INTRAMEDULLARY NAIL/ABRAHAM INSERTION;  Surgeon: Geoff Shah II, MD;  Location: [facility] MAIN OR;  Service: Orthopedics;  Laterality: Right;       Family History "   Problem Relation Age of Onset   • COPD Father        Social History     Socioeconomic History   • Marital status:    Tobacco Use   • Smoking status: Never   • Smokeless tobacco: Never   Vaping Use   • Vaping Use: Never used   Substance and Sexual Activity   • Alcohol use: Not Currently   • Drug use: Never   • Sexual activity: Defer           Objective   Physical Exam  Vitals and nursing note reviewed.   Constitutional:       Appearance: Normal appearance. She is well-developed and normal weight. She is not ill-appearing or toxic-appearing.   HENT:      Head: Normocephalic and atraumatic.      Nose: Nose normal. No congestion.      Mouth/Throat:      Mouth: Mucous membranes are moist.   Eyes:      Pupils: Pupils are equal, round, and reactive to light.   Cardiovascular:      Rate and Rhythm: Tachycardia present. Rhythm irregular.      Pulses: Normal pulses.      Heart sounds: Normal heart sounds. No murmur heard.  Pulmonary:      Effort: Pulmonary effort is normal. No respiratory distress.      Breath sounds: Normal breath sounds. No wheezing.   Abdominal:      General: Bowel sounds are normal. There is no distension.      Palpations: Abdomen is soft.      Tenderness: There is no abdominal tenderness. There is no right CVA tenderness or left CVA tenderness.   Musculoskeletal:         General: Tenderness present. No swelling.      Cervical back: Normal range of motion.      Comments: Tenderness to palpation right shoulder, no obvious deformity   Skin:     General: Skin is warm and dry.      Capillary Refill: Capillary refill takes less than 2 seconds.      Findings: No rash.   Neurological:      General: No focal deficit present.      Mental Status: She is alert and oriented to person, place, and time.      Cranial Nerves: No cranial nerve deficit.      Motor: No weakness.   Psychiatric:         Mood and Affect: Mood normal.         Behavior: Behavior normal.         Procedures           ED Course    BP  "145/86   Pulse (!) 131   Temp (!) 102 °F (38.9 °C) (Rectal)   Resp 24   Ht 167.6 cm (66\")   Wt 69.1 kg (152 lb 5.4 oz)   SpO2 96%   BMI 24.59 kg/m²   Labs Reviewed   COMPREHENSIVE METABOLIC PANEL - Abnormal; Notable for the following components:       Result Value    Glucose 119 (*)     BUN 28 (*)     Creatinine 1.31 (*)     CO2 21.0 (*)     eGFR 41.0 (*)     All other components within normal limits    Narrative:     GFR Normal >60  Chronic Kidney Disease <60  Kidney Failure <15    The GFR formula is only valid for adults with stable renal function between ages 18 and 70.   APTT - Abnormal; Notable for the following components:    PTT 22.5 (*)     All other components within normal limits   URINALYSIS W/ CULTURE IF INDICATED - Abnormal; Notable for the following components:    Blood, UA Trace (*)     Protein, UA Trace (*)     Leuk Esterase, UA Small (1+) (*)     Nitrite, UA Positive (*)     All other components within normal limits    Narrative:     In absence of clinical symptoms, the presence of pyuria, bacteria, and/or nitrites on the urinalysis result does not correlate with infection.   SINGLE HSTROPONIN T - Abnormal; Notable for the following components:    HS Troponin T 25 (*)     All other components within normal limits    Narrative:     High Sensitive Troponin T Reference Range:  <10.0 ng/L- Negative Female for AMI  <15.0 ng/L- Negative Male for AMI  >=10 - Abnormal Female indicating possible myocardial injury.  >=15 - Abnormal Male indicating possible myocardial injury.   Clinicians would have to utilize clinical acumen, EKG, Troponin, and serial changes to determine if it is an Acute Myocardial Infarction or myocardial injury due to an underlying chronic condition.        BNP (IN-HOUSE) - Abnormal; Notable for the following components:    proBNP 7,303.0 (*)     All other components within normal limits    Narrative:     Among patients with dyspnea, NT-proBNP is highly sensitive for the detection " of acute congestive heart failure. In addition NT-proBNP of <300 pg/ml effectively rules out acute congestive heart failure with 99% negative predictive value.    Results may be falsely decreased if patient taking Biotin.     CBC WITH AUTO DIFFERENTIAL - Abnormal; Notable for the following components:    WBC 13.90 (*)     MCHC 30.8 (*)     RDW 16.7 (*)     RDW-SD 56.0 (*)     All other components within normal limits    Narrative:     The previously reported component NRBC is no longer being reported. Previous result was 0.0 /100 WBC (Reference Range: 0.0-0.2 /100 WBC) on 3/18/2023 at 2053 EDT.   URINALYSIS, MICROSCOPIC ONLY - Abnormal; Notable for the following components:    RBC, UA 0-2 (*)     WBC, UA 21-30 (*)     Bacteria, UA 4+ (*)     All other components within normal limits   DIGOXIN LEVEL - Abnormal; Notable for the following components:    Digoxin <0.30 (*)     All other components within normal limits   MANUAL DIFFERENTIAL - Abnormal; Notable for the following components:    Neutrophil % 82.0 (*)     Lymphocyte % 5.0 (*)     Neutrophils Absolute 11.82 (*)     Monocytes Absolute 1.39 (*)     All other components within normal limits   RESPIRATORY PANEL PCR W/ COVID-19 (SARS-COV-2) VINCENT/STEFANY/ANNY/PAD/COR/MAD/MANASA IN-HOUSE, NP SWAB IN [facility], 3-4 HR TAT - Normal    Narrative:     In the setting of a positive respiratory panel with a viral infection PLUS a negative procalcitonin without other underlying concern for bacterial infection, consider observing off antibiotics or discontinuation of antibiotics and continue supportive care. If the respiratory panel is positive for atypical bacterial infection (Bordetella pertussis, Chlamydophila pneumoniae, or Mycoplasma pneumoniae), consider antibiotic de-escalation to target atypical bacterial infection.   PROTIME-INR - Normal   CK - Normal   TSH - Normal   POC LACTATE - Normal   BLOOD CULTURE   BLOOD CULTURE   URINE CULTURE   SCAN SLIDE   POC LACTATE   CBC AND  DIFFERENTIAL    Narrative:     The following orders were created for panel order CBC & Differential.  Procedure                               Abnormality         Status                     ---------                               -----------         ------                     CBC Auto Differential[[id number]]        Abnormal            Final result               Scan Slide[[id number]]                                       Final result                 Please view results for these tests on the individual orders.     Medications   apixaban (ELIQUIS) tablet 5 mg (5 mg Oral Given 3/18/23 2134)   acetaminophen (TYLENOL) tablet 650 mg (has no administration in time range)   digoxin (LANOXIN) tablet 125 mcg (has no administration in time range)   furosemide (LASIX) tablet 20 mg (has no administration in time range)   HYDROcodone-acetaminophen (NORCO) 5-325 MG per tablet 1 tablet (has no administration in time range)   metoprolol succinate XL (TOPROL-XL) 24 hr tablet 50 mg (50 mg Oral Given 3/18/23 2134)   topiramate (TOPAMAX) tablet 100 mg (100 mg Oral Given 3/18/23 2134)   sodium chloride 0.9 % flush 3 mL (3 mL Intravenous Given 3/18/23 2135)   sodium chloride 0.9 % flush 3-10 mL (has no administration in time range)   sodium chloride 0.9 % infusion 40 mL (has no administration in time range)   ondansetron (ZOFRAN) injection 4 mg (has no administration in time range)   famotidine (PEPCID) tablet 40 mg (has no administration in time range)   dilTIAZem (CARDIZEM) 125 mg in 125 mL D5W infusion (5 mg/hr Intravenous New Bag 3/18/23 2203)   dilTIAZem (CARDIZEM) injection 10 mg (10 mg Intravenous Given 3/18/23 2134)   acetaminophen (TYLENOL) tablet 1,000 mg (1,000 mg Oral Given 3/18/23 2134)   aztreonam (AZACTAM) 1 g in sodium chloride 0.9 % 100 mL IVPB-MBP ( Intravenous Currently Infusing 3/18/23 2213)     No radiology results for the last day                                         Medical Decision Making  Differential Dx  (Includes but not limited to): Dysrhythmia, electrolyte abnormality, NSTEMI, UTI, sepsis, pneumonia, fracture  Medical Records Reviewed: Patient was seen in the ER 12/7/2022 for generalized weakness.  Patient diagnosed with UTI at that time  Labs: On my interpretation, lactate 1.4, blood cultures pending.  Respiratory panel negative.  Urinalysis positive for UTI, culture pending.  CBC mild leukocytosis 13,000.  CMP glucose 119, BUN 28, creatinine 1.31.  Troponin elevated 25.  BNP elevated at 7303.   Imaging: On my interpretation chest x-ray shows no obvious infiltrate or pleural effusion  Telemetry: EKG interpretation: Reviewed by myself interpreted by ER attending, atrial fibrillation with rapid ventricular rate of 137, no acute ST elevation or depression.  Testing considered but not ordered: CT head, patient denies any head injury headache or confusion.  Nature of Complaint: Acute  Admission vs Discharge: Admission  Discussion: While in the ED IV was placed and labs were obtained appropriate PPE was worn during exam and throughout all encounters with the patient.  Patient had the above evaluation.  IV established, lab work obtained.  Patient placed on continuous telemetry monitoring throughout ER stay.  Patient mainly complaining of right shoulder pain, however she was noted to have fever, rectal temperature 102, tachycardic EKG revealing atrial fibrillation with rapid ventricular rate of 157.  Patient given Tylenol for fever.  Lactate 1.4.  Patient started on Cardizem bolus and drip for A-fib RVR.  Lab work significant for leukocytosis 13,000, urinalysis positive for UTI, CMP glucose 119, BUN 28, creatinine 1.31 as noted above.  Troponin elevated 25. Chest x-ray shows no obvious pneumonia.  Patient will be admitted for further evaluation treatment for A-fib RVR, UTI.  I spoke with Dr. Henderson who agreed to accept patient for admission.  Patient otherwise unremarkable ER stay.    Atrial fibrillation with RVR (HCC):  chronic illness or injury with exacerbation, progression, or side effects of treatment  Fever, unspecified fever cause: acute illness or injury  Generalized weakness: acute illness or injury  Urinary tract infection without hematuria, site unspecified: acute illness or injury  Amount and/or Complexity of Data Reviewed  External Data Reviewed: notes.  Labs: ordered. Decision-making details documented in ED Course.  Radiology: ordered. Decision-making details documented in ED Course.  ECG/medicine tests: ordered. Decision-making details documented in ED Course.      Risk  Prescription drug management.  Decision regarding hospitalization.          Final diagnoses:   Atrial fibrillation with RVR (HCC)   Urinary tract infection without hematuria, site unspecified   Generalized weakness   Fever, unspecified fever cause       ED Disposition  ED Disposition     ED Disposition   Decision to Admit    Condition   --    Comment   Level of Care: Telemetry [5]   Admitting Physician: BRIELLE KNOWLES [[id number]]   Attending Physician: BRIELLE KNOWLES [[id number]]               No follow-up provider specified.       Medication List      No changes were made to your prescriptions during this visit.          Hazel Tiwari PA  03/18/23 9950

## 2023-03-19 NOTE — PLAN OF CARE
Patient stable throughout shift. She remains on cardizem gtt. Cardiology was consulted and is following.       Problem: Adult Inpatient Plan of Care  Goal: Absence of Hospital-Acquired Illness or Injury  Intervention: Identify and Manage Fall Risk  Recent Flowsheet Documentation  Taken 3/19/2023 1400 by Renee Cintron RN  Safety Promotion/Fall Prevention: safety round/check completed  Taken 3/19/2023 1350 by Renee Cintron RN  Safety Promotion/Fall Prevention: safety round/check completed  Taken 3/19/2023 1200 by Renee Cintron RN  Safety Promotion/Fall Prevention: safety round/check completed  Taken 3/19/2023 1000 by Renee Cintron RN  Safety Promotion/Fall Prevention: safety round/check completed  Taken 3/19/2023 0813 by Renee Cintron RN  Safety Promotion/Fall Prevention: safety round/check completed  Intervention: Prevent Skin Injury  Recent Flowsheet Documentation  Taken 3/19/2023 1350 by Renee Cintron RN  Body Position:   turned   right  Taken 3/19/2023 0813 by Renee Cintron RN  Body Position:   position changed independently   turned   left  Skin Protection:   silicone foam dressing in place   pulse oximeter probe site changed  Intervention: Prevent and Manage VTE (Venous Thromboembolism) Risk  Recent Flowsheet Documentation  Taken 3/19/2023 0813 by Renee Cintron RN  Activity Management: activity adjusted per tolerance  VTE Prevention/Management: (on eliquis) other (see comments)  Goal: Optimal Comfort and Wellbeing  Intervention: Monitor Pain and Promote Comfort  Recent Flowsheet Documentation  Taken 3/19/2023 0813 by Renee Cintron RN  Pain Management Interventions: see MAR     Problem: Dysrhythmia  Goal: Normalized Cardiac Rhythm  Intervention: Monitor and Manage Cardiac Rhythm Effect  Recent Flowsheet Documentation  Taken 3/19/2023 0813 by Renee Cintron RN  VTE Prevention/Management: (on eliquis) other (see comments)     Problem: Fall Injury Risk  Goal: Absence of Fall  and Fall-Related Injury  Intervention: Promote Injury-Free Environment  Recent Flowsheet Documentation  Taken 3/19/2023 1400 by Renee Cintron RN  Safety Promotion/Fall Prevention: safety round/check completed  Taken 3/19/2023 1350 by Renee Cintron RN  Safety Promotion/Fall Prevention: safety round/check completed  Taken 3/19/2023 1200 by Renee Cintron RN  Safety Promotion/Fall Prevention: safety round/check completed  Taken 3/19/2023 1000 by Renee Cintron RN  Safety Promotion/Fall Prevention: safety round/check completed  Taken 3/19/2023 0813 by Renee Cintron RN  Safety Promotion/Fall Prevention: safety round/check completed     Problem: Osteoarthritis Comorbidity  Goal: Maintenance of Osteoarthritis Symptom Control  Intervention: Maintain Osteoarthritis Symptom Control  Recent Flowsheet Documentation  Taken 3/19/2023 0813 by Renee Cintron RN  Activity Management: activity adjusted per tolerance  Assistive Device Utilized: (pt uses wheelchair at home) other (see comments)     Problem: Pain Chronic (Persistent) (Comorbidity Management)  Goal: Acceptable Pain Control and Functional Ability  Intervention: Manage Persistent Pain  Recent Flowsheet Documentation  Taken 3/19/2023 1350 by Renee Cintron RN  Bowel Elimination Promotion:   commode/bedpan at bedside   privacy promoted  Intervention: Develop Pain Management Plan  Recent Flowsheet Documentation  Taken 3/19/2023 0813 by Renee Cintron RN  Pain Management Interventions: see MAR     Problem: Skin Injury Risk Increased  Goal: Skin Health and Integrity  Intervention: Optimize Skin Protection  Recent Flowsheet Documentation  Taken 3/19/2023 1350 by Renee Cintron RN  Head of Bed (HOB) Positioning: HOB at 20 degrees  Taken 3/19/2023 0813 by Renee Cintron RN  Head of Bed (HOB) Positioning: HOB at 20-30 degrees  Skin Protection:   silicone foam dressing in place   pulse oximeter probe site changed   Goal Outcome Evaluation:

## 2023-03-20 PROBLEM — I48.91 ATRIAL FIBRILLATION WITH RVR: Status: ACTIVE | Noted: 2023-03-20

## 2023-03-20 LAB
ANION GAP SERPL CALCULATED.3IONS-SCNC: 10 MMOL/L (ref 5–15)
BACTERIA SPEC AEROBE CULT: ABNORMAL
BASOPHILS # BLD AUTO: 0.1 10*3/MM3 (ref 0–0.2)
BASOPHILS NFR BLD AUTO: 0.5 % (ref 0–1.5)
BUN SERPL-MCNC: 32 MG/DL (ref 8–23)
BUN/CREAT SERPL: 23.2 (ref 7–25)
CALCIUM SPEC-SCNC: 9.3 MG/DL (ref 8.6–10.5)
CHLORIDE SERPL-SCNC: 109 MMOL/L (ref 98–107)
CO2 SERPL-SCNC: 22 MMOL/L (ref 22–29)
CREAT SERPL-MCNC: 1.38 MG/DL (ref 0.57–1)
DEPRECATED RDW RBC AUTO: 59.1 FL (ref 37–54)
EGFRCR SERPLBLD CKD-EPI 2021: 38.5 ML/MIN/1.73
EOSINOPHIL # BLD AUTO: 0.1 10*3/MM3 (ref 0–0.4)
EOSINOPHIL NFR BLD AUTO: 0.9 % (ref 0.3–6.2)
ERYTHROCYTE [DISTWIDTH] IN BLOOD BY AUTOMATED COUNT: 16.6 % (ref 12.3–15.4)
GLUCOSE SERPL-MCNC: 105 MG/DL (ref 65–99)
HCT VFR BLD AUTO: 34.1 % (ref 34–46.6)
HGB BLD-MCNC: 10.8 G/DL (ref 12–15.9)
LYMPHOCYTES # BLD AUTO: 1.4 10*3/MM3 (ref 0.7–3.1)
LYMPHOCYTES NFR BLD AUTO: 12.9 % (ref 19.6–45.3)
MAGNESIUM SERPL-MCNC: 1.9 MG/DL (ref 1.6–2.4)
MCH RBC QN AUTO: 30.5 PG (ref 26.6–33)
MCHC RBC AUTO-ENTMCNC: 31.8 G/DL (ref 31.5–35.7)
MCV RBC AUTO: 95.9 FL (ref 79–97)
MONOCYTES # BLD AUTO: 1.1 10*3/MM3 (ref 0.1–0.9)
MONOCYTES NFR BLD AUTO: 10.2 % (ref 5–12)
NEUTROPHILS NFR BLD AUTO: 75.5 % (ref 42.7–76)
NEUTROPHILS NFR BLD AUTO: 8 10*3/MM3 (ref 1.7–7)
NRBC BLD AUTO-RTO: 0.1 /100 WBC (ref 0–0.2)
PLATELET # BLD AUTO: 250 10*3/MM3 (ref 140–450)
PMV BLD AUTO: 8.2 FL (ref 6–12)
POTASSIUM SERPL-SCNC: 4.2 MMOL/L (ref 3.5–5.2)
RBC # BLD AUTO: 3.55 10*6/MM3 (ref 3.77–5.28)
SODIUM SERPL-SCNC: 141 MMOL/L (ref 136–145)
WBC NRBC COR # BLD: 10.6 10*3/MM3 (ref 3.4–10.8)

## 2023-03-20 PROCEDURE — 80048 BASIC METABOLIC PNL TOTAL CA: CPT | Performed by: FAMILY MEDICINE

## 2023-03-20 PROCEDURE — 85025 COMPLETE CBC W/AUTO DIFF WBC: CPT | Performed by: FAMILY MEDICINE

## 2023-03-20 PROCEDURE — 85025 COMPLETE CBC W/AUTO DIFF WBC: CPT | Performed by: INTERNAL MEDICINE

## 2023-03-20 PROCEDURE — 99233 SBSQ HOSP IP/OBS HIGH 50: CPT | Performed by: INTERNAL MEDICINE

## 2023-03-20 PROCEDURE — 80048 BASIC METABOLIC PNL TOTAL CA: CPT | Performed by: INTERNAL MEDICINE

## 2023-03-20 PROCEDURE — 83735 ASSAY OF MAGNESIUM: CPT | Performed by: NURSE PRACTITIONER

## 2023-03-20 RX ADMIN — Medication 3 ML: at 08:26

## 2023-03-20 RX ADMIN — AZTREONAM 1 G: 1 INJECTION, POWDER, LYOPHILIZED, FOR SOLUTION INTRAMUSCULAR; INTRAVENOUS at 13:19

## 2023-03-20 RX ADMIN — FUROSEMIDE 20 MG: 40 TABLET ORAL at 08:26

## 2023-03-20 RX ADMIN — APIXABAN 5 MG: 5 TABLET, FILM COATED ORAL at 08:26

## 2023-03-20 RX ADMIN — TOPIRAMATE 100 MG: 100 TABLET, FILM COATED ORAL at 21:03

## 2023-03-20 RX ADMIN — Medication 5 MG/HR: at 21:02

## 2023-03-20 RX ADMIN — DIGOXIN 125 MCG: 250 TABLET ORAL at 08:26

## 2023-03-20 RX ADMIN — AZTREONAM 1 G: 1 INJECTION, POWDER, LYOPHILIZED, FOR SOLUTION INTRAMUSCULAR; INTRAVENOUS at 04:46

## 2023-03-20 RX ADMIN — AZTREONAM 1 G: 1 INJECTION, POWDER, LYOPHILIZED, FOR SOLUTION INTRAMUSCULAR; INTRAVENOUS at 21:02

## 2023-03-20 RX ADMIN — METOPROLOL SUCCINATE 50 MG: 50 TABLET, EXTENDED RELEASE ORAL at 21:03

## 2023-03-20 RX ADMIN — APIXABAN 5 MG: 5 TABLET, FILM COATED ORAL at 21:03

## 2023-03-20 RX ADMIN — HYDROCODONE BITARTRATE AND ACETAMINOPHEN 1 TABLET: 5; 325 TABLET ORAL at 21:03

## 2023-03-20 RX ADMIN — Medication 3 ML: at 21:05

## 2023-03-20 RX ADMIN — METOPROLOL SUCCINATE 50 MG: 50 TABLET, EXTENDED RELEASE ORAL at 08:26

## 2023-03-20 NOTE — PLAN OF CARE
Goal Outcome Evaluation:  Plan of Care Reviewed With: patient      Patient has been stable all shift. Patient continues to be disoriented to time. Patient has rested majority of shift. Patient continue on cardizem gtt at 5 mg/hr. Patient currently in bed with bed alarm on and call light within reach. Will continue to monitor.

## 2023-03-20 NOTE — CASE MANAGEMENT/SOCIAL WORK
Discharge Planning Assessment   Luis Felipe     Patient Name: Hazel Bucio  MRN: [id number]  Today's Date: 3/20/2023    Admit Date: 3/18/2023    Plan: DC Plan: Return home with Nephew.   Discharge Needs Assessment     Row Name 03/20/23 1705       Living Environment    People in Home other relative(s)  Nephew    Current Living Arrangements home    Primary Care Provided by self    Provides Primary Care For no one    Family Caregiver if Needed other (see comments)    Quality of Family Relationships helpful;involved;supportive    Able to Return to Prior Arrangements yes       Resource/Environmental Concerns    Resource/Environmental Concerns none    Transportation Concerns none       Transition Planning    Patient/Family Anticipates Transition to home with family    Patient/Family Anticipated Services at Transition none    Transportation Anticipated car, drives self;family or friend will provide       Discharge Needs Assessment    Readmission Within the Last 30 Days no previous admission in last 30 days    Equipment Currently Used at Home grab bar;walker, rolling;shower chair    Concerns to be Addressed no discharge needs identified    Anticipated Changes Related to Illness none    Equipment Needed After Discharge none               Discharge Plan     Row Name 03/20/23 0113       Plan    Plan DC Plan: Return home with Nephew.    Patient/Family in Agreement with Plan yes    Plan Comments Met with patient at bedside.  Lives at home with nephew.  IADL.  Verified PCP.   Using Rolling Walker at home.  Denies DC needs.  Barriers to discharge: Gardizem drip.  IV fluids.              Continued Care and Services - Admitted Since 3/18/2023    Coordination has not been started for this encounter.       Expected Discharge Date and Time     Expected Discharge Date Expected Discharge Time    Mar 22, 2023          Demographic Summary     Row Name 03/20/23 1705       General Information    Admission Type observation    Arrived From  emergency department    Referral Source admission list    Reason for Consult discharge planning    Preferred Language English               Functional Status     Row Name 03/20/23 4482       Functional Status    Usual Activity Tolerance moderate    Current Activity Tolerance moderate       Functional Status, IADL    Medications independent    Meal Preparation independent;assistive person    Housekeeping independent;assistive person    Laundry independent;assistive person    Shopping assistive person;independent       Mental Status    General Appearance WDL WDL            Met with patient in room wearing PPE: mask.    Maintained distance greater than six feet and spent less than 15 minutes in the room.    Alia Yousif RN      Office Phone [phone]  Office Cell [phone]

## 2023-03-20 NOTE — CONSULTS
picc team consult:    PIV desired.  Placed without difficulty.  Advanced line not necessary at this time.

## 2023-03-20 NOTE — PROGRESS NOTES
LOS: 0 days   Patient Care Team:  Lizzy Francis MD as PCP - General (Family Medicine)  Jose Carlos Cheng MD as Consulting Physician (Cardiology)    Subjective     Interval History: Fever resolved, blood cultures positive for e coli    Patient Complaints: Generally doesn't feel well, mild diffuse abdominal pain; poor appetite    History taken from: patient    Review of Systems   Constitutional: Positive for activity change, appetite change and fatigue. Negative for diaphoresis and fever.   HENT: Negative for facial swelling.    Eyes: Negative for visual disturbance.   Respiratory: Negative for cough, shortness of breath, wheezing and stridor.    Cardiovascular: Negative for chest pain.   Gastrointestinal: Positive for abdominal pain and nausea. Negative for constipation, diarrhea and vomiting.   Genitourinary: Negative for urgency.   Musculoskeletal: Negative for arthralgias and myalgias.   Neurological: Positive for weakness.   Psychiatric/Behavioral: Negative for confusion.           Objective     Vital Signs  Temp:  [98.2 °F (36.8 °C)-99.1 °F (37.3 °C)] 98.2 °F (36.8 °C)  Heart Rate:  [65-84] 72  Resp:  [15-19] 15  BP: (112-141)/(55-83) 120/63    Physical Exam:     General Appearance:    Alert, cooperative, in no acute distress,   Head:    Normocephalic, without obvious abnormality, atraumatic   Eyes:            Lids and lashes normal, conjunctivae and sclerae normal, no   icterus, no pallor, corneas clear, PERRLA   Ears:    Ears appear intact with no abnormalities noted   Throat:   No oral lesions, no thrush, oral mucosa moist   Neck:   No adenopathy, supple, trachea midline, no thyromegaly, no   carotid bruit, no JVD   Lungs:     Clear to auscultation,respirations regular, even and                  unlabored    Heart:    Regular rhythm and normal rate, normal S1 and S2, no            murmur, no gallop, no rub, no click   Chest Wall:    No abnormalities observed   Abdomen:   Urostomy draining clear  urine; parastomal hernia, reducible   Extremities:   Moves all extremities well, no edema, no cyanosis, no             Redness   Pulses:   Pulses palpable and equal bilaterally   Skin:   No bleeding, bruising or rash   Lymph nodes:   No palpable adenopathy   Neurologic:   Cranial nerves 2 - 12 grossly intact, sensation intact, DTR       present and equal bilaterally        Results Review:    Lab Results (last 24 hours)     Procedure Component Value Units Date/Time    Blood Culture - Blood, Arm, Right [[id number]]  (Normal) Collected: 03/19/23 1556    Specimen: Blood from Arm, Right Updated: 03/20/23 1615     Blood Culture No growth at 24 hours    Narrative:      Less than seven (7) mL's of blood was collected.  Insufficient quantity may yield false negative results.    Blood Culture - Blood, Arm, Right [[id number]]  (Normal) Collected: 03/19/23 1556    Specimen: Blood from Arm, Right Updated: 03/20/23 1615     Blood Culture No growth at 24 hours    Narrative:      Less than seven (7) mL's of blood was collected.  Insufficient quantity may yield false negative results.    Urine Culture - Urine, Urostomy [[id number]]  (Abnormal)  (Susceptibility) Collected: 03/18/23 2043    Specimen: Urine from Urostomy Updated: 03/20/23 1311     Urine Culture >100,000 CFU/mL Escherichia coli    Narrative:      Colonization of the urinary tract without infection is common. Treatment is discouraged unless the patient is symptomatic, pregnant, or undergoing an invasive urologic procedure.    Susceptibility      Escherichia coli      ASHLEY      Ampicillin Resistant     Ampicillin + Sulbactam Intermediate      Aztreonam Susceptible (C)  [1]       Cefazolin Susceptible      Cefepime Susceptible      Ceftazidime Susceptible      Ceftriaxone Susceptible      Gentamicin Susceptible      Levofloxacin Susceptible      Nitrofurantoin Susceptible      Piperacillin + Tazobactam Susceptible      Trimethoprim + Sulfamethoxazole Susceptible                    [1]  Appended report. These results have been appended to a previously final verified report.                 Basic Metabolic Panel [[id number]]  (Abnormal) Collected: 03/20/23 0650    Specimen: Blood Updated: 03/20/23 0809     Glucose 105 mg/dL      BUN 32 mg/dL      Creatinine 1.38 mg/dL      Sodium 141 mmol/L      Potassium 4.2 mmol/L      Comment: Slight hemolysis detected by analyzer. Results may be affected.        Chloride 109 mmol/L      CO2 22.0 mmol/L      Calcium 9.3 mg/dL      BUN/Creatinine Ratio 23.2     Anion Gap 10.0 mmol/L      eGFR 38.5 mL/min/1.73     Narrative:      GFR Normal >60  Chronic Kidney Disease <60  Kidney Failure <15    The GFR formula is only valid for adults with stable renal function between ages 18 and 70.    Magnesium [[id number]]  (Normal) Collected: 03/20/23 0650    Specimen: Blood Updated: 03/20/23 0809     Magnesium 1.9 mg/dL     CBC & Differential [[id number]]  (Abnormal) Collected: 03/20/23 0650    Specimen: Blood Updated: 03/20/23 0742    Narrative:      The following orders were created for panel order CBC & Differential.  Procedure                               Abnormality         Status                     ---------                               -----------         ------                     CBC Auto Differential[[id number]]        Abnormal            Final result                 Please view results for these tests on the individual orders.    CBC Auto Differential [[id number]]  (Abnormal) Collected: 03/20/23 0650    Specimen: Blood Updated: 03/20/23 0742     WBC 10.60 10*3/mm3      RBC 3.55 10*6/mm3      Hemoglobin 10.8 g/dL      Hematocrit 34.1 %      MCV 95.9 fL      MCH 30.5 pg      MCHC 31.8 g/dL      RDW 16.6 %      RDW-SD 59.1 fl      MPV 8.2 fL      Platelets 250 10*3/mm3      Neutrophil % 75.5 %      Lymphocyte % 12.9 %      Monocyte % 10.2 %      Eosinophil % 0.9 %      Basophil % 0.5 %      Neutrophils, Absolute 8.00 10*3/mm3      Lymphocytes, Absolute 1.40  10*3/mm3      Monocytes, Absolute 1.10 10*3/mm3      Eosinophils, Absolute 0.10 10*3/mm3      Basophils, Absolute 0.10 10*3/mm3      nRBC 0.1 /100 WBC            Imaging Results (Last 24 Hours)     ** No results found for the last 24 hours. **               I reviewed the patient's new clinical results.    Medication Review:   Scheduled Meds:apixaban, 5 mg, Oral, BID  aztreonam, 1 g, Intravenous, Q8H  digoxin, 125 mcg, Oral, Daily  furosemide, 20 mg, Oral, Daily  metoprolol succinate XL, 50 mg, Oral, BID  sodium chloride, 3 mL, Intravenous, Q12H  topiramate, 100 mg, Oral, Nightly      Continuous Infusions:dilTIAZem, 5-15 mg/hr, Last Rate: 5 mg/hr (03/19/23 1912)      PRN Meds:.•  acetaminophen  •  HYDROcodone-acetaminophen  •  ondansetron  •  sodium chloride  •  sodium chloride     Assessment & Plan       Fever    Atrial fibrillation with RVR (HCC)  Sepsis - vitals stable  Ecoli bacteremia - clinically responding to Aztreonam; will ask ID to follow  UTI  Elevated creatinine - unclear what baseline is - 9/2022 creatinine was 0.91; 12/7/22 1.57; now 1.38.  Will order ct scan without contrast to rule out obstruction and evaluate for complicated UTI. Continue IVF  S/p cystectomy  Essential hypertension - controlled  Presence of pacemaker  Type 2 DM   Atrial fib - rate now controlled; she is anti-coagulated      Plan for disposition:SRINIVAS Henderson MD  03/20/23  19:25 EDT

## 2023-03-20 NOTE — PLAN OF CARE
Goal Outcome Evaluation:      Pt continues on room air, still in controlled Afib on cardizem gtt. Urostomy in place with good output. Plan to return home when stable. VSS with no complaints at this time.    Problem: Adult Inpatient Plan of Care  Goal: Plan of Care Review  Outcome: Ongoing, Progressing

## 2023-03-21 ENCOUNTER — APPOINTMENT (OUTPATIENT)
Dept: CT IMAGING | Facility: HOSPITAL | Age: 83
DRG: 698 | End: 2023-03-21
Payer: MEDICARE

## 2023-03-21 LAB
ANION GAP SERPL CALCULATED.3IONS-SCNC: 9 MMOL/L (ref 5–15)
BASOPHILS # BLD AUTO: 0 10*3/MM3 (ref 0–0.2)
BASOPHILS NFR BLD AUTO: 0.6 % (ref 0–1.5)
BUN SERPL-MCNC: 18 MG/DL (ref 8–23)
BUN/CREAT SERPL: 30.5 (ref 7–25)
CALCIUM SPEC-SCNC: 9.2 MG/DL (ref 8.6–10.5)
CHLORIDE SERPL-SCNC: 101 MMOL/L (ref 98–107)
CO2 SERPL-SCNC: 30 MMOL/L (ref 22–29)
CREAT SERPL-MCNC: 0.59 MG/DL (ref 0.57–1)
DEPRECATED RDW RBC AUTO: 63.9 FL (ref 37–54)
EGFRCR SERPLBLD CKD-EPI 2021: 90.7 ML/MIN/1.73
EOSINOPHIL # BLD AUTO: 0.2 10*3/MM3 (ref 0–0.4)
EOSINOPHIL NFR BLD AUTO: 1.8 % (ref 0.3–6.2)
ERYTHROCYTE [DISTWIDTH] IN BLOOD BY AUTOMATED COUNT: 17.8 % (ref 12.3–15.4)
GLUCOSE SERPL-MCNC: 90 MG/DL (ref 65–99)
HCT VFR BLD AUTO: 35.8 % (ref 34–46.6)
HGB BLD-MCNC: 10.8 G/DL (ref 12–15.9)
LYMPHOCYTES # BLD AUTO: 1.6 10*3/MM3 (ref 0.7–3.1)
LYMPHOCYTES NFR BLD AUTO: 18.6 % (ref 19.6–45.3)
MCH RBC QN AUTO: 30.5 PG (ref 26.6–33)
MCHC RBC AUTO-ENTMCNC: 30.2 G/DL (ref 31.5–35.7)
MCV RBC AUTO: 100.8 FL (ref 79–97)
MONOCYTES # BLD AUTO: 0.9 10*3/MM3 (ref 0.1–0.9)
MONOCYTES NFR BLD AUTO: 10.2 % (ref 5–12)
NEUTROPHILS NFR BLD AUTO: 6.1 10*3/MM3 (ref 1.7–7)
NEUTROPHILS NFR BLD AUTO: 68.8 % (ref 42.7–76)
NRBC BLD AUTO-RTO: 0.1 /100 WBC (ref 0–0.2)
PLATELET # BLD AUTO: 250 10*3/MM3 (ref 140–450)
PMV BLD AUTO: 7.9 FL (ref 6–12)
POTASSIUM SERPL-SCNC: 4.5 MMOL/L (ref 3.5–5.2)
RBC # BLD AUTO: 3.55 10*6/MM3 (ref 3.77–5.28)
SODIUM SERPL-SCNC: 140 MMOL/L (ref 136–145)
WBC NRBC COR # BLD: 8.8 10*3/MM3 (ref 3.4–10.8)

## 2023-03-21 PROCEDURE — 99233 SBSQ HOSP IP/OBS HIGH 50: CPT | Performed by: INTERNAL MEDICINE

## 2023-03-21 PROCEDURE — 25010000002 ONDANSETRON PER 1 MG: Performed by: INTERNAL MEDICINE

## 2023-03-21 PROCEDURE — 74176 CT ABD & PELVIS W/O CONTRAST: CPT

## 2023-03-21 RX ORDER — DILTIAZEM HYDROCHLORIDE 60 MG/1
60 TABLET, FILM COATED ORAL EVERY 8 HOURS SCHEDULED
Status: DISCONTINUED | OUTPATIENT
Start: 2023-03-21 | End: 2023-03-21

## 2023-03-21 RX ORDER — METOPROLOL SUCCINATE 50 MG/1
50 TABLET, EXTENDED RELEASE ORAL EVERY 8 HOURS
Status: DISCONTINUED | OUTPATIENT
Start: 2023-03-21 | End: 2023-03-22

## 2023-03-21 RX ORDER — FAMOTIDINE 20 MG/1
20 TABLET, FILM COATED ORAL
Status: DISCONTINUED | OUTPATIENT
Start: 2023-03-21 | End: 2023-03-24 | Stop reason: HOSPADM

## 2023-03-21 RX ADMIN — DIGOXIN 125 MCG: 250 TABLET ORAL at 08:21

## 2023-03-21 RX ADMIN — ACETAMINOPHEN 650 MG: 325 TABLET, FILM COATED ORAL at 08:25

## 2023-03-21 RX ADMIN — Medication 3 ML: at 08:21

## 2023-03-21 RX ADMIN — METOPROLOL SUCCINATE 50 MG: 50 TABLET, EXTENDED RELEASE ORAL at 16:46

## 2023-03-21 RX ADMIN — FAMOTIDINE 20 MG: 20 TABLET, FILM COATED ORAL at 16:46

## 2023-03-21 RX ADMIN — DILTIAZEM HYDROCHLORIDE 30 MG: 30 TABLET, FILM COATED ORAL at 20:36

## 2023-03-21 RX ADMIN — APIXABAN 5 MG: 5 TABLET, FILM COATED ORAL at 20:35

## 2023-03-21 RX ADMIN — ONDANSETRON 4 MG: 2 INJECTION INTRAMUSCULAR; INTRAVENOUS at 12:13

## 2023-03-21 RX ADMIN — AZTREONAM 1 G: 1 INJECTION, POWDER, LYOPHILIZED, FOR SOLUTION INTRAMUSCULAR; INTRAVENOUS at 05:17

## 2023-03-21 RX ADMIN — TOPIRAMATE 100 MG: 100 TABLET, FILM COATED ORAL at 20:35

## 2023-03-21 RX ADMIN — APIXABAN 5 MG: 5 TABLET, FILM COATED ORAL at 08:21

## 2023-03-21 RX ADMIN — METOPROLOL SUCCINATE 50 MG: 50 TABLET, EXTENDED RELEASE ORAL at 08:21

## 2023-03-21 RX ADMIN — DILTIAZEM HYDROCHLORIDE 30 MG: 30 TABLET, FILM COATED ORAL at 13:12

## 2023-03-21 RX ADMIN — AZTREONAM 2 G: 2 INJECTION, POWDER, LYOPHILIZED, FOR SOLUTION INTRAMUSCULAR; INTRAVENOUS at 20:34

## 2023-03-21 RX ADMIN — AZTREONAM 2 G: 2 INJECTION, POWDER, LYOPHILIZED, FOR SOLUTION INTRAMUSCULAR; INTRAVENOUS at 13:12

## 2023-03-21 RX ADMIN — DILTIAZEM HYDROCHLORIDE 30 MG: 30 TABLET, FILM COATED ORAL at 08:21

## 2023-03-21 RX ADMIN — Medication 3 ML: at 20:35

## 2023-03-21 RX ADMIN — FUROSEMIDE 20 MG: 40 TABLET ORAL at 08:21

## 2023-03-21 NOTE — PLAN OF CARE
Goal Outcome Evaluation:  Plan of Care Reviewed With: patient         Patient has rested majority of shift. Patient remains on cardizem gtt at 5mg/hr. Is currently in controlled Afib. VSS. Patient is currently resting with bed alarm on and call light in reach. Plan is for patient to go home with nephew when ready to discharge. CT of abdomen ordered on patient. Just spoke with CT and could not take patient at this time. Stated it will be completed later in the morning.

## 2023-03-21 NOTE — PLAN OF CARE
Goal Outcome Evaluation:      Pt switched from IV cardizem to PO this morning, tolerating. Pt had episode of nausea treated with zofran, effective. Increased IV abx dose. PT evaluation ordered. VSS with no complaints at this time.    Problem: Adult Inpatient Plan of Care  Goal: Plan of Care Review  Outcome: Ongoing, Progressing

## 2023-03-21 NOTE — CONSULTS
Infectious Diseases Consult Note    Referring Provider: Lora Henderson MD    Reason for Consultation: Bacteremia    Patient Care Team:  Lizzy Francis MD as PCP - General (Family Medicine)  Jose Carlos Cheng MD as Consulting Physician (Cardiology)    Chief complaint weakness    Subjective     History of present illness:      This is 82-year-old female who presented to [facility] on March 18, 2023 with complaints of weakness.  She was found to have E. coli bacteremia.  Urine culture grew E. coli.  The patient is currently on IV aztreonam.  The patient had history of multiple drug allergies.  The patient had history of bladder and renal cell carcinoma.  She is s/p radical nephrectomy and ileal conduit.  The patient is hemodynamically stable.    Review of Systems   Review of Systems   Constitutional: Negative.    HENT: Negative.    Eyes: Negative.    Respiratory: Negative.    Cardiovascular: Negative.    Gastrointestinal: Negative.    Genitourinary: Negative.    Musculoskeletal: Negative.    Skin: Negative.    Neurological: Negative.    Hematological: Negative.    Psychiatric/Behavioral: Negative.        Medications  Medications Prior to Admission   Medication Sig Dispense Refill Last Dose   • acetaminophen (TYLENOL) 325 MG tablet Take 2 tablets by mouth Every 6 (Six) Hours As Needed for Mild Pain.   Past Week   • Cholecalciferol (Vitamin D3) 50 MCG (2000 UT) tablet Take 1 tablet by mouth Daily.   3/18/2023   • metoprolol succinate XL (TOPROL-XL) 50 MG 24 hr tablet TAKE 1 TABLET TWICE A  tablet 3 3/18/2023   • topiramate (TOPAMAX) 100 MG tablet Take 1 tablet by mouth every night at bedtime.   3/18/2023   • apixaban (ELIQUIS) 5 MG tablet tablet Take 1 tablet by mouth 2 (Two) Times a Day. 60 tablet 5 Unknown   • digoxin (LANOXIN) 125 MCG tablet TAKE 1 TABLET DAILY 90 tablet 3 Unknown   • dilTIAZem CD (CARDIZEM CD) 360 MG 24 hr capsule TAKE 1 CAPSULE DAILY 90 capsule 3 Unknown       History  Past  Medical History:   Diagnosis Date   • Bladder cancer (HCC)    • Cancer (HCC)     breast, bladder   • Deep vein thrombosis (HCC)    • Essential hypertension 1/17/2017   • Fracture tibia/fibula, right, closed, initial encounter 8/27/2020   • GERD (gastroesophageal reflux disease)    • History of urostomy    • Immobility 08/27/2020    r/t broken Tibia    • Kidney carcinoma, right (HCC)     removed    • Long term current use of anticoagulant 1/9/2015   • PAF (paroxysmal atrial fibrillation) (HCC) 6/26/2019   • Presence of cardiac pacemaker 11/03/2014    BS dual chamber PM placed 10/2014 with pocket revision 1/25/17.  HX tachy rob syndrome   • Sick sinus syndrome (HCC) 11/3/2014     Past Surgical History:   Procedure Laterality Date   • BREAST LUMPECTOMY     • CHOLECYSTECTOMY N/A 10/12/2020    Procedure: CHOLECYSTECTOMY LAPAROSCOPIC;  Surgeon: Go Pereira DO;  Location: [facility];  Service: General;  Laterality: N/A;   • CYSTECTOMY W/ URETEROILEAL CONDUIT     • HARDWARE REMOVAL Right 6/11/2021    Procedure: TIBIA HARDWARE REMOVAL;  Surgeon: Geoff Shah II, MD;  Location: [facility] OR;  Service: Orthopedics;  Laterality: Right;   • HERNIA REPAIR     • HYSTERECTOMY     • INSERT / REPLACE / REMOVE PACEMAKER     • NEPHRECTOMY Right    • TIBIA IM NAILING/RODDING Right 8/28/2020    Procedure: TIBIA INTRAMEDULLARY NAIL/ABRAHAM INSERTION;  Surgeon: Geoff Shah II, MD;  Location: [facility] OR;  Service: Orthopedics;  Laterality: Right;       Family History  Family History   Problem Relation Age of Onset   • COPD Father        Social History   reports that she has never smoked. She has never used smokeless tobacco. She reports that she does not currently use alcohol. She reports that she does not use drugs.    Allergies  Amoxicillin, Ciprofloxacin, Oxycodone-acetaminophen, Penicillins, Sulfa antibiotics, Cephalexin, Clavulanic acid, Codeine, Contrast dye (echo or unknown ct/mr), Doxycycline,  Fluconazole, Iodine, Macrobid [nitrofurantoin], Tobramycin, and Trimethoprim    Objective     Vital Signs   Vital Signs (last 24 hours)       03/20 0700  03/21 0659 03/21 0700  03/21 1331   Most Recent      Temp (°F) 97.6 -  98.8      98.5     98.5 (36.9) 03/21 0945    Heart Rate 65 -  83    66 -  67     66 03/21 1315    Resp 15 -  18      17     17 03/21 0945    /55 -  127/69    107/58 -  138/72     107/58 03/21 1315    SpO2 (%) 97 -  100    91 -  97     97 03/21 1315          Physical Exam:  Physical Exam  Vitals and nursing note reviewed.   Constitutional:       Appearance: She is well-developed.   HENT:      Head: Normocephalic and atraumatic.   Eyes:      Pupils: Pupils are equal, round, and reactive to light.   Cardiovascular:      Rate and Rhythm: Normal rate and regular rhythm.      Heart sounds: Normal heart sounds.   Pulmonary:      Effort: Pulmonary effort is normal. No respiratory distress.      Breath sounds: Normal breath sounds. No wheezing or rales.   Abdominal:      General: Bowel sounds are normal. There is no distension.      Palpations: Abdomen is soft. There is no mass.      Tenderness: There is no abdominal tenderness. There is no guarding or rebound.      Comments: Presence of ileal conduit   Musculoskeletal:         General: No deformity. Normal range of motion.      Cervical back: Normal range of motion and neck supple.   Skin:     General: Skin is warm.      Findings: No erythema or rash.   Neurological:      Mental Status: She is alert and oriented to person, place, and time.      Cranial Nerves: No cranial nerve deficit.         Microbiology  Microbiology Results (last 10 days)     Procedure Component Value - Date/Time    Blood Culture - Blood, Arm, Right [[id number]]  (Normal) Collected: 03/19/23 1556    Lab Status: Preliminary result Specimen: Blood from Arm, Right Updated: 03/20/23 1615     Blood Culture No growth at 24 hours    Narrative:      Less than seven (7) mL's of blood  was collected.  Insufficient quantity may yield false negative results.    Blood Culture - Blood, Arm, Right [[id number]]  (Normal) Collected: 03/19/23 1556    Lab Status: Preliminary result Specimen: Blood from Arm, Right Updated: 03/20/23 1615     Blood Culture No growth at 24 hours    Narrative:      Less than seven (7) mL's of blood was collected.  Insufficient quantity may yield false negative results.    Blood Culture - Blood, Arm, Left [[id number]]  (Abnormal) Collected: 03/18/23 2132    Lab Status: Preliminary result Specimen: Blood from Arm, Left Updated: 03/21/23 0700     Blood Culture Escherichia coli     Isolated from Anaerobic Bottle     Gram Stain Anaerobic Bottle Gram negative bacilli    Narrative:      Less than seven (7) mL's of blood was collected.  Insufficient quantity may yield false negative results.    Respiratory Panel PCR w/COVID-19(SARS-CoV-2) VINCENT/STEFANY/ANNY/PAD/COR/MAD/MANASA In-House, NP Swab in UTM/VTM, 3-4 HR TAT - Swab, Nasopharynx [[id number]]  (Normal) Collected: 03/18/23 2044    Lab Status: Final result Specimen: Swab from Nasopharynx Updated: 03/18/23 2138     ADENOVIRUS, PCR Not Detected     Coronavirus 229E Not Detected     Coronavirus HKU1 Not Detected     Coronavirus NL63 Not Detected     Coronavirus OC43 Not Detected     COVID19 Not Detected     Human Metapneumovirus Not Detected     Human Rhinovirus/Enterovirus Not Detected     Influenza A PCR Not Detected     Influenza B PCR Not Detected     Parainfluenza Virus 1 Not Detected     Parainfluenza Virus 2 Not Detected     Parainfluenza Virus 3 Not Detected     Parainfluenza Virus 4 Not Detected     RSV, PCR Not Detected     Bordetella pertussis pcr Not Detected     Bordetella parapertussis PCR Not Detected     Chlamydophila pneumoniae PCR Not Detected     Mycoplasma pneumo by PCR Not Detected    Narrative:      In the setting of a positive respiratory panel with a viral infection PLUS a negative procalcitonin without other underlying  concern for bacterial infection, consider observing off antibiotics or discontinuation of antibiotics and continue supportive care. If the respiratory panel is positive for atypical bacterial infection (Bordetella pertussis, Chlamydophila pneumoniae, or Mycoplasma pneumoniae), consider antibiotic de-escalation to target atypical bacterial infection.    Urine Culture - Urine, Urostomy [[id number]]  (Abnormal)  (Susceptibility) Collected: 03/18/23 2043    Lab Status: Edited Result - FINAL Specimen: Urine from Urostomy Updated: 03/20/23 1311     Urine Culture >100,000 CFU/mL Escherichia coli    Narrative:      Colonization of the urinary tract without infection is common. Treatment is discouraged unless the patient is symptomatic, pregnant, or undergoing an invasive urologic procedure.    Susceptibility      Escherichia coli      ASHLEY      Ampicillin Resistant     Ampicillin + Sulbactam Intermediate      Aztreonam Susceptible (C)  [1]       Cefazolin Susceptible      Cefepime Susceptible      Ceftazidime Susceptible      Ceftriaxone Susceptible      Gentamicin Susceptible      Levofloxacin Susceptible      Nitrofurantoin Susceptible      Piperacillin + Tazobactam Susceptible      Trimethoprim + Sulfamethoxazole Susceptible                   [1]  Appended report. These results have been appended to a previously final verified report.                 Blood Culture - Blood, Arm, Left [[id number]]  (Abnormal) Collected: 03/18/23 2041    Lab Status: Preliminary result Specimen: Blood from Arm, Left Updated: 03/21/23 0659     Blood Culture Escherichia coli     Isolated from Anaerobic Bottle     Gram Stain Anaerobic Bottle Gram negative bacilli    Blood Culture ID, PCR - Blood, Arm, Left [[id number]]  (Abnormal) Collected: 03/18/23 2041    Lab Status: Final result Specimen: Blood from Arm, Left Updated: 03/19/23 1233     BCID, PCR Escherichia coli. Identification by BCID2 PCR.     BOTTLE TYPE Anaerobic Bottle    Narrative:       No resistance genes detected.          Laboratory  Results from last 7 days   Lab Units 03/20/23  2345   WBC 10*3/mm3 8.80   HEMOGLOBIN g/dL 10.8*   HEMATOCRIT % 35.8   PLATELETS 10*3/mm3 250     Results from last 7 days   Lab Units 03/20/23  2345   SODIUM mmol/L 140   POTASSIUM mmol/L 4.5   CHLORIDE mmol/L 101   CO2 mmol/L 30.0*   BUN mg/dL 18   CREATININE mg/dL 0.59   GLUCOSE mg/dL 90   CALCIUM mg/dL 9.2     Results from last 7 days   Lab Units 03/20/23  2345   SODIUM mmol/L 140   POTASSIUM mmol/L 4.5   CHLORIDE mmol/L 101   CO2 mmol/L 30.0*   BUN mg/dL 18   CREATININE mg/dL 0.59   GLUCOSE mg/dL 90   CALCIUM mg/dL 9.2     Results from last 7 days   Lab Units 03/18/23  2041   CK TOTAL U/L 29               Radiology  Imaging Results (Last 72 Hours)     Procedure Component Value Units Date/Time    CT Abdomen Pelvis Without Contrast [[id number]] Collected: 03/21/23 0853     Updated: 03/21/23 0905    Narrative:      CT ABDOMEN PELVIS WO CONTRAST    Date of Exam: 3/21/2023 7:47 AM EDT    Indication: UTI, recurrent/complicated (Female).    Comparison: August 18, 2022    Technique: Axial CT images were obtained of the abdomen and pelvis without the administration of contrast. Sagittal and coronal reconstructions were performed.  Automated exposure control and iterative reconstruction methods were used.     Findings:  Within the lung bases is bibasilar atelectasis.    The unenhanced liver, adrenal glands, spleen, and pancreas are unremarkable. The gallbladder is surgically absent. The right kidney is absent. There is a nonobstructing left renal stone.    The stomach appears normal. The small bowel appears normal in caliber. The colon appears normal. The appendix is not well-visualized. There is no ascites or loculated collection. No abnormally enlarged lymph nodes are identified.    The rectum is unremarkable. The uterus and urinary bladder are surgically absent, with a right lower quadrant urostomy. A previously identified  right parastomal hernia is no longer present.    No aggressive osseous lesions are identified.      Impression:      Impression:  1.No acute process identified within abdomen/pelvis.  2.Nonobstructing left renal stone.  3.Changes from cystectomy with right lower quadrant urostomy.      Electronically Signed: Santiago Oshea    3/21/2023 9:03 AM EDT    Workstation ID: [id number]    XR Shoulder 2+ View Right [[id number]] Collected: 03/19/23 0727     Updated: 03/19/23 0730    Narrative:      XR SHOULDER 2+ VW RIGHT    Date of Exam: 3/18/2023 8:53 PM EDT    Indication: pain.  Right shoulder pain.     Comparison: None available.    Findings:  There is advanced degenerative change. There is generalized osteopenia. No acute fracture is evident.      Impression:      Impression:  Advanced degenerative changes. Generalized osteopenia.    Electronically Signed: Issa Barnett    3/19/2023 7:28 AM EDT    Workstation ID: [id number]    XR Chest 1 View [[id number]] Collected: 03/19/23 0724     Updated: 03/19/23 0729    Narrative:      XR CHEST 1 VW    Date of Exam: 3/18/2023 8:52 PM EDT    Indication: chest pain.    Comparison: 12/7/2022    Findings:  Left subclavian dual-lead cardiac stimulator device again noted. There is cardiomegaly. There is mixed interstitial and alveolar opacity in the perihilar regions, right greater than left. This may be due to pulmonary edema or atypical infection pattern.   No pleural fluid is seen. No pneumothorax is evident. Aortic vascular calcification is noted. Calcified lymph nodes again noted in the right paratracheal and hilar regions.      Impression:      Impression:  Hazy mixed interstitial alveolar opacities in the perihilar regions, right greater than left suggesting edema or atypical infection pattern.    Electronically Signed: Issa Barnett    3/19/2023 7:26 AM EDT    Workstation ID: [id number]          Cardiology      Results Review:  I have reviewed all clinical data, test, lab, and imaging  results.       Schedule Meds  apixaban, 5 mg, Oral, BID  aztreonam, 2 g, Intravenous, Q8H  digoxin, 125 mcg, Oral, Daily  dilTIAZem, 30 mg, Oral, Q8H  furosemide, 20 mg, Oral, Daily  metoprolol succinate XL, 50 mg, Oral, Q8H  sodium chloride, 3 mL, Intravenous, Q12H  topiramate, 100 mg, Oral, Nightly        Infusion Meds       PRN Meds  •  acetaminophen  •  HYDROcodone-acetaminophen  •  ondansetron  •  sodium chloride  •  sodium chloride      Assessment & Plan       Assessment    E. coli bacteremia secondary to complicated UTI.    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  The organism is susceptible to aztreonam    History of multiple drug allergies including cephalosporins, penicillins and quinolones    History of renal cell carcinoma bladder cancer.  Patient is s/p radical cystectomy and ileal conduit in the past    Plan    Continue aztreonam but increase dose to 2 g IV every 8 hours for 2 weeks  Continue supportive care  A.mKenneth Cisneros MD  03/21/23  13:31 EDT    Note is dictated utilizing voice recognition software/Dragon

## 2023-03-21 NOTE — PROGRESS NOTES
Cardiology  Cardiology consult note  Augustin Ellsworth MD, PhD      Subjective:     Encounter Date:03/18/2023      Patient ID: Hazel Bucio is a 82 y.o. female.    Chief Complaint: weakness, fever      HPI:  Hazel Bucio is a 82 y.o. female well-known to me with a past cardiac history of permanent afib (anticoaguled with coumadin) and SSS s/p [employer] PPM.  Last 2D echo 9/2022 showed an EF = 60-65% with mild to moderate MR/TR and mild AI.  Last nuclear stress test 2020 showed no ischemia.  PMH includes HTN, CKD stage 3, bladder cancer, and renal cancer. Patient presented with c/o weakness and fever and found with UTI as well as afib with RVR.  She received IV diltiazem.  Cardiology was consulted for afib management.    Tele shows afib in the 70s currently well controlled.  Patient states that she did not have any palpitations, dizziness, chest pain, or SOA.  Prior to this admit, she has been able to perform activities such as housework without unusual difficulty.  She denies PND/orthopnea or edema.  Her BP was elevated on admit.  She reports compliance with medications.  She reports that she had her abdominal hernia repair 3-4 months ago without complications.   .  She was admitted with sepsis, found to have E. coli bacteremia now on antibiotics, she had A-fib RVR ultimately started on diltiazem drip for control, she likely had some hypotension with renal injury which has been improved  =====================================================================    Today  Rates are better controlled, continuing diltiazem drip  Chest pain-free  Renal function appears compromised, avoid nephrotoxic medications as well as hypotension  Does not appear volume overloaded  Speaking complete sentences, feels fatigued with malaise    Discussed high risk medicines with metoprolol digoxin combination, dosing per level, avoid hypokalemia with nursing staff    No acute events overnight    Past Medical History:   Diagnosis Date    • Bladder cancer (HCC)    • Cancer (HCC)     breast, bladder   • Deep vein thrombosis (HCC)    • Essential hypertension 1/17/2017   • Fracture tibia/fibula, right, closed, initial encounter 8/27/2020   • GERD (gastroesophageal reflux disease)    • History of urostomy    • Immobility 08/27/2020    r/t broken Tibia    • Kidney carcinoma, right (HCC)     removed    • Long term current use of anticoagulant 1/9/2015   • PAF (paroxysmal atrial fibrillation) (HCC) 6/26/2019   • Presence of cardiac pacemaker 11/03/2014    BS dual chamber PM placed 10/2014 with pocket revision 1/25/17.  HX tachy rob syndrome   • Sick sinus syndrome (HCC) 11/3/2014         Past Surgical History:   Procedure Laterality Date   • BREAST LUMPECTOMY     • CHOLECYSTECTOMY N/A 10/12/2020    Procedure: CHOLECYSTECTOMY LAPAROSCOPIC;  Surgeon: Go Pereira DO;  Location: [facility];  Service: General;  Laterality: N/A;   • CYSTECTOMY W/ URETEROILEAL CONDUIT     • HARDWARE REMOVAL Right 6/11/2021    Procedure: TIBIA HARDWARE REMOVAL;  Surgeon: Geoff Shah II, MD;  Location: [facility] OR;  Service: Orthopedics;  Laterality: Right;   • HERNIA REPAIR     • HYSTERECTOMY     • INSERT / REPLACE / REMOVE PACEMAKER     • NEPHRECTOMY Right    • TIBIA IM NAILING/RODDING Right 8/28/2020    Procedure: TIBIA INTRAMEDULLARY NAIL/ABRAHAM INSERTION;  Surgeon: Geoff Shah II, MD;  Location: [facility];  Service: Orthopedics;  Laterality: Right;         Social History     Socioeconomic History   • Marital status:    Tobacco Use   • Smoking status: Never   • Smokeless tobacco: Never   Vaping Use   • Vaping Use: Never used   Substance and Sexual Activity   • Alcohol use: Not Currently   • Drug use: Never   • Sexual activity: Defer       Family History   Problem Relation Age of Onset   • COPD Father          Allergies   Allergen Reactions   • Amoxicillin Shortness Of Breath   • Ciprofloxacin Hives   • Oxycodone-Acetaminophen  "Unknown - High Severity     Pt's son stated when pt fell and broke her leg she was put on oxycodone; pt's son stated pt's \"vitals went all out of wack, she couldn't remember things.\"   • Penicillins Rash   • Sulfa Antibiotics Hives   • Cephalexin Other (See Comments)   • Clavulanic Acid Other (See Comments)   • Codeine Other (See Comments)   • Contrast Dye (Echo Or Unknown Ct/Mr) Other (See Comments)     8/18/22    • Doxycycline Other (See Comments)   • Fluconazole Other (See Comments)   • Iodine Hives   • Macrobid [Nitrofurantoin] Hives   • Tobramycin Other (See Comments)   • Trimethoprim Other (See Comments)       Current Medications:   Scheduled Meds:apixaban, 5 mg, Oral, BID  aztreonam, 2 g, Intravenous, Q8H  digoxin, 125 mcg, Oral, Daily  dilTIAZem, 30 mg, Oral, Q8H  furosemide, 20 mg, Oral, Daily  metoprolol succinate XL, 50 mg, Oral, Q8H  sodium chloride, 3 mL, Intravenous, Q12H  topiramate, 100 mg, Oral, Nightly      Review of systems negative x14 point review of systems except as mentioned above         Objective:         /72   Pulse 66   Temp 98.8 °F (37.1 °C) (Oral)   Resp 17   Ht 167.6 cm (66\")   Wt 61.4 kg (135 lb 5.8 oz)   SpO2 91%   BMI 21.85 kg/m²       General: Frail, in NAD.  Nasal cannula  Neuro: AAOx3. No gross deficits.  Moves all extremities  HEENT: sclera clear, no xanthalasmas.  CV: irregular controlled rates S1S2 RRR. N 1 out of 6 systolic ejection murmurs.  Resp: Breathing is unlabored. Lungs coarse bases.  Diminished somewhat  GI: BS+. Abdomen soft and NTTP.  Ext: Pedal pulses are palpable. Extremities are non-edematous.  MS: moves all extremities, no weakness.  Skin: warm, dry.  Psych: calm and cooperative.    Unchanged from prior encounter            Lab Review:     Results from last 7 days   Lab Units 03/20/23  2345 03/20/23  0650 03/18/23  2041   SODIUM mmol/L 140 141 140   POTASSIUM mmol/L 4.5 4.2 4.4   CHLORIDE mmol/L 101 109* 106   CO2 mmol/L 30.0* 22.0 21.0*   BUN " mg/dL 18 32* 28*   CREATININE mg/dL 0.59 1.38* 1.31*   GLUCOSE mg/dL 90 105* 119*   CALCIUM mg/dL 9.2 9.3 9.8   AST (SGOT) U/L  --   --  24   ALT (SGPT) U/L  --   --  9     Results from last 7 days   Lab Units 03/18/23  2041   CK TOTAL U/L 29   HSTROP T ng/L 25*     Results from last 7 days   Lab Units 03/20/23  2345 03/20/23  0650   WBC 10*3/mm3 8.80 10.60   HEMOGLOBIN g/dL 10.8* 10.8*   HEMATOCRIT % 35.8 34.1   PLATELETS 10*3/mm3 250 250     Results from last 7 days   Lab Units 03/18/23  2041   INR  1.05   APTT seconds 22.5*     Results from last 7 days   Lab Units 03/20/23  0650   MAGNESIUM mg/dL 1.9           Invalid input(s): LDLCALC  Results from last 7 days   Lab Units 03/18/23  2041   PROBNP pg/mL 7,303.0*     Results from last 7 days   Lab Units 03/18/23  2041   TSH uIU/mL 0.880       Recent Radiology:  Imaging Results (Most Recent)     Procedure Component Value Units Date/Time    CT Abdomen Pelvis Without Contrast [[id number]] Collected: 03/21/23 0853     Updated: 03/21/23 0905    Narrative:      CT ABDOMEN PELVIS WO CONTRAST    Date of Exam: 3/21/2023 7:47 AM EDT    Indication: UTI, recurrent/complicated (Female).    Comparison: August 18, 2022    Technique: Axial CT images were obtained of the abdomen and pelvis without the administration of contrast. Sagittal and coronal reconstructions were performed.  Automated exposure control and iterative reconstruction methods were used.     Findings:  Within the lung bases is bibasilar atelectasis.    The unenhanced liver, adrenal glands, spleen, and pancreas are unremarkable. The gallbladder is surgically absent. The right kidney is absent. There is a nonobstructing left renal stone.    The stomach appears normal. The small bowel appears normal in caliber. The colon appears normal. The appendix is not well-visualized. There is no ascites or loculated collection. No abnormally enlarged lymph nodes are identified.    The rectum is unremarkable. The uterus and  urinary bladder are surgically absent, with a right lower quadrant urostomy. A previously identified right parastomal hernia is no longer present.    No aggressive osseous lesions are identified.      Impression:      Impression:  1.No acute process identified within abdomen/pelvis.  2.Nonobstructing left renal stone.  3.Changes from cystectomy with right lower quadrant urostomy.      Electronically Signed: Santiago Oshea    3/21/2023 9:03 AM EDT    Workstation ID: [id number]    XR Shoulder 2+ View Right [[id number]] Collected: 03/19/23 0727     Updated: 03/19/23 0730    Narrative:      XR SHOULDER 2+ VW RIGHT    Date of Exam: 3/18/2023 8:53 PM EDT    Indication: pain.  Right shoulder pain.     Comparison: None available.    Findings:  There is advanced degenerative change. There is generalized osteopenia. No acute fracture is evident.      Impression:      Impression:  Advanced degenerative changes. Generalized osteopenia.    Electronically Signed: Issa Barnett    3/19/2023 7:28 AM EDT    Workstation ID: [id number]    XR Chest 1 View [[id number]] Collected: 03/19/23 0724     Updated: 03/19/23 0729    Narrative:      XR CHEST 1 VW    Date of Exam: 3/18/2023 8:52 PM EDT    Indication: chest pain.    Comparison: 12/7/2022    Findings:  Left subclavian dual-lead cardiac stimulator device again noted. There is cardiomegaly. There is mixed interstitial and alveolar opacity in the perihilar regions, right greater than left. This may be due to pulmonary edema or atypical infection pattern.   No pleural fluid is seen. No pneumothorax is evident. Aortic vascular calcification is noted. Calcified lymph nodes again noted in the right paratracheal and hilar regions.      Impression:      Impression:  Hazy mixed interstitial alveolar opacities in the perihilar regions, right greater than left suggesting edema or atypical infection pattern.    Electronically Signed: Issa Barnett    3/19/2023 7:26 AM EDT    Workstation ID: [id number]             ECHOCARDIOGRAM:    Results for orders placed during the hospital encounter of 09/24/21    Adult Transthoracic Echo Complete W/ Cont if Necessary Per Protocol    Interpretation Summary  · Estimated left ventricular EF was in agreement with the calculated left ventricular EF. Left ventricular ejection fraction appears to be 61 - 65%. Left ventricular systolic function is normal.  · Left atrial volume is moderately increased.  · The right atrial cavity is moderately dilated.  · Estimated right ventricular systolic pressure from tricuspid regurgitation is normal (<35 mmHg).            Assessment:         Active Hospital Problems    Diagnosis  POA   • **Fever [R50.9]  Yes   • Atrial fibrillation with RVR ([facility]) [I48.91]  Yes     1) Permanent atrial fibrillation with RVR --> CVR  - RVR in setting UTI  -Continue metoprolol twice daily, diltiazem drip which can be held for hypotension, continue oral digoxin, continue monitoring with high risk medicines, digoxin level desired 0.8-1.2  Avoid hypotension  - K greater than 4 magnesium greater than 2 desired  - TSH WNL  - anticoagulated with coumadin at home --> receiving eliquis here  - Last 2D echo 9/2022 showed an EF = 60-65% with mild to moderate MR/TR and mild AI.  Last nuclear stress test 2020 showed no ischemia.  Chest pain-free    2) SSS s/p [employer] PPM, interrogate device, had been SACHIN needing replacement, will evaluate leads as well at that time    3) UTI with urosepsis, E. coli bacteremia antibiotics per primary  -E. coli with bacteremia    4) HTN, stable avoid hypotension    5) CKD stage 3, improved creatinine less than 1    6) Hx renal cancer s/p right nephrectomy       Tenuous blood pressure and heart rate status, titrating real-time, continue IV rate control with IV diltiazem drip at this point, convert to p.o. when able depending on blood pressures    Thank you for the consult is a pleasure to be involved in her cardiovascular care.  Please call  with any questions or concerns  Augustin Ellsworth MD, PhD

## 2023-03-21 NOTE — PROGRESS NOTES
"     LOS: 1 day   Patient Care Team:  Lizzy Francis MD as PCP - General (Family Medicine)  Jose Carlos Cheng MD as Consulting Physician (Cardiology)    Subjective     Interval History: Improving    Patient Complaints: \"I am tired\".  No other specific complaints    History taken from: patient    Review of Systems   Constitutional: Positive for activity change and fatigue. Negative for appetite change, chills, diaphoresis and fever.   HENT: Negative for facial swelling.    Eyes: Negative for visual disturbance.   Respiratory: Negative for cough, shortness of breath, wheezing and stridor.    Cardiovascular: Negative for chest pain, palpitations and leg swelling.   Gastrointestinal: Negative for abdominal pain.   Genitourinary: Negative for dysuria.   Musculoskeletal: Negative for arthralgias.   Skin: Negative for rash and wound.   Neurological: Negative for dizziness, speech difficulty and headaches.   Psychiatric/Behavioral: Negative for confusion.           Objective     Vital Signs  Temp:  [97.5 °F (36.4 °C)-98.8 °F (37.1 °C)] 97.5 °F (36.4 °C)  Heart Rate:  [64-77] 64  Resp:  [15-18] 18  BP: (107-138)/(58-72) 137/69    Physical Exam:     General Appearance:    Alert, cooperative, in no acute distress, hard of hearing, fully oriented   Head:    Normocephalic, without obvious abnormality, atraumatic   Eyes:            Lids and lashes normal, conjunctivae and sclerae normal, no   icterus, no pallor, corneas clear, PERRLA   Ears:    Ears appear intact with no abnormalities noted   Throat:   No oral lesions, no thrush, oral mucosa moist   Neck:   No adenopathy, supple, trachea midline, no thyromegaly, no   carotid bruit, no JVD   Lungs:     Clear to auscultation,respirations regular, even and                  unlabored    Heart:   Irregularly irregular   Chest Wall:    No abnormalities observed   Abdomen:    Urostomy draining clear yellow  urine   Extremities:   Moves all extremities well, no edema, no cyanosis, " no             Redness   Pulses:   Pulses palpable and equal bilaterally   Skin:   No bleeding, bruising or rash   Lymph nodes:   No palpable adenopathy   Neurologic:   Cranial nerves 2 - 12 grossly intact, sensation intact, DTR       present and equal bilaterally        Results Review:    Lab Results (last 24 hours)     Procedure Component Value Units Date/Time    Blood Culture - Blood, Arm, Right [[id number]]  (Normal) Collected: 03/19/23 1556    Specimen: Blood from Arm, Right Updated: 03/21/23 1615     Blood Culture No growth at 2 days    Narrative:      Less than seven (7) mL's of blood was collected.  Insufficient quantity may yield false negative results.    Blood Culture - Blood, Arm, Right [[id number]]  (Normal) Collected: 03/19/23 1556    Specimen: Blood from Arm, Right Updated: 03/21/23 1615     Blood Culture No growth at 2 days    Narrative:      Less than seven (7) mL's of blood was collected.  Insufficient quantity may yield false negative results.    Blood Culture - Blood, Arm, Left [[id number]]  (Abnormal) Collected: 03/18/23 2132    Specimen: Blood from Arm, Left Updated: 03/21/23 0700     Blood Culture Escherichia coli     Isolated from Anaerobic Bottle     Gram Stain Anaerobic Bottle Gram negative bacilli    Narrative:      Less than seven (7) mL's of blood was collected.  Insufficient quantity may yield false negative results.    Blood Culture - Blood, Arm, Left [[id number]]  (Abnormal) Collected: 03/18/23 2041    Specimen: Blood from Arm, Left Updated: 03/21/23 0659     Blood Culture Escherichia coli     Isolated from Anaerobic Bottle     Gram Stain Anaerobic Bottle Gram negative bacilli    CBC & Differential [[id number]]  (Abnormal) Collected: 03/20/23 2345    Specimen: Blood Updated: 03/21/23 0112    Narrative:      The following orders were created for panel order CBC & Differential.  Procedure                               Abnormality         Status                     ---------                                -----------         ------                     CBC Auto Differential[[id number]]        Abnormal            Final result               Scan Slide[[id number]]                                                                    Please view results for these tests on the individual orders.    CBC Auto Differential [[id number]]  (Abnormal) Collected: 03/20/23 2345    Specimen: Blood Updated: 03/21/23 0112     WBC 8.80 10*3/mm3      RBC 3.55 10*6/mm3      Hemoglobin 10.8 g/dL      Hematocrit 35.8 %      .8 fL      Comment: Result checked          MCH 30.5 pg      MCHC 30.2 g/dL      RDW 17.8 %      RDW-SD 63.9 fl      MPV 7.9 fL      Platelets 250 10*3/mm3      Neutrophil % 68.8 %      Lymphocyte % 18.6 %      Monocyte % 10.2 %      Eosinophil % 1.8 %      Basophil % 0.6 %      Neutrophils, Absolute 6.10 10*3/mm3      Lymphocytes, Absolute 1.60 10*3/mm3      Monocytes, Absolute 0.90 10*3/mm3      Eosinophils, Absolute 0.20 10*3/mm3      Basophils, Absolute 0.00 10*3/mm3      nRBC 0.1 /100 WBC     Basic Metabolic Panel [[id number]]  (Abnormal) Collected: 03/20/23 2345    Specimen: Blood Updated: 03/21/23 0057     Glucose 90 mg/dL      BUN 18 mg/dL      Creatinine 0.59 mg/dL      Sodium 140 mmol/L      Potassium 4.5 mmol/L      Chloride 101 mmol/L      CO2 30.0 mmol/L      Calcium 9.2 mg/dL      BUN/Creatinine Ratio 30.5     Anion Gap 9.0 mmol/L      eGFR 90.7 mL/min/1.73     Narrative:      GFR Normal >60  Chronic Kidney Disease <60  Kidney Failure <15    The GFR formula is only valid for adults with stable renal function between ages 18 and 70.           Imaging Results (Last 24 Hours)     Procedure Component Value Units Date/Time    CT Abdomen Pelvis Without Contrast [[id number]] Collected: 03/21/23 0853     Updated: 03/21/23 0905    Narrative:      CT ABDOMEN PELVIS WO CONTRAST    Date of Exam: 3/21/2023 7:47 AM EDT    Indication: UTI, recurrent/complicated (Female).    Comparison: August 18,  2022    Technique: Axial CT images were obtained of the abdomen and pelvis without the administration of contrast. Sagittal and coronal reconstructions were performed.  Automated exposure control and iterative reconstruction methods were used.     Findings:  Within the lung bases is bibasilar atelectasis.    The unenhanced liver, adrenal glands, spleen, and pancreas are unremarkable. The gallbladder is surgically absent. The right kidney is absent. There is a nonobstructing left renal stone.    The stomach appears normal. The small bowel appears normal in caliber. The colon appears normal. The appendix is not well-visualized. There is no ascites or loculated collection. No abnormally enlarged lymph nodes are identified.    The rectum is unremarkable. The uterus and urinary bladder are surgically absent, with a right lower quadrant urostomy. A previously identified right parastomal hernia is no longer present.    No aggressive osseous lesions are identified.      Impression:      Impression:  1.No acute process identified within abdomen/pelvis.  2.Nonobstructing left renal stone.  3.Changes from cystectomy with right lower quadrant urostomy.      Electronically Signed: Santiago Oshea    3/21/2023 9:03 AM EDT    Workstation ID: [id number]               I reviewed the patient's new clinical results.    Medication Review:   Scheduled Meds:apixaban, 5 mg, Oral, BID  aztreonam, 2 g, Intravenous, Q8H  digoxin, 125 mcg, Oral, Daily  dilTIAZem, 30 mg, Oral, Q8H  furosemide, 20 mg, Oral, Daily  metoprolol succinate XL, 50 mg, Oral, Q8H  sodium chloride, 3 mL, Intravenous, Q12H  topiramate, 100 mg, Oral, Nightly      Continuous Infusions:   PRN Meds:.•  acetaminophen  •  HYDROcodone-acetaminophen  •  ondansetron  •  sodium chloride  •  sodium chloride     Assessment & Plan       Fever    Atrial fibrillation with RVR (HCC)  E. coli bacteremia  Urinary tract infection  Sepsis due to urinary tract infection  History of bladder  cancer    Patient is clinically responded to IV Azactam.  Dose adjusted by infectious disease service.  Will will need 2-week course of IV antibiotics, timed every 8 hours.  She will likely need skilled rehab to accomplish this.  PT eval pending    Atrial fibs well controlled and she is converting back to her oral medications, including Eliquis for anticoagulation    Plan for disposition:SRINIVAS Henderson MD  03/21/23  16:19 EDT

## 2023-03-22 LAB
ALBUMIN SERPL-MCNC: 3.8 G/DL (ref 3.5–5.2)
ALBUMIN/GLOB SERPL: 1 G/DL
ALP SERPL-CCNC: 121 U/L (ref 39–117)
ALT SERPL W P-5'-P-CCNC: 8 U/L (ref 1–33)
ANION GAP SERPL CALCULATED.3IONS-SCNC: 11 MMOL/L (ref 5–15)
AST SERPL-CCNC: 15 U/L (ref 1–32)
BACTERIA SPEC AEROBE CULT: ABNORMAL
BASOPHILS # BLD AUTO: 0.1 10*3/MM3 (ref 0–0.2)
BASOPHILS NFR BLD AUTO: 1 % (ref 0–1.5)
BILIRUB SERPL-MCNC: 0.3 MG/DL (ref 0–1.2)
BUN SERPL-MCNC: 37 MG/DL (ref 8–23)
BUN/CREAT SERPL: 25.7 (ref 7–25)
CALCIUM SPEC-SCNC: 9.7 MG/DL (ref 8.6–10.5)
CHLORIDE SERPL-SCNC: 107 MMOL/L (ref 98–107)
CO2 SERPL-SCNC: 22 MMOL/L (ref 22–29)
CREAT SERPL-MCNC: 1.44 MG/DL (ref 0.57–1)
DEPRECATED RDW RBC AUTO: 57.3 FL (ref 37–54)
DIGOXIN SERPL-MCNC: 1.2 NG/ML (ref 0.6–1.2)
EGFRCR SERPLBLD CKD-EPI 2021: 36.6 ML/MIN/1.73
EOSINOPHIL # BLD AUTO: 0.1 10*3/MM3 (ref 0–0.4)
EOSINOPHIL NFR BLD AUTO: 1.2 % (ref 0.3–6.2)
ERYTHROCYTE [DISTWIDTH] IN BLOOD BY AUTOMATED COUNT: 16.1 % (ref 12.3–15.4)
GLOBULIN UR ELPH-MCNC: 3.9 GM/DL
GLUCOSE SERPL-MCNC: 106 MG/DL (ref 65–99)
GRAM STN SPEC: ABNORMAL
HCT VFR BLD AUTO: 37 % (ref 34–46.6)
HGB BLD-MCNC: 11.9 G/DL (ref 12–15.9)
ISOLATED FROM: ABNORMAL
LYMPHOCYTES # BLD AUTO: 1.2 10*3/MM3 (ref 0.7–3.1)
LYMPHOCYTES NFR BLD AUTO: 16.9 % (ref 19.6–45.3)
MCH RBC QN AUTO: 30.7 PG (ref 26.6–33)
MCHC RBC AUTO-ENTMCNC: 32.3 G/DL (ref 31.5–35.7)
MCV RBC AUTO: 95.2 FL (ref 79–97)
MONOCYTES # BLD AUTO: 0.7 10*3/MM3 (ref 0.1–0.9)
MONOCYTES NFR BLD AUTO: 9.4 % (ref 5–12)
NEUTROPHILS NFR BLD AUTO: 5 10*3/MM3 (ref 1.7–7)
NEUTROPHILS NFR BLD AUTO: 71.5 % (ref 42.7–76)
NRBC BLD AUTO-RTO: 0.2 /100 WBC (ref 0–0.2)
PLATELET # BLD AUTO: 346 10*3/MM3 (ref 140–450)
PMV BLD AUTO: 7.6 FL (ref 6–12)
POTASSIUM SERPL-SCNC: 4 MMOL/L (ref 3.5–5.2)
PROT SERPL-MCNC: 7.7 G/DL (ref 6–8.5)
RBC # BLD AUTO: 3.88 10*6/MM3 (ref 3.77–5.28)
SODIUM SERPL-SCNC: 140 MMOL/L (ref 136–145)
WBC NRBC COR # BLD: 7 10*3/MM3 (ref 3.4–10.8)

## 2023-03-22 PROCEDURE — 80162 ASSAY OF DIGOXIN TOTAL: CPT | Performed by: INTERNAL MEDICINE

## 2023-03-22 PROCEDURE — 80053 COMPREHEN METABOLIC PANEL: CPT | Performed by: INTERNAL MEDICINE

## 2023-03-22 PROCEDURE — 97162 PT EVAL MOD COMPLEX 30 MIN: CPT

## 2023-03-22 PROCEDURE — 85025 COMPLETE CBC W/AUTO DIFF WBC: CPT | Performed by: INTERNAL MEDICINE

## 2023-03-22 PROCEDURE — 25010000002 ONDANSETRON PER 1 MG: Performed by: INTERNAL MEDICINE

## 2023-03-22 PROCEDURE — 99233 SBSQ HOSP IP/OBS HIGH 50: CPT | Performed by: INTERNAL MEDICINE

## 2023-03-22 RX ORDER — METOPROLOL SUCCINATE 50 MG/1
50 TABLET, EXTENDED RELEASE ORAL EVERY EVENING
Status: DISCONTINUED | OUTPATIENT
Start: 2023-03-22 | End: 2023-03-23

## 2023-03-22 RX ORDER — METOPROLOL SUCCINATE 50 MG/1
100 TABLET, EXTENDED RELEASE ORAL EVERY MORNING
Status: DISCONTINUED | OUTPATIENT
Start: 2023-03-23 | End: 2023-03-23

## 2023-03-22 RX ADMIN — HYDROCODONE BITARTRATE AND ACETAMINOPHEN 1 TABLET: 5; 325 TABLET ORAL at 19:43

## 2023-03-22 RX ADMIN — AZTREONAM 2 G: 2 INJECTION, POWDER, LYOPHILIZED, FOR SOLUTION INTRAMUSCULAR; INTRAVENOUS at 21:10

## 2023-03-22 RX ADMIN — FAMOTIDINE 20 MG: 20 TABLET, FILM COATED ORAL at 17:29

## 2023-03-22 RX ADMIN — ONDANSETRON 4 MG: 2 INJECTION INTRAMUSCULAR; INTRAVENOUS at 09:01

## 2023-03-22 RX ADMIN — METOPROLOL SUCCINATE 50 MG: 50 TABLET, EXTENDED RELEASE ORAL at 17:29

## 2023-03-22 RX ADMIN — AZTREONAM 2 G: 2 INJECTION, POWDER, LYOPHILIZED, FOR SOLUTION INTRAMUSCULAR; INTRAVENOUS at 14:11

## 2023-03-22 RX ADMIN — METOPROLOL SUCCINATE 50 MG: 50 TABLET, EXTENDED RELEASE ORAL at 09:01

## 2023-03-22 RX ADMIN — AZTREONAM 2 G: 2 INJECTION, POWDER, LYOPHILIZED, FOR SOLUTION INTRAMUSCULAR; INTRAVENOUS at 05:26

## 2023-03-22 RX ADMIN — TOPIRAMATE 100 MG: 100 TABLET, FILM COATED ORAL at 21:05

## 2023-03-22 RX ADMIN — DILTIAZEM HYDROCHLORIDE 30 MG: 30 TABLET, FILM COATED ORAL at 05:25

## 2023-03-22 RX ADMIN — DIGOXIN 125 MCG: 250 TABLET ORAL at 09:01

## 2023-03-22 RX ADMIN — FAMOTIDINE 20 MG: 20 TABLET, FILM COATED ORAL at 09:01

## 2023-03-22 RX ADMIN — DILTIAZEM HYDROCHLORIDE 30 MG: 30 TABLET, FILM COATED ORAL at 21:05

## 2023-03-22 RX ADMIN — APIXABAN 5 MG: 5 TABLET, FILM COATED ORAL at 09:01

## 2023-03-22 RX ADMIN — Medication 3 ML: at 09:01

## 2023-03-22 RX ADMIN — ACETAMINOPHEN 650 MG: 325 TABLET, FILM COATED ORAL at 05:31

## 2023-03-22 RX ADMIN — DILTIAZEM HYDROCHLORIDE 30 MG: 30 TABLET, FILM COATED ORAL at 14:12

## 2023-03-22 RX ADMIN — FUROSEMIDE 20 MG: 40 TABLET ORAL at 09:00

## 2023-03-22 RX ADMIN — Medication 3 ML: at 21:06

## 2023-03-22 RX ADMIN — APIXABAN 5 MG: 5 TABLET, FILM COATED ORAL at 21:06

## 2023-03-22 RX ADMIN — METOPROLOL SUCCINATE 50 MG: 50 TABLET, EXTENDED RELEASE ORAL at 00:33

## 2023-03-22 NOTE — PROGRESS NOTES
Infectious Diseases Progress Note      LOS: 2 days   Patient Care Team:  Lizzy Francis MD as PCP - General (Family Medicine)  Jose Carlos Cheng MD as Consulting Physician (Cardiology)    Chief Complaint: Patient denies fever, chills, diaphoresis, shortness of breath, cough, GI symptoms, or any urinary symptoms.    Subjective       The patient has been afebrile for the last 24 hours.  The patient is on room air, hemodynamically stable, and is tolerating antimicrobial therapy.  She is still feeling fairly weak      Review of Systems:   Review of Systems   HENT: Negative.    Eyes: Negative.    Respiratory: Negative.    Cardiovascular: Negative.    Gastrointestinal: Negative.    Endocrine: Negative.    Genitourinary: Negative.    Musculoskeletal: Negative.    Skin: Negative.    Neurological: Positive for weakness.   Psychiatric/Behavioral: Negative.    All other systems reviewed and are negative.       Objective     Vital Signs  Temp:  [97.4 °F (36.3 °C)-98.6 °F (37 °C)] 97.9 °F (36.6 °C)  Heart Rate:  [61-74] 74  Resp:  [14-18] 15  BP: (108-153)/(56-84) 131/78    Physical Exam:  Physical Exam  Vitals and nursing note reviewed.   Constitutional:       General: She is not in acute distress.     Appearance: She is well-developed and normal weight. She is ill-appearing. She is not diaphoretic.   HENT:      Head: Normocephalic and atraumatic.   Eyes:      General: No scleral icterus.     Extraocular Movements: Extraocular movements intact.      Conjunctiva/sclera: Conjunctivae normal.      Pupils: Pupils are equal, round, and reactive to light.   Cardiovascular:      Rate and Rhythm: Normal rate and regular rhythm.      Heart sounds: Normal heart sounds, S1 normal and S2 normal. No murmur heard.  Pulmonary:      Effort: Pulmonary effort is normal. No respiratory distress.      Breath sounds: Normal breath sounds. No stridor. No wheezing or rales.   Chest:      Chest wall: No tenderness.   Abdominal:      General:  Bowel sounds are normal. There is no distension.      Palpations: Abdomen is soft. There is no mass.      Tenderness: There is no abdominal tenderness. There is no guarding.   Genitourinary:     Comments: Ileal conduit  Musculoskeletal:         General: No swelling, tenderness or deformity. Normal range of motion.      Cervical back: Neck supple.   Skin:     General: Skin is warm and dry.      Coloration: Skin is not pale.      Findings: No bruising, erythema or rash.   Neurological:      General: No focal deficit present.      Mental Status: She is alert and oriented to person, place, and time. Mental status is at baseline.      Cranial Nerves: No cranial nerve deficit.   Psychiatric:         Mood and Affect: Mood normal.          Results Review:    I have reviewed all clinical data, test, lab, and imaging results.     Radiology  No Radiology Exams Resulted Within Past 24 Hours    Cardiology    Laboratory    Results from last 7 days   Lab Units 03/22/23  1231 03/20/23  2345 03/20/23  0650 03/18/23  2041   WBC 10*3/mm3 7.00 8.80 10.60 13.90*   HEMOGLOBIN g/dL 11.9* 10.8* 10.8* 12.3   HEMATOCRIT % 37.0 35.8 34.1 39.8   PLATELETS 10*3/mm3 346 250 250 291     Results from last 7 days   Lab Units 03/22/23  1231 03/20/23  2345 03/20/23  0650 03/18/23  2041   SODIUM mmol/L 140 140 141 140   POTASSIUM mmol/L 4.0 4.5 4.2 4.4   CHLORIDE mmol/L 107 101 109* 106   CO2 mmol/L 22.0 30.0* 22.0 21.0*   BUN mg/dL 37* 18 32* 28*   CREATININE mg/dL 1.44* 0.59 1.38* 1.31*   GLUCOSE mg/dL 106* 90 105* 119*   ALBUMIN g/dL 3.8  --   --  4.0   BILIRUBIN mg/dL 0.3  --   --  0.6   ALK PHOS U/L 121*  --   --  111   AST (SGOT) U/L 15  --   --  24   ALT (SGPT) U/L 8  --   --  9   CALCIUM mg/dL 9.7 9.2 9.3 9.8     Results from last 7 days   Lab Units 03/18/23  2041   CK TOTAL U/L 29             Microbiology   Microbiology Results (last 10 days)     Procedure Component Value - Date/Time    Blood Culture - Blood, Arm, Right [[id number]]  (Normal)  Collected: 03/19/23 1556    Lab Status: Preliminary result Specimen: Blood from Arm, Right Updated: 03/21/23 1615     Blood Culture No growth at 2 days    Narrative:      Less than seven (7) mL's of blood was collected.  Insufficient quantity may yield false negative results.    Blood Culture - Blood, Arm, Right [[id number]]  (Normal) Collected: 03/19/23 1556    Lab Status: Preliminary result Specimen: Blood from Arm, Right Updated: 03/21/23 1615     Blood Culture No growth at 2 days    Narrative:      Less than seven (7) mL's of blood was collected.  Insufficient quantity may yield false negative results.    Blood Culture - Blood, Arm, Left [[id number]]  (Abnormal) Collected: 03/18/23 2132    Lab Status: Final result Specimen: Blood from Arm, Left Updated: 03/22/23 0721     Blood Culture Escherichia coli     Isolated from Anaerobic Bottle     Gram Stain Anaerobic Bottle Gram negative bacilli    Narrative:      Refer to previous blood culture collected on 03/18/2023 2041 for MICs    Less than seven (7) mL's of blood was collected.  Insufficient quantity may yield false negative results.    Respiratory Panel PCR w/COVID-19(SARS-CoV-2) VINCENT/STEFANY/ANNY/PAD/COR/MAD/MANASA In-House, NP Swab in UTM/VTM, 3-4 HR TAT - Swab, Nasopharynx [[id number]]  (Normal) Collected: 03/18/23 2044    Lab Status: Final result Specimen: Swab from Nasopharynx Updated: 03/18/23 2138     ADENOVIRUS, PCR Not Detected     Coronavirus 229E Not Detected     Coronavirus HKU1 Not Detected     Coronavirus NL63 Not Detected     Coronavirus OC43 Not Detected     COVID19 Not Detected     Human Metapneumovirus Not Detected     Human Rhinovirus/Enterovirus Not Detected     Influenza A PCR Not Detected     Influenza B PCR Not Detected     Parainfluenza Virus 1 Not Detected     Parainfluenza Virus 2 Not Detected     Parainfluenza Virus 3 Not Detected     Parainfluenza Virus 4 Not Detected     RSV, PCR Not Detected     Bordetella pertussis pcr Not Detected      Bordetella parapertussis PCR Not Detected     Chlamydophila pneumoniae PCR Not Detected     Mycoplasma pneumo by PCR Not Detected    Narrative:      In the setting of a positive respiratory panel with a viral infection PLUS a negative procalcitonin without other underlying concern for bacterial infection, consider observing off antibiotics or discontinuation of antibiotics and continue supportive care. If the respiratory panel is positive for atypical bacterial infection (Bordetella pertussis, Chlamydophila pneumoniae, or Mycoplasma pneumoniae), consider antibiotic de-escalation to target atypical bacterial infection.    Urine Culture - Urine, Urostomy [[id number]]  (Abnormal)  (Susceptibility) Collected: 03/18/23 2043    Lab Status: Edited Result - FINAL Specimen: Urine from Urostomy Updated: 03/20/23 1311     Urine Culture >100,000 CFU/mL Escherichia coli    Narrative:      Colonization of the urinary tract without infection is common. Treatment is discouraged unless the patient is symptomatic, pregnant, or undergoing an invasive urologic procedure.    Susceptibility      Escherichia coli      ASHLEY      Ampicillin Resistant     Ampicillin + Sulbactam Intermediate      Aztreonam Susceptible (C)  [1]       Cefazolin Susceptible      Cefepime Susceptible      Ceftazidime Susceptible      Ceftriaxone Susceptible      Gentamicin Susceptible      Levofloxacin Susceptible      Nitrofurantoin Susceptible      Piperacillin + Tazobactam Susceptible      Trimethoprim + Sulfamethoxazole Susceptible                   [1]  Appended report. These results have been appended to a previously final verified report.                 Blood Culture - Blood, Arm, Left [[id number]]  (Abnormal)  (Susceptibility) Collected: 03/18/23 2041    Lab Status: Edited Result - FINAL Specimen: Blood from Arm, Left Updated: 03/22/23 1510     Blood Culture Escherichia coli     Isolated from Anaerobic Bottle     Gram Stain Anaerobic Bottle Gram negative  bacilli    Susceptibility      Escherichia coli      ASHLEY      Ampicillin Resistant     Ampicillin + Sulbactam Resistant     Aztreonam Susceptible (C)  [1]       Cefepime Susceptible      Ceftazidime Susceptible      Ceftriaxone Susceptible      Gentamicin Susceptible      Levofloxacin Susceptible      Piperacillin + Tazobactam Susceptible      Trimethoprim + Sulfamethoxazole Susceptible                   [1]  Appended report. These results have been appended to a previously final verified report.             Susceptibility Comments     Escherichia coli    Cefazolin sensitivity will not be reported for Enterobacteriaceae in non-urine isolates. If cefazolin is preferred, please call the microbiology lab to request an E-test.  With the exception of urinary-sourced infections, aminoglycosides should not be used as monotherapy.    Aztreonam reported per Sarina YUEN of [facility]             Blood Culture ID, PCR - Blood, Arm, Left [[id number]]  (Abnormal) Collected: 03/18/23 2041    Lab Status: Final result Specimen: Blood from Arm, Left Updated: 03/19/23 1233     BCID, PCR Escherichia coli. Identification by BCID2 PCR.     BOTTLE TYPE Anaerobic Bottle    Narrative:      No resistance genes detected.          Medication Review:       Schedule Meds  apixaban, 5 mg, Oral, BID  aztreonam, 2 g, Intravenous, Q8H  digoxin, 125 mcg, Oral, Daily  dilTIAZem, 30 mg, Oral, Q8H  famotidine, 20 mg, Oral, BID AC  furosemide, 20 mg, Oral, Daily  [START ON 3/23/2023] metoprolol succinate XL, 100 mg, Oral, QAM  metoprolol succinate XL, 50 mg, Oral, Q PM  sodium chloride, 3 mL, Intravenous, Q12H  topiramate, 100 mg, Oral, Nightly        Infusion Meds       PRN Meds  •  acetaminophen  •  HYDROcodone-acetaminophen  •  ondansetron  •  sodium chloride  •  sodium chloride        Assessment & Plan       Antimicrobial Therapy   1.  IV aztreonam        2.        3.        4.        5.            Assessment     E. coli bacteremia  secondary to complicated UTI.     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  The organism is susceptible to aztreonam     History of multiple drug allergies including cephalosporins, penicillins and quinolones.  Patient is tolerating IV aztreonam without any side effect     History of renal cell carcinoma bladder cancer.  Patient is s/p radical cystectomy and ileal conduit in the past     Plan     Continue aztreonam 2 g IV every 8 hours for 2 weeks-last day on 4/3/2023  Patient will need a midline before discharge-please remove when IV antibiotics are completed  Continue supportive care  Estiven Jon, APRN  03/22/23  15:23 EDT    Note is dictated utilizing voice recognition software/Dragon

## 2023-03-22 NOTE — PROGRESS NOTES
Cardiology  Cardiology consult note  Augustin Ellsworth MD, PhD      Subjective:     Encounter Date:03/18/2023      Patient ID: Hazel Bucio is a 82 y.o. female.    Chief Complaint: weakness, fever      HPI:  Hazel Bucio is a 82 y.o. female well-known to me with a past cardiac history of permanent afib (anticoaguled with coumadin) and SSS s/p [employer] PPM.  Last 2D echo 9/2022 showed an EF = 60-65% with mild to moderate MR/TR and mild AI.  Last nuclear stress test 2020 showed no ischemia.  PMH includes HTN, CKD stage 3, bladder cancer, and renal cancer. Patient presented with c/o weakness and fever and found with UTI as well as afib with RVR.  She received IV diltiazem.  Cardiology was consulted for afib management.    Tele shows afib in the 70s currently well controlled.  Patient states that she did not have any palpitations, dizziness, chest pain, or SOA.  Prior to this admit, she has been able to perform activities such as housework without unusual difficulty.  She denies PND/orthopnea or edema.  Her BP was elevated on admit.  She reports compliance with medications.  She reports that she had her abdominal hernia repair 3-4 months ago without complications.   .  She was admitted with sepsis, found to have E. coli bacteremia now on antibiotics, she had A-fib RVR ultimately started on diltiazem drip for control, she likely had some hypotension with renal injury which has been improved  =====================================================================    Today  Seen and examined, tired and fatigued, no new fevers  Chest pain-free, rates well controlled, blood pressure stable 130 systolic today, afebrile  Off diltiazem drip  Metoprolol was increased to every 8 hours along with oral diltiazem  Creatinine is much improved  Awaiting repeat labs    Tolerating Eliquis without bleeding  Monitoring digoxin, avoid hypokalemia, dose per level, level pending  Does not appear volume overloaded  No acute events  overnight    Brief plan today  Transition metoprolol XL to 100 in the morning and 50 at night for twice daily dosing  Continue digoxin level pending  Diltiazem every 8 hours, can transition to extended release 120 daily as she improves  Avoid hypotension  Anticoagulation continue  Continue to treat sepsis, IV antibiotics on board with aztreonam  Past Medical History:   Diagnosis Date   • Bladder cancer (HCC)    • Cancer (HCC)     breast, bladder   • Deep vein thrombosis (HCC)    • Essential hypertension 1/17/2017   • Fracture tibia/fibula, right, closed, initial encounter 8/27/2020   • GERD (gastroesophageal reflux disease)    • History of urostomy    • Immobility 08/27/2020    r/t broken Tibia    • Kidney carcinoma, right (HCC)     removed    • Long term current use of anticoagulant 1/9/2015   • PAF (paroxysmal atrial fibrillation) ([facility]) 6/26/2019   • Presence of cardiac pacemaker 11/03/2014    BS dual chamber PM placed 10/2014 with pocket revision 1/25/17.  HX tachy rob syndrome   • Sick sinus syndrome ([facility]) 11/3/2014         Past Surgical History:   Procedure Laterality Date   • BREAST LUMPECTOMY     • CHOLECYSTECTOMY N/A 10/12/2020    Procedure: CHOLECYSTECTOMY LAPAROSCOPIC;  Surgeon: Go Pereira DO;  Location: [facility];  Service: General;  Laterality: N/A;   • CYSTECTOMY W/ URETEROILEAL CONDUIT     • HARDWARE REMOVAL Right 6/11/2021    Procedure: TIBIA HARDWARE REMOVAL;  Surgeon: Geoff Shah II, MD;  Location: [facility] OR;  Service: Orthopedics;  Laterality: Right;   • HERNIA REPAIR     • HYSTERECTOMY     • INSERT / REPLACE / REMOVE PACEMAKER     • NEPHRECTOMY Right    • TIBIA IM NAILING/RODDING Right 8/28/2020    Procedure: TIBIA INTRAMEDULLARY NAIL/ABRAHAM INSERTION;  Surgeon: Geoff Shah II, MD;  Location: [facility] OR;  Service: Orthopedics;  Laterality: Right;         Social History     Socioeconomic History   • Marital status:    Tobacco Use   • Smoking  "status: Never   • Smokeless tobacco: Never   Vaping Use   • Vaping Use: Never used   Substance and Sexual Activity   • Alcohol use: Not Currently   • Drug use: Never   • Sexual activity: Defer       Family History   Problem Relation Age of Onset   • COPD Father          Allergies   Allergen Reactions   • Amoxicillin Shortness Of Breath   • Ciprofloxacin Hives   • Oxycodone-Acetaminophen Unknown - High Severity     Pt's son stated when pt fell and broke her leg she was put on oxycodone; pt's son stated pt's \"vitals went all out of wack, she couldn't remember things.\"   • Penicillins Rash   • Sulfa Antibiotics Hives   • Cephalexin Other (See Comments)   • Clavulanic Acid Other (See Comments)   • Codeine Other (See Comments)   • Contrast Dye (Echo Or Unknown Ct/Mr) Other (See Comments)     8/18/22    • Doxycycline Other (See Comments)   • Fluconazole Other (See Comments)   • Iodine Hives   • Macrobid [Nitrofurantoin] Hives   • Tobramycin Other (See Comments)   • Trimethoprim Other (See Comments)       Current Medications:   Scheduled Meds:apixaban, 5 mg, Oral, BID  aztreonam, 2 g, Intravenous, Q8H  digoxin, 125 mcg, Oral, Daily  dilTIAZem, 30 mg, Oral, Q8H  famotidine, 20 mg, Oral, BID AC  furosemide, 20 mg, Oral, Daily  metoprolol succinate XL, 50 mg, Oral, Q8H  sodium chloride, 3 mL, Intravenous, Q12H  topiramate, 100 mg, Oral, Nightly      Review of systems negative x14 point review of systems except as mentioned above         Objective:         /67   Pulse 67   Temp 97.7 °F (36.5 °C) (Oral)   Resp 14   Ht 167.6 cm (66\")   Wt 60 kg (132 lb 4.4 oz)   SpO2 98%   BMI 21.35 kg/m²       General: Frail, in NAD.  Nasal cannula  Neuro: AAOx3. No gross deficits.  Moves all extremities  HEENT: sclera clear, no xanthalasmas.  CV: irregular controlled rates S1S2 RRR. N 1 out of 6 systolic ejection murmurs.  Resp: Breathing is unlabored. Lungs coarse bases.  Diminished somewhat  GI: BS+. Abdomen soft and NTTP.  Ext: " Pedal pulses are palpable. Extremities are non-edematous.  MS: moves all extremities, no weakness.  Skin: warm, dry.  Psych: calm and cooperative.  Unchanged from prior encounter            Lab Review:     Results from last 7 days   Lab Units 03/20/23 2345 03/20/23  0650 03/18/23  2041   SODIUM mmol/L 140 141 140   POTASSIUM mmol/L 4.5 4.2 4.4   CHLORIDE mmol/L 101 109* 106   CO2 mmol/L 30.0* 22.0 21.0*   BUN mg/dL 18 32* 28*   CREATININE mg/dL 0.59 1.38* 1.31*   GLUCOSE mg/dL 90 105* 119*   CALCIUM mg/dL 9.2 9.3 9.8   AST (SGOT) U/L  --   --  24   ALT (SGPT) U/L  --   --  9     Results from last 7 days   Lab Units 03/18/23 2041   CK TOTAL U/L 29   HSTROP T ng/L 25*     Results from last 7 days   Lab Units 03/20/23  2345 03/20/23  0650   WBC 10*3/mm3 8.80 10.60   HEMOGLOBIN g/dL 10.8* 10.8*   HEMATOCRIT % 35.8 34.1   PLATELETS 10*3/mm3 250 250     Results from last 7 days   Lab Units 03/18/23  2041   INR  1.05   APTT seconds 22.5*     Results from last 7 days   Lab Units 03/20/23  0650   MAGNESIUM mg/dL 1.9           Invalid input(s): LDLCALC  Results from last 7 days   Lab Units 03/18/23 2041   PROBNP pg/mL 7,303.0*     Results from last 7 days   Lab Units 03/18/23  2041   TSH uIU/mL 0.880       Recent Radiology:  Imaging Results (Most Recent)     Procedure Component Value Units Date/Time    CT Abdomen Pelvis Without Contrast [[id number]] Collected: 03/21/23 0853     Updated: 03/21/23 0905    Narrative:      CT ABDOMEN PELVIS WO CONTRAST    Date of Exam: 3/21/2023 7:47 AM EDT    Indication: UTI, recurrent/complicated (Female).    Comparison: August 18, 2022    Technique: Axial CT images were obtained of the abdomen and pelvis without the administration of contrast. Sagittal and coronal reconstructions were performed.  Automated exposure control and iterative reconstruction methods were used.     Findings:  Within the lung bases is bibasilar atelectasis.    The unenhanced liver, adrenal glands, spleen, and  pancreas are unremarkable. The gallbladder is surgically absent. The right kidney is absent. There is a nonobstructing left renal stone.    The stomach appears normal. The small bowel appears normal in caliber. The colon appears normal. The appendix is not well-visualized. There is no ascites or loculated collection. No abnormally enlarged lymph nodes are identified.    The rectum is unremarkable. The uterus and urinary bladder are surgically absent, with a right lower quadrant urostomy. A previously identified right parastomal hernia is no longer present.    No aggressive osseous lesions are identified.      Impression:      Impression:  1.No acute process identified within abdomen/pelvis.  2.Nonobstructing left renal stone.  3.Changes from cystectomy with right lower quadrant urostomy.      Electronically Signed: Santiago Oshea    3/21/2023 9:03 AM EDT    Workstation ID: [id number]    XR Shoulder 2+ View Right [[id number]] Collected: 03/19/23 0727     Updated: 03/19/23 0730    Narrative:      XR SHOULDER 2+ VW RIGHT    Date of Exam: 3/18/2023 8:53 PM EDT    Indication: pain.  Right shoulder pain.     Comparison: None available.    Findings:  There is advanced degenerative change. There is generalized osteopenia. No acute fracture is evident.      Impression:      Impression:  Advanced degenerative changes. Generalized osteopenia.    Electronically Signed: Issa Barnett    3/19/2023 7:28 AM EDT    Workstation ID: [id number]    XR Chest 1 View [[id number]] Collected: 03/19/23 0724     Updated: 03/19/23 0729    Narrative:      XR CHEST 1 VW    Date of Exam: 3/18/2023 8:52 PM EDT    Indication: chest pain.    Comparison: 12/7/2022    Findings:  Left subclavian dual-lead cardiac stimulator device again noted. There is cardiomegaly. There is mixed interstitial and alveolar opacity in the perihilar regions, right greater than left. This may be due to pulmonary edema or atypical infection pattern.   No pleural fluid is seen. No  pneumothorax is evident. Aortic vascular calcification is noted. Calcified lymph nodes again noted in the right paratracheal and hilar regions.      Impression:      Impression:  Hazy mixed interstitial alveolar opacities in the perihilar regions, right greater than left suggesting edema or atypical infection pattern.    Electronically Signed: Issa Barnett    3/19/2023 7:26 AM EDT    Workstation ID: [id number]            ECHOCARDIOGRAM:    Results for orders placed during the hospital encounter of 09/24/21    Adult Transthoracic Echo Complete W/ Cont if Necessary Per Protocol    Interpretation Summary  · Estimated left ventricular EF was in agreement with the calculated left ventricular EF. Left ventricular ejection fraction appears to be 61 - 65%. Left ventricular systolic function is normal.  · Left atrial volume is moderately increased.  · The right atrial cavity is moderately dilated.  · Estimated right ventricular systolic pressure from tricuspid regurgitation is normal (<35 mmHg).            Assessment:         Active Hospital Problems    Diagnosis  POA   • **Fever [R50.9]  Yes   • Atrial fibrillation with RVR (HCC) [I48.91]  Yes     1) Permanent atrial fibrillation with RVR --> CVR  - RVR in setting UTI, much improved with modification of beta-blocker, calcium channel blocker and digoxin  -continue monitoring with high risk medicines, digoxin level desired 0.8-1.2  Avoid hypotension  - K greater than 4 magnesium greater than 2 desired  - TSH WNL  - anticoagulated with coumadin at home --> receiving eliquis here, stable  - Last 2D echo 9/2022 showed an EF = 60-65% with mild to moderate MR/TR and mild AI.  Last nuclear stress test 2020 showed no ischemia.  Chest pain-free    2) SSS s/p [employer] PPM, interrogate device, had been SACHIN needing replacement, will evaluate leads as well at that time  Tachybradycardia syndrome    3) UTI with urosepsis, E. coli bacteremia  -E. coli with bacteremia  - on abx    4)  HTN, stable avoid hypotension    5) CKD stage 3, improved creatinine less than 1    6) Hx renal cancer s/p right nephrectomy       Modifications to medicines as above  Awaiting repeat labs    Thank you for the consult is a pleasure to be involved in her cardiovascular care.  Please call with any questions or concerns  Augustin Ellsworth MD, PhD

## 2023-03-22 NOTE — PROGRESS NOTES
LOS: 2 days   Patient Care Team:  Lizzy Francis MD as PCP - General (Family Medicine)  Jose Carlos Cheng MD as Consulting Physician (Cardiology)    Subjective     Interval History: Improving    Patient Complaints: Feeling better, no specific complaints today    History taken from: patient    Review of Systems        Objective     Vital Signs  Temp:  [97.4 °F (36.3 °C)-98.6 °F (37 °C)] 97.7 °F (36.5 °C)  Heart Rate:  [61-74] 67  Resp:  [14-18] 14  BP: (107-153)/(56-84) 132/67    Physical Exam:     General Appearance:    Alert, cooperative, in no acute distress,   Head:    Normocephalic, without obvious abnormality, atraumatic   Eyes:            Lids and lashes normal, conjunctivae and sclerae normal, no   icterus, no pallor, corneas clear, PERRLA   Ears:    Ears appear intact with no abnormalities noted   Throat:   No oral lesions, no thrush, oral mucosa moist   Neck:   No adenopathy, supple, trachea midline, no thyromegaly, no   carotid bruit, no JVD   Lungs:     Clear to auscultation,respirations regular, even and                  unlabored    Heart:   Irregularly irregular   Chest Wall:    No abnormalities observed   Abdomen:    Soft, nontender, nondistended, urostomy draining clear urine; reducible parastomal hernia   Extremities:   Moves all extremities well, no edema, no cyanosis, no             Redness   Pulses:   Pulses palpable and equal bilaterally   Skin:   No bleeding, bruising or rash   Lymph nodes:   No palpable adenopathy   Neurologic:   Cranial nerves 2 - 12 grossly intact, sensation intact, DTR       present and equal bilaterally        Results Review:    Lab Results (last 24 hours)     Procedure Component Value Units Date/Time    CBC & Differential [[id number]] Collected: 03/22/23 1231    Specimen: Blood Updated: 03/22/23 1234    Narrative:      The following orders were created for panel order CBC & Differential.  Procedure                               Abnormality         Status                      ---------                               -----------         ------                     CBC Auto Differential[[id number]]                            In process                   Please view results for these tests on the individual orders.    CBC Auto Differential [[id number]] Collected: 03/22/23 1231    Specimen: Blood Updated: 03/22/23 1234    Comprehensive Metabolic Panel [[id number]] Collected: 03/22/23 1231    Specimen: Blood Updated: 03/22/23 1234    Digoxin Level [[id number]] Collected: 03/22/23 1231    Specimen: Blood Updated: 03/22/23 1234    Blood Culture - Blood, Arm, Left [[id number]]  (Abnormal)  (Susceptibility) Collected: 03/18/23 2041    Specimen: Blood from Arm, Left Updated: 03/22/23 0721     Blood Culture Escherichia coli     Isolated from Anaerobic Bottle     Gram Stain Anaerobic Bottle Gram negative bacilli    Susceptibility      Escherichia coli      ASHLEY      Ampicillin Resistant     Ampicillin + Sulbactam Resistant     Cefepime Susceptible      Ceftazidime Susceptible      Ceftriaxone Susceptible      Gentamicin Susceptible      Levofloxacin Susceptible      Piperacillin + Tazobactam Susceptible      Trimethoprim + Sulfamethoxazole Susceptible                       Susceptibility Comments     Escherichia coli    Cefazolin sensitivity will not be reported for Enterobacteriaceae in non-urine isolates. If cefazolin is preferred, please call the microbiology lab to request an E-test.  With the exception of urinary-sourced infections, aminoglycosides should not be used as monotherapy.             Blood Culture - Blood, Arm, Left [[id number]]  (Abnormal) Collected: 03/18/23 2132    Specimen: Blood from Arm, Left Updated: 03/22/23 0721     Blood Culture Escherichia coli     Isolated from Anaerobic Bottle     Gram Stain Anaerobic Bottle Gram negative bacilli    Narrative:      Refer to previous blood culture collected on 03/18/2023 2041 for MICs    Less than seven (7) mL's of blood was collected.   Insufficient quantity may yield false negative results.    Blood Culture - Blood, Arm, Right [[id number]]  (Normal) Collected: 03/19/23 1556    Specimen: Blood from Arm, Right Updated: 03/21/23 1615     Blood Culture No growth at 2 days    Narrative:      Less than seven (7) mL's of blood was collected.  Insufficient quantity may yield false negative results.    Blood Culture - Blood, Arm, Right [[id number]]  (Normal) Collected: 03/19/23 1556    Specimen: Blood from Arm, Right Updated: 03/21/23 1615     Blood Culture No growth at 2 days    Narrative:      Less than seven (7) mL's of blood was collected.  Insufficient quantity may yield false negative results.           Imaging Results (Last 24 Hours)     ** No results found for the last 24 hours. **               I reviewed the patient's new clinical results.    Medication Review:   Scheduled Meds:apixaban, 5 mg, Oral, BID  aztreonam, 2 g, Intravenous, Q8H  digoxin, 125 mcg, Oral, Daily  dilTIAZem, 30 mg, Oral, Q8H  famotidine, 20 mg, Oral, BID AC  furosemide, 20 mg, Oral, Daily  [START ON 3/23/2023] metoprolol succinate XL, 100 mg, Oral, QAM  metoprolol succinate XL, 50 mg, Oral, Q PM  sodium chloride, 3 mL, Intravenous, Q12H  topiramate, 100 mg, Oral, Nightly      Continuous Infusions:   PRN Meds:.•  acetaminophen  •  HYDROcodone-acetaminophen  •  ondansetron  •  sodium chloride  •  sodium chloride     Assessment & Plan       Fever    Atrial fibrillation with RVR (HCC)  Urinary tract infection -plan to complete 2-week course of Azactam, 2 g every 8 hours; will need PICC line prior to discharge  Bacteremia, E. coli, urinary source no evidence of complicated UTI  Permanent atrial fibs-rate now controlled with digoxin, diltiazem, metoprolol; continue Eliquis  Chronic headaches-Topamax        Plan for disposition:Will need SNF to facilitate IV antibiotics    Lora Henderson MD  03/22/23  12:46 EDT

## 2023-03-22 NOTE — DISCHARGE PLACEMENT REQUEST
"Nell Bucio (82 y.o. Female)     Date of Birth   1940    Social Security Number       Address   [address]  [place] [address]    Home Phone   [phone]    MRN   [id number]       [facility]    Marital Status                               Admission Date   3/18/23    Admission Type   Emergency    Admitting Provider   Lora Henderson MD    Attending Provider   Lora Henderson MD    Department, Room/Bed   [facility], 2102/1       Discharge Date       Discharge Disposition       Discharge Destination                               Attending Provider: Lora Henderson MD    Allergies: Amoxicillin, Ciprofloxacin, Oxycodone-acetaminophen, Penicillins, Sulfa Antibiotics, Cephalexin, Clavulanic Acid, Codeine, Contrast Dye (Echo Or Unknown Ct/mr), Doxycycline, Fluconazole, Iodine, Macrobid [Nitrofurantoin], Tobramycin, Trimethoprim    Isolation: None   Infection: None   Code Status: CPR    Ht: 167.6 cm (66\")   Wt: 60 kg (132 lb 4.4 oz)    Admission Cmt: None   Principal Problem: Fever [R50.9]                 Active Insurance as of 3/18/2023     Primary Coverage     Payor Plan Insurance Group Employer/Plan Group    MEDICARE MEDICARE A & B      Payor Plan Address Payor Plan Phone Number Payor Plan Fax Number Effective Dates    [address] [phone]  4/1/2006 - None Entered    [place] [address]       Subscriber Name Subscriber Birth Date Member ID       NELL BUCIO 1940 [id number]                 Emergency Contacts      (Rel.) Home Phone Work Phone Mobile Phone    OZZY GARAY (Friend) [phone] -- [phone]    Manish Bucio (Son) [phone] -- [phone]    RANJITH WATKINS (Grandchild) -- -- [phone]              "

## 2023-03-22 NOTE — THERAPY EVALUATION
Patient Name: Hazel Bucio  : 1940    MRN: [id number]                              Today's Date: 3/22/2023       Admit Date: 3/18/2023    Visit Dx:     ICD-10-CM ICD-9-CM   1. Atrial fibrillation with RVR ([facility])  I48.91 427.31   2. Urinary tract infection without hematuria, site unspecified  N39.0 599.0   3. Generalized weakness  R53.1 780.79   4. 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[facility])   • Chronic insomnia   • Seasonal allergies   • Altered mental status   • MACRINA (acute kidney injury) (Formerly [facility])   • Cholecystitis with cholelithiasis   • Acute UTI   • Abdominal pain   • Acute UTI   • Acquired absence of kidney   • Other artificial openings of urinary tract status (Former[facility])   • History of bladder cancer   • Hydronephrosis   • RLS (restless legs syndrome)   • Weakness   • Preop cardiovascular exam   • Other specified abdominal hernia without obstruction or gangrene   • Fever   • Athscl heart disease of native coronary 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   • Acquired absence of other specified parts of digestive tract   • Chronic kidney disease, stage 3a ([facility])   • Gastro-esophageal reflux disease without esophagitis   • Hyperlipidemia, unspecified   • Paroxysmal atrial fibrillation (HCC)   • Essential (primary) hypertension   • Personal history of breast cancer   • Presence of cardiac pacemaker   • Sick sinus syndrome (HCC)   • Type 2 diabetes mellitus with diabetic chronic  kidney disease (HCC)   • Type 2 diabetes mellitus with diabetic polyneuropathy (HCC)   • Malignant neoplasm of unspecified ureter (HCC)   • Atrial fibrillation with RVR (HCC)     Past Medical History:   Diagnosis Date   • Bladder cancer (HCC)    • Cancer (HCC)     breast, bladder   • Deep vein thrombosis (HCC)    • Essential hypertension 1/17/2017   • Fracture tibia/fibula, right, closed, initial encounter 8/27/2020   • GERD (gastroesophageal reflux disease)    • History of urostomy    • Immobility 08/27/2020    r/t broken Tibia    • Kidney carcinoma, right (HCC)     removed    • Long term current use of anticoagulant 1/9/2015   • PAF (paroxysmal atrial fibrillation) (HCC) 6/26/2019   • Presence of cardiac pacemaker 11/03/2014    BS dual chamber PM placed 10/2014 with pocket revision 1/25/17.  HX tachy rob syndrome   • Sick sinus syndrome (HCC) 11/3/2014     Past Surgical History:   Procedure Laterality Date   • BREAST LUMPECTOMY     • CHOLECYSTECTOMY N/A 10/12/2020    Procedure: CHOLECYSTECTOMY LAPAROSCOPIC;  Surgeon: Go Pereira DO;  Location: [facility] MAIN OR;  Service: General;  Laterality: N/A;   • CYSTECTOMY W/ URETEROILEAL CONDUIT     • HARDWARE REMOVAL Right 6/11/2021    Procedure: TIBIA HARDWARE REMOVAL;  Surgeon: Geoff Shah II, MD;  Location: [facility] MAIN OR;  Service: Orthopedics;  Laterality: Right;   • HERNIA REPAIR     • HYSTERECTOMY     • INSERT / REPLACE / REMOVE PACEMAKER     • NEPHRECTOMY Right    • TIBIA IM NAILING/RODDING Right 8/28/2020    Procedure: TIBIA INTRAMEDULLARY NAIL/ABRAHAM INSERTION;  Surgeon: Geoff Shah II, MD;  Location: [facility] MAIN OR;  Service: Orthopedics;  Laterality: Right;      General Information     Row Name 03/22/23 1316          Physical Therapy Time and Intention    Document Type evaluation  -EL     Mode of Treatment individual therapy;physical therapy  -EL     Row Name 03/22/23 5925          General Information    Patient Profile Reviewed yes   -EL     Prior Level of Function independent:;transfer;w/c or scooter;ADL's  -EL     Row Name 03/22/23 1316          Living Environment    People in Home other (see comments);other relative(s)  Nephew lives with her and is there majority of the time  -EL     Row Name 03/22/23 1316          Home Main Entrance    Number of Stairs, Main Entrance one  -EL     Row Name 03/22/23 1316          Stairs Within Home, Primary    Number of Stairs, Within Home, Primary none  -EL     Row Name 03/22/23 1316          Cognition    Orientation Status (Cognition) oriented x 4  -EL     Row Name 03/22/23 1316          Safety Issues, Functional Mobility    Impairments Affecting Function (Mobility) balance;endurance/activity tolerance;strength  -EL           User Key  (r) = Recorded By, (t) = Taken By, (c) = Cosigned By    Initials Name Provider Type    Landon Ornelas, PT Physical Therapist               Mobility     Row Name 03/22/23 1323          Bed Mobility    Bed Mobility supine-sit  -EL     Supine-Sit [place] (Bed Mobility) modified independence  -EL     Assistive Device (Bed Mobility) bed rails  -EL     Row Name 03/22/23 1323          Bed-Chair Transfer    Bed-Chair [place] (Transfers) contact guard  -EL     Comment, (Bed-Chair Transfer) Requiring assistance for line management.  -EL           User Key  (r) = Recorded By, (t) = Taken By, (c) = Cosigned By    Initials Name Provider Type    Landon Ornelas, PT Physical Therapist               Obj/Interventions     Row Name 03/22/23 1329          Range of Motion Comprehensive    General Range of Motion bilateral lower extremity ROM WFL  -EL     Row Name 03/22/23 1329          Strength Comprehensive (MMT)    General Manual Muscle Testing (MMT) Assessment lower extremity strength deficits identified  -EL     Comment, General Manual Muscle Testing (MMT) Assessment BLE 3+/5 gross  -EL     Row Name 03/22/23 1329          Balance    Balance Assessment sitting static balance;standing  dynamic balance;standing static balance  -EL     Static Sitting Balance independent  -EL     Static Standing Balance contact guard  -EL     Dynamic Standing Balance contact guard  -EL     Row Name 03/22/23 1329          Sensory Assessment (Somatosensory)    Sensory Assessment (Somatosensory) sensation intact  -EL           User Key  (r) = Recorded By, (t) = Taken By, (c) = Cosigned By    Initials Name Provider Type    Landon Ornelas, PT Physical Therapist               Goals/Plan     Row Name 03/22/23 1337          Transfer Goal 1 (PT)    Activity/Assistive Device (Transfer Goal 1, PT) transfers, all  -EL     [place] Level/Cues Needed (Transfer Goal 1, PT) independent  -EL     Time Frame (Transfer Goal 1, PT) long term goal (LTG);2 weeks  -EL     Row Name 03/22/23 1337          Problem Specific Goal 1 (PT)    Problem Specific Goal 1 (PT) BLE 4/5 gross  -EL     Time Frame (Problem Specific Goal 1, PT) long-term goal (LTG);2 weeks  -EL     Row Name 03/22/23 1337          Therapy Assessment/Plan (PT)    Planned Therapy Interventions (PT) balance training;neuromuscular re-education;bed mobility training;transfer training;patient/family education;strengthening  -EL           User Key  (r) = Recorded By, (t) = Taken By, (c) = Cosigned By    Initials Name Provider Type    Landon Ornelas PT Physical Therapist               Clinical Impression     Row Name 03/22/23 1331          Pain    Pretreatment Pain Rating 0/10 - no pain  -EL     Posttreatment Pain Rating 0/10 - no pain  -EL     Row Name 03/22/23 1331          Plan of Care Review    Plan of Care Reviewed With patient  -EL     Outcome Evaluation Pt is an 83 YO F admitted with fever, Afib, and generalized weakness. Pt states she lives at home and her nephew lives with her. Pt typically is independent with all ADLs and functions from a w/c. Pt states she frx her leg and had it surgically repaired approx 8 months ago and has used a w/c since. Pt states she utilizes a  w/c mostly due to fear of falling, but is able to stand and transfer self. Pt this date requires no assistance to come to sitting EOB and CGA to transfer to bedside chair. Pt appears near baseline mobility, albeit with noted weakness. PT to f/u while admitted to encourge mobitliy and exercise, but likely safe to return home with family assist from PT standpoint.  -EL     Row Name 03/22/23 1334          Therapy Assessment/Plan (PT)    Rehab Potential (PT) good, to achieve stated therapy goals  -EL     Criteria for Skilled Interventions Met (PT) yes  -EL     Therapy Frequency (PT) 3 times/wk  -EL     Predicted Duration of Therapy Intervention (PT) Until d/c  -EL     Row Name 03/22/23 1331          Vital Signs    O2 Delivery Pre Treatment room air  -EL     O2 Delivery Intra Treatment room air  -EL     O2 Delivery Post Treatment room air  -EL     Pre Patient Position Supine  -EL     Intra Patient Position Standing  -EL     Post Patient Position Sitting  -EL     Row Name 03/22/23 1332          Positioning and Restraints    Pre-Treatment Position in bed  -EL     Post Treatment Position chair  -EL     In Chair notified nsg;reclined;call light within reach;encouraged to call for assist;exit alarm on  -EL           User Key  (r) = Recorded By, (t) = Taken By, (c) = Cosigned By    Initials Name Provider Type    EL Landon Barnes, PT Physical Therapist               Outcome Measures     Row Name 03/22/23 1212          How much help from another person do you currently need...    Turning from your back to your side while in flat bed without using bedrails? 3  -EL     Moving from lying on back to sitting on the side of a flat bed without bedrails? 3  -EL     Moving to and from a bed to a chair (including a wheelchair)? 3  -EL     Standing up from a chair using your arms (e.g., wheelchair, bedside chair)? 3  -EL     Climbing 3-5 steps with a railing? 1  -EL     To walk in hospital room? 1  -EL     AM-PAC 6 Clicks Score (PT) 14  -EL      Highest level of mobility 4 --> Transferred to chair/commode  -     Row Name 03/22/23 1331          Functional Assessment    Outcome Measure Options AM-PAC 6 Clicks Basic Mobility (PT)  -           User Key  (r) = Recorded By, (t) = Taken By, (c) = Cosigned By    Initials Name Provider Type    EL Landon Barnes PT Physical Therapist                             Physical Therapy Education     Title: PT OT SLP Therapies (Done)     Topic: Physical Therapy (Done)     Point: Mobility training (Done)     Learning Progress Summary           Patient Acceptance, E,TB, VU by  at 3/22/2023 1338                   Point: Precautions (Done)     Learning Progress Summary           Patient Acceptance, E,TB, VU by  at 3/22/2023 1338                               User Key     Initials Effective Dates Name Provider Type Discipline     06/23/20 -  Landon Barnes PT Physical Therapist PT              PT Recommendation and Plan  Planned Therapy Interventions (PT): balance training, neuromuscular re-education, bed mobility training, transfer training, patient/family education, strengthening  Plan of Care Reviewed With: patient  Outcome Evaluation: Pt is an 81 YO F admitted with fever, Afib, and generalized weakness. Pt states she lives at home and her nephew lives with her. Pt typically is independent with all ADLs and functions from a w/c. Pt states she frx her leg and had it surgically repaired approx 8 months ago and has used a w/c since. Pt states she utilizes a w/c mostly due to fear of falling, but is able to stand and transfer self. Pt this date requires no assistance to come to sitting EOB and CGA to transfer to bedside chair. Pt appears near baseline mobility, albeit with noted weakness. PT to f/u while admitted to [facility] mobitli and exercise, but likely safe to return home with family assist from PT standpoint.     Time Calculation:    PT Charges     Row Name 03/22/23 6517             Time Calculation    Start Time 1000   -EL      Stop Time 1018  -EL      Time Calculation (min) 18 min  -EL      PT Received On 03/22/23  -EL      PT - Next Appointment 03/24/23  -EL      PT Goal Re-Cert Due Date 04/05/23  -EL            User Key  (r) = Recorded By, (t) = Taken By, (c) = Cosigned By    Initials Name Provider Type    Landon Ornelas, PT Physical Therapist              Therapy Charges for Today     Code Description Service Date Service Provider Modifiers Qty    [id number] HC PT EVAL MOD COMPLEXITY 3 3/22/2023 Landon Barnes, PT GP 1          PT G-Codes  Outcome Measure Options: AM-PAC 6 Clicks Basic Mobility (PT)  AM-PAC 6 Clicks Score (PT): 14  PT Discharge Summary  Anticipated Discharge Disposition (PT): home with assist    Landon Barnes PT  3/22/2023

## 2023-03-22 NOTE — CASE MANAGEMENT/SOCIAL WORK
Continued Stay Note   Luis Felipe     Patient Name: Hazel Bucio  MRN: [id number]  Today's Date: 3/22/2023    Admit Date: 3/18/2023    Plan: Declined SNF. Home with nephew. [facility]  accepted, pending order. [facility] following for IV abx.   Discharge Plan     Row Name 03/22/23 1510       Plan    Plan Declined SNF. Home with nephew. [facility]  accepted, pending order. [facility] following for IV abx.    Plan Comments Per cici Solorio, [facility] accepted, pending order. Per cici Osorio with [facility], following for IV abx. CM spoke to patient's friend Sawyer who stated he will give IV abx. CM called patient's nephew Papito who said he will give IV abx as well. MD and floor RN aware.    Update: 3/22/23 1542:  Per MD, possible discharge 3/23. CM informed cici Solorio and cici Osorio. CM sent secure chat to MD to place HH order, pending. CM informed MD and floor RN patient will need a midline, pending.    Row Name 03/22/23 1425       Plan    Plan Declined SNF. Home with nephew. [facility], pending acceptance. [facility] pending acceptance.    Plan Comments Per patient, does not have preference on HH. CM placed referral to [facility] , message sent to cici Solorio, pending response. CM placed referral to [facility], message sent to cici Osorio, following for IV abx. CM updated MD and floor RN.    Row Name 03/22/23 1348       Plan    Plan Home with nephew. Pending HH decision with IV abx needs.    Provided Post Acute Provider List? Yes    Post Acute Provider List Home Health    Delivered To Patient    Method of Delivery In person    Plan Comments CM spoke to patient at beside. Patient stated she does not want to go to SNF. Patient stated she would like home health and have her nephew give her the IV abx. CM gave patient HH list, CM will follow up on decision. CM updated MD and floor RN.              Met with patient in room wearing PPE: mask.    Maintained  distance greater than six feet and spent less than 15 minutes in the room.        Debby Lawson RN

## 2023-03-22 NOTE — PLAN OF CARE
Goal Outcome Evaluation:      Pt continues in controlled Afib. Will be on IV abx x2 weeks but does not want to go to SNF. Possible D/C home tomorrow with home health with PICC placement prior. VSS with no complaints at this time.    Problem: Adult Inpatient Plan of Care  Goal: Plan of Care Review  Outcome: Ongoing, Progressing

## 2023-03-22 NOTE — PLAN OF CARE
Goal Outcome Evaluation:               Pt tolerating PO Cardizem. Continuing IV abx. Plan of care discussed with patient. VS stable.

## 2023-03-22 NOTE — PLAN OF CARE
Goal Outcome Evaluation:  Plan of Care Reviewed With: patient           Outcome Evaluation: Pt is an 83 YO F admitted with fever, Afib, and generalized weakness. Pt states she lives at home and her nephew lives with her. Pt typically is independent with all ADLs and functions from a w/c. Pt states she frx her leg and had it surgically repaired approx 8 months ago and has used a w/c since. Pt states she utilizes a w/c mostly due to fear of falling, but is able to stand and transfer self. Pt this date requires no assistance to come to sitting EOB and CGA to transfer to bedside chair. Pt appears near baseline mobility, albeit with noted weakness. PT to f/u while admitted to encourge mobitliy and exercise, but likely safe to return home with family assist from PT standpoint.

## 2023-03-23 LAB
ANION GAP SERPL CALCULATED.3IONS-SCNC: 11 MMOL/L (ref 5–15)
BASOPHILS # BLD AUTO: 0 10*3/MM3 (ref 0–0.2)
BASOPHILS NFR BLD AUTO: 0.3 % (ref 0–1.5)
BUN SERPL-MCNC: 38 MG/DL (ref 8–23)
BUN/CREAT SERPL: 28.4 (ref 7–25)
CALCIUM SPEC-SCNC: 9.4 MG/DL (ref 8.6–10.5)
CHLORIDE SERPL-SCNC: 109 MMOL/L (ref 98–107)
CO2 SERPL-SCNC: 21 MMOL/L (ref 22–29)
CREAT SERPL-MCNC: 1.34 MG/DL (ref 0.57–1)
DEPRECATED RDW RBC AUTO: 56.9 FL (ref 37–54)
EGFRCR SERPLBLD CKD-EPI 2021: 39.9 ML/MIN/1.73
EOSINOPHIL # BLD AUTO: 0.1 10*3/MM3 (ref 0–0.4)
EOSINOPHIL NFR BLD AUTO: 1.8 % (ref 0.3–6.2)
ERYTHROCYTE [DISTWIDTH] IN BLOOD BY AUTOMATED COUNT: 16.1 % (ref 12.3–15.4)
GLUCOSE SERPL-MCNC: 91 MG/DL (ref 65–99)
HCT VFR BLD AUTO: 34 % (ref 34–46.6)
HGB BLD-MCNC: 11.2 G/DL (ref 12–15.9)
LYMPHOCYTES # BLD AUTO: 1.6 10*3/MM3 (ref 0.7–3.1)
LYMPHOCYTES NFR BLD AUTO: 23.1 % (ref 19.6–45.3)
MCH RBC QN AUTO: 31.3 PG (ref 26.6–33)
MCHC RBC AUTO-ENTMCNC: 33 G/DL (ref 31.5–35.7)
MCV RBC AUTO: 95 FL (ref 79–97)
MONOCYTES # BLD AUTO: 0.7 10*3/MM3 (ref 0.1–0.9)
MONOCYTES NFR BLD AUTO: 9.9 % (ref 5–12)
NEUTROPHILS NFR BLD AUTO: 4.4 10*3/MM3 (ref 1.7–7)
NEUTROPHILS NFR BLD AUTO: 64.9 % (ref 42.7–76)
NRBC BLD AUTO-RTO: 0.1 /100 WBC (ref 0–0.2)
PLATELET # BLD AUTO: 358 10*3/MM3 (ref 140–450)
PMV BLD AUTO: 7.5 FL (ref 6–12)
POTASSIUM SERPL-SCNC: 4.1 MMOL/L (ref 3.5–5.2)
RBC # BLD AUTO: 3.58 10*6/MM3 (ref 3.77–5.28)
SODIUM SERPL-SCNC: 141 MMOL/L (ref 136–145)
WBC NRBC COR # BLD: 6.8 10*3/MM3 (ref 3.4–10.8)

## 2023-03-23 PROCEDURE — 36410 VNPNXR 3YR/> PHY/QHP DX/THER: CPT

## 2023-03-23 PROCEDURE — 05HB33Z INSERTION OF INFUSION DEVICE INTO RIGHT BASILIC VEIN, PERCUTANEOUS APPROACH: ICD-10-PCS | Performed by: INTERNAL MEDICINE

## 2023-03-23 PROCEDURE — C1751 CATH, INF, PER/CENT/MIDLINE: HCPCS

## 2023-03-23 PROCEDURE — 80048 BASIC METABOLIC PNL TOTAL CA: CPT | Performed by: INTERNAL MEDICINE

## 2023-03-23 PROCEDURE — 85025 COMPLETE CBC W/AUTO DIFF WBC: CPT | Performed by: INTERNAL MEDICINE

## 2023-03-23 PROCEDURE — 99232 SBSQ HOSP IP/OBS MODERATE 35: CPT | Performed by: INTERNAL MEDICINE

## 2023-03-23 RX ORDER — SODIUM CHLORIDE 0.9 % (FLUSH) 0.9 %
10 SYRINGE (ML) INJECTION AS NEEDED
Status: DISCONTINUED | OUTPATIENT
Start: 2023-03-23 | End: 2023-03-24 | Stop reason: HOSPADM

## 2023-03-23 RX ORDER — SODIUM CHLORIDE 0.9 % (FLUSH) 0.9 %
10 SYRINGE (ML) INJECTION EVERY 12 HOURS SCHEDULED
Status: DISCONTINUED | OUTPATIENT
Start: 2023-03-23 | End: 2023-03-24 | Stop reason: HOSPADM

## 2023-03-23 RX ORDER — METOPROLOL SUCCINATE 50 MG/1
50 TABLET, EXTENDED RELEASE ORAL EVERY 12 HOURS SCHEDULED
Status: DISCONTINUED | OUTPATIENT
Start: 2023-03-24 | End: 2023-03-24 | Stop reason: HOSPADM

## 2023-03-23 RX ORDER — SODIUM CHLORIDE 0.9 % (FLUSH) 0.9 %
20 SYRINGE (ML) INJECTION AS NEEDED
Status: DISCONTINUED | OUTPATIENT
Start: 2023-03-23 | End: 2023-03-24 | Stop reason: HOSPADM

## 2023-03-23 RX ORDER — DILTIAZEM HYDROCHLORIDE 180 MG/1
180 CAPSULE, COATED, EXTENDED RELEASE ORAL
Status: DISCONTINUED | OUTPATIENT
Start: 2023-03-24 | End: 2023-03-24 | Stop reason: HOSPADM

## 2023-03-23 RX ORDER — METOPROLOL SUCCINATE 50 MG/1
50 TABLET, EXTENDED RELEASE ORAL EVERY MORNING
Status: DISCONTINUED | OUTPATIENT
Start: 2023-03-24 | End: 2023-03-23

## 2023-03-23 RX ADMIN — Medication 10 ML: at 20:42

## 2023-03-23 RX ADMIN — METOPROLOL SUCCINATE 100 MG: 50 TABLET, EXTENDED RELEASE ORAL at 06:03

## 2023-03-23 RX ADMIN — APIXABAN 5 MG: 5 TABLET, FILM COATED ORAL at 20:42

## 2023-03-23 RX ADMIN — AZTREONAM 2 G: 2 INJECTION, POWDER, LYOPHILIZED, FOR SOLUTION INTRAMUSCULAR; INTRAVENOUS at 05:47

## 2023-03-23 RX ADMIN — DILTIAZEM HYDROCHLORIDE 30 MG: 30 TABLET, FILM COATED ORAL at 06:03

## 2023-03-23 RX ADMIN — AZTREONAM 2 G: 2 INJECTION, POWDER, LYOPHILIZED, FOR SOLUTION INTRAMUSCULAR; INTRAVENOUS at 20:41

## 2023-03-23 RX ADMIN — APIXABAN 5 MG: 5 TABLET, FILM COATED ORAL at 08:39

## 2023-03-23 RX ADMIN — DIGOXIN 125 MCG: 250 TABLET ORAL at 08:39

## 2023-03-23 RX ADMIN — FUROSEMIDE 20 MG: 40 TABLET ORAL at 08:39

## 2023-03-23 RX ADMIN — FAMOTIDINE 20 MG: 20 TABLET, FILM COATED ORAL at 08:39

## 2023-03-23 RX ADMIN — HYDROCODONE BITARTRATE AND ACETAMINOPHEN 1 TABLET: 5; 325 TABLET ORAL at 20:42

## 2023-03-23 RX ADMIN — AZTREONAM 2 G: 2 INJECTION, POWDER, LYOPHILIZED, FOR SOLUTION INTRAMUSCULAR; INTRAVENOUS at 13:21

## 2023-03-23 RX ADMIN — FAMOTIDINE 20 MG: 20 TABLET, FILM COATED ORAL at 17:38

## 2023-03-23 RX ADMIN — TOPIRAMATE 100 MG: 100 TABLET, FILM COATED ORAL at 20:42

## 2023-03-23 RX ADMIN — Medication 3 ML: at 08:39

## 2023-03-23 NOTE — PLAN OF CARE
Problem: Adult Inpatient Plan of Care  Goal: Plan of Care Review  Outcome: Ongoing, Progressing   Goal Outcome Evaluation:               Pt transferred from PCU, pending discharge home in AM. VSS, pt had no c/o pain. Pt educated on current plan of care, verbalized understanding.

## 2023-03-23 NOTE — PROGRESS NOTES
Cardiology  Cardiology consult note  Augustin Ellsworth MD, PhD      Subjective:     Encounter Date:03/18/2023      Patient ID: Hazel Bucio is a 82 y.o. female.    Chief Complaint: weakness, fever      HPI:  Hazel Bucio is a 82 y.o. female well-known to me with a past cardiac history of permanent afib (anticoaguled with coumadin) and SSS s/p [employer] PPM.  Last 2D echo 9/2022 showed an EF = 60-65% with mild to moderate MR/TR and mild AI.  Last nuclear stress test 2020 showed no ischemia.  PMH includes HTN, CKD stage 3, bladder cancer, and renal cancer. Patient presented with c/o weakness and fever and found with UTI as well as afib with RVR.  She received IV diltiazem.  Cardiology was consulted for afib management.    Tele shows afib in the 70s currently well controlled.  Patient states that she did not have any palpitations, dizziness, chest pain, or SOA.  Prior to this admit, she has been able to perform activities such as housework without unusual difficulty.  She denies PND/orthopnea or edema.  Her BP was elevated on admit.  She reports compliance with medications.  She reports that she had her abdominal hernia repair 3-4 months ago without complications.   .  She was admitted with sepsis, found to have E. coli bacteremia now on antibiotics, she had A-fib RVR ultimately started on diltiazem drip for control, she likely had some hypotension with renal injury which has been improved  =====================================================================    Today  Seen and examined, no new fevers, remains tired and fatigued, resting comfortably on encounter no acute events overnight  Chest pain-free, rates well controlled, blood pressure stable 130 systolic today, afebrile  Intermittent pacing with heart rates in the 60s to 70s with underlying atrial fibrillation  Decreasing metoprolol to 50 twice daily, scheduling diltiazem 180 daily, stop digoxin with adequate blood pressure for now  Creatinine much  improved appears euvolemic    Tolerating Eliquis without bleeding  Stop high risk medicines today digoxin  Does not appear volume overloaded  No acute events overnight    Brief plan today  Transition metoprolol XL to 50 twice daily to avoid pacing and allow heart rate to be 70s to 90s t  Transition to diltiazem sustained-release 120 daily continue  Discontinue digoxin  Avoid hypotension  Anticoagulation continue  Continue to treat sepsis, IV antibiotics on board with aztreonam      Past Medical History:   Diagnosis Date   • Bladder cancer (HCC)    • Cancer (HCC)     breast, bladder   • Deep vein thrombosis (HCC)    • Essential hypertension 1/17/2017   • Fracture tibia/fibula, right, closed, initial encounter 8/27/2020   • GERD (gastroesophageal reflux disease)    • History of urostomy    • Immobility 08/27/2020    r/t broken Tibia    • Kidney carcinoma, right (HCC)     removed    • Long term current use of anticoagulant 1/9/2015   • PAF (paroxysmal atrial fibrillation) (Formerly [facility]) 6/26/2019   • Presence of cardiac pacemaker 11/03/2014    BS dual chamber PM placed 10/2014 with pocket revision 1/25/17.  HX tachy rob syndrome   • Sick sinus syndrome (Formerly [facility]) 11/3/2014         Past Surgical History:   Procedure Laterality Date   • BREAST LUMPECTOMY     • CHOLECYSTECTOMY N/A 10/12/2020    Procedure: CHOLECYSTECTOMY LAPAROSCOPIC;  Surgeon: Go Pereira DO;  Location: [facility] MAIN OR;  Service: General;  Laterality: N/A;   • CYSTECTOMY W/ URETEROILEAL CONDUIT     • HARDWARE REMOVAL Right 6/11/2021    Procedure: TIBIA HARDWARE REMOVAL;  Surgeon: Geoff Shah II, MD;  Location: [facility] MAIN OR;  Service: Orthopedics;  Laterality: Right;   • HERNIA REPAIR     • HYSTERECTOMY     • INSERT / REPLACE / REMOVE PACEMAKER     • NEPHRECTOMY Right    • TIBIA IM NAILING/RODDING Right 8/28/2020    Procedure: TIBIA INTRAMEDULLARY NAIL/ABRAHAM INSERTION;  Surgeon: Geoff Shah II, MD;  Location: [facility] MAIN OR;  Service:  "Orthopedics;  Laterality: Right;         Social History     Socioeconomic History   • Marital status:    Tobacco Use   • Smoking status: Never   • Smokeless tobacco: Never   Vaping Use   • Vaping Use: Never used   Substance and Sexual Activity   • Alcohol use: Not Currently   • Drug use: Never   • Sexual activity: Defer       Family History   Problem Relation Age of Onset   • COPD Father          Allergies   Allergen Reactions   • Amoxicillin Shortness Of Breath   • Ciprofloxacin Hives   • Oxycodone-Acetaminophen Unknown - High Severity     Pt's son stated when pt fell and broke her leg she was put on oxycodone; pt's son stated pt's \"vitals went all out of wack, she couldn't remember things.\"   • Penicillins Rash   • Sulfa Antibiotics Hives   • Cephalexin Other (See Comments)   • Clavulanic Acid Other (See Comments)   • Codeine Other (See Comments)   • Contrast Dye (Echo Or Unknown Ct/Mr) Other (See Comments)     8/18/22    • Doxycycline Other (See Comments)   • Fluconazole Other (See Comments)   • Iodine Hives   • Macrobid [Nitrofurantoin] Hives   • Tobramycin Other (See Comments)   • Trimethoprim Other (See Comments)       Current Medications:   Scheduled Meds:apixaban, 5 mg, Oral, BID  aztreonam, 2 g, Intravenous, Q8H  [START ON 3/24/2023] dilTIAZem CD, 180 mg, Oral, Q24H  famotidine, 20 mg, Oral, BID AC  furosemide, 20 mg, Oral, Daily  [START ON 3/24/2023] metoprolol succinate XL, 50 mg, Oral, Q12H  sodium chloride, 3 mL, Intravenous, Q12H  topiramate, 100 mg, Oral, Nightly      Review of systems negative x14 point review of systems except as mentioned above         Objective:         /66 (BP Location: Left arm, Patient Position: Lying)   Pulse 65   Temp 98.2 °F (36.8 °C) (Oral)   Resp 18   Ht 167.6 cm (66\")   Wt 60.4 kg (133 lb 2.5 oz)   SpO2 98%   BMI 21.49 kg/m²       General: Frail, in NAD.  Nasal cannula  Neuro: AAOx3. No gross deficits.  Moves all extremities  HEENT: sclera clear, no " xanthalasmas.  CV: irregular controlled rates S1S2 RRR. N 1 out of 6 systolic ejection murmurs.  Resp: Breathing is unlabored. Lungs coarse bases.  Diminished somewhat  GI: BS+. Abdomen soft and NTTP.  Ext: Pedal pulses are palpable. Extremities are non-edematous.  MS: moves all extremities, no weakness.  Skin: warm, dry.  Psych: calm and cooperative.  Unchanged from prior encounter            Lab Review:     Results from last 7 days   Lab Units 03/23/23  0711 03/22/23  1231 03/20/23  0650 03/18/23  2041   SODIUM mmol/L 141 140   < > 140   POTASSIUM mmol/L 4.1 4.0   < > 4.4   CHLORIDE mmol/L 109* 107   < > 106   CO2 mmol/L 21.0* 22.0   < > 21.0*   BUN mg/dL 38* 37*   < > 28*   CREATININE mg/dL 1.34* 1.44*   < > 1.31*   GLUCOSE mg/dL 91 106*   < > 119*   CALCIUM mg/dL 9.4 9.7   < > 9.8   AST (SGOT) U/L  --  15  --  24   ALT (SGPT) U/L  --  8  --  9    < > = values in this interval not displayed.     Results from last 7 days   Lab Units 03/18/23  2041   CK TOTAL U/L 29   HSTROP T ng/L 25*     Results from last 7 days   Lab Units 03/23/23  0711 03/22/23  1231   WBC 10*3/mm3 6.80 7.00   HEMOGLOBIN g/dL 11.2* 11.9*   HEMATOCRIT % 34.0 37.0   PLATELETS 10*3/mm3 358 346     Results from last 7 days   Lab Units 03/18/23  2041   INR  1.05   APTT seconds 22.5*     Results from last 7 days   Lab Units 03/20/23  0650   MAGNESIUM mg/dL 1.9           Invalid input(s): LDLCALC  Results from last 7 days   Lab Units 03/18/23  2041   PROBNP pg/mL 7,303.0*     Results from last 7 days   Lab Units 03/18/23  2041   TSH uIU/mL 0.880       Recent Radiology:  Imaging Results (Most Recent)     Procedure Component Value Units Date/Time    CT Abdomen Pelvis Without Contrast [[id number]] Collected: 03/21/23 0853     Updated: 03/21/23 0905    Narrative:      CT ABDOMEN PELVIS WO CONTRAST    Date of Exam: 3/21/2023 7:47 AM EDT    Indication: UTI, recurrent/complicated (Female).    Comparison: August 18, 2022    Technique: Axial CT images were  obtained of the abdomen and pelvis without the administration of contrast. Sagittal and coronal reconstructions were performed.  Automated exposure control and iterative reconstruction methods were used.     Findings:  Within the lung bases is bibasilar atelectasis.    The unenhanced liver, adrenal glands, spleen, and pancreas are unremarkable. The gallbladder is surgically absent. The right kidney is absent. There is a nonobstructing left renal stone.    The stomach appears normal. The small bowel appears normal in caliber. The colon appears normal. The appendix is not well-visualized. There is no ascites or loculated collection. No abnormally enlarged lymph nodes are identified.    The rectum is unremarkable. The uterus and urinary bladder are surgically absent, with a right lower quadrant urostomy. A previously identified right parastomal hernia is no longer present.    No aggressive osseous lesions are identified.      Impression:      Impression:  1.No acute process identified within abdomen/pelvis.  2.Nonobstructing left renal stone.  3.Changes from cystectomy with right lower quadrant urostomy.      Electronically Signed: Santiago Oshea    3/21/2023 9:03 AM EDT    Workstation ID: [id number]    XR Shoulder 2+ View Right [[id number]] Collected: 03/19/23 0727     Updated: 03/19/23 0730    Narrative:      XR SHOULDER 2+ VW RIGHT    Date of Exam: 3/18/2023 8:53 PM EDT    Indication: pain.  Right shoulder pain.     Comparison: None available.    Findings:  There is advanced degenerative change. There is generalized osteopenia. No acute fracture is evident.      Impression:      Impression:  Advanced degenerative changes. Generalized osteopenia.    Electronically Signed: Issa Barnett    3/19/2023 7:28 AM EDT    Workstation ID: [id number]    XR Chest 1 View [[id number]] Collected: 03/19/23 0724     Updated: 03/19/23 0729    Narrative:      XR CHEST 1 VW    Date of Exam: 3/18/2023 8:52 PM EDT    Indication: chest  pain.    Comparison: 12/7/2022    Findings:  Left subclavian dual-lead cardiac stimulator device again noted. There is cardiomegaly. There is mixed interstitial and alveolar opacity in the perihilar regions, right greater than left. This may be due to pulmonary edema or atypical infection pattern.   No pleural fluid is seen. No pneumothorax is evident. Aortic vascular calcification is noted. Calcified lymph nodes again noted in the right paratracheal and hilar regions.      Impression:      Impression:  Hazy mixed interstitial alveolar opacities in the perihilar regions, right greater than left suggesting edema or atypical infection pattern.    Electronically Signed: Issafarhan Barnett    3/19/2023 7:26 AM EDT    Workstation ID: [id number]            ECHOCARDIOGRAM:    Results for orders placed during the hospital encounter of 09/24/21    Adult Transthoracic Echo Complete W/ Cont if Necessary Per Protocol    Interpretation Summary  · Estimated left ventricular EF was in agreement with the calculated left ventricular EF. Left ventricular ejection fraction appears to be 61 - 65%. Left ventricular systolic function is normal.  · Left atrial volume is moderately increased.  · The right atrial cavity is moderately dilated.  · Estimated right ventricular systolic pressure from tricuspid regurgitation is normal (<35 mmHg).            Assessment:         Active Hospital Problems    Diagnosis  POA   • **Fever [R50.9]  Yes   • Atrial fibrillation with RVR (HCC) [I48.91]  Yes     1) Permanent atrial fibrillation with RVR --> CVR  - RVR in setting UTI, much improved with modification of beta-blocker, calcium channel blocker and digoxin  -continue monitoring with high risk medicines, digoxin level desired 0.8-1.2  Avoid hypotension  - K greater than 4 magnesium greater than 2 desired  - TSH WNL  - anticoagulated with coumadin at home --> receiving eliquis here, stable  - Last 2D echo 9/2022 showed an EF = 60-65% with mild to moderate  MR/TR and mild AI.  Last nuclear stress test 2020 showed no ischemia.  Chest pain-free    2) SSS s/p [employer] PPM, interrogate device, had been SACHIN needing replacement, will evaluate leads as well at that time  Tachybradycardia syndrome    3) UTI with urosepsis, E. coli bacteremia  -E. coli with bacteremia  - on abx    4) HTN, stable avoid hypotension    5) CKD stage 3, improved creatinine less than 1    6) Hx renal cancer s/p right nephrectomy       Modifications to medicines as above  Awaiting repeat labs    Thank you for the consult is a pleasure to be involved in her cardiovascular care.  Please call with any questions or concerns  Augustin Ellsworth MD, PhD

## 2023-03-23 NOTE — PROGRESS NOTES
Infectious Diseases Progress Note      LOS: 3 days   Patient Care Team:  Lizzy Francis MD as PCP - General (Family Medicine)  Jose Carlos Cheng MD as Consulting Physician (Cardiology)    Chief Complaint: Patient denies fever, chills, diaphoresis, shortness of breath, cough, GI symptoms, or any urinary symptoms.    Subjective       The patient has been afebrile for the last 24 hours.  The patient is on room air, hemodynamically stable, and is tolerating antimicrobial therapy.  She is still feeling fairly weak but is doing better today      Review of Systems:   Review of Systems   HENT: Negative.    Eyes: Negative.    Respiratory: Negative.    Cardiovascular: Negative.    Gastrointestinal: Negative.    Endocrine: Negative.    Genitourinary: Negative.    Musculoskeletal: Negative.    Skin: Negative.    Neurological: Positive for weakness.   Psychiatric/Behavioral: Negative.    All other systems reviewed and are negative.       Objective     Vital Signs  Temp:  [97.3 °F (36.3 °C)-98.6 °F (37 °C)] 98.2 °F (36.8 °C)  Heart Rate:  [63-85] 65  Resp:  [12-18] 18  BP: (122-149)/(62-79) 134/66    Physical Exam:  Physical Exam  Vitals and nursing note reviewed.   Constitutional:       General: She is not in acute distress.     Appearance: She is well-developed and normal weight. She is ill-appearing. She is not diaphoretic.   HENT:      Head: Normocephalic and atraumatic.   Eyes:      General: No scleral icterus.     Extraocular Movements: Extraocular movements intact.      Conjunctiva/sclera: Conjunctivae normal.      Pupils: Pupils are equal, round, and reactive to light.   Cardiovascular:      Rate and Rhythm: Normal rate and regular rhythm.      Heart sounds: Normal heart sounds, S1 normal and S2 normal. No murmur heard.  Pulmonary:      Effort: Pulmonary effort is normal. No respiratory distress.      Breath sounds: Normal breath sounds. No stridor. No wheezing or rales.   Chest:      Chest wall: No tenderness.    Abdominal:      General: Bowel sounds are normal. There is no distension.      Palpations: Abdomen is soft. There is no mass.      Tenderness: There is no abdominal tenderness. There is no guarding.   Genitourinary:     Comments: Ileal conduit  Musculoskeletal:         General: No swelling, tenderness or deformity. Normal range of motion.      Cervical back: Neck supple.   Skin:     General: Skin is warm and dry.      Coloration: Skin is not pale.      Findings: No bruising, erythema or rash.   Neurological:      General: No focal deficit present.      Mental Status: She is alert and oriented to person, place, and time. Mental status is at baseline.      Cranial Nerves: No cranial nerve deficit.   Psychiatric:         Mood and Affect: Mood normal.          Results Review:    I have reviewed all clinical data, test, lab, and imaging results.     Radiology  No Radiology Exams Resulted Within Past 24 Hours    Cardiology    Laboratory    Results from last 7 days   Lab Units 03/23/23  0711 03/22/23  1231 03/20/23  2345 03/20/23  0650 03/18/23  2041   WBC 10*3/mm3 6.80 7.00 8.80 10.60 13.90*   HEMOGLOBIN g/dL 11.2* 11.9* 10.8* 10.8* 12.3   HEMATOCRIT % 34.0 37.0 35.8 34.1 39.8   PLATELETS 10*3/mm3 358 346 250 250 291     Results from last 7 days   Lab Units 03/23/23  0711 03/22/23  1231 03/20/23  2345 03/20/23  0650 03/18/23  2041   SODIUM mmol/L 141 140 140 141 140   POTASSIUM mmol/L 4.1 4.0 4.5 4.2 4.4   CHLORIDE mmol/L 109* 107 101 109* 106   CO2 mmol/L 21.0* 22.0 30.0* 22.0 21.0*   BUN mg/dL 38* 37* 18 32* 28*   CREATININE mg/dL 1.34* 1.44* 0.59 1.38* 1.31*   GLUCOSE mg/dL 91 106* 90 105* 119*   ALBUMIN g/dL  --  3.8  --   --  4.0   BILIRUBIN mg/dL  --  0.3  --   --  0.6   ALK PHOS U/L  --  121*  --   --  111   AST (SGOT) U/L  --  15  --   --  24   ALT (SGPT) U/L  --  8  --   --  9   CALCIUM mg/dL 9.4 9.7 9.2 9.3 9.8     Results from last 7 days   Lab Units 03/18/23 2041   CK TOTAL U/L 29              Microbiology   Microbiology Results (last 10 days)     Procedure Component Value - Date/Time    Blood Culture - Blood, Arm, Right [[id number]]  (Normal) Collected: 03/19/23 1556    Lab Status: Preliminary result Specimen: Blood from Arm, Right Updated: 03/22/23 1615     Blood Culture No growth at 3 days    Narrative:      Less than seven (7) mL's of blood was collected.  Insufficient quantity may yield false negative results.    Blood Culture - Blood, Arm, Right [[id number]]  (Normal) Collected: 03/19/23 1556    Lab Status: Preliminary result Specimen: Blood from Arm, Right Updated: 03/22/23 1615     Blood Culture No growth at 3 days    Narrative:      Less than seven (7) mL's of blood was collected.  Insufficient quantity may yield false negative results.    Blood Culture - Blood, Arm, Left [[id number]]  (Abnormal) Collected: 03/18/23 2132    Lab Status: Final result Specimen: Blood from Arm, Left Updated: 03/22/23 0721     Blood Culture Escherichia coli     Isolated from Anaerobic Bottle     Gram Stain Anaerobic Bottle Gram negative bacilli    Narrative:      Refer to previous blood culture collected on 03/18/2023 2041 for MICs    Less than seven (7) mL's of blood was collected.  Insufficient quantity may yield false negative results.    Respiratory Panel PCR w/COVID-19(SARS-CoV-2) VINCENT/STEFANY/ANNY/PAD/COR/MAD/MANASA In-House, NP Swab in UTM/VTM, 3-4 HR TAT - Swab, Nasopharynx [[id number]]  (Normal) Collected: 03/18/23 2044    Lab Status: Final result Specimen: Swab from Nasopharynx Updated: 03/18/23 2138     ADENOVIRUS, PCR Not Detected     Coronavirus 229E Not Detected     Coronavirus HKU1 Not Detected     Coronavirus NL63 Not Detected     Coronavirus OC43 Not Detected     COVID19 Not Detected     Human Metapneumovirus Not Detected     Human Rhinovirus/Enterovirus Not Detected     Influenza A PCR Not Detected     Influenza B PCR Not Detected     Parainfluenza Virus 1 Not Detected     Parainfluenza Virus 2 Not Detected      Parainfluenza Virus 3 Not Detected     Parainfluenza Virus 4 Not Detected     RSV, PCR Not Detected     Bordetella pertussis pcr Not Detected     Bordetella parapertussis PCR Not Detected     Chlamydophila pneumoniae PCR Not Detected     Mycoplasma pneumo by PCR Not Detected    Narrative:      In the setting of a positive respiratory panel with a viral infection PLUS a negative procalcitonin without other underlying concern for bacterial infection, consider observing off antibiotics or discontinuation of antibiotics and continue supportive care. If the respiratory panel is positive for atypical bacterial infection (Bordetella pertussis, Chlamydophila pneumoniae, or Mycoplasma pneumoniae), consider antibiotic de-escalation to target atypical bacterial infection.    Urine Culture - Urine, Urostomy [[id number]]  (Abnormal)  (Susceptibility) Collected: 03/18/23 2043    Lab Status: Edited Result - FINAL Specimen: Urine from Urostomy Updated: 03/20/23 1311     Urine Culture >100,000 CFU/mL Escherichia coli    Narrative:      Colonization of the urinary tract without infection is common. Treatment is discouraged unless the patient is symptomatic, pregnant, or undergoing an invasive urologic procedure.    Susceptibility      Escherichia coli      ASHLEY      Ampicillin Resistant     Ampicillin + Sulbactam Intermediate      Aztreonam Susceptible (C)  [1]       Cefazolin Susceptible      Cefepime Susceptible      Ceftazidime Susceptible      Ceftriaxone Susceptible      Gentamicin Susceptible      Levofloxacin Susceptible      Nitrofurantoin Susceptible      Piperacillin + Tazobactam Susceptible      Trimethoprim + Sulfamethoxazole Susceptible                   [1]  Appended report. These results have been appended to a previously final verified report.                 Blood Culture - Blood, Arm, Left [[id number]]  (Abnormal)  (Susceptibility) Collected: 03/18/23 2041    Lab Status: Edited Result - FINAL Specimen: Blood from  Arm, Left Updated: 03/22/23 1510     Blood Culture Escherichia coli     Isolated from Anaerobic Bottle     Gram Stain Anaerobic Bottle Gram negative bacilli    Susceptibility      Escherichia coli      ASHLEY      Ampicillin Resistant     Ampicillin + Sulbactam Resistant     Aztreonam Susceptible (C)  [1]       Cefepime Susceptible      Ceftazidime Susceptible      Ceftriaxone Susceptible      Gentamicin Susceptible      Levofloxacin Susceptible      Piperacillin + Tazobactam Susceptible      Trimethoprim + Sulfamethoxazole Susceptible                   [1]  Appended report. These results have been appended to a previously final verified report.             Susceptibility Comments     Escherichia coli    Cefazolin sensitivity will not be reported for Enterobacteriaceae in non-urine isolates. If cefazolin is preferred, please call the microbiology lab to request an E-test.  With the exception of urinary-sourced infections, aminoglycosides should not be used as monotherapy.    Aztreonam reported per Sarina YUEN of [facility]             Blood Culture ID, PCR - Blood, Arm, Left [[id number]]  (Abnormal) Collected: 03/18/23 2041    Lab Status: Final result Specimen: Blood from Arm, Left Updated: 03/19/23 1233     BCID, PCR Escherichia coli. Identification by BCID2 PCR.     BOTTLE TYPE Anaerobic Bottle    Narrative:      No resistance genes detected.          Medication Review:       Schedule Meds  apixaban, 5 mg, Oral, BID  aztreonam, 2 g, Intravenous, Q8H  [START ON 3/24/2023] dilTIAZem CD, 180 mg, Oral, Q24H  famotidine, 20 mg, Oral, BID AC  furosemide, 20 mg, Oral, Daily  [START ON 3/24/2023] metoprolol succinate XL, 50 mg, Oral, Q12H  sodium chloride, 10 mL, Intravenous, Q12H  sodium chloride, 3 mL, Intravenous, Q12H  topiramate, 100 mg, Oral, Nightly        Infusion Meds       PRN Meds  •  acetaminophen  •  HYDROcodone-acetaminophen  •  ondansetron  •  sodium chloride  •  sodium chloride  •  sodium chloride  •   sodium chloride        Assessment & Plan       Antimicrobial Therapy   1.  IV aztreonam        2.        3.        4.        5.            Assessment=     E. coli bacteremia secondary to complicated UTI.     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  The organism is susceptible to aztreonam     History of multiple drug allergies including cephalosporins, penicillins and quinolones.  Patient is tolerating IV aztreonam without any side effect     History of renal cell carcinoma bladder cancer.  Patient is s/p radical cystectomy and ileal conduit in the past     Plan     Continue aztreonam 2 g IV every 8 hours for 2 weeks-last day on 4/3/2023  Patient will need a midline before discharge-please remove when IV antibiotics are completed  Weekly labs CBC and creatinine x2 weeks  Continue supportive care  Okay to discharge from Infectious Disease standpoint    Please fax all post discharge lab results, imaging studies and correspondence to this fax number [phone]  For any question or concern please contact our service number [phone]    CHELLE Hernandez  03/23/23  15:11 EDT    Note is dictated utilizing voice recognition software/Dragon

## 2023-03-23 NOTE — PROGRESS NOTES
LOS: 3 days   Patient Care Team:  Lizzy Francis MD as PCP - General (Family Medicine)  Jose Carlos Cheng MD as Consulting Physician (Cardiology)    Subjective     Interval History: Stable overnight    Patient Complaints: No specific complaints    History taken from: patient    Review of Systems   Constitutional: Positive for activity change and fatigue. Negative for appetite change, diaphoresis and fever.   HENT: Negative for facial swelling.    Eyes: Negative for visual disturbance.   Respiratory: Negative for cough, shortness of breath, wheezing and stridor.    Cardiovascular: Negative for chest pain, palpitations and leg swelling.   Gastrointestinal: Negative for abdominal pain, diarrhea, nausea and vomiting.   Genitourinary: Negative for enuresis.   Musculoskeletal: Negative for arthralgias and gait problem.   Skin: Negative for rash and wound.   Neurological: Negative for dizziness and headaches.   Psychiatric/Behavioral: Negative for confusion.           Objective     Vital Signs  Temp:  [97.3 °F (36.3 °C)-98.6 °F (37 °C)] 98 °F (36.7 °C)  Heart Rate:  [63-81] 76  Resp:  [12-18] 18  BP: (122-149)/(62-79) 136/72    Physical Exam:     General Appearance:    Alert, cooperative, in no acute distress, hard of hearing   Head:    Normocephalic, without obvious abnormality, atraumatic   Eyes:            Lids and lashes normal, conjunctivae and sclerae normal, no   icterus, no pallor, corneas clear, PERRLA   Ears:    Ears appear intact with no abnormalities noted   Throat:   No oral lesions, no thrush, oral mucosa moist   Neck:   No adenopathy, supple, trachea midline, no thyromegaly, no   carotid bruit, no JVD   Lungs:     Clear to auscultation,respirations regular, even and                  unlabored    Heart:   Irregularly irregular   Chest Wall:    No abnormalities observed   Abdomen:    Urostomy, parastomal hernia, easily reduced   Extremities:   Moves all extremities well, no edema, no cyanosis, no              Redness   Pulses:   Pulses palpable and equal bilaterally   Skin:   No bleeding, bruising or rash   Lymph nodes:   No palpable adenopathy   Neurologic:   Cranial nerves 2 - 12 grossly intact, sensation intact, DTR       present and equal bilaterally        Results Review:    Lab Results (last 24 hours)     Procedure Component Value Units Date/Time    Blood Culture - Blood, Arm, Right [[id number]]  (Normal) Collected: 03/19/23 1556    Specimen: Blood from Arm, Right Updated: 03/23/23 1615     Blood Culture No growth at 4 days    Narrative:      Less than seven (7) mL's of blood was collected.  Insufficient quantity may yield false negative results.    Blood Culture - Blood, Arm, Right [[id number]]  (Normal) Collected: 03/19/23 1556    Specimen: Blood from Arm, Right Updated: 03/23/23 1615     Blood Culture No growth at 4 days    Narrative:      Less than seven (7) mL's of blood was collected.  Insufficient quantity may yield false negative results.    Basic Metabolic Panel [[id number]]  (Abnormal) Collected: 03/23/23 0711    Specimen: Blood Updated: 03/23/23 0813     Glucose 91 mg/dL      BUN 38 mg/dL      Creatinine 1.34 mg/dL      Sodium 141 mmol/L      Potassium 4.1 mmol/L      Chloride 109 mmol/L      CO2 21.0 mmol/L      Calcium 9.4 mg/dL      BUN/Creatinine Ratio 28.4     Anion Gap 11.0 mmol/L      eGFR 39.9 mL/min/1.73     Narrative:      GFR Normal >60  Chronic Kidney Disease <60  Kidney Failure <15    The GFR formula is only valid for adults with stable renal function between ages 18 and 70.    CBC & Differential [[id number]]  (Abnormal) Collected: 03/23/23 0711    Specimen: Blood Updated: 03/23/23 0757    Narrative:      The following orders were created for panel order CBC & Differential.  Procedure                               Abnormality         Status                     ---------                               -----------         ------                     CBC Auto Differential[[id number]]         Abnormal            Final result                 Please view results for these tests on the individual orders.    CBC Auto Differential [[id number]]  (Abnormal) Collected: 03/23/23 0711    Specimen: Blood Updated: 03/23/23 0757     WBC 6.80 10*3/mm3      RBC 3.58 10*6/mm3      Hemoglobin 11.2 g/dL      Hematocrit 34.0 %      MCV 95.0 fL      MCH 31.3 pg      MCHC 33.0 g/dL      RDW 16.1 %      RDW-SD 56.9 fl      MPV 7.5 fL      Platelets 358 10*3/mm3      Neutrophil % 64.9 %      Lymphocyte % 23.1 %      Monocyte % 9.9 %      Eosinophil % 1.8 %      Basophil % 0.3 %      Neutrophils, Absolute 4.40 10*3/mm3      Lymphocytes, Absolute 1.60 10*3/mm3      Monocytes, Absolute 0.70 10*3/mm3      Eosinophils, Absolute 0.10 10*3/mm3      Basophils, Absolute 0.00 10*3/mm3      nRBC 0.1 /100 WBC            Imaging Results (Last 24 Hours)     ** No results found for the last 24 hours. **               I reviewed the patient's new clinical results.    Medication Review:   Scheduled Meds:apixaban, 5 mg, Oral, BID  aztreonam, 2 g, Intravenous, Q8H  [START ON 3/24/2023] dilTIAZem CD, 180 mg, Oral, Q24H  famotidine, 20 mg, Oral, BID AC  furosemide, 20 mg, Oral, Daily  [START ON 3/24/2023] metoprolol succinate XL, 50 mg, Oral, Q12H  sodium chloride, 10 mL, Intravenous, Q12H  sodium chloride, 3 mL, Intravenous, Q12H  topiramate, 100 mg, Oral, Nightly      Continuous Infusions:   PRN Meds:.•  acetaminophen  •  HYDROcodone-acetaminophen  •  ondansetron  •  sodium chloride  •  sodium chloride  •  sodium chloride  •  sodium chloride     Assessment & Plan       Fever    Atrial fibrillation with RVR (HCC)  History of bladder cancer  Status post ileal conduit with urostomy  E. coli bacteremia -appropriately treated with aztreonam; follow-up blood cultures remain negative  UTI-complete 2-week course of aztreonam  Elevated creatinine-trending down today, closer to baseline  Permanent atrial fibs-rate is now controlled; she is adequately  anticoagulated  Chronic headaches-continue topiramate    Eliquis for DVT prophylaxis   Famotidine for stress ulcer prophylaxis      Patient is ready for discharge, pending arrangements for IV antibiotics    Plan for disposition: To be determined    Lora Henderson MD  03/23/23  16:58 EDT

## 2023-03-23 NOTE — PLAN OF CARE
Goal Outcome Evaluation:   Pt a/o. Room air. ID and cardiology following. Midline in place. Urostomy.  Needs SNF. Call light in reach. No complaints.

## 2023-03-23 NOTE — NURSING NOTE
Report called to Dena on 2C. Pt updated about need for downgrade - family contacted and made aware of transfer to room 242.

## 2023-03-23 NOTE — CONSULTS
picc team consult:    Procedure explained to patient and agrees to proceed.  Time out performed.  Power glide midline catheter placed RUE basilic vessel utilizing US guidance and sterile technqiue with easily compressible vessel without difficulty.  Dark venous blood return noted and line flushes without difficulty.

## 2023-03-23 NOTE — CASE MANAGEMENT/SOCIAL WORK
Continued Stay Note  [facility]     Patient Name: Hazel Bucio  MRN: [id number]  Today's Date: 3/23/2023    Admit Date: 3/18/2023    Plan: CM to follow up for SNF choices due to pt needing IV abx for 2 weeks. [facility]. IV abx not covered for [facility].   Discharge Plan     Row Name 03/23/23 1448       Plan    Plan CM to follow up for SNF choices due to pt needing IV abx for 2 weeks. [facility] accepted. IV abx not covered for [facility].    Plan Comments Received phone call from Devon with [facility], patient doesn't have medication coverage for IV abx at home, which is covered under Medicare part D. Secure chat sent to ID NP, and patient will need the antibiotic every 8 hours. Pt agreeable to SNF. ECF list provided, she is going to review with her friend and wants CM to follow up tomorrow for choices.                    Expected Discharge Date and Time     Expected Discharge Date Expected Discharge Time    Mar 24, 2023             eDlmis Fernandez RN

## 2023-03-23 NOTE — CASE MANAGEMENT/SOCIAL WORK
Social Work Assessment  [facility]     Patient Name: Hazel Bucio  MRN: [id number]  Today's Date: 3/23/2023    Admit Date: 3/18/2023     Psychosocial     Row Name 03/23/23 1424       Values/Beliefs    Spiritual, Cultural Beliefs, Cheondoism Practices, Values that Affect Care no       Behavior WDL    Behavior WDL WDL       Emotion Mood WDL    Emotion/Mood/Affect WDL WDL       Mental Health    Little Interest or Pleasure in Doing Things 0-->not at all    Feeling Down, Depressed or Hopeless 0-->not at all    Do you feel stress - tense, restless, nervous, or anxious, or unable to sleep at night because your mind is troubled all the time - these days? Not at all       Speech WDL    Speech WDL WDL       Perceptual State WDL    Perceptual State WDL WDL       Thought Process WDL    Thought Process WDL WDL       Intellectual Performance WDL    Intellectual Performance WDL WDL    Level of Consciousness Alert       Coping/Stress    Major Change/Loss/Stressor medical condition/diagnosis    Patient Personal Strengths strong support system    Sources of Support other family members    Techniques to [place] with Loss/Stress/Change diversional activities    Reaction to Health Status accepting    Understanding of Condition and Treatment adequate understanding of medical condition       Developmental Stage (Eriksson's)    Developmental Stage Stage 8 (65 years-death/Late Adulthood) Integrity vs. Despair       C-SSRS (Recent)    Q1 Wished to be Dead (Past Month) no    Q2 Suicidal Thoughts (Past Month) no    Q6 Suicide Behavior (Lifetime) no       Violence Risk    Feels Like Hurting Others no    Previous Attempt to Harm Others no               Abuse/Neglect     Row Name 03/23/23 1424       Personal Safety    Feels Unsafe at Home or Work/School no    Feels Threatened by Someone no    Does Anyone Try to Keep You From Having Contact with Others or Doing Things Outside Your Home? no    Physical Signs of Abuse Present no               Legal      Row Name 03/23/23 1425       Financial Resource Strain    How hard is it for you to pay for the very basics like food, housing, medical care, and heating? Not hard       Financial/Legal    Source of Income social security       Legal    Criminal Activity/Legal Involvement none               Substance Abuse     Row Name 03/23/23 1425       Substance Use    Substance Use Status never used       AUDIT-C (Alcohol Use Disorders ID Test)    Q1: How often do you have a drink containing alcohol? Never    Q2: How many drinks containing alcohol do you have on a typical day when you are drinking? None    Q3: How often do you have six or more drinks on one occasion? Never    Audit-C Score 0       Family Member Substance Use (#4)    Previous Substance Use Treatment none                Met with patient in room wearing PPE: mask.  Maintained distance greater than six feet and spent less than 15 minutes in the room.    Maylin Mattson LCSW    Office: [phone]  Fax: [phone]  [email or web address]

## 2023-03-24 VITALS
HEART RATE: 74 BPM | TEMPERATURE: 97.9 F | WEIGHT: 136.47 LBS | HEIGHT: 66 IN | SYSTOLIC BLOOD PRESSURE: 129 MMHG | OXYGEN SATURATION: 97 % | BODY MASS INDEX: 21.93 KG/M2 | RESPIRATION RATE: 16 BRPM | DIASTOLIC BLOOD PRESSURE: 74 MMHG

## 2023-03-24 PROBLEM — A41.50 SEPSIS DUE TO GRAM-NEGATIVE UTI: Status: ACTIVE | Noted: 2023-03-24

## 2023-03-24 PROBLEM — B96.20 E COLI BACTEREMIA: Status: ACTIVE | Noted: 2023-03-24

## 2023-03-24 PROBLEM — N39.0 SEPSIS DUE TO GRAM-NEGATIVE UTI: Status: ACTIVE | Noted: 2023-03-24

## 2023-03-24 PROBLEM — R78.81 E COLI BACTEREMIA: Status: ACTIVE | Noted: 2023-03-24

## 2023-03-24 LAB
ANION GAP SERPL CALCULATED.3IONS-SCNC: 10 MMOL/L (ref 5–15)
BACTERIA BLD CULT: ABNORMAL
BACTERIA SPEC AEROBE CULT: ABNORMAL
BACTERIA SPEC AEROBE CULT: ABNORMAL
BACTERIA SPEC AEROBE CULT: NORMAL
BACTERIA SPEC AEROBE CULT: NORMAL
BASOPHILS # BLD AUTO: 0.1 10*3/MM3 (ref 0–0.2)
BASOPHILS NFR BLD AUTO: 1.4 % (ref 0–1.5)
BOTTLE TYPE: ABNORMAL
BUN SERPL-MCNC: 38 MG/DL (ref 8–23)
BUN/CREAT SERPL: 28.8 (ref 7–25)
CALCIUM SPEC-SCNC: 9.3 MG/DL (ref 8.6–10.5)
CHLORIDE SERPL-SCNC: 110 MMOL/L (ref 98–107)
CO2 SERPL-SCNC: 21 MMOL/L (ref 22–29)
CREAT SERPL-MCNC: 1.32 MG/DL (ref 0.57–1)
DEPRECATED RDW RBC AUTO: 53.4 FL (ref 37–54)
EGFRCR SERPLBLD CKD-EPI 2021: 40.6 ML/MIN/1.73
EOSINOPHIL # BLD AUTO: 0.2 10*3/MM3 (ref 0–0.4)
EOSINOPHIL NFR BLD AUTO: 2.6 % (ref 0.3–6.2)
ERYTHROCYTE [DISTWIDTH] IN BLOOD BY AUTOMATED COUNT: 15.8 % (ref 12.3–15.4)
GLUCOSE SERPL-MCNC: 87 MG/DL (ref 65–99)
GRAM STN SPEC: ABNORMAL
GRAM STN SPEC: ABNORMAL
HCT VFR BLD AUTO: 33.2 % (ref 34–46.6)
HGB BLD-MCNC: 10.6 G/DL (ref 12–15.9)
ISOLATED FROM: ABNORMAL
ISOLATED FROM: ABNORMAL
LYMPHOCYTES # BLD AUTO: 1.7 10*3/MM3 (ref 0.7–3.1)
LYMPHOCYTES NFR BLD AUTO: 28.2 % (ref 19.6–45.3)
MCH RBC QN AUTO: 30.6 PG (ref 26.6–33)
MCHC RBC AUTO-ENTMCNC: 31.9 G/DL (ref 31.5–35.7)
MCV RBC AUTO: 96.1 FL (ref 79–97)
MONOCYTES # BLD AUTO: 0.6 10*3/MM3 (ref 0.1–0.9)
MONOCYTES NFR BLD AUTO: 9.8 % (ref 5–12)
NEUTROPHILS NFR BLD AUTO: 3.6 10*3/MM3 (ref 1.7–7)
NEUTROPHILS NFR BLD AUTO: 58 % (ref 42.7–76)
NRBC BLD AUTO-RTO: 0 /100 WBC (ref 0–0.2)
PLATELET # BLD AUTO: 345 10*3/MM3 (ref 140–450)
PMV BLD AUTO: 7.4 FL (ref 6–12)
POTASSIUM SERPL-SCNC: 4 MMOL/L (ref 3.5–5.2)
RBC # BLD AUTO: 3.46 10*6/MM3 (ref 3.77–5.28)
SODIUM SERPL-SCNC: 141 MMOL/L (ref 136–145)
WBC NRBC COR # BLD: 6.1 10*3/MM3 (ref 3.4–10.8)

## 2023-03-24 PROCEDURE — 80048 BASIC METABOLIC PNL TOTAL CA: CPT | Performed by: INTERNAL MEDICINE

## 2023-03-24 PROCEDURE — 99232 SBSQ HOSP IP/OBS MODERATE 35: CPT | Performed by: INTERNAL MEDICINE

## 2023-03-24 PROCEDURE — 85025 COMPLETE CBC W/AUTO DIFF WBC: CPT | Performed by: INTERNAL MEDICINE

## 2023-03-24 PROCEDURE — 97110 THERAPEUTIC EXERCISES: CPT

## 2023-03-24 PROCEDURE — 97530 THERAPEUTIC ACTIVITIES: CPT

## 2023-03-24 RX ORDER — FUROSEMIDE 20 MG/1
20 TABLET ORAL DAILY
Start: 2023-03-25 | End: 2023-04-18

## 2023-03-24 RX ORDER — DILTIAZEM HYDROCHLORIDE 180 MG/1
180 CAPSULE, COATED, EXTENDED RELEASE ORAL
Start: 2023-03-25 | End: 2023-04-18

## 2023-03-24 RX ORDER — HYDROCODONE BITARTRATE AND ACETAMINOPHEN 5; 325 MG/1; MG/1
1 TABLET ORAL EVERY 8 HOURS PRN
Qty: 30 TABLET | Refills: 0 | Status: SHIPPED | OUTPATIENT
Start: 2023-03-24 | End: 2023-04-18

## 2023-03-24 RX ORDER — FAMOTIDINE 20 MG/1
20 TABLET, FILM COATED ORAL
Start: 2023-03-24

## 2023-03-24 RX ADMIN — APIXABAN 5 MG: 5 TABLET, FILM COATED ORAL at 08:34

## 2023-03-24 RX ADMIN — HYDROCODONE BITARTRATE AND ACETAMINOPHEN 1 TABLET: 5; 325 TABLET ORAL at 10:24

## 2023-03-24 RX ADMIN — AZTREONAM 2 G: 2 INJECTION, POWDER, LYOPHILIZED, FOR SOLUTION INTRAMUSCULAR; INTRAVENOUS at 15:17

## 2023-03-24 RX ADMIN — Medication 10 ML: at 08:34

## 2023-03-24 RX ADMIN — AZTREONAM 2 G: 2 INJECTION, POWDER, LYOPHILIZED, FOR SOLUTION INTRAMUSCULAR; INTRAVENOUS at 05:56

## 2023-03-24 RX ADMIN — METOPROLOL SUCCINATE 50 MG: 50 TABLET, EXTENDED RELEASE ORAL at 08:34

## 2023-03-24 RX ADMIN — FAMOTIDINE 20 MG: 20 TABLET, FILM COATED ORAL at 08:34

## 2023-03-24 RX ADMIN — FUROSEMIDE 20 MG: 40 TABLET ORAL at 08:34

## 2023-03-24 RX ADMIN — DILTIAZEM HYDROCHLORIDE 180 MG: 180 CAPSULE, COATED, EXTENDED RELEASE ORAL at 08:34

## 2023-03-24 NOTE — THERAPY TREATMENT NOTE
"Subjective: Pt agreeable to therapeutic plan of care. \"I cannot walk\"    Objective:     Bed mobility - CGA supine to sit  Transfers - CGA sit to stand from edge of the bed to bedside chair.  Pt needed time to complete task   Ambulation - n/a    Therapeutic Exercise - 10 Reps Bilaterally AROM supported sitting / chair    Vitals: WNL    Pain: 0 VAS       Education: Provided education on the importance of mobility in the acute care setting, Verbal/Tactile Cues and Transfer Training    Assessment: Hazel Bucio presents with functional mobility impairments which indicate the need for skilled intervention. Pt did well with transfers today.  Pt is most likely close to her baseline mobility but does demonstrate potential to improve to gait training.  Pt is more agreeable to rehab at d/c.  Tolerating session today without incident. Will continue to follow and progress as tolerated.     Plan/Recommendations:   Moderate Intensity Therapy recommended post-acute care. This is recommended as therapy feels the patient would require 3-4 days per week and wouldn't tolerate \"3 hour daily\" rehab intensity. SNF would be recommended.  Pt requires no DME at discharge.     Pt desires Skilled Rehab placement at discharge. Pt cooperative; agreeable to therapeutic recommendations and plan of care.     Basic Mobility 6-click:  Rollin = Total, A lot = 2, A little = 3; 4 = None  Supine>Sit:   1 = Total, A lot = 2, A little = 3; 4 = None   Sit>Stand with arms:  1 = Total, A lot = 2, A little = 3; 4 = None  Bed>Chair:   1 = Total, A lot = 2, A little = 3; 4 = None  Ambulate in room:  1 = Total, A lot = 2, A little = 3; 4 = None  3-5 Steps with railin = Total, A lot = 2, A little = 3; 4 = None  Score: 15    Modified [place]: 4 = Moderately severe disability (Unable to attend to own bodily needs without assistance, and unable to walk unassisted)     Post-Tx Position: Up in Chair, Alarms activated and Call light and personal items " within reach   PPE: gloves and surgical mask

## 2023-03-24 NOTE — PLAN OF CARE
Problem: Adult Inpatient Plan of Care  Goal: Plan of Care Review  Outcome: Ongoing, Progressing   Goal Outcome Evaluation:         Pt rested well overnight, c/o right should pain x1, treated per MAR with relief noted. VSS. IV abx given per MAR, discharge pending SNF placement. Pt educated on current plan of care.

## 2023-03-24 NOTE — PROGRESS NOTES
Infectious Diseases Progress Note      LOS: 4 days   Patient Care Team:  Lizzy Francis MD as PCP - General (Family Medicine)  Jose Carlos Cheng MD as Consulting Physician (Cardiology)    Chief Complaint: Patient denies fever, chills, diaphoresis, shortness of breath, cough, GI symptoms, or any urinary symptoms.    Subjective       The patient has been afebrile for the last 24 hours.  The patient is on room air, hemodynamically stable, and is tolerating antimicrobial therapy.  She is still feeling fairly weak but is doing better today-currently in the chair      Review of Systems:   Review of Systems   HENT: Negative.    Eyes: Negative.    Respiratory: Negative.    Cardiovascular: Negative.    Gastrointestinal: Negative.    Endocrine: Negative.    Genitourinary: Negative.    Musculoskeletal: Negative.    Skin: Negative.    Neurological: Positive for weakness.   Psychiatric/Behavioral: Negative.    All other systems reviewed and are negative.       Objective     Vital Signs  Temp:  [97.6 °F (36.4 °C)-98.4 °F (36.9 °C)] 97.9 °F (36.6 °C)  Heart Rate:  [74-87] 74  Resp:  [16-21] 16  BP: (125-146)/(66-80) 129/74    Physical Exam:  Physical Exam  Vitals and nursing note reviewed.   Constitutional:       General: She is not in acute distress.     Appearance: She is well-developed and normal weight. She is ill-appearing. She is not diaphoretic.   HENT:      Head: Normocephalic and atraumatic.   Eyes:      General: No scleral icterus.     Extraocular Movements: Extraocular movements intact.      Conjunctiva/sclera: Conjunctivae normal.      Pupils: Pupils are equal, round, and reactive to light.   Cardiovascular:      Rate and Rhythm: Normal rate and regular rhythm.      Heart sounds: Normal heart sounds, S1 normal and S2 normal. No murmur heard.  Pulmonary:      Effort: Pulmonary effort is normal. No respiratory distress.      Breath sounds: Normal breath sounds. No stridor. No wheezing or rales.   Chest:      Chest  wall: No tenderness.   Abdominal:      General: Bowel sounds are normal. There is no distension.      Palpations: Abdomen is soft. There is no mass.      Tenderness: There is no abdominal tenderness. There is no guarding.   Genitourinary:     Comments: Ileal conduit  Musculoskeletal:         General: No swelling, tenderness or deformity. Normal range of motion.      Cervical back: Neck supple.   Skin:     General: Skin is warm and dry.      Coloration: Skin is not pale.      Findings: No bruising, erythema or rash.   Neurological:      General: No focal deficit present.      Mental Status: She is alert and oriented to person, place, and time. Mental status is at baseline.      Cranial Nerves: No cranial nerve deficit.   Psychiatric:         Mood and Affect: Mood normal.          Results Review:    I have reviewed all clinical data, test, lab, and imaging results.     Radiology  No Radiology Exams Resulted Within Past 24 Hours    Cardiology    Laboratory    Results from last 7 days   Lab Units 03/24/23  0602 03/23/23  0711 03/22/23  1231 03/20/23  2345 03/20/23  0650 03/18/23  2041   WBC 10*3/mm3 6.10 6.80 7.00 8.80 10.60 13.90*   HEMOGLOBIN g/dL 10.6* 11.2* 11.9* 10.8* 10.8* 12.3   HEMATOCRIT % 33.2* 34.0 37.0 35.8 34.1 39.8   PLATELETS 10*3/mm3 345 358 346 250 250 291     Results from last 7 days   Lab Units 03/24/23  0602 03/23/23  0711 03/22/23  1231 03/20/23  2345 03/20/23  0650 03/18/23  2041   SODIUM mmol/L 141 141 140 140 141 140   POTASSIUM mmol/L 4.0 4.1 4.0 4.5 4.2 4.4   CHLORIDE mmol/L 110* 109* 107 101 109* 106   CO2 mmol/L 21.0* 21.0* 22.0 30.0* 22.0 21.0*   BUN mg/dL 38* 38* 37* 18 32* 28*   CREATININE mg/dL 1.32* 1.34* 1.44* 0.59 1.38* 1.31*   GLUCOSE mg/dL 87 91 106* 90 105* 119*   ALBUMIN g/dL  --   --  3.8  --   --  4.0   BILIRUBIN mg/dL  --   --  0.3  --   --  0.6   ALK PHOS U/L  --   --  121*  --   --  111   AST (SGOT) U/L  --   --  15  --   --  24   ALT (SGPT) U/L  --   --  8  --   --  9    CALCIUM mg/dL 9.3 9.4 9.7 9.2 9.3 9.8     Results from last 7 days   Lab Units 03/18/23 2041   CK TOTAL U/L 29             Microbiology   Microbiology Results (last 10 days)     Procedure Component Value - Date/Time    Blood Culture - Blood, Arm, Right [[id number]]  (Normal) Collected: 03/19/23 1556    Lab Status: Preliminary result Specimen: Blood from Arm, Right Updated: 03/23/23 1615     Blood Culture No growth at 4 days    Narrative:      Less than seven (7) mL's of blood was collected.  Insufficient quantity may yield false negative results.    Blood Culture - Blood, Arm, Right [[id number]]  (Normal) Collected: 03/19/23 1556    Lab Status: Preliminary result Specimen: Blood from Arm, Right Updated: 03/23/23 1615     Blood Culture No growth at 4 days    Narrative:      Less than seven (7) mL's of blood was collected.  Insufficient quantity may yield false negative results.    Blood Culture - Blood, Arm, Left [[id number]]  (Abnormal) Collected: 03/18/23 2132    Lab Status: Final result Specimen: Blood from Arm, Left Updated: 03/22/23 0721     Blood Culture Escherichia coli     Isolated from Anaerobic Bottle     Gram Stain Anaerobic Bottle Gram negative bacilli    Narrative:      Refer to previous blood culture collected on 03/18/2023 2041 for MICs    Less than seven (7) mL's of blood was collected.  Insufficient quantity may yield false negative results.    Respiratory Panel PCR w/COVID-19(SARS-CoV-2) VINCENT/STEFANY/ANNY/PAD/COR/MAD/MANASA In-House, NP Swab in UTM/[facility], 3-4 HR TAT - Swab, Nasopharynx [[id number]]  (Normal) Collected: 03/18/23 2044    Lab Status: Final result Specimen: Swab from Nasopharynx Updated: 03/18/23 2138     ADENOVIRUS, PCR Not Detected     Coronavirus 229E Not Detected     Coronavirus HKU1 Not Detected     Coronavirus NL63 Not Detected     Coronavirus OC43 Not Detected     COVID19 Not Detected     Human Metapneumovirus Not Detected     Human Rhinovirus/Enterovirus Not Detected     Influenza A PCR Not  Detected     Influenza B PCR Not Detected     Parainfluenza Virus 1 Not Detected     Parainfluenza Virus 2 Not Detected     Parainfluenza Virus 3 Not Detected     Parainfluenza Virus 4 Not Detected     RSV, PCR Not Detected     Bordetella pertussis pcr Not Detected     Bordetella parapertussis PCR Not Detected     Chlamydophila pneumoniae PCR Not Detected     Mycoplasma pneumo by PCR Not Detected    Narrative:      In the setting of a positive respiratory panel with a viral infection PLUS a negative procalcitonin without other underlying concern for bacterial infection, consider observing off antibiotics or discontinuation of antibiotics and continue supportive care. If the respiratory panel is positive for atypical bacterial infection (Bordetella pertussis, Chlamydophila pneumoniae, or Mycoplasma pneumoniae), consider antibiotic de-escalation to target atypical bacterial infection.    Urine Culture - Urine, Urostomy [[id number]]  (Abnormal)  (Susceptibility) Collected: 03/18/23 2043    Lab Status: Edited Result - FINAL Specimen: Urine from Urostomy Updated: 03/20/23 1311     Urine Culture >100,000 CFU/mL Escherichia coli    Narrative:      Colonization of the urinary tract without infection is common. Treatment is discouraged unless the patient is symptomatic, pregnant, or undergoing an invasive urologic procedure.    Susceptibility      Escherichia coli      ASHLEY      Ampicillin Resistant     Ampicillin + Sulbactam Intermediate      Aztreonam Susceptible (C)  [1]       Cefazolin Susceptible      Cefepime Susceptible      Ceftazidime Susceptible      Ceftriaxone Susceptible      Gentamicin Susceptible      Levofloxacin Susceptible      Nitrofurantoin Susceptible      Piperacillin + Tazobactam Susceptible      Trimethoprim + Sulfamethoxazole Susceptible                   [1]  Appended report. These results have been appended to a previously final verified report.                 Blood Culture - Blood, Arm, Left  [[id number]]  (Abnormal)  (Susceptibility) Collected: 03/18/23 2041    Lab Status: Edited Result - FINAL Specimen: Blood from Arm, Left Updated: 03/24/23 1034     Blood Culture Escherichia coli     Isolated from Anaerobic Bottle     Blood Culture Staphylococcus, coagulase negative     Isolated from Aerobic Bottle     Gram Stain Anaerobic Bottle Gram negative bacilli      Aerobic Bottle Gram positive cocci in clusters     Comment: Appended report. These results have been appended to a previously final verified report.       Narrative:      Staphylococcus, coagulase negative:  Probable contaminant requires clinical correlation, susceptibility not performed unless requested by physician.      Susceptibility      Escherichia coli      ASHLEY      Ampicillin Resistant     Ampicillin + Sulbactam Resistant     Aztreonam Susceptible (C)  [1]       Cefepime Susceptible      Ceftazidime Susceptible      Ceftriaxone Susceptible      Gentamicin Susceptible      Levofloxacin Susceptible      Piperacillin + Tazobactam Susceptible      Trimethoprim + Sulfamethoxazole Susceptible                   [1]  Appended report. These results have been appended to a previously final verified report.             Susceptibility Comments     Escherichia coli    Cefazolin sensitivity will not be reported for Enterobacteriaceae in non-urine isolates. If cefazolin is preferred, please call the microbiology lab to request an E-test.  With the exception of urinary-sourced infections, aminoglycosides should not be used as monotherapy.    Aztreonam reported per Sarina Elena D. of [facility]             Blood Culture ID, PCR - Blood, Arm, Left [[id number]]  (Abnormal) Collected: 03/18/23 2041    Lab Status: Final result Specimen: Blood from Arm, Left Updated: 03/19/23 1233     BCID, PCR Escherichia coli. Identification by BCID2 PCR.     BOTTLE TYPE Anaerobic Bottle    Narrative:      No resistance genes detected.    Blood Culture ID, PCR - Blood, Arm,  Left [[id number]]  (Abnormal) Collected: 03/18/23 2041    Lab Status: Final result Specimen: Blood from Arm, Left Updated: 03/24/23 1009     BCID, PCR Staph spp, not aureus or lugdunensis. Identification by BCID2 PCR.     BOTTLE TYPE Aerobic Bottle          Medication Review:       Schedule Meds  apixaban, 5 mg, Oral, BID  aztreonam, 2 g, Intravenous, Q8H  dilTIAZem CD, 180 mg, Oral, Q24H  famotidine, 20 mg, Oral, BID AC  furosemide, 20 mg, Oral, Daily  metoprolol succinate XL, 50 mg, Oral, Q12H  sodium chloride, 10 mL, Intravenous, Q12H  sodium chloride, 3 mL, Intravenous, Q12H  topiramate, 100 mg, Oral, Nightly        Infusion Meds       PRN Meds  •  acetaminophen  •  HYDROcodone-acetaminophen  •  ondansetron  •  sodium chloride  •  sodium chloride  •  sodium chloride  •  sodium chloride        Assessment & Plan       Antimicrobial Therapy   1.  IV aztreonam        2.        3.        4.        5.            Assessment=     E. coli bacteremia secondary to complicated UTI.     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  The organism is susceptible to aztreonam     History of multiple drug allergies including cephalosporins, penicillins and quinolones.  Patient is tolerating IV aztreonam without any side effect     History of renal cell carcinoma bladder cancer.  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    Please fax all post discharge lab results, imaging studies and correspondence to this fax number [phone]  For any question or concern please contact our  service number [phone]    Sarina Jon, CHELLE  03/24/23  14:19 EDT    Note is dictated utilizing voice recognition software/Dragon

## 2023-03-24 NOTE — CASE MANAGEMENT/SOCIAL WORK
Continued Stay Note  NYDIA Briscoe     Patient Name: Hazel Bucio  MRN: [id number]  Today's Date: 3/24/2023    Admit Date: 3/18/2023    Plan: Accepted to [facility] for IV abx, with bed available today. Pt friend Sawyer will transport. [facility] accepted.   Discharge Plan     Row Name 03/24/23 1500       Plan    Plan Accepted to [facility] for IV abx, with bed available today. Pt friend Sawyer will transport. [facility] accepted.    Plan Comments Received text from liaison Brandee patient accepted to [facility], with bed available today. Pt is agreeable. MD and nurse notified via secure chat. Spoke with pt friend Sawyer and he can provide transportation.                    Expected Discharge Date and Time     Expected Discharge Date Expected Discharge Time    Mar 24, 2023             Delmis Fernandez RN

## 2023-03-24 NOTE — PROGRESS NOTES
Cardiology  Cardiology consult note  Augustin Ellsworth MD, PhD      Subjective:     Encounter Date:03/18/2023      Patient ID: Hazel Bucio is a 82 y.o. female.    Chief Complaint: weakness, fever      HPI:  Hazel Bucio is a 82 y.o. female well-known to me with a past cardiac history of permanent afib (anticoaguled with coumadin) and SSS s/p [employer] PPM.  Last 2D echo 9/2022 showed an EF = 60-65% with mild to moderate MR/TR and mild AI.  Last nuclear stress test 2020 showed no ischemia.  PMH includes HTN, CKD stage 3, bladder cancer, and renal cancer. Patient presented with c/o weakness and fever and found with UTI as well as afib with RVR.  She received IV diltiazem.  Cardiology was consulted for afib management.    Tele shows afib in the 70s currently well controlled.  Patient states that she did not have any palpitations, dizziness, chest pain, or SOA.  Prior to this admit, she has been able to perform activities such as housework without unusual difficulty.  She denies PND/orthopnea or edema.  Her BP was elevated on admit.  She reports compliance with medications.  She reports that she had her abdominal hernia repair 3-4 months ago without complications.   .  She was admitted with sepsis, found to have E. coli bacteremia now on antibiotics, she had A-fib RVR ultimately started on diltiazem drip for control, she likely had some hypotension with renal injury which has been improved  =====================================================================    Today laying in bed comfortable no distress, no new fevers overnight    No chest pain no shortness of breath  C atrial fibrillation well controlled 80s to 90s  No further pacing with decrease in rate control agents  Continuing metoprolol to 50 twice daily, scheduling diltiazem 180 daily, stop digoxin with adequate blood pressure yesterday  Creatinine much improved appears euvolemic    Tolerating Eliquis without bleeding  Stop high risk medicines digoxin  yesterday  Does not appear volume overloaded  No acute events overnight    Brief plan today  Continue metoprolol XL to 50 twice daily to avoid pacing and allow heart rate to be 70s to 90s t  Continue diltiazem sustained-release 120 daily continue  Remain off digoxin  Avoid hypotension  Anticoagulation continue  Continue to treat sepsis, IV antibiotics on board with aztreonam for UTI urosepsis      Past Medical History:   Diagnosis Date   • Bladder cancer (HCC)    • Cancer ([facility])     breast, bladder   • Deep vein thrombosis ([facility])    • Essential hypertension 1/17/2017   • Fracture tibia/fibula, right, closed, initial encounter 8/27/2020   • GERD (gastroesophageal reflux disease)    • History of urostomy    • Immobility 08/27/2020    r/t broken Tibia    • Kidney carcinoma, right (HCC)     removed    • Long term current use of anticoagulant 1/9/2015   • PAF (paroxysmal atrial fibrillation) ([facility]) 6/26/2019   • Presence of cardiac pacemaker 11/03/2014    BS dual chamber PM placed 10/2014 with pocket revision 1/25/17.  HX tachy rob syndrome   • Sick sinus syndrome ([facility]) 11/3/2014         Past Surgical History:   Procedure Laterality Date   • BREAST LUMPECTOMY     • CHOLECYSTECTOMY N/A 10/12/2020    Procedure: CHOLECYSTECTOMY LAPAROSCOPIC;  Surgeon: Go Pereira DO;  Location: [facility] OR;  Service: General;  Laterality: N/A;   • CYSTECTOMY W/ URETEROILEAL CONDUIT     • HARDWARE REMOVAL Right 6/11/2021    Procedure: TIBIA HARDWARE REMOVAL;  Surgeon: Geoff Shah II, MD;  Location: [facility] OR;  Service: Orthopedics;  Laterality: Right;   • HERNIA REPAIR     • HYSTERECTOMY     • INSERT / REPLACE / REMOVE PACEMAKER     • NEPHRECTOMY Right    • TIBIA IM NAILING/RODDING Right 8/28/2020    Procedure: TIBIA INTRAMEDULLARY NAIL/ABRAHAM INSERTION;  Surgeon: Geoff hSah II, MD;  Location: [facility] MAIN OR;  Service: Orthopedics;  Laterality: Right;         Social History     Socioeconomic History   •  "Marital status:    Tobacco Use   • Smoking status: Never   • Smokeless tobacco: Never   Vaping Use   • Vaping Use: Never used   Substance and Sexual Activity   • Alcohol use: Not Currently   • Drug use: Never   • Sexual activity: Defer       Family History   Problem Relation Age of Onset   • COPD Father          Allergies   Allergen Reactions   • Amoxicillin Shortness Of Breath   • Ciprofloxacin Hives   • Oxycodone-Acetaminophen Unknown - High Severity     Pt's son stated when pt fell and broke her leg she was put on oxycodone; pt's son stated pt's \"vitals went all out of wack, she couldn't remember things.\"   • Penicillins Rash   • Sulfa Antibiotics Hives   • Cephalexin Other (See Comments)   • Clavulanic Acid Other (See Comments)   • Codeine Other (See Comments)   • Contrast Dye (Echo Or Unknown Ct/Mr) Other (See Comments)     8/18/22    • Doxycycline Other (See Comments)   • Fluconazole Other (See Comments)   • Iodine Hives   • Macrobid [Nitrofurantoin] Hives   • Tobramycin Other (See Comments)   • Trimethoprim Other (See Comments)       Current Medications:   Scheduled Meds:apixaban, 5 mg, Oral, BID  aztreonam, 2 g, Intravenous, Q8H  dilTIAZem CD, 180 mg, Oral, Q24H  famotidine, 20 mg, Oral, BID AC  furosemide, 20 mg, Oral, Daily  metoprolol succinate XL, 50 mg, Oral, Q12H  sodium chloride, 10 mL, Intravenous, Q12H  sodium chloride, 3 mL, Intravenous, Q12H  topiramate, 100 mg, Oral, Nightly      Review of systems negative x14 point review of systems except as mentioned above         Objective:         /74 (BP Location: Left arm, Patient Position: Lying)   Pulse 74   Temp 97.9 °F (36.6 °C) (Oral)   Resp 16   Ht 167.6 cm (66\")   Wt 61.9 kg (136 lb 7.4 oz)   SpO2 97%   BMI 22.03 kg/m²       General: Frail, in NAD.  Nasal cannula  Neuro: AAOx3. No gross deficits.  Moves all extremities  HEENT: sclera clear, no xanthalasmas.  CV: irregular controlled rates S1S2 RRR. N 1 out of 6 systolic ejection " murmurs.  Resp: Breathing is unlabored. Lungs coarse bases.  Diminished somewhat  GI: BS+. Abdomen soft and NTTP.  Ext: Pedal pulses are palpable. Extremities are non-edematous.  MS: moves all extremities, no weakness.  Skin: warm, dry.  Psych: calm and cooperative.  Unchanged from prior encounter            Lab Review:     Results from last 7 days   Lab Units 03/24/23  0602 03/23/23  0711 03/22/23  1231 03/20/23  0650 03/18/23  2041   SODIUM mmol/L 141 141 140   < > 140   POTASSIUM mmol/L 4.0 4.1 4.0   < > 4.4   CHLORIDE mmol/L 110* 109* 107   < > 106   CO2 mmol/L 21.0* 21.0* 22.0   < > 21.0*   BUN mg/dL 38* 38* 37*   < > 28*   CREATININE mg/dL 1.32* 1.34* 1.44*   < > 1.31*   GLUCOSE mg/dL 87 91 106*   < > 119*   CALCIUM mg/dL 9.3 9.4 9.7   < > 9.8   AST (SGOT) U/L  --   --  15  --  24   ALT (SGPT) U/L  --   --  8  --  9    < > = values in this interval not displayed.     Results from last 7 days   Lab Units 03/18/23  2041   CK TOTAL U/L 29   HSTROP T ng/L 25*     Results from last 7 days   Lab Units 03/24/23  0602 03/23/23  0711   WBC 10*3/mm3 6.10 6.80   HEMOGLOBIN g/dL 10.6* 11.2*   HEMATOCRIT % 33.2* 34.0   PLATELETS 10*3/mm3 345 358     Results from last 7 days   Lab Units 03/18/23  2041   INR  1.05   APTT seconds 22.5*     Results from last 7 days   Lab Units 03/20/23  0650   MAGNESIUM mg/dL 1.9           Invalid input(s): LDLCALC  Results from last 7 days   Lab Units 03/18/23 2041   PROBNP pg/mL 7,303.0*     Results from last 7 days   Lab Units 03/18/23  2041   TSH uIU/mL 0.880       Recent Radiology:  Imaging Results (Most Recent)     Procedure Component Value Units Date/Time    CT Abdomen Pelvis Without Contrast [[id number]] Collected: 03/21/23 0853     Updated: 03/21/23 0905    Narrative:      CT ABDOMEN PELVIS WO CONTRAST    Date of Exam: 3/21/2023 7:47 AM EDT    Indication: UTI, recurrent/complicated (Female).    Comparison: August 18, 2022    Technique: Axial CT images were obtained of the abdomen and  pelvis without the administration of contrast. Sagittal and coronal reconstructions were performed.  Automated exposure control and iterative reconstruction methods were used.     Findings:  Within the lung bases is bibasilar atelectasis.    The unenhanced liver, adrenal glands, spleen, and pancreas are unremarkable. The gallbladder is surgically absent. The right kidney is absent. There is a nonobstructing left renal stone.    The stomach appears normal. The small bowel appears normal in caliber. The colon appears normal. The appendix is not well-visualized. There is no ascites or loculated collection. No abnormally enlarged lymph nodes are identified.    The rectum is unremarkable. The uterus and urinary bladder are surgically absent, with a right lower quadrant urostomy. A previously identified right parastomal hernia is no longer present.    No aggressive osseous lesions are identified.      Impression:      Impression:  1.No acute process identified within abdomen/pelvis.  2.Nonobstructing left renal stone.  3.Changes from cystectomy with right lower quadrant urostomy.      Electronically Signed: Santiago Oshea    3/21/2023 9:03 AM EDT    Workstation ID: [id number]    XR Shoulder 2+ View Right [[id number]] Collected: 03/19/23 0727     Updated: 03/19/23 0730    Narrative:      XR SHOULDER 2+ VW RIGHT    Date of Exam: 3/18/2023 8:53 PM EDT    Indication: pain.  Right shoulder pain.     Comparison: None available.    Findings:  There is advanced degenerative change. There is generalized osteopenia. No acute fracture is evident.      Impression:      Impression:  Advanced degenerative changes. Generalized osteopenia.    Electronically Signed: Issa Barnett    3/19/2023 7:28 AM EDT    Workstation ID: [id number]    XR Chest 1 View [[id number]] Collected: 03/19/23 0724     Updated: 03/19/23 0729    Narrative:      XR CHEST 1 VW    Date of Exam: 3/18/2023 8:52 PM EDT    Indication: chest pain.    Comparison:  12/7/2022    Findings:  Left subclavian dual-lead cardiac stimulator device again noted. There is cardiomegaly. There is mixed interstitial and alveolar opacity in the perihilar regions, right greater than left. This may be due to pulmonary edema or atypical infection pattern.   No pleural fluid is seen. No pneumothorax is evident. Aortic vascular calcification is noted. Calcified lymph nodes again noted in the right paratracheal and hilar regions.      Impression:      Impression:  Hazy mixed interstitial alveolar opacities in the perihilar regions, right greater than left suggesting edema or atypical infection pattern.    Electronically Signed: Issafarhan Barnett    3/19/2023 7:26 AM EDT    Workstation ID: [id number]            ECHOCARDIOGRAM:    Results for orders placed during the hospital encounter of 09/24/21    Adult Transthoracic Echo Complete W/ Cont if Necessary Per Protocol    Interpretation Summary  · Estimated left ventricular EF was in agreement with the calculated left ventricular EF. Left ventricular ejection fraction appears to be 61 - 65%. Left ventricular systolic function is normal.  · Left atrial volume is moderately increased.  · The right atrial cavity is moderately dilated.  · Estimated right ventricular systolic pressure from tricuspid regurgitation is normal (<35 mmHg).            Assessment:         Active Hospital Problems    Diagnosis  POA   • **Fever [R50.9]  Yes   • Atrial fibrillation with RVR (HCC) [I48.91]  Yes     1) Permanent atrial fibrillation with RVR --> CVR  - RVR in setting UTI, much improved with modification of beta-blocker, calcium channel blocker and digoxin  -continue monitoring with high risk medicines, digoxin level desired 0.8-1.2  Avoid hypotension  - K greater than 4 magnesium greater than 2 desired  - TSH WNL  - anticoagulated with coumadin at home --> receiving eliquis here, stable  - Last 2D echo 9/2022 showed an EF = 60-65% with mild to moderate MR/TR and mild AI.  Last  nuclear stress test 2020 showed no ischemia.  Chest pain-free    2) SSS s/p [employer] PPM, interrogate device, had been SACHIN needing replacement, will evaluate leads as well at that time  Tachybradycardia syndrome    3) UTI with urosepsis, E. coli bacteremia  -E. coli with bacteremia  - on abx    4) HTN, stable avoid hypotension    5) CKD stage 3, improved creatinine less than 1    6) Hx renal cancer s/p right nephrectomy       Modifications to medicines as above    Continue to follow  Adjust rate and rhythm control strategy as indicated  Device normal functioning  Chest pain-free  Continue to monitor high risk medicines  Multiple cardiac comorbidities addressed individually as documented above    Thank you for the consult is a pleasure to be involved in her cardiovascular care.  Please call with any questions or concerns  Augustin Ellsworth MD, PhD

## 2023-03-24 NOTE — PLAN OF CARE
"Goal Outcome Evaluation:     Bed mobility - CGA supine to sit  Transfers - CGA sit to stand from edge of the bed to bedside chair.  Pt needed time to complete task   Ambulation - n/a    Therapeutic Exercise - 10 Reps Bilaterally AROM supported sitting / chair     Moderate Intensity Therapy recommended post-acute care. This is recommended as therapy feels the patient would require 3-4 days per week and wouldn't tolerate \"3 hour daily\" rehab intensity. SNF would be recommended.  Pt requires no DME at discharge.     Pt desires Skilled Rehab placement at discharge. Pt cooperative; agreeable to therapeutic recommendations and plan of care.            "

## 2023-03-24 NOTE — PROGRESS NOTES
LOS: 4 days   Patient Care Team:  Lizzy Francis MD as PCP - General (Family Medicine)  Jose Carlos Cheng MD as Consulting Physician (Cardiology)    Subjective     Interval History: No significant change    Patient Complaints: No specific complaints    History taken from: patient    Review of Systems   Constitutional: Positive for fatigue.   All other systems reviewed and are negative.          Objective     Vital Signs  Temp:  [97.6 °F (36.4 °C)-98.4 °F (36.9 °C)] 97.9 °F (36.6 °C)  Heart Rate:  [74-87] 74  Resp:  [16-21] 16  BP: (125-146)/(66-80) 129/74    Physical Exam:     General Appearance:    Alert, cooperative, in no acute distress, hard of hearing   Head:    Normocephalic, without obvious abnormality, atraumatic   Eyes:            Lids and lashes normal, conjunctivae and sclerae normal, no   icterus, no pallor, corneas clear, PERRLA   Ears:    Ears appear intact with no abnormalities noted   Throat:   No oral lesions, no thrush, oral mucosa moist   Neck:   No adenopathy, supple, trachea midline, no thyromegaly, no   carotid bruit, no JVD   Lungs:     Clear to auscultation,respirations regular, even and                  unlabored    Heart:    Regular rhythm and normal rate, normal S1 and S2, no            murmur, no gallop, no rub, no click   Chest Wall:    No abnormalities observed   Abdomen:    Urostomy right lower quadrant, draining clear urine, parastomal hernia is easily reduced   Extremities:   Moves all extremities well, no edema, no cyanosis, no             Redness   Pulses:   Pulses palpable and equal bilaterally   Skin:   No bleeding, bruising or rash   Lymph nodes:   No palpable adenopathy   Neurologic:   Cranial nerves 2 - 12 grossly intact, sensation intact, DTR       present and equal bilaterally        Results Review:    Lab Results (last 24 hours)     Procedure Component Value Units Date/Time    Blood Culture - Blood, Arm, Left [[id number]]  (Abnormal)  (Susceptibility) Collected:  03/18/23 2041    Specimen: Blood from Arm, Left Updated: 03/24/23 1034     Blood Culture Escherichia coli     Isolated from Anaerobic Bottle     Blood Culture Staphylococcus, coagulase negative     Isolated from Aerobic Bottle     Gram Stain Anaerobic Bottle Gram negative bacilli      Aerobic Bottle Gram positive cocci in clusters     Comment: Appended report. These results have been appended to a previously final verified report.       Narrative:      Staphylococcus, coagulase negative:  Probable contaminant requires clinical correlation, susceptibility not performed unless requested by physician.      Susceptibility      Escherichia coli      ASHLEY      Ampicillin Resistant     Ampicillin + Sulbactam Resistant     Aztreonam Susceptible (C)  [1]       Cefepime Susceptible      Ceftazidime Susceptible      Ceftriaxone Susceptible      Gentamicin Susceptible      Levofloxacin Susceptible      Piperacillin + Tazobactam Susceptible      Trimethoprim + Sulfamethoxazole Susceptible                   [1]  Appended report. These results have been appended to a previously final verified report.             Susceptibility Comments     Escherichia coli    Cefazolin sensitivity will not be reported for Enterobacteriaceae in non-urine isolates. If cefazolin is preferred, please call the microbiology lab to request an E-test.  With the exception of urinary-sourced infections, aminoglycosides should not be used as monotherapy.    Aztreonam reported per Sarina YUEN of [place]             Blood Culture ID, PCR - Blood, Arm, Left [[id number]]  (Abnormal) Collected: 03/18/23 2041    Specimen: Blood from Arm, Left Updated: 03/24/23 1009     BCID, PCR Staph spp, not aureus or lugdunensis. Identification by BCID2 PCR.     BOTTLE TYPE Aerobic Bottle    Basic Metabolic Panel [[id number]]  (Abnormal) Collected: 03/24/23 0602    Specimen: Blood Updated: 03/24/23 0640     Glucose 87 mg/dL      BUN 38 mg/dL      Creatinine 1.32 mg/dL       Sodium 141 mmol/L      Potassium 4.0 mmol/L      Comment: Slight hemolysis detected by analyzer. Results may be affected.        Chloride 110 mmol/L      CO2 21.0 mmol/L      Calcium 9.3 mg/dL      BUN/Creatinine Ratio 28.8     Anion Gap 10.0 mmol/L      eGFR 40.6 mL/min/1.73     Narrative:      GFR Normal >60  Chronic Kidney Disease <60  Kidney Failure <15    The GFR formula is only valid for adults with stable renal function between ages 18 and 70.    CBC & Differential [[id number]]  (Abnormal) Collected: 03/24/23 0602    Specimen: Blood Updated: 03/24/23 0618    Narrative:      The following orders were created for panel order CBC & Differential.  Procedure                               Abnormality         Status                     ---------                               -----------         ------                     CBC Auto Differential[[id number]]        Abnormal            Final result                 Please view results for these tests on the individual orders.    CBC Auto Differential [[id number]]  (Abnormal) Collected: 03/24/23 0602    Specimen: Blood Updated: 03/24/23 0618     WBC 6.10 10*3/mm3      RBC 3.46 10*6/mm3      Hemoglobin 10.6 g/dL      Hematocrit 33.2 %      MCV 96.1 fL      MCH 30.6 pg      MCHC 31.9 g/dL      RDW 15.8 %      RDW-SD 53.4 fl      MPV 7.4 fL      Platelets 345 10*3/mm3      Neutrophil % 58.0 %      Lymphocyte % 28.2 %      Monocyte % 9.8 %      Eosinophil % 2.6 %      Basophil % 1.4 %      Neutrophils, Absolute 3.60 10*3/mm3      Lymphocytes, Absolute 1.70 10*3/mm3      Monocytes, Absolute 0.60 10*3/mm3      Eosinophils, Absolute 0.20 10*3/mm3      Basophils, Absolute 0.10 10*3/mm3      nRBC 0.0 /100 WBC     Blood Culture - Blood, Arm, Right [[id number]]  (Normal) Collected: 03/19/23 1556    Specimen: Blood from Arm, Right Updated: 03/23/23 1615     Blood Culture No growth at 4 days    Narrative:      Less than seven (7) mL's of blood was collected.  Insufficient quantity may  yield false negative results.    Blood Culture - Blood, Arm, Right [[id number]]  (Normal) Collected: 03/19/23 1556    Specimen: Blood from Arm, Right Updated: 03/23/23 1615     Blood Culture No growth at 4 days    Narrative:      Less than seven (7) mL's of blood was collected.  Insufficient quantity may yield false negative results.           Imaging Results (Last 24 Hours)     ** No results found for the last 24 hours. **               I reviewed the patient's new clinical results.    Medication Review:   Scheduled Meds:apixaban, 5 mg, Oral, BID  aztreonam, 2 g, Intravenous, Q8H  dilTIAZem CD, 180 mg, Oral, Q24H  famotidine, 20 mg, Oral, BID AC  furosemide, 20 mg, Oral, Daily  metoprolol succinate XL, 50 mg, Oral, Q12H  sodium chloride, 10 mL, Intravenous, Q12H  sodium chloride, 3 mL, Intravenous, Q12H  topiramate, 100 mg, Oral, Nightly      Continuous Infusions:   PRN Meds:.•  acetaminophen  •  HYDROcodone-acetaminophen  •  ondansetron  •  sodium chloride  •  sodium chloride  •  sodium chloride  •  sodium chloride     Assessment & Plan       Fever    Atrial fibrillation with RVR (HCC)    History of bladder cancer  Status post ileal conduit with urostomy  E. coli bacteremia -appropriately treated with aztreonam; follow-up blood cultures remain negative  UTI-complete 2-week course of aztreonam  Elevated creatinine-trending down today, closer to baseline  Permanent atrial fibs-rate is now controlled; she is adequately anticoagulated  Chronic headaches-continue topiramate     Eliquis for DVT prophylaxis   Famotidine for stress ulcer prophylaxis        Plan for disposition:Ready for discharge to SNF for rehab and IV antibiotics.      Lora Henderson MD  03/24/23  12:14 EDT

## 2023-03-24 NOTE — DISCHARGE SUMMARY
Date of Discharge:  3/24/2023    Discharge Diagnosis:   **Sepsis due to gram-negative UTI (HCC) [A41.50, N39.0]   E coli bacteremia [R78.81, B96.20]   Atrial fibrillation with RVR (HCC) [I48.91]   Fever [R50.9]   Acute UTI [N39.0]   Essential hypertension [I10]   Long term current use of anticoagulant [Z79.01]   Presence of cardiac pacemaker [Z95.0]   Sick sinus syndrome (HCC) [I49.5]   Type 2 diabetes mellitus (HCC) [E11.9]       Presenting Problem/History of Present Illness  Active Hospital Problems    Diagnosis  POA   • **Sepsis due to gram-negative UTI (HCC) [A41.50, N39.0]  Yes   • E coli bacteremia [R78.81, B96.20]  Yes   • Atrial fibrillation with RVR (HCC) [I48.91]  Yes   • Fever [R50.9]  Yes   • Acute UTI [N39.0]  Yes   • Essential hypertension [I10]  Yes   • Long term current use of anticoagulant [Z79.01]  Not Applicable   • Presence of cardiac pacemaker [Z95.0]  Yes   • Sick sinus syndrome (HCC) [I49.5]  Yes   • Type 2 diabetes mellitus (HCC) [E11.9]  Yes      Resolved Hospital Problems   No resolved problems to display.          Hospital Course  Patient is a 82 y.o. female with h/o bladder cancer with permanent urostomy presented with weakness and fever.  She had UTI, e coli bacteremia and atrial fib with RVR.  She improved with appropriate antibiotic therapy.  Because of multiple drug allergies, she is being treated with IV azactam.  Mental status returned to baseline and she improved daily with antibiotics.  Heart rate was controlled with medication adjustments.  At the time of discharge she remains in atrial fib at a controlled rate.  She is fully oriented.  She is being transferred to [facility] for strengthening and to complete IV antibiotics (total of 2 weeks therapy).      Procedures Performed         Consults:   Consults     Date and Time Order Name Status Description    3/21/2023 12:33 AM Inpatient Infectious Diseases Consult Completed     3/19/2023 12:34 AM Inpatient Cardiology Consult  Completed           Pertinent Test Results:    Lab Results (most recent)     Procedure Component Value Units Date/Time    Blood Culture - Blood, Arm, Right [[id number]]  (Normal) Collected: 03/19/23 1556    Specimen: Blood from Arm, Right Updated: 03/24/23 1616     Blood Culture No growth at 5 days    Narrative:      Less than seven (7) mL's of blood was collected.  Insufficient quantity may yield false negative results.    Blood Culture - Blood, Arm, Right [[id number]]  (Normal) Collected: 03/19/23 1556    Specimen: Blood from Arm, Right Updated: 03/24/23 1616     Blood Culture No growth at 5 days    Narrative:      Less than seven (7) mL's of blood was collected.  Insufficient quantity may yield false negative results.    Blood Culture ID, PCR - Blood, Arm, Left [[id number]]  (Abnormal) Collected: 03/18/23 2041    Specimen: Blood from Arm, Left Updated: 03/24/23 1009     BCID, PCR Staph spp, not aureus or lugdunensis. Identification by BCID2 PCR.     BOTTLE TYPE Aerobic Bottle    Basic Metabolic Panel [[id number]]  (Abnormal) Collected: 03/24/23 0602    Specimen: Blood Updated: 03/24/23 0640     Glucose 87 mg/dL      BUN 38 mg/dL      Creatinine 1.32 mg/dL      Sodium 141 mmol/L      Potassium 4.0 mmol/L      Comment: Slight hemolysis detected by analyzer. Results may be affected.        Chloride 110 mmol/L      CO2 21.0 mmol/L      Calcium 9.3 mg/dL      BUN/Creatinine Ratio 28.8     Anion Gap 10.0 mmol/L      eGFR 40.6 mL/min/1.73     Narrative:      GFR Normal >60  Chronic Kidney Disease <60  Kidney Failure <15    The GFR formula is only valid for adults with stable renal function between ages 18 and 70.    CBC & Differential [[id number]]  (Abnormal) Collected: 03/24/23 0602    Specimen: Blood Updated: 03/24/23 0618    Narrative:      The following orders were created for panel order CBC & Differential.  Procedure                               Abnormality         Status                     ---------                                -----------         ------                     CBC Auto Differential[[id number]]        Abnormal            Final result                 Please view results for these tests on the individual orders.    CBC Auto Differential [[id number]]  (Abnormal) Collected: 03/24/23 0602    Specimen: Blood Updated: 03/24/23 0618     WBC 6.10 10*3/mm3      RBC 3.46 10*6/mm3      Hemoglobin 10.6 g/dL      Hematocrit 33.2 %      MCV 96.1 fL      MCH 30.6 pg      MCHC 31.9 g/dL      RDW 15.8 %      RDW-SD 53.4 fl      MPV 7.4 fL      Platelets 345 10*3/mm3      Neutrophil % 58.0 %      Lymphocyte % 28.2 %      Monocyte % 9.8 %      Eosinophil % 2.6 %      Basophil % 1.4 %      Neutrophils, Absolute 3.60 10*3/mm3      Lymphocytes, Absolute 1.70 10*3/mm3      Monocytes, Absolute 0.60 10*3/mm3      Eosinophils, Absolute 0.20 10*3/mm3      Basophils, Absolute 0.10 10*3/mm3      nRBC 0.0 /100 WBC     Basic Metabolic Panel [[id number]]  (Abnormal) Collected: 03/23/23 0711    Specimen: Blood Updated: 03/23/23 0813     Glucose 91 mg/dL      BUN 38 mg/dL      Creatinine 1.34 mg/dL      Sodium 141 mmol/L      Potassium 4.1 mmol/L      Chloride 109 mmol/L      CO2 21.0 mmol/L      Calcium 9.4 mg/dL      BUN/Creatinine Ratio 28.4     Anion Gap 11.0 mmol/L      eGFR 39.9 mL/min/1.73     Narrative:      GFR Normal >60  Chronic Kidney Disease <60  Kidney Failure <15    The GFR formula is only valid for adults with stable renal function between ages 18 and 70.    CBC & Differential [[id number]]  (Abnormal) Collected: 03/23/23 0711    Specimen: Blood Updated: 03/23/23 0757    Narrative:      The following orders were created for panel order CBC & Differential.  Procedure                               Abnormality         Status                     ---------                               -----------         ------                     CBC Auto Differential[[id number]]        Abnormal            Final result                 Please view results for  these tests on the individual orders.    CBC Auto Differential [[id number]]  (Abnormal) Collected: 03/23/23 0711    Specimen: Blood Updated: 03/23/23 0757     WBC 6.80 10*3/mm3      RBC 3.58 10*6/mm3      Hemoglobin 11.2 g/dL      Hematocrit 34.0 %      MCV 95.0 fL      MCH 31.3 pg      MCHC 33.0 g/dL      RDW 16.1 %      RDW-SD 56.9 fl      MPV 7.5 fL      Platelets 358 10*3/mm3      Neutrophil % 64.9 %      Lymphocyte % 23.1 %      Monocyte % 9.9 %      Eosinophil % 1.8 %      Basophil % 0.3 %      Neutrophils, Absolute 4.40 10*3/mm3      Lymphocytes, Absolute 1.60 10*3/mm3      Monocytes, Absolute 0.70 10*3/mm3      Eosinophils, Absolute 0.10 10*3/mm3      Basophils, Absolute 0.00 10*3/mm3      nRBC 0.1 /100 WBC     Digoxin Level [[id number]]  (Normal) Collected: 03/22/23 1231    Specimen: Blood Updated: 03/22/23 1314     Digoxin 1.20 ng/mL     Comprehensive Metabolic Panel [[id number]]  (Abnormal) Collected: 03/22/23 1231    Specimen: Blood Updated: 03/22/23 1300     Glucose 106 mg/dL      BUN 37 mg/dL      Creatinine 1.44 mg/dL      Sodium 140 mmol/L      Potassium 4.0 mmol/L      Comment: Slight hemolysis detected by analyzer. Results may be affected.        Chloride 107 mmol/L      CO2 22.0 mmol/L      Calcium 9.7 mg/dL      Total Protein 7.7 g/dL      Albumin 3.8 g/dL      ALT (SGPT) 8 U/L      AST (SGOT) 15 U/L      Alkaline Phosphatase 121 U/L      Total Bilirubin 0.3 mg/dL      Globulin 3.9 gm/dL      A/G Ratio 1.0 g/dL      BUN/Creatinine Ratio 25.7     Anion Gap 11.0 mmol/L      eGFR 36.6 mL/min/1.73     Narrative:      GFR Normal >60  Chronic Kidney Disease <60  Kidney Failure <15    The GFR formula is only valid for adults with stable renal function between ages 18 and 70.    Urine Culture - Urine, Urostomy [[id number]]  (Abnormal)  (Susceptibility) Collected: 03/18/23 2043    Specimen: Urine from Urostomy Updated: 03/20/23 1311     Urine Culture >100,000 CFU/mL Escherichia coli    Narrative:       Colonization of the urinary tract without infection is common. Treatment is discouraged unless the patient is symptomatic, pregnant, or undergoing an invasive urologic procedure.    Susceptibility      Escherichia coli      ASHLEY      Ampicillin Resistant     Ampicillin + Sulbactam Intermediate      Aztreonam Susceptible (C)  [1]       Cefazolin Susceptible      Cefepime Susceptible      Ceftazidime Susceptible      Ceftriaxone Susceptible      Gentamicin Susceptible      Levofloxacin Susceptible      Nitrofurantoin Susceptible      Piperacillin + Tazobactam Susceptible      Trimethoprim + Sulfamethoxazole Susceptible                   [1]  Appended report. These results have been appended to a previously final verified report.                 Magnesium [[id number]]  (Normal) Collected: 03/20/23 0650    Specimen: Blood Updated: 03/20/23 0809     Magnesium 1.9 mg/dL     Blood Culture ID, PCR - Blood, Arm, Left [[id number]]  (Abnormal) Collected: 03/18/23 2041    Specimen: Blood from Arm, Left Updated: 03/19/23 1233     BCID, PCR Escherichia coli. Identification by BCID2 PCR.     BOTTLE TYPE Anaerobic Bottle    Narrative:      No resistance genes detected.    TSH [[id number]]  (Normal) Collected: 03/18/23 2041    Specimen: Blood Updated: 03/18/23 2201     TSH 0.880 uIU/mL     Digoxin Level [[id number]]  (Abnormal) Collected: 03/18/23 2041    Specimen: Blood Updated: 03/18/23 2159     Digoxin <0.30 ng/mL     Respiratory Panel PCR w/COVID-19(SARS-CoV-2) VINCENT/STEFANY/ANNY/PAD/COR/MAD/MANASA In-House, NP Swab in UTM/VTM, 3-4 HR TAT - Swab, Nasopharynx [[id number]]  (Normal) Collected: 03/18/23 2044    Specimen: Swab from Nasopharynx Updated: 03/18/23 2138     ADENOVIRUS, PCR Not Detected     Coronavirus 229E Not Detected     Coronavirus HKU1 Not Detected     Coronavirus NL63 Not Detected     Coronavirus OC43 Not Detected     COVID19 Not Detected     Human Metapneumovirus Not Detected     Human Rhinovirus/Enterovirus Not Detected      Influenza A PCR Not Detected     Influenza B PCR Not Detected     Parainfluenza Virus 1 Not Detected     Parainfluenza Virus 2 Not Detected     Parainfluenza Virus 3 Not Detected     Parainfluenza Virus 4 Not Detected     RSV, PCR Not Detected     Bordetella pertussis pcr Not Detected     Bordetella parapertussis PCR Not Detected     Chlamydophila pneumoniae PCR Not Detected     Mycoplasma pneumo by PCR Not Detected    Narrative:      In the setting of a positive respiratory panel with a viral infection PLUS a negative procalcitonin without other underlying concern for bacterial infection, consider observing off antibiotics or discontinuation of antibiotics and continue supportive care. If the respiratory panel is positive for atypical bacterial infection (Bordetella pertussis, Chlamydophila pneumoniae, or Mycoplasma pneumoniae), consider antibiotic de-escalation to target atypical bacterial infection.    Manual Differential [[id number]]  (Abnormal) Collected: 03/18/23 2041    Specimen: Blood Updated: 03/18/23 2122     Neutrophil % 82.0 %      Lymphocyte % 5.0 %      Monocyte % 10.0 %      Bands %  3.0 %      Neutrophils Absolute 11.82 10*3/mm3      Lymphocytes Absolute 0.70 10*3/mm3      Monocytes Absolute 1.39 10*3/mm3      RBC Morphology Normal     WBC Morphology Normal     Platelet Morphology Normal    Scan Slide [[id number]] Collected: 03/18/23 2041    Specimen: Blood Updated: 03/18/23 2122     Scan Slide --     Comment: See Manual Differential Results       Comprehensive Metabolic Panel [[id number]]  (Abnormal) Collected: 03/18/23 2041    Specimen: Blood Updated: 03/18/23 2121     Glucose 119 mg/dL      BUN 28 mg/dL      Creatinine 1.31 mg/dL      Sodium 140 mmol/L      Potassium 4.4 mmol/L      Comment: Slight hemolysis detected by analyzer. Results may be affected.        Chloride 106 mmol/L      CO2 21.0 mmol/L      Calcium 9.8 mg/dL      Total Protein 8.2 g/dL      Albumin 4.0 g/dL      ALT (SGPT) 9 U/L       AST (SGOT) 24 U/L      Comment: Slight hemolysis detected by analyzer. Results may be affected.        Alkaline Phosphatase 111 U/L      Total Bilirubin 0.6 mg/dL      Globulin 4.2 gm/dL      A/G Ratio 1.0 g/dL      BUN/Creatinine Ratio 21.4     Anion Gap 13.0 mmol/L      eGFR 41.0 mL/min/1.73     Narrative:      GFR Normal >60  Chronic Kidney Disease <60  Kidney Failure <15    The GFR formula is only valid for adults with stable renal function between ages 18 and 70.    Single High Sensitivity Troponin T [[id number]]  (Abnormal) Collected: 03/18/23 2041    Specimen: Blood Updated: 03/18/23 2121     HS Troponin T 25 ng/L     Narrative:      High Sensitive Troponin T Reference Range:  <10.0 ng/L- Negative Female for AMI  <15.0 ng/L- Negative Male for AMI  >=10 - Abnormal Female indicating possible myocardial injury.  >=15 - Abnormal Male indicating possible myocardial injury.   Clinicians would have to utilize clinical acumen, EKG, Troponin, and serial changes to determine if it is an Acute Myocardial Infarction or myocardial injury due to an underlying chronic condition.         CK [[id number]]  (Normal) Collected: 03/18/23 2041    Specimen: Blood Updated: 03/18/23 2121     Creatine Kinase 29 U/L     BNP [[id number]]  (Abnormal) Collected: 03/18/23 2041    Specimen: Blood Updated: 03/18/23 2121     proBNP 7,303.0 pg/mL     Narrative:      Among patients with dyspnea, NT-proBNP is highly sensitive for the detection of acute congestive heart failure. In addition NT-proBNP of <300 pg/ml effectively rules out acute congestive heart failure with 99% negative predictive value.    Results may be falsely decreased if patient taking Biotin.      aPTT [[id number]]  (Abnormal) Collected: 03/18/23 2041    Specimen: Blood Updated: 03/18/23 2102     PTT 22.5 seconds     Protime-INR [[id number]]  (Normal) Collected: 03/18/23 2041    Specimen: Blood Updated: 03/18/23 2102     Protime 10.8 Seconds      INR 1.05    Urinalysis, Microscopic  Only - Urostomy [[id number]]  (Abnormal) Collected: 03/18/23 2043    Specimen: Urine from Urostomy Updated: 03/18/23 2056     RBC, UA 0-2 /HPF      WBC, UA 21-30 /HPF      Bacteria, UA 4+ /HPF      Squamous Epithelial Cells, UA 0-2 /HPF      Hyaline Casts, UA 0-2 /LPF      Methodology Automated Microscopy    Urinalysis With Culture If Indicated - Urostomy [[id number]]  (Abnormal) Collected: 03/18/23 2043    Specimen: Urine from Urostomy Updated: 03/18/23 2053     Color, UA Yellow     Appearance, UA Clear     pH, UA 6.0     Specific Gravity, UA 1.008     Glucose, UA Negative     Ketones, UA Negative     Bilirubin, UA Negative     Blood, UA Trace     Protein, UA Trace     Leuk Esterase, UA Small (1+)     Nitrite, UA Positive     Urobilinogen, UA 0.2 E.U./dL    Narrative:      In absence of clinical symptoms, the presence of pyuria, bacteria, and/or nitrites on the urinalysis result does not correlate with infection.    POC Lactate [[id number]]  (Normal) Collected: 03/18/23 2048    Specimen: Blood Updated: 03/18/23 2049     Lactate 1.4 mmol/L      Comment: Serial Number: [id number]:  [id number]              Results for orders placed during the hospital encounter of 09/24/21    Adult Transthoracic Echo Complete W/ Cont if Necessary Per Protocol    Interpretation Summary  · Estimated left ventricular EF was in agreement with the calculated left ventricular EF. Left ventricular ejection fraction appears to be 61 - 65%. Left ventricular systolic function is normal.  · Left atrial volume is moderately increased.  · The right atrial cavity is moderately dilated.  · Estimated right ventricular systolic pressure from tricuspid regurgitation is normal (<35 mmHg).              Condition on Discharge:  Improved , stable    Vital Signs  Temp:  [97.6 °F (36.4 °C)-98.4 °F (36.9 °C)] 97.9 °F (36.6 °C)  Heart Rate:  [74-87] 74  Resp:  [16-21] 16  BP: (125-146)/(66-80) 129/74    Physical Exam:     General Appearance:    Alert,  cooperative, in no acute distress   Head:    Normocephalic, without obvious abnormality, atraumatic   Eyes:            Lids and lashes normal, conjunctivae and sclerae normal, no   icterus, no pallor, corneas clear, PERRLA   Ears:    Ears appear intact with no abnormalities noted   Throat:   No oral lesions, no thrush, oral mucosa moist   Neck:   No adenopathy, supple, trachea midline, no thyromegaly, no   carotid bruit, no JVD   Lungs:     Clear to auscultation,respirations regular, even and                  unlabored    Heart:    Irregularly irregular, normal S1 and S2, no            murmur, no gallop, no rub, no click   Chest Wall:    No abnormalities observed   Abdomen:     Normal bowel sounds, no masses, no organomegaly, soft        non-tender, non-distended, no guarding, no rebound                tenderness   Extremities:   Moves all extremities well, no edema, no cyanosis, no             redness   Pulses:   Pulses palpable and equal bilaterally   Skin:   No bleeding, bruising or rash   Lymph nodes:   No palpable adenopathy   Neurologic:   Cranial nerves 2 - 12 grossly intact, sensation intact, DTR       present and equal bilaterally       Discharge Disposition  Skilled Nursing Facility (DC - External)    Discharge Medications     Discharge Medications      New Medications      Instructions Start Date   aztreonam (AZACTAM) 2 gm IVPB in 100 mL NS MBP   2 g, Intravenous, Every 8 Hours      famotidine 20 MG tablet  Commonly known as: PEPCID   20 mg, Oral, 2 Times Daily Before Meals         Changes to Medications      Instructions Start Date   dilTIAZem  MG 24 hr capsule  Commonly known as: CARDIZEM CD  What changed:   · medication strength  · how much to take  · when to take this   180 mg, Oral, Every 24 Hours Scheduled   Start Date: March 25, 2023     HYDROcodone-acetaminophen 5-325 MG per tablet  Commonly known as: NORCO  What changed: reasons to take this   1 tablet, Oral, Every 8 Hours PRN          Continue These Medications      Instructions Start Date   acetaminophen 325 MG tablet  Commonly known as: TYLENOL   650 mg, Oral, Every 6 Hours PRN      apixaban 5 MG tablet tablet  Commonly known as: ELIQUIS   5 mg, Oral, 2 Times Daily      furosemide 20 MG tablet  Commonly known as: LASIX   20 mg, Oral, Daily   Start Date: March 25, 2023     metoprolol succinate XL 50 MG 24 hr tablet  Commonly known as: TOPROL-XL   TAKE 1 TABLET TWICE A DAY      topiramate 100 MG tablet  Commonly known as: TOPAMAX   100 mg, Oral, Every Night at Bedtime      Vitamin D3 50 MCG (2000 UT) tablet   2,000 Units, Oral, Daily         Stop These Medications    digoxin 125 MCG tablet  Commonly known as: LANOXIN            Discharge Diet:   Consistent carb diet    Activity at Discharge:     Follow-up Appointments  Future Appointments   Date Time Provider Department Center   4/4/2023 10:30 AM Jose Carlos Cheng MD [facility]   6/20/2023  1:00 PM [facility] [place] DEVICE CHECK [facility]   6/20/2023  1:30 PM Augustin Ellsworth MD [facility]     Additional Instructions for the Follow-ups that You Need to Schedule     Discharge Follow-up with PCP   As directed       Currently Documented PCP:    Lizzy Francis MD    PCP Phone Number:    [phone]     Follow Up Details: After release from rehab         Discharge Follow-up with Specified Provider: Dr. Tafoya, cardiologist; 1 Month   As directed      To: Dr. Tafoya, cardiologist    Follow Up: 1 Month               Test Results Pending at Discharge       Lora Henderson MD  03/24/23  16:25 EDT    Time: Discharge 35 min

## 2023-03-27 ENCOUNTER — TELEPHONE (OUTPATIENT)
Dept: CARDIOLOGY | Facility: CLINIC | Age: 83
End: 2023-03-27
Payer: MEDICARE

## 2023-03-27 NOTE — TELEPHONE ENCOUNTER
Left message for patient to call office to make hospital f/u appointment. Hub can schedule with Dena Keith.

## 2023-03-27 NOTE — CASE MANAGEMENT/SOCIAL WORK
Case Management Discharge Note      Final Note: Violet    Provided Post Acute Provider List?: Yes  Post Acute Provider List: Home Health  Delivered To: Patient  Method of Delivery: In person    Selected Continued Care - Discharged on 3/24/2023 Admission date: 3/18/2023 - Discharge disposition: Skilled Nursing Facility (DC - External)    Destination Coordination complete.    Service Provider Selected Services Address Phone Fax Patient Preferred    [facility] [address] [place] [address] [phone] --               Transportation Services  Private: Car    Final Discharge Disposition Code: 03 - skilled nursing facility (SNF)

## 2023-03-27 NOTE — TELEPHONE ENCOUNTER
Caller: NED    Relationship: Provider    Best call back number: [phone] ASK FOR 2 SOUTH    What is the best time to reach you: ANY     Who are you requesting to speak with (clinical staff, provider,  specific staff member): ANY         What was the call regarding: PTY NEEDS POST HOSPITAL FU WITH DR. CASTRO ON 4-24. 1ST IS IN July. PLEASE CALL BACK TO SCHEDULE.     Do you require a callback: YES

## 2023-03-28 NOTE — PROGRESS NOTES
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             If applicable, please update the problem list.       Patient: Hazel Bucio        : 1940  Account: [id number]           Admit Date: 3/18/2023        How to Respond to this query:       a. Click New Note     b. Answer query within the yellow box.                c. Update the Problem List, if applicable.      If you have any questions about this query contact me at: [email or web address]    Dr. Henderson,    Patient presented on 3/18 with a history of bladder cancer and ileal conduit.  Patient was determined to have sepsis due to E. coli UTI.  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 Please utilize your independent clinical judgment when addressing the question(s) above.     This query and your response, once completed, will be entered into the legal medical record.    Sincerely,  Ana Petty RN CCDS [facility]  Clinical Documentation Integrity Program   "

## 2023-04-04 ENCOUNTER — OFFICE VISIT (OUTPATIENT)
Dept: CARDIOLOGY | Facility: CLINIC | Age: 83
End: 2023-04-04
Payer: MEDICARE

## 2023-04-04 ENCOUNTER — PREP FOR SURGERY (OUTPATIENT)
Dept: OTHER | Facility: HOSPITAL | Age: 83
End: 2023-04-04
Payer: MEDICARE

## 2023-04-04 VITALS
HEART RATE: 79 BPM | OXYGEN SATURATION: 100 % | SYSTOLIC BLOOD PRESSURE: 126 MMHG | WEIGHT: 131 LBS | BODY MASS INDEX: 21.05 KG/M2 | HEIGHT: 66 IN | DIASTOLIC BLOOD PRESSURE: 76 MMHG

## 2023-04-04 DIAGNOSIS — I49.5 SICK SINUS SYNDROME: Primary | Chronic | ICD-10-CM

## 2023-04-04 DIAGNOSIS — Z95.0 PRESENCE OF CARDIAC PACEMAKER: Primary | Chronic | ICD-10-CM

## 2023-04-04 DIAGNOSIS — I49.5 SICK SINUS SYNDROME: Chronic | ICD-10-CM

## 2023-04-04 DIAGNOSIS — I10 ESSENTIAL HYPERTENSION: Chronic | ICD-10-CM

## 2023-04-04 PROCEDURE — 3074F SYST BP LT 130 MM HG: CPT | Performed by: INTERNAL MEDICINE

## 2023-04-04 PROCEDURE — 3078F DIAST BP <80 MM HG: CPT | Performed by: INTERNAL MEDICINE

## 2023-04-04 PROCEDURE — 1160F RVW MEDS BY RX/DR IN RCRD: CPT | Performed by: INTERNAL MEDICINE

## 2023-04-04 PROCEDURE — 1159F MED LIST DOCD IN RCRD: CPT | Performed by: INTERNAL MEDICINE

## 2023-04-04 PROCEDURE — 99213 OFFICE O/P EST LOW 20 MIN: CPT | Performed by: INTERNAL MEDICINE

## 2023-04-04 NOTE — PROGRESS NOTES
HP      Name: Hazel Bucio ADMIT: (Not on file)   : 1940  PCP: Lizzy Francis MD    MRN: [id number] LOS: 0 days   AGE/SEX: 82 y.o. female  ROOM: Room/bed info not found     Chief Complaint   Patient presents with   • Consult     PM replacement   • Atrial Fibrillation       Subjective        History of present illness  Hazel Bucio is an 82-year-old female patient who has a dual-chamber pacemaker which is set at DDI due to permanent A-fib to avoid atrial tracking, is here today to discuss about generator change out.    Past Medical History:   Diagnosis Date   • Bladder cancer    • Cancer     breast, bladder   • Deep vein thrombosis    • Essential hypertension 2017   • Fracture tibia/fibula, right, closed, initial encounter 2020   • GERD (gastroesophageal reflux disease)    • History of urostomy    • Immobility 2020    r/t broken Tibia    • Kidney carcinoma, right     removed    • Long term current use of anticoagulant 2015   • PAF (paroxysmal atrial fibrillation) 2019   • Presence of cardiac pacemaker 2014    BS dual chamber PM placed 10/2014 with pocket revision 17.  HX tachy rob syndrome   • Sick sinus syndrome 11/3/2014     Past Surgical History:   Procedure Laterality Date   • BREAST LUMPECTOMY     • CHOLECYSTECTOMY N/A 10/12/2020    Procedure: CHOLECYSTECTOMY LAPAROSCOPIC;  Surgeon: Go Pereira DO;  Location: [facility];  Service: General;  Laterality: N/A;   • CYSTECTOMY W/ URETEROILEAL CONDUIT     • HARDWARE REMOVAL Right 2021    Procedure: TIBIA HARDWARE REMOVAL;  Surgeon: Geoff Shah II, MD;  Location: [facility] MAIN OR;  Service: Orthopedics;  Laterality: Right;   • HERNIA REPAIR     • HYSTERECTOMY     • INSERT / REPLACE / REMOVE PACEMAKER     • NEPHRECTOMY Right    • TIBIA IM NAILING/RODDING Right 2020    Procedure: TIBIA INTRAMEDULLARY NAIL/ABRAHAM INSERTION;  Surgeon: Geoff Shah II, MD;  Location: [facility] MAIN OR;   Service: Orthopedics;  Laterality: Right;     Family History   Problem Relation Age of Onset   • COPD Father      Social History     Tobacco Use   • Smoking status: Never     Passive exposure: Never   • Smokeless tobacco: Never   Vaping Use   • Vaping Use: Never used   Substance Use Topics   • Alcohol use: Not Currently   • Drug use: Never     (Not in a hospital admission)    Allergies:  Amoxicillin, Ciprofloxacin, Oxycodone-acetaminophen, Penicillins, Sulfa antibiotics, Cephalexin, Clavulanic acid, Codeine, Contrast dye (echo or unknown ct/mr), Doxycycline, Fluconazole, Iodine, Macrobid [nitrofurantoin], Tobramycin, and Trimethoprim    Review of systems    Constitutional: Negative.    Respiratory and cardiovascular: As detailed in HPI section.  Gastrointestinal: Negative for constipation, nausea and vomiting negative for abdominal distention, abdominal pain and diarrhea.   Genitourinary: Negative for difficulty urinating and flank pain.   Musculoskeletal: Negative for arthralgias, joint swelling and myalgias.   Skin: Negative for color change, rash and wound.   Neurological: Negative for dizziness, syncope, weakness and headaches.   Hematological: Negative for adenopathy.   Psychiatric/Behavioral: Negative for confusion.   All other systems reviewed and are negative.    Physical Exam  VITALS REVIEWED    General:      well developed, in no acute distress.    Head:      normocephalic and atraumatic.    Eyes:      PERRL/EOM intact, conjunctiva and sclera clear with out nystagmus.    Neck:      no masses, thyromegaly,  trachea central with normal respiratory effort and PMI displaced laterally  Lungs:      Clear to auscultation bilaterally  Heart:       Irregular rhythm  Msk:      no deformity or scoliosis noted of thoracic or lumbar spine.    Pulses:      pulses normal in all 4 extremities.    Extremities:       No lower extremity edema  Neurologic:      no focal deficits.   alert oriented x3  Skin:      intact without  lesions or rashes.    Psych:      alert and cooperative; normal mood and affect; normal attention span and concentration.      Result Review :               Pertinent cardiac workup    1. EKG 3/18/2023 atrial fibrillation ventricular rate of 137 bpm.  Narrow QRS  2. Echo 9/28/2021 ejection fraction 60 to 65%      Procedures        Assessment and Plan      Hazel Bucio is an 82-year-old female patient who has permanent atrial fibrillation, has a dual-chamber pacemaker, set at DDI to avoid atrial tracking, is here today to discuss about generator replacement.  The battery is at [employer].  Her atrial fibrillation seems to be well controlled with diltiazem, metoprolol and digoxin at times based on office notes.  Her RV pacing burden is between 20 and 60%, her EF is normal and her QRS is narrow.  We will schedule her for generator change out, we might decrease the rate to minimize RV pacing but I do not think she needs an upgrade at this time.    Diagnoses and all orders for this visit:    1. Presence of cardiac pacemaker (Primary)  Overview:  BS dual chamber PM placed 10/2014 with pocket revision  1/25/17.  HX tachy rob syndrome      2. Essential hypertension    3. Sick sinus syndrome (HCC)         No follow-ups on file.  Patient was given instructions and counseling regarding her condition or for health maintenance advice. Please see specific information pulled into the AVS if appropriate.

## 2023-04-04 NOTE — H&P (VIEW-ONLY)
HP      Name: Hazel Bucio ADMIT: (Not on file)   : 1940  PCP: Lizzy Francis MD    MRN: [id number] LOS: 0 days   AGE/SEX: 82 y.o. female  ROOM: Room/bed info not found     Chief Complaint   Patient presents with   • Consult     PM replacement   • Atrial Fibrillation       Subjective        History of present illness  Hazel Bucio is an 82-year-old female patient who has a dual-chamber pacemaker which is set at DDI due to permanent A-fib to avoid atrial tracking, is here today to discuss about generator change out.    Past Medical History:   Diagnosis Date   • Bladder cancer    • Cancer     breast, bladder   • Deep vein thrombosis    • Essential hypertension 2017   • Fracture tibia/fibula, right, closed, initial encounter 2020   • GERD (gastroesophageal reflux disease)    • History of urostomy    • Immobility 2020    r/t broken Tibia    • Kidney carcinoma, right     removed    • Long term current use of anticoagulant 2015   • PAF (paroxysmal atrial fibrillation) 2019   • Presence of cardiac pacemaker 2014    BS dual chamber PM placed 10/2014 with pocket revision 17.  HX tachy rob syndrome   • Sick sinus syndrome 11/3/2014     Past Surgical History:   Procedure Laterality Date   • BREAST LUMPECTOMY     • CHOLECYSTECTOMY N/A 10/12/2020    Procedure: CHOLECYSTECTOMY LAPAROSCOPIC;  Surgeon: Go Pereira DO;  Location: [facility];  Service: General;  Laterality: N/A;   • CYSTECTOMY W/ URETEROILEAL CONDUIT     • HARDWARE REMOVAL Right 2021    Procedure: TIBIA HARDWARE REMOVAL;  Surgeon: Geoff Shah II, MD;  Location: [facility] MAIN OR;  Service: Orthopedics;  Laterality: Right;   • HERNIA REPAIR     • HYSTERECTOMY     • INSERT / REPLACE / REMOVE PACEMAKER     • NEPHRECTOMY Right    • TIBIA IM NAILING/RODDING Right 2020    Procedure: TIBIA INTRAMEDULLARY NAIL/ABRAHAM INSERTION;  Surgeon: Geoff Shah II, MD;  Location: [facility] MAIN OR;   Service: Orthopedics;  Laterality: Right;     Family History   Problem Relation Age of Onset   • COPD Father      Social History     Tobacco Use   • Smoking status: Never     Passive exposure: Never   • Smokeless tobacco: Never   Vaping Use   • Vaping Use: Never used   Substance Use Topics   • Alcohol use: Not Currently   • Drug use: Never     (Not in a hospital admission)    Allergies:  Amoxicillin, Ciprofloxacin, Oxycodone-acetaminophen, Penicillins, Sulfa antibiotics, Cephalexin, Clavulanic acid, Codeine, Contrast dye (echo or unknown ct/mr), Doxycycline, Fluconazole, Iodine, Macrobid [nitrofurantoin], Tobramycin, and Trimethoprim    Review of systems    Constitutional: Negative.    Respiratory and cardiovascular: As detailed in HPI section.  Gastrointestinal: Negative for constipation, nausea and vomiting negative for abdominal distention, abdominal pain and diarrhea.   Genitourinary: Negative for difficulty urinating and flank pain.   Musculoskeletal: Negative for arthralgias, joint swelling and myalgias.   Skin: Negative for color change, rash and wound.   Neurological: Negative for dizziness, syncope, weakness and headaches.   Hematological: Negative for adenopathy.   Psychiatric/Behavioral: Negative for confusion.   All other systems reviewed and are negative.    Physical Exam  VITALS REVIEWED    General:      well developed, in no acute distress.    Head:      normocephalic and atraumatic.    Eyes:      PERRL/EOM intact, conjunctiva and sclera clear with out nystagmus.    Neck:      no masses, thyromegaly,  trachea central with normal respiratory effort and PMI displaced laterally  Lungs:      Clear to auscultation bilaterally  Heart:       Irregular rhythm  Msk:      no deformity or scoliosis noted of thoracic or lumbar spine.    Pulses:      pulses normal in all 4 extremities.    Extremities:       No lower extremity edema  Neurologic:      no focal deficits.   alert oriented x3  Skin:      intact without  lesions or rashes.    Psych:      alert and cooperative; normal mood and affect; normal attention span and concentration.      Result Review :               Pertinent cardiac workup    1. EKG 3/18/2023 atrial fibrillation ventricular rate of 137 bpm.  Narrow QRS  2. Echo 9/28/2021 ejection fraction 60 to 65%      Procedures        Assessment and Plan      Hazel Bucio is an 82-year-old female patient who has permanent atrial fibrillation, has a dual-chamber pacemaker, set at [employer]DI to avoid atrial tracking, is here today to discuss about generator replacement.  The battery is at [employer].  Her atrial fibrillation seems to be well controlled with diltiazem, metoprolol and digoxin at times based on office notes.  Her RV pacing burden is between 20 and 60%, her EF is normal and her QRS is narrow.  We will schedule her for generator change out, we might decrease the rate to minimize RV pacing but I do not think she needs an upgrade at this time.    Diagnoses and all orders for this visit:    1. Presence of cardiac pacemaker (Primary)  Overview:  BS dual chamber PM placed 10/2014 with pocket revision  1/25/17.  HX tachy rob syndrome      2. Essential hypertension    3. Sick sinus syndrome (HCC)         No follow-ups on file.  Patient was given instructions and counseling regarding her condition or for health maintenance advice. Please see specific information pulled into the AVS if appropriate.

## 2023-04-08 ENCOUNTER — ANESTHESIA EVENT (OUTPATIENT)
Dept: CARDIOLOGY | Facility: HOSPITAL | Age: 83
End: 2023-04-08
Payer: MEDICARE

## 2023-04-08 NOTE — ANESTHESIA PREPROCEDURE EVALUATION
Anesthesia Evaluation     Patient summary reviewed and Nursing notes reviewed   no history of anesthetic complications:  NPO Solid Status: > 8 hours  NPO Liquid Status: > 2 hours           Airway   Dental      Pulmonary    Cardiovascular     ECG reviewed  PT is on anticoagulation therapy  Patient on routine beta blocker    (+) pacemaker pacemaker, hypertension, valvular problems/murmurs AI, MR and TI, CAD, dysrhythmias Atrial Fib, Tachycardia, DVT, hyperlipidemia,       Neuro/Psych  (+) weakness, numbness,    GI/Hepatic/Renal/Endo    (+)  GERD,  renal disease ARF and CRI, diabetes mellitus,     Musculoskeletal     (+) chronic pain,   Abdominal    Substance History      OB/GYN          Other   blood dyscrasia anemia,   history of cancer    ROS/Med Hx Other: Additional History:  Afib/RVR, sick sinus syndrome, cancer (breast, bladder, ureteral, renal), hydronephrosis, neuropathy, acute encephalopathy, altered mental status, allergies, RLS, abd pain, h/o bacteremia and sepsis    Echo:   Estimated left ventricular EF was in agreement with the calculated left ventricular EF. Left ventricular ejection fraction appears to be 61 - 65%. Left ventricular systolic function is normal.   Left atrial volume is moderately increased.   The right atrial cavity is moderately dilated.   Estimated right ventricular systolic pressure from tricuspid regurgitation is normal (<35 mmHg).    Echocardiogram Findings    Left Ventricle Calculated left ventricular EF = 62% Estimated left ventricular EF was in agreement with the calculated left ventricular EF. Left ventricular ejection fraction appears to be 61 - 65%. Left ventricular systolic function is normal.   Septal wall motion is normal. Normal left ventricular cavity size and wall thickness noted. All left ventricular wall segments contract normally.  Right Ventricle Normal right ventricular cavity size, wall thickness, systolic function and septal motion noted. Electronic lead present in  the ventricle.  Left Atrium The left atrial cavity is moderately dilated. Left atrial volume is moderately increased.  Right Atrium The right atrial cavity is moderately dilated. The inferior vena cava is normally sized. An electronic lead is present in the right atrium.  Aortic Valve The aortic valve is grossly normal in structure. Mild aortic valve regurgitation is present. No hemodynamically significant aortic valve stenosis is present.  Mitral Valve The mitral valve is grossly normal in structure. Mild to moderate mitral valve regurgitation is present with a posteriorly-directed jet noted. No significant mitral valve stenosis is present.  Tricuspid Valve Mild to moderate tricuspid valve regurgitation is present. Estimated right ventricular systolic pressure from tricuspid regurgitation is normal (<35 mmHg). No evidence of pulmonary hypertension is present. No evidence of significant tricuspid valve stenosis is present.  Pulmonic Valve The pulmonic valve is grossly normal in structure. There is no significant pulmonic valve regurgitation present. There is no pulmonic valve stenosis present.  Greater Vessels No dilation of the aortic root is present. No dilation of the sinuses of Valsalva is present.  Pericardium There is no evidence of pericardial effusion. .        Stress Test:   Findings consistent with a normal ECG stress test.   Left ventricular ejection fraction is normal (Calculated EF = 70%).   Impressions are consistent with a low risk study.   Relatively small septal and lateral wall defects appear fixed with no reversible ischemia appreciated.     Moderate defect involving the septal and lateral wall segments with no reversible ischemia appreciated.  Global LV systolic function appears normal with no segmental abnormalities; LVEF 70% or greater by gated SPECT analysis        PSH:  HYSTERECTOMY BREAST LUMPECTOMY  INSERT / REPLACE / REMOVE PACEMAKER TIBIA IM NAILING/RODDING  CHOLECYSTECTOMY HERNIA  REPAIR  NEPHRECTOMY CYSTECTOMY W/ URETEROILEAL CONDUIT  HARDWARE REMOVAL                   Anesthesia Plan    ASA 4     general and MAC     (Patient identified; pre-operative vital signs, all relevant labs/studies, complete medical/surgical/anesthetic history, full medication list, full allergy list, and NPO status obtained/reviewed; physical assessment performed; anesthetic options, side effects, potential complications, risks, and benefits discussed; questions answered; written anesthesia consent obtained; patient cleared for procedure; anesthesia machine and equipment checked and functioning)  intravenous induction     Anesthetic plan, risks, benefits, and alternatives have been provided, discussed and informed consent has been obtained with: patient.    Plan discussed with CRNA and CAA.        CODE STATUS:

## 2023-04-10 ENCOUNTER — ANESTHESIA (OUTPATIENT)
Dept: CARDIOLOGY | Facility: HOSPITAL | Age: 83
End: 2023-04-10
Payer: MEDICARE

## 2023-04-10 ENCOUNTER — TELEPHONE (OUTPATIENT)
Dept: CARDIOLOGY | Facility: CLINIC | Age: 83
End: 2023-04-10
Payer: MEDICARE

## 2023-04-10 ENCOUNTER — DOCUMENTATION (OUTPATIENT)
Dept: CARDIOLOGY | Facility: CLINIC | Age: 83
End: 2023-04-10
Payer: MEDICARE

## 2023-04-10 NOTE — PROGRESS NOTES
[facility]   PROGRESS NOTE      Called patient to reschedule procedure per Dr. Cheng. Office to call patient with new date and time. Patient verbalized understanding.    CHELLE Busby  04/10/23  11:04 EDT   Electronically signed by CHELLE Busby, 04/10/23, 11:05 AM EDT.

## 2023-04-10 NOTE — TELEPHONE ENCOUNTER
Called patient and cancelled procedure for today (4/10/2023) per Dr. Cheng. Office to call with rescheduled date and time. Patient verbalized understanding.

## 2023-04-18 ENCOUNTER — APPOINTMENT (OUTPATIENT)
Dept: GENERAL RADIOLOGY | Facility: HOSPITAL | Age: 83
End: 2023-04-18
Payer: MEDICARE

## 2023-04-18 ENCOUNTER — HOSPITAL ENCOUNTER (OUTPATIENT)
Facility: HOSPITAL | Age: 83
Setting detail: OBSERVATION
Discharge: HOME HEALTH | End: 2023-04-20
Attending: INTERNAL MEDICINE | Admitting: INTERNAL MEDICINE
Payer: MEDICARE

## 2023-04-18 DIAGNOSIS — I48.91 ATRIAL FIBRILLATION WITH RVR: ICD-10-CM

## 2023-04-18 DIAGNOSIS — R07.9 CHEST PAIN, UNSPECIFIED TYPE: Primary | ICD-10-CM

## 2023-04-18 LAB
AMORPH URATE CRY URNS QL MICRO: ABNORMAL /HPF
ANION GAP SERPL CALCULATED.3IONS-SCNC: 12 MMOL/L (ref 5–15)
BACTERIA UR QL AUTO: ABNORMAL /HPF
BASOPHILS # BLD AUTO: 0.1 10*3/MM3 (ref 0–0.2)
BASOPHILS NFR BLD AUTO: 1.9 % (ref 0–1.5)
BILIRUB UR QL STRIP: NEGATIVE
BUN SERPL-MCNC: 17 MG/DL (ref 8–23)
BUN/CREAT SERPL: 14.5 (ref 7–25)
CALCIUM SPEC-SCNC: 8.7 MG/DL (ref 8.6–10.5)
CHLORIDE SERPL-SCNC: 106 MMOL/L (ref 98–107)
CLARITY UR: ABNORMAL
CO2 SERPL-SCNC: 23 MMOL/L (ref 22–29)
COLOR UR: YELLOW
CREAT SERPL-MCNC: 1.17 MG/DL (ref 0.57–1)
DEPRECATED RDW RBC AUTO: 56 FL (ref 37–54)
DIGOXIN SERPL-MCNC: 0.3 NG/ML (ref 0.6–1.2)
EGFRCR SERPLBLD CKD-EPI 2021: 46.7 ML/MIN/1.73
EOSINOPHIL # BLD AUTO: 0.1 10*3/MM3 (ref 0–0.4)
EOSINOPHIL NFR BLD AUTO: 1.4 % (ref 0.3–6.2)
ERYTHROCYTE [DISTWIDTH] IN BLOOD BY AUTOMATED COUNT: 16.2 % (ref 12.3–15.4)
GEN 5 2HR TROPONIN T REFLEX: 25 NG/L
GLUCOSE SERPL-MCNC: 117 MG/DL (ref 65–99)
GLUCOSE UR STRIP-MCNC: NEGATIVE MG/DL
HCT VFR BLD AUTO: 38.9 % (ref 34–46.6)
HGB BLD-MCNC: 11.9 G/DL (ref 12–15.9)
HGB UR QL STRIP.AUTO: ABNORMAL
HYALINE CASTS UR QL AUTO: ABNORMAL /LPF
KETONES UR QL STRIP: ABNORMAL
LEUKOCYTE ESTERASE UR QL STRIP.AUTO: ABNORMAL
LYMPHOCYTES # BLD AUTO: 0.9 10*3/MM3 (ref 0.7–3.1)
LYMPHOCYTES NFR BLD AUTO: 17.4 % (ref 19.6–45.3)
MCH RBC QN AUTO: 30.2 PG (ref 26.6–33)
MCHC RBC AUTO-ENTMCNC: 30.7 G/DL (ref 31.5–35.7)
MCV RBC AUTO: 98.3 FL (ref 79–97)
MONOCYTES # BLD AUTO: 0.5 10*3/MM3 (ref 0.1–0.9)
MONOCYTES NFR BLD AUTO: 9.3 % (ref 5–12)
NEUTROPHILS NFR BLD AUTO: 3.8 10*3/MM3 (ref 1.7–7)
NEUTROPHILS NFR BLD AUTO: 70 % (ref 42.7–76)
NITRITE UR QL STRIP: NEGATIVE
NRBC BLD AUTO-RTO: 0 /100 WBC (ref 0–0.2)
PH UR STRIP.AUTO: 6 [PH] (ref 5–8)
PLATELET # BLD AUTO: 168 10*3/MM3 (ref 140–450)
PMV BLD AUTO: 7.5 FL (ref 6–12)
POTASSIUM SERPL-SCNC: 3.8 MMOL/L (ref 3.5–5.2)
PROT UR QL STRIP: ABNORMAL
RBC # BLD AUTO: 3.96 10*6/MM3 (ref 3.77–5.28)
RBC # UR STRIP: ABNORMAL /HPF
REF LAB TEST METHOD: ABNORMAL
SODIUM SERPL-SCNC: 141 MMOL/L (ref 136–145)
SP GR UR STRIP: 1.02 (ref 1–1.03)
SQUAMOUS #/AREA URNS HPF: ABNORMAL /HPF
TROPONIN T DELTA: -1 NG/L
TROPONIN T SERPL HS-MCNC: 26 NG/L
UROBILINOGEN UR QL STRIP: ABNORMAL
WBC # UR STRIP: ABNORMAL /HPF
WBC NRBC COR # BLD: 5.4 10*3/MM3 (ref 3.4–10.8)

## 2023-04-18 PROCEDURE — 96375 TX/PRO/DX INJ NEW DRUG ADDON: CPT

## 2023-04-18 PROCEDURE — 93005 ELECTROCARDIOGRAM TRACING: CPT

## 2023-04-18 PROCEDURE — 87086 URINE CULTURE/COLONY COUNT: CPT

## 2023-04-18 PROCEDURE — 96365 THER/PROPH/DIAG IV INF INIT: CPT

## 2023-04-18 PROCEDURE — 96366 THER/PROPH/DIAG IV INF ADDON: CPT

## 2023-04-18 PROCEDURE — 81001 URINALYSIS AUTO W/SCOPE: CPT

## 2023-04-18 PROCEDURE — 25010000002 ONDANSETRON PER 1 MG

## 2023-04-18 PROCEDURE — 71045 X-RAY EXAM CHEST 1 VIEW: CPT

## 2023-04-18 PROCEDURE — 99285 EMERGENCY DEPT VISIT HI MDM: CPT

## 2023-04-18 PROCEDURE — 84484 ASSAY OF TROPONIN QUANT: CPT | Performed by: EMERGENCY MEDICINE

## 2023-04-18 PROCEDURE — 80048 BASIC METABOLIC PNL TOTAL CA: CPT | Performed by: EMERGENCY MEDICINE

## 2023-04-18 PROCEDURE — G0378 HOSPITAL OBSERVATION PER HR: HCPCS

## 2023-04-18 PROCEDURE — 85025 COMPLETE CBC W/AUTO DIFF WBC: CPT | Performed by: EMERGENCY MEDICINE

## 2023-04-18 PROCEDURE — 80162 ASSAY OF DIGOXIN TOTAL: CPT | Performed by: EMERGENCY MEDICINE

## 2023-04-18 RX ORDER — SODIUM CHLORIDE 0.9 % (FLUSH) 0.9 %
10 SYRINGE (ML) INJECTION AS NEEDED
Status: DISCONTINUED | OUTPATIENT
Start: 2023-04-18 | End: 2023-04-20 | Stop reason: HOSPADM

## 2023-04-18 RX ORDER — METOPROLOL SUCCINATE 50 MG/1
50 TABLET, EXTENDED RELEASE ORAL NIGHTLY
Status: DISCONTINUED | OUTPATIENT
Start: 2023-04-18 | End: 2023-04-20 | Stop reason: HOSPADM

## 2023-04-18 RX ORDER — DILTIAZEM HCL/D5W 125 MG/125
5-15 PLASTIC BAG, INJECTION (ML) INTRAVENOUS CONTINUOUS
Status: DISCONTINUED | OUTPATIENT
Start: 2023-04-18 | End: 2023-04-19

## 2023-04-18 RX ORDER — CHOLECALCIFEROL (VITAMIN D3) 125 MCG
5 CAPSULE ORAL NIGHTLY PRN
Status: DISCONTINUED | OUTPATIENT
Start: 2023-04-18 | End: 2023-04-20 | Stop reason: HOSPADM

## 2023-04-18 RX ORDER — ONDANSETRON 2 MG/ML
4 INJECTION INTRAMUSCULAR; INTRAVENOUS EVERY 6 HOURS PRN
Status: DISCONTINUED | OUTPATIENT
Start: 2023-04-18 | End: 2023-04-20 | Stop reason: HOSPADM

## 2023-04-18 RX ORDER — DIGOXIN 125 MCG
125 TABLET ORAL
COMMUNITY

## 2023-04-18 RX ORDER — HYDRALAZINE HYDROCHLORIDE 20 MG/ML
10 INJECTION INTRAMUSCULAR; INTRAVENOUS EVERY 6 HOURS PRN
Status: DISCONTINUED | OUTPATIENT
Start: 2023-04-18 | End: 2023-04-20 | Stop reason: HOSPADM

## 2023-04-18 RX ORDER — SODIUM CHLORIDE 0.9 % (FLUSH) 0.9 %
3-10 SYRINGE (ML) INJECTION AS NEEDED
Status: DISCONTINUED | OUTPATIENT
Start: 2023-04-18 | End: 2023-04-20 | Stop reason: HOSPADM

## 2023-04-18 RX ORDER — DIGOXIN 125 MCG
125 TABLET ORAL
Status: DISCONTINUED | OUTPATIENT
Start: 2023-04-19 | End: 2023-04-20 | Stop reason: HOSPADM

## 2023-04-18 RX ORDER — METOPROLOL TARTRATE 5 MG/5ML
5 INJECTION INTRAVENOUS ONCE
Status: COMPLETED | OUTPATIENT
Start: 2023-04-18 | End: 2023-04-18

## 2023-04-18 RX ORDER — TOPIRAMATE 100 MG/1
100 TABLET, FILM COATED ORAL DAILY
Status: DISCONTINUED | OUTPATIENT
Start: 2023-04-19 | End: 2023-04-20 | Stop reason: HOSPADM

## 2023-04-18 RX ORDER — FAMOTIDINE 20 MG/1
20 TABLET, FILM COATED ORAL
Status: DISCONTINUED | OUTPATIENT
Start: 2023-04-18 | End: 2023-04-20 | Stop reason: HOSPADM

## 2023-04-18 RX ORDER — SODIUM CHLORIDE 9 MG/ML
40 INJECTION, SOLUTION INTRAVENOUS AS NEEDED
Status: DISCONTINUED | OUTPATIENT
Start: 2023-04-18 | End: 2023-04-20 | Stop reason: HOSPADM

## 2023-04-18 RX ORDER — DILTIAZEM HYDROCHLORIDE 360 MG/1
360 CAPSULE, EXTENDED RELEASE ORAL DAILY
Status: ON HOLD | COMMUNITY
End: 2023-04-28 | Stop reason: SDUPTHER

## 2023-04-18 RX ORDER — METOPROLOL SUCCINATE 50 MG/1
50 TABLET, EXTENDED RELEASE ORAL NIGHTLY
COMMUNITY

## 2023-04-18 RX ORDER — SODIUM CHLORIDE 0.9 % (FLUSH) 0.9 %
3 SYRINGE (ML) INJECTION EVERY 12 HOURS SCHEDULED
Status: DISCONTINUED | OUTPATIENT
Start: 2023-04-18 | End: 2023-04-20 | Stop reason: HOSPADM

## 2023-04-18 RX ORDER — NITROGLYCERIN 0.4 MG/1
0.4 TABLET SUBLINGUAL
Status: DISCONTINUED | OUTPATIENT
Start: 2023-04-18 | End: 2023-04-20 | Stop reason: HOSPADM

## 2023-04-18 RX ADMIN — Medication 5 MG/HR: at 18:35

## 2023-04-18 RX ADMIN — ONDANSETRON 4 MG: 2 INJECTION INTRAMUSCULAR; INTRAVENOUS at 18:15

## 2023-04-18 RX ADMIN — Medication 7.5 MG/HR: at 14:05

## 2023-04-18 RX ADMIN — FAMOTIDINE 20 MG: 20 TABLET, FILM COATED ORAL at 22:00

## 2023-04-18 RX ADMIN — Medication 5 MG/HR: at 11:51

## 2023-04-18 RX ADMIN — METOPROLOL SUCCINATE 50 MG: 50 TABLET, EXTENDED RELEASE ORAL at 22:00

## 2023-04-18 RX ADMIN — METOPROLOL TARTRATE 5 MG: 1 INJECTION, SOLUTION INTRAVENOUS at 18:05

## 2023-04-18 RX ADMIN — Medication 3 ML: at 22:00

## 2023-04-18 RX ADMIN — APIXABAN 5 MG: 5 TABLET, FILM COATED ORAL at 22:00

## 2023-04-18 NOTE — ED PROVIDER NOTES
"Subjective   History of Present Illness  Patient atrial female complaint of chest pain throughout the day today.  She describes it as an ache that is mild without radiation.  Denies cough fever shortness of breath or other complaint.          Review of Systems    Past Medical History:   Diagnosis Date   • Bladder cancer    • Cancer     breast, bladder   • Deep vein thrombosis    • Essential hypertension 1/17/2017   • Fracture tibia/fibula, right, closed, initial encounter 8/27/2020   • GERD (gastroesophageal reflux disease)    • History of urostomy    • Immobility 08/27/2020    r/t broken Tibia    • Kidney carcinoma, right     removed    • Long term current use of anticoagulant 1/9/2015   • PAF (paroxysmal atrial fibrillation) 6/26/2019   • Presence of cardiac pacemaker 11/03/2014    BS dual chamber PM placed 10/2014 with pocket revision 1/25/17.  HX tachy rob syndrome   • Sick sinus syndrome 11/3/2014       Allergies   Allergen Reactions   • Amoxicillin Shortness Of Breath   • Ciprofloxacin Hives   • Oxycodone-Acetaminophen Unknown - High Severity     Pt's son stated when pt fell and broke her leg she was put on oxycodone; pt's son stated pt's \"vitals went all out of wack, she couldn't remember things.\"   • Penicillins Rash   • Sulfa Antibiotics Hives   • Cephalexin Other (See Comments)   • Clavulanic Acid Other (See Comments)   • Codeine Other (See Comments)   • Contrast Dye (Echo Or Unknown Ct/Mr) Other (See Comments)     8/18/22    • Doxycycline Other (See Comments)   • Fluconazole Other (See Comments)   • Iodine Hives   • Macrobid [Nitrofurantoin] Hives   • Tobramycin Other (See Comments)   • Trimethoprim Other (See Comments)       Past Surgical History:   Procedure Laterality Date   • BREAST LUMPECTOMY     • CHOLECYSTECTOMY N/A 10/12/2020    Procedure: CHOLECYSTECTOMY LAPAROSCOPIC;  Surgeon: Go Pereira DO;  Location: [facility] MAIN OR;  Service: General;  Laterality: N/A;   • CYSTECTOMY W/ URETEROILEAL " CONDUIT     • HARDWARE REMOVAL Right 6/11/2021    Procedure: TIBIA HARDWARE REMOVAL;  Surgeon: Geoff Shah II, MD;  Location: [facility] MAIN OR;  Service: Orthopedics;  Laterality: Right;   • HERNIA REPAIR     • HYSTERECTOMY     • INSERT / REPLACE / REMOVE PACEMAKER     • NEPHRECTOMY Right    • TIBIA IM NAILING/RODDING Right 8/28/2020    Procedure: TIBIA INTRAMEDULLARY NAIL/ABRAHAM INSERTION;  Surgeon: Geoff Shah II, MD;  Location: [facility] MAIN OR;  Service: Orthopedics;  Laterality: Right;       Family History   Problem Relation Age of Onset   • COPD Father        Social History     Socioeconomic History   • Marital status:    Tobacco Use   • Smoking status: Never     Passive exposure: Never   • Smokeless tobacco: Never   Vaping Use   • Vaping Use: Never used   Substance and Sexual Activity   • Alcohol use: Not Currently   • Drug use: Never   • Sexual activity: Defer           Objective   Physical Exam  Neck has no adenopathy JVD or bruits.  Lungs are clear.  Heart has an irregular rhythm is tachycardic without murmur rub or gallop.  Chest is nontender.  Abdomen is soft nontender.  Extremity exam is no cyanosis or edema.  Procedures     My EKG interpretation shows a fibrillation with RVR at a rate of 139 with no acute ST change      ED Course      Results for orders placed or performed during the hospital encounter of 04/18/23   Basic Metabolic Panel    Specimen: Blood   Result Value Ref Range    Glucose 117 (H) 65 - 99 mg/dL    BUN 17 8 - 23 mg/dL    Creatinine 1.17 (H) 0.57 - 1.00 mg/dL    Sodium 141 136 - 145 mmol/L    Potassium 3.8 3.5 - 5.2 mmol/L    Chloride 106 98 - 107 mmol/L    CO2 23.0 22.0 - 29.0 mmol/L    Calcium 8.7 8.6 - 10.5 mg/dL    BUN/Creatinine Ratio 14.5 7.0 - 25.0    Anion Gap 12.0 5.0 - 15.0 mmol/L    eGFR 46.7 (L) >60.0 mL/min/1.73   High Sensitivity Troponin T    Specimen: Blood   Result Value Ref Range    HS Troponin T 26 (H) <10 ng/L   CBC Auto Differential     Specimen: Blood   Result Value Ref Range    WBC 5.40 3.40 - 10.80 10*3/mm3    RBC 3.96 3.77 - 5.28 10*6/mm3    Hemoglobin 11.9 (L) 12.0 - 15.9 g/dL    Hematocrit 38.9 34.0 - 46.6 %    MCV 98.3 (H) 79.0 - 97.0 fL    MCH 30.2 26.6 - 33.0 pg    MCHC 30.7 (L) 31.5 - 35.7 g/dL    RDW 16.2 (H) 12.3 - 15.4 %    RDW-SD 56.0 (H) 37.0 - 54.0 fl    MPV 7.5 6.0 - 12.0 fL    Platelets 168 140 - 450 10*3/mm3    Neutrophil % 70.0 42.7 - 76.0 %    Lymphocyte % 17.4 (L) 19.6 - 45.3 %    Monocyte % 9.3 5.0 - 12.0 %    Eosinophil % 1.4 0.3 - 6.2 %    Basophil % 1.9 (H) 0.0 - 1.5 %    Neutrophils, Absolute 3.80 1.70 - 7.00 10*3/mm3    Lymphocytes, Absolute 0.90 0.70 - 3.10 10*3/mm3    Monocytes, Absolute 0.50 0.10 - 0.90 10*3/mm3    Eosinophils, Absolute 0.10 0.00 - 0.40 10*3/mm3    Basophils, Absolute 0.10 0.00 - 0.20 10*3/mm3    nRBC 0.0 0.0 - 0.2 /100 WBC   High Sensitivity Troponin T 2Hr    Specimen: Blood   Result Value Ref Range    HS Troponin T 25 (H) <10 ng/L    Troponin T Delta -1 >=-4 - <+4 ng/L   ECG 12 Lead Chest Pain   Result Value Ref Range    QT Interval 303 ms     XR Chest 1 View    Result Date: 4/18/2023  Mild chronic changes of the lung fields without evidence of acute cardiopulmonary abnormality. Electronically Signed: Chago Newell  4/18/2023 12:34 PM EDT  Workstation ID: [id number]                                         Medical Decision Making  My chest x-ray interpretation shows no cardiac effusion or infiltrate.  Patient does have A-fib with RVR.  Patient placed on Cardizem both bolus and drip.  She continues to be in atrial fibrillation at a rate of 110.  She has no evidence of acute coronary syndrome based on EKG and troponin.  Metabolic panel is at baseline without renal insufficiency or electrolyte abnormality.  Patient will be admitted for further rate control as well as cardiac evaluation.  Did speak with patient's family physician agrees to the admission.  Total critical care time 40 minutes    Amount  and/or Complexity of Data Reviewed  Labs: ordered. Decision-making details documented in ED Course.  Radiology: ordered and independent interpretation performed.  ECG/medicine tests: ordered and independent interpretation performed.      Risk  Prescription drug management.  Decision regarding hospitalization.          Final diagnoses:   Chest pain, unspecified type   Atrial fibrillation with RVR       ED Disposition  ED Disposition     ED Disposition   Decision to Admit    Condition   --    Comment   --             No follow-up provider specified.       Medication List      No changes were made to your prescriptions during this visit.          Issa Valenzuela MD  04/18/23 0441

## 2023-04-18 NOTE — CASE MANAGEMENT/SOCIAL WORK
Discharge Planning Assessment   Luis Felipe     Patient Name: Hazel Bucio  MRN: [id number]  Today's Date: 4/18/2023    Admit Date: 4/18/2023    Plan: From Home with Nephew and Current with [facility] , Watch for Oxygen needs   Discharge Needs Assessment     Row Name 04/18/23 1559       Living Environment    People in Home other relative(s)    Name(s) of People in Home Nephew    Current Living Arrangements home    Potentially Unsafe Housing Conditions none    Primary Care Provided by self    Able to Return to Prior Arrangements yes       Resource/Environmental Concerns    Resource/Environmental Concerns none    Transportation Concerns none       Food Insecurity    Within the past 12 months, you worried that your food would run out before you got the money to buy more. Never true    Within the past 12 months, the food you bought just didn't last and you didn't have money to get more. Never true       Transition Planning    Patient/Family Anticipates Transition to home with family    Patient/Family Anticipated Services at Transition home health care    Transportation Anticipated family or friend will provide       Discharge Needs Assessment    Readmission Within the Last 30 Days no previous admission in last 30 days    Equipment Currently Used at Home walker, standard    Concerns to be Addressed denies needs/concerns at this time    Anticipated Changes Related to Illness none    Equipment Needed After Discharge none               Discharge Plan     Row Name 04/18/23 1600       Plan    Plan From Home with Nephew and Current with [facility] , Watch for Oxygen needs    Patient/Family in Agreement with Plan yes    Plan Comments Met with patient and her friend at bedside Lives at home with Nephew whom assists with her care. Patient current with [facility] confimred with Liaison Gogo. Nephew or Friend Sawyer provides transportation. PCP and Pharmacy verified, able to afford medications. D/C BArriers: Cardiology consult               Continued Care and Services - Admitted Since 4/18/2023     Home Medical Care     Service Provider Request Status Selected Services Address Phone Fax Patient Preferred    [facility] Accepted N/A [address] [facility], [facility] [address], [place] [id number] [phone] --                       Demographic Summary     Row Name 04/18/23 1559       General Information    Admission Type observation    Arrived From emergency department    Referral Source admission list    Reason for Consult discharge planning    Preferred Language English               Functional Status     Row Name 04/18/23 1559       Functional Status    Usual Activity Tolerance moderate    Current Activity Tolerance moderate       Functional Status, IADL    Medications assistive person    Meal Preparation assistive person    Housekeeping assistive person    Laundry assistive person    Shopping assistive person       Mental Status    General Appearance WDL WDL       Mental Status Summary    Recent Changes in Mental Status/Cognitive Functioning no changes                      Patient Forms     Row Name 04/18/23 1603       Patient Forms    Important Message from Medicare (IMM) --  Kemp 4/18 per reg                  Xochilt Gutierrez RN

## 2023-04-18 NOTE — H&P
Patient Care Team:  Lizzy Francis MD as PCP - General (Family Medicine)  Jose Carlos Cheng MD as Consulting Physician (Cardiology)    Chief complaint chest pain    Subjective     Patient is a 83 y.o. female who presents to [facility] ED on 4/18/2023 with complaints of chest pain.  The patient reports nausea associated with chest pain and does states she vomited this a.m.  The patient is established with cardiology, Dr. Ellsworth, and states she was due to have her pacemaker battery change last week but appointment was changed.  She cannot remember the last time she ate but friend at bedside states he knows she ate cake on Sunday since it was her birthday.  Patient lives at home with her nephew.  She spends most of her time in wheelchair but is able to stand and pivot for position changes.  She does report a nonproductive cough with nasal congestion but denies fever chills or exposure to illness.  She has a past medical history of bladder cancer that required urostomy.  Friend at bedside states she was hospitalized for UTI not long ago and required inpatient rehab postdischarge.    ED course: \107, , creatinine 1.17    Review of Systems   Constitutional: Positive for activity change, appetite change and fatigue. Negative for chills and fever.   HENT: Positive for congestion and sinus pressure.    Eyes: Negative.    Respiratory: Positive for cough and shortness of breath.    Cardiovascular: Positive for chest pain and palpitations.   Gastrointestinal: Negative.    Endocrine: Negative.    Genitourinary: Negative.    Musculoskeletal: Negative.    Skin: Negative.    Allergic/Immunologic: Negative.    Neurological: Positive for weakness.   Hematological: Negative.    Psychiatric/Behavioral: Negative.           History  Past Medical History:   Diagnosis Date   • Bladder cancer    • Cancer     breast, bladder   • Deep vein thrombosis    • Essential hypertension 1/17/2017   • Fracture  tibia/fibula, right, closed, initial encounter 8/27/2020   • GERD (gastroesophageal reflux disease)    • History of urostomy    • Immobility 08/27/2020    r/t broken Tibia    • Kidney carcinoma, right     removed    • Long term current use of anticoagulant 1/9/2015   • PAF (paroxysmal atrial fibrillation) 6/26/2019   • Presence of cardiac pacemaker 11/03/2014    BS dual chamber PM placed 10/2014 with pocket revision 1/25/17.  HX tachy rob syndrome   • Sick sinus syndrome 11/3/2014     Past Surgical History:   Procedure Laterality Date   • BREAST LUMPECTOMY     • CHOLECYSTECTOMY N/A 10/12/2020    Procedure: CHOLECYSTECTOMY LAPAROSCOPIC;  Surgeon: Go Pereira DO;  Location: [facility] MAIN OR;  Service: General;  Laterality: N/A;   • CYSTECTOMY W/ URETEROILEAL CONDUIT     • HARDWARE REMOVAL Right 6/11/2021    Procedure: TIBIA HARDWARE REMOVAL;  Surgeon: Geoff Shah II, MD;  Location: [facility] MAIN OR;  Service: Orthopedics;  Laterality: Right;   • HERNIA REPAIR     • HYSTERECTOMY     • INSERT / REPLACE / REMOVE PACEMAKER     • NEPHRECTOMY Right    • TIBIA IM NAILING/RODDING Right 8/28/2020    Procedure: TIBIA INTRAMEDULLARY NAIL/ABRAHAM INSERTION;  Surgeon: Geoff Shah II, MD;  Location: [facility] MAIN OR;  Service: Orthopedics;  Laterality: Right;     Family History   Problem Relation Age of Onset   • COPD Father      Social History     Tobacco Use   • Smoking status: Never     Passive exposure: Never   • Smokeless tobacco: Never   Vaping Use   • Vaping Use: Never used   Substance Use Topics   • Alcohol use: Not Currently   • Drug use: Never     (Not in a hospital admission)    Allergies:  Amoxicillin, Ciprofloxacin, Oxycodone-acetaminophen, Penicillins, Sulfa antibiotics, Cephalexin, Clavulanic acid, Codeine, Contrast dye (echo or unknown ct/mr), Doxycycline, Fluconazole, Iodine, Macrobid [nitrofurantoin], Tobramycin, and Trimethoprim    Objective     Vital Signs  Temp:  [99.2 °F (37.3 °C)] 99.2  °F (37.3 °C)  Heart Rate:  [] 127  Resp:  [16-20] 19  BP: (142-178)/() 178/102       Physical Exam  Vitals reviewed.   HENT:      Head: Normocephalic and atraumatic.      Right Ear: External ear normal.      Left Ear: External ear normal.      Nose: Congestion present.      Mouth/Throat:      Mouth: Mucous membranes are dry.   Eyes:      General:         Right eye: No discharge.         Left eye: No discharge.   Cardiovascular:      Rate and Rhythm: Tachycardia present.      Heart sounds: Murmur heard.   Pulmonary:      Effort: Pulmonary effort is normal.      Breath sounds: Normal breath sounds.   Abdominal:      General: Bowel sounds are normal.      Palpations: Abdomen is soft.   Musculoskeletal:         General: Normal range of motion.      Cervical back: Normal range of motion.   Skin:     General: Skin is warm and dry.   Neurological:      Mental Status: She is alert and oriented to person, place, and time.   Psychiatric:         Mood and Affect: Mood normal.         Behavior: Behavior normal.          Results Review:     Imaging Results (Last 24 Hours)     Procedure Component Value Units Date/Time    XR Chest 1 View [[id number]] Collected: 04/18/23 1233     Updated: 04/18/23 1236    Narrative:      XR CHEST 1 VW    Date of Exam: 4/18/2023 11:56 AM EDT    Indication: Chest pain.    Comparison: 3/18/2023      FINDINGS: Mild chronic changes of the lung fields are present without focal airspace opacity. There is no significant pleural effusion or distinct pneumothorax. Normal heart and mediastinal contours. Left chest wall ICD projects in place.      Impression:      Mild chronic changes of the lung fields without evidence of acute cardiopulmonary abnormality.    Electronically Signed: Chago Newell    4/18/2023 12:34 PM EDT    Workstation ID: [id number]           Lab Results (last 24 hours)     Procedure Component Value Units Date/Time    Digoxin Level [[id number]] Collected: 04/18/23 1326    Specimen:  Blood Updated: 04/18/23 1446    High Sensitivity Troponin T 2Hr [[id number]]  (Abnormal) Collected: 04/18/23 1326    Specimen: Blood Updated: 04/18/23 1358     HS Troponin T 25 ng/L      Troponin T Delta -1 ng/L     Narrative:      High Sensitive Troponin T Reference Range:  <10.0 ng/L- Negative Female for AMI  <15.0 ng/L- Negative Male for AMI  >=10 - Abnormal Female indicating possible myocardial injury.  >=15 - Abnormal Male indicating possible myocardial injury.   Clinicians would have to utilize clinical acumen, EKG, Troponin, and serial changes to determine if it is an Acute Myocardial Infarction or myocardial injury due to an underlying chronic condition.         Basic Metabolic Panel [[id number]]  (Abnormal) Collected: 04/18/23 1126    Specimen: Blood Updated: 04/18/23 1157     Glucose 117 mg/dL      BUN 17 mg/dL      Creatinine 1.17 mg/dL      Sodium 141 mmol/L      Potassium 3.8 mmol/L      Chloride 106 mmol/L      CO2 23.0 mmol/L      Calcium 8.7 mg/dL      BUN/Creatinine Ratio 14.5     Anion Gap 12.0 mmol/L      eGFR 46.7 mL/min/1.73     Narrative:      GFR Normal >60  Chronic Kidney Disease <60  Kidney Failure <15    The GFR formula is only valid for adults with stable renal function between ages 18 and 70.    High Sensitivity Troponin T [[id number]]  (Abnormal) Collected: 04/18/23 1126    Specimen: Blood Updated: 04/18/23 1157     HS Troponin T 26 ng/L     Narrative:      High Sensitive Troponin T Reference Range:  <10.0 ng/L- Negative Female for AMI  <15.0 ng/L- Negative Male for AMI  >=10 - Abnormal Female indicating possible myocardial injury.  >=15 - Abnormal Male indicating possible myocardial injury.   Clinicians would have to utilize clinical acumen, EKG, Troponin, and serial changes to determine if it is an Acute Myocardial Infarction or myocardial injury due to an underlying chronic condition.         CBC & Differential [[id number]]  (Abnormal) Collected: 04/18/23 1126    Specimen: Blood Updated:  04/18/23 1135    Narrative:      The following orders were created for panel order CBC & Differential.  Procedure                               Abnormality         Status                     ---------                               -----------         ------                     CBC Auto Differential[[id number]]        Abnormal            Final result                 Please view results for these tests on the individual orders.    CBC Auto Differential [[id number]]  (Abnormal) Collected: 04/18/23 1126    Specimen: Blood Updated: 04/18/23 1135     WBC 5.40 10*3/mm3      RBC 3.96 10*6/mm3      Hemoglobin 11.9 g/dL      Hematocrit 38.9 %      MCV 98.3 fL      MCH 30.2 pg      MCHC 30.7 g/dL      RDW 16.2 %      RDW-SD 56.0 fl      MPV 7.5 fL      Platelets 168 10*3/mm3      Neutrophil % 70.0 %      Lymphocyte % 17.4 %      Monocyte % 9.3 %      Eosinophil % 1.4 %      Basophil % 1.9 %      Neutrophils, Absolute 3.80 10*3/mm3      Lymphocytes, Absolute 0.90 10*3/mm3      Monocytes, Absolute 0.50 10*3/mm3      Eosinophils, Absolute 0.10 10*3/mm3      Basophils, Absolute 0.10 10*3/mm3      nRBC 0.0 /100 WBC            I reviewed the patient's new clinical results.    Assessment & Plan     Chest pain  Atrial fibrillation  Status post pacemaker placement  Sick sinus syndrome  - Cardiology consulted, patient follows Dr. Ellsworth  - Cardizem drip  - Continue home med digoxin and metoprolol  - Anticoagulation Eliquis    History of bladder cancer  - Patient has urostomy  - Followed by Dr. Gupta  -We will obtain urinalysis, culture if indicated    History of kidney carcinoma, right  Nephrectomy, right    Essential hypertension  -Cardizem drip    Gastroesophageal reflux  -Pepcid    Immobility syndrome    I discussed the patient's findings and my recommendations with patient.     Shantel Moulton, APRN  04/18/23  14:59 EDT

## 2023-04-19 ENCOUNTER — APPOINTMENT (OUTPATIENT)
Dept: NUCLEAR MEDICINE | Facility: HOSPITAL | Age: 83
End: 2023-04-19
Payer: MEDICARE

## 2023-04-19 LAB
ANION GAP SERPL CALCULATED.3IONS-SCNC: 8 MMOL/L (ref 5–15)
B PARAPERT DNA SPEC QL NAA+PROBE: NOT DETECTED
B PERT DNA SPEC QL NAA+PROBE: NOT DETECTED
BASOPHILS # BLD AUTO: 0 10*3/MM3 (ref 0–0.2)
BASOPHILS NFR BLD AUTO: 0.4 % (ref 0–1.5)
BH CV REST NUCLEAR ISOTOPE DOSE: 10 MCI
BH CV STRESS BP STAGE 1: NORMAL
BH CV STRESS BP STAGE 2: NORMAL
BH CV STRESS BP STAGE 3: NORMAL
BH CV STRESS BP STAGE 4: NORMAL
BH CV STRESS COMMENTS STAGE 1: NORMAL
BH CV STRESS COMMENTS STAGE 2: NORMAL
BH CV STRESS DOSE REGADENOSON STAGE 1: 0.4
BH CV STRESS DURATION MIN STAGE 1: 0
BH CV STRESS DURATION MIN STAGE 2: 4
BH CV STRESS DURATION SEC STAGE 1: 10
BH CV STRESS DURATION SEC STAGE 2: 0
BH CV STRESS HR STAGE 1: 104
BH CV STRESS HR STAGE 2: 125
BH CV STRESS HR STAGE 3: 110
BH CV STRESS HR STAGE 4: 123
BH CV STRESS NUCLEAR ISOTOPE DOSE: 32 MCI
BH CV STRESS PROTOCOL 1: NORMAL
BH CV STRESS RECOVERY BP: NORMAL MMHG
BH CV STRESS RECOVERY HR: 98 BPM
BH CV STRESS STAGE 1: 1
BH CV STRESS STAGE 2: 2
BH CV STRESS STAGE 3: 3
BH CV STRESS STAGE 4: 4
BUN SERPL-MCNC: 25 MG/DL (ref 8–23)
BUN/CREAT SERPL: 15.2 (ref 7–25)
C PNEUM DNA NPH QL NAA+NON-PROBE: NOT DETECTED
CALCIUM SPEC-SCNC: 8.8 MG/DL (ref 8.6–10.5)
CHLORIDE SERPL-SCNC: 108 MMOL/L (ref 98–107)
CO2 SERPL-SCNC: 26 MMOL/L (ref 22–29)
CREAT SERPL-MCNC: 1.64 MG/DL (ref 0.57–1)
DEPRECATED RDW RBC AUTO: 60.4 FL (ref 37–54)
EGFRCR SERPLBLD CKD-EPI 2021: 31.1 ML/MIN/1.73
EOSINOPHIL # BLD AUTO: 0.2 10*3/MM3 (ref 0–0.4)
EOSINOPHIL NFR BLD AUTO: 5.5 % (ref 0.3–6.2)
ERYTHROCYTE [DISTWIDTH] IN BLOOD BY AUTOMATED COUNT: 16.6 % (ref 12.3–15.4)
FLUAV SUBTYP SPEC NAA+PROBE: NOT DETECTED
FLUBV RNA ISLT QL NAA+PROBE: NOT DETECTED
GLUCOSE SERPL-MCNC: 95 MG/DL (ref 65–99)
HADV DNA SPEC NAA+PROBE: NOT DETECTED
HCOV 229E RNA SPEC QL NAA+PROBE: NOT DETECTED
HCOV HKU1 RNA SPEC QL NAA+PROBE: NOT DETECTED
HCOV NL63 RNA SPEC QL NAA+PROBE: NOT DETECTED
HCOV OC43 RNA SPEC QL NAA+PROBE: NOT DETECTED
HCT VFR BLD AUTO: 37.8 % (ref 34–46.6)
HGB BLD-MCNC: 11.6 G/DL (ref 12–15.9)
HMPV RNA NPH QL NAA+NON-PROBE: NOT DETECTED
HPIV1 RNA ISLT QL NAA+PROBE: NOT DETECTED
HPIV2 RNA SPEC QL NAA+PROBE: NOT DETECTED
HPIV3 RNA NPH QL NAA+PROBE: NOT DETECTED
HPIV4 P GENE NPH QL NAA+PROBE: NOT DETECTED
LV EF NUC BP: 60 %
LYMPHOCYTES # BLD AUTO: 1.8 10*3/MM3 (ref 0.7–3.1)
LYMPHOCYTES NFR BLD AUTO: 45.7 % (ref 19.6–45.3)
M PNEUMO IGG SER IA-ACNC: NOT DETECTED
MAXIMAL PREDICTED HEART RATE: 138 BPM
MCH RBC QN AUTO: 30.8 PG (ref 26.6–33)
MCHC RBC AUTO-ENTMCNC: 30.6 G/DL (ref 31.5–35.7)
MCV RBC AUTO: 100.6 FL (ref 79–97)
MONOCYTES # BLD AUTO: 0.4 10*3/MM3 (ref 0.1–0.9)
MONOCYTES NFR BLD AUTO: 11 % (ref 5–12)
NEUTROPHILS NFR BLD AUTO: 1.5 10*3/MM3 (ref 1.7–7)
NEUTROPHILS NFR BLD AUTO: 37.4 % (ref 42.7–76)
NRBC BLD AUTO-RTO: 0 /100 WBC (ref 0–0.2)
PERCENT MAX PREDICTED HR: 90.58 %
PLATELET # BLD AUTO: 149 10*3/MM3 (ref 140–450)
PMV BLD AUTO: 7.3 FL (ref 6–12)
POTASSIUM SERPL-SCNC: 5 MMOL/L (ref 3.5–5.2)
QT INTERVAL: 303 MS
RBC # BLD AUTO: 3.76 10*6/MM3 (ref 3.77–5.28)
RHINOVIRUS RNA SPEC NAA+PROBE: NOT DETECTED
RSV RNA NPH QL NAA+NON-PROBE: NOT DETECTED
SARS-COV-2 RNA NPH QL NAA+NON-PROBE: NOT DETECTED
SODIUM SERPL-SCNC: 142 MMOL/L (ref 136–145)
STRESS BASELINE BP: NORMAL MMHG
STRESS BASELINE HR: 97 BPM
STRESS PERCENT HR: 107 %
STRESS POST PEAK BP: NORMAL MMHG
STRESS POST PEAK HR: 125 BPM
STRESS TARGET HR: 117 BPM
WBC NRBC COR # BLD: 4.1 10*3/MM3 (ref 3.4–10.8)

## 2023-04-19 PROCEDURE — G0378 HOSPITAL OBSERVATION PER HR: HCPCS

## 2023-04-19 PROCEDURE — 25010000002 REGADENOSON 0.4 MG/5ML SOLUTION: Performed by: INTERNAL MEDICINE

## 2023-04-19 PROCEDURE — 93018 CV STRESS TEST I&R ONLY: CPT | Performed by: INTERNAL MEDICINE

## 2023-04-19 PROCEDURE — 99222 1ST HOSP IP/OBS MODERATE 55: CPT | Performed by: INTERNAL MEDICINE

## 2023-04-19 PROCEDURE — 93016 CV STRESS TEST SUPVJ ONLY: CPT | Performed by: INTERNAL MEDICINE

## 2023-04-19 PROCEDURE — 78452 HT MUSCLE IMAGE SPECT MULT: CPT | Performed by: INTERNAL MEDICINE

## 2023-04-19 PROCEDURE — 0 TECHNETIUM TETROFOSMIN KIT: Performed by: INTERNAL MEDICINE

## 2023-04-19 PROCEDURE — 85025 COMPLETE CBC W/AUTO DIFF WBC: CPT

## 2023-04-19 PROCEDURE — A9502 TC99M TETROFOSMIN: HCPCS | Performed by: INTERNAL MEDICINE

## 2023-04-19 PROCEDURE — 78452 HT MUSCLE IMAGE SPECT MULT: CPT

## 2023-04-19 PROCEDURE — 0202U NFCT DS 22 TRGT SARS-COV-2: CPT | Performed by: INTERNAL MEDICINE

## 2023-04-19 PROCEDURE — 93017 CV STRESS TEST TRACING ONLY: CPT

## 2023-04-19 PROCEDURE — 80048 BASIC METABOLIC PNL TOTAL CA: CPT

## 2023-04-19 RX ORDER — ACETAMINOPHEN 325 MG/1
650 TABLET ORAL EVERY 4 HOURS PRN
Status: DISCONTINUED | OUTPATIENT
Start: 2023-04-19 | End: 2023-04-20 | Stop reason: HOSPADM

## 2023-04-19 RX ORDER — BENZONATATE 100 MG/1
100 CAPSULE ORAL 3 TIMES DAILY PRN
Status: DISCONTINUED | OUTPATIENT
Start: 2023-04-19 | End: 2023-04-20 | Stop reason: HOSPADM

## 2023-04-19 RX ORDER — DILTIAZEM HYDROCHLORIDE 180 MG/1
360 CAPSULE, COATED, EXTENDED RELEASE ORAL
Status: DISCONTINUED | OUTPATIENT
Start: 2023-04-19 | End: 2023-04-20 | Stop reason: HOSPADM

## 2023-04-19 RX ORDER — GUAIFENESIN 600 MG/1
600 TABLET, EXTENDED RELEASE ORAL EVERY 12 HOURS SCHEDULED
Status: DISCONTINUED | OUTPATIENT
Start: 2023-04-19 | End: 2023-04-20 | Stop reason: HOSPADM

## 2023-04-19 RX ORDER — HYDROCODONE BITARTRATE AND ACETAMINOPHEN 5; 325 MG/1; MG/1
1 TABLET ORAL EVERY 6 HOURS PRN
Status: DISCONTINUED | OUTPATIENT
Start: 2023-04-19 | End: 2023-04-20 | Stop reason: HOSPADM

## 2023-04-19 RX ORDER — REGADENOSON 0.08 MG/ML
0.4 INJECTION, SOLUTION INTRAVENOUS
Status: COMPLETED | OUTPATIENT
Start: 2023-04-19 | End: 2023-04-19

## 2023-04-19 RX ADMIN — FAMOTIDINE 20 MG: 20 TABLET, FILM COATED ORAL at 08:15

## 2023-04-19 RX ADMIN — TOPIRAMATE 100 MG: 100 TABLET, FILM COATED ORAL at 08:15

## 2023-04-19 RX ADMIN — BENZONATATE 100 MG: 100 CAPSULE ORAL at 01:43

## 2023-04-19 RX ADMIN — GUAIFENESIN 600 MG: 600 TABLET, EXTENDED RELEASE ORAL at 21:19

## 2023-04-19 RX ADMIN — APIXABAN 5 MG: 5 TABLET, FILM COATED ORAL at 21:19

## 2023-04-19 RX ADMIN — DIGOXIN 125 MCG: 125 TABLET ORAL at 12:08

## 2023-04-19 RX ADMIN — TETROFOSMIN 1 DOSE: 1.38 INJECTION, POWDER, LYOPHILIZED, FOR SOLUTION INTRAVENOUS at 08:40

## 2023-04-19 RX ADMIN — GUAIFENESIN 600 MG: 600 TABLET, EXTENDED RELEASE ORAL at 12:08

## 2023-04-19 RX ADMIN — Medication 3 ML: at 21:19

## 2023-04-19 RX ADMIN — TETROFOSMIN 1 DOSE: 1.38 INJECTION, POWDER, LYOPHILIZED, FOR SOLUTION INTRAVENOUS at 09:30

## 2023-04-19 RX ADMIN — DILTIAZEM HYDROCHLORIDE 360 MG: 180 CAPSULE, COATED, EXTENDED RELEASE ORAL at 12:09

## 2023-04-19 RX ADMIN — REGADENOSON 0.4 MG: 0.08 INJECTION, SOLUTION INTRAVENOUS at 09:30

## 2023-04-19 RX ADMIN — FAMOTIDINE 20 MG: 20 TABLET, FILM COATED ORAL at 17:32

## 2023-04-19 RX ADMIN — APIXABAN 5 MG: 5 TABLET, FILM COATED ORAL at 08:15

## 2023-04-19 RX ADMIN — Medication 3 ML: at 12:09

## 2023-04-19 NOTE — CONSULTS
Cardiology consult note  Augustin Ellsworth and [facility]    Patient Care Team:  Lizzy Francis MD as PCP - General (Family Medicine)  Jose Carlos Cheng MD as Consulting Physician (Cardiology)    CHIEF COMPLAINT: A-fib RVR, atypical chest pain    HISTORY OF PRESENT ILLNESS:    Hazel Bucio is a 83 y.o. female well-known to me with a past cardiac history of permanent afib (anticoaguled with coumadin) and SSS s/p [employer] PPM.  Last 2D echo 9/2022 showed an EF = 60-65% with mild to moderate MR/TR and mild AI.  Last nuclear stress test 2020 showed no ischemia.  PMH includes HTN, CKD stage 3, bladder cancer, and renal cancer.  Recently marked patient presented with c/o weakness and fever and found with UTI as well as afib with RVR.    Received IV of diltiazem and ultimately discharged on metoprolol and diltiazem without digoxin.  At that time She was admitted with sepsis, found to have E. coli bacteremia now on antibiotics, she had A-fib RVR ultimately started on diltiazem drip for control, she likely had some hypotension with renal injury which has been improved back to the hospital last 24 hours with A-fib RVR and some atypical chest pain.  She underwent stress this morning which revealed no reversible ischemia with preserved EF with underlying atrial fibrillation that was rate controlled.  She denies any further chest pain and feels better with rates under control.    Review of systems otherwise negative x14 point review of systems except was mentioned above  Historical data copied forward from previous encounters in EMR is unchanged    EKG demonstrates A-fib RVR, normal ST segments no ischemia    Past Medical History:   Diagnosis Date   • Bladder cancer    • Cancer     breast, bladder   • Deep vein thrombosis    • Essential hypertension 1/17/2017   • Fracture tibia/fibula, right, closed, initial encounter 8/27/2020   • GERD (gastroesophageal reflux disease)    • History of urostomy    • Immobility 08/27/2020     r/t broken Tibia    • Kidney carcinoma, right     removed    • Long term current use of anticoagulant 1/9/2015   • PAF (paroxysmal atrial fibrillation) 6/26/2019   • Presence of cardiac pacemaker 11/03/2014    BS dual chamber PM placed 10/2014 with pocket revision 1/25/17.  HX tachy rob syndrome   • Sick sinus syndrome 11/3/2014     Past Surgical History:   Procedure Laterality Date   • BREAST LUMPECTOMY     • CHOLECYSTECTOMY N/A 10/12/2020    Procedure: CHOLECYSTECTOMY LAPAROSCOPIC;  Surgeon: Go Pereira DO;  Location: [facility] MAIN OR;  Service: General;  Laterality: N/A;   • CYSTECTOMY W/ URETEROILEAL CONDUIT     • HARDWARE REMOVAL Right 6/11/2021    Procedure: TIBIA HARDWARE REMOVAL;  Surgeon: Geoff Shah II, MD;  Location: [facility] MAIN OR;  Service: Orthopedics;  Laterality: Right;   • HERNIA REPAIR     • HYSTERECTOMY     • INSERT / REPLACE / REMOVE PACEMAKER     • NEPHRECTOMY Right    • TIBIA IM NAILING/RODDING Right 8/28/2020    Procedure: TIBIA INTRAMEDULLARY NAIL/ABRAHAM INSERTION;  Surgeon: Geoff Shah II, MD;  Location: [facility] MAIN OR;  Service: Orthopedics;  Laterality: Right;     Family History   Problem Relation Age of Onset   • COPD Father      Social History     Tobacco Use   • Smoking status: Never     Passive exposure: Never   • Smokeless tobacco: Never   Vaping Use   • Vaping Use: Never used   Substance Use Topics   • Alcohol use: Not Currently   • Drug use: Never     (Not in a hospital admission)    Allergies:  Amoxicillin, Ciprofloxacin, Oxycodone-acetaminophen, Penicillins, Sulfa antibiotics, Cephalexin, Clavulanic acid, Codeine, Contrast dye (echo or unknown ct/mr), Doxycycline, Fluconazole, Iodine, Macrobid [nitrofurantoin], Tobramycin, and Trimethoprim    REVIEW OF SYSTEMS:  Please see the above history of present illness for pertinent positives and negatives.  The remainder of the patient's systems have been reviewed and are negative.     Vital Signs  Temp:  [98.3  "°F (36.8 °C)-99.1 °F (37.3 °C)] 98.3 °F (36.8 °C)  Heart Rate:  [] 107  Resp:  [15-24] 18  BP: (121-178)/() 159/114    Flowsheet Rows    Flowsheet Row First Filed Value   Admission Height 167.6 cm (66\") Documented at 04/18/2023 1100   Admission Weight 61.2 kg (135 lb) Documented at 04/18/2023 1100           Physical Exam:  Physical Exam   Constitutional: No acute distress appears stated age  HEENT:   Head: Normocephalic and atraumatic.   Eyes:  Pupils are equal, round, and reactive to light. EOM are intact. Sclerae are anicteric and noninjected.  Mouth and Throat: Patient has moist mucous membranes. Oropharynx is clear of any erythema or exudate.     Neck: Neck supple. No JVD present. No thyromegaly present. No lymphadenopathy present.  Cardiovascular: Irregularly irregular, S1 normal and S2 normal.  Exam reveals no gallop and no friction rub.  1 out of 6 stable murmur, unchanged from prior  Pulmonary/Chest: Lungs are clear to auscultation bilaterally. No respiratory distress. No wheezes. No rhonchi. No rales.   Abdominal: Soft. Bowel sounds are normal. No distension and no mass. There is no hepatosplenomegaly. There is no tenderness.   Musculoskeletal: Normal muscle tone  Extremities: No edema. Pulses are palpable in all 4 extremities.  Neurological: Patient is alert and oriented to person, place, and time. Cranial nerves II-XII are grossly intact with no focal deficits.  Skin: Skin is warm. No rash noted. Nails show no clubbing.  No cyanosis or erythema.     Results Review:    I reviewed the patient's new clinical results.  Lab Results (most recent)     Procedure Component Value Units Date/Time    Basic Metabolic Panel [[id number]]  (Abnormal) Collected: 04/19/23 0628    Specimen: Blood Updated: 04/19/23 0708     Glucose 95 mg/dL      BUN 25 mg/dL      Creatinine 1.64 mg/dL      Sodium 142 mmol/L      Potassium 5.0 mmol/L      Comment: Slight hemolysis detected by analyzer. Results may be affected.    "     Chloride 108 mmol/L      CO2 26.0 mmol/L      Calcium 8.8 mg/dL      BUN/Creatinine Ratio 15.2     Anion Gap 8.0 mmol/L      eGFR 31.1 mL/min/1.73     Narrative:      GFR Normal >60  Chronic Kidney Disease <60  Kidney Failure <15    The GFR formula is only valid for adults with stable renal function between ages 18 and 70.    CBC & Differential [[id number]]  (Abnormal) Collected: 04/19/23 0628    Specimen: Blood Updated: 04/19/23 0636    Narrative:      The following orders were created for panel order CBC & Differential.  Procedure                               Abnormality         Status                     ---------                               -----------         ------                     CBC Auto Differential[[id number]]        Abnormal            Final result                 Please view results for these tests on the individual orders.    CBC Auto Differential [[id number]]  (Abnormal) Collected: 04/19/23 0628    Specimen: Blood Updated: 04/19/23 0636     WBC 4.10 10*3/mm3      RBC 3.76 10*6/mm3      Hemoglobin 11.6 g/dL      Hematocrit 37.8 %      .6 fL      MCH 30.8 pg      MCHC 30.6 g/dL      RDW 16.6 %      RDW-SD 60.4 fl      MPV 7.3 fL      Platelets 149 10*3/mm3      Neutrophil % 37.4 %      Lymphocyte % 45.7 %      Monocyte % 11.0 %      Eosinophil % 5.5 %      Basophil % 0.4 %      Neutrophils, Absolute 1.50 10*3/mm3      Lymphocytes, Absolute 1.80 10*3/mm3      Monocytes, Absolute 0.40 10*3/mm3      Eosinophils, Absolute 0.20 10*3/mm3      Basophils, Absolute 0.00 10*3/mm3      nRBC 0.0 /100 WBC     Urinalysis, Microscopic Only - Urine, Clean Catch [[id number]]  (Abnormal) Collected: 04/18/23 2029    Specimen: Urine, Clean Catch Updated: 04/18/23 2113     RBC, UA 0-2 /HPF      WBC, UA Too Numerous to Count /HPF      Bacteria, UA 2+ /HPF      Squamous Epithelial Cells, UA 0-2 /HPF      Hyaline Casts, UA 3-6 /LPF      Amorphous Crystals, UA Moderate/2+ /HPF      Methodology Manual Light  Microscopy    Urine Culture - Urine, Urine, Clean Catch [[id number]] Collected: 04/18/23 2029    Specimen: Urine, Clean Catch Updated: 04/18/23 2113    Urinalysis With Culture If Indicated - Urine, Clean Catch [[id number]]  (Abnormal) Collected: 04/18/23 2029    Specimen: Urine, Clean Catch Updated: 04/18/23 2038     Color, UA Yellow     Appearance, UA Cloudy     Comment: Result checked          pH, UA 6.0     Specific Gravity, UA 1.017     Glucose, UA Negative     Ketones, UA Trace     Bilirubin, UA Negative     Blood, UA Trace     Protein, UA 30 mg/dL (1+)     Leuk Esterase, UA Moderate (2+)     Nitrite, UA Negative     Urobilinogen, UA 1.0 E.U./dL    Narrative:      In absence of clinical symptoms, the presence of pyuria, bacteria, and/or nitrites on the urinalysis result does not correlate with infection.    Digoxin Level [[id number]]  (Abnormal) Collected: 04/18/23 1326    Specimen: Blood Updated: 04/18/23 1514     Digoxin 0.30 ng/mL     High Sensitivity Troponin T 2Hr [[id number]]  (Abnormal) Collected: 04/18/23 1326    Specimen: Blood Updated: 04/18/23 1358     HS Troponin T 25 ng/L      Troponin T Delta -1 ng/L     Narrative:      High Sensitive Troponin T Reference Range:  <10.0 ng/L- Negative Female for AMI  <15.0 ng/L- Negative Male for AMI  >=10 - Abnormal Female indicating possible myocardial injury.  >=15 - Abnormal Male indicating possible myocardial injury.   Clinicians would have to utilize clinical acumen, EKG, Troponin, and serial changes to determine if it is an Acute Myocardial Infarction or myocardial injury due to an underlying chronic condition.         Basic Metabolic Panel [[id number]]  (Abnormal) Collected: 04/18/23 1126    Specimen: Blood Updated: 04/18/23 1157     Glucose 117 mg/dL      BUN 17 mg/dL      Creatinine 1.17 mg/dL      Sodium 141 mmol/L      Potassium 3.8 mmol/L      Chloride 106 mmol/L      CO2 23.0 mmol/L      Calcium 8.7 mg/dL      BUN/Creatinine Ratio 14.5     Anion Gap 12.0  mmol/L      eGFR 46.7 mL/min/1.73     Narrative:      GFR Normal >60  Chronic Kidney Disease <60  Kidney Failure <15    The GFR formula is only valid for adults with stable renal function between ages 18 and 70.    High Sensitivity Troponin T [[id number]]  (Abnormal) Collected: 04/18/23 1126    Specimen: Blood Updated: 04/18/23 1157     HS Troponin T 26 ng/L     Narrative:      High Sensitive Troponin T Reference Range:  <10.0 ng/L- Negative Female for AMI  <15.0 ng/L- Negative Male for AMI  >=10 - Abnormal Female indicating possible myocardial injury.  >=15 - Abnormal Male indicating possible myocardial injury.   Clinicians would have to utilize clinical acumen, EKG, Troponin, and serial changes to determine if it is an Acute Myocardial Infarction or myocardial injury due to an underlying chronic condition.         CBC & Differential [[id number]]  (Abnormal) Collected: 04/18/23 1126    Specimen: Blood Updated: 04/18/23 1135    Narrative:      The following orders were created for panel order CBC & Differential.  Procedure                               Abnormality         Status                     ---------                               -----------         ------                     CBC Auto Differential[[id number]]        Abnormal            Final result                 Please view results for these tests on the individual orders.    CBC Auto Differential [[id number]]  (Abnormal) Collected: 04/18/23 1126    Specimen: Blood Updated: 04/18/23 1135     WBC 5.40 10*3/mm3      RBC 3.96 10*6/mm3      Hemoglobin 11.9 g/dL      Hematocrit 38.9 %      MCV 98.3 fL      MCH 30.2 pg      MCHC 30.7 g/dL      RDW 16.2 %      RDW-SD 56.0 fl      MPV 7.5 fL      Platelets 168 10*3/mm3      Neutrophil % 70.0 %      Lymphocyte % 17.4 %      Monocyte % 9.3 %      Eosinophil % 1.4 %      Basophil % 1.9 %      Neutrophils, Absolute 3.80 10*3/mm3      Lymphocytes, Absolute 0.90 10*3/mm3      Monocytes, Absolute 0.50 10*3/mm3       Eosinophils, Absolute 0.10 10*3/mm3      Basophils, Absolute 0.10 10*3/mm3      nRBC 0.0 /100 WBC           Imaging Results (Most Recent)     Procedure Component Value Units Date/Time    XR Chest 1 View [[id number]] Collected: 04/18/23 1233     Updated: 04/18/23 1236    Narrative:      XR CHEST 1 VW    Date of Exam: 4/18/2023 11:56 AM EDT    Indication: Chest pain.    Comparison: 3/18/2023      FINDINGS: Mild chronic changes of the lung fields are present without focal airspace opacity. There is no significant pleural effusion or distinct pneumothorax. Normal heart and mediastinal contours. Left chest wall ICD projects in place.      Impression:      Mild chronic changes of the lung fields without evidence of acute cardiopulmonary abnormality.    Electronically Signed: Chago Newell    4/18/2023 12:34 PM EDT    Workstation ID: [id number]        reviewed    ECG/EMG Results (most recent)     Procedure Component Value Units Date/Time    ECG 12 Lead Chest Pain [[id number]] Collected: 04/18/23 1108     Updated: 04/19/23 0900     QT Interval 303 ms     Narrative:      HEART RATE= 139  bpm  RR Interval= 431  ms  CO Interval=   ms  P Horizontal Axis=   deg  P Front Axis=   deg  QRSD Interval= 64  ms  QT Interval= 303  ms  QRS Axis= 63  deg  T Wave Axis= 29  deg  - ABNORMAL ECG -  Atrial fibrillation  Low voltage, precordial leads  Consider anteroseptal infarct  When compared with ECG of 18-Mar-2023 20:25:10,  No significant change  Electronically Signed By: Issa Valenzuela (ANNY) 19-Apr-2023 09:00:41  Date and Time of Study: 2023-04-18 11:08:59        reviewed    Assessment & Plan   Essential pretension  A-fib RVR  History of diastolic dysfunction  Hyperlipidemia  Tachybradycardia syndrome, permanent pacemaker in place dual-chamber device [employer]    A-fib RVR  Restart metoprolol XL  Diltiazem drip, convert to p.o. diltiazem  Digoxin back on board  Underlying CKD avoid hypokalemia in the setting of digoxin high risk  medicine    Ischemic evaluation negative with no ischemia, preserved EF, underlying atrial fibrillation, low risk stress test  Likely medication repeat 2D echo, recently received 1 last admission  Anticoagulation with Eliquis continues  Hypertensive, restart diltiazem, follow for additional antihypertensives OV needed, goal less than 140 systolic    Treat UTI    Continue to follow, thank you for the consult  Augustin Ellsworth MD, PhD    I discussed the patient's findings and my recommendations with patient and staff.     Augustin Ellsworth MD  04/19/23  11:34 EDT

## 2023-04-19 NOTE — PROGRESS NOTES
LOS: 0 days   Patient Care Team:  Lizzy Francis MD as PCP - General (Family Medicine)  Jose Carlos Cheng MD as Consulting Physician (Cardiology)    Subjective     Interval History: No chest pain overnight.    Patient Complaints: nasal congestion and non-productive cough    History taken from: patient    Review of Systems   Constitutional: Negative for activity change, appetite change, chills, diaphoresis, fatigue, fever and unexpected weight change.   HENT: Positive for congestion, hearing loss, postnasal drip, rhinorrhea and voice change. Negative for sore throat and trouble swallowing.    Respiratory: Positive for cough. Negative for shortness of breath and wheezing.    Cardiovascular: Negative for chest pain, palpitations and leg swelling.   Gastrointestinal: Negative for abdominal pain, constipation, diarrhea and nausea.   Genitourinary: Negative for dysuria.   Musculoskeletal: Positive for gait problem. Negative for arthralgias.   Neurological: Positive for weakness. Negative for tremors.   Psychiatric/Behavioral: Negative for confusion.           Objective     Vital Signs  Temp:  [98.3 °F (36.8 °C)-99.2 °F (37.3 °C)] 98.3 °F (36.8 °C)  Heart Rate:  [] 107  Resp:  [15-24] 18  BP: (121-178)/() 159/114    Physical Exam:     General Appearance:    Alert, cooperative, in no acute distress, hard of hearing   Head:    Normocephalic, without obvious abnormality, atraumatic   Eyes:            Lids and lashes normal, conjunctivae and sclerae normal, no   icterus, no pallor, corneas clear, PERRLA   Ears:    Ears appear intact with no abnormalities noted   Throat:   No oral lesions, no thrush, oral mucosa moist   Neck:   No adenopathy, supple, trachea midline, no thyromegaly, no   carotid bruit, no JVD   Lungs:    Scattered rhonchi, largely clears with coughing    Heart:    Irregularly irregular   Chest Wall:    No abnormalities observed   Abdomen:     Soft; urostomy in RLQ   Extremities:   Moves  all extremities well, no edema, no cyanosis, no             Redness   Pulses:   Pulses palpable and equal bilaterally   Skin:   No bleeding, bruising or rash   Lymph nodes:   No palpable adenopathy   Neurologic:   Cranial nerves 2 - 12 grossly intact, sensation intact, DTR       present and equal bilaterally        Results Review:    Lab Results (last 24 hours)     Procedure Component Value Units Date/Time    Basic Metabolic Panel [[id number]]  (Abnormal) Collected: 04/19/23 0628    Specimen: Blood Updated: 04/19/23 0708     Glucose 95 mg/dL      BUN 25 mg/dL      Creatinine 1.64 mg/dL      Sodium 142 mmol/L      Potassium 5.0 mmol/L      Comment: Slight hemolysis detected by analyzer. Results may be affected.        Chloride 108 mmol/L      CO2 26.0 mmol/L      Calcium 8.8 mg/dL      BUN/Creatinine Ratio 15.2     Anion Gap 8.0 mmol/L      eGFR 31.1 mL/min/1.73     Narrative:      GFR Normal >60  Chronic Kidney Disease <60  Kidney Failure <15    The GFR formula is only valid for adults with stable renal function between ages 18 and 70.    CBC & Differential [[id number]]  (Abnormal) Collected: 04/19/23 0628    Specimen: Blood Updated: 04/19/23 0636    Narrative:      The following orders were created for panel order CBC & Differential.  Procedure                               Abnormality         Status                     ---------                               -----------         ------                     CBC Auto Differential[[id number]]        Abnormal            Final result                 Please view results for these tests on the individual orders.    CBC Auto Differential [[id number]]  (Abnormal) Collected: 04/19/23 0628    Specimen: Blood Updated: 04/19/23 0636     WBC 4.10 10*3/mm3      RBC 3.76 10*6/mm3      Hemoglobin 11.6 g/dL      Hematocrit 37.8 %      .6 fL      MCH 30.8 pg      MCHC 30.6 g/dL      RDW 16.6 %      RDW-SD 60.4 fl      MPV 7.3 fL      Platelets 149 10*3/mm3      Neutrophil % 37.4 %       Lymphocyte % 45.7 %      Monocyte % 11.0 %      Eosinophil % 5.5 %      Basophil % 0.4 %      Neutrophils, Absolute 1.50 10*3/mm3      Lymphocytes, Absolute 1.80 10*3/mm3      Monocytes, Absolute 0.40 10*3/mm3      Eosinophils, Absolute 0.20 10*3/mm3      Basophils, Absolute 0.00 10*3/mm3      nRBC 0.0 /100 WBC     Urinalysis, Microscopic Only - Urine, Clean Catch [[id number]]  (Abnormal) Collected: 04/18/23 2029    Specimen: Urine, Clean Catch Updated: 04/18/23 2113     RBC, UA 0-2 /HPF      WBC, UA Too Numerous to Count /HPF      Bacteria, UA 2+ /HPF      Squamous Epithelial Cells, UA 0-2 /HPF      Hyaline Casts, UA 3-6 /LPF      Amorphous Crystals, UA Moderate/2+ /HPF      Methodology Manual Light Microscopy    Urine Culture - Urine, Urine, Clean Catch [[id number]] Collected: 04/18/23 2029    Specimen: Urine, Clean Catch Updated: 04/18/23 2113    Urinalysis With Culture If Indicated - Urine, Clean Catch [[id number]]  (Abnormal) Collected: 04/18/23 2029    Specimen: Urine, Clean Catch Updated: 04/18/23 2038     Color, UA Yellow     Appearance, UA Cloudy     Comment: Result checked          pH, UA 6.0     Specific Gravity, UA 1.017     Glucose, UA Negative     Ketones, UA Trace     Bilirubin, UA Negative     Blood, UA Trace     Protein, UA 30 mg/dL (1+)     Leuk Esterase, UA Moderate (2+)     Nitrite, UA Negative     Urobilinogen, UA 1.0 E.U./dL    Narrative:      In absence of clinical symptoms, the presence of pyuria, bacteria, and/or nitrites on the urinalysis result does not correlate with infection.    Digoxin Level [[id number]]  (Abnormal) Collected: 04/18/23 1326    Specimen: Blood Updated: 04/18/23 1514     Digoxin 0.30 ng/mL     High Sensitivity Troponin T 2Hr [[id number]]  (Abnormal) Collected: 04/18/23 1326    Specimen: Blood Updated: 04/18/23 1358     HS Troponin T 25 ng/L      Troponin T Delta -1 ng/L     Narrative:      High Sensitive Troponin T Reference Range:  <10.0 ng/L- Negative Female for  AMI  <15.0 ng/L- Negative Male for AMI  >=10 - Abnormal Female indicating possible myocardial injury.  >=15 - Abnormal Male indicating possible myocardial injury.   Clinicians would have to utilize clinical acumen, EKG, Troponin, and serial changes to determine if it is an Acute Myocardial Infarction or myocardial injury due to an underlying chronic condition.         Basic Metabolic Panel [[id number]]  (Abnormal) Collected: 04/18/23 1126    Specimen: Blood Updated: 04/18/23 1157     Glucose 117 mg/dL      BUN 17 mg/dL      Creatinine 1.17 mg/dL      Sodium 141 mmol/L      Potassium 3.8 mmol/L      Chloride 106 mmol/L      CO2 23.0 mmol/L      Calcium 8.7 mg/dL      BUN/Creatinine Ratio 14.5     Anion Gap 12.0 mmol/L      eGFR 46.7 mL/min/1.73     Narrative:      GFR Normal >60  Chronic Kidney Disease <60  Kidney Failure <15    The GFR formula is only valid for adults with stable renal function between ages 18 and 70.    High Sensitivity Troponin T [[id number]]  (Abnormal) Collected: 04/18/23 1126    Specimen: Blood Updated: 04/18/23 1157     HS Troponin T 26 ng/L     Narrative:      High Sensitive Troponin T Reference Range:  <10.0 ng/L- Negative Female for AMI  <15.0 ng/L- Negative Male for AMI  >=10 - Abnormal Female indicating possible myocardial injury.  >=15 - Abnormal Male indicating possible myocardial injury.   Clinicians would have to utilize clinical acumen, EKG, Troponin, and serial changes to determine if it is an Acute Myocardial Infarction or myocardial injury due to an underlying chronic condition.         CBC & Differential [[id number]]  (Abnormal) Collected: 04/18/23 1126    Specimen: Blood Updated: 04/18/23 1135    Narrative:      The following orders were created for panel order CBC & Differential.  Procedure                               Abnormality         Status                     ---------                               -----------         ------                     CBC Auto  Differential[[id number]]        Abnormal            Final result                 Please view results for these tests on the individual orders.    CBC Auto Differential [[id number]]  (Abnormal) Collected: 04/18/23 1126    Specimen: Blood Updated: 04/18/23 1135     WBC 5.40 10*3/mm3      RBC 3.96 10*6/mm3      Hemoglobin 11.9 g/dL      Hematocrit 38.9 %      MCV 98.3 fL      MCH 30.2 pg      MCHC 30.7 g/dL      RDW 16.2 %      RDW-SD 56.0 fl      MPV 7.5 fL      Platelets 168 10*3/mm3      Neutrophil % 70.0 %      Lymphocyte % 17.4 %      Monocyte % 9.3 %      Eosinophil % 1.4 %      Basophil % 1.9 %      Neutrophils, Absolute 3.80 10*3/mm3      Lymphocytes, Absolute 0.90 10*3/mm3      Monocytes, Absolute 0.50 10*3/mm3      Eosinophils, Absolute 0.10 10*3/mm3      Basophils, Absolute 0.10 10*3/mm3      nRBC 0.0 /100 WBC            Imaging Results (Last 24 Hours)     Procedure Component Value Units Date/Time    XR Chest 1 View [[id number]] Collected: 04/18/23 1233     Updated: 04/18/23 1236    Narrative:      XR CHEST 1 VW    Date of Exam: 4/18/2023 11:56 AM EDT    Indication: Chest pain.    Comparison: 3/18/2023      FINDINGS: Mild chronic changes of the lung fields are present without focal airspace opacity. There is no significant pleural effusion or distinct pneumothorax. Normal heart and mediastinal contours. Left chest wall ICD projects in place.      Impression:      Mild chronic changes of the lung fields without evidence of acute cardiopulmonary abnormality.    Electronically Signed: Chago Newell    4/18/2023 12:34 PM EDT    Workstation ID: [id number]               I reviewed the patient's new clinical results.    Medication Review:   Scheduled Meds:apixaban, 5 mg, Oral, BID  digoxin, 125 mcg, Oral, Daily  famotidine, 20 mg, Oral, BID AC  metoprolol succinate XL, 50 mg, Oral, Nightly  sodium chloride, 3 mL, Intravenous, Q12H  topiramate, 100 mg, Oral, Daily      Continuous Infusions:dilTIAZem, 5-15 mg/hr, Last  Rate: Stopped (04/18/23 2000)      PRN Meds:.•  benzonatate  •  hydrALAZINE  •  melatonin  •  nitroglycerin  •  ondansetron  •  [COMPLETED] Insert Peripheral IV **AND** sodium chloride  •  sodium chloride  •  sodium chloride     Assessment & Plan       Chest pain, unspecified type  - concerning for angina; no recent ischemic workup; stress test ordered (pt is non-ambulatory) and cardiology consult placed.    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  URI symptoms - respiratory panel pending; add mucinex, Tessalon  Presence of pacemaker  Essential hypertension - controlled      Famotidine for stress ulcer prophy  Eliquis for DVT prophy    Plan for disposition:Return home when ok with cardiologist    Lora Henderson MD  04/19/23  08:28 EDT

## 2023-04-20 ENCOUNTER — READMISSION MANAGEMENT (OUTPATIENT)
Dept: CALL CENTER | Facility: HOSPITAL | Age: 83
End: 2023-04-20
Payer: MEDICARE

## 2023-04-20 VITALS
DIASTOLIC BLOOD PRESSURE: 60 MMHG | TEMPERATURE: 97.6 F | WEIGHT: 135 LBS | SYSTOLIC BLOOD PRESSURE: 103 MMHG | BODY MASS INDEX: 21.69 KG/M2 | RESPIRATION RATE: 18 BRPM | HEART RATE: 66 BPM | OXYGEN SATURATION: 97 % | HEIGHT: 66 IN

## 2023-04-20 LAB
ANION GAP SERPL CALCULATED.3IONS-SCNC: 13 MMOL/L (ref 5–15)
BACTERIA SPEC AEROBE CULT: NORMAL
BASOPHILS # BLD AUTO: 0 10*3/MM3 (ref 0–0.2)
BASOPHILS NFR BLD AUTO: 0.4 % (ref 0–1.5)
BUN SERPL-MCNC: 35 MG/DL (ref 8–23)
BUN/CREAT SERPL: 23.8 (ref 7–25)
CALCIUM SPEC-SCNC: 9 MG/DL (ref 8.6–10.5)
CHLORIDE SERPL-SCNC: 107 MMOL/L (ref 98–107)
CO2 SERPL-SCNC: 20 MMOL/L (ref 22–29)
CREAT SERPL-MCNC: 1.47 MG/DL (ref 0.57–1)
DEPRECATED RDW RBC AUTO: 57.8 FL (ref 37–54)
EGFRCR SERPLBLD CKD-EPI 2021: 35.5 ML/MIN/1.73
EOSINOPHIL # BLD AUTO: 0.1 10*3/MM3 (ref 0–0.4)
EOSINOPHIL NFR BLD AUTO: 2.2 % (ref 0.3–6.2)
ERYTHROCYTE [DISTWIDTH] IN BLOOD BY AUTOMATED COUNT: 15.8 % (ref 12.3–15.4)
GLUCOSE SERPL-MCNC: 107 MG/DL (ref 65–99)
HCT VFR BLD AUTO: 35.9 % (ref 34–46.6)
HGB BLD-MCNC: 11.6 G/DL (ref 12–15.9)
LYMPHOCYTES # BLD AUTO: 2 10*3/MM3 (ref 0.7–3.1)
LYMPHOCYTES NFR BLD AUTO: 32 % (ref 19.6–45.3)
MCH RBC QN AUTO: 31.4 PG (ref 26.6–33)
MCHC RBC AUTO-ENTMCNC: 32.3 G/DL (ref 31.5–35.7)
MCV RBC AUTO: 97.3 FL (ref 79–97)
MONOCYTES # BLD AUTO: 0.6 10*3/MM3 (ref 0.1–0.9)
MONOCYTES NFR BLD AUTO: 9.3 % (ref 5–12)
NEUTROPHILS NFR BLD AUTO: 3.5 10*3/MM3 (ref 1.7–7)
NEUTROPHILS NFR BLD AUTO: 56.1 % (ref 42.7–76)
NRBC BLD AUTO-RTO: 0.1 /100 WBC (ref 0–0.2)
PLATELET # BLD AUTO: 159 10*3/MM3 (ref 140–450)
PMV BLD AUTO: 7.9 FL (ref 6–12)
POTASSIUM SERPL-SCNC: 4.8 MMOL/L (ref 3.5–5.2)
RBC # BLD AUTO: 3.69 10*6/MM3 (ref 3.77–5.28)
SODIUM SERPL-SCNC: 140 MMOL/L (ref 136–145)
WBC NRBC COR # BLD: 6.3 10*3/MM3 (ref 3.4–10.8)

## 2023-04-20 PROCEDURE — G0378 HOSPITAL OBSERVATION PER HR: HCPCS

## 2023-04-20 PROCEDURE — 99232 SBSQ HOSP IP/OBS MODERATE 35: CPT | Performed by: INTERNAL MEDICINE

## 2023-04-20 PROCEDURE — 85025 COMPLETE CBC W/AUTO DIFF WBC: CPT

## 2023-04-20 PROCEDURE — 80048 BASIC METABOLIC PNL TOTAL CA: CPT

## 2023-04-20 PROCEDURE — 36415 COLL VENOUS BLD VENIPUNCTURE: CPT

## 2023-04-20 RX ORDER — DILTIAZEM HYDROCHLORIDE 360 MG/1
360 CAPSULE, EXTENDED RELEASE ORAL
Qty: 30 CAPSULE | Refills: 1 | Status: SHIPPED | OUTPATIENT
Start: 2023-04-21

## 2023-04-20 RX ADMIN — GUAIFENESIN 600 MG: 600 TABLET, EXTENDED RELEASE ORAL at 08:08

## 2023-04-20 RX ADMIN — DILTIAZEM HYDROCHLORIDE 360 MG: 180 CAPSULE, COATED, EXTENDED RELEASE ORAL at 08:09

## 2023-04-20 RX ADMIN — HYDROCODONE BITARTRATE AND ACETAMINOPHEN 1 TABLET: 5; 325 TABLET ORAL at 00:13

## 2023-04-20 RX ADMIN — DIGOXIN 125 MCG: 125 TABLET ORAL at 11:32

## 2023-04-20 RX ADMIN — FAMOTIDINE 20 MG: 20 TABLET, FILM COATED ORAL at 08:08

## 2023-04-20 RX ADMIN — APIXABAN 5 MG: 5 TABLET, FILM COATED ORAL at 08:08

## 2023-04-20 RX ADMIN — TOPIRAMATE 100 MG: 100 TABLET, FILM COATED ORAL at 08:08

## 2023-04-20 RX ADMIN — Medication 3 ML: at 08:08

## 2023-04-20 NOTE — DISCHARGE SUMMARY
Date of Discharge:  4/20/2023    Discharge Diagnosis:   Chest pain  Atrial fibrillation  Upper respiratory infection  Pacemaker  Essential hypertension    Presenting Problem/History of Present Illness  Active Hospital Problems    Diagnosis  POA   • **Chest pain, unspecified type [R07.9]  Yes      Resolved Hospital Problems   No resolved problems to display.          Hospital Course    Patient is a 83 y.o. female presented with a past cardiac history of permanent afib (anticoaguled with coumadin) and SSS s/p [employer] PPM.  PMH includes HTN, CKD stage 3, bladder cancer, and renal cancer complaints of chest pain.  Patient was seen by cardiologist this weekly evaluation was negative for ischemia and underlying atrial fibs well controlled and she is on Eliquis.  She has a follow-up appointment with PCP in 2 weeks we will need to follow-up with cardiology in a month.  She is agreeable this plan discharge.    Procedures Performed         Consults:   Consults     Date and Time Order Name Status Description    4/18/2023  2:58 PM Inpatient Cardiology Consult Completed     4/18/2023  2:41 PM Family Medicine Consult      3/21/2023 12:33 AM Inpatient Infectious Diseases Consult Completed     3/19/2023 12:34 AM Inpatient Cardiology Consult Completed           Pertinent Test Results:    Lab Results (most recent)     Procedure Component Value Units Date/Time    Basic Metabolic Panel [[id number]]  (Abnormal) Collected: 04/20/23 0603    Specimen: Blood Updated: 04/20/23 0718     Glucose 107 mg/dL      BUN 35 mg/dL      Creatinine 1.47 mg/dL      Sodium 140 mmol/L      Potassium 4.8 mmol/L      Comment: Slight hemolysis detected by analyzer. Results may be affected.        Chloride 107 mmol/L      CO2 20.0 mmol/L      Calcium 9.0 mg/dL      BUN/Creatinine Ratio 23.8     Anion Gap 13.0 mmol/L      eGFR 35.5 mL/min/1.73     Narrative:      GFR Normal >60  Chronic Kidney Disease <60  Kidney Failure <15    The GFR formula is  only valid for adults with stable renal function between ages 18 and 70.    CBC & Differential [[id number]]  (Abnormal) Collected: 04/20/23 0603    Specimen: Blood Updated: 04/20/23 0642    Narrative:      The following orders were created for panel order CBC & Differential.  Procedure                               Abnormality         Status                     ---------                               -----------         ------                     CBC Auto Differential[[id number]]        Abnormal            Final result                 Please view results for these tests on the individual orders.    CBC Auto Differential [[id number]]  (Abnormal) Collected: 04/20/23 0603    Specimen: Blood Updated: 04/20/23 0642     WBC 6.30 10*3/mm3      RBC 3.69 10*6/mm3      Hemoglobin 11.6 g/dL      Hematocrit 35.9 %      MCV 97.3 fL      MCH 31.4 pg      MCHC 32.3 g/dL      RDW 15.8 %      RDW-SD 57.8 fl      MPV 7.9 fL      Platelets 159 10*3/mm3      Neutrophil % 56.1 %      Lymphocyte % 32.0 %      Monocyte % 9.3 %      Eosinophil % 2.2 %      Basophil % 0.4 %      Neutrophils, Absolute 3.50 10*3/mm3      Lymphocytes, Absolute 2.00 10*3/mm3      Monocytes, Absolute 0.60 10*3/mm3      Eosinophils, Absolute 0.10 10*3/mm3      Basophils, Absolute 0.00 10*3/mm3      nRBC 0.1 /100 WBC     Respiratory Panel PCR w/COVID-19(SARS-CoV-2) VINCENT/STEFANY/ANNY/PAD/COR/MAD/MANASA In-House, NP Swab in [facility] ([facility] 3-4 HR TAT - Swab, Nasopharynx [[id number]]  (Normal) Collected: 04/19/23 1209    Specimen: Swab from Nasopharynx Updated: 04/19/23 1307     ADENOVIRUS, PCR Not Detected     Coronavirus 229E Not Detected     Coronavirus HKU1 Not Detected     Coronavirus NL63 Not Detected     Coronavirus OC43 Not Detected     COVID19 Not Detected     Human Metapneumovirus Not Detected     Human Rhinovirus/Enterovirus Not Detected     Influenza A PCR Not Detected     Influenza B PCR Not Detected     Parainfluenza Virus 1 Not Detected     Parainfluenza Virus 2 Not  Detected     Parainfluenza Virus 3 Not Detected     Parainfluenza Virus 4 Not Detected     RSV, PCR Not Detected     Bordetella pertussis pcr Not Detected     Bordetella parapertussis PCR Not Detected     Chlamydophila pneumoniae PCR Not Detected     Mycoplasma pneumo by PCR Not Detected    Narrative:      In the setting of a positive respiratory panel with a viral infection PLUS a negative procalcitonin without other underlying concern for bacterial infection, consider observing off antibiotics or discontinuation of antibiotics and continue supportive care. If the respiratory panel is positive for atypical bacterial infection (Bordetella pertussis, Chlamydophila pneumoniae, or Mycoplasma pneumoniae), consider antibiotic de-escalation to target atypical bacterial infection.    Basic Metabolic Panel [[id number]]  (Abnormal) Collected: 04/19/23 0628    Specimen: Blood Updated: 04/19/23 0708     Glucose 95 mg/dL      BUN 25 mg/dL      Creatinine 1.64 mg/dL      Sodium 142 mmol/L      Potassium 5.0 mmol/L      Comment: Slight hemolysis detected by analyzer. Results may be affected.        Chloride 108 mmol/L      CO2 26.0 mmol/L      Calcium 8.8 mg/dL      BUN/Creatinine Ratio 15.2     Anion Gap 8.0 mmol/L      eGFR 31.1 mL/min/1.73     Narrative:      GFR Normal >60  Chronic Kidney Disease <60  Kidney Failure <15    The GFR formula is only valid for adults with stable renal function between ages 18 and 70.    CBC & Differential [[id number]]  (Abnormal) Collected: 04/19/23 0628    Specimen: Blood Updated: 04/19/23 0636    Narrative:      The following orders were created for panel order CBC & Differential.  Procedure                               Abnormality         Status                     ---------                               -----------         ------                     CBC Auto Differential[[id number]]        Abnormal            Final result                 Please view results for these tests on the individual  orders.    CBC Auto Differential [[id number]]  (Abnormal) Collected: 04/19/23 0628    Specimen: Blood Updated: 04/19/23 0636     WBC 4.10 10*3/mm3      RBC 3.76 10*6/mm3      Hemoglobin 11.6 g/dL      Hematocrit 37.8 %      .6 fL      MCH 30.8 pg      MCHC 30.6 g/dL      RDW 16.6 %      RDW-SD 60.4 fl      MPV 7.3 fL      Platelets 149 10*3/mm3      Neutrophil % 37.4 %      Lymphocyte % 45.7 %      Monocyte % 11.0 %      Eosinophil % 5.5 %      Basophil % 0.4 %      Neutrophils, Absolute 1.50 10*3/mm3      Lymphocytes, Absolute 1.80 10*3/mm3      Monocytes, Absolute 0.40 10*3/mm3      Eosinophils, Absolute 0.20 10*3/mm3      Basophils, Absolute 0.00 10*3/mm3      nRBC 0.0 /100 WBC     Urinalysis, Microscopic Only - Urine, Clean Catch [[id number]]  (Abnormal) Collected: 04/18/23 2029    Specimen: Urine, Clean Catch Updated: 04/18/23 2113     RBC, UA 0-2 /HPF      WBC, UA Too Numerous to Count /HPF      Bacteria, UA 2+ /HPF      Squamous Epithelial Cells, UA 0-2 /HPF      Hyaline Casts, UA 3-6 /LPF      Amorphous Crystals, UA Moderate/2+ /HPF      Methodology Manual Light Microscopy    Urine Culture - Urine, Urine, Clean Catch [[id number]] Collected: 04/18/23 2029    Specimen: Urine, Clean Catch Updated: 04/18/23 2113    Urinalysis With Culture If Indicated - Urine, Clean Catch [[id number]]  (Abnormal) Collected: 04/18/23 2029    Specimen: Urine, Clean Catch Updated: 04/18/23 2038     Color, UA Yellow     Appearance, UA Cloudy     Comment: Result checked          pH, UA 6.0     Specific Gravity, UA 1.017     Glucose, UA Negative     Ketones, UA Trace     Bilirubin, UA Negative     Blood, UA Trace     Protein, UA 30 mg/dL (1+)     Leuk Esterase, UA Moderate (2+)     Nitrite, UA Negative     Urobilinogen, UA 1.0 E.U./dL    Narrative:      In absence of clinical symptoms, the presence of pyuria, bacteria, and/or nitrites on the urinalysis result does not correlate with infection.    Digoxin Level [[id number]]   (Abnormal) Collected: 04/18/23 1326    Specimen: Blood Updated: 04/18/23 1514     Digoxin 0.30 ng/mL     High Sensitivity Troponin T 2Hr [[id number]]  (Abnormal) Collected: 04/18/23 1326    Specimen: Blood Updated: 04/18/23 1358     HS Troponin T 25 ng/L      Troponin T Delta -1 ng/L     Narrative:      High Sensitive Troponin T Reference Range:  <10.0 ng/L- Negative Female for AMI  <15.0 ng/L- Negative Male for AMI  >=10 - Abnormal Female indicating possible myocardial injury.  >=15 - Abnormal Male indicating possible myocardial injury.   Clinicians would have to utilize clinical acumen, EKG, Troponin, and serial changes to determine if it is an Acute Myocardial Infarction or myocardial injury due to an underlying chronic condition.         High Sensitivity Troponin T [[id number]]  (Abnormal) Collected: 04/18/23 1126    Specimen: Blood Updated: 04/18/23 1157     HS Troponin T 26 ng/L     Narrative:      High Sensitive Troponin T Reference Range:  <10.0 ng/L- Negative Female for AMI  <15.0 ng/L- Negative Male for AMI  >=10 - Abnormal Female indicating possible myocardial injury.  >=15 - Abnormal Male indicating possible myocardial injury.   Clinicians would have to utilize clinical acumen, EKG, Troponin, and serial changes to determine if it is an Acute Myocardial Infarction or myocardial injury due to an underlying chronic condition.                Results for orders placed during the hospital encounter of 09/24/21    Adult Transthoracic Echo Complete W/ Cont if Necessary Per Protocol    Interpretation Summary  · Estimated left ventricular EF was in agreement with the calculated left ventricular EF. Left ventricular ejection fraction appears to be 61 - 65%. Left ventricular systolic function is normal.  · Left atrial volume is moderately increased.  · The right atrial cavity is moderately dilated.  · Estimated right ventricular systolic pressure from tricuspid regurgitation is normal (<35 mmHg).         Condition on  Discharge: Stable    Vital Signs  Temp:  [97.6 °F (36.4 °C)-98.5 °F (36.9 °C)] 97.6 °F (36.4 °C)  Heart Rate:  [] 66  Resp:  [18-20] 18  BP: (103-162)/(54-95) 103/60      Physical Exam  Vitals reviewed.   Constitutional:       Appearance: She is not ill-appearing.   HENT:      Head: Normocephalic and atraumatic.      Right Ear: External ear normal.      Left Ear: External ear normal.      Nose: Nose normal.      Mouth/Throat:      Mouth: Mucous membranes are moist.   Eyes:      General:         Right eye: No discharge.         Left eye: No discharge.   Cardiovascular:      Rate and Rhythm: Normal rate and regular rhythm.      Pulses: Normal pulses.      Heart sounds: Normal heart sounds.   Pulmonary:      Effort: Pulmonary effort is normal.      Breath sounds: Normal breath sounds.   Abdominal:      General: Bowel sounds are normal.      Palpations: Abdomen is soft.   Musculoskeletal:         General: Normal range of motion.      Cervical back: Normal range of motion.   Skin:     General: Skin is warm.      Coloration: Skin is pale.   Neurological:      Mental Status: She is alert and oriented to person, place, and time.   Psychiatric:         Behavior: Behavior normal.              [place] [facility] [place]ations     Discharge Medications      New Medications      Instructions Start Date   dilTIAZem  MG 24 hr capsule  Commonly known as: CARDIZEM CD   360 mg, Oral, Every 24 Hours Scheduled   Start Date: April 21, 2023        Continue These Medications      Instructions Start Date   acetaminophen 325 MG tablet  Commonly known as: TYLENOL   650 mg, Oral, Every 6 Hours PRN      apixaban 5 MG tablet tablet  Commonly known as: ELIQUIS   5 mg, Oral, 2 Times Daily      digoxin 125 MCG tablet  Commonly known as: LANOXIN   125 mcg, Oral, Daily Digoxin      dilTIAZem 360 MG 24 hr capsule  Commonly known as: TIAZAC   360 mg, Oral, Daily      famotidine 20 MG tablet  Commonly  known as: PEPCID   20 mg, Oral, 2 Times Daily Before Meals      metoprolol succinate XL 50 MG 24 hr tablet  Commonly known as: TOPROL-XL   50 mg, Oral, Nightly      topiramate 100 MG tablet  Commonly known as: TOPAMAX   100 mg, Oral, Every Night at Bedtime      Vitamin D3 50 MCG (2000 UT) tablet   2,000 Units, Oral, Daily             Discharge Diet:   Diet Instructions     Diet: Regular/House Diet; Regular Texture (IDDSI 7); Thin (IDDSI 0)      Discharge Diet: Regular/House Diet    Texture: Regular Texture (IDDSI 7)    Fluid Consistency: Thin (IDDSI 0)          Activity at Discharge:   Activity Instructions     Gradually Increase Activity Until at Pre-Hospitalization Level            Follow-up Appointments  Future Appointments   Date Time Provider Department Center   5/11/2023  2:30 PM [facility] DEVICE CHECK [facility]   6/20/2023  1:00 PM [facility] DEVICE CHECK [facility]   6/20/2023  1:30 PM Augustin Ellsworth MD [facility]     Additional Instructions for the Follow-ups that You Need to Schedule     Discharge Follow-up with PCP   As directed       Currently Documented PCP:    Lizzy Francis MD    PCP Phone Number:    [phone]     Follow Up Details: office will call with appointment               Test Results Pending at Discharge  Pending Labs     Order Current Status    Urine Culture - Urine, Urine, Clean Catch In process           CHELLE Harrison  04/20/23  12:06 EDT    Time: Discharge 25 min

## 2023-04-20 NOTE — PROGRESS NOTES
Cardiology consult note  Augustin Ellsworth and [facility]    Patient Care Team:  Lizzy Francis MD as PCP - General (Family Medicine)  Jose Carlos Cheng MD as Consulting Physician (Cardiology)    CHIEF COMPLAINT: A-fib RVR, atypical chest pain    HISTORY OF PRESENT ILLNESS:    Hazel Bucio is a 83 y.o. female well-known to me with a past cardiac history of permanent afib (anticoaguled with coumadin) and SSS s/p [employer] PPM.  Last 2D echo 9/2022 showed an EF = 60-65% with mild to moderate MR/TR and mild AI.  Last nuclear stress test 2020 showed no ischemia.  PMH includes HTN, CKD stage 3, bladder cancer, and renal cancer.  Recently marked patient presented with c/o weakness and fever and found with UTI as well as afib with RVR.    Received IV of diltiazem and ultimately discharged on metoprolol and diltiazem without digoxin.  At that time She was admitted with sepsis, found to have E. coli bacteremia now on antibiotics, she had A-fib RVR ultimately started on diltiazem drip for control, she likely had some hypotension with renal injury which has been improved back to the hospital last 24 hours with A-fib RVR and some atypical chest pain.  She underwent stress this morning which revealed no reversible ischemia with preserved EF with underlying atrial fibrillation that was rate controlled.  She denies any further chest pain and feels better with rates under control.  ==========================================================  Doing well, A-fib controlled  Converted to oral medications  Diltiazem, digoxin, metoprolol combination on board  Rates well controlled 80s to 90s    Okay to go home today follow-up with cardiology as outpatient in 30 days    Review of systems otherwise negative x14 point review of systems except was mentioned above  Historical data copied forward from previous encounters in EMR is unchanged    EKG demonstrates A-fib RVR, normal ST segments no ischemia    Past Medical History:   Diagnosis  Date   • Bladder cancer    • Cancer     breast, bladder   • Deep vein thrombosis    • Essential hypertension 1/17/2017   • Fracture tibia/fibula, right, closed, initial encounter 8/27/2020   • GERD (gastroesophageal reflux disease)    • History of urostomy    • Immobility 08/27/2020    r/t broken Tibia    • Kidney carcinoma, right     removed    • Long term current use of anticoagulant 1/9/2015   • PAF (paroxysmal atrial fibrillation) 6/26/2019   • Presence of cardiac pacemaker 11/03/2014    BS dual chamber PM placed 10/2014 with pocket revision 1/25/17.  HX tachy rob syndrome   • Sick sinus syndrome 11/3/2014     Past Surgical History:   Procedure Laterality Date   • BREAST LUMPECTOMY     • CHOLECYSTECTOMY N/A 10/12/2020    Procedure: CHOLECYSTECTOMY LAPAROSCOPIC;  Surgeon: Go Pereira DO;  Location: [facility] MAIN OR;  Service: General;  Laterality: N/A;   • CYSTECTOMY W/ URETEROILEAL CONDUIT     • HARDWARE REMOVAL Right 6/11/2021    Procedure: TIBIA HARDWARE REMOVAL;  Surgeon: Geoff Shah II, MD;  Location: [facility] MAIN OR;  Service: Orthopedics;  Laterality: Right;   • HERNIA REPAIR     • HYSTERECTOMY     • INSERT / REPLACE / REMOVE PACEMAKER     • NEPHRECTOMY Right    • TIBIA IM NAILING/RODDING Right 8/28/2020    Procedure: TIBIA INTRAMEDULLARY NAIL/ABRAHAM INSERTION;  Surgeon: Geoff Shah II, MD;  Location: [facility] MAIN OR;  Service: Orthopedics;  Laterality: Right;     Family History   Problem Relation Age of Onset   • COPD Father      Social History     Tobacco Use   • Smoking status: Never     Passive exposure: Never   • Smokeless tobacco: Never   Vaping Use   • Vaping Use: Never used   Substance Use Topics   • Alcohol use: Not Currently   • Drug use: Never     Medications Prior to Admission   Medication Sig Dispense Refill Last Dose   • acetaminophen (TYLENOL) 325 MG tablet Take 2 tablets by mouth Every 6 (Six) Hours As Needed for Mild Pain.      • apixaban (ELIQUIS) 5 MG tablet  "tablet Take 1 tablet by mouth 2 (Two) Times a Day. 60 tablet 5    • Cholecalciferol (Vitamin D3) 50 MCG (2000 UT) tablet Take 1 tablet by mouth Daily.      • digoxin (LANOXIN) 125 MCG tablet Take 1 tablet by mouth Daily.      • dilTIAZem (TIAZAC) 360 MG 24 hr capsule Take 1 capsule by mouth Daily.      • famotidine (PEPCID) 20 MG tablet Take 1 tablet by mouth 2 (Two) Times a Day Before Meals.      • metoprolol succinate XL (TOPROL-XL) 50 MG 24 hr tablet Take 1 tablet by mouth Every Night.      • topiramate (TOPAMAX) 100 MG tablet Take 1 tablet by mouth every night at bedtime.        Allergies:  Amoxicillin, Ciprofloxacin, Oxycodone-acetaminophen, Penicillins, Sulfa antibiotics, Cephalexin, Clavulanic acid, Codeine, Contrast dye (echo or unknown ct/mr), Doxycycline, Fluconazole, Iodine, Macrobid [nitrofurantoin], Tobramycin, and Trimethoprim    REVIEW OF SYSTEMS:  Please see the above history of present illness for pertinent positives and negatives.  The remainder of the patient's systems have been reviewed and are negative.     Vital Signs  Temp:  [97.7 °F (36.5 °C)-98.5 °F (36.9 °C)] 97.7 °F (36.5 °C)  Heart Rate:  [] 82  Resp:  [18-20] 18  BP: (118-162)/(54-95) 135/86    Flowsheet Rows    Flowsheet Row First Filed Value   Admission Height 167.6 cm (66\") Documented at 04/18/2023 1100   Admission Weight 61.2 kg (135 lb) Documented at 04/18/2023 1100           Physical Exam:  Physical Exam   Constitutional: No acute distress appears stated age  HEENT:   Head: Normocephalic and atraumatic.   Eyes:  Pupils are equal, round, and reactive to light. EOM are intact. Sclerae are anicteric and noninjected.  Mouth and Throat: Patient has moist mucous membranes. Oropharynx is clear of any erythema or exudate.     Neck: Neck supple. No JVD present. No thyromegaly present. No lymphadenopathy present.  Cardiovascular: Irregularly irregular, S1 normal and S2 normal.  Exam reveals no gallop and no friction rub.  1 out of 6 " stable murmur, unchanged from prior  Pulmonary/Chest: Lungs are clear to auscultation bilaterally. No respiratory distress. No wheezes. No rhonchi. No rales.   Abdominal: Soft. Bowel sounds are normal. No distension and no mass. There is no hepatosplenomegaly. There is no tenderness.   Musculoskeletal: Normal muscle tone  Extremities: No edema. Pulses are palpable in all 4 extremities.  Neurological: Patient is alert and oriented to person, place, and time. Cranial nerves II-XII are grossly intact with no focal deficits.  Skin: Skin is warm. No rash noted. Nails show no clubbing.  No cyanosis or erythema.     Results Review:    I reviewed the patient's new clinical results.  Lab Results (most recent)     Procedure Component Value Units Date/Time    Basic Metabolic Panel [[id number]]  (Abnormal) Collected: 04/19/23 0628    Specimen: Blood Updated: 04/19/23 0708     Glucose 95 mg/dL      BUN 25 mg/dL      Creatinine 1.64 mg/dL      Sodium 142 mmol/L      Potassium 5.0 mmol/L      Comment: Slight hemolysis detected by analyzer. Results may be affected.        Chloride 108 mmol/L      CO2 26.0 mmol/L      Calcium 8.8 mg/dL      BUN/Creatinine Ratio 15.2     Anion Gap 8.0 mmol/L      eGFR 31.1 mL/min/1.73     Narrative:      GFR Normal >60  Chronic Kidney Disease <60  Kidney Failure <15    The GFR formula is only valid for adults with stable renal function between ages 18 and 70.    CBC & Differential [[id number]]  (Abnormal) Collected: 04/19/23 0628    Specimen: Blood Updated: 04/19/23 0636    Narrative:      The following orders were created for panel order CBC & Differential.  Procedure                               Abnormality         Status                     ---------                               -----------         ------                     CBC Auto Differential[[id number]]        Abnormal            Final result                 Please view results for these tests on the individual orders.    CBC Auto Differential  [[id number]]  (Abnormal) Collected: 04/19/23 0628    Specimen: Blood Updated: 04/19/23 0636     WBC 4.10 10*3/mm3      RBC 3.76 10*6/mm3      Hemoglobin 11.6 g/dL      Hematocrit 37.8 %      .6 fL      MCH 30.8 pg      MCHC 30.6 g/dL      RDW 16.6 %      RDW-SD 60.4 fl      MPV 7.3 fL      Platelets 149 10*3/mm3      Neutrophil % 37.4 %      Lymphocyte % 45.7 %      Monocyte % 11.0 %      Eosinophil % 5.5 %      Basophil % 0.4 %      Neutrophils, Absolute 1.50 10*3/mm3      Lymphocytes, Absolute 1.80 10*3/mm3      Monocytes, Absolute 0.40 10*3/mm3      Eosinophils, Absolute 0.20 10*3/mm3      Basophils, Absolute 0.00 10*3/mm3      nRBC 0.0 /100 WBC     Urinalysis, Microscopic Only - Urine, Clean Catch [[id number]]  (Abnormal) Collected: 04/18/23 2029    Specimen: Urine, Clean Catch Updated: 04/18/23 2113     RBC, UA 0-2 /HPF      WBC, UA Too Numerous to Count /HPF      Bacteria, UA 2+ /HPF      Squamous Epithelial Cells, UA 0-2 /HPF      Hyaline Casts, UA 3-6 /LPF      Amorphous Crystals, UA Moderate/2+ /HPF      Methodology Manual Light Microscopy    Urine Culture - Urine, Urine, Clean Catch [[id number]] Collected: 04/18/23 2029    Specimen: Urine, Clean Catch Updated: 04/18/23 2113    Urinalysis With Culture If Indicated - Urine, Clean Catch [[id number]]  (Abnormal) Collected: 04/18/23 2029    Specimen: Urine, Clean Catch Updated: 04/18/23 2038     Color, UA Yellow     Appearance, UA Cloudy     Comment: Result checked          pH, UA 6.0     Specific Gravity, UA 1.017     Glucose, UA Negative     Ketones, UA Trace     Bilirubin, UA Negative     Blood, UA Trace     Protein, UA 30 mg/dL (1+)     Leuk Esterase, UA Moderate (2+)     Nitrite, UA Negative     Urobilinogen, UA 1.0 E.U./dL    Narrative:      In absence of clinical symptoms, the presence of pyuria, bacteria, and/or nitrites on the urinalysis result does not correlate with infection.    Digoxin Level [[id number]]  (Abnormal) Collected: 04/18/23 8512     Specimen: Blood Updated: 04/18/23 1514     Digoxin 0.30 ng/mL     High Sensitivity Troponin T 2Hr [[id number]]  (Abnormal) Collected: 04/18/23 1326    Specimen: Blood Updated: 04/18/23 1358     HS Troponin T 25 ng/L      Troponin T Delta -1 ng/L     Narrative:      High Sensitive Troponin T Reference Range:  <10.0 ng/L- Negative Female for AMI  <15.0 ng/L- Negative Male for AMI  >=10 - Abnormal Female indicating possible myocardial injury.  >=15 - Abnormal Male indicating possible myocardial injury.   Clinicians would have to utilize clinical acumen, EKG, Troponin, and serial changes to determine if it is an Acute Myocardial Infarction or myocardial injury due to an underlying chronic condition.         Basic Metabolic Panel [[id number]]  (Abnormal) Collected: 04/18/23 1126    Specimen: Blood Updated: 04/18/23 1157     Glucose 117 mg/dL      BUN 17 mg/dL      Creatinine 1.17 mg/dL      Sodium 141 mmol/L      Potassium 3.8 mmol/L      Chloride 106 mmol/L      CO2 23.0 mmol/L      Calcium 8.7 mg/dL      BUN/Creatinine Ratio 14.5     Anion Gap 12.0 mmol/L      eGFR 46.7 mL/min/1.73     Narrative:      GFR Normal >60  Chronic Kidney Disease <60  Kidney Failure <15    The GFR formula is only valid for adults with stable renal function between ages 18 and 70.    High Sensitivity Troponin T [[id number]]  (Abnormal) Collected: 04/18/23 1126    Specimen: Blood Updated: 04/18/23 1157     HS Troponin T 26 ng/L     Narrative:      High Sensitive Troponin T Reference Range:  <10.0 ng/L- Negative Female for AMI  <15.0 ng/L- Negative Male for AMI  >=10 - Abnormal Female indicating possible myocardial injury.  >=15 - Abnormal Male indicating possible myocardial injury.   Clinicians would have to utilize clinical acumen, EKG, Troponin, and serial changes to determine if it is an Acute Myocardial Infarction or myocardial injury due to an underlying chronic condition.         CBC & Differential [[id number]]  (Abnormal) Collected: 04/18/23  1126    Specimen: Blood Updated: 04/18/23 1135    Narrative:      The following orders were created for panel order CBC & Differential.  Procedure                               Abnormality         Status                     ---------                               -----------         ------                     CBC Auto Differential[[id number]]        Abnormal            Final result                 Please view results for these tests on the individual orders.    CBC Auto Differential [[id number]]  (Abnormal) Collected: 04/18/23 1126    Specimen: Blood Updated: 04/18/23 1135     WBC 5.40 10*3/mm3      RBC 3.96 10*6/mm3      Hemoglobin 11.9 g/dL      Hematocrit 38.9 %      MCV 98.3 fL      MCH 30.2 pg      MCHC 30.7 g/dL      RDW 16.2 %      RDW-SD 56.0 fl      MPV 7.5 fL      Platelets 168 10*3/mm3      Neutrophil % 70.0 %      Lymphocyte % 17.4 %      Monocyte % 9.3 %      Eosinophil % 1.4 %      Basophil % 1.9 %      Neutrophils, Absolute 3.80 10*3/mm3      Lymphocytes, Absolute 0.90 10*3/mm3      Monocytes, Absolute 0.50 10*3/mm3      Eosinophils, Absolute 0.10 10*3/mm3      Basophils, Absolute 0.10 10*3/mm3      nRBC 0.0 /100 WBC           Imaging Results (Most Recent)     Procedure Component Value Units Date/Time    XR Chest 1 View [[id number]] Collected: 04/18/23 1233     Updated: 04/18/23 1236    Narrative:      XR CHEST 1 VW    Date of Exam: 4/18/2023 11:56 AM EDT    Indication: Chest pain.    Comparison: 3/18/2023      FINDINGS: Mild chronic changes of the lung fields are present without focal airspace opacity. There is no significant pleural effusion or distinct pneumothorax. Normal heart and mediastinal contours. Left chest wall ICD projects in place.      Impression:      Mild chronic changes of the lung fields without evidence of acute cardiopulmonary abnormality.    Electronically Signed: Chago Newell    4/18/2023 12:34 PM EDT    Workstation ID: [id number]        reviewed    ECG/EMG Results (most recent)      Procedure Component Value Units Date/Time    ECG 12 Lead Chest Pain [[id number]] Collected: 04/18/23 1108     Updated: 04/19/23 0900     QT Interval 303 ms     Narrative:      HEART RATE= 139  bpm  RR Interval= 431  ms  NC Interval=   ms  P Horizontal Axis=   deg  P Front Axis=   deg  QRSD Interval= 64  ms  QT Interval= 303  ms  QRS Axis= 63  deg  T Wave Axis= 29  deg  - ABNORMAL ECG -  Atrial fibrillation  Low voltage, precordial leads  Consider anteroseptal infarct  When compared with ECG of 18-Mar-2023 20:25:10,  No significant change  Electronically Signed By: Issa Valenzuela ([facility]) 19-Apr-2023 09:00:41  Date and Time of Study: 2023-04-18 11:08:59    SCANNED - TELEMETRY   [[id number]] Resulted: 04/18/23     Updated: 04/19/23 2155    SCANNED - TELEMETRY   [[id number]] Resulted: 04/18/23     Updated: 04/20/23 0440    SCANNED - TELEMETRY   [[id number]] Resulted: 04/18/23     Updated: 04/20/23 1116        reviewed    Assessment & Plan   Essential pretension  A-fib RVR  History of diastolic dysfunction  Hyperlipidemia  Tachybradycardia syndrome, permanent pacemaker in place dual-chamber device [employer]    A-fib RVR  Restart metoprolol XL  Diltiazem drip, convert to p.o. diltiazem  Digoxin back on board  Underlying CKD avoid hypokalemia in the setting of digoxin high risk medicine    Ischemic evaluation negative with no ischemia, preserved EF, underlying atrial fibrillation, low risk stress test  No need for repeat 2D echo, recently received 1 last admission  Anticoagulation with Eliquis continues    Blood pressure better on oral diltiazem, digoxin and metoprolol combination  Stable today  Patient like to go home  She is on room air she is euvolemic    Okay to discharge today from CV standpoint, current medications are on board and reconciled, patient discussed with medications and follow-up cardiology 30 days    Treat UTI    Continue to follow, thank you for the consult  Augustin Ellsworth MD, PhD    I discussed the  patient's findings and my recommendations with patient and staff.     Augustin Ellsworth MD  04/20/23  11:44 EDT

## 2023-04-20 NOTE — CASE MANAGEMENT/SOCIAL WORK
Case Management Discharge Note      Final Note: [facility].         Selected Continued Care - Discharged on 4/20/2023 Admission date: 4/18/2023 - Discharge disposition: Home-Health Care Svc       Home Medical Care Coordination complete.    Service Provider Selected Services Address Phone Fax Patient Preferred    [facility] ,  Home Rehabilitation [address], [facility] [address] [id number] [phone] --           Transportation Services  Private: Car    Final Discharge Disposition Code: 06 - home with home health care

## 2023-04-21 NOTE — OUTREACH NOTE
Prep Survey    Flowsheet Row Responses   [facility]   Is LACE score < 7 ? No   Eligibility Readm Mgmt   Discharge diagnosis chest pain   Does the patient have one of the following disease processes/diagnoses(primary or secondary)? Other   Does the patient have Home health ordered? Yes   [facility]   Is there a DME ordered? No   Prep survey completed? Yes          Ginny MADRIGAL - Registered Nurse

## 2023-04-25 ENCOUNTER — READMISSION MANAGEMENT (OUTPATIENT)
Dept: CALL CENTER | Facility: HOSPITAL | Age: 83
End: 2023-04-25
Payer: MEDICARE

## 2023-04-25 NOTE — OUTREACH NOTE
Medical Week 1 Survey    Flowsheet Row Responses   [facility] patient discharged from? [facility]   Does the patient have one of the following disease processes/diagnoses(primary or secondary)? Other   Week 1 attempt successful? Yes   Call start time 1141   Call end time 1148   Discharge diagnosis chest pain   Person spoke with today (if not patient) and relationship pt and valorie Jacques   Meds reviewed with patient/caregiver? Yes   Does the patient have all medications ordered at discharge? No   What is keeping the patient from filling the prescriptions? --  [Pt/friend was unaware that she had a prescription at pharmacy]   Nursing Interventions Nurse provided patient education  [Friend to  Cardizem today]   Comments regarding appointments Cardiology on 5/11/23   Does the patient have a primary care provider?  Yes   Does the patient have an appointment with their PCP within 7 days of discharge? No   Nursing Interventions Advised patient to make appointment   Has the patient kept scheduled appointments due by today? N/A   [facility]   Has home health visited the patient within 72 hours of discharge? Call prior to 72 hours   Psychosocial issues? No   Did the patient receive a copy of their discharge instructions? Yes   Nursing interventions Reviewed instructions with patient   What is the patient's perception of their health status since discharge? Improving  [pt feels better since discharge--she denies chest pain, palpitations and reports she is going to be reevaluated for another PM.  Pt did not  Cardizem--friend will do so today.]   Is the patient/caregiver able to teach back signs and symptoms related to disease process for when to call PCP? Yes   Is the patient/caregiver able to teach back signs and symptoms related to disease process for when to call 911? Yes   Week 1 call completed? Yes          GIOVANNI FRIEDMAN - Registered Nurse

## 2023-04-27 ENCOUNTER — TELEPHONE (OUTPATIENT)
Dept: CARDIOLOGY | Facility: CLINIC | Age: 83
End: 2023-04-27
Payer: MEDICARE

## 2023-04-27 ENCOUNTER — PATIENT EDUCATION (SURGERY INSTRUCTIONS) (OUTPATIENT)
Dept: CARDIOLOGY | Facility: CLINIC | Age: 83
End: 2023-04-27
Payer: MEDICARE

## 2023-04-28 ENCOUNTER — HOSPITAL ENCOUNTER (OUTPATIENT)
Facility: HOSPITAL | Age: 83
Setting detail: HOSPITAL OUTPATIENT SURGERY
Discharge: HOME | End: 2023-04-28
Attending: INTERNAL MEDICINE | Admitting: INTERNAL MEDICINE
Payer: MEDICARE

## 2023-04-28 VITALS
DIASTOLIC BLOOD PRESSURE: 85 MMHG | HEART RATE: 114 BPM | OXYGEN SATURATION: 100 % | RESPIRATION RATE: 7 BRPM | SYSTOLIC BLOOD PRESSURE: 128 MMHG

## 2023-04-28 VITALS
RESPIRATION RATE: 20 BRPM | TEMPERATURE: 98.6 F | DIASTOLIC BLOOD PRESSURE: 89 MMHG | BODY MASS INDEX: 22.21 KG/M2 | HEART RATE: 104 BPM | WEIGHT: 130.07 LBS | SYSTOLIC BLOOD PRESSURE: 165 MMHG | OXYGEN SATURATION: 95 % | HEIGHT: 64 IN

## 2023-04-28 DIAGNOSIS — I49.5 SICK SINUS SYNDROME: Chronic | ICD-10-CM

## 2023-04-28 DIAGNOSIS — I49.5 SICK SINUS SYNDROME: ICD-10-CM

## 2023-04-28 LAB — MRSA DNA SPEC QL NAA+PROBE: NORMAL

## 2023-04-28 PROCEDURE — 25010000002 VANCOMYCIN 1 G RECONSTITUTED SOLUTION 1 EACH VIAL: Performed by: INTERNAL MEDICINE

## 2023-04-28 PROCEDURE — C1889 IMPLANT/INSERT DEVICE, NOC: HCPCS | Performed by: INTERNAL MEDICINE

## 2023-04-28 PROCEDURE — C1785 PMKR, DUAL, RATE-RESP: HCPCS | Performed by: INTERNAL MEDICINE

## 2023-04-28 PROCEDURE — 25010000002 PROPOFOL 1000 MG/100ML EMULSION: Performed by: NURSE ANESTHETIST, CERTIFIED REGISTERED

## 2023-04-28 PROCEDURE — 87641 MR-STAPH DNA AMP PROBE: CPT | Performed by: INTERNAL MEDICINE

## 2023-04-28 PROCEDURE — 25010000002 DEXAMETHASONE PER 1 MG: Performed by: NURSE ANESTHETIST, CERTIFIED REGISTERED

## 2023-04-28 PROCEDURE — 25010000002 ONDANSETRON PER 1 MG: Performed by: NURSE ANESTHETIST, CERTIFIED REGISTERED

## 2023-04-28 PROCEDURE — 33228 REMV&REPLC PM GEN DUAL LEAD: CPT | Performed by: INTERNAL MEDICINE

## 2023-04-28 PROCEDURE — 96360 HYDRATION IV INFUSION INIT: CPT | Performed by: INTERNAL MEDICINE

## 2023-04-28 DEVICE — PACEMAKER
Type: IMPLANTABLE DEVICE | Site: CHEST | Status: FUNCTIONAL
Brand: ACCOLADE™ MRI DR

## 2023-04-28 DEVICE — ENV PM AIGISRX ANTIBAC RESORB 2.5X2.7IN MD: Type: IMPLANTABLE DEVICE | Site: CHEST | Status: FUNCTIONAL

## 2023-04-28 RX ORDER — SODIUM CHLORIDE 9 MG/ML
30 INJECTION, SOLUTION INTRAVENOUS CONTINUOUS
Status: DISCONTINUED | OUTPATIENT
Start: 2023-04-28 | End: 2023-04-28 | Stop reason: HOSPADM

## 2023-04-28 RX ORDER — DEXAMETHASONE SODIUM PHOSPHATE 4 MG/ML
INJECTION, SOLUTION INTRA-ARTICULAR; INTRALESIONAL; INTRAMUSCULAR; INTRAVENOUS; SOFT TISSUE AS NEEDED
Status: DISCONTINUED | OUTPATIENT
Start: 2023-04-28 | End: 2023-04-28 | Stop reason: SURG

## 2023-04-28 RX ORDER — PROPOFOL 10 MG/ML
INJECTION, EMULSION INTRAVENOUS CONTINUOUS PRN
Status: DISCONTINUED | OUTPATIENT
Start: 2023-04-28 | End: 2023-04-28 | Stop reason: SURG

## 2023-04-28 RX ORDER — LIDOCAINE HYDROCHLORIDE AND EPINEPHRINE BITARTRATE 20; .01 MG/ML; MG/ML
INJECTION, SOLUTION SUBCUTANEOUS
Status: DISCONTINUED | OUTPATIENT
Start: 2023-04-28 | End: 2023-04-28 | Stop reason: HOSPADM

## 2023-04-28 RX ORDER — ONDANSETRON 2 MG/ML
4 INJECTION INTRAMUSCULAR; INTRAVENOUS ONCE AS NEEDED
Status: CANCELLED | OUTPATIENT
Start: 2023-04-28

## 2023-04-28 RX ORDER — DEXAMETHASONE SODIUM PHOSPHATE 4 MG/ML
8 INJECTION, SOLUTION INTRA-ARTICULAR; INTRALESIONAL; INTRAMUSCULAR; INTRAVENOUS; SOFT TISSUE ONCE AS NEEDED
Status: CANCELLED | OUTPATIENT
Start: 2023-04-28

## 2023-04-28 RX ORDER — ONDANSETRON 2 MG/ML
INJECTION INTRAMUSCULAR; INTRAVENOUS AS NEEDED
Status: DISCONTINUED | OUTPATIENT
Start: 2023-04-28 | End: 2023-04-28 | Stop reason: SURG

## 2023-04-28 RX ADMIN — PROPOFOL INJECTABLE EMULSION 50 MCG/KG/MIN: 10 INJECTION, EMULSION INTRAVENOUS at 14:45

## 2023-04-28 RX ADMIN — DEXAMETHASONE SODIUM PHOSPHATE 4 MG: 4 INJECTION, SOLUTION INTRAMUSCULAR; INTRAVENOUS at 15:18

## 2023-04-28 RX ADMIN — VANCOMYCIN HYDROCHLORIDE 1 G: 1 INJECTION, POWDER, LYOPHILIZED, FOR SOLUTION INTRAVENOUS at 14:50

## 2023-04-28 RX ADMIN — ONDANSETRON 4 MG: 2 INJECTION INTRAMUSCULAR; INTRAVENOUS at 15:18

## 2023-04-28 RX ADMIN — SODIUM CHLORIDE 30 ML/HR: 9 INJECTION, SOLUTION INTRAVENOUS at 10:28

## 2023-04-28 NOTE — ANESTHESIA POSTPROCEDURE EVALUATION
Patient: Hazel Bucio    Procedure Summary     Date: 04/28/23 Room / Location: [place] CATH LAB 3 /  [facility] CATH INVASIVE LOCATION    Anesthesia Start: 1440 Anesthesia Stop: 1541    Procedure: Pacemaker gen change, Ranger AWARE $ Diagnosis:       Sick sinus syndrome      (Battery at [facility])    Providers: Jose Carlos Cheng MD Provider: Denys Bird MD    Anesthesia Type: general, MAC ASA Status: 4          Anesthesia Type: general, MAC    Vitals  Vitals Value Taken Time   /99 04/28/23 1547   Temp     Pulse 114 04/28/23 1550   Resp 20 04/28/23 1545   SpO2 91 % 04/28/23 1545   Vitals shown include unvalidated device data.        Post Anesthesia Care and Evaluation    Patient location during evaluation: PACU  Patient participation: complete - patient participated  Level of consciousness: awake  Pain scale: See nurse's notes for pain score.  Pain management: adequate    Airway patency: patent  Anesthetic complications: No anesthetic complications  PONV Status: none  Cardiovascular status: acceptable  Respiratory status: acceptable and spontaneous ventilation  Hydration status: acceptable    Comments: Patient seen and examined postoperatively; vital signs stable; SpO2 greater than or equal to 90%; cardiopulmonary status stable; nausea/vomiting adequately controlled; pain adequately controlled; no apparent anesthesia complications; patient discharged from anesthesia care when discharge criteria were met

## 2023-05-04 ENCOUNTER — READMISSION MANAGEMENT (OUTPATIENT)
Dept: CALL CENTER | Facility: HOSPITAL | Age: 83
End: 2023-05-04
Payer: MEDICARE

## 2023-05-04 NOTE — OUTREACH NOTE
Medical Week 2 Survey    Flowsheet Row Responses   [facility] patient discharged from? [facility]   Does the patient have one of the following disease processes/diagnoses(primary or secondary)? Other   Week 2 attempt successful? Yes   Call start time 0908   Discharge diagnosis chest pain   Call end time 0910   Meds reviewed with patient/caregiver? Yes   Is the patient having any side effects they believe may be caused by any medication additions or changes? No   Does the patient have all medications ordered at discharge? N/A   Is the patient taking all medications as directed (includes completed medication regime)? Yes   Comments regarding appointments Cardiology on 5/11/23   Does the patient have a primary care provider?  Yes   Does the patient have an appointment with their PCP within 7 days of discharge? No   What is preventing the patient from scheduling follow up appointments within 7 days of discharge? Haven't had time, Waiting on return call   Has the patient kept scheduled appointments due by today? N/A   [facility]   Has home health visited the patient within 72 hours of discharge? Yes   Psychosocial issues? No   Did the patient receive a copy of their discharge instructions? Yes   Nursing interventions Reviewed instructions with patient   What is the patient's perception of their health status since discharge? Improving   Is the patient/caregiver able to teach back the hierarchy of who to call/visit for symptoms/problems? PCP, Specialist, Home health nurse, Urgent Care, ED, 911 Yes   Week 2 Call Completed? Yes          Dannielle RICHMOND - Registered Nurse

## 2023-05-11 ENCOUNTER — CLINICAL SUPPORT NO REQUIREMENTS (OUTPATIENT)
Dept: CARDIOLOGY | Facility: CLINIC | Age: 83
End: 2023-05-11
Payer: MEDICARE

## 2023-05-11 DIAGNOSIS — I49.5 SICK SINUS SYNDROME: ICD-10-CM

## 2023-05-11 DIAGNOSIS — Z95.0 PRESENCE OF CARDIAC PACEMAKER: Primary | Chronic | ICD-10-CM

## 2023-05-11 NOTE — PROGRESS NOTES
Patient was seen in office today s/p pacemaker replacement. Interrogation of device is normal, lead stable. Steri strips are no longer in place. Incision is well approximated, no redness, swelling or drainage noted. Patient has an apt on 6/20/23 at 1:30pm.

## 2023-05-12 ENCOUNTER — READMISSION MANAGEMENT (OUTPATIENT)
Dept: CALL CENTER | Facility: HOSPITAL | Age: 83
End: 2023-05-12
Payer: MEDICARE

## 2023-05-12 NOTE — OUTREACH NOTE
Medical Week 3 Survey    Flowsheet Row Responses   [facility] patient discharged from? [facility]   Does the patient have one of the following disease processes/diagnoses(primary or secondary)? Other   Week 3 attempt successful? Yes   Call start time 1553   Call end time 1556   Discharge diagnosis chest pain   Meds reviewed with patient/caregiver? Yes   Does the patient have all medications ordered at discharge? N/A   Has the patient kept scheduled appointments due by today? Yes   Psychosocial issues? No   Comments Pt reports that she is doing well, denies CP. She reports the left shoulder incision is healing well,  to touch.    What is the patient's perception of their health status since discharge? Returned to baseline/stable   Is the patient/caregiver able to teach back signs and symptoms related to disease process for when to call PCP? Yes   Week 3 Call Completed? Yes   Graduated Yes   Is the patient interested in additional calls from an ambulatory ?  NOTE:  applies to high risk patients requiring additional follow-up. No          Anayeli LOU - Registered Nurse

## 2023-05-15 ENCOUNTER — TELEPHONE (OUTPATIENT)
Dept: CARDIOLOGY | Facility: CLINIC | Age: 83
End: 2023-05-15
Payer: MEDICARE

## 2023-05-15 NOTE — TELEPHONE ENCOUNTER
Remote device interrogation received.  Patient having periods of RVR with A-fib on May 14 will contact patient to assess status

## 2023-05-21 NOTE — PROGRESS NOTES
Cardiology  Cardiology consult note  Augustin Ellsworth MD, PhD      Subjective:     Encounter Date:03/18/2023      Patient ID: Hazel Bucio is a 82 y.o. female.    Chief Complaint: weakness, fever      HPI:  Hazel Bucio is a 82 y.o. female well-known to me with a past cardiac history of permanent afib (anticoaguled with coumadin) and SSS s/p [employer] PPM.  Last 2D echo 9/2022 showed an EF = 60-65% with mild to moderate MR/TR and mild AI.  Last nuclear stress test 2020 showed no ischemia.  PMH includes HTN, CKD stage 3, bladder cancer, and renal cancer. Patient presented with c/o weakness and fever and found with UTI as well as afib with RVR.  She received IV diltiazem.  Cardiology was consulted for afib management.    Tele shows afib in the 70s currently well controlled.  Patient states that she did not have any palpitations, dizziness, chest pain, or SOA.  Prior to this admit, she has been able to perform activities such as housework without unusual difficulty.  She denies PND/orthopnea or edema.  Her BP was elevated on admit.  She reports compliance with medications.  She reports that she had her abdominal hernia repair 3-4 months ago without complications.   .  She was admitted with sepsis, found to have E. coli bacteremia now on antibiotics, she had A-fib RVR ultimately started on diltiazem drip for control, she likely had some hypotension with renal injury which has been improved  =====================================================================    Today  Seen and examined, chest pain-free, rates are well controlled in the 60s to 70s  Discussed with nursing staff at bedside to discontinue diltiazem drip  Metoprolol was increased to every 8 hours along with oral diltiazem  She is on 360 mg daily, will avoid this higher dose given borderline blood pressures hypotension and possibly hypotension or sepsis related renal injury which is improving slowly    No acute events overnight    Past Medical  History:   Diagnosis Date   • Bladder cancer (HCC)    • Cancer (HCC)     breast, bladder   • Deep vein thrombosis (HCC)    • Essential hypertension 1/17/2017   • Fracture tibia/fibula, right, closed, initial encounter 8/27/2020   • GERD (gastroesophageal reflux disease)    • History of urostomy    • Immobility 08/27/2020    r/t broken Tibia    • Kidney carcinoma, right (HCC)     removed    • Long term current use of anticoagulant 1/9/2015   • PAF (paroxysmal atrial fibrillation) (HCC) 6/26/2019   • Presence of cardiac pacemaker 11/03/2014    BS dual chamber PM placed 10/2014 with pocket revision 1/25/17.  HX tachy rob syndrome   • Sick sinus syndrome (HCC) 11/3/2014         Past Surgical History:   Procedure Laterality Date   • BREAST LUMPECTOMY     • CHOLECYSTECTOMY N/A 10/12/2020    Procedure: CHOLECYSTECTOMY LAPAROSCOPIC;  Surgeon: Go Pereira DO;  Location: [facility];  Service: General;  Laterality: N/A;   • CYSTECTOMY W/ URETEROILEAL CONDUIT     • HARDWARE REMOVAL Right 6/11/2021    Procedure: TIBIA HARDWARE REMOVAL;  Surgeon: Geoff Shah II, MD;  Location: [facility] OR;  Service: Orthopedics;  Laterality: Right;   • HERNIA REPAIR     • HYSTERECTOMY     • INSERT / REPLACE / REMOVE PACEMAKER     • NEPHRECTOMY Right    • TIBIA IM NAILING/RODDING Right 8/28/2020    Procedure: TIBIA INTRAMEDULLARY NAIL/ABRAHAM INSERTION;  Surgeon: Geoff Shah II, MD;  Location: [facility];  Service: Orthopedics;  Laterality: Right;         Social History     Socioeconomic History   • Marital status:    Tobacco Use   • Smoking status: Never   • Smokeless tobacco: Never   Vaping Use   • Vaping Use: Never used   Substance and Sexual Activity   • Alcohol use: Not Currently   • Drug use: Never   • Sexual activity: Defer       Family History   Problem Relation Age of Onset   • COPD Father          Allergies   Allergen Reactions   • Amoxicillin Shortness Of Breath   • Ciprofloxacin Hives   •  "Oxycodone-Acetaminophen Unknown - High Severity     Pt's son stated when pt fell and broke her leg she was put on oxycodone; pt's son stated pt's \"vitals went all out of wack, she couldn't remember things.\"   • Penicillins Rash   • Sulfa Antibiotics Hives   • Cephalexin Other (See Comments)   • Clavulanic Acid Other (See Comments)   • Codeine Other (See Comments)   • Contrast Dye (Echo Or Unknown Ct/Mr) Other (See Comments)     8/18/22    • Doxycycline Other (See Comments)   • Fluconazole Other (See Comments)   • Iodine Hives   • Macrobid [Nitrofurantoin] Hives   • Tobramycin Other (See Comments)   • Trimethoprim Other (See Comments)       Current Medications:   Scheduled Meds:apixaban, 5 mg, Oral, BID  aztreonam, 2 g, Intravenous, Q8H  digoxin, 125 mcg, Oral, Daily  dilTIAZem, 30 mg, Oral, Q8H  furosemide, 20 mg, Oral, Daily  metoprolol succinate XL, 50 mg, Oral, Q8H  sodium chloride, 3 mL, Intravenous, Q12H  topiramate, 100 mg, Oral, Nightly      Review of systems negative x14 point review of systems except as mentioned above         Objective:         /72   Pulse 66   Temp 98.8 °F (37.1 °C) (Oral)   Resp 17   Ht 167.6 cm (66\")   Wt 61.4 kg (135 lb 5.8 oz)   SpO2 91%   BMI 21.85 kg/m²       General: Frail, in NAD.  Nasal cannula  Neuro: AAOx3. No gross deficits.  Moves all extremities  HEENT: sclera clear, no xanthalasmas.  CV: irregular controlled rates S1S2 RRR. N 1 out of 6 systolic ejection murmurs.  Resp: Breathing is unlabored. Lungs coarse bases.  Diminished somewhat  GI: BS+. Abdomen soft and NTTP.  Ext: Pedal pulses are palpable. Extremities are non-edematous.  MS: moves all extremities, no weakness.  Skin: warm, dry.  Psych: calm and cooperative.            Lab Review:     Results from last 7 days   Lab Units 03/20/23  2345 03/20/23  0650 03/18/23  2041   SODIUM mmol/L 140 141 140   POTASSIUM mmol/L 4.5 4.2 4.4   CHLORIDE mmol/L 101 109* 106   CO2 mmol/L 30.0* 22.0 21.0*   BUN mg/dL 18 32* " 28*   CREATININE mg/dL 0.59 1.38* 1.31*   GLUCOSE mg/dL 90 105* 119*   CALCIUM mg/dL 9.2 9.3 9.8   AST (SGOT) U/L  --   --  24   ALT (SGPT) U/L  --   --  9     Results from last 7 days   Lab Units 03/18/23  2041   CK TOTAL U/L 29   HSTROP T ng/L 25*     Results from last 7 days   Lab Units 03/20/23  2345 03/20/23  0650   WBC 10*3/mm3 8.80 10.60   HEMOGLOBIN g/dL 10.8* 10.8*   HEMATOCRIT % 35.8 34.1   PLATELETS 10*3/mm3 250 250     Results from last 7 days   Lab Units 03/18/23  2041   INR  1.05   APTT seconds 22.5*     Results from last 7 days   Lab Units 03/20/23  0650   MAGNESIUM mg/dL 1.9           Invalid input(s): LDLCALC  Results from last 7 days   Lab Units 03/18/23  2041   PROBNP pg/mL 7,303.0*     Results from last 7 days   Lab Units 03/18/23  2041   TSH uIU/mL 0.880       Recent Radiology:  Imaging Results (Most Recent)     Procedure Component Value Units Date/Time    CT Abdomen Pelvis Without Contrast [[id number]] Collected: 03/21/23 0853     Updated: 03/21/23 0905    Narrative:      CT ABDOMEN PELVIS WO CONTRAST    Date of Exam: 3/21/2023 7:47 AM EDT    Indication: UTI, recurrent/complicated (Female).    Comparison: August 18, 2022    Technique: Axial CT images were obtained of the abdomen and pelvis without the administration of contrast. Sagittal and coronal reconstructions were performed.  Automated exposure control and iterative reconstruction methods were used.     Findings:  Within the lung bases is bibasilar atelectasis.    The unenhanced liver, adrenal glands, spleen, and pancreas are unremarkable. The gallbladder is surgically absent. The right kidney is absent. There is a nonobstructing left renal stone.    The stomach appears normal. The small bowel appears normal in caliber. The colon appears normal. The appendix is not well-visualized. There is no ascites or loculated collection. No abnormally enlarged lymph nodes are identified.    The rectum is unremarkable. The uterus and urinary bladder  Typical anginal pain - exertional, pressure-like. Hx CABG  -resume ASA, statin, BB  -trop 14, EKG nonischemic compared to prior  -repeat troponin and CK  -wells score 0 but with some chest pain with inspiration. No signs of DVT on exam. Will check D-dimer and if elevated obtain V/Q scan  -echo are surgically absent, with a right lower quadrant urostomy. A previously identified right parastomal hernia is no longer present.    No aggressive osseous lesions are identified.      Impression:      Impression:  1.No acute process identified within abdomen/pelvis.  2.Nonobstructing left renal stone.  3.Changes from cystectomy with right lower quadrant urostomy.      Electronically Signed: Santiago Oshea    3/21/2023 9:03 AM EDT    Workstation ID: [id number]    XR Shoulder 2+ View Right [[id number]] Collected: 03/19/23 0727     Updated: 03/19/23 0730    Narrative:      XR SHOULDER 2+ VW RIGHT    Date of Exam: 3/18/2023 8:53 PM EDT    Indication: pain.  Right shoulder pain.     Comparison: None available.    Findings:  There is advanced degenerative change. There is generalized osteopenia. No acute fracture is evident.      Impression:      Impression:  Advanced degenerative changes. Generalized osteopenia.    Electronically Signed: Issa Barnett    3/19/2023 7:28 AM EDT    Workstation ID: [id number]    XR Chest 1 View [[id number]] Collected: 03/19/23 0724     Updated: 03/19/23 0729    Narrative:      XR CHEST 1 VW    Date of Exam: 3/18/2023 8:52 PM EDT    Indication: chest pain.    Comparison: 12/7/2022    Findings:  Left subclavian dual-lead cardiac stimulator device again noted. There is cardiomegaly. There is mixed interstitial and alveolar opacity in the perihilar regions, right greater than left. This may be due to pulmonary edema or atypical infection pattern.   No pleural fluid is seen. No pneumothorax is evident. Aortic vascular calcification is noted. Calcified lymph nodes again noted in the right paratracheal and hilar regions.      Impression:      Impression:  Hazy mixed interstitial alveolar opacities in the perihilar regions, right greater than left suggesting edema or atypical infection pattern.    Electronically Signed: Issa Barnett    3/19/2023 7:26 AM EDT    Workstation ID: [id number]             ECHOCARDIOGRAM:    Results for orders placed during the hospital encounter of 09/24/21    Adult Transthoracic Echo Complete W/ Cont if Necessary Per Protocol    Interpretation Summary  · Estimated left ventricular EF was in agreement with the calculated left ventricular EF. Left ventricular ejection fraction appears to be 61 - 65%. Left ventricular systolic function is normal.  · Left atrial volume is moderately increased.  · The right atrial cavity is moderately dilated.  · Estimated right ventricular systolic pressure from tricuspid regurgitation is normal (<35 mmHg).            Assessment:         Active Hospital Problems    Diagnosis  POA   • **Fever [R50.9]  Yes   • Atrial fibrillation with RVR ([facility]) [I48.91]  Yes     1) Permanent atrial fibrillation with RVR --> CVR  - RVR in setting UTI  - she received IV diltiazem, transition to p.o. today, advance beta-blocker to every 8 hours, continue oral digoxin, continue monitoring with high risk medicines, digoxin level desired 0.8-1.2  Avoid hypotension  - K greater than 4 magnesium greater than 2 desired  - TSH WNL  - anticoagulated with coumadin at home --> receiving eliquis here  - Last 2D echo 9/2022 showed an EF = 60-65% with mild to moderate MR/TR and mild AI.  Last nuclear stress test 2020 showed no ischemia.  Chest pain-free    2) SSS s/p [employer] PPM, interrogate device, had been SACHIN needing replacement, will evaluate leads as well at that time    3) UTI with urosepsis, E. coli bacteremia  -E. coli with bacteremia  - on abx    4) HTN, stable avoid hypotension    5) CKD stage 3, improved creatinine less than 1    6) Hx renal cancer s/p right nephrectomy           Thank you for the consult is a pleasure to be involved in her cardiovascular care.  Please call with any questions or concerns  Augustin Ellsworth MD, PhD

## 2023-05-25 DIAGNOSIS — I48.21 PERMANENT ATRIAL FIBRILLATION: ICD-10-CM

## 2023-05-25 RX ORDER — APIXABAN 5 MG/1
TABLET, FILM COATED ORAL
Qty: 60 TABLET | Refills: 5 | Status: SHIPPED | OUTPATIENT
Start: 2023-05-25 | End: 2023-05-26

## 2023-05-26 ENCOUNTER — TELEPHONE (OUTPATIENT)
Dept: CARDIOLOGY | Facility: CLINIC | Age: 83
End: 2023-05-26
Payer: MEDICARE

## 2023-05-26 RX ORDER — APIXABAN 5 MG/1
TABLET, FILM COATED ORAL
Qty: 60 TABLET | Refills: 5 | Status: SHIPPED | OUTPATIENT
Start: 2023-05-26

## 2023-05-26 NOTE — TELEPHONE ENCOUNTER
PATIENT'S HEART RATE HAS BEEN 120-124 TODAY AND  IRREGULAR. NURSE SAID THAT IT BEING IRREGULAR IS NORMAL FOR THE PATIENT. SHE IS AYSMPTOMATIC RIGHT NOW. PATIENT SAYS THAT SHE HAS FELT A RACING HEART/FLUTTERING FOR A FEW DAYS WHEN SHE FIRST WAKES UP IN THE MORNING. SHE HAS NAUSEA RIGHT NOW, BUT SHE DEALS WITH THAT OFF AND ON FOR A WHILE NOW. SHE HAS TAKEN HER MEDS TODAY. I ADVISED THAT THE PATIENT SHOULD GO TO THE ER IF SHE GETS ANY WORSE.

## 2023-06-02 ENCOUNTER — CLINICAL SUPPORT NO REQUIREMENTS (OUTPATIENT)
Dept: CARDIOLOGY | Facility: CLINIC | Age: 83
End: 2023-06-02
Payer: MEDICARE

## 2023-06-02 ENCOUNTER — OFFICE VISIT (OUTPATIENT)
Dept: CARDIOLOGY | Facility: CLINIC | Age: 83
End: 2023-06-02

## 2023-06-02 VITALS
BODY MASS INDEX: 21.64 KG/M2 | SYSTOLIC BLOOD PRESSURE: 146 MMHG | HEIGHT: 65 IN | DIASTOLIC BLOOD PRESSURE: 83 MMHG | HEART RATE: 116 BPM | RESPIRATION RATE: 18 BRPM

## 2023-06-02 DIAGNOSIS — Z95.0 PRESENCE OF CARDIAC PACEMAKER: ICD-10-CM

## 2023-06-02 DIAGNOSIS — I48.0 PAF (PAROXYSMAL ATRIAL FIBRILLATION): ICD-10-CM

## 2023-06-02 DIAGNOSIS — I49.5 SICK SINUS SYNDROME: ICD-10-CM

## 2023-06-02 DIAGNOSIS — I10 ESSENTIAL HYPERTENSION: ICD-10-CM

## 2023-06-02 DIAGNOSIS — I48.21 PERMANENT ATRIAL FIBRILLATION: Primary | ICD-10-CM

## 2023-06-02 RX ORDER — METOPROLOL SUCCINATE 100 MG/1
150 TABLET, EXTENDED RELEASE ORAL EVERY MORNING
Qty: 45 TABLET | Refills: 5 | Status: SHIPPED | OUTPATIENT
Start: 2023-06-02 | End: 2023-07-02

## 2023-06-02 NOTE — PROGRESS NOTES
Cardiology Clinic Note  Augustin Ellsworth MD, PhD    Subjective:     Encounter Date:06/02/2023      Patient ID: Hazel Bucio is a 83 y.o. female.    Chief Complaint:  Chief Complaint   Patient presents with   • Hospital Follow Up Visit       HPI:    Hazel Bucio is a 83 y.o. female well-known to me with a past cardiac history of permanent afib (anticoaguled with coumadin) and SSS s/p [employer] PPM.  Last 2D echo 9/2022 showed an EF = 60-65% with mild to moderate MR/TR and mild AI.  Last nuclear stress test 2020 showed no ischemia.  PMH includes HTN, CKD stage 3, bladder cancer, and renal cancer.  Recently marked patient presented with c/o weakness and fever and found with UTI as well as afib with RVR.    Received IV of diltiazem and ultimately discharged on metoprolol and diltiazem without digoxin.  At that time She was admitted with sepsis, found to have E. coli bacteremia now on antibiotics, she had A-fib RVR ultimately started on diltiazem drip for control, she likely had some hypotension with renal injury which has been improved back to the hospital last 24 hours with A-fib RVR and some atypical chest pain.  She underwent stress in the hospital at that time which revealed no reversible ischemia with preserved EF with underlying atrial fibrillation that was rate controlled.    She has a device in place as stated, she presents back for follow-up, she is maintained on digoxin diltiazem and metoprolol, she says she takes all these at night but metoprolol twice a day and she has not taken this yet today.  Her heart rates are uncontrolled in the 120s to 130s, intermittently appears to be having RVR on her device interrogation which was read as ventricular tachycardia however there is irregularity to the ventricular tracings and atrial tracing was not well seen, this is more likely A-fib RVR recurrently given the duration of events which would be symptomatic if this was truly ventricular tachycardia as discussed  "with the patient.  She has had no syncope and says she has been doing well since hospital discharge    She is in A-fib today on encounter    Review of systems otherwise negative x14 point review of systems except as mentioned above    Historical data copied forward from previous encounters in EMR including the history, exam, and assessment/plan has been reviewed and is unchanged unless noted otherwise.    Cardiac medicines reviewed with risk, benefits, and necessity of each discussed.    Risk and benefit of cardiac testing reviewed including death heart attack stroke pain bleeding infection need for vascular /cardiovascular surgery were discussed and the patient     Objective:         /83 (BP Location: Left arm, Patient Position: Sitting)   Pulse 116   Resp 18   Ht 165.1 cm (65\")   BMI 21.64 kg/m²     Physical Exam  Irregularly tachycardic, no rubs gallops heave or lift, no gross murmurs  No clubbing cyanosis or edema  Soft nontender nondistended  Intact grossly  Clear to auscultation  Skin is warm and dry normal pulses 1+ equal bilaterally    Assessment:       A-fib RVR  Essential hypertension  High risk medicines on digoxin  Long-term use of anticoagulants  Dyslipidemia  Diabetes  Sick sinus syndrome status post dual-chamber pacemaker implantation [employer] device    Uncontrolled rates today  Donahue metoprolol XL to 150 daily to be taken in the morning  Continue diltiazem daily 360 at night  Continue Eliquis 5 twice daily  Digoxin will continue, dosing per level  Not on any diuretics that would promote hypokalemia    See her back in 1 week with EKG to assess rates  We will consider reinterrogation of device    Augustin Ellsworth MD, PhD           The pleasure to be involved in this patient's cardiovascular care.  Please call with any questions or concerns  Augustin Ellsworth MD, PhD    Most recent EKG as reviewed and interpreted by me:  Procedures     Most recent echo as reviewed and interpreted by " me:  Results for orders placed during the hospital encounter of 09/24/21    Adult Transthoracic Echo Complete W/ Cont if Necessary Per Protocol    Interpretation Summary  · Estimated left ventricular EF was in agreement with the calculated left ventricular EF. Left ventricular ejection fraction appears to be 61 - 65%. Left ventricular systolic function is normal.  · Left atrial volume is moderately increased.  · The right atrial cavity is moderately dilated.  · Estimated right ventricular systolic pressure from tricuspid regurgitation is normal (<35 mmHg).      Most recent stress test as reviewed and interpreted by me:  Results for orders placed during the hospital encounter of 04/18/23    Stress Test With Myocardial Perfusion One Day    Interpretation Summary  •  Left ventricular ejection fraction is normal (Calculated EF = 60%).  •  Myocardial perfusion imaging indicates a normal myocardial perfusion study with no evidence of ischemia.  •  Impressions are consistent with a low risk study.  •  Diaphragmatic attenuation and GI artifacts are present.  •  There is no prior study available for comparison.  •  Findings consistent with an equivocal ECG stress test.    Underlying atrial fibrillation during the study, normal ST-T wave findings  Heart rates reached 1 20-1 30 with pharmacologic stress, no changes at submaximal stress with respect to ischemic ST-T wave findings  No arrhythmias seen other than baseline atrial fibrillation  Nuclear perfusion demonstrated fixed small basal inferolateral defect with no reversibility, possible diaphragmatic GI attenuation, normal EF 60% with normal LV size and geometry and LV function    Low risk study for any reversible ischemia, appears to have normal perfusion other than diaphragmatic GI attenuation affected diminished inferolateral counts observed in stress and rest with normal EF 60%      Most recent cardiac catheterization as reviewed interpreted by me:  No results found for  this or any previous visit.    The following portions of the patient's history were reviewed and updated as appropriate: allergies, current medications, past family history, past medical history, past social history, past surgical history and problem list.      ROS:  14 point review of systems negative except as mentioned above    Current Outpatient Medications:   •  acetaminophen (TYLENOL) 325 MG tablet, Take 2 tablets by mouth Every 6 (Six) Hours As Needed for Mild Pain., Disp: , Rfl:   •  digoxin (LANOXIN) 125 MCG tablet, Take 1 tablet by mouth Daily., Disp: , Rfl:   •  dilTIAZem CD (CARDIZEM CD) 360 MG 24 hr capsule, Take 1 capsule by mouth Daily., Disp: 30 capsule, Rfl: 1  •  Eliquis 5 MG tablet tablet, TAKE 1 TABLET BY MOUTH TWICE DAILY, Disp: 60 tablet, Rfl: 5  •  metoprolol succinate XL (TOPROL-XL) 50 MG 24 hr tablet, Take 1 tablet by mouth 2 (Two) Times a Day., Disp: , Rfl:   •  topiramate (TOPAMAX) 100 MG tablet, Take 1 tablet by mouth every night at bedtime., Disp: , Rfl:   •  Cholecalciferol (Vitamin D3) 50 MCG (2000 UT) tablet, Take 1 tablet by mouth Daily. (Patient not taking: Reported on 6/2/2023), Disp: , Rfl:     Problem List:  Patient Active Problem List   Diagnosis   • Atrial fibrillation   • Dyslipidemia   • Essential hypertension   • Long term current use of anticoagulant   • Presence of cardiac pacemaker   • Sick sinus syndrome   • Type 2 diabetes mellitus (HCC)   • Tear film insufficiency   • Presbyopia   • Macular puckering of retina   • Tear film insufficiency, bilateral   • Pseudophakia, both eyes   • Lens replaced by other means   • Epiretinal membrane, bilateral   • Acute encephalopathy   • History of carcinoma in situ of breast   • Ureteral cancer   • Chronic insomnia   • Seasonal allergies   • Altered mental status   • MACRINA (acute kidney injury)   • Cholecystitis with cholelithiasis   • Acute UTI   • Abdominal pain   • Acute UTI   • Acquired absence of kidney   • Other artificial  openings of urinary tract status   • History of bladder cancer   • Hydronephrosis   • RLS (restless legs syndrome)   • Weakness   • Preop cardiovascular exam   • Other specified abdominal hernia without obstruction or gangrene   • Fever   • Athscl heart disease of native coronary 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   • Acquired absence of other specified parts of digestive tract   • Chronic kidney disease, stage 3a   • Gastro-esophageal reflux disease without esophagitis   • Hyperlipidemia, unspecified   • Paroxysmal atrial fibrillation   • Essential (primary) hypertension   • Personal history of breast cancer   • Presence of cardiac pacemaker   • Sick sinus syndrome   • Type 2 diabetes mellitus with diabetic chronic kidney disease   • Type 2 diabetes mellitus with diabetic polyneuropathy   • Malignant neoplasm of unspecified ureter   • Atrial fibrillation with RVR   • Sepsis due to gram-negative UTI   • E coli bacteremia   • Chest pain, unspecified type     Past Medical History:  Past Medical History:   Diagnosis Date   • Bladder cancer    • Cancer     breast, bladder   • Deep vein thrombosis    • Essential hypertension 1/17/2017   • Fracture tibia/fibula, right, closed, initial encounter 8/27/2020   • GERD (gastroesophageal reflux disease)    • History of urostomy    • Immobility 08/27/2020    r/t broken Tibia    • Kidney carcinoma, right     removed    • Long term current use of anticoagulant 1/9/2015   • PAF (paroxysmal atrial fibrillation) 6/26/2019   • Presence of cardiac pacemaker 11/03/2014    BS dual chamber PM placed 10/2014 with pocket revision 1/25/17.  HX tachy rob syndrome   • Sick sinus syndrome 11/3/2014     Past Surgical History:  Past Surgical History:   Procedure Laterality Date   • BREAST LUMPECTOMY     • CARDIAC ELECTROPHYSIOLOGY PROCEDURE N/A 4/28/2023    Procedure: Pacemaker gen change, [place] AWARE $;  Surgeon: Jose Carlos Cheng MD;  Location: [facility] CATH INVASIVE LOCATION;  Service: Cardiovascular;   "Laterality: N/A;   • CHOLECYSTECTOMY N/A 10/12/2020    Procedure: CHOLECYSTECTOMY LAPAROSCOPIC;  Surgeon: Go Pereira DO;  Location: [facility] MAIN OR;  Service: General;  Laterality: N/A;   • CYSTECTOMY W/ URETEROILEAL CONDUIT     • HARDWARE REMOVAL Right 6/11/2021    Procedure: TIBIA HARDWARE REMOVAL;  Surgeon: Geoff Shah II, MD;  Location: [facility] MAIN OR;  Service: Orthopedics;  Laterality: Right;   • HERNIA REPAIR     • HYSTERECTOMY     • INSERT / REPLACE / REMOVE PACEMAKER     • NEPHRECTOMY Right    • TIBIA IM NAILING/RODDING Right 8/28/2020    Procedure: TIBIA INTRAMEDULLARY NAIL/ABRAHAM INSERTION;  Surgeon: Geoff Shah II, MD;  Location: [facility] MAIN OR;  Service: Orthopedics;  Laterality: Right;     Social History:  Social History     Socioeconomic History   • Marital status:    Tobacco Use   • Smoking status: Never     Passive exposure: Never   • Smokeless tobacco: Never   Vaping Use   • Vaping Use: Never used   Substance and Sexual Activity   • Alcohol use: Not Currently   • Drug use: Never   • Sexual activity: Defer     Allergies:  Allergies   Allergen Reactions   • Amoxicillin Shortness Of Breath   • Ciprofloxacin Hives   • Oxycodone-Acetaminophen Unknown - High Severity     Pt's son stated when pt fell and broke her leg she was put on oxycodone; pt's son stated pt's \"vitals went all out of wack, she couldn't remember things.\"   • Penicillins Rash   • Sulfa Antibiotics Hives   • Cephalexin Other (See Comments)   • Clavulanic Acid Other (See Comments)   • Codeine Other (See Comments)   • Contrast Dye (Echo Or Unknown Ct/Mr) Other (See Comments)     8/18/22    • Doxycycline Other (See Comments)   • Fluconazole Other (See Comments)   • Iodine Hives   • Macrobid [Nitrofurantoin] Hives   • Tobramycin Other (See Comments)   • Trimethoprim Other (See Comments)     Immunizations:  Immunization History   Administered Date(s) Administered   • COVID-19 (PFIZER) Purple Cap Monovalent " 02/03/2021, 02/26/2021   • COVID-19 (UNSPECIFIED) 11/09/2020, 11/17/2020   • FLUAD TRI 65YR+ 09/07/2012, 09/24/2014   • Fluzone High Dose =>65 Years ([employer] ONLY) 12/16/2015, 11/04/2016, 10/31/2017, 10/18/2018, 09/27/2019   • Influenza Seasonal Injectable 11/30/2020   • Pneumococcal Conjugate 13-Valent (PCV13) 11/04/2016   • Tdap 04/01/2016            In-Office Procedure(s):  No orders to display        ASCVD RIsk Score::  The ASCVD Risk score (Chelly ANGEL, et al., 2019) failed to calculate for the following reasons:    The 2019 ASCVD risk score is only valid for ages 40 to 79    The patient has a prior MI or stroke diagnosis    Imaging:    Results for orders placed during the hospital encounter of 04/18/23    XR Chest 1 View    Narrative  XR CHEST 1 VW    Date of Exam: 4/18/2023 11:56 AM EDT    Indication: Chest pain.    Comparison: 3/18/2023      FINDINGS: Mild chronic changes of the lung fields are present without focal airspace opacity. There is no significant pleural effusion or distinct pneumothorax. Normal heart and mediastinal contours. Left chest wall ICD projects in place.    Impression  Mild chronic changes of the lung fields without evidence of acute cardiopulmonary abnormality.    Electronically Signed: Chago Newell  4/18/2023 12:34 PM EDT  Workstation ID: [id number]       Results for orders placed during the hospital encounter of 03/18/23    CT Abdomen Pelvis Without Contrast    Narrative  CT ABDOMEN PELVIS WO CONTRAST    Date of Exam: 3/21/2023 7:47 AM EDT    Indication: UTI, recurrent/complicated (Female).    Comparison: August 18, 2022    Technique: Axial CT images were obtained of the abdomen and pelvis without the administration of contrast. Sagittal and coronal reconstructions were performed.  Automated exposure control and iterative reconstruction methods were used.    Findings:  Within the lung bases is bibasilar atelectasis.    The unenhanced liver, adrenal glands, spleen, and pancreas are  unremarkable. The gallbladder is surgically absent. The right kidney is absent. There is a nonobstructing left renal stone.    The stomach appears normal. The small bowel appears normal in caliber. The colon appears normal. The appendix is not well-visualized. There is no ascites or loculated collection. No abnormally enlarged lymph nodes are identified.    The rectum is unremarkable. The uterus and urinary bladder are surgically absent, with a right lower quadrant urostomy. A previously identified right parastomal hernia is no longer present.    No aggressive osseous lesions are identified.    Impression  Impression:  1.No acute process identified within abdomen/pelvis.  2.Nonobstructing left renal stone.  3.Changes from cystectomy with right lower quadrant urostomy.      Electronically Signed: Santiago Oshea  3/21/2023 9:03 AM EDT  Workstation ID: [id number]      Results for orders placed during the hospital encounter of 03/18/23    CT Abdomen Pelvis Without Contrast    Narrative  CT ABDOMEN PELVIS WO CONTRAST    Date of Exam: 3/21/2023 7:47 AM EDT    Indication: UTI, recurrent/complicated (Female).    Comparison: August 18, 2022    Technique: Axial CT images were obtained of the abdomen and pelvis without the administration of contrast. Sagittal and coronal reconstructions were performed.  Automated exposure control and iterative reconstruction methods were used.    Findings:  Within the lung bases is bibasilar atelectasis.    The unenhanced liver, adrenal glands, spleen, and pancreas are unremarkable. The gallbladder is surgically absent. The right kidney is absent. There is a nonobstructing left renal stone.    The stomach appears normal. The small bowel appears normal in caliber. The colon appears normal. The appendix is not well-visualized. There is no ascites or loculated collection. No abnormally enlarged lymph nodes are identified.    The rectum is unremarkable. The uterus and urinary bladder are surgically  absent, with a right lower quadrant urostomy. A previously identified right parastomal hernia is no longer present.    No aggressive osseous lesions are identified.    Impression  Impression:  1.No acute process identified within abdomen/pelvis.  2.Nonobstructing left renal stone.  3.Changes from cystectomy with right lower quadrant urostomy.      Electronically Signed: Santiago Dayo  3/21/2023 9:03 AM EDT  Workstation ID: [id number]      Lab Review:   Admission on 04/28/2023, Discharged on 04/28/2023   Component Date Value   • MRSA PCR 04/28/2023 No MRSA Detected    Admission on 04/18/2023, Discharged on 04/20/2023   Component Date Value   • QT Interval 04/18/2023 303    • Glucose 04/18/2023 117 (H)    • BUN 04/18/2023 17    • Creatinine 04/18/2023 1.17 (H)    • Sodium 04/18/2023 141    • Potassium 04/18/2023 3.8    • Chloride 04/18/2023 106    • CO2 04/18/2023 23.0    • Calcium 04/18/2023 8.7    • BUN/Creatinine Ratio 04/18/2023 14.5    • Anion Gap 04/18/2023 12.0    • eGFR 04/18/2023 46.7 (L)    • HS Troponin T 04/18/2023 26 (H)    • WBC 04/18/2023 5.40    • RBC 04/18/2023 3.96    • Hemoglobin 04/18/2023 11.9 (L)    • Hematocrit 04/18/2023 38.9    • MCV 04/18/2023 98.3 (H)    • MCH 04/18/2023 30.2    • MCHC 04/18/2023 30.7 (L)    • RDW 04/18/2023 16.2 (H)    • RDW-SD 04/18/2023 56.0 (H)    • MPV 04/18/2023 7.5    • Platelets 04/18/2023 168    • Neutrophil % 04/18/2023 70.0    • Lymphocyte % 04/18/2023 17.4 (L)    • Monocyte % 04/18/2023 9.3    • Eosinophil % 04/18/2023 1.4    • Basophil % 04/18/2023 1.9 (H)    • Neutrophils, Absolute 04/18/2023 3.80    • Lymphocytes, Absolute 04/18/2023 0.90    • Monocytes, Absolute 04/18/2023 0.50    • Eosinophils, Absolute 04/18/2023 0.10    • Basophils, Absolute 04/18/2023 0.10    • nRBC 04/18/2023 0.0    • HS Troponin T 04/18/2023 25 (H)    • Troponin T Delta 04/18/2023 -1    • Digoxin 04/18/2023 0.30 (L)    • Color, UA 04/18/2023 Yellow    • Appearance, UA 04/18/2023 Cloudy  (A)    • pH, UA 04/18/2023 6.0    • Specific Gravity, UA 04/18/2023 1.017    • Glucose, UA 04/18/2023 Negative    • Ketones, UA 04/18/2023 Trace (A)    • Bilirubin, UA 04/18/2023 Negative    • Blood, UA 04/18/2023 Trace (A)    • Protein, UA 04/18/2023 30 mg/dL (1+) (A)    • Leuk Esterase, UA 04/18/2023 Moderate (2+) (A)    • Nitrite, UA 04/18/2023 Negative    • Urobilinogen, UA 04/18/2023 1.0 E.U./dL    • RBC, UA 04/18/2023 0-2 (A)    • WBC, UA 04/18/2023 Too Numerous to Count (A)    • Bacteria, UA 04/18/2023 2+ (A)    • Squamous Epithelial Cell* 04/18/2023 0-2    • Hyaline Casts, UA 04/18/2023 3-6    • Amorphous Crystals, UA 04/18/2023 Moderate/2+    • Methodology 04/18/2023 Manual Light Microscopy    • Urine Culture 04/18/2023 >100,000 CFU/mL Mixed Hien Isolated    • Glucose 04/19/2023 95    • BUN 04/19/2023 25 (H)    • Creatinine 04/19/2023 1.64 (H)    • Sodium 04/19/2023 142    • Potassium 04/19/2023 5.0    • Chloride 04/19/2023 108 (H)    • CO2 04/19/2023 26.0    • Calcium 04/19/2023 8.8    • BUN/Creatinine Ratio 04/19/2023 15.2    • Anion Gap 04/19/2023 8.0    • eGFR 04/19/2023 31.1 (L)    • WBC 04/19/2023 4.10    • RBC 04/19/2023 3.76 (L)    • Hemoglobin 04/19/2023 11.6 (L)    • Hematocrit 04/19/2023 37.8    • MCV 04/19/2023 100.6 (H)    • MCH 04/19/2023 30.8    • MCHC 04/19/2023 30.6 (L)    • RDW 04/19/2023 16.6 (H)    • RDW-SD 04/19/2023 60.4 (H)    • MPV 04/19/2023 7.3    • Platelets 04/19/2023 149    • Neutrophil % 04/19/2023 37.4 (L)    • Lymphocyte % 04/19/2023 45.7 (H)    • Monocyte % 04/19/2023 11.0    • Eosinophil % 04/19/2023 5.5    • Basophil % 04/19/2023 0.4    • Neutrophils, Absolute 04/19/2023 1.50 (L)    • Lymphocytes, Absolute 04/19/2023 1.80    • Monocytes, Absolute 04/19/2023 0.40    • Eosinophils, Absolute 04/19/2023 0.20    • Basophils, Absolute 04/19/2023 0.00    • nRBC 04/19/2023 0.0    • Target HR (85%) 04/19/2023 117    • Max. Pred. HR (100%) 04/19/2023 138    •  CV STRESS PROTOCOL  1 04/19/2023 Pharmacologic    • Stage 1 04/19/2023 1    • HR Stage 1 04/19/2023 104    • BP Stage 1 04/19/2023 163/100    • Duration Min Stage 1 04/19/2023 0    • Duration Sec Stage 1 04/19/2023 10    • Stress Dose Regadenoson * 04/19/2023 0.4    • Stress Comments Stage 1 04/19/2023 10 sec bolus injection    • Stage 2 04/19/2023 2    • HR Stage 2 04/19/2023 125    • BP Stage 2 04/19/2023 149/86    • Duration Min Stage 2 04/19/2023 4    • Duration Sec Stage 2 04/19/2023 0    • Stress Comments Stage 2 04/19/2023 recovery    • Stage 3 04/19/2023 3    • HR Stage 3 04/19/2023 110    • BP Stage 3 04/19/2023 149/86    • Stage 4 04/19/2023 4    • HR Stage 4 04/19/2023 123    • BP Stage 4 04/19/2023 148/90    • Baseline HR 04/19/2023 97    • Baseline BP 04/19/2023 163/100    • Peak HR 04/19/2023 125    • Percent Max Pred HR 04/19/2023 90.58    • Percent Target HR 04/19/2023 107    • Peak BP 04/19/2023 163/100    • Recovery HR 04/19/2023 98    • Recovery BP 04/19/2023 141/91    • BH CV REST NUCLEAR ISOTO* 04/19/2023 10.0    • BH CV STRESS NUCLEAR ISO* 04/19/2023 32.0    • Nuc Stress EF 04/19/2023 60    • ADENOVIRUS, PCR 04/19/2023 Not Detected    • Coronavirus 229E 04/19/2023 Not Detected    • Coronavirus HKU1 04/19/2023 Not Detected    • Coronavirus NL63 04/19/2023 Not Detected    • Coronavirus OC43 04/19/2023 Not Detected    • COVID19 04/19/2023 Not Detected    • Human Metapneumovirus 04/19/2023 Not Detected    • Human Rhinovirus/Enterov* 04/19/2023 Not Detected    • Influenza A PCR 04/19/2023 Not Detected    • Influenza B PCR 04/19/2023 Not Detected    • Parainfluenza Virus 1 04/19/2023 Not Detected    • Parainfluenza Virus 2 04/19/2023 Not Detected    • Parainfluenza Virus 3 04/19/2023 Not Detected    • Parainfluenza Virus 4 04/19/2023 Not Detected    • RSV, PCR 04/19/2023 Not Detected    • Bordetella pertussis pcr 04/19/2023 Not Detected    • Bordetella parapertussis* 04/19/2023 Not Detected    • Chlamydophila  pneumoniae* 04/19/2023 Not Detected    • Mycoplasma pneumo by PCR 04/19/2023 Not Detected    • Glucose 04/20/2023 107 (H)    • BUN 04/20/2023 35 (H)    • Creatinine 04/20/2023 1.47 (H)    • Sodium 04/20/2023 140    • Potassium 04/20/2023 4.8    • Chloride 04/20/2023 107    • CO2 04/20/2023 20.0 (L)    • Calcium 04/20/2023 9.0    • BUN/Creatinine Ratio 04/20/2023 23.8    • Anion Gap 04/20/2023 13.0    • eGFR 04/20/2023 35.5 (L)    • WBC 04/20/2023 6.30    • RBC 04/20/2023 3.69 (L)    • Hemoglobin 04/20/2023 11.6 (L)    • Hematocrit 04/20/2023 35.9    • MCV 04/20/2023 97.3 (H)    • MCH 04/20/2023 31.4    • MCHC 04/20/2023 32.3    • RDW 04/20/2023 15.8 (H)    • RDW-SD 04/20/2023 57.8 (H)    • MPV 04/20/2023 7.9    • Platelets 04/20/2023 159    • Neutrophil % 04/20/2023 56.1    • Lymphocyte % 04/20/2023 32.0    • Monocyte % 04/20/2023 9.3    • Eosinophil % 04/20/2023 2.2    • Basophil % 04/20/2023 0.4    • Neutrophils, Absolute 04/20/2023 3.50    • Lymphocytes, Absolute 04/20/2023 2.00    • Monocytes, Absolute 04/20/2023 0.60    • Eosinophils, Absolute 04/20/2023 0.10    • Basophils, Absolute 04/20/2023 0.00    • nRBC 04/20/2023 0.1    No results displayed because visit has over 200 results.      Admission on 12/07/2022, Discharged on 12/07/2022   Component Date Value   • QT Interval 12/07/2022 346    • Digoxin 12/07/2022 1.30 (H)    • Extra Tube 12/07/2022 Hold for add-ons.    • Extra Tube 12/07/2022 hold for add-on    • Extra Tube 12/07/2022 Hold for add-ons.    • Extra Tube 12/07/2022 Hold for add-ons.    • ADENOVIRUS, PCR 12/07/2022 Not Detected    • Coronavirus 229E 12/07/2022 Not Detected    • Coronavirus HKU1 12/07/2022 Not Detected    • Coronavirus NL63 12/07/2022 Not Detected    • Coronavirus OC43 12/07/2022 Not Detected    • COVID19 12/07/2022 Not Detected    • Human Metapneumovirus 12/07/2022 Not Detected    • Human Rhinovirus/Enterov* 12/07/2022 Not Detected    • Influenza A PCR 12/07/2022 Not Detected    •  Influenza B PCR 12/07/2022 Not Detected    • Parainfluenza Virus 1 12/07/2022 Not Detected    • Parainfluenza Virus 2 12/07/2022 Not Detected    • Parainfluenza Virus 3 12/07/2022 Not Detected    • Parainfluenza Virus 4 12/07/2022 Not Detected    • RSV, PCR 12/07/2022 Not Detected    • Bordetella pertussis pcr 12/07/2022 Not Detected    • Bordetella parapertussis* 12/07/2022 Not Detected    • Chlamydophila pneumoniae* 12/07/2022 Not Detected    • Mycoplasma pneumo by PCR 12/07/2022 Not Detected    • Glucose 12/07/2022 106 (H)    • BUN 12/07/2022 22    • Creatinine 12/07/2022 1.57 (H)    • Sodium 12/07/2022 142    • Potassium 12/07/2022 4.0    • Chloride 12/07/2022 110 (H)    • CO2 12/07/2022 18.0 (L)    • Calcium 12/07/2022 9.7    • Total Protein 12/07/2022 7.6    • Albumin 12/07/2022 3.90    • ALT (SGPT) 12/07/2022 13    • AST (SGOT) 12/07/2022 22    • Alkaline Phosphatase 12/07/2022 143 (H)    • Total Bilirubin 12/07/2022 0.6    • Globulin 12/07/2022 3.7    • A/G Ratio 12/07/2022 1.1    • BUN/Creatinine Ratio 12/07/2022 14.0    • Anion Gap 12/07/2022 14.0    • eGFR 12/07/2022 33.0 (L)    • Lipase 12/07/2022 19    • Color, UA 12/07/2022 Yellow    • Appearance, UA 12/07/2022 Cloudy (A)    • pH, UA 12/07/2022 <=5.0    • Specific Gravity, UA 12/07/2022 1.010    • Glucose, UA 12/07/2022 Negative    • Ketones, UA 12/07/2022 Negative    • Bilirubin, UA 12/07/2022 Negative    • Blood, UA 12/07/2022 Trace (A)    • Protein, UA 12/07/2022 30 mg/dL (1+) (A)    • Leuk Esterase, UA 12/07/2022 Large (3+) (A)    • Nitrite, UA 12/07/2022 Positive (A)    • Urobilinogen, UA 12/07/2022 0.2 E.U./dL    • Troponin T 12/07/2022 0.017    • proBNP 12/07/2022 6,821.0 (H)    • Blood Culture 12/07/2022 No growth at 5 days    • Blood Culture 12/07/2022 No growth at 5 days    • Sed Rate 12/07/2022 30    • Creatine Kinase 12/07/2022 22    • Ammonia 12/07/2022 11    • Magnesium 12/07/2022 2.0    • Phosphorus 12/07/2022 2.8    • TSH 12/07/2022  0.899    • WBC 12/07/2022 8.10    • RBC 12/07/2022 4.29    • Hemoglobin 12/07/2022 13.1    • Hematocrit 12/07/2022 40.2    • MCV 12/07/2022 93.8    • MCH 12/07/2022 30.5    • MCHC 12/07/2022 32.5    • RDW 12/07/2022 15.4    • RDW-SD 12/07/2022 54.3 (H)    • MPV 12/07/2022 7.6    • Platelets 12/07/2022 322    • Neutrophil % 12/07/2022 75.9    • Lymphocyte % 12/07/2022 15.5 (L)    • Monocyte % 12/07/2022 7.3    • Eosinophil % 12/07/2022 0.5    • Basophil % 12/07/2022 0.8    • Neutrophils, Absolute 12/07/2022 6.20    • Lymphocytes, Absolute 12/07/2022 1.30    • Monocytes, Absolute 12/07/2022 0.60    • Eosinophils, Absolute 12/07/2022 0.00    • Basophils, Absolute 12/07/2022 0.10    • nRBC 12/07/2022 0.1    • RBC, UA 12/07/2022 3-5 (A)    • WBC, UA 12/07/2022 21-30 (A)    • Bacteria, UA 12/07/2022 2+ (A)    • Squamous Epithelial Cell* 12/07/2022 None Seen    • Hyaline Casts, UA 12/07/2022 None Seen    • Methodology 12/07/2022 Manual Light Microscopy    • Urine Culture 12/07/2022 >100,000 CFU/mL Escherichia coli (A)    • Lactate 12/07/2022 0.5      Recent labs reviewed and interpreted for clinical significance and application            Level of Care:           Augustin Ellsworth MD  06/02/23  .

## 2023-06-09 ENCOUNTER — CLINICAL SUPPORT (OUTPATIENT)
Dept: CARDIOLOGY | Facility: CLINIC | Age: 83
End: 2023-06-09
Payer: MEDICARE

## 2023-06-09 DIAGNOSIS — I48.91 ATRIAL FIBRILLATION WITH RVR: Primary | ICD-10-CM

## 2023-06-09 DIAGNOSIS — Z95.0 PRESENCE OF CARDIAC PACEMAKER: Chronic | ICD-10-CM

## 2023-06-09 RX ORDER — METOPROLOL SUCCINATE 200 MG/1
200 TABLET, EXTENDED RELEASE ORAL DAILY
Qty: 30 TABLET | Refills: 3 | Status: SHIPPED | OUTPATIENT
Start: 2023-06-09

## 2023-06-09 NOTE — PROGRESS NOTES
Patient came into the office today for repeat EKG to assess heart rates after metoprolol increase last week. Dr. Ellsworth reviewed EKG from today and would like to refer to dr venegas and increase metop xl to 200mg daily. Spoke with patient and friend that was in the office with her today.

## 2023-06-16 ENCOUNTER — INPATIENT HOSPITAL (AMBULATORY)
Dept: URBAN - METROPOLITAN AREA HOSPITAL 84 | Facility: HOSPITAL | Age: 83
End: 2023-06-16

## 2023-06-16 DIAGNOSIS — Z79.01 LONG TERM (CURRENT) USE OF ANTICOAGULANTS: ICD-10-CM

## 2023-06-16 DIAGNOSIS — R10.32 LEFT LOWER QUADRANT PAIN: ICD-10-CM

## 2023-06-16 DIAGNOSIS — R93.5 ABNORMAL FINDINGS ON DIAGNOSTIC IMAGING OF OTHER ABDOMINAL R: ICD-10-CM

## 2023-06-16 DIAGNOSIS — R11.2 NAUSEA WITH VOMITING, UNSPECIFIED: ICD-10-CM

## 2023-06-16 DIAGNOSIS — Z90.49 ACQUIRED ABSENCE OF OTHER SPECIFIED PARTS OF DIGESTIVE TRACT: ICD-10-CM

## 2023-06-16 PROBLEM — R10.9 LEFT FLANK PAIN: Status: ACTIVE | Noted: 2023-06-16

## 2023-06-16 PROCEDURE — 99222 1ST HOSP IP/OBS MODERATE 55: CPT | Mod: FS | Performed by: NURSE PRACTITIONER

## 2023-06-17 ENCOUNTER — INPATIENT HOSPITAL (AMBULATORY)
Dept: URBAN - METROPOLITAN AREA HOSPITAL 84 | Facility: HOSPITAL | Age: 83
End: 2023-06-17

## 2023-06-17 DIAGNOSIS — Z90.49 ACQUIRED ABSENCE OF OTHER SPECIFIED PARTS OF DIGESTIVE TRACT: ICD-10-CM

## 2023-06-17 DIAGNOSIS — K59.00 CONSTIPATION, UNSPECIFIED: ICD-10-CM

## 2023-06-17 DIAGNOSIS — R10.32 LEFT LOWER QUADRANT PAIN: ICD-10-CM

## 2023-06-17 DIAGNOSIS — R93.3 ABNORMAL FINDINGS ON DIAGNOSTIC IMAGING OF OTHER PARTS OF DI: ICD-10-CM

## 2023-06-17 PROCEDURE — 99232 SBSQ HOSP IP/OBS MODERATE 35: CPT | Performed by: NURSE PRACTITIONER

## 2023-06-18 ENCOUNTER — INPATIENT HOSPITAL (AMBULATORY)
Dept: URBAN - METROPOLITAN AREA HOSPITAL 84 | Facility: HOSPITAL | Age: 83
End: 2023-06-18

## 2023-06-18 DIAGNOSIS — R93.3 ABNORMAL FINDINGS ON DIAGNOSTIC IMAGING OF OTHER PARTS OF DI: ICD-10-CM

## 2023-06-18 DIAGNOSIS — Z90.49 ACQUIRED ABSENCE OF OTHER SPECIFIED PARTS OF DIGESTIVE TRACT: ICD-10-CM

## 2023-06-18 DIAGNOSIS — R11.2 NAUSEA WITH VOMITING, UNSPECIFIED: ICD-10-CM

## 2023-06-18 DIAGNOSIS — R10.32 LEFT LOWER QUADRANT PAIN: ICD-10-CM

## 2023-06-18 DIAGNOSIS — K59.00 CONSTIPATION, UNSPECIFIED: ICD-10-CM

## 2023-06-18 PROCEDURE — 99232 SBSQ HOSP IP/OBS MODERATE 35: CPT | Performed by: NURSE PRACTITIONER

## 2023-06-30 PROBLEM — T46.6X5A MYALGIA DUE TO STATIN: Status: ACTIVE | Noted: 2023-06-30

## 2023-06-30 PROBLEM — M79.10 MYALGIA DUE TO STATIN: Status: ACTIVE | Noted: 2023-06-30

## 2023-09-13 ENCOUNTER — TELEPHONE (OUTPATIENT)
Dept: CARDIOLOGY | Facility: CLINIC | Age: 83
End: 2023-09-13
Payer: MEDICARE

## 2023-09-13 NOTE — TELEPHONE ENCOUNTER
Called and LVM    ---------------------------    OK FOR THE HUB TO RELEASE    Hi I'm reaching out from Dr Camarillo and Dr Ellsworth's office. We are currently not receiving transmissions from your home monitor. Please check all connections and push the button to transmit. Please contact the office if you have any questions at [phone]. Thank you for your assistance.

## 2023-09-25 RX ORDER — DILTIAZEM HYDROCHLORIDE 360 MG/1
CAPSULE, EXTENDED RELEASE ORAL
COMMUNITY
Start: 2023-07-21 | End: 2023-09-26

## 2023-09-25 RX ORDER — METOPROLOL SUCCINATE 50 MG/1
TABLET, EXTENDED RELEASE ORAL
COMMUNITY
Start: 2023-07-25 | End: 2023-09-26

## 2023-09-26 ENCOUNTER — CLINICAL SUPPORT NO REQUIREMENTS (OUTPATIENT)
Dept: CARDIOLOGY | Facility: CLINIC | Age: 83
End: 2023-09-26
Payer: MEDICARE

## 2023-09-26 ENCOUNTER — OFFICE VISIT (OUTPATIENT)
Dept: CARDIOLOGY | Facility: CLINIC | Age: 83
End: 2023-09-26
Payer: MEDICARE

## 2023-09-26 VITALS
SYSTOLIC BLOOD PRESSURE: 101 MMHG | OXYGEN SATURATION: 97 % | HEART RATE: 111 BPM | RESPIRATION RATE: 18 BRPM | HEIGHT: 65 IN | WEIGHT: 127 LBS | DIASTOLIC BLOOD PRESSURE: 63 MMHG | BODY MASS INDEX: 21.16 KG/M2

## 2023-09-26 DIAGNOSIS — I49.5 SICK SINUS SYNDROME: ICD-10-CM

## 2023-09-26 DIAGNOSIS — I48.91 ATRIAL FIBRILLATION WITH RVR: Primary | ICD-10-CM

## 2023-09-26 DIAGNOSIS — I10 ESSENTIAL HYPERTENSION: Chronic | ICD-10-CM

## 2023-09-26 DIAGNOSIS — Z95.0 PRESENCE OF CARDIAC PACEMAKER: Chronic | ICD-10-CM

## 2023-09-26 RX ORDER — ONDANSETRON 4 MG/1
4 TABLET, FILM COATED ORAL
COMMUNITY

## 2023-09-26 RX ORDER — METOPROLOL TARTRATE 50 MG/1
50 TABLET, FILM COATED ORAL 2 TIMES DAILY
COMMUNITY

## 2023-09-26 NOTE — PROGRESS NOTES
Cardiology Office Follow Up Visit      Primary Care Provider:  Lizzy Francis MD    Reason for f/u:     Atrial fibrillation with RVR  Hypertension  Sick sinus syndrome  Dual-chamber pacemaker      Subjective     CC:    Denies chest pain or worsening shortness of breath    History of Present Illness       Hazel Bucio is a 83 y.o. female.  Patient is here today for hospital follow-up.  She was recently admitted at [facility] in June 2023.  At that time she was having abdominal pain and found to have left nephrolithiasis and moderate stool burden.  Repeat nuclear stress test showed no reversible ischemia.  Echocardiogram demonstrated preserved LV function.      Patient is known to have a history of sick sinus syndrome requiring previous dual-chamber [employer] pacemaker implant in October 2014.  She also has permanent atrial fibrillation.     Last echocardiogram showed her ejection fraction to be 60 to 65% with normal RV size and function.  There was moderate biatrial enlargement, mild to moderate MR, mild AI, mild to moderate TR, diastolic dysfunction.    She has been living at [facility], undergoing therapy     She denies any current or recent chest pain, worsening dyspnea, PND, orthopnea, palpitations, near syncope, lower extremity edema or feelings of her heart racing.  She reports compliance with medical therapy        ASSESSMENT/PLAN:        Diagnoses and all orders for this visit:    1. Atrial fibrillation with RVR (Primary)    2. Essential hypertension    3. Sick sinus syndrome    4. Presence of cardiac pacemaker           MEDICAL DECISION MAKING:    Pacemaker is interrogated today.  There are some mildly elevated ventricular rates but overall ventricular rates are better controlled.  Device function is stable.  EKG today shows atrial fibrillation with heart rate in the 111 range.    I made no changes in her medicines we will continue the current treatment.  Overall her  ventricular rates appear stable.  She is anticoagulated with Eliquis.  Blood pressure stable.  No changes in her medical therapy.  If she develops any new or worsening problems advised her to contact the office sooner.  Otherwise she will have scheduled follow-up with Dr. Ellsworth in 3 months      Past Medical History:   Diagnosis Date    Bladder cancer     Cancer     breast, bladder    Deep vein thrombosis     Essential hypertension 1/17/2017    Fracture tibia/fibula, right, closed, initial encounter 8/27/2020    GERD (gastroesophageal reflux disease)     History of urostomy     Immobility 08/27/2020    r/t broken Tibia     Kidney carcinoma, right     removed     Long term current use of anticoagulant 1/9/2015    PAF (paroxysmal atrial fibrillation) 6/26/2019    Presence of cardiac pacemaker 11/03/2014    BS dual chamber PM placed 10/2014 with pocket revision 1/25/17.  HX tachy rob syndrome    Sick sinus syndrome 11/3/2014       Past Surgical History:   Procedure Laterality Date    BREAST LUMPECTOMY      CARDIAC ELECTROPHYSIOLOGY PROCEDURE N/A 4/28/2023    Procedure: Pacemaker gen change, [place] AWARE $;  Surgeon: Jose Carlos Cheng MD;  Location: [facility] CATH INVASIVE LOCATION;  Service: Cardiovascular;  Laterality: N/A;    CHOLECYSTECTOMY N/A 10/12/2020    Procedure: CHOLECYSTECTOMY LAPAROSCOPIC;  Surgeon: Go Pereira DO;  Location: [facility] MAIN OR;  Service: General;  Laterality: N/A;    CYSTECTOMY W/ URETEROILEAL CONDUIT      HARDWARE REMOVAL Right 6/11/2021    Procedure: TIBIA HARDWARE REMOVAL;  Surgeon: Geoff Shah II, MD;  Location: [facility] MAIN OR;  Service: Orthopedics;  Laterality: Right;    HERNIA REPAIR      HYSTERECTOMY      INSERT / REPLACE / REMOVE PACEMAKER      NEPHRECTOMY Right     TIBIA IM NAILING/RODDING Right 8/28/2020    Procedure: TIBIA INTRAMEDULLARY NAIL/ABRAHAM INSERTION;  Surgeon: Geoff Shah II, MD;  Location: [facility] MAIN OR;  Service: Orthopedics;  Laterality:  Right;         Current Outpatient Medications:     acetaminophen (TYLENOL) 325 MG tablet, Take 2 tablets by mouth Every 6 (Six) Hours As Needed for Mild Pain., Disp: , Rfl:     apixaban (ELIQUIS) 2.5 MG tablet tablet, Take 1 tablet by mouth Every 12 (Twelve) Hours. Indications: Atrial Fibrillation, Disp: 60 tablet, Rfl: 0    dicyclomine (BENTYL) 10 MG capsule, Take 1 capsule by mouth 3 (Three) Times a Day., Disp: 60 capsule, Rfl: 0    hydrALAZINE (APRESOLINE) 25 MG tablet, Take 1 tablet by mouth 3 (Three) Times a Day., Disp: 90 tablet, Rfl: 1    ipratropium (ATROVENT HFA) 17 MCG/ACT inhaler, Inhale 2 puffs 4 (Four) Times a Day., Disp: , Rfl:     metoprolol tartrate (LOPRESSOR) 50 MG tablet, Take 1 tablet by mouth Every 12 (Twelve) Hours., Disp: 60 tablet, Rfl: 0    metoprolol tartrate (LOPRESSOR) 50 MG tablet, Take 1 tablet by mouth 2 (Two) Times a Day., Disp: , Rfl:     ondansetron (ZOFRAN) 4 MG tablet, Take 1 tablet by mouth., Disp: , Rfl:     saccharomyces boulardii (FLORASTOR) 250 MG capsule, Take 1 capsule by mouth 2 (Two) Times a Day., Disp: 60 capsule, Rfl: 0    topiramate (TOPAMAX) 100 MG tablet, Take 1 tablet by mouth Every Night., Disp: 30 tablet, Rfl: 0    Social History     Socioeconomic History    Marital status:    Tobacco Use    Smoking status: Never     Passive exposure: Never    Smokeless tobacco: Never   Vaping Use    Vaping Use: Never used   Substance and Sexual Activity    Alcohol use: Not Currently    Drug use: Never    Sexual activity: Defer       Family History   Problem Relation Age of Onset    COPD Father        The following portions of the patient's history were reviewed and updated as appropriate: allergies, current medications, past family history, past medical history, past social history, past surgical history and problem list.    Review of Systems   Constitutional: Positive for malaise/fatigue.   Cardiovascular:  Positive for irregular heartbeat.   Neurological:  Positive for  "weakness.   All other systems reviewed and are negative.  Pertinent items are noted in HPI, all other systems reviewed and negative      /63 (BP Location: Left arm, Patient Position: Sitting, Cuff Size: Large Adult)   Pulse 111   Resp 18   Ht 165.1 cm (65\")   Wt 57.6 kg (127 lb)   SpO2 97%   BMI 21.13 kg/m² .    Objective     Vitals reviewed.   Constitutional:       General: Not in acute distress.     Appearance: Normal appearance. Well-developed.   Eyes:      Pupils: Pupils are equal, round, and reactive to light.   HENT:      Head: Normocephalic and atraumatic.   Neck:      Vascular: No JVD.   Pulmonary:      Effort: Pulmonary effort is normal.      Breath sounds: Normal breath sounds.   Cardiovascular:      Tachycardia present. Irregularly irregular rhythm.   Pulses:     Intact distal pulses.   Edema:     Peripheral edema absent.   Abdominal:      General: There is no distension.      Palpations: Abdomen is soft.      Tenderness: There is no abdominal tenderness.   Musculoskeletal: Normal range of motion.      Cervical back: Normal range of motion and neck supple. Skin:     General: Skin is warm and dry.   Neurological:      Mental Status: Alert and oriented to person, place, and time.         EKG ordered and reviewed by me in office    ECG 12 Lead    Date/Time: 9/26/2023 11:17 AM  Performed by: Angela Lozada APRN  Authorized by: Angela Lozada APRN   Comparison: compared with previous ECG   Similar to previous ECG  Rhythm: atrial fibrillation  BPM: 111    Clinical impression: abnormal EKG            In Office Device Interrogation: Reviewed    DEVICE INTERROGATION:  IN OFFICE    DEVICE TYPE:   Single-chamber pacemaker    :   [employer]    BATTERY:  Stable    TIME TO ELECTIVE REPLACEMENT INDICATORS:   10-year    CHARGE TIME:   Not applicable        LEAD DATA:   LEADS Reprogrammed for testing purposes    Atrial:      Ventricular:     23.9 mV, 575 ohms, 0.6 V@0.4 " ms    LV:      Pacemaker Dependent: No      Atrial pacing percentage: Not applicable %    Ventricular pacing percentage: 2% %      Arrhythmia Logbook Reviewed: 100% A-fib        Summary:    Stable Device Function    No significant arhythmia burden.     Battery status is stable.      NEXT IN OFFICE DEVICE CHECK DUE: At next visit    REMOTE DEVICE INTERROGATIONS: Ongoing

## 2023-09-27 ENCOUNTER — TELEPHONE (OUTPATIENT)
Dept: CARDIOLOGY | Facility: CLINIC | Age: 83
End: 2023-09-27

## 2023-09-27 NOTE — TELEPHONE ENCOUNTER
Caller: MARLENY    Relationship: [facility]    What form or medical record are you requesting: LAST OFFICE NOTES        How would you like to receive the form or medical records (pick-up, mail, fax): FAX  If fax, what is the fax number: [phone]        Additional notes: PT IS CURRENTLY AT [facility]- THEY WOULD LIKE THE LAST OFFICE NOTE FAXED

## 2023-10-13 ENCOUNTER — APPOINTMENT (OUTPATIENT)
Dept: GENERAL RADIOLOGY | Facility: HOSPITAL | Age: 83
End: 2023-10-13
Payer: MEDICARE

## 2023-10-13 ENCOUNTER — HOSPITAL ENCOUNTER (INPATIENT)
Facility: HOSPITAL | Age: 83
LOS: 3 days | Discharge: NURSING, REHAB OR LONG-TERM CARE | End: 2023-10-18
Attending: EMERGENCY MEDICINE | Admitting: FAMILY MEDICINE
Payer: MEDICARE

## 2023-10-13 DIAGNOSIS — I20.0 UNSTABLE ANGINA PECTORIS: ICD-10-CM

## 2023-10-13 DIAGNOSIS — R07.9 CHEST PAIN, UNSPECIFIED TYPE: Primary | ICD-10-CM

## 2023-10-13 LAB
ALBUMIN SERPL-MCNC: 3.6 G/DL (ref 3.5–5.2)
ALBUMIN/GLOB SERPL: 1 G/DL
ALP SERPL-CCNC: 139 U/L (ref 39–117)
ALT SERPL W P-5'-P-CCNC: 14 U/L (ref 1–33)
ANION GAP SERPL CALCULATED.3IONS-SCNC: 13 MMOL/L (ref 5–15)
APTT PPP: 27.3 SECONDS (ref 24–31)
AST SERPL-CCNC: 23 U/L (ref 1–32)
BASOPHILS # BLD AUTO: 0.1 10*3/MM3 (ref 0–0.2)
BASOPHILS NFR BLD AUTO: 1.5 % (ref 0–1.5)
BILIRUB SERPL-MCNC: 0.2 MG/DL (ref 0–1.2)
BUN SERPL-MCNC: 54 MG/DL (ref 8–23)
BUN/CREAT SERPL: 10.6 (ref 7–25)
CALCIUM SPEC-SCNC: 9.5 MG/DL (ref 8.6–10.5)
CHLORIDE SERPL-SCNC: 98 MMOL/L (ref 98–107)
CO2 SERPL-SCNC: 28 MMOL/L (ref 22–29)
CREAT SERPL-MCNC: 5.11 MG/DL (ref 0.57–1)
DEPRECATED RDW RBC AUTO: 55.1 FL (ref 37–54)
EGFRCR SERPLBLD CKD-EPI 2021: 8 ML/MIN/1.73
EOSINOPHIL # BLD AUTO: 0.1 10*3/MM3 (ref 0–0.4)
EOSINOPHIL NFR BLD AUTO: 2 % (ref 0.3–6.2)
ERYTHROCYTE [DISTWIDTH] IN BLOOD BY AUTOMATED COUNT: 15.5 % (ref 12.3–15.4)
GEN 5 2HR TROPONIN T REFLEX: 63 NG/L
GLOBULIN UR ELPH-MCNC: 3.6 GM/DL
GLUCOSE SERPL-MCNC: 98 MG/DL (ref 65–99)
HCT VFR BLD AUTO: 35 % (ref 34–46.6)
HGB BLD-MCNC: 11.3 G/DL (ref 12–15.9)
INR PPP: 1.05 (ref 0.93–1.1)
LYMPHOCYTES # BLD AUTO: 1.8 10*3/MM3 (ref 0.7–3.1)
LYMPHOCYTES NFR BLD AUTO: 29.7 % (ref 19.6–45.3)
MCH RBC QN AUTO: 31.3 PG (ref 26.6–33)
MCHC RBC AUTO-ENTMCNC: 32.2 G/DL (ref 31.5–35.7)
MCV RBC AUTO: 97.2 FL (ref 79–97)
MONOCYTES # BLD AUTO: 0.7 10*3/MM3 (ref 0.1–0.9)
MONOCYTES NFR BLD AUTO: 11.5 % (ref 5–12)
NEUTROPHILS NFR BLD AUTO: 3.3 10*3/MM3 (ref 1.7–7)
NEUTROPHILS NFR BLD AUTO: 55.3 % (ref 42.7–76)
NRBC BLD AUTO-RTO: 0 /100 WBC (ref 0–0.2)
NT-PROBNP SERPL-MCNC: 7320 PG/ML (ref 0–1800)
PLATELET # BLD AUTO: 203 10*3/MM3 (ref 140–450)
PMV BLD AUTO: 6.8 FL (ref 6–12)
POTASSIUM SERPL-SCNC: 4.7 MMOL/L (ref 3.5–5.2)
PROT SERPL-MCNC: 7.2 G/DL (ref 6–8.5)
PROTHROMBIN TIME: 11.4 SECONDS (ref 9.6–11.7)
RBC # BLD AUTO: 3.6 10*6/MM3 (ref 3.77–5.28)
SODIUM SERPL-SCNC: 139 MMOL/L (ref 136–145)
TROPONIN T DELTA: 0 NG/L
TROPONIN T SERPL HS-MCNC: 63 NG/L
WBC NRBC COR # BLD: 6 10*3/MM3 (ref 3.4–10.8)

## 2023-10-13 PROCEDURE — 99285 EMERGENCY DEPT VISIT HI MDM: CPT

## 2023-10-13 PROCEDURE — 36415 COLL VENOUS BLD VENIPUNCTURE: CPT | Performed by: PHYSICIAN ASSISTANT

## 2023-10-13 PROCEDURE — 84484 ASSAY OF TROPONIN QUANT: CPT | Performed by: PHYSICIAN ASSISTANT

## 2023-10-13 PROCEDURE — 93005 ELECTROCARDIOGRAM TRACING: CPT

## 2023-10-13 PROCEDURE — G0378 HOSPITAL OBSERVATION PER HR: HCPCS

## 2023-10-13 PROCEDURE — 85610 PROTHROMBIN TIME: CPT | Performed by: PHYSICIAN ASSISTANT

## 2023-10-13 PROCEDURE — 80053 COMPREHEN METABOLIC PANEL: CPT | Performed by: PHYSICIAN ASSISTANT

## 2023-10-13 PROCEDURE — 83880 ASSAY OF NATRIURETIC PEPTIDE: CPT | Performed by: PHYSICIAN ASSISTANT

## 2023-10-13 PROCEDURE — 85730 THROMBOPLASTIN TIME PARTIAL: CPT | Performed by: PHYSICIAN ASSISTANT

## 2023-10-13 PROCEDURE — 71045 X-RAY EXAM CHEST 1 VIEW: CPT

## 2023-10-13 PROCEDURE — 85025 COMPLETE CBC W/AUTO DIFF WBC: CPT | Performed by: PHYSICIAN ASSISTANT

## 2023-10-13 PROCEDURE — 94799 UNLISTED PULMONARY SVC/PX: CPT

## 2023-10-13 PROCEDURE — 94640 AIRWAY INHALATION TREATMENT: CPT

## 2023-10-13 PROCEDURE — 93005 ELECTROCARDIOGRAM TRACING: CPT | Performed by: EMERGENCY MEDICINE

## 2023-10-13 PROCEDURE — 94664 DEMO&/EVAL PT USE INHALER: CPT

## 2023-10-13 RX ORDER — POLYETHYLENE GLYCOL 3350 17 G/17G
17 POWDER, FOR SOLUTION ORAL DAILY PRN
Status: DISCONTINUED | OUTPATIENT
Start: 2023-10-13 | End: 2023-10-18 | Stop reason: HOSPADM

## 2023-10-13 RX ORDER — LOPERAMIDE HYDROCHLORIDE 2 MG/1
2 CAPSULE ORAL DAILY PRN
COMMUNITY

## 2023-10-13 RX ORDER — HYDRALAZINE HYDROCHLORIDE 25 MG/1
25 TABLET, FILM COATED ORAL 3 TIMES DAILY
Status: DISCONTINUED | OUTPATIENT
Start: 2023-10-13 | End: 2023-10-14

## 2023-10-13 RX ORDER — NITROGLYCERIN 0.4 MG/1
0.4 TABLET SUBLINGUAL
Status: DISCONTINUED | OUTPATIENT
Start: 2023-10-13 | End: 2023-10-18

## 2023-10-13 RX ORDER — SODIUM CHLORIDE 9 MG/ML
40 INJECTION, SOLUTION INTRAVENOUS AS NEEDED
Status: DISCONTINUED | OUTPATIENT
Start: 2023-10-13 | End: 2023-10-18 | Stop reason: HOSPADM

## 2023-10-13 RX ORDER — ONDANSETRON 2 MG/ML
4 INJECTION INTRAMUSCULAR; INTRAVENOUS EVERY 6 HOURS PRN
Status: DISCONTINUED | OUTPATIENT
Start: 2023-10-13 | End: 2023-10-18 | Stop reason: HOSPADM

## 2023-10-13 RX ORDER — HYDRALAZINE HYDROCHLORIDE 20 MG/ML
10 INJECTION INTRAMUSCULAR; INTRAVENOUS EVERY 6 HOURS PRN
Status: DISCONTINUED | OUTPATIENT
Start: 2023-10-13 | End: 2023-10-18 | Stop reason: HOSPADM

## 2023-10-13 RX ORDER — SODIUM CHLORIDE 0.9 % (FLUSH) 0.9 %
10 SYRINGE (ML) INJECTION AS NEEDED
Status: DISCONTINUED | OUTPATIENT
Start: 2023-10-13 | End: 2023-10-18 | Stop reason: HOSPADM

## 2023-10-13 RX ORDER — BISACODYL 5 MG/1
5 TABLET, DELAYED RELEASE ORAL DAILY PRN
Status: DISCONTINUED | OUTPATIENT
Start: 2023-10-13 | End: 2023-10-18 | Stop reason: HOSPADM

## 2023-10-13 RX ORDER — SODIUM CHLORIDE 0.9 % (FLUSH) 0.9 %
10 SYRINGE (ML) INJECTION EVERY 12 HOURS SCHEDULED
Status: DISCONTINUED | OUTPATIENT
Start: 2023-10-13 | End: 2023-10-18 | Stop reason: HOSPADM

## 2023-10-13 RX ORDER — AMOXICILLIN 250 MG
2 CAPSULE ORAL 2 TIMES DAILY
Status: DISCONTINUED | OUTPATIENT
Start: 2023-10-13 | End: 2023-10-18 | Stop reason: HOSPADM

## 2023-10-13 RX ORDER — TOPIRAMATE 100 MG/1
100 TABLET, FILM COATED ORAL NIGHTLY
Status: DISCONTINUED | OUTPATIENT
Start: 2023-10-13 | End: 2023-10-18 | Stop reason: HOSPADM

## 2023-10-13 RX ORDER — ASPIRIN 81 MG/1
324 TABLET, CHEWABLE ORAL ONCE
Status: COMPLETED | OUTPATIENT
Start: 2023-10-13 | End: 2023-10-13

## 2023-10-13 RX ORDER — BISACODYL 10 MG
10 SUPPOSITORY, RECTAL RECTAL DAILY PRN
Status: DISCONTINUED | OUTPATIENT
Start: 2023-10-13 | End: 2023-10-18 | Stop reason: HOSPADM

## 2023-10-13 RX ORDER — DICYCLOMINE HYDROCHLORIDE 10 MG/1
10 CAPSULE ORAL 3 TIMES DAILY
Status: DISCONTINUED | OUTPATIENT
Start: 2023-10-13 | End: 2023-10-18 | Stop reason: HOSPADM

## 2023-10-13 RX ORDER — ONDANSETRON 4 MG/1
4 TABLET, FILM COATED ORAL EVERY 6 HOURS PRN
Status: DISCONTINUED | OUTPATIENT
Start: 2023-10-13 | End: 2023-10-18 | Stop reason: HOSPADM

## 2023-10-13 RX ORDER — ZINC OXIDE 20 %
1 OINTMENT (GRAM) TOPICAL 2 TIMES DAILY
COMMUNITY

## 2023-10-13 RX ORDER — SACCHAROMYCES BOULARDII 250 MG
250 CAPSULE ORAL 2 TIMES DAILY
Status: DISCONTINUED | OUTPATIENT
Start: 2023-10-13 | End: 2023-10-18 | Stop reason: HOSPADM

## 2023-10-13 RX ORDER — METOPROLOL TARTRATE 50 MG/1
50 TABLET, FILM COATED ORAL 2 TIMES DAILY
Status: DISCONTINUED | OUTPATIENT
Start: 2023-10-13 | End: 2023-10-18 | Stop reason: HOSPADM

## 2023-10-13 RX ADMIN — ASPIRIN 324 MG: 81 TABLET, CHEWABLE ORAL at 16:19

## 2023-10-13 RX ADMIN — DOCUSATE SODIUM 50 MG AND SENNOSIDES 8.6 MG 2 TABLET: 8.6; 5 TABLET, FILM COATED ORAL at 20:37

## 2023-10-13 RX ADMIN — Medication 10 ML: at 20:39

## 2023-10-13 RX ADMIN — IPRATROPIUM BROMIDE 2 PUFF: 17 AEROSOL, METERED RESPIRATORY (INHALATION) at 18:36

## 2023-10-13 RX ADMIN — APIXABAN 2.5 MG: 2.5 TABLET, FILM COATED ORAL at 20:37

## 2023-10-13 RX ADMIN — Medication 250 MG: at 20:38

## 2023-10-13 RX ADMIN — TOPIRAMATE 100 MG: 100 TABLET, FILM COATED ORAL at 20:38

## 2023-10-13 RX ADMIN — DICYCLOMINE HYDROCHLORIDE 10 MG: 10 CAPSULE ORAL at 20:38

## 2023-10-13 NOTE — PLAN OF CARE
Goal Outcome Evaluation:                        Problem: Adult Inpatient Plan of Care  Goal: Plan of Care Review  Outcome: Ongoing, Progressing  Goal: Patient-Specific Goal (Individualized)  Outcome: Ongoing, Progressing  Goal: Absence of Hospital-Acquired Illness or Injury  Outcome: Ongoing, Progressing  Goal: Optimal Comfort and Wellbeing  Outcome: Ongoing, Progressing  Intervention: Provide Person-Centered Care  Recent Flowsheet Documentation  Taken 10/13/2023 1741 by Karina Zazueta, RN  Trust Relationship/Rapport:   care explained   choices provided  Goal: Readiness for Transition of Care  Outcome: Ongoing, Progressing  Intervention: Mutually Develop Transition Plan  Recent Flowsheet Documentation  Taken 10/13/2023 1738 by Karina Zazueta, RN  Equipment Currently Used at Home: wheelchair     Problem: Skin Injury Risk Increased  Goal: Skin Health and Integrity  Outcome: Ongoing, Progressing

## 2023-10-13 NOTE — ED PROVIDER NOTES
Subjective   History of Present Illness  Patient is an 83-year-old female that presents to the ED for chest pain since this morning.  She describes the chest pain as an intermittent dull ache.  Patient is on Eliquis for A-fib.  Patient is positive for chest pain, dyspnea, nausea.  Palpation of chest elicits chest pain.  She denies unilateral leg swelling, cough, recent travel, headache, vomiting, fever, rhinorrhea, congestion, back pain, dysuria.  Patient has been taking medications as directed.  She currently rates her pain as moderate severity.  Is nonradiating from her chest.    Cardiology Dr. Ellsworth  PCP Dr. Francis    History provided by:  Patient      Review of Systems   Constitutional:  Negative for fever.   HENT:  Negative for congestion.    Respiratory:  Positive for shortness of breath.    Cardiovascular:  Positive for chest pain.   Gastrointestinal:  Negative for abdominal pain, nausea and vomiting.   Genitourinary:  Negative for dysuria, frequency and urgency.   Musculoskeletal:  Negative for back pain.   Neurological:  Negative for headaches.       Past Medical History:   Diagnosis Date    Bladder cancer     Cancer     breast, bladder    Deep vein thrombosis     Essential hypertension 1/17/2017    Fracture tibia/fibula, right, closed, initial encounter 8/27/2020    GERD (gastroesophageal reflux disease)     History of urostomy     Immobility 08/27/2020    r/t broken Tibia     Kidney carcinoma, right     removed     Long term current use of anticoagulant 1/9/2015    PAF (paroxysmal atrial fibrillation) 6/26/2019    Presence of cardiac pacemaker 11/03/2014    BS dual chamber PM placed 10/2014 with pocket revision 1/25/17.  HX tachy rob syndrome    Sick sinus syndrome 11/3/2014       Allergies   Allergen Reactions    Amoxicillin Shortness Of Breath    Ciprofloxacin Hives    Oxycodone-Acetaminophen Unknown - High Severity     Pt's son stated when pt fell and broke her leg she was put on oxycodone; pt's son  "stated pt's \"vitals went all out of wack, she couldn't remember things.\"    Penicillins Rash    Sulfa Antibiotics Hives    Cephalexin Other (See Comments)    Clavulanic Acid Other (See Comments)    Codeine Other (See Comments)    Contrast Dye (Echo Or Unknown Ct/Mr) Other (See Comments)     8/18/22     Doxycycline Other (See Comments)    Fluconazole Other (See Comments)    Iodinated Contrast Media Other (See Comments)     8/18/22    Iodine Hives    Macrobid [Nitrofurantoin] Hives    Tobramycin Other (See Comments)    Trimethoprim Other (See Comments)       Past Surgical History:   Procedure Laterality Date    BREAST LUMPECTOMY      CARDIAC ELECTROPHYSIOLOGY PROCEDURE N/A 4/28/2023    Procedure: Pacemaker gen change, [place] AWARE $;  Surgeon: Jose Carlos Cheng MD;  Location: [facility] CATH INVASIVE LOCATION;  Service: Cardiovascular;  Laterality: N/A;    CHOLECYSTECTOMY N/A 10/12/2020    Procedure: CHOLECYSTECTOMY LAPAROSCOPIC;  Surgeon: Go Pereira DO;  Location: [facility] MAIN OR;  Service: General;  Laterality: N/A;    CYSTECTOMY W/ URETEROILEAL CONDUIT      HARDWARE REMOVAL Right 6/11/2021    Procedure: TIBIA HARDWARE REMOVAL;  Surgeon: Geoff Shah II, MD;  Location: [facility] MAIN OR;  Service: Orthopedics;  Laterality: Right;    HERNIA REPAIR      HYSTERECTOMY      INSERT / REPLACE / REMOVE PACEMAKER      NEPHRECTOMY Right     TIBIA IM NAILING/RODDING Right 8/28/2020    Procedure: TIBIA INTRAMEDULLARY NAIL/ABRAHAM INSERTION;  Surgeon: Geoff Shah II, MD;  Location: [facility] MAIN OR;  Service: Orthopedics;  Laterality: Right;       Family History   Problem Relation Age of Onset    COPD Father        Social History     Socioeconomic History    Marital status:    Tobacco Use    Smoking status: Never     Passive exposure: Never    Smokeless tobacco: Never   Vaping Use    Vaping Use: Never used   Substance and Sexual Activity    Alcohol use: Not Currently    Drug use: Never    Sexual " "activity: Defer           Objective   Physical Exam  Vitals and nursing note reviewed.   Constitutional:       General: She is not in acute distress.     Appearance: She is well-developed. She is not ill-appearing, toxic-appearing or diaphoretic.   HENT:      Head: Normocephalic and atraumatic.      Mouth/Throat:      Mouth: Mucous membranes are moist.      Pharynx: Oropharynx is clear.   Eyes:      General: No scleral icterus.     Extraocular Movements: Extraocular movements intact.      Pupils: Pupils are equal, round, and reactive to light.   Cardiovascular:      Rate and Rhythm: Normal rate. Rhythm irregular.      Heart sounds: No murmur heard.     No friction rub. No gallop.   Pulmonary:      Effort: Pulmonary effort is normal. No respiratory distress.      Breath sounds: Normal breath sounds. No stridor. No wheezing, rhonchi or rales.   Chest:      Chest wall: Tenderness present.   Abdominal:      General: There is no distension. There are no signs of injury.      Tenderness: There is no right CVA tenderness or left CVA tenderness.      Hernia: No hernia is present.   Skin:     General: Skin is warm.      Coloration: Skin is not cyanotic, jaundiced or pale.      Findings: No rash.   Neurological:      General: No focal deficit present.      Mental Status: She is alert and oriented to person, place, and time.   Psychiatric:         Mood and Affect: Mood normal.         Behavior: Behavior normal.         Procedures           ED Course  ED Course as of 10/13/23 1626   Fri Oct 13, 2023   1619 proBNP(!): 7,320.0  At patient's baseline patient has known CKD [AA]   1623 Spoke to DANIELE Álvarez who agreed for admission [AA]      ED Course User Index  [AA] Stephania Rodriguez PA    /57   Pulse 94   Temp 98.6 °F (37 °C)   Resp 16   Ht 165.1 cm (65\")   Wt 57.6 kg (127 lb)   SpO2 99%   BMI 21.13 kg/m²   Medications   sodium chloride 0.9 % flush 10 mL (has no administration in time range)   nitroglycerin " (NITROSTAT) SL tablet 0.4 mg (has no administration in time range)   aspirin chewable tablet 324 mg (324 mg Oral Given 10/13/23 1619)     Labs Reviewed   COMPREHENSIVE METABOLIC PANEL - Abnormal; Notable for the following components:       Result Value    BUN 54 (*)     Creatinine 5.11 (*)     Alkaline Phosphatase 139 (*)     eGFR 8.0 (*)     All other components within normal limits    Narrative:     GFR Normal >60  Chronic Kidney Disease <60  Kidney Failure <15    The GFR formula is only valid for adults with stable renal function between ages 18 and 70.   TROPONIN - Abnormal; Notable for the following components:    HS Troponin T 63 (*)     All other components within normal limits    Narrative:     High Sensitive Troponin T Reference Range:  <10.0 ng/L- Negative Female for AMI  <15.0 ng/L- Negative Male for AMI  >=10 - Abnormal Female indicating possible myocardial injury.  >=15 - Abnormal Male indicating possible myocardial injury.   Clinicians would have to utilize clinical acumen, EKG, Troponin, and serial changes to determine if it is an Acute Myocardial Infarction or myocardial injury due to an underlying chronic condition.        CBC WITH AUTO DIFFERENTIAL - Abnormal; Notable for the following components:    RBC 3.60 (*)     Hemoglobin 11.3 (*)     MCV 97.2 (*)     RDW 15.5 (*)     RDW-SD 55.1 (*)     All other components within normal limits   BNP (IN-HOUSE) - Abnormal; Notable for the following components:    proBNP 7,320.0 (*)     All other components within normal limits    Narrative:     This assay is used as an aid in the diagnosis of individuals suspected of having heart failure. It can be used as an aid in the diagnosis of acute decompensated heart failure (ADHF) in patients presenting with signs and symptoms of ADHF to the emergency department (ED). In addition, NT-proBNP of <300 pg/mL indicates ADHF is not likely.    Age Range Result Interpretation  NT-proBNP Concentration (pg/mL:      <50              Positive            >450                   Gray                 300-450                    Negative             <300    50-75           Positive            >900                  Gray                300-900                  Negative            <300      >75             Positive            >1800                  Gray                300-1800                  Negative            <300   PROTIME-INR - Normal   APTT - Normal   HIGH SENSITIVITIY TROPONIN T 2HR   CBC AND DIFFERENTIAL    Narrative:     The following orders were created for panel order CBC & Differential.  Procedure                               Abnormality         Status                     ---------                               -----------         ------                     CBC Auto Differential[[id number]]        Abnormal            Final result                 Please view results for these tests on the individual orders.     XR Chest 1 View   Final Result   Impression:   No definite acute abnormality. Small opacities overlying the right lower lung is most likely outside the patient. Please correlate.         Electronically Signed: George Sorensen DO     10/13/2023 3:30 PM EDT     Workstation ID: [id number]                                               Medical Decision Making  Chart Review: Discharge summary reviewed from 6/30/2023 admitted for abdominal pain was found to have UTI was treated with antibiotics she did also require Cardizem due to A-fib allergy was consulted and she was restarted on digoxin was also discharged home on Eliquis    Comorbidity: As per past medical history  Differentials: ACS, electrode abnormality, arrhythmia, pleural effusion     ;this list is not all inclusive and does not constitute the entirety of considered causes  EKG: Interpreted by myself and Dr. Kolb shows A-fib rate 91 low voltage in precordial leads previous EKG reviewed from 6/28/2023 also showed A-fib  Labs: As above  Radiology: My interpretation shows no  acute pneumothorax correlated with radiology interpretation as below  XR Chest 1 View   Final Result    Impression:    No definite acute abnormality. Small opacities overlying the right lower lung is most likely outside the patient. Please correlate.            Electronically Signed: George Sorensen DO      10/13/2023 3:30 PM EDT      Workstation ID: [id number]     Disposition/Treatment:  Appropriate PPE was worn during exam and throughout all encounters with the patient.  When the ED IV was placed and labs were obtained patient was placed on proper monitors EKG shows A-fib which patient has a known history of no acute STEMI.  Initial troponin elevated at 63.  On chart review she does have a history of elevated troponin in the past which could be somewhat secondary to CKD.  5 months ago was more in the 20 range though.  Will trend troponin.  Chest x-ray shows no acute pneumonia.  Labs otherwise show no leukocytosis chronic anemia and elevated BUN and creatinine.  Potassium normal.  Lipase also chronically elevated and stable.  Patient's pacemaker was interrogated which did show some nonsustained ventricular arrhythmia episodes.  Findings were discussed with the patient who understand admission.  Spoke to Preeti James NP who agreed for admission with PCP group.  Neurology consult was also placed while in the ED.  Was given aspirin and nitroglycerin while in the ED    Amount and/or Complexity of Data Reviewed  Labs: ordered. Decision-making details documented in ED Course.  Radiology: ordered.  ECG/medicine tests: ordered.    Risk  OTC drugs.  Prescription drug management.        Final diagnoses:   Chest pain, unspecified type       ED Disposition  ED Disposition       ED Disposition   Decision to Admit    Condition   --    Comment   Level of Care: Med/Surg [1]   Admitting Physician: BRIELLE KNOWLES [[id number]]   Attending Physician: BRIELLE KNOWLES [[id number]]                 No follow-up provider specified.       Medication List       No changes were made to your prescriptions during this visit.            Stephania Rodriguez PA  10/13/23 1844

## 2023-10-13 NOTE — Clinical Note
Level of Care: Med/Surg [1]   Admitting Physician: BREILLE KNOWLES [[id number]]   Attending Physician: BRIELLE KNOWLES [[id number]]

## 2023-10-13 NOTE — CONSULTS
Nephrology Consult Note                                                [facility]      Patient Identification:  Name: Hazel Bucio  Age: 83 y.o.  Sex: female  :  1940  MRN: [id number]               Requesting Physician: Lora Henderson MD  Reason for Consultation: management recommendations      History of Present Illness:    Patient is a 83-year-old white female patient with history of ESRD on hemodialysis  history of hypertension previous bladder cancer who presented today with chest pain she had dialysis earlier that was uneventful I was asked to follow-up for dialysis.  Problem List:    Chest pain     Past Medical History:  Past Medical History:   Diagnosis Date    Bladder cancer     Cancer     breast, bladder    Deep vein thrombosis     Essential hypertension 2017    Fracture tibia/fibula, right, closed, initial encounter 2020    GERD (gastroesophageal reflux disease)     History of urostomy     Immobility 2020    r/t broken Tibia     Kidney carcinoma, right     removed     Long term current use of anticoagulant 2015    PAF (paroxysmal atrial fibrillation) 2019    Presence of cardiac pacemaker 2014    BS dual chamber PM placed 10/2014 with pocket revision 17.  HX tachy rob syndrome    Sick sinus syndrome 11/3/2014     Past Surgical History:  Past Surgical History:   Procedure Laterality Date    BREAST LUMPECTOMY      CARDIAC ELECTROPHYSIOLOGY PROCEDURE N/A 2023    Procedure: Pacemaker gen change, [place] AWARE $;  Surgeon: Jose Carlos Cheng MD;  Location: [facility] CATH INVASIVE LOCATION;  Service: Cardiovascular;  Laterality: N/A;    CHOLECYSTECTOMY N/A 10/12/2020    Procedure: CHOLECYSTECTOMY LAPAROSCOPIC;  Surgeon: Go Pereira DO;  Location: [facility] MAIN OR;  Service: General;  Laterality: N/A;    CYSTECTOMY W/ URETEROILEAL CONDUIT      HARDWARE REMOVAL Right 2021    Procedure: TIBIA HARDWARE REMOVAL;   Surgeon: Geoff Shah II, MD;  Location: [facility] MAIN OR;  Service: Orthopedics;  Laterality: Right;    HERNIA REPAIR      HYSTERECTOMY      INSERT / REPLACE / REMOVE PACEMAKER      NEPHRECTOMY Right     TIBIA IM NAILING/RODDING Right 8/28/2020    Procedure: TIBIA INTRAMEDULLARY NAIL/ABRAHAM INSERTION;  Surgeon: Geoff Shah II, MD;  Location: [facility] MAIN OR;  Service: Orthopedics;  Laterality: Right;      Home Meds:  (Not in a hospital admission)    Current Meds:     Current Facility-Administered Medications:     sennosides-docusate (PERICOLACE) 8.6-50 MG per tablet 2 tablet, 2 tablet, Oral, BID **AND** polyethylene glycol (MIRALAX) packet 17 g, 17 g, Oral, Daily PRN **AND** bisacodyl (DULCOLAX) EC tablet 5 mg, 5 mg, Oral, Daily PRN **AND** bisacodyl (DULCOLAX) suppository 10 mg, 10 mg, Rectal, Daily PRN, Yocha Dehe, Preeti M, APRN    hydrALAZINE (APRESOLINE) injection 10 mg, 10 mg, Intravenous, Q6H PRN, Yocha Dehe Preeti M, APRN    nitroglycerin (NITROSTAT) SL tablet 0.4 mg, 0.4 mg, Sublingual, Q5 Min PRN, Stephania Rodriguez PA    ondansetron (ZOFRAN) tablet 4 mg, 4 mg, Oral, Q6H PRN **OR** ondansetron (ZOFRAN) injection 4 mg, 4 mg, Intravenous, Q6H PRN, Yocha Dehe Preeti M, APRN    sodium chloride 0.9 % flush 10 mL, 10 mL, Intravenous, PRN, Stephania Rodriguez PA    sodium chloride 0.9 % flush 10 mL, 10 mL, Intravenous, Q12H, Yocha Dehe, Preeti M, APRN    sodium chloride 0.9 % flush 10 mL, 10 mL, Intravenous, PRN, Yocha Dehe, Preeti M, APRN    sodium chloride 0.9 % infusion 40 mL, 40 mL, Intravenous, PRN, Yocha Dehe, Preeti M, APRN    Current Outpatient Medications:     acetaminophen (TYLENOL) 325 MG tablet, Take 2 tablets by mouth Every 6 (Six) Hours As Needed for Mild Pain., Disp: , Rfl:     apixaban (ELIQUIS) 2.5 MG tablet tablet, Take 1 tablet by mouth Every 12 (Twelve) Hours. Indications: Atrial Fibrillation, Disp: 60 tablet, Rfl: 0    dicyclomine (BENTYL) 10 MG capsule, Take 1 capsule by mouth 3 (Three) Times a  "Day., Disp: 60 capsule, Rfl: 0    hydrALAZINE (APRESOLINE) 25 MG tablet, Take 1 tablet by mouth 3 (Three) Times a Day., Disp: 90 tablet, Rfl: 1    ipratropium (ATROVENT HFA) 17 MCG/ACT inhaler, Inhale 2 puffs 4 (Four) Times a Day., Disp: , Rfl:     metoprolol tartrate (LOPRESSOR) 50 MG tablet, Take 1 tablet by mouth Every 12 (Twelve) Hours., Disp: 60 tablet, Rfl: 0    metoprolol tartrate (LOPRESSOR) 50 MG tablet, Take 1 tablet by mouth 2 (Two) Times a Day., Disp: , Rfl:     ondansetron (ZOFRAN) 4 MG tablet, Take 1 tablet by mouth., Disp: , Rfl:     saccharomyces boulardii (FLORASTOR) 250 MG capsule, Take 1 capsule by mouth 2 (Two) Times a Day., Disp: 60 capsule, Rfl: 0    topiramate (TOPAMAX) 100 MG tablet, Take 1 tablet by mouth Every Night., Disp: 30 tablet, Rfl: 0    Allergies:  Allergies   Allergen Reactions    Amoxicillin Shortness Of Breath    Ciprofloxacin Hives    Oxycodone-Acetaminophen Unknown - High Severity     Pt's son stated when pt fell and broke her leg she was put on oxycodone; pt's son stated pt's \"vitals went all out of wack, she couldn't remember things.\"    Penicillins Rash    Sulfa Antibiotics Hives    Cephalexin Other (See Comments)    Clavulanic Acid Other (See Comments)    Codeine Other (See Comments)    Contrast Dye (Echo Or Unknown Ct/Mr) Other (See Comments)     8/18/22     Doxycycline Other (See Comments)    Fluconazole Other (See Comments)    Iodinated Contrast Media Other (See Comments)     8/18/22    Iodine Hives    Macrobid [Nitrofurantoin] Hives    Tobramycin Other (See Comments)    Trimethoprim Other (See Comments)     Social History:   Social History     Tobacco Use    Smoking status: Never     Passive exposure: Never    Smokeless tobacco: Never   Substance Use Topics    Alcohol use: Not Currently      Family History:  Family History   Problem Relation Age of Onset    COPD Father         Review of Systems  Reviewed 12 systems were reviewed, all negative except for those mentioned " "in HPI    Objective:  Vitals:   /57   Pulse 94   Temp 98.6 °F (37 °C)   Resp 16   Ht 165.1 cm (65\")   Wt 57.6 kg (127 lb)   SpO2 99%   BMI 21.13 kg/m²   I/O:   No intake or output data in the 24 hours ending 10/13/23 1641    Exam:  General Appearance:  Alert  Head:  Normocephalic, without obvious abnormality, atraumatic  Eyes:  PERRL, conjunctiva/corneas clear     Neck:  Supple,  no adenopathy;      Lungs:  Decreased BS occasion ronchi  Heart:  Regular rate and rhythm, S1 and S2 normal  Abdomen:  Soft, non-tender, bowel sounds active   Extremities: trace edema  Pulses: 2+ and symmetric all extremities  Skin:  No rashes or lesions  Data Review:  All labs (24hrs):   Recent Results (from the past 24 hour(s))   ECG 12 Lead Chest Pain    Collection Time: 10/13/23  2:56 PM   Result Value Ref Range    QT Interval 395 ms    QTC Interval 487 ms   Comprehensive Metabolic Panel    Collection Time: 10/13/23  3:41 PM    Specimen: Blood   Result Value Ref Range    Glucose 98 65 - 99 mg/dL    BUN 54 (H) 8 - 23 mg/dL    Creatinine 5.11 (H) 0.57 - 1.00 mg/dL    Sodium 139 136 - 145 mmol/L    Potassium 4.7 3.5 - 5.2 mmol/L    Chloride 98 98 - 107 mmol/L    CO2 28.0 22.0 - 29.0 mmol/L    Calcium 9.5 8.6 - 10.5 mg/dL    Total Protein 7.2 6.0 - 8.5 g/dL    Albumin 3.6 3.5 - 5.2 g/dL    ALT (SGPT) 14 1 - 33 U/L    AST (SGOT) 23 1 - 32 U/L    Alkaline Phosphatase 139 (H) 39 - 117 U/L    Total Bilirubin 0.2 0.0 - 1.2 mg/dL    Globulin 3.6 gm/dL    A/G Ratio 1.0 g/dL    BUN/Creatinine Ratio 10.6 7.0 - 25.0    Anion Gap 13.0 5.0 - 15.0 mmol/L    eGFR 8.0 (L) >60.0 mL/min/1.73   High Sensitivity Troponin T    Collection Time: 10/13/23  3:41 PM    Specimen: Blood   Result Value Ref Range    HS Troponin T 63 (C) <10 ng/L   CBC Auto Differential    Collection Time: 10/13/23  3:41 PM    Specimen: Blood   Result Value Ref Range    WBC 6.00 3.40 - 10.80 10*3/mm3    RBC 3.60 (L) 3.77 - 5.28 10*6/mm3    Hemoglobin 11.3 (L) 12.0 - 15.9 " g/dL    Hematocrit 35.0 34.0 - 46.6 %    MCV 97.2 (H) 79.0 - 97.0 fL    MCH 31.3 26.6 - 33.0 pg    MCHC 32.2 31.5 - 35.7 g/dL    RDW 15.5 (H) 12.3 - 15.4 %    RDW-SD 55.1 (H) 37.0 - 54.0 fl    MPV 6.8 6.0 - 12.0 fL    Platelets 203 140 - 450 10*3/mm3    Neutrophil % 55.3 42.7 - 76.0 %    Lymphocyte % 29.7 19.6 - 45.3 %    Monocyte % 11.5 5.0 - 12.0 %    Eosinophil % 2.0 0.3 - 6.2 %    Basophil % 1.5 0.0 - 1.5 %    Neutrophils, Absolute 3.30 1.70 - 7.00 10*3/mm3    Lymphocytes, Absolute 1.80 0.70 - 3.10 10*3/mm3    Monocytes, Absolute 0.70 0.10 - 0.90 10*3/mm3    Eosinophils, Absolute 0.10 0.00 - 0.40 10*3/mm3    Basophils, Absolute 0.10 0.00 - 0.20 10*3/mm3    nRBC 0.0 0.0 - 0.2 /100 WBC   BNP    Collection Time: 10/13/23  3:41 PM    Specimen: Blood   Result Value Ref Range    proBNP 7,320.0 (H) 0.0 - 1,800.0 pg/mL   Protime-INR    Collection Time: 10/13/23  3:41 PM    Specimen: Blood   Result Value Ref Range    Protime 11.4 9.6 - 11.7 Seconds    INR 1.05 0.93 - 1.10   aPTT    Collection Time: 10/13/23  3:41 PM    Specimen: Blood   Result Value Ref Range    PTT 27.3 24.0 - 31.0 seconds       Current Facility-Administered Medications:     sennosides-docusate (PERICOLACE) 8.6-50 MG per tablet 2 tablet, 2 tablet, Oral, BID **AND** polyethylene glycol (MIRALAX) packet 17 g, 17 g, Oral, Daily PRN **AND** bisacodyl (DULCOLAX) EC tablet 5 mg, 5 mg, Oral, Daily PRN **AND** bisacodyl (DULCOLAX) suppository 10 mg, 10 mg, Rectal, Daily PRN, Preeti James, APRN    hydrALAZINE (APRESOLINE) injection 10 mg, 10 mg, Intravenous, Q6H PRN, Preeti James APRN    nitroglycerin (NITROSTAT) SL tablet 0.4 mg, 0.4 mg, Sublingual, Q5 Min PRN, Stephania Rodriguez PA    ondansetron (ZOFRAN) tablet 4 mg, 4 mg, Oral, Q6H PRN **OR** ondansetron (ZOFRAN) injection 4 mg, 4 mg, Intravenous, Q6H PRN, Preeti James APRN    sodium chloride 0.9 % flush 10 mL, 10 mL, Intravenous, PRN, Stephania Rodriguez PA    sodium chloride 0.9 % flush 10  mL, 10 mL, Intravenous, Q12H, [place] M, APRN    sodium chloride 0.9 % flush 10 mL, 10 mL, Intravenous, PRN, [place] M, APRN    sodium chloride 0.9 % infusion 40 mL, 40 mL, Intravenous, PRN, [place] M, APRN    Current Outpatient Medications:     acetaminophen (TYLENOL) 325 MG tablet, Take 2 tablets by mouth Every 6 (Six) Hours As Needed for Mild Pain., Disp: , Rfl:     apixaban (ELIQUIS) 2.5 MG tablet tablet, Take 1 tablet by mouth Every 12 (Twelve) Hours. Indications: Atrial Fibrillation, Disp: 60 tablet, Rfl: 0    dicyclomine (BENTYL) 10 MG capsule, Take 1 capsule by mouth 3 (Three) Times a Day., Disp: 60 capsule, Rfl: 0    hydrALAZINE (APRESOLINE) 25 MG tablet, Take 1 tablet by mouth 3 (Three) Times a Day., Disp: 90 tablet, Rfl: 1    ipratropium (ATROVENT HFA) 17 MCG/ACT inhaler, Inhale 2 puffs 4 (Four) Times a Day., Disp: , Rfl:     metoprolol tartrate (LOPRESSOR) 50 MG tablet, Take 1 tablet by mouth Every 12 (Twelve) Hours., Disp: 60 tablet, Rfl: 0    metoprolol tartrate (LOPRESSOR) 50 MG tablet, Take 1 tablet by mouth 2 (Two) Times a Day., Disp: , Rfl:     ondansetron (ZOFRAN) 4 MG tablet, Take 1 tablet by mouth., Disp: , Rfl:     saccharomyces boulardii (FLORASTOR) 250 MG capsule, Take 1 capsule by mouth 2 (Two) Times a Day., Disp: 60 capsule, Rfl: 0    topiramate (TOPAMAX) 100 MG tablet, Take 1 tablet by mouth Every Night., Disp: 30 tablet, Rfl: 0    Assessment:  ESRD hemodialysis Monday Wednesday Friday  Chest pain  Hypertension  History of urostomy tube  Diabetes  Dyslipidemia    Recommendations:    Patient has had dialysis earlier today  No need for dialysis at this point  Avoid volume and electrolyte  If needed cardiac cath may proceed  Next dialysis is Monday but we will reevaluate tomorrow    June Keller MD  10/13/2023  16:41 EDT

## 2023-10-13 NOTE — ED NOTES
Nursing report ED to floor  Hazel Bucio  83 y.o.  female    HPI:   Chief Complaint   Patient presents with    Chest Pain     Chest pain, Pacemaker misfiring. Patient hard of hearing.        Admitting doctor:   Lora Henderson MD    Admitting diagnosis:   The encounter diagnosis was Chest pain, unspecified type.    Code status:   Current Code Status       Date Active Code Status Order ID Comments User Context       10/13/2023 1628 CPR (Attempt to Resuscitate) [id number]  Preeti James, APRN ED        Question Answer    Code Status (Patient has no pulse and is not breathing) CPR (Attempt to Resuscitate)    Medical Interventions (Patient has pulse or is breathing) Full Support    Level Of Support Discussed With Patient                    Allergies:   Amoxicillin, Ciprofloxacin, Oxycodone-acetaminophen, Penicillins, Sulfa antibiotics, Cephalexin, Clavulanic acid, Codeine, Contrast dye (echo or unknown ct/mr), Doxycycline, Fluconazole, Iodinated contrast media, Iodine, Macrobid [nitrofurantoin], Tobramycin, and Trimethoprim    Isolation:  No active isolations     Fall Risk:  Fall Risk Assessment was completed, and patient is at high risk for falls.   Predictive Model Details         9 (Low) Factor Value    Calculated 10/13/2023 16:50 Age 83    Risk of Fall Model Musculoskeletal Assessment WDL     Active Peripheral IV Present     Imaging order in this encounter Present     Skin Assessment WDL     Diastolic BP 57     Magnesium not on file     Financial Class Medicare     Drug Use No     Josh Scale not on file     Creatinine 5.11 mg/dL     Albumin 3.6 g/dL     Chloride 98 mmol/L     Peripheral Vascular Assessment WDL     Cardiac Assessment X     Gastrointestinal Assessment WDL     Number of Distinct Medication Classes administered 1     Respiratory Rate 16     Potassium 4.7 mmol/L     Calcium 9.5 mg/dL     Days after Admission 0.074     Total Bilirubin 0.2 mg/dL     ALT 14 U/L         Weight:       10/13/23  1453   Weight:  "57.6 kg (127 lb)       Intake and Output  No intake or output data in the 24 hours ending 10/13/23 1650    Diet:   Dietary Orders (From admission, onward)       Start     Ordered    10/13/23 1628  Diet: Cardiac Diets, Diabetic Diets; Healthy Heart (2-3 Na+); Consistent Carbohydrate; Texture: Regular Texture (IDDSI 7); Fluid Consistency: Thin (IDDSI 0)  Diet Effective Now        References:    Diet Order [facility]   Question Answer Comment   Diets: Cardiac Diets    Diets: Diabetic Diets    Cardiac Diet: Healthy Heart (2-3 Na+)    Diabetic Diet: Consistent Carbohydrate    Texture: Regular Texture (IDDSI 7)    Fluid Consistency: Thin (IDDSI 0)        10/13/23 1628                     Most recent vitals:   Vitals:    10/13/23 1452 10/13/23 1453 10/13/23 1616   BP: 135/79  100/57   Pulse: 110  94   Resp: 16     Temp: 98.6 °F (37 °C)     SpO2: 99%     Weight:  57.6 kg (127 lb)    Height:  165.1 cm (65\")        Active LDAs/IV Access:   Lines, Drains & Airways       Active LDAs       Name Placement date Placement time Site Days    Peripheral IV 10/13/23 1542 Left Antecubital 10/13/23  1542  Antecubital  less than 1    Urostomy RLQ 12/07/21  permanent had on admission  1900  RLQ  674    Hemodialysis Cath Double 06/23/23  1243  Chest  112                    Skin Condition:   Skin Assessments (last day)       None             Labs (abnormal labs have a star):   Labs Reviewed   COMPREHENSIVE METABOLIC PANEL - Abnormal; Notable for the following components:       Result Value    BUN 54 (*)     Creatinine 5.11 (*)     Alkaline Phosphatase 139 (*)     eGFR 8.0 (*)     All other components within normal limits    Narrative:     GFR Normal >60  Chronic Kidney Disease <60  Kidney Failure <15    The GFR formula is only valid for adults with stable renal function between ages 18 and 70.   TROPONIN - Abnormal; Notable for the following components:    HS Troponin T 63 (*)     All other components within normal limits    Narrative:     " High Sensitive Troponin T Reference Range:  <10.0 ng/L- Negative Female for AMI  <15.0 ng/L- Negative Male for AMI  >=10 - Abnormal Female indicating possible myocardial injury.  >=15 - Abnormal Male indicating possible myocardial injury.   Clinicians would have to utilize clinical acumen, EKG, Troponin, and serial changes to determine if it is an Acute Myocardial Infarction or myocardial injury due to an underlying chronic condition.        CBC WITH AUTO DIFFERENTIAL - Abnormal; Notable for the following components:    RBC 3.60 (*)     Hemoglobin 11.3 (*)     MCV 97.2 (*)     RDW 15.5 (*)     RDW-SD 55.1 (*)     All other components within normal limits   BNP (IN-HOUSE) - Abnormal; Notable for the following components:    proBNP 7,320.0 (*)     All other components within normal limits    Narrative:     This assay is used as an aid in the diagnosis of individuals suspected of having heart failure. It can be used as an aid in the diagnosis of acute decompensated heart failure (ADHF) in patients presenting with signs and symptoms of ADHF to the emergency department (ED). In addition, NT-proBNP of <300 pg/mL indicates ADHF is not likely.    Age Range Result Interpretation  NT-proBNP Concentration (pg/mL:      <50             Positive            >450                   Gray                 300-450                    Negative             <300    50-75           Positive            >900                  Gray                300-900                  Negative            <300      >75             Positive            >1800                  Gray                300-1800                  Negative            <300   PROTIME-INR - Normal   APTT - Normal   HIGH SENSITIVITIY TROPONIN T 2HR   URINALYSIS W/ CULTURE IF INDICATED   CBC AND DIFFERENTIAL    Narrative:     The following orders were created for panel order CBC & Differential.  Procedure                               Abnormality         Status                     ---------                                -----------         ------                     CBC Auto Differential[[id number]]        Abnormal            Final result                 Please view results for these tests on the individual orders.       LOC: Person and Situation    Telemetry:  Telemetry    Cardiac Monitoring Ordered: yes    EKG:   ECG 12 Lead Chest Pain   Preliminary Result   HEART RATE= 91  bpm   RR Interval= 657  ms   VT Interval=   ms   P Horizontal Axis=   deg   P Front Axis=   deg   QRSD Interval= 75  ms   QT Interval= 395  ms   QTcB= 487  ms   QRS Axis= 74  deg   T Wave Axis= 56  deg   - ABNORMAL ECG -   Atrial fibrillation   Low voltage, precordial leads   Electronically Signed By:    Date and Time of Study: 2023-10-13 14:56:10          Medications Given in the ED:   Medications   sodium chloride 0.9 % flush 10 mL (has no administration in time range)   nitroglycerin (NITROSTAT) SL tablet 0.4 mg (has no administration in time range)   sodium chloride 0.9 % flush 10 mL (has no administration in time range)   sodium chloride 0.9 % flush 10 mL (has no administration in time range)   sodium chloride 0.9 % infusion 40 mL (has no administration in time range)   sennosides-docusate (PERICOLACE) 8.6-50 MG per tablet 2 tablet (has no administration in time range)     And   polyethylene glycol (MIRALAX) packet 17 g (has no administration in time range)     And   bisacodyl (DULCOLAX) EC tablet 5 mg (has no administration in time range)     And   bisacodyl (DULCOLAX) suppository 10 mg (has no administration in time range)   ondansetron (ZOFRAN) tablet 4 mg (has no administration in time range)     Or   ondansetron (ZOFRAN) injection 4 mg (has no administration in time range)   hydrALAZINE (APRESOLINE) injection 10 mg (has no administration in time range)   apixaban (ELIQUIS) tablet 2.5 mg (has no administration in time range)   dicyclomine (BENTYL) capsule 10 mg (has no administration in time range)   hydrALAZINE (APRESOLINE)  tablet 25 mg (has no administration in time range)   ipratropium (ATROVENT HFA) inhaler 2 puff (has no administration in time range)   metoprolol tartrate (LOPRESSOR) tablet 50 mg (has no administration in time range)   topiramate (TOPAMAX) tablet 100 mg (has no administration in time range)   saccharomyces boulardii (FLORASTOR) capsule 250 mg (has no administration in time range)   aspirin chewable tablet 324 mg (324 mg Oral Given 10/13/23 1619)       Imaging results:  XR Chest 1 View    Result Date: 10/13/2023  Impression: No definite acute abnormality. Small opacities overlying the right lower lung is most likely outside the patient. Please correlate. Electronically Signed: George Sorensen DO  10/13/2023 3:30 PM EDT  Workstation ID: [id number]     Social issues:   Social History     Socioeconomic History    Marital status:    Tobacco Use    Smoking status: Never     Passive exposure: Never    Smokeless tobacco: Never   Vaping Use    Vaping Use: Never used   Substance and Sexual Activity    Alcohol use: Not Currently    Drug use: Never    Sexual activity: Defer       NIH Stroke Scale:  Interval: (not recorded)  1a. Level of Consciousness: (not recorded)  1b. LOC Questions: (not recorded)  1c. LOC Commands: (not recorded)  2. Best Gaze: (not recorded)  3. Visual: (not recorded)  4. Facial Palsy: (not recorded)  5a. Motor Arm, Left: (not recorded)  5b. Motor Arm, Right: (not recorded)  6a. Motor Leg, Left: (not recorded)  6b. Motor Leg, Right: (not recorded)  7. Limb Ataxia: (not recorded)  8. Sensory: (not recorded)  9. Best Language: (not recorded)  10. Dysarthria: (not recorded)  11. Extinction and Inattention (formerly Neglect): (not recorded)    Total (NIH Stroke Scale): (not recorded)     Additional notable assessment information:     Nursing report ED to floor:  Neida Arce RN   10/13/23 16:50 EDT

## 2023-10-14 LAB
APTT PPP: 28 SECONDS (ref 61–76.5)
BACTERIA UR QL AUTO: ABNORMAL /HPF
BASOPHILS # BLD AUTO: 0 10*3/MM3 (ref 0–0.2)
BASOPHILS NFR BLD AUTO: 0.7 % (ref 0–1.5)
BILIRUB UR QL STRIP: NEGATIVE
CLARITY UR: ABNORMAL
COLOR UR: YELLOW
DEPRECATED RDW RBC AUTO: 54.7 FL (ref 37–54)
EOSINOPHIL # BLD AUTO: 0.2 10*3/MM3 (ref 0–0.4)
EOSINOPHIL NFR BLD AUTO: 3 % (ref 0.3–6.2)
ERYTHROCYTE [DISTWIDTH] IN BLOOD BY AUTOMATED COUNT: 15.3 % (ref 12.3–15.4)
GLUCOSE UR STRIP-MCNC: NEGATIVE MG/DL
HCT VFR BLD AUTO: 32.1 % (ref 34–46.6)
HGB BLD-MCNC: 10.3 G/DL (ref 12–15.9)
HGB UR QL STRIP.AUTO: ABNORMAL
HYALINE CASTS UR QL AUTO: ABNORMAL /LPF
INR PPP: 1.09 (ref 0.93–1.1)
KETONES UR QL STRIP: NEGATIVE
LEUKOCYTE ESTERASE UR QL STRIP.AUTO: ABNORMAL
LYMPHOCYTES # BLD AUTO: 1.6 10*3/MM3 (ref 0.7–3.1)
LYMPHOCYTES NFR BLD AUTO: 29.9 % (ref 19.6–45.3)
MCH RBC QN AUTO: 31.5 PG (ref 26.6–33)
MCHC RBC AUTO-ENTMCNC: 32 G/DL (ref 31.5–35.7)
MCV RBC AUTO: 98.3 FL (ref 79–97)
MONOCYTES # BLD AUTO: 0.5 10*3/MM3 (ref 0.1–0.9)
MONOCYTES NFR BLD AUTO: 9.3 % (ref 5–12)
NEUTROPHILS NFR BLD AUTO: 3.1 10*3/MM3 (ref 1.7–7)
NEUTROPHILS NFR BLD AUTO: 57.1 % (ref 42.7–76)
NITRITE UR QL STRIP: POSITIVE
NRBC BLD AUTO-RTO: 0.1 /100 WBC (ref 0–0.2)
PH UR STRIP.AUTO: 7.5 [PH] (ref 5–8)
PLATELET # BLD AUTO: 182 10*3/MM3 (ref 140–450)
PMV BLD AUTO: 7 FL (ref 6–12)
PROT UR QL STRIP: NEGATIVE
PROTHROMBIN TIME: 11.8 SECONDS (ref 9.6–11.7)
QT INTERVAL: 395 MS
QTC INTERVAL: 487 MS
RBC # BLD AUTO: 3.26 10*6/MM3 (ref 3.77–5.28)
RBC # UR STRIP: ABNORMAL /HPF
REF LAB TEST METHOD: ABNORMAL
RENAL EPI CELLS #/AREA URNS HPF: ABNORMAL /HPF
SP GR UR STRIP: 1.01 (ref 1–1.03)
SQUAMOUS #/AREA URNS HPF: ABNORMAL /HPF
UROBILINOGEN UR QL STRIP: ABNORMAL
WBC # UR STRIP: ABNORMAL /HPF
WBC NRBC COR # BLD: 5.4 10*3/MM3 (ref 3.4–10.8)

## 2023-10-14 PROCEDURE — 85730 THROMBOPLASTIN TIME PARTIAL: CPT | Performed by: FAMILY MEDICINE

## 2023-10-14 PROCEDURE — 94761 N-INVAS EAR/PLS OXIMETRY MLT: CPT

## 2023-10-14 PROCEDURE — 87086 URINE CULTURE/COLONY COUNT: CPT | Performed by: NURSE PRACTITIONER

## 2023-10-14 PROCEDURE — 93010 ELECTROCARDIOGRAM REPORT: CPT | Performed by: STUDENT IN AN ORGANIZED HEALTH CARE EDUCATION/TRAINING PROGRAM

## 2023-10-14 PROCEDURE — 94799 UNLISTED PULMONARY SVC/PX: CPT

## 2023-10-14 PROCEDURE — 81001 URINALYSIS AUTO W/SCOPE: CPT | Performed by: NURSE PRACTITIONER

## 2023-10-14 PROCEDURE — G0378 HOSPITAL OBSERVATION PER HR: HCPCS

## 2023-10-14 PROCEDURE — 85025 COMPLETE CBC W/AUTO DIFF WBC: CPT | Performed by: FAMILY MEDICINE

## 2023-10-14 PROCEDURE — 87077 CULTURE AEROBIC IDENTIFY: CPT | Performed by: NURSE PRACTITIONER

## 2023-10-14 PROCEDURE — 93005 ELECTROCARDIOGRAM TRACING: CPT | Performed by: STUDENT IN AN ORGANIZED HEALTH CARE EDUCATION/TRAINING PROGRAM

## 2023-10-14 PROCEDURE — 99215 OFFICE O/P EST HI 40 MIN: CPT | Performed by: STUDENT IN AN ORGANIZED HEALTH CARE EDUCATION/TRAINING PROGRAM

## 2023-10-14 PROCEDURE — 25010000002 HEPARIN (PORCINE) 25000-0.45 UT/250ML-% SOLUTION: Performed by: FAMILY MEDICINE

## 2023-10-14 PROCEDURE — 87186 SC STD MICRODIL/AGAR DIL: CPT | Performed by: NURSE PRACTITIONER

## 2023-10-14 PROCEDURE — 85610 PROTHROMBIN TIME: CPT | Performed by: FAMILY MEDICINE

## 2023-10-14 RX ORDER — HEPARIN SODIUM 10000 [USP'U]/100ML
12 INJECTION, SOLUTION INTRAVENOUS
Status: DISCONTINUED | OUTPATIENT
Start: 2023-10-14 | End: 2023-10-18

## 2023-10-14 RX ORDER — ISOSORBIDE MONONITRATE 30 MG/1
30 TABLET, EXTENDED RELEASE ORAL
Status: DISCONTINUED | OUTPATIENT
Start: 2023-10-14 | End: 2023-10-18

## 2023-10-14 RX ORDER — TRAMADOL HYDROCHLORIDE 50 MG/1
50 TABLET ORAL EVERY 6 HOURS PRN
Status: DISCONTINUED | OUTPATIENT
Start: 2023-10-14 | End: 2023-10-18 | Stop reason: HOSPADM

## 2023-10-14 RX ADMIN — Medication 10 ML: at 21:00

## 2023-10-14 RX ADMIN — APIXABAN 2.5 MG: 2.5 TABLET, FILM COATED ORAL at 08:34

## 2023-10-14 RX ADMIN — DICYCLOMINE HYDROCHLORIDE 10 MG: 10 CAPSULE ORAL at 08:34

## 2023-10-14 RX ADMIN — Medication 250 MG: at 08:34

## 2023-10-14 RX ADMIN — Medication 10 ML: at 08:34

## 2023-10-14 RX ADMIN — DICYCLOMINE HYDROCHLORIDE 10 MG: 10 CAPSULE ORAL at 16:07

## 2023-10-14 RX ADMIN — DICYCLOMINE HYDROCHLORIDE 10 MG: 10 CAPSULE ORAL at 20:53

## 2023-10-14 RX ADMIN — TOPIRAMATE 100 MG: 100 TABLET, FILM COATED ORAL at 20:52

## 2023-10-14 RX ADMIN — METOPROLOL TARTRATE 50 MG: 50 TABLET ORAL at 08:34

## 2023-10-14 RX ADMIN — TRAMADOL HYDROCHLORIDE 50 MG: 50 TABLET, COATED ORAL at 18:05

## 2023-10-14 RX ADMIN — HEPARIN SODIUM 12 UNITS/KG/HR: 10000 INJECTION, SOLUTION INTRAVENOUS at 18:05

## 2023-10-14 RX ADMIN — Medication 250 MG: at 20:52

## 2023-10-14 RX ADMIN — ISOSORBIDE MONONITRATE 30 MG: 30 TABLET, EXTENDED RELEASE ORAL at 18:05

## 2023-10-14 RX ADMIN — METOPROLOL TARTRATE 50 MG: 50 TABLET ORAL at 20:53

## 2023-10-14 RX ADMIN — DOCUSATE SODIUM 50 MG AND SENNOSIDES 8.6 MG 2 TABLET: 8.6; 5 TABLET, FILM COATED ORAL at 20:52

## 2023-10-14 NOTE — H&P
Patient Care Team:  Lizzy Francis MD as PCP - General (Family Medicine)  Jose Carlos Cheng MD as Consulting Physician (Cardiology)    Chief complaint Chest pain    Subjective     Patient is a 83 y.o. female who presents with past cardiac history of permanent afib (anticoaguled with Eliquis) and SSS s/p [employer] PPM, HTN, CKD stage 3, bladder cancer, and renal cancer .SHECARLY presented to the ED with complaints of chest pain. She denies any shortness of breath, nausea, chills, or diaphoresis. There are no aggravating or alleviating factors and she denies any chest pain this morning.  In the ED, EKG showed A-fib with no ST elevation.  Initial troponin was elevated and she does maintain on higher baseline troponin.  Other labs chest x-ray were unremarkable.    Review of Systems   Constitutional:  Positive for activity change.   HENT: Negative.     Respiratory: Negative.     Cardiovascular:  Positive for chest pain.   Gastrointestinal: Negative.    Genitourinary: Negative.    Musculoskeletal: Negative.    Neurological: Negative.    Psychiatric/Behavioral: Negative.            History  Past Medical History:   Diagnosis Date    Bladder cancer     Cancer     breast, bladder    Deep vein thrombosis     Essential hypertension 1/17/2017    Fracture tibia/fibula, right, closed, initial encounter 8/27/2020    GERD (gastroesophageal reflux disease)     History of urostomy     Immobility 08/27/2020    r/t broken Tibia     Kidney carcinoma, right     removed     Long term current use of anticoagulant 1/9/2015    PAF (paroxysmal atrial fibrillation) 6/26/2019    Presence of cardiac pacemaker 11/03/2014    BS dual chamber PM placed 10/2014 with pocket revision 1/25/17.  HX tachy rob syndrome    Sick sinus syndrome 11/3/2014     Past Surgical History:   Procedure Laterality Date    BREAST LUMPECTOMY      CARDIAC ELECTROPHYSIOLOGY PROCEDURE N/A 4/28/2023    Procedure: Pacemaker gen change, [employer] $;   Surgeon: Jose Carlos Cheng MD;  Location: [facility] CATH INVASIVE LOCATION;  Service: Cardiovascular;  Laterality: N/A;    CHOLECYSTECTOMY N/A 10/12/2020    Procedure: CHOLECYSTECTOMY LAPAROSCOPIC;  Surgeon: Go Pereira DO;  Location: [facility] MAIN OR;  Service: General;  Laterality: N/A;    CYSTECTOMY W/ URETEROILEAL CONDUIT      HARDWARE REMOVAL Right 6/11/2021    Procedure: TIBIA HARDWARE REMOVAL;  Surgeon: Geoff Shah II, MD;  Location: [facility] MAIN OR;  Service: Orthopedics;  Laterality: Right;    HERNIA REPAIR      HYSTERECTOMY      INSERT / REPLACE / REMOVE PACEMAKER      NEPHRECTOMY Right     TIBIA IM NAILING/RODDING Right 8/28/2020    Procedure: TIBIA INTRAMEDULLARY NAIL/ABRAHAM INSERTION;  Surgeon: Geoff Shah II, MD;  Location: [facility] MAIN OR;  Service: Orthopedics;  Laterality: Right;     Family History   Problem Relation Age of Onset    COPD Father      Social History     Tobacco Use    Smoking status: Never     Passive exposure: Never    Smokeless tobacco: Never   Vaping Use    Vaping Use: Never used   Substance Use Topics    Alcohol use: Not Currently    Drug use: Never     Medications Prior to Admission   Medication Sig Dispense Refill Last Dose    acetaminophen (TYLENOL) 325 MG tablet Take 2 tablets by mouth Every 6 (Six) Hours As Needed for Mild Pain.       apixaban (ELIQUIS) 2.5 MG tablet tablet Take 1 tablet by mouth Every 12 (Twelve) Hours. Indications: Atrial Fibrillation 60 tablet 0     dicyclomine (BENTYL) 10 MG capsule Take 1 capsule by mouth 3 (Three) Times a Day. 60 capsule 0     hydrALAZINE (APRESOLINE) 25 MG tablet Take 1 tablet by mouth 3 (Three) Times a Day. 90 tablet 1     ipratropium (ATROVENT) 0.02 % nebulizer solution Take 2.5 mL by nebulization 4 (Four) Times a Day As Needed for Wheezing or Shortness of Air.       loperamide (IMODIUM) 2 MG capsule Take 1 capsule by mouth Daily As Needed for Diarrhea.       metoprolol tartrate (LOPRESSOR) 50 MG tablet Take 1  tablet by mouth 2 (Two) Times a Day.       ondansetron (ZOFRAN) 4 MG tablet Take 1 tablet by mouth Every 8 (Eight) Hours As Needed.       saccharomyces boulardii (FLORASTOR) 250 MG capsule Take 1 capsule by mouth 2 (Two) Times a Day. 60 capsule 0     topiramate (TOPAMAX) 100 MG tablet Take 1 tablet by mouth Every Night. 30 tablet 0     zinc oxide 20 % ointment Apply 1 application  topically to the appropriate area as directed 2 (Two) Times a Day.        Allergies:  Amoxicillin, Ciprofloxacin, Oxycodone-acetaminophen, Penicillins, Sulfa antibiotics, Cephalexin, Clavulanic acid, Codeine, Contrast dye (echo or unknown ct/mr), Doxycycline, Fluconazole, Iodinated contrast media, Iodine, Macrobid [nitrofurantoin], Tobramycin, and Trimethoprim    Objective     Vital Signs  Temp:  [97.8 °F (36.6 °C)-98.6 °F (37 °C)] 97.8 °F (36.6 °C)  Heart Rate:  [] 98  Resp:  [11-18] 17  BP: ()/(48-79) 100/54       Physical Exam  Vitals reviewed.   Constitutional:       Appearance: She is not ill-appearing.   HENT:      Head: Normocephalic and atraumatic.      Right Ear: External ear normal.      Left Ear: External ear normal.      Nose: Nose normal.      Mouth/Throat:      Mouth: Mucous membranes are moist.   Eyes:      General:         Right eye: No discharge.         Left eye: No discharge.   Cardiovascular:      Rate and Rhythm: Normal rate and regular rhythm.      Pulses: Normal pulses.      Heart sounds: Normal heart sounds.   Pulmonary:      Effort: Pulmonary effort is normal.      Breath sounds: Normal breath sounds.   Abdominal:      General: Bowel sounds are normal.      Palpations: Abdomen is soft.   Musculoskeletal:         General: Normal range of motion.      Cervical back: Normal range of motion.   Skin:     General: Skin is warm and dry.      Coloration: Skin is pale.   Neurological:      Mental Status: She is alert and oriented to person, place, and time.   Psychiatric:         Behavior: Behavior normal.           Results Review:     Imaging Results (Last 24 Hours)       Procedure Component Value Units Date/Time    XR Chest 1 View [[id number]] Collected: 10/13/23 1526     Updated: 10/13/23 1532    Narrative:      XR CHEST 1 VW    Date of Exam: 10/13/2023 3:15 PM EDT    Indication: Chest Pain Protocol  Chest Pain Protocol    Comparison: 6/26/2023    Findings:  Lines: Right internal jugular venous catheter with the distal tip in the right atrium. Unchanged position of left subclavian dual-lead pacemaker    Lungs: Mild hazy opacities throughout the visualized lungs appears to have improved since most recent comparison. No consolidation. Small focal opacities overlying the right lower is most likely overlying the patient.  Pleura: No pleural effusion or pneumothorax.    Cardiomediastinum: The cardiomediastinal silhouette is normal    Soft Tissues: Unremarkable.    Bones: No acute osseous abnormality.      Impression:      Impression:  No definite acute abnormality. Small opacities overlying the right lower lung is most likely outside the patient. Please correlate.      Electronically Signed: George Sorensen DO    10/13/2023 3:30 PM EDT    Workstation ID: [id number]             Lab Results (last 24 hours)       Procedure Component Value Units Date/Time    High Sensitivity Troponin T 2Hr [[id number]]  (Abnormal) Collected: 10/13/23 1742    Specimen: Blood Updated: 10/13/23 1814     HS Troponin T 63 ng/L      Troponin T Delta 0 ng/L     Narrative:      High Sensitive Troponin T Reference Range:  <10.0 ng/L- Negative Female for AMI  <15.0 ng/L- Negative Male for AMI  >=10 - Abnormal Female indicating possible myocardial injury.  >=15 - Abnormal Male indicating possible myocardial injury.   Clinicians would have to utilize clinical acumen, EKG, Troponin, and serial changes to determine if it is an Acute Myocardial Infarction or myocardial injury due to an underlying chronic condition.         High Sensitivity Troponin T [[id number]]   (Abnormal) Collected: 10/13/23 1541    Specimen: Blood Updated: 10/13/23 1621     HS Troponin T 63 ng/L     Narrative:      High Sensitive Troponin T Reference Range:  <10.0 ng/L- Negative Female for AMI  <15.0 ng/L- Negative Male for AMI  >=10 - Abnormal Female indicating possible myocardial injury.  >=15 - Abnormal Male indicating possible myocardial injury.   Clinicians would have to utilize clinical acumen, EKG, Troponin, and serial changes to determine if it is an Acute Myocardial Infarction or myocardial injury due to an underlying chronic condition.         Comprehensive Metabolic Panel [[id number]]  (Abnormal) Collected: 10/13/23 1541    Specimen: Blood Updated: 10/13/23 1619     Glucose 98 mg/dL      BUN 54 mg/dL      Creatinine 5.11 mg/dL      Sodium 139 mmol/L      Potassium 4.7 mmol/L      Chloride 98 mmol/L      CO2 28.0 mmol/L      Calcium 9.5 mg/dL      Total Protein 7.2 g/dL      Albumin 3.6 g/dL      ALT (SGPT) 14 U/L      AST (SGOT) 23 U/L      Alkaline Phosphatase 139 U/L      Total Bilirubin 0.2 mg/dL      Globulin 3.6 gm/dL      A/G Ratio 1.0 g/dL      BUN/Creatinine Ratio 10.6     Anion Gap 13.0 mmol/L      eGFR 8.0 mL/min/1.73      Comment: <15 Indicative of kidney failure       Narrative:      GFR Normal >60  Chronic Kidney Disease <60  Kidney Failure <15    The GFR formula is only valid for adults with stable renal function between ages 18 and 70.    BNP [[id number]]  (Abnormal) Collected: 10/13/23 1541    Specimen: Blood Updated: 10/13/23 1619     proBNP 7,320.0 pg/mL     Narrative:      This assay is used as an aid in the diagnosis of individuals suspected of having heart failure. It can be used as an aid in the diagnosis of acute decompensated heart failure (ADHF) in patients presenting with signs and symptoms of ADHF to the emergency department (ED). In addition, NT-proBNP of <300 pg/mL indicates ADHF is not likely.    Age Range Result Interpretation  NT-proBNP Concentration  (pg/mL:      <50             Positive            >450                   Gray                 300-450                    Negative             <300    50-75           Positive            >900                  Gray                300-900                  Negative            <300      >75             Positive            >1800                  Gray                300-1800                  Negative            <300    Protime-INR [[id number]]  (Normal) Collected: 10/13/23 1541    Specimen: Blood Updated: 10/13/23 1601     Protime 11.4 Seconds      INR 1.05    aPTT [[id number]]  (Normal) Collected: 10/13/23 1541    Specimen: Blood Updated: 10/13/23 1601     PTT 27.3 seconds     CBC & Differential [[id number]]  (Abnormal) Collected: 10/13/23 1541    Specimen: Blood Updated: 10/13/23 1551    Narrative:      The following orders were created for panel order CBC & Differential.  Procedure                               Abnormality         Status                     ---------                               -----------         ------                     CBC Auto Differential[[id number]]        Abnormal            Final result                 Please view results for these tests on the individual orders.    CBC Auto Differential [[id number]]  (Abnormal) Collected: 10/13/23 1541    Specimen: Blood Updated: 10/13/23 1551     WBC 6.00 10*3/mm3      RBC 3.60 10*6/mm3      Hemoglobin 11.3 g/dL      Hematocrit 35.0 %      MCV 97.2 fL      MCH 31.3 pg      MCHC 32.2 g/dL      RDW 15.5 %      RDW-SD 55.1 fl      MPV 6.8 fL      Platelets 203 10*3/mm3      Neutrophil % 55.3 %      Lymphocyte % 29.7 %      Monocyte % 11.5 %      Eosinophil % 2.0 %      Basophil % 1.5 %      Neutrophils, Absolute 3.30 10*3/mm3      Lymphocytes, Absolute 1.80 10*3/mm3      Monocytes, Absolute 0.70 10*3/mm3      Eosinophils, Absolute 0.10 10*3/mm3      Basophils, Absolute 0.10 10*3/mm3      nRBC 0.0 /100 WBC              I reviewed the patient's new clinical  results.    Assessment & Plan     Chest pain  Hypertension  History of sick sinus syndrome with pacemaker  CKD stage III  History of bladder and renal cancer  Presence of urostomy  Type 2 diabetes with diabetic polyneuropathy  Dyslipidemia    Plan of care  Consult cardiology  Last stress test with 4/19/2023 results of low risk study for reversible ischemia and EF of 60.  We will continue home medications further recommendations based on cardiology consult    DVT prophylaxis: Eliquis  GI prophylaxis: Protonix        I discussed the patient's findings and my recommendations with patient.     Preeti James, CHELLE  10/14/23  07:35 EDT

## 2023-10-14 NOTE — CONSULTS
Referring Provider: Bethanie Mena MD        Patient Care Team:  Lizzy Francis MD as PCP - General (Family Medicine)  Jose Carlos Cheng MD as Consulting Physician (Cardiology)      History of present illness:    Pleasant 83-year-old female, ESRD on dialysis via right port, sick sinus syndrome status post pacemaker, history of bladder/renal cancer-presence of urostomy, type 2 diabetes, hypertension, dyslipidemia, presented to [facility] for chest pain  Lives at home with her nephew.  Limited mobility in setting of leg pain.  However endorses occasional chest tightness, had persistent chest tightness the day of admission while she was cleaning the house which led her to come to the emergency room.  Follows with Dr. Ellsworth in clinic  Review of systems: Negative unless as noted in HPI    Personal History:      Past Medical History:   Diagnosis Date    Bladder cancer     Cancer     breast, bladder    Deep vein thrombosis     Essential hypertension 1/17/2017    Fracture tibia/fibula, right, closed, initial encounter 8/27/2020    GERD (gastroesophageal reflux disease)     History of urostomy     Immobility 08/27/2020    r/t broken Tibia     Kidney carcinoma, right     removed     Long term current use of anticoagulant 1/9/2015    PAF (paroxysmal atrial fibrillation) 6/26/2019    Presence of cardiac pacemaker 11/03/2014    BS dual chamber PM placed 10/2014 with pocket revision 1/25/17.  HX tachy rob syndrome    Sick sinus syndrome 11/3/2014       Past Surgical History:   Procedure Laterality Date    BREAST LUMPECTOMY      CARDIAC ELECTROPHYSIOLOGY PROCEDURE N/A 4/28/2023    Procedure: Pacemaker gen change, [place] AWARE $;  Surgeon: Jose Carlos Cheng MD;  Location: [facility] CATH INVASIVE LOCATION;  Service: Cardiovascular;  Laterality: N/A;    CHOLECYSTECTOMY N/A 10/12/2020    Procedure: CHOLECYSTECTOMY LAPAROSCOPIC;  Surgeon: Go Pereira DO;  Location: [facility] MAIN OR;  Service: General;  Laterality: N/A;     CYSTECTOMY W/ URETEROILEAL CONDUIT      HARDWARE REMOVAL Right 6/11/2021    Procedure: TIBIA HARDWARE REMOVAL;  Surgeon: Geoff Shah II, MD;  Location: [facility] MAIN OR;  Service: Orthopedics;  Laterality: Right;    HERNIA REPAIR      HYSTERECTOMY      INSERT / REPLACE / REMOVE PACEMAKER      NEPHRECTOMY Right     TIBIA IM NAILING/RODDING Right 8/28/2020    Procedure: TIBIA INTRAMEDULLARY NAIL/ABRAHAM INSERTION;  Surgeon: Geoff Shah II, MD;  Location: [facility] MAIN OR;  Service: Orthopedics;  Laterality: Right;       Family History   Problem Relation Age of Onset    COPD Father        Social History     Tobacco Use    Smoking status: Never     Passive exposure: Never    Smokeless tobacco: Never   Vaping Use    Vaping Use: Never used   Substance Use Topics    Alcohol use: Not Currently    Drug use: Never        Home meds:  Prior to Admission medications    Medication Sig Start Date End Date Taking? Authorizing Provider   acetaminophen (TYLENOL) 325 MG tablet Take 2 tablets by mouth Every 6 (Six) Hours As Needed for Mild Pain.   Yes Gino Leos MD   apixaban (ELIQUIS) 2.5 MG tablet tablet Take 1 tablet by mouth Every 12 (Twelve) Hours. Indications: Atrial Fibrillation 6/30/23  Yes Shantel Moulton APRN   dicyclomine (BENTYL) 10 MG capsule Take 1 capsule by mouth 3 (Three) Times a Day. 6/30/23  Yes Shantel Moulton APRN   hydrALAZINE (APRESOLINE) 25 MG tablet Take 1 tablet by mouth 3 (Three) Times a Day. 6/30/23  Yes Lora Henderson MD   ipratropium (ATROVENT) 0.02 % nebulizer solution Take 2.5 mL by nebulization 4 (Four) Times a Day As Needed for Wheezing or Shortness of Air.   Yes ProviderGino MD   loperamide (IMODIUM) 2 MG capsule Take 1 capsule by mouth Daily As Needed for Diarrhea.   Yes ProviderGino MD   metoprolol tartrate (LOPRESSOR) 50 MG tablet Take 1 tablet by mouth 2 (Two) Times a Day.   Yes Gino Leos MD   ondansetron (ZOFRAN) 4 MG tablet  "Take 1 tablet by mouth Every 8 (Eight) Hours As Needed.   Yes ProviderGino MD   saccharomyces boulardii (FLORASTOR) 250 MG capsule Take 1 capsule by mouth 2 (Two) Times a Day. 6/30/23  Yes Shantel Moulton APRN   topiramate (TOPAMAX) 100 MG tablet Take 1 tablet by mouth Every Night. 6/30/23  Yes Shantel Moulton APRN   zinc oxide 20 % ointment Apply 1 application  topically to the appropriate area as directed 2 (Two) Times a Day.   Yes Provider, MD Gino       Allergies:     Amoxicillin, Ciprofloxacin, Oxycodone-acetaminophen, Penicillins, Sulfa antibiotics, Cephalexin, Clavulanic acid, Codeine, Contrast dye (echo or unknown ct/mr), Doxycycline, Fluconazole, Iodinated contrast media, Iodine, Macrobid [nitrofurantoin], Tobramycin, and Trimethoprim    Scheduled Meds:apixaban, 2.5 mg, Oral, Q12H  dicyclomine, 10 mg, Oral, TID  hydrALAZINE, 25 mg, Oral, TID  ipratropium, 2 puff, Inhalation, 4x Daily - RT  metoprolol tartrate, 50 mg, Oral, BID  saccharomyces boulardii, 250 mg, Oral, BID  senna-docusate sodium, 2 tablet, Oral, BID  sodium chloride, 10 mL, Intravenous, Q12H  topiramate, 100 mg, Oral, Nightly      Continuous Infusions:   PRN Meds:  senna-docusate sodium **AND** polyethylene glycol **AND** bisacodyl **AND** bisacodyl    hydrALAZINE    nitroglycerin    ondansetron **OR** ondansetron    sodium chloride    sodium chloride    sodium chloride      OBJECTIVE    Vital Signs  Vitals:    10/14/23 0832 10/14/23 0845 10/14/23 1131 10/14/23 1149   BP: 100/64   96/55   BP Location: Left arm   Left arm   Patient Position: Lying   Lying   Pulse:  112 90 96   Resp: 18 16  20   Temp: 97.8 °F (36.6 °C)   98.3 °F (36.8 °C)   TempSrc: Oral   Oral   SpO2:  97% 97% 97%   Weight:       Height:           Flowsheet Rows      Flowsheet Row First Filed Value   Admission Height 165.1 cm (65\") Documented at 10/13/2023 1453   Admission Weight 57.6 kg (127 lb) Documented at 10/13/2023 1453              Intake/Output " Summary (Last 24 hours) at 10/14/2023 1547  Last data filed at 10/14/2023 0900  Gross per 24 hour   Intake 240 ml   Output --   Net 240 ml            Physical Exam:  General-no acute distress  Cardiovascular-S1-S2 normal, no murmur  Respiratory-clear to auscultation, no wheezing  GI-soft, nontender  No pedal edema        BNP        TROPONIN  Results from last 7 days   Lab Units 10/13/23  1742   HSTROP T ng/L 63*           ABG          EKG  I personally viewed and interpreted the patient's EKG/Telemetry data:  ECG 12 Lead Chest Pain   Preliminary Result   HEART RATE= 101  bpm   RR Interval= 593  ms   TX Interval=   ms   P Horizontal Axis=   deg   P Front Axis=   deg   QRSD Interval= 78  ms   QT Interval= 371  ms   QTcB= 482  ms   QRS Axis= 52  deg   T Wave Axis= 1  deg   - ABNORMAL ECG -   Atrial fibrillation   Low voltage, extremity and precordial leads   When compared with ECG of 13-Oct-2023 14:56:10,   No significant change   Electronically Signed By:    Date and Time of Study: 2023-10-14 12:58:28      ECG 12 Lead Chest Pain   Final Result   HEART RATE= 91  bpm   RR Interval= 657  ms   TX Interval=   ms   P Horizontal Axis=   deg   P Front Axis=   deg   QRSD Interval= 75  ms   QT Interval= 395  ms   QTcB= 487  ms   QRS Axis= 74  deg   T Wave Axis= 56  deg   - ABNORMAL ECG -   Atrial fibrillation   Low voltage, precordial leads   When compared with ECG of 28-Jun-2023 3:10:16,   Significant rate increase   Significant repolarization change   Significant axis, voltage or hypertrophy change   Electronically Signed By: Rudy Kolb ([facility]) 14-Oct-2023 06:15:59   Date and Time of Study: 2023-10-13 14:56:10      SCANNED - TELEMETRY     Final Result      SCANNED - TELEMETRY     Final Result            Echocardiogram:    Results for orders placed during the hospital encounter of 09/24/21    Adult Transthoracic Echo Complete W/ Cont if Necessary Per Protocol    Interpretation Summary  · Estimated left ventricular EF was in  agreement with the calculated left ventricular EF. Left ventricular ejection fraction appears to be 61 - 65%. Left ventricular systolic function is normal.  · Left atrial volume is moderately increased.  · The right atrial cavity is moderately dilated.  · Estimated right ventricular systolic pressure from tricuspid regurgitation is normal (<35 mmHg).        Stress Test:  Results for orders placed during the hospital encounter of 04/18/23    Stress Test With Myocardial Perfusion One Day    Interpretation Summary    Left ventricular ejection fraction is normal (Calculated EF = 60%).    Myocardial perfusion imaging indicates a normal myocardial perfusion study with no evidence of ischemia.    Impressions are consistent with a low risk study.    Diaphragmatic attenuation and GI artifacts are present.    There is no prior study available for comparison.    Findings consistent with an equivocal ECG stress test.    Underlying atrial fibrillation during the study, normal ST-T wave findings  Heart rates reached 1 20-1 30 with pharmacologic stress, no changes at submaximal stress with respect to ischemic ST-T wave findings  No arrhythmias seen other than baseline atrial fibrillation  Nuclear perfusion demonstrated fixed small basal inferolateral defect with no reversibility, possible diaphragmatic GI attenuation, normal EF 60% with normal LV size and geometry and LV function    Low risk study for any reversible ischemia, appears to have normal perfusion other than diaphragmatic GI attenuation affected diminished inferolateral counts observed in stress and rest with normal EF 60%            ASSESSMENT & PLAN:      NSTEMI  Chest pain    Troponin 63-63, unclear baseline, decreased clearance in setting of ESRD  ECG without acute ST-T wave changes  Symptoms sound typical given multitude of risk factors.  Had nuclear stress test in April 2023 which was largely unremarkable except for what diaphragmatic artifact    Plan  - Medical  management for now, if refractory angina, will consider left heart cath with Dr. Ellsworth  - Check transthoracic echocardiogram  - Stop apixaban and switch to heparin drip for now  - Metoprolol 50 mg twice daily  - Stop hydralazine and start Imdur 30 mg daily for antianginal effect  -Start statin therapy    Hypertension  Stop hydralazine and start Imdur as above for antianginal effect  Metoprolol as above    Atrial fibrillation  Elevated CHADsVAsc score in setting of age, sex, HTN, DM and now CAD  AC as above  Rate control with metoprolol      EMR Dragon/Transcription disclaimer:  Much of this encounter note is an electronic transcription/translation of spoken language to printed text. The electronic translation of spoken language may permit erroneous, or at times, nonsensical words or phrases to be inadvertently transcribed; Although I have reviewed the note for such errors, some may still exist.    Shantelle Godoy MD  10/14/23  15:47 EDT

## 2023-10-14 NOTE — PLAN OF CARE
Problem: Adult Inpatient Plan of Care  Goal: Plan of Care Review  Outcome: Ongoing, Progressing  Goal: Patient-Specific Goal (Individualized)  Outcome: Ongoing, Progressing  Goal: Absence of Hospital-Acquired Illness or Injury  Outcome: Ongoing, Progressing  Intervention: Identify and Manage Fall Risk  Recent Flowsheet Documentation  Taken 10/14/2023 1600 by Sharon Cha LPN  Safety Promotion/Fall Prevention: safety round/check completed  Taken 10/14/2023 1400 by Sharon Cha LPN  Safety Promotion/Fall Prevention: safety round/check completed  Taken 10/14/2023 1200 by Sharon Cha LPN  Safety Promotion/Fall Prevention: safety round/check completed  Taken 10/14/2023 1000 by Sharon Cha LPN  Safety Promotion/Fall Prevention: safety round/check completed  Taken 10/14/2023 0830 by Sharon Cha LPN  Safety Promotion/Fall Prevention: safety round/check completed  Intervention: Prevent Infection  Recent Flowsheet Documentation  Taken 10/14/2023 1600 by Sharon Cha LPN  Infection Prevention:   hand hygiene promoted   rest/sleep promoted   single patient room provided  Taken 10/14/2023 1400 by Sharon Cha LPN  Infection Prevention:   hand hygiene promoted   rest/sleep promoted   single patient room provided  Taken 10/14/2023 1200 by Sharon Cha LPN  Infection Prevention:   hand hygiene promoted   rest/sleep promoted   single patient room provided  Taken 10/14/2023 1000 by Sharon Cha LPN  Infection Prevention:   hand hygiene promoted   rest/sleep promoted   single patient room provided  Taken 10/14/2023 0830 by Sharon Cha LPN  Infection Prevention:   hand hygiene promoted   rest/sleep promoted   single patient room provided  Goal: Optimal Comfort and Wellbeing  Outcome: Ongoing, Progressing  Goal: Readiness for Transition of Care  Outcome: Ongoing, Progressing     Problem: Skin Injury Risk Increased  Goal: Skin Health and Integrity  Outcome: Ongoing, Progressing     Problem: Diabetes  Comorbidity  Goal: Blood Glucose Level Within Targeted Range  Outcome: Ongoing, Progressing     Problem: Heart Failure Comorbidity  Goal: Maintenance of Heart Failure Symptom Control  Outcome: Ongoing, Progressing  Intervention: Maintain Heart Failure-Management  Recent Flowsheet Documentation  Taken 10/14/2023 1600 by Sharon Cha LPN  Medication Review/Management: medications reviewed  Taken 10/14/2023 1400 by Sharon Cha LPN  Medication Review/Management: medications reviewed  Taken 10/14/2023 1200 by Sharon Cha LPN  Medication Review/Management: medications reviewed  Taken 10/14/2023 1000 by Sharon Cha LPN  Medication Review/Management: medications reviewed  Taken 10/14/2023 0830 by Sharon Cha LPN  Medication Review/Management: medications reviewed     Problem: Hypertension Comorbidity  Goal: Blood Pressure in Desired Range  Outcome: Ongoing, Progressing  Intervention: Maintain Blood Pressure Management  Recent Flowsheet Documentation  Taken 10/14/2023 1600 by Sharon Cha LPN  Medication Review/Management: medications reviewed  Taken 10/14/2023 1400 by Sharon Cha LPN  Medication Review/Management: medications reviewed  Taken 10/14/2023 1200 by Sharon Cha LPN  Medication Review/Management: medications reviewed  Taken 10/14/2023 1000 by Sharon Cha LPN  Medication Review/Management: medications reviewed  Taken 10/14/2023 0830 by Sharon Cha LPN  Medication Review/Management: medications reviewed     Problem: Pain Chronic (Persistent) (Comorbidity Management)  Goal: Acceptable Pain Control and Functional Ability  Outcome: Ongoing, Progressing  Intervention: Manage Persistent Pain  Recent Flowsheet Documentation  Taken 10/14/2023 1600 by Sharon Cha LPN  Medication Review/Management: medications reviewed  Taken 10/14/2023 1400 by Sharon Cha LPN  Medication Review/Management: medications reviewed  Taken 10/14/2023 1200 by Sharon Cha LPN  Medication Review/Management:  medications reviewed  Taken 10/14/2023 1000 by Sharon Cha LPN  Medication Review/Management: medications reviewed  Taken 10/14/2023 0830 by Sharon Cha LPN  Medication Review/Management: medications reviewed     Problem: Fall Injury Risk  Goal: Absence of Fall and Fall-Related Injury  Outcome: Ongoing, Progressing  Intervention: Identify and Manage Contributors  Recent Flowsheet Documentation  Taken 10/14/2023 1600 by Sharon Cha LPN  Medication Review/Management: medications reviewed  Taken 10/14/2023 1400 by Sharon Cha LPN  Medication Review/Management: medications reviewed  Taken 10/14/2023 1200 by Sharon Cha LPN  Medication Review/Management: medications reviewed  Taken 10/14/2023 1000 by Sharon Cha LPN  Medication Review/Management: medications reviewed  Taken 10/14/2023 0830 by Sharon Cha LPN  Medication Review/Management: medications reviewed  Intervention: Promote Injury-Free Environment  Recent Flowsheet Documentation  Taken 10/14/2023 1600 by Sharon Cha LPN  Safety Promotion/Fall Prevention: safety round/check completed  Taken 10/14/2023 1400 by Sharon Cha LPN  Safety Promotion/Fall Prevention: safety round/check completed  Taken 10/14/2023 1200 by Sharon Cha LPN  Safety Promotion/Fall Prevention: safety round/check completed  Taken 10/14/2023 1000 by Sharon Cha LPN  Safety Promotion/Fall Prevention: safety round/check completed  Taken 10/14/2023 0830 by Sharon Cha LPN  Safety Promotion/Fall Prevention: safety round/check completed   Goal Outcome Evaluation:

## 2023-10-14 NOTE — PLAN OF CARE
Problem: Adult Inpatient Plan of Care  Goal: Plan of Care Review  Outcome: Ongoing, Progressing  Goal: Patient-Specific Goal (Individualized)  Outcome: Ongoing, Progressing  Goal: Absence of Hospital-Acquired Illness or Injury  Outcome: Ongoing, Progressing  Intervention: Identify and Manage Fall Risk  Recent Flowsheet Documentation  Taken 10/14/2023 0200 by Asmita Campbell RN  Safety Promotion/Fall Prevention:   safety round/check completed   room organization consistent   clutter free environment maintained   assistive device/personal items within reach   fall prevention program maintained  Taken 10/14/2023 0000 by Asmita Campbell RN  Safety Promotion/Fall Prevention:   room organization consistent   safety round/check completed   fall prevention program maintained   clutter free environment maintained   assistive device/personal items within reach  Taken 10/13/2023 2200 by Asmita Campbell RN  Safety Promotion/Fall Prevention:   safety round/check completed   room organization consistent   clutter free environment maintained   assistive device/personal items within reach   fall prevention program maintained  Taken 10/13/2023 2000 by Asmita Campbell RN  Safety Promotion/Fall Prevention: activity supervised  Intervention: Prevent Skin Injury  Recent Flowsheet Documentation  Taken 10/14/2023 0200 by Asmita Campbell RN  Body Position: weight shifting  Taken 10/14/2023 0000 by Asmita Campbell RN  Body Position:   position changed independently   weight shifting  Taken 10/13/2023 2200 by Asmita Campbell RN  Body Position:   position changed independently   weight shifting  Taken 10/13/2023 2000 by Asmita Campbell RN  Body Position:   position changed independently   weight shifting  Skin Protection: adhesive use limited  Intervention: Prevent Infection  Recent Flowsheet Documentation  Taken 10/14/2023 0400 by Asmita Campbell RN  Infection Prevention:   rest/sleep promoted   single  patient room provided   personal protective equipment utilized   hand hygiene promoted   environmental surveillance performed  Taken 10/14/2023 0200 by Asmita Campbell RN  Infection Prevention:   single patient room provided   rest/sleep promoted   personal protective equipment utilized   hand hygiene promoted   environmental surveillance performed  Taken 10/14/2023 0000 by Asmita Campbell RN  Infection Prevention:   single patient room provided   rest/sleep promoted   personal protective equipment utilized   hand hygiene promoted   environmental surveillance performed  Taken 10/13/2023 2200 by Asmita Campbell RN  Infection Prevention:   rest/sleep promoted   personal protective equipment utilized   hand hygiene promoted   environmental surveillance performed  Taken 10/13/2023 2000 by Asmita Campbell RN  Infection Prevention:   single patient room provided   rest/sleep promoted   personal protective equipment utilized   hand hygiene promoted   environmental surveillance performed  Goal: Optimal Comfort and Wellbeing  Outcome: Ongoing, Progressing  Intervention: Provide Person-Centered Care  Recent Flowsheet Documentation  Taken 10/14/2023 0400 by Asmita Campbell RN  Trust Relationship/Rapport: care explained  Taken 10/14/2023 0000 by Asmita Campbell RN  Trust Relationship/Rapport: care explained  Taken 10/13/2023 2000 by Asmita Campbell RN  Trust Relationship/Rapport: care explained  Goal: Readiness for Transition of Care  Outcome: Ongoing, Progressing     Problem: Skin Injury Risk Increased  Goal: Skin Health and Integrity  Outcome: Ongoing, Progressing  Intervention: Optimize Skin Protection  Recent Flowsheet Documentation  Taken 10/14/2023 0200 by Asmita Campbell RN  Head of Bed (HOB) Positioning: HOB elevated  Taken 10/14/2023 0000 by Asmita Cmapbell RN  Head of Bed (HOB) Positioning: HOB elevated  Taken 10/13/2023 2200 by Asmita Cambpell RN  Head of Bed (HOB) Positioning:  HOB elevated  Taken 10/13/2023 2000 by Asmita Campbell RN  Pressure Reduction Techniques: frequent weight shift encouraged  Head of Bed (HOB) Positioning: HOB elevated  Pressure Reduction Devices: pressure-redistributing mattress utilized  Skin Protection: adhesive use limited     Problem: Diabetes Comorbidity  Goal: Blood Glucose Level Within Targeted Range  Outcome: Ongoing, Progressing  Intervention: Monitor and Manage Glycemia  Recent Flowsheet Documentation  Taken 10/13/2023 2000 by Asmita Campbell RN  Glycemic Management: blood glucose monitored     Problem: Heart Failure Comorbidity  Goal: Maintenance of Heart Failure Symptom Control  Outcome: Ongoing, Progressing  Intervention: Maintain Heart Failure-Management  Recent Flowsheet Documentation  Taken 10/14/2023 0200 by Asmita Campbell RN  Medication Review/Management:   medications reviewed   high-risk medications identified  Taken 10/14/2023 0000 by Asmita Campbell RN  Medication Review/Management:   medications reviewed   high-risk medications identified  Taken 10/13/2023 2200 by Asmita Campbell RN  Medication Review/Management:   medications reviewed   high-risk medications identified  Taken 10/13/2023 2000 by Asmita Campbell RN  Medication Review/Management:   medications reviewed   high-risk medications identified     Problem: Hypertension Comorbidity  Goal: Blood Pressure in Desired Range  Outcome: Ongoing, Progressing  Intervention: Maintain Blood Pressure Management  Recent Flowsheet Documentation  Taken 10/14/2023 0200 by Asmita Campbell RN  Medication Review/Management:   medications reviewed   high-risk medications identified  Taken 10/14/2023 0000 by Asmita Campbell RN  Medication Review/Management:   medications reviewed   high-risk medications identified  Taken 10/13/2023 2200 by Asmita Campbell RN  Medication Review/Management:   medications reviewed   high-risk medications identified  Taken 10/13/2023 2000  by Asmita Campbell RN  Medication Review/Management:   medications reviewed   high-risk medications identified     Problem: Pain Chronic (Persistent) (Comorbidity Management)  Goal: Acceptable Pain Control and Functional Ability  Outcome: Ongoing, Progressing  Intervention: Manage Persistent Pain  Recent Flowsheet Documentation  Taken 10/14/2023 0200 by Asmita Campbell RN  Medication Review/Management:   medications reviewed   high-risk medications identified  Taken 10/14/2023 0000 by Asmita Campbell RN  Medication Review/Management:   medications reviewed   high-risk medications identified  Taken 10/13/2023 2200 by Asmita Campbell RN  Medication Review/Management:   medications reviewed   high-risk medications identified  Taken 10/13/2023 2000 by Asmita Campbell RN  Medication Review/Management:   medications reviewed   high-risk medications identified  Intervention: Optimize Psychosocial Wellbeing  Recent Flowsheet Documentation  Taken 10/14/2023 0400 by Asmita Campbell RN  Family/Support System Care: support provided  Taken 10/14/2023 0000 by Asmita Campbell RN  Family/Support System Care: support provided  Taken 10/13/2023 2000 by Asmita Campbell RN  Diversional Activities: television  Family/Support System Care: support provided   Goal Outcome Evaluation:

## 2023-10-14 NOTE — PROGRESS NOTES
"                                                                                                                                      Nephrology  Progress Note                                        [facility]    Patient Identification    Name: Hazel Bucio  Age: 83 y.o.  Sex: female  :  1940  MRN: [id number]      DATE OF SERVICE:  10/14/2023        Subective    No complaints     Objective   Scheduled Meds:apixaban, 2.5 mg, Oral, Q12H  dicyclomine, 10 mg, Oral, TID  hydrALAZINE, 25 mg, Oral, TID  ipratropium, 2 puff, Inhalation, 4x Daily - RT  metoprolol tartrate, 50 mg, Oral, BID  saccharomyces boulardii, 250 mg, Oral, BID  senna-docusate sodium, 2 tablet, Oral, BID  sodium chloride, 10 mL, Intravenous, Q12H  topiramate, 100 mg, Oral, Nightly          Continuous Infusions:     PRN Meds:  senna-docusate sodium **AND** polyethylene glycol **AND** bisacodyl **AND** bisacodyl    hydrALAZINE    nitroglycerin    ondansetron **OR** ondansetron    sodium chloride    sodium chloride    sodium chloride     Exam:  /54 (BP Location: Left arm, Patient Position: Lying)   Pulse 98   Temp 97.8 °F (36.6 °C) (Oral)   Resp 17   Ht 165.1 cm (65\")   Wt 54.6 kg (120 lb 5.9 oz)   SpO2 96%   BMI 20.03 kg/m²     Intake/Output last 3 shifts:  No intake/output data recorded.    Intake/Output this shift:  No intake/output data recorded.    Physical exam:  General Appearance:  Alert  Head:  Normocephalic, without obvious abnormality, atraumatic  Eyes:  PERRL, conjunctiva/corneas clear     Neck:  Supple,  no adenopathy;      Lungs:  Decreased BS occasion ronchi  Heart:  Regular rate and rhythm, S1 and S2 normal  Abdomen:  Soft, non-tender, bowel sounds active   Extremities: trace edema  Pulses: 2+ and symmetric all extremities  Skin:  No rashes or lesions       Data Review:  All labs (24hrs):   Recent Results (from the past 24 hour(s))   ECG 12 Lead Chest Pain    Collection Time: 10/13/23  2:56 PM "   Result Value Ref Range    QT Interval 395 ms    QTC Interval 487 ms   Comprehensive Metabolic Panel    Collection Time: 10/13/23  3:41 PM    Specimen: Blood   Result Value Ref Range    Glucose 98 65 - 99 mg/dL    BUN 54 (H) 8 - 23 mg/dL    Creatinine 5.11 (H) 0.57 - 1.00 mg/dL    Sodium 139 136 - 145 mmol/L    Potassium 4.7 3.5 - 5.2 mmol/L    Chloride 98 98 - 107 mmol/L    CO2 28.0 22.0 - 29.0 mmol/L    Calcium 9.5 8.6 - 10.5 mg/dL    Total Protein 7.2 6.0 - 8.5 g/dL    Albumin 3.6 3.5 - 5.2 g/dL    ALT (SGPT) 14 1 - 33 U/L    AST (SGOT) 23 1 - 32 U/L    Alkaline Phosphatase 139 (H) 39 - 117 U/L    Total Bilirubin 0.2 0.0 - 1.2 mg/dL    Globulin 3.6 gm/dL    A/G Ratio 1.0 g/dL    BUN/Creatinine Ratio 10.6 7.0 - 25.0    Anion Gap 13.0 5.0 - 15.0 mmol/L    eGFR 8.0 (L) >60.0 mL/min/1.73   High Sensitivity Troponin T    Collection Time: 10/13/23  3:41 PM    Specimen: Blood   Result Value Ref Range    HS Troponin T 63 (C) <10 ng/L   CBC Auto Differential    Collection Time: 10/13/23  3:41 PM    Specimen: Blood   Result Value Ref Range    WBC 6.00 3.40 - 10.80 10*3/mm3    RBC 3.60 (L) 3.77 - 5.28 10*6/mm3    Hemoglobin 11.3 (L) 12.0 - 15.9 g/dL    Hematocrit 35.0 34.0 - 46.6 %    MCV 97.2 (H) 79.0 - 97.0 fL    MCH 31.3 26.6 - 33.0 pg    MCHC 32.2 31.5 - 35.7 g/dL    RDW 15.5 (H) 12.3 - 15.4 %    RDW-SD 55.1 (H) 37.0 - 54.0 fl    MPV 6.8 6.0 - 12.0 fL    Platelets 203 140 - 450 10*3/mm3    Neutrophil % 55.3 42.7 - 76.0 %    Lymphocyte % 29.7 19.6 - 45.3 %    Monocyte % 11.5 5.0 - 12.0 %    Eosinophil % 2.0 0.3 - 6.2 %    Basophil % 1.5 0.0 - 1.5 %    Neutrophils, Absolute 3.30 1.70 - 7.00 10*3/mm3    Lymphocytes, Absolute 1.80 0.70 - 3.10 10*3/mm3    Monocytes, Absolute 0.70 0.10 - 0.90 10*3/mm3    Eosinophils, Absolute 0.10 0.00 - 0.40 10*3/mm3    Basophils, Absolute 0.10 0.00 - 0.20 10*3/mm3    nRBC 0.0 0.0 - 0.2 /100 WBC   BNP    Collection Time: 10/13/23  3:41 PM    Specimen: Blood   Result Value Ref Range     proBNP 7,320.0 (H) 0.0 - 1,800.0 pg/mL   Protime-INR    Collection Time: 10/13/23  3:41 PM    Specimen: Blood   Result Value Ref Range    Protime 11.4 9.6 - 11.7 Seconds    INR 1.05 0.93 - 1.10   aPTT    Collection Time: 10/13/23  3:41 PM    Specimen: Blood   Result Value Ref Range    PTT 27.3 24.0 - 31.0 seconds   High Sensitivity Troponin T 2Hr    Collection Time: 10/13/23  5:42 PM    Specimen: Blood   Result Value Ref Range    HS Troponin T 63 (C) <10 ng/L    Troponin T Delta 0 >=-4 - <+4 ng/L          Imaging:  XR Chest 1 View    Result Date: 10/13/2023  Impression: No definite acute abnormality. Small opacities overlying the right lower lung is most likely outside the patient. Please correlate. Electronically Signed: George Sorensen DO  10/13/2023 3:30 PM EDT  Workstation ID: [id number]     Assessment/Plan:     Chest pain       ESRD hemodialysis Monday Wednesday Friday  Chest pain  Hypertension  History of urostomy tube  Diabetes  Dyslipidemia     Recommendations:  No need for dialysis today   Avoid volume and electrolyte  If needed cardiac cath may proceed  Next dialysis is Monday but we will reevaluate tomorrow

## 2023-10-15 ENCOUNTER — APPOINTMENT (OUTPATIENT)
Dept: CARDIOLOGY | Facility: HOSPITAL | Age: 83
End: 2023-10-15
Payer: MEDICARE

## 2023-10-15 LAB
ALBUMIN SERPL-MCNC: 3.3 G/DL (ref 3.5–5.2)
ALBUMIN/GLOB SERPL: 0.9 G/DL
ALP SERPL-CCNC: 114 U/L (ref 39–117)
ALT SERPL W P-5'-P-CCNC: 12 U/L (ref 1–33)
ANION GAP SERPL CALCULATED.3IONS-SCNC: 18 MMOL/L (ref 5–15)
ANION GAP SERPL CALCULATED.3IONS-SCNC: 19 MMOL/L (ref 5–15)
APTT PPP: 39.3 SECONDS (ref 61–76.5)
APTT PPP: 44.7 SECONDS (ref 61–76.5)
APTT PPP: 52.4 SECONDS (ref 61–76.5)
AST SERPL-CCNC: 27 U/L (ref 1–32)
BILIRUB SERPL-MCNC: 0.3 MG/DL (ref 0–1.2)
BUN SERPL-MCNC: 86 MG/DL (ref 8–23)
BUN SERPL-MCNC: 88 MG/DL (ref 8–23)
BUN/CREAT SERPL: 14.4 (ref 7–25)
BUN/CREAT SERPL: 14.5 (ref 7–25)
CALCIUM SPEC-SCNC: 8.8 MG/DL (ref 8.6–10.5)
CALCIUM SPEC-SCNC: 8.9 MG/DL (ref 8.6–10.5)
CHLORIDE SERPL-SCNC: 98 MMOL/L (ref 98–107)
CHLORIDE SERPL-SCNC: 99 MMOL/L (ref 98–107)
CO2 SERPL-SCNC: 19 MMOL/L (ref 22–29)
CO2 SERPL-SCNC: 19 MMOL/L (ref 22–29)
CREAT SERPL-MCNC: 5.98 MG/DL (ref 0.57–1)
CREAT SERPL-MCNC: 6.08 MG/DL (ref 0.57–1)
EGFRCR SERPLBLD CKD-EPI 2021: 6.5 ML/MIN/1.73
EGFRCR SERPLBLD CKD-EPI 2021: 6.6 ML/MIN/1.73
GEN 5 2HR TROPONIN T REFLEX: 57 NG/L
GLOBULIN UR ELPH-MCNC: 3.5 GM/DL
GLUCOSE BLDC GLUCOMTR-MCNC: 89 MG/DL (ref 70–105)
GLUCOSE SERPL-MCNC: 122 MG/DL (ref 65–99)
GLUCOSE SERPL-MCNC: 88 MG/DL (ref 65–99)
POTASSIUM SERPL-SCNC: 5 MMOL/L (ref 3.5–5.2)
POTASSIUM SERPL-SCNC: 5.1 MMOL/L (ref 3.5–5.2)
PROT SERPL-MCNC: 6.8 G/DL (ref 6–8.5)
QT INTERVAL: 380 MS
QTC INTERVAL: 457 MS
SODIUM SERPL-SCNC: 135 MMOL/L (ref 136–145)
SODIUM SERPL-SCNC: 137 MMOL/L (ref 136–145)
TROPONIN T DELTA: -4 NG/L
TROPONIN T SERPL HS-MCNC: 61 NG/L

## 2023-10-15 PROCEDURE — 99233 SBSQ HOSP IP/OBS HIGH 50: CPT | Performed by: STUDENT IN AN ORGANIZED HEALTH CARE EDUCATION/TRAINING PROGRAM

## 2023-10-15 PROCEDURE — 93005 ELECTROCARDIOGRAM TRACING: CPT | Performed by: NURSE PRACTITIONER

## 2023-10-15 PROCEDURE — 97166 OT EVAL MOD COMPLEX 45 MIN: CPT | Performed by: OCCUPATIONAL THERAPIST

## 2023-10-15 PROCEDURE — 97162 PT EVAL MOD COMPLEX 30 MIN: CPT

## 2023-10-15 PROCEDURE — 25010000002 HEPARIN (PORCINE) 25000-0.45 UT/250ML-% SOLUTION: Performed by: FAMILY MEDICINE

## 2023-10-15 PROCEDURE — 85730 THROMBOPLASTIN TIME PARTIAL: CPT | Performed by: INTERNAL MEDICINE

## 2023-10-15 PROCEDURE — 25010000002 ONDANSETRON PER 1 MG: Performed by: NURSE PRACTITIONER

## 2023-10-15 PROCEDURE — 85730 THROMBOPLASTIN TIME PARTIAL: CPT | Performed by: NURSE PRACTITIONER

## 2023-10-15 PROCEDURE — 93010 ELECTROCARDIOGRAM REPORT: CPT | Performed by: STUDENT IN AN ORGANIZED HEALTH CARE EDUCATION/TRAINING PROGRAM

## 2023-10-15 PROCEDURE — 80053 COMPREHEN METABOLIC PANEL: CPT | Performed by: NURSE PRACTITIONER

## 2023-10-15 PROCEDURE — 82948 REAGENT STRIP/BLOOD GLUCOSE: CPT

## 2023-10-15 PROCEDURE — 84484 ASSAY OF TROPONIN QUANT: CPT | Performed by: NURSE PRACTITIONER

## 2023-10-15 RX ORDER — SODIUM CHLORIDE 9 MG/ML
75 INJECTION, SOLUTION INTRAVENOUS CONTINUOUS
Status: DISCONTINUED | OUTPATIENT
Start: 2023-10-15 | End: 2023-10-15

## 2023-10-15 RX ADMIN — ISOSORBIDE MONONITRATE 30 MG: 30 TABLET, EXTENDED RELEASE ORAL at 08:26

## 2023-10-15 RX ADMIN — TOPIRAMATE 100 MG: 100 TABLET, FILM COATED ORAL at 20:04

## 2023-10-15 RX ADMIN — Medication 10 ML: at 20:05

## 2023-10-15 RX ADMIN — DICYCLOMINE HYDROCHLORIDE 10 MG: 10 CAPSULE ORAL at 20:04

## 2023-10-15 RX ADMIN — Medication 250 MG: at 20:04

## 2023-10-15 RX ADMIN — DICYCLOMINE HYDROCHLORIDE 10 MG: 10 CAPSULE ORAL at 17:12

## 2023-10-15 RX ADMIN — HEPARIN SODIUM 14 UNITS/KG/HR: 10000 INJECTION, SOLUTION INTRAVENOUS at 19:42

## 2023-10-15 RX ADMIN — Medication 250 MG: at 08:26

## 2023-10-15 RX ADMIN — DICYCLOMINE HYDROCHLORIDE 10 MG: 10 CAPSULE ORAL at 08:27

## 2023-10-15 RX ADMIN — ONDANSETRON 4 MG: 2 INJECTION INTRAMUSCULAR; INTRAVENOUS at 12:04

## 2023-10-15 RX ADMIN — Medication 10 ML: at 08:27

## 2023-10-15 RX ADMIN — METOPROLOL TARTRATE 50 MG: 50 TABLET ORAL at 20:04

## 2023-10-15 NOTE — PROGRESS NOTES
"                                                                                                                                      Nephrology  Progress Note                                        [facility]    Patient Identification    Name: Hazel Bucio  Age: 83 y.o.  Sex: female  :  1940  MRN: [id number]      DATE OF SERVICE:  10/15/2023        Subective    No complaints     Objective   Scheduled Meds:dicyclomine, 10 mg, Oral, TID  isosorbide mononitrate, 30 mg, Oral, Q24H  metoprolol tartrate, 50 mg, Oral, BID  saccharomyces boulardii, 250 mg, Oral, BID  senna-docusate sodium, 2 tablet, Oral, BID  sodium chloride, 10 mL, Intravenous, Q12H  topiramate, 100 mg, Oral, Nightly          Continuous Infusions:heparin, 12 Units/kg/hr, Last Rate: 13 Units/kg/hr (10/15/23 013)        PRN Meds:  senna-docusate sodium **AND** polyethylene glycol **AND** bisacodyl **AND** bisacodyl    heparin    heparin    hydrALAZINE    ipratropium    nitroglycerin    ondansetron **OR** ondansetron    sodium chloride    sodium chloride    sodium chloride    traMADol     Exam:  /57 (BP Location: Left arm, Patient Position: Lying)   Pulse 96   Temp 97.8 °F (36.6 °C) (Oral)   Resp 17   Ht 165.1 cm (65\")   Wt 54.6 kg (120 lb 5.9 oz)   SpO2 94%   BMI 20.03 kg/m²     Intake/Output last 3 shifts:  I/O last 3 completed shifts:  In: 480 [P.O.:480]  Out: -     Intake/Output this shift:  No intake/output data recorded.    Physical exam:  General Appearance:  Alert  Head:  Normocephalic, without obvious abnormality, atraumatic  Eyes:  PERRL, conjunctiva/corneas clear     Neck:  Supple,  no adenopathy;      Lungs:  Decreased BS occasion ronchi  Heart:  Regular rate and rhythm, S1 and S2 normal  Abdomen:  Soft, non-tender, bowel sounds active   Extremities: trace edema  Pulses: 2+ and symmetric all extremities  Skin:  No rashes or lesions       Data Review:  All labs (24hrs):   Recent Results (from the past 24 " hour(s))   ECG 12 Lead Chest Pain    Collection Time: 10/14/23 12:58 PM   Result Value Ref Range    QT Interval 371 ms    QTC Interval 482 ms   Protime-INR    Collection Time: 10/14/23  4:39 PM    Specimen: Blood   Result Value Ref Range    Protime 11.8 (H) 9.6 - 11.7 Seconds    INR 1.09 0.93 - 1.10   aPTT    Collection Time: 10/14/23  4:39 PM    Specimen: Blood   Result Value Ref Range    PTT 28.0 (L) 61.0 - 76.5 seconds   CBC Auto Differential    Collection Time: 10/14/23  4:39 PM    Specimen: Blood   Result Value Ref Range    WBC 5.40 3.40 - 10.80 10*3/mm3    RBC 3.26 (L) 3.77 - 5.28 10*6/mm3    Hemoglobin 10.3 (L) 12.0 - 15.9 g/dL    Hematocrit 32.1 (L) 34.0 - 46.6 %    MCV 98.3 (H) 79.0 - 97.0 fL    MCH 31.5 26.6 - 33.0 pg    MCHC 32.0 31.5 - 35.7 g/dL    RDW 15.3 12.3 - 15.4 %    RDW-SD 54.7 (H) 37.0 - 54.0 fl    MPV 7.0 6.0 - 12.0 fL    Platelets 182 140 - 450 10*3/mm3    Neutrophil % 57.1 42.7 - 76.0 %    Lymphocyte % 29.9 19.6 - 45.3 %    Monocyte % 9.3 5.0 - 12.0 %    Eosinophil % 3.0 0.3 - 6.2 %    Basophil % 0.7 0.0 - 1.5 %    Neutrophils, Absolute 3.10 1.70 - 7.00 10*3/mm3    Lymphocytes, Absolute 1.60 0.70 - 3.10 10*3/mm3    Monocytes, Absolute 0.50 0.10 - 0.90 10*3/mm3    Eosinophils, Absolute 0.20 0.00 - 0.40 10*3/mm3    Basophils, Absolute 0.00 0.00 - 0.20 10*3/mm3    nRBC 0.1 0.0 - 0.2 /100 WBC   Urinalysis With Culture If Indicated - Urine, Clean Catch    Collection Time: 10/14/23  6:35 PM    Specimen: Urine, Clean Catch   Result Value Ref Range    Color, UA Yellow Yellow, Straw    Appearance, UA Cloudy (A) Clear    pH, UA 7.5 5.0 - 8.0    Specific Gravity, UA 1.010 1.005 - 1.030    Glucose, UA Negative Negative    Ketones, UA Negative Negative    Bilirubin, UA Negative Negative    Blood, UA Trace (A) Negative    Protein, UA Negative Negative    Leuk Esterase, UA Large (3+) (A) Negative    Nitrite, UA Positive (A) Negative    Urobilinogen, UA 1.0 E.U./dL 0.2 - 1.0 E.U./dL   Urinalysis, Microscopic  Only - Urine, Clean Catch    Collection Time: 10/14/23  6:35 PM    Specimen: Urine, Clean Catch   Result Value Ref Range    RBC, UA None Seen None Seen, 0-2 /HPF    WBC, UA 21-50 (A) None Seen, 0-2 /HPF    Bacteria, UA 4+ (A) None Seen /HPF    Squamous Epithelial Cells, UA 0-2 None Seen, 0-2 /HPF    Renal Epithelial Cells, UA 0-2 0 - 2 /HPF    Hyaline Casts, UA None Seen None Seen /LPF    Methodology Manual Light Microscopy    aPTT    Collection Time: 10/15/23 12:37 AM    Specimen: Blood   Result Value Ref Range    PTT 39.3 (L) 61.0 - 76.5 seconds   aPTT    Collection Time: 10/15/23  7:00 AM    Specimen: Blood   Result Value Ref Range    PTT 52.4 (L) 61.0 - 76.5 seconds          Imaging:  XR Chest 1 View    Result Date: 10/13/2023  Impression: No definite acute abnormality. Small opacities overlying the right lower lung is most likely outside the patient. Please correlate. Electronically Signed: George Sorensen DO  10/13/2023 3:30 PM EDT  Workstation ID: [id number]     Assessment/Plan:     Chest pain       ESRD hemodialysis Monday Wednesday Friday  Chest pain  Hypertension  History of urostomy tube  Diabetes  Dyslipidemia     Recommendations:  No need for dialysis today   Avoid volume and electrolyte  If needed cardiac cath may proceed   Hemodialysis tomorrow         Yes

## 2023-10-15 NOTE — PROGRESS NOTES
Referring Provider: Bethanie Mena MD     Patient Care Team:  Lizzy Francis MD as PCP - General (Family Medicine)  Jose Carlos Cheng MD as Consulting Physician (Cardiology)      SUBJECTIVE    This morning had nausea/vomiting and weakness  Blood pressure was in the low 100s with mild tachycardia  Felt better with antinausea medications     ROS  Review of all systems negative except as indicated above    Personal History:    Past Medical History:   Diagnosis Date    Bladder cancer     Cancer     breast, bladder    Deep vein thrombosis     Essential hypertension 1/17/2017    Fracture tibia/fibula, right, closed, initial encounter 8/27/2020    GERD (gastroesophageal reflux disease)     History of urostomy     Immobility 08/27/2020    r/t broken Tibia     Kidney carcinoma, right     removed     Long term current use of anticoagulant 1/9/2015    PAF (paroxysmal atrial fibrillation) 6/26/2019    Presence of cardiac pacemaker 11/03/2014    BS dual chamber PM placed 10/2014 with pocket revision 1/25/17.  HX tachy rob syndrome    Sick sinus syndrome 11/3/2014       Past Surgical History:   Procedure Laterality Date    BREAST LUMPECTOMY      CARDIAC ELECTROPHYSIOLOGY PROCEDURE N/A 4/28/2023    Procedure: Pacemaker gen change, [place] AWARE $;  Surgeon: Jose Carlos Cheng MD;  Location: [facility] CATH INVASIVE LOCATION;  Service: Cardiovascular;  Laterality: N/A;    CHOLECYSTECTOMY N/A 10/12/2020    Procedure: CHOLECYSTECTOMY LAPAROSCOPIC;  Surgeon: Go Pereira DO;  Location: [facility] MAIN OR;  Service: General;  Laterality: N/A;    CYSTECTOMY W/ URETEROILEAL CONDUIT      HARDWARE REMOVAL Right 6/11/2021    Procedure: TIBIA HARDWARE REMOVAL;  Surgeon: Geoff Shah II, MD;  Location: [facility] MAIN OR;  Service: Orthopedics;  Laterality: Right;    HERNIA REPAIR      HYSTERECTOMY      INSERT / REPLACE / REMOVE PACEMAKER      NEPHRECTOMY Right     TIBIA IM NAILING/RODDING Right 8/28/2020    Procedure: TIBIA  INTRAMEDULLARY NAIL/ABRAHAM INSERTION;  Surgeon: Geoff Shah II, MD;  Location: [facility] MAIN OR;  Service: Orthopedics;  Laterality: Right;       Family History   Problem Relation Age of Onset    COPD Father        Social History     Tobacco Use    Smoking status: Never     Passive exposure: Never    Smokeless tobacco: Never   Vaping Use    Vaping Use: Never used   Substance Use Topics    Alcohol use: Not Currently    Drug use: Never        Home meds:  Prior to Admission medications    Medication Sig Start Date End Date Taking? Authorizing Provider   acetaminophen (TYLENOL) 325 MG tablet Take 2 tablets by mouth Every 6 (Six) Hours As Needed for Mild Pain.   Yes Gino Leos MD   apixaban (ELIQUIS) 2.5 MG tablet tablet Take 1 tablet by mouth Every 12 (Twelve) Hours. Indications: Atrial Fibrillation 6/30/23  Yes Shantel Moulton APRN   dicyclomine (BENTYL) 10 MG capsule Take 1 capsule by mouth 3 (Three) Times a Day. 6/30/23  Yes Shantel Moulton APRN   hydrALAZINE (APRESOLINE) 25 MG tablet Take 1 tablet by mouth 3 (Three) Times a Day. 6/30/23  Yes Lora Henderson MD   ipratropium (ATROVENT) 0.02 % nebulizer solution Take 2.5 mL by nebulization 4 (Four) Times a Day As Needed for Wheezing or Shortness of Air.   Yes Gino Leos MD   loperamide (IMODIUM) 2 MG capsule Take 1 capsule by mouth Daily As Needed for Diarrhea.   Yes Gino Leos MD   metoprolol tartrate (LOPRESSOR) 50 MG tablet Take 1 tablet by mouth 2 (Two) Times a Day.   Yes Gino Leos MD   ondansetron (ZOFRAN) 4 MG tablet Take 1 tablet by mouth Every 8 (Eight) Hours As Needed.   Yes Gino Leos MD   saccharomyces boulardii (FLORASTOR) 250 MG capsule Take 1 capsule by mouth 2 (Two) Times a Day. 6/30/23  Yes Shantel Moulton APRN   topiramate (TOPAMAX) 100 MG tablet Take 1 tablet by mouth Every Night. 6/30/23  Yes Shantel Moulton APRN   zinc oxide 20 % ointment Apply 1 application  topically  "to the appropriate area as directed 2 (Two) Times a Day.   Yes Provider, MD Gino       Allergies:  Amoxicillin, Ciprofloxacin, Oxycodone-acetaminophen, Penicillins, Sulfa antibiotics, Cephalexin, Clavulanic acid, Codeine, Contrast dye (echo or unknown ct/mr), Doxycycline, Fluconazole, Iodinated contrast media, Iodine, Macrobid [nitrofurantoin], Tobramycin, and Trimethoprim      OBJECTIVE    Vital Signs  Vitals:    10/15/23 0310 10/15/23 0500 10/15/23 0825 10/15/23 1209   BP: 91/54  113/57 100/56   BP Location: Left arm  Left arm Left arm   Patient Position: Lying  Lying Lying   Pulse: 104 96  94   Resp: 15  17 21   Temp: 97.8 °F (36.6 °C)  97.8 °F (36.6 °C) 97.9 °F (36.6 °C)   TempSrc: Oral  Oral Oral   SpO2: 95% 94%  92%   Weight:       Height:           Flowsheet Rows      Flowsheet Row First Filed Value   Admission Height 165.1 cm (65\") Documented at 10/13/2023 1453   Admission Weight 57.6 kg (127 lb) Documented at 10/13/2023 1453              Intake/Output Summary (Last 24 hours) at 10/15/2023 1616  Last data filed at 10/15/2023 0900  Gross per 24 hour   Intake 120 ml   Output --   Net 120 ml              Physical Exam:  General-no acute distress  No elevated JVP noted.  Cardiovascular-S1-S2 normal, no murmurs noted.  Respiratory-normal breath sounds, no wheezing/crackles.  GI-abdomen is soft and nontender  No pedal edema        Results Review:    BNP        TROPONIN  Results from last 7 days   Lab Units 10/15/23  1427   HSTROP T ng/L 61*       CoAg  Results from last 7 days   Lab Units 10/15/23  0700 10/15/23  0037 10/14/23  1639 10/13/23  1541   INR   --   --  1.09 1.05   APTT seconds 52.4* 39.3* 28.0* 27.3       Creatinine Clearance  Estimated Creatinine Clearance: 7.3 mL/min (A) (by C-G formula based on SCr of 5.11 mg/dL (H)).      Radiology  No radiology results for the last day      EKG  I personally viewed and interpreted the patient's EKG/Telemetry data:  ECG 12 Lead Syncope   Preliminary Result "   HEART RATE= 87  bpm   RR Interval= 690  ms   FL Interval=   ms   P Horizontal Axis=   deg   P Front Axis=   deg   QRSD Interval= 78  ms   QT Interval= 380  ms   QTcB= 457  ms   QRS Axis= 50  deg   T Wave Axis= 26  deg   - ABNORMAL ECG -   Atrial fibrillation   Low voltage, precordial leads   Electronically Signed By:    Date and Time of Study: 2023-10-15 12:42:13      ECG 12 Lead Chest Pain   Preliminary Result   HEART RATE= 101  bpm   RR Interval= 593  ms   FL Interval=   ms   P Horizontal Axis=   deg   P Front Axis=   deg   QRSD Interval= 78  ms   QT Interval= 371  ms   QTcB= 482  ms   QRS Axis= 52  deg   T Wave Axis= 1  deg   - ABNORMAL ECG -   Atrial fibrillation   Low voltage, extremity and precordial leads   When compared with ECG of 13-Oct-2023 14:56:10,   No significant change   Electronically Signed By:    Date and Time of Study: 2023-10-14 12:58:28      ECG 12 Lead Chest Pain   Final Result   HEART RATE= 91  bpm   RR Interval= 657  ms   FL Interval=   ms   P Horizontal Axis=   deg   P Front Axis=   deg   QRSD Interval= 75  ms   QT Interval= 395  ms   QTcB= 487  ms   QRS Axis= 74  deg   T Wave Axis= 56  deg   - ABNORMAL ECG -   Atrial fibrillation   Low voltage, precordial leads   When compared with ECG of 28-Jun-2023 3:10:16,   Significant rate increase   Significant repolarization change   Significant axis, voltage or hypertrophy change   Electronically Signed By: Rudy Kolb (Yoan) 14-Oct-2023 06:15:59   Date and Time of Study: 2023-10-13 14:56:10      SCANNED - TELEMETRY     Final Result      SCANNED - TELEMETRY     Final Result      SCANNED - TELEMETRY     Final Result      SCANNED - TELEMETRY     Final Result      SCANNED - TELEMETRY     Final Result      SCANNED - TELEMETRY     Final Result      SCANNED - TELEMETRY     Final Result      SCANNED - TELEMETRY     Final Result            Echocardiogram:    Results for orders placed during the hospital encounter of 09/24/21    Adult Transthoracic Echo  Complete W/ Cont if Necessary Per Protocol    Interpretation Summary  · Estimated left ventricular EF was in agreement with the calculated left ventricular EF. Left ventricular ejection fraction appears to be 61 - 65%. Left ventricular systolic function is normal.  · Left atrial volume is moderately increased.  · The right atrial cavity is moderately dilated.  · Estimated right ventricular systolic pressure from tricuspid regurgitation is normal (<35 mmHg).        Stress Test:  Results for orders placed during the hospital encounter of 04/18/23    Stress Test With Myocardial Perfusion One Day    Interpretation Summary    Left ventricular ejection fraction is normal (Calculated EF = 60%).    Myocardial perfusion imaging indicates a normal myocardial perfusion study with no evidence of ischemia.    Impressions are consistent with a low risk study.    Diaphragmatic attenuation and GI artifacts are present.    There is no prior study available for comparison.    Findings consistent with an equivocal ECG stress test.    Underlying atrial fibrillation during the study, normal ST-T wave findings  Heart rates reached 1 20-1 30 with pharmacologic stress, no changes at submaximal stress with respect to ischemic ST-T wave findings  No arrhythmias seen other than baseline atrial fibrillation  Nuclear perfusion demonstrated fixed small basal inferolateral defect with no reversibility, possible diaphragmatic GI attenuation, normal EF 60% with normal LV size and geometry and LV function    Low risk study for any reversible ischemia, appears to have normal perfusion other than diaphragmatic GI attenuation affected diminished inferolateral counts observed in stress and rest with normal EF 60%               ASSESSMENT & PLAN:      NSTEMI  Chest pain     Troponin 63-63, unclear baseline, decreased clearance in setting of ESRD  ECG without acute ST-T wave changes  Symptoms sound typical given multitude of risk factors.  Had nuclear  stress test in April 2023 which was largely unremarkable except for diaphragmatic artifact     Plan  - Unable to tolerate Imdur, will check on symptoms tomorrow and consider angiogram with Dr. Seng james  -baby ASA  - Check transthoracic echocardiogram  - Stop apixaban and switch to heparin drip for now pending [facility] decision  - Metoprolol 50 mg twice daily  -  Imdur 30 mg daily for antianginal effect  -Start statin therapy     Hypertension  Stop hydralazine and start Imdur as above for antianginal effect  Metoprolol as above     Atrial fibrillation  Elevated CHADsVAsc score in setting of age, sex, HTN, DM and now CAD  AC as above  Rate control with metoprolol        EMR Dragon/Transcription disclaimer:  Much of this encounter note is an electronic transcription/translation of spoken language to printed text. The electronic translation of spoken language may permit erroneous, or at times, nonsensical words or phrases to be inadvertently transcribed; Although I have reviewed the note for such errors, some may still exist.    Shantelle Godoy MD  10/15/23  16:16 EDT

## 2023-10-15 NOTE — PLAN OF CARE
Goal Outcome Evaluation:  Plan of Care Reviewed With: patient        Progress: no change  Outcome Evaluation: Pt is an 82 y/o F admited to [facility] on 10/13/23 for chest pain. PMH includes Afib on Eliquis, sick sinus syndrome s/p [employer] PPM, HTN, CKD3, renal CA, bladder CA s/p urostomy. At baseline, pt lives in [facility] with ramp to enter with her nephew. Her nephew assists with some ADLs and otherwise is indep with dressing, bathing, driving to [facility], ambulation with RW. Pt currently is AAOx3, disoriented to time, and [place] in her L ear. Pt is mod-ind with bed mobility, maxA for first STS, Regla for 2nd STS with increased height of bed, demo, and VC, and CGA for amb with RW with decreased gait speed, shuffling, c/o weakness. Pt demo dec activity tolerance, only tolerating ambulating and static standing < 5 min. Pt is far below baseline function and would benefit from SNF upon d/c. PT to follow through LOS to address deficits.      Anticipated Discharge Disposition (PT): skilled nursing facility

## 2023-10-15 NOTE — THERAPY EVALUATION
Addendum:  Initial prior living environment reported upon eval by patient was found to be inaccurate. Lavern, , reported she spoke to pt's niece & the niece reported the pt was originally living at home with nephew, but has been in an ECF for at least a couple months & there is no plan for the pt to return home in the future.         Patient Name: Hazel Bucio  : 1940    MRN: [id number]                              Today's Date: 10/15/2023       Admit Date: 10/13/2023    Visit Dx:     ICD-10-CM ICD-9-CM   1. Chest pain, unspecified type  R07.9 786.50     Patient Active Problem List   Diagnosis    Atrial fibrillation    Dyslipidemia    Essential hypertension    Long term current use of anticoagulant    Presence of cardiac pacemaker    Sick sinus syndrome    Type 2 diabetes mellitus    Tear film insufficiency    Presbyopia    Macular puckering of retina    Tear film insufficiency, bilateral    Pseudophakia, both eyes    Lens replaced by other means    Epiretinal membrane, bilateral    Acute encephalopathy    History of carcinoma in situ of breast    Ureteral cancer    Chronic insomnia    Seasonal allergies    Altered mental status    MACRINA (acute kidney injury)    Cholecystitis with cholelithiasis    Acute UTI    Abdominal pain    Acute UTI    Acquired absence of kidney    Other artificial openings of urinary tract status    History of bladder cancer    Hydronephrosis    RLS (restless legs syndrome)    Weakness    Preop cardiovascular exam    Other specified abdominal hernia without obstruction or gangrene    Fever    Athscl heart disease of native coronary 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    Acquired absence of other specified parts of digestive tract    Chronic kidney disease, stage 3a    Gastro-esophageal reflux disease without esophagitis    Hyperlipidemia, unspecified    Paroxysmal atrial fibrillation    Essential (primary) hypertension    Personal history of breast cancer    Presence of cardiac pacemaker     Sick sinus syndrome    Type 2 diabetes mellitus with diabetic chronic kidney disease    Type 2 diabetes mellitus with diabetic polyneuropathy    Malignant neoplasm of unspecified ureter    Atrial fibrillation with RVR    Sepsis due to gram-negative UTI    E coli bacteremia    Chest pain, unspecified type    Left flank pain    Myalgia due to statin    Chest pain     Past Medical History:   Diagnosis Date    Bladder cancer     Cancer     breast, bladder    Deep vein thrombosis     Essential hypertension 1/17/2017    Fracture tibia/fibula, right, closed, initial encounter 8/27/2020    GERD (gastroesophageal reflux disease)     History of urostomy     Immobility 08/27/2020    r/t broken Tibia     Kidney carcinoma, right     removed     Long term current use of anticoagulant 1/9/2015    PAF (paroxysmal atrial fibrillation) 6/26/2019    Presence of cardiac pacemaker 11/03/2014    BS dual chamber PM placed 10/2014 with pocket revision 1/25/17.  HX tachy rob syndrome    Sick sinus syndrome 11/3/2014     Past Surgical History:   Procedure Laterality Date    BREAST LUMPECTOMY      CARDIAC ELECTROPHYSIOLOGY PROCEDURE N/A 4/28/2023    Procedure: Pacemaker gen change, [place] AWARE $;  Surgeon: Jose Carlos Cheng MD;  Location: [facility] CATH INVASIVE LOCATION;  Service: Cardiovascular;  Laterality: N/A;    CHOLECYSTECTOMY N/A 10/12/2020    Procedure: CHOLECYSTECTOMY LAPAROSCOPIC;  Surgeon: Go Pereira DO;  Location: [facility] MAIN OR;  Service: General;  Laterality: N/A;    CYSTECTOMY W/ URETEROILEAL CONDUIT      HARDWARE REMOVAL Right 6/11/2021    Procedure: TIBIA HARDWARE REMOVAL;  Surgeon: Geoff Shah II, MD;  Location: [facility] MAIN OR;  Service: Orthopedics;  Laterality: Right;    HERNIA REPAIR      HYSTERECTOMY      INSERT / REPLACE / REMOVE PACEMAKER      NEPHRECTOMY Right     TIBIA IM NAILING/RODDING Right 8/28/2020    Procedure: TIBIA INTRAMEDULLARY NAIL/ABRAHMA INSERTION;  Surgeon: Geoff Shah II  MD;  Location: [facility] MAIN OR;  Service: Orthopedics;  Laterality: Right;      General Information       Row Name 10/15/23 1251          OT Time and Intention    Document Type evaluation  -DT     Mode of Treatment occupational therapy  -DT       Row Name 10/15/23 1251          General Information    Patient Profile Reviewed yes  -DT     Prior Level of Function independent:;all household mobility;ADL's;driving  Uses RW for amb, has seat in walk-in shower. Family drives her to dialysis & does shopping, but pt does drive to [employer].  -DT     Existing Precautions/Restrictions fall  -DT     Barriers to Rehab medically complex;hearing deficit  Hears better in Right ear vs left.  -DT       Row Name 10/15/23 1251          Living Environment    People in Home --  nephew lives with her & works out of the home. He is home most of the time.  -DT       Row Name 10/15/23 1251          Home Main Entrance    Number of Stairs, Main Entrance none  -DT       Row Name 10/15/23 1251          Stairs Within Home, Primary    Number of Stairs, Within Home, Primary none  -DT       Row Name 10/15/23 1251          Cognition    Orientation Status (Cognition) oriented to;person;place;situation;verbal cues/prompts needed for orientation  Knew year, but not month  -DT       Row Name 10/15/23 1251          Safety Issues, Functional Mobility    Impairments Affecting Function (Mobility) balance;endurance/activity tolerance;postural/trunk control;strength  -DT               User Key  (r) = Recorded By, (t) = Taken By, (c) = Cosigned By      Initials Name Provider Type    DT Lisbeth Edouard, OT Occupational Therapist                     Mobility/ADL's       Row Name 10/15/23 7141          Bed Mobility    Bed Mobility supine-sit  -DT     Supine-Sit Strong (Bed Mobility) contact guard;minimum assist (75% patient effort)  -DT     Assistive Device (Bed Mobility) bed rails;head of bed elevated  -DT       Row Name 10/15/23 9858           Transfers    Transfers sit-stand transfer;bed-chair transfer;stand-sit transfer  -DT       Row Name 10/15/23 1255          Bed-Chair Transfer    Bed-Chair [place] (Transfers) minimum assist (75% patient effort);moderate assist (50% patient effort)  -DT     Assistive Device (Bed-Chair Transfers) walker, front-wheeled  -DT       Row Name 10/15/23 1255          Sit-Stand Transfer    Sit-Stand [place] (Transfers) other (see comments);verbal cues  verbal cuing for hand placement, body positioning,  -DT     Assistive Device (Sit-Stand Transfers) walker, front-wheeled  -DT     Comment, (Sit-Stand Transfer) Pt unable to stand X 2 attempts until bed height raised, then pt was able to stand with min X1 & CGA X1 from raised bed height & v.c. for tech  -DT       Row Name 10/15/23 1255          Stand-Sit Transfer    Stand-Sit [place] (Transfers) contact guard;minimum assist (75% patient effort);verbal cues  v.c. to reach back for chair.  -DT     Assistive Device (Stand-Sit Transfers) walker, front-wheeled  -DT       Row Name 10/15/23 1255          Activities of Daily Living    BADL Assessment/Intervention lower body dressing;bathing  -DT       Row Name 10/15/23 1255          Lower Body Dressing Assessment/Training    [place] Level (Lower Body Dressing) lower body dressing skills;don;shoes/slippers;supervision  -DT     Position (Lower Body Dressing) edge of bed sitting  -DT       Row Name 10/15/23 1255          Bathing Assessment/Intervention    [place] Level (Bathing) bathing skills;upper body;set up  -DT     Position (Bathing) supported sitting  -DT               User Key  (r) = Recorded By, (t) = Taken By, (c) = Cosigned By      Initials Name Provider Type    DT Lisbeth Edouard, OT Occupational Therapist                   Obj/Interventions       Row Name 10/15/23 1301          Vision Assessment/Intervention    Visual Impairment/Limitations corrective lenses full-time  -DT       Row Name  10/15/23 1301          Range of Motion Comprehensive    General Range of Motion upper extremity range of motion deficits identified  -DT     Comment, General Range of Motion Right shoulder limited to approx 75 degrees flex AROM due to old injury from fall 3 years ago.  -DT       Row Name 10/15/23 1301          Strength Comprehensive (MMT)    General Manual Muscle Testing (MMT) Assessment upper extremity strength deficits identified  -DT     Comment, General Manual Muscle Testing (MMT) Assessment right shoulder =3-/5, bilateral elbow, grasp=3+/5.  -DT       Row Name 10/15/23 1301          Balance    Balance Assessment sitting static balance;sitting dynamic balance;standing static balance;standing dynamic balance  -DT     Static Sitting Balance independent  -DT     Dynamic Sitting Balance supervision  -DT     Position, Sitting Balance sitting edge of bed  -DT     Static Standing Balance minimal assist  -DT     Dynamic Standing Balance minimal assist;moderate assist  -DT     Position/Device Used, Standing Balance walker, rolling  -DT               User Key  (r) = Recorded By, (t) = Taken By, (c) = Cosigned By      Initials Name Provider Type    DT Lisbeth Edouard, OT Occupational Therapist                   Goals/Plan       Row Name 10/15/23 1315          Transfer Goal 1 (OT)    Activity/Assistive Device (Transfer Goal 1, OT) transfers, all  -DT     [place] Level/Cues Needed (Transfer Goal 1, OT) modified independence  -DT     Time Frame (Transfer Goal 1, OT) 2 weeks  -DT       Row Name 10/15/23 1315          Bathing Goal 1 (OT)    Activity/Device (Bathing Goal 1, OT) bathing skills, all  -DT     [place] Level/Cues Needed (Bathing Goal 1, OT) supervision required  -DT     Time Frame (Bathing Goal 1, OT) 2 weeks  -DT       Row Name 10/15/23 1315          Dressing Goal 1 (OT)    Activity/Device (Dressing Goal 1, OT) dressing skills, all  -DT     [place]/Cues Needed (Dressing Goal 1, OT) modified  independence  -DT     Time Frame (Dressing Goal 1, OT) 2 weeks  -DT       Row Name 10/15/23 1315          Toileting Goal 1 (OT)    Activity/Device (Toileting Goal 1, OT) toileting skills, all  -DT     [place] Level/Cues Needed (Toileting Goal 1, OT) modified independence  -DT     Time Frame (Toileting Goal 1, OT) 2 weeks  -DT       Row Name 10/15/23 1315          Therapy Assessment/Plan (OT)    Planned Therapy Interventions (OT) activity tolerance training;BADL retraining;functional balance retraining;neuromuscular control/coordination retraining;occupation/activity based interventions;patient/caregiver education/training;strengthening exercise;transfer/mobility retraining  -DT               User Key  (r) = Recorded By, (t) = Taken By, (c) = Cosigned By      Initials Name Provider Type    DT Lisbeth Edouard, OT Occupational Therapist                   Clinical Impression       Row Name 10/15/23 1303          Pain Assessment    Pretreatment Pain Rating 7/10  -DT     Posttreatment Pain Rating 7/10  -DT     Pain Location - Side/Orientation Right  -DT     Pain Location - shoulder  -DT     Pain Intervention(s) Therapeutic presence;Emotional support  -DT     Additional Documentation Pain Scale: FACES Pre/Post-Treatment (Group)  -DT       Row Name 10/15/23 1303          Pain Scale: FACES Pre/Post-Treatment    Pain: FACES Scale, Pretreatment 0-->no hurt  -DT     Posttreatment Pain Rating 4-->hurts little more  -DT     Pain Location - Side/Orientation Right  -DT     Pain Location - knee  -DT       Row Name 10/15/23 1313 10/15/23 1303       Plan of Care Review    Plan of Care Reviewed With -- patient  -DT    Outcome Evaluation Pt is an 83-year-old female that presents to the ED on 10/13/23 for chest pain, dyspnea & nausea. PMH: bladder & breast CA, right tibia/fibula fx, a-fib, pacemaker, sick sinus syndrome. At baseline, pt lives in a [facility] with a ramp to enter. Her nephew lives with her & works from home, so is  present most of the time. Pt is mod ind with bathing, ind with dressing, feeding, grooming & toileting & mod ind with amb using a RW. She intermittently drives herself to [facility]. Family drives her to dialysis & does shopping for her. Upon eval, pt A&O X3 (not to month). She is [place] & hears better out of right ear. She completed sup>sit with CGA. When attempting to stand from bed, pt unable to stand X2 attempts until height of bed raised then she stood with min A X1 & CGA X1. She completed donning of shoes ind sitting EOB, but requires mod A for LB ADLs when standing due to dec balance, generalized weakness & dec activity tolerance. Pt seems to be functioning below her reported baseline status; therefore, OT will follow for OT tx & recommends SNF upon discharge. Pt is in agreement with recommendations at time of eval.  -DT --      Row Name 10/15/23 1313          Therapy Assessment/Plan (OT)    Rehab Potential (OT) good, to achieve stated therapy goals  -DT     Criteria for Skilled Therapeutic Interventions Met (OT) yes;meets criteria;skilled treatment is necessary  -DT     Therapy Frequency (OT) 5 times/wk  -DT     Predicted Duration of Therapy Intervention (OT) until d/c  -DT       Row Name 10/15/23 1313          Therapy Plan Review/Discharge Plan (OT)    Anticipated Discharge Disposition (OT) skilled nursing facility  -DT       Row Name 10/15/23 1313          Vital Signs    O2 Delivery Pre Treatment room air  -DT     O2 Delivery Intra Treatment room air  -DT     O2 Delivery Post Treatment room air  -DT       Row Name 10/15/23 1313          Positioning and Restraints    Pre-Treatment Position in bed  -DT     Post Treatment Position chair  -DT     In Chair notified nsg;sitting;call light within reach;encouraged to call for assist;exit alarm on  -DT               User Key  (r) = Recorded By, (t) = Taken By, (c) = Cosigned By      Initials Name Provider Type    Lisbeth Dasilva, OT Occupational Therapist                    Outcome Measures       Row Name 10/15/23 0800          How much help from another person do you currently need...    Turning from your back to your side while in flat bed without using bedrails? 2  -EV     Moving from lying on back to sitting on the side of a flat bed without bedrails? 2  -EV     Moving to and from a bed to a chair (including a wheelchair)? 2  -EV     Standing up from a chair using your arms (e.g., wheelchair, bedside chair)? 2  -EV     Climbing 3-5 steps with a railing? 2  -EV     To walk in hospital room? 2  -EV     AM-PAC 6 Clicks Score (PT) 12  -EV     Highest level of mobility 4 --> Transferred to chair/commode  -EV               User Key  (r) = Recorded By, (t) = Taken By, (c) = Cosigned By      Initials Name Provider Type    Sharon Levy LPN Licensed Nurse                    Occupational Therapy Education       Title: PT OT SLP Therapies (In Progress)       Topic: Occupational Therapy (In Progress)       Point: ADL training (Done)       Description:   Instruct learner(s) on proper safety adaptation and remediation techniques during self care or transfers.   Instruct in proper use of assistive devices.                  Learning Progress Summary             Patient Acceptance, E,TB, VU by DT at 10/15/2023 1316    Comment: Role of OT,goals & POC, safety awareness in hospital setting.                         Point: Home exercise program (Not Started)       Description:   Instruct learner(s) on appropriate technique for monitoring, assisting and/or progressing therapeutic exercises/activities.                  Learner Progress:  Not documented in this visit.              Point: Precautions (Done)       Description:   Instruct learner(s) on prescribed precautions during self-care and functional transfers.                  Learning Progress Summary             Patient Acceptance, E,TB, VU by DT at 10/15/2023 1316    Comment: Role of OT,goals & POC, safety awareness in hospital  setting.                         Point: Body mechanics (Done)       Description:   Instruct learner(s) on proper positioning and spine alignment during self-care, functional mobility activities and/or exercises.                  Learning Progress Summary             Patient Acceptance, E,TB, VU by DT at 10/15/2023 1316    Comment: Role of OT,goals & POC, safety awareness in hospital setting.                                         User Key       Initials Effective Dates Name Provider Type Discipline    DT 07/11/23 -  Lisbeth Edouard, OT Occupational Therapist OT                  OT Recommendation and Plan  Planned Therapy Interventions (OT): activity tolerance training, BADL retraining, functional balance retraining, neuromuscular control/coordination retraining, occupation/activity based interventions, patient/caregiver education/training, strengthening exercise, transfer/mobility retraining  Therapy Frequency (OT): 5 times/wk  Plan of Care Review  Plan of Care Reviewed With: patient  Outcome Evaluation: Pt is an 83-year-old female that presents to the ED on 10/13/23 for chest pain, dyspnea & nausea. PMH: bladder & breast CA, right tibia/fibula fx, a-fib, pacemaker, sick sinus syndrome. At baseline, pt lives in a [facility] with a ramp to enter. Her nephew lives with her & works from home, so is present most of the time. Pt is mod ind with bathing, ind with dressing, feeding, grooming & toileting & mod ind with amb using a RW. She intermittently drives herself to [facility]. Family drives her to dialysis & does shopping for her. Upon eval, pt A&O X3 (not to month). She is [place] & hears better out of right ear. She completed sup>sit with CGA. When attempting to stand from bed, pt unable to stand X2 attempts until height of bed raised then she stood with min A X1 & CGA X1. She completed donning of shoes ind sitting EOB, but requires mod A for LB ADLs when standing due to dec balance, generalized weakness & dec activity  tolerance. Pt seems to be functioning below her reported baseline status; therefore, OT will follow for OT tx & recommends SNF upon discharge. Pt is in agreement with recommendations at time of eval.     Time Calculation:         Time Calculation- OT       Row Name 10/15/23 1316             Time Calculation- OT    OT Start Time 1024  -DT      OT Stop Time 1052  -DT      OT Time Calculation (min) 28 min  -DT      OT Received On 10/15/23  -DT      OT - Next Appointment 10/16/23  -DT      OT Goal Re-Cert Due Date 10/29/23  -DT                User Key  (r) = Recorded By, (t) = Taken By, (c) = Cosigned By      Initials Name Provider Type    Lisbeth Dasilva, OT Occupational Therapist                           Lisbeth Edouard, OT  10/15/2023

## 2023-10-15 NOTE — PLAN OF CARE
Problem: Adult Inpatient Plan of Care  Goal: Plan of Care Review  Outcome: Ongoing, Progressing  Goal: Patient-Specific Goal (Individualized)  Outcome: Ongoing, Progressing  Goal: Absence of Hospital-Acquired Illness or Injury  Outcome: Ongoing, Progressing  Intervention: Identify and Manage Fall Risk  Recent Flowsheet Documentation  Taken 10/15/2023 1800 by Sharon Cha LPN  Safety Promotion/Fall Prevention: safety round/check completed  Taken 10/15/2023 1615 by Sharon Cha LPN  Safety Promotion/Fall Prevention: safety round/check completed  Taken 10/15/2023 1400 by Sharon Cha LPN  Safety Promotion/Fall Prevention: safety round/check completed  Taken 10/15/2023 1230 by Sharon Cha LPN  Safety Promotion/Fall Prevention: safety round/check completed  Taken 10/15/2023 1000 by Sharon Cha LPN  Safety Promotion/Fall Prevention: safety round/check completed  Taken 10/15/2023 0800 by Sharon Cha LPN  Safety Promotion/Fall Prevention: safety round/check completed  Intervention: Prevent and Manage VTE (Venous Thromboembolism) Risk  Recent Flowsheet Documentation  Taken 10/15/2023 0800 by Sharon Cha LPN  VTE Prevention/Management:   bilateral   sequential compression devices on  Intervention: Prevent Infection  Recent Flowsheet Documentation  Taken 10/15/2023 1800 by Sharon Cha LPN  Infection Prevention:   hand hygiene promoted   rest/sleep promoted   single patient room provided  Taken 10/15/2023 1615 by Sharon Cha LPN  Infection Prevention:   hand hygiene promoted   rest/sleep promoted   single patient room provided  Taken 10/15/2023 1400 by Sharon Cha LPN  Infection Prevention:   hand hygiene promoted   rest/sleep promoted   single patient room provided  Taken 10/15/2023 1230 by Sharon Cha LPN  Infection Prevention:   hand hygiene promoted   rest/sleep promoted   single patient room provided  Taken 10/15/2023 1000 by Sharon Cha LPN  Infection Prevention:   hand hygiene  promoted   rest/sleep promoted   single patient room provided  Taken 10/15/2023 0800 by Sharon Cha LPN  Infection Prevention:   hand hygiene promoted   rest/sleep promoted   single patient room provided  Goal: Optimal Comfort and Wellbeing  Outcome: Ongoing, Progressing  Goal: Readiness for Transition of Care  Outcome: Ongoing, Progressing     Problem: Skin Injury Risk Increased  Goal: Skin Health and Integrity  Outcome: Ongoing, Progressing     Problem: Diabetes Comorbidity  Goal: Blood Glucose Level Within Targeted Range  Outcome: Ongoing, Progressing     Problem: Heart Failure Comorbidity  Goal: Maintenance of Heart Failure Symptom Control  Outcome: Ongoing, Progressing  Intervention: Maintain Heart Failure-Management  Recent Flowsheet Documentation  Taken 10/15/2023 1800 by Sharon Cha LPN  Medication Review/Management: medications reviewed  Taken 10/15/2023 1615 by Sharon Cha LPN  Medication Review/Management: medications reviewed  Taken 10/15/2023 1400 by Sharon Cha LPN  Medication Review/Management: medications reviewed  Taken 10/15/2023 1230 by Sharon Cha LPN  Medication Review/Management: medications reviewed  Taken 10/15/2023 1000 by Sharon Cha LPN  Medication Review/Management: medications reviewed  Taken 10/15/2023 0800 by Sharon Cha LPN  Medication Review/Management: medications reviewed     Problem: Hypertension Comorbidity  Goal: Blood Pressure in Desired Range  Outcome: Ongoing, Progressing  Intervention: Maintain Blood Pressure Management  Recent Flowsheet Documentation  Taken 10/15/2023 1800 by Sharon Cha LPN  Medication Review/Management: medications reviewed  Taken 10/15/2023 1615 by Sharon Cha LPN  Medication Review/Management: medications reviewed  Taken 10/15/2023 1400 by Sharon Cha LPN  Medication Review/Management: medications reviewed  Taken 10/15/2023 1230 by Sharon Cha LPN  Medication Review/Management: medications reviewed  Taken 10/15/2023 1000  by Sharon Cha LPN  Medication Review/Management: medications reviewed  Taken 10/15/2023 0800 by Sharon Cah LPN  Medication Review/Management: medications reviewed     Problem: Pain Chronic (Persistent) (Comorbidity Management)  Goal: Acceptable Pain Control and Functional Ability  Outcome: Ongoing, Progressing  Intervention: Manage Persistent Pain  Recent Flowsheet Documentation  Taken 10/15/2023 1800 by Sharon Cha LPN  Medication Review/Management: medications reviewed  Taken 10/15/2023 1615 by Sharon Cha LPN  Medication Review/Management: medications reviewed  Taken 10/15/2023 1400 by Sharon Cha LPN  Medication Review/Management: medications reviewed  Taken 10/15/2023 1230 by Sharon Cha LPN  Medication Review/Management: medications reviewed  Taken 10/15/2023 1000 by Sharon Cha LPN  Medication Review/Management: medications reviewed  Taken 10/15/2023 0800 by Sharon Cha LPN  Medication Review/Management: medications reviewed     Problem: Fall Injury Risk  Goal: Absence of Fall and Fall-Related Injury  Outcome: Ongoing, Progressing  Intervention: Identify and Manage Contributors  Recent Flowsheet Documentation  Taken 10/15/2023 1800 by Sharon Cha LPN  Medication Review/Management: medications reviewed  Taken 10/15/2023 1615 by Sharon Cha LPN  Medication Review/Management: medications reviewed  Taken 10/15/2023 1400 by Sharon Cha LPN  Medication Review/Management: medications reviewed  Taken 10/15/2023 1230 by Sharon Cha LPN  Medication Review/Management: medications reviewed  Taken 10/15/2023 1000 by Sharon Cha LPN  Medication Review/Management: medications reviewed  Taken 10/15/2023 0800 by Sharon Cha LPN  Medication Review/Management: medications reviewed  Intervention: Promote Injury-Free Environment  Recent Flowsheet Documentation  Taken 10/15/2023 1800 by Sharon Cha LPN  Safety Promotion/Fall Prevention: safety round/check completed  Taken 10/15/2023  1615 by Cha, Sharon, LPN  Safety Promotion/Fall Prevention: safety round/check completed  Taken 10/15/2023 1400 by Sharon Cha LPN  Safety Promotion/Fall Prevention: safety round/check completed  Taken 10/15/2023 1230 by Sharon Cha LPN  Safety Promotion/Fall Prevention: safety round/check completed  Taken 10/15/2023 1000 by Sharon Cha LPN  Safety Promotion/Fall Prevention: safety round/check completed  Taken 10/15/2023 0800 by Sharon Cha LPN  Safety Promotion/Fall Prevention: safety round/check completed   Goal Outcome Evaluation:

## 2023-10-15 NOTE — THERAPY EVALUATION
Patient Name: Hazel Bucio  : 1940    MRN: [id number]                              Today's Date: 10/15/2023       Admit Date: 10/13/2023    Visit Dx:     ICD-10-CM ICD-9-CM   1. Chest pain, unspecified type  R07.9 786.50     Patient Active Problem List   Diagnosis    Atrial fibrillation    Dyslipidemia    Essential hypertension    Long term current use of anticoagulant    Presence of cardiac pacemaker    Sick sinus syndrome    Type 2 diabetes mellitus    Tear film insufficiency    Presbyopia    Macular puckering of retina    Tear film insufficiency, bilateral    Pseudophakia, both eyes    Lens replaced by other means    Epiretinal membrane, bilateral    Acute encephalopathy    History of carcinoma in situ of breast    Ureteral cancer    Chronic insomnia    Seasonal allergies    Altered mental status    MACRINA (acute kidney injury)    Cholecystitis with cholelithiasis    Acute UTI    Abdominal pain    Acute UTI    Acquired absence of kidney    Other artificial openings of urinary tract status    History of bladder cancer    Hydronephrosis    RLS (restless legs syndrome)    Weakness    Preop cardiovascular exam    Other specified abdominal hernia without obstruction or gangrene    Fever    Athscl heart disease of native coronary 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    Acquired absence of other specified parts of digestive tract    Chronic kidney disease, stage 3a    Gastro-esophageal reflux disease without esophagitis    Hyperlipidemia, unspecified    Paroxysmal atrial fibrillation    Essential (primary) hypertension    Personal history of breast cancer    Presence of cardiac pacemaker    Sick sinus syndrome    Type 2 diabetes mellitus with diabetic chronic kidney disease    Type 2 diabetes mellitus with diabetic polyneuropathy    Malignant neoplasm of unspecified ureter    Atrial fibrillation with RVR    Sepsis due to gram-negative UTI    E coli bacteremia    Chest pain, unspecified type    Left flank pain    Myalgia due to  statin    Chest pain     Past Medical History:   Diagnosis Date    Bladder cancer     Cancer     breast, bladder    Deep vein thrombosis     Essential hypertension 1/17/2017    Fracture tibia/fibula, right, closed, initial encounter 8/27/2020    GERD (gastroesophageal reflux disease)     History of urostomy     Immobility 08/27/2020    r/t broken Tibia     Kidney carcinoma, right     removed     Long term current use of anticoagulant 1/9/2015    PAF (paroxysmal atrial fibrillation) 6/26/2019    Presence of cardiac pacemaker 11/03/2014    BS dual chamber PM placed 10/2014 with pocket revision 1/25/17.  HX tachy rob syndrome    Sick sinus syndrome 11/3/2014     Past Surgical History:   Procedure Laterality Date    BREAST LUMPECTOMY      CARDIAC ELECTROPHYSIOLOGY PROCEDURE N/A 4/28/2023    Procedure: Pacemaker gen change, [place] AWARE $;  Surgeon: Jose Carlos Cheng MD;  Location: [facility] CATH INVASIVE LOCATION;  Service: Cardiovascular;  Laterality: N/A;    CHOLECYSTECTOMY N/A 10/12/2020    Procedure: CHOLECYSTECTOMY LAPAROSCOPIC;  Surgeon: Go Pereira DO;  Location: [facility] MAIN OR;  Service: General;  Laterality: N/A;    CYSTECTOMY W/ URETEROILEAL CONDUIT      HARDWARE REMOVAL Right 6/11/2021    Procedure: TIBIA HARDWARE REMOVAL;  Surgeon: Geoff Shah II, MD;  Location: [facility] MAIN OR;  Service: Orthopedics;  Laterality: Right;    HERNIA REPAIR      HYSTERECTOMY      INSERT / REPLACE / REMOVE PACEMAKER      NEPHRECTOMY Right     TIBIA IM NAILING/RODDING Right 8/28/2020    Procedure: TIBIA INTRAMEDULLARY NAIL/ABRAHAM INSERTION;  Surgeon: Geoff Shah II, MD;  Location: [facility] MAIN OR;  Service: Orthopedics;  Laterality: Right;      General Information       Row Name 10/15/23 1543          Physical Therapy Time and Intention    Document Type evaluation  -OD     Mode of Treatment physical therapy  -OD       Row Name 10/15/23 1543          General Information    Patient Profile Reviewed yes   -OD     Prior Level of Function independent:;all household mobility;driving;min assist:;ADL's  Uses RW for amb, has seat in walk-in shower. Family drives her to dialysis & does shopping, but pt does drive to [employer].  -OD     Existing Precautions/Restrictions fall  -OD     Barriers to Rehab medically complex;hearing deficit  Hears better in R ear  -OD       Row Name 10/15/23 1540          Living Environment    People in Home other relative(s);other (see comments)  Patient's nephew lives with her and assists with ADLs  -OD       Row Name 10/15/23 1543          Home Main Entrance    Number of Stairs, Main Entrance none;other (see comments)  Ramp  -OD       Row Name 10/15/23 1543          Stairs Within Home, Primary    Number of Stairs, Within Home, Primary none  -OD       Row Name 10/15/23 1542          Cognition    Orientation Status (Cognition) oriented to;person;place;situation;verbal cues/prompts needed for orientation;other (see comments)  Knew year, but not month  -OD       Row Name 10/15/23 1545          Safety Issues, Functional Mobility    Impairments Affecting Function (Mobility) balance;endurance/activity tolerance;postural/trunk control;strength  -OD               User Key  (r) = Recorded By, (t) = Taken By, (c) = Cosigned By      Initials Name Provider Type    OD Jenny Delgado PT Physical Therapist                   Mobility       Row Name 10/15/23 1541          Bed Mobility    Bed Mobility supine-sit  -OD     Supine-Sit [place] (Bed Mobility) contact guard;minimum assist (75% patient effort)  -OD     Assistive Device (Bed Mobility) bed rails;head of bed elevated  -OD       Row Name 10/15/23 1540          Bed-Chair Transfer    Bed-Chair [place] (Transfers) minimum assist (75% patient effort);moderate assist (50% patient effort)  -OD     Assistive Device (Bed-Chair Transfers) walker, front-wheeled  -OD       Row Name 10/15/23 1542          Sit-Stand Transfer    Sit-Stand [place] (Transfers)  other (see comments);verbal cues  verbal cuing for hand placement, body positioning  -OD     Assistive Device (Sit-Stand Transfers) walker, front-wheeled  -OD     Comment, (Sit-Stand Transfer) Pt unable to stand X 2 attempts until bed height raised, then pt was able to stand with min X1 & CGA X1 from raised bed height & v.c.  -OD       Row Name 10/15/23 1544          Gait/Stairs (Locomotion)    [place] Level (Gait) contact guard  -OD     Assistive Device (Gait) walker, front-wheeled  -OD     Patient was able to Ambulate yes  -OD     Distance in Feet (Gait) 15  -OD     Deviations/Abnormal Patterns (Gait) festinating/shuffling;base of support, narrow;gait speed decreased;weight shifting decreased  -OD               User Key  (r) = Recorded By, (t) = Taken By, (c) = Cosigned By      Initials Name Provider Type    Jenny Zimmer PT Physical Therapist                   Obj/Interventions       Row Name 10/15/23 1546          Range of Motion Comprehensive    General Range of Motion bilateral lower extremity ROM WFL  -OD       Row Name 10/15/23 1546          Strength Comprehensive (MMT)    General Manual Muscle Testing (MMT) Assessment lower extremity strength deficits identified  -OD     Comment, General Manual Muscle Testing (MMT) Assessment Pt demo BLE weakness functionally and per MMT, 3+/5 grossly  -OD       Row Name 10/15/23 1546          Balance    Balance Interventions sitting;minimal challenge;standing;moderate challenge  -OD       Row Name 10/15/23 1546          Sensory Assessment (Somatosensory)    Sensory Assessment (Somatosensory) sensation intact  -OD               User Key  (r) = Recorded By, (t) = Taken By, (c) = Cosigned By      Initials Name Provider Type    Jenny Zimmer PT Physical Therapist                   Goals/Plan       Row Name 10/15/23 4305          Transfer Goal 1 (PT)    Activity/Assistive Device (Transfer Goal 1, PT) transfers, all;walker, rolling  -OD     [place] Level/Cues  Needed (Transfer Goal 1, PT) modified independence  -OD     Time Frame (Transfer Goal 1, PT) long term goal (LTG);2 weeks  -OD       Row Name 10/15/23 4671          Gait Training Goal 1 (PT)    Activity/Assistive Device (Gait Training Goal 1, PT) gait (walking locomotion);assistive device use;increase energy conservation;increase endurance/gait distance;walker, rolling  -OD     [place] Level (Gait Training Goal 1, PT) modified independence  -OD     Distance (Gait Training Goal 1, PT) 100 feet  -OD     Time Frame (Gait Training Goal 1, PT) long term goal (LTG);2 weeks  -OD       Row Name 10/15/23 9986          Problem Specific Goal 1 (PT)    Problem Specific Goal 1 (PT) Pt will demo improved exercise tolerance with upright activities > 10 min for improving indep with ADLs and return to PLOF  -OD     Time Frame (Problem Specific Goal 1, PT) long-term goal (LTG);2 weeks  -OD       Row Name 10/15/23 8294          Therapy Assessment/Plan (PT)    Planned Therapy Interventions (PT) balance training;bed mobility training;gait training;home exercise program;neuromuscular re-education;ROM (range of motion);postural re-education;transfer training;strengthening;stretching;patient/family education  -OD               User Key  (r) = Recorded By, (t) = Taken By, (c) = Cosigned By      Initials Name Provider Type    OD Jenny Delgado, PT Physical Therapist                   Clinical Impression       Row Name 10/15/23 4788          Pain    Pretreatment Pain Rating 7/10  -OD     Posttreatment Pain Rating 7/10  -OD     Pain Location - Side/Orientation Right  -OD     Pain Location - shoulder  -OD     Pain Intervention(s) Repositioned;Emotional support;Therapeutic presence  -OD       Row Name 10/15/23 3641          Plan of Care Review    Plan of Care Reviewed With patient  -OD     Progress no change  -OD     Outcome Evaluation Pt is an 82 y/o F admited to [facility] on 10/13/23 for chest pain. PMH includes Afib on Eliquis, sick sinus  syndrome s/p [employer] PPM, HTN, CKD3, renal CA, bladder CA s/p urostomy. At baseline, pt lives in [facility] with ramp to enter with her nephew. Her nephew assists with some ADLs and otherwise is indep with dressing, bathing, driving to [employer], ambulation with RW. Pt currently is AAOx3, disoriented to time, and [place] in her L ear. Pt is mod-ind with bed mobility, maxA for first STS, Regla for 2nd STS with increased height of bed, demo, and VC, and CGA for amb with RW with decreased gait speed, shuffling, c/o weakness. Pt demo dec activity tolerance, only tolerating ambulating and static standing < 5 min. Pt is far below baseline function and would benefit from SNF upon d/c. PT to follow through LOS to address deficits.  -OD       Row Name 10/15/23 1546          Therapy Assessment/Plan (PT)    Criteria for Skilled Interventions Met (PT) yes;meets criteria  -OD     Therapy Frequency (PT) 5 times/wk  -OD     Predicted Duration of Therapy Intervention (PT) until d/c  -OD       Row Name 10/15/23 1546          Vital Signs    O2 Delivery Pre Treatment room air  -OD     O2 Delivery Intra Treatment room air  -OD     O2 Delivery Post Treatment room air  -OD     Pre Patient Position Supine  -OD     Intra Patient Position Standing  -OD     Post Patient Position Sitting  -OD       Row Name 10/15/23 1546          Positioning and Restraints    Pre-Treatment Position in bed  -OD     Post Treatment Position chair  -OD     In Chair notified nsg;sitting;call light within reach;encouraged to call for assist;exit alarm on  -OD               User Key  (r) = Recorded By, (t) = Taken By, (c) = Cosigned By      Initials Name Provider Type    OD Jenny Delgado, PT Physical Therapist                   Outcome Measures       Row Name 10/15/23 8256 10/15/23 0800       How much help from another person do you currently need...    Turning from your back to your side while in flat bed without using bedrails? 4  -OD 2  -EV    Moving from lying on  back to sitting on the side of a flat bed without bedrails? 4  -OD 2  -EV    Moving to and from a bed to a chair (including a wheelchair)? 3  -OD 2  -EV    Standing up from a chair using your arms (e.g., wheelchair, bedside chair)? 3  -OD 2  -EV    Climbing 3-5 steps with a railing? 1  -OD 2  -EV    To walk in hospital room? 3  -OD 2  -EV    AM-PAC 6 Clicks Score (PT) 18  -OD 12  -EV    Highest level of mobility 6 --> Walked 10 steps or more  -OD 4 --> Transferred to chair/commode  -EV      Row Name 10/15/23 1556          Functional Assessment    Outcome Measure Options AM-PAC 6 Clicks Basic Mobility (PT)  -OD               User Key  (r) = Recorded By, (t) = Taken By, (c) = Cosigned By      Initials Name Provider Type    EV Sharon Cha LPN Licensed Nurse    Jenny Zimmer, PT Physical Therapist                                 Physical Therapy Education       Title: PT OT SLP Therapies (In Progress)       Topic: Physical Therapy (Done)       Point: Mobility training (Done)       Learning Progress Summary             Patient Acceptance, E, VU by OD at 10/15/2023 1556                         Point: Home exercise program (Done)       Learning Progress Summary             Patient Acceptance, E, VU by OD at 10/15/2023 1556                         Point: Body mechanics (Done)       Learning Progress Summary             Patient Acceptance, E, VU by OD at 10/15/2023 1556                         Point: Precautions (Done)       Learning Progress Summary             Patient Acceptance, E, VU by OD at 10/15/2023 1556                                         User Key       Initials Effective Dates Name Provider Type Discipline    OD 05/11/23 -  Jenny Delgado, REKHA Physical Therapist PT                  PT Recommendation and Plan  Planned Therapy Interventions (PT): balance training, bed mobility training, gait training, home exercise program, neuromuscular re-education, ROM (range of motion), postural re-education, transfer  training, strengthening, stretching, patient/family education  Plan of Care Reviewed With: patient  Progress: no change  Outcome Evaluation: Pt is an 82 y/o F admited to [facility] on 10/13/23 for chest pain. PMH includes Afib on Eliquis, sick sinus syndrome s/p [employer] PPM, HTN, CKD3, renal CA, bladder CA s/p urostomy. At baseline, pt lives in [facility] with ramp to enter with her nephew. Her nephew assists with some ADLs and otherwise is indep with dressing, bathing, driving to [place], ambulation with RW. Pt currently is AAOx3, disoriented to time, and [place] in her L ear. Pt is mod-ind with bed mobility, maxA for first STS, Regla for 2nd STS with increased height of bed, demo, and VC, and CGA for amb with RW with decreased gait speed, shuffling, c/o weakness. Pt demo dec activity tolerance, only tolerating ambulating and static standing < 5 min. Pt is far below baseline function and would benefit from SNF upon d/c. PT to follow through LOS to address deficits.     Time Calculation:         PT Charges       Row Name 10/15/23 3977             Time Calculation    Start Time 1024  -OD      Stop Time 1052  -OD      Time Calculation (min) 28 min  -OD      PT Received On 10/15/23  -OD      PT - Next Appointment 10/16/23  -OD      PT Goal Re-Cert Due Date 10/29/23  -OD         Time Calculation- PT    Total Timed Code Minutes- PT 0 minute(s)  -OD                User Key  (r) = Recorded By, (t) = Taken By, (c) = Cosigned By      Initials Name Provider Type    OD Jenny Delgado, REKHA Physical Therapist                  Therapy Charges for Today       Code Description Service Date Service Provider Modifiers Qty    [id number] HC PT EVAL MOD COMPLEXITY 4 10/15/2023 Jenny Delgado, PT GP 1            PT G-Codes  Outcome Measure Options: AM-PAC 6 Clicks Basic Mobility (PT)  AM-PAC 6 Clicks Score (PT): 18  PT Discharge Summary  Anticipated Discharge Disposition (PT): skilled nursing facility    Jenny Delgado PT  10/15/2023

## 2023-10-15 NOTE — PLAN OF CARE
Goal Outcome Evaluation:      Discussed with patient the importance of taking her blood pressure medication as directed by  Also how to monitor the blood pressure with a home machine; eating a well balanced meals and maintaining activity as much as possible.

## 2023-10-15 NOTE — PROGRESS NOTES
LOS: 0 days   Patient Care Team:  Lizzy Francis MD as PCP - General (Family Medicine)  Jose Carlos Cheng MD as Consulting Physician (Cardiology)    Subjective     Denies any complaints     Review of Systems   Constitutional:  Positive for activity change.   HENT: Negative.     Respiratory: Negative.     Cardiovascular: Negative.    Gastrointestinal: Negative.    Genitourinary: Negative.    Musculoskeletal: Negative.    Neurological: Negative.    Psychiatric/Behavioral: Negative.             Objective     Vital Signs  Temp:  [97.8 °F (36.6 °C)-98.4 °F (36.9 °C)] 97.8 °F (36.6 °C)  Heart Rate:  [] 96  Resp:  [15-22] 17  BP: ()/(51-89) 113/57      Physical Exam  Vitals reviewed.   Constitutional:       Appearance: She is not ill-appearing.   HENT:      Head: Normocephalic and atraumatic.      Right Ear: External ear normal.      Left Ear: External ear normal.      Nose: Nose normal.      Mouth/Throat:      Mouth: Mucous membranes are moist.   Eyes:      General:         Right eye: No discharge.         Left eye: No discharge.   Cardiovascular:      Rate and Rhythm: Normal rate and regular rhythm.      Pulses: Normal pulses.      Heart sounds: Normal heart sounds.   Pulmonary:      Effort: Pulmonary effort is normal.      Breath sounds: Normal breath sounds.   Abdominal:      General: Bowel sounds are normal.      Palpations: Abdomen is soft.   Musculoskeletal:         General: Normal range of motion.      Cervical back: Normal range of motion.   Skin:     General: Skin is warm.   Neurological:      Mental Status: She is alert and oriented to person, place, and time.   Psychiatric:         Behavior: Behavior normal.              Results Review:    Lab Results (last 24 hours)       Procedure Component Value Units Date/Time    aPTT [[id number]]  (Abnormal) Collected: 10/15/23 0700    Specimen: Blood Updated: 10/15/23 0750     PTT 52.4 seconds     aPTT [[id number]]  (Abnormal) Collected: 10/15/23 0037     Specimen: Blood Updated: 10/15/23 0112     PTT 39.3 seconds     Urinalysis, Microscopic Only - Urine, Clean Catch [[id number]]  (Abnormal) Collected: 10/14/23 1835    Specimen: Urine, Clean Catch Updated: 10/14/23 2019     RBC, UA None Seen /HPF      WBC, UA 21-50 /HPF      Bacteria, UA 4+ /HPF      Squamous Epithelial Cells, UA 0-2 /HPF      Renal Epithelial Cells, UA 0-2 /HPF      Hyaline Casts, UA None Seen /LPF      Methodology Manual Light Microscopy    Urine Culture - Urine, Urine, Clean Catch [[id number]] Collected: 10/14/23 1835    Specimen: Urine, Clean Catch Updated: 10/14/23 2019    Urinalysis With Culture If Indicated - Urine, Clean Catch [[id number]]  (Abnormal) Collected: 10/14/23 1835    Specimen: Urine, Clean Catch Updated: 10/14/23 1857     Color, UA Yellow     Appearance, UA Cloudy     pH, UA 7.5     Specific Gravity, UA 1.010     Glucose, UA Negative     Ketones, UA Negative     Bilirubin, UA Negative     Blood, UA Trace     Protein, UA Negative     Leuk Esterase, UA Large (3+)     Nitrite, UA Positive     Urobilinogen, UA 1.0 E.U./dL    Narrative:      In absence of clinical symptoms, the presence of pyuria, bacteria, and/or nitrites on the urinalysis result does not correlate with infection.    Protime-INR [[id number]]  (Abnormal) Collected: 10/14/23 1639    Specimen: Blood Updated: 10/14/23 1710     Protime 11.8 Seconds      INR 1.09    aPTT [[id number]]  (Abnormal) Collected: 10/14/23 1639    Specimen: Blood Updated: 10/14/23 1710     PTT 28.0 seconds     CBC & Differential [[id number]]  (Abnormal) Collected: 10/14/23 1639    Specimen: Blood Updated: 10/14/23 1659    Narrative:      The following orders were created for panel order CBC & Differential.  Procedure                               Abnormality         Status                     ---------                               -----------         ------                     CBC Auto Differential[[id number]]        Abnormal            Final result                  Please view results for these tests on the individual orders.    CBC Auto Differential [[id number]]  (Abnormal) Collected: 10/14/23 1639    Specimen: Blood Updated: 10/14/23 1659     WBC 5.40 10*3/mm3      RBC 3.26 10*6/mm3      Hemoglobin 10.3 g/dL      Hematocrit 32.1 %      MCV 98.3 fL      MCH 31.5 pg      MCHC 32.0 g/dL      RDW 15.3 %      RDW-SD 54.7 fl      MPV 7.0 fL      Platelets 182 10*3/mm3      Neutrophil % 57.1 %      Lymphocyte % 29.9 %      Monocyte % 9.3 %      Eosinophil % 3.0 %      Basophil % 0.7 %      Neutrophils, Absolute 3.10 10*3/mm3      Lymphocytes, Absolute 1.60 10*3/mm3      Monocytes, Absolute 0.50 10*3/mm3      Eosinophils, Absolute 0.20 10*3/mm3      Basophils, Absolute 0.00 10*3/mm3      nRBC 0.1 /100 WBC              Imaging Results (Last 24 Hours)       ** No results found for the last 24 hours. **                 I reviewed the patient's new clinical results.    Medication Review:   Scheduled Meds:dicyclomine, 10 mg, Oral, TID  isosorbide mononitrate, 30 mg, Oral, Q24H  metoprolol tartrate, 50 mg, Oral, BID  saccharomyces boulardii, 250 mg, Oral, BID  senna-docusate sodium, 2 tablet, Oral, BID  sodium chloride, 10 mL, Intravenous, Q12H  topiramate, 100 mg, Oral, Nightly      Continuous Infusions:heparin, 12 Units/kg/hr, Last Rate: 13 Units/kg/hr (10/15/23 0139)      PRN Meds:.  senna-docusate sodium **AND** polyethylene glycol **AND** bisacodyl **AND** bisacodyl    heparin    heparin    hydrALAZINE    ipratropium    nitroglycerin    ondansetron **OR** ondansetron    sodium chloride    sodium chloride    sodium chloride    traMADol     Interval History:    Assessment & Plan     Chest pain  NSTEMI  Hypertension  History of sick sinus syndrome with pacemaker  CKD stage III  History of bladder and renal cancer  Presence of urostomy  Type 2 diabetes with diabetic polyneuropathy  Dyslipidemia     Plan of care  Cardiology heparin gtt poss heart cath tomorrow; nephrology  following for HD        DVT prophylaxis: Eliquis  GI prophylaxis: Protonix    Plan for disposition:SRINIVAS James, CHELLE  10/15/23  09:57 EDT

## 2023-10-16 ENCOUNTER — APPOINTMENT (OUTPATIENT)
Dept: CARDIOLOGY | Facility: HOSPITAL | Age: 83
End: 2023-10-16
Payer: MEDICARE

## 2023-10-16 LAB
ALBUMIN SERPL-MCNC: 3.3 G/DL (ref 3.5–5.2)
ALBUMIN/GLOB SERPL: 1.1 G/DL
ALP SERPL-CCNC: 121 U/L (ref 39–117)
ALT SERPL W P-5'-P-CCNC: 7 U/L (ref 1–33)
ANION GAP SERPL CALCULATED.3IONS-SCNC: 15 MMOL/L (ref 5–15)
APTT PPP: 47.3 SECONDS (ref 61–76.5)
APTT PPP: 60.6 SECONDS (ref 61–76.5)
APTT PPP: 73.7 SECONDS (ref 61–76.5)
AST SERPL-CCNC: 15 U/L (ref 1–32)
BASOPHILS # BLD AUTO: 0 10*3/MM3 (ref 0–0.2)
BASOPHILS NFR BLD AUTO: 0.8 % (ref 0–1.5)
BH CV ECHO MEAS - AO MAX PG: 4.8 MMHG
BH CV ECHO MEAS - AO MEAN PG: 3 MMHG
BH CV ECHO MEAS - AO V2 MAX: 109 CM/SEC
BH CV ECHO MEAS - AO V2 VTI: 19.6 CM
BH CV ECHO MEAS - EDV(CUBED): 64 ML
BH CV ECHO MEAS - EDV(MOD-SP2): 53.7 ML
BH CV ECHO MEAS - EDV(MOD-SP4): 52.9 ML
BH CV ECHO MEAS - EF(MOD-BP): 50.1 %
BH CV ECHO MEAS - EF(MOD-SP2): 51.4 %
BH CV ECHO MEAS - EF(MOD-SP4): 50.9 %
BH CV ECHO MEAS - ESV(CUBED): 19.7 ML
BH CV ECHO MEAS - ESV(MOD-SP2): 26.1 ML
BH CV ECHO MEAS - ESV(MOD-SP4): 26 ML
BH CV ECHO MEAS - FS: 32.5 %
BH CV ECHO MEAS - IVS/LVPW: 1.08 CM
BH CV ECHO MEAS - IVSD: 1.4 CM
BH CV ECHO MEAS - LA DIMENSION: 3.8 CM
BH CV ECHO MEAS - LAT PEAK E' VEL: 16.8 CM/SEC
BH CV ECHO MEAS - LV DIASTOLIC VOL/BSA (35-75): 33.2 CM2
BH CV ECHO MEAS - LV MASS(C)D: 197.6 GRAMS
BH CV ECHO MEAS - LV SYSTOLIC VOL/BSA (12-30): 16.3 CM2
BH CV ECHO MEAS - LVIDD: 4 CM
BH CV ECHO MEAS - LVIDS: 2.7 CM
BH CV ECHO MEAS - LVOT AREA: 3.1 CM2
BH CV ECHO MEAS - LVOT DIAM: 2 CM
BH CV ECHO MEAS - LVPWD: 1.3 CM
BH CV ECHO MEAS - MED PEAK E' VEL: 10.8 CM/SEC
BH CV ECHO MEAS - MV A MAX VEL: 0 CM/SEC
BH CV ECHO MEAS - MV DEC SLOPE: 612.5 CM/SEC2
BH CV ECHO MEAS - MV DEC TIME: 0.18 SEC
BH CV ECHO MEAS - MV E MAX VEL: 135 CM/SEC
BH CV ECHO MEAS - MV MAX PG: 7.8 MMHG
BH CV ECHO MEAS - MV MEAN PG: 4 MMHG
BH CV ECHO MEAS - MV P1/2T: 67.4 MSEC
BH CV ECHO MEAS - MV V2 VTI: 21.2 CM
BH CV ECHO MEAS - MVA(P1/2T): 3.3 CM2
BH CV ECHO MEAS - PA V2 MAX: 76.7 CM/SEC
BH CV ECHO MEAS - RV MAX PG: 2.45 MMHG
BH CV ECHO MEAS - RV V1 MAX: 78.2 CM/SEC
BH CV ECHO MEAS - RV V1 VTI: 16.8 CM
BH CV ECHO MEAS - RVDD: 2.5 CM
BH CV ECHO MEAS - SI(MOD-SP2): 17.3 ML/M2
BH CV ECHO MEAS - SI(MOD-SP4): 16.9 ML/M2
BH CV ECHO MEAS - SV(MOD-SP2): 27.6 ML
BH CV ECHO MEAS - SV(MOD-SP4): 26.9 ML
BH CV ECHO MEAS - TAPSE (>1.6): 1.22 CM
BH CV ECHO MEAS - TR MAX PG: 26.4 MMHG
BH CV ECHO MEAS - TR MAX VEL: 257 CM/SEC
BH CV ECHO MEASUREMENTS AVERAGE E/E' RATIO: 9.78
BH CV XLRA - TDI S': 9.3 CM/SEC
BILIRUB SERPL-MCNC: 0.3 MG/DL (ref 0–1.2)
BUN SERPL-MCNC: 95 MG/DL (ref 8–23)
BUN/CREAT SERPL: 14 (ref 7–25)
CALCIUM SPEC-SCNC: 8.7 MG/DL (ref 8.6–10.5)
CHLORIDE SERPL-SCNC: 101 MMOL/L (ref 98–107)
CO2 SERPL-SCNC: 21 MMOL/L (ref 22–29)
CREAT SERPL-MCNC: 6.81 MG/DL (ref 0.57–1)
DEPRECATED RDW RBC AUTO: 58.6 FL (ref 37–54)
EGFRCR SERPLBLD CKD-EPI 2021: 5.6 ML/MIN/1.73
EOSINOPHIL # BLD AUTO: 0.3 10*3/MM3 (ref 0–0.4)
EOSINOPHIL NFR BLD AUTO: 5 % (ref 0.3–6.2)
ERYTHROCYTE [DISTWIDTH] IN BLOOD BY AUTOMATED COUNT: 15.7 % (ref 12.3–15.4)
GLOBULIN UR ELPH-MCNC: 2.9 GM/DL
GLUCOSE SERPL-MCNC: 96 MG/DL (ref 65–99)
HBV SURFACE AB SER RIA-ACNC: NORMAL
HBV SURFACE AG SERPL QL IA: NORMAL
HCT VFR BLD AUTO: 31.1 % (ref 34–46.6)
HGB BLD-MCNC: 9.9 G/DL (ref 12–15.9)
HOLD SPECIMEN: NORMAL
HOLD SPECIMEN: NORMAL
LEFT ATRIUM VOLUME INDEX: 50.1 ML/M2
LYMPHOCYTES # BLD AUTO: 1.8 10*3/MM3 (ref 0.7–3.1)
LYMPHOCYTES NFR BLD AUTO: 29 % (ref 19.6–45.3)
MCH RBC QN AUTO: 31.7 PG (ref 26.6–33)
MCHC RBC AUTO-ENTMCNC: 31.8 G/DL (ref 31.5–35.7)
MCV RBC AUTO: 99.6 FL (ref 79–97)
MONOCYTES # BLD AUTO: 0.7 10*3/MM3 (ref 0.1–0.9)
MONOCYTES NFR BLD AUTO: 11.3 % (ref 5–12)
NEUTROPHILS NFR BLD AUTO: 3.3 10*3/MM3 (ref 1.7–7)
NEUTROPHILS NFR BLD AUTO: 53.9 % (ref 42.7–76)
NRBC BLD AUTO-RTO: 0.1 /100 WBC (ref 0–0.2)
PLATELET # BLD AUTO: 184 10*3/MM3 (ref 140–450)
PMV BLD AUTO: 7.2 FL (ref 6–12)
POTASSIUM SERPL-SCNC: 5.5 MMOL/L (ref 3.5–5.2)
PROT SERPL-MCNC: 6.2 G/DL (ref 6–8.5)
QT INTERVAL: 371 MS
QTC INTERVAL: 482 MS
RBC # BLD AUTO: 3.13 10*6/MM3 (ref 3.77–5.28)
SINUS: 4.1 CM
SODIUM SERPL-SCNC: 137 MMOL/L (ref 136–145)
WBC NRBC COR # BLD: 6.2 10*3/MM3 (ref 3.4–10.8)
WHOLE BLOOD HOLD SPECIMEN: NORMAL

## 2023-10-16 PROCEDURE — 85025 COMPLETE CBC W/AUTO DIFF WBC: CPT | Performed by: FAMILY MEDICINE

## 2023-10-16 PROCEDURE — 25010000002 HEPARIN (PORCINE) PER 1000 UNITS: Performed by: INTERNAL MEDICINE

## 2023-10-16 PROCEDURE — 5A1D70Z PERFORMANCE OF URINARY FILTRATION, INTERMITTENT, LESS THAN 6 HOURS PER DAY: ICD-10-PCS | Performed by: INTERNAL MEDICINE

## 2023-10-16 PROCEDURE — 97530 THERAPEUTIC ACTIVITIES: CPT | Performed by: OCCUPATIONAL THERAPIST

## 2023-10-16 PROCEDURE — 93306 TTE W/DOPPLER COMPLETE: CPT

## 2023-10-16 PROCEDURE — P9047 ALBUMIN (HUMAN), 25%, 50ML: HCPCS | Performed by: INTERNAL MEDICINE

## 2023-10-16 PROCEDURE — 87340 HEPATITIS B SURFACE AG IA: CPT | Performed by: INTERNAL MEDICINE

## 2023-10-16 PROCEDURE — 97112 NEUROMUSCULAR REEDUCATION: CPT | Performed by: OCCUPATIONAL THERAPIST

## 2023-10-16 PROCEDURE — 85730 THROMBOPLASTIN TIME PARTIAL: CPT | Performed by: FAMILY MEDICINE

## 2023-10-16 PROCEDURE — 93306 TTE W/DOPPLER COMPLETE: CPT | Performed by: INTERNAL MEDICINE

## 2023-10-16 PROCEDURE — 86706 HEP B SURFACE ANTIBODY: CPT | Performed by: INTERNAL MEDICINE

## 2023-10-16 PROCEDURE — 99233 SBSQ HOSP IP/OBS HIGH 50: CPT | Performed by: INTERNAL MEDICINE

## 2023-10-16 PROCEDURE — 97535 SELF CARE MNGMENT TRAINING: CPT | Performed by: OCCUPATIONAL THERAPIST

## 2023-10-16 PROCEDURE — 25010000002 HEPARIN (PORCINE) 25000-0.45 UT/250ML-% SOLUTION: Performed by: FAMILY MEDICINE

## 2023-10-16 PROCEDURE — 80053 COMPREHEN METABOLIC PANEL: CPT | Performed by: NURSE PRACTITIONER

## 2023-10-16 PROCEDURE — 25010000002 ALBUMIN HUMAN 25% PER 50 ML: Performed by: INTERNAL MEDICINE

## 2023-10-16 PROCEDURE — 85730 THROMBOPLASTIN TIME PARTIAL: CPT | Performed by: INTERNAL MEDICINE

## 2023-10-16 RX ORDER — MORPHINE SULFATE 2 MG/ML
1 INJECTION, SOLUTION INTRAMUSCULAR; INTRAVENOUS
Status: DISCONTINUED | OUTPATIENT
Start: 2023-10-16 | End: 2023-10-18 | Stop reason: HOSPADM

## 2023-10-16 RX ORDER — HEPARIN SODIUM 1000 [USP'U]/ML
5000 INJECTION, SOLUTION INTRAVENOUS; SUBCUTANEOUS AS NEEDED
Status: DISCONTINUED | OUTPATIENT
Start: 2023-10-16 | End: 2023-10-18

## 2023-10-16 RX ORDER — ALBUMIN (HUMAN) 12.5 G/50ML
12.5 SOLUTION INTRAVENOUS AS NEEDED
Status: DISCONTINUED | OUTPATIENT
Start: 2023-10-16 | End: 2023-10-18 | Stop reason: HOSPADM

## 2023-10-16 RX ADMIN — ALBUMIN (HUMAN) 12.5 G: 0.25 INJECTION, SOLUTION INTRAVENOUS at 10:09

## 2023-10-16 RX ADMIN — Medication 250 MG: at 20:30

## 2023-10-16 RX ADMIN — Medication 250 MG: at 12:55

## 2023-10-16 RX ADMIN — ALBUMIN (HUMAN) 12.5 G: 0.25 INJECTION, SOLUTION INTRAVENOUS at 09:04

## 2023-10-16 RX ADMIN — ISOSORBIDE MONONITRATE 30 MG: 30 TABLET, EXTENDED RELEASE ORAL at 12:55

## 2023-10-16 RX ADMIN — TOPIRAMATE 100 MG: 100 TABLET, FILM COATED ORAL at 20:30

## 2023-10-16 RX ADMIN — HEPARIN SODIUM 5000 UNITS: 1000 INJECTION INTRAVENOUS; SUBCUTANEOUS at 08:33

## 2023-10-16 RX ADMIN — DICYCLOMINE HYDROCHLORIDE 10 MG: 10 CAPSULE ORAL at 20:30

## 2023-10-16 RX ADMIN — METOPROLOL TARTRATE 50 MG: 50 TABLET ORAL at 20:30

## 2023-10-16 RX ADMIN — DOCUSATE SODIUM 50 MG AND SENNOSIDES 8.6 MG 2 TABLET: 8.6; 5 TABLET, FILM COATED ORAL at 20:30

## 2023-10-16 RX ADMIN — DICYCLOMINE HYDROCHLORIDE 10 MG: 10 CAPSULE ORAL at 12:56

## 2023-10-16 RX ADMIN — Medication 10 ML: at 08:18

## 2023-10-16 RX ADMIN — HEPARIN SODIUM 16 UNITS/KG/HR: 10000 INJECTION, SOLUTION INTRAVENOUS at 23:22

## 2023-10-16 RX ADMIN — METOPROLOL TARTRATE 50 MG: 50 TABLET ORAL at 12:55

## 2023-10-16 RX ADMIN — Medication 10 ML: at 20:31

## 2023-10-16 NOTE — PLAN OF CARE
Goal Outcome Evaluation:  Plan of Care Reviewed With: patient           Outcome Evaluation: Pt is an 83-year-old female that presents to the ED on 10/13/23 for chest pain, dyspnea & nausea. PMH: bladder & breast CA, right tibia/fibula fx, a-fib, pacemaker, sick sinus syndrome. At baseline, pt lives in a SSH with a ramp to enter. Her nephew lives with her & works from home, so is present most of the time. Pt is mod ind with bathing, ind with dressing, feeding, grooming & toileting & mod ind with amb using a RW. She intermittently drives herself to [employer]. Family drives her to dialysis & does shopping for her. Upon eval, pt A&O X3 (not to month). She is [place] & hears better out of right ear. She completed sup>sit with CGA. When attempting to stand from bed, pt unable to stand X2 attempts until height of bed raised then she stood with min A X1 & CGA X1. She completed donning of shoes ind sitting EOB, but requires mod A for LB ADLs when standing due to dec balance, generalized weakness & dec activity tolerance. Pt seems to be functioning below her reported baseline status; therefore, OT will follow for OT tx & recommends SNF upon discharge. Pt is in agreement with recommendations at time of eval.      Anticipated Discharge Disposition (OT): skilled nursing facility

## 2023-10-16 NOTE — DISCHARGE PLACEMENT REQUEST
"Nell Bucio (83 y.o. Female)       Date of Birth   1940    Social Security Number       Address   [address]  [place] [facility]    Home Phone   [phone]    MRN   [id number]       [facility]    Marital Status                               Admission Date   10/13/23    Admission Type   Emergency    Admitting Provider   Bethanie Mena MD    Attending Provider   Lora Henderson MD    Department, Room/Bed   [facility], 247/1       Discharge Date       Discharge Disposition       Discharge Destination                                 Attending Provider: Lora Henderson MD    Allergies: Amoxicillin, Ciprofloxacin, Oxycodone-acetaminophen, Penicillins, Sulfa Antibiotics, Cephalexin, Clavulanic Acid, Codeine, Contrast Dye (Echo Or Unknown Ct/mr), Doxycycline, Fluconazole, Iodinated Contrast Media, Iodine, Macrobid [Nitrofurantoin], Tobramycin, Trimethoprim    Isolation: None   Infection: None   Code Status: CPR    Ht: 165.1 cm (65\")   Wt: 54.6 kg (120 lb 5.9 oz)    Admission Cmt: None   Principal Problem: None                  Active Insurance as of 10/13/2023       Primary Coverage       Payor Plan Insurance Group Employer/Plan Group    MEDICARE MEDICARE A & B        Payor Plan Address Payor Plan Phone Number Payor Plan Fax Number Effective Dates    [address] [phone]  4/1/2006 - None Entered    [place] [address]         Subscriber Name Subscriber Birth Date Member ID       NELL BUCIO 1940 [id number]                     Emergency Contacts        (Rel.) Home Phone Work Phone Mobile Phone    OZZY GARAY (Friend) [phone] -- [phone]    Brianna Patel (Niece) (Relative) -- -- [phone]    Papito Agudelo (Relative) -- -- [phone]    Manish Bucio (Son) [phone] -- [phone]    RANJITH WATKINS (Grandchild) -- -- [phone]              "

## 2023-10-16 NOTE — PLAN OF CARE
Problem: Adult Inpatient Plan of Care  Goal: Plan of Care Review  Outcome: Ongoing, Progressing  Flowsheets (Taken 10/16/2023 1722)  Progress: no change  Plan of Care Reviewed With: patient  Goal: Patient-Specific Goal (Individualized)  Outcome: Ongoing, Progressing  Goal: Absence of Hospital-Acquired Illness or Injury  Outcome: Ongoing, Progressing  Intervention: Identify and Manage Fall Risk  Recent Flowsheet Documentation  Taken 10/16/2023 1634 by Sloane Bergeron LPN  Safety Promotion/Fall Prevention:   assistive device/personal items within reach   clutter free environment maintained   fall prevention program maintained   nonskid shoes/slippers when out of bed   safety round/check completed   room organization consistent  Taken 10/16/2023 1350 by Sloane Bergeron LPN  Safety Promotion/Fall Prevention:   assistive device/personal items within reach   clutter free environment maintained   fall prevention program maintained   nonskid shoes/slippers when out of bed   safety round/check completed   room organization consistent  Taken 10/16/2023 1150 by Sloane Bergeron LPN  Safety Promotion/Fall Prevention: patient off unit  Taken 10/16/2023 1000 by Sloane Bergeron LPN  Safety Promotion/Fall Prevention: patient off unit  Taken 10/16/2023 0749 by Sloane Bergeron LPN  Safety Promotion/Fall Prevention:   assistive device/personal items within reach   clutter free environment maintained   fall prevention program maintained   nonskid shoes/slippers when out of bed   room organization consistent   safety round/check completed  Intervention: Prevent Skin Injury  Recent Flowsheet Documentation  Taken 10/16/2023 1634 by Sloane Bergeron LPN  Body Position: weight shifting  Skin Protection:   adhesive use limited   tubing/devices free from skin contact  Taken 10/16/2023 0749 by Sloane Bergeron LPN  Body Position: weight shifting  Skin Protection:   adhesive use limited   tubing/devices free from skin contact  Intervention: Prevent and  Manage VTE (Venous Thromboembolism) Risk  Recent Flowsheet Documentation  Taken 10/16/2023 1634 by Sloane Bergeron LPN  Activity Management: bedrest  VTE Prevention/Management:   bilateral   sequential compression devices off   patient refused intervention  Range of Motion: active ROM (range of motion) encouraged  Taken 10/16/2023 0749 by Sloane Bergeron LPN  Activity Management: bedrest  VTE Prevention/Management:   bilateral   sequential compression devices off   patient refused intervention  Range of Motion: active ROM (range of motion) encouraged  Intervention: Prevent Infection  Recent Flowsheet Documentation  Taken 10/16/2023 1634 by Sloane Bergeron LPN  Infection Prevention:   environmental surveillance performed   hand hygiene promoted   rest/sleep promoted   single patient room provided  Taken 10/16/2023 1350 by Sloane Bergeron LPN  Infection Prevention:   environmental surveillance performed   hand hygiene promoted   rest/sleep promoted   single patient room provided  Taken 10/16/2023 0749 by Sloane Bergeron LPN  Infection Prevention:   environmental surveillance performed   hand hygiene promoted   rest/sleep promoted   single patient room provided  Goal: Optimal Comfort and Wellbeing  Outcome: Ongoing, Progressing  Intervention: Provide Person-Centered Care  Recent Flowsheet Documentation  Taken 10/16/2023 1634 by Sloane Bergeron LPN  Trust Relationship/Rapport:   care explained   thoughts/feelings acknowledged  Taken 10/16/2023 0749 by Sloane Bergeron LPN  Trust Relationship/Rapport:   care explained   thoughts/feelings acknowledged  Goal: Readiness for Transition of Care  Outcome: Ongoing, Progressing     Problem: Skin Injury Risk Increased  Goal: Skin Health and Integrity  Outcome: Ongoing, Progressing  Intervention: Optimize Skin Protection  Recent Flowsheet Documentation  Taken 10/16/2023 1634 by Sloane Bergeron LPN  Pressure Reduction Techniques: frequent weight shift encouraged  Head of Bed (HOB)  Positioning: HOB elevated  Pressure Reduction Devices: pressure-redistributing mattress utilized  Skin Protection:   adhesive use limited   tubing/devices free from skin contact  Taken 10/16/2023 0749 by Sloane Bergeron LPN  Pressure Reduction Techniques: frequent weight shift encouraged  Head of Bed (HOB) Positioning: HOB elevated  Pressure Reduction Devices: pressure-redistributing mattress utilized  Skin Protection:   adhesive use limited   tubing/devices free from skin contact     Problem: Diabetes Comorbidity  Goal: Blood Glucose Level Within Targeted Range  Outcome: Ongoing, Progressing  Intervention: Monitor and Manage Glycemia  Recent Flowsheet Documentation  Taken 10/16/2023 1634 by Sloane Bergeron LPN  Glycemic Management: blood glucose monitored     Problem: Heart Failure Comorbidity  Goal: Maintenance of Heart Failure Symptom Control  Outcome: Ongoing, Progressing  Intervention: Maintain Heart Failure-Management  Recent Flowsheet Documentation  Taken 10/16/2023 1634 by Sloane Bergeron LPN  Medication Review/Management: medications reviewed  Taken 10/16/2023 1350 by Sloane Bergeron LPN  Medication Review/Management: medications reviewed  Taken 10/16/2023 0749 by Sloane Bergeron LPN  Medication Review/Management: medications reviewed     Problem: Hypertension Comorbidity  Goal: Blood Pressure in Desired Range  Outcome: Ongoing, Progressing  Intervention: Maintain Blood Pressure Management  Recent Flowsheet Documentation  Taken 10/16/2023 1634 by Sloane Bergeron LPN  Medication Review/Management: medications reviewed  Taken 10/16/2023 1350 by Sloane Bergeron LPN  Medication Review/Management: medications reviewed  Taken 10/16/2023 0749 by Sloane Bergeron LPN  Medication Review/Management: medications reviewed     Problem: Pain Chronic (Persistent) (Comorbidity Management)  Goal: Acceptable Pain Control and Functional Ability  Outcome: Ongoing, Progressing  Intervention: Manage Persistent Pain  Recent Flowsheet  Documentation  Taken 10/16/2023 1634 by Sloane Bergeron LPN  Medication Review/Management: medications reviewed  Taken 10/16/2023 1350 by Sloane Bergeron LPN  Medication Review/Management: medications reviewed  Taken 10/16/2023 0749 by Sloane Bergeron LPN  Medication Review/Management: medications reviewed  Intervention: Optimize Psychosocial Wellbeing  Recent Flowsheet Documentation  Taken 10/16/2023 1634 by Sloane Bergeron LPN  Diversional Activities: television  Family/Support System Care: caregiver stress acknowledged  Taken 10/16/2023 0749 by Sloane Bergeron LPN  Diversional Activities: television  Family/Support System Care: caregiver stress acknowledged     Problem: Fall Injury Risk  Goal: Absence of Fall and Fall-Related Injury  Outcome: Ongoing, Progressing  Intervention: Identify and Manage Contributors  Recent Flowsheet Documentation  Taken 10/16/2023 1634 by Sloane Bergeron LPN  Medication Review/Management: medications reviewed  Taken 10/16/2023 1350 by Sloane Bergeron LPN  Medication Review/Management: medications reviewed  Taken 10/16/2023 0749 by Sloane Bergeron LPN  Medication Review/Management: medications reviewed  Intervention: Promote Injury-Free Environment  Recent Flowsheet Documentation  Taken 10/16/2023 1634 by Sloane Bergeron LPN  Safety Promotion/Fall Prevention:   assistive device/personal items within reach   clutter free environment maintained   fall prevention program maintained   nonskid shoes/slippers when out of bed   safety round/check completed   room organization consistent  Taken 10/16/2023 1350 by Sloane Bergeron LPN  Safety Promotion/Fall Prevention:   assistive device/personal items within reach   clutter free environment maintained   fall prevention program maintained   nonskid shoes/slippers when out of bed   safety round/check completed   room organization consistent  Taken 10/16/2023 1150 by Sloane Bergeron LPN  Safety Promotion/Fall Prevention: patient off unit  Taken 10/16/2023  1000 by Moerer, Sloane, LPN  Safety Promotion/Fall Prevention: patient off unit  Taken 10/16/2023 0749 by Sloane Bergeron LPN  Safety Promotion/Fall Prevention:   assistive device/personal items within reach   clutter free environment maintained   fall prevention program maintained   nonskid shoes/slippers when out of bed   room organization consistent   safety round/check completed   Goal Outcome Evaluation:  Plan of Care Reviewed With: patient        Progress: no change

## 2023-10-16 NOTE — PROGRESS NOTES
LOS: 1 day   Patient Care Team:  Lizzy Francis MD as PCP - General (Family Medicine)  Jose Carlos Cheng MD as Consulting Physician (Cardiology)    Subjective     Interval History: Stable overnight    Patient Complaints: Patient was seen during dialysis.  She is complaining of mild substernal chest pain that waxes and wanes.  Unrelated to exertion.  Similar to pain that she was experiencing on admission.    History taken from: patient    Review of Systems   Constitutional:  Positive for activity change. Negative for appetite change, chills, fatigue and fever.   HENT:  Negative for facial swelling.    Eyes:  Negative for visual disturbance.   Respiratory:  Negative for cough and shortness of breath.    Cardiovascular:  Positive for chest pain. Negative for palpitations and leg swelling.   Gastrointestinal:  Negative for abdominal pain, constipation, diarrhea, nausea and vomiting.   Endocrine: Negative for polyuria.   Genitourinary:  Negative for dysuria.   Musculoskeletal:  Negative for arthralgias, back pain and gait problem.   Skin:  Negative for rash and wound.   Neurological:  Negative for dizziness, tremors, weakness, light-headedness and headaches.   Psychiatric/Behavioral:  Negative for confusion.            Objective     Vital Signs  Temp:  [97.9 °F (36.6 °C)-98.8 °F (37.1 °C)] 98.3 °F (36.8 °C)  Heart Rate:  [94-98] 98  Resp:  [16-21] 16  BP: ()/(46-60) 103/60    Physical Exam:     General Appearance:    Alert, cooperative, in no acute distress,   Head:    Normocephalic, without obvious abnormality, atraumatic   Eyes:            Lids and lashes normal, conjunctivae and sclerae normal, no   icterus, no pallor, corneas clear, PERRLA   Ears:    Ears appear intact with no abnormalities noted   Throat:   No oral lesions, no thrush, oral mucosa moist   Neck:   No adenopathy, supple, trachea midline, no thyromegaly, no   carotid bruit, no JVD   Lungs:     Clear to auscultation,respirations regular,  even and                  unlabored    Heart:    Regular rhythm and normal rate, normal S1 and S2, no            murmur, no gallop, no rub, no click   Chest Wall:  Tunneled dialysis catheter in right chest wall   Abdomen:   Urostomy right lower quadrant   Extremities:   Moves all extremities well, no edema, no cyanosis, no             Redness   Pulses:   Pulses palpable and equal bilaterally   Skin:   No bleeding, bruising or rash   Lymph nodes:   No palpable adenopathy   Neurologic:   Cranial nerves 2 - 12 grossly intact, sensation intact, DTR       present and equal bilaterally        Results Review:    Lab Results (last 24 hours)       Procedure Component Value Units Date/Time    Urine Culture - Urine, Urine, Clean Catch [[id number]]  (Abnormal) Collected: 10/14/23 1835    Specimen: Urine, Clean Catch Updated: 10/16/23 1044     Urine Culture >100,000 CFU/mL Gram Negative Bacilli    Narrative:      Colonization of the urinary tract without infection is common. Treatment is discouraged unless the patient is symptomatic, pregnant, or undergoing an invasive urologic procedure.    Extra Tubes [[id number]] Collected: 10/16/23 0832    Specimen: Blood, Venous Line Updated: 10/16/23 0945    Narrative:      The following orders were created for panel order Extra Tubes.  Procedure                               Abnormality         Status                     ---------                               -----------         ------                     Lavender Top[[id number]]                                     Final result               Gold Top - SST[[id number]]                                   Final result               Green Top (Gel)[[id number]]                                  Final result                 Please view results for these tests on the individual orders.    Lavender Top [[id number]] Collected: 10/16/23 0832    Specimen: Blood Updated: 10/16/23 0945     Extra Tube hold for add-on     Comment: Auto resulted       Gold Top  - SST [[id number]] Collected: 10/16/23 0832    Specimen: Blood Updated: 10/16/23 0945     Extra Tube Hold for add-ons.     Comment: Auto resulted.       Green Top (Gel) [[id number]] Collected: 10/16/23 0832    Specimen: Blood Updated: 10/16/23 0945     Extra Tube Hold for add-ons.     Comment: Auto resulted.       aPTT [[id number]]  (Abnormal) Collected: 10/16/23 0832    Specimen: Blood from Arm, Left Updated: 10/16/23 0936     PTT 60.6 seconds     Comprehensive Metabolic Panel [[id number]]  (Abnormal) Collected: 10/16/23 0149    Specimen: Blood Updated: 10/16/23 0216     Glucose 96 mg/dL      BUN 95 mg/dL      Creatinine 6.81 mg/dL      Sodium 137 mmol/L      Potassium 5.5 mmol/L      Chloride 101 mmol/L      CO2 21.0 mmol/L      Calcium 8.7 mg/dL      Total Protein 6.2 g/dL      Albumin 3.3 g/dL      ALT (SGPT) 7 U/L      AST (SGOT) 15 U/L      Alkaline Phosphatase 121 U/L      Total Bilirubin 0.3 mg/dL      Globulin 2.9 gm/dL      A/G Ratio 1.1 g/dL      BUN/Creatinine Ratio 14.0     Anion Gap 15.0 mmol/L      eGFR 5.6 mL/min/1.73      Comment: <15 Indicative of kidney failure       Narrative:      GFR Normal >60  Chronic Kidney Disease <60  Kidney Failure <15    The GFR formula is only valid for adults with stable renal function between ages 18 and 70.    aPTT [[id number]]  (Abnormal) Collected: 10/16/23 0149    Specimen: Blood from Arm, Left Updated: 10/16/23 0206     PTT 47.3 seconds     CBC & Differential [[id number]]  (Abnormal) Collected: 10/16/23 0149    Specimen: Blood Updated: 10/16/23 0157    Narrative:      The following orders were created for panel order CBC & Differential.  Procedure                               Abnormality         Status                     ---------                               -----------         ------                     CBC Auto Differential[[id number]]        Abnormal            Final result                 Please view results for these tests on the individual orders.    CBC Auto  Differential [[id number]]  (Abnormal) Collected: 10/16/23 0149    Specimen: Blood Updated: 10/16/23 0157     WBC 6.20 10*3/mm3      RBC 3.13 10*6/mm3      Hemoglobin 9.9 g/dL      Hematocrit 31.1 %      MCV 99.6 fL      MCH 31.7 pg      MCHC 31.8 g/dL      RDW 15.7 %      RDW-SD 58.6 fl      MPV 7.2 fL      Platelets 184 10*3/mm3      Neutrophil % 53.9 %      Lymphocyte % 29.0 %      Monocyte % 11.3 %      Eosinophil % 5.0 %      Basophil % 0.8 %      Neutrophils, Absolute 3.30 10*3/mm3      Lymphocytes, Absolute 1.80 10*3/mm3      Monocytes, Absolute 0.70 10*3/mm3      Eosinophils, Absolute 0.30 10*3/mm3      Basophils, Absolute 0.00 10*3/mm3      nRBC 0.1 /100 WBC     aPTT [[id number]]  (Abnormal) Collected: 10/15/23 1852    Specimen: Blood Updated: 10/15/23 1937     PTT 44.7 seconds     High Sensitivity Troponin T 2Hr [[id number]]  (Abnormal) Collected: 10/15/23 1731    Specimen: Blood Updated: 10/15/23 1843     HS Troponin T 57 ng/L      Troponin T Delta -4 ng/L     Narrative:      High Sensitive Troponin T Reference Range:  <10.0 ng/L- Negative Female for AMI  <15.0 ng/L- Negative Male for AMI  >=10 - Abnormal Female indicating possible myocardial injury.  >=15 - Abnormal Male indicating possible myocardial injury.   Clinicians would have to utilize clinical acumen, EKG, Troponin, and serial changes to determine if it is an Acute Myocardial Infarction or myocardial injury due to an underlying chronic condition.         Basic Metabolic Panel [[id number]]  (Abnormal) Collected: 10/15/23 1731    Specimen: Blood Updated: 10/15/23 1840     Glucose 122 mg/dL      BUN 88 mg/dL      Creatinine 6.08 mg/dL      Sodium 135 mmol/L      Potassium 5.1 mmol/L      Comment: Slight hemolysis detected by analyzer. Results may be affected.        Chloride 98 mmol/L      CO2 19.0 mmol/L      Calcium 8.8 mg/dL      BUN/Creatinine Ratio 14.5     Anion Gap 18.0 mmol/L      eGFR 6.5 mL/min/1.73      Comment: <15 Indicative of kidney  failure       Narrative:      GFR Normal >60  Chronic Kidney Disease <60  Kidney Failure <15    The GFR formula is only valid for adults with stable renal function between ages 18 and 70.    Comprehensive Metabolic Panel [[id number]]  (Abnormal) Collected: 10/15/23 1427    Specimen: Blood Updated: 10/15/23 1627     Glucose 88 mg/dL      BUN 86 mg/dL      Creatinine 5.98 mg/dL      Sodium 137 mmol/L      Potassium 5.0 mmol/L      Comment: Slight hemolysis detected by analyzer. Results may be affected.        Chloride 99 mmol/L      CO2 19.0 mmol/L      Calcium 8.9 mg/dL      Total Protein 6.8 g/dL      Albumin 3.3 g/dL      ALT (SGPT) 12 U/L      AST (SGOT) 27 U/L      Comment: Slight hemolysis detected by analyzer. Results may be affected.        Alkaline Phosphatase 114 U/L      Total Bilirubin 0.3 mg/dL      Globulin 3.5 gm/dL      A/G Ratio 0.9 g/dL      BUN/Creatinine Ratio 14.4     Anion Gap 19.0 mmol/L      eGFR 6.6 mL/min/1.73      Comment: <15 Indicative of kidney failure       Narrative:      GFR Normal >60  Chronic Kidney Disease <60  Kidney Failure <15    The GFR formula is only valid for adults with stable renal function between ages 18 and 70.    High Sensitivity Troponin T [[id number]]  (Abnormal) Collected: 10/15/23 1427    Specimen: Blood Updated: 10/15/23 1508     HS Troponin T 61 ng/L     Narrative:      High Sensitive Troponin T Reference Range:  <10.0 ng/L- Negative Female for AMI  <15.0 ng/L- Negative Male for AMI  >=10 - Abnormal Female indicating possible myocardial injury.  >=15 - Abnormal Male indicating possible myocardial injury.   Clinicians would have to utilize clinical acumen, EKG, Troponin, and serial changes to determine if it is an Acute Myocardial Infarction or myocardial injury due to an underlying chronic condition.         POC Glucose Once [[id number]]  (Normal) Collected: 10/15/23 1208    Specimen: Blood Updated: 10/15/23 1209     Glucose 89 mg/dL      Comment: Serial Number:  [id number]:  [id number]                Imaging Results (Last 24 Hours)       ** No results found for the last 24 hours. **                 I reviewed the patient's new clinical results.    Medication Review:   Scheduled Meds:dicyclomine, 10 mg, Oral, TID  isosorbide mononitrate, 30 mg, Oral, Q24H  metoprolol tartrate, 50 mg, Oral, BID  saccharomyces boulardii, 250 mg, Oral, BID  senna-docusate sodium, 2 tablet, Oral, BID  sodium chloride, 10 mL, Intravenous, Q12H  topiramate, 100 mg, Oral, Nightly      Continuous Infusions:heparin, 12 Units/kg/hr, Last Rate: 16 Units/kg/hr (10/16/23 6579)      PRN Meds:.  albumin human    senna-docusate sodium **AND** polyethylene glycol **AND** bisacodyl **AND** bisacodyl    heparin (porcine)    heparin    heparin    hydrALAZINE    ipratropium    Morphine    nitroglycerin    ondansetron **OR** ondansetron    sodium chloride    sodium chloride    sodium chloride    traMADol     Assessment & Plan       Chest pain  -Suspicious for anginal; cardiologist planning left heart cath.  Will need to coordinate with dialysis schedule.  Monitor blood pressure closely with use of blocker and nitrates.  End-stage renal disease-dialysis 3 times a week; Dr. Saba following  Hyperkalemia-should correct with dialysis  Chronic headaches-Topamax  Paroxysmal atrial fib -oral anticoagulation on hold; she is currently on heparin drip  Anemia related to end-stage renal disease  Urostomy-no symptoms suggestive of clinical UTI.  Urine culture noted but this is unreliable in the setting of urostomy tube.  Not currently on antibiotics.      Plan for disposition: To be determined    Lora Henderson MD  10/16/23  10:50 EDT

## 2023-10-16 NOTE — THERAPY TREATMENT NOTE
"Subjective: Pt agreeable to therapeutic plan of care. Pt had dialysis this a.m. and has been NPO as she is going to have heart cath today.   NOTE: Lavern Guzman, , informed this therapist that she spoke to pt's niece & pt was actually residing at an ECF prior to this admission versus from home which is what pt reported upon eval on 10/15/23.  She has been in ECF for at least a couple months & there are no future plans for pt to return home per niece.     Cognition: oriented to Person, Place, Time, and Situation except month.     Objective:     Bed Mobility: Min-A   Functional Transfers: Mod-A for sit>stand.      Balance: supported and standing = CGA and Min-A  Functional Ambulation: N/A or Not attempted.    Grooming & oral care. : SBA  ADL Position: edge of bed sitting  ADL Comments:     Lower Body Dressing: Mod-A  ADL Position: edge of bed sitting  ADL Comments:     Vitals: WNL    Pain: pt c/o headache  Interventions for pain: RN notified and Therapeutic Presence  Education: Provided education on the importance of mobility in the acute care setting and Transfer Training      Assessment: Hazel Bucio presents with ADL impairments affecting function including balance, endurance / activity tolerance, and strength. Pt had dialysis this am & is also NPO  this date as she is having a heart cath this date. She reports feeling weak. She participated with ADLs sitting EOB doing oral care, grooming & LB drsg. She continues to require assist for LB ADLs & ADL transfers. Demonstrated functioning below baseline abilities indicate the need for continued skilled intervention while inpatient. Tolerating session today without incident. Will continue to follow and progress as tolerated.     Plan/Recommendations:   Moderate Intensity Therapy recommended post-acute care. This is recommended as therapy feels the patient would require 3-4 days per week and wouldn't tolerate \"3 hour daily\" rehab intensity. SNF would be the " preferred choice. If the patient does not agree to SNF, arrange HH or OP depending on home bound status. If patient is medically complex, consider LTACH.. Pt requires no DME at discharge.     Pt desires Skilled Rehab placement at discharge. Pt cooperative; agreeable to therapeutic recommendations and plan of care.     Modified [place]: N/A = No pre-op stroke/TIA    Post-Tx Position: Supine with HOB Elevated, Alarms activated, and Call light and personal items within reach  PPE: gloves

## 2023-10-16 NOTE — NURSING NOTE
Spoke to Dena Keith in regards to pt's possible heart cath. Pt to go for heart cath on Wednesday 10/18.

## 2023-10-16 NOTE — PROGRESS NOTES
"                                                                                                                                      Nephrology  Progress Note                                        [facility]    Patient Identification    Name: Hazel Bucio  Age: 83 y.o.  Sex: female  :  1940  MRN: [id number]      DATE OF SERVICE:  10/16/2023        Subective    No complaints   Seen on dialysis  Objective   Scheduled Meds:dicyclomine, 10 mg, Oral, TID  isosorbide mononitrate, 30 mg, Oral, Q24H  metoprolol tartrate, 50 mg, Oral, BID  saccharomyces boulardii, 250 mg, Oral, BID  senna-docusate sodium, 2 tablet, Oral, BID  sodium chloride, 10 mL, Intravenous, Q12H  topiramate, 100 mg, Oral, Nightly          Continuous Infusions:heparin, 12 Units/kg/hr, Last Rate: 15 Units/kg/hr (10/16/23 0647)        PRN Meds:  senna-docusate sodium **AND** polyethylene glycol **AND** bisacodyl **AND** bisacodyl    heparin (porcine)    heparin    heparin    hydrALAZINE    ipratropium    nitroglycerin    ondansetron **OR** ondansetron    sodium chloride    sodium chloride    sodium chloride    traMADol     Exam:  /60 (BP Location: Left arm, Patient Position: Lying)   Pulse 98   Temp 98.3 °F (36.8 °C) (Axillary)   Resp 16   Ht 165.1 cm (65\")   Wt 54.6 kg (120 lb 5.9 oz)   SpO2 95%   BMI 20.03 kg/m²     Intake/Output last 3 shifts:  I/O last 3 completed shifts:  In: 600 [P.O.:600]  Out: 350 [Urine:350]    Intake/Output this shift:  No intake/output data recorded.    Physical exam:  General Appearance:  Alert  Head:  Normocephalic, without obvious abnormality, atraumatic  Eyes:  PERRL, conjunctiva/corneas clear     Neck:  Supple,  no adenopathy;      Lungs:  Decreased BS occasion ronchi  Heart:  Regular rate and rhythm, S1 and S2 normal  Abdomen:  Soft, non-tender, bowel sounds active   Extremities: trace edema  Pulses: 2+ and symmetric all extremities  Skin:  No rashes or lesions       Data " Review:  All labs (24hrs):   Recent Results (from the past 24 hour(s))   POC Glucose Once    Collection Time: 10/15/23 12:08 PM    Specimen: Blood   Result Value Ref Range    Glucose 89 70 - 105 mg/dL   ECG 12 Lead Syncope    Collection Time: 10/15/23 12:42 PM   Result Value Ref Range    QT Interval 380 ms    QTC Interval 457 ms   High Sensitivity Troponin T    Collection Time: 10/15/23  2:27 PM    Specimen: Blood   Result Value Ref Range    HS Troponin T 61 (C) <10 ng/L   Comprehensive Metabolic Panel    Collection Time: 10/15/23  2:27 PM    Specimen: Blood   Result Value Ref Range    Glucose 88 65 - 99 mg/dL    BUN 86 (H) 8 - 23 mg/dL    Creatinine 5.98 (H) 0.57 - 1.00 mg/dL    Sodium 137 136 - 145 mmol/L    Potassium 5.0 3.5 - 5.2 mmol/L    Chloride 99 98 - 107 mmol/L    CO2 19.0 (L) 22.0 - 29.0 mmol/L    Calcium 8.9 8.6 - 10.5 mg/dL    Total Protein 6.8 6.0 - 8.5 g/dL    Albumin 3.3 (L) 3.5 - 5.2 g/dL    ALT (SGPT) 12 1 - 33 U/L    AST (SGOT) 27 1 - 32 U/L    Alkaline Phosphatase 114 39 - 117 U/L    Total Bilirubin 0.3 0.0 - 1.2 mg/dL    Globulin 3.5 gm/dL    A/G Ratio 0.9 g/dL    BUN/Creatinine Ratio 14.4 7.0 - 25.0    Anion Gap 19.0 (H) 5.0 - 15.0 mmol/L    eGFR 6.6 (L) >60.0 mL/min/1.73   High Sensitivity Troponin T 2Hr    Collection Time: 10/15/23  5:31 PM    Specimen: Blood   Result Value Ref Range    HS Troponin T 57 (C) <10 ng/L    Troponin T Delta -4 (L) >=-4 - <+4 ng/L   Basic Metabolic Panel    Collection Time: 10/15/23  5:31 PM    Specimen: Blood   Result Value Ref Range    Glucose 122 (H) 65 - 99 mg/dL    BUN 88 (H) 8 - 23 mg/dL    Creatinine 6.08 (H) 0.57 - 1.00 mg/dL    Sodium 135 (L) 136 - 145 mmol/L    Potassium 5.1 3.5 - 5.2 mmol/L    Chloride 98 98 - 107 mmol/L    CO2 19.0 (L) 22.0 - 29.0 mmol/L    Calcium 8.8 8.6 - 10.5 mg/dL    BUN/Creatinine Ratio 14.5 7.0 - 25.0    Anion Gap 18.0 (H) 5.0 - 15.0 mmol/L    eGFR 6.5 (L) >60.0 mL/min/1.73   aPTT    Collection Time: 10/15/23  6:52 PM     Specimen: Blood   Result Value Ref Range    PTT 44.7 (L) 61.0 - 76.5 seconds   aPTT    Collection Time: 10/16/23  1:49 AM    Specimen: Arm, Left; Blood   Result Value Ref Range    PTT 47.3 (L) 61.0 - 76.5 seconds   CBC Auto Differential    Collection Time: 10/16/23  1:49 AM    Specimen: Blood   Result Value Ref Range    WBC 6.20 3.40 - 10.80 10*3/mm3    RBC 3.13 (L) 3.77 - 5.28 10*6/mm3    Hemoglobin 9.9 (L) 12.0 - 15.9 g/dL    Hematocrit 31.1 (L) 34.0 - 46.6 %    MCV 99.6 (H) 79.0 - 97.0 fL    MCH 31.7 26.6 - 33.0 pg    MCHC 31.8 31.5 - 35.7 g/dL    RDW 15.7 (H) 12.3 - 15.4 %    RDW-SD 58.6 (H) 37.0 - 54.0 fl    MPV 7.2 6.0 - 12.0 fL    Platelets 184 140 - 450 10*3/mm3    Neutrophil % 53.9 42.7 - 76.0 %    Lymphocyte % 29.0 19.6 - 45.3 %    Monocyte % 11.3 5.0 - 12.0 %    Eosinophil % 5.0 0.3 - 6.2 %    Basophil % 0.8 0.0 - 1.5 %    Neutrophils, Absolute 3.30 1.70 - 7.00 10*3/mm3    Lymphocytes, Absolute 1.80 0.70 - 3.10 10*3/mm3    Monocytes, Absolute 0.70 0.10 - 0.90 10*3/mm3    Eosinophils, Absolute 0.30 0.00 - 0.40 10*3/mm3    Basophils, Absolute 0.00 0.00 - 0.20 10*3/mm3    nRBC 0.1 0.0 - 0.2 /100 WBC   Comprehensive Metabolic Panel    Collection Time: 10/16/23  1:49 AM    Specimen: Blood   Result Value Ref Range    Glucose 96 65 - 99 mg/dL    BUN 95 (H) 8 - 23 mg/dL    Creatinine 6.81 (H) 0.57 - 1.00 mg/dL    Sodium 137 136 - 145 mmol/L    Potassium 5.5 (H) 3.5 - 5.2 mmol/L    Chloride 101 98 - 107 mmol/L    CO2 21.0 (L) 22.0 - 29.0 mmol/L    Calcium 8.7 8.6 - 10.5 mg/dL    Total Protein 6.2 6.0 - 8.5 g/dL    Albumin 3.3 (L) 3.5 - 5.2 g/dL    ALT (SGPT) 7 1 - 33 U/L    AST (SGOT) 15 1 - 32 U/L    Alkaline Phosphatase 121 (H) 39 - 117 U/L    Total Bilirubin 0.3 0.0 - 1.2 mg/dL    Globulin 2.9 gm/dL    A/G Ratio 1.1 g/dL    BUN/Creatinine Ratio 14.0 7.0 - 25.0    Anion Gap 15.0 5.0 - 15.0 mmol/L    eGFR 5.6 (L) >60.0 mL/min/1.73          Imaging:  XR Chest 1 View    Result Date: 10/13/2023  Impression: No  definite acute abnormality. Small opacities overlying the right lower lung is most likely outside the patient. Please correlate. Electronically Signed: George Sorensen DO  10/13/2023 3:30 PM EDT  Workstation ID: [id number]     Assessment/Plan:     Chest pain       ESRD hemodialysis Monday Wednesday Friday  Chest pain  Hypertension  History of urostomy tube  Diabetes  Dyslipidemia     Recommendations:   Seen on dialysis  Watch blood pressure  Avoid volume and electrolyte  If needed cardiac cath may proceed

## 2023-10-16 NOTE — SIGNIFICANT NOTE
Social Work Assessment  [facility]     Patient Name: Hazel Bucio  MRN: [id number]  Today's Date: 10/16/2023    Admit Date: 10/13/2023     Discharge Plan       Row Name 10/16/23 1252       Plan    Plan PT/OT rec SNF, RNCM to f/u re: choices. No precert required. PASRR approved.                10/16/23 1252   PASRR   PASRR Status Approved/Complete     VINCE Faria, W  Medical Social Worker  Ph [phone]  Fax [phone]  [email or web address]

## 2023-10-16 NOTE — PLAN OF CARE
Problem: Adult Inpatient Plan of Care  Goal: Plan of Care Review  Outcome: Ongoing, Progressing  Goal: Patient-Specific Goal (Individualized)  Outcome: Ongoing, Progressing  Goal: Absence of Hospital-Acquired Illness or Injury  Outcome: Ongoing, Progressing  Intervention: Identify and Manage Fall Risk  Recent Flowsheet Documentation  Taken 10/16/2023 0359 by Rocío Zazueta RN  Safety Promotion/Fall Prevention:   safety round/check completed   fall prevention program maintained   activity supervised   assistive device/personal items within reach   clutter free environment maintained   nonskid shoes/slippers when out of bed   room organization consistent  Taken 10/16/2023 0200 by Rocío Zazueta RN  Safety Promotion/Fall Prevention: safety round/check completed  Taken 10/15/2023 2347 by Rocío Zazueta RN  Safety Promotion/Fall Prevention:   safety round/check completed   fall prevention program maintained  Taken 10/15/2023 2200 by Rocío Zazueta RN  Safety Promotion/Fall Prevention:   safety round/check completed   fall prevention program maintained  Taken 10/15/2023 2004 by Rocío Zazueta RN  Safety Promotion/Fall Prevention:   safety round/check completed   fall prevention program maintained   activity supervised   assistive device/personal items within reach   clutter free environment maintained   nonskid shoes/slippers when out of bed   room organization consistent  Intervention: Prevent and Manage VTE (Venous Thromboembolism) Risk  Recent Flowsheet Documentation  Taken 10/15/2023 2004 by Rocío Zazueta RN  VTE Prevention/Management:   bilateral   sequential compression devices off   patient refused intervention  Intervention: Prevent Infection  Recent Flowsheet Documentation  Taken 10/16/2023 0359 by Rocío Zazueta RN  Infection Prevention: environmental surveillance performed  Taken 10/15/2023 2347 by Rocío Zazueta RN  Infection Prevention: environmental  surveillance performed  Taken 10/15/2023 2004 by Rocío Zazueta RN  Infection Prevention: environmental surveillance performed  Goal: Optimal Comfort and Wellbeing  Outcome: Ongoing, Progressing  Intervention: Provide Person-Centered Care  Recent Flowsheet Documentation  Taken 10/15/2023 2004 by Rocío Zazueta RN  Trust Relationship/Rapport:   care explained   emotional support provided   choices provided  Goal: Readiness for Transition of Care  Outcome: Ongoing, Progressing     Problem: Skin Injury Risk Increased  Goal: Skin Health and Integrity  Outcome: Ongoing, Progressing  Intervention: Optimize Skin Protection  Recent Flowsheet Documentation  Taken 10/15/2023 2004 by Rocío Zazueta RN  Pressure Reduction Techniques: frequent weight shift encouraged  Pressure Reduction Devices: pressure-redistributing mattress utilized     Problem: Diabetes Comorbidity  Goal: Blood Glucose Level Within Targeted Range  Outcome: Ongoing, Progressing     Problem: Heart Failure Comorbidity  Goal: Maintenance of Heart Failure Symptom Control  Outcome: Ongoing, Progressing  Intervention: Maintain Heart Failure-Management  Recent Flowsheet Documentation  Taken 10/15/2023 2004 by Rocío Zazueta RN  Medication Review/Management: medications reviewed     Problem: Hypertension Comorbidity  Goal: Blood Pressure in Desired Range  Outcome: Ongoing, Progressing  Intervention: Maintain Blood Pressure Management  Recent Flowsheet Documentation  Taken 10/15/2023 2004 by Rocío Zazueta RN  Medication Review/Management: medications reviewed     Problem: Pain Chronic (Persistent) (Comorbidity Management)  Goal: Acceptable Pain Control and Functional Ability  Outcome: Ongoing, Progressing  Intervention: Manage Persistent Pain  Recent Flowsheet Documentation  Taken 10/15/2023 2004 by Rocío Zazueta RN  Medication Review/Management: medications reviewed  Intervention: Optimize Psychosocial Wellbeing  Recent  Flowsheet Documentation  Taken 10/15/2023 2004 by Rocío Zazueta RN  Diversional Activities: television  Family/Support System Care: support provided     Problem: Fall Injury Risk  Goal: Absence of Fall and Fall-Related Injury  Outcome: Ongoing, Progressing  Intervention: Identify and Manage Contributors  Recent Flowsheet Documentation  Taken 10/15/2023 2004 by Rocío Zazueta RN  Medication Review/Management: medications reviewed  Intervention: Promote Injury-Free Environment  Recent Flowsheet Documentation  Taken 10/16/2023 0359 by Rocío Zazueta RN  Safety Promotion/Fall Prevention:   safety round/check completed   fall prevention program maintained   activity supervised   assistive device/personal items within reach   clutter free environment maintained   nonskid shoes/slippers when out of bed   room organization consistent  Taken 10/16/2023 0200 by Rocío Zazueta RN  Safety Promotion/Fall Prevention: safety round/check completed  Taken 10/15/2023 2347 by Rocío Zazueta RN  Safety Promotion/Fall Prevention:   safety round/check completed   fall prevention program maintained  Taken 10/15/2023 2200 by Rocío Zazueta RN  Safety Promotion/Fall Prevention:   safety round/check completed   fall prevention program maintained  Taken 10/15/2023 2004 by Rocío Zazueta RN  Safety Promotion/Fall Prevention:   safety round/check completed   fall prevention program maintained   activity supervised   assistive device/personal items within reach   clutter free environment maintained   nonskid shoes/slippers when out of bed   room organization consistent   Goal Outcome Evaluation:

## 2023-10-16 NOTE — PROGRESS NOTES
Referring Provider: Lora Henderson MD     Patient Care Team:  Lizzy Francis MD as PCP - General (Family Medicine)  Jose Carlos Cheng MD as Consulting Physician (Cardiology)      SUBJECTIVE  Plan ultimately for invasive ischemic evaluation   Chest pain-free  Has some nausea vomiting over the weekend better with antiemetics    Troponin mildly elevated 50s to 60s, BNP elevated 7300 on 10-13  Creatinine severely elevated 6.8 HD dependent, down for dialysis today    plan for invasive ischemic evaluation prior to discharge with uninformative stress previously and episodes of chest pain with risk factors for CAD  Continue to hold anticoagulation, Lovenox versus heparin we will continue    ROS  Review of all systems negative except as indicated above    Personal History:    Past Medical History:   Diagnosis Date    Bladder cancer     Cancer     breast, bladder    Deep vein thrombosis     Essential hypertension 1/17/2017    Fracture tibia/fibula, right, closed, initial encounter 8/27/2020    GERD (gastroesophageal reflux disease)     History of urostomy     Immobility 08/27/2020    r/t broken Tibia     Kidney carcinoma, right     removed     Long term current use of anticoagulant 1/9/2015    PAF (paroxysmal atrial fibrillation) 6/26/2019    Presence of cardiac pacemaker 11/03/2014    BS dual chamber PM placed 10/2014 with pocket revision 1/25/17.  HX tachy rob syndrome    Sick sinus syndrome 11/3/2014       Past Surgical History:   Procedure Laterality Date    BREAST LUMPECTOMY      CARDIAC ELECTROPHYSIOLOGY PROCEDURE N/A 4/28/2023    Procedure: Pacemaker gen change, [place] AWARE $;  Surgeon: Jose Carlos Cheng MD;  Location: [facility] CATH INVASIVE LOCATION;  Service: Cardiovascular;  Laterality: N/A;    CHOLECYSTECTOMY N/A 10/12/2020    Procedure: CHOLECYSTECTOMY LAPAROSCOPIC;  Surgeon: Go Pereira DO;  Location: [facility] MAIN OR;  Service: General;  Laterality: N/A;    CYSTECTOMY W/ URETEROILEAL CONDUIT       HARDWARE REMOVAL Right 6/11/2021    Procedure: TIBIA HARDWARE REMOVAL;  Surgeon: Geoff Shah II, MD;  Location: [facility] MAIN OR;  Service: Orthopedics;  Laterality: Right;    HERNIA REPAIR      HYSTERECTOMY      INSERT / REPLACE / REMOVE PACEMAKER      NEPHRECTOMY Right     TIBIA IM NAILING/RODDING Right 8/28/2020    Procedure: TIBIA INTRAMEDULLARY NAIL/ABRAHAM INSERTION;  Surgeon: Geoff Shah II, MD;  Location: [facility] MAIN OR;  Service: Orthopedics;  Laterality: Right;       Family History   Problem Relation Age of Onset    COPD Father        Social History     Tobacco Use    Smoking status: Never     Passive exposure: Never    Smokeless tobacco: Never   Vaping Use    Vaping Use: Never used   Substance Use Topics    Alcohol use: Not Currently    Drug use: Never        Home meds:  Prior to Admission medications    Medication Sig Start Date End Date Taking? Authorizing Provider   acetaminophen (TYLENOL) 325 MG tablet Take 2 tablets by mouth Every 6 (Six) Hours As Needed for Mild Pain.   Yes Gino Leos MD   apixaban (ELIQUIS) 2.5 MG tablet tablet Take 1 tablet by mouth Every 12 (Twelve) Hours. Indications: Atrial Fibrillation 6/30/23  Yes Shantel Moulton APRN   dicyclomine (BENTYL) 10 MG capsule Take 1 capsule by mouth 3 (Three) Times a Day. 6/30/23  Yes Shantel Moulton APRN   hydrALAZINE (APRESOLINE) 25 MG tablet Take 1 tablet by mouth 3 (Three) Times a Day. 6/30/23  Yes Lora Henderson MD   ipratropium (ATROVENT) 0.02 % nebulizer solution Take 2.5 mL by nebulization 4 (Four) Times a Day As Needed for Wheezing or Shortness of Air.   Yes Gino Leos MD   loperamide (IMODIUM) 2 MG capsule Take 1 capsule by mouth Daily As Needed for Diarrhea.   Yes Gino Leos MD   metoprolol tartrate (LOPRESSOR) 50 MG tablet Take 1 tablet by mouth 2 (Two) Times a Day.   Yes Gino Leos MD   ondansetron (ZOFRAN) 4 MG tablet Take 1 tablet by mouth Every 8 (Eight) Hours  "As Needed.   Yes ProviderGino MD   saccharomyces boulardii (FLORASTOR) 250 MG capsule Take 1 capsule by mouth 2 (Two) Times a Day. 6/30/23  Yes Shantel Moulton APRN   topiramate (TOPAMAX) 100 MG tablet Take 1 tablet by mouth Every Night. 6/30/23  Yes Shantel Moulton APRN   zinc oxide 20 % ointment Apply 1 application  topically to the appropriate area as directed 2 (Two) Times a Day.   Yes Provider, MD Gino       Allergies:  Amoxicillin, Ciprofloxacin, Oxycodone-acetaminophen, Penicillins, Sulfa antibiotics, Cephalexin, Clavulanic acid, Codeine, Contrast dye (echo or unknown ct/mr), Doxycycline, Fluconazole, Iodinated contrast media, Iodine, Macrobid [nitrofurantoin], Tobramycin, and Trimethoprim      OBJECTIVE    Vital Signs  Vitals:    10/15/23 1624 10/15/23 2005 10/16/23 0049 10/16/23 0500   BP: 97/53 100/54 96/50 103/60   BP Location: Left arm Left arm Left arm Left arm   Patient Position: Lying Lying Lying Lying   Pulse:  98     Resp:  18 18 16   Temp:  98.8 °F (37.1 °C) 98.4 °F (36.9 °C) 98.3 °F (36.8 °C)   TempSrc:  Oral Oral Axillary   SpO2:    95%   Weight:       Height:           Flowsheet Rows      Flowsheet Row First Filed Value   Admission Height 165.1 cm (65\") Documented at 10/13/2023 1453   Admission Weight 57.6 kg (127 lb) Documented at 10/13/2023 1453              Intake/Output Summary (Last 24 hours) at 10/16/2023 1006  Last data filed at 10/15/2023 2219  Gross per 24 hour   Intake 480 ml   Output 350 ml   Net 130 ml              Physical Exam:  General-no acute distress  No elevated JVP noted.  Cardiovascular-S1-S2 normal, no murmurs noted.  Respiratory-normal breath sounds, no wheezing/crackles.  GI-abdomen is soft and nontender  No pedal edema    Unchanged from prior encounter    Results Review:    BNP        TROPONIN  Results from last 7 days   Lab Units 10/15/23  1731   HSTROP T ng/L 57*       CoAg  Results from last 7 days   Lab Units 10/16/23  0832 10/16/23  0149 " 10/15/23  1852 10/15/23  0700 10/15/23  0037 10/14/23  1639 10/13/23  1541   INR   --   --   --   --   --  1.09 1.05   APTT seconds 60.6* 47.3* 44.7* 52.4* 39.3* 28.0* 27.3       Creatinine Clearance  Estimated Creatinine Clearance: 5.5 mL/min (A) (by C-G formula based on SCr of 6.81 mg/dL (H)).      Radiology  No radiology results for the last day      EKG  I personally viewed and interpreted the patient's EKG/Telemetry data:  ECG 12 Lead Syncope   Final Result   HEART RATE= 87  bpm   RR Interval= 690  ms   AL Interval=   ms   P Horizontal Axis=   deg   P Front Axis=   deg   QRSD Interval= 78  ms   QT Interval= 380  ms   QTcB= 457  ms   QRS Axis= 50  deg   T Wave Axis= 26  deg   - ABNORMAL ECG -   Atrial fibrillation   Low voltage, precordial leads   When compared with ECG of 14-Oct-2023 12:58:28,   No significant change   Electronically Signed By: Shantelle Godoy (ANNY) 15-Oct-2023 20:15:03   Date and Time of Study: 2023-10-15 12:42:13      ECG 12 Lead Chest Pain   Final Result   HEART RATE= 101  bpm   RR Interval= 593  ms   AL Interval=   ms   P Horizontal Axis=   deg   P Front Axis=   deg   QRSD Interval= 78  ms   QT Interval= 371  ms   QTcB= 482  ms   QRS Axis= 52  deg   T Wave Axis= 1  deg   - ABNORMAL ECG -   Atrial fibrillation   Low voltage, extremity and precordial leads   When compared with ECG of 13-Oct-2023 14:56:10,   No significant change   Electronically Signed By: Shantelle Godoy (ANNY) 16-Oct-2023 09:07:39   Date and Time of Study: 2023-10-14 12:58:28      ECG 12 Lead Chest Pain   Final Result   HEART RATE= 91  bpm   RR Interval= 657  ms   AL Interval=   ms   P Horizontal Axis=   deg   P Front Axis=   deg   QRSD Interval= 75  ms   QT Interval= 395  ms   QTcB= 487  ms   QRS Axis= 74  deg   T Wave Axis= 56  deg   - ABNORMAL ECG -   Atrial fibrillation   Low voltage, precordial leads   When compared with ECG of 28-Jun-2023 3:10:16,   Significant rate increase   Significant repolarization change   Significant  axis, voltage or hypertrophy change   Electronically Signed By: Rudy Kolb (Yoan) 14-Oct-2023 06:15:59   Date and Time of Study: 2023-10-13 14:56:10      SCANNED - TELEMETRY     Final Result      SCANNED - TELEMETRY     Final Result      SCANNED - TELEMETRY     Final Result      SCANNED - TELEMETRY     Final Result      SCANNED - TELEMETRY     Final Result      SCANNED - TELEMETRY     Final Result      SCANNED - TELEMETRY     Final Result      SCANNED - TELEMETRY     Final Result      SCANNED - TELEMETRY     Final Result      SCANNED - TELEMETRY     Final Result      SCANNED - TELEMETRY     Final Result      SCANNED - TELEMETRY     Final Result      SCANNED - TELEMETRY     Final Result      SCANNED - TELEMETRY     Final Result      SCANNED - TELEMETRY     Final Result            Echocardiogram:    Results for orders placed during the hospital encounter of 09/24/21    Adult Transthoracic Echo Complete W/ Cont if Necessary Per Protocol    Interpretation Summary  · Estimated left ventricular EF was in agreement with the calculated left ventricular EF. Left ventricular ejection fraction appears to be 61 - 65%. Left ventricular systolic function is normal.  · Left atrial volume is moderately increased.  · The right atrial cavity is moderately dilated.  · Estimated right ventricular systolic pressure from tricuspid regurgitation is normal (<35 mmHg).        Stress Test:  Results for orders placed during the hospital encounter of 04/18/23    Stress Test With Myocardial Perfusion One Day    Interpretation Summary    Left ventricular ejection fraction is normal (Calculated EF = 60%).    Myocardial perfusion imaging indicates a normal myocardial perfusion study with no evidence of ischemia.    Impressions are consistent with a low risk study.    Diaphragmatic attenuation and GI artifacts are present.    There is no prior study available for comparison.    Findings consistent with an equivocal ECG stress test.    Underlying  atrial fibrillation during the study, normal ST-T wave findings  Heart rates reached 1 20-1 30 with pharmacologic stress, no changes at submaximal stress with respect to ischemic ST-T wave findings  No arrhythmias seen other than baseline atrial fibrillation  Nuclear perfusion demonstrated fixed small basal inferolateral defect with no reversibility, possible diaphragmatic GI attenuation, normal EF 60% with normal LV size and geometry and LV function    Low risk study for any reversible ischemia, appears to have normal perfusion other than diaphragmatic GI attenuation affected diminished inferolateral counts observed in stress and rest with normal EF 60%               ASSESSMENT & PLAN:      NSTEMI  Chest pain     Troponin 63-63, unclear baseline, decreased clearance in setting of ESRD  ECG without acute ST-T wave changes  Symptoms sound typical given multitude of risk factors.  Had nuclear stress test in April 2023 which was largely unremarkable except for diaphragmatic artifact     Plan  - Unable to tolerate Imdur, will plan on invasive ischemic evaluation ultimately for definitive evaluation of coronary anatomy given risk factors, uninformative stress within the last 6 months  -baby ASA  - Check transthoracic echocardiogram  - Off apixaban and switch to heparin drip for now pending [facility] decision  - Metoprolol 50 mg twice daily  -Start statin therapy  Unable to tolerate further antianginals at this time     Hypertension  Stop hydralazine and start Imdur as above for antianginal effect  Metoprolol as above     Atrial fibrillation  Elevated CHADsVAsc score in setting of age, sex, HTN, DM and now CAD  AC as above  Rate control with metoprolol        EMR Dragon/Transcription disclaimer:  Much of this encounter note is an electronic transcription/translation of spoken language to printed text. The electronic translation of spoken language may permit erroneous, or at times, nonsensical words or phrases to be  inadvertently transcribed; Although I have reviewed the note for such errors, some may still exist.    Augustin Ellsworth MD  10/16/23  10:06 EDT

## 2023-10-16 NOTE — PLAN OF CARE
"Goal Outcome Evaluation:    Assessment: Hazel Bucio presents with ADL impairments affecting function including balance, endurance / activity tolerance, and strength. Pt had dialysis this am & is also NPO  this date as she is having a heart cath this date. She reports feeling weak. She participated with ADLs sitting EOB doing oral care, grooming & LB drsg. She continues to require assist for LB ADLs & ADL transfers. Demonstrated functioning below baseline abilities indicate the need for continued skilled intervention while inpatient. Tolerating session today without incident. Will continue to follow and progress as tolerated.      Plan/Recommendations:   Moderate Intensity Therapy recommended post-acute care. This is recommended as therapy feels the patient would require 3-4 days per week and wouldn't tolerate \"3 hour daily\" rehab intensity. SNF would be the preferred choice. If the patient does not agree to SNF, arrange HH or OP depending on home bound status. If patient is medically complex, consider LTACH.. Pt requires no DME at discharge.      Pt desires Skilled Rehab placement at discharge. Pt cooperative; agreeable to therapeutic recommendations and plan of care.      "

## 2023-10-17 LAB
ANION GAP SERPL CALCULATED.3IONS-SCNC: 12 MMOL/L (ref 5–15)
APTT PPP: 57 SECONDS (ref 61–76.5)
APTT PPP: 62.4 SECONDS (ref 61–76.5)
BACTERIA SPEC AEROBE CULT: ABNORMAL
BASOPHILS # BLD AUTO: 0 10*3/MM3 (ref 0–0.2)
BASOPHILS NFR BLD AUTO: 0.7 % (ref 0–1.5)
BUN SERPL-MCNC: 39 MG/DL (ref 8–23)
BUN/CREAT SERPL: 11.1 (ref 7–25)
CALCIUM SPEC-SCNC: 9 MG/DL (ref 8.6–10.5)
CHLORIDE SERPL-SCNC: 103 MMOL/L (ref 98–107)
CO2 SERPL-SCNC: 23 MMOL/L (ref 22–29)
CREAT SERPL-MCNC: 3.52 MG/DL (ref 0.57–1)
DEPRECATED RDW RBC AUTO: 54.3 FL (ref 37–54)
EGFRCR SERPLBLD CKD-EPI 2021: 12.4 ML/MIN/1.73
EOSINOPHIL # BLD AUTO: 0.2 10*3/MM3 (ref 0–0.4)
EOSINOPHIL NFR BLD AUTO: 3.3 % (ref 0.3–6.2)
ERYTHROCYTE [DISTWIDTH] IN BLOOD BY AUTOMATED COUNT: 15.2 % (ref 12.3–15.4)
GLUCOSE SERPL-MCNC: 95 MG/DL (ref 65–99)
HCT VFR BLD AUTO: 28.9 % (ref 34–46.6)
HGB BLD-MCNC: 9.6 G/DL (ref 12–15.9)
LYMPHOCYTES # BLD AUTO: 1.6 10*3/MM3 (ref 0.7–3.1)
LYMPHOCYTES NFR BLD AUTO: 30.2 % (ref 19.6–45.3)
MCH RBC QN AUTO: 32.4 PG (ref 26.6–33)
MCHC RBC AUTO-ENTMCNC: 33.1 G/DL (ref 31.5–35.7)
MCV RBC AUTO: 97.7 FL (ref 79–97)
MONOCYTES # BLD AUTO: 0.6 10*3/MM3 (ref 0.1–0.9)
MONOCYTES NFR BLD AUTO: 12 % (ref 5–12)
NEUTROPHILS NFR BLD AUTO: 2.9 10*3/MM3 (ref 1.7–7)
NEUTROPHILS NFR BLD AUTO: 53.8 % (ref 42.7–76)
NRBC BLD AUTO-RTO: 0 /100 WBC (ref 0–0.2)
PLATELET # BLD AUTO: 195 10*3/MM3 (ref 140–450)
PMV BLD AUTO: 7.3 FL (ref 6–12)
POTASSIUM SERPL-SCNC: 4.6 MMOL/L (ref 3.5–5.2)
RBC # BLD AUTO: 2.96 10*6/MM3 (ref 3.77–5.28)
SODIUM SERPL-SCNC: 138 MMOL/L (ref 136–145)
WBC NRBC COR # BLD: 5.3 10*3/MM3 (ref 3.4–10.8)

## 2023-10-17 PROCEDURE — 85730 THROMBOPLASTIN TIME PARTIAL: CPT | Performed by: INTERNAL MEDICINE

## 2023-10-17 PROCEDURE — 85025 COMPLETE CBC W/AUTO DIFF WBC: CPT | Performed by: INTERNAL MEDICINE

## 2023-10-17 PROCEDURE — 25010000002 MORPHINE PER 10 MG: Performed by: INTERNAL MEDICINE

## 2023-10-17 PROCEDURE — 97116 GAIT TRAINING THERAPY: CPT | Performed by: PHYSICAL THERAPIST

## 2023-10-17 PROCEDURE — 80048 BASIC METABOLIC PNL TOTAL CA: CPT | Performed by: INTERNAL MEDICINE

## 2023-10-17 PROCEDURE — 97530 THERAPEUTIC ACTIVITIES: CPT | Performed by: PHYSICAL THERAPIST

## 2023-10-17 PROCEDURE — 97530 THERAPEUTIC ACTIVITIES: CPT | Performed by: OCCUPATIONAL THERAPIST

## 2023-10-17 PROCEDURE — 99233 SBSQ HOSP IP/OBS HIGH 50: CPT | Performed by: INTERNAL MEDICINE

## 2023-10-17 PROCEDURE — 97535 SELF CARE MNGMENT TRAINING: CPT | Performed by: OCCUPATIONAL THERAPIST

## 2023-10-17 RX ADMIN — DICYCLOMINE HYDROCHLORIDE 10 MG: 10 CAPSULE ORAL at 21:22

## 2023-10-17 RX ADMIN — METOPROLOL TARTRATE 50 MG: 50 TABLET ORAL at 21:22

## 2023-10-17 RX ADMIN — DICYCLOMINE HYDROCHLORIDE 10 MG: 10 CAPSULE ORAL at 17:57

## 2023-10-17 RX ADMIN — DOCUSATE SODIUM 50 MG AND SENNOSIDES 8.6 MG 2 TABLET: 8.6; 5 TABLET, FILM COATED ORAL at 08:04

## 2023-10-17 RX ADMIN — DOCUSATE SODIUM 50 MG AND SENNOSIDES 8.6 MG 2 TABLET: 8.6; 5 TABLET, FILM COATED ORAL at 21:22

## 2023-10-17 RX ADMIN — ISOSORBIDE MONONITRATE 30 MG: 30 TABLET, EXTENDED RELEASE ORAL at 08:04

## 2023-10-17 RX ADMIN — DICYCLOMINE HYDROCHLORIDE 10 MG: 10 CAPSULE ORAL at 08:04

## 2023-10-17 RX ADMIN — Medication 10 ML: at 21:21

## 2023-10-17 RX ADMIN — Medication 250 MG: at 08:04

## 2023-10-17 RX ADMIN — Medication 10 ML: at 08:08

## 2023-10-17 RX ADMIN — TOPIRAMATE 100 MG: 100 TABLET, FILM COATED ORAL at 21:22

## 2023-10-17 RX ADMIN — MORPHINE SULFATE 1 MG: 2 INJECTION, SOLUTION INTRAMUSCULAR; INTRAVENOUS at 21:29

## 2023-10-17 RX ADMIN — Medication 250 MG: at 21:22

## 2023-10-17 NOTE — PROGRESS NOTES
LOS: 2 days   Patient Care Team:  Lizzy Francis MD as PCP - General (Family Medicine)  Jose Carlos Cheng MD as Consulting Physician (Cardiology)    Subjective     Had chest pain this am now resolved    Review of Systems   Constitutional:  Positive for activity change.   HENT: Negative.     Respiratory: Negative.     Cardiovascular: Negative.    Gastrointestinal: Negative.    Genitourinary: Negative.    Musculoskeletal: Negative.    Neurological: Negative.    Psychiatric/Behavioral: Negative.             Objective     Vital Signs  Temp:  [98 °F (36.7 °C)-98.2 °F (36.8 °C)] 98.2 °F (36.8 °C)  Heart Rate:  [] 104  Resp:  [14-16] 15  BP: (100-117)/(51-66) 103/56      Physical Exam  Vitals reviewed.   Constitutional:       Appearance: She is not ill-appearing.   HENT:      Head: Normocephalic and atraumatic.      Right Ear: External ear normal.      Left Ear: External ear normal.      Nose: Nose normal.      Mouth/Throat:      Mouth: Mucous membranes are moist.   Eyes:      General:         Right eye: No discharge.         Left eye: No discharge.   Cardiovascular:      Rate and Rhythm: Normal rate and regular rhythm.      Pulses: Normal pulses.      Heart sounds: Normal heart sounds.   Pulmonary:      Effort: Pulmonary effort is normal.      Breath sounds: Normal breath sounds.   Abdominal:      General: Bowel sounds are normal.      Palpations: Abdomen is soft.   Musculoskeletal:         General: Normal range of motion.      Cervical back: Normal range of motion.   Skin:     General: Skin is warm.   Neurological:      Mental Status: She is alert and oriented to person, place, and time.   Psychiatric:         Behavior: Behavior normal.              Results Review:    Lab Results (last 24 hours)       Procedure Component Value Units Date/Time    Urine Culture - Urine, Urine, Clean Catch [[id number]]  (Abnormal)  (Susceptibility) Collected: 10/14/23 1835    Specimen: Urine, Clean Catch Updated: 10/17/23  0222     Urine Culture >100,000 CFU/mL Escherichia coli    Narrative:      Colonization of the urinary tract without infection is common. Treatment is discouraged unless the patient is symptomatic, pregnant, or undergoing an invasive urologic procedure.    Susceptibility        Escherichia coli      ASHLEY      Ampicillin Resistant      Ampicillin + Sulbactam Intermediate      Cefazolin Susceptible      Cefepime Susceptible      Ceftazidime Susceptible      Ceftriaxone Susceptible      Gentamicin Susceptible      Levofloxacin Resistant      Nitrofurantoin Susceptible      Piperacillin + Tazobactam Susceptible      Trimethoprim + Sulfamethoxazole Resistant                           Basic Metabolic Panel [[id number]]  (Abnormal) Collected: 10/17/23 0019    Specimen: Blood Updated: 10/17/23 0105     Glucose 95 mg/dL      BUN 39 mg/dL      Creatinine 3.52 mg/dL      Sodium 138 mmol/L      Potassium 4.6 mmol/L      Chloride 103 mmol/L      CO2 23.0 mmol/L      Calcium 9.0 mg/dL      BUN/Creatinine Ratio 11.1     Anion Gap 12.0 mmol/L      eGFR 12.4 mL/min/1.73      Comment: <15 Indicative of kidney failure       Narrative:      GFR Normal >60  Chronic Kidney Disease <60  Kidney Failure <15    The GFR formula is only valid for adults with stable renal function between ages 18 and 70.    aPTT [[id number]]  (Abnormal) Collected: 10/17/23 0019    Specimen: Blood Updated: 10/17/23 0041     PTT 57.0 seconds     CBC & Differential [[id number]]  (Abnormal) Collected: 10/17/23 0019    Specimen: Blood Updated: 10/17/23 0038    Narrative:      The following orders were created for panel order CBC & Differential.  Procedure                               Abnormality         Status                     ---------                               -----------         ------                     CBC Auto Differential[[id number]]        Abnormal            Final result                 Please view results for these tests on the individual orders.     CBC Auto Differential [[id number]]  (Abnormal) Collected: 10/17/23 0019    Specimen: Blood Updated: 10/17/23 0038     WBC 5.30 10*3/mm3      RBC 2.96 10*6/mm3      Hemoglobin 9.6 g/dL      Hematocrit 28.9 %      MCV 97.7 fL      MCH 32.4 pg      MCHC 33.1 g/dL      RDW 15.2 %      RDW-SD 54.3 fl      MPV 7.3 fL      Platelets 195 10*3/mm3      Neutrophil % 53.8 %      Lymphocyte % 30.2 %      Monocyte % 12.0 %      Eosinophil % 3.3 %      Basophil % 0.7 %      Neutrophils, Absolute 2.90 10*3/mm3      Lymphocytes, Absolute 1.60 10*3/mm3      Monocytes, Absolute 0.60 10*3/mm3      Eosinophils, Absolute 0.20 10*3/mm3      Basophils, Absolute 0.00 10*3/mm3      nRBC 0.0 /100 WBC     aPTT [[id number]]  (Normal) Collected: 10/16/23 1729    Specimen: Blood Updated: 10/16/23 1821     PTT 73.7 seconds     Hepatitis B Surface Antibody [[id number]]  (Normal) Collected: 10/16/23 0832    Specimen: Blood Updated: 10/16/23 1712     Hep B S Ab Non-Reactive    Narrative:      Non-Reactive - Individual is considered to be not immune to infection with HBV.    Equivocal - Unable to determine if anti-HBs is present at levels consistent with immunity. The individual should be further assessed by associated risk factors and the use of additional diagnositc information, or another sample may be collected and tested.    Reactive - Anti-HBs concentration detected at >10 mIU/mL. Individual is considered to be immune to infection with HBV.      Results may be falsely decreased if patient taking Biotin.      Hepatitis B Surface Antigen [[id number]]  (Normal) Collected: 10/16/23 0832    Specimen: Blood Updated: 10/16/23 1122     Hepatitis B Surface Ag Non-Reactive    Extra Tubes [[id number]] Collected: 10/16/23 0832    Specimen: Blood Updated: 10/16/23 0945    Narrative:      The following orders were created for panel order Extra Tubes.  Procedure                               Abnormality         Status                     ---------                                -----------         ------                     Lavender Top[[id number]]                                     Final result               Gold Top - SST[[id number]]                                   Final result               Green Top (Gel)[[id number]]                                  Final result                 Please view results for these tests on the individual orders.    Lavender Top [[id number]] Collected: 10/16/23 0832    Specimen: Blood Updated: 10/16/23 0945     Extra Tube hold for add-on     Comment: Auto resulted       Gold Top - SST [[id number]] Collected: 10/16/23 0832    Specimen: Blood Updated: 10/16/23 0945     Extra Tube Hold for add-ons.     Comment: Auto resulted.       Green Top (Gel) [[id number]] Collected: 10/16/23 0832    Specimen: Blood Updated: 10/16/23 0945     Extra Tube Hold for add-ons.     Comment: Auto resulted.       aPTT [[id number]]  (Abnormal) Collected: 10/16/23 0832    Specimen: Blood from Arm, Left Updated: 10/16/23 0936     PTT 60.6 seconds              Imaging Results (Last 24 Hours)       ** No results found for the last 24 hours. **                 I reviewed the patient's new clinical results.    Medication Review:   Scheduled Meds:dicyclomine, 10 mg, Oral, TID  isosorbide mononitrate, 30 mg, Oral, Q24H  metoprolol tartrate, 50 mg, Oral, BID  saccharomyces boulardii, 250 mg, Oral, BID  senna-docusate sodium, 2 tablet, Oral, BID  sodium chloride, 10 mL, Intravenous, Q12H  topiramate, 100 mg, Oral, Nightly      Continuous Infusions:heparin, 12 Units/kg/hr, Last Rate: 17 Units/kg/hr (10/17/23 0055)      PRN Meds:.  albumin human    senna-docusate sodium **AND** polyethylene glycol **AND** bisacodyl **AND** bisacodyl    heparin (porcine)    heparin    heparin    hydrALAZINE    ipratropium    Morphine    nitroglycerin    ondansetron **OR** ondansetron    sodium chloride    sodium chloride    sodium chloride    traMADol     Interval History:    Assessment & Plan     Chest  pain  NSTEMI  Hypertension  History of sick sinus syndrome with pacemaker  CKD stage III  History of bladder and renal cancer  Presence of urostomy  Type 2 diabetes with diabetic polyneuropathy  Dyslipidemia     Plan of care  Cardiology heparin gtt poss heart cath nephrology following for HD        DVT prophylaxis: Eliquis on hold hep gtt  GI prophylaxis: Protonix    Plan for disposition:SRINIVAS James, APRN  10/17/23  08:59 EDT

## 2023-10-17 NOTE — PLAN OF CARE
"Goal Outcome Evaluation:  Assessment: Hazel Bucio presents with ADL impairments affecting function including balance, cognition, endurance / activity tolerance, postural / trunk control, and strength. Pt had no c/o chest pain this date. She is scheduled for heart cath on 10/18/23. She is participating & progressing well with ADLs, balance & transfers. She demo improving sitting balance & activity tolerance. Demonstrated functioning below baseline abilities indicate the need for continued skilled intervention while inpatient. Tolerating session today without incident. Will continue to follow and progress as tolerated.     Plan/Recommendations:   Moderate Intensity Therapy recommended post-acute care. This is recommended as therapy feels the patient would require 3-4 days per week and wouldn't tolerate \"3 hour daily\" rehab intensity. SNF would be the preferred choice. If the patient does not agree to SNF, arrange HH or OP depending on home bound status. If patient is medically complex, consider LTACH.. Pt requires no DME at discharge.     Pt desires Skilled Rehab placement at discharge. Pt cooperative; agreeable to therapeutic recommendations and plan of care.     "

## 2023-10-17 NOTE — THERAPY TREATMENT NOTE
"Subjective: Pt agreeable to therapeutic plan of care.  Cognition: Alert, following 1 step commands. Demo STM deficits requiring v.c.     Objective:     Bed Mobility: Min-A   Functional Transfers: Mod-A     Balance: sitting = SBA, standing -mod A.    Functional Ambulation: Mod-A, Assist x 2, and with rolling walker    Lower Body Dressing: Min-A  ADL Position: edge of bed sitting  ADL Comments:     Toileting: Max-A  ADL Position: supine  ADL Comments:     Vitals: WNL    Pain: 0 VAS  Education: Provided education on the importance of mobility in the acute care setting, Verbal/Tactile Cues, and Transfer Training      Assessment: Hazel Bucio presents with ADL impairments affecting function including balance, cognition, endurance / activity tolerance, postural / trunk control, and strength. Pt had no c/o chest pain this date. She is scheduled for heart cath on 10/18/23. She is participating & progressing well with ADLs, balance & transfers. She demo improving sitting balance & activity tolerance. Demonstrated functioning below baseline abilities indicate the need for continued skilled intervention while inpatient. Tolerating session today without incident. Will continue to follow and progress as tolerated.     Plan/Recommendations:   Moderate Intensity Therapy recommended post-acute care. This is recommended as therapy feels the patient would require 3-4 days per week and wouldn't tolerate \"3 hour daily\" rehab intensity. SNF would be the preferred choice. If the patient does not agree to SNF, arrange HH or OP depending on home bound status. If patient is medically complex, consider LTACH.. Pt requires no DME at discharge.     Pt desires Skilled Rehab placement at discharge. Pt cooperative; agreeable to therapeutic recommendations and plan of care.     Modified Yazmin: N/A = No pre-op stroke/TIA    Post-Tx Position: Up in Chair, Alarms activated, and Call light and personal items within reach  PPE: gloves    "

## 2023-10-17 NOTE — PROGRESS NOTES
Referring Provider: Lora Henderson MD     Patient Care Team:  Lizzy Francis MD as PCP - General (Family Medicine)  Jose Carlos Cheng MD as Consulting Physician (Cardiology)      SUBJECTIVE  No acute events overnight  Plan ultimately for invasive ischemic evaluation  Echo yesterday demonstrated significantly enlarged IVC with minimal collapsibility, right atrial pressure was estimated greater than 20, needs further dialysis for volume off  Continues to have some atypical chest pain off and on, left and right heart catheterization will be held tomorrow with optimization of dialysis today and post procedurally    Discussed with nephrology plans for contrast tomorrow    Has some nausea vomiting over the weekend better with antiemetics    Troponin mildly elevated 50s to 60s flat,  HD dependent, down for dialysis today    Continue to hold NOAC    ROS  Review of all systems negative except as indicated above    Personal History:    Past Medical History:   Diagnosis Date    Bladder cancer     Cancer     breast, bladder    Deep vein thrombosis     Essential hypertension 1/17/2017    Fracture tibia/fibula, right, closed, initial encounter 8/27/2020    GERD (gastroesophageal reflux disease)     History of urostomy     Immobility 08/27/2020    r/t broken Tibia     Kidney carcinoma, right     removed     Long term current use of anticoagulant 1/9/2015    PAF (paroxysmal atrial fibrillation) 6/26/2019    Presence of cardiac pacemaker 11/03/2014    BS dual chamber PM placed 10/2014 with pocket revision 1/25/17.  HX tachy rob syndrome    Sick sinus syndrome 11/3/2014       Past Surgical History:   Procedure Laterality Date    BREAST LUMPECTOMY      CARDIAC ELECTROPHYSIOLOGY PROCEDURE N/A 4/28/2023    Procedure: Pacemaker gen change, [place] AWARE $;  Surgeon: Jose Carlos Cheng MD;  Location: [facility] CATH INVASIVE LOCATION;  Service: Cardiovascular;  Laterality: N/A;    CHOLECYSTECTOMY N/A 10/12/2020    Procedure:  CHOLECYSTECTOMY LAPAROSCOPIC;  Surgeon: Go Pereira DO;  Location: [facility] MAIN OR;  Service: General;  Laterality: N/A;    CYSTECTOMY W/ URETEROILEAL CONDUIT      HARDWARE REMOVAL Right 6/11/2021    Procedure: TIBIA HARDWARE REMOVAL;  Surgeon: Geoff Shah II, MD;  Location: [facility] MAIN OR;  Service: Orthopedics;  Laterality: Right;    HERNIA REPAIR      HYSTERECTOMY      INSERT / REPLACE / REMOVE PACEMAKER      NEPHRECTOMY Right     TIBIA IM NAILING/RODDING Right 8/28/2020    Procedure: TIBIA INTRAMEDULLARY NAIL/ABRAHAM INSERTION;  Surgeon: Geoff Shah II, MD;  Location: [facility] MAIN OR;  Service: Orthopedics;  Laterality: Right;       Family History   Problem Relation Age of Onset    COPD Father        Social History     Tobacco Use    Smoking status: Never     Passive exposure: Never    Smokeless tobacco: Never   Vaping Use    Vaping Use: Never used   Substance Use Topics    Alcohol use: Not Currently    Drug use: Never        Home meds:  Prior to Admission medications    Medication Sig Start Date End Date Taking? Authorizing Provider   acetaminophen (TYLENOL) 325 MG tablet Take 2 tablets by mouth Every 6 (Six) Hours As Needed for Mild Pain.   Yes ProviderGino MD   apixaban (ELIQUIS) 2.5 MG tablet tablet Take 1 tablet by mouth Every 12 (Twelve) Hours. Indications: Atrial Fibrillation 6/30/23  Yes Shantel Moulton APRN   dicyclomine (BENTYL) 10 MG capsule Take 1 capsule by mouth 3 (Three) Times a Day. 6/30/23  Yes Shantel Moulton APRN   hydrALAZINE (APRESOLINE) 25 MG tablet Take 1 tablet by mouth 3 (Three) Times a Day. 6/30/23  Yes Lora Henderson MD   ipratropium (ATROVENT) 0.02 % nebulizer solution Take 2.5 mL by nebulization 4 (Four) Times a Day As Needed for Wheezing or Shortness of Air.   Yes ProviderGino MD   loperamide (IMODIUM) 2 MG capsule Take 1 capsule by mouth Daily As Needed for Diarrhea.   Yes Gino Leos MD   metoprolol tartrate (LOPRESSOR)  "50 MG tablet Take 1 tablet by mouth 2 (Two) Times a Day.   Yes Provider, MD Gino   ondansetron (ZOFRAN) 4 MG tablet Take 1 tablet by mouth Every 8 (Eight) Hours As Needed.   Yes Provider, Historical, MD   saccharomyces boulardii (FLORASTOR) 250 MG capsule Take 1 capsule by mouth 2 (Two) Times a Day. 6/30/23  Yes Shantel Moulton APRN   topiramate (TOPAMAX) 100 MG tablet Take 1 tablet by mouth Every Night. 6/30/23  Yes Shantel Moulton APRN   zinc oxide 20 % ointment Apply 1 application  topically to the appropriate area as directed 2 (Two) Times a Day.   Yes Provider, MD Gino       Allergies:  Amoxicillin, Ciprofloxacin, Oxycodone-acetaminophen, Penicillins, Sulfa antibiotics, Cephalexin, Clavulanic acid, Codeine, Contrast dye (echo or unknown ct/mr), Doxycycline, Fluconazole, Iodinated contrast media, Iodine, Macrobid [nitrofurantoin], Tobramycin, and Trimethoprim      OBJECTIVE    Vital Signs  Vitals:    10/16/23 1641 10/16/23 2030 10/16/23 2330 10/17/23 0311   BP: 101/54 101/51 100/52 103/56   BP Location: Left arm  Left arm Left arm   Patient Position: Lying  Lying Lying   Pulse: 95 95 88 104   Resp: 14  16 15   Temp: 98 °F (36.7 °C)  98.1 °F (36.7 °C) 98.2 °F (36.8 °C)   TempSrc: Oral  Oral Oral   SpO2:  99% 98% 98%   Weight:       Height:           Flowsheet Rows      Flowsheet Row First Filed Value   Admission Height 165.1 cm (65\") Documented at 10/13/2023 1453   Admission Weight 57.6 kg (127 lb) Documented at 10/13/2023 1453              Intake/Output Summary (Last 24 hours) at 10/17/2023 0909  Last data filed at 10/16/2023 1800  Gross per 24 hour   Intake --   Output 1750 ml   Net -1750 ml              Physical Exam:  General-no acute distress  No elevated JVP noted.  Cardiovascular-S1-S2 normal, no murmurs noted.  Respiratory-normal breath sounds, no wheezing/crackles.  GI-abdomen is soft and nontender  No pedal edema    Unchanged from prior encounter    Results Review:    BNP    "     TROPONIN  Results from last 7 days   Lab Units 10/15/23  1731   HSTROP T ng/L 57*       CoAg  Results from last 7 days   Lab Units 10/17/23  0019 10/16/23  1729 10/16/23  0832 10/16/23  0149 10/15/23  1852 10/15/23  0700 10/15/23  0037 10/14/23  1639 10/13/23  1541   INR   --   --   --   --   --   --   --  1.09 1.05   APTT seconds 57.0* 73.7 60.6* 47.3* 44.7* 52.4* 39.3* 28.0* 27.3       Creatinine Clearance  Estimated Creatinine Clearance: 10.6 mL/min (A) (by C-G formula based on SCr of 3.52 mg/dL (H)).      Radiology  No radiology results for the last day      EKG  I personally viewed and interpreted the patient's EKG/Telemetry data:  ECG 12 Lead Syncope   Final Result   HEART RATE= 87  bpm   RR Interval= 690  ms   NJ Interval=   ms   P Horizontal Axis=   deg   P Front Axis=   deg   QRSD Interval= 78  ms   QT Interval= 380  ms   QTcB= 457  ms   QRS Axis= 50  deg   T Wave Axis= 26  deg   - ABNORMAL ECG -   Atrial fibrillation   Low voltage, precordial leads   When compared with ECG of 14-Oct-2023 12:58:28,   No significant change   Electronically Signed By: Shantelle Godoy (ANNY) 15-Oct-2023 20:15:03   Date and Time of Study: 2023-10-15 12:42:13      ECG 12 Lead Chest Pain   Final Result   HEART RATE= 101  bpm   RR Interval= 593  ms   NJ Interval=   ms   P Horizontal Axis=   deg   P Front Axis=   deg   QRSD Interval= 78  ms   QT Interval= 371  ms   QTcB= 482  ms   QRS Axis= 52  deg   T Wave Axis= 1  deg   - ABNORMAL ECG -   Atrial fibrillation   Low voltage, extremity and precordial leads   When compared with ECG of 13-Oct-2023 14:56:10,   No significant change   Electronically Signed By: Shantelle Godoy (ANNY) 16-Oct-2023 09:07:39   Date and Time of Study: 2023-10-14 12:58:28      ECG 12 Lead Chest Pain   Final Result   HEART RATE= 91  bpm   RR Interval= 657  ms   NJ Interval=   ms   P Horizontal Axis=   deg   P Front Axis=   deg   QRSD Interval= 75  ms   QT Interval= 395  ms   QTcB= 487  ms   QRS Axis= 74  deg   T  Wave Axis= 56  deg   - ABNORMAL ECG -   Atrial fibrillation   Low voltage, precordial leads   When compared with ECG of 28-Jun-2023 3:10:16,   Significant rate increase   Significant repolarization change   Significant axis, voltage or hypertrophy change   Electronically Signed By: Rudy Kolb (Yoan) 14-Oct-2023 06:15:59   Date and Time of Study: 2023-10-13 14:56:10      SCANNED - TELEMETRY     Final Result      SCANNED - TELEMETRY     Final Result      SCANNED - TELEMETRY     Final Result      SCANNED - TELEMETRY     Final Result      SCANNED - TELEMETRY     Final Result      SCANNED - TELEMETRY     Final Result      SCANNED - TELEMETRY     Final Result      SCANNED - TELEMETRY     Final Result      SCANNED - TELEMETRY     Final Result      SCANNED - TELEMETRY     Final Result      SCANNED - TELEMETRY     Final Result      SCANNED - TELEMETRY     Final Result      SCANNED - TELEMETRY     Final Result      SCANNED - TELEMETRY     Final Result      SCANNED - TELEMETRY     Final Result      SCANNED - TELEMETRY     Final Result      SCANNED - TELEMETRY     Final Result      SCANNED - TELEMETRY     Final Result      SCANNED - TELEMETRY     Final Result            Echocardiogram:    Results for orders placed during the hospital encounter of 10/13/23    Adult Transthoracic Echo Complete W/ Cont if Necessary Per Protocol    Interpretation Summary    Left ventricular systolic function is normal. Left ventricular ejection fraction appears to be 56 - 60%.    Left ventricular wall thickness is consistent with mild concentric hypertrophy.    Left ventricular diastolic function was indeterminate.    The right ventricular cavity is mildly dilated.    The left atrial cavity is severely dilated.    Left atrial volume is severely increased.    The right atrial cavity is moderately  dilated.    Estimated right ventricular systolic pressure from tricuspid regurgitation is normal (<35 mmHg).    The main pulmonary artery is severely  dilated.        Stress Test:  Results for orders placed during the hospital encounter of 04/18/23    Stress Test With Myocardial Perfusion One Day    Interpretation Summary    Left ventricular ejection fraction is normal (Calculated EF = 60%).    Myocardial perfusion imaging indicates a normal myocardial perfusion study with no evidence of ischemia.    Impressions are consistent with a low risk study.    Diaphragmatic attenuation and GI artifacts are present.    There is no prior study available for comparison.    Findings consistent with an equivocal ECG stress test.    Underlying atrial fibrillation during the study, normal ST-T wave findings  Heart rates reached 1 20-1 30 with pharmacologic stress, no changes at submaximal stress with respect to ischemic ST-T wave findings  No arrhythmias seen other than baseline atrial fibrillation  Nuclear perfusion demonstrated fixed small basal inferolateral defect with no reversibility, possible diaphragmatic GI attenuation, normal EF 60% with normal LV size and geometry and LV function    Low risk study for any reversible ischemia, appears to have normal perfusion other than diaphragmatic GI attenuation affected diminished inferolateral counts observed in stress and rest with normal EF 60%               ASSESSMENT & PLAN:      NSTEMI  Chest pain     Troponin 63-63, unclear baseline, decreased clearance in setting of ESRD  ECG without acute ST-T wave changes  Symptoms sound typical given multitude of risk factors.  Had nuclear stress test in April 2023 which was largely unremarkable except for diaphragmatic artifact     Plan  - Unable to tolerate Imdur, will plan on invasive ischemic evaluation ultimately for definitive evaluation of coronary anatomy given risk factors, uninformative stress within the last 6 months  -baby ASA  - Check transthoracic echocardiogram  - Off apixaban and switch to heparin drip for now pending [facility] decision  - Metoprolol 50 mg twice daily  -Start  statin therapy  Unable to tolerate further antianginals at this time     Hypertension  Stop hydralazine and start Imdur as above for antianginal effect  Metoprolol as above     Atrial fibrillation  Elevated CHADsVAsc score in setting of age, sex, HTN, DM and now CAD  AC as above, off NOAC  Rate control with metoprolol    Today  Volume up by 2D echo, continue HD and ultrafiltration for volume off, congestion may be part of the problem  Chest pain concerning for angina, left catheterization will be scheduled for tomorrow morning, will perform right heart catheterization for estimation of filling pressures to help dialysis efforts as well    Further recommendation follow findings  Continues to have stuttering chest discomfort        EMR Dragon/Transcription disclaimer:  Much of this encounter note is an electronic transcription/translation of spoken language to printed text. The electronic translation of spoken language may permit erroneous, or at times, nonsensical words or phrases to be inadvertently transcribed; Although I have reviewed the note for such errors, some may still exist.    Augustin Ellsworth MD  10/17/23  09:09 EDT

## 2023-10-17 NOTE — PLAN OF CARE
Goal Outcome Evaluation:        Problem: Adult Inpatient Plan of Care  Goal: Plan of Care Review  Outcome: Ongoing, Progressing  Goal: Patient-Specific Goal (Individualized)  Outcome: Ongoing, Progressing  Goal: Absence of Hospital-Acquired Illness or Injury  Outcome: Ongoing, Progressing  Goal: Optimal Comfort and Wellbeing  Outcome: Ongoing, Progressing  Goal: Readiness for Transition of Care  Outcome: Ongoing, Progressing     Problem: Skin Injury Risk Increased  Goal: Skin Health and Integrity  Outcome: Ongoing, Progressing     Problem: Diabetes Comorbidity  Goal: Blood Glucose Level Within Targeted Range  Outcome: Ongoing, Progressing     Problem: Heart Failure Comorbidity  Goal: Maintenance of Heart Failure Symptom Control  Outcome: Ongoing, Progressing     Problem: Hypertension Comorbidity  Goal: Blood Pressure in Desired Range  Outcome: Ongoing, Progressing     Problem: Pain Chronic (Persistent) (Comorbidity Management)  Goal: Acceptable Pain Control and Functional Ability  Outcome: Ongoing, Progressing     Problem: Fall Injury Risk  Goal: Absence of Fall and Fall-Related Injury  Outcome: Ongoing, Progressing

## 2023-10-17 NOTE — PLAN OF CARE
Problem: Adult Inpatient Plan of Care  Goal: Plan of Care Review  Outcome: Ongoing, Progressing  Goal: Patient-Specific Goal (Individualized)  Outcome: Ongoing, Progressing  Goal: Absence of Hospital-Acquired Illness or Injury  Outcome: Ongoing, Progressing  Intervention: Identify and Manage Fall Risk  Recent Flowsheet Documentation  Taken 10/17/2023 0345 by Rocío Zazueta RN  Safety Promotion/Fall Prevention:   safety round/check completed   fall prevention program maintained   activity supervised   assistive device/personal items within reach   clutter free environment maintained   nonskid shoes/slippers when out of bed   room organization consistent  Taken 10/17/2023 0200 by Rocío Zazueta RN  Safety Promotion/Fall Prevention:   safety round/check completed   fall prevention program maintained  Taken 10/17/2023 0047 by Rocío Zazueta RN  Safety Promotion/Fall Prevention:   safety round/check completed   fall prevention program maintained   activity supervised   assistive device/personal items within reach   clutter free environment maintained   nonskid shoes/slippers when out of bed   room organization consistent  Taken 10/16/2023 2200 by Rocío Zazueta RN  Safety Promotion/Fall Prevention:   safety round/check completed   fall prevention program maintained  Taken 10/16/2023 2030 by Rocío Zazueta RN  Safety Promotion/Fall Prevention:   safety round/check completed   fall prevention program maintained   clutter free environment maintained   assistive device/personal items within reach   activity supervised   nonskid shoes/slippers when out of bed   room organization consistent  Intervention: Prevent Skin Injury  Recent Flowsheet Documentation  Taken 10/16/2023 2030 by Rocío Zauzeta RN  Skin Protection: adhesive use limited  Intervention: Prevent and Manage VTE (Venous Thromboembolism) Risk  Recent Flowsheet Documentation  Taken 10/16/2023 2030 by Rocío Zazueta  RN  VTE Prevention/Management:   bilateral   sequential compression devices off   patient refused intervention  Intervention: Prevent Infection  Recent Flowsheet Documentation  Taken 10/17/2023 0345 by Rocío Zazueta RN  Infection Prevention: environmental surveillance performed  Taken 10/17/2023 0047 by Rocío Zazueta RN  Infection Prevention: environmental surveillance performed  Taken 10/16/2023 2030 by Rocío Zazueta RN  Infection Prevention: environmental surveillance performed  Goal: Optimal Comfort and Wellbeing  Outcome: Ongoing, Progressing  Intervention: Provide Person-Centered Care  Recent Flowsheet Documentation  Taken 10/17/2023 0047 by Rocío Zazueta RN  Trust Relationship/Rapport:   choices provided   care explained  Taken 10/16/2023 2030 by Rocío Zazueta RN  Trust Relationship/Rapport:   care explained   choices provided  Goal: Readiness for Transition of Care  Outcome: Ongoing, Progressing     Problem: Skin Injury Risk Increased  Goal: Skin Health and Integrity  Outcome: Ongoing, Progressing  Intervention: Optimize Skin Protection  Recent Flowsheet Documentation  Taken 10/16/2023 2030 by Rocío Zazueta RN  Pressure Reduction Techniques: frequent weight shift encouraged  Pressure Reduction Devices: pressure-redistributing mattress utilized  Skin Protection: adhesive use limited     Problem: Diabetes Comorbidity  Goal: Blood Glucose Level Within Targeted Range  Outcome: Ongoing, Progressing  Intervention: Monitor and Manage Glycemia  Recent Flowsheet Documentation  Taken 10/16/2023 2030 by Rocío Zazueta RN  Glycemic Management: blood glucose monitored     Problem: Heart Failure Comorbidity  Goal: Maintenance of Heart Failure Symptom Control  Outcome: Ongoing, Progressing  Intervention: Maintain Heart Failure-Management  Recent Flowsheet Documentation  Taken 10/17/2023 0047 by Rocío Zazueta RN  Medication Review/Management: medications  reviewed     Problem: Hypertension Comorbidity  Goal: Blood Pressure in Desired Range  Outcome: Ongoing, Progressing  Intervention: Maintain Blood Pressure Management  Recent Flowsheet Documentation  Taken 10/17/2023 0047 by oRcío Zazueta RN  Medication Review/Management: medications reviewed     Problem: Pain Chronic (Persistent) (Comorbidity Management)  Goal: Acceptable Pain Control and Functional Ability  Outcome: Ongoing, Progressing  Intervention: Manage Persistent Pain  Recent Flowsheet Documentation  Taken 10/17/2023 0047 by Rocío Zazueta RN  Medication Review/Management: medications reviewed  Intervention: Optimize Psychosocial Wellbeing  Recent Flowsheet Documentation  Taken 10/17/2023 0047 by Rocío Zazueta RN  Diversional Activities: television  Family/Support System Care: support provided  Taken 10/16/2023 2030 by Rocío Zazueta RN  Diversional Activities: television  Family/Support System Care: caregiver stress acknowledged     Problem: Fall Injury Risk  Goal: Absence of Fall and Fall-Related Injury  Outcome: Ongoing, Progressing  Intervention: Identify and Manage Contributors  Recent Flowsheet Documentation  Taken 10/17/2023 0047 by Rocío Zazueta RN  Medication Review/Management: medications reviewed  Intervention: Promote Injury-Free Environment  Recent Flowsheet Documentation  Taken 10/17/2023 0345 by Rocío Zazueta RN  Safety Promotion/Fall Prevention:   safety round/check completed   fall prevention program maintained   activity supervised   assistive device/personal items within reach   clutter free environment maintained   nonskid shoes/slippers when out of bed   room organization consistent  Taken 10/17/2023 0200 by Rocío Zazueta RN  Safety Promotion/Fall Prevention:   safety round/check completed   fall prevention program maintained  Taken 10/17/2023 0047 by Rocío Zazueta RN  Safety Promotion/Fall Prevention:   safety round/check  completed   fall prevention program maintained   activity supervised   assistive device/personal items within reach   clutter free environment maintained   nonskid shoes/slippers when out of bed   room organization consistent  Taken 10/16/2023 2200 by Rocío Zazueta, RN  Safety Promotion/Fall Prevention:   safety round/check completed   fall prevention program maintained  Taken 10/16/2023 2030 by Rocío Zazueta, RN  Safety Promotion/Fall Prevention:   safety round/check completed   fall prevention program maintained   clutter free environment maintained   assistive device/personal items within reach   activity supervised   nonskid shoes/slippers when out of bed   room organization consistent   Goal Outcome Evaluation:

## 2023-10-17 NOTE — PROGRESS NOTES
"                                                                                                                                      Nephrology  Progress Note                                        [facility]    Patient Identification    Name: Hazel Bucio  Age: 83 y.o.  Sex: female  :  1940  MRN: [id number]      DATE OF SERVICE:  10/17/2023        Subective    No complaints    Objective   Scheduled Meds:dicyclomine, 10 mg, Oral, TID  isosorbide mononitrate, 30 mg, Oral, Q24H  metoprolol tartrate, 50 mg, Oral, BID  saccharomyces boulardii, 250 mg, Oral, BID  senna-docusate sodium, 2 tablet, Oral, BID  sodium chloride, 10 mL, Intravenous, Q12H  topiramate, 100 mg, Oral, Nightly          Continuous Infusions:heparin, 12 Units/kg/hr, Last Rate: 17 Units/kg/hr (10/17/23 0055)        PRN Meds:  albumin human    senna-docusate sodium **AND** polyethylene glycol **AND** bisacodyl **AND** bisacodyl    heparin (porcine)    heparin    heparin    hydrALAZINE    ipratropium    Morphine    nitroglycerin    ondansetron **OR** ondansetron    sodium chloride    sodium chloride    sodium chloride    traMADol     Exam:  /56 (BP Location: Left arm, Patient Position: Lying)   Pulse 104   Temp 98.2 °F (36.8 °C) (Oral)   Resp 15   Ht 165.1 cm (65\")   Wt 54.6 kg (120 lb 5.9 oz)   SpO2 98%   BMI 20.03 kg/m²     Intake/Output last 3 shifts:  I/O last 3 completed shifts:  In: -   Out: 2100 [Urine:1100]    Intake/Output this shift:  No intake/output data recorded.    Physical exam:  General Appearance:  Alert  Head:  Normocephalic, without obvious abnormality, atraumatic  Eyes:  PERRL, conjunctiva/corneas clear     Neck:  Supple,  no adenopathy;      Lungs:  Decreased BS occasion ronchi  Heart:  Regular rate and rhythm, S1 and S2 normal  Abdomen:  Soft, non-tender, bowel sounds active   Extremities: trace edema  Pulses: 2+ and symmetric all extremities  Skin:  No rashes or lesions       Data " Review:  All labs (24hrs):   Recent Results (from the past 24 hour(s))   aPTT    Collection Time: 10/16/23  8:32 AM    Specimen: Arm, Left; Blood   Result Value Ref Range    PTT 60.6 (L) 61.0 - 76.5 seconds   Lavender Top    Collection Time: 10/16/23  8:32 AM   Result Value Ref Range    Extra Tube hold for add-on    Gold Top - SST    Collection Time: 10/16/23  8:32 AM   Result Value Ref Range    Extra Tube Hold for add-ons.    Green Top (Gel)    Collection Time: 10/16/23  8:32 AM   Result Value Ref Range    Extra Tube Hold for add-ons.    Hepatitis B Surface Antigen    Collection Time: 10/16/23  8:32 AM    Specimen: Blood   Result Value Ref Range    Hepatitis B Surface Ag Non-Reactive Non-Reactive   Hepatitis B Surface Antibody    Collection Time: 10/16/23  8:32 AM    Specimen: Blood   Result Value Ref Range    Hep B S Ab Non-Reactive Non-Reactive   Adult Transthoracic Echo Complete W/ Cont if Necessary Per Protocol    Collection Time: 10/16/23  3:35 PM   Result Value Ref Range    EF(MOD-bp) 50.1 %    LVIDd 4.0 cm    LVIDs 2.7 cm    IVSd 1.40 cm    LVPWd 1.30 cm    FS 32.5 %    IVS/LVPW 1.08 cm    ESV(cubed) 19.7 ml    LV Sys Vol (BSA corrected) 16.3 cm2    EDV(cubed) 64.0 ml    LV [place] Vol (BSA corrected) 33.2 cm2    LVOT area 3.1 cm2    LV mass(C)d 197.6 grams    LVOT diam 2.00 cm    EDV(MOD-sp2) 53.7 ml    EDV(MOD-sp4) 52.9 ml    ESV(MOD-sp2) 26.1 ml    ESV(MOD-sp4) 26.0 ml    SV(MOD-sp2) 27.6 ml    SV(MOD-sp4) 26.9 ml    SI(MOD-sp2) 17.3 ml/m2    SI(MOD-sp4) 16.9 ml/m2    EF(MOD-sp2) 51.4 %    EF(MOD-sp4) 50.9 %    MV E max mj 135.0 cm/sec    MV A max mj 0.00 cm/sec    MV dec time 0.18 sec    LA ESV Index (BP) 50.1 ml/m2    Med Peak E' Mj 10.8 cm/sec    Lat Peak E' Mj 16.8 cm/sec    Avg E/e' ratio 9.78     RVIDd 2.5 cm    TAPSE (>1.6) 1.22 cm    RV S' 9.3 cm/sec    LA dimension (2D)  3.8 cm    Ao pk mj 109.0 cm/sec    Ao max PG 4.8 mmHg    Ao mean PG 3.0 mmHg    Ao V2 VTI 19.6 cm    MV max PG 7.8 mmHg    MV  mean PG 4.0 mmHg    MV V2 VTI 21.2 cm    MV P1/2t 67.4 msec    MVA(P1/2t) 3.3 cm2    MV dec slope 612.5 cm/sec2    TR max kenya 257.0 cm/sec    TR max PG 26.4 mmHg    RV V1 max PG 2.45 mmHg    RV V1 max 78.2 cm/sec    RV V1 VTI 16.8 cm    PA V2 max 76.7 cm/sec    Sinus 4.1 cm   aPTT    Collection Time: 10/16/23  5:29 PM    Specimen: Blood   Result Value Ref Range    PTT 73.7 61.0 - 76.5 seconds   aPTT    Collection Time: 10/17/23 12:19 AM    Specimen: Blood   Result Value Ref Range    PTT 57.0 (L) 61.0 - 76.5 seconds   Basic Metabolic Panel    Collection Time: 10/17/23 12:19 AM    Specimen: Blood   Result Value Ref Range    Glucose 95 65 - 99 mg/dL    BUN 39 (H) 8 - 23 mg/dL    Creatinine 3.52 (H) 0.57 - 1.00 mg/dL    Sodium 138 136 - 145 mmol/L    Potassium 4.6 3.5 - 5.2 mmol/L    Chloride 103 98 - 107 mmol/L    CO2 23.0 22.0 - 29.0 mmol/L    Calcium 9.0 8.6 - 10.5 mg/dL    BUN/Creatinine Ratio 11.1 7.0 - 25.0    Anion Gap 12.0 5.0 - 15.0 mmol/L    eGFR 12.4 (L) >60.0 mL/min/1.73   CBC Auto Differential    Collection Time: 10/17/23 12:19 AM    Specimen: Blood   Result Value Ref Range    WBC 5.30 3.40 - 10.80 10*3/mm3    RBC 2.96 (L) 3.77 - 5.28 10*6/mm3    Hemoglobin 9.6 (L) 12.0 - 15.9 g/dL    Hematocrit 28.9 (L) 34.0 - 46.6 %    MCV 97.7 (H) 79.0 - 97.0 fL    MCH 32.4 26.6 - 33.0 pg    MCHC 33.1 31.5 - 35.7 g/dL    RDW 15.2 12.3 - 15.4 %    RDW-SD 54.3 (H) 37.0 - 54.0 fl    MPV 7.3 6.0 - 12.0 fL    Platelets 195 140 - 450 10*3/mm3    Neutrophil % 53.8 42.7 - 76.0 %    Lymphocyte % 30.2 19.6 - 45.3 %    Monocyte % 12.0 5.0 - 12.0 %    Eosinophil % 3.3 0.3 - 6.2 %    Basophil % 0.7 0.0 - 1.5 %    Neutrophils, Absolute 2.90 1.70 - 7.00 10*3/mm3    Lymphocytes, Absolute 1.60 0.70 - 3.10 10*3/mm3    Monocytes, Absolute 0.60 0.10 - 0.90 10*3/mm3    Eosinophils, Absolute 0.20 0.00 - 0.40 10*3/mm3    Basophils, Absolute 0.00 0.00 - 0.20 10*3/mm3    nRBC 0.0 0.0 - 0.2 /100 WBC          Imaging:  XR Chest 1 View    Result  Date: 10/13/2023  Impression: No definite acute abnormality. Small opacities overlying the right lower lung is most likely outside the patient. Please correlate. Electronically Signed: George Sorensen DO  10/13/2023 3:30 PM EDT  Workstation ID: [id number]     Assessment/Plan:     Chest pain       ESRD hemodialysis Monday Wednesday Friday  Chest pain  Hypertension  History of urostomy tube  Diabetes  Dyslipidemia     Recommendations:  Dialysis again tomorrow  Watch blood pressure  Avoid volume and electrolyte  If needed cardiac cath may proceed

## 2023-10-17 NOTE — PLAN OF CARE
"Goal Outcome Evaluation:      Assessment: Hazel Bucio presents with functional mobility impairments which indicate the need for skilled intervention. Tolerating session today without incident. Pt on bed pan upon entering room and required max A with pericare.  Pt required mod A with transfers this date, and was min to mod A x2 for ambulation with RW.  Pt required cues to improve posture, increase step length, and push through walker for support as needed.  Will continue to follow and progress as tolerated.     Plan/Recommendations:   Moderate Intensity Therapy recommended post-acute care. This is recommended as therapy feels the patient would require 3-4 days per week and wouldn't tolerate \"3 hour daily\" rehab intensity. SNF would be the preferred choice. If the patient does not agree to SNF, arrange HH or OP depending on home bound status. If patient is medically complex, consider LTACH.. Pt requires no DME at discharge.     Pt desires Skilled Rehab placement at discharge. Pt cooperative; agreeable to therapeutic recommendations and plan of care.                   "

## 2023-10-18 VITALS
SYSTOLIC BLOOD PRESSURE: 114 MMHG | TEMPERATURE: 97.4 F | RESPIRATION RATE: 13 BRPM | OXYGEN SATURATION: 99 % | HEART RATE: 92 BPM | DIASTOLIC BLOOD PRESSURE: 56 MMHG | BODY MASS INDEX: 20.75 KG/M2 | WEIGHT: 124.56 LBS | HEIGHT: 65 IN

## 2023-10-18 PROBLEM — Z99.2 ESRD ON DIALYSIS: Status: ACTIVE | Noted: 2023-10-18

## 2023-10-18 PROBLEM — N18.6 ESRD ON DIALYSIS: Status: ACTIVE | Noted: 2023-10-18

## 2023-10-18 LAB
ANION GAP SERPL CALCULATED.3IONS-SCNC: 13 MMOL/L (ref 5–15)
ANION GAP SERPL CALCULATED.3IONS-SCNC: 14 MMOL/L (ref 5–15)
APTT PPP: 56.1 SECONDS (ref 61–76.5)
BASE DEFICIT: ABNORMAL
BASE DEFICIT: ABNORMAL
BASE EXCESS BLDA CALC-SCNC: <0 MMOL/L (ref 0–3)
BASE EXCESS BLDV CALC-SCNC: ABNORMAL MMOL/L
BASOPHILS # BLD AUTO: 0 10*3/MM3 (ref 0–0.2)
BASOPHILS NFR BLD AUTO: 0.6 % (ref 0–1.5)
BUN SERPL-MCNC: 56 MG/DL (ref 8–23)
BUN SERPL-MCNC: 60 MG/DL (ref 8–23)
BUN/CREAT SERPL: 12.3 (ref 7–25)
BUN/CREAT SERPL: 12.5 (ref 7–25)
CA-I BLDA-SCNC: 1.28 MMOL/L (ref 1.12–1.32)
CA-I BLDA-SCNC: 1.29 MMOL/L (ref 1.12–1.32)
CALCIUM SPEC-SCNC: 9.4 MG/DL (ref 8.6–10.5)
CALCIUM SPEC-SCNC: 9.5 MG/DL (ref 8.6–10.5)
CHLORIDE SERPL-SCNC: 101 MMOL/L (ref 98–107)
CHLORIDE SERPL-SCNC: 101 MMOL/L (ref 98–107)
CO2 BLDA-SCNC: 22 MMOL/L (ref 23–27)
CO2 CONTENT VENOUS: ABNORMAL
CO2 SERPL-SCNC: 21 MMOL/L (ref 22–29)
CO2 SERPL-SCNC: 21 MMOL/L (ref 22–29)
CREAT SERPL-MCNC: 4.54 MG/DL (ref 0.57–1)
CREAT SERPL-MCNC: 4.81 MG/DL (ref 0.57–1)
DEPRECATED RDW RBC AUTO: 54.3 FL (ref 37–54)
DEPRECATED RDW RBC AUTO: 56.4 FL (ref 37–54)
EGFRCR SERPLBLD CKD-EPI 2021: 8.6 ML/MIN/1.73
EGFRCR SERPLBLD CKD-EPI 2021: 9.2 ML/MIN/1.73
EOSINOPHIL # BLD AUTO: 0.1 10*3/MM3 (ref 0–0.4)
EOSINOPHIL NFR BLD AUTO: 1.7 % (ref 0.3–6.2)
ERYTHROCYTE [DISTWIDTH] IN BLOOD BY AUTOMATED COUNT: 15.1 % (ref 12.3–15.4)
ERYTHROCYTE [DISTWIDTH] IN BLOOD BY AUTOMATED COUNT: 15.7 % (ref 12.3–15.4)
GLUCOSE BLDC GLUCOMTR-MCNC: 85 MG/DL (ref 70–105)
GLUCOSE BLDC GLUCOMTR-MCNC: 87 MG/DL (ref 70–105)
GLUCOSE SERPL-MCNC: 84 MG/DL (ref 65–99)
GLUCOSE SERPL-MCNC: 94 MG/DL (ref 65–99)
HCO3 BLDA-SCNC: 20.7 MMOL/L (ref 22–26)
HCO3 BLDV-SCNC: 20.4 MMOL/L (ref 23–28)
HCT VFR BLD AUTO: 29.8 % (ref 34–46.6)
HCT VFR BLD AUTO: 31.1 % (ref 34–46.6)
HCT VFR BLDA CALC: 26 % (ref 38–51)
HCT VFR BLDA CALC: 29 % (ref 38–51)
HGB BLD-MCNC: 10.2 G/DL (ref 12–15.9)
HGB BLD-MCNC: 9.8 G/DL (ref 12–15.9)
HGB BLDA-MCNC: 8.8 G/DL (ref 12–17)
HGB BLDA-MCNC: 9.9 G/DL (ref 12–17)
LYMPHOCYTES # BLD AUTO: 1.9 10*3/MM3 (ref 0.7–3.1)
LYMPHOCYTES NFR BLD AUTO: 30.1 % (ref 19.6–45.3)
MCH RBC QN AUTO: 32.2 PG (ref 26.6–33)
MCH RBC QN AUTO: 32.3 PG (ref 26.6–33)
MCHC RBC AUTO-ENTMCNC: 32.8 G/DL (ref 31.5–35.7)
MCHC RBC AUTO-ENTMCNC: 32.9 G/DL (ref 31.5–35.7)
MCV RBC AUTO: 98.1 FL (ref 79–97)
MCV RBC AUTO: 98.2 FL (ref 79–97)
MONOCYTES # BLD AUTO: 0.7 10*3/MM3 (ref 0.1–0.9)
MONOCYTES NFR BLD AUTO: 11.5 % (ref 5–12)
NEUTROPHILS NFR BLD AUTO: 3.6 10*3/MM3 (ref 1.7–7)
NEUTROPHILS NFR BLD AUTO: 56.1 % (ref 42.7–76)
NRBC BLD AUTO-RTO: 0.1 /100 WBC (ref 0–0.2)
PCO2 BLDA: 39.6 MM HG (ref 35–45)
PCO2 BLDV: 41.1 MM HG (ref 41–51)
PH BLDA: 7.33 PH UNITS (ref 7.35–7.45)
PH BLDV: 7.3 PH UNITS (ref 7.31–7.41)
PLATELET # BLD AUTO: 201 10*3/MM3 (ref 140–450)
PLATELET # BLD AUTO: 204 10*3/MM3 (ref 140–450)
PMV BLD AUTO: 7.4 FL (ref 6–12)
PMV BLD AUTO: 7.9 FL (ref 6–12)
PO2 BLDA: 180 MM HG (ref 80–105)
PO2 BLDV: 48 MM HG (ref 35–42)
POTASSIUM BLDA-SCNC: 4.1 MMOL/L (ref 3.5–4.9)
POTASSIUM BLDA-SCNC: 4.1 MMOL/L (ref 3.5–4.9)
POTASSIUM SERPL-SCNC: 4.5 MMOL/L (ref 3.5–5.2)
POTASSIUM SERPL-SCNC: 4.5 MMOL/L (ref 3.5–5.2)
RBC # BLD AUTO: 3.04 10*6/MM3 (ref 3.77–5.28)
RBC # BLD AUTO: 3.17 10*6/MM3 (ref 3.77–5.28)
SAO2 % BLDCOA: 100 % (ref 95–98)
SAO2 % BLDCOV: 22 % (ref 45–75)
SODIUM BLD-SCNC: 136 MMOL/L (ref 138–146)
SODIUM BLD-SCNC: 136 MMOL/L (ref 138–146)
SODIUM SERPL-SCNC: 135 MMOL/L (ref 136–145)
SODIUM SERPL-SCNC: 136 MMOL/L (ref 136–145)
WBC NRBC COR # BLD: 5.5 10*3/MM3 (ref 3.4–10.8)
WBC NRBC COR # BLD: 6.3 10*3/MM3 (ref 3.4–10.8)

## 2023-10-18 PROCEDURE — 93460 R&L HRT ART/VENTRICLE ANGIO: CPT | Performed by: INTERNAL MEDICINE

## 2023-10-18 PROCEDURE — C1894 INTRO/SHEATH, NON-LASER: HCPCS | Performed by: INTERNAL MEDICINE

## 2023-10-18 PROCEDURE — 80048 BASIC METABOLIC PNL TOTAL CA: CPT | Performed by: NURSE PRACTITIONER

## 2023-10-18 PROCEDURE — 85025 COMPLETE CBC W/AUTO DIFF WBC: CPT | Performed by: INTERNAL MEDICINE

## 2023-10-18 PROCEDURE — 25010000002 FENTANYL CITRATE (PF) 100 MCG/2ML SOLUTION: Performed by: INTERNAL MEDICINE

## 2023-10-18 PROCEDURE — 99153 MOD SED SAME PHYS/QHP EA: CPT | Performed by: INTERNAL MEDICINE

## 2023-10-18 PROCEDURE — 25010000002 MIDAZOLAM PER 1 MG: Performed by: INTERNAL MEDICINE

## 2023-10-18 PROCEDURE — 82803 BLOOD GASES ANY COMBINATION: CPT

## 2023-10-18 PROCEDURE — 84132 ASSAY OF SERUM POTASSIUM: CPT

## 2023-10-18 PROCEDURE — 25010000002 HEPARIN (PORCINE) PER 1000 UNITS: Performed by: INTERNAL MEDICINE

## 2023-10-18 PROCEDURE — 25810000003 SODIUM CHLORIDE 0.9 % SOLUTION: Performed by: INTERNAL MEDICINE

## 2023-10-18 PROCEDURE — B2151ZZ FLUOROSCOPY OF LEFT HEART USING LOW OSMOLAR CONTRAST: ICD-10-PCS | Performed by: INTERNAL MEDICINE

## 2023-10-18 PROCEDURE — 82947 ASSAY GLUCOSE BLOOD QUANT: CPT

## 2023-10-18 PROCEDURE — 85027 COMPLETE CBC AUTOMATED: CPT | Performed by: NURSE PRACTITIONER

## 2023-10-18 PROCEDURE — 4A023N8 MEASUREMENT OF CARDIAC SAMPLING AND PRESSURE, BILATERAL, PERCUTANEOUS APPROACH: ICD-10-PCS | Performed by: INTERNAL MEDICINE

## 2023-10-18 PROCEDURE — 99152 MOD SED SAME PHYS/QHP 5/>YRS: CPT | Performed by: INTERNAL MEDICINE

## 2023-10-18 PROCEDURE — 99233 SBSQ HOSP IP/OBS HIGH 50: CPT | Performed by: INTERNAL MEDICINE

## 2023-10-18 PROCEDURE — 25010000002 HEPARIN (PORCINE) 25000-0.45 UT/250ML-% SOLUTION: Performed by: FAMILY MEDICINE

## 2023-10-18 PROCEDURE — C1769 GUIDE WIRE: HCPCS | Performed by: INTERNAL MEDICINE

## 2023-10-18 PROCEDURE — 84295 ASSAY OF SERUM SODIUM: CPT

## 2023-10-18 PROCEDURE — 82330 ASSAY OF CALCIUM: CPT

## 2023-10-18 PROCEDURE — 80048 BASIC METABOLIC PNL TOTAL CA: CPT | Performed by: INTERNAL MEDICINE

## 2023-10-18 PROCEDURE — 25510000001 IOPAMIDOL PER 1 ML: Performed by: INTERNAL MEDICINE

## 2023-10-18 PROCEDURE — 85014 HEMATOCRIT: CPT

## 2023-10-18 PROCEDURE — B2111ZZ FLUOROSCOPY OF MULTIPLE CORONARY ARTERIES USING LOW OSMOLAR CONTRAST: ICD-10-PCS | Performed by: INTERNAL MEDICINE

## 2023-10-18 PROCEDURE — 85730 THROMBOPLASTIN TIME PARTIAL: CPT | Performed by: FAMILY MEDICINE

## 2023-10-18 RX ORDER — SODIUM CHLORIDE 0.9 % (FLUSH) 0.9 %
10 SYRINGE (ML) INJECTION AS NEEDED
Status: DISCONTINUED | OUTPATIENT
Start: 2023-10-18 | End: 2023-10-18 | Stop reason: HOSPADM

## 2023-10-18 RX ORDER — HEPARIN SODIUM 1000 [USP'U]/ML
10000 INJECTION, SOLUTION INTRAVENOUS; SUBCUTANEOUS ONCE
Status: COMPLETED | OUTPATIENT
Start: 2023-10-18 | End: 2023-10-18

## 2023-10-18 RX ORDER — LIDOCAINE HYDROCHLORIDE 20 MG/ML
INJECTION, SOLUTION INFILTRATION; PERINEURAL
Status: DISCONTINUED | OUTPATIENT
Start: 2023-10-18 | End: 2023-10-18 | Stop reason: HOSPADM

## 2023-10-18 RX ORDER — SODIUM CHLORIDE 9 MG/ML
250 INJECTION, SOLUTION INTRAVENOUS ONCE AS NEEDED
Status: DISCONTINUED | OUTPATIENT
Start: 2023-10-18 | End: 2023-10-18 | Stop reason: HOSPADM

## 2023-10-18 RX ORDER — ATORVASTATIN CALCIUM 10 MG/1
20 TABLET, FILM COATED ORAL DAILY
Qty: 90 TABLET | Refills: 0 | Status: SHIPPED | OUTPATIENT
Start: 2023-10-18

## 2023-10-18 RX ORDER — SODIUM CHLORIDE 0.9 % (FLUSH) 0.9 %
10 SYRINGE (ML) INJECTION EVERY 12 HOURS SCHEDULED
Status: DISCONTINUED | OUTPATIENT
Start: 2023-10-18 | End: 2023-10-18 | Stop reason: HOSPADM

## 2023-10-18 RX ORDER — MIDAZOLAM HYDROCHLORIDE 1 MG/ML
INJECTION INTRAMUSCULAR; INTRAVENOUS
Status: DISCONTINUED | OUTPATIENT
Start: 2023-10-18 | End: 2023-10-18 | Stop reason: HOSPADM

## 2023-10-18 RX ORDER — SODIUM CHLORIDE 9 MG/ML
INJECTION, SOLUTION INTRAVENOUS
Status: COMPLETED | OUTPATIENT
Start: 2023-10-18 | End: 2023-10-18

## 2023-10-18 RX ORDER — NITROGLYCERIN 0.4 MG/1
0.4 TABLET SUBLINGUAL
Status: DISCONTINUED | OUTPATIENT
Start: 2023-10-18 | End: 2023-10-18 | Stop reason: HOSPADM

## 2023-10-18 RX ORDER — HEPARIN SODIUM 10000 [USP'U]/100ML
12 INJECTION, SOLUTION INTRAVENOUS
Status: DISCONTINUED | OUTPATIENT
Start: 2023-10-18 | End: 2023-10-18

## 2023-10-18 RX ORDER — SODIUM CHLORIDE 9 MG/ML
100 INJECTION, SOLUTION INTRAVENOUS CONTINUOUS
Status: DISCONTINUED | OUTPATIENT
Start: 2023-10-19 | End: 2023-10-18

## 2023-10-18 RX ORDER — SODIUM CHLORIDE 9 MG/ML
40 INJECTION, SOLUTION INTRAVENOUS AS NEEDED
Status: DISCONTINUED | OUTPATIENT
Start: 2023-10-18 | End: 2023-10-18 | Stop reason: HOSPADM

## 2023-10-18 RX ORDER — FENTANYL CITRATE 50 UG/ML
INJECTION, SOLUTION INTRAMUSCULAR; INTRAVENOUS
Status: DISCONTINUED | OUTPATIENT
Start: 2023-10-18 | End: 2023-10-18 | Stop reason: HOSPADM

## 2023-10-18 RX ADMIN — Medication 10 ML: at 08:17

## 2023-10-18 RX ADMIN — HEPARIN SODIUM 17 UNITS/KG/HR: 10000 INJECTION, SOLUTION INTRAVENOUS at 00:12

## 2023-10-18 RX ADMIN — Medication 250 MG: at 12:52

## 2023-10-18 RX ADMIN — DOCUSATE SODIUM 50 MG AND SENNOSIDES 8.6 MG 2 TABLET: 8.6; 5 TABLET, FILM COATED ORAL at 12:52

## 2023-10-18 RX ADMIN — Medication 10 ML: at 08:16

## 2023-10-18 RX ADMIN — METOPROLOL TARTRATE 50 MG: 50 TABLET ORAL at 12:52

## 2023-10-18 RX ADMIN — HEPARIN SODIUM 10000 UNITS: 1000 INJECTION INTRAVENOUS; SUBCUTANEOUS at 17:26

## 2023-10-18 RX ADMIN — DICYCLOMINE HYDROCHLORIDE 10 MG: 10 CAPSULE ORAL at 12:52

## 2023-10-18 NOTE — PROGRESS NOTES
Referring Provider: Lora Henderson MD     Patient Care Team:  Lizzy Francis MD as PCP - General (Family Medicine)  Jose Carlos Cheng MD as Consulting Physician (Cardiology)      SUBJECTIVE  No acute events overnight  Pending left and right heart catheterization today for ischemic evaluation with recurrent chest pain refractory to medical therapy  Echo this admission demonstrated significantly enlarged IVC with minimal collapsibility, right atrial pressure was estimated greater than 20, needs further dialysis for volume off, will discuss with nephrology    HD dependent, HD 3 times a week    Continue to hold NOAC    ROS  Review of all systems negative except as indicated above    Personal History:    Past Medical History:   Diagnosis Date    Bladder cancer     Cancer     breast, bladder    Deep vein thrombosis     Essential hypertension 1/17/2017    Fracture tibia/fibula, right, closed, initial encounter 8/27/2020    GERD (gastroesophageal reflux disease)     History of urostomy     Immobility 08/27/2020    r/t broken Tibia     Kidney carcinoma, right     removed     Long term current use of anticoagulant 1/9/2015    PAF (paroxysmal atrial fibrillation) 6/26/2019    Presence of cardiac pacemaker 11/03/2014    BS dual chamber PM placed 10/2014 with pocket revision 1/25/17.  HX tachy rob syndrome    Sick sinus syndrome 11/3/2014       Past Surgical History:   Procedure Laterality Date    BREAST LUMPECTOMY      CARDIAC ELECTROPHYSIOLOGY PROCEDURE N/A 4/28/2023    Procedure: Pacemaker gen change, [place] AWARE $;  Surgeon: Jose Carlos Cheng MD;  Location: [facility] CATH INVASIVE LOCATION;  Service: Cardiovascular;  Laterality: N/A;    CHOLECYSTECTOMY N/A 10/12/2020    Procedure: CHOLECYSTECTOMY LAPAROSCOPIC;  Surgeon: Go Pereira DO;  Location: [facility] MAIN OR;  Service: General;  Laterality: N/A;    CYSTECTOMY W/ URETEROILEAL CONDUIT      HARDWARE REMOVAL Right 6/11/2021    Procedure: TIBIA HARDWARE REMOVAL;   Surgeon: Geoff Shah II, MD;  Location: [facility] MAIN OR;  Service: Orthopedics;  Laterality: Right;    HERNIA REPAIR      HYSTERECTOMY      INSERT / REPLACE / REMOVE PACEMAKER      NEPHRECTOMY Right     TIBIA IM NAILING/RODDING Right 8/28/2020    Procedure: TIBIA INTRAMEDULLARY NAIL/ABRAHAM INSERTION;  Surgeon: Geoff Shah II, MD;  Location: [facility] MAIN OR;  Service: Orthopedics;  Laterality: Right;       Family History   Problem Relation Age of Onset    COPD Father        Social History     Tobacco Use    Smoking status: Never     Passive exposure: Never    Smokeless tobacco: Never   Vaping Use    Vaping Use: Never used   Substance Use Topics    Alcohol use: Not Currently    Drug use: Never        Home meds:  Prior to Admission medications    Medication Sig Start Date End Date Taking? Authorizing Provider   acetaminophen (TYLENOL) 325 MG tablet Take 2 tablets by mouth Every 6 (Six) Hours As Needed for Mild Pain.   Yes Gino Leos MD   apixaban (ELIQUIS) 2.5 MG tablet tablet Take 1 tablet by mouth Every 12 (Twelve) Hours. Indications: Atrial Fibrillation 6/30/23  Yes Shantel Moulton APRN   dicyclomine (BENTYL) 10 MG capsule Take 1 capsule by mouth 3 (Three) Times a Day. 6/30/23  Yes Shantel Moulton APRN   hydrALAZINE (APRESOLINE) 25 MG tablet Take 1 tablet by mouth 3 (Three) Times a Day. 6/30/23  Yes Lroa Henderson MD   ipratropium (ATROVENT) 0.02 % nebulizer solution Take 2.5 mL by nebulization 4 (Four) Times a Day As Needed for Wheezing or Shortness of Air.   Yes Gino Leos MD   loperamide (IMODIUM) 2 MG capsule Take 1 capsule by mouth Daily As Needed for Diarrhea.   Yes Gino Leos MD   metoprolol tartrate (LOPRESSOR) 50 MG tablet Take 1 tablet by mouth 2 (Two) Times a Day.   Yes Gino Leos MD   ondansetron (ZOFRAN) 4 MG tablet Take 1 tablet by mouth Every 8 (Eight) Hours As Needed.   Yes Provider, Historical, MD   saccharomyces boulardii  "(FLORASTOR) 250 MG capsule Take 1 capsule by mouth 2 (Two) Times a Day. 6/30/23  Yes Shantel Moulton APRN   topiramate (TOPAMAX) 100 MG tablet Take 1 tablet by mouth Every Night. 6/30/23  Yes Shantel Moulton APRN   zinc oxide 20 % ointment Apply 1 application  topically to the appropriate area as directed 2 (Two) Times a Day.   Yes Provider, Gino, MD       Allergies:  Amoxicillin, Ciprofloxacin, Oxycodone-acetaminophen, Penicillins, Sulfa antibiotics, Cephalexin, Clavulanic acid, Codeine, Contrast dye (echo or unknown ct/mr), Doxycycline, Fluconazole, Iodinated contrast media, Iodine, Macrobid [nitrofurantoin], Tobramycin, and Trimethoprim      OBJECTIVE    Vital Signs  Vitals:    10/17/23 1919 10/17/23 2100 10/17/23 2300 10/18/23 0413   BP: 106/61  115/62 119/61   BP Location: Left arm  Right arm Right arm   Patient Position: Lying  Lying Lying   Pulse: 108 101 98 89   Resp: 17  15 18   Temp: 98.5 °F (36.9 °C)  98.2 °F (36.8 °C) 98.3 °F (36.8 °C)   TempSrc: Oral  Oral Oral   SpO2: 99% 90% 98% 96%   Weight:    56.5 kg (124 lb 9 oz)   Height:           Flowsheet Rows      Flowsheet Row First Filed Value   Admission Height 165.1 cm (65\") Documented at 10/13/2023 1453   Admission Weight 57.6 kg (127 lb) Documented at 10/13/2023 1453              Intake/Output Summary (Last 24 hours) at 10/18/2023 0916  Last data filed at 10/18/2023 0413  Gross per 24 hour   Intake 720 ml   Output 350 ml   Net 370 ml              Physical Exam:  General-no acute distress  No elevated JVP noted.  Cardiovascular-S1-S2 normal, no murmurs noted.  Respiratory-normal breath sounds, no wheezing/crackles.  GI-abdomen is soft and nontender  No pedal edema    Unchanged from prior encounter    Results Review:    BNP        TROPONIN  Results from last 7 days   Lab Units 10/15/23  1731   HSTROP T ng/L 57*       CoAg  Results from last 7 days   Lab Units 10/18/23  0117 10/17/23  0948 10/17/23  0019 10/16/23  1729 10/16/23  0832 " 10/16/23  0149 10/15/23  1852 10/15/23  0037 10/14/23  1639 10/13/23  1541   INR   --   --   --   --   --   --   --   --  1.09 1.05   APTT seconds 56.1* 62.4 57.0* 73.7 60.6* 47.3* 44.7*   < > 28.0* 27.3    < > = values in this interval not displayed.       Creatinine Clearance  Estimated Creatinine Clearance: 8.5 mL/min (A) (by C-G formula based on SCr of 4.54 mg/dL (H)).      Radiology  No radiology results for the last day      EKG  I personally viewed and interpreted the patient's EKG/Telemetry data:  ECG 12 Lead Syncope   Final Result   HEART RATE= 87  bpm   RR Interval= 690  ms   NC Interval=   ms   P Horizontal Axis=   deg   P Front Axis=   deg   QRSD Interval= 78  ms   QT Interval= 380  ms   QTcB= 457  ms   QRS Axis= 50  deg   T Wave Axis= 26  deg   - ABNORMAL ECG -   Atrial fibrillation   Low voltage, precordial leads   When compared with ECG of 14-Oct-2023 12:58:28,   No significant change   Electronically Signed By: Shantelle Godoy (ANNY) 15-Oct-2023 20:15:03   Date and Time of Study: 2023-10-15 12:42:13      ECG 12 Lead Chest Pain   Final Result   HEART RATE= 101  bpm   RR Interval= 593  ms   NC Interval=   ms   P Horizontal Axis=   deg   P Front Axis=   deg   QRSD Interval= 78  ms   QT Interval= 371  ms   QTcB= 482  ms   QRS Axis= 52  deg   T Wave Axis= 1  deg   - ABNORMAL ECG -   Atrial fibrillation   Low voltage, extremity and precordial leads   When compared with ECG of 13-Oct-2023 14:56:10,   No significant change   Electronically Signed By: Shantelle Godoy (ANNY) 16-Oct-2023 09:07:39   Date and Time of Study: 2023-10-14 12:58:28      ECG 12 Lead Chest Pain   Final Result   HEART RATE= 91  bpm   RR Interval= 657  ms   NC Interval=   ms   P Horizontal Axis=   deg   P Front Axis=   deg   QRSD Interval= 75  ms   QT Interval= 395  ms   QTcB= 487  ms   QRS Axis= 74  deg   T Wave Axis= 56  deg   - ABNORMAL ECG -   Atrial fibrillation   Low voltage, precordial leads   When compared with ECG of 28-Jun-2023 3:10:16,    Significant rate increase   Significant repolarization change   Significant axis, voltage or hypertrophy change   Electronically Signed By: Rudy Kolb (Yoan) 14-Oct-2023 06:15:59   Date and Time of Study: 2023-10-13 14:56:10      SCANNED - TELEMETRY     Final Result      SCANNED - TELEMETRY     Final Result      SCANNED - TELEMETRY     Final Result      SCANNED - TELEMETRY     Final Result      SCANNED - TELEMETRY     Final Result      SCANNED - TELEMETRY     Final Result      SCANNED - TELEMETRY     Final Result      SCANNED - TELEMETRY     Final Result      SCANNED - TELEMETRY     Final Result      SCANNED - TELEMETRY     Final Result      SCANNED - TELEMETRY     Final Result      SCANNED - TELEMETRY     Final Result      SCANNED - TELEMETRY     Final Result      SCANNED - TELEMETRY     Final Result      SCANNED - TELEMETRY     Final Result      SCANNED - TELEMETRY     Final Result      SCANNED - TELEMETRY     Final Result      SCANNED - TELEMETRY     Final Result      SCANNED - TELEMETRY     Final Result      SCANNED - TELEMETRY     Final Result      SCANNED - TELEMETRY     Final Result      SCANNED - TELEMETRY     Final Result      SCANNED - TELEMETRY     Final Result      SCANNED - TELEMETRY     Final Result            Echocardiogram:    Results for orders placed during the hospital encounter of 10/13/23    Adult Transthoracic Echo Complete W/ Cont if Necessary Per Protocol    Interpretation Summary    Left ventricular systolic function is normal. Left ventricular ejection fraction appears to be 56 - 60%.    Left ventricular wall thickness is consistent with mild concentric hypertrophy.    Left ventricular diastolic function was indeterminate.    The right ventricular cavity is mildly dilated.    The left atrial cavity is severely dilated.    Left atrial volume is severely increased.    The right atrial cavity is moderately  dilated.    Estimated right ventricular systolic pressure from tricuspid  regurgitation is normal (<35 mmHg).    The main pulmonary artery is severely dilated.        Stress Test:  Results for orders placed during the hospital encounter of 04/18/23    Stress Test With Myocardial Perfusion One Day    Interpretation Summary    Left ventricular ejection fraction is normal (Calculated EF = 60%).    Myocardial perfusion imaging indicates a normal myocardial perfusion study with no evidence of ischemia.    Impressions are consistent with a low risk study.    Diaphragmatic attenuation and GI artifacts are present.    There is no prior study available for comparison.    Findings consistent with an equivocal ECG stress test.    Underlying atrial fibrillation during the study, normal ST-T wave findings  Heart rates reached 1 20-1 30 with pharmacologic stress, no changes at submaximal stress with respect to ischemic ST-T wave findings  No arrhythmias seen other than baseline atrial fibrillation  Nuclear perfusion demonstrated fixed small basal inferolateral defect with no reversibility, possible diaphragmatic GI attenuation, normal EF 60% with normal LV size and geometry and LV function    Low risk study for any reversible ischemia, appears to have normal perfusion other than diaphragmatic GI attenuation affected diminished inferolateral counts observed in stress and rest with normal EF 60%               ASSESSMENT & PLAN:      NSTEMI  Chest pain     Troponin 63-63, unclear baseline, decreased clearance in setting of ESRD  ECG without acute ST-T wave changes  Symptoms sound typical given multitude of risk factors.  Had nuclear stress test in April 2023 which was largely unremarkable except for diaphragmatic artifact     Plan  - Unable to tolerate Imdur, will plan on invasive ischemic evaluation ultimately for definitive evaluation of coronary anatomy given risk factors, uninformative stress within the last 6 months  -baby ASA  - Check transthoracic echocardiogram  - Off apixaban and switch to  heparin drip for now pending [facility] decision  - Metoprolol 50 mg twice daily  -Start statin therapy  Unable to tolerate further antianginals at this time     Hypertension  Stop hydralazine and start Imdur as above for antianginal effect  Metoprolol as above     Atrial fibrillation  Elevated CHADsVAsc score in setting of age, sex, HTN, DM and now CAD  AC as above, off NOAC  Rate control with metoprolol    Today  Volume up by 2D echo, continue HD and ultrafiltration for volume off, congestion may be part of the problem  Left right heart cath to guide therapy as well as assess chest pain refractory to medical therapy with prior noninvasive evaluations which were uninformative      Further recommendation follow findings  Continues to have stuttering chest discomfort        EMR Dragon/Transcription disclaimer:  Much of this encounter note is an electronic transcription/translation of spoken language to printed text. The electronic translation of spoken language may permit erroneous, or at times, nonsensical words or phrases to be inadvertently transcribed; Although I have reviewed the note for such errors, some may still exist.    Augustin Ellsworth MD  10/18/23  09:16 EDT

## 2023-10-18 NOTE — SIGNIFICANT NOTE
10/18/23 1000   OTHER   Discipline physical therapist   Rehab Time/Intention   Session Not Performed patient unavailable for treatment  (Pt off unit for cardiac cath this a.m. will resume PT as appropriate tomorrow.)   Therapy Assessment/Plan (PT)   Criteria for Skilled Interventions Met (PT) yes;meets criteria   Recommendation   PT - Next Appointment 10/19/23

## 2023-10-18 NOTE — PROGRESS NOTES
"                                                                                                                                      Nephrology  Progress Note                                        [facility]    Patient Identification    Name: Hazel Bucio  Age: 83 y.o.  Sex: female  :  1940  MRN: [id number]      DATE OF SERVICE:  10/18/2023        Subective    No complaints    Objective   Scheduled Meds:dicyclomine, 10 mg, Oral, TID  isosorbide mononitrate, 30 mg, Oral, Q24H  metoprolol tartrate, 50 mg, Oral, BID  saccharomyces boulardii, 250 mg, Oral, BID  senna-docusate sodium, 2 tablet, Oral, BID  sodium chloride, 10 mL, Intravenous, Q12H  topiramate, 100 mg, Oral, Nightly          Continuous Infusions:heparin, 12 Units/kg/hr, Last Rate: 17 Units/kg/hr (10/18/23 001)        PRN Meds:  albumin human    senna-docusate sodium **AND** polyethylene glycol **AND** bisacodyl **AND** bisacodyl    heparin (porcine)    heparin    heparin    hydrALAZINE    ipratropium    Morphine    nitroglycerin    ondansetron **OR** ondansetron    sodium chloride    sodium chloride    sodium chloride    traMADol     Exam:  /61 (BP Location: Right arm, Patient Position: Lying)   Pulse 89   Temp 98.3 °F (36.8 °C) (Oral)   Resp 18   Ht 165.1 cm (65\")   Wt 56.5 kg (124 lb 9 oz)   SpO2 96%   BMI 20.73 kg/m²     Intake/Output last 3 shifts:  I/O last 3 completed shifts:  In: 720 [P.O.:720]  Out:  [Urine:1000]    Intake/Output this shift:  I/O this shift:  In: -   Out: 350 [Urine:350]    Physical exam:  General Appearance:  Alert  Head:  Normocephalic, without obvious abnormality, atraumatic  Eyes:  PERRL, conjunctiva/corneas clear     Neck:  Supple,  no adenopathy;      Lungs:  Decreased BS occasion ronchi  Heart:  Regular rate and rhythm, S1 and S2 normal  Abdomen:  Soft, non-tender, bowel sounds active   Extremities: trace edema  Pulses: 2+ and symmetric all extremities  Skin:  No rashes or " lesions       Data Review:  All labs (24hrs):   Recent Results (from the past 24 hour(s))   aPTT    Collection Time: 10/17/23  9:48 AM    Specimen: Blood   Result Value Ref Range    PTT 62.4 61.0 - 76.5 seconds   aPTT    Collection Time: 10/18/23  1:17 AM    Specimen: Blood   Result Value Ref Range    PTT 56.1 (L) 61.0 - 76.5 seconds   Basic Metabolic Panel    Collection Time: 10/18/23  1:17 AM    Specimen: Blood   Result Value Ref Range    Glucose 94 65 - 99 mg/dL    BUN 56 (H) 8 - 23 mg/dL    Creatinine 4.54 (H) 0.57 - 1.00 mg/dL    Sodium 136 136 - 145 mmol/L    Potassium 4.5 3.5 - 5.2 mmol/L    Chloride 101 98 - 107 mmol/L    CO2 21.0 (L) 22.0 - 29.0 mmol/L    Calcium 9.5 8.6 - 10.5 mg/dL    BUN/Creatinine Ratio 12.3 7.0 - 25.0    Anion Gap 14.0 5.0 - 15.0 mmol/L    eGFR 9.2 (L) >60.0 mL/min/1.73   CBC Auto Differential    Collection Time: 10/18/23  1:17 AM    Specimen: Blood   Result Value Ref Range    WBC 6.30 3.40 - 10.80 10*3/mm3    RBC 3.17 (L) 3.77 - 5.28 10*6/mm3    Hemoglobin 10.2 (L) 12.0 - 15.9 g/dL    Hematocrit 31.1 (L) 34.0 - 46.6 %    MCV 98.1 (H) 79.0 - 97.0 fL    MCH 32.2 26.6 - 33.0 pg    MCHC 32.8 31.5 - 35.7 g/dL    RDW 15.7 (H) 12.3 - 15.4 %    RDW-SD 56.4 (H) 37.0 - 54.0 fl    MPV 7.4 6.0 - 12.0 fL    Platelets 204 140 - 450 10*3/mm3    Neutrophil % 56.1 42.7 - 76.0 %    Lymphocyte % 30.1 19.6 - 45.3 %    Monocyte % 11.5 5.0 - 12.0 %    Eosinophil % 1.7 0.3 - 6.2 %    Basophil % 0.6 0.0 - 1.5 %    Neutrophils, Absolute 3.60 1.70 - 7.00 10*3/mm3    Lymphocytes, Absolute 1.90 0.70 - 3.10 10*3/mm3    Monocytes, Absolute 0.70 0.10 - 0.90 10*3/mm3    Eosinophils, Absolute 0.10 0.00 - 0.40 10*3/mm3    Basophils, Absolute 0.00 0.00 - 0.20 10*3/mm3    nRBC 0.1 0.0 - 0.2 /100 WBC          Imaging:  XR Chest 1 View    Result Date: 10/13/2023  Impression: No definite acute abnormality. Small opacities overlying the right lower lung is most likely outside the patient. Please correlate. Electronically  Signed: George Sorensen DO  10/13/2023 3:30 PM EDT  Workstation ID: [id number]     Assessment/Plan:     Chest pain       ESRD hemodialysis Monday Wednesday Friday  Chest pain  Hypertension  History of urostomy tube  Diabetes  Dyslipidemia     Recommendations:  Dialysis again today  Watch blood pressure  Avoid volume and electrolyte  If needed cardiac cath may proceed

## 2023-10-18 NOTE — PLAN OF CARE
Goal Outcome Evaluation:     Patient was npo after midnight for cardiac cath in am 10/18/23 patient is still on heparin gtt so bleeding precautions were discussed ;importance of maintaining skin integrity by turning side to side; and applying cream as needed

## 2023-10-18 NOTE — DISCHARGE PLACEMENT REQUEST
"Nell Bucio (83 y.o. Female)       Date of Birth   1940    Social Security Number       Address   [address]  [place] [facility]    Home Phone   [phone]    MRN   [id number]       [facility]    Marital Status                               Admission Date   10/13/23    Admission Type   Emergency    Admitting Provider   Bethanie Mena MD    Attending Provider   Lora Henderson MD    Department, Room/Bed   [facility], 247/1       Discharge Date       Discharge Disposition       Discharge Destination                                 Attending Provider: Lora Henderson MD    Allergies: Amoxicillin, Ciprofloxacin, Oxycodone-acetaminophen, Penicillins, Sulfa Antibiotics, Cephalexin, Clavulanic Acid, Codeine, Contrast Dye (Echo Or Unknown Ct/mr), Doxycycline, Fluconazole, Iodinated Contrast Media, Iodine, Macrobid [Nitrofurantoin], Tobramycin, Trimethoprim    Isolation: None   Infection: None   Code Status: CPR    Ht: 165.1 cm (65\")   Wt: 56.5 kg (124 lb 9 oz)    Admission Cmt: None   Principal Problem: None                  Active Insurance as of 10/13/2023       Primary Coverage       Payor Plan Insurance Group Employer/Plan Group    MEDICARE MEDICARE A & B        Payor Plan Address Payor Plan Phone Number Payor Plan Fax Number Effective Dates    [address] [phone]  4/1/2006 - None Entered    [place] [address]         Subscriber Name Subscriber Birth Date Member ID       NELL BUCIO 1940 [id number]                     Emergency Contacts        (Rel.) Home Phone Work Phone Mobile Phone    OZZY GARAY (Friend) [phone] -- [phone]    Brianna Patel (Niece) (Relative) -- -- [phone]    Papito Agudelo (Relative) -- -- [phone]    Manish Bucio (Son) [phone] -- [phone]    RANJITH WATKINS (Grandchild) -- -- [phone]              "

## 2023-10-18 NOTE — PLAN OF CARE
Plan of care    L/RHC results    Findings  1.  Wedge pressure of 9  2.  PA pressure 28/11 with a mean of 20  3.  RV pressure 27/1 with an EDP of 3  4.  LV pressure of 91/0 with an EDP of 5-6  5.  Closing pressure 96/48 with a mean of 72  6.  Aortic saturation 100%  7.  PA saturation 80%  8.  Abdulaziz cardiac output calculation 7.7 L/min with index of 4.7 which is normal  9.  Normal cardiac output,  which is normal,  which is normal less than 2 Wood unit     Angiography  1.  Left main normal no disease  Over 2 the LAD is a medium caliber vessel with 1 diagonal branch on its course to around the apex with no angiographic disease throughout the LAD  3.  The circumflex is nondominant with 2 obtuse marginal branches with no angiographic disease throughout  4.  The RCA is a very large caliber dominant vessel with mild diffuse but nonobstructive disease 30% maximally in the proximal to mid segment with PLV and PDA with no obstructive disease less than 20%     Conclusions recommendations  1.  Normal epicardial coronary anatomy with no obstructive atherosclerotic disease of any significance maximally 30% with ANNA-3 flow in all vessels  2.  Normal LV filling pressures with normal LV function with normal transaortic valve gradient  3.  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

## 2023-10-18 NOTE — CASE MANAGEMENT/SOCIAL WORK
Continued Stay Note  NYDIA Briscoe     Patient Name: Hazel Bucio  MRN: [id number]  Today's Date: 10/18/2023    Admit Date: 10/13/2023    Plan: D/C Plan: Return to [facility] (Private Pay until [facility] eligible) No new PASRR required; Transport TBD.   Discharge Plan       Row Name 10/18/23 0841       Plan    Plan Comments Barrier to D/C: L & R heart cath 10/18/23               Expected Discharge Date and Time       Expected Discharge Date Expected Discharge Time    Oct 20, 2023               Lavern Guzman RN

## 2023-10-18 NOTE — DISCHARGE SUMMARY
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          Hospital Course  Patient is a 83 y.o. female with end-stage renal disease on dialysis who presented with report of substernal chest pain.  She ruled out for acute coronary syndrome with EKG that showed no acute changes but controlled atrial fibrillation.  She had elevated troponins that did not trend upward.  She was evaluated by her cardiologist.  Recommendation was for heart cath as she had had a unremarkable stress test in April 2023 but symptoms were highly suggestive of unstable angina.  She underwent heart cath that showed nonobstructive coronary artery disease.  Chest pain resolved and she was treated medically with beta-blocker.  Statin was added.  At the time of discharge she is pain-free and is tolerating her medication regimen.  She will resume her Monday Wednesday Friday dialysis sessions.  She is hemodynamically stable and agreeable to discharge.    Patient has had no symptoms of urinary tract infection.  She does have a positive urine culture but this was collected from urostomy tube.  She was not treated for UTI    Procedures Performed    Procedure(s):  Left  Heart Cath possible PCI, atherectomy, hemodynamic support  -------------------       Consults:   Consults       Date and Time Order Name Status Description    10/13/2023  4:22 PM Cardiology (on-call MD unless specified)      10/13/2023  4:22 PM Family Medicine Consult              Pertinent Test Results:    Lab Results (most recent)       Procedure Component Value Units Date/Time    POC Chem Panel [[id number]]  (Abnormal) Collected: 10/18/23 0954    Specimen: Venous Blood Updated: 10/18/23 1023     Glucose 85 mg/dL      Comment: Serial Number: [id number]:  [id number]        Sodium 136 mmol/L      POC Potassium 4.1 mmol/L      Ionized Calcium 1.29 mmol/L      Hematocrit 29 %      Hemoglobin 9.9 g/dL      pH, Venous 7.300 pH Units      pCO2, Venous 41.1 mm Hg      CO2 CONTENT  --     HCO3, Venous 20.4 mmol/L      Base Excess, Venous --     Base Deficit --     O2 Saturation, Venous 22.0 %      pO2, Venous 48.0 mm Hg     POC Chem 8, arterial (ISTAT) [[id number]]  (Abnormal) Collected: 10/18/23 0952    Specimen: Arterial Blood Updated: 10/18/23 1022     Ionized Calcium 1.28 mmol/L      pH, Arterial 7.330 pH units      pCO2, Arterial 39.6 mm Hg      pO2, Arterial 180.0 mm Hg      HCO3, Arterial 20.7 mmol/L      Base Excess, Arterial <0.0 mmol/L      Base Deficit --     O2 Saturation, Arterial 100.0 %      Glucose 87 mg/dL      Comment: Serial Number: [id number]:  [id number]        Sodium 136 mmol/L      POC Potassium 4.1 mmol/L      Hematocrit 26 %      Hemoglobin 8.8 g/dL      CO2 Content 22 mmol/L     Basic Metabolic Panel [[id number]]  (Abnormal) Collected: 10/18/23 0805    Specimen: Blood Updated: 10/18/23 0934     Glucose 84 mg/dL      BUN 60 mg/dL      Creatinine 4.81 mg/dL      Sodium 135 mmol/L      Potassium 4.5 mmol/L      Chloride 101 mmol/L      CO2 21.0 mmol/L      Calcium 9.4 mg/dL      BUN/Creatinine Ratio 12.5     Anion Gap 13.0 mmol/L      eGFR 8.6 mL/min/1.73      Comment: <15 Indicative of kidney failure        Narrative:      GFR Normal >60  Chronic Kidney Disease <60  Kidney Failure <15    The GFR formula is only valid for adults with stable renal function between ages 18 and 70.    CBC (No Diff) [[id number]]  (Abnormal) Collected: 10/18/23 0805    Specimen: Blood Updated: 10/18/23 0909     WBC 5.50 10*3/mm3      RBC 3.04 10*6/mm3      Hemoglobin 9.8 g/dL      Hematocrit 29.8 %      MCV 98.2 fL      MCH 32.3 pg      MCHC 32.9 g/dL      RDW 15.1 %      RDW-SD 54.3 fl      MPV 7.9 fL      Platelets 201 10*3/mm3     Basic Metabolic Panel [[id number]]  (Abnormal) Collected: 10/18/23 0117    Specimen: Blood Updated: 10/18/23 0201     Glucose 94 mg/dL      BUN 56 mg/dL      Creatinine 4.54 mg/dL      Sodium 136 mmol/L      Potassium 4.5 mmol/L      Chloride 101 mmol/L      CO2 21.0 mmol/L      Calcium 9.5 mg/dL      BUN/Creatinine Ratio 12.3     Anion Gap 14.0 mmol/L      eGFR 9.2 mL/min/1.73      Comment: <15 Indicative of kidney failure       Narrative:      GFR Normal >60  Chronic Kidney Disease <60  Kidney Failure <15    The GFR formula is only valid for adults with stable renal function between ages 18 and 70.    aPTT [[id number]]  (Abnormal) Collected: 10/18/23 0117    Specimen: Blood Updated: 10/18/23 0150     PTT 56.1 seconds     CBC & Differential [[id number]]  (Abnormal) Collected: 10/18/23 0117    Specimen: Blood Updated: 10/18/23 0136    Narrative:      The following orders were created for panel order CBC & Differential.  Procedure                               Abnormality         Status                     ---------                               -----------         ------                     CBC Auto Differential[[id number]]        Abnormal            Final result                 Please view results for these tests on the individual orders.    CBC Auto Differential [[id number]]  (Abnormal) Collected: 10/18/23 0117    Specimen: Blood Updated: 10/18/23 0136     WBC 6.30 10*3/mm3      RBC 3.17 10*6/mm3       Hemoglobin 10.2 g/dL      Hematocrit 31.1 %      MCV 98.1 fL      MCH 32.2 pg      MCHC 32.8 g/dL      RDW 15.7 %      RDW-SD 56.4 fl      MPV 7.4 fL      Platelets 204 10*3/mm3      Neutrophil % 56.1 %      Lymphocyte % 30.1 %      Monocyte % 11.5 %      Eosinophil % 1.7 %      Basophil % 0.6 %      Neutrophils, Absolute 3.60 10*3/mm3      Lymphocytes, Absolute 1.90 10*3/mm3      Monocytes, Absolute 0.70 10*3/mm3      Eosinophils, Absolute 0.10 10*3/mm3      Basophils, Absolute 0.00 10*3/mm3      nRBC 0.1 /100 WBC     aPTT [[id number]]  (Normal) Collected: 10/17/23 0948    Specimen: Blood Updated: 10/17/23 1011     PTT 62.4 seconds     Urine Culture - Urine, Urine, Clean Catch [[id number]]  (Abnormal)  (Susceptibility) Collected: 10/14/23 1835    Specimen: Urine, Clean Catch Updated: 10/17/23 0222     Urine Culture >100,000 CFU/mL Escherichia coli    Narrative:      Colonization of the urinary tract without infection is common. Treatment is discouraged unless the patient is symptomatic, pregnant, or undergoing an invasive urologic procedure.    Susceptibility        Escherichia coli      ASHLEY      Ampicillin Resistant      Ampicillin + Sulbactam Intermediate      Cefazolin Susceptible      Cefepime Susceptible      Ceftazidime Susceptible      Ceftriaxone Susceptible      Gentamicin Susceptible      Levofloxacin Resistant      Nitrofurantoin Susceptible      Piperacillin + Tazobactam Susceptible      Trimethoprim + Sulfamethoxazole Resistant                           CBC & Differential [[id number]]  (Abnormal) Collected: 10/17/23 0019    Specimen: Blood Updated: 10/17/23 0038    Narrative:      The following orders were created for panel order CBC & Differential.  Procedure                               Abnormality         Status                     ---------                               -----------         ------                     CBC Auto Differential[[id number]]        Abnormal            Final result                  Please view results for these tests on the individual orders.    CBC Auto Differential [[id number]]  (Abnormal) Collected: 10/17/23 0019    Specimen: Blood Updated: 10/17/23 0038     WBC 5.30 10*3/mm3      RBC 2.96 10*6/mm3      Hemoglobin 9.6 g/dL      Hematocrit 28.9 %      MCV 97.7 fL      MCH 32.4 pg      MCHC 33.1 g/dL      RDW 15.2 %      RDW-SD 54.3 fl      MPV 7.3 fL      Platelets 195 10*3/mm3      Neutrophil % 53.8 %      Lymphocyte % 30.2 %      Monocyte % 12.0 %      Eosinophil % 3.3 %      Basophil % 0.7 %      Neutrophils, Absolute 2.90 10*3/mm3      Lymphocytes, Absolute 1.60 10*3/mm3      Monocytes, Absolute 0.60 10*3/mm3      Eosinophils, Absolute 0.20 10*3/mm3      Basophils, Absolute 0.00 10*3/mm3      nRBC 0.0 /100 WBC     Hepatitis B Surface Antibody [[id number]]  (Normal) Collected: 10/16/23 0832    Specimen: Blood Updated: 10/16/23 1712     Hep B S Ab Non-Reactive    Narrative:      Non-Reactive - Individual is considered to be not immune to infection with HBV.    Equivocal - Unable to determine if anti-HBs is present at levels consistent with immunity. The individual should be further assessed by associated risk factors and the use of additional diagnositc information, or another sample may be collected and tested.    Reactive - Anti-HBs concentration detected at >10 mIU/mL. Individual is considered to be immune to infection with HBV.      Results may be falsely decreased if patient taking Biotin.      Hepatitis B Surface Antigen [[id number]]  (Normal) Collected: 10/16/23 0832    Specimen: Blood Updated: 10/16/23 1122     Hepatitis B Surface Ag Non-Reactive    Extra Tubes [[id number]] Collected: 10/16/23 0832    Specimen: Blood Updated: 10/16/23 0945    Narrative:      The following orders were created for panel order Extra Tubes.  Procedure                               Abnormality         Status                     ---------                               -----------         ------                      Lavender Top[[id number]]                                     Final result               Gold Top - SST[[id number]]                                   Final result               Green Top (Gel)[[id number]]                                  Final result                 Please view results for these tests on the individual orders.    Lavender Top [[id number]] Collected: 10/16/23 0832    Specimen: Blood Updated: 10/16/23 0945     Extra Tube hold for add-on     Comment: Auto resulted       Gold Top - SST [[id number]] Collected: 10/16/23 0832    Specimen: Blood Updated: 10/16/23 0945     Extra Tube Hold for add-ons.     Comment: Auto resulted.       Green Top (Gel) [[id number]] Collected: 10/16/23 0832    Specimen: Blood Updated: 10/16/23 0945     Extra Tube Hold for add-ons.     Comment: Auto resulted.       Comprehensive Metabolic Panel [[id number]]  (Abnormal) Collected: 10/16/23 0149    Specimen: Blood Updated: 10/16/23 0216     Glucose 96 mg/dL      BUN 95 mg/dL      Creatinine 6.81 mg/dL      Sodium 137 mmol/L      Potassium 5.5 mmol/L      Chloride 101 mmol/L      CO2 21.0 mmol/L      Calcium 8.7 mg/dL      Total Protein 6.2 g/dL      Albumin 3.3 g/dL      ALT (SGPT) 7 U/L      AST (SGOT) 15 U/L      Alkaline Phosphatase 121 U/L      Total Bilirubin 0.3 mg/dL      Globulin 2.9 gm/dL      A/G Ratio 1.1 g/dL      BUN/Creatinine Ratio 14.0     Anion Gap 15.0 mmol/L      eGFR 5.6 mL/min/1.73      Comment: <15 Indicative of kidney failure       Narrative:      GFR Normal >60  Chronic Kidney Disease <60  Kidney Failure <15    The GFR formula is only valid for adults with stable renal function between ages 18 and 70.    High Sensitivity Troponin T 2Hr [[id number]]  (Abnormal) Collected: 10/15/23 1731    Specimen: Blood Updated: 10/15/23 1843     HS Troponin T 57 ng/L      Troponin T Delta -4 ng/L     Narrative:      High Sensitive Troponin T Reference Range:  <10.0 ng/L- Negative Female for AMI  <15.0 ng/L- Negative Male  for AMI  >=10 - Abnormal Female indicating possible myocardial injury.  >=15 - Abnormal Male indicating possible myocardial injury.   Clinicians would have to utilize clinical acumen, EKG, Troponin, and serial changes to determine if it is an Acute Myocardial Infarction or myocardial injury due to an underlying chronic condition.         Comprehensive Metabolic Panel [[id number]]  (Abnormal) Collected: 10/15/23 1427    Specimen: Blood Updated: 10/15/23 1627     Glucose 88 mg/dL      BUN 86 mg/dL      Creatinine 5.98 mg/dL      Sodium 137 mmol/L      Potassium 5.0 mmol/L      Comment: Slight hemolysis detected by analyzer. Results may be affected.        Chloride 99 mmol/L      CO2 19.0 mmol/L      Calcium 8.9 mg/dL      Total Protein 6.8 g/dL      Albumin 3.3 g/dL      ALT (SGPT) 12 U/L      AST (SGOT) 27 U/L      Comment: Slight hemolysis detected by analyzer. Results may be affected.        Alkaline Phosphatase 114 U/L      Total Bilirubin 0.3 mg/dL      Globulin 3.5 gm/dL      A/G Ratio 0.9 g/dL      BUN/Creatinine Ratio 14.4     Anion Gap 19.0 mmol/L      eGFR 6.6 mL/min/1.73      Comment: <15 Indicative of kidney failure       Narrative:      GFR Normal >60  Chronic Kidney Disease <60  Kidney Failure <15    The GFR formula is only valid for adults with stable renal function between ages 18 and 70.    High Sensitivity Troponin T [[id number]]  (Abnormal) Collected: 10/15/23 1427    Specimen: Blood Updated: 10/15/23 1508     HS Troponin T 61 ng/L     Narrative:      High Sensitive Troponin T Reference Range:  <10.0 ng/L- Negative Female for AMI  <15.0 ng/L- Negative Male for AMI  >=10 - Abnormal Female indicating possible myocardial injury.  >=15 - Abnormal Male indicating possible myocardial injury.   Clinicians would have to utilize clinical acumen, EKG, Troponin, and serial changes to determine if it is an Acute Myocardial Infarction or myocardial injury due to an underlying chronic condition.         POC Glucose  Once [[id number]]  (Normal) Collected: 10/15/23 1208    Specimen: Blood Updated: 10/15/23 1209     Glucose 89 mg/dL      Comment: Serial Number: [id number]:  [id number]       Urinalysis, Microscopic Only - Urine, Clean Catch [[id number]]  (Abnormal) Collected: 10/14/23 1835    Specimen: Urine, Clean Catch Updated: 10/14/23 2019     RBC, UA None Seen /HPF      WBC, UA 21-50 /HPF      Bacteria, UA 4+ /HPF      Squamous Epithelial Cells, UA 0-2 /HPF      Renal Epithelial Cells, UA 0-2 /HPF      Hyaline Casts, UA None Seen /LPF      Methodology Manual Light Microscopy    Urinalysis With Culture If Indicated - Urine, Clean Catch [[id number]]  (Abnormal) Collected: 10/14/23 1835    Specimen: Urine, Clean Catch Updated: 10/14/23 1857     Color, UA Yellow     Appearance, UA Cloudy     pH, UA 7.5     Specific Gravity, UA 1.010     Glucose, UA Negative     Ketones, UA Negative     Bilirubin, UA Negative     Blood, UA Trace     Protein, UA Negative     Leuk Esterase, UA Large (3+)     Nitrite, UA Positive     Urobilinogen, UA 1.0 E.U./dL    Narrative:      In absence of clinical symptoms, the presence of pyuria, bacteria, and/or nitrites on the urinalysis result does not correlate with infection.    Protime-INR [[id number]]  (Abnormal) Collected: 10/14/23 1639    Specimen: Blood Updated: 10/14/23 1710     Protime 11.8 Seconds      INR 1.09    High Sensitivity Troponin T 2Hr [[id number]]  (Abnormal) Collected: 10/13/23 1742    Specimen: Blood Updated: 10/13/23 1814     HS Troponin T 63 ng/L      Troponin T Delta 0 ng/L     Narrative:      High Sensitive Troponin T Reference Range:  <10.0 ng/L- Negative Female for AMI  <15.0 ng/L- Negative Male for AMI  >=10 - Abnormal Female indicating possible myocardial injury.  >=15 - Abnormal Male indicating possible myocardial injury.   Clinicians would have to utilize clinical acumen, EKG, Troponin, and serial changes to determine if it is an Acute Myocardial Infarction or myocardial  injury due to an underlying chronic condition.         High Sensitivity Troponin T [[id number]]  (Abnormal) Collected: 10/13/23 1541    Specimen: Blood Updated: 10/13/23 1621     HS Troponin T 63 ng/L     Narrative:      High Sensitive Troponin T Reference Range:  <10.0 ng/L- Negative Female for AMI  <15.0 ng/L- Negative Male for AMI  >=10 - Abnormal Female indicating possible myocardial injury.  >=15 - Abnormal Male indicating possible myocardial injury.   Clinicians would have to utilize clinical acumen, EKG, Troponin, and serial changes to determine if it is an Acute Myocardial Infarction or myocardial injury due to an underlying chronic condition.         BNP [[id number]]  (Abnormal) Collected: 10/13/23 1541    Specimen: Blood Updated: 10/13/23 1619     proBNP 7,320.0 pg/mL     Narrative:      This assay is used as an aid in the diagnosis of individuals suspected of having heart failure. It can be used as an aid in the diagnosis of acute decompensated heart failure (ADHF) in patients presenting with signs and symptoms of ADHF to the emergency department (ED). In addition, NT-proBNP of <300 pg/mL indicates ADHF is not likely.    Age Range Result Interpretation  NT-proBNP Concentration (pg/mL:      <50             Positive            >450                   Gray                 300-450                    Negative             <300    50-75           Positive            >900                  Gray                300-900                  Negative            <300      >75             Positive            >1800                  Gray                300-1800                  Negative            <300    Protime-INR [[id number]]  (Normal) Collected: 10/13/23 1541    Specimen: Blood Updated: 10/13/23 1601     Protime 11.4 Seconds      INR 1.05             Results for orders placed during the hospital encounter of 10/13/23    Adult Transthoracic Echo Complete W/ Cont if Necessary Per Protocol    Interpretation Summary    Left  ventricular systolic function is normal. Left ventricular ejection fraction appears to be 56 - 60%.    Left ventricular wall thickness is consistent with mild concentric hypertrophy.    Left ventricular diastolic function was indeterminate.    The right ventricular cavity is mildly dilated.    The left atrial cavity is severely dilated.    Left atrial volume is severely increased.    The right atrial cavity is moderately  dilated.    Estimated right ventricular systolic pressure from tricuspid regurgitation is normal (<35 mmHg).    The main pulmonary artery is severely dilated.              Condition on Discharge: Stable    Vital Signs  Temp:  [97.6 °F (36.4 °C)-98.5 °F (36.9 °C)] 97.6 °F (36.4 °C)  Heart Rate:  [] 98  Resp:  [12-21] 14  BP: ()/(49-73) 110/58    Physical Exam:     General Appearance:    Alert, cooperative, in no acute distress   Head:    Normocephalic, without obvious abnormality, atraumatic   Eyes:            Lids and lashes normal, conjunctivae and sclerae normal, no   icterus, no pallor, corneas clear, PERRLA   Ears:    Ears appear intact with no abnormalities noted   Throat:   No oral lesions, no thrush, oral mucosa moist   Neck:   No adenopathy, supple, trachea midline, no thyromegaly, no   carotid bruit, no JVD   Lungs:     Clear to auscultation,respirations regular, even and                  unlabored    Heart:  Irregularly irregular   Chest Wall:  Dialysis catheter in right chest wall   Abdomen:   Urostomy in right lower quadrant   Extremities:   Moves all extremities well, no edema, no cyanosis, no             redness   Pulses:   Pulses palpable and equal bilaterally   Skin:   No bleeding, bruising or rash   Lymph nodes:   No palpable adenopathy   Neurologic:   Cranial nerves 2 - 12 grossly intact, sensation intact, DTR       present and equal bilaterally       Discharge Disposition  Skilled Nursing Facility (DC - External)    Discharge Medications     Discharge Medications         New Medications        Instructions Start Date   atorvastatin 10 MG tablet  Commonly known as: LIPITOR   20 mg, Oral, Daily             Continue These Medications        Instructions Start Date   acetaminophen 325 MG tablet  Commonly known as: TYLENOL   650 mg, Oral, Every 6 Hours PRN      apixaban 2.5 MG tablet tablet  Commonly known as: ELIQUIS   2.5 mg, Oral, Every 12 Hours Scheduled      dicyclomine 10 MG capsule  Commonly known as: BENTYL   10 mg, Oral, 3 Times Daily      ipratropium 0.02 % nebulizer solution  Commonly known as: ATROVENT   500 mcg, Nebulization, 4 Times Daily PRN      loperamide 2 MG capsule  Commonly known as: IMODIUM   2 mg, Oral, Daily PRN      metoprolol tartrate 50 MG tablet  Commonly known as: LOPRESSOR   50 mg, Oral, 2 Times Daily      ondansetron 4 MG tablet  Commonly known as: ZOFRAN   4 mg, Oral, Every 8 Hours PRN      saccharomyces boulardii 250 MG capsule  Commonly known as: FLORASTOR   250 mg, Oral, 2 Times Daily      topiramate 100 MG tablet  Commonly known as: TOPAMAX   100 mg, Oral, Nightly      zinc oxide 20 % ointment   1 application , Topical, 2 Times Daily             Stop These Medications      hydrALAZINE 25 MG tablet  Commonly known as: APRESOLINE              Discharge Diet:   Diet Instructions       Diet: Cardiac Diets; Healthy Heart (2-3 Na+); Thin (IDDSI 0)      Discharge Diet: Cardiac Diets    Cardiac Diet: Healthy Heart (2-3 Na+)    Fluid Consistency: Thin (IDDSI 0)            Activity at Discharge:   Activity Instructions       Activity as Tolerated              Follow-up Appointments  Future Appointments   Date Time Provider Department Center   12/26/2023  2:45 PM Augustin Ellsworth MD [facility]   12/26/2023  2:45 PM [facility] DEVICE CHECK [facility]     Additional Instructions for the Follow-ups that You Need to Schedule       Discharge Follow-up with PCP   As directed       Currently Documented PCP:    Lizzy Francis,  MD    PCP Phone Number:    [phone]     Follow Up Details: Facility doctor will resume care at discharge.        Discharge Follow-up with Specified Provider: Keep previously scheduled appointment with Dr. Ellsworth in December.   As directed      To: Keep previously scheduled appointment with Dr. Ellsworth in December.        Discharge Follow-up with Specified Provider: Keep your routine schedule for dialysis   As directed      To: Keep your routine schedule for dialysis                Test Results Pending at Discharge       Lora Henderson MD  10/18/23  15:21 EDT    Time: Discharge 25 min

## 2023-10-18 NOTE — NURSING NOTE
Received report from Nurse Deepika Miles, patient has been discharged and has already signed all necessary paperwork.  She is finishing her dialysis treatment and will be sent to [facility].  Per Deepika report was already called to [facility] and she spoke with Janet.

## 2023-10-18 NOTE — PLAN OF CARE
Problem: Adult Inpatient Plan of Care  Goal: Plan of Care Review  Outcome: Ongoing, Progressing  Flowsheets (Taken 10/18/2023 1614)  Progress: improving  Plan of Care Reviewed With: patient  Outcome Evaluation: Pt to dc to [facility] after HD. Pt had normal heart cath today with bedrest over at 1635. No complaints this shift.  Goal: Patient-Specific Goal (Individualized)  Outcome: Ongoing, Progressing  Goal: Absence of Hospital-Acquired Illness or Injury  Outcome: Ongoing, Progressing  Intervention: Identify and Manage Fall Risk  Recent Flowsheet Documentation  Taken 10/18/2023 1553 by Deepika Pressley RN  Safety Promotion/Fall Prevention:   safety round/check completed   nonskid shoes/slippers when out of bed  Taken 10/18/2023 1400 by Deepika Pressley RN  Safety Promotion/Fall Prevention: safety round/check completed  Taken 10/18/2023 1205 by Deepika Pressley RN  Safety Promotion/Fall Prevention:   safety round/check completed   nonskid shoes/slippers when out of bed  Taken 10/18/2023 0800 by Deepika Pressley RN  Safety Promotion/Fall Prevention:   safety round/check completed   nonskid shoes/slippers when out of bed  Intervention: Prevent and Manage VTE (Venous Thromboembolism) Risk  Recent Flowsheet Documentation  Taken 10/18/2023 1400 by Deepika Pressley RN  Activity Management: bedrest  Taken 10/18/2023 1205 by Deepika Pressley RN  Activity Management: bedrest  Goal: Optimal Comfort and Wellbeing  Outcome: Ongoing, Progressing  Goal: Readiness for Transition of Care  Outcome: Ongoing, Progressing     Problem: Skin Injury Risk Increased  Goal: Skin Health and Integrity  Outcome: Ongoing, Progressing  Intervention: Optimize Skin Protection  Recent Flowsheet Documentation  Taken 10/18/2023 0800 by Deepika Pressley RN  Pressure Reduction Techniques: frequent weight shift encouraged     Problem: Diabetes Comorbidity  Goal: Blood Glucose Level Within Targeted Range  Outcome: Ongoing, Progressing     Problem: Heart Failure  Comorbidity  Goal: Maintenance of Heart Failure Symptom Control  Outcome: Ongoing, Progressing     Problem: Hypertension Comorbidity  Goal: Blood Pressure in Desired Range  Outcome: Ongoing, Progressing     Problem: Pain Chronic (Persistent) (Comorbidity Management)  Goal: Acceptable Pain Control and Functional Ability  Outcome: Ongoing, Progressing     Problem: Fall Injury Risk  Goal: Absence of Fall and Fall-Related Injury  Outcome: Ongoing, Progressing  Intervention: Promote Injury-Free Environment  Recent Flowsheet Documentation  Taken 10/18/2023 1553 by Deepika Pressley RN  Safety Promotion/Fall Prevention:   safety round/check completed   nonskid shoes/slippers when out of bed  Taken 10/18/2023 1400 by Deepika Pressley RN  Safety Promotion/Fall Prevention: safety round/check completed  Taken 10/18/2023 1205 by Deepika Pressley RN  Safety Promotion/Fall Prevention:   safety round/check completed   nonskid shoes/slippers when out of bed  Taken 10/18/2023 0800 by Deepika Pressley RN  Safety Promotion/Fall Prevention:   safety round/check completed   nonskid shoes/slippers when out of bed   Goal Outcome Evaluation:  Plan of Care Reviewed With: patient        Progress: improving  Outcome Evaluation: Pt to dc to [facility] after HD. Pt had normal heart cath today with bedrest over at 1635. No complaints this shift.

## 2023-10-19 NOTE — CASE MANAGEMENT/SOCIAL WORK
Case Management Discharge Note      Final Note: Return to [facility]         Selected Continued Care - Discharged on 10/18/2023 Admission date: 10/13/2023 - Discharge disposition: Skilled Nursing Facility (DC - External)      Destination Coordination complete.      Service Provider Selected Services Address Phone Fax Patient Preferred    [facility] [place] [facility] [address] [place] [address] [place] [address] [phone] --                 Transportation Services  W/C Van: Liza Oliveira    Final Discharge Disposition Code: 04 - intermediate care facility

## 2023-11-08 ENCOUNTER — HOSPITAL ENCOUNTER (INPATIENT)
Facility: HOSPITAL | Age: 83
LOS: 5 days | Discharge: NURSING, REHAB OR LONG-TERM CARE | DRG: 698 | End: 2023-11-13
Attending: EMERGENCY MEDICINE | Admitting: FAMILY MEDICINE
Payer: MEDICARE

## 2023-11-08 ENCOUNTER — TELEPHONE (OUTPATIENT)
Dept: CARDIOLOGY | Facility: CLINIC | Age: 83
End: 2023-11-08
Payer: MEDICARE

## 2023-11-08 ENCOUNTER — APPOINTMENT (OUTPATIENT)
Dept: GENERAL RADIOLOGY | Facility: HOSPITAL | Age: 83
DRG: 698 | End: 2023-11-08
Payer: MEDICARE

## 2023-11-08 DIAGNOSIS — N39.0 URINARY TRACT INFECTION WITHOUT HEMATURIA, SITE UNSPECIFIED: ICD-10-CM

## 2023-11-08 DIAGNOSIS — N18.6 STAGE 5 CHRONIC KIDNEY DISEASE ON CHRONIC DIALYSIS: ICD-10-CM

## 2023-11-08 DIAGNOSIS — R00.2 PALPITATIONS: ICD-10-CM

## 2023-11-08 DIAGNOSIS — Z99.2 STAGE 5 CHRONIC KIDNEY DISEASE ON CHRONIC DIALYSIS: ICD-10-CM

## 2023-11-08 DIAGNOSIS — R53.1 GENERAL WEAKNESS: ICD-10-CM

## 2023-11-08 DIAGNOSIS — I48.91 ATRIAL FIBRILLATION WITH RVR: Primary | ICD-10-CM

## 2023-11-08 LAB
ALBUMIN SERPL-MCNC: 3.5 G/DL (ref 3.5–5.2)
ALBUMIN/GLOB SERPL: 0.9 G/DL
ALP SERPL-CCNC: 142 U/L (ref 39–117)
ALT SERPL W P-5'-P-CCNC: 12 U/L (ref 1–33)
ANION GAP SERPL CALCULATED.3IONS-SCNC: 14 MMOL/L (ref 5–15)
APTT PPP: 32.3 SECONDS (ref 24–31)
AST SERPL-CCNC: 19 U/L (ref 1–32)
B PARAPERT DNA SPEC QL NAA+PROBE: NOT DETECTED
B PERT DNA SPEC QL NAA+PROBE: NOT DETECTED
BACTERIA UR QL AUTO: ABNORMAL /HPF
BASOPHILS # BLD AUTO: 0.1 10*3/MM3 (ref 0–0.2)
BASOPHILS NFR BLD AUTO: 0.6 % (ref 0–1.5)
BILIRUB SERPL-MCNC: 0.3 MG/DL (ref 0–1.2)
BILIRUB UR QL STRIP: NEGATIVE
BUN SERPL-MCNC: 59 MG/DL (ref 8–23)
BUN/CREAT SERPL: 11.3 (ref 7–25)
C PNEUM DNA NPH QL NAA+NON-PROBE: NOT DETECTED
CALCIUM SPEC-SCNC: 9.6 MG/DL (ref 8.6–10.5)
CHLORIDE SERPL-SCNC: 99 MMOL/L (ref 98–107)
CLARITY UR: ABNORMAL
CO2 SERPL-SCNC: 24 MMOL/L (ref 22–29)
COLOR UR: YELLOW
CREAT SERPL-MCNC: 5.22 MG/DL (ref 0.57–1)
DEPRECATED RDW RBC AUTO: 53.4 FL (ref 37–54)
EGFRCR SERPLBLD CKD-EPI 2021: 7.8 ML/MIN/1.73
EOSINOPHIL # BLD AUTO: 0 10*3/MM3 (ref 0–0.4)
EOSINOPHIL NFR BLD AUTO: 0.1 % (ref 0.3–6.2)
ERYTHROCYTE [DISTWIDTH] IN BLOOD BY AUTOMATED COUNT: 14.7 % (ref 12.3–15.4)
FLUAV SUBTYP SPEC NAA+PROBE: NOT DETECTED
FLUBV RNA ISLT QL NAA+PROBE: NOT DETECTED
GEN 5 2HR TROPONIN T REFLEX: 57 NG/L
GLOBULIN UR ELPH-MCNC: 3.7 GM/DL
GLUCOSE SERPL-MCNC: 145 MG/DL (ref 65–99)
GLUCOSE UR STRIP-MCNC: NEGATIVE MG/DL
HADV DNA SPEC NAA+PROBE: NOT DETECTED
HCOV 229E RNA SPEC QL NAA+PROBE: NOT DETECTED
HCOV HKU1 RNA SPEC QL NAA+PROBE: NOT DETECTED
HCOV NL63 RNA SPEC QL NAA+PROBE: NOT DETECTED
HCOV OC43 RNA SPEC QL NAA+PROBE: NOT DETECTED
HCT VFR BLD AUTO: 32.6 % (ref 34–46.6)
HGB BLD-MCNC: 10.5 G/DL (ref 12–15.9)
HGB UR QL STRIP.AUTO: ABNORMAL
HMPV RNA NPH QL NAA+NON-PROBE: NOT DETECTED
HPIV1 RNA ISLT QL NAA+PROBE: NOT DETECTED
HPIV2 RNA SPEC QL NAA+PROBE: NOT DETECTED
HPIV3 RNA NPH QL NAA+PROBE: NOT DETECTED
HPIV4 P GENE NPH QL NAA+PROBE: NOT DETECTED
HYALINE CASTS UR QL AUTO: ABNORMAL /LPF
INR PPP: 1.12 (ref 0.93–1.1)
KETONES UR QL STRIP: NEGATIVE
LEUKOCYTE ESTERASE UR QL STRIP.AUTO: ABNORMAL
LYMPHOCYTES # BLD AUTO: 0.5 10*3/MM3 (ref 0.7–3.1)
LYMPHOCYTES NFR BLD AUTO: 4 % (ref 19.6–45.3)
M PNEUMO IGG SER IA-ACNC: NOT DETECTED
MAGNESIUM SERPL-MCNC: 2.1 MG/DL (ref 1.6–2.4)
MCH RBC QN AUTO: 31.5 PG (ref 26.6–33)
MCHC RBC AUTO-ENTMCNC: 32.2 G/DL (ref 31.5–35.7)
MCV RBC AUTO: 97.8 FL (ref 79–97)
MONOCYTES # BLD AUTO: 0.2 10*3/MM3 (ref 0.1–0.9)
MONOCYTES NFR BLD AUTO: 2.2 % (ref 5–12)
NEUTROPHILS NFR BLD AUTO: 10.6 10*3/MM3 (ref 1.7–7)
NEUTROPHILS NFR BLD AUTO: 93.1 % (ref 42.7–76)
NITRITE UR QL STRIP: NEGATIVE
NRBC BLD AUTO-RTO: 0 /100 WBC (ref 0–0.2)
PH UR STRIP.AUTO: 8.5 [PH] (ref 5–8)
PLATELET # BLD AUTO: 264 10*3/MM3 (ref 140–450)
PMV BLD AUTO: 8 FL (ref 6–12)
POTASSIUM SERPL-SCNC: 4.6 MMOL/L (ref 3.5–5.2)
PROT SERPL-MCNC: 7.2 G/DL (ref 6–8.5)
PROT UR QL STRIP: ABNORMAL
PROTHROMBIN TIME: 12.1 SECONDS (ref 9.6–11.7)
RBC # BLD AUTO: 3.34 10*6/MM3 (ref 3.77–5.28)
RBC # UR STRIP: ABNORMAL /HPF
REF LAB TEST METHOD: ABNORMAL
RHINOVIRUS RNA SPEC NAA+PROBE: NOT DETECTED
RSV RNA NPH QL NAA+NON-PROBE: NOT DETECTED
SARS-COV-2 RNA NPH QL NAA+NON-PROBE: NOT DETECTED
SODIUM SERPL-SCNC: 137 MMOL/L (ref 136–145)
SP GR UR STRIP: 1.02 (ref 1–1.03)
SQUAMOUS #/AREA URNS HPF: ABNORMAL /HPF
TROPONIN T DELTA: -6 NG/L
TROPONIN T SERPL HS-MCNC: 63 NG/L
TSH SERPL DL<=0.05 MIU/L-ACNC: 1 UIU/ML (ref 0.27–4.2)
UROBILINOGEN UR QL STRIP: ABNORMAL
WBC # UR STRIP: ABNORMAL /HPF
WBC NRBC COR # BLD: 11.4 10*3/MM3 (ref 3.4–10.8)

## 2023-11-08 PROCEDURE — 87077 CULTURE AEROBIC IDENTIFY: CPT | Performed by: NURSE PRACTITIONER

## 2023-11-08 PROCEDURE — 93005 ELECTROCARDIOGRAM TRACING: CPT | Performed by: NURSE PRACTITIONER

## 2023-11-08 PROCEDURE — 87186 SC STD MICRODIL/AGAR DIL: CPT | Performed by: NURSE PRACTITIONER

## 2023-11-08 PROCEDURE — 99285 EMERGENCY DEPT VISIT HI MDM: CPT

## 2023-11-08 PROCEDURE — 84484 ASSAY OF TROPONIN QUANT: CPT | Performed by: NURSE PRACTITIONER

## 2023-11-08 PROCEDURE — 81001 URINALYSIS AUTO W/SCOPE: CPT | Performed by: NURSE PRACTITIONER

## 2023-11-08 PROCEDURE — 0202U NFCT DS 22 TRGT SARS-COV-2: CPT | Performed by: NURSE PRACTITIONER

## 2023-11-08 PROCEDURE — 85730 THROMBOPLASTIN TIME PARTIAL: CPT | Performed by: NURSE PRACTITIONER

## 2023-11-08 PROCEDURE — 25010000002 CEFTRIAXONE PER 250 MG: Performed by: NURSE PRACTITIONER

## 2023-11-08 PROCEDURE — 83735 ASSAY OF MAGNESIUM: CPT | Performed by: NURSE PRACTITIONER

## 2023-11-08 PROCEDURE — 87086 URINE CULTURE/COLONY COUNT: CPT | Performed by: NURSE PRACTITIONER

## 2023-11-08 PROCEDURE — 36415 COLL VENOUS BLD VENIPUNCTURE: CPT

## 2023-11-08 PROCEDURE — 85025 COMPLETE CBC W/AUTO DIFF WBC: CPT | Performed by: NURSE PRACTITIONER

## 2023-11-08 PROCEDURE — 71045 X-RAY EXAM CHEST 1 VIEW: CPT

## 2023-11-08 PROCEDURE — 84443 ASSAY THYROID STIM HORMONE: CPT | Performed by: NURSE PRACTITIONER

## 2023-11-08 PROCEDURE — 25810000003 SODIUM CHLORIDE 0.9 % SOLUTION: Performed by: NURSE PRACTITIONER

## 2023-11-08 PROCEDURE — 85610 PROTHROMBIN TIME: CPT | Performed by: NURSE PRACTITIONER

## 2023-11-08 PROCEDURE — 80053 COMPREHEN METABOLIC PANEL: CPT | Performed by: NURSE PRACTITIONER

## 2023-11-08 RX ORDER — ONDANSETRON 4 MG/1
4 TABLET, ORALLY DISINTEGRATING ORAL EVERY 8 HOURS PRN
COMMUNITY

## 2023-11-08 RX ORDER — ACETAMINOPHEN 325 MG/1
650 TABLET ORAL EVERY 6 HOURS PRN
Status: DISCONTINUED | OUTPATIENT
Start: 2023-11-08 | End: 2023-11-13 | Stop reason: HOSPADM

## 2023-11-08 RX ORDER — ATORVASTATIN CALCIUM 20 MG/1
20 TABLET, FILM COATED ORAL DAILY
Status: DISCONTINUED | OUTPATIENT
Start: 2023-11-09 | End: 2023-11-13 | Stop reason: HOSPADM

## 2023-11-08 RX ORDER — SODIUM CHLORIDE 0.9 % (FLUSH) 0.9 %
10 SYRINGE (ML) INJECTION AS NEEDED
Status: DISCONTINUED | OUTPATIENT
Start: 2023-11-08 | End: 2023-11-13 | Stop reason: HOSPADM

## 2023-11-08 RX ORDER — IPRATROPIUM BROMIDE AND ALBUTEROL SULFATE 2.5; .5 MG/3ML; MG/3ML
3 SOLUTION RESPIRATORY (INHALATION) EVERY 4 HOURS PRN
Status: DISCONTINUED | OUTPATIENT
Start: 2023-11-08 | End: 2023-11-13 | Stop reason: HOSPADM

## 2023-11-08 RX ORDER — PREDNISONE 20 MG/1
20 TABLET ORAL 2 TIMES DAILY
COMMUNITY
Start: 2023-11-08 | End: 2023-11-13 | Stop reason: HOSPADM

## 2023-11-08 RX ORDER — DILTIAZEM HCL/D5W 125 MG/125
5-15 PLASTIC BAG, INJECTION (ML) INTRAVENOUS
Status: DISCONTINUED | OUTPATIENT
Start: 2023-11-08 | End: 2023-11-09

## 2023-11-08 RX ORDER — IPRATROPIUM BROMIDE AND ALBUTEROL SULFATE 2.5; .5 MG/3ML; MG/3ML
3 SOLUTION RESPIRATORY (INHALATION) EVERY 4 HOURS PRN
COMMUNITY

## 2023-11-08 RX ORDER — METOPROLOL TARTRATE 50 MG/1
50 TABLET, FILM COATED ORAL 2 TIMES DAILY
Status: DISCONTINUED | OUTPATIENT
Start: 2023-11-08 | End: 2023-11-13 | Stop reason: HOSPADM

## 2023-11-08 RX ORDER — METOPROLOL TARTRATE 5 MG/5ML
2.5 INJECTION INTRAVENOUS ONCE
Status: COMPLETED | OUTPATIENT
Start: 2023-11-08 | End: 2023-11-08

## 2023-11-08 RX ADMIN — APIXABAN 2.5 MG: 2.5 TABLET, FILM COATED ORAL at 23:52

## 2023-11-08 RX ADMIN — CEFTRIAXONE 1000 MG: 1 INJECTION, POWDER, FOR SOLUTION INTRAMUSCULAR; INTRAVENOUS at 19:33

## 2023-11-08 RX ADMIN — SODIUM CHLORIDE 500 ML: 9 INJECTION, SOLUTION INTRAVENOUS at 20:13

## 2023-11-08 RX ADMIN — METOPROLOL TARTRATE 50 MG: 50 TABLET ORAL at 23:52

## 2023-11-08 RX ADMIN — METOPROLOL TARTRATE 2.5 MG: 1 INJECTION, SOLUTION INTRAVENOUS at 17:46

## 2023-11-08 NOTE — TELEPHONE ENCOUNTER
James from [facility] called and stated patient has had elevated heart rate, 131, 120 and 115 chest xrays was good, patient is not feeling good and denied dialysis. Please call [phone] and speak with James

## 2023-11-08 NOTE — TELEPHONE ENCOUNTER
Spoke with patients nurse at [facility]. Patients b/p is 89/49 and states her HR is all over the place 198, 165, 80. They are unable to do EKG there. Patient not feeling well and advised to go to ER with HR all over the place and low b/p. She felt she needed to go to ER as well.

## 2023-11-08 NOTE — ED PROVIDER NOTES
Subjective   History of Present Illness  Patient is an 83-year-old white female with a history of bladder cancer with urostomy, history of A-fib and DVT currently on Eliquis.  She has a history of chronic kidney disease currently on dialysis Monday Wednesday Friday.  She has a history of sick sinus syndrome with pacemaker.  She presents today from [facility] by EMS with complaints of general unwell feeling.  She states last night she had a fever and some cough.  She states today she developed some discomfort in the center of her chest and felt like her heart was racing.  She states this lasted most of the morning but now feels better.  She denies any shortness of breath.  She denies any nausea vomiting or diarrhea.  She states that she did miss her dialysis this morning because she did not feel well.      Review of Systems   Constitutional:  Positive for fever.   HENT:  Positive for congestion.    Respiratory:  Positive for cough. Negative for shortness of breath.    Cardiovascular:  Positive for chest pain and palpitations. Negative for leg swelling.   Gastrointestinal:  Negative for abdominal pain, diarrhea, nausea and vomiting.   Genitourinary:  Negative for decreased urine volume.   Skin:  Negative for rash.   Neurological:  Positive for weakness.       Past Medical History:   Diagnosis Date    Bladder cancer     Cancer     breast, bladder    Deep vein thrombosis     Essential hypertension 1/17/2017    Fracture tibia/fibula, right, closed, initial encounter 8/27/2020    GERD (gastroesophageal reflux disease)     History of urostomy     Immobility 08/27/2020    r/t broken Tibia     Kidney carcinoma, right     removed     Long term current use of anticoagulant 1/9/2015    PAF (paroxysmal atrial fibrillation) 6/26/2019    Presence of cardiac pacemaker 11/03/2014    BS dual chamber PM placed 10/2014 with pocket revision 1/25/17.  HX tachy rob syndrome    Sick sinus syndrome 11/3/2014       Allergies  "  Allergen Reactions    Amoxicillin Shortness Of Breath    Ciprofloxacin Hives    Oxycodone-Acetaminophen Unknown - High Severity     Pt's son stated when pt fell and broke her leg she was put on oxycodone; pt's son stated pt's \"vitals went all out of wack, she couldn't remember things.\"    Penicillins Rash    Sulfa Antibiotics Hives    Cephalexin Other (See Comments)    Clavulanic Acid Other (See Comments)    Codeine Other (See Comments)    Contrast Dye (Echo Or Unknown Ct/Mr) Other (See Comments)     8/18/22     Doxycycline Other (See Comments)    Fluconazole Other (See Comments)    Iodinated Contrast Media Other (See Comments)     8/18/22    Iodine Hives    Macrobid [Nitrofurantoin] Hives    Tobramycin Other (See Comments)    Trimethoprim Other (See Comments)       Past Surgical History:   Procedure Laterality Date    BREAST LUMPECTOMY      CARDIAC CATHETERIZATION N/A 10/18/2023    Procedure: Left Heart Cath possible PCI, atherectomy, hemodynamic support;  Surgeon: Augustin Ellsworth MD;  Location: [facility] CATH INVASIVE LOCATION;  Service: Cardiology;  Laterality: N/A;    CARDIAC ELECTROPHYSIOLOGY PROCEDURE N/A 4/28/2023    Procedure: Pacemaker gen change, [place] AWARE $;  Surgeon: Jose Carlos Cheng MD;  Location: [facility] CATH INVASIVE LOCATION;  Service: Cardiovascular;  Laterality: N/A;    CHOLECYSTECTOMY N/A 10/12/2020    Procedure: CHOLECYSTECTOMY LAPAROSCOPIC;  Surgeon: Go Pereira DO;  Location: [facility] MAIN OR;  Service: General;  Laterality: N/A;    CYSTECTOMY W/ URETEROILEAL CONDUIT      HARDWARE REMOVAL Right 6/11/2021    Procedure: TIBIA HARDWARE REMOVAL;  Surgeon: Geoff Shah II, MD;  Location: [facility] MAIN OR;  Service: Orthopedics;  Laterality: Right;    HERNIA REPAIR      HYSTERECTOMY      INSERT / REPLACE / REMOVE PACEMAKER      NEPHRECTOMY Right     TIBIA IM NAILING/RODDING Right 8/28/2020    Procedure: TIBIA INTRAMEDULLARY NAIL/ABRAHAM INSERTION;  Surgeon: Geoff Shah " Jung العراقي MD;  Location: [facility] MAIN OR;  Service: Orthopedics;  Laterality: Right;       Family History   Problem Relation Age of Onset    COPD Father        Social History     Socioeconomic History    Marital status:    Tobacco Use    Smoking status: Never     Passive exposure: Never    Smokeless tobacco: Never   Vaping Use    Vaping Use: Never used   Substance and Sexual Activity    Alcohol use: Not Currently    Drug use: Never    Sexual activity: Defer           Objective   Physical Exam  Vital signs and triage nurse note reviewed.  Constitutional: Awake, alert; well-developed and well-nourished. No acute distress is noted.  Chronically ill in appearance.  HEENT: Normocephalic, atraumatic; pupils are PERRL with intact EOM; oropharynx is pink and moist without exudate or erythema.  No drooling or pooling of oral secretions.  Neck: Supple, full range of motion without pain; no cervical lymphadenopathy. Normal phonation.  Cardiovascular: Irregular and tachycardic rate and rhythm, normal S1-S2.  No murmur noted.  Pulmonary: Respiratory effort regular nonlabored, breath sounds clear to auscultation all fields.  Abdomen: Soft, nontender, nondistended with normoactive bowel sounds; no rebound or guarding.  Musculoskeletal: Independent range of motion of all extremities with no palpable tenderness or edema.  Neuro: Alert oriented x3, speech is clear and appropriate, GCS 15.    Skin: Flesh tone, warm, dry, intact; no erythematous or petechial rash or lesion.    Procedures           ED Course      Labs Reviewed   COMPREHENSIVE METABOLIC PANEL - Abnormal; Notable for the following components:       Result Value    Glucose 145 (*)     BUN 59 (*)     Creatinine 5.22 (*)     Alkaline Phosphatase 142 (*)     eGFR 7.8 (*)     All other components within normal limits    Narrative:     GFR Normal >60  Chronic Kidney Disease <60  Kidney Failure <15    The GFR formula is only valid for adults with stable renal function  between ages 18 and 70.   PROTIME-INR - Abnormal; Notable for the following components:    Protime 12.1 (*)     INR 1.12 (*)     All other components within normal limits   APTT - Abnormal; Notable for the following components:    PTT 32.3 (*)     All other components within normal limits   TROPONIN - Abnormal; Notable for the following components:    HS Troponin T 63 (*)     All other components within normal limits    Narrative:     High Sensitive Troponin T Reference Range:  <14.0 ng/L- Negative Female for AMI  <22.0 ng/L- Negative Male for AMI  >=14 - Abnormal Female indicating possible myocardial injury.  >=22 - Abnormal Male indicating possible myocardial injury.   Clinicians would have to utilize clinical acumen, EKG, Troponin, and serial changes to determine if it is an Acute Myocardial Infarction or myocardial injury due to an underlying chronic condition.        CBC WITH AUTO DIFFERENTIAL - Abnormal; Notable for the following components:    WBC 11.40 (*)     RBC 3.34 (*)     Hemoglobin 10.5 (*)     Hematocrit 32.6 (*)     MCV 97.8 (*)     Neutrophil % 93.1 (*)     Lymphocyte % 4.0 (*)     Monocyte % 2.2 (*)     Eosinophil % 0.1 (*)     Neutrophils, Absolute 10.60 (*)     Lymphocytes, Absolute 0.50 (*)     All other components within normal limits   HIGH SENSITIVITIY TROPONIN T 2HR - Abnormal; Notable for the following components:    HS Troponin T 57 (*)     Troponin T Delta -6 (*)     All other components within normal limits    Narrative:     High Sensitive Troponin T Reference Range:  <14.0 ng/L- Negative Female for AMI  <22.0 ng/L- Negative Male for AMI  >=14 - Abnormal Female indicating possible myocardial injury.  >=22 - Abnormal Male indicating possible myocardial injury.   Clinicians would have to utilize clinical acumen, EKG, Troponin, and serial changes to determine if it is an Acute Myocardial Infarction or myocardial injury due to an underlying chronic condition.        URINALYSIS W/ CULTURE IF  INDICATED - Abnormal; Notable for the following components:    Appearance, UA Cloudy (*)     pH, UA 8.5 (*)     Blood, UA Trace (*)     Protein,  mg/dL (2+) (*)     Leuk Esterase, UA Large (3+) (*)     All other components within normal limits    Narrative:     In absence of clinical symptoms, the presence of pyuria, bacteria, and/or nitrites on the urinalysis result does not correlate with infection.   URINALYSIS, MICROSCOPIC ONLY - Abnormal; Notable for the following components:    WBC, UA 21-50 (*)     Bacteria, UA 4+ (*)     All other components within normal limits   RESPIRATORY PANEL PCR W/ COVID-19 (SARS-COV-2), NP SWAB IN UTM/VTP, 3-4 HR TAT - Normal    Narrative:     In the setting of a positive respiratory panel with a viral infection PLUS a negative procalcitonin without other underlying concern for bacterial infection, consider observing off antibiotics or discontinuation of antibiotics and continue supportive care. If the respiratory panel is positive for atypical bacterial infection (Bordetella pertussis, Chlamydophila pneumoniae, or Mycoplasma pneumoniae), consider antibiotic de-escalation to target atypical bacterial infection.   MAGNESIUM - Normal   TSH - Normal   URINE CULTURE   CBC AND DIFFERENTIAL    Narrative:     The following orders were created for panel order CBC & Differential.  Procedure                               Abnormality         Status                     ---------                               -----------         ------                     CBC Auto Differential[[id number]]        Abnormal            Final result                 Please view results for these tests on the individual orders.     XR Chest 1 View    Result Date: 11/8/2023  Impression: No acute chest finding. Electronically Signed: Ivy Valenzuela MD  11/8/2023 3:33 PM EST  Workstation ID: [id number]   Medications   sodium chloride 0.9 % flush 10 mL (has no administration in time range)   dilTIAZem (CARDIZEM) 125 mg  in 125 mL D5W infusion (has no administration in time range)   cefTRIAXone (ROCEPHIN) 1,000 mg in sodium chloride 0.9 % 100 mL IVPB (has no administration in time range)   metoprolol tartrate (LOPRESSOR) injection 2.5 mg (2.5 mg Intravenous Given 11/8/23 1746)                                          Medical Decision Making  Patient presents today with the above complaint.    She had the above exam and evaluation.  She is placed on continuous cardiac monitor.  IV was established.  Labs EKG and chest x-ray were obtained.  Patient was irregular and tachycardic on bedside monitor.  Rectal temperature was obtained given her tachycardia and reported fever last night.  Was found to be within normal limits at 98.4.    Work-up: My EKG interpretation shows A-fib with ventricular rate of 119, no acute ST or T wave changes.  CBC significant for WBC of 11.4, hemoglobin 10.5, 93% neutrophils, no bands.  Metabolic panel significant for glucose 145, BUN 59, creatinine 5.22.  Normal magnesium and TSH.  High-sensitivity opponent 63.  Viral respiratory panel is negative.  Urinalysis returned with trace blood, 100 protein, large leukocytes, 21-50 WBCs and 4+ bacteria.  Chest x-ray shows no acute findings.    Patient has had a heart rate in the 120s to 130 since arrival to the ED.  She was given Lopressor 2.5mg IV.  Heart rate remains in the upper teens to low 120s.  Blood pressure stable.  O2 saturation remains in the low to mid 90s on room air.  Patient is in no distress and has no symptoms at this time.    Patient was given Rocephin 1 g IV which she appears to have tolerated in the past given her multiple allergies.    Findings were discussed with her.  She will be admitted to the hospital for further management of her rapid A-fib.  She will also likely need dialysis as she missed dialysis today.  Case and plan discussed with Dr. Esqueda.        Problems Addressed:  Atrial fibrillation with RVR: complicated acute illness or  injury  General weakness: complicated acute illness or injury  Palpitations: complicated acute illness or injury  Stage 5 chronic kidney disease on chronic dialysis: complicated acute illness or injury  Urinary tract infection without hematuria, site unspecified: complicated acute illness or injury    Amount and/or Complexity of Data Reviewed  Labs: ordered.  Radiology: ordered.  ECG/medicine tests: ordered.    Risk  Prescription drug management.  Decision regarding hospitalization.        Final diagnoses:   Atrial fibrillation with RVR   Palpitations   General weakness   Stage 5 chronic kidney disease on chronic dialysis   Urinary tract infection without hematuria, site unspecified       ED Disposition  ED Disposition       ED Disposition   Decision to Admit    Condition   --    Comment   Level of Care: Telemetry [5]   Admitting Physician: ROMAN WHITTEN [[id number]]   Attending Physician: ROMAN WHITTEN [[id number]]                 No follow-up provider specified.       Medication List      No changes were made to your prescriptions during this visit.            Chantal Rodrigues APRN  11/08/23 1753       Chantal Rodrigues APRN  11/08/23 1909

## 2023-11-08 NOTE — ED NOTES
Pt states that her niece spoke to her cardiologist about her symptoms and the cardiologist referred her to the ED.

## 2023-11-09 ENCOUNTER — APPOINTMENT (OUTPATIENT)
Dept: GENERAL RADIOLOGY | Facility: HOSPITAL | Age: 83
DRG: 698 | End: 2023-11-09
Payer: MEDICARE

## 2023-11-09 PROBLEM — I48.91 ATRIAL FIBRILLATION WITH RAPID VENTRICULAR RESPONSE: Status: ACTIVE | Noted: 2023-11-09

## 2023-11-09 LAB
ANION GAP SERPL CALCULATED.3IONS-SCNC: 12 MMOL/L (ref 5–15)
BUN SERPL-MCNC: 67 MG/DL (ref 8–23)
BUN/CREAT SERPL: 12.3 (ref 7–25)
CALCIUM SPEC-SCNC: 9.1 MG/DL (ref 8.6–10.5)
CHLORIDE SERPL-SCNC: 105 MMOL/L (ref 98–107)
CO2 SERPL-SCNC: 25 MMOL/L (ref 22–29)
CREAT SERPL-MCNC: 5.46 MG/DL (ref 0.57–1)
EGFRCR SERPLBLD CKD-EPI 2021: 7.4 ML/MIN/1.73
GLUCOSE SERPL-MCNC: 99 MG/DL (ref 65–99)
MAGNESIUM SERPL-MCNC: 2.2 MG/DL (ref 1.6–2.4)
PHOSPHATE SERPL-MCNC: 5.2 MG/DL (ref 2.5–4.5)
POTASSIUM SERPL-SCNC: 4.7 MMOL/L (ref 3.5–5.2)
QT INTERVAL: 337 MS
QTC INTERVAL: 475 MS
SODIUM SERPL-SCNC: 142 MMOL/L (ref 136–145)
TROPONIN T SERPL HS-MCNC: 52 NG/L
WHOLE BLOOD HOLD SPECIMEN: NORMAL

## 2023-11-09 PROCEDURE — 84484 ASSAY OF TROPONIN QUANT: CPT | Performed by: FAMILY MEDICINE

## 2023-11-09 PROCEDURE — 83735 ASSAY OF MAGNESIUM: CPT | Performed by: FAMILY MEDICINE

## 2023-11-09 PROCEDURE — 84100 ASSAY OF PHOSPHORUS: CPT | Performed by: FAMILY MEDICINE

## 2023-11-09 PROCEDURE — 99222 1ST HOSP IP/OBS MODERATE 55: CPT | Performed by: INTERNAL MEDICINE

## 2023-11-09 PROCEDURE — 80048 BASIC METABOLIC PNL TOTAL CA: CPT | Performed by: FAMILY MEDICINE

## 2023-11-09 PROCEDURE — 36415 COLL VENOUS BLD VENIPUNCTURE: CPT | Performed by: FAMILY MEDICINE

## 2023-11-09 PROCEDURE — 71045 X-RAY EXAM CHEST 1 VIEW: CPT

## 2023-11-09 RX ORDER — MIDODRINE HYDROCHLORIDE 5 MG/1
10 TABLET ORAL DAILY PRN
Status: DISCONTINUED | OUTPATIENT
Start: 2023-11-09 | End: 2023-11-13 | Stop reason: HOSPADM

## 2023-11-09 RX ADMIN — METOPROLOL TARTRATE 50 MG: 50 TABLET ORAL at 08:40

## 2023-11-09 RX ADMIN — DILTIAZEM HYDROCHLORIDE 30 MG: 30 TABLET, FILM COATED ORAL at 12:19

## 2023-11-09 RX ADMIN — APIXABAN 2.5 MG: 2.5 TABLET, FILM COATED ORAL at 20:49

## 2023-11-09 RX ADMIN — ATORVASTATIN CALCIUM 20 MG: 20 TABLET, FILM COATED ORAL at 08:40

## 2023-11-09 RX ADMIN — Medication 10 ML: at 08:40

## 2023-11-09 RX ADMIN — DILTIAZEM HYDROCHLORIDE 30 MG: 30 TABLET, FILM COATED ORAL at 17:10

## 2023-11-09 RX ADMIN — APIXABAN 2.5 MG: 2.5 TABLET, FILM COATED ORAL at 08:40

## 2023-11-09 RX ADMIN — METOPROLOL TARTRATE 50 MG: 50 TABLET ORAL at 20:49

## 2023-11-09 NOTE — PLAN OF CARE
Problem: Fall Injury Risk  Goal: Absence of Fall and Fall-Related Injury  Intervention: Identify and Manage Contributors  Description: Develop a fall prevention plan with the patient and caregiver/family.  Provide reorientation, appropriate sensory stimulation and routines with changes in mental status to decrease risk of fall.  Promote use of personal vision and auditory aids.  Assess assistance level required for safe and effective self-care; provide support as needed, such as toileting, mobilization. For age 65 and older, implement timed toileting with assistance.  Encourage physical activity, such as performance of mobility and self-care at highest level of patient ability, multicomponent exercise program and provision of appropriate assistive devices.  If fall occurs, assess the severity of injury; implement fall injury protocol. Determine the cause and revise fall injury prevention plan.  Regularly review medication contribution to fall risk; adjust medication administration times to minimize risk of falling.  Consider risk related to polypharmacy and age.  Balance adequate pain management with potential for oversedation.  Recent Flowsheet Documentation  Taken 11/9/2023 0000 by Emile Heath, RN  Medication Review/Management:   medications reviewed   high-risk medications identified  Intervention: Promote Injury-Free Environment  Description: Provide a safe, barrier-free environment that encourages independent activity.  Keep care area uncluttered and well-lighted.  Determine need for increased observation or monitoring.  Avoid use of devices that minimize mobility, such as restraints or indwelling urinary catheter.  Recent Flowsheet Documentation  Taken 11/9/2023 0200 by Emile Heath, RN  Safety Promotion/Fall Prevention:   activity supervised   assistive device/personal items within reach   clutter free environment maintained   fall prevention program maintained   gait belt   safety round/check  completed   room organization consistent   nonskid shoes/slippers when out of bed  Taken 11/9/2023 0000 by Emile Heath, RN  Safety Promotion/Fall Prevention:   activity supervised   assistive device/personal items within reach   clutter free environment maintained   fall prevention program maintained   gait belt   safety round/check completed   room organization consistent   nonskid shoes/slippers when out of bed     Problem: Adult Inpatient Plan of Care  Goal: Absence of Hospital-Acquired Illness or Injury  Intervention: Identify and Manage Fall Risk  Description: Perform standard risk assessment on admission using a validated tool or comprehensive approach appropriate to the patient; reassess fall risk frequently, with change in status or transfer to another level of care.  Communicate fall injury risk to interprofessional healthcare team.  Determine need for increased observation, equipment and environmental modification, such as low bed, signage and supportive, nonskid footwear.  Adjust safety measures to individual developmental age, stage and identified risk factors.  Reinforce the importance of safety and physical activity with patient and family.  Perform regular intentional rounding to assess need for position change, pain assessment and personal needs, including assistance with toileting.  Recent Flowsheet Documentation  Taken 11/9/2023 0200 by Emile Heath, RN  Safety Promotion/Fall Prevention:   activity supervised   assistive device/personal items within reach   clutter free environment maintained   fall prevention program maintained   gait belt   safety round/check completed   room organization consistent   nonskid shoes/slippers when out of bed  Taken 11/9/2023 0000 by Emile Heath, RN  Safety Promotion/Fall Prevention:   activity supervised   assistive device/personal items within reach   clutter free environment maintained   fall prevention program maintained   gait belt   safety round/check  completed   room organization consistent   nonskid shoes/slippers when out of bed  Intervention: Prevent and Manage VTE (Venous Thromboembolism) Risk  Description: Assess for VTE (venous thromboembolism) risk.  Encourage and assist with early ambulation.  Initiate and maintain compression or other therapy, as indicated, based on identified risk in accordance with organizational protocol and provider order.  Encourage both active and passive leg exercises while in bed, if unable to ambulate.  Recent Flowsheet Documentation  Taken 11/9/2023 0000 by Emile Heath RN  VTE Prevention/Management:   bilateral   sequential compression devices on  Intervention: Prevent Infection  Description: Maintain skin and mucous membrane integrity; promote hand, oral and pulmonary hygiene.  Optimize fluid balance, nutrition, sleep and glycemic control to maximize infection resistance.  Identify potential sources of infection early to prevent or mitigate progression of infection (e.g., wound, lines, devices).  Evaluate ongoing need for invasive devices; remove promptly when no longer indicated.  Recent Flowsheet Documentation  Taken 11/9/2023 0000 by Emile Heath RN  Infection Prevention:   single patient room provided   rest/sleep promoted   personal protective equipment utilized   hand hygiene promoted   environmental surveillance performed  Goal: Optimal Comfort and Wellbeing  Intervention: Provide Person-Centered Care  Description: Use a family-focused approach to care.  Develop trust and rapport by proactively providing information, encouraging questions, addressing concerns and offering reassurance.  Acknowledge emotional response to hospitalization.  Recognize and utilize personal coping strategies.  Honor spiritual and cultural preferences.  Recent Flowsheet Documentation  Taken 11/9/2023 0000 by Emile Heath RN  Trust Relationship/Rapport:   care explained   choices provided   emotional support provided   empathic  listening provided   questions answered   reassurance provided   questions encouraged   thoughts/feelings acknowledged  Goal: Readiness for Transition of Care  Intervention: Mutually Develop Transition Plan  Description: Identify available resources for support (e.g., family, friends, community).  Identify and address barriers to ongoing treatment and home management (e.g., environmental, financial).  Provide opportunities to practice self-management skills.  Assess and monitor emotional readiness for transition.  Establish or reconnect linkage with outpatient providers or community-based services.  Recent Flowsheet Documentation  Taken 11/9/2023 0019 by Emile Heath, RN  Equipment Currently Used at Home: none  Taken 11/9/2023 0018 by Emile Heath, RN  Transportation Anticipated: family or friend will provide  Patient/Family Anticipated Services at Transition:   Patient/Family Anticipates Transition to: home with family   Goal Outcome Evaluation:    Patient admitted last night for confusion, UTI, and a-fib.  IV antibiotics started.  Patient is alert to person and place.  She receives dialysis M-W-F.  Consults called to Nephrology and Cardiology.  Vitals WNL.  Heart rate has maintained under 120.

## 2023-11-09 NOTE — CONSULTS
Nephrology Consult Note                                                [facility]      Patient Identification:  Name: Hazel Bucio  Age: 83 y.o.  Sex: female  :  1940  MRN: [id number]               Requesting Physician: Hilario Esqueda MD  Reason for Consultation: management recommendations      History of Present Illness:    Patient is an 83-year-old white female patient with history of ESRD on hemodialysis  who presented to the hospital with chest pain patient has history of A-fib and has history of bladder cancer renal cell cancer with urostomy tube  She had dialysis yesterday  She is due for dialysis tomorrow I was consulted to help with dialysis plan  Problem List:    * No active hospital problems. *     Past Medical History:  Past Medical History:   Diagnosis Date    Bladder cancer     Cancer     breast, bladder    Deep vein thrombosis     Essential hypertension 2017    Fracture tibia/fibula, right, closed, initial encounter 2020    GERD (gastroesophageal reflux disease)     History of urostomy     Immobility 2020    r/t broken Tibia     Kidney carcinoma, right     removed     Long term current use of anticoagulant 2015    PAF (paroxysmal atrial fibrillation) 2019    Presence of cardiac pacemaker 2014    BS dual chamber PM placed 10/2014 with pocket revision 17.  HX tachy rob syndrome    Sick sinus syndrome 11/3/2014     Past Surgical History:  Past Surgical History:   Procedure Laterality Date    BREAST LUMPECTOMY      CARDIAC CATHETERIZATION N/A 10/18/2023    Procedure: Left Heart Cath possible PCI, atherectomy, hemodynamic support;  Surgeon: Augustin Ellsworth MD;  Location: [facility] CATH INVASIVE LOCATION;  Service: Cardiology;  Laterality: N/A;    CARDIAC ELECTROPHYSIOLOGY PROCEDURE N/A 2023    Procedure: Pacemaker gen change, [place] AWARE $;  Surgeon: Jose Carlos Cheng MD;  Location: [facility] CATH  INVASIVE LOCATION;  Service: Cardiovascular;  Laterality: N/A;    CHOLECYSTECTOMY N/A 10/12/2020    Procedure: CHOLECYSTECTOMY LAPAROSCOPIC;  Surgeon: Go Pereira DO;  Location: [facility] MAIN OR;  Service: General;  Laterality: N/A;    CYSTECTOMY W/ URETEROILEAL CONDUIT      HARDWARE REMOVAL Right 6/11/2021    Procedure: TIBIA HARDWARE REMOVAL;  Surgeon: Geoff Shah II, MD;  Location: [facility] MAIN OR;  Service: Orthopedics;  Laterality: Right;    HERNIA REPAIR      HYSTERECTOMY      INSERT / REPLACE / REMOVE PACEMAKER      NEPHRECTOMY Right     TIBIA IM NAILING/RODDING Right 8/28/2020    Procedure: TIBIA INTRAMEDULLARY NAIL/ABRAHAM INSERTION;  Surgeon: Geoff Shah II, MD;  Location: [facility] MAIN OR;  Service: Orthopedics;  Laterality: Right;      Home Meds:  Medications Prior to Admission   Medication Sig Dispense Refill Last Dose    acetaminophen (TYLENOL) 325 MG tablet Take 2 tablets by mouth Every 6 (Six) Hours As Needed for Mild Pain.       apixaban (ELIQUIS) 2.5 MG tablet tablet Take 1 tablet by mouth Every 12 (Twelve) Hours. Indications: Atrial Fibrillation 60 tablet 0     atorvastatin (LIPITOR) 10 MG tablet Take 2 tablets by mouth Daily. 90 tablet 0     dicyclomine (BENTYL) 10 MG capsule Take 1 capsule by mouth 3 (Three) Times a Day. 60 capsule 0     ipratropium-albuterol (DUO-NEB) 0.5-2.5 mg/3 ml nebulizer Take 3 mL by nebulization Every 4 (Four) Hours As Needed for Wheezing.       loperamide (IMODIUM) 2 MG capsule Take 2 capsules by mouth Daily As Needed for Diarrhea.       metoprolol tartrate (LOPRESSOR) 50 MG tablet Take 1 tablet by mouth 2 (Two) Times a Day.       ondansetron ODT (ZOFRAN-ODT) 4 MG disintegrating tablet Place 1 tablet on the tongue Every 8 (Eight) Hours As Needed for Nausea or Vomiting.       predniSONE (DELTASONE) 20 MG tablet Take 1 tablet by mouth 2 (Two) Times a Day.       saccharomyces boulardii (FLORASTOR) 250 MG capsule Take 1 capsule by mouth 2 (Two) Times a  "Day. 60 capsule 0     topiramate (TOPAMAX) 100 MG tablet Take 1 tablet by mouth Every Night. 30 tablet 0     zinc oxide 20 % ointment Apply 1 application  topically to the appropriate area as directed 2 (Two) Times a Day.        Current Meds:     Current Facility-Administered Medications:     acetaminophen (TYLENOL) tablet 650 mg, 650 mg, Oral, Q6H PRN, Hilario Esqueda MD    apixaban (ELIQUIS) tablet 2.5 mg, 2.5 mg, Oral, Q12H, Hilario Esqueda MD, 2.5 mg at 11/08/23 2352    atorvastatin (LIPITOR) tablet 20 mg, 20 mg, Oral, Daily, Hilario Esqueda MD    dilTIAZem (CARDIZEM) 125 mg in 125 mL D5W infusion, 5-15 mg/hr, Intravenous, Titrated, Chantal Rodrigues APRN    ipratropium-albuterol (DUO-NEB) nebulizer solution 3 mL, 3 mL, Nebulization, Q4H PRN, Hilario Esqueda MD    metoprolol tartrate (LOPRESSOR) tablet 50 mg, 50 mg, Oral, BID, Hilario Esqueda MD, 50 mg at 11/08/23 2352    [COMPLETED] Insert Peripheral IV, , , Once **AND** sodium chloride 0.9 % flush 10 mL, 10 mL, Intravenous, PRN, Chantal Rodrigues, APRALECIA    Allergies:  Allergies   Allergen Reactions    Amoxicillin Shortness Of Breath    Ciprofloxacin Hives    Oxycodone-Acetaminophen Unknown - High Severity     Pt's son stated when pt fell and broke her leg she was put on oxycodone; pt's son stated pt's \"vitals went all out of wack, she couldn't remember things.\"    Penicillins Rash    Sulfa Antibiotics Hives    Cephalexin Other (See Comments)    Clavulanic Acid Other (See Comments)    Codeine Other (See Comments)    Contrast Dye (Echo Or Unknown Ct/Mr) Other (See Comments)     8/18/22     Doxycycline Other (See Comments)    Fluconazole Other (See Comments)    Iodinated Contrast Media Other (See Comments)     8/18/22    Iodine Hives    Macrobid [Nitrofurantoin] Hives    Tobramycin Other (See Comments)    Trimethoprim Other (See Comments)     Social History:   Social History     Tobacco Use    Smoking status: Never     Passive exposure: Never    Smokeless tobacco: " "Never   Substance Use Topics    Alcohol use: Not Currently      Family History:  Family History   Problem Relation Age of Onset    COPD Father         Review of Systems  Reviewed 12 systems were reviewed, all negative except for those mentioned in HPI    Objective:  Vitals:   /52 (BP Location: Left arm, Patient Position: Lying)   Pulse 103   Temp 97.8 °F (36.6 °C) (Oral)   Resp 15   Ht 165.1 cm (65\")   Wt 56.2 kg (124 lb)   SpO2 98%   BMI 20.63 kg/m²   I/O:     Intake/Output Summary (Last 24 hours) at 11/9/2023 0635  Last data filed at 11/9/2023 0500  Gross per 24 hour   Intake 100 ml   Output 0 ml   Net 100 ml       Exam:  General Appearance:  Alert  Head:  Normocephalic, without obvious abnormality, atraumatic  Eyes:  PERRL, conjunctiva/corneas clear     Neck:  Supple,  no adenopathy;      Lungs:  Decreased BS occasion ronchi  Heart:  Regular rate and rhythm, S1 and S2 normal  Abdomen:  Soft, non-tender, bowel sounds active   Extremities: trace edema  Pulses: 2+ and symmetric all extremities  Skin:  No rashes or lesions  Data Review:  All labs (24hrs):   Recent Results (from the past 24 hour(s))   Comprehensive Metabolic Panel    Collection Time: 11/08/23  3:25 PM    Specimen: Arm, Left; Blood   Result Value Ref Range    Glucose 145 (H) 65 - 99 mg/dL    BUN 59 (H) 8 - 23 mg/dL    Creatinine 5.22 (H) 0.57 - 1.00 mg/dL    Sodium 137 136 - 145 mmol/L    Potassium 4.6 3.5 - 5.2 mmol/L    Chloride 99 98 - 107 mmol/L    CO2 24.0 22.0 - 29.0 mmol/L    Calcium 9.6 8.6 - 10.5 mg/dL    Total Protein 7.2 6.0 - 8.5 g/dL    Albumin 3.5 3.5 - 5.2 g/dL    ALT (SGPT) 12 1 - 33 U/L    AST (SGOT) 19 1 - 32 U/L    Alkaline Phosphatase 142 (H) 39 - 117 U/L    Total Bilirubin 0.3 0.0 - 1.2 mg/dL    Globulin 3.7 gm/dL    A/G Ratio 0.9 g/dL    BUN/Creatinine Ratio 11.3 7.0 - 25.0    Anion Gap 14.0 5.0 - 15.0 mmol/L    eGFR 7.8 (L) >60.0 mL/min/1.73   Protime-INR    Collection Time: 11/08/23  3:25 PM    Specimen: Arm, Left; " Blood   Result Value Ref Range    Protime 12.1 (H) 9.6 - 11.7 Seconds    INR 1.12 (H) 0.93 - 1.10   aPTT    Collection Time: 11/08/23  3:25 PM    Specimen: Arm, Left; Blood   Result Value Ref Range    PTT 32.3 (H) 24.0 - 31.0 seconds   High Sensitivity Troponin T    Collection Time: 11/08/23  3:25 PM    Specimen: Arm, Left; Blood   Result Value Ref Range    HS Troponin T 63 (C) <14 ng/L   Magnesium    Collection Time: 11/08/23  3:25 PM    Specimen: Arm, Left; Blood   Result Value Ref Range    Magnesium 2.1 1.6 - 2.4 mg/dL   TSH    Collection Time: 11/08/23  3:25 PM    Specimen: Arm, Left; Blood   Result Value Ref Range    TSH 1.000 0.270 - 4.200 uIU/mL   CBC Auto Differential    Collection Time: 11/08/23  3:25 PM    Specimen: Arm, Left; Blood   Result Value Ref Range    WBC 11.40 (H) 3.40 - 10.80 10*3/mm3    RBC 3.34 (L) 3.77 - 5.28 10*6/mm3    Hemoglobin 10.5 (L) 12.0 - 15.9 g/dL    Hematocrit 32.6 (L) 34.0 - 46.6 %    MCV 97.8 (H) 79.0 - 97.0 fL    MCH 31.5 26.6 - 33.0 pg    MCHC 32.2 31.5 - 35.7 g/dL    RDW 14.7 12.3 - 15.4 %    RDW-SD 53.4 37.0 - 54.0 fl    MPV 8.0 6.0 - 12.0 fL    Platelets 264 140 - 450 10*3/mm3    Neutrophil % 93.1 (H) 42.7 - 76.0 %    Lymphocyte % 4.0 (L) 19.6 - 45.3 %    Monocyte % 2.2 (L) 5.0 - 12.0 %    Eosinophil % 0.1 (L) 0.3 - 6.2 %    Basophil % 0.6 0.0 - 1.5 %    Neutrophils, Absolute 10.60 (H) 1.70 - 7.00 10*3/mm3    Lymphocytes, Absolute 0.50 (L) 0.70 - 3.10 10*3/mm3    Monocytes, Absolute 0.20 0.10 - 0.90 10*3/mm3    Eosinophils, Absolute 0.00 0.00 - 0.40 10*3/mm3    Basophils, Absolute 0.10 0.00 - 0.20 10*3/mm3    nRBC 0.0 0.0 - 0.2 /100 WBC   Respiratory Panel PCR w/COVID-19(SARS-CoV-2) [facility]/STEFANY/ANNY/PAD/COR/MAD/[facility]NASA In-House, NP Swab in [place], 3-4 HR TAT - Swab, Nasopharynx    Collection Time: 11/08/23  3:27 PM    Specimen: Nasopharynx; Swab   Result Value Ref Range    ADENOVIRUS, PCR Not Detected Not Detected    Coronavirus 229E Not Detected Not Detected    Coronavirus HKU1 Not  Detected Not Detected    Coronavirus NL63 Not Detected Not Detected    Coronavirus OC43 Not Detected Not Detected    COVID19 Not Detected Not Detected - Ref. Range    Human Metapneumovirus Not Detected Not Detected    Human Rhinovirus/Enterovirus Not Detected Not Detected    Influenza A PCR Not Detected Not Detected    Influenza B PCR Not Detected Not Detected    Parainfluenza Virus 1 Not Detected Not Detected    Parainfluenza Virus 2 Not Detected Not Detected    Parainfluenza Virus 3 Not Detected Not Detected    Parainfluenza Virus 4 Not Detected Not Detected    RSV, PCR Not Detected Not Detected    Bordetella pertussis pcr Not Detected Not Detected    Bordetella parapertussis PCR Not Detected Not Detected    Chlamydophila pneumoniae PCR Not Detected Not Detected    Mycoplasma pneumo by PCR Not Detected Not Detected   ECG 12 Lead Tachycardia    Collection Time: 11/08/23  3:43 PM   Result Value Ref Range    QT Interval 337 ms    QTC Interval 475 ms   High Sensitivity Troponin T 2Hr    Collection Time: 11/08/23  6:23 PM    Specimen: Arm, Right; Blood   Result Value Ref Range    HS Troponin T 57 (C) <14 ng/L    Troponin T Delta -6 (L) >=-4 - <+4 ng/L   Urinalysis With Culture If Indicated - Urostomy    Collection Time: 11/08/23  6:23 PM    Specimen: Urostomy; Urine   Result Value Ref Range    Color, UA Yellow Yellow, Straw    Appearance, UA Cloudy (A) Clear    pH, UA 8.5 (H) 5.0 - 8.0    Specific Gravity, UA 1.016 1.005 - 1.030    Glucose, UA Negative Negative    Ketones, UA Negative Negative    Bilirubin, UA Negative Negative    Blood, UA Trace (A) Negative    Protein,  mg/dL (2+) (A) Negative    Leuk Esterase, UA Large (3+) (A) Negative    Nitrite, UA Negative Negative    Urobilinogen, UA 1.0 E.U./dL 0.2 - 1.0 E.U./dL   Urinalysis, Microscopic Only - Urostomy    Collection Time: 11/08/23  6:23 PM    Specimen: Urostomy; Urine   Result Value Ref Range    RBC, UA 0-2 None Seen, 0-2 /HPF    WBC, UA 21-50 (A) None  Seen, 0-2 /HPF    Bacteria, UA 4+ (A) None Seen /HPF    Squamous Epithelial Cells, UA 0-2 None Seen, 0-2 /HPF    Hyaline Casts, UA None Seen None Seen /LPF    Methodology Automated Microscopy        Current Facility-Administered Medications:     acetaminophen (TYLENOL) tablet 650 mg, 650 mg, Oral, Q6H PRN, Hilario Esqueda MD    apixaban (ELIQUIS) tablet 2.5 mg, 2.5 mg, Oral, Q12H, Hilario Esqueda MD, 2.5 mg at 11/08/23 2352    atorvastatin (LIPITOR) tablet 20 mg, 20 mg, Oral, Daily, Hilario Esqueda MD    dilTIAZem (CARDIZEM) 125 mg in 125 mL D5W infusion, 5-15 mg/hr, Intravenous, Titrated, Chantal Rodrigues APRN    ipratropium-albuterol (DUO-NEB) nebulizer solution 3 mL, 3 mL, Nebulization, Q4H PRN, Hilario Esqueda MD    metoprolol tartrate (LOPRESSOR) tablet 50 mg, 50 mg, Oral, BID, Hilario Esqueda MD, 50 mg at 11/08/23 2352    [COMPLETED] Insert Peripheral IV, , , Once **AND** sodium chloride 0.9 % flush 10 mL, 10 mL, Intravenous, PRN, Chantal Rodrigues APRN    Assessment:  ESRD hemodialysis Monday Wednesday Friday  Chest pain/presyncope  A-fib  Sick sinus syndrome  Hypertension  History of bladder cancer  History of renal cell carcinoma      Recommendations:  Hemodialysis tomorrow  We will not remove fluid with dialysis  We will allow her blood pressure to go up a little bit to avoid ID dizziness      June Keller MD  11/9/2023  06:35 EST

## 2023-11-09 NOTE — PLAN OF CARE
Problem: Adult Inpatient Plan of Care  Goal: Absence of Hospital-Acquired Illness or Injury  Intervention: Identify and Manage Fall Risk  Description: Perform standard risk assessment on admission using a validated tool or comprehensive approach appropriate to the patient; reassess fall risk frequently, with change in status or transfer to another level of care.  Communicate fall injury risk to interprofessional healthcare team.  Determine need for increased observation, equipment and environmental modification, such as low bed, signage and supportive, nonskid footwear.  Adjust safety measures to individual developmental age, stage and identified risk factors.  Reinforce the importance of safety and physical activity with patient and family.  Perform regular intentional rounding to assess need for position change, pain assessment and personal needs, including assistance with toileting.  Recent Flowsheet Documentation  Taken 11/9/2023 0200 by Emile Heath, RN  Safety Promotion/Fall Prevention:   activity supervised   assistive device/personal items within reach   clutter free environment maintained   fall prevention program maintained   gait belt   safety round/check completed   room organization consistent   nonskid shoes/slippers when out of bed  Taken 11/9/2023 0000 by Emile Heath, RN  Safety Promotion/Fall Prevention:   activity supervised   assistive device/personal items within reach   clutter free environment maintained   fall prevention program maintained   gait belt   safety round/check completed   room organization consistent   nonskid shoes/slippers when out of bed  Intervention: Prevent and Manage VTE (Venous Thromboembolism) Risk  Description: Assess for VTE (venous thromboembolism) risk.  Encourage and assist with early ambulation.  Initiate and maintain compression or other therapy, as indicated, based on identified risk in accordance with organizational protocol and provider order.  Encourage  both active and passive leg exercises while in bed, if unable to ambulate.  Recent Flowsheet Documentation  Taken 11/9/2023 0000 by Emile Heath RN  VTE Prevention/Management:   bilateral   sequential compression devices on  Intervention: Prevent Infection  Description: Maintain skin and mucous membrane integrity; promote hand, oral and pulmonary hygiene.  Optimize fluid balance, nutrition, sleep and glycemic control to maximize infection resistance.  Identify potential sources of infection early to prevent or mitigate progression of infection (e.g., wound, lines, devices).  Evaluate ongoing need for invasive devices; remove promptly when no longer indicated.  Recent Flowsheet Documentation  Taken 11/9/2023 0000 by Emile Heath, RN  Infection Prevention:   single patient room provided   rest/sleep promoted   personal protective equipment utilized   hand hygiene promoted   environmental surveillance performed  Goal: Optimal Comfort and Wellbeing  Intervention: Provide Person-Centered Care  Description: Use a family-focused approach to care.  Develop trust and rapport by proactively providing information, encouraging questions, addressing concerns and offering reassurance.  Acknowledge emotional response to hospitalization.  Recognize and utilize personal coping strategies.  Honor spiritual and cultural preferences.  Recent Flowsheet Documentation  Taken 11/9/2023 0000 by Emile Heath, RN  Trust Relationship/Rapport:   care explained   choices provided   emotional support provided   empathic listening provided   questions answered   reassurance provided   questions encouraged   thoughts/feelings acknowledged  Goal: Readiness for Transition of Care  Intervention: Mutually Develop Transition Plan  Description: Identify available resources for support (e.g., family, friends, community).  Identify and address barriers to ongoing treatment and home management (e.g., environmental, financial).  Provide opportunities  to practice self-management skills.  Assess and monitor emotional readiness for transition.  Establish or reconnect linkage with outpatient providers or community-based services.  Recent Flowsheet Documentation  Taken 11/9/2023 0019 by Emile Heath, RN  Equipment Currently Used at Home: none  Taken 11/9/2023 0018 by Emile Heath, RN  Transportation Anticipated: family or friend will provide  Patient/Family Anticipated Services at Transition:   Patient/Family Anticipates Transition to: home with family     Problem: Fall Injury Risk  Goal: Absence of Fall and Fall-Related Injury  Intervention: Identify and Manage Contributors  Description: Develop a fall prevention plan with the patient and caregiver/family.  Provide reorientation, appropriate sensory stimulation and routines with changes in mental status to decrease risk of fall.  Promote use of personal vision and auditory aids.  Assess assistance level required for safe and effective self-care; provide support as needed, such as toileting, mobilization. For age 65 and older, implement timed toileting with assistance.  Encourage physical activity, such as performance of mobility and self-care at highest level of patient ability, multicomponent exercise program and provision of appropriate assistive devices.  If fall occurs, assess the severity of injury; implement fall injury protocol. Determine the cause and revise fall injury prevention plan.  Regularly review medication contribution to fall risk; adjust medication administration times to minimize risk of falling.  Consider risk related to polypharmacy and age.  Balance adequate pain management with potential for oversedation.  Recent Flowsheet Documentation  Taken 11/9/2023 0000 by Emile Heath, RN  Medication Review/Management:   medications reviewed   high-risk medications identified  Intervention: Promote Injury-Free Environment  Description: Provide a safe, barrier-free environment that  encourages independent activity.  Keep care area uncluttered and well-lighted.  Determine need for increased observation or monitoring.  Avoid use of devices that minimize mobility, such as restraints or indwelling urinary catheter.  Recent Flowsheet Documentation  Taken 11/9/2023 0200 by Emile Heath, RN  Safety Promotion/Fall Prevention:   activity supervised   assistive device/personal items within reach   clutter free environment maintained   fall prevention program maintained   gait belt   safety round/check completed   room organization consistent   nonskid shoes/slippers when out of bed  Taken 11/9/2023 0000 by Emile Heath, RN  Safety Promotion/Fall Prevention:   activity supervised   assistive device/personal items within reach   clutter free environment maintained   fall prevention program maintained   gait belt   safety round/check completed   room organization consistent   nonskid shoes/slippers when out of bed   Goal Outcome Evaluation:

## 2023-11-09 NOTE — DISCHARGE PLACEMENT REQUEST
"Nell Liu (83 y.o. Female)       Date of Birth   1940    Social Security Number       Address   [address]  [place] [address]    Home Phone   [phone]    MRN   [id number]       [facility]    Marital Status                               Admission Date   11/8/23    Admission Type   Emergency    Admitting Provider   Hilario Esqueda MD    Attending Provider   Hilario Esqueda MD    Department, Room/Bed   [facility] 2D, 270/1       Discharge Date       Discharge Disposition       Discharge Destination                                 Attending Provider: Hilario Esqueda MD    Allergies: Amoxicillin, Ciprofloxacin, Oxycodone-acetaminophen, Penicillins, Sulfa Antibiotics, Cephalexin, Clavulanic Acid, Codeine, Contrast Dye (Echo Or Unknown Ct/mr), Doxycycline, Fluconazole, Iodinated Contrast Media, Iodine, Macrobid [Nitrofurantoin], Tobramycin, Trimethoprim    Isolation: None   Infection: None   Code Status: CPR    Ht: 165.1 cm (65\")   Wt: 56.2 kg (124 lb)    Admission Cmt: None   Principal Problem: Atrial fibrillation with rapid ventricular response [I48.91]                   Active Insurance as of 11/8/2023       Primary Coverage       Payor Plan Insurance Group Employer/Plan Group    MEDICARE MEDICARE A & B        Payor Plan Address Payor Plan Phone Number Payor Plan Fax Number Effective Dates    [address] [phone]  4/1/2006 - None Entered    [place] [address]         Subscriber Name Subscriber Birth Date Member ID       NELL LIU 1940 [id number]                     Emergency Contacts        (Rel.) Home Phone Work Phone Mobile Phone    Manish Liu (Son) [phone] -- [phone]    Brianna Patel (Niece) (Relative) -- -- [phone]    RANJITH WATKINS (Grandchild) -- -- [phone]                 History & Physical        Hilario Esqueda MD at 11/09/23 0921          Patient Care Team:  Lizzy Franics MD as PCP - General (Family " Medicine)  Jose Carlos Cheng MD as Consulting Physician (Cardiology)    Chief Complaint  Subjective    The patient is a 83 y.o. female who presents with progressive palpitations and shortness of breath with evidence of atrial fibrillation with rapid ventricular response  HPI  The patient was in her usual state of health until day of presentation when she began to experience some substernal chest pressure tightness and palpitations.  This progressed and she presented to the [facility] emergency room for evaluation where she was found to have A-fib with RVR.    She is feeling better at the time of my evaluation and has no apparent complaints other than weakness  Review of Systems  Review of Systems   Constitutional:  Positive for activity change.   Respiratory:  Positive for chest tightness and shortness of breath.    Cardiovascular:  Positive for palpitations. Negative for chest pain.   Neurological:  Positive for weakness.       History  Past Medical History:   Diagnosis Date    Bladder cancer     Cancer     breast, bladder    Deep vein thrombosis     Essential hypertension 1/17/2017    Fracture tibia/fibula, right, closed, initial encounter 8/27/2020    GERD (gastroesophageal reflux disease)     History of urostomy     Immobility 08/27/2020    r/t broken Tibia     Kidney carcinoma, right     removed     Long term current use of anticoagulant 1/9/2015    PAF (paroxysmal atrial fibrillation) 6/26/2019    Presence of cardiac pacemaker 11/03/2014    BS dual chamber PM placed 10/2014 with pocket revision 1/25/17.  HX tachy rob syndrome    Sick sinus syndrome 11/3/2014     Past Surgical History:   Procedure Laterality Date    BREAST LUMPECTOMY      CARDIAC CATHETERIZATION N/A 10/18/2023    Procedure: Left Heart Cath possible PCI, atherectomy, hemodynamic support;  Surgeon: Augustin Ellsworth MD;  Location: [facility] INVASIVE LOCATION;  Service: Cardiology;  Laterality: N/A;    CARDIAC  ELECTROPHYSIOLOGY PROCEDURE N/A 4/28/2023    Procedure: Pacemaker gen change, [place] AWARE $;  Surgeon: Jose Carlos Cheng MD;  Location: [facility] CATH INVASIVE LOCATION;  Service: Cardiovascular;  Laterality: N/A;    CHOLECYSTECTOMY N/A 10/12/2020    Procedure: CHOLECYSTECTOMY LAPAROSCOPIC;  Surgeon: Go Pereira DO;  Location: [facility] MAIN OR;  Service: General;  Laterality: N/A;    CYSTECTOMY W/ URETEROILEAL CONDUIT      HARDWARE REMOVAL Right 6/11/2021    Procedure: TIBIA HARDWARE REMOVAL;  Surgeon: Geoff Shah II, MD;  Location: [facility] MAIN OR;  Service: Orthopedics;  Laterality: Right;    HERNIA REPAIR      HYSTERECTOMY      INSERT / REPLACE / REMOVE PACEMAKER      NEPHRECTOMY Right     TIBIA IM NAILING/RODDING Right 8/28/2020    Procedure: TIBIA INTRAMEDULLARY NAIL/ABRAHAM INSERTION;  Surgeon: Geoff Shah II, MD;  Location: [facility] MAIN OR;  Service: Orthopedics;  Laterality: Right;     Family History   Problem Relation Age of Onset    COPD Father      Social History     Tobacco Use    Smoking status: Never     Passive exposure: Never    Smokeless tobacco: Never   Vaping Use    Vaping Use: Never used   Substance Use Topics    Alcohol use: Not Currently    Drug use: Never     Allergies:  Amoxicillin, Ciprofloxacin, Oxycodone-acetaminophen, Penicillins, Sulfa antibiotics, Cephalexin, Clavulanic acid, Codeine, Contrast dye (echo or unknown ct/mr), Doxycycline, Fluconazole, Iodinated contrast media, Iodine, Macrobid [nitrofurantoin], Tobramycin, and Trimethoprim    Objective    Vital Signs  Temp:  [97.8 °F (36.6 °C)-98.4 °F (36.9 °C)] 97.8 °F (36.6 °C)  Heart Rate:  [] 107  Resp:  [15-21] 15  BP: ()/(50-70) 121/63      Physical Exam:   Physical Exam  Vitals reviewed.   Constitutional:       Appearance: Normal appearance.   Cardiovascular:      Rate and Rhythm: Tachycardia present. Rhythm irregular.      Heart sounds: Normal heart sounds.   Pulmonary:      Breath sounds: Normal  "breath sounds.   Skin:     General: Skin is warm.   Neurological:      General: No focal deficit present.      Mental Status: She is alert.              Results Review:   CBC    Results from last 7 days   Lab Units 11/08/23  1525   WBC 10*3/mm3 11.40*   HEMOGLOBIN g/dL 10.5*   PLATELETS 10*3/mm3 264     BMP   Results from last 7 days   Lab Units 11/09/23  0604 11/08/23  1525   SODIUM mmol/L 142 137   POTASSIUM mmol/L 4.7 4.6   CHLORIDE mmol/L 105 99   CO2 mmol/L 25.0 24.0   BUN mg/dL 67* 59*   CREATININE mg/dL 5.46* 5.22*   GLUCOSE mg/dL 99 145*   MAGNESIUM mg/dL 2.2 2.1   PHOSPHORUS mg/dL 5.2*  --      Cr Clearance Estimated Creatinine Clearance: 7 mL/min (A) (by C-G formula based on SCr of 5.46 mg/dL (H)).  Coag   Results from last 7 days   Lab Units 11/08/23  1525   INR  1.12*   APTT seconds 32.3*     HbA1C   Lab Results   Component Value Date    HGBA1C 5.4 10/11/2020    HGBA1C 6.2 (H) 09/08/2020    HGBA1C 6.1 (H) 08/27/2020     Blood Glucose No results found for: \"POCGLU\"  Infection     CMP   Results from last 7 days   Lab Units 11/09/23  0604 11/08/23  1525   SODIUM mmol/L 142 137   POTASSIUM mmol/L 4.7 4.6   CHLORIDE mmol/L 105 99   CO2 mmol/L 25.0 24.0   BUN mg/dL 67* 59*   CREATININE mg/dL 5.46* 5.22*   GLUCOSE mg/dL 99 145*   ALBUMIN g/dL  --  3.5   BILIRUBIN mg/dL  --  0.3   ALK PHOS U/L  --  142*   AST (SGOT) U/L  --  19   ALT (SGPT) U/L  --  12     UA    Results from last 7 days   Lab Units 11/08/23  1823   NITRITE UA  Negative   WBC UA /HPF 21-50*   BACTERIA UA /HPF 4+*   SQUAM EPITHEL UA /HPF 0-2     Radiology(recent) XR Chest 1 View    Result Date: 11/9/2023  Impression: 1. Right IJ central venous catheter tip stable overlying the proximal right atrium. 2. No acute process. Electronically Signed: Jacoby Elise MD  11/9/2023 8:43 AM EST  Workstation ID: [id number]    XR Chest 1 View    Result Date: 11/8/2023  Impression: No acute chest finding. Electronically Signed: Ivy Valenzuela MD  11/8/2023 3:33 PM " EST  Workstation ID: [id number]      Assessment:    Atrial fibrillation with rapid ventricular response  History of bladder cancer  Urostomy in place  History of deep venous thrombosis  Secondary thrombophilia  Oral anticoagulation therapy  ESRD  Hypertension with ESRD  Anemia of ESRD  Dependency upon hemodialysis  History of sick sinus syndrome  History of pacemaker placement  Gastroesophageal reflux disease without esophagitis  History of renal cell carcinoma  History of right nephrectomy  Urinary tract infection present upon admission        Plan:  Cardiac and renal evaluations//plans to follow  Expected Length of Stay 3 days    I discussed the patient's findings and my recommendations with patient and nursing staff.     Hilario Esqueda MD  11/09/23  09:22 EST          Electronically signed by Hilario Esqueda MD at 11/09/23 0925       Physician Progress Notes (last 24 hours)  Notes from 11/08/23 1057 through 11/09/23 1057   No notes of this type exist for this encounter.       Medical Student Notes (last 24 hours)  Notes from 11/08/23 1057 through 11/09/23 1057   No notes of this type exist for this encounter.       Physical Therapy Notes (last 24 hours)  Notes from 11/08/23 1057 through 11/09/23 1057   No notes exist for this encounter.       Occupational Therapy Notes (last 24 hours)  Notes from 11/08/23 1058 through 11/09/23 1058   No notes exist for this encounter.

## 2023-11-09 NOTE — SIGNIFICANT NOTE
11/09/23 1555   [facility] Status Approved/Complete  (Return to [facility], private pay [facility]. Not needed for return)

## 2023-11-09 NOTE — H&P
Patient Care Team:  Lizzy Francis MD as PCP - General (Family Medicine)  Jose Carlos Cheng MD as Consulting Physician (Cardiology)    Chief Complaint  Subjective     The patient is a 83 y.o. female who presents with progressive palpitations and shortness of breath with evidence of atrial fibrillation with rapid ventricular response  HPI  The patient was in her usual state of health until day of presentation when she began to experience some substernal chest pressure tightness and palpitations.  This progressed and she presented to the [facility] emergency room for evaluation where she was found to have A-fib with RVR.    She is feeling better at the time of my evaluation and has no apparent complaints other than weakness  Review of Systems  Review of Systems   Constitutional:  Positive for activity change.   Respiratory:  Positive for chest tightness and shortness of breath.    Cardiovascular:  Positive for palpitations. Negative for chest pain.   Neurological:  Positive for weakness.       History  Past Medical History:   Diagnosis Date    Bladder cancer     Cancer     breast, bladder    Deep vein thrombosis     Essential hypertension 1/17/2017    Fracture tibia/fibula, right, closed, initial encounter 8/27/2020    GERD (gastroesophageal reflux disease)     History of urostomy     Immobility 08/27/2020    r/t broken Tibia     Kidney carcinoma, right     removed     Long term current use of anticoagulant 1/9/2015    PAF (paroxysmal atrial fibrillation) 6/26/2019    Presence of cardiac pacemaker 11/03/2014    BS dual chamber PM placed 10/2014 with pocket revision 1/25/17.  HX tachy rob syndrome    Sick sinus syndrome 11/3/2014     Past Surgical History:   Procedure Laterality Date    BREAST LUMPECTOMY      CARDIAC CATHETERIZATION N/A 10/18/2023    Procedure: Left Heart Cath possible PCI, atherectomy, hemodynamic support;  Surgeon: Augustin Ellsworth MD;  Location: [facility] INVASIVE  LOCATION;  Service: Cardiology;  Laterality: N/A;    CARDIAC ELECTROPHYSIOLOGY PROCEDURE N/A 4/28/2023    Procedure: Pacemaker gen change, [place] AWARE $;  Surgeon: Jose Carlos Cheng MD;  Location: [facility] CATH INVASIVE LOCATION;  Service: Cardiovascular;  Laterality: N/A;    CHOLECYSTECTOMY N/A 10/12/2020    Procedure: CHOLECYSTECTOMY LAPAROSCOPIC;  Surgeon: Go Pereira DO;  Location: [facility] MAIN OR;  Service: General;  Laterality: N/A;    CYSTECTOMY W/ URETEROILEAL CONDUIT      HARDWARE REMOVAL Right 6/11/2021    Procedure: TIBIA HARDWARE REMOVAL;  Surgeon: Geoff Shah II, MD;  Location: [facility] MAIN OR;  Service: Orthopedics;  Laterality: Right;    HERNIA REPAIR      HYSTERECTOMY      INSERT / REPLACE / REMOVE PACEMAKER      NEPHRECTOMY Right     TIBIA IM NAILING/RODDING Right 8/28/2020    Procedure: TIBIA INTRAMEDULLARY NAIL/ABRAHAM INSERTION;  Surgeon: Geoff Shah II, MD;  Location: [facility] MAIN OR;  Service: Orthopedics;  Laterality: Right;     Family History   Problem Relation Age of Onset    COPD Father      Social History     Tobacco Use    Smoking status: Never     Passive exposure: Never    Smokeless tobacco: Never   Vaping Use    Vaping Use: Never used   Substance Use Topics    Alcohol use: Not Currently    Drug use: Never     Allergies:  Amoxicillin, Ciprofloxacin, Oxycodone-acetaminophen, Penicillins, Sulfa antibiotics, Cephalexin, Clavulanic acid, Codeine, Contrast dye (echo or unknown ct/mr), Doxycycline, Fluconazole, Iodinated contrast media, Iodine, Macrobid [nitrofurantoin], Tobramycin, and Trimethoprim    Objective     Vital Signs  Temp:  [97.8 °F (36.6 °C)-98.4 °F (36.9 °C)] 97.8 °F (36.6 °C)  Heart Rate:  [] 107  Resp:  [15-21] 15  BP: ()/(50-70) 121/63      Physical Exam:   Physical Exam  Vitals reviewed.   Constitutional:       Appearance: Normal appearance.   Cardiovascular:      Rate and Rhythm: Tachycardia present. Rhythm irregular.      Heart sounds:  "Normal heart sounds.   Pulmonary:      Breath sounds: Normal breath sounds.   Skin:     General: Skin is warm.   Neurological:      General: No focal deficit present.      Mental Status: She is alert.              Results Review:   CBC    Results from last 7 days   Lab Units 11/08/23  1525   WBC 10*3/mm3 11.40*   HEMOGLOBIN g/dL 10.5*   PLATELETS 10*3/mm3 264     BMP   Results from last 7 days   Lab Units 11/09/23  0604 11/08/23  1525   SODIUM mmol/L 142 137   POTASSIUM mmol/L 4.7 4.6   CHLORIDE mmol/L 105 99   CO2 mmol/L 25.0 24.0   BUN mg/dL 67* 59*   CREATININE mg/dL 5.46* 5.22*   GLUCOSE mg/dL 99 145*   MAGNESIUM mg/dL 2.2 2.1   PHOSPHORUS mg/dL 5.2*  --      Cr Clearance Estimated Creatinine Clearance: 7 mL/min (A) (by C-G formula based on SCr of 5.46 mg/dL (H)).  Coag   Results from last 7 days   Lab Units 11/08/23  1525   INR  1.12*   APTT seconds 32.3*     HbA1C   Lab Results   Component Value Date    HGBA1C 5.4 10/11/2020    HGBA1C 6.2 (H) 09/08/2020    HGBA1C 6.1 (H) 08/27/2020     Blood Glucose No results found for: \"POCGLU\"  Infection     CMP   Results from last 7 days   Lab Units 11/09/23  0604 11/08/23  1525   SODIUM mmol/L 142 137   POTASSIUM mmol/L 4.7 4.6   CHLORIDE mmol/L 105 99   CO2 mmol/L 25.0 24.0   BUN mg/dL 67* 59*   CREATININE mg/dL 5.46* 5.22*   GLUCOSE mg/dL 99 145*   ALBUMIN g/dL  --  3.5   BILIRUBIN mg/dL  --  0.3   ALK PHOS U/L  --  142*   AST (SGOT) U/L  --  19   ALT (SGPT) U/L  --  12     UA    Results from last 7 days   Lab Units 11/08/23  1823   NITRITE UA  Negative   WBC UA /HPF 21-50*   BACTERIA UA /HPF 4+*   SQUAM EPITHEL UA /HPF 0-2     Radiology(recent) XR Chest 1 View    Result Date: 11/9/2023  Impression: 1. Right IJ central venous catheter tip stable overlying the proximal right atrium. 2. No acute process. Electronically Signed: Jacoby Elise MD  11/9/2023 8:43 AM EST  Workstation ID: [id number]    XR Chest 1 View    Result Date: 11/8/2023  Impression: No acute chest finding. " Electronically Signed: Ivy Valenzuela MD  11/8/2023 3:33 PM EST  Workstation ID: [id number]      Assessment:    Atrial fibrillation with rapid ventricular response  History of bladder cancer  Urostomy in place  History of deep venous thrombosis  Secondary thrombophilia  Oral anticoagulation therapy  ESRD  Hypertension with ESRD  Anemia of ESRD  Dependency upon hemodialysis  History of sick sinus syndrome  History of pacemaker placement  Gastroesophageal reflux disease without esophagitis  History of renal cell carcinoma  History of right nephrectomy  Urinary tract infection present upon admission        Plan:  Cardiac and renal evaluations//plans to follow  Expected Length of Stay 3 days    I discussed the patient's findings and my recommendations with patient and nursing staff.     Hilario Esqueda MD  11/09/23  09:22 EST

## 2023-11-09 NOTE — CASE MANAGEMENT/SOCIAL WORK
Discharge Planning Assessment   Luis Felipe     Patient Name: Hazel Bucio  MRN: [id number]  Today's Date: 11/9/2023    Admit Date: 11/8/2023    Plan: Return to [facility].  No Precert or PASRR needed. Current dialysis @    Discharge Needs Assessment       Row Name 11/09/23 1559       Living Environment    People in Home other (see comments)    Unique Family Situation LT- [facility]    Current Living Arrangements extended care facility    Potentially Unsafe Housing Conditions none    Provides Primary Care For no one    Family Caregiver if Needed other (see comments)    Quality of Family Relationships helpful;involved;supportive    Able to Return to Prior Arrangements yes       Resource/Environmental Concerns    Resource/Environmental Concerns none    Transportation Concerns none       Transition Planning    Patient/Family Anticipates Transition to long-term care facility    Patient/Family Anticipated Services at Transition     Transportation Anticipated health plan transportation;family or friend will provide       Discharge Needs Assessment    Readmission Within the Last 30 Days no previous admission in last 30 days    Concerns to be Addressed discharge planning    Anticipated Changes Related to Illness none                   Discharge Plan       Row Name 11/09/23 1529       Plan    Plan Return to [facility].  No Precert or PASRR needed. Current dialysis @     Patient/Family in Agreement with Plan yes    Plan Comments Met with patient at bedside, who is extremely [place].   Patient agreeable to return to [facility] on discharge.  Was there skilled, but after using skilled day, is now LTC private pay. recieves HD at .  Verified PCP.   Permission to speak with both Neice (local) and son in [place].  At baseline WC bound.  Barriers to discharge: A fibr RVR. Baseline Afib. Interrogate ICD and add cardizem oral.                  Continued Care and Services -  Admitted Since 11/8/2023       Destination       Service Provider Request Status Selected Services Address Phone Fax Patient Preferred    [facility] [place] Accepted N/A [address] [place] [address] [phone] --                  Selected Continued Care - Prior Encounters Includes continued care and service providers with selected services from prior encounters from 8/10/2023 to 11/9/2023      Discharged on 10/18/2023 Admission date: 10/13/2023 - Discharge disposition: Intermediate Care       Destination       Service Provider Selected Services Address Phone Fax Patient Preferred    [facility] [place] [facility] [address] [place] [address] [phone] --                          Expected Discharge Date and Time       Expected Discharge Date Expected Discharge Time    Nov 13, 2023            Demographic Summary       Row Name 11/09/23 1552       General Information    Admission Type inpatient    Arrived From emergency department    Required Notices Provided Important Message from Medicare    Referral Source admission list    Reason for Consult discharge planning    Preferred Language English                   Functional Status       Row Name 11/09/23 1557       Functional Status    Usual Activity Tolerance poor    Current Activity Tolerance poor       Functional Status, IADL    Medications completely dependent    Meal Preparation completely dependent    Housekeeping completely dependent    Laundry completely dependent    Shopping completely dependent       Mental Status    General Appearance WDL WDL       Mental Status Summary    Recent Changes in Mental Status/Cognitive Functioning no changes                    Met with patient in room.    Maintained distance greater than six feet and spent less than 15 minutes in the room.  Alia Yousif RN     Office Phone [phone]  Office Cell [phone]          Case Management Readmission Assessment Note    Case Management Readmission Assessment (all recorded)       Readmission Interview       Row Name 11/09/23 1544             Readmission Indications    Is this hospitalization related to the prior hospital diagnosis? No      What was the reason you were admitted? Afib with uncontrolled rate, up to 198.   Borderline hypotensive        [facility] Name 11/09/23 1544             Recommendation for rehospitalization    Did you speak with your physician prior to coming to the hospital Yes      If yes, what physician did you speak with? Celestino Cadena Spoke with Dr Ellsworth cardiology      Who recommended you return to the hospital? Specialist      Did you seek care elsewhere prior to coming to the hospital? No        [facility] Name 11/09/23 1544             Follow up appointment    Do you have a PCP? Yes        [facility] Name 11/09/23 1544             Medications    Did you have newly prescribed medications at discharge? Yes      Did you understand the reasons for your medications at discharge and how to take them? Yes      Did you understand the side effects of your medications? Yes      Are you taking all of you prescribed medications? Yes  take all meds as given per LH        [facility] Name 11/09/23 1544             Discharge Instructions    Did you understand your discharge instructions? Yes      Did your family/caregiver hear your instructions? Yes      Were you told to eat a special diet? Yes      Did you adhere to the diet? Yes      Were you given a number of someone to call if you had questions or concerns? Yes        [facility] Name 11/09/23 1544             Index discharge location/services    Where did you go upon discharge? Skilled Nursing Facility        [facility] Name 11/09/23 1544             Discharge Readiness    On a scale of 1-5 (5 being well prepared), how ready were you for discharge 4

## 2023-11-09 NOTE — CONSULTS
[facility] [place]        Subjective:     Encounter Date:11/08/2023      Patient ID: Hazel Bucio is a 83 y.o. female.    Chief Complaint: Dizziness, pre syncope    Referring Physician: Dr. Esqueda    HPI: Seen and examined, agree with narrative is discussed with nurse practitioner after face-to-face counter scribed findings below  Hazel Bucio is a 83 y.o. female who presents with chest pain. Ms. Bucio is a patient of Dr. Ellsworth. Pmh includes afib (anticoagulated with Eliquis), SSS s/p [employer] PPM,  HTN, ESRD on HD MWF, bladder cancer, and renal cancer with urostomy. Last 2D echo 10/2023 showed an EF = 60-65%, pulmonary artery dilated; Last nuclear stress test 2023 showed no ischemia.     Ms. Bucio presents with dizziness and pre syncope yesterday after dialysis. She states she had no chest pain and had no chest pain. She reports she completed dialysis then became dizzy and felt pre syncopal at home. She is in Afib- rates variable. She is permanent afib- last interrogation 9/2023 she is Afib 100%. She had some intermittent RVR. She is on metoprolol tartrate 50mg BID at home.       Review of systems otherwise negative x14 point review of systems except as mentioned above  Historical data copied forward from previous encounters in EMR is unchanged    EKG atrial fibrillation rate controlled, permanent A-fib historically  Past Medical History:   Diagnosis Date    Bladder cancer     Cancer     breast, bladder    Deep vein thrombosis     Essential hypertension 1/17/2017    Fracture tibia/fibula, right, closed, initial encounter 8/27/2020    GERD (gastroesophageal reflux disease)     History of urostomy     Immobility 08/27/2020    r/t broken Tibia     Kidney carcinoma, right     removed     Long term current use of anticoagulant 1/9/2015    PAF (paroxysmal atrial fibrillation) 6/26/2019    Presence of cardiac pacemaker 11/03/2014    BS dual chamber PM placed 10/2014 with pocket revision 1/25/17.  HX tachy  "rob syndrome    Sick sinus syndrome 11/3/2014       Past Surgical History:   Procedure Laterality Date    BREAST LUMPECTOMY      CARDIAC CATHETERIZATION N/A 10/18/2023    Procedure: Left Heart Cath possible PCI, atherectomy, hemodynamic support;  Surgeon: Augustin Ellsworth MD;  Location: [facility] CATH INVASIVE LOCATION;  Service: Cardiology;  Laterality: N/A;    CARDIAC ELECTROPHYSIOLOGY PROCEDURE N/A 4/28/2023    Procedure: Pacemaker gen change, [place] AWARE $;  Surgeon: Jose Carlos Cheng MD;  Location: [facility] CATH INVASIVE LOCATION;  Service: Cardiovascular;  Laterality: N/A;    CHOLECYSTECTOMY N/A 10/12/2020    Procedure: CHOLECYSTECTOMY LAPAROSCOPIC;  Surgeon: Go Pereira DO;  Location: [facility] MAIN OR;  Service: General;  Laterality: N/A;    CYSTECTOMY W/ URETEROILEAL CONDUIT      HARDWARE REMOVAL Right 6/11/2021    Procedure: TIBIA HARDWARE REMOVAL;  Surgeon: Geoff Shah II, MD;  Location: [facility] MAIN OR;  Service: Orthopedics;  Laterality: Right;    HERNIA REPAIR      HYSTERECTOMY      INSERT / REPLACE / REMOVE PACEMAKER      NEPHRECTOMY Right     TIBIA IM NAILING/RODDING Right 8/28/2020    Procedure: TIBIA INTRAMEDULLARY NAIL/ABRAHAM INSERTION;  Surgeon: Geoff Shah II, MD;  Location: [facility] MAIN OR;  Service: Orthopedics;  Laterality: Right;       Family History   Problem Relation Age of Onset    COPD Father        Social History     Socioeconomic History    Marital status:    Tobacco Use    Smoking status: Never     Passive exposure: Never    Smokeless tobacco: Never   Vaping Use    Vaping Use: Never used   Substance and Sexual Activity    Alcohol use: Not Currently    Drug use: Never    Sexual activity: Defer         Allergies   Allergen Reactions    Amoxicillin Shortness Of Breath    Ciprofloxacin Hives    Oxycodone-Acetaminophen Unknown - High Severity     Pt's son stated when pt fell and broke her leg she was put on oxycodone; pt's son stated pt's \"vitals went " "all out of wack, she couldn't remember things.\"    Penicillins Rash    Sulfa Antibiotics Hives    Cephalexin Other (See Comments)    Clavulanic Acid Other (See Comments)    Codeine Other (See Comments)    Contrast Dye (Echo Or Unknown Ct/Mr) Other (See Comments)     8/18/22     Doxycycline Other (See Comments)    Fluconazole Other (See Comments)    Iodinated Contrast Media Other (See Comments)     8/18/22    Iodine Hives    Macrobid [Nitrofurantoin] Hives    Tobramycin Other (See Comments)    Trimethoprim Other (See Comments)       Current Medications:   Scheduled Meds:apixaban, 2.5 mg, Oral, Q12H  atorvastatin, 20 mg, Oral, Daily  metoprolol tartrate, 50 mg, Oral, BID      Continuous Infusions:dilTIAZem, 5-15 mg/hr        Review of Systems   Constitutional: Negative for chills, diaphoresis and malaise/fatigue.   Cardiovascular:  Positive for near-syncope. Negative for chest pain, dyspnea on exertion, irregular heartbeat, leg swelling, orthopnea, palpitations, paroxysmal nocturnal dyspnea and syncope.   Respiratory:  Negative for cough, shortness of breath, sleep disturbances due to breathing and sputum production.    Gastrointestinal:  Negative for change in bowel habit.   Genitourinary:  Negative for urgency.   Neurological:  Positive for dizziness. Negative for headaches.   Psychiatric/Behavioral:  Negative for altered mental status.             Objective:         /52 (BP Location: Left arm, Patient Position: Lying)   Pulse 103   Temp 97.8 °F (36.6 °C) (Oral)   Resp 15   Ht 165.1 cm (65\")   Wt 56.2 kg (124 lb)   SpO2 98%   BMI 20.63 kg/m²     Physical Exam:  General Appearance:    Alert, cooperative, in no acute distress                                Head: Atraumatic, normocephalic, PERRLA               Neck:   supple, trachea midline, no thyromegaly, no carotid bruit, no JVD   Lungs:     Clear to auscultation,respirations regular, even and               unlabored    Heart:    irregular rhythm and " "normal rate, normal S1 and S2, murmur   Abdomen:     Normal bowel sounds, no masses, no organomegaly, soft  nontender, nondistended, no guarding, no rebound  tenderness   Extremities:   Moves all extremities well, no edema, no cyanosis, no  redness   Pulses:   Pulses palpable and equal bilaterally   Skin:   No bleeding, bruising or rash   Neurologic:   Awake, alert, oriented x3     Agree with narrative discussed with nurse practitioner to face-to-face encounter, scribed exam above            ASCVD Risk Score::  The ASCVD Risk score (Chelly ANGEL, et al., 2019) failed to calculate for the following reasons:    The 2019 ASCVD risk score is only valid for ages 40 to 79    The patient has a prior MI or stroke diagnosis      Lab Review:     Results from last 7 days   Lab Units 11/09/23  0604 11/08/23  1525   SODIUM mmol/L 142 137   POTASSIUM mmol/L 4.7 4.6   CHLORIDE mmol/L 105 99   CO2 mmol/L 25.0 24.0   BUN mg/dL 67* 59*   CREATININE mg/dL 5.46* 5.22*   GLUCOSE mg/dL 99 145*   CALCIUM mg/dL 9.1 9.6   AST (SGOT) U/L  --  19   ALT (SGPT) U/L  --  12     Results from last 7 days   Lab Units 11/09/23  0604 11/08/23  1823 11/08/23  1525   HSTROP T ng/L 52* 57* 63*     Results from last 7 days   Lab Units 11/08/23  1525   WBC 10*3/mm3 11.40*   HEMOGLOBIN g/dL 10.5*   HEMATOCRIT % 32.6*   PLATELETS 10*3/mm3 264     Results from last 7 days   Lab Units 11/08/23  1525   INR  1.12*   APTT seconds 32.3*     Results from last 7 days   Lab Units 11/09/23  0604 11/08/23  1525   MAGNESIUM mg/dL 2.2 2.1           Invalid input(s): \"LDLCALC\"      Results from last 7 days   Lab Units 11/08/23  1525   TSH uIU/mL 1.000       Recent Radiology:  Imaging Results (Most Recent)       Procedure Component Value Units Date/Time    XR Chest 1 View [[id number]] Resulted: 11/09/23 0830     Updated: 11/09/23 0830    XR Chest 1 View [[id number]] Collected: 11/08/23 1532     Updated: 11/08/23 1535    Narrative:      XR CHEST 1 VW    Date of Exam: 11/8/2023 " 2:57 PM EST    Indication: fever    Comparison: AP portable chest 10/13/2023.    Findings:  There is no acute airspace disease. Heart size is mildly enlarged but stable. Pulmonary vascular distribution is normal. Left chest wall pacemaker device, right IJ approach central line, each appear similarly positioned. No pneumothorax is seen. No   pleural effusion is evident. Benign calcified lymph node is seen in the right paratracheal region. There are advanced degenerative changes of the right shoulder. No acute osseous abnormality is identified.      Impression:      Impression:  No acute chest finding.      Electronically Signed: Ivy Valenzuela MD    11/8/2023 3:33 PM EST    Workstation ID: [id number]              ECHOCARDIOGRAM:    Results for orders placed during the hospital encounter of 10/13/23    Adult Transthoracic Echo Complete W/ Cont if Necessary Per Protocol    Interpretation Summary    Left ventricular systolic function is normal. Left ventricular ejection fraction appears to be 56 - 60%.    Left ventricular wall thickness is consistent with mild concentric hypertrophy.    Left ventricular diastolic function was indeterminate.    The right ventricular cavity is mildly dilated.    The left atrial cavity is severely dilated.    Left atrial volume is severely increased.    The right atrial cavity is moderately  dilated.    Estimated right ventricular systolic pressure from tricuspid regurgitation is normal (<35 mmHg).    The main pulmonary artery is severely dilated.            Assessment:         There are no active hospital problems to display for this patient.    Pre syncope / dizziness  Permanent atrial fibrillation on [employer]  SSS s/p [employer] PPM  HTN  ESRD on HD MWF  bladder cancer, and renal cancer with urostomy  Last 2D echo 10/2023 showed an EF = 60-65%, pulmonary artery dilated; Last nuclear stress test 2023 showed no ischemia  Incidental elevated troponin in setting of ESRD on HD     Plan:    Patient presented with dizziness, near syncope after dialysis likely after volume removal, try to estimate filling pressures with 2D echo   She has permanent afib. Rates variable  Interrogation is pending  Goal rate simply less than 100  Beta-blockers on board metoprolol 50 twice daily  Statin therapy will continue  We will continue on Eliquis 2.5 twice daily for stroke risk reduction  Consider midodrine on dialysis days, likely should not hold beta-blocker as can have uncontrolled heart rates, has a backup pacemaker in place  Eliquis for a/c  Troponin 50s, with ESRD, no active Chest pain    Augustin Ellsworth MD, PhD               Dena Keith, APRN  11/09/23  08:39 EST

## 2023-11-10 LAB
ANION GAP SERPL CALCULATED.3IONS-SCNC: 13 MMOL/L (ref 5–15)
BACTERIA SPEC AEROBE CULT: ABNORMAL
BUN SERPL-MCNC: 77 MG/DL (ref 8–23)
BUN/CREAT SERPL: 12.5 (ref 7–25)
CALCIUM SPEC-SCNC: 9.2 MG/DL (ref 8.6–10.5)
CHLORIDE SERPL-SCNC: 103 MMOL/L (ref 98–107)
CO2 SERPL-SCNC: 24 MMOL/L (ref 22–29)
CREAT SERPL-MCNC: 6.14 MG/DL (ref 0.57–1)
DEPRECATED RDW RBC AUTO: 52.1 FL (ref 37–54)
EGFRCR SERPLBLD CKD-EPI 2021: 6.4 ML/MIN/1.73
ERYTHROCYTE [DISTWIDTH] IN BLOOD BY AUTOMATED COUNT: 14.8 % (ref 12.3–15.4)
GLUCOSE SERPL-MCNC: 100 MG/DL (ref 65–99)
HCT VFR BLD AUTO: 31.2 % (ref 34–46.6)
HGB BLD-MCNC: 10.2 G/DL (ref 12–15.9)
MCH RBC QN AUTO: 32.5 PG (ref 26.6–33)
MCHC RBC AUTO-ENTMCNC: 32.6 G/DL (ref 31.5–35.7)
MCV RBC AUTO: 100 FL (ref 79–97)
PLATELET # BLD AUTO: 265 10*3/MM3 (ref 140–450)
PMV BLD AUTO: 7.5 FL (ref 6–12)
POTASSIUM SERPL-SCNC: 5 MMOL/L (ref 3.5–5.2)
RBC # BLD AUTO: 3.12 10*6/MM3 (ref 3.77–5.28)
SODIUM SERPL-SCNC: 140 MMOL/L (ref 136–145)
WBC NRBC COR # BLD: 9.6 10*3/MM3 (ref 3.4–10.8)

## 2023-11-10 PROCEDURE — 85027 COMPLETE CBC AUTOMATED: CPT | Performed by: FAMILY MEDICINE

## 2023-11-10 PROCEDURE — 99232 SBSQ HOSP IP/OBS MODERATE 35: CPT | Performed by: INTERNAL MEDICINE

## 2023-11-10 PROCEDURE — 97162 PT EVAL MOD COMPLEX 30 MIN: CPT

## 2023-11-10 PROCEDURE — 80048 BASIC METABOLIC PNL TOTAL CA: CPT | Performed by: FAMILY MEDICINE

## 2023-11-10 PROCEDURE — 25010000002 HEPARIN (PORCINE) PER 1000 UNITS

## 2023-11-10 PROCEDURE — 25010000002 CEFTRIAXONE PER 250 MG: Performed by: FAMILY MEDICINE

## 2023-11-10 PROCEDURE — 5A1D70Z PERFORMANCE OF URINARY FILTRATION, INTERMITTENT, LESS THAN 6 HOURS PER DAY: ICD-10-PCS | Performed by: INTERNAL MEDICINE

## 2023-11-10 RX ORDER — HEPARIN SODIUM 1000 [USP'U]/ML
INJECTION, SOLUTION INTRAVENOUS; SUBCUTANEOUS
Status: COMPLETED
Start: 2023-11-10 | End: 2023-11-10

## 2023-11-10 RX ORDER — HEPARIN SODIUM 1000 [USP'U]/ML
4000 INJECTION, SOLUTION INTRAVENOUS; SUBCUTANEOUS ONCE
Status: COMPLETED | OUTPATIENT
Start: 2023-11-10 | End: 2023-11-10

## 2023-11-10 RX ADMIN — METOPROLOL TARTRATE 50 MG: 50 TABLET ORAL at 11:57

## 2023-11-10 RX ADMIN — CEFTRIAXONE 1000 MG: 1 INJECTION, POWDER, FOR SOLUTION INTRAMUSCULAR; INTRAVENOUS at 16:56

## 2023-11-10 RX ADMIN — APIXABAN 2.5 MG: 2.5 TABLET, FILM COATED ORAL at 20:44

## 2023-11-10 RX ADMIN — ATORVASTATIN CALCIUM 20 MG: 20 TABLET, FILM COATED ORAL at 11:57

## 2023-11-10 RX ADMIN — HEPARIN SODIUM 4000 UNITS: 1000 INJECTION, SOLUTION INTRAVENOUS; SUBCUTANEOUS at 11:24

## 2023-11-10 RX ADMIN — DILTIAZEM HYDROCHLORIDE 30 MG: 30 TABLET, FILM COATED ORAL at 11:57

## 2023-11-10 RX ADMIN — APIXABAN 2.5 MG: 2.5 TABLET, FILM COATED ORAL at 11:56

## 2023-11-10 RX ADMIN — DILTIAZEM HYDROCHLORIDE 30 MG: 30 TABLET, FILM COATED ORAL at 00:23

## 2023-11-10 RX ADMIN — DILTIAZEM HYDROCHLORIDE 30 MG: 30 TABLET, FILM COATED ORAL at 05:57

## 2023-11-10 RX ADMIN — METOPROLOL TARTRATE 50 MG: 50 TABLET ORAL at 20:44

## 2023-11-10 RX ADMIN — DILTIAZEM HYDROCHLORIDE 30 MG: 30 TABLET, FILM COATED ORAL at 17:00

## 2023-11-10 NOTE — SIGNIFICANT NOTE
11/10/23 0948   OTHER   Discipline physical therapist   Rehab Time/Intention   Session Not Performed patient unavailable for evaluation;other (see comments)  (Pt LISETH at dialysis. Will recheck on pt this PM or possibly 11/11)   Therapy Assessment/Plan (PT)   Criteria for Skilled Interventions Met (PT) yes   Recommendation   PT - Next Appointment 11/11/23

## 2023-11-10 NOTE — PLAN OF CARE
Problem: Adult Inpatient Plan of Care  Goal: Absence of Hospital-Acquired Illness or Injury  Intervention: Identify and Manage Fall Risk  Description: Perform standard risk assessment on admission using a validated tool or comprehensive approach appropriate to the patient; reassess fall risk frequently, with change in status or transfer to another level of care.  Communicate fall injury risk to interprofessional healthcare team.  Determine need for increased observation, equipment and environmental modification, such as low bed, signage and supportive, nonskid footwear.  Adjust safety measures to individual developmental age, stage and identified risk factors.  Reinforce the importance of safety and physical activity with patient and family.  Perform regular intentional rounding to assess need for position change, pain assessment and personal needs, including assistance with toileting.  Recent Flowsheet Documentation  Taken 11/10/2023 0400 by Emile Heath, RN  Safety Promotion/Fall Prevention:   assistive device/personal items within reach   activity supervised   clutter free environment maintained   fall prevention program maintained   gait belt   safety round/check completed   room organization consistent   nonskid shoes/slippers when out of bed  Taken 11/10/2023 0200 by Emile Heath, RN  Safety Promotion/Fall Prevention:   activity supervised   assistive device/personal items within reach   clutter free environment maintained   fall prevention program maintained   gait belt   safety round/check completed   room organization consistent   nonskid shoes/slippers when out of bed  Taken 11/10/2023 0000 by Emile Heath, RN  Safety Promotion/Fall Prevention:   assistive device/personal items within reach   activity supervised   clutter free environment maintained   fall prevention program maintained   gait belt   safety round/check completed   room organization consistent   nonskid shoes/slippers when out of  bed  Taken 11/9/2023 2200 by Emile Heath RN  Safety Promotion/Fall Prevention:   activity supervised   assistive device/personal items within reach   clutter free environment maintained   fall prevention program maintained   gait belt   safety round/check completed   room organization consistent   nonskid shoes/slippers when out of bed  Taken 11/9/2023 2000 by Emile Heath RN  Safety Promotion/Fall Prevention:   activity supervised   assistive device/personal items within reach   clutter free environment maintained   fall prevention program maintained   gait belt   safety round/check completed   room organization consistent   nonskid shoes/slippers when out of bed  Intervention: Prevent Infection  Description: Maintain skin and mucous membrane integrity; promote hand, oral and pulmonary hygiene.  Optimize fluid balance, nutrition, sleep and glycemic control to maximize infection resistance.  Identify potential sources of infection early to prevent or mitigate progression of infection (e.g., wound, lines, devices).  Evaluate ongoing need for invasive devices; remove promptly when no longer indicated.  Recent Flowsheet Documentation  Taken 11/9/2023 2000 by Emile Heath RN  Infection Prevention:   single patient room provided   rest/sleep promoted   personal protective equipment utilized   hand hygiene promoted   environmental surveillance performed  Goal: Optimal Comfort and Wellbeing  Intervention: Provide Person-Centered Care  Description: Use a family-focused approach to care.  Develop trust and rapport by proactively providing information, encouraging questions, addressing concerns and offering reassurance.  Acknowledge emotional response to hospitalization.  Recognize and utilize personal coping strategies.  Honor spiritual and cultural preferences.  Recent Flowsheet Documentation  Taken 11/9/2023 2000 by Emile Heath, RN  Trust Relationship/Rapport:   care explained   choices provided   emotional  support provided   empathic listening provided   questions answered   questions encouraged   reassurance provided   thoughts/feelings acknowledged     Problem: Fall Injury Risk  Goal: Absence of Fall and Fall-Related Injury  Intervention: Identify and Manage Contributors  Description: Develop a fall prevention plan with the patient and caregiver/family.  Provide reorientation, appropriate sensory stimulation and routines with changes in mental status to decrease risk of fall.  Promote use of personal vision and auditory aids.  Assess assistance level required for safe and effective self-care; provide support as needed, such as toileting, mobilization. For age 65 and older, implement timed toileting with assistance.  Encourage physical activity, such as performance of mobility and self-care at highest level of patient ability, multicomponent exercise program and provision of appropriate assistive devices.  If fall occurs, assess the severity of injury; implement fall injury protocol. Determine the cause and revise fall injury prevention plan.  Regularly review medication contribution to fall risk; adjust medication administration times to minimize risk of falling.  Consider risk related to polypharmacy and age.  Balance adequate pain management with potential for oversedation.  Recent Flowsheet Documentation  Taken 11/9/2023 2000 by Emile Heath, RN  Medication Review/Management:   medications reviewed   high-risk medications identified  Intervention: Promote Injury-Free Environment  Description: Provide a safe, barrier-free environment that encourages independent activity.  Keep care area uncluttered and well-lighted.  Determine need for increased observation or monitoring.  Avoid use of devices that minimize mobility, such as restraints or indwelling urinary catheter.  Recent Flowsheet Documentation  Taken 11/10/2023 0400 by Emile Heath, RN  Safety Promotion/Fall Prevention:   assistive device/personal items  within reach   activity supervised   clutter free environment maintained   fall prevention program maintained   gait belt   safety round/check completed   room organization consistent   nonskid shoes/slippers when out of bed  Taken 11/10/2023 0200 by Emile Heath RN  Safety Promotion/Fall Prevention:   activity supervised   assistive device/personal items within reach   clutter free environment maintained   fall prevention program maintained   gait belt   safety round/check completed   room organization consistent   nonskid shoes/slippers when out of bed  Taken 11/10/2023 0000 by Emile Heath RN  Safety Promotion/Fall Prevention:   assistive device/personal items within reach   activity supervised   clutter free environment maintained   fall prevention program maintained   gait belt   safety round/check completed   room organization consistent   nonskid shoes/slippers when out of bed  Taken 11/9/2023 2200 by Emile Heath RN  Safety Promotion/Fall Prevention:   activity supervised   assistive device/personal items within reach   clutter free environment maintained   fall prevention program maintained   gait belt   safety round/check completed   room organization consistent   nonskid shoes/slippers when out of bed  Taken 11/9/2023 2000 by Emile Heath RN  Safety Promotion/Fall Prevention:   activity supervised   assistive device/personal items within reach   clutter free environment maintained   fall prevention program maintained   gait belt   safety round/check completed   room organization consistent   nonskid shoes/slippers when out of bed   Goal Outcome Evaluation:       No complaints from patient.  She denies any pain or respiratory distress.  Blood pressure WNL.  A-fib with controlled rate.  Dialysis today.

## 2023-11-10 NOTE — PLAN OF CARE
Problem: Adult Inpatient Plan of Care  Goal: Absence of Hospital-Acquired Illness or Injury  Intervention: Identify and Manage Fall Risk  Description: Perform standard risk assessment on admission using a validated tool or comprehensive approach appropriate to the patient; reassess fall risk frequently, with change in status or transfer to another level of care.  Communicate fall injury risk to interprofessional healthcare team.  Determine need for increased observation, equipment and environmental modification, such as low bed, signage and supportive, nonskid footwear.  Adjust safety measures to individual developmental age, stage and identified risk factors.  Reinforce the importance of safety and physical activity with patient and family.  Perform regular intentional rounding to assess need for position change, pain assessment and personal needs, including assistance with toileting.  Recent Flowsheet Documentation  Taken 11/10/2023 0400 by Emile Heath, RN  Safety Promotion/Fall Prevention:   assistive device/personal items within reach   activity supervised   clutter free environment maintained   fall prevention program maintained   gait belt   safety round/check completed   room organization consistent   nonskid shoes/slippers when out of bed  Taken 11/10/2023 0200 by Emile Heath, RN  Safety Promotion/Fall Prevention:   activity supervised   assistive device/personal items within reach   clutter free environment maintained   fall prevention program maintained   gait belt   safety round/check completed   room organization consistent   nonskid shoes/slippers when out of bed  Taken 11/10/2023 0000 by Emile Heath, RN  Safety Promotion/Fall Prevention:   assistive device/personal items within reach   activity supervised   clutter free environment maintained   fall prevention program maintained   gait belt   safety round/check completed   room organization consistent   nonskid shoes/slippers when out of  bed  Taken 11/9/2023 2200 by Emile Heath RN  Safety Promotion/Fall Prevention:   activity supervised   assistive device/personal items within reach   clutter free environment maintained   fall prevention program maintained   gait belt   safety round/check completed   room organization consistent   nonskid shoes/slippers when out of bed  Taken 11/9/2023 2000 by Emile Heath RN  Safety Promotion/Fall Prevention:   activity supervised   assistive device/personal items within reach   clutter free environment maintained   fall prevention program maintained   gait belt   safety round/check completed   room organization consistent   nonskid shoes/slippers when out of bed  Intervention: Prevent Infection  Description: Maintain skin and mucous membrane integrity; promote hand, oral and pulmonary hygiene.  Optimize fluid balance, nutrition, sleep and glycemic control to maximize infection resistance.  Identify potential sources of infection early to prevent or mitigate progression of infection (e.g., wound, lines, devices).  Evaluate ongoing need for invasive devices; remove promptly when no longer indicated.  Recent Flowsheet Documentation  Taken 11/9/2023 2000 by Emile Heath RN  Infection Prevention:   single patient room provided   rest/sleep promoted   personal protective equipment utilized   hand hygiene promoted   environmental surveillance performed  Goal: Optimal Comfort and Wellbeing  Intervention: Provide Person-Centered Care  Description: Use a family-focused approach to care.  Develop trust and rapport by proactively providing information, encouraging questions, addressing concerns and offering reassurance.  Acknowledge emotional response to hospitalization.  Recognize and utilize personal coping strategies.  Honor spiritual and cultural preferences.  Recent Flowsheet Documentation  Taken 11/9/2023 2000 by Emile Heath, RN  Trust Relationship/Rapport:   care explained   choices provided   emotional  support provided   empathic listening provided   questions answered   questions encouraged   reassurance provided   thoughts/feelings acknowledged     Problem: Fall Injury Risk  Goal: Absence of Fall and Fall-Related Injury  Intervention: Identify and Manage Contributors  Description: Develop a fall prevention plan with the patient and caregiver/family.  Provide reorientation, appropriate sensory stimulation and routines with changes in mental status to decrease risk of fall.  Promote use of personal vision and auditory aids.  Assess assistance level required for safe and effective self-care; provide support as needed, such as toileting, mobilization. For age 65 and older, implement timed toileting with assistance.  Encourage physical activity, such as performance of mobility and self-care at highest level of patient ability, multicomponent exercise program and provision of appropriate assistive devices.  If fall occurs, assess the severity of injury; implement fall injury protocol. Determine the cause and revise fall injury prevention plan.  Regularly review medication contribution to fall risk; adjust medication administration times to minimize risk of falling.  Consider risk related to polypharmacy and age.  Balance adequate pain management with potential for oversedation.  Recent Flowsheet Documentation  Taken 11/9/2023 2000 by Emile Heath, RN  Medication Review/Management:   medications reviewed   high-risk medications identified  Intervention: Promote Injury-Free Environment  Description: Provide a safe, barrier-free environment that encourages independent activity.  Keep care area uncluttered and well-lighted.  Determine need for increased observation or monitoring.  Avoid use of devices that minimize mobility, such as restraints or indwelling urinary catheter.  Recent Flowsheet Documentation  Taken 11/10/2023 0400 by Emile Heath, RN  Safety Promotion/Fall Prevention:   assistive device/personal items  within reach   activity supervised   clutter free environment maintained   fall prevention program maintained   gait belt   safety round/check completed   room organization consistent   nonskid shoes/slippers when out of bed  Taken 11/10/2023 0200 by Emile Heath RN  Safety Promotion/Fall Prevention:   activity supervised   assistive device/personal items within reach   clutter free environment maintained   fall prevention program maintained   gait belt   safety round/check completed   room organization consistent   nonskid shoes/slippers when out of bed  Taken 11/10/2023 0000 by Emile Heath RN  Safety Promotion/Fall Prevention:   assistive device/personal items within reach   activity supervised   clutter free environment maintained   fall prevention program maintained   gait belt   safety round/check completed   room organization consistent   nonskid shoes/slippers when out of bed  Taken 11/9/2023 2200 by Emile Heath RN  Safety Promotion/Fall Prevention:   activity supervised   assistive device/personal items within reach   clutter free environment maintained   fall prevention program maintained   gait belt   safety round/check completed   room organization consistent   nonskid shoes/slippers when out of bed  Taken 11/9/2023 2000 by Emile Heath RN  Safety Promotion/Fall Prevention:   activity supervised   assistive device/personal items within reach   clutter free environment maintained   fall prevention program maintained   gait belt   safety round/check completed   room organization consistent   nonskid shoes/slippers when out of bed   Goal Outcome Evaluation:    Paracentesis yesterday.  Patient still having slight abdominal discomfort.  A couple small loose bowel movements.  IV flagyl and rocephin given.  BP a little low with systolic in the 90s.

## 2023-11-10 NOTE — PROGRESS NOTES
"Cardiology [place]        LOS:  LOS: 2 days   Patient Name: Hazel Bucio  Age/Sex: 83 y.o. female  : 1940  MRN: [id number]    Day of Service: 11/10/23   Length of Stay: 2  Encounter Provider: CHELLE Lerner  Place of Service: [facility] CARDIOLOGY  Patient Care Team:  Lizzy Francis MD as PCP - General (Family Medicine)  Jose Carlos Cheng MD as Consulting Physician (Cardiology)    Subjective:     Chief Complaint: f/u Afib, dizziness    Subjective: afib, rates controlled, deconditioned, has not been out of bed, discussed with nursing at bedside to get the patient on a walk and make sure that she is stable and getting up and she is not orthostatic    Current Medications:   Scheduled Meds:apixaban, 2.5 mg, Oral, Q12H  atorvastatin, 20 mg, Oral, Daily  dilTIAZem, 30 mg, Oral, Q6H  metoprolol tartrate, 50 mg, Oral, BID      Continuous Infusions:Pharmacy Consult - Pharmacy to dose,         Allergies:  Allergies   Allergen Reactions    Amoxicillin Shortness Of Breath    Ciprofloxacin Hives    Oxycodone-Acetaminophen Unknown - High Severity     Pt's son stated when pt fell and broke her leg she was put on oxycodone; pt's son stated pt's \"vitals went all out of wack, she couldn't remember things.\"    Penicillins Rash    Sulfa Antibiotics Hives    Cephalexin Other (See Comments)    Clavulanic Acid Other (See Comments)    Codeine Other (See Comments)    Contrast Dye (Echo Or Unknown Ct/Mr) Other (See Comments)     22     Doxycycline Other (See Comments)    Fluconazole Other (See Comments)    Iodinated Contrast Media Other (See Comments)     22    Iodine Hives    Macrobid [Nitrofurantoin] Hives    Tobramycin Other (See Comments)    Trimethoprim Other (See Comments)       Review of Systems   Constitutional: Positive for malaise/fatigue. Negative for chills and diaphoresis.   Cardiovascular:  Negative for chest pain, dyspnea on exertion, irregular heartbeat, leg swelling, " near-syncope, orthopnea, palpitations, paroxysmal nocturnal dyspnea and syncope.   Respiratory:  Negative for cough, shortness of breath, sleep disturbances due to breathing and sputum production.    Gastrointestinal:  Negative for change in bowel habit.   Genitourinary:  Negative for urgency.   Neurological:  Negative for dizziness and headaches.   Psychiatric/Behavioral:  Negative for altered mental status.          Objective:     Temp:  [97.3 °F (36.3 °C)-98.7 °F (37.1 °C)] 98.7 °F (37.1 °C)  Heart Rate:  [] 98  Resp:  [14-28] 28  BP: (107-131)/(57-83) 116/83     Intake/Output Summary (Last 24 hours) at 11/10/2023 1610  Last data filed at 11/10/2023 1232  Gross per 24 hour   Intake 840 ml   Output 750 ml   Net 90 ml     Body mass index is 21.57 kg/m².      11/08/23  1328 11/10/23  0352   Weight: 56.2 kg (124 lb) 58.8 kg (129 lb 10.1 oz)         General Appearance:    Alert, cooperative, in no acute distress                                Head: Atraumatic, normocephalic, PERRLA               Neck:   supple, no JVD   Lungs:     Clear to auscultation, respirations regular, even and               unlabored    Heart:    irregular rhythm and normal rate, normal S1 and S2   Abdomen:     Normal bowel sounds, no masses, no organomegaly, soft  nontender, nondistended, no guarding, no rebound  tenderness   Extremities:   Moves all extremities well, no edema, no cyanosis, no  redness   Pulses:   Pulses palpable and equal bilaterally   Skin:   No bleeding, bruising or rash   Neurologic:   Awake, alert, oriented x3     Unchanged from prior    Lab Review:   Results from last 7 days   Lab Units 11/10/23  0153 11/09/23  0604 11/08/23  1525   SODIUM mmol/L 140 142 137   POTASSIUM mmol/L 5.0 4.7 4.6   CHLORIDE mmol/L 103 105 99   CO2 mmol/L 24.0 25.0 24.0   BUN mg/dL 77* 67* 59*   CREATININE mg/dL 6.14* 5.46* 5.22*   GLUCOSE mg/dL 100* 99 145*   CALCIUM mg/dL 9.2 9.1 9.6   AST (SGOT) U/L  --   --  19   ALT (SGPT) U/L  --   --  " 12     Results from last 7 days   Lab Units 11/09/23  0604 11/08/23  1823 11/08/23  1525   HSTROP T ng/L 52* 57* 63*     Results from last 7 days   Lab Units 11/10/23  0153 11/08/23  1525   WBC 10*3/mm3 9.60 11.40*   HEMOGLOBIN g/dL 10.2* 10.5*   HEMATOCRIT % 31.2* 32.6*   PLATELETS 10*3/mm3 265 264     Results from last 7 days   Lab Units 11/08/23  1525   INR  1.12*   APTT seconds 32.3*     Results from last 7 days   Lab Units 11/09/23  0604 11/08/23  1525   MAGNESIUM mg/dL 2.2 2.1           Invalid input(s): \"LDLCALC\"      Results from last 7 days   Lab Units 11/08/23  1525   TSH uIU/mL 1.000       Recent Radiology:  Imaging Results (Most Recent)       Procedure Component Value Units Date/Time    XR Chest 1 View [[id number]] Collected: 11/09/23 0842     Updated: 11/09/23 0845    Narrative:      XR CHEST 1 VW    Date of Exam: 11/9/2023 8:30 AM EST    Indication: soa    Comparison: 11/8/2023    Findings:  Right internal jugular central venous catheter tip overlies the proximal right atrium. Heart size top normal, stable. Left-sided AICD noted. Lungs are without consolidation. Negative for pneumothorax or pleural effusion. Osseous structures grossly   intact. Dense aortic arch vascular calcifications.      Impression:      Impression:  1. Right IJ central venous catheter tip stable overlying the proximal right atrium.  2. No acute process.        Electronically Signed: Jacoby Elise MD    11/9/2023 8:43 AM EST    Workstation ID: [id number]    XR Chest 1 View [[id number]] Collected: 11/08/23 1532     Updated: 11/08/23 1535    Narrative:      XR CHEST 1 VW    Date of Exam: 11/8/2023 2:57 PM EST    Indication: fever    Comparison: AP portable chest 10/13/2023.    Findings:  There is no acute airspace disease. Heart size is mildly enlarged but stable. Pulmonary vascular distribution is normal. Left chest wall pacemaker device, right IJ approach central line, each appear similarly positioned. No pneumothorax is seen. No "   pleural effusion is evident. Benign calcified lymph node is seen in the right paratracheal region. There are advanced degenerative changes of the right shoulder. No acute osseous abnormality is identified.      Impression:      Impression:  No acute chest finding.      Electronically Signed: Ivy Valenzuela MD    11/8/2023 3:33 PM EST    Workstation ID: [id number]            ECHOCARDIOGRAM:    Results for orders placed during the hospital encounter of 10/13/23    Adult Transthoracic Echo Complete W/ Cont if Necessary Per Protocol    Interpretation Summary    Left ventricular systolic function is normal. Left ventricular ejection fraction appears to be 56 - 60%.    Left ventricular wall thickness is consistent with mild concentric hypertrophy.    Left ventricular diastolic function was indeterminate.    The right ventricular cavity is mildly dilated.    The left atrial cavity is severely dilated.    Left atrial volume is severely increased.    The right atrial cavity is moderately  dilated.    Estimated right ventricular systolic pressure from tricuspid regurgitation is normal (<35 mmHg).    The main pulmonary artery is severely dilated.        I reviewed the patient's new clinical results.    EKG:      Assessment:       Atrial fibrillation with rapid ventricular response    Atrial fibrillation with RVR    Pre syncope / dizziness  Permanent atrial fibrillation on [employer] pteis  SSS s/p [employer] PPM  HTN  ESRD on HD MWF  bladder cancer, and renal cancer with urostomy  Last 2D echo 10/2023 showed an EF = 60-65%, pulmonary artery dilated; Last nuclear stress test 2023 showed no ischemia  Incidental elevated troponin in setting of ESRD on HD    Plan:   Patient presented with dizziness, near syncope after dialysis likely after volume removal, try to estimate filling pressures with 2D echo   She has permanent afib. Rates variable  Prior device remote interrogations reveal permanent afib  Goal rate simply less than  100  Beta-blockers on board metoprolol 50 twice daily  Statin therapy will continue  We will continue on Eliquis 2.5 twice daily for stroke risk reduction  Consider midodrine on dialysis days, likely should not hold beta-blocker as can have uncontrolled heart rates, has a backup pacemaker in place  Eliquis for a/c  Troponin 50s, with ESRD, no active Chest pain      Needs physical therapy, lives alone, may benefit from rehab, up out of bed today with assistance, get orthostatics  Sure she can function independently safely prior to discharge    See back as outpatient 30 days after discharge    CHELLE Lerner  11/10/23  16:10 EST

## 2023-11-10 NOTE — PROGRESS NOTES
LOS: 2 days   Patient Care Team:  Lizzy Francis MD as PCP - General (Family Medicine)  Jose Carlos Cheng MD as Consulting Physician (Cardiology)    Subjective:  Events noted    Objective:   Afebrile      Review of Systems:   Review of Systems   Unable to perform ROS: Other           Vital Signs  Temp:  [98 °F (36.7 °C)-98.4 °F (36.9 °C)] 98.1 °F (36.7 °C)  Heart Rate:  [92-98] 92  Resp:  [14-27] 14  BP: (107-117)/(57-67) 107/62    Physical Exam:  Physical Exam  Vitals and nursing note reviewed.          Radiology:  XR Chest 1 View    Result Date: 11/9/2023  Impression: 1. Right IJ central venous catheter tip stable overlying the proximal right atrium. 2. No acute process. Electronically Signed: Jacoby Elise MD  11/9/2023 8:43 AM EST  Workstation ID: [id number]    XR Chest 1 View    Result Date: 11/8/2023  Impression: No acute chest finding. Electronically Signed: Ivy Valenzuela MD  11/8/2023 3:33 PM EST  Workstation ID: [id number]        Results Review:     I reviewed the patient's new clinical results.  I reviewed the patient's new imaging results and agree with the interpretation.    Medication Review:   Scheduled Meds:apixaban, 2.5 mg, Oral, Q12H  atorvastatin, 20 mg, Oral, Daily  dilTIAZem, 30 mg, Oral, Q6H  metoprolol tartrate, 50 mg, Oral, BID      Continuous Infusions:   PRN Meds:.  acetaminophen    ipratropium-albuterol    midodrine    [COMPLETED] Insert Peripheral IV **AND** sodium chloride    Labs:    CBC    Results from last 7 days   Lab Units 11/10/23  0153 11/08/23  1525   WBC 10*3/mm3 9.60 11.40*   HEMOGLOBIN g/dL 10.2* 10.5*   PLATELETS 10*3/mm3 265 264     BMP   Results from last 7 days   Lab Units 11/10/23  0153 11/09/23  0604 11/08/23  1525   SODIUM mmol/L 140 142 137   POTASSIUM mmol/L 5.0 4.7 4.6   CHLORIDE mmol/L 103 105 99   CO2 mmol/L 24.0 25.0 24.0   BUN mg/dL 77* 67* 59*   CREATININE mg/dL 6.14* 5.46* 5.22*   GLUCOSE mg/dL 100* 99 145*   MAGNESIUM mg/dL  --  2.2 2.1   PHOSPHORUS mg/dL  " --  5.2*  --      Cr Clearance Estimated Creatinine Clearance: 6.6 mL/min (A) (by C-G formula based on SCr of 6.14 mg/dL (H)).  Coag   Results from last 7 days   Lab Units 11/08/23  1525   INR  1.12*   APTT seconds 32.3*     HbA1C   Lab Results   Component Value Date    HGBA1C 5.4 10/11/2020    HGBA1C 6.2 (H) 09/08/2020    HGBA1C 6.1 (H) 08/27/2020     Blood Glucose No results found for: \"POCGLU\"  Infection   Results from last 7 days   Lab Units 11/08/23  1823   URINECX  >100,000 CFU/mL Proteus mirabilis*     CMP   Results from last 7 days   Lab Units 11/10/23  0153 11/09/23  0604 11/08/23  1525   SODIUM mmol/L 140 142 137   POTASSIUM mmol/L 5.0 4.7 4.6   CHLORIDE mmol/L 103 105 99   CO2 mmol/L 24.0 25.0 24.0   BUN mg/dL 77* 67* 59*   CREATININE mg/dL 6.14* 5.46* 5.22*   GLUCOSE mg/dL 100* 99 145*   ALBUMIN g/dL  --   --  3.5   BILIRUBIN mg/dL  --   --  0.3   ALK PHOS U/L  --   --  142*   AST (SGOT) U/L  --   --  19   ALT (SGPT) U/L  --   --  12     UA    Results from last 7 days   Lab Units 11/08/23  1823   NITRITE UA  Negative   WBC UA /HPF 21-50*   BACTERIA UA /HPF 4+*   SQUAM EPITHEL UA /HPF 0-2   URINECX  >100,000 CFU/mL Proteus mirabilis*     Radiology(recent) XR Chest 1 View    Result Date: 11/9/2023  Impression: 1. Right IJ central venous catheter tip stable overlying the proximal right atrium. 2. No acute process. Electronically Signed: Jacoby Elise MD  11/9/2023 8:43 AM EST  Workstation ID: [id number]    XR Chest 1 View    Result Date: 11/8/2023  Impression: No acute chest finding. Electronically Signed: Ivy Valenzuela MD  11/8/2023 3:33 PM EST  Workstation ID: [id number]    Assessment:    Near syncope  Atrial fibrillation with rapid ventricular response  History of bladder cancer  Urostomy in place  History of deep venous thrombosis  Secondary thrombophilia  Hypercoagulable state secondary to atrial fibrillation  Autonomic dysfunction syndrome  Oral anticoagulation therapy  ESRD  Hypertension with " ESRD  Anemia of ESRD  Dependency upon hemodialysis  History of sick sinus syndrome  History of pacemaker placement  Gastroesophageal reflux disease without esophagitis  History of renal cell carcinoma  History of right nephrectomy  Urinary tract infection present upon admission    Plan:  Continue antimicrobial therapy//rate control//hemodialysis        Hilario Esqueda MD  11/10/23  09:28 EST

## 2023-11-10 NOTE — CASE MANAGEMENT/SOCIAL WORK
Continued Stay Note  NYDIA Briscoe     Patient Name: Hazel Bucio  MRN: [id number]  Today's Date: 11/10/2023    Admit Date: 11/8/2023    Plan: Return to [facility]. Bed available over weekend.  No Precert or PASRR needed. Current dialysis @ [facility].  Van vs EMS.   Discharge Plan       Row Name 11/10/23 1618       Plan    Plan Return to [facility]. Bed available over weekend.  No Precert or PASRR needed. Current dialysis @ [facility]. WC Van vs EMS.    Patient/Family in Agreement with Plan yes    Plan Comments Barriers to discharge: watching HR and BP.             Expected Discharge Date and Time       Expected Discharge Date Expected Discharge Time    Nov 13, 2023         Phone communication or documentation only - no physical contact with patient or family.   Alia Yousif RN     Office Phone [phone]  Office Cell [phone]

## 2023-11-10 NOTE — PROGRESS NOTES
"                                                                                                                                      Nephrology  Progress Note                                        [facility]    Patient Identification    Name: Hazel Bucio  Age: 83 y.o.  Sex: female  :  1940  MRN: [id number]      DATE OF SERVICE:  11/10/2023        Subective    Chest pain  Dialysis today     Objective   Scheduled Meds:apixaban, 2.5 mg, Oral, Q12H  atorvastatin, 20 mg, Oral, Daily  dilTIAZem, 30 mg, Oral, Q6H  metoprolol tartrate, 50 mg, Oral, BID          Continuous Infusions:     PRN Meds:  acetaminophen    ipratropium-albuterol    midodrine    [COMPLETED] Insert Peripheral IV **AND** sodium chloride     Exam:  /57 (BP Location: Left arm, Patient Position: Lying)   Pulse 92   Temp 98.3 °F (36.8 °C) (Oral)   Resp 22   Ht 165.1 cm (65\")   Wt 58.8 kg (129 lb 10.1 oz)   SpO2 95%   BMI 21.57 kg/m²     Intake/Output last 3 shifts:  I/O last 3 completed shifts:  In: 560 [P.O.:460; IV Piggyback:100]  Out: 225 [Urine:225]    Intake/Output this shift:  I/O this shift:  In: 240 [P.O.:240]  Out: 150 [Urine:150]    Physical exam:  General Appearance:  Alert  Head:  Normocephalic, without obvious abnormality, atraumatic  Eyes:  PERRL, conjunctiva/corneas clear     Neck:  Supple,  no adenopathy;      Lungs:  Decreased BS occasion ronchi  Heart:  Regular rate and rhythm, S1 and S2 normal  Abdomen:  Soft, non-tender, bowel sounds active   Extremities: trace edema  Pulses: 2+ and symmetric all extremities  Skin:  No rashes or lesions       Data Review:  All labs (24hrs):   Recent Results (from the past 24 hour(s))   Basic Metabolic Panel    Collection Time: 11/10/23  1:53 AM    Specimen: Blood   Result Value Ref Range    Glucose 100 (H) 65 - 99 mg/dL    BUN 77 (H) 8 - 23 mg/dL    Creatinine 6.14 (H) 0.57 - 1.00 mg/dL    Sodium 140 136 - 145 mmol/L    Potassium 5.0 3.5 - 5.2 mmol/L    Chloride " 103 98 - 107 mmol/L    CO2 24.0 22.0 - 29.0 mmol/L    Calcium 9.2 8.6 - 10.5 mg/dL    BUN/Creatinine Ratio 12.5 7.0 - 25.0    Anion Gap 13.0 5.0 - 15.0 mmol/L    eGFR 6.4 (L) >60.0 mL/min/1.73   CBC (No Diff)    Collection Time: 11/10/23  1:53 AM    Specimen: Blood   Result Value Ref Range    WBC 9.60 3.40 - 10.80 10*3/mm3    RBC 3.12 (L) 3.77 - 5.28 10*6/mm3    Hemoglobin 10.2 (L) 12.0 - 15.9 g/dL    Hematocrit 31.2 (L) 34.0 - 46.6 %    .0 (H) 79.0 - 97.0 fL    MCH 32.5 26.6 - 33.0 pg    MCHC 32.6 31.5 - 35.7 g/dL    RDW 14.8 12.3 - 15.4 %    RDW-SD 52.1 37.0 - 54.0 fl    MPV 7.5 6.0 - 12.0 fL    Platelets 265 140 - 450 10*3/mm3          Imaging:  XR Chest 1 View    Result Date: 11/9/2023  Impression: 1. Right IJ central venous catheter tip stable overlying the proximal right atrium. 2. No acute process. Electronically Signed: Jacoby Elise MD  11/9/2023 8:43 AM EST  Workstation ID: [id number]    XR Chest 1 View    Result Date: 11/8/2023  Impression: No acute chest finding. Electronically Signed: Ivy Valenzuela MD  11/8/2023 3:33 PM EST  Workstation ID: [id number]     Assessment/Plan:     Atrial fibrillation with rapid ventricular response    Atrial fibrillation with RVR       ESRD hemodialysis Monday Wednesday Friday  Chest pain/presyncope  A-fib  Sick sinus syndrome  Hypertension  History of bladder cancer  History of renal cell carcinoma        Recommendations:  Hemodialysis today seen on dialysis  We did not remove fluid with dialysis  We will allow her blood pressure to go up a little bit to avoid ID dizziness  Follow-up with cardiology

## 2023-11-10 NOTE — PLAN OF CARE
Goal Outcome Evaluation:  Plan of Care Reviewed With: patient   Pt presents as an 82 y/o F admitted to [facility] on 11/8/23 with progressive palpitations and shortness of breath with evidence of atrial fibrillation with rapid ventricular response.Cardiology and nephrology are following pt. CXR was (-). Pt received dialysis this Am and pt unable to recall this happening. Pt had UTI present upon admission. Past Medical Hx:HTN, pacemaker, diabetes, hx breast CA, hx CA ureter, CKD requiring dialysis. Pt was pleasant and cooperative and disoriented to situation. At baseline, pt is a resident of [facility] and reports she uses a rolling walker for gait. Pt is believed to be a poor historian. Pt has limited AROM bilateral knees to ~ 90 degrees. This date, pt transferred to EOB with CGA of 1 and verbal cues with additional time. Pt required mod assist of 2 for sit<>stand transfer and for 5 small lateral side steps using rolling walker from middle of bed to head of bed. Pt was returned to bed as no recliner available in room and PT assessing pt would not be safe or comfortable in upright chair. PT will follow pt with PT recommendation of pt to return to [facility] with screening for need for PT by in house facility Physical Therapy at [facility].                Anticipated Discharge Disposition (PT): extended care facility

## 2023-11-11 PROCEDURE — 25010000002 CEFTRIAXONE PER 250 MG: Performed by: FAMILY MEDICINE

## 2023-11-11 RX ADMIN — ATORVASTATIN CALCIUM 20 MG: 20 TABLET, FILM COATED ORAL at 07:21

## 2023-11-11 RX ADMIN — DILTIAZEM HYDROCHLORIDE 30 MG: 30 TABLET, FILM COATED ORAL at 11:05

## 2023-11-11 RX ADMIN — METOPROLOL TARTRATE 50 MG: 50 TABLET ORAL at 07:21

## 2023-11-11 RX ADMIN — DILTIAZEM HYDROCHLORIDE 30 MG: 30 TABLET, FILM COATED ORAL at 23:35

## 2023-11-11 RX ADMIN — APIXABAN 2.5 MG: 2.5 TABLET, FILM COATED ORAL at 07:21

## 2023-11-11 RX ADMIN — DILTIAZEM HYDROCHLORIDE 30 MG: 30 TABLET, FILM COATED ORAL at 05:56

## 2023-11-11 RX ADMIN — CEFTRIAXONE 1000 MG: 1 INJECTION, POWDER, FOR SOLUTION INTRAMUSCULAR; INTRAVENOUS at 16:24

## 2023-11-11 RX ADMIN — METOPROLOL TARTRATE 50 MG: 50 TABLET ORAL at 21:02

## 2023-11-11 RX ADMIN — DILTIAZEM HYDROCHLORIDE 30 MG: 30 TABLET, FILM COATED ORAL at 16:25

## 2023-11-11 RX ADMIN — APIXABAN 2.5 MG: 2.5 TABLET, FILM COATED ORAL at 21:02

## 2023-11-11 RX ADMIN — DILTIAZEM HYDROCHLORIDE 30 MG: 30 TABLET, FILM COATED ORAL at 00:45

## 2023-11-11 NOTE — PROGRESS NOTES
"                                                                                                                                      Nephrology  Progress Note                                        [facility]    Patient Identification    Name: Hazel Bucio  Age: 83 y.o.  Sex: female  :  1940  MRN: [id number]      DATE OF SERVICE:  2023        Subective    No chest pain  Status post dialysis yesterday     Objective   Scheduled Meds:apixaban, 2.5 mg, Oral, Q12H  atorvastatin, 20 mg, Oral, Daily  cefTRIAXone, 1,000 mg, Intravenous, Q24H  dilTIAZem, 30 mg, Oral, Q6H  metoprolol tartrate, 50 mg, Oral, BID          Continuous Infusions:     PRN Meds:  acetaminophen    ipratropium-albuterol    midodrine    [COMPLETED] Insert Peripheral IV **AND** sodium chloride     Exam:  /67 (BP Location: Left arm, Patient Position: Lying)   Pulse 100   Temp 97.9 °F (36.6 °C) (Oral)   Resp 20   Ht 165.1 cm (65\")   Wt 60.9 kg (134 lb 4.2 oz)   SpO2 95%   BMI 22.34 kg/m²     Intake/Output last 3 shifts:  I/O last 3 completed shifts:  In: 1080 [P.O.:480; I.V.:600]  Out: 1050 [Urine:150; Emesis/NG output:300]    Intake/Output this shift:  No intake/output data recorded.    Physical exam:  General Appearance:  Alert  Head:  Normocephalic, without obvious abnormality, atraumatic  Eyes:  PERRL, conjunctiva/corneas clear     Neck:  Supple,  no adenopathy;      Lungs:  Decreased BS occasion ronchi  Heart:  Regular rate and rhythm, S1 and S2 normal  Abdomen:  Soft, non-tender, bowel sounds active   Extremities: trace edema  Pulses: 2+ and symmetric all extremities  Skin:  No rashes or lesions       Data Review:  All labs (24hrs):   No results found for this or any previous visit (from the past 24 hour(s)).         Imaging:  XR Chest 1 View    Result Date: 2023  Impression: 1. Right IJ central venous catheter tip stable overlying the proximal right atrium. 2. No acute process. Electronically " Signed: Jacoby Elise MD  11/9/2023 8:43 AM EST  Workstation ID: [id number]    XR Chest 1 View    Result Date: 11/8/2023  Impression: No acute chest finding. Electronically Signed: Ivy Valenzuela MD  11/8/2023 3:33 PM EST  Workstation ID: [id number]     Assessment/Plan:     Atrial fibrillation with rapid ventricular response    Atrial fibrillation with RVR       ESRD hemodialysis Monday Wednesday Friday  Chest pain/presyncope  A-fib  Sick sinus syndrome  Hypertension  History of bladder cancer  History of renal cell carcinoma        Recommendations:  Hemodialysis was done yesterday  No need for dialysis today  Next dialysis discharge Monday

## 2023-11-11 NOTE — PLAN OF CARE
Goal Outcome Evaluation:  Plan of Care Reviewed With: patient        Progress: improving     Patient is feeling better, she had gotten up to bedside commode and had no signs of dizziness noted. She has been resting this shift with no issues or concerns. Could possibly discharge back to [facility] today.

## 2023-11-11 NOTE — PROGRESS NOTES
LOS: 3 days   Patient Care Team:  Lizzy Francis MD as PCP - General (Family Medicine)  Jose Carlos Cehng MD as Consulting Physician (Cardiology)    Subjective:  Events noted    Objective:   Afebrile      Review of Systems:   Review of Systems   Unable to perform ROS: Other           Vital Signs  Temp:  [97.9 °F (36.6 °C)-98.8 °F (37.1 °C)] 98.8 °F (37.1 °C)  Heart Rate:  [] 92  Resp:  [16-28] 16  BP: (112-127)/(56-83) 112/56    Physical Exam:  Physical Exam  Vitals and nursing note reviewed.   Cardiovascular:      Rate and Rhythm: Normal rate.      Heart sounds: Normal heart sounds.   Pulmonary:      Breath sounds: Normal breath sounds.   Neurological:      Mental Status: She is alert.          Radiology:  XR Chest 1 View    Result Date: 11/9/2023  Impression: 1. Right IJ central venous catheter tip stable overlying the proximal right atrium. 2. No acute process. Electronically Signed: Jacoby Elise MD  11/9/2023 8:43 AM EST  Workstation ID: [id number]    XR Chest 1 View    Result Date: 11/8/2023  Impression: No acute chest finding. Electronically Signed: Ivy Valenzuela MD  11/8/2023 3:33 PM EST  Workstation ID: [id number]        Results Review:     I reviewed the patient's new clinical results.  I reviewed the patient's new imaging results and agree with the interpretation.    Medication Review:   Scheduled Meds:apixaban, 2.5 mg, Oral, Q12H  atorvastatin, 20 mg, Oral, Daily  cefTRIAXone, 1,000 mg, Intravenous, Q24H  dilTIAZem, 30 mg, Oral, Q6H  metoprolol tartrate, 50 mg, Oral, BID      Continuous Infusions:   PRN Meds:.  acetaminophen    ipratropium-albuterol    midodrine    [COMPLETED] Insert Peripheral IV **AND** sodium chloride    Labs:    CBC    Results from last 7 days   Lab Units 11/10/23  0153 11/08/23  1525   WBC 10*3/mm3 9.60 11.40*   HEMOGLOBIN g/dL 10.2* 10.5*   PLATELETS 10*3/mm3 265 264     BMP   Results from last 7 days   Lab Units 11/10/23  0153 11/09/23  0604 11/08/23  1525   SODIUM  "mmol/L 140 142 137   POTASSIUM mmol/L 5.0 4.7 4.6   CHLORIDE mmol/L 103 105 99   CO2 mmol/L 24.0 25.0 24.0   BUN mg/dL 77* 67* 59*   CREATININE mg/dL 6.14* 5.46* 5.22*   GLUCOSE mg/dL 100* 99 145*   MAGNESIUM mg/dL  --  2.2 2.1   PHOSPHORUS mg/dL  --  5.2*  --      Cr Clearance Estimated Creatinine Clearance: 6.8 mL/min (A) (by C-G formula based on SCr of 6.14 mg/dL (H)).  Coag   Results from last 7 days   Lab Units 11/08/23  1525   INR  1.12*   APTT seconds 32.3*     HbA1C   Lab Results   Component Value Date    HGBA1C 5.4 10/11/2020    HGBA1C 6.2 (H) 09/08/2020    HGBA1C 6.1 (H) 08/27/2020     Blood Glucose No results found for: \"POCGLU\"  Infection   Results from last 7 days   Lab Units 11/08/23  1823   URINECX  >100,000 CFU/mL Proteus mirabilis*     CMP   Results from last 7 days   Lab Units 11/10/23  0153 11/09/23  0604 11/08/23  1525   SODIUM mmol/L 140 142 137   POTASSIUM mmol/L 5.0 4.7 4.6   CHLORIDE mmol/L 103 105 99   CO2 mmol/L 24.0 25.0 24.0   BUN mg/dL 77* 67* 59*   CREATININE mg/dL 6.14* 5.46* 5.22*   GLUCOSE mg/dL 100* 99 145*   ALBUMIN g/dL  --   --  3.5   BILIRUBIN mg/dL  --   --  0.3   ALK PHOS U/L  --   --  142*   AST (SGOT) U/L  --   --  19   ALT (SGPT) U/L  --   --  12     UA    Results from last 7 days   Lab Units 11/08/23  1823   NITRITE UA  Negative   WBC UA /HPF 21-50*   BACTERIA UA /HPF 4+*   SQUAM EPITHEL UA /HPF 0-2   URINECX  >100,000 CFU/mL Proteus mirabilis*     Radiology(recent) No radiology results for the last day   Assessment:    Near syncope  Atrial fibrillation with rapid ventricular response  History of bladder cancer  Urostomy in place  History of deep venous thrombosis  Secondary thrombophilia  Hypercoagulable state secondary to atrial fibrillation  Autonomic dysfunction syndrome  Oral anticoagulation therapy  ESRD  Hypertension with ESRD  Anemia of ESRD  Dependency upon hemodialysis  History of sick sinus syndrome  History of pacemaker placement  Gastroesophageal reflux " disease without esophagitis  History of renal cell carcinoma  History of right nephrectomy  Urinary tract infection present upon admission//Proteus species    Plan:  HD today//anti-microbial therapy//physical therapy//discharge planning        Hilario Esqueda MD  11/11/23  12:33 EST

## 2023-11-12 LAB — HBV SURFACE AG SERPL QL IA: NORMAL

## 2023-11-12 PROCEDURE — 25010000002 CEFTRIAXONE PER 250 MG: Performed by: FAMILY MEDICINE

## 2023-11-12 PROCEDURE — 87340 HEPATITIS B SURFACE AG IA: CPT | Performed by: INTERNAL MEDICINE

## 2023-11-12 RX ORDER — BISACODYL 10 MG
10 SUPPOSITORY, RECTAL RECTAL DAILY
Status: DISCONTINUED | OUTPATIENT
Start: 2023-11-12 | End: 2023-11-13 | Stop reason: HOSPADM

## 2023-11-12 RX ORDER — CHOLECALCIFEROL (VITAMIN D3) 125 MCG
5 CAPSULE ORAL NIGHTLY PRN
Status: DISCONTINUED | OUTPATIENT
Start: 2023-11-12 | End: 2023-11-13 | Stop reason: HOSPADM

## 2023-11-12 RX ADMIN — DILTIAZEM HYDROCHLORIDE 30 MG: 30 TABLET, FILM COATED ORAL at 17:02

## 2023-11-12 RX ADMIN — METOPROLOL TARTRATE 50 MG: 50 TABLET ORAL at 20:49

## 2023-11-12 RX ADMIN — Medication 5 MG: at 20:49

## 2023-11-12 RX ADMIN — DILTIAZEM HYDROCHLORIDE 30 MG: 30 TABLET, FILM COATED ORAL at 12:10

## 2023-11-12 RX ADMIN — DILTIAZEM HYDROCHLORIDE 30 MG: 30 TABLET, FILM COATED ORAL at 06:17

## 2023-11-12 RX ADMIN — CEFTRIAXONE 1000 MG: 1 INJECTION, POWDER, FOR SOLUTION INTRAMUSCULAR; INTRAVENOUS at 16:42

## 2023-11-12 RX ADMIN — APIXABAN 2.5 MG: 2.5 TABLET, FILM COATED ORAL at 20:49

## 2023-11-12 RX ADMIN — APIXABAN 2.5 MG: 2.5 TABLET, FILM COATED ORAL at 09:04

## 2023-11-12 RX ADMIN — ATORVASTATIN CALCIUM 20 MG: 20 TABLET, FILM COATED ORAL at 09:04

## 2023-11-12 NOTE — PROGRESS NOTES
"                                                                                                                                      Nephrology  Progress Note                                        [facility]    Patient Identification    Name: Hazel Bucio  Age: 83 y.o.  Sex: female  :  1940  MRN: [id number]      DATE OF SERVICE:  2023        Subective    No chest pain  No shortness of breath     Objective   Scheduled Meds:apixaban, 2.5 mg, Oral, Q12H  atorvastatin, 20 mg, Oral, Daily  cefTRIAXone, 1,000 mg, Intravenous, Q24H  dilTIAZem, 30 mg, Oral, Q6H  metoprolol tartrate, 50 mg, Oral, BID          Continuous Infusions:     PRN Meds:  acetaminophen    ipratropium-albuterol    midodrine    [COMPLETED] Insert Peripheral IV **AND** sodium chloride     Exam:  /70 (BP Location: Left arm, Patient Position: Lying)   Pulse 95   Temp 98.2 °F (36.8 °C) (Oral)   Resp 22   Ht 165.1 cm (65\")   Wt 60.9 kg (134 lb 4.2 oz)   SpO2 97%   BMI 22.34 kg/m²     Intake/Output last 3 shifts:  I/O last 3 completed shifts:  In: 950 [P.O.:950]  Out: 850 [Urine:550; Emesis/NG output:300]    Intake/Output this shift:  No intake/output data recorded.    Physical exam:  General Appearance:  Alert  Head:  Normocephalic, without obvious abnormality, atraumatic  Eyes:  PERRL, conjunctiva/corneas clear     Neck:  Supple,  no adenopathy;      Lungs:  Decreased BS occasion ronchi  Heart:  Regular rate and rhythm, S1 and S2 normal  Abdomen:  Soft, non-tender, bowel sounds active   Extremities: trace edema  Pulses: 2+ and symmetric all extremities  Skin:  No rashes or lesions       Data Review:  All labs (24hrs):   No results found for this or any previous visit (from the past 24 hour(s)).         Imaging:  XR Chest 1 View    Result Date: 2023  Impression: 1. Right IJ central venous catheter tip stable overlying the proximal right atrium. 2. No acute process. Electronically Signed: Jacoby Elise MD  " 11/9/2023 8:43 AM EST  Workstation ID: [id number]    XR Chest 1 View    Result Date: 11/8/2023  Impression: No acute chest finding. Electronically Signed: Ivy Valenzuela MD  11/8/2023 3:33 PM EST  Workstation ID: [id number]     Assessment/Plan:     Atrial fibrillation with rapid ventricular response    Atrial fibrillation with RVR       ESRD hemodialysis Monday Wednesday Friday  Chest pain/presyncope  A-fib  Sick sinus syndrome  Hypertension  History of bladder cancer  History of renal cell carcinoma        Recommendations:  Continue current treatment  No need for dialysis today  Next dialysis discharge Monday

## 2023-11-12 NOTE — PLAN OF CARE
Goal Outcome Evaluation:  Plan of Care Reviewed With: patient        Progress: improving  Patient is very pleasant and has only advised that she has not been able to sleep good since being in here, it is too late to get medication for that since the new day is starting soon.  Will advise MD to see if patient can get something to help her sleep tonight. Patient will probably be discharged on Monday after dialysis.

## 2023-11-12 NOTE — PLAN OF CARE
Goal Outcome Evaluation:         Pt refused to have suppository and stated would wait.

## 2023-11-12 NOTE — PLAN OF CARE
Goal Outcome Evaluation:            Denies any pain. No Bm since 11/9/23. MD notified and waiting for the reply.

## 2023-11-12 NOTE — PROGRESS NOTES
LOS: 4 days   Patient Care Team:  Lizzy Francis MD as PCP - General (Family Medicine)  Jose Carlos Cheng MD as Consulting Physician (Cardiology)    Subjective:  Improving//no distress    Objective:   Afebrile      Review of Systems:   Review of Systems   Unable to perform ROS: Dementia   Constitutional:  Positive for activity change.           Vital Signs  Temp:  [98 °F (36.7 °C)-98.8 °F (37.1 °C)] 98 °F (36.7 °C)  Heart Rate:  [] 84  Resp:  [14-22] 14  BP: (112-129)/(56-70) 129/58    Physical Exam:  Physical Exam  Vitals reviewed.   Constitutional:       General: She is not in acute distress.     Appearance: Normal appearance.   Cardiovascular:      Rate and Rhythm: Rhythm irregular.      Heart sounds: Normal heart sounds.   Pulmonary:      Breath sounds: Normal breath sounds.   Abdominal:      Palpations: Abdomen is soft.   Neurological:      Mental Status: She is alert.          Radiology:  XR Chest 1 View    Result Date: 11/9/2023  Impression: 1. Right IJ central venous catheter tip stable overlying the proximal right atrium. 2. No acute process. Electronically Signed: Jacoby Elise MD  11/9/2023 8:43 AM EST  Workstation ID: [id number]    XR Chest 1 View    Result Date: 11/8/2023  Impression: No acute chest finding. Electronically Signed: Ivy Valenzuela MD  11/8/2023 3:33 PM EST  Workstation ID: [id number]        Results Review:     I reviewed the patient's new clinical results.  I reviewed the patient's new imaging results and agree with the interpretation.    Medication Review:   Scheduled Meds:apixaban, 2.5 mg, Oral, Q12H  atorvastatin, 20 mg, Oral, Daily  cefTRIAXone, 1,000 mg, Intravenous, Q24H  dilTIAZem, 30 mg, Oral, Q6H  metoprolol tartrate, 50 mg, Oral, BID      Continuous Infusions:   PRN Meds:.  acetaminophen    ipratropium-albuterol    midodrine    [COMPLETED] Insert Peripheral IV **AND** sodium chloride    Labs:    CBC    Results from last 7 days   Lab Units 11/10/23  0153 11/08/23  5699  "  WBC 10*3/mm3 9.60 11.40*   HEMOGLOBIN g/dL 10.2* 10.5*   PLATELETS 10*3/mm3 265 264     BMP   Results from last 7 days   Lab Units 11/10/23  0153 11/09/23  0604 11/08/23  1525   SODIUM mmol/L 140 142 137   POTASSIUM mmol/L 5.0 4.7 4.6   CHLORIDE mmol/L 103 105 99   CO2 mmol/L 24.0 25.0 24.0   BUN mg/dL 77* 67* 59*   CREATININE mg/dL 6.14* 5.46* 5.22*   GLUCOSE mg/dL 100* 99 145*   MAGNESIUM mg/dL  --  2.2 2.1   PHOSPHORUS mg/dL  --  5.2*  --      Cr Clearance Estimated Creatinine Clearance: 6.8 mL/min (A) (by C-G formula based on SCr of 6.14 mg/dL (H)).  Coag   Results from last 7 days   Lab Units 11/08/23  1525   INR  1.12*   APTT seconds 32.3*     HbA1C   Lab Results   Component Value Date    HGBA1C 5.4 10/11/2020    HGBA1C 6.2 (H) 09/08/2020    HGBA1C 6.1 (H) 08/27/2020     Blood Glucose No results found for: \"POCGLU\"  Infection   Results from last 7 days   Lab Units 11/08/23  1823   URINECX  >100,000 CFU/mL Proteus mirabilis*     CMP   Results from last 7 days   Lab Units 11/10/23  0153 11/09/23  0604 11/08/23  1525   SODIUM mmol/L 140 142 137   POTASSIUM mmol/L 5.0 4.7 4.6   CHLORIDE mmol/L 103 105 99   CO2 mmol/L 24.0 25.0 24.0   BUN mg/dL 77* 67* 59*   CREATININE mg/dL 6.14* 5.46* 5.22*   GLUCOSE mg/dL 100* 99 145*   ALBUMIN g/dL  --   --  3.5   BILIRUBIN mg/dL  --   --  0.3   ALK PHOS U/L  --   --  142*   AST (SGOT) U/L  --   --  19   ALT (SGPT) U/L  --   --  12     UA    Results from last 7 days   Lab Units 11/08/23  1823   NITRITE UA  Negative   WBC UA /HPF 21-50*   BACTERIA UA /HPF 4+*   SQUAM EPITHEL UA /HPF 0-2   URINECX  >100,000 CFU/mL Proteus mirabilis*     Radiology(recent) No radiology results for the last day   Assessment:    Near syncope  Atrial fibrillation with rapid ventricular response  History of bladder cancer  Urostomy in place  History of deep venous thrombosis  Secondary thrombophilia  Hypercoagulable state secondary to atrial fibrillation  Autonomic dysfunction syndrome  Oral " anticoagulation therapy  ESRD  Hypertension with ESRD  Anemia of ESRD  Dependency upon hemodialysis  History of sick sinus syndrome  History of pacemaker placement  Gastroesophageal reflux disease without esophagitis  History of renal cell carcinoma  History of right nephrectomy  Urinary tract infection present upon admission//Proteus species    Plan:    Antimicrobial therapy//discharge planning        Hilario Esqueda MD  11/12/23  10:57 EST

## 2023-11-13 VITALS
HEIGHT: 65 IN | SYSTOLIC BLOOD PRESSURE: 142 MMHG | BODY MASS INDEX: 22.37 KG/M2 | DIASTOLIC BLOOD PRESSURE: 82 MMHG | RESPIRATION RATE: 18 BRPM | WEIGHT: 134.26 LBS | HEART RATE: 111 BPM | OXYGEN SATURATION: 100 % | TEMPERATURE: 97 F

## 2023-11-13 RX ORDER — MIDODRINE HYDROCHLORIDE 10 MG/1
10 TABLET ORAL DAILY PRN
Qty: 15 TABLET | Refills: 0 | Status: SHIPPED | OUTPATIENT
Start: 2023-11-13

## 2023-11-13 RX ORDER — DILTIAZEM HYDROCHLORIDE 120 MG/1
120 CAPSULE, COATED, EXTENDED RELEASE ORAL
Status: DISCONTINUED | OUTPATIENT
Start: 2023-11-13 | End: 2023-11-13 | Stop reason: HOSPADM

## 2023-11-13 RX ORDER — DILTIAZEM HYDROCHLORIDE 120 MG/1
120 CAPSULE, COATED, EXTENDED RELEASE ORAL
Qty: 30 CAPSULE | Refills: 0 | Status: SHIPPED | OUTPATIENT
Start: 2023-11-14

## 2023-11-13 RX ADMIN — DILTIAZEM HYDROCHLORIDE 120 MG: 120 CAPSULE, COATED, EXTENDED RELEASE ORAL at 12:37

## 2023-11-13 RX ADMIN — ATORVASTATIN CALCIUM 20 MG: 20 TABLET, FILM COATED ORAL at 12:37

## 2023-11-13 RX ADMIN — DILTIAZEM HYDROCHLORIDE 30 MG: 30 TABLET, FILM COATED ORAL at 00:20

## 2023-11-13 RX ADMIN — DILTIAZEM HYDROCHLORIDE 30 MG: 30 TABLET, FILM COATED ORAL at 05:50

## 2023-11-13 RX ADMIN — APIXABAN 2.5 MG: 2.5 TABLET, FILM COATED ORAL at 12:37

## 2023-11-13 NOTE — PROGRESS NOTES
"                                                                                                                                      Nephrology  Progress Note                                        [facility]    Patient Identification    Name: Hazel Bucio  Age: 83 y.o.  Sex: female  :  1940  MRN: [id number]      DATE OF SERVICE:  2023        Subective    No chest pain  No shortness of breath    seen on dialysis  Objective   Scheduled Meds:apixaban, 2.5 mg, Oral, Q12H  atorvastatin, 20 mg, Oral, Daily  bisacodyl, 10 mg, Rectal, Daily  cefTRIAXone, 1,000 mg, Intravenous, Q24H  dilTIAZem, 30 mg, Oral, Q6H  metoprolol tartrate, 50 mg, Oral, BID          Continuous Infusions:     PRN Meds:  acetaminophen    ipratropium-albuterol    melatonin    midodrine    [COMPLETED] Insert Peripheral IV **AND** sodium chloride     Exam:  /65 (BP Location: Left arm, Patient Position: Lying)   Pulse 105   Temp 98.7 °F (37.1 °C) (Oral)   Resp 16   Ht 165.1 cm (65\")   Wt 60.9 kg (134 lb 4.2 oz)   SpO2 97%   BMI 22.34 kg/m²     Intake/Output last 3 shifts:  I/O last 3 completed shifts:  In: 710 [P.O.:710]  Out: 750 [Urine:750]    Intake/Output this shift:  I/O this shift:  In: -   Out: 250 [Urine:250]    Physical exam:  General Appearance:  Alert  Head:  Normocephalic, without obvious abnormality, atraumatic  Eyes:  PERRL, conjunctiva/corneas clear     Neck:  Supple,  no adenopathy;      Lungs:  Decreased BS occasion ronchi  Heart:  Regular rate and rhythm, S1 and S2 normal  Abdomen:  Soft, non-tender, bowel sounds active   Extremities: trace edema  Pulses: 2+ and symmetric all extremities  Skin:  No rashes or lesions       Data Review:  All labs (24hrs):   Recent Results (from the past 24 hour(s))   Hepatitis B Surface Antigen    Collection Time: 23  3:45 PM    Specimen: Blood   Result Value Ref Range    Hepatitis B Surface Ag Non-Reactive Non-Reactive            Imaging:  XR Chest 1 " View    Result Date: 11/9/2023  Impression: 1. Right IJ central venous catheter tip stable overlying the proximal right atrium. 2. No acute process. Electronically Signed: Jacoby Elise MD  11/9/2023 8:43 AM EST  Workstation ID: [id number]    XR Chest 1 View    Result Date: 11/8/2023  Impression: No acute chest finding. Electronically Signed: Ivy Valenzuela MD  11/8/2023 3:33 PM EST  Workstation ID: [id number]     Assessment/Plan:     Atrial fibrillation with rapid ventricular response    Atrial fibrillation with RVR       ESRD hemodialysis Monday Wednesday Friday  Chest pain/presyncope  A-fib  Sick sinus syndrome  Hypertension  History of bladder cancer  History of renal cell carcinoma        Recommendations:  Continue current treatment   Continue dialysis 3 times a week   Seen on dialysis today

## 2023-11-13 NOTE — CASE MANAGEMENT/SOCIAL WORK
Continued Stay Note   Luis Felipe     Patient Name: Hazel Bucio  MRN: [id number]  Today's Date: 11/13/2023    Admit Date: 11/8/2023    Plan: Return to [facility]. Bed ready. No precert or PASRR needed. Current HD @ [facility].   Discharge Plan       Row Name 11/13/23 1336       Plan    Plan Return to [facility]. Bed ready. No precert or PASRR needed. Current HD @ [facility].    Plan Comments Patient to get HD today. Awaiting cardiology to sign off. CM updated nursing to call or send a secure chat if patient needed transportation set up for patient at discharge. CM messaged  Alayna bolanos to confirm bed is ready.             Vern Pickard RN     Cell number [phone]  Office number [phone]

## 2023-11-13 NOTE — PROGRESS NOTES
"Cardiology [place]        LOS:  LOS: 5 days   Patient Name: Hazel Bucio  Age/Sex: 83 y.o. female  : 1940  MRN: [id number]    Day of Service: 23   Length of Stay: 5  Encounter Provider: CHELLE Lerner  Place of Service: [facility] CARDIOLOGY  Patient Care Team:  Lizzy Francis MD as PCP - General (Family Medicine)  Jose Carlos Cheng MD as Consulting Physician (Cardiology)    Subjective:     Chief Complaint: f/u Afib, dizziness    Subjective: afib, rates controlled, dialysis today then plan to discharge to [facility]    Current Medications:   Scheduled Meds:apixaban, 2.5 mg, Oral, Q12H  atorvastatin, 20 mg, Oral, Daily  bisacodyl, 10 mg, Rectal, Daily  cefTRIAXone, 1,000 mg, Intravenous, Q24H  dilTIAZem, 30 mg, Oral, Q6H  metoprolol tartrate, 50 mg, Oral, BID      Continuous Infusions:       Allergies:  Allergies   Allergen Reactions    Amoxicillin Shortness Of Breath    Ciprofloxacin Hives    Oxycodone-Acetaminophen Unknown - High Severity     Pt's son stated when pt fell and broke her leg she was put on oxycodone; pt's son stated pt's \"vitals went all out of wack, she couldn't remember things.\"    Penicillins Rash    Sulfa Antibiotics Hives    Cephalexin Other (See Comments)    Clavulanic Acid Other (See Comments)    Codeine Other (See Comments)    Contrast Dye (Echo Or Unknown Ct/Mr) Other (See Comments)     22     Doxycycline Other (See Comments)    Fluconazole Other (See Comments)    Iodinated Contrast Media Other (See Comments)     22    Iodine Hives    Macrobid [Nitrofurantoin] Hives    Tobramycin Other (See Comments)    Trimethoprim Other (See Comments)       Review of Systems   Constitutional: Positive for malaise/fatigue. Negative for chills and diaphoresis.   Cardiovascular:  Negative for chest pain, dyspnea on exertion, irregular heartbeat, leg swelling, near-syncope, orthopnea, palpitations, paroxysmal nocturnal dyspnea and syncope. "   Respiratory:  Negative for cough, shortness of breath, sleep disturbances due to breathing and sputum production.    Gastrointestinal:  Negative for change in bowel habit.   Genitourinary:  Negative for urgency.   Neurological:  Negative for dizziness and headaches.   Psychiatric/Behavioral:  Negative for altered mental status.          Objective:     Temp:  [98.1 °F (36.7 °C)-99.1 °F (37.3 °C)] 98.2 °F (36.8 °C)  Heart Rate:  [] 99  Resp:  [16-22] 16  BP: (107-132)/(60-67) 121/67     Intake/Output Summary (Last 24 hours) at 11/13/2023 1104  Last data filed at 11/13/2023 0354  Gross per 24 hour   Intake --   Output 450 ml   Net -450 ml     Body mass index is 22.34 kg/m².      11/08/23  1328 11/10/23  0352 11/11/23  0416   Weight: 56.2 kg (124 lb) 58.8 kg (129 lb 10.1 oz) 60.9 kg (134 lb 4.2 oz)         General Appearance:    Alert, cooperative, in no acute distress                                Head: Atraumatic, normocephalic, PERRLA               Neck:   supple, no JVD   Lungs:     Clear to auscultation, respirations regular, even and               unlabored    Heart:    irregular rhythm and normal rate, normal S1 and S2   Abdomen:     Normal bowel sounds, no masses, no organomegaly, soft  nontender, nondistended, no guarding, no rebound  tenderness   Extremities:   Moves all extremities well, no edema, no cyanosis, no  redness   Pulses:   Pulses palpable and equal bilaterally   Skin:   No bleeding, bruising or rash   Neurologic:   Awake, alert, oriented x3       Lab Review:   Results from last 7 days   Lab Units 11/10/23  0153 11/09/23  0604 11/08/23  1525   SODIUM mmol/L 140 142 137   POTASSIUM mmol/L 5.0 4.7 4.6   CHLORIDE mmol/L 103 105 99   CO2 mmol/L 24.0 25.0 24.0   BUN mg/dL 77* 67* 59*   CREATININE mg/dL 6.14* 5.46* 5.22*   GLUCOSE mg/dL 100* 99 145*   CALCIUM mg/dL 9.2 9.1 9.6   AST (SGOT) U/L  --   --  19   ALT (SGPT) U/L  --   --  12     Results from last 7 days   Lab Units 11/09/23  0606  "11/08/23  1823 11/08/23  1525   HSTROP T ng/L 52* 57* 63*     Results from last 7 days   Lab Units 11/10/23  0153 11/08/23  1525   WBC 10*3/mm3 9.60 11.40*   HEMOGLOBIN g/dL 10.2* 10.5*   HEMATOCRIT % 31.2* 32.6*   PLATELETS 10*3/mm3 265 264     Results from last 7 days   Lab Units 11/08/23  1525   INR  1.12*   APTT seconds 32.3*     Results from last 7 days   Lab Units 11/09/23  0604 11/08/23  1525   MAGNESIUM mg/dL 2.2 2.1           Invalid input(s): \"LDLCALC\"      Results from last 7 days   Lab Units 11/08/23  1525   TSH uIU/mL 1.000       Recent Radiology:  Imaging Results (Most Recent)       Procedure Component Value Units Date/Time    XR Chest 1 View [[id number]] Collected: 11/09/23 0842     Updated: 11/09/23 0845    Narrative:      XR CHEST 1 VW    Date of Exam: 11/9/2023 8:30 AM EST    Indication: soa    Comparison: 11/8/2023    Findings:  Right internal jugular central venous catheter tip overlies the proximal right atrium. Heart size top normal, stable. Left-sided AICD noted. Lungs are without consolidation. Negative for pneumothorax or pleural effusion. Osseous structures grossly   intact. Dense aortic arch vascular calcifications.      Impression:      Impression:  1. Right IJ central venous catheter tip stable overlying the proximal right atrium.  2. No acute process.        Electronically Signed: Jacoby Elise MD    11/9/2023 8:43 AM EST    Workstation ID: [id number]    XR Chest 1 View [[id number]] Collected: 11/08/23 1532     Updated: 11/08/23 1535    Narrative:      XR CHEST 1 VW    Date of Exam: 11/8/2023 2:57 PM EST    Indication: fever    Comparison: AP portable chest 10/13/2023.    Findings:  There is no acute airspace disease. Heart size is mildly enlarged but stable. Pulmonary vascular distribution is normal. Left chest wall pacemaker device, right IJ approach central line, each appear similarly positioned. No pneumothorax is seen. No   pleural effusion is evident. Benign calcified lymph node is " seen in the right paratracheal region. There are advanced degenerative changes of the right shoulder. No acute osseous abnormality is identified.      Impression:      Impression:  No acute chest finding.      Electronically Signed: Ivy Valenzuela MD    11/8/2023 3:33 PM EST    Workstation ID: [id number]            ECHOCARDIOGRAM:    Results for orders placed during the hospital encounter of 10/13/23    Adult Transthoracic Echo Complete W/ Cont if Necessary Per Protocol    Interpretation Summary    Left ventricular systolic function is normal. Left ventricular ejection fraction appears to be 56 - 60%.    Left ventricular wall thickness is consistent with mild concentric hypertrophy.    Left ventricular diastolic function was indeterminate.    The right ventricular cavity is mildly dilated.    The left atrial cavity is severely dilated.    Left atrial volume is severely increased.    The right atrial cavity is moderately  dilated.    Estimated right ventricular systolic pressure from tricuspid regurgitation is normal (<35 mmHg).    The main pulmonary artery is severely dilated.        I reviewed the patient's new clinical results.    EKG:      Assessment:       Atrial fibrillation with rapid ventricular response    Atrial fibrillation with RVR    Pre syncope / dizziness  Permanent atrial fibrillation on EliENDOGENXis  SSS s/p [employer] PPM  HTN  ESRD on HD MWF  bladder cancer, and renal cancer with urostomy  Last 2D echo 10/2023 showed an EF = 60-65%, pulmonary artery dilated; Last nuclear stress test 2023 showed no ischemia  Incidental elevated troponin in setting of ESRD on HD    Plan:   Patient presented with dizziness, near syncope after dialysis likely after volume removal, try to estimate filling pressures with 2D echo   She has permanent afib. Rates variable  Prior device remote interrogations reveal permanent afib  Goal rate simply less than 100  Beta-blockers on board metoprolol 50 twice daily  Statin  therapy will continue  We will continue on Eliquis 2.5 twice daily for stroke risk reduction  Consider midodrine on dialysis days, likely should not hold beta-blocker as can have uncontrolled heart rates, has a backup pacemaker in place  Eliquis for a/c  Troponin 50s, with ESRD, no active Chest pain      Needs physical therapy, lives alone, may benefit from rehab, up out of bed today with assistance, get orthostatics  Outpt cardiology f/u in 4 weeks  Plan is to discharge to [facility]    Dena Keith, CHELLE  11/13/23  11:04 EST

## 2023-11-13 NOTE — NURSING NOTE
Patient advised she had not slept in several nights and she has been up until 3 or 4 in the morning.  Call was put out to MD to see if we could try melatonin.  called back and said we could give Melatonin 5mg prn nightly.

## 2023-11-13 NOTE — DISCHARGE SUMMARY
Date of Discharge:  11/13/2023    Discharge Diagnosis:     Near syncope  Atrial fibrillation with rapid ventricular response  History of bladder cancer  Urostomy in place  History of deep venous thrombosis  Secondary thrombophilia  Hypercoagulable state secondary to atrial fibrillation  Autonomic dysfunction syndrome  Oral anticoagulation therapy  ESRD  Hypertension with ESRD  Anemia of ESRD  Dependency upon hemodialysis  History of sick sinus syndrome  History of pacemaker placement  Gastroesophageal reflux disease without esophagitis  History of renal cell carcinoma  History of right nephrectomy  Urinary tract infection present upon admission//Proteus species    Presenting Problem/History of Present Illness  Active Hospital Problems    Diagnosis  POA    **Atrial fibrillation with rapid ventricular response [I48.91]  Yes    Atrial fibrillation with RVR [I48.91]  Yes      Resolved Hospital Problems   No resolved problems to display.          Hospital Course    Hazel Bucio is an 83-year-old female who presented to [facility] ED on 11/8/2023 with progressive palpitations and shortness of breath.  She was found to be in atrial fibrillation with rapid ventricular response.  Cardiology was consulted.  Rates variable with permanent A-fib.  She will continue on metoprolol 50 mg twice a day as well as Cardizem 120 mg daily.  Patient will also continue Eliquis 2.5 mg twice a day for stroke reduction.  Cardiology recommends considering midodrine on dialysis days and recommends not holding beta-blocker to avoid uncontrolled heart rates.  Patient does have pacemaker in situ.  Patient will continue her normal dialysis on Mondays, Wednesdays, Fridays.  Patient is hemodynamically stable at time of discharge.  She will follow-up with cardiology in 4 weeks.  Dr. Jimenez will resume care at [facility].    Procedures Performed         Consults:   Consults       Date and Time Order Name Status Description    11/10/2023   3:42 PM Inpatient Cardiology Consult      11/8/2023 10:33 PM Inpatient Cardiology Consult Completed     11/8/2023 10:33 PM Inpatient Nephrology Consult Completed     11/8/2023  5:15 PM Nephrology (on -call MD unless specified) Completed     11/8/2023  5:15 PM Family Medicine Consult              Pertinent Test Results:    Lab Results (most recent)       Procedure Component Value Units Date/Time    Hepatitis B Surface Antigen [[id number]]  (Normal) Collected: 11/12/23 1545    Specimen: Blood Updated: 11/12/23 1658     Hepatitis B Surface Ag Non-Reactive    Urine Culture - Urine, Urostomy [[id number]]  (Abnormal)  (Susceptibility) Collected: 11/08/23 1823    Specimen: Urine from Urostomy Updated: 11/10/23 0432     Urine Culture >100,000 CFU/mL Proteus mirabilis    Narrative:      Colonization of the urinary tract without infection is common. Treatment is discouraged unless the patient is symptomatic, pregnant, or undergoing an invasive urologic procedure.    Susceptibility        Proteus mirabilis      ASHLEY      Ampicillin Susceptible      Ampicillin + Sulbactam Susceptible      Cefazolin Susceptible      Cefepime Susceptible      Ceftazidime Susceptible      Ceftriaxone Susceptible      Gentamicin Susceptible      Levofloxacin Resistant      Nitrofurantoin Resistant      Piperacillin + Tazobactam Susceptible      Trimethoprim + Sulfamethoxazole Susceptible                           Basic Metabolic Panel [[id number]]  (Abnormal) Collected: 11/10/23 0153    Specimen: Blood Updated: 11/10/23 0230     Glucose 100 mg/dL      BUN 77 mg/dL      Creatinine 6.14 mg/dL      Sodium 140 mmol/L      Potassium 5.0 mmol/L      Comment: Slight hemolysis detected by analyzer. Result may be falsely elevated.        Chloride 103 mmol/L      CO2 24.0 mmol/L      Calcium 9.2 mg/dL      BUN/Creatinine Ratio 12.5     Anion Gap 13.0 mmol/L      eGFR 6.4 mL/min/1.73      Comment: <15 Indicative of kidney failure       Narrative:      GFR Normal  >60  Chronic Kidney Disease <60  Kidney Failure <15    The GFR formula is only valid for adults with stable renal function between ages 18 and 70.    CBC (No Diff) [[id number]]  (Abnormal) Collected: 11/10/23 0153    Specimen: Blood Updated: 11/10/23 0209     WBC 9.60 10*3/mm3      RBC 3.12 10*6/mm3      Hemoglobin 10.2 g/dL      Hematocrit 31.2 %      .0 fL      MCH 32.5 pg      MCHC 32.6 g/dL      RDW 14.8 %      RDW-SD 52.1 fl      MPV 7.5 fL      Platelets 265 10*3/mm3     Extra Tubes [[id number]] Collected: 11/09/23 0604    Specimen: Blood, Venous Line Updated: 11/09/23 0715    Narrative:      The following orders were created for panel order Extra Tubes.  Procedure                               Abnormality         Status                     ---------                               -----------         ------                     Lavender Top[[id number]]                                     Final result                 Please view results for these tests on the individual orders.    Lavender Top [[id number]] Collected: 11/09/23 0604    Specimen: Blood Updated: 11/09/23 0715     Extra Tube hold for add-on     Comment: Auto resulted       High Sensitivity Troponin T [[id number]]  (Abnormal) Collected: 11/09/23 0604    Specimen: Blood Updated: 11/09/23 0712     HS Troponin T 52 ng/L     Narrative:      High Sensitive Troponin T Reference Range:  <14.0 ng/L- Negative Female for AMI  <22.0 ng/L- Negative Male for AMI  >=14 - Abnormal Female indicating possible myocardial injury.  >=22 - Abnormal Male indicating possible myocardial injury.   Clinicians would have to utilize clinical acumen, EKG, Troponin, and serial changes to determine if it is an Acute Myocardial Infarction or myocardial injury due to an underlying chronic condition.         Basic Metabolic Panel [[id number]]  (Abnormal) Collected: 11/09/23 0604    Specimen: Blood Updated: 11/09/23 0708     Glucose 99 mg/dL      BUN 67 mg/dL      Creatinine 5.46 mg/dL       Sodium 142 mmol/L      Potassium 4.7 mmol/L      Chloride 105 mmol/L      CO2 25.0 mmol/L      Calcium 9.1 mg/dL      BUN/Creatinine Ratio 12.3     Anion Gap 12.0 mmol/L      eGFR 7.4 mL/min/1.73      Comment: <15 Indicative of kidney failure       Narrative:      GFR Normal >60  Chronic Kidney Disease <60  Kidney Failure <15    The GFR formula is only valid for adults with stable renal function between ages 18 and 70.    Phosphorus [[id number]]  (Abnormal) Collected: 11/09/23 0604    Specimen: Blood Updated: 11/09/23 0708     Phosphorus 5.2 mg/dL     Magnesium [[id number]]  (Normal) Collected: 11/09/23 0604    Specimen: Blood Updated: 11/09/23 0708     Magnesium 2.2 mg/dL     High Sensitivity Troponin T 2Hr [[id number]]  (Abnormal) Collected: 11/08/23 1823    Specimen: Blood from Arm, Right Updated: 11/08/23 1855     HS Troponin T 57 ng/L      Troponin T Delta -6 ng/L     Narrative:      High Sensitive Troponin T Reference Range:  <14.0 ng/L- Negative Female for AMI  <22.0 ng/L- Negative Male for AMI  >=14 - Abnormal Female indicating possible myocardial injury.  >=22 - Abnormal Male indicating possible myocardial injury.   Clinicians would have to utilize clinical acumen, EKG, Troponin, and serial changes to determine if it is an Acute Myocardial Infarction or myocardial injury due to an underlying chronic condition.         Urinalysis With Culture If Indicated - Urostomy [[id number]]  (Abnormal) Collected: 11/08/23 1823    Specimen: Urine from Urostomy Updated: 11/08/23 1851     Color, UA Yellow     Appearance, UA Cloudy     pH, UA 8.5     Specific Gravity, UA 1.016     Glucose, UA Negative     Ketones, UA Negative     Bilirubin, UA Negative     Blood, UA Trace     Protein,  mg/dL (2+)     Leuk Esterase, UA Large (3+)     Nitrite, UA Negative     Urobilinogen, UA 1.0 E.U./dL    Narrative:      In absence of clinical symptoms, the presence of pyuria, bacteria, and/or nitrites on the urinalysis result does  not correlate with infection.    Urinalysis, Microscopic Only - Urostomy [[id number]]  (Abnormal) Collected: 11/08/23 1823    Specimen: Urine from Urostomy Updated: 11/08/23 1851     RBC, UA 0-2 /HPF      WBC, UA 21-50 /HPF      Bacteria, UA 4+ /HPF      Squamous Epithelial Cells, UA 0-2 /HPF      Hyaline Casts, UA None Seen /LPF      Methodology Automated Microscopy    Respiratory Panel PCR w/COVID-19(SARS-CoV-2) VINCENT/STEFANY/ANNY/PAD/COR/MAD/MANASA In-House, NP Swab in UTM/VTM, 3-4 HR TAT - Swab, Nasopharynx [[id number]]  (Normal) Collected: 11/08/23 1527    Specimen: Swab from Nasopharynx Updated: 11/08/23 1619     ADENOVIRUS, PCR Not Detected     Coronavirus 229E Not Detected     Coronavirus HKU1 Not Detected     Coronavirus NL63 Not Detected     Coronavirus OC43 Not Detected     COVID19 Not Detected     Human Metapneumovirus Not Detected     Human Rhinovirus/Enterovirus Not Detected     Influenza A PCR Not Detected     Influenza B PCR Not Detected     Parainfluenza Virus 1 Not Detected     Parainfluenza Virus 2 Not Detected     Parainfluenza Virus 3 Not Detected     Parainfluenza Virus 4 Not Detected     RSV, PCR Not Detected     Bordetella pertussis pcr Not Detected     Bordetella parapertussis PCR Not Detected     Chlamydophila pneumoniae PCR Not Detected     Mycoplasma pneumo by PCR Not Detected    Narrative:      In the setting of a positive respiratory panel with a viral infection PLUS a negative procalcitonin without other underlying concern for bacterial infection, consider observing off antibiotics or discontinuation of antibiotics and continue supportive care. If the respiratory panel is positive for atypical bacterial infection (Bordetella pertussis, Chlamydophila pneumoniae, or Mycoplasma pneumoniae), consider antibiotic de-escalation to target atypical bacterial infection.    Protime-INR [[id number]]  (Abnormal) Collected: 11/08/23 1525    Specimen: Blood from Arm, Left Updated: 11/08/23 1617     Protime 12.1  Seconds      INR 1.12    aPTT [[id number]]  (Abnormal) Collected: 11/08/23 1525    Specimen: Blood from Arm, Left Updated: 11/08/23 1617     PTT 32.3 seconds     High Sensitivity Troponin T [[id number]]  (Abnormal) Collected: 11/08/23 1525    Specimen: Blood from Arm, Left Updated: 11/08/23 1605     HS Troponin T 63 ng/L     Narrative:      High Sensitive Troponin T Reference Range:  <14.0 ng/L- Negative Female for AMI  <22.0 ng/L- Negative Male for AMI  >=14 - Abnormal Female indicating possible myocardial injury.  >=22 - Abnormal Male indicating possible myocardial injury.   Clinicians would have to utilize clinical acumen, EKG, Troponin, and serial changes to determine if it is an Acute Myocardial Infarction or myocardial injury due to an underlying chronic condition.         TSH [[id number]]  (Normal) Collected: 11/08/23 1525    Specimen: Blood from Arm, Left Updated: 11/08/23 1602     TSH 1.000 uIU/mL     Comprehensive Metabolic Panel [[id number]]  (Abnormal) Collected: 11/08/23 1525    Specimen: Blood from Arm, Left Updated: 11/08/23 1558     Glucose 145 mg/dL      BUN 59 mg/dL      Creatinine 5.22 mg/dL      Sodium 137 mmol/L      Potassium 4.6 mmol/L      Chloride 99 mmol/L      CO2 24.0 mmol/L      Calcium 9.6 mg/dL      Total Protein 7.2 g/dL      Albumin 3.5 g/dL      ALT (SGPT) 12 U/L      AST (SGOT) 19 U/L      Alkaline Phosphatase 142 U/L      Total Bilirubin 0.3 mg/dL      Globulin 3.7 gm/dL      A/G Ratio 0.9 g/dL      BUN/Creatinine Ratio 11.3     Anion Gap 14.0 mmol/L      eGFR 7.8 mL/min/1.73      Comment: <15 Indicative of kidney failure       Narrative:      GFR Normal >60  Chronic Kidney Disease <60  Kidney Failure <15    The GFR formula is only valid for adults with stable renal function between ages 18 and 70.    Magnesium [[id number]]  (Normal) Collected: 11/08/23 1525    Specimen: Blood from Arm, Left Updated: 11/08/23 1558     Magnesium 2.1 mg/dL     CBC & Differential [[id number]]  (Abnormal)  Collected: 11/08/23 1525    Specimen: Blood from Arm, Left Updated: 11/08/23 1537    Narrative:      The following orders were created for panel order CBC & Differential.  Procedure                               Abnormality         Status                     ---------                               -----------         ------                     CBC Auto Differential[[id number]]        Abnormal            Final result                 Please view results for these tests on the individual orders.    CBC Auto Differential [[id number]]  (Abnormal) Collected: 11/08/23 1525    Specimen: Blood from Arm, Left Updated: 11/08/23 1537     WBC 11.40 10*3/mm3      RBC 3.34 10*6/mm3      Hemoglobin 10.5 g/dL      Hematocrit 32.6 %      MCV 97.8 fL      MCH 31.5 pg      MCHC 32.2 g/dL      RDW 14.7 %      RDW-SD 53.4 fl      MPV 8.0 fL      Platelets 264 10*3/mm3      Neutrophil % 93.1 %      Lymphocyte % 4.0 %      Monocyte % 2.2 %      Eosinophil % 0.1 %      Basophil % 0.6 %      Neutrophils, Absolute 10.60 10*3/mm3      Lymphocytes, Absolute 0.50 10*3/mm3      Monocytes, Absolute 0.20 10*3/mm3      Eosinophils, Absolute 0.00 10*3/mm3      Basophils, Absolute 0.10 10*3/mm3      nRBC 0.0 /100 WBC              Results for orders placed during the hospital encounter of 10/13/23    Adult Transthoracic Echo Complete W/ Cont if Necessary Per Protocol    Interpretation Summary    Left ventricular systolic function is normal. Left ventricular ejection fraction appears to be 56 - 60%.    Left ventricular wall thickness is consistent with mild concentric hypertrophy.    Left ventricular diastolic function was indeterminate.    The right ventricular cavity is mildly dilated.    The left atrial cavity is severely dilated.    Left atrial volume is severely increased.    The right atrial cavity is moderately  dilated.    Estimated right ventricular systolic pressure from tricuspid regurgitation is normal (<35 mmHg).    The main pulmonary artery  is severely dilated.              Condition on Discharge:  stable    Vital Signs  Temp:  [97 °F (36.1 °C)-99.1 °F (37.3 °C)] 97 °F (36.1 °C)  Heart Rate:  [] 111  Resp:  [16-18] 18  BP: (102-142)/(63-84) 142/82      Physical Exam  Vitals reviewed.   HENT:      Head: Normocephalic.      Right Ear: External ear normal.      Left Ear: External ear normal.      Nose: Nose normal.   Eyes:      General:         Right eye: No discharge.         Left eye: No discharge.   Cardiovascular:      Rate and Rhythm: Rhythm irregular.   Pulmonary:      Effort: Pulmonary effort is normal.      Breath sounds: Normal breath sounds.   Abdominal:      General: Bowel sounds are normal.      Palpations: Abdomen is soft.   Musculoskeletal:         General: Normal range of motion.   Skin:     General: Skin is warm and dry.      Coloration: Skin is pale.   Neurological:      Mental Status: She is alert. Mental status is at baseline.   Psychiatric:         Behavior: Behavior normal.              Discharge Disposition  Skilled Nursing Facility (DC - External)    Discharge Medications     Discharge Medications        New Medications        Instructions Start Date   dilTIAZem  MG 24 hr capsule  Commonly known as: CARDIZEM CD   120 mg, Oral, Every 24 Hours Scheduled   Start Date: November 14, 2023     midodrine 10 MG tablet  Commonly known as: PROAMATINE   10 mg, Oral, Daily PRN             Continue These Medications        Instructions Start Date   acetaminophen 325 MG tablet  Commonly known as: TYLENOL   650 mg, Oral, Every 6 Hours PRN      apixaban 2.5 MG tablet tablet  Commonly known as: ELIQUIS   2.5 mg, Oral, Every 12 Hours Scheduled      atorvastatin 10 MG tablet  Commonly known as: LIPITOR   20 mg, Oral, Daily      dicyclomine 10 MG capsule  Commonly known as: BENTYL   10 mg, Oral, 3 Times Daily      ipratropium-albuterol 0.5-2.5 mg/3 ml nebulizer  Commonly known as: DUO-NEB   3 mL, Nebulization, Every 4 Hours PRN       loperamide 2 MG capsule  Commonly known as: IMODIUM   4 mg, Oral, Daily PRN      metoprolol tartrate 50 MG tablet  Commonly known as: LOPRESSOR   50 mg, Oral, 2 Times Daily      ondansetron ODT 4 MG disintegrating tablet  Commonly known as: ZOFRAN-ODT   4 mg, Translingual, Every 8 Hours PRN      saccharomyces boulardii 250 MG capsule  Commonly known as: FLORASTOR   250 mg, Oral, 2 Times Daily      topiramate 100 MG tablet  Commonly known as: TOPAMAX   100 mg, Oral, Nightly      zinc oxide 20 % ointment   1 application , Topical, 2 Times Daily             Stop These Medications      predniSONE 20 MG tablet  Commonly known as: DELTASONE              Discharge Diet:   Diet Instructions       Diet: Renal Diets; Low Sodium (2-3g), Low Potassium; Regular Texture (IDDSI 7); Thin (IDDSI 0)      Discharge Diet: Renal Diets    Renal Diet:  Low Sodium (2-3g)  Low Potassium       Texture: Regular Texture (IDDSI 7)    Fluid Consistency: Thin (IDDSI 0)            Activity at Discharge:   Activity Instructions       Activity as Tolerated              Follow-up Appointments  Future Appointments   Date Time Provider Department Center   12/26/2023  2:45 PM Augustin Ellsworth MD [facility]   12/26/2023  2:45 PM [facility] [place] DEVICE CHECK [facility]     Additional Instructions for the Follow-ups that You Need to Schedule       Discharge Follow-up with PCP   As directed       Currently Documented PCP:    Lizzy Francis MD    PCP Phone Number:    [phone]     Follow Up Details: Dr Cano will resume care at facility        Discharge Follow-up with Specified Provider: Cardiology; 1 Month   As directed      To: Cardiology   Follow Up: 1 Month                Test Results Pending at Discharge       CHELLE Myers  11/13/23  12:43 EST    Time: Discharge 29 min

## 2023-11-13 NOTE — PLAN OF CARE
Goal Outcome Evaluation:  Plan of Care Reviewed With: patient        Progress: improving     Patient is doing better and will receive dialysis today before she is discharged back to [facility]. She has had no complaints except for not being able to sleep. MD was contacted and melatonin was ordered for patient.

## 2023-11-13 NOTE — PROGRESS NOTES
Enter Query Response Below      Query Response: UTI due to urostomy              If applicable, please update the problem list.     Patient: Hazel Bucio        : 1940  Account: [id number]           Admit Date: 2023        How to Respond to this query:       a. Click New Note     b. Answer query within the yellow box.                c. Update the Problem List, if applicable.      If you have any questions about this query contact me at: [email or web address] Of The Month     Dr. Henderson:    83-year-old patient with history of renal cell carcinoma s/p nephrectomy and urostomy in place admitted  with atrial fibrillation with RVR.  Also noted to have UTI -Proteus mirabilis per culture.  Treatment included IV Rocephin.    Please clarify the etiology of the UTI:    UTI due to urostomy  UTI not due to urostomy  Other- specify_______  Unable to determine      By submitting this query, we are merely seeking further clarification of documentation to accurately reflect all conditions that you are monitoring, evaluating, treating or that extend the hospitalization or utilize additional resources of care. Please utilize your independent clinical judgment when addressing the question(s) above.     This query and your response, once completed, will be entered into the legal medical record.    Sincerely,  Marla Ayala RN, CCDS  Clinical Documentation Integrity Program

## 2023-11-14 NOTE — CASE MANAGEMENT/SOCIAL WORK
Case Management Discharge Note      Final Note: [facility]         Selected Continued Care - Discharged on 11/13/2023 Admission date: 11/8/2023 - Discharge disposition: Skilled Nursing Facility (DC - External)      Destination Coordination complete.      Service Provider Selected Services Address Phone Fax Patient Preferred    [facility] [place] [facility] [address] [place] [address] [place] [address] [phone] --               Transportation Services  W/C Van: Liza Oliveira    Final Discharge Disposition Code: 04 - intermediate care facility

## 2023-12-10 ENCOUNTER — APPOINTMENT (OUTPATIENT)
Dept: GENERAL RADIOLOGY | Facility: HOSPITAL | Age: 83
End: 2023-12-10
Payer: MEDICARE

## 2023-12-10 ENCOUNTER — HOSPITAL ENCOUNTER (EMERGENCY)
Facility: HOSPITAL | Age: 83
Discharge: NURSING, REHAB OR LONG-TERM CARE | End: 2023-12-10
Attending: EMERGENCY MEDICINE | Admitting: EMERGENCY MEDICINE
Payer: MEDICARE

## 2023-12-10 VITALS
BODY MASS INDEX: 22.76 KG/M2 | RESPIRATION RATE: 11 BRPM | HEIGHT: 67 IN | SYSTOLIC BLOOD PRESSURE: 125 MMHG | WEIGHT: 145 LBS | DIASTOLIC BLOOD PRESSURE: 59 MMHG | TEMPERATURE: 98.1 F | OXYGEN SATURATION: 94 % | HEART RATE: 79 BPM

## 2023-12-10 DIAGNOSIS — T82.9XXA COMPLICATION ASSOCIATED WITH DIALYSIS CATHETER: Primary | ICD-10-CM

## 2023-12-10 DIAGNOSIS — N18.9 CHRONIC RENAL FAILURE, UNSPECIFIED CKD STAGE: ICD-10-CM

## 2023-12-10 LAB
ANION GAP SERPL CALCULATED.3IONS-SCNC: 16 MMOL/L (ref 5–15)
BASOPHILS # BLD AUTO: 0 10*3/MM3 (ref 0–0.2)
BASOPHILS NFR BLD AUTO: 0.3 % (ref 0–1.5)
BUN SERPL-MCNC: 51 MG/DL (ref 8–23)
BUN/CREAT SERPL: 12.3 (ref 7–25)
CALCIUM SPEC-SCNC: 9.9 MG/DL (ref 8.6–10.5)
CHLORIDE SERPL-SCNC: 96 MMOL/L (ref 98–107)
CO2 SERPL-SCNC: 24 MMOL/L (ref 22–29)
CREAT SERPL-MCNC: 4.14 MG/DL (ref 0.57–1)
DEPRECATED RDW RBC AUTO: 56 FL (ref 37–54)
EGFRCR SERPLBLD CKD-EPI 2021: 10.2 ML/MIN/1.73
EOSINOPHIL # BLD AUTO: 0 10*3/MM3 (ref 0–0.4)
EOSINOPHIL NFR BLD AUTO: 0 % (ref 0.3–6.2)
ERYTHROCYTE [DISTWIDTH] IN BLOOD BY AUTOMATED COUNT: 15.3 % (ref 12.3–15.4)
GLUCOSE SERPL-MCNC: 152 MG/DL (ref 65–99)
HCT VFR BLD AUTO: 39.9 % (ref 34–46.6)
HGB BLD-MCNC: 13 G/DL (ref 12–15.9)
LYMPHOCYTES # BLD AUTO: 0.8 10*3/MM3 (ref 0.7–3.1)
LYMPHOCYTES NFR BLD AUTO: 11.7 % (ref 19.6–45.3)
MCH RBC QN AUTO: 32.4 PG (ref 26.6–33)
MCHC RBC AUTO-ENTMCNC: 32.6 G/DL (ref 31.5–35.7)
MCV RBC AUTO: 99.4 FL (ref 79–97)
MONOCYTES # BLD AUTO: 0.2 10*3/MM3 (ref 0.1–0.9)
MONOCYTES NFR BLD AUTO: 3.4 % (ref 5–12)
NEUTROPHILS NFR BLD AUTO: 6.1 10*3/MM3 (ref 1.7–7)
NEUTROPHILS NFR BLD AUTO: 84.6 % (ref 42.7–76)
NRBC BLD AUTO-RTO: 0 /100 WBC (ref 0–0.2)
PLATELET # BLD AUTO: 199 10*3/MM3 (ref 140–450)
PMV BLD AUTO: 7.1 FL (ref 6–12)
POTASSIUM SERPL-SCNC: 4.5 MMOL/L (ref 3.5–5.2)
RBC # BLD AUTO: 4.01 10*6/MM3 (ref 3.77–5.28)
SODIUM SERPL-SCNC: 136 MMOL/L (ref 136–145)
WBC NRBC COR # BLD AUTO: 7.2 10*3/MM3 (ref 3.4–10.8)

## 2023-12-10 PROCEDURE — 85025 COMPLETE CBC W/AUTO DIFF WBC: CPT | Performed by: EMERGENCY MEDICINE

## 2023-12-10 PROCEDURE — 36415 COLL VENOUS BLD VENIPUNCTURE: CPT

## 2023-12-10 PROCEDURE — 80048 BASIC METABOLIC PNL TOTAL CA: CPT | Performed by: EMERGENCY MEDICINE

## 2023-12-10 PROCEDURE — 71045 X-RAY EXAM CHEST 1 VIEW: CPT

## 2023-12-10 PROCEDURE — 99283 EMERGENCY DEPT VISIT LOW MDM: CPT

## 2023-12-10 NOTE — ED PROVIDER NOTES
"Subjective   History of Present Illness  83-year-old female presents from EMS stating they were called to the extended care facility reports that the stitch came out of the clasp of her dialysis catheter.  She reported last had dialysis on Friday.  Secondary complaint by the extended care facility was that she was having some need for increased assistance with sitting up today.  They report she is wheelchair-bound.  She does have some baseline confusion that is unchanged.  When EMS arrived they states she was sitting up unassisted at her reported baseline functionality.  Review of Systems    Past Medical History:   Diagnosis Date    Bladder cancer     Cancer     breast, bladder    Deep vein thrombosis     Essential hypertension 1/17/2017    Fracture tibia/fibula, right, closed, initial encounter 8/27/2020    GERD (gastroesophageal reflux disease)     History of urostomy     Immobility 08/27/2020    r/t broken Tibia     Kidney carcinoma, right     removed     Long term current use of anticoagulant 1/9/2015    PAF (paroxysmal atrial fibrillation) 6/26/2019    Presence of cardiac pacemaker 11/03/2014    BS dual chamber PM placed 10/2014 with pocket revision 1/25/17.  HX tachy rob syndrome    Sick sinus syndrome 11/3/2014       Allergies   Allergen Reactions    Amoxicillin Shortness Of Breath    Ciprofloxacin Hives    Oxycodone-Acetaminophen Unknown - High Severity     Pt's son stated when pt fell and broke her leg she was put on oxycodone; pt's son stated pt's \"vitals went all out of wack, she couldn't remember things.\"    Penicillins Rash    Sulfa Antibiotics Hives    Cephalexin Other (See Comments)    Clavulanic Acid Other (See Comments)    Codeine Other (See Comments)    Contrast Dye (Echo Or Unknown Ct/Mr) Other (See Comments)     8/18/22     Doxycycline Other (See Comments)    Fluconazole Other (See Comments)    Iodinated Contrast Media Other (See Comments)     8/18/22    Iodine Hives    Macrobid " [Nitrofurantoin] Hives    Tobramycin Other (See Comments)    Trimethoprim Other (See Comments)       Past Surgical History:   Procedure Laterality Date    BREAST LUMPECTOMY      CARDIAC CATHETERIZATION N/A 10/18/2023    Procedure: Left Heart Cath possible PCI, atherectomy, hemodynamic support;  Surgeon: Augustin Ellsworth MD;  Location: [facility] CATH INVASIVE LOCATION;  Service: Cardiology;  Laterality: N/A;    CARDIAC ELECTROPHYSIOLOGY PROCEDURE N/A 4/28/2023    Procedure: Pacemaker gen change, [place] AWARE $;  Surgeon: Jose Carlos Cheng MD;  Location: [facility] CATH INVASIVE LOCATION;  Service: Cardiovascular;  Laterality: N/A;    CHOLECYSTECTOMY N/A 10/12/2020    Procedure: CHOLECYSTECTOMY LAPAROSCOPIC;  Surgeon: Go Pereria DO;  Location: [facility] MAIN OR;  Service: General;  Laterality: N/A;    CYSTECTOMY W/ URETEROILEAL CONDUIT      HARDWARE REMOVAL Right 6/11/2021    Procedure: TIBIA HARDWARE REMOVAL;  Surgeon: Geoff Shah II, MD;  Location: [facility] MAIN OR;  Service: Orthopedics;  Laterality: Right;    HERNIA REPAIR      HYSTERECTOMY      INSERT / REPLACE / REMOVE PACEMAKER      NEPHRECTOMY Right     TIBIA IM NAILING/RODDING Right 8/28/2020    Procedure: TIBIA INTRAMEDULLARY NAIL/ABRAHAM INSERTION;  Surgeon: Geoff Shah II, MD;  Location: [facility] MAIN OR;  Service: Orthopedics;  Laterality: Right;       Family History   Problem Relation Age of Onset    COPD Father        Social History     Socioeconomic History    Marital status:    Tobacco Use    Smoking status: Never     Passive exposure: Never    Smokeless tobacco: Never   Vaping Use    Vaping Use: Never used   Substance and Sexual Activity    Alcohol use: Not Currently    Drug use: Never    Sexual activity: Defer     Prior to Admission medications    Medication Sig Start Date End Date Taking? Authorizing Provider   acetaminophen (TYLENOL) 325 MG tablet Take 2 tablets by mouth Every 6 (Six) Hours As Needed for Mild Pain.     "Gino Leos MD   apixaban (ELIQUIS) 2.5 MG tablet tablet Take 1 tablet by mouth Every 12 (Twelve) Hours. Indications: Atrial Fibrillation 6/30/23   Shantel Moulton APRN   atorvastatin (LIPITOR) 10 MG tablet Take 2 tablets by mouth Daily. 10/18/23   Lora Henderson MD   dicyclomine (BENTYL) 10 MG capsule Take 1 capsule by mouth 3 (Three) Times a Day. 6/30/23   Shantel Moulton APRN   dilTIAZem CD (CARDIZEM CD) 120 MG 24 hr capsule Take 1 capsule by mouth Daily. 11/14/23   Shantel Moulton APRN   ipratropium-albuterol (DUO-NEB) 0.5-2.5 mg/3 ml nebulizer Take 3 mL by nebulization Every 4 (Four) Hours As Needed for Wheezing.    Gino Leos MD   loperamide (IMODIUM) 2 MG capsule Take 2 capsules by mouth Daily As Needed for Diarrhea.    Gino Leos MD   metoprolol tartrate (LOPRESSOR) 50 MG tablet Take 1 tablet by mouth 2 (Two) Times a Day.    Gino Leos MD   midodrine (PROAMATINE) 10 MG tablet Take 1 tablet by mouth Daily As Needed (PRN dialysis days.). 11/13/23   Shantel Moulton APRN   ondansetron ODT (ZOFRAN-ODT) 4 MG disintegrating tablet Place 1 tablet on the tongue Every 8 (Eight) Hours As Needed for Nausea or Vomiting.    Gino Leos MD   saccharomyces boulardii (FLORASTOR) 250 MG capsule Take 1 capsule by mouth 2 (Two) Times a Day. 6/30/23   Shantel Moulton APRN   topiramate (TOPAMAX) 100 MG tablet Take 1 tablet by mouth Every Night. 6/30/23   Shantel Moulton APRN   zinc oxide 20 % ointment Apply 1 application  topically to the appropriate area as directed 2 (Two) Times a Day.    Gion Leos MD     /50   Pulse 78   Temp 98.1 °F (36.7 °C) (Oral)   Resp 11   Ht 170.2 cm (67\")   Wt 65.8 kg (145 lb)   SpO2 96%   BMI 22.71 kg/m²         Objective   Physical Exam  General: Well-developed female no acute distress awake alert and pleasant  Eyes: Pupils round and equal, sclera nonicteric  HEENT: Mucous membranes moist, no " mucosal swelling  Neck: Supple, no nuchal rigidity, no JVD; there is a tunnel catheter in place on the right side of her chest, no active bleeding, no surrounding erythema or induration or tenderness, it appears in normal position however the clasp has a broken suture  Respirations: Respirations nonlabored, equal breath sounds bilaterally, clear lungs  Heart regular rate and rhythm, no murmurs rubs or gallops,   Abdomen soft nontender nondistended, no hepatosplenomegaly, no hernia, no mass, normal bowel sounds, no CVA tenderness  Extremities no clubbing cyanosis or edema, calves are symmetric and nontender  Neuro cranial nerves grossly intact, no focal limb deficits  Psych oriented to person and place, cooperative  Skin no rash, brisk cap refill  Procedures  The suture on the clasp of the dialysis catheter had broken.  There was no suture attached to the skin to secure the line.  After confirmation of line position was confirmed with chest x-ray.  Patient was agreeable to the plan of resuturing the clasp.  I cleansed the skin with Shur-Clens and then it was dried and it was numbed with lidocaine 1 cc and 3-0 Ethilon suture was placed through the hole of the clasp and through the skin and tied in place.  This was done without complication and was sterile throughout.         ED Course                                   Results for orders placed or performed during the hospital encounter of 12/10/23   Basic Metabolic Panel    Specimen: Arm, Left; Blood   Result Value Ref Range    Glucose 152 (H) 65 - 99 mg/dL    BUN 51 (H) 8 - 23 mg/dL    Creatinine 4.14 (H) 0.57 - 1.00 mg/dL    Sodium 136 136 - 145 mmol/L    Potassium 4.5 3.5 - 5.2 mmol/L    Chloride 96 (L) 98 - 107 mmol/L    CO2 24.0 22.0 - 29.0 mmol/L    Calcium 9.9 8.6 - 10.5 mg/dL    BUN/Creatinine Ratio 12.3 7.0 - 25.0    Anion Gap 16.0 (H) 5.0 - 15.0 mmol/L    eGFR 10.2 (L) >60.0 mL/min/1.73   CBC Auto Differential    Specimen: Arm, Left; Blood   Result Value Ref  Range    WBC 7.20 3.40 - 10.80 10*3/mm3    RBC 4.01 3.77 - 5.28 10*6/mm3    Hemoglobin 13.0 12.0 - 15.9 g/dL    Hematocrit 39.9 34.0 - 46.6 %    MCV 99.4 (H) 79.0 - 97.0 fL    MCH 32.4 26.6 - 33.0 pg    MCHC 32.6 31.5 - 35.7 g/dL    RDW 15.3 12.3 - 15.4 %    RDW-SD 56.0 (H) 37.0 - 54.0 fl    MPV 7.1 6.0 - 12.0 fL    Platelets 199 140 - 450 10*3/mm3    Neutrophil % 84.6 (H) 42.7 - 76.0 %    Lymphocyte % 11.7 (L) 19.6 - 45.3 %    Monocyte % 3.4 (L) 5.0 - 12.0 %    Eosinophil % 0.0 (L) 0.3 - 6.2 %    Basophil % 0.3 0.0 - 1.5 %    Neutrophils, Absolute 6.10 1.70 - 7.00 10*3/mm3    Lymphocytes, Absolute 0.80 0.70 - 3.10 10*3/mm3    Monocytes, Absolute 0.20 0.10 - 0.90 10*3/mm3    Eosinophils, Absolute 0.00 0.00 - 0.40 10*3/mm3    Basophils, Absolute 0.00 0.00 - 0.20 10*3/mm3    nRBC 0.0 0.0 - 0.2 /100 WBC     XR Chest 1 View    Result Date: 12/10/2023  Impression: 1.Right IJ dialysis catheter with tip projecting in the superior right atrium, as before. 2.New small to moderate left pleural effusion with left basilar opacities which could represent atelectasis or infiltrate. Electronically Signed: Augustin Rodriges  12/10/2023 9:54 AM EST  Workstation ID: [id number]             Medical Decision Making  Patient presents from the extended care facility with disruption of the suture of the dialysis catheter.  [place] position of the catheter was confirmed with x-ray.  The clasp was sutured back into place to secure the catheter site.  CBC shows no leukocytosis or significant anemia, Chem-7 shows chronic renal failure but normal potassium.  Patient is due for dialysis tomorrow.  She will be discharged back to the facility.  They are given warning signs for return.    Problems Addressed:  Chronic renal failure, unspecified CKD stage: complicated acute illness or injury  Complication associated with dialysis catheter: complicated acute illness or injury    Amount and/or Complexity of Data Reviewed  Labs: ordered.  Radiology: ordered  and independent interpretation performed.     Details: My independent interpretation of chest x-ray image the dialysis catheter is in an unchanged position from previous x-ray.  There is some left basilar atelectasis.  Notably patient was not describing any respiratory symptoms and exhibited no cough or shortness of breath during emergency room course to suggest infiltrate or infectious process        Final diagnoses:   Complication associated with dialysis catheter   Chronic renal failure, unspecified CKD stage       ED Disposition  ED Disposition       ED Disposition   Discharge    Condition   Stable    Comment   --               Lizzy Francis MD  [address] [address]  [phone]    Schedule an appointment as soon as possible for a visit in 1 week           Medication List      No changes were made to your prescriptions during this visit.            Rashaad Kolb MD  12/10/23 1115

## 2023-12-10 NOTE — DISCHARGE INSTRUCTIONS
Dialysis tomorrow as scheduled.  Continue routine dialysis catheter care.  Return for shortness of breath, high fever, swelling, redness, bleeding or any other concerns

## 2023-12-26 ENCOUNTER — CLINICAL SUPPORT NO REQUIREMENTS (OUTPATIENT)
Dept: CARDIOLOGY | Facility: CLINIC | Age: 83
End: 2023-12-26
Payer: MEDICARE

## 2023-12-26 ENCOUNTER — OFFICE VISIT (OUTPATIENT)
Dept: CARDIOLOGY | Facility: CLINIC | Age: 83
End: 2023-12-26
Payer: MEDICARE

## 2023-12-26 VITALS
OXYGEN SATURATION: 93 % | HEIGHT: 67 IN | DIASTOLIC BLOOD PRESSURE: 70 MMHG | RESPIRATION RATE: 18 BRPM | BODY MASS INDEX: 22.76 KG/M2 | WEIGHT: 145 LBS | SYSTOLIC BLOOD PRESSURE: 110 MMHG | HEART RATE: 96 BPM

## 2023-12-26 DIAGNOSIS — I49.5 SICK SINUS SYNDROME: Primary | ICD-10-CM

## 2023-12-26 DIAGNOSIS — Z95.0 PRESENCE OF CARDIAC PACEMAKER: Chronic | ICD-10-CM

## 2023-12-26 DIAGNOSIS — I48.20 ATRIAL FIBRILLATION, CHRONIC: Primary | ICD-10-CM

## 2023-12-26 PROCEDURE — 93000 ELECTROCARDIOGRAM COMPLETE: CPT | Performed by: INTERNAL MEDICINE

## 2023-12-26 PROCEDURE — 99214 OFFICE O/P EST MOD 30 MIN: CPT | Performed by: INTERNAL MEDICINE

## 2023-12-26 PROCEDURE — 3078F DIAST BP <80 MM HG: CPT | Performed by: INTERNAL MEDICINE

## 2023-12-26 PROCEDURE — 3074F SYST BP LT 130 MM HG: CPT | Performed by: INTERNAL MEDICINE

## 2023-12-26 RX ORDER — PREDNISONE 5 MG/1
TABLET ORAL
COMMUNITY
Start: 2023-11-08

## 2023-12-26 RX ORDER — PREDNISONE 10 MG/1
TABLET ORAL
COMMUNITY
Start: 2023-12-08

## 2023-12-26 RX ORDER — GABAPENTIN 300 MG/1
CAPSULE ORAL
COMMUNITY
Start: 2023-12-06

## 2023-12-26 RX ORDER — VENLAFAXINE 37.5 MG/1
TABLET ORAL
COMMUNITY
Start: 2023-11-30

## 2023-12-26 RX ORDER — SERTRALINE HYDROCHLORIDE 25 MG/1
TABLET, FILM COATED ORAL
COMMUNITY
Start: 2023-12-07

## 2023-12-26 RX ORDER — CALCIUM ACETATE 667 MG/1
TABLET ORAL
COMMUNITY
Start: 2023-12-15

## 2023-12-26 RX ORDER — METOPROLOL TARTRATE 50 MG/1
TABLET, FILM COATED ORAL
Qty: 180 TABLET | Refills: 3 | Status: SHIPPED | OUTPATIENT
Start: 2023-12-26

## 2023-12-26 RX ORDER — GABAPENTIN 100 MG/1
CAPSULE ORAL
COMMUNITY
Start: 2023-12-06

## 2024-01-02 ENCOUNTER — LAB REQUISITION (OUTPATIENT)
Dept: LAB | Facility: HOSPITAL | Age: 84
End: 2024-01-02
Payer: MEDICARE

## 2024-01-02 DIAGNOSIS — Z93.6 OTHER ARTIFICIAL OPENINGS OF URINARY TRACT STATUS: ICD-10-CM

## 2024-01-02 DIAGNOSIS — I95.9 HYPOTENSION, UNSPECIFIED: ICD-10-CM

## 2024-01-02 DIAGNOSIS — K59.00 CONSTIPATION, UNSPECIFIED: ICD-10-CM

## 2024-01-02 DIAGNOSIS — N18.30 CHRONIC KIDNEY DISEASE, STAGE 3 UNSPECIFIED: ICD-10-CM

## 2024-01-02 DIAGNOSIS — N20.0 CALCULUS OF KIDNEY: ICD-10-CM

## 2024-01-02 DIAGNOSIS — J18.9 PNEUMONIA, UNSPECIFIED ORGANISM: ICD-10-CM

## 2024-01-02 DIAGNOSIS — G47.00 INSOMNIA, UNSPECIFIED: ICD-10-CM

## 2024-01-02 DIAGNOSIS — R27.9 UNSPECIFIED LACK OF COORDINATION: ICD-10-CM

## 2024-01-02 DIAGNOSIS — M19.90 UNSPECIFIED OSTEOARTHRITIS, UNSPECIFIED SITE: ICD-10-CM

## 2024-01-02 LAB
B PARAPERT DNA SPEC QL NAA+PROBE: NOT DETECTED
B PERT DNA SPEC QL NAA+PROBE: NOT DETECTED
C PNEUM DNA NPH QL NAA+NON-PROBE: NOT DETECTED
FLUAV H1 2009 PAND RNA NPH QL NAA+PROBE: DETECTED
FLUBV RNA ISLT QL NAA+PROBE: NOT DETECTED
HADV DNA SPEC NAA+PROBE: NOT DETECTED
HCOV 229E RNA SPEC QL NAA+PROBE: NOT DETECTED
HCOV HKU1 RNA SPEC QL NAA+PROBE: NOT DETECTED
HCOV NL63 RNA SPEC QL NAA+PROBE: NOT DETECTED
HCOV OC43 RNA SPEC QL NAA+PROBE: NOT DETECTED
HMPV RNA NPH QL NAA+NON-PROBE: NOT DETECTED
HPIV1 RNA ISLT QL NAA+PROBE: NOT DETECTED
HPIV2 RNA SPEC QL NAA+PROBE: NOT DETECTED
HPIV3 RNA NPH QL NAA+PROBE: NOT DETECTED
HPIV4 P GENE NPH QL NAA+PROBE: NOT DETECTED
M PNEUMO IGG SER IA-ACNC: NOT DETECTED
RHINOVIRUS RNA SPEC NAA+PROBE: NOT DETECTED
RSV RNA NPH QL NAA+NON-PROBE: NOT DETECTED
SARS-COV-2 RNA NPH QL NAA+NON-PROBE: NOT DETECTED

## 2024-01-02 PROCEDURE — 0202U NFCT DS 22 TRGT SARS-COV-2: CPT | Performed by: FAMILY MEDICINE

## 2024-01-08 NOTE — PROGRESS NOTES
DEVICE INTERROGATION:  IN OFFICE    DEVICE TYPE:   Single-chamber pacemaker    :   [employer]    BATTERY:  Stable    TIME TO ELECTIVE REPLACEMENT INDICATORS:   10.5 years    CHARGE TIME:   Not applicable        LEAD DATA:       DEVICE REPROGRAMMED FOR TESTING      Atrial:         Ventricular:     22.5 mV, 638 ohms, 0.6 V@0.4 ms    LV:      Pacemaker dependent:   No      Atrial pacing percentage: Not applicable %    Ventricular pacing percentage: Less than 1%      Arrhythmia Logbook Reviewed: No ventricular high rate episodes that are sustained        Summary:    Stable Device Function          Battery status is stable.    Reviewed with: Dr. Ellsworth      NEXT IN OFFICE DEVICE CHECK DUE: At next visit    REMOTE DEVICE INTERROGATIONS: Ongoing

## 2024-01-08 NOTE — PROGRESS NOTES
Cardiology Clinic Note  Augustin Ellsworth MD, PhD    Subjective:     Encounter Date:12/26/2023      Patient ID: Hazel Bucio is a 83 y.o. female.    Chief Complaint:  Chief Complaint   Patient presents with    Follow-up       HPI:  Hazel Bucio is a 83 y.o. female.  Patient is here today for hospital follow-up.  She was recently admitted at [facility] in June 2023 and again in December with syncope during dialysis with volume depletion and orthostasis.  She had a hospitalization where she was poorly functional, ultimately discharged to rehab.  She had some mildly elevated troponin incidental finding.  She has had a recent nuclear stress test 2023 showed no reversible ischemia EF normal.  Echocardiogram demonstrated preserved LV function.  Patient is known to have a history of sick sinus syndrome requiring previous dual-chamber [employer] pacemaker implant in October 2014.  She also has permanent atrial fibrillation.  Eliquis for chronic anticoagulation   Last echocardiogram showed her ejection fraction to be 60 to 65% with normal RV size and function.  There was moderate biatrial enlargement, mild to moderate MR, mild AI, mild to moderate TR, diastolic dysfunction.   She denies any current or recent chest pain, worsening dyspnea, PND, orthopnea, palpitations, near syncope, lower extremity edema or feelings of her heart racing.  She reports compliance with medical therapy.  She is really unsure of her medicines today, she lives in assisted living at this time for refills, high fall risk.  She has had some high resting heart rates with marginally controlled atrial fibrillation which is permanent with rates of 100-1 10 as high as 130 in the hospital with advancing her beta-blocker at that time.  Blood pressure is 110/70 today    Review of systems otherwise negative x 14 point review of systems except as mentioned above    Device interrogation permanent atrial fibrillation, rates are  "controlled      Historical data copied forward from previous encounters in EMR including the history, exam, and assessment/plan has been reviewed and is unchanged unless noted otherwise.    Cardiac medicines reviewed with risk, benefits, and necessity of each discussed.    Risk and benefit of cardiac testing reviewed including death heart attack stroke pain bleeding infection need for vascular /cardiovascular surgery were discussed and the patient     Objective:         /70 (BP Location: Left arm, Patient Position: Sitting, Cuff Size: Large Adult)   Pulse 96   Resp 18   Ht 170.2 cm (67\")   Wt 65.8 kg (145 lb)   SpO2 93%   BMI 22.71 kg/m²     Physical Exam    Assessment:         Atrial fibrillation with rapid ventricular response    Atrial fibrillation with RVR     Pre syncope / dizziness  Permanent atrial fibrillation on [employer]  SSS s/p [employer] PPM  HTN  ESRD on HD MWF  bladder cancer, and renal cancer with urostomy  Last 2D echo 10/2023 showed an EF = 60-65%, pulmonary artery dilated; Last nuclear stress test 2023 showed no ischemia  Incidental elevated troponin in setting of ESRD on HD     Permanent atrial fibrillation, heart rates marginally controlled, increase metoprolol to 100 in the morning and 50 at night    Blood pressures are controlled  Continue midodrine  Secondary prevention goals for CAD  She has been poorly tolerated above atorvastatin and she is at maximum tolerated dose she is also nutritionally deficient  Continue diltiazem for atrial fibrillation, she was down titrated with lower blood pressures  Not optimal candidate for digoxin or amiodarone with chronic persistent atrial fibrillation and renal disease  Chest pain-free  No excess volume on encounter today  No new murmurs  Pacemaker is normal functioning    See her back in 3 to 6 months      Needs physical therapy, lives alone, may benefit from rehab, up out of bed today with assistance, get orthostatics  Sure she can " function independently safely prior to discharge        The pleasure to be involved in this patient's cardiovascular care.  Please call with any questions or concerns  Augustin Ellsworth MD, PhD    Most recent EKG as reviewed and interpreted by me:    ECG 12 Lead    Date/Time: 12/26/2023 11:50 AM  Performed by: Augustin Ellsworth MD    Authorized by: Augustin Ellsworth MD  Comparison: not compared with previous ECG   Previous ECG: no previous ECG available  Rhythm: atrial fibrillation  Rate: normal  QRS axis: normal  Other findings: low voltage    Clinical impression: abnormal EKG           Most recent echo as reviewed and interpreted by me:  Results for orders placed during the hospital encounter of 10/13/23    Adult Transthoracic Echo Complete W/ Cont if Necessary Per Protocol    Interpretation Summary    Left ventricular systolic function is normal. Left ventricular ejection fraction appears to be 56 - 60%.    Left ventricular wall thickness is consistent with mild concentric hypertrophy.    Left ventricular diastolic function was indeterminate.    The right ventricular cavity is mildly dilated.    The left atrial cavity is severely dilated.    Left atrial volume is severely increased.    The right atrial cavity is moderately  dilated.    Estimated right ventricular systolic pressure from tricuspid regurgitation is normal (<35 mmHg).    The main pulmonary artery is severely dilated.      Most recent stress test as reviewed and interpreted by me:  Results for orders placed during the hospital encounter of 04/18/23    Stress Test With Myocardial Perfusion One Day    Interpretation Summary    Left ventricular ejection fraction is normal (Calculated EF = 60%).    Myocardial perfusion imaging indicates a normal myocardial perfusion study with no evidence of ischemia.    Impressions are consistent with a low risk study.    Diaphragmatic attenuation and GI artifacts are present.    There is no prior study  available for comparison.    Findings consistent with an equivocal ECG stress test.    Underlying atrial fibrillation during the study, normal ST-T wave findings  Heart rates reached 1 20-1 30 with pharmacologic stress, no changes at submaximal stress with respect to ischemic ST-T wave findings  No arrhythmias seen other than baseline atrial fibrillation  Nuclear perfusion demonstrated fixed small basal inferolateral defect with no reversibility, possible diaphragmatic GI attenuation, normal EF 60% with normal LV size and geometry and LV function    Low risk study for any reversible ischemia, appears to have normal perfusion other than diaphragmatic GI attenuation affected diminished inferolateral counts observed in stress and rest with normal EF 60%      Most recent cardiac catheterization as reviewed interpreted by me:  Results for orders placed during the hospital encounter of 10/13/23    Cardiac Catheterization/Vascular Study    Narrative  Primary operative Augustin Ellsworth MD, PhD  [facility]  Date of service 10-    Procedure  1.  Left and right heart cath coronary angiography, LV pressure and functional assessment, LV gram, Abdulaziz cardiac output calculations with PA and FA saturations    Indication  Refractory chest pain medical therapy, estimation of filling pressures to guide dialysis and volume removal with recurrent hospitalization    After informed consent patient brought to the Cath Lab sterilely prepped and draped in usual fashion with exposure of the right groin for right common femoral arterial and venous access via micropuncture and modified cylinder technique with placement of 6 Mohawk sheaths.  Burrton-Celine catheter was advanced to the wedge position followed by pullback to the PA with PA artery saturation obtained, pullback the RV and RA with pressures recorded at each level.  FA saturation was also obtained.  Burrton-Celine was removed followed by coronary angiography with JL 4 and JR4  "catheters for selective left and right coronary angiography respectively, JR4 was used to cross the aortic valve with hand-injection for LV gram EDP assessment and pullback assessment of the transaortic valve gradient.  There was no CAD of any angiographic significance maximally 30% in the RCA otherwise left-sided.  Without angiographic disease.  Filling pressures were normal with normal LVEDP, normal EF at 60%, right atrial pressure was 5 with adequate  dialysis efforts daily over the last 3 to 4 days, normal pulmonary pressures, normal aortic transvalvular gradient.  All catheters\" removed.  She left Cath Lab chest pain-free hemodynamically electrically stable returning to staff neurologically grossly intact bilaterally    Complications none  Blood loss less than 5 cc  Contrast 65 cc  Sedation time of 45 minutes    Findings  1.  Wedge pressure of 9  2.  PA pressure 28/11 with a mean of 20  3.  RV pressure 27/1 with an EDP of 3  4.  LV pressure of 91/0 with an EDP of 5-6  5.  Closing pressure 96/48 with a mean of 72  6.  Aortic saturation 100%  7.  PA saturation 80%  8.  Abdulaziz cardiac output calculation 7.7 L/min with index of 4.7 which is normal  9.  Normal cardiac output,  which is normal,  which is normal less than 2 Wood unit    Angiography  1.  Left main normal no disease  Over 2 the LAD is a medium caliber vessel with 1 diagonal branch on its course to around the apex with no angiographic disease throughout the LAD  3.  The circumflex is nondominant with 2 obtuse marginal branches with no angiographic disease throughout  4.  The RCA is a very large caliber dominant vessel with mild diffuse but nonobstructive disease 30% maximally in the proximal to mid segment with PLV and PDA with no obstructive disease less than 20%    Conclusions recommendations  1.  Normal epicardial coronary anatomy with no obstructive atherosclerotic disease of any significance maximally 30% with ANNA-3 flow in all " vessels  2.  Normal LV filling pressures with normal LV function with normal transaortic valve gradient  3.  Normal pulmonary pressures with no evidence of pulmonary hypertension, normal cardiac output with normal peripheral and systemic vascular resistance, normal right atrial pressure with adequate dialysis efforts over the last few days    Continue dialysis efforts, dry weight has been established, note this for future dialysis efforts to avoid volume accumulation volume overload, 2D echo on presentation revealed severely elevated IVC diameter with minimal collapsibility right toe pressure was greater than 20 at that time by noninvasive assessment    Augustin Ellsworth MD, PhD    The following portions of the patient's history were reviewed and updated as appropriate: allergies, current medications, past family history, past medical history, past social history, past surgical history, and problem list.      ROS:  14 point review of systems negative except as mentioned above    Current Outpatient Medications:     acetaminophen (TYLENOL) 325 MG tablet, Take 2 tablets by mouth Every 6 (Six) Hours As Needed for Mild Pain., Disp: , Rfl:     apixaban (ELIQUIS) 2.5 MG tablet tablet, Take 1 tablet by mouth Every 12 (Twelve) Hours. Indications: Atrial Fibrillation, Disp: 60 tablet, Rfl: 0    atorvastatin (LIPITOR) 10 MG tablet, Take 2 tablets by mouth Daily., Disp: 90 tablet, Rfl: 0    calcium acetate (PHOSLO) 667 MG tablet, , Disp: , Rfl:     dicyclomine (BENTYL) 10 MG capsule, Take 1 capsule by mouth 3 (Three) Times a Day., Disp: 60 capsule, Rfl: 0    dilTIAZem CD (CARDIZEM CD) 120 MG 24 hr capsule, Take 1 capsule by mouth Daily., Disp: 30 capsule, Rfl: 0    gabapentin (NEURONTIN) 100 MG capsule, , Disp: , Rfl:     gabapentin (NEURONTIN) 300 MG capsule, , Disp: , Rfl:     ipratropium-albuterol (DUO-NEB) 0.5-2.5 mg/3 ml nebulizer, Take 3 mL by nebulization Every 4 (Four) Hours As Needed for Wheezing., Disp: , Rfl:      loperamide (IMODIUM) 2 MG capsule, Take 2 capsules by mouth Daily As Needed for Diarrhea., Disp: , Rfl:     midodrine (PROAMATINE) 10 MG tablet, Take 1 tablet by mouth Daily As Needed (PRN dialysis days.)., Disp: 15 tablet, Rfl: 0    ondansetron ODT (ZOFRAN-ODT) 4 MG disintegrating tablet, Place 1 tablet on the tongue Every 8 (Eight) Hours As Needed for Nausea or Vomiting., Disp: , Rfl:     predniSONE (DELTASONE) 10 MG tablet, , Disp: , Rfl:     predniSONE (DELTASONE) 5 MG tablet, , Disp: , Rfl:     saccharomyces boulardii (FLORASTOR) 250 MG capsule, Take 1 capsule by mouth 2 (Two) Times a Day., Disp: 60 capsule, Rfl: 0    sertraline (ZOLOFT) 25 MG tablet, , Disp: , Rfl:     topiramate (TOPAMAX) 100 MG tablet, Take 1 tablet by mouth Every Night., Disp: 30 tablet, Rfl: 0    venlafaxine (EFFEXOR) 37.5 MG tablet, , Disp: , Rfl:     zinc oxide 20 % ointment, Apply 1 application  topically to the appropriate area as directed 2 (Two) Times a Day., Disp: , Rfl:     metoprolol tartrate (LOPRESSOR) 50 MG tablet, 2 pills in the morning,  1 pill in the evening, Disp: 180 tablet, Rfl: 3    Problem List:  Patient Active Problem List   Diagnosis    Atrial fibrillation    Dyslipidemia    Essential hypertension    Long term current use of anticoagulant    Presence of cardiac pacemaker    Sick sinus syndrome    Type 2 diabetes mellitus    Tear film insufficiency    Presbyopia    Macular puckering of retina    Tear film insufficiency, bilateral    Pseudophakia, both eyes    Lens replaced by other means    Epiretinal membrane, bilateral    Acute encephalopathy    History of carcinoma in situ of breast    Ureteral cancer    Chronic insomnia    Seasonal allergies    Altered mental status    MACRINA (acute kidney injury)    Cholecystitis with cholelithiasis    Acute UTI    Abdominal pain    Acute UTI    Acquired absence of kidney    Other artificial openings of urinary tract status    History of bladder cancer    Hydronephrosis    RLS  (restless legs syndrome)    Weakness    Preop cardiovascular exam    Other specified abdominal hernia without obstruction or gangrene    Fever    Athscl heart disease of native coronary 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    Acquired absence of other specified parts of digestive tract    Chronic kidney disease, stage 3a    Gastro-esophageal reflux disease without esophagitis    Hyperlipidemia, unspecified    Paroxysmal atrial fibrillation    Essential (primary) hypertension    Personal history of breast cancer    Presence of cardiac pacemaker    Sick sinus syndrome    Type 2 diabetes mellitus with diabetic chronic kidney disease    Type 2 diabetes mellitus with diabetic polyneuropathy    Malignant neoplasm of unspecified ureter    Atrial fibrillation with RVR    Sepsis due to gram-negative UTI    E coli bacteremia    Chest pain, unspecified type    Left flank pain    Myalgia due to statin    Chest pain    ESRD on dialysis    Atrial fibrillation with rapid ventricular response     Past Medical History:  Past Medical History:   Diagnosis Date    Bladder cancer     Cancer     breast, bladder    Deep vein thrombosis     Essential hypertension 1/17/2017    Fracture tibia/fibula, right, closed, initial encounter 8/27/2020    GERD (gastroesophageal reflux disease)     History of urostomy     Immobility 08/27/2020    r/t broken Tibia     Kidney carcinoma, right     removed     Long term current use of anticoagulant 1/9/2015    PAF (paroxysmal atrial fibrillation) 6/26/2019    Presence of cardiac pacemaker 11/03/2014    BS dual chamber PM placed 10/2014 with pocket revision 1/25/17.  HX tachy rob syndrome    Sick sinus syndrome 11/3/2014     Past Surgical History:  Past Surgical History:   Procedure Laterality Date    BREAST LUMPECTOMY      CARDIAC CATHETERIZATION N/A 10/18/2023    Procedure: Left Heart Cath possible PCI, atherectomy, hemodynamic support;  Surgeon: Augustin Ellsworth MD;  Location: [facility] CATH INVASIVE LOCATION;   "Service: Cardiology;  Laterality: N/A;    CARDIAC ELECTROPHYSIOLOGY PROCEDURE N/A 4/28/2023    Procedure: Pacemaker gen change, [place] AWARE $;  Surgeon: Jose Carlos Cheng MD;  Location: [facility] CATH INVASIVE LOCATION;  Service: Cardiovascular;  Laterality: N/A;    CHOLECYSTECTOMY N/A 10/12/2020    Procedure: CHOLECYSTECTOMY LAPAROSCOPIC;  Surgeon: Go Pereira DO;  Location: [facility] MAIN OR;  Service: General;  Laterality: N/A;    CYSTECTOMY W/ URETEROILEAL CONDUIT      HARDWARE REMOVAL Right 6/11/2021    Procedure: TIBIA HARDWARE REMOVAL;  Surgeon: Geoff Shah II, MD;  Location: [facility] MAIN OR;  Service: Orthopedics;  Laterality: Right;    HERNIA REPAIR      HYSTERECTOMY      INSERT / REPLACE / REMOVE PACEMAKER      NEPHRECTOMY Right     TIBIA IM NAILING/RODDING Right 8/28/2020    Procedure: TIBIA INTRAMEDULLARY NAIL/ABRAHAM INSERTION;  Surgeon: Geoff Shah II, MD;  Location: [facility] MAIN OR;  Service: Orthopedics;  Laterality: Right;     Social History:  Social History     Socioeconomic History    Marital status:    Tobacco Use    Smoking status: Never     Passive exposure: Never    Smokeless tobacco: Never   Vaping Use    Vaping Use: Never used   Substance and Sexual Activity    Alcohol use: Not Currently    Drug use: Never    Sexual activity: Defer     Allergies:  Allergies   Allergen Reactions    Amoxicillin Shortness Of Breath    Ciprofloxacin Hives    Oxycodone-Acetaminophen Unknown - High Severity     Pt's son stated when pt fell and broke her leg she was put on oxycodone; pt's son stated pt's \"vitals went all out of wack, she couldn't remember things.\"    Penicillins Rash    Sulfa Antibiotics Hives    Cephalexin Other (See Comments)    Clavulanic Acid Other (See Comments)    Codeine Other (See Comments)    Contrast Dye (Echo Or Unknown Ct/Mr) Other (See Comments)     8/18/22     Doxycycline Other (See Comments)    Fluconazole Other (See Comments)    Iodinated Contrast Media " Other (See Comments)     8/18/22    Iodine Hives    Macrobid [Nitrofurantoin] Hives    Tobramycin Other (See Comments)    Trimethoprim Other (See Comments)     Immunizations:  Immunization History   Administered Date(s) Administered    COVID-19 (PFIZER) Purple Cap Monovalent 02/03/2021, 02/26/2021    COVID-19 (UNSPECIFIED) 11/09/2020, 11/17/2020    FLUAD TRI 65YR+ 09/07/2012, 09/24/2014    Fluzone High Dose =>65 Years ([employer] ONLY) 12/16/2015, 11/04/2016, 10/31/2017, 10/18/2018, 09/27/2019    Influenza Seasonal Injectable 11/30/2020    Pneumococcal Conjugate 13-Valent (PCV13) 11/04/2016    Tdap 04/01/2016            In-Office Procedure(s):  No orders to display        ASCVD RIsk Score::  The ASCVD Risk score (Chelly ANGEL, et al., 2019) failed to calculate for the following reasons:    The 2019 ASCVD risk score is only valid for ages 40 to 79    The patient has a prior MI or stroke diagnosis    Imaging:    Results for orders placed during the hospital encounter of 12/10/23    XR Chest 1 View    Narrative  XR CHEST 1 VW    Date of Exam: 12/10/2023 9:45 AM EST    Indication: dialysis catheter evaluation    Comparison: November 9, 2023    Findings:  Right IJ dialysis catheter with tip projecting in the superior right atrium, as before. No pneumothorax. There is a new small to moderate left pleural effusion. There are left basilar opacities. No significant right effusion or infiltrate. The heart  appears mildly enlarged. Pacemaker is present. There is calcific atherosclerosis of the aorta.    Impression  Impression:  1.Right IJ dialysis catheter with tip projecting in the superior right atrium, as before.  2.New small to moderate left pleural effusion with left basilar opacities which could represent atelectasis or infiltrate.        Electronically Signed: Augustin Rodriges  12/10/2023 9:54 AM EST  Workstation ID: [id number]       Results for orders placed during the hospital encounter of 06/16/23    CT Abdomen Pelvis Without  Contrast    Narrative  EXAMINATION:  CT SCAN OF THE ABDOMEN AND PELVIS WITHOUT INTRAVENOUS CONTRAST    DATE OF EXAM: 6/16/2023 3:51 AM    HISTORY: Left flank pain    COMPARISON: 3/21/2023.    TECHNIQUE: CT examination of the abdomen and pelvis with sagittal and coronal reformations was performed without intravenous contrast.  CT dose lowering techniques were used, to include: automated exposure control, adjustment for patient size, and/or use  of iterative reconstruction.    Note: The exam is limited because some types of pathology may not be adequately demonstrated due to lack of contrast enhancement.    FINDINGS:    ABDOMEN/PELVIS:    Lower Chest:  No evidence of acute abnormality. Bibasal scarring. Stable cardiomegaly.    Liver: Normal.    Gallbladder/Biliary: Status post cholecystectomy.    Pancreas: Normal.    Spleen: Normal.    Adrenal Glands: Normal.    Kidneys: Stable nonobstructing left nephrolithiasis. No hydronephrosis or nephrolithiasis. Postsurgical changes are again seen with reimplantation of the left ureter into an ileal conduit in the right pelvis, which is seen coursing into the right lower  ostomy site. Prior right nephrectomy..    GI Tract: Herniation of a short segment of the transverse colon into the right lower anterior abdominal wall parastomal hernia. No obstruction. Moderate stool burden    Mesentery/Peritoneum: Normal.    Vasculature: Normal.    Lymph Nodes: Normal.    Abdominal Wall: Normal.    Bladder: Normal.    Reproductive: Status post hysterectomy.    Musculoskeletal: No evidence of acute abnormality. Redemonstration of calcification within the right rectus muscle. Redemonstration of severe generalized osteopenia. Similar degenerative changes.    Impression  1.  Stable nonobstructing left nephrolithiasis. Status post right nephrectomy. Moderate stool burden.  2.  Parastomal hernia contains a short segment of stool filled transverse colon without evidence of obstruction.  3.  Severe  osteopenia.  4.  Chronic changes as above.    Electronically signed by:  Trev Sanders D.O.  6/16/2023 2:17 AM Mountain Time      Results for orders placed during the hospital encounter of 06/16/23    CT Abdomen Pelvis Without Contrast    Narrative  EXAMINATION:  CT SCAN OF THE ABDOMEN AND PELVIS WITHOUT INTRAVENOUS CONTRAST    DATE OF EXAM: 6/16/2023 3:51 AM    HISTORY: Left flank pain    COMPARISON: 3/21/2023.    TECHNIQUE: CT examination of the abdomen and pelvis with sagittal and coronal reformations was performed without intravenous contrast.  CT dose lowering techniques were used, to include: automated exposure control, adjustment for patient size, and/or use  of iterative reconstruction.    Note: The exam is limited because some types of pathology may not be adequately demonstrated due to lack of contrast enhancement.    FINDINGS:    ABDOMEN/PELVIS:    Lower Chest:  No evidence of acute abnormality. Bibasal scarring. Stable cardiomegaly.    Liver: Normal.    Gallbladder/Biliary: Status post cholecystectomy.    Pancreas: Normal.    Spleen: Normal.    Adrenal Glands: Normal.    Kidneys: Stable nonobstructing left nephrolithiasis. No hydronephrosis or nephrolithiasis. Postsurgical changes are again seen with reimplantation of the left ureter into an ileal conduit in the right pelvis, which is seen coursing into the right lower  ostomy site. Prior right nephrectomy..    GI Tract: Herniation of a short segment of the transverse colon into the right lower anterior abdominal wall parastomal hernia. No obstruction. Moderate stool burden    Mesentery/Peritoneum: Normal.    Vasculature: Normal.    Lymph Nodes: Normal.    Abdominal Wall: Normal.    Bladder: Normal.    Reproductive: Status post hysterectomy.    Musculoskeletal: No evidence of acute abnormality. Redemonstration of calcification within the right rectus muscle. Redemonstration of severe generalized osteopenia. Similar degenerative  changes.    Impression  1.  Stable nonobstructing left nephrolithiasis. Status post right nephrectomy. Moderate stool burden.  2.  Parastomal hernia contains a short segment of stool filled transverse colon without evidence of obstruction.  3.  Severe osteopenia.  4.  Chronic changes as above.    Electronically signed by:  Trev Sanders D.O.  6/16/2023 2:17 AM Mountain Time      Lab Review:   Admission on 12/10/2023, Discharged on 12/10/2023   Component Date Value    Glucose 12/10/2023 152 (H)     BUN 12/10/2023 51 (H)     Creatinine 12/10/2023 4.14 (H)     Sodium 12/10/2023 136     Potassium 12/10/2023 4.5     Chloride 12/10/2023 96 (L)     CO2 12/10/2023 24.0     Calcium 12/10/2023 9.9     BUN/Creatinine Ratio 12/10/2023 12.3     Anion Gap 12/10/2023 16.0 (H)     eGFR 12/10/2023 10.2 (L)     WBC 12/10/2023 7.20     RBC 12/10/2023 4.01     Hemoglobin 12/10/2023 13.0     Hematocrit 12/10/2023 39.9     MCV 12/10/2023 99.4 (H)     MCH 12/10/2023 32.4     MCHC 12/10/2023 32.6     RDW 12/10/2023 15.3     RDW-SD 12/10/2023 56.0 (H)     MPV 12/10/2023 7.1     Platelets 12/10/2023 199     Neutrophil % 12/10/2023 84.6 (H)     Lymphocyte % 12/10/2023 11.7 (L)     Monocyte % 12/10/2023 3.4 (L)     Eosinophil % 12/10/2023 0.0 (L)     Basophil % 12/10/2023 0.3     Neutrophils, Absolute 12/10/2023 6.10     Lymphocytes, Absolute 12/10/2023 0.80     Monocytes, Absolute 12/10/2023 0.20     Eosinophils, Absolute 12/10/2023 0.00     Basophils, Absolute 12/10/2023 0.00     nRBC 12/10/2023 0.0    Admission on 11/08/2023, Discharged on 11/13/2023   Component Date Value    Glucose 11/08/2023 145 (H)     BUN 11/08/2023 59 (H)     Creatinine 11/08/2023 5.22 (H)     Sodium 11/08/2023 137     Potassium 11/08/2023 4.6     Chloride 11/08/2023 99     CO2 11/08/2023 24.0     Calcium 11/08/2023 9.6     Total Protein 11/08/2023 7.2     Albumin 11/08/2023 3.5     ALT (SGPT) 11/08/2023 12     AST (SGOT) 11/08/2023 19     Alkaline  Phosphatase 11/08/2023 142 (H)     Total Bilirubin 11/08/2023 0.3     Globulin 11/08/2023 3.7     A/G Ratio 11/08/2023 0.9     BUN/Creatinine Ratio 11/08/2023 11.3     Anion Gap 11/08/2023 14.0     eGFR 11/08/2023 7.8 (L)     Protime 11/08/2023 12.1 (H)     INR 11/08/2023 1.12 (H)     PTT 11/08/2023 32.3 (H)     HS Troponin T 11/08/2023 63 (C)     Magnesium 11/08/2023 2.1     TSH 11/08/2023 1.000     WBC 11/08/2023 11.40 (H)     RBC 11/08/2023 3.34 (L)     Hemoglobin 11/08/2023 10.5 (L)     Hematocrit 11/08/2023 32.6 (L)     MCV 11/08/2023 97.8 (H)     MCH 11/08/2023 31.5     MCHC 11/08/2023 32.2     RDW 11/08/2023 14.7     RDW-SD 11/08/2023 53.4     MPV 11/08/2023 8.0     Platelets 11/08/2023 264     Neutrophil % 11/08/2023 93.1 (H)     Lymphocyte % 11/08/2023 4.0 (L)     Monocyte % 11/08/2023 2.2 (L)     Eosinophil % 11/08/2023 0.1 (L)     Basophil % 11/08/2023 0.6     Neutrophils, Absolute 11/08/2023 10.60 (H)     Lymphocytes, Absolute 11/08/2023 0.50 (L)     Monocytes, Absolute 11/08/2023 0.20     Eosinophils, Absolute 11/08/2023 0.00     Basophils, Absolute 11/08/2023 0.10     nRBC 11/08/2023 0.0     ADENOVIRUS, PCR 11/08/2023 Not Detected     Coronavirus 229E 11/08/2023 Not Detected     Coronavirus HKU1 11/08/2023 Not Detected     Coronavirus NL63 11/08/2023 Not Detected     Coronavirus OC43 11/08/2023 Not Detected     COVID19 11/08/2023 Not Detected     Human Metapneumovirus 11/08/2023 Not Detected     Human Rhinovirus/Enterov* 11/08/2023 Not Detected     Influenza A PCR 11/08/2023 Not Detected     Influenza B PCR 11/08/2023 Not Detected     Parainfluenza Virus 1 11/08/2023 Not Detected     Parainfluenza Virus 2 11/08/2023 Not Detected     Parainfluenza Virus 3 11/08/2023 Not Detected     Parainfluenza Virus 4 11/08/2023 Not Detected     RSV, PCR 11/08/2023 Not Detected     Bordetella pertussis pcr 11/08/2023 Not Detected     Bordetella parapertussis* 11/08/2023 Not Detected     Chlamydophila pneumoniae*  11/08/2023 Not Detected     Mycoplasma pneumo by PCR 11/08/2023 Not Detected     QT Interval 11/08/2023 337     QTC Interval 11/08/2023 475     HS Troponin T 11/08/2023 57 (C)     Troponin T Delta 11/08/2023 -6 (L)     Color, UA 11/08/2023 Yellow     Appearance, UA 11/08/2023 Cloudy (A)     pH, UA 11/08/2023 8.5 (H)     Specific [place], UA 11/08/2023 1.016     Glucose, UA 11/08/2023 Negative     Ketones, UA 11/08/2023 Negative     Bilirubin, UA 11/08/2023 Negative     Blood, UA 11/08/2023 Trace (A)     Protein, UA 11/08/2023 100 mg/dL (2+) (A)     Leuk Esterase, UA 11/08/2023 Large (3+) (A)     Nitrite, UA 11/08/2023 Negative     Urobilinogen, UA 11/08/2023 1.0 E.U./dL     RBC, UA 11/08/2023 0-2     WBC, UA 11/08/2023 21-50 (A)     Bacteria, UA 11/08/2023 4+ (A)     Squamous Epithelial Cell* 11/08/2023 0-2     Hyaline Casts, UA 11/08/2023 None Seen     Methodology 11/08/2023 Automated Microscopy     Urine Culture 11/08/2023 >100,000 CFU/mL Proteus mirabilis (A)     Glucose 11/09/2023 99     BUN 11/09/2023 67 (H)     Creatinine 11/09/2023 5.46 (H)     Sodium 11/09/2023 142     Potassium 11/09/2023 4.7     Chloride 11/09/2023 105     CO2 11/09/2023 25.0     Calcium 11/09/2023 9.1     BUN/Creatinine Ratio 11/09/2023 12.3     Anion Gap 11/09/2023 12.0     eGFR 11/09/2023 7.4 (L)     Phosphorus 11/09/2023 5.2 (H)     Magnesium 11/09/2023 2.2     HS Troponin T 11/09/2023 52 (H)     Extra Tube 11/09/2023 hold for add-on     Glucose 11/10/2023 100 (H)     BUN 11/10/2023 77 (H)     Creatinine 11/10/2023 6.14 (H)     Sodium 11/10/2023 140     Potassium 11/10/2023 5.0     Chloride 11/10/2023 103     CO2 11/10/2023 24.0     Calcium 11/10/2023 9.2     BUN/Creatinine Ratio 11/10/2023 12.5     Anion Gap 11/10/2023 13.0     eGFR 11/10/2023 6.4 (L)     WBC 11/10/2023 9.60     RBC 11/10/2023 3.12 (L)     Hemoglobin 11/10/2023 10.2 (L)     Hematocrit 11/10/2023 31.2 (L)     MCV 11/10/2023 100.0 (H)     MCH 11/10/2023 32.5     MCHC  11/10/2023 32.6     RDW 11/10/2023 14.8     RDW-SD 11/10/2023 52.1     MPV 11/10/2023 7.5     Platelets 11/10/2023 265     Hepatitis B Surface Ag 11/12/2023 Non-Reactive    No results displayed because visit has over 200 results.      No results displayed because visit has over 200 results.        Recent labs reviewed and interpreted for clinical significance and application            Level of Care:           Augustin Ellsworth MD  01/08/24  .

## 2024-01-23 ENCOUNTER — TELEPHONE (OUTPATIENT)
Dept: CARDIOLOGY | Facility: CLINIC | Age: 84
End: 2024-01-23
Payer: MEDICARE

## 2024-01-23 NOTE — TELEPHONE ENCOUNTER
Called and lvm to have the patient send a manual transmission with home device with [employer] RE[employer]  ------------------------------------    Angela CORBETT from Dr Camarillo and Dr Ellsworth's office has not received a transmission from your home monitoring device. Please check all connections on the monitor and push the button to transmit. Please contact the office if you have any questions [phone]. Thank you for your assistance.  You may also contact [facility] [phone]

## 2024-01-28 ENCOUNTER — APPOINTMENT (OUTPATIENT)
Dept: GENERAL RADIOLOGY | Facility: HOSPITAL | Age: 84
End: 2024-01-28
Payer: MEDICARE

## 2024-01-28 ENCOUNTER — HOSPITAL ENCOUNTER (INPATIENT)
Facility: HOSPITAL | Age: 84
LOS: 2 days | Discharge: NURSING, REHAB OR LONG-TERM CARE | End: 2024-01-31
Attending: INTERNAL MEDICINE | Admitting: INTERNAL MEDICINE
Payer: MEDICARE

## 2024-01-28 DIAGNOSIS — R07.9 CHEST PAIN, UNSPECIFIED TYPE: Primary | ICD-10-CM

## 2024-01-28 DIAGNOSIS — J21.0 RSV (ACUTE BRONCHIOLITIS DUE TO RESPIRATORY SYNCYTIAL VIRUS): ICD-10-CM

## 2024-01-28 DIAGNOSIS — I95.9 HYPOTENSION, UNSPECIFIED HYPOTENSION TYPE: ICD-10-CM

## 2024-01-28 LAB
ALBUMIN SERPL-MCNC: 3.1 G/DL (ref 3.5–5.2)
ALBUMIN/GLOB SERPL: 1 G/DL
ALP SERPL-CCNC: 118 U/L (ref 39–117)
ALT SERPL W P-5'-P-CCNC: 24 U/L (ref 1–33)
ANION GAP SERPL CALCULATED.3IONS-SCNC: 12 MMOL/L (ref 5–15)
AST SERPL-CCNC: 32 U/L (ref 1–32)
B PARAPERT DNA SPEC QL NAA+PROBE: NOT DETECTED
B PERT DNA SPEC QL NAA+PROBE: NOT DETECTED
BASOPHILS # BLD AUTO: 0.1 10*3/MM3 (ref 0–0.2)
BASOPHILS NFR BLD AUTO: 1.1 % (ref 0–1.5)
BILIRUB SERPL-MCNC: 0.4 MG/DL (ref 0–1.2)
BUN SERPL-MCNC: 84 MG/DL (ref 8–23)
BUN/CREAT SERPL: 15.7 (ref 7–25)
C PNEUM DNA NPH QL NAA+NON-PROBE: NOT DETECTED
CALCIUM SPEC-SCNC: 8.6 MG/DL (ref 8.6–10.5)
CHLORIDE SERPL-SCNC: 101 MMOL/L (ref 98–107)
CO2 SERPL-SCNC: 24 MMOL/L (ref 22–29)
CREAT SERPL-MCNC: 5.36 MG/DL (ref 0.57–1)
DEPRECATED RDW RBC AUTO: 54.7 FL (ref 37–54)
EGFRCR SERPLBLD CKD-EPI 2021: 7.5 ML/MIN/1.73
EOSINOPHIL # BLD AUTO: 0.2 10*3/MM3 (ref 0–0.4)
EOSINOPHIL NFR BLD AUTO: 1.6 % (ref 0.3–6.2)
ERYTHROCYTE [DISTWIDTH] IN BLOOD BY AUTOMATED COUNT: 16 % (ref 12.3–15.4)
FLUAV SUBTYP SPEC NAA+PROBE: NOT DETECTED
FLUBV RNA ISLT QL NAA+PROBE: NOT DETECTED
GEN 5 2HR TROPONIN T REFLEX: 80 NG/L
GLOBULIN UR ELPH-MCNC: 3.2 GM/DL
GLUCOSE SERPL-MCNC: 104 MG/DL (ref 65–99)
HADV DNA SPEC NAA+PROBE: NOT DETECTED
HCOV 229E RNA SPEC QL NAA+PROBE: NOT DETECTED
HCOV HKU1 RNA SPEC QL NAA+PROBE: NOT DETECTED
HCOV NL63 RNA SPEC QL NAA+PROBE: NOT DETECTED
HCOV OC43 RNA SPEC QL NAA+PROBE: NOT DETECTED
HCT VFR BLD AUTO: 31.4 % (ref 34–46.6)
HGB BLD-MCNC: 10.1 G/DL (ref 12–15.9)
HMPV RNA NPH QL NAA+NON-PROBE: NOT DETECTED
HOLD SPECIMEN: NORMAL
HOLD SPECIMEN: NORMAL
HPIV1 RNA ISLT QL NAA+PROBE: NOT DETECTED
HPIV2 RNA SPEC QL NAA+PROBE: NOT DETECTED
HPIV3 RNA NPH QL NAA+PROBE: NOT DETECTED
HPIV4 P GENE NPH QL NAA+PROBE: NOT DETECTED
LYMPHOCYTES # BLD AUTO: 2 10*3/MM3 (ref 0.7–3.1)
LYMPHOCYTES NFR BLD AUTO: 19.2 % (ref 19.6–45.3)
M PNEUMO IGG SER IA-ACNC: NOT DETECTED
MCH RBC QN AUTO: 32 PG (ref 26.6–33)
MCHC RBC AUTO-ENTMCNC: 32 G/DL (ref 31.5–35.7)
MCV RBC AUTO: 100.1 FL (ref 79–97)
MONOCYTES # BLD AUTO: 1 10*3/MM3 (ref 0.1–0.9)
MONOCYTES NFR BLD AUTO: 9.9 % (ref 5–12)
NEUTROPHILS NFR BLD AUTO: 68.2 % (ref 42.7–76)
NEUTROPHILS NFR BLD AUTO: 7 10*3/MM3 (ref 1.7–7)
NRBC BLD AUTO-RTO: 0.1 /100 WBC (ref 0–0.2)
PLATELET # BLD AUTO: 268 10*3/MM3 (ref 140–450)
PMV BLD AUTO: 6.8 FL (ref 6–12)
POTASSIUM SERPL-SCNC: 4.9 MMOL/L (ref 3.5–5.2)
PROT SERPL-MCNC: 6.3 G/DL (ref 6–8.5)
RBC # BLD AUTO: 3.14 10*6/MM3 (ref 3.77–5.28)
RHINOVIRUS RNA SPEC NAA+PROBE: NOT DETECTED
RSV RNA NPH QL NAA+NON-PROBE: DETECTED
SARS-COV-2 RNA NPH QL NAA+NON-PROBE: NOT DETECTED
SODIUM SERPL-SCNC: 137 MMOL/L (ref 136–145)
TROPONIN T DELTA: -3 NG/L
TROPONIN T SERPL HS-MCNC: 83 NG/L
WBC NRBC COR # BLD AUTO: 10.2 10*3/MM3 (ref 3.4–10.8)
WHOLE BLOOD HOLD COAG: NORMAL
WHOLE BLOOD HOLD SPECIMEN: NORMAL

## 2024-01-28 PROCEDURE — 0202U NFCT DS 22 TRGT SARS-COV-2: CPT | Performed by: PHYSICIAN ASSISTANT

## 2024-01-28 PROCEDURE — 36415 COLL VENOUS BLD VENIPUNCTURE: CPT

## 2024-01-28 PROCEDURE — 99285 EMERGENCY DEPT VISIT HI MDM: CPT

## 2024-01-28 PROCEDURE — 84484 ASSAY OF TROPONIN QUANT: CPT | Performed by: PHYSICIAN ASSISTANT

## 2024-01-28 PROCEDURE — 80053 COMPREHEN METABOLIC PANEL: CPT | Performed by: PHYSICIAN ASSISTANT

## 2024-01-28 PROCEDURE — 71045 X-RAY EXAM CHEST 1 VIEW: CPT

## 2024-01-28 PROCEDURE — G0378 HOSPITAL OBSERVATION PER HR: HCPCS

## 2024-01-28 PROCEDURE — 87340 HEPATITIS B SURFACE AG IA: CPT | Performed by: INTERNAL MEDICINE

## 2024-01-28 PROCEDURE — 93005 ELECTROCARDIOGRAM TRACING: CPT | Performed by: INTERNAL MEDICINE

## 2024-01-28 PROCEDURE — 85025 COMPLETE CBC W/AUTO DIFF WBC: CPT | Performed by: PHYSICIAN ASSISTANT

## 2024-01-28 PROCEDURE — 25810000003 SODIUM CHLORIDE 0.9 % SOLUTION: Performed by: PHYSICIAN ASSISTANT

## 2024-01-28 PROCEDURE — 93005 ELECTROCARDIOGRAM TRACING: CPT

## 2024-01-28 PROCEDURE — 25810000003 SODIUM CHLORIDE 0.9 % SOLUTION

## 2024-01-28 RX ORDER — MIDODRINE HYDROCHLORIDE 5 MG/1
10 TABLET ORAL DAILY PRN
Status: DISCONTINUED | OUTPATIENT
Start: 2024-01-28 | End: 2024-01-31 | Stop reason: HOSPADM

## 2024-01-28 RX ORDER — POLYETHYLENE GLYCOL 3350 17 G/17G
17 POWDER, FOR SOLUTION ORAL DAILY PRN
Status: DISCONTINUED | OUTPATIENT
Start: 2024-01-28 | End: 2024-01-31 | Stop reason: HOSPADM

## 2024-01-28 RX ORDER — METOPROLOL TARTRATE 50 MG/1
50 TABLET, FILM COATED ORAL EVERY 12 HOURS SCHEDULED
Status: DISCONTINUED | OUTPATIENT
Start: 2024-01-28 | End: 2024-01-29

## 2024-01-28 RX ORDER — ATORVASTATIN CALCIUM 20 MG/1
20 TABLET, FILM COATED ORAL NIGHTLY
COMMUNITY

## 2024-01-28 RX ORDER — FUROSEMIDE 40 MG/1
40 TABLET ORAL DAILY
COMMUNITY
Start: 2024-01-26 | End: 2024-01-31 | Stop reason: HOSPADM

## 2024-01-28 RX ORDER — SODIUM CHLORIDE 0.9 % (FLUSH) 0.9 %
10 SYRINGE (ML) INJECTION AS NEEDED
Status: DISCONTINUED | OUTPATIENT
Start: 2024-01-28 | End: 2024-01-31 | Stop reason: HOSPADM

## 2024-01-28 RX ORDER — IPRATROPIUM BROMIDE AND ALBUTEROL SULFATE 2.5; .5 MG/3ML; MG/3ML
3 SOLUTION RESPIRATORY (INHALATION) EVERY 4 HOURS PRN
Status: DISCONTINUED | OUTPATIENT
Start: 2024-01-28 | End: 2024-01-31 | Stop reason: HOSPADM

## 2024-01-28 RX ORDER — SODIUM CHLORIDE 9 MG/ML
40 INJECTION, SOLUTION INTRAVENOUS AS NEEDED
Status: DISCONTINUED | OUTPATIENT
Start: 2024-01-28 | End: 2024-01-31 | Stop reason: HOSPADM

## 2024-01-28 RX ORDER — LOPERAMIDE HYDROCHLORIDE 2 MG/1
4 CAPSULE ORAL DAILY PRN
Status: DISCONTINUED | OUTPATIENT
Start: 2024-01-28 | End: 2024-01-31 | Stop reason: HOSPADM

## 2024-01-28 RX ORDER — HYDRALAZINE HYDROCHLORIDE 20 MG/ML
10 INJECTION INTRAMUSCULAR; INTRAVENOUS EVERY 6 HOURS PRN
Status: DISCONTINUED | OUTPATIENT
Start: 2024-01-28 | End: 2024-01-31 | Stop reason: HOSPADM

## 2024-01-28 RX ORDER — DOPAMINE HYDROCHLORIDE 160 MG/100ML
2-20 INJECTION, SOLUTION INTRAVENOUS
Status: DISCONTINUED | OUTPATIENT
Start: 2024-01-29 | End: 2024-01-29

## 2024-01-28 RX ORDER — DILTIAZEM HYDROCHLORIDE 120 MG/1
120 CAPSULE, COATED, EXTENDED RELEASE ORAL
Status: DISCONTINUED | OUTPATIENT
Start: 2024-01-29 | End: 2024-01-29

## 2024-01-28 RX ORDER — ALBUMIN (HUMAN) 12.5 G/50ML
25 SOLUTION INTRAVENOUS ONCE
Status: COMPLETED | OUTPATIENT
Start: 2024-01-29 | End: 2024-01-29

## 2024-01-28 RX ORDER — BISACODYL 5 MG/1
5 TABLET, DELAYED RELEASE ORAL DAILY PRN
Status: DISCONTINUED | OUTPATIENT
Start: 2024-01-28 | End: 2024-01-31 | Stop reason: HOSPADM

## 2024-01-28 RX ORDER — DICYCLOMINE HYDROCHLORIDE 10 MG/1
10 CAPSULE ORAL 3 TIMES DAILY
Status: DISCONTINUED | OUTPATIENT
Start: 2024-01-28 | End: 2024-01-31 | Stop reason: HOSPADM

## 2024-01-28 RX ORDER — SODIUM CHLORIDE 0.9 % (FLUSH) 0.9 %
10 SYRINGE (ML) INJECTION EVERY 12 HOURS SCHEDULED
Status: DISCONTINUED | OUTPATIENT
Start: 2024-01-28 | End: 2024-01-31 | Stop reason: HOSPADM

## 2024-01-28 RX ORDER — GABAPENTIN 100 MG/1
100 CAPSULE ORAL NIGHTLY
Status: DISCONTINUED | OUTPATIENT
Start: 2024-01-28 | End: 2024-01-31 | Stop reason: HOSPADM

## 2024-01-28 RX ORDER — MELATONIN 3 MG
1 LOZENGE ORAL
COMMUNITY
End: 2024-01-31 | Stop reason: HOSPADM

## 2024-01-28 RX ORDER — POLYETHYLENE GLYCOL 3350 17 G/17G
17 POWDER, FOR SOLUTION ORAL EVERY MORNING
COMMUNITY

## 2024-01-28 RX ORDER — ONDANSETRON 2 MG/ML
4 INJECTION INTRAMUSCULAR; INTRAVENOUS EVERY 6 HOURS PRN
Status: DISCONTINUED | OUTPATIENT
Start: 2024-01-28 | End: 2024-01-31 | Stop reason: HOSPADM

## 2024-01-28 RX ORDER — ATORVASTATIN CALCIUM 20 MG/1
20 TABLET, FILM COATED ORAL DAILY
Status: DISCONTINUED | OUTPATIENT
Start: 2024-01-29 | End: 2024-01-31 | Stop reason: HOSPADM

## 2024-01-28 RX ORDER — METOPROLOL TARTRATE 50 MG/1
100 TABLET, FILM COATED ORAL EVERY MORNING
COMMUNITY
End: 2024-01-31 | Stop reason: HOSPADM

## 2024-01-28 RX ORDER — TOPIRAMATE 100 MG/1
100 TABLET, FILM COATED ORAL NIGHTLY
Status: DISCONTINUED | OUTPATIENT
Start: 2024-01-28 | End: 2024-01-31 | Stop reason: HOSPADM

## 2024-01-28 RX ORDER — AMOXICILLIN 250 MG
2 CAPSULE ORAL 2 TIMES DAILY
Status: DISCONTINUED | OUTPATIENT
Start: 2024-01-28 | End: 2024-01-31 | Stop reason: HOSPADM

## 2024-01-28 RX ORDER — ONDANSETRON 4 MG/1
4 TABLET, ORALLY DISINTEGRATING ORAL EVERY 6 HOURS PRN
Status: DISCONTINUED | OUTPATIENT
Start: 2024-01-28 | End: 2024-01-31 | Stop reason: HOSPADM

## 2024-01-28 RX ORDER — BISACODYL 10 MG
10 SUPPOSITORY, RECTAL RECTAL DAILY PRN
Status: DISCONTINUED | OUTPATIENT
Start: 2024-01-28 | End: 2024-01-31 | Stop reason: HOSPADM

## 2024-01-28 RX ORDER — METOPROLOL TARTRATE 50 MG/1
50 TABLET, FILM COATED ORAL NIGHTLY
COMMUNITY
End: 2024-01-31 | Stop reason: HOSPADM

## 2024-01-28 RX ADMIN — DICYCLOMINE HYDROCHLORIDE 10 MG: 10 CAPSULE ORAL at 23:37

## 2024-01-28 RX ADMIN — SODIUM CHLORIDE 500 ML: 9 INJECTION, SOLUTION INTRAVENOUS at 23:30

## 2024-01-28 RX ADMIN — GABAPENTIN 100 MG: 100 CAPSULE ORAL at 23:38

## 2024-01-28 RX ADMIN — Medication 10 ML: at 23:37

## 2024-01-28 RX ADMIN — TOPIRAMATE 100 MG: 100 TABLET, FILM COATED ORAL at 23:37

## 2024-01-28 RX ADMIN — SODIUM CHLORIDE 500 ML: 9 INJECTION, SOLUTION INTRAVENOUS at 16:25

## 2024-01-28 RX ADMIN — APIXABAN 2.5 MG: 2.5 TABLET, FILM COATED ORAL at 23:38

## 2024-01-28 NOTE — Clinical Note
Level of Care: Telemetry [5]   Admitting Physician: BRIELLE KNOWLES [[id number]]   Attending Physician: BRIELLE KNOWLES [[id number]]

## 2024-01-28 NOTE — ED PROVIDER NOTES
"Subjective   History of Present Illness  Patient is an 83-year-old female who presents with resolved chest pain.  Care facility reports they also took her blood pressure which was mildly low.  They gave her midodrine she gets dialysis.  She denies any chest pain currently.        Review of Systems   Constitutional:  Negative for chills, diaphoresis, fatigue and fever.   HENT:  Negative for congestion, ear pain, sinus pressure, sore throat, trouble swallowing and voice change.    Respiratory:  Positive for chest tightness. Negative for cough.    Cardiovascular:  Positive for chest pain. Negative for palpitations.   Gastrointestinal:  Negative for abdominal distention, nausea and vomiting.   Genitourinary: Negative.    Musculoskeletal: Negative.    Skin: Negative.    Neurological: Negative.    Psychiatric/Behavioral: Negative.         Past Medical History:   Diagnosis Date    Bladder cancer     Cancer     breast, bladder    Deep vein thrombosis     Essential hypertension 1/17/2017    Fracture tibia/fibula, right, closed, initial encounter 8/27/2020    GERD (gastroesophageal reflux disease)     History of urostomy     Immobility 08/27/2020    r/t broken Tibia     Kidney carcinoma, right     removed     Long term current use of anticoagulant 1/9/2015    PAF (paroxysmal atrial fibrillation) 6/26/2019    Presence of cardiac pacemaker 11/03/2014    BS dual chamber PM placed 10/2014 with pocket revision 1/25/17.  HX tachy rob syndrome    Sick sinus syndrome 11/3/2014       Allergies   Allergen Reactions    Amoxicillin Shortness Of Breath    Ciprofloxacin Hives    Oxycodone-Acetaminophen Unknown - High Severity     Pt's son stated when pt fell and broke her leg she was put on oxycodone; pt's son stated pt's \"vitals went all out of wack, she couldn't remember things.\"    Penicillins Rash    Sulfa Antibiotics Hives    Cephalexin Other (See Comments)    Clavulanic Acid Other (See Comments)    Codeine Other (See Comments)    " Contrast Dye (Echo Or Unknown Ct/Mr) Other (See Comments)     8/18/22     Doxycycline Other (See Comments)    Fluconazole Other (See Comments)    Iodinated Contrast Media Other (See Comments)     8/18/22    Iodine Hives    Macrobid [Nitrofurantoin] Hives    Tobramycin Other (See Comments)    Trimethoprim Other (See Comments)       Past Surgical History:   Procedure Laterality Date    BREAST LUMPECTOMY      CARDIAC CATHETERIZATION N/A 10/18/2023    Procedure: Left Heart Cath possible PCI, atherectomy, hemodynamic support;  Surgeon: Augustin Ellsworth MD;  Location: [facility] CATH INVASIVE LOCATION;  Service: Cardiology;  Laterality: N/A;    CARDIAC ELECTROPHYSIOLOGY PROCEDURE N/A 4/28/2023    Procedure: Pacemaker gen change, [place] AWARE $;  Surgeon: Jose Carlos Cheng MD;  Location: [facility] CATH INVASIVE LOCATION;  Service: Cardiovascular;  Laterality: N/A;    CHOLECYSTECTOMY N/A 10/12/2020    Procedure: CHOLECYSTECTOMY LAPAROSCOPIC;  Surgeon: Go Pereira DO;  Location: [facility] MAIN OR;  Service: General;  Laterality: N/A;    CYSTECTOMY W/ URETEROILEAL CONDUIT      HARDWARE REMOVAL Right 6/11/2021    Procedure: TIBIA HARDWARE REMOVAL;  Surgeon: Geoff Shah II, MD;  Location: [facility] MAIN OR;  Service: Orthopedics;  Laterality: Right;    HERNIA REPAIR      HYSTERECTOMY      INSERT / REPLACE / REMOVE PACEMAKER      NEPHRECTOMY Right     TIBIA IM NAILING/RODDING Right 8/28/2020    Procedure: TIBIA INTRAMEDULLARY NAIL/ABRAHAM INSERTION;  Surgeon: Geoff Shah II, MD;  Location: [facility] MAIN OR;  Service: Orthopedics;  Laterality: Right;       Family History   Problem Relation Age of Onset    COPD Father        Social History     Socioeconomic History    Marital status:    Tobacco Use    Smoking status: Never     Passive exposure: Never    Smokeless tobacco: Never   Vaping Use    Vaping Use: Never used   Substance and Sexual Activity    Alcohol use: Not Currently    Drug use: Never     "Sexual activity: Defer           Objective   Physical Exam  Constitutional:       General: She is not in acute distress.     Appearance: Normal appearance. She is well-developed. She is not ill-appearing, toxic-appearing or diaphoretic.   HENT:      Head: Normocephalic and atraumatic.      Nose: Nose normal.   Eyes:      Conjunctiva/sclera: Conjunctivae normal.   Cardiovascular:      Rate and Rhythm: Normal rate and regular rhythm.   Pulmonary:      Effort: Pulmonary effort is normal. No respiratory distress.      Breath sounds: Normal breath sounds. No stridor. No decreased breath sounds, wheezing, rhonchi or rales.   Musculoskeletal:         General: Normal range of motion.      Cervical back: Normal range of motion.   Skin:     General: Skin is warm and dry.   Neurological:      General: No focal deficit present.      Mental Status: She is alert and oriented to person, place, and time. Mental status is at baseline.   Psychiatric:         Mood and Affect: Mood normal.         Behavior: Behavior normal.         Thought Content: Thought content normal.         Judgment: Judgment normal.         Procedures           ED Course  ED Course as of 01/28/24 1910   Sun Jan 28, 2024   1826 EKG interpreted by ER physician reviewed by myself.  Atrial fibrillation rate of 71 [MG]      ED Course User Index  [MG] Hazel Prasad PA-C                HEART Score: 4                              Medical Decision Making  Appropriate PPE worn during exam.    /54   Pulse 83   Temp 98.6 °F (37 °C) (Oral)   Resp 17   Ht 170.2 cm (67\")   Wt 65.8 kg (145 lb)   SpO2 93%   BMI 22.71 kg/m²      Co-morbidities --  has a past medical history of Bladder cancer, Cancer, Deep vein thrombosis, Essential hypertension (1/17/2017), Fracture tibia/fibula, right, closed, initial encounter (8/27/2020), GERD (gastroesophageal reflux disease), History of urostomy, Immobility (08/27/2020), Kidney carcinoma, right, Long term current use of " anticoagulant (1/9/2015), PAF (paroxysmal atrial fibrillation) (6/26/2019), Presence of cardiac pacemaker (11/03/2014), and Sick sinus syndrome (11/3/2014).  Radiology interpretation --  X-rays reviewed by me and interpreted by radiologist:  XR Chest 1 View    Result Date: 1/28/2024  Impression: 1.Cardiomegaly. No acute radiographic abnormality is identified. Electronically Signed: Joshua Fregoso MD  1/28/2024 4:11 PM EST  Workstation ID: [id number]      Presents with chest pain.  Mild elevation in her troponin although no significant delta change she was positive for RSV.  Was initially hypotensive received midodrine before she got here she received 500 cc of fluid.  Is a dialysis patient her creatinine is 5.36 potassium stable.  Emitted to your primary care doctor.  I discussed the findings and recommendations with the patient who voices understanding. Stable while in the ER.     Note Disclaimer: At [facility], we believe that sharing information builds trust and better relationships. You are receiving this note because you are receiving care at [facility] or recently visited. It is possible you will see health information before a provider has talked with you about it. This kind of information can be easy to misunderstand. To help you fully understand what it means for your health, we urge you to discuss this note with your provider.        Problems Addressed:  Chest pain, unspecified type: complicated acute illness or injury  RSV (acute bronchiolitis due to respiratory syncytial virus): complicated acute illness or injury    Amount and/or Complexity of Data Reviewed  Labs: ordered.  Radiology: ordered.  ECG/medicine tests: ordered.    Risk  Prescription drug management.  Decision regarding hospitalization.        Final diagnoses:   Chest pain, unspecified type   RSV (acute bronchiolitis due to respiratory syncytial virus)   Hypotension, unspecified hypotension type       ED Disposition  ED Disposition        ED Disposition   Decision to Admit    Condition   --    Comment   Level of Care: Med/Surg [1]   Diagnosis: Chest pain [933894]   Admitting Physician: BRIELLE KNOWLES [[id number]]   Attending Physician: BRIELLE KNOWLES [[id number]]                 No follow-up provider specified.       Medication List      No changes were made to your prescriptions during this visit.            Hazel Prasad PA-C  01/28/24 1600

## 2024-01-29 PROBLEM — I25.10 ATHSCL HEART DISEASE OF NATIVE CORONARY ARTERY W/O ANG PCTRS: Chronic | Status: ACTIVE | Noted: 2022-09-09

## 2024-01-29 PROBLEM — Z96.1 PSEUDOPHAKIA, BOTH EYES: Status: RESOLVED | Noted: 2020-02-13 | Resolved: 2024-01-29

## 2024-01-29 PROBLEM — Z01.810 PREOP CARDIOVASCULAR EXAM: Status: RESOLVED | Noted: 2022-09-02 | Resolved: 2024-01-29

## 2024-01-29 PROBLEM — G93.40 ACUTE ENCEPHALOPATHY: Status: RESOLVED | Noted: 2020-08-27 | Resolved: 2024-01-29

## 2024-01-29 PROBLEM — I48.21 PERMANENT ATRIAL FIBRILLATION: Chronic | Status: ACTIVE | Noted: 2019-06-26

## 2024-01-29 PROBLEM — N18.6 ESRD ON DIALYSIS: Chronic | Status: ACTIVE | Noted: 2023-10-18

## 2024-01-29 PROBLEM — N18.31 CHRONIC KIDNEY DISEASE, STAGE 3A: Status: RESOLVED | Noted: 2022-09-09 | Resolved: 2024-01-29

## 2024-01-29 PROBLEM — Z93.6 OTHER ARTIFICIAL OPENINGS OF URINARY TRACT STATUS: Status: RESOLVED | Noted: 2021-10-04 | Resolved: 2024-01-29

## 2024-01-29 PROBLEM — Z91.81 HISTORY OF FALLING: Status: RESOLVED | Noted: 2023-04-11 | Resolved: 2024-01-29

## 2024-01-29 PROBLEM — T46.6X5A MYALGIA DUE TO STATIN: Status: RESOLVED | Noted: 2023-06-30 | Resolved: 2024-01-29

## 2024-01-29 PROBLEM — E11.42 TYPE 2 DIABETES MELLITUS WITH DIABETIC POLYNEUROPATHY: Status: RESOLVED | Noted: 2022-09-09 | Resolved: 2024-01-29

## 2024-01-29 PROBLEM — Z99.2 ESRD ON DIALYSIS: Chronic | Status: ACTIVE | Noted: 2023-10-18

## 2024-01-29 PROBLEM — Z91.81 HISTORY OF FALLING: Status: ACTIVE | Noted: 2023-04-11

## 2024-01-29 PROBLEM — E87.5 HYPERKALEMIA: Status: RESOLVED | Noted: 2023-08-31 | Resolved: 2024-01-29

## 2024-01-29 PROBLEM — R10.9 ABDOMINAL PAIN: Status: RESOLVED | Noted: 2021-12-07 | Resolved: 2024-01-29

## 2024-01-29 PROBLEM — Z86.718 PERSONAL HISTORY OF OTHER VENOUS THROMBOSIS AND EMBOLISM: Status: RESOLVED | Noted: 2023-04-11 | Resolved: 2024-01-29

## 2024-01-29 PROBLEM — E78.5 HYPERLIPIDEMIA, UNSPECIFIED: Status: RESOLVED | Noted: 2020-09-14 | Resolved: 2024-01-29

## 2024-01-29 PROBLEM — N17.9 AKI (ACUTE KIDNEY INJURY): Status: RESOLVED | Noted: 2020-09-07 | Resolved: 2024-01-29

## 2024-01-29 PROBLEM — Z95.0 PRESENCE OF CARDIAC PACEMAKER: Status: RESOLVED | Noted: 2020-09-14 | Resolved: 2024-01-29

## 2024-01-29 PROBLEM — G31.84 MILD COGNITIVE IMPAIRMENT OF UNCERTAIN OR UNKNOWN ETIOLOGY: Status: ACTIVE | Noted: 2023-08-31

## 2024-01-29 PROBLEM — E11.22 TYPE 2 DIABETES MELLITUS WITH DIABETIC CHRONIC KIDNEY DISEASE: Status: RESOLVED | Noted: 2022-09-09 | Resolved: 2024-01-29

## 2024-01-29 PROBLEM — G31.84 MILD COGNITIVE IMPAIRMENT OF UNCERTAIN OR UNKNOWN ETIOLOGY: Chronic | Status: ACTIVE | Noted: 2023-08-31

## 2024-01-29 PROBLEM — E87.5 HYPERKALEMIA: Status: ACTIVE | Noted: 2023-08-31

## 2024-01-29 PROBLEM — Z85.3 PERSONAL HISTORY OF BREAST CANCER: Status: RESOLVED | Noted: 2020-09-14 | Resolved: 2024-01-29

## 2024-01-29 PROBLEM — Z86.718 PERSONAL HISTORY OF OTHER VENOUS THROMBOSIS AND EMBOLISM: Status: ACTIVE | Noted: 2023-04-11

## 2024-01-29 PROBLEM — I48.0 PAROXYSMAL ATRIAL FIBRILLATION: Status: RESOLVED | Noted: 2020-09-14 | Resolved: 2024-01-29

## 2024-01-29 PROBLEM — K21.9 GASTRO-ESOPHAGEAL REFLUX DISEASE WITHOUT ESOPHAGITIS: Chronic | Status: ACTIVE | Noted: 2020-09-14

## 2024-01-29 PROBLEM — K80.10 CHOLECYSTITIS WITH CHOLELITHIASIS: Status: RESOLVED | Noted: 2020-11-12 | Resolved: 2024-01-29

## 2024-01-29 PROBLEM — I49.5 SICK SINUS SYNDROME: Status: RESOLVED | Noted: 2020-09-14 | Resolved: 2024-01-29

## 2024-01-29 PROBLEM — Z86.000 HISTORY OF CARCINOMA IN SITU OF BREAST: Chronic | Status: RESOLVED | Noted: 2020-08-27 | Resolved: 2024-01-29

## 2024-01-29 PROBLEM — C66.9 MALIGNANT NEOPLASM OF UNSPECIFIED URETER: Status: RESOLVED | Noted: 2020-09-14 | Resolved: 2024-01-29

## 2024-01-29 PROBLEM — K58.9 IRRITABLE BOWEL SYNDROME WITHOUT DIARRHEA: Status: RESOLVED | Noted: 2023-08-31 | Resolved: 2024-01-29

## 2024-01-29 PROBLEM — N39.0 ACUTE UTI: Status: RESOLVED | Noted: 2021-09-25 | Resolved: 2024-01-29

## 2024-01-29 PROBLEM — B96.20 E COLI BACTEREMIA: Status: RESOLVED | Noted: 2023-03-24 | Resolved: 2024-01-29

## 2024-01-29 PROBLEM — R07.9 CHEST PAIN: Status: RESOLVED | Noted: 2023-10-13 | Resolved: 2024-01-29

## 2024-01-29 PROBLEM — A41.50 SEPSIS DUE TO GRAM-NEGATIVE UTI: Status: RESOLVED | Noted: 2023-03-24 | Resolved: 2024-01-29

## 2024-01-29 PROBLEM — Z85.51 HISTORY OF BLADDER CANCER: Status: RESOLVED | Noted: 2021-10-04 | Resolved: 2024-01-29

## 2024-01-29 PROBLEM — K45.8 OTHER SPECIFIED ABDOMINAL HERNIA WITHOUT OBSTRUCTION OR GANGRENE: Status: RESOLVED | Noted: 2022-09-02 | Resolved: 2024-01-29

## 2024-01-29 PROBLEM — R07.9 CHEST PAIN, UNSPECIFIED TYPE: Status: RESOLVED | Noted: 2023-04-18 | Resolved: 2024-01-29

## 2024-01-29 PROBLEM — Z90.5 ACQUIRED ABSENCE OF KIDNEY: Chronic | Status: ACTIVE | Noted: 2021-10-04

## 2024-01-29 PROBLEM — I48.91 ATRIAL FIBRILLATION WITH RVR: Status: RESOLVED | Noted: 2023-03-20 | Resolved: 2024-01-29

## 2024-01-29 PROBLEM — H04.123 TEAR FILM INSUFFICIENCY, BILATERAL: Status: RESOLVED | Noted: 2020-02-13 | Resolved: 2024-01-29

## 2024-01-29 PROBLEM — R50.9 FEVER: Status: RESOLVED | Noted: 2023-03-18 | Resolved: 2024-01-29

## 2024-01-29 PROBLEM — R78.81 E COLI BACTEREMIA: Status: RESOLVED | Noted: 2023-03-24 | Resolved: 2024-01-29

## 2024-01-29 PROBLEM — I95.9 HYPOTENSION: Status: ACTIVE | Noted: 2024-01-29

## 2024-01-29 PROBLEM — R41.82 ALTERED MENTAL STATUS: Status: RESOLVED | Noted: 2020-09-07 | Resolved: 2024-01-29

## 2024-01-29 PROBLEM — N39.0 ACUTE UTI: Status: RESOLVED | Noted: 2021-12-10 | Resolved: 2024-01-29

## 2024-01-29 PROBLEM — N13.30 HYDRONEPHROSIS: Status: RESOLVED | Noted: 2022-06-17 | Resolved: 2024-01-29

## 2024-01-29 PROBLEM — Z90.49 ACQUIRED ABSENCE OF OTHER SPECIFIED PARTS OF DIGESTIVE TRACT: Chronic | Status: ACTIVE | Noted: 2022-09-09

## 2024-01-29 PROBLEM — N39.0 SEPSIS DUE TO GRAM-NEGATIVE UTI: Status: RESOLVED | Noted: 2023-03-24 | Resolved: 2024-01-29

## 2024-01-29 PROBLEM — I48.91 ATRIAL FIBRILLATION: Chronic | Status: ACTIVE | Noted: 2019-06-26

## 2024-01-29 PROBLEM — K58.9 IRRITABLE BOWEL SYNDROME WITHOUT DIARRHEA: Status: ACTIVE | Noted: 2023-08-31

## 2024-01-29 PROBLEM — R10.9 LEFT FLANK PAIN: Status: RESOLVED | Noted: 2023-06-16 | Resolved: 2024-01-29

## 2024-01-29 PROBLEM — I48.91 ATRIAL FIBRILLATION WITH RAPID VENTRICULAR RESPONSE: Status: RESOLVED | Noted: 2023-11-09 | Resolved: 2024-01-29

## 2024-01-29 PROBLEM — R53.1 WEAKNESS: Status: RESOLVED | Noted: 2022-06-17 | Resolved: 2024-01-29

## 2024-01-29 PROBLEM — I10 ESSENTIAL (PRIMARY) HYPERTENSION: Status: RESOLVED | Noted: 2020-09-14 | Resolved: 2024-01-29

## 2024-01-29 PROBLEM — H35.373 EPIRETINAL MEMBRANE, BILATERAL: Status: RESOLVED | Noted: 2020-02-13 | Resolved: 2024-01-29

## 2024-01-29 PROBLEM — M79.10 MYALGIA DUE TO STATIN: Status: RESOLVED | Noted: 2023-06-30 | Resolved: 2024-01-29

## 2024-01-29 PROBLEM — G25.81 RLS (RESTLESS LEGS SYNDROME): Status: RESOLVED | Noted: 2022-06-17 | Resolved: 2024-01-29

## 2024-01-29 LAB
ANION GAP SERPL CALCULATED.3IONS-SCNC: 16 MMOL/L (ref 5–15)
BASOPHILS # BLD AUTO: 0.1 10*3/MM3 (ref 0–0.2)
BASOPHILS NFR BLD AUTO: 1.5 % (ref 0–1.5)
BUN SERPL-MCNC: 89 MG/DL (ref 8–23)
BUN/CREAT SERPL: 17 (ref 7–25)
CALCIUM SPEC-SCNC: 8.4 MG/DL (ref 8.6–10.5)
CHLORIDE SERPL-SCNC: 102 MMOL/L (ref 98–107)
CO2 SERPL-SCNC: 19 MMOL/L (ref 22–29)
CREAT SERPL-MCNC: 5.25 MG/DL (ref 0.57–1)
DEPRECATED RDW RBC AUTO: 52.9 FL (ref 37–54)
EGFRCR SERPLBLD CKD-EPI 2021: 7.7 ML/MIN/1.73
EOSINOPHIL # BLD AUTO: 0.1 10*3/MM3 (ref 0–0.4)
EOSINOPHIL NFR BLD AUTO: 0.7 % (ref 0.3–6.2)
ERYTHROCYTE [DISTWIDTH] IN BLOOD BY AUTOMATED COUNT: 15.6 % (ref 12.3–15.4)
GLUCOSE SERPL-MCNC: 97 MG/DL (ref 65–99)
HBV SURFACE AG SERPL QL IA: NORMAL
HCT VFR BLD AUTO: 31.7 % (ref 34–46.6)
HGB BLD-MCNC: 10.4 G/DL (ref 12–15.9)
LYMPHOCYTES # BLD AUTO: 1.1 10*3/MM3 (ref 0.7–3.1)
LYMPHOCYTES NFR BLD AUTO: 11.9 % (ref 19.6–45.3)
MCH RBC QN AUTO: 33.1 PG (ref 26.6–33)
MCHC RBC AUTO-ENTMCNC: 32.9 G/DL (ref 31.5–35.7)
MCV RBC AUTO: 100.6 FL (ref 79–97)
MONOCYTES # BLD AUTO: 0.8 10*3/MM3 (ref 0.1–0.9)
MONOCYTES NFR BLD AUTO: 9.3 % (ref 5–12)
NEUTROPHILS NFR BLD AUTO: 6.8 10*3/MM3 (ref 1.7–7)
NEUTROPHILS NFR BLD AUTO: 76.6 % (ref 42.7–76)
NRBC BLD AUTO-RTO: 0 /100 WBC (ref 0–0.2)
PLATELET # BLD AUTO: 232 10*3/MM3 (ref 140–450)
PMV BLD AUTO: 7.4 FL (ref 6–12)
POTASSIUM SERPL-SCNC: 5.6 MMOL/L (ref 3.5–5.2)
QT INTERVAL: 409 MS
QTC INTERVAL: 435 MS
RBC # BLD AUTO: 3.15 10*6/MM3 (ref 3.77–5.28)
SODIUM SERPL-SCNC: 137 MMOL/L (ref 136–145)
WBC NRBC COR # BLD AUTO: 8.9 10*3/MM3 (ref 3.4–10.8)

## 2024-01-29 PROCEDURE — 87102 FUNGUS ISOLATION CULTURE: CPT | Performed by: INTERNAL MEDICINE

## 2024-01-29 PROCEDURE — 25010000002 PHENYLEPHRINE 10 MG/ML SOLUTION 5 ML VIAL: Performed by: NURSE PRACTITIONER

## 2024-01-29 PROCEDURE — 25010000002 DOPAMINE PER 40 MG

## 2024-01-29 PROCEDURE — 80048 BASIC METABOLIC PNL TOTAL CA: CPT | Performed by: NURSE PRACTITIONER

## 2024-01-29 PROCEDURE — 85025 COMPLETE CBC W/AUTO DIFF WBC: CPT | Performed by: NURSE PRACTITIONER

## 2024-01-29 PROCEDURE — 25810000003 SODIUM CHLORIDE 0.9 % SOLUTION 250 ML FLEX CONT: Performed by: NURSE PRACTITIONER

## 2024-01-29 PROCEDURE — 99222 1ST HOSP IP/OBS MODERATE 55: CPT | Performed by: INTERNAL MEDICINE

## 2024-01-29 PROCEDURE — 25010000002 HEPARIN (PORCINE) PER 1000 UNITS: Performed by: INTERNAL MEDICINE

## 2024-01-29 PROCEDURE — 25010000002 ONDANSETRON PER 1 MG: Performed by: NURSE PRACTITIONER

## 2024-01-29 PROCEDURE — 25010000002 ALBUMIN HUMAN 25% PER 50 ML

## 2024-01-29 PROCEDURE — 36415 COLL VENOUS BLD VENIPUNCTURE: CPT | Performed by: NURSE PRACTITIONER

## 2024-01-29 PROCEDURE — P9047 ALBUMIN (HUMAN), 25%, 50ML: HCPCS

## 2024-01-29 RX ORDER — POLYETHYLENE GLYCOL 3350 17 G/17G
17 POWDER, FOR SOLUTION ORAL DAILY
Status: DISCONTINUED | OUTPATIENT
Start: 2024-01-30 | End: 2024-01-31 | Stop reason: HOSPADM

## 2024-01-29 RX ORDER — IBUPROFEN 600 MG/1
1 TABLET ORAL
Status: DISCONTINUED | OUTPATIENT
Start: 2024-01-29 | End: 2024-01-31 | Stop reason: HOSPADM

## 2024-01-29 RX ORDER — MIDODRINE HYDROCHLORIDE 5 MG/1
10 TABLET ORAL EVERY 8 HOURS
Status: DISCONTINUED | OUTPATIENT
Start: 2024-01-30 | End: 2024-01-31 | Stop reason: HOSPADM

## 2024-01-29 RX ORDER — ALBUMIN (HUMAN) 12.5 G/50ML
12.5 SOLUTION INTRAVENOUS AS NEEDED
Status: ACTIVE | OUTPATIENT
Start: 2024-01-29 | End: 2024-01-30

## 2024-01-29 RX ORDER — ACETAMINOPHEN 650 MG
TABLET, EXTENDED RELEASE ORAL DAILY
Status: DISCONTINUED | OUTPATIENT
Start: 2024-01-29 | End: 2024-01-31 | Stop reason: HOSPADM

## 2024-01-29 RX ORDER — INSULIN LISPRO 100 [IU]/ML
2-7 INJECTION, SOLUTION INTRAVENOUS; SUBCUTANEOUS EVERY 6 HOURS SCHEDULED
Status: DISCONTINUED | OUTPATIENT
Start: 2024-01-30 | End: 2024-01-30

## 2024-01-29 RX ORDER — DEXTROSE MONOHYDRATE 25 G/50ML
25 INJECTION, SOLUTION INTRAVENOUS
Status: DISCONTINUED | OUTPATIENT
Start: 2024-01-29 | End: 2024-01-31 | Stop reason: HOSPADM

## 2024-01-29 RX ORDER — FAMOTIDINE 20 MG/1
10 TABLET, FILM COATED ORAL
Status: DISCONTINUED | OUTPATIENT
Start: 2024-01-29 | End: 2024-01-31 | Stop reason: HOSPADM

## 2024-01-29 RX ORDER — MIDODRINE HYDROCHLORIDE 5 MG/1
5 TABLET ORAL
Status: DISCONTINUED | OUTPATIENT
Start: 2024-01-29 | End: 2024-01-29

## 2024-01-29 RX ORDER — NICOTINE POLACRILEX 4 MG
15 LOZENGE BUCCAL
Status: DISCONTINUED | OUTPATIENT
Start: 2024-01-29 | End: 2024-01-31 | Stop reason: HOSPADM

## 2024-01-29 RX ORDER — TRAMADOL HYDROCHLORIDE 50 MG/1
50 TABLET ORAL EVERY 12 HOURS PRN
Status: DISCONTINUED | OUTPATIENT
Start: 2024-01-29 | End: 2024-01-31 | Stop reason: HOSPADM

## 2024-01-29 RX ORDER — NITROGLYCERIN 0.4 MG/1
0.4 TABLET SUBLINGUAL
Status: DISCONTINUED | OUTPATIENT
Start: 2024-01-29 | End: 2024-01-31 | Stop reason: HOSPADM

## 2024-01-29 RX ORDER — HEPARIN SODIUM 1000 [USP'U]/ML
1000 INJECTION, SOLUTION INTRAVENOUS; SUBCUTANEOUS ONCE
Status: COMPLETED | OUTPATIENT
Start: 2024-01-29 | End: 2024-01-29

## 2024-01-29 RX ORDER — MIDODRINE HYDROCHLORIDE 5 MG/1
10 TABLET ORAL
Status: DISCONTINUED | OUTPATIENT
Start: 2024-01-29 | End: 2024-01-29

## 2024-01-29 RX ADMIN — DOPAMINE HYDROCHLORIDE 3 MCG/KG/MIN: 160 INJECTION, SOLUTION INTRAVENOUS at 02:28

## 2024-01-29 RX ADMIN — DICYCLOMINE HYDROCHLORIDE 10 MG: 10 CAPSULE ORAL at 13:35

## 2024-01-29 RX ADMIN — DICYCLOMINE HYDROCHLORIDE 10 MG: 10 CAPSULE ORAL at 22:25

## 2024-01-29 RX ADMIN — ALBUMIN (HUMAN) 25 G: 0.25 INJECTION, SOLUTION INTRAVENOUS at 00:40

## 2024-01-29 RX ADMIN — DOPAMINE HYDROCHLORIDE 4 MCG/KG/MIN: 160 INJECTION, SOLUTION INTRAVENOUS at 03:23

## 2024-01-29 RX ADMIN — MIDODRINE HYDROCHLORIDE 5 MG: 5 TABLET ORAL at 13:35

## 2024-01-29 RX ADMIN — DOPAMINE HYDROCHLORIDE 2 MCG/KG/MIN: 160 INJECTION, SOLUTION INTRAVENOUS at 01:42

## 2024-01-29 RX ADMIN — TOPIRAMATE 100 MG: 100 TABLET, FILM COATED ORAL at 22:25

## 2024-01-29 RX ADMIN — FAMOTIDINE 10 MG: 20 TABLET, FILM COATED ORAL at 22:25

## 2024-01-29 RX ADMIN — HEPARIN SODIUM 1000 UNITS: 1000 INJECTION INTRAVENOUS; SUBCUTANEOUS at 18:22

## 2024-01-29 RX ADMIN — APIXABAN 2.5 MG: 2.5 TABLET, FILM COATED ORAL at 22:28

## 2024-01-29 RX ADMIN — Medication 10 ML: at 13:35

## 2024-01-29 RX ADMIN — MIDODRINE HYDROCHLORIDE 10 MG: 5 TABLET ORAL at 17:39

## 2024-01-29 RX ADMIN — APIXABAN 2.5 MG: 2.5 TABLET, FILM COATED ORAL at 13:35

## 2024-01-29 RX ADMIN — ATORVASTATIN CALCIUM 20 MG: 20 TABLET, FILM COATED ORAL at 13:35

## 2024-01-29 RX ADMIN — ONDANSETRON 4 MG: 2 INJECTION INTRAMUSCULAR; INTRAVENOUS at 03:56

## 2024-01-29 RX ADMIN — TRAMADOL HYDROCHLORIDE 50 MG: 50 TABLET, COATED ORAL at 00:39

## 2024-01-29 RX ADMIN — GABAPENTIN 100 MG: 100 CAPSULE ORAL at 22:25

## 2024-01-29 RX ADMIN — PHENYLEPHRINE HYDROCHLORIDE 0.5 MCG/KG/MIN: 10 INJECTION INTRAVENOUS at 16:22

## 2024-01-29 RX ADMIN — DOCUSATE SODIUM 50MG AND SENNOSIDES 8.6MG 2 TABLET: 8.6; 5 TABLET, FILM COATED ORAL at 22:25

## 2024-01-29 NOTE — CONSULTS
Nephrology Consult Note                                                [facility]      Patient Identification:  Name: Hazel Bucio  Age: 83 y.o.  Sex: female  :  1940  MRN: [id number]               Requesting Physician: Lora Henderson MD  Reason for Consultation: management recommendations      History of Present Illness:     Patient is 83-year-old white female patient with history of end-stage renal disease hemodialysis  who was admitted to the hospital with chest pain was initially hypotensive was consulted to manage dialysis  Problem List:    Chest pain     Past Medical History:  Past Medical History:   Diagnosis Date    Bladder cancer     Cancer     breast, bladder    Deep vein thrombosis     Essential hypertension 2017    Fracture tibia/fibula, right, closed, initial encounter 2020    GERD (gastroesophageal reflux disease)     History of urostomy     Immobility 2020    r/t broken Tibia     Kidney carcinoma, right     removed     Long term current use of anticoagulant 2015    PAF (paroxysmal atrial fibrillation) 2019    Presence of cardiac pacemaker 2014    BS dual chamber PM placed 10/2014 with pocket revision 17.  HX tachy rob syndrome    Sick sinus syndrome 11/3/2014     Past Surgical History:  Past Surgical History:   Procedure Laterality Date    BREAST LUMPECTOMY      CARDIAC CATHETERIZATION N/A 10/18/2023    Procedure: Left Heart Cath possible PCI, atherectomy, hemodynamic support;  Surgeon: Augustin Ellsworth MD;  Location: [facility] CATH INVASIVE LOCATION;  Service: Cardiology;  Laterality: N/A;    CARDIAC ELECTROPHYSIOLOGY PROCEDURE N/A 2023    Procedure: Pacemaker gen change, [place] AWARE $;  Surgeon: Jose Carlos Cheng MD;  Location:  [facility] CATH INVASIVE LOCATION;  Service: Cardiovascular;  Laterality: N/A;    CHOLECYSTECTOMY N/A 10/12/2020    Procedure: CHOLECYSTECTOMY LAPAROSCOPIC;  Surgeon: Randall  Go SIERRA DO;  Location: [facility] MAIN OR;  Service: General;  Laterality: N/A;    CYSTECTOMY W/ URETEROILEAL CONDUIT      HARDWARE REMOVAL Right 6/11/2021    Procedure: TIBIA HARDWARE REMOVAL;  Surgeon: Geoff Shah II, MD;  Location: [facility] MAIN OR;  Service: Orthopedics;  Laterality: Right;    HERNIA REPAIR      HYSTERECTOMY      INSERT / REPLACE / REMOVE PACEMAKER      NEPHRECTOMY Right     TIBIA IM NAILING/RODDING Right 8/28/2020    Procedure: TIBIA INTRAMEDULLARY NAIL/ABRAHAM INSERTION;  Surgeon: Geoff Shah II, MD;  Location: [facility] MAIN OR;  Service: Orthopedics;  Laterality: Right;      Home Meds:  Medications Prior to Admission   Medication Sig Dispense Refill Last Dose    apixaban (ELIQUIS) 2.5 MG tablet tablet Take 1 tablet by mouth Every 12 (Twelve) Hours. Indications: Atrial Fibrillation 60 tablet 0 1/28/2024 at 0830    atorvastatin (LIPITOR) 20 MG tablet Take 1 tablet by mouth Every Night.   1/27/2024 at 1911    calcium acetate (PHOSLO) 667 MG tablet    1/28/2024 at 1300    dicyclomine (BENTYL) 10 MG capsule Take 1 capsule by mouth 3 (Three) Times a Day. 60 capsule 0 1/28/2024 at 0830    dilTIAZem CD (CARDIZEM CD) 120 MG 24 hr capsule Take 1 capsule by mouth Daily. 30 capsule 0 1/28/2024 at 0830    furosemide (LASIX) 40 MG tablet Take 1 tablet by mouth Daily.   1/28/2024 at 0830    gabapentin (NEURONTIN) 300 MG capsule 1 capsule 3 (Three) Times a Day.   1/28/2024 at 0830    Melatonin-Pyridoxine 1-10 MG tablet Take 1 tablet by mouth every night at bedtime.   1/27/2024 at 1911    metoprolol tartrate (LOPRESSOR) 50 MG tablet Take 2 tablets by mouth Every Morning.   1/28/2024 at 0830    metoprolol tartrate (LOPRESSOR) 50 MG tablet Take 1 tablet by mouth Every Night.   1/27/2024 at 1911    midodrine (PROAMATINE) 10 MG tablet Take 1 tablet by mouth Daily As Needed (PRN dialysis days.). 15 tablet 0     polyethylene glycol (MiraLax) 17 GM/SCOOP powder Take 17 g by mouth Every Morning.    1/28/2024 at 0830    saccharomyces boulardii (FLORASTOR) 250 MG capsule Take 1 capsule by mouth 2 (Two) Times a Day. 60 capsule 0 1/28/2024 at 0830    sertraline (ZOLOFT) 25 MG tablet Take 1 tablet by mouth every night at bedtime.   1/27/2024 at 1911    topiramate (TOPAMAX) 100 MG tablet Take 1 tablet by mouth Every Night. 30 tablet 0 1/27/2024 at 1911    zinc oxide 20 % ointment Apply 1 application  topically to the appropriate area as directed. Every shift.       acetaminophen (TYLENOL) 325 MG tablet Take 2 tablets by mouth 4 (Four) Times a Day As Needed for Mild Pain.       ipratropium-albuterol (DUO-NEB) 0.5-2.5 mg/3 ml nebulizer Take 3 mL by nebulization. Six times daily PRN.       loperamide (IMODIUM) 2 MG capsule Take 2 capsules by mouth Daily As Needed for Diarrhea.       ondansetron ODT (ZOFRAN-ODT) 4 MG disintegrating tablet Place 1 tablet on the tongue 3 (Three) Times a Day As Needed for Nausea or Vomiting.        Current Meds:     Current Facility-Administered Medications:     apixaban (ELIQUIS) tablet 2.5 mg, 2.5 mg, Oral, Q12H, Preeti James APRN, 2.5 mg at 01/28/24 2338    atorvastatin (LIPITOR) tablet 20 mg, 20 mg, Oral, Daily, Preeti James APRN    sennosides-docusate (PERICOLACE) 8.6-50 MG per tablet 2 tablet, 2 tablet, Oral, BID **AND** polyethylene glycol (MIRALAX) packet 17 g, 17 g, Oral, Daily PRN **AND** bisacodyl (DULCOLAX) EC tablet 5 mg, 5 mg, Oral, Daily PRN **AND** bisacodyl (DULCOLAX) suppository 10 mg, 10 mg, Rectal, Daily PRN, Preeti James, APRN    Calcium Replacement - Follow Nurse / BPA Driven Protocol, , Does not apply, PRN, Preeti James, APRN    dicyclomine (BENTYL) capsule 10 mg, 10 mg, Oral, TID, Preeti James APRN, 10 mg at 01/28/24 9077    dilTIAZem CD (CARDIZEM CD) 24 hr capsule 120 mg, 120 mg, Oral, Q24H, Preeti James APRN    DOPamine 400 mg in 250 mL D5W infusion, 2-20 mcg/kg/min, Intravenous, Titrated, Ese Rodriguez APRN, Last Rate: 9.84 mL/hr at  01/29/24 0323, 4 mcg/kg/min at 01/29/24 0323    gabapentin (NEURONTIN) capsule 100 mg, 100 mg, Oral, Nightly, ResighiniSeanna M, APRN, 100 mg at 01/28/24 2338    hydrALAZINE (APRESOLINE) injection 10 mg, 10 mg, Intravenous, Q6H PRN, [place], Preeti M, APRN    ipratropium-albuterol (DUO-NEB) nebulizer solution 3 mL, 3 mL, Nebulization, Q4H PRN, [place]ighini, Preeti M, APRN    loperamide (IMODIUM) capsule 4 mg, 4 mg, Oral, Daily PRN, Resigh[place], Preeti M, APRN    Magnesium Standard Dose Replacement - Follow Nurse / BPA Driven Protocol, , Does not apply, PRN, [place]igh[place], Preeti M, APRN    metoprolol tartrate (LOPRESSOR) tablet 50 mg, 50 mg, Oral, Q12H, [place] Preeti M, APRN    midodrine (PROAMATINE) tablet 10 mg, 10 mg, Oral, Daily PRN, [place] Preeti M, APRN    ondansetron ODT (ZOFRAN-ODT) disintegrating tablet 4 mg, 4 mg, Oral, Q6H PRN **OR** ondansetron (ZOFRAN) injection 4 mg, 4 mg, Intravenous, Q6H PRN, ResighiniSeanna M, APRN, 4 mg at 01/29/24 0356    Phosphorus Replacement - Follow Nurse / BPA Driven Protocol, , Does not apply, PRN, [place] Preeti M, APRN    Potassium Replacement - Follow Nurse / BPA Driven Protocol, , Does not apply, PRN, Resighini, Preeti M, APRN    sodium chloride 0.9 % flush 10 mL, 10 mL, Intravenous, PRN, Hazel Prasad PA-C    sodium chloride 0.9 % flush 10 mL, 10 mL, Intravenous, Q12H, ResighiniSeanna M, APRN, 10 mL at 01/28/24 2337    sodium chloride 0.9 % flush 10 mL, 10 mL, Intravenous, PRN, Resighini, Preeti M, APRN    sodium chloride 0.9 % infusion 40 mL, 40 mL, Intravenous, PRN, [place] Preeti M, APRN    topiramate (TOPAMAX) tablet 100 mg, 100 mg, Oral, Nightly, Preeti James APRN, 100 mg at 01/28/24 7037    traMADol (ULTRAM) tablet 50 mg, 50 mg, Oral, Q12H PRN, Ese Rodriguez APRN, 50 mg at 01/29/24 0039    Allergies:  Allergies   Allergen Reactions    Amoxicillin Shortness Of Breath    Ciprofloxacin Hives    Oxycodone-Acetaminophen Unknown - High Severity     Pt's son stated when pt fell and broke her leg she  "was put on oxycodone; pt's son stated pt's \"vitals went all out of wack, she couldn't remember things.\"    Penicillins Rash    Sulfa Antibiotics Hives    Cephalexin Other (See Comments)    Clavulanic Acid Other (See Comments)    Codeine Other (See Comments)    Contrast Dye (Echo Or Unknown Ct/Mr) Other (See Comments)     8/18/22     Doxycycline Other (See Comments)    Fluconazole Other (See Comments)    Iodinated Contrast Media Other (See Comments)     8/18/22    Iodine Hives    Macrobid [Nitrofurantoin] Hives    Tobramycin Other (See Comments)    Trimethoprim Other (See Comments)     Social History:   Social History     Tobacco Use    Smoking status: Never     Passive exposure: Never    Smokeless tobacco: Never   Substance Use Topics    Alcohol use: Not Currently      Family History:  Family History   Problem Relation Age of Onset    COPD Father         Review of Systems  Reviewed 12 systems were reviewed, all negative except for those mentioned in HPI    Objective:  Vitals:   BP 92/58   Pulse 98   Temp 96.6 °F (35.9 °C) (Axillary)   Resp 14   Ht 170.2 cm (67.01\")   Wt 65.6 kg (144 lb 10 oz)   SpO2 95%   BMI 22.65 kg/m²   I/O:     Intake/Output Summary (Last 24 hours) at 1/29/2024 0678  Last data filed at 1/29/2024 0228  Gross per 24 hour   Intake 600 ml   Output 500 ml   Net 100 ml       Exam:  General Appearance:  Alert  Head:  Normocephalic, without obvious abnormality, atraumatic  Eyes:  PERRL, conjunctiva/corneas clear     Neck:  Supple,  no adenopathy;      Lungs:  Decreased BS occasion ronchi  Heart:  Regular rate and rhythm, S1 and S2 normal  Abdomen:  Soft, non-tender, bowel sounds active   Extremities: trace edema  Pulses: 2+ and symmetric all extremities  Skin:  No rashes or lesions  Data Review:  All labs (24hrs):   Recent Results (from the past 24 hour(s))   ECG 12 Lead Chest Pain    Collection Time: 01/28/24  3:33 PM   Result Value Ref Range    QT Interval 409 ms    QTC Interval 435 ms "   Comprehensive Metabolic Panel    Collection Time: 01/28/24  3:53 PM    Specimen: Blood   Result Value Ref Range    Glucose 104 (H) 65 - 99 mg/dL    BUN 84 (H) 8 - 23 mg/dL    Creatinine 5.36 (H) 0.57 - 1.00 mg/dL    Sodium 137 136 - 145 mmol/L    Potassium 4.9 3.5 - 5.2 mmol/L    Chloride 101 98 - 107 mmol/L    CO2 24.0 22.0 - 29.0 mmol/L    Calcium 8.6 8.6 - 10.5 mg/dL    Total Protein 6.3 6.0 - 8.5 g/dL    Albumin 3.1 (L) 3.5 - 5.2 g/dL    ALT (SGPT) 24 1 - 33 U/L    AST (SGOT) 32 1 - 32 U/L    Alkaline Phosphatase 118 (H) 39 - 117 U/L    Total Bilirubin 0.4 0.0 - 1.2 mg/dL    Globulin 3.2 gm/dL    A/G Ratio 1.0 g/dL    BUN/Creatinine Ratio 15.7 7.0 - 25.0    Anion Gap 12.0 5.0 - 15.0 mmol/L    eGFR 7.5 (L) >60.0 mL/min/1.73   High Sensitivity Troponin T    Collection Time: 01/28/24  3:53 PM    Specimen: Blood   Result Value Ref Range    HS Troponin T 83 (C) <14 ng/L   Green Top (Gel)    Collection Time: 01/28/24  3:53 PM   Result Value Ref Range    Extra Tube HOLD    Lavender Top    Collection Time: 01/28/24  3:53 PM   Result Value Ref Range    Extra Tube hold for add-on    Gold Top - SST    Collection Time: 01/28/24  3:53 PM   Result Value Ref Range    Extra Tube Hold for add-ons.    Light Blue Top    Collection Time: 01/28/24  3:53 PM   Result Value Ref Range    Extra Tube Hold for add-ons.    CBC Auto Differential    Collection Time: 01/28/24  3:53 PM    Specimen: Blood   Result Value Ref Range    WBC 10.20 3.40 - 10.80 10*3/mm3    RBC 3.14 (L) 3.77 - 5.28 10*6/mm3    Hemoglobin 10.1 (L) 12.0 - 15.9 g/dL    Hematocrit 31.4 (L) 34.0 - 46.6 %    .1 (H) 79.0 - 97.0 fL    MCH 32.0 26.6 - 33.0 pg    MCHC 32.0 31.5 - 35.7 g/dL    RDW 16.0 (H) 12.3 - 15.4 %    RDW-SD 54.7 (H) 37.0 - 54.0 fl    MPV 6.8 6.0 - 12.0 fL    Platelets 268 140 - 450 10*3/mm3    Neutrophil % 68.2 42.7 - 76.0 %    Lymphocyte % 19.2 (L) 19.6 - 45.3 %    Monocyte % 9.9 5.0 - 12.0 %    Eosinophil % 1.6 0.3 - 6.2 %    Basophil % 1.1 0.0 -  1.5 %    Neutrophils, Absolute 7.00 1.70 - 7.00 10*3/mm3    Lymphocytes, Absolute 2.00 0.70 - 3.10 10*3/mm3    Monocytes, Absolute 1.00 (H) 0.10 - 0.90 10*3/mm3    Eosinophils, Absolute 0.20 0.00 - 0.40 10*3/mm3    Basophils, Absolute 0.10 0.00 - 0.20 10*3/mm3    nRBC 0.1 0.0 - 0.2 /100 WBC   Respiratory Panel PCR w/COVID-19(SARS-CoV-2) [facility] In-House, NP Swab in UTM/VTM, 2 HR TAT - Swab, Nasopharynx    Collection Time: 01/28/24  5:13 PM    Specimen: Nasopharynx; Swab   Result Value Ref Range    ADENOVIRUS, PCR Not Detected Not Detected    Coronavirus 229E Not Detected Not Detected    Coronavirus HKU1 Not Detected Not Detected    Coronavirus NL63 Not Detected Not Detected    Coronavirus OC43 Not Detected Not Detected    COVID19 Not Detected Not Detected - Ref. Range    Human Metapneumovirus Not Detected Not Detected    Human Rhinovirus/Enterovirus Not Detected Not Detected    Influenza A PCR Not Detected Not Detected    Influenza B PCR Not Detected Not Detected    Parainfluenza Virus 1 Not Detected Not Detected    Parainfluenza Virus 2 Not Detected Not Detected    Parainfluenza Virus 3 Not Detected Not Detected    Parainfluenza Virus 4 Not Detected Not Detected    RSV, PCR Detected (A) Not Detected    Bordetella pertussis pcr Not Detected Not Detected    Bordetella parapertussis PCR Not Detected Not Detected    Chlamydophila pneumoniae PCR Not Detected Not Detected    Mycoplasma pneumo by PCR Not Detected Not Detected   High Sensitivity Troponin T 2Hr    Collection Time: 01/28/24  6:01 PM    Specimen: Blood   Result Value Ref Range    HS Troponin T 80 (C) <14 ng/L    Troponin T Delta -3 >=-4 - <+4 ng/L       Current Facility-Administered Medications:     apixaban (ELIQUIS) tablet 2.5 mg, 2.5 mg, Oral, Q12H, [place], Preeti M, APRN, 2.5 mg at 01/28/24 2338    atorvastatin (LIPITOR) tablet 20 mg, 20 mg, Oral, Daily, [place], Preeti M, APRN    sennosides-docusate (PERICOLACE) 8.6-50 MG per tablet 2  tablet, 2 tablet, Oral, BID **AND** polyethylene glycol (MIRALAX) packet 17 g, 17 g, Oral, Daily PRN **AND** bisacodyl (DULCOLAX) EC tablet 5 mg, 5 mg, Oral, Daily PRN **AND** bisacodyl (DULCOLAX) suppository 10 mg, 10 mg, Rectal, Daily PRN, [place], Preeti M, APRN    Calcium Replacement - Follow Nurse / BPA Driven Protocol, , Does not apply, PRN, [place] Preeti M, APRN    dicyclomine (BENTYL) capsule 10 mg, 10 mg, Oral, TID, [place] Preeti M, APRN, 10 mg at 01/28/24 2337    dilTIAZem CD (CARDIZEM CD) 24 hr capsule 120 mg, 120 mg, Oral, Q24H, [place] Preeti M, APRN    DOPamine 400 mg in 250 mL D5W infusion, 2-20 mcg/kg/min, Intravenous, Titrated, Ese Rodriguez APRN, Last Rate: 9.84 mL/hr at 01/29/24 0323, 4 mcg/kg/min at 01/29/24 0323    gabapentin (NEURONTIN) capsule 100 mg, 100 mg, Oral, Nightly, [place] M, APRN, 100 mg at 01/28/24 2338    hydrALAZINE (APRESOLINE) injection 10 mg, 10 mg, Intravenous, Q6H PRN, [place], Preeti M, APRN    ipratropium-albuterol (DUO-NEB) nebulizer solution 3 mL, 3 mL, Nebulization, Q4H PRN, [place], Preeti M, APRN    loperamide (IMODIUM) capsule 4 mg, 4 mg, Oral, Daily PRN, [place], Preeti M, APRN    Magnesium Standard Dose Replacement - Follow Nurse / BPA Driven Protocol, , Does not apply, PRN, [place], Preeti M, APRN    metoprolol tartrate (LOPRESSOR) tablet 50 mg, 50 mg, Oral, Q12H, [place], Preeti M, APRN    midodrine (PROAMATINE) tablet 10 mg, 10 mg, Oral, Daily PRN, [place], Preeti M, APRN    ondansetron ODT (ZOFRAN-ODT) disintegrating tablet 4 mg, 4 mg, Oral, Q6H PRN **OR** ondansetron (ZOFRAN) injection 4 mg, 4 mg, Intravenous, Q6H PRN, Preeti [place], APRN, 4 mg at 01/29/24 0356    Phosphorus Replacement - Follow Nurse / BPA Driven Protocol, , Does not apply, PRN, Preeti James, APRN    Potassium Replacement - Follow Nurse / BPA Driven Protocol, , Does not apply, PRN, Preeti James, APRN    sodium chloride 0.9 % flush 10 mL, 10 mL, Intravenous, PRN, Hazel Prasad, PA-C     sodium chloride 0.9 % flush 10 mL, 10 mL, Intravenous, Q12H, [place], Preeti M, APRN, 10 mL at 01/28/24 2337    sodium chloride 0.9 % flush 10 mL, 10 mL, Intravenous, PRN, [place], Preeti M, APRN    sodium chloride 0.9 % infusion 40 mL, 40 mL, Intravenous, PRN, [place], Preeti M, APRN    topiramate (TOPAMAX) tablet 100 mg, 100 mg, Oral, Nightly, [place], Preeti M, APRN, 100 mg at 01/28/24 2337    traMADol (ULTRAM) tablet 50 mg, 50 mg, Oral, Q12H PRN, Ese Rodriguez, APRN, 50 mg at 01/29/24 0039    Assessment:   ESRD hemodialysis dependent   Status post hypotension requiring dopamine   Chest pain rule out acute coronary syndrome   Afib   Status post hyperkalemia    Recommendations:   Try gentle dialysis without removal of fluids   Albumin for blood pressure support         June Keller MD  1/29/2024  06:27 EST

## 2024-01-29 NOTE — PLAN OF CARE
Problem: Skin Injury Risk Increased  Goal: Skin Health and Integrity  1/29/2024 0715 by Janki Forde RN  Outcome: Ongoing, Progressing  1/29/2024 0710 by Janki Forde RN  Outcome: Ongoing, Progressing     Problem: Chest Pain  Goal: Resolution of Chest Pain Symptoms  1/29/2024 0715 by Janki Forde RN  Outcome: Ongoing, Progressing  1/29/2024 0710 by Janki Forde RN  Outcome: Ongoing, Progressing     Problem: Skin Injury Risk Increased  Goal: Skin Health and Integrity  1/29/2024 0716 by Janki Forde RN  Outcome: Ongoing, Progressing  1/29/2024 0715 by Janki Forde RN  Outcome: Ongoing, Progressing  1/29/2024 0715 by Janki Forde RN  Outcome: Ongoing, Progressing  1/29/2024 0710 by Janki Forde RN  Outcome: Ongoing, Progressing     Problem: Chest Pain  Goal: Resolution of Chest Pain Symptoms  1/29/2024 0716 by Janki Forde RN  Outcome: Ongoing, Progressing  1/29/2024 0715 by Janki Forde RN  Outcome: Ongoing, Progressing  1/29/2024 0715 by Janki Forde RN  Outcome: Ongoing, Progressing  1/29/2024 0710 by Janki Forde RN  Outcome: Ongoing, Progressing   Goal Outcome Evaluation:                   Patient admitted to Observation unit last PM due to diagnosis of RSV. Patient has Un accessed port, and restricted limb due to hemodialysis. Patient to have consult with Hemodyalsis, Wound care, Family Medicine, and Nephrology. Last PM, page was put out to [facility] due to dropping BP and fluctuating pulse (44-120s). Michael answered and patient was give NS bolus, albumin, and patient currently running on Dopamine @4. Michael notified that nurses on Obs unit are not supposed to titrate Dopamine and patient needs to be transferred, [place] to round and assess. Patient vitals currently stable, bed in lowest position, call light within reach. Will continue plan of care.

## 2024-01-29 NOTE — CONSULTS
CARDIOLOGY CONSULT NOTE      Referring Provider: Dr. Henderson    Reason for Consultation: Atrial fibrillation, hypotension    Attending: Lora Henderson MD    Chief complaint    Atypical chest pain  Hypotension    Subjective .   Agree with narrative as discussed with nurse practitioner after face-to-face encounter scribed findings below  History of present illness:  Hazel Bucio is a 83 y.o. female who presents with reports of resolved chest pain and hypotension.    Patient resides at [facility].  She is known to have end-stage renal disease requiring dialysis, hypertension, GERD, atrial fibrillation.  She has been noted to have low blood pressures and was brought to the emergency room for further evaluation.    She was given a fluid bolus in the emergency room after her systolic blood pressure was in the 80s.  She had a respiratory virus panel that was positive for RSV.    Last echocardiogram demonstrated her EF of 55 to 60% with no significant valvular flow abnormalities.    Patient had cardiac catheterization in October 2023 that showed normal epicardial anatomy with no obstructive disease normal pulmonary pressures with no evidence of pulmonary hypertension, normal cardiac output and normal LV filling pressures.    Patient has had persistent hypotension requiring alteration in medical therapy and has had some mildly elevated ventricular rates with her atrial fibrillation.    At the time of my interview the patient is drowsy  She denies chest pain or shortness of breath at current time    Unable to get consistent review of systems secondary to acuity of condition  Historical data copied forward from previous encounters in EMR is unchanged    Agree with narrative discussed with nurse practitioner after face-to-face counter scribed findings in the note    Review of Systems   Unable to perform ROS: Acuity of condition     Pertinent items are noted in HPI, all other systems reviewed and negative  History  Past Medical  History:   Diagnosis Date    Bladder cancer     Cancer     breast, bladder    Deep vein thrombosis     Essential hypertension 1/17/2017    Fracture tibia/fibula, right, closed, initial encounter 8/27/2020    GERD (gastroesophageal reflux disease)     History of urostomy     Immobility 08/27/2020    r/t broken Tibia     Kidney carcinoma, right     removed     Long term current use of anticoagulant 1/9/2015    PAF (paroxysmal atrial fibrillation) 6/26/2019    Presence of cardiac pacemaker 11/03/2014    BS dual chamber PM placed 10/2014 with pocket revision 1/25/17.  HX tachy rob syndrome    Sick sinus syndrome 11/3/2014       Past Surgical History:   Procedure Laterality Date    BREAST LUMPECTOMY      CARDIAC CATHETERIZATION N/A 10/18/2023    Procedure: Left Heart Cath possible PCI, atherectomy, hemodynamic support;  Surgeon: Augustin Ellsworth MD;  Location: [facility] CATH INVASIVE LOCATION;  Service: Cardiology;  Laterality: N/A;    CARDIAC ELECTROPHYSIOLOGY PROCEDURE N/A 4/28/2023    Procedure: Pacemaker gen change, [place] AWARE $;  Surgeon: Jose Carlos Cheng MD;  Location: [facility] CATH INVASIVE LOCATION;  Service: Cardiovascular;  Laterality: N/A;    CHOLECYSTECTOMY N/A 10/12/2020    Procedure: CHOLECYSTECTOMY LAPAROSCOPIC;  Surgeon: Go Pereira DO;  Location: [facility] MAIN OR;  Service: General;  Laterality: N/A;    CYSTECTOMY W/ URETEROILEAL CONDUIT      HARDWARE REMOVAL Right 6/11/2021    Procedure: TIBIA HARDWARE REMOVAL;  Surgeon: Geoff Shah II, MD;  Location: [facility] MAIN OR;  Service: Orthopedics;  Laterality: Right;    HERNIA REPAIR      HYSTERECTOMY      INSERT / REPLACE / REMOVE PACEMAKER      NEPHRECTOMY Right     TIBIA IM NAILING/RODDING Right 8/28/2020    Procedure: TIBIA INTRAMEDULLARY NAIL/ABRAHAM INSERTION;  Surgeon: Geoff Shah II, MD;  Location: [facility] MAIN OR;  Service: Orthopedics;  Laterality: Right;       Family History   Problem Relation Age of Onset    COPD  Father        Social History     Tobacco Use    Smoking status: Never     Passive exposure: Never    Smokeless tobacco: Never   Vaping Use    Vaping Use: Never used   Substance Use Topics    Alcohol use: Not Currently    Drug use: Never        Medications Prior to Admission   Medication Sig Dispense Refill Last Dose    apixaban (ELIQUIS) 2.5 MG tablet tablet Take 1 tablet by mouth Every 12 (Twelve) Hours. Indications: Atrial Fibrillation 60 tablet 0 1/28/2024 at 0830    atorvastatin (LIPITOR) 20 MG tablet Take 1 tablet by mouth Every Night.   1/27/2024 at 1911    calcium acetate (PHOSLO) 667 MG tablet    1/28/2024 at 1300    dicyclomine (BENTYL) 10 MG capsule Take 1 capsule by mouth 3 (Three) Times a Day. 60 capsule 0 1/28/2024 at 0830    dilTIAZem CD (CARDIZEM CD) 120 MG 24 hr capsule Take 1 capsule by mouth Daily. 30 capsule 0 1/28/2024 at 0830    furosemide (LASIX) 40 MG tablet Take 1 tablet by mouth Daily.   1/28/2024 at 0830    gabapentin (NEURONTIN) 300 MG capsule 1 capsule 3 (Three) Times a Day.   1/28/2024 at 0830    Melatonin-Pyridoxine 1-10 MG tablet Take 1 tablet by mouth every night at bedtime.   1/27/2024 at 1911    metoprolol tartrate (LOPRESSOR) 50 MG tablet Take 2 tablets by mouth Every Morning.   1/28/2024 at 0830    metoprolol tartrate (LOPRESSOR) 50 MG tablet Take 1 tablet by mouth Every Night.   1/27/2024 at 1911    midodrine (PROAMATINE) 10 MG tablet Take 1 tablet by mouth Daily As Needed (PRN dialysis days.). 15 tablet 0     polyethylene glycol (MiraLax) 17 GM/SCOOP powder Take 17 g by mouth Every Morning.   1/28/2024 at 0830    saccharomyces boulardii (FLORASTOR) 250 MG capsule Take 1 capsule by mouth 2 (Two) Times a Day. 60 capsule 0 1/28/2024 at 0830    sertraline (ZOLOFT) 25 MG tablet Take 1 tablet by mouth every night at bedtime.   1/27/2024 at 1911    topiramate (TOPAMAX) 100 MG tablet Take 1 tablet by mouth Every Night. 30 tablet 0 1/27/2024 at 1911    zinc oxide 20 % ointment Apply 1  application  topically to the appropriate area as directed. Every shift.       acetaminophen (TYLENOL) 325 MG tablet Take 2 tablets by mouth 4 (Four) Times a Day As Needed for Mild Pain.       ipratropium-albuterol (DUO-NEB) 0.5-2.5 mg/3 ml nebulizer Take 3 mL by nebulization. Six times daily PRN.       loperamide (IMODIUM) 2 MG capsule Take 2 capsules by mouth Daily As Needed for Diarrhea.       ondansetron ODT (ZOFRAN-ODT) 4 MG disintegrating tablet Place 1 tablet on the tongue 3 (Three) Times a Day As Needed for Nausea or Vomiting.            Amoxicillin, Ciprofloxacin, Oxycodone-acetaminophen, Penicillins, Sulfa antibiotics, Cephalexin, Clavulanic acid, Codeine, Contrast dye (echo or unknown ct/mr), Doxycycline, Fluconazole, Iodinated contrast media, Iodine, Macrobid [nitrofurantoin], Tobramycin, and Trimethoprim    Scheduled Meds:apixaban, 2.5 mg, Oral, Q12H  atorvastatin, 20 mg, Oral, Daily  dicyclomine, 10 mg, Oral, TID  famotidine, 10 mg, Oral, Q48H  gabapentin, 100 mg, Oral, Nightly  midodrine, 5 mg, Oral, TID AC  [START ON 1/30/2024] polyethylene glycol, 17 g, Oral, Daily  povidone-iodine, , Topical, Daily  senna-docusate sodium, 2 tablet, Oral, BID  sodium chloride, 10 mL, Intravenous, Q12H  topiramate, 100 mg, Oral, Nightly      Continuous Infusions:DOPamine, 2-20 mcg/kg/min, Last Rate: 3.984 mcg/kg/min (01/29/24 1454)      PRN Meds:.  albumin human    senna-docusate sodium **AND** polyethylene glycol **AND** bisacodyl **AND** bisacodyl    Calcium Replacement - Follow Nurse / BPA Driven Protocol    hydrALAZINE    ipratropium-albuterol    loperamide    Magnesium Standard Dose Replacement - Follow Nurse / BPA Driven Protocol    midodrine    ondansetron ODT **OR** ondansetron    Phosphorus Replacement - Follow Nurse / BPA Driven Protocol    Potassium Replacement - Follow Nurse / BPA Driven Protocol    sodium chloride    sodium chloride    sodium chloride    traMADol    Objective     VITAL SIGNS  Vitals:     "01/29/24 1415 01/29/24 1428 01/29/24 1430 01/29/24 1445   BP: 116/54  107/53 117/48   BP Location: Left arm      Patient Position: Lying  Lying Lying   Pulse: 103  98 94   Resp: 16 16  16   Temp: 98.5 °F (36.9 °C)      TempSrc:       SpO2:       Weight:       Height:           Flowsheet Rows      Flowsheet Row First Filed Value   Admission Height 170.2 cm (67\") Documented at 01/28/2024 1517   Admission Weight 65.8 kg (145 lb) Documented at 01/28/2024 1517            Body mass index is 22.65 kg/m².     TELEMETRY: A-fib with mildly elevated ventricular rates    Physical Exam:  Vitals reviewed.   Constitutional:       General: Not in acute distress.     Appearance: Normal appearance. Well-developed. Frail. Chronically ill-appearing.   Eyes:      Pupils: Pupils are equal, round, and reactive to light.   HENT:      Head: Normocephalic and atraumatic.   Neck:      Vascular: No JVD.   Pulmonary:      Effort: Pulmonary effort is normal.      Breath sounds: Decreased breath sounds present.   Cardiovascular:      Normal rate. Irregularly irregular rhythm.   Pulses:     Intact distal pulses.   Edema:     Peripheral edema absent.   Abdominal:      General: There is no distension.      Palpations: Abdomen is soft.      Tenderness: There is no abdominal tenderness.   Musculoskeletal: Normal range of motion.      Cervical back: Normal range of motion and neck supple. Skin:     General: Skin is warm and dry.   Neurological:      Mental Status: Lethargic.          Results Review:   I reviewed the patient's new clinical results.    CBC    Results from last 7 days   Lab Units 01/29/24  0645 01/28/24  1553   WBC 10*3/mm3 8.90 10.20   HEMOGLOBIN g/dL 10.4* 10.1*   PLATELETS 10*3/mm3 232 268     BMP   Results from last 7 days   Lab Units 01/29/24  0645 01/28/24  1553   SODIUM mmol/L 137 137   POTASSIUM mmol/L 5.6* 4.9   CHLORIDE mmol/L 102 101   CO2 mmol/L 19.0* 24.0   BUN mg/dL 89* 84*   CREATININE mg/dL 5.25* 5.36*   GLUCOSE mg/dL 97 " "104*     Cr Clearance Estimated Creatinine Clearance: 8.6 mL/min (A) (by C-G formula based on SCr of 5.25 mg/dL (H)).  Coag     HbA1C   Lab Results   Component Value Date    HGBA1C 5.4 10/11/2020    HGBA1C 6.2 (H) 09/08/2020    HGBA1C 6.1 (H) 08/27/2020     Blood Glucose No results found for: \"POCGLU\"  Infection     CMP   Results from last 7 days   Lab Units 01/29/24  0645 01/28/24  1553   SODIUM mmol/L 137 137   POTASSIUM mmol/L 5.6* 4.9   CHLORIDE mmol/L 102 101   CO2 mmol/L 19.0* 24.0   BUN mg/dL 89* 84*   CREATININE mg/dL 5.25* 5.36*   GLUCOSE mg/dL 97 104*   ALBUMIN g/dL  --  3.1*   BILIRUBIN mg/dL  --  0.4   ALK PHOS U/L  --  118*   AST (SGOT) U/L  --  32   ALT (SGPT) U/L  --  24     ABG      UA      DEUCE  No results found for: \"POCMETH\", \"POCAMPHET\", \"POCBARBITUR\", \"POCBENZO\", \"POCCOCAINE\", \"POCOPIATES\", \"POCOXYCODO\", \"POCPHENCYC\", \"POCPROPOXY\", \"POCTHC\", \"POCTRICYC\"  Lysis Labs   Results from last 7 days   Lab Units 01/29/24  0645 01/28/24  1553   HEMOGLOBIN g/dL 10.4* 10.1*   PLATELETS 10*3/mm3 232 268   CREATININE mg/dL 5.25* 5.36*     Radiology(recent) XR Chest 1 View    Result Date: 1/28/2024  Impression: 1.Cardiomegaly. No acute radiographic abnormality is identified. Electronically Signed: Joshua Fregoso MD  1/28/2024 4:11 PM EST  Workstation ID: [id number]     Results from last 7 days   Lab Units 01/28/24  1801   HSTROP T ng/L 80*       Imaging Results (Last 24 Hours)       Procedure Component Value Units Date/Time    XR Chest 1 View [[id number]] Collected: 01/28/24 1610     Updated: 01/28/24 1613    Narrative:      XR CHEST 1 VW    Date of Exam: 1/28/2024 4:01 PM EST    Indication: Chest Pain Protocol  Chest Pain Protocol    Comparison: Chest x-ray dated 12/10/2023    Findings:  Left chest wall pacemaker is present. Right IJ dialysis catheter overlies the right atrium. The lungs appear adequately aerated without consolidation or mass. No pleural effusion or pneumothorax is identified. Cardiac silhouette " is enlarged. Pulmonary   vasculature is unremarkable. No acute or suspicious osseous lesion is identified.       Impression:      Impression:  1.Cardiomegaly. No acute radiographic abnormality is identified.    Electronically Signed: Joshua Fregoso MD    1/28/2024 4:11 PM EST    Workstation ID: [id number]            EKG            I personally viewed and interpreted the patient's EKG/Telemetry data:    ECHOCARDIOGRAM:      STRESS MYOVIEW:    CARDIAC CATHETERIZATION:    OTHER:         Assessment & Plan       Hypotension    Chest pain    Atrial fibrillation with RVR  Atrial fibrillation is permanent  Ventricular rates are mildly rapid  She is currently off beta-blocker and calcium channel blocker due to hypotension  Currently on IV dopamine    Hypotension  Metoprolol and diltiazem currently on hold due to low blood pressure  Has been started on midodrine  Patient is currently on IV dopamine  Would consider transition to IV west due to heart rate    End-stage renal disease requiring hemodialysis    Sick sinus syndrome with dual-chamber pacemaker  Stable device function last    RSV      Increase midodrine, change dopamine to IV West-Synephrine to decrease heart rates and A-fib  Gentle beta-blockers can be reinitiated, hold Cardizem  Digoxin is okay renally dosed per level, will avoid at this time but if rates become an issue will dose accordingly    Follow hemodynamics  Continue telemetry  Continue Eliquis  Statin therapy on board  Renal following for dialysis need    Continue to follow  Thank you for the consult  Please call with any questions or concerns    Augustin Ellsworht MD, PhD      Angela Lozada, APRALECIA  01/29/24  15:07 EST

## 2024-01-29 NOTE — PROGRESS NOTES
LOS: 0 days   Patient Care Team:  John Cano MD as PCP - General (Family Medicine)  Jose Carlos Cheng MD as Consulting Physician (Cardiology)    Subjective     Interval History: Remains mildly hypotensive overnight    Patient Complaints: Patient voices no specific complaints.  She denies pain, shortness of breath, nausea or problems.    History taken from: patient    Review of Systems   Constitutional:  Positive for activity change and fatigue. Negative for appetite change, chills, diaphoresis and fever.   HENT:  Positive for hearing loss. Negative for postnasal drip.    Eyes:  Negative for visual disturbance.   Respiratory:  Negative for cough, shortness of breath, wheezing and stridor.    Cardiovascular:  Negative for chest pain, palpitations and leg swelling.   Gastrointestinal:  Negative for abdominal pain, constipation, diarrhea, nausea and vomiting.   Endocrine: Negative for polyuria.   Genitourinary:  Negative for dysuria.   Musculoskeletal:  Positive for gait problem. Negative for arthralgias and back pain.   Skin:  Negative for rash and wound.   Neurological:  Negative for dizziness, light-headedness and headaches.   Psychiatric/Behavioral:  Negative for confusion.            Objective     Vital Signs  Temp:  [96.6 °F (35.9 °C)-98.9 °F (37.2 °C)] 98.9 °F (37.2 °C)  Heart Rate:  [] 94  Resp:  [14-18] 15  BP: ()/(31-71) 113/46    Physical Exam:     General Appearance:    Alert, cooperative, in no acute distress, frail, hard of hearing   Head:    Normocephalic, without obvious abnormality, atraumatic   Eyes:            Lids and lashes normal, conjunctivae and sclerae normal, no   icterus, no pallor, corneas clear, PERRLA   Ears:    Ears appear intact with no abnormalities noted   Throat:   No oral lesions, no thrush, oral mucosa moist   Neck:   No adenopathy, supple, trachea midline, no thyromegaly, no   carotid bruit, no JVD   Lungs:     Clear to auscultation,respirations regular, even  and                  unlabored    Heart:     Irregularly irregular, 2/6 systolic ejection murmur   Chest Wall:  Tunneled catheter right chest wall   Abdomen:   Urostomy right abdomen   Extremities:   Moves all extremities well, no edema, no cyanosis, no             Redness   Pulses:   Pulses palpable and equal bilaterally   Skin: Chronic venous stasis changes bilateral lower extremities   Lymph nodes:   No palpable adenopathy   Neurologic:   Cranial nerves 2 - 12 grossly intact, sensation intact, DTR       present and equal bilaterally        Results Review:    Lab Results (last 24 hours)       Procedure Component Value Units Date/Time    Hepatitis B Surface Antigen [[id number]]  (Normal) Collected: 01/28/24 1553    Specimen: Blood Updated: 01/29/24 1039     Hepatitis B Surface Ag Non-Reactive    Basic Metabolic Panel [[id number]]  (Abnormal) Collected: 01/29/24 0645    Specimen: Blood Updated: 01/29/24 0720     Glucose 97 mg/dL      BUN 89 mg/dL      Creatinine 5.25 mg/dL      Sodium 137 mmol/L      Potassium 5.6 mmol/L      Comment: Specimen hemolyzed.  Result may be falsely elevated.        Chloride 102 mmol/L      CO2 19.0 mmol/L      Calcium 8.4 mg/dL      BUN/Creatinine Ratio 17.0     Anion Gap 16.0 mmol/L      eGFR 7.7 mL/min/1.73      Comment: <15 Indicative of kidney failure       Narrative:      GFR Normal >60  Chronic Kidney Disease <60  Kidney Failure <15    The GFR formula is only valid for adults with stable renal function between ages 18 and 70.    CBC & Differential [[id number]]  (Abnormal) Collected: 01/29/24 0645    Specimen: Blood Updated: 01/29/24 0706    Narrative:      The following orders were created for panel order CBC & Differential.  Procedure                               Abnormality         Status                     ---------                               -----------         ------                     CBC Auto Differential[[id number]]        Abnormal            Final result                  Please view results for these tests on the individual orders.    CBC Auto Differential [[id number]]  (Abnormal) Collected: 01/29/24 0645    Specimen: Blood Updated: 01/29/24 0706     WBC 8.90 10*3/mm3      RBC 3.15 10*6/mm3      Hemoglobin 10.4 g/dL      Hematocrit 31.7 %      .6 fL      MCH 33.1 pg      MCHC 32.9 g/dL      RDW 15.6 %      RDW-SD 52.9 fl      MPV 7.4 fL      Platelets 232 10*3/mm3      Neutrophil % 76.6 %      Lymphocyte % 11.9 %      Monocyte % 9.3 %      Eosinophil % 0.7 %      Basophil % 1.5 %      Neutrophils, Absolute 6.80 10*3/mm3      Lymphocytes, Absolute 1.10 10*3/mm3      Monocytes, Absolute 0.80 10*3/mm3      Eosinophils, Absolute 0.10 10*3/mm3      Basophils, Absolute 0.10 10*3/mm3      nRBC 0.0 /100 WBC     CANDIDA AURIS SCREEN - Swab, Axilla Right, Axilla Left and Groin [[id number]] Collected: 01/29/24 0348    Specimen: Swab from Axilla Right, Axilla Left and Groin Updated: 01/29/24 0538    High Sensitivity Troponin T 2Hr [[id number]]  (Abnormal) Collected: 01/28/24 1801    Specimen: Blood Updated: 01/28/24 1823     HS Troponin T 80 ng/L      Troponin T Delta -3 ng/L     Narrative:      High Sensitive Troponin T Reference Range:  <14.0 ng/L- Negative Female for AMI  <22.0 ng/L- Negative Male for AMI  >=14 - Abnormal Female indicating possible myocardial injury.  >=22 - Abnormal Male indicating possible myocardial injury.   Clinicians would have to utilize clinical acumen, EKG, Troponin, and serial changes to determine if it is an Acute Myocardial Infarction or myocardial injury due to an underlying chronic condition.         Respiratory Panel PCR w/COVID-19(SARS-CoV-2) VINCENT/STEFANY/ANNY/PAD/COR/MANASA In-House, NP Swab in [facility], 2 HR TAT - Swab, Nasopharynx [[id number]]  (Abnormal) Collected: 01/28/24 1713    Specimen: Swab from Nasopharynx Updated: 01/28/24 1819     ADENOVIRUS, PCR Not Detected     Coronavirus 229E Not Detected     Coronavirus HKU1 Not Detected     Coronavirus NL63  Not Detected     Coronavirus OC43 Not Detected     COVID19 Not Detected     Human Metapneumovirus Not Detected     Human Rhinovirus/Enterovirus Not Detected     Influenza A PCR Not Detected     Influenza B PCR Not Detected     Parainfluenza Virus 1 Not Detected     Parainfluenza Virus 2 Not Detected     Parainfluenza Virus 3 Not Detected     Parainfluenza Virus 4 Not Detected     RSV, PCR Detected     Bordetella pertussis pcr Not Detected     Bordetella parapertussis PCR Not Detected     Chlamydophila pneumoniae PCR Not Detected     Mycoplasma pneumo by PCR Not Detected    Narrative:      In the setting of a positive respiratory panel with a viral infection PLUS a negative procalcitonin without other underlying concern for bacterial infection, consider observing off antibiotics or discontinuation of antibiotics and continue supportive care. If the respiratory panel is positive for atypical bacterial infection (Bordetella pertussis, Chlamydophila pneumoniae, or Mycoplasma pneumoniae), consider antibiotic de-escalation to target atypical bacterial infection.    [facility] Draw [[id number]] Collected: 01/28/24 1553    Specimen: Blood Updated: 01/28/24 1700    Narrative:      The following orders were created for panel order [facility] Draw.  Procedure                               Abnormality         Status                     ---------                               -----------         ------                     Green Top (Gel)[[id number]]                                  Final result               Lavender Top[[id number]]                                     Final result               Gold Top - SST[[id number]]                                   Final result               Light Blue Top[[id number]]                                   Final result                 Please view results for these tests on the individual orders.    Lavender Top [[id number]] Collected: 01/28/24 1553    Specimen: Blood Updated: 01/28/24 1700     Extra Tube  hold for add-on     Comment: Auto resulted       Gold Top - SST [[id number]] Collected: 01/28/24 1553    Specimen: Blood Updated: 01/28/24 1700     Extra Tube Hold for add-ons.     Comment: Auto resulted.       Light Blue Top [[id number]] Collected: 01/28/24 1553    Specimen: Blood Updated: 01/28/24 1700     Extra Tube Hold for add-ons.     Comment: Auto resulted       High Sensitivity Troponin T [[id number]]  (Abnormal) Collected: 01/28/24 1553    Specimen: Blood Updated: 01/28/24 1639     HS Troponin T 83 ng/L     Narrative:      High Sensitive Troponin T Reference Range:  <14.0 ng/L- Negative Female for AMI  <22.0 ng/L- Negative Male for AMI  >=14 - Abnormal Female indicating possible myocardial injury.  >=22 - Abnormal Male indicating possible myocardial injury.   Clinicians would have to utilize clinical acumen, EKG, Troponin, and serial changes to determine if it is an Acute Myocardial Infarction or myocardial injury due to an underlying chronic condition.         Green Top (Gel) [[id number]] Collected: 01/28/24 1553    Specimen: Blood Updated: 01/28/24 1638     Extra Tube HOLD    Comprehensive Metabolic Panel [[id number]]  (Abnormal) Collected: 01/28/24 1553    Specimen: Blood Updated: 01/28/24 1637     Glucose 104 mg/dL      BUN 84 mg/dL      Creatinine 5.36 mg/dL      Sodium 137 mmol/L      Potassium 4.9 mmol/L      Comment: Slight hemolysis detected by analyzer. Result may be falsely elevated.        Chloride 101 mmol/L      CO2 24.0 mmol/L      Calcium 8.6 mg/dL      Total Protein 6.3 g/dL      Albumin 3.1 g/dL      ALT (SGPT) 24 U/L      AST (SGOT) 32 U/L      Alkaline Phosphatase 118 U/L      Total Bilirubin 0.4 mg/dL      Globulin 3.2 gm/dL      A/G Ratio 1.0 g/dL      BUN/Creatinine Ratio 15.7     Anion Gap 12.0 mmol/L      eGFR 7.5 mL/min/1.73      Comment: <15 Indicative of kidney failure       Narrative:      GFR Normal >60  Chronic Kidney Disease <60  Kidney Failure <15    The GFR formula is only valid  for adults with stable renal function between ages 18 and 70.    CBC & Differential [[id number]]  (Abnormal) Collected: 01/28/24 1553    Specimen: Blood Updated: 01/28/24 1607    Narrative:      The following orders were created for panel order CBC & Differential.  Procedure                               Abnormality         Status                     ---------                               -----------         ------                     CBC Auto Differential[[id number]]        Abnormal            Final result                 Please view results for these tests on the individual orders.    CBC Auto Differential [[id number]]  (Abnormal) Collected: 01/28/24 1553    Specimen: Blood Updated: 01/28/24 1607     WBC 10.20 10*3/mm3      RBC 3.14 10*6/mm3      Hemoglobin 10.1 g/dL      Hematocrit 31.4 %      .1 fL      MCH 32.0 pg      MCHC 32.0 g/dL      RDW 16.0 %      RDW-SD 54.7 fl      MPV 6.8 fL      Platelets 268 10*3/mm3      Neutrophil % 68.2 %      Lymphocyte % 19.2 %      Monocyte % 9.9 %      Eosinophil % 1.6 %      Basophil % 1.1 %      Neutrophils, Absolute 7.00 10*3/mm3      Lymphocytes, Absolute 2.00 10*3/mm3      Monocytes, Absolute 1.00 10*3/mm3      Eosinophils, Absolute 0.20 10*3/mm3      Basophils, Absolute 0.10 10*3/mm3      nRBC 0.1 /100 WBC              Imaging Results (Last 24 Hours)       Procedure Component Value Units Date/Time    XR Chest 1 View [[id number]] Collected: 01/28/24 1610     Updated: 01/28/24 1613    Narrative:      XR CHEST 1 VW    Date of Exam: 1/28/2024 4:01 PM EST    Indication: Chest Pain Protocol  Chest Pain Protocol    Comparison: Chest x-ray dated 12/10/2023    Findings:  Left chest wall pacemaker is present. Right IJ dialysis catheter overlies the right atrium. The lungs appear adequately aerated without consolidation or mass. No pleural effusion or pneumothorax is identified. Cardiac silhouette is enlarged. Pulmonary   vasculature is unremarkable. No acute or suspicious  osseous lesion is identified.       Impression:      Impression:  1.Cardiomegaly. No acute radiographic abnormality is identified.    Electronically Signed: Joshua Fregoso MD    1/28/2024 4:11 PM EST    Workstation ID: [id number]                 I reviewed the patient's new clinical results.    Medication Review:   Scheduled Meds:apixaban, 2.5 mg, Oral, Q12H  atorvastatin, 20 mg, Oral, Daily  dicyclomine, 10 mg, Oral, TID  gabapentin, 100 mg, Oral, Nightly  senna-docusate sodium, 2 tablet, Oral, BID  sodium chloride, 10 mL, Intravenous, Q12H  topiramate, 100 mg, Oral, Nightly      Continuous Infusions:DOPamine, 2-20 mcg/kg/min, Last Rate: 3.984 mcg/kg/min (01/29/24 1100)      PRN Meds:.  senna-docusate sodium **AND** polyethylene glycol **AND** bisacodyl **AND** bisacodyl    Calcium Replacement - Follow Nurse / BPA Driven Protocol    hydrALAZINE    ipratropium-albuterol    loperamide    Magnesium Standard Dose Replacement - Follow Nurse / BPA Driven Protocol    midodrine    ondansetron ODT **OR** ondansetron    Phosphorus Replacement - Follow Nurse / BPA Driven Protocol    Potassium Replacement - Follow Nurse / BPA Driven Protocol    sodium chloride    sodium chloride    sodium chloride    traMADol     Assessment & Plan       Chest pain  -Patient has ruled out for acute coronary syndrome and had heart cath in October 2023 that showed no significant obstructive disease.  I do not anticipate any further ischemic workup.  Chest pain has resolved.    Hypotension - improved with dopamine; likely a combination of dialysis, metoprolol and diltiazem, superimposed RSV; no signs of sepsis; starting scheduled midodrine.  Add back rate controlling agents as soon as tolerated as she has h/o RVR.    Permanent atrial fib - rate is controlled; holding metoprolol and diltiazem for now due to BP; continue Eliquis.  Add back rate controlling agents as soon as tolerated as she has h/o RVR.    ESRD on dialysis - dialysis per   Arianna  Hyperkalemia - should resolve with dialysis  RSV - relatively asymptomatic; continue droplet precautions  Chronic headaches - Topamax    Low-dose famotidine for stress ulcer prophylaxis  On Eliquis for A-fib so no additional DVT prophylaxis is needed      Plan for disposition:Return to [facility] at discharge    Lora Henderson MD  01/29/24  11:57 EST

## 2024-01-29 NOTE — DISCHARGE PLACEMENT REQUEST
"Nell Liu (83 y.o. Female)       Date of Birth   1940    Social Security Number       Address   [place] [address] [place] [address]    Home Phone   [phone]    MRN   [id number]       [employer]   [facility] [place], [facility]    Marital Status                               Admission Date   1/28/24    Admission Type   Emergency    Admitting Provider   Lora Henderson MD    Attending Provider   Lora Henderson MD    Department, Room/Bed   [facility] OBSERVATION, 113/1       Discharge Date       Discharge Disposition       Discharge Destination                                 Attending Provider: Lora Henderson MD    Allergies: Amoxicillin, Ciprofloxacin, Oxycodone-acetaminophen, Penicillins, Sulfa Antibiotics, Cephalexin, Clavulanic Acid, Codeine, Contrast Dye (Echo Or Unknown Ct/mr), Doxycycline, Fluconazole, Iodinated Contrast Media, Iodine, Macrobid [Nitrofurantoin], Tobramycin, Trimethoprim    Isolation: Contact, Droplet   Infection: RSV (01/28/24), Kizzy Auris (rule out) (01/29/24)   Code Status: CPR    Ht: 170.2 cm (67.01\")   Wt: 65.6 kg (144 lb 10 oz)    Admission Cmt: None   Principal Problem: None                  Active Insurance as of 1/28/2024       Primary Coverage       Payor Plan Insurance Group Employer/Plan Group    MEDICARE MEDICARE A & B        Payor Plan Address Payor Plan Phone Number Payor Plan Fax Number Effective Dates    [address] [phone]  4/1/2006 - None Entered    [place] [id number]         Subscriber Name Subscriber Birth Date Member ID       NELL LIU 1940 [id number]                     Emergency Contacts        (Rel.) Home Phone Work Phone Mobile Phone    Manish Liu (Son) [phone] -- [phone]    PatelBrianna (Niece) (Relative) -- -- [phone]    RANJITH WATKINS (Grandchild) -- -- [phone]                "

## 2024-01-29 NOTE — ED NOTES
Nursing report ED to floor  Hazel Bucio  83 y.o.  female    HPI:   Chief Complaint   Patient presents with    Chest Pain       Admitting doctor:   Lora Henderson MD    Admitting diagnosis:   The primary encounter diagnosis was Chest pain, unspecified type. Diagnoses of RSV (acute bronchiolitis due to respiratory syncytial virus) and Hypotension, unspecified hypotension type were also pertinent to this visit.    Code status:   Current Code Status       Date Active Code Status Order ID Comments User Context       1/28/2024 1900 CPR (Attempt to Resuscitate) [id number]  Preeti James APRN ED        Question Answer    Code Status (Patient has no pulse and is not breathing) CPR (Attempt to Resuscitate)    Medical Interventions (Patient has pulse or is breathing) Full Support    Level Of Support Discussed With Patient                    Allergies:   Amoxicillin, Ciprofloxacin, Oxycodone-acetaminophen, Penicillins, Sulfa antibiotics, Cephalexin, Clavulanic acid, Codeine, Contrast dye (echo or unknown ct/mr), Doxycycline, Fluconazole, Iodinated contrast media, Iodine, Macrobid [nitrofurantoin], Tobramycin, and Trimethoprim    Isolation:  Droplet, Enhanced Droplet/Contact , Contact     Fall Risk:  Fall Risk Assessment was completed, and patient is at high risk for falls.   Predictive Model Details         12 (Low) Factor Value    Calculated 1/28/2024 20:42 Age 83    Risk of Fall Model Musculoskeletal Assessment WDL     Active Peripheral IV Present     Imaging order in this encounter Present     Skin Assessment WDL     Magnesium not on file     Financial Class Medicare     Albumin 3.1 g/dL     Respiratory Rate 17     Drug Use No     Calcium 8.6 mg/dL     Diastolic BP 69     Josh Scale not on file     Creatinine 5.36 mg/dL     Peripheral Vascular Assessment WDL     Cardiac Assessment X     Chloride 101 mmol/L     Gastrointestinal Assessment WDL     Number of Distinct Medication Classes administered 1     ALT 24 U/L      Potassium 4.9 mmol/L     Days after Admission 0.222     Total Bilirubin 0.4 mg/dL         Weight:       01/28/24  1517   Weight: 65.8 kg (145 lb)       Intake and Output    Intake/Output Summary (Last 24 hours) at 1/28/2024 2042  Last data filed at 1/28/2024 2006  Gross per 24 hour   Intake 600 ml   Output 350 ml   Net 250 ml       Diet:   Dietary Orders (From admission, onward)       Start     Ordered    01/28/24 1900  Diet: Cardiac Diets, Diabetic Diets; Healthy Heart (2-3 Na+); Consistent Carbohydrate; Texture: Regular Texture (IDDSI 7); Fluid Consistency: Thin (IDDSI 0)  Diet Effective Now        References:    Diet Order [place]   Question Answer Comment   Diets: Cardiac Diets    Diets: Diabetic Diets    Cardiac Diet: Healthy Heart (2-3 Na+)    Diabetic Diet: Consistent Carbohydrate    Texture: Regular Texture (IDDSI 7)    Fluid Consistency: Thin (IDDSI 0)        01/28/24 1900                     Most recent vitals:   Vitals:    01/28/24 1914 01/28/24 1915 01/28/24 1931 01/28/24 2002   BP:   126/65 104/69   Pulse: 73 76 80 87   Resp:       Temp:       TempSrc:       SpO2: 97% 99% 92% 98%   Weight:       Height:           Active LDAs/IV Access:   Lines, Drains & Airways       Active LDAs       Name Placement date Placement time Site Days    Peripheral IV Anterior;Left;Proximal Antecubital --  --  Antecubital  --    Urostomy RLQ 12/07/21  permanent had on admission  1900  RLQ  782    Hemodialysis Cath Double 06/23/23  1243  Chest  219                    Skin Condition:   Skin Assessments (last day)       Date/Time Skin WDL    01/28/24 15:53:16 WDL             Labs (abnormal labs have a star):   Labs Reviewed   RESPIRATORY PANEL PCR W/ COVID-19 (SARS-COV-2), NP SWAB IN UTM/VTP, 2 HR TAT - Abnormal; Notable for the following components:       Result Value    RSV, PCR Detected (*)     All other components within normal limits    Narrative:     In the setting of a positive respiratory panel with a viral infection  PLUS a negative procalcitonin without other underlying concern for bacterial infection, consider observing off antibiotics or discontinuation of antibiotics and continue supportive care. If the respiratory panel is positive for atypical bacterial infection (Bordetella pertussis, Chlamydophila pneumoniae, or Mycoplasma pneumoniae), consider antibiotic de-escalation to target atypical bacterial infection.   COMPREHENSIVE METABOLIC PANEL - Abnormal; Notable for the following components:    Glucose 104 (*)     BUN 84 (*)     Creatinine 5.36 (*)     Albumin 3.1 (*)     Alkaline Phosphatase 118 (*)     eGFR 7.5 (*)     All other components within normal limits    Narrative:     GFR Normal >60  Chronic Kidney Disease <60  Kidney Failure <15    The GFR formula is only valid for adults with stable renal function between ages 18 and 70.   TROPONIN - Abnormal; Notable for the following components:    HS Troponin T 83 (*)     All other components within normal limits    Narrative:     High Sensitive Troponin T Reference Range:  <14.0 ng/L- Negative Female for AMI  <22.0 ng/L- Negative Male for AMI  >=14 - Abnormal Female indicating possible myocardial injury.  >=22 - Abnormal Male indicating possible myocardial injury.   Clinicians would have to utilize clinical acumen, EKG, Troponin, and serial changes to determine if it is an Acute Myocardial Infarction or myocardial injury due to an underlying chronic condition.        CBC WITH AUTO DIFFERENTIAL - Abnormal; Notable for the following components:    RBC 3.14 (*)     Hemoglobin 10.1 (*)     Hematocrit 31.4 (*)     .1 (*)     RDW 16.0 (*)     RDW-SD 54.7 (*)     Lymphocyte % 19.2 (*)     Monocytes, Absolute 1.00 (*)     All other components within normal limits   HIGH SENSITIVITIY TROPONIN T 2HR - Abnormal; Notable for the following components:    HS Troponin T 80 (*)     All other components within normal limits    Narrative:     High Sensitive Troponin T Reference  Range:  <14.0 ng/L- Negative Female for AMI  <22.0 ng/L- Negative Male for AMI  >=14 - Abnormal Female indicating possible myocardial injury.  >=22 - Abnormal Male indicating possible myocardial injury.   Clinicians would have to utilize clinical acumen, EKG, Troponin, and serial changes to determine if it is an Acute Myocardial Infarction or myocardial injury due to an underlying chronic condition.        RAINBOW DRAW    Narrative:     The following orders were created for panel order [place] Draw.  Procedure                               Abnormality         Status                     ---------                               -----------         ------                     Green Top (Gel)[[id number]]                                  Final result               Lavender Top[[id number]]                                     Final result               Gold Top - SST[[id number]]                                   Final result               Light Blue Top[[id number]]                                   Final result                 Please view results for these tests on the individual orders.   CBC AND DIFFERENTIAL    Narrative:     The following orders were created for panel order CBC & Differential.  Procedure                               Abnormality         Status                     ---------                               -----------         ------                     CBC Auto Differential[[id number]]        Abnormal            Final result                 Please view results for these tests on the individual orders.   GREEN TOP   LAVENDER TOP   GOLD TOP - SST   LIGHT BLUE TOP       LOC: Person, Place, and Situation    Telemetry:  Med/Surg    Cardiac Monitoring Ordered: no    EKG:   ECG 12 Lead Chest Pain   Preliminary Result   HEART RATE= 71  bpm   RR Interval= 883  ms   MO Interval=   ms   P Horizontal Axis=   deg   P Front Axis=   deg   QRSD Interval= 92  ms   QT Interval= 409  ms   QTcB= 435  ms   QRS Axis= 82  deg   T Wave  Axis= 63  deg   - ABNORMAL ECG -   Atrial fibrillation   Probable anteroseptal infarct, old   When compared with ECG of 08-Nov-2023 15:43:44,   Significant rate decrease   Significant axis, voltage or hypertrophy change   Electronically Signed By:    Date and Time of Study: 2024-01-28 15:33:55          Medications Given in the ED:   Medications   sodium chloride 0.9 % flush 10 mL (has no administration in time range)   sodium chloride 0.9 % flush 10 mL (has no administration in time range)   sodium chloride 0.9 % flush 10 mL (has no administration in time range)   sodium chloride 0.9 % infusion 40 mL (has no administration in time range)   sennosides-docusate (PERICOLACE) 8.6-50 MG per tablet 2 tablet (has no administration in time range)     And   polyethylene glycol (MIRALAX) packet 17 g (has no administration in time range)     And   bisacodyl (DULCOLAX) EC tablet 5 mg (has no administration in time range)     And   bisacodyl (DULCOLAX) suppository 10 mg (has no administration in time range)   Potassium Replacement - Follow Nurse / BPA Driven Protocol (has no administration in time range)   Magnesium Standard Dose Replacement - Follow Nurse / BPA Driven Protocol (has no administration in time range)   Phosphorus Replacement - Follow Nurse / BPA Driven Protocol (has no administration in time range)   Calcium Replacement - Follow Nurse / BPA Driven Protocol (has no administration in time range)   ondansetron ODT (ZOFRAN-ODT) disintegrating tablet 4 mg (has no administration in time range)     Or   ondansetron (ZOFRAN) injection 4 mg (has no administration in time range)   hydrALAZINE (APRESOLINE) injection 10 mg (has no administration in time range)   apixaban (ELIQUIS) tablet 2.5 mg (has no administration in time range)   atorvastatin (LIPITOR) tablet 20 mg (has no administration in time range)   dicyclomine (BENTYL) capsule 10 mg (has no administration in time range)   dilTIAZem CD (CARDIZEM CD) 24 hr capsule 120  mg (has no administration in time range)   gabapentin (NEURONTIN) capsule 100 mg (has no administration in time range)   ipratropium-albuterol (DUO-NEB) nebulizer solution 3 mL (has no administration in time range)   loperamide (IMODIUM) capsule 4 mg (has no administration in time range)   metoprolol tartrate (LOPRESSOR) tablet 50 mg (has no administration in time range)   midodrine (PROAMATINE) tablet 10 mg (has no administration in time range)   topiramate (TOPAMAX) tablet 100 mg (has no administration in time range)   sodium chloride 0.9 % bolus 500 mL (0 mL Intravenous Stopped 1/28/24 6204)       Imaging results:  XR Chest 1 View    Result Date: 1/28/2024  Impression: 1.Cardiomegaly. No acute radiographic abnormality is identified. Electronically Signed: Joshua Fregoso MD  1/28/2024 4:11 PM EST  Workstation ID: [id number]     Social issues:   Social History     Socioeconomic History    Marital status:    Tobacco Use    Smoking status: Never     Passive exposure: Never    Smokeless tobacco: Never   Vaping Use    Vaping Use: Never used   Substance and Sexual Activity    Alcohol use: Not Currently    Drug use: Never    Sexual activity: Defer       NIH Stroke Scale:  Interval: (not recorded)  1a. Level of Consciousness: (not recorded)  1b. LOC Questions: (not recorded)  1c. LOC Commands: (not recorded)  2. Best Gaze: (not recorded)  3. Visual: (not recorded)  4. Facial Palsy: (not recorded)  5a. Motor Arm, Left: (not recorded)  5b. Motor Arm, Right: (not recorded)  6a. Motor Leg, Left: (not recorded)  6b. Motor Leg, Right: (not recorded)  7. Limb Ataxia: (not recorded)  8. Sensory: (not recorded)  9. Best Language: (not recorded)  10. Dysarthria: (not recorded)  11. Extinction and Inattention (formerly Neglect): (not recorded)    Total (NIH Stroke Scale): (not recorded)     Additional notable assessment information:     Nursing report ED to floor: Report given to rashaad Milton RN   01/28/24 20:42  EST

## 2024-01-29 NOTE — NURSING NOTE
Pt on dopamine drip.   IV in left AC found to be leaking.Removed by charge nurse Kayy Carty and attempted x2 to get a new IV in on left arm (right arm restriction.) Unsuccessful. Called PICC team and spoke with Gabi.

## 2024-01-29 NOTE — H&P
Patient Care Team:  John Cano MD as PCP - General (Family Medicine)  Jose Carlos Cheng MD as Consulting Physician (Cardiology)    Chief complaint chest pain    Subjective     Patient is a 83 y.o. female with history of  stage 5 CKD on dialysis, hypertension, GERD, paroxysmal atrial fibrillation, who presented to the ER from [facility] with an episode of chest pain prior to arrival, she was pain free on arrival to ER. Facility reported low blood pressure, they gave her midodrine. Denies any fever, chills, nausea, vomiting, shortness of breath, diaphroresis, dizziness, syncope.  In the ER EKG atrial fibrillation rate 71, CXR shows cardiomegaly but no acute findings. Troponin elevated 83 repeat 80, initially hypotensive and was given midodrine at facility prior to arrival and given 500cc bolus in ER. She is a chronic dialysis patient- Creatinine 5.36, potassium 4.9 . Respiratory panel positive for RSV.     Cardiology- Dr. Ellsworth  Nephrology- Dr. Keller    Onset of symptoms was 1 day    Chart Review:    Patient had hospital admission 2 months ago for afib with RVR.    Echo 10/16/23    Left ventricular systolic function is normal. Left ventricular ejection fraction appears to be 56 - 60%.    Left ventricular wall thickness is consistent with mild concentric hypertrophy.    Left ventricular diastolic function was indeterminate.    The right ventricular cavity is mildly dilated.    The left atrial cavity is severely dilated.    Left atrial volume is severely increased.    The right atrial cavity is moderately  dilated.    Estimated right ventricular systolic pressure from tricuspid regurgitation is normal (<35 mmHg).    The main pulmonary artery is severely dilated.    Cardiac Cath 10/18/23  Conclusions recommendations  1.  Normal epicardial coronary anatomy with no obstructive atherosclerotic disease of any significance maximally 30% with ANNA-3 flow in all vessels  2.  Normal LV filling pressures with normal  LV function with normal transaortic valve gradient  3.  Normal pulmonary pressures with no evidence of pulmonary hypertension, normal cardiac output with normal peripheral and systemic vascular resistance, normal right atrial pressure with adequate dialysis efforts over the last few days     Continue dialysis efforts, dry weight has been established, note this for future dialysis efforts to avoid volume accumulation volume overload, 2D echo on presentation revealed severely elevated IVC diameter with minimal collapsibility right toe pressure was greater than 20 at that time by noninvasive assessment          Review of Systems   Constitutional: Negative.    HENT: Negative.     Eyes: Negative.    Respiratory: Negative.     Cardiovascular:  Positive for chest pain.   Gastrointestinal: Negative.    Endocrine: Negative.    Genitourinary: Negative.    Musculoskeletal: Negative.    Skin: Negative.    Neurological: Negative.    Psychiatric/Behavioral: Negative.            History  Past Medical History:   Diagnosis Date    Bladder cancer     Cancer     breast, bladder    Deep vein thrombosis     Essential hypertension 1/17/2017    Fracture tibia/fibula, right, closed, initial encounter 8/27/2020    GERD (gastroesophageal reflux disease)     History of urostomy     Immobility 08/27/2020    r/t broken Tibia     Kidney carcinoma, right     removed     Long term current use of anticoagulant 1/9/2015    PAF (paroxysmal atrial fibrillation) 6/26/2019    Presence of cardiac pacemaker 11/03/2014    BS dual chamber PM placed 10/2014 with pocket revision 1/25/17.  HX tachy rob syndrome    Sick sinus syndrome 11/3/2014     Past Surgical History:   Procedure Laterality Date    BREAST LUMPECTOMY      CARDIAC CATHETERIZATION N/A 10/18/2023    Procedure: Left Heart Cath possible PCI, atherectomy, hemodynamic support;  Surgeon: Augustin Ellsworth MD;  Location: [facility] CATH INVASIVE LOCATION;  Service: Cardiology;  Laterality: N/A;     CARDIAC ELECTROPHYSIOLOGY PROCEDURE N/A 4/28/2023    Procedure: Pacemaker gen change, [place] AWARE $;  Surgeon: Jose Carlos Cheng MD;  Location: [facility] CATH INVASIVE LOCATION;  Service: Cardiovascular;  Laterality: N/A;    CHOLECYSTECTOMY N/A 10/12/2020    Procedure: CHOLECYSTECTOMY LAPAROSCOPIC;  Surgeon: Go Pereira DO;  Location: [facility] MAIN OR;  Service: General;  Laterality: N/A;    CYSTECTOMY W/ URETEROILEAL CONDUIT      HARDWARE REMOVAL Right 6/11/2021    Procedure: TIBIA HARDWARE REMOVAL;  Surgeon: Geoff Shah II, MD;  Location: [facility] MAIN OR;  Service: Orthopedics;  Laterality: Right;    HERNIA REPAIR      HYSTERECTOMY      INSERT / REPLACE / REMOVE PACEMAKER      NEPHRECTOMY Right     TIBIA IM NAILING/RODDING Right 8/28/2020    Procedure: TIBIA INTRAMEDULLARY NAIL/ABRAHAM INSERTION;  Surgeon: Geoff Shah II, MD;  Location: [facility] MAIN OR;  Service: Orthopedics;  Laterality: Right;     Family History   Problem Relation Age of Onset    COPD Father      Social History     Tobacco Use    Smoking status: Never     Passive exposure: Never    Smokeless tobacco: Never   Vaping Use    Vaping Use: Never used   Substance Use Topics    Alcohol use: Not Currently    Drug use: Never     (Not in a hospital admission)    Allergies:  Amoxicillin, Ciprofloxacin, Oxycodone-acetaminophen, Penicillins, Sulfa antibiotics, Cephalexin, Clavulanic acid, Codeine, Contrast dye (echo or unknown ct/mr), Doxycycline, Fluconazole, Iodinated contrast media, Iodine, Macrobid [nitrofurantoin], Tobramycin, and Trimethoprim    Objective     Vital Signs  Temp:  [98.6 °F (37 °C)] 98.6 °F (37 °C)  Heart Rate:  [72-91] 80  Resp:  [17] 17  BP: ()/(43-71) 126/65     Physical Exam:      General Appearance:    Alert, cooperative, in no acute distress   Head:    Normocephalic, without obvious abnormality, atraumatic   Eyes:            Lids and lashes normal, conjunctivae and sclerae normal, no   icterus, no  pallor, corneas clear, PERRLA   Ears:    Ears appear intact with no abnormalities noted   Throat:   No oral lesions, no thrush, oral mucosa moist   Neck:   No adenopathy, supple, trachea midline, no thyromegaly, no   carotid bruit, no JVD   Lungs:     Clear to auscultation,respirations regular, even and                  unlabored    Heart:    Regular rhythm and normal rate, normal S1 and S2, no            murmur, no gallop, no rub, no click   Chest Wall:    No abnormalities observed   Abdomen:     Normal bowel sounds, no masses, no organomegaly, soft        non-tender, non-distended, no guarding, no rebound                tenderness   Extremities:   Moves all extremities well, no edema, no cyanosis, no             redness   Pulses:   Pulses palpable and equal bilaterally   Skin:   No bleeding, bruising or rash   Lymph nodes:   No palpable adenopathy   Neurologic:   No focal deficits noted       Results Review:     Imaging Results (Last 24 Hours)       Procedure Component Value Units Date/Time    XR Chest 1 View [[id number]] Collected: 01/28/24 1610     Updated: 01/28/24 1613    Narrative:      XR CHEST 1 VW    Date of Exam: 1/28/2024 4:01 PM EST    Indication: Chest Pain Protocol  Chest Pain Protocol    Comparison: Chest x-ray dated 12/10/2023    Findings:  Left chest wall pacemaker is present. Right IJ dialysis catheter overlies the right atrium. The lungs appear adequately aerated without consolidation or mass. No pleural effusion or pneumothorax is identified. Cardiac silhouette is enlarged. Pulmonary   vasculature is unremarkable. No acute or suspicious osseous lesion is identified.       Impression:      Impression:  1.Cardiomegaly. No acute radiographic abnormality is identified.    Electronically Signed: Joshua Fregoso MD    1/28/2024 4:11 PM EST    Workstation ID: [id number]             Lab Results (last 24 hours)       Procedure Component Value Units Date/Time    High Sensitivity Troponin T 2Hr [[id number]]   (Abnormal) Collected: 01/28/24 1801    Specimen: Blood Updated: 01/28/24 1823     HS Troponin T 80 ng/L      Troponin T Delta -3 ng/L     Narrative:      High Sensitive Troponin T Reference Range:  <14.0 ng/L- Negative Female for AMI  <22.0 ng/L- Negative Male for AMI  >=14 - Abnormal Female indicating possible myocardial injury.  >=22 - Abnormal Male indicating possible myocardial injury.   Clinicians would have to utilize clinical acumen, EKG, Troponin, and serial changes to determine if it is an Acute Myocardial Infarction or myocardial injury due to an underlying chronic condition.         Respiratory Panel PCR w/COVID-19(SARS-CoV-2) VINCENT/STEFANY/ANNY/PAD/COR/MANASA In-House, NP Swab in UTM/VTM, 2 HR TAT - Swab, Nasopharynx [[id number]]  (Abnormal) Collected: 01/28/24 1713    Specimen: Swab from Nasopharynx Updated: 01/28/24 1819     ADENOVIRUS, PCR Not Detected     Coronavirus 229E Not Detected     Coronavirus HKU1 Not Detected     Coronavirus NL63 Not Detected     Coronavirus OC43 Not Detected     COVID19 Not Detected     Human Metapneumovirus Not Detected     Human Rhinovirus/Enterovirus Not Detected     Influenza A PCR Not Detected     Influenza B PCR Not Detected     Parainfluenza Virus 1 Not Detected     Parainfluenza Virus 2 Not Detected     Parainfluenza Virus 3 Not Detected     Parainfluenza Virus 4 Not Detected     RSV, PCR Detected     Bordetella pertussis pcr Not Detected     Bordetella parapertussis PCR Not Detected     Chlamydophila pneumoniae PCR Not Detected     Mycoplasma pneumo by PCR Not Detected    Narrative:      In the setting of a positive respiratory panel with a viral infection PLUS a negative procalcitonin without other underlying concern for bacterial infection, consider observing off antibiotics or discontinuation of antibiotics and continue supportive care. If the respiratory panel is positive for atypical bacterial infection (Bordetella pertussis, Chlamydophila pneumoniae, or Mycoplasma  pneumoniae), consider antibiotic de-escalation to target atypical bacterial infection.    [place] Draw [[id number]] Collected: 01/28/24 1553    Specimen: Blood Updated: 01/28/24 1700    Narrative:      The following orders were created for panel order [place] Draw.  Procedure                               Abnormality         Status                     ---------                               -----------         ------                     Green Top (Gel)[[id number]]                                  Final result               Lavender Top[[id number]]                                     Final result               Gold Top - SST[[id number]]                                   Final result               Light Blue Top[[id number]]                                   Final result                 Please view results for these tests on the individual orders.    Lavender Top [[id number]] Collected: 01/28/24 1553    Specimen: Blood Updated: 01/28/24 1700     Extra Tube hold for add-on     Comment: Auto resulted       Gold Top - SST [[id number]] Collected: 01/28/24 1553    Specimen: Blood Updated: 01/28/24 1700     Extra Tube Hold for add-ons.     Comment: Auto resulted.       Light Blue Top [[id number]] Collected: 01/28/24 1553    Specimen: Blood Updated: 01/28/24 1700     Extra Tube Hold for add-ons.     Comment: Auto resulted       High Sensitivity Troponin T [[id number]]  (Abnormal) Collected: 01/28/24 1553    Specimen: Blood Updated: 01/28/24 1639     HS Troponin T 83 ng/L     Narrative:      High Sensitive Troponin T Reference Range:  <14.0 ng/L- Negative Female for AMI  <22.0 ng/L- Negative Male for AMI  >=14 - Abnormal Female indicating possible myocardial injury.  >=22 - Abnormal Male indicating possible myocardial injury.   Clinicians would have to utilize clinical acumen, EKG, Troponin, and serial changes to determine if it is an Acute Myocardial Infarction or myocardial injury due to an underlying chronic condition.         Green  Top (Gel) [[id number]] Collected: 01/28/24 1553    Specimen: Blood Updated: 01/28/24 1638     Extra Tube HOLD    Comprehensive Metabolic Panel [[id number]]  (Abnormal) Collected: 01/28/24 1553    Specimen: Blood Updated: 01/28/24 1637     Glucose 104 mg/dL      BUN 84 mg/dL      Creatinine 5.36 mg/dL      Sodium 137 mmol/L      Potassium 4.9 mmol/L      Comment: Slight hemolysis detected by analyzer. Result may be falsely elevated.        Chloride 101 mmol/L      CO2 24.0 mmol/L      Calcium 8.6 mg/dL      Total Protein 6.3 g/dL      Albumin 3.1 g/dL      ALT (SGPT) 24 U/L      AST (SGOT) 32 U/L      Alkaline Phosphatase 118 U/L      Total Bilirubin 0.4 mg/dL      Globulin 3.2 gm/dL      A/G Ratio 1.0 g/dL      BUN/Creatinine Ratio 15.7     Anion Gap 12.0 mmol/L      eGFR 7.5 mL/min/1.73      Comment: <15 Indicative of kidney failure       Narrative:      GFR Normal >60  Chronic Kidney Disease <60  Kidney Failure <15    The GFR formula is only valid for adults with stable renal function between ages 18 and 70.    CBC & Differential [[id number]]  (Abnormal) Collected: 01/28/24 1553    Specimen: Blood Updated: 01/28/24 1607    Narrative:      The following orders were created for panel order CBC & Differential.  Procedure                               Abnormality         Status                     ---------                               -----------         ------                     CBC Auto Differential[[id number]]        Abnormal            Final result                 Please view results for these tests on the individual orders.    CBC Auto Differential [[id number]]  (Abnormal) Collected: 01/28/24 1553    Specimen: Blood Updated: 01/28/24 1607     WBC 10.20 10*3/mm3      RBC 3.14 10*6/mm3      Hemoglobin 10.1 g/dL      Hematocrit 31.4 %      .1 fL      MCH 32.0 pg      MCHC 32.0 g/dL      RDW 16.0 %      RDW-SD 54.7 fl      MPV 6.8 fL      Platelets 268 10*3/mm3      Neutrophil % 68.2 %      Lymphocyte % 19.2 %       Monocyte % 9.9 %      Eosinophil % 1.6 %      Basophil % 1.1 %      Neutrophils, Absolute 7.00 10*3/mm3      Lymphocytes, Absolute 2.00 10*3/mm3      Monocytes, Absolute 1.00 10*3/mm3      Eosinophils, Absolute 0.20 10*3/mm3      Basophils, Absolute 0.10 10*3/mm3      nRBC 0.1 /100 WBC              I reviewed the patient's new clinical results.    Assessment & Plan     Chest pain  Hypotension  RSV  -troponin 83, 80  -EKG atrial fibrillation rate 71  -CXR cardiomegaly, no acute findings  -resp. Panel positive for RSV  -Nephrology consulted for inpatient dialysis management  -given midodrine at facility, 500cc NS in ER        DVT prophylaxis- SCD's  GI prophylaxis-ppi    I discussed the patient's findings and my recommendations with patient.     Ese Rodriguez, APRN  01/28/24  19:51 EST

## 2024-01-29 NOTE — ED NOTES
Pt alert, oriented, in no apparent distress. Pt's SPO2 band and BP cuff had dislodged, creating erroneous readings. Monitoring items fixed. Pt denies additional needs at this time.

## 2024-01-29 NOTE — CASE MANAGEMENT/SOCIAL WORK
Discharge Planning Assessment  NYDIA Briscoe     Patient Name: Hazel Bucio  MRN: [id number]  Today's Date: 1/29/2024    Admit Date: 1/28/2024    Plan: Return to [facility]   Discharge Needs Assessment       Row Name 01/29/24 1346       Living Environment    People in Home other (see comments)    Unique Family Situation [facility]    Current Living Arrangements extended care facility    Potentially Unsafe Housing Conditions none    In the past 12 months has the electric, gas, oil, or water company threatened to shut off services in your home? No    Provides Primary Care For no one    Family Caregiver if Needed other (see comments)  [facility] staff    Quality of Family Relationships helpful;involved;supportive    Able to Return to Prior Arrangements yes       Resource/Environmental Concerns    Resource/Environmental Concerns none    Transportation Concerns none       Transportation Needs    In the past 12 months, has lack of transportation kept you from medical appointments or from getting medications? no    In the past 12 months, has lack of transportation kept you from meetings, work, or from getting things needed for daily living? No       Food Insecurity    Within the past 12 months, you worried that your food would run out before you got the money to buy more. Never true    Within the past 12 months, the food you bought just didn't last and you didn't have money to get more. Never true       Transition Planning    Patient/Family Anticipates Transition to long-term care facility    Patient/Family Anticipated Services at Transition none    Transportation Anticipated health plan transportation;family or friend will provide       Discharge Needs Assessment    Readmission Within the Last 30 Days no previous admission in last 30 days    Current Outpatient/Agency/Support Group outpatient hemodialysis    Equipment Currently Used at Home wheelchair    Concerns to be Addressed discharge planning     Anticipated Changes Related to Illness none    Outpatient/Agency/Support Group Needs outpatient hemodialysis    Discharge Facility/Level of Care Needs nursing facility, intermediate    Provided Post Acute Provider List? N/A    Provided Post Acute Provider Quality & Resource List? N/A                   Discharge Plan       Row Name 01/29/24 1348       Plan    Plan Return to [facility] HD-     Plan Comments CM met with patient at the bedside, obtained information from [facility] liaison. Confirmed PCP, insurance, and pharmacy. Patient denies any difficulty affording medications. Patient is currently living at [facility] and receiving HD at [facility]. CM confirmed with [facility] liaisonStacey, that patient may return to [facility]. Will need transportation arranged. DC Barriers: Nephrology following, patient on Dopamine gtt.                  Continued Care and Services - Admitted Since 1/28/2024       Destination       Service Provider Request Status Selected Services Address Phone Fax Patient Preferred    [facility] [place] Pending - Request Sent N/A [address] [place] [address] [phone] --                  Selected Continued Care - Prior Encounters Includes continued care and service providers with selected services from prior encounters from 10/30/2023 to 1/29/2024      Discharged on 11/13/2023 Admission date: 11/8/2023 - Discharge disposition: Intermediate Care       Destination       Service Provider Selected Services Address Phone Fax Patient Preferred    [facility] [place] [facility] [address] [place] [address] [phone] --                          Expected Discharge Date and Time       Expected Discharge Date Expected Discharge Time    Jan 30, 2024            Demographic Summary       Row Name 01/29/24 1346       General Information    Admission Type observation    Arrived From  emergency department    Required Notices Provided Observation Status Notice    Referral Source admission list    Reason for Consult discharge planning    Preferred Language English       Contact Information    Permission Granted to Share Info With     Contact Information Obtained for                    Functional Status       Row Name 01/29/24 1346       Functional Status    Usual Activity Tolerance poor    Current Activity Tolerance poor       Functional Status, IADL    Medications completely dependent    Meal Preparation completely dependent    Housekeeping completely dependent    Laundry completely dependent    Shopping completely dependent       Mental Status    General Appearance WDL WDL       Mental Status Summary    Recent Changes in Mental Status/Cognitive Functioning no changes           Vern Pickard RN     Cell number [phone]  Office number [phone]

## 2024-01-29 NOTE — PROGRESS NOTES
HD PROCEDURE TOLERATED WELL    NO ISSUES WITH BLOOD PRESSURE    NO UF REMOVED, EXCEPT RINSE NS.    PT IS STABLE, REPORT GIVEN TO ROOM NURSE.

## 2024-01-29 NOTE — NURSING NOTE
WOCN note:    83 yr old female admitted 1/28/24 with hypotension. WOCN consult received for a left heel pressure injury noted upon admission.   Patient presents with a dry resolving deep tissue pressure injury to her medial left heel measuring approximately 2x3cm. There is no open wound or exudate. The injury was painted with Betadine and the heels were floated off the bed with pillows.   Patient also noted to have a red papular rash consistent with yeast under her right breast. Recommend to cleanse the skin, pat dry and place a folded pillow case under the skin folds. Patient also noted to have a urostomy with pouch in place. Continue pressure injury prevention measures and skin care for any episodes of incontinence. We will continue to follow as needed.

## 2024-01-29 NOTE — PLAN OF CARE
Problem: Skin Injury Risk Increased  Goal: Skin Health and Integrity  Outcome: Ongoing, Progressing     Problem: Chest Pain  Goal: Resolution of Chest Pain Symptoms  Outcome: Ongoing, Progressing   Goal Outcome Evaluation:            Patient was admitted to Observation last PM due to diagnosis of RSV, BP began to drop and pulse fluctuated (44-120s). Page put out for [place]. Another page called, Michael answered and patient was started on N.S bolus, Albumin, and Dopamine. Dopamine titrated throughout night (currently at 4). Patient to have consult with Nephrology, Family medicine, Wound care, and dialysis today. Patient stable, Michael notified that patient needs to be transferred, [place] to round and assess.

## 2024-01-29 NOTE — CONSULTS
Nutrition Services    Patient Name: Hazel Bucio  YOB: 1940  MRN: [id number]  Admission date: 1/28/2024    Comment:    See MSA below.    Moderate chronic disease-related malnutrition related to chronic suboptimal intake in the setting of multiple medical problems as evidenced by 3+ severe edema in left/right knees, and moderate fat/muscle loss per NFPE.     RD to add potassium restriction to diet.    RD to continue to monitor.    CLINICAL NUTRITION ASSESSMENT      Reason for Assessment 1/29 - wound     H&P      Past Medical History:   Diagnosis Date    Bladder cancer     Cancer     breast, bladder    Deep vein thrombosis     Essential hypertension 1/17/2017    Fracture tibia/fibula, right, closed, initial encounter 8/27/2020    GERD (gastroesophageal reflux disease)     History of urostomy     Immobility 08/27/2020    r/t broken Tibia     Kidney carcinoma, right     removed     Long term current use of anticoagulant 1/9/2015    PAF (paroxysmal atrial fibrillation) 6/26/2019    Presence of cardiac pacemaker 11/03/2014    BS dual chamber PM placed 10/2014 with pocket revision 1/25/17.  HX tachy rob syndrome    Sick sinus syndrome 11/3/2014       Past Surgical History:   Procedure Laterality Date    BREAST LUMPECTOMY      CARDIAC CATHETERIZATION N/A 10/18/2023    Procedure: Left Heart Cath possible PCI, atherectomy, hemodynamic support;  Surgeon: Augustin Ellsworth MD;  Location: [facility] CATH INVASIVE LOCATION;  Service: Cardiology;  Laterality: N/A;    CARDIAC ELECTROPHYSIOLOGY PROCEDURE N/A 4/28/2023    Procedure: Pacemaker gen change, [place] AWARE $;  Surgeon: Jose Carlos Cheng MD;  Location: [facility] CATH INVASIVE LOCATION;  Service: Cardiovascular;  Laterality: N/A;    CHOLECYSTECTOMY N/A 10/12/2020    Procedure: CHOLECYSTECTOMY LAPAROSCOPIC;  Surgeon: Go Pereira DO;  Location: [facility] MAIN OR;  Service: General;  Laterality: N/A;    CYSTECTOMY W/ URETEROILEAL CONDUIT      HARDWARE  "REMOVAL Right 6/11/2021    Procedure: TIBIA HARDWARE REMOVAL;  Surgeon: Geoff Shah II, MD;  Location: [facility] MAIN OR;  Service: Orthopedics;  Laterality: Right;    HERNIA REPAIR      HYSTERECTOMY      INSERT / REPLACE / REMOVE PACEMAKER      NEPHRECTOMY Right     TIBIA IM NAILING/RODDING Right 8/28/2020    Procedure: TIBIA INTRAMEDULLARY NAIL/ABRAHAM INSERTION;  Surgeon: Geoff Shah II, MD;  Location: [facility] MAIN OR;  Service: Orthopedics;  Laterality: Right;        Current Problems   Chest pain    End-stage renal disease  Nephrology following           Encounter Information        Trending Narrative     Pt admitted to ED with complaints of chest pain. Pt has a wt loss of 8.2% within the past two months. PO intake unable to assess, no recorded intake since admission 1/30. At the time of visit, pt asked RD to remove tray away from her bed, as she was not going to eat it. Pt not agreeable to questioning. NFPE completed, consistent with nutrition diagnosis of malnutrition using AND/ASPEN criteria. See MSA below. RD to continue to monitor.      Anthropometrics        Current Height, Weight Height: 170.2 cm (67.01\")  Weight: 65.6 kg (144 lb 10 oz) (01/28/24 2125)       Usual Body Weight (UBW) Unable to assess       Trending Weight Hx     This admission: 1/30 - 123 lb bed scale wt             PTA: 1/30 - 16% wt gain x 3 mo (using wt from 10/18/23)    Wt Readings from Last 30 Encounters:   01/28/24 2125 65.6 kg (144 lb 10 oz)   01/28/24 1517 65.8 kg (145 lb)   12/26/23 1418 65.8 kg (145 lb)   12/10/23 0916 65.8 kg (145 lb)   11/11/23 0416 60.9 kg (134 lb 4.2 oz)   11/10/23 0352 58.8 kg (129 lb 10.1 oz)   11/08/23 1328 56.2 kg (124 lb)   10/18/23 0413 56.5 kg (124 lb 9 oz)   10/13/23 2035 54.6 kg (120 lb 5.9 oz)   10/13/23 1453 57.6 kg (127 lb)   09/26/23 0954 57.6 kg (127 lb)   06/30/23 0714 68.6 kg (151 lb 3.8 oz)   06/26/23 0700 69.7 kg (153 lb 10.6 oz)   06/26/23 0450 69.5 kg (153 lb 3.5 oz) "   06/25/23 0445 69.6 kg (153 lb 7 oz)   06/24/23 0500 69.6 kg (153 lb 7 oz)   06/23/23 0544 68.8 kg (151 lb 10.8 oz)   06/22/23 0553 70 kg (154 lb 5.2 oz)   06/21/23 0454 67.4 kg (148 lb 9.4 oz)   06/20/23 0526 68 kg (149 lb 14.6 oz)   06/19/23 0455 67.4 kg (148 lb 9.4 oz)   06/18/23 0500 64.9 kg (143 lb 1.3 oz)   06/16/23 0303 59 kg (130 lb)   04/28/23 1003 59 kg (130 lb 1.1 oz)   04/18/23 1100 61.2 kg (135 lb)   04/04/23 0953 59.4 kg (131 lb)   03/24/23 0333 61.9 kg (136 lb 7.4 oz)   03/22/23 2354 60.4 kg (133 lb 2.5 oz)   03/22/23 0505 60 kg (132 lb 4.4 oz)   03/21/23 0516 61.4 kg (135 lb 5.8 oz)   03/20/23 0513 61 kg (134 lb 7.7 oz)   03/19/23 1204 63.4 kg (139 lb 12.4 oz)   03/19/23 0538 58.1 kg (128 lb 1.4 oz)   03/18/23 2312 67.8 kg (149 lb 7.6 oz)   03/18/23 2200 69.1 kg (152 lb 5.4 oz)   03/18/23 2010 62.8 kg (138 lb 7.2 oz)   12/07/22 1204 65.8 kg (145 lb)   09/01/22 1239 68 kg (150 lb)   06/28/22 1410 69.9 kg (154 lb 3.2 oz)   06/17/22 0310 69.4 kg (153 lb)   06/16/22 0350 70.8 kg (156 lb 1.4 oz)   06/15/22 0517 69.8 kg (153 lb 14.1 oz)   06/14/22 0458 70.8 kg (156 lb 1.4 oz)   06/11/22 2235 65.8 kg (145 lb)   03/22/22 1350 69.9 kg (154 lb)   02/22/22 1344 69.2 kg (152 lb 9.6 oz)   12/09/21 1927 71.2 kg (157 lb)   12/09/21 0339 69.1 kg (152 lb 6.4 oz)   12/08/21 0353 67 kg (147 lb 9.6 oz)   12/07/21 1955 67.4 kg (148 lb 9.6 oz)   12/06/21 1958 69.4 kg (153 lb)   11/02/21 1507 69.5 kg (153 lb 3.2 oz)   09/30/21 0318 70.9 kg (156 lb 4.9 oz)   09/29/21 0310 73.4 kg (161 lb 13.1 oz)   09/28/21 1736 72.6 kg (160 lb)   09/28/21 0000 73 kg (160 lb 15 oz)   09/27/21 0329 68 kg (149 lb 14.6 oz)   09/26/21 0307 68.2 kg (150 lb 5.7 oz)   09/24/21 2014 63.5 kg (140 lb)   08/17/21 1454 68.5 kg (151 lb)   06/03/21 1046 65.8 kg (145 lb)   11/17/20 1300 64.4 kg (142 lb)   11/12/20 1555 72.6 kg (160 lb)   11/08/20 1027 73.9 kg (163 lb)   10/13/20 0546 73.1 kg (161 lb 2.5 oz)   10/12/20 0500 72.4 kg (159 lb 9.8 oz)    10/11/20 0600 74.7 kg (164 lb 10.9 oz)   10/11/20 0100 74.7 kg (164 lb 9.6 oz)   10/10/20 2344 74.6 kg (164 lb 8 oz)   10/10/20 1937 71.7 kg (158 lb)   09/14/20 0636 81.3 kg (179 lb 3.7 oz)   09/13/20 0605 78 kg (171 lb 15.3 oz)   09/12/20 0605 79 kg (174 lb 2.6 oz)   09/11/20 0602 78 kg (171 lb 15.3 oz)   09/10/20 0614 79.3 kg (174 lb 13.2 oz)   09/08/20 0620 76.9 kg (169 lb 8.5 oz)   09/07/20 1107 76.9 kg (169 lb 8.5 oz)   09/07/20 1039 73.2 kg (161 lb 6.4 oz)   09/07/20 0826 72.6 kg (160 lb 0.9 oz)   08/31/20 0531 76.7 kg (169 lb 1.5 oz)   08/30/20 0600 72.5 kg (159 lb 13.3 oz)   08/29/20 0555 73 kg (160 lb 15 oz)   08/29/20 0455 73 kg (160 lb 15 oz)   08/28/20 0344 71.6 kg (157 lb 13.6 oz)   08/27/20 1756 72.2 kg (159 lb 2.8 oz)   08/27/20 1552 74.8 kg (165 lb)   07/14/20 1528 72.6 kg (160 lb)   05/19/20 1201 73.5 kg (162 lb)      BMI kg/m2 Body mass index is 22.65 kg/m².       Labs        Pertinent Labs Hyperkalemia - recommend potassium diet restriction   Results from last 7 days   Lab Units 01/30/24  1304 01/29/24  0645 01/28/24  1553   SODIUM mmol/L 135* 137 137   POTASSIUM mmol/L 5.4* 5.6* 4.9   CHLORIDE mmol/L 103 102 101   CO2 mmol/L 25.0 19.0* 24.0   BUN mg/dL 41* 89* 84*   CREATININE mg/dL 3.17* 5.25* 5.36*   CALCIUM mg/dL 9.1 8.4* 8.6   BILIRUBIN mg/dL 0.4  --  0.4   ALK PHOS U/L 122*  --  118*   ALT (SGPT) U/L 25  --  24   AST (SGOT) U/L 34*  --  32   GLUCOSE mg/dL 83 97 104*     Results from last 7 days   Lab Units 01/30/24  1304   MAGNESIUM mg/dL 1.9   PHOSPHORUS mg/dL 4.4   HEMOGLOBIN g/dL 9.7*   HEMATOCRIT % 29.7*   TRIGLYCERIDES mg/dL 70     Lab Results   Component Value Date    HGBA1C 4.90 01/30/2024        Medications    Scheduled Medications apixaban, 2.5 mg, Oral, Q12H  atorvastatin, 20 mg, Oral, Daily  dicyclomine, 10 mg, Oral, TID  famotidine, 10 mg, Oral, Q48H  fludrocortisone, 100 mcg, Oral, Daily  gabapentin, 100 mg, Oral, Nightly  insulin lispro, 2-7 Units, Subcutaneous, 4x Daily With  Meals & Nightly  midodrine, 10 mg, Oral, Q8H  polyethylene glycol, 17 g, Oral, Daily  povidone-iodine, , Topical, Daily  senna-docusate sodium, 2 tablet, Oral, BID  sodium chloride, 10 mL, Intravenous, Q12H  topiramate, 100 mg, Oral, Nightly        Infusions phenylephrine, 0.5-3 mcg/kg/min, Last Rate: Stopped (01/30/24 0500)        PRN Medications   albumin human    senna-docusate sodium **AND** polyethylene glycol **AND** bisacodyl **AND** bisacodyl    Calcium Replacement - Follow Nurse / BPA Driven Protocol    dextrose    dextrose    glucagon (human recombinant)    hydrALAZINE    ipratropium-albuterol    loperamide    Magnesium Standard Dose Replacement - Follow Nurse / BPA Driven Protocol    midodrine    nitroglycerin    ondansetron ODT **OR** ondansetron    Phosphorus Replacement - Follow Nurse / BPA Driven Protocol    Potassium Replacement - Follow Nurse / BPA Driven Protocol    sodium chloride    sodium chloride    sodium chloride    traMADol     Physical Findings        Trending Physical   Appearance, NFPE 1/30 - NFPE completed, consistent with nutrition diagnosis of malnutrition using AND/ASPEN criteria. See MSA below.      --  Edema  3+ severe edema in left/right knees     Bowel Function Last documented BM 1/29.      Tubes No feeding tube in place.      Chewing/Swallowing No documented chewing/swallowing issues.     Skin Stage II pressure injury on coccyx - from bedside RN documentation  Left posterior heel - WOCN following     --  Current Nutrition Orders & Evaluation of Intake       Oral Nutrition     Food Allergies NKFA   Current PO Diet Diet: Cardiac Diets, Diabetic Diets; Healthy Heart (2-3 Na+); Consistent Carbohydrate; Texture: Regular Texture (IDDSI 7); Fluid Consistency: Thin (IDDSI 0)   Supplement No supplement ordered   PO Evaluation     Trending % PO Intake 1/30 - no documented PO intake since admission 1/28.   --  Nutritional Risk Screening        NRS-2002 Score          Nutrition Diagnosis          Nutrition Dx Problem 1 Moderate chronic disease-related malnutrition related to chronic suboptimal intake in the setting of multiple medical problems as evidenced by 3+ severe edema in left/right knees, and moderate fat/muscle loss per NFPE.     Nutrition Dx Problem 2        Intervention Goal         Intervention Goal(s) Encourage good PO intake.      Nutrition Intervention        RD Action Continue diabetic healthy heart consistent carbohydrate diet as tolerated, RD to order potassium diet restriction     Nutrition Prescription          Diet Prescription diabetic healthy heart consistent carbohydrate diet with added potassium restriction   Supplement Prescription No supplement ordered   --  Monitor/Evaluation        Monitor Per protocol, PO intake, Pertinent labs, Weight, Skin status     Malnutrition Severity Assessment      Patient meets criteria for : (P) Moderate (non-severe) Malnutrition  Malnutrition Type (last 8 hours)       Malnutrition Severity Assessment       Row Name 01/30/24 1454       Malnutrition Severity Assessment    Malnutrition Type Chronic Disease - Related Malnutrition (P)       Row Name 01/30/24 1454       Insufficient Energy Intake     Insufficient Energy Intake Findings Moderate (P)     Insufficient Energy Intake  <75% of est. energy requirement for > or equal to 1 month (P)       Row Name 01/30/24 1454       Unintentional Weight Loss     Unintentional Weight Loss Findings None (P)     Unintentional Weight Loss  -- (P)       Row Name 01/30/24 1454       Muscle Loss    Loss of Muscle Mass Findings Moderate (P)     [place] Region Moderate - slight depression (P)     Clavicle Bone Region Moderate - some protrusion in females, visible in males (P)     Acromion Bone Region Moderate - acromion may slightly protrude (P)     Scapular Bone Region Moderate - mild depression, bones may show slightly (P)     Dorsal Hand Region Moderate - slight depression (P)     Patellar Region Moderate - patella  more prominent, less muscle definition around patella (P)     Anterior Thigh Region Moderate - mild depression on inner thigh (P)     Posterior Calf Region Moderate - some roundness, slight firmness (P)       Row Name 01/30/24 1454       Fat Loss    Subcutaneous Fat Loss Findings Moderate (P)     Orbital Region  Moderate -  somewhat hollowness, slightly dark circles (P)     Upper Arm Region Moderate - some fat tissue, not ample (P)       Row Name 01/30/24 1454       Fluid Accumulation (Edema)    Fluid Acumulation Findings Severe (P)     Fluid Accumulation  Severe equals 3+ or 4+ pitting edema (P)       Row Name 01/30/24 1454       Criteria Met (Must meet criteria for severity in at least 2 of these categories: M Wasting, Fat Loss, Fluid, Secondary Signs, Wt. Status, Intake)    Patient meets criteria for  Moderate (non-severe) Malnutrition (P)                            Electronically signed by:  Myranda Villegas  01/29/24 15:10 EST

## 2024-01-30 LAB
ALBUMIN SERPL-MCNC: 3.3 G/DL (ref 3.5–5.2)
ALBUMIN/GLOB SERPL: 1 G/DL
ALP SERPL-CCNC: 122 U/L (ref 39–117)
ALT SERPL W P-5'-P-CCNC: 25 U/L (ref 1–33)
ANION GAP SERPL CALCULATED.3IONS-SCNC: 7 MMOL/L (ref 5–15)
AST SERPL-CCNC: 34 U/L (ref 1–32)
BASOPHILS # BLD AUTO: 0.1 10*3/MM3 (ref 0–0.2)
BASOPHILS NFR BLD AUTO: 1.1 % (ref 0–1.5)
BILIRUB SERPL-MCNC: 0.4 MG/DL (ref 0–1.2)
BUN SERPL-MCNC: 41 MG/DL (ref 8–23)
BUN/CREAT SERPL: 12.9 (ref 7–25)
CA-I SERPL ISE-MCNC: 1.22 MMOL/L (ref 1.2–1.3)
CALCIUM SPEC-SCNC: 9.1 MG/DL (ref 8.6–10.5)
CHLORIDE SERPL-SCNC: 103 MMOL/L (ref 98–107)
CHOLEST SERPL-MCNC: 86 MG/DL (ref 0–200)
CO2 SERPL-SCNC: 25 MMOL/L (ref 22–29)
CREAT SERPL-MCNC: 3.17 MG/DL (ref 0.57–1)
DEPRECATED RDW RBC AUTO: 56 FL (ref 37–54)
EGFRCR SERPLBLD CKD-EPI 2021: 14.1 ML/MIN/1.73
EOSINOPHIL # BLD AUTO: 0.1 10*3/MM3 (ref 0–0.4)
EOSINOPHIL NFR BLD AUTO: 1.8 % (ref 0.3–6.2)
ERYTHROCYTE [DISTWIDTH] IN BLOOD BY AUTOMATED COUNT: 15.9 % (ref 12.3–15.4)
GLOBULIN UR ELPH-MCNC: 3.2 GM/DL
GLUCOSE BLDC GLUCOMTR-MCNC: 102 MG/DL (ref 70–105)
GLUCOSE BLDC GLUCOMTR-MCNC: 128 MG/DL (ref 70–105)
GLUCOSE BLDC GLUCOMTR-MCNC: 72 MG/DL (ref 70–105)
GLUCOSE BLDC GLUCOMTR-MCNC: 92 MG/DL (ref 70–105)
GLUCOSE SERPL-MCNC: 83 MG/DL (ref 65–99)
HBA1C MFR BLD: 4.9 % (ref 4.8–5.6)
HCT VFR BLD AUTO: 29.7 % (ref 34–46.6)
HDLC SERPL-MCNC: 43 MG/DL (ref 40–60)
HGB BLD-MCNC: 9.7 G/DL (ref 12–15.9)
LDLC SERPL CALC-MCNC: 28 MG/DL (ref 0–100)
LDLC/HDLC SERPL: 0.67 {RATIO}
LYMPHOCYTES # BLD AUTO: 1.3 10*3/MM3 (ref 0.7–3.1)
LYMPHOCYTES NFR BLD AUTO: 18.6 % (ref 19.6–45.3)
MAGNESIUM SERPL-MCNC: 1.9 MG/DL (ref 1.6–2.4)
MCH RBC QN AUTO: 32.1 PG (ref 26.6–33)
MCHC RBC AUTO-ENTMCNC: 32.8 G/DL (ref 31.5–35.7)
MCV RBC AUTO: 97.8 FL (ref 79–97)
MONOCYTES # BLD AUTO: 0.8 10*3/MM3 (ref 0.1–0.9)
MONOCYTES NFR BLD AUTO: 12 % (ref 5–12)
NEUTROPHILS NFR BLD AUTO: 4.7 10*3/MM3 (ref 1.7–7)
NEUTROPHILS NFR BLD AUTO: 66.5 % (ref 42.7–76)
NRBC BLD AUTO-RTO: 0.1 /100 WBC (ref 0–0.2)
PHOSPHATE SERPL-MCNC: 4.4 MG/DL (ref 2.5–4.5)
PLATELET # BLD AUTO: 249 10*3/MM3 (ref 140–450)
PMV BLD AUTO: 6.9 FL (ref 6–12)
POTASSIUM SERPL-SCNC: 5.4 MMOL/L (ref 3.5–5.2)
PROCALCITONIN SERPL-MCNC: 0.22 NG/ML (ref 0–0.25)
PROT SERPL-MCNC: 6.5 G/DL (ref 6–8.5)
RBC # BLD AUTO: 3.04 10*6/MM3 (ref 3.77–5.28)
SODIUM SERPL-SCNC: 135 MMOL/L (ref 136–145)
TRIGL SERPL-MCNC: 70 MG/DL (ref 0–150)
TSH SERPL DL<=0.05 MIU/L-ACNC: 2.99 UIU/ML (ref 0.27–4.2)
VLDLC SERPL-MCNC: 15 MG/DL (ref 5–40)
WBC NRBC COR # BLD AUTO: 7.1 10*3/MM3 (ref 3.4–10.8)

## 2024-01-30 PROCEDURE — 84100 ASSAY OF PHOSPHORUS: CPT | Performed by: STUDENT IN AN ORGANIZED HEALTH CARE EDUCATION/TRAINING PROGRAM

## 2024-01-30 PROCEDURE — 82948 REAGENT STRIP/BLOOD GLUCOSE: CPT

## 2024-01-30 PROCEDURE — 80053 COMPREHEN METABOLIC PANEL: CPT | Performed by: INTERNAL MEDICINE

## 2024-01-30 PROCEDURE — 85025 COMPLETE CBC W/AUTO DIFF WBC: CPT | Performed by: STUDENT IN AN ORGANIZED HEALTH CARE EDUCATION/TRAINING PROGRAM

## 2024-01-30 PROCEDURE — 80061 LIPID PANEL: CPT | Performed by: STUDENT IN AN ORGANIZED HEALTH CARE EDUCATION/TRAINING PROGRAM

## 2024-01-30 PROCEDURE — 83735 ASSAY OF MAGNESIUM: CPT | Performed by: STUDENT IN AN ORGANIZED HEALTH CARE EDUCATION/TRAINING PROGRAM

## 2024-01-30 PROCEDURE — 84443 ASSAY THYROID STIM HORMONE: CPT | Performed by: STUDENT IN AN ORGANIZED HEALTH CARE EDUCATION/TRAINING PROGRAM

## 2024-01-30 PROCEDURE — 83036 HEMOGLOBIN GLYCOSYLATED A1C: CPT | Performed by: STUDENT IN AN ORGANIZED HEALTH CARE EDUCATION/TRAINING PROGRAM

## 2024-01-30 PROCEDURE — 84145 PROCALCITONIN (PCT): CPT | Performed by: INTERNAL MEDICINE

## 2024-01-30 PROCEDURE — 82330 ASSAY OF CALCIUM: CPT | Performed by: STUDENT IN AN ORGANIZED HEALTH CARE EDUCATION/TRAINING PROGRAM

## 2024-01-30 PROCEDURE — 82948 REAGENT STRIP/BLOOD GLUCOSE: CPT | Performed by: STUDENT IN AN ORGANIZED HEALTH CARE EDUCATION/TRAINING PROGRAM

## 2024-01-30 PROCEDURE — 99233 SBSQ HOSP IP/OBS HIGH 50: CPT | Performed by: INTERNAL MEDICINE

## 2024-01-30 RX ORDER — FLUDROCORTISONE ACETATE 0.1 MG/1
100 TABLET ORAL DAILY
Status: DISCONTINUED | OUTPATIENT
Start: 2024-01-30 | End: 2024-01-31

## 2024-01-30 RX ORDER — INSULIN LISPRO 100 [IU]/ML
2-7 INJECTION, SOLUTION INTRAVENOUS; SUBCUTANEOUS
Status: DISCONTINUED | OUTPATIENT
Start: 2024-01-30 | End: 2024-01-31 | Stop reason: HOSPADM

## 2024-01-30 RX ADMIN — GABAPENTIN 100 MG: 100 CAPSULE ORAL at 20:34

## 2024-01-30 RX ADMIN — FLUDROCORTISONE ACETATE 100 MCG: 0.1 TABLET ORAL at 12:10

## 2024-01-30 RX ADMIN — Medication 10 ML: at 09:50

## 2024-01-30 RX ADMIN — ATORVASTATIN CALCIUM 20 MG: 20 TABLET, FILM COATED ORAL at 09:49

## 2024-01-30 RX ADMIN — MIDODRINE HYDROCHLORIDE 10 MG: 5 TABLET ORAL at 17:58

## 2024-01-30 RX ADMIN — Medication 10 ML: at 20:35

## 2024-01-30 RX ADMIN — DOCUSATE SODIUM 50MG AND SENNOSIDES 8.6MG 2 TABLET: 8.6; 5 TABLET, FILM COATED ORAL at 09:49

## 2024-01-30 RX ADMIN — DICYCLOMINE HYDROCHLORIDE 10 MG: 10 CAPSULE ORAL at 09:49

## 2024-01-30 RX ADMIN — POVIDONE-IODINE: 10 SOLUTION TOPICAL at 09:50

## 2024-01-30 RX ADMIN — DOCUSATE SODIUM 50MG AND SENNOSIDES 8.6MG 2 TABLET: 8.6; 5 TABLET, FILM COATED ORAL at 20:34

## 2024-01-30 RX ADMIN — DICYCLOMINE HYDROCHLORIDE 10 MG: 10 CAPSULE ORAL at 20:34

## 2024-01-30 RX ADMIN — TOPIRAMATE 100 MG: 100 TABLET, FILM COATED ORAL at 20:34

## 2024-01-30 RX ADMIN — DICYCLOMINE HYDROCHLORIDE 10 MG: 10 CAPSULE ORAL at 17:58

## 2024-01-30 RX ADMIN — APIXABAN 2.5 MG: 2.5 TABLET, FILM COATED ORAL at 09:49

## 2024-01-30 RX ADMIN — MIDODRINE HYDROCHLORIDE 10 MG: 5 TABLET ORAL at 09:49

## 2024-01-30 RX ADMIN — MIDODRINE HYDROCHLORIDE 10 MG: 5 TABLET ORAL at 01:27

## 2024-01-30 RX ADMIN — APIXABAN 2.5 MG: 2.5 TABLET, FILM COATED ORAL at 20:34

## 2024-01-30 NOTE — CONSULTS
Critical Care Consult Note   Hazel Bucio : 1940 MRN:[id number] LOS:0 ROOM: 113/1     Reason for admission: Hypotension     Assessment / Plan     Hypotension  -Persistent hypotension in spite of adequate fluid resuscitation  -C/w additional fluids as needed. 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    --HD 2024  -Stable  -Continue gabapentin  -Monitor and trend labs as appropriate  -Nephrology following    A-fib, permanent    --With documented history of RVR  -On home metoprolol and diltiazem, currently held due to Hypertension  -Continue home Eliquis  -Cardiology following    Diabetes mellitus type 2, chronic  -A1C  --Hold home oral diabetes medications for now  -Start SSI  -Accu-checks ACHS    Headaches  -Continue home Topamax    Anxiety/Depression  -Continue home meds as appropriate        Level Of Support Discussed With: Patient  Code Status (Patient has no pulse and is not breathing): CPR (Attempt to Resuscitate)  Medical Interventions (Patient has pulse or is breathing): Full Support       Nutrition: Diet: Cardiac Diets, Diabetic Diets; Healthy Heart (2-3 Na+); Consistent Carbohydrate; Texture: Regular Texture (IDDSI 7); Fluid Consistency: Thin (IDDSI 0) Patient isn't on Tube Feeding     DVT prophylaxis:  Medical and mechanical DVT prophylaxis orders are present.         History of Present illness     A 83 y.o. old female patient with PMH of bladder cancer, breast cancer, ureteral cancer, Hypertension, hyperlipidemia, ESRD on dialysis, persistent atrial fibrillation with long-term use of anticoagulation, cognitive impairment, and hard of hearing presents to the hospital with complaints of chest pain.  Patient initial report of chest pain  from her extended care facility however had resolved prior to emergency room visit.  Patient was found to be hypotensive in the emergency room and was admitted following midodrine and 500 cc bolus.  Patient was started on dopamine however cardiology wish to change that to [place]-Synephrine to avoid potential risk of tachycardia.  Patient brought to the intensive care unit due to vasopressor usage.  At the time my assessment,she is awake and alert, oriented to self and place. She denies complaints, but cannot elaborate further.    ACP: Patient disoriented, no family at bedside she will remain full code with full intervention; her son Manish is listed as her decision-maker if she is unable.    Patient was seen and examined on 01/29/24 at 21:48 EST .    Subjective / Review of systems     Review of Systems   Unable to perform ROS: Acuity of condition        Past Medical/Surgical/Social/Family History & Allergies     Past Medical History:   Diagnosis Date    Bladder cancer     Cancer     breast, bladder    Deep vein thrombosis     Essential hypertension 1/17/2017    Fracture tibia/fibula, right, closed, initial encounter 8/27/2020    GERD (gastroesophageal reflux disease)     History of urostomy     Immobility 08/27/2020    r/t broken Tibia     Kidney carcinoma, right     removed     Long term current use of anticoagulant 1/9/2015    PAF (paroxysmal atrial fibrillation) 6/26/2019    Presence of cardiac pacemaker 11/03/2014    BS dual chamber PM placed 10/2014 with pocket revision 1/25/17.  HX tachy rob syndrome    Sick sinus syndrome 11/3/2014      Past Surgical History:   Procedure Laterality Date    BREAST LUMPECTOMY      CARDIAC CATHETERIZATION N/A 10/18/2023    Procedure: Left Heart Cath possible PCI, atherectomy, hemodynamic support;  Surgeon: Augustin Ellsworth MD;  Location: [facility] CATH INVASIVE LOCATION;  Service: Cardiology;  Laterality: N/A;    CARDIAC ELECTROPHYSIOLOGY PROCEDURE N/A 4/28/2023    Procedure:  "Pacemaker gen change, [place] AWARE $;  Surgeon: Jose Carlos Cheng MD;  Location: [facility] CATH INVASIVE LOCATION;  Service: Cardiovascular;  Laterality: N/A;    CHOLECYSTECTOMY N/A 10/12/2020    Procedure: CHOLECYSTECTOMY LAPAROSCOPIC;  Surgeon: Go Pereira DO;  Location: [facility] MAIN OR;  Service: General;  Laterality: N/A;    CYSTECTOMY W/ URETEROILEAL CONDUIT      HARDWARE REMOVAL Right 6/11/2021    Procedure: TIBIA HARDWARE REMOVAL;  Surgeon: Geoff Shah II, MD;  Location: [facility] MAIN OR;  Service: Orthopedics;  Laterality: Right;    HERNIA REPAIR      HYSTERECTOMY      INSERT / REPLACE / REMOVE PACEMAKER      NEPHRECTOMY Right     TIBIA IM NAILING/RODDING Right 8/28/2020    Procedure: TIBIA INTRAMEDULLARY NAIL/ABRAHAM INSERTION;  Surgeon: Geoff Sahh II, MD;  Location: [facility] MAIN OR;  Service: Orthopedics;  Laterality: Right;      Social History     Socioeconomic History    Marital status:    Tobacco Use    Smoking status: Never     Passive exposure: Never    Smokeless tobacco: Never   Vaping Use    Vaping Use: Never used   Substance and Sexual Activity    Alcohol use: Not Currently    Drug use: Never    Sexual activity: Defer      Family History   Problem Relation Age of Onset    COPD Father       Allergies   Allergen Reactions    Amoxicillin Shortness Of Breath    Ciprofloxacin Hives    Oxycodone-Acetaminophen Unknown - High Severity     Pt's son stated when pt fell and broke her leg she was put on oxycodone; pt's son stated pt's \"vitals went all out of wack, she couldn't remember things.\"    Penicillins Rash    Sulfa Antibiotics Hives    Cephalexin Other (See Comments)    Clavulanic Acid Other (See Comments)    Codeine Other (See Comments)    Contrast Dye (Echo Or Unknown Ct/Mr) Other (See Comments)     8/18/22     Doxycycline Other (See Comments)    Fluconazole Other (See Comments)    Iodinated Contrast Media Other (See Comments)     8/18/22    Iodine Hives    Macrobid " [Nitrofurantoin] Hives    Tobramycin Other (See Comments)    Trimethoprim Other (See Comments)        Home Medications     Prior to Admission medications    Medication Sig Start Date End Date Taking? Authorizing Provider   apixaban (ELIQUIS) 2.5 MG tablet tablet Take 1 tablet by mouth Every 12 (Twelve) Hours. Indications: Atrial Fibrillation 6/30/23  Yes Shantel Moulton APRN   atorvastatin (LIPITOR) 20 MG tablet Take 1 tablet by mouth Every Night.   Yes Gino Leos MD   calcium acetate (PHOSLO) 667 MG tablet  12/15/23  Yes Gino Leos MD   dicyclomine (BENTYL) 10 MG capsule Take 1 capsule by mouth 3 (Three) Times a Day. 6/30/23  Yes Shantel Moulton APRN   dilTIAZem CD (CARDIZEM CD) 120 MG 24 hr capsule Take 1 capsule by mouth Daily. 11/14/23  Yes Shantel Moulton APRN   furosemide (LASIX) 40 MG tablet Take 1 tablet by mouth Daily. 1/26/24  Yes Gino Leos MD   gabapentin (NEURONTIN) 300 MG capsule 1 capsule 3 (Three) Times a Day. 12/6/23  Yes Gino Leos MD   Melatonin-Pyridoxine 1-10 MG tablet Take 1 tablet by mouth every night at bedtime.   Yes Gino Leos MD   metoprolol tartrate (LOPRESSOR) 50 MG tablet Take 2 tablets by mouth Every Morning.   Yes Gino Leos MD   metoprolol tartrate (LOPRESSOR) 50 MG tablet Take 1 tablet by mouth Every Night.   Yes Gino Leos MD   midodrine (PROAMATINE) 10 MG tablet Take 1 tablet by mouth Daily As Needed (PRN dialysis days.). 11/13/23  Yes Shantel Moulton APRN   polyethylene glycol (MiraLax) 17 GM/SCOOP powder Take 17 g by mouth Every Morning.   Yes Gino Leos MD   saccharomyces boulardii (FLORASTOR) 250 MG capsule Take 1 capsule by mouth 2 (Two) Times a Day. 6/30/23  Yes Shantel Moulton APRN   sertraline (ZOLOFT) 25 MG tablet Take 1 tablet by mouth every night at bedtime. 12/7/23  Yes Gino Leos MD   topiramate (TOPAMAX) 100 MG tablet Take 1 tablet by mouth  Every Night. 6/30/23  Yes Shantel Moulton APRN   zinc oxide 20 % ointment Apply 1 application  topically to the appropriate area as directed. Every shift.   Yes Gnio Leos MD   acetaminophen (TYLENOL) 325 MG tablet Take 2 tablets by mouth 4 (Four) Times a Day As Needed for Mild Pain.    Gino Leos MD   ipratropium-albuterol (DUO-NEB) 0.5-2.5 mg/3 ml nebulizer Take 3 mL by nebulization. Six times daily PRN.    Gino Leos MD   loperamide (IMODIUM) 2 MG capsule Take 2 capsules by mouth Daily As Needed for Diarrhea.    Gino Leos MD   ondansetron ODT (ZOFRAN-ODT) 4 MG disintegrating tablet Place 1 tablet on the tongue 3 (Three) Times a Day As Needed for Nausea or Vomiting.    Gino Leos MD        Objective / Physical Exam     Vital signs:  Temp: 98.5 °F (36.9 °C)  BP: 137/61  Heart Rate: 110  Resp: 17  SpO2: 98 %  Weight: 65.6 kg (144 lb 10 oz)    Admission Weight: Weight: 65.8 kg (145 lb)    Physical Exam  Vitals and nursing note reviewed.   Constitutional:       Appearance: Normal appearance.   HENT:      Head: Normocephalic.      Nose: Nose normal.      Mouth/Throat:      Mouth: Mucous membranes are moist.      Pharynx: Oropharynx is clear.   Eyes:      Pupils: Pupils are equal, round, and reactive to light.   Cardiovascular:      Rate and Rhythm: Normal rate and regular rhythm.      Pulses: Normal pulses.      Heart sounds: Normal heart sounds.   Pulmonary:      Effort: Pulmonary effort is normal.      Breath sounds: Normal breath sounds.   Abdominal:      General: Bowel sounds are normal.      Palpations: Abdomen is soft.   Musculoskeletal:         General: Normal range of motion.      Cervical back: Normal range of motion.   Skin:     General: Skin is warm and dry.      Capillary Refill: Capillary refill takes 2 to 3 seconds.   Neurological:      General: No focal deficit present.      Mental Status: She is alert. Mental status is at baseline. She is  disoriented.   Psychiatric:         Mood and Affect: Mood normal.         Behavior: Behavior normal.         Thought Content: Thought content normal.         Judgment: Judgment normal.          Labs     Results from last 7 days   Lab Units 01/29/24  0645 01/28/24  1553   WBC 10*3/mm3 8.90 10.20   HEMATOCRIT % 31.7* 31.4*   PLATELETS 10*3/mm3 232 268      Results from last 7 days   Lab Units 01/29/24  0645 01/28/24  1553   SODIUM mmol/L 137 137   POTASSIUM mmol/L 5.6* 4.9   CHLORIDE mmol/L 102 101   CO2 mmol/L 19.0* 24.0   BUN mg/dL 89* 84*   CREATININE mg/dL 5.25* 5.36*        Imaging     XR Chest 1 View    Result Date: 1/28/2024  Impression: 1.Cardiomegaly. No acute radiographic abnormality is identified. Electronically Signed: Joshua Fregoso MD  1/28/2024 4:11 PM EST  Workstation ID: [id number]      Chest X ray: My independent assessment showed no infiltrates or effusions      Current Medications     Scheduled Meds:  apixaban, 2.5 mg, Oral, Q12H  atorvastatin, 20 mg, Oral, Daily  dicyclomine, 10 mg, Oral, TID  famotidine, 10 mg, Oral, Q48H  gabapentin, 100 mg, Oral, Nightly  midodrine, 10 mg, Oral, TID AC  [START ON 1/30/2024] polyethylene glycol, 17 g, Oral, Daily  povidone-iodine, , Topical, Daily  senna-docusate sodium, 2 tablet, Oral, BID  sodium chloride, 10 mL, Intravenous, Q12H  topiramate, 100 mg, Oral, Nightly         Continuous Infusions:  phenylephrine, 0.5-3 mcg/kg/min, Last Rate: 0.5 mcg/kg/min (01/29/24 1823)         CHELLE Nance   Critical Care  01/29/24   21:48 EST

## 2024-01-30 NOTE — PROGRESS NOTES
"                                                                                                                                      Nephrology  Progress Note                                        [facility]    Patient Identification    Name: Hazel Bucio  Age: 83 y.o.  Sex: female  :  1940  MRN: [id number]      DATE OF SERVICE:  2024        Subective    Low BP on [place]     Objective   Scheduled Meds:apixaban, 2.5 mg, Oral, Q12H  atorvastatin, 20 mg, Oral, Daily  dicyclomine, 10 mg, Oral, TID  famotidine, 10 mg, Oral, Q48H  gabapentin, 100 mg, Oral, Nightly  insulin lispro, 2-7 Units, Subcutaneous, 4x Daily With Meals & Nightly  midodrine, 10 mg, Oral, Q8H  polyethylene glycol, 17 g, Oral, Daily  povidone-iodine, , Topical, Daily  senna-docusate sodium, 2 tablet, Oral, BID  sodium chloride, 10 mL, Intravenous, Q12H  topiramate, 100 mg, Oral, Nightly          Continuous Infusions:phenylephrine, 0.5-3 mcg/kg/min, Last Rate: Stopped (24 0500)        PRN Meds:  albumin human    senna-docusate sodium **AND** polyethylene glycol **AND** bisacodyl **AND** bisacodyl    Calcium Replacement - Follow Nurse / BPA Driven Protocol    dextrose    dextrose    glucagon (human recombinant)    hydrALAZINE    ipratropium-albuterol    loperamide    Magnesium Standard Dose Replacement - Follow Nurse / BPA Driven Protocol    midodrine    nitroglycerin    ondansetron ODT **OR** ondansetron    Phosphorus Replacement - Follow Nurse / BPA Driven Protocol    Potassium Replacement - Follow Nurse / BPA Driven Protocol    sodium chloride    sodium chloride    sodium chloride    traMADol     Exam:  /78   Pulse 87   Temp 99.2 °F (37.3 °C) (Oral)   Resp 13   Ht 170.2 cm (67.01\")   Wt 56 kg (123 lb 7.3 oz)   SpO2 97%   BMI 19.33 kg/m²     Intake/Output last 3 shifts:  I/O last 3 completed shifts:  In: 600 [I.V.:100; IV Piggyback:500]  Out: 800 [Urine:800]    Intake/Output this shift:  I/O this " shift:  In: 282 [P.O.:60; I.V.:222]  Out: 200 [Urine:200]    Physical exam:  General Appearance:  Alert  Head:  Normocephalic, without obvious abnormality, atraumatic  Eyes:  PERRL, conjunctiva/corneas clear     Neck:  Supple,  no adenopathy;      Lungs:  Decreased BS occasion ronchi  Heart:  Regular rate and rhythm, S1 and S2 normal  Abdomen:  Soft, non-tender, bowel sounds active   Extremities: trace edema  Pulses: 2+ and symmetric all extremities  Skin:  No rashes or lesions       Data Review:  All labs (24hrs):   Recent Results (from the past 24 hour(s))   Basic Metabolic Panel    Collection Time: 01/29/24  6:45 AM    Specimen: Blood   Result Value Ref Range    Glucose 97 65 - 99 mg/dL    BUN 89 (H) 8 - 23 mg/dL    Creatinine 5.25 (H) 0.57 - 1.00 mg/dL    Sodium 137 136 - 145 mmol/L    Potassium 5.6 (H) 3.5 - 5.2 mmol/L    Chloride 102 98 - 107 mmol/L    CO2 19.0 (L) 22.0 - 29.0 mmol/L    Calcium 8.4 (L) 8.6 - 10.5 mg/dL    BUN/Creatinine Ratio 17.0 7.0 - 25.0    Anion Gap 16.0 (H) 5.0 - 15.0 mmol/L    eGFR 7.7 (L) >60.0 mL/min/1.73   CBC Auto Differential    Collection Time: 01/29/24  6:45 AM    Specimen: Blood   Result Value Ref Range    WBC 8.90 3.40 - 10.80 10*3/mm3    RBC 3.15 (L) 3.77 - 5.28 10*6/mm3    Hemoglobin 10.4 (L) 12.0 - 15.9 g/dL    Hematocrit 31.7 (L) 34.0 - 46.6 %    .6 (H) 79.0 - 97.0 fL    MCH 33.1 (H) 26.6 - 33.0 pg    MCHC 32.9 31.5 - 35.7 g/dL    RDW 15.6 (H) 12.3 - 15.4 %    RDW-SD 52.9 37.0 - 54.0 fl    MPV 7.4 6.0 - 12.0 fL    Platelets 232 140 - 450 10*3/mm3    Neutrophil % 76.6 (H) 42.7 - 76.0 %    Lymphocyte % 11.9 (L) 19.6 - 45.3 %    Monocyte % 9.3 5.0 - 12.0 %    Eosinophil % 0.7 0.3 - 6.2 %    Basophil % 1.5 0.0 - 1.5 %    Neutrophils, Absolute 6.80 1.70 - 7.00 10*3/mm3    Lymphocytes, Absolute 1.10 0.70 - 3.10 10*3/mm3    Monocytes, Absolute 0.80 0.10 - 0.90 10*3/mm3    Eosinophils, Absolute 0.10 0.00 - 0.40 10*3/mm3    Basophils, Absolute 0.10 0.00 - 0.20 10*3/mm3     nRBC 0.0 0.0 - 0.2 /100 WBC          Imaging:  XR Chest 1 View    Result Date: 1/28/2024  Impression: 1.Cardiomegaly. No acute radiographic abnormality is identified. Electronically Signed: Joshua Fregoso MD  1/28/2024 4:11 PM EST  Workstation ID: [id number]     Assessment/Plan:     Hypotension    Permanent atrial fibrillation    Dyslipidemia    Essential hypertension    Long term current use of anticoagulant    Presence of cardiac pacemaker    Type 2 diabetes mellitus    Ureteral cancer    Chronic insomnia    Seasonal allergies    Acquired absence of kidney    Athscl heart disease of native coronary 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    Acquired absence of other specified parts of digestive tract    Gastro-esophageal reflux disease without esophagitis    ESRD on dialysis    Mild cognitive impairment of uncertain or unknown etiology       ESRD hemodialysis dependent   Status post hypotension requiring dopamine   Chest pain rule out acute coronary syndrome   Afib   Status post hyperkalemia     Recommendations:     s/p HD yesterday  Recheck labs  No HD today

## 2024-01-30 NOTE — PROGRESS NOTES
LOS: 1 day   Patient Care Team:  John Cano MD as PCP - General (Family Medicine)  Jose Carlos Cheng MD as Consulting Physician (Cardiology)    Subjective         Review of Systems   Unable to perform ROS: Other           Objective     Vital Signs  Temp:  [97.9 °F (36.6 °C)-99.2 °F (37.3 °C)] 97.9 °F (36.6 °C)  Heart Rate:  [] 104  Resp:  [12-17] 12  BP: ()/() 125/69      Physical Exam  Vitals reviewed.   Constitutional:       Appearance: She is not ill-appearing.   HENT:      Head: Normocephalic and atraumatic.      Right Ear: External ear normal.      Left Ear: External ear normal.      Nose: Nose normal.      Mouth/Throat:      Mouth: Mucous membranes are moist.   Eyes:      General:         Right eye: No discharge.         Left eye: No discharge.   Cardiovascular:      Rate and Rhythm: Normal rate and regular rhythm.      Pulses: Normal pulses.      Heart sounds: Normal heart sounds.   Pulmonary:      Effort: Pulmonary effort is normal.      Breath sounds: Normal breath sounds.   Abdominal:      General: Bowel sounds are normal.      Palpations: Abdomen is soft.   Musculoskeletal:         General: Normal range of motion.      Cervical back: Normal range of motion.   Skin:     General: Skin is warm.      Coloration: Skin is pale.   Neurological:      Mental Status: She is alert. Mental status is at baseline.   Psychiatric:         Cognition and Memory: Cognition is impaired. Memory is impaired.              Results Review:    Lab Results (last 24 hours)       Procedure Component Value Units Date/Time    POC Glucose 4x Daily Before Meals & at Bedtime [[id number]]  (Normal) Collected: 01/30/24 0700    Specimen: Blood Updated: 01/30/24 0702     Glucose 72 mg/dL      Comment: Serial Number: [id number]:  [id number]       CANDIDA AURIS SCREEN - Swab, Axilla Right, Axilla Left and Groin [[id number]]  (Normal) Collected: 01/29/24 0348    Specimen: Swab from Axilla Right, Axilla Left and  Groin Updated: 01/30/24 0545     Candida Auris Screen Culture No Candida auris isolated at 24 hours             Imaging Results (Last 24 Hours)       ** No results found for the last 24 hours. **                 I reviewed the patient's new clinical results.    Medication Review:   Scheduled Meds:apixaban, 2.5 mg, Oral, Q12H  atorvastatin, 20 mg, Oral, Daily  dicyclomine, 10 mg, Oral, TID  famotidine, 10 mg, Oral, Q48H  fludrocortisone, 100 mcg, Oral, Daily  gabapentin, 100 mg, Oral, Nightly  insulin lispro, 2-7 Units, Subcutaneous, 4x Daily With Meals & Nightly  midodrine, 10 mg, Oral, Q8H  polyethylene glycol, 17 g, Oral, Daily  povidone-iodine, , Topical, Daily  senna-docusate sodium, 2 tablet, Oral, BID  sodium chloride, 10 mL, Intravenous, Q12H  topiramate, 100 mg, Oral, Nightly      Continuous Infusions:phenylephrine, 0.5-3 mcg/kg/min, Last Rate: Stopped (01/30/24 0500)      PRN Meds:.  albumin human    senna-docusate sodium **AND** polyethylene glycol **AND** bisacodyl **AND** bisacodyl    Calcium Replacement - Follow Nurse / BPA Driven Protocol    dextrose    dextrose    glucagon (human recombinant)    hydrALAZINE    ipratropium-albuterol    loperamide    Magnesium Standard Dose Replacement - Follow Nurse / BPA Driven Protocol    midodrine    nitroglycerin    ondansetron ODT **OR** ondansetron    Phosphorus Replacement - Follow Nurse / BPA Driven Protocol    Potassium Replacement - Follow Nurse / BPA Driven Protocol    sodium chloride    sodium chloride    sodium chloride    traMADol     Interval History:    Assessment & Plan     Chest pain  -Patient has ruled out for acute coronary syndrome and had heart cath in October 2023 that showed no significant obstructive disease. .     Hypotension   - required pressors briefly off now  -will need to restart bp/rate meds with continued monitoring     RSV-asymptomatic     Permanent atrial fib - rate is controlled; holding metoprolol and diltiazem for now due to BP;  continue Eliquis  -Add back rate controlling agents as soon as tolerated as she has h/o RVR.     ESRD on dialysis - dialysis per Dr. Keller  Hyperkalemia - monitor  RSV - relatively asymptomatic; continue droplet precautions  Chronic headaches - Topamax     Low-dose famotidine for stress ulcer prophylaxis  On Eliquis for A-fib so no additional DVT prophylaxis is needed    Plan for disposition:Back to     Preeti James, CHELLE  01/30/24  11:21 EST

## 2024-01-30 NOTE — CASE MANAGEMENT/SOCIAL WORK
Continued Stay Note   Luis Felipe     Patient Name: Hazel Bucio  MRN: [id number]  Today's Date: 1/30/2024    Admit Date: 1/28/2024    Plan: DC Plan: Return to [facility]. No Precert required. No PASRR required. Chronic HD- LH   Discharge Plan       Row Name 01/30/24 1024       Plan    Plan DC Plan: Return to [facility]. No Precert required. No PASRR required. Chronic HD- LH    Provided Post Acute Provider List? N/A    Provided Post Acute Provider Quality & Resource List? N/A    Plan Comments CM spoke with intensivist, nursing, and NP to obtain clinical upates in morning rounds. Plan to downgrade to PCU level of care. Will need HD today to treat elevated potassium.CM will continue to follow for any further needs and adjust discharge plan accordingly. DC Barriers: Cardiac monitoring, O2@2L nc, HD cath, Urostomy, monitor renal function, K+ 5.6, and BP monitoring.                 Expected Discharge Date and Time       Expected Discharge Date Expected Discharge Time    Jan 31, 2024               Chantal Kennedy RN    Office Phone: [phone]  Office Cell:     [phone]'

## 2024-01-30 NOTE — PROGRESS NOTES
Critical Care Progress Note   Hazel Bucio : 1940 MRN:[id number] LOS:1     Principal Problem: Hypotension     Reason for follow up: All the medical problems listed below    Summary     A 83 y.o. female admitted with a principal diagnosis of Hypotension.      Significant events     24 : Patient has been afebrile and vital signs have all been stable since admission to the ICU.  Only stayed on pressors for a very short time for there were discontinued.  Home midodrine has been resumed.  White blood cell count is within normal limits.  Potassium slightly elevated at 5.6, CO2 is 19.  Chest x-ray with mild interstitial prominence, likely secondary to mild pulmonary edema from kidney disease.    Assessment / Plan     Acute hypotension, likely 2/2 HD; improved  Chronic baseline hypotension (on midodrine at home on HD days)  -Persistent hypotension in spite of adequate fluid resuscitation  -C/w additional fluids as needed. 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  Ok to downgrade at this time.  Actually, I can't find an actual reason for admission to begin with, and accepting  team may wish to d/c.          Code status:   Level Of Support Discussed With: Patient  Code Status (Patient has no pulse and is not breathing): CPR (Attempt to Resuscitate)  Medical Interventions (Patient has pulse or is breathing): Full Support       Nutrition: Diet: Cardiac Diets, Diabetic Diets; Healthy Heart (2-3 Na+); Consistent Carbohydrate; Texture: Regular Texture (IDDSI 7); Fluid Consistency: Thin (IDDSI 0)   Patient isn't on Tube Feeding    DVT prophylaxis:  Medical and mechanical DVT prophylaxis orders are present.         Subjective / Review of systems     Review of Systems   Patient very hard of hearing, however, she denies shortness of breath or chest pain.  She denies fevers, chills, sweats, nausea, vomiting.  Ate a good breakfast this morning and did well with that.  No dizziness, no headaches, no vision changes.  Objective / Physical Exam   Vital signs:  Temp: 97.9 °F (36.6 °C)  BP: 125/69  Heart Rate: 104  Resp: 12  SpO2: 100 %  Weight: 56 kg (123 lb 7.3 oz)    Admission Weight: Weight: 65.8 kg (145 lb)  Current Weight: Weight: 56 kg (123 lb 7.3 oz)    Input/Output in last 24 hours:    Intake/Output Summary (Last 24 hours) at 1/30/2024 1131  Last data filed at 1/30/2024 0544  Gross per 24 hour   Intake 282 ml   Output 500 ml   Net -218 ml      Physical Exam     GEN:  Pleasant, elderly, chronically ill-appearing woman.  Does not appear acutely ill.  Appears appropriate for stated age.  WD/WN/WH.  Lying in bed on RA.  NAD.  NEURO:  Brainstem reflexes intact.  No obvious focal deficit.  Moves all 4 ext.  HEENT:  N/AT.  PERRL.  MMM.  Oropharynx non-erythematous.  No drainage from the eyes/ears/nose.  No conjunctival petechiae.  No oral thrush.  Hard of hearing.  Voice normal.  NECK:  Supple, NT, trachea midline.  No meningismus.  No ROM limitation.  No JVD.    CHEST/LUNGS:  Breath sounds are diminished, but clear and equal bilaterally.  No w/r/r.  Chest excursion equal bilaterally.    CARDIOVASCULAR:   RRR w/ holosystolic murmur noted.    GI:  Abdomen soft, NT, ND, +BS.   : Deferred.  EXTREMITIES:  No deformity or amputation.  No cyanosis, edema, or asymmetry.  Pulses 2+ and equal in BLE's.    SKIN:  Warm, dry, and pink.  No rash, breakdown, or track marks noted.  LYMPHATICS/HEME:  No overt LAD or abnormal bruising.  No lymphedema.  MSK:  No joint abnormalities noted.  Strength is 5/5 and equal in BUE and BLE's.  PSYCH:  Pleasant.  A&Ox 2.  Normal mood and affect.  Responds appropriately to commands and appears to comprehend instructions.        Radiology and Labs     Results from last 7 days   Lab Units 01/29/24  0645 01/28/24  1553   WBC 10*3/mm3 8.90 10.20   HEMATOCRIT % 31.7* 31.4*   PLATELETS 10*3/mm3 232 268      Results from last 7 days   Lab Units 01/29/24  0645 01/28/24  1553   SODIUM mmol/L 137 137   POTASSIUM mmol/L 5.6* 4.9   CHLORIDE mmol/L 102 101   CO2 mmol/L 19.0* 24.0   BUN mg/dL 89* 84*   CREATININE mg/dL 5.25* 5.36*      Current medications   Scheduled Meds: apixaban, 2.5 mg, Oral, Q12H  atorvastatin, 20 mg, Oral, Daily  dicyclomine, 10 mg, Oral, TID  famotidine, 10 mg, Oral, Q48H  fludrocortisone, 100 mcg, Oral, Daily  gabapentin, 100 mg, Oral, Nightly  insulin lispro, 2-7 Units, Subcutaneous, 4x Daily With Meals & Nightly  midodrine, 10 mg, Oral, Q8H  polyethylene glycol, 17 g, Oral, Daily  povidone-iodine, , Topical, Daily  senna-docusate sodium, 2 tablet, Oral, BID  sodium chloride, 10 mL, Intravenous, Q12H  topiramate, 100 mg, Oral, Nightly      Continuous Infusions: phenylephrine, 0.5-3 mcg/kg/min, Last Rate: Stopped (01/30/24 0500)        Plan discussed with RN. Reviewed all other data in the last 24 hours, including but not limited to vitals, labs, microbiology, imaging and pertinent notes from other providers.     Stephan Burnham,    Critical Care  01/30/24   11:31 EST

## 2024-01-31 VITALS
RESPIRATION RATE: 16 BRPM | SYSTOLIC BLOOD PRESSURE: 122 MMHG | HEART RATE: 105 BPM | DIASTOLIC BLOOD PRESSURE: 64 MMHG | HEIGHT: 67 IN | BODY MASS INDEX: 19.58 KG/M2 | WEIGHT: 124.78 LBS | TEMPERATURE: 97.5 F | OXYGEN SATURATION: 93 %

## 2024-01-31 LAB
ALBUMIN SERPL-MCNC: 2.8 G/DL (ref 3.5–5.2)
ALBUMIN/GLOB SERPL: 1.2 G/DL
ALP SERPL-CCNC: 96 U/L (ref 39–117)
ALT SERPL W P-5'-P-CCNC: 20 U/L (ref 1–33)
ANION GAP SERPL CALCULATED.3IONS-SCNC: 14 MMOL/L (ref 5–15)
AST SERPL-CCNC: 22 U/L (ref 1–32)
BASOPHILS # BLD AUTO: 0.1 10*3/MM3 (ref 0–0.2)
BASOPHILS NFR BLD AUTO: 1.1 % (ref 0–1.5)
BILIRUB SERPL-MCNC: 0.4 MG/DL (ref 0–1.2)
BUN SERPL-MCNC: 46 MG/DL (ref 8–23)
BUN/CREAT SERPL: 13.8 (ref 7–25)
CA-I SERPL ISE-MCNC: 1.22 MMOL/L (ref 1.2–1.3)
CALCIUM SPEC-SCNC: 7.4 MG/DL (ref 8.6–10.5)
CHLORIDE SERPL-SCNC: 111 MMOL/L (ref 98–107)
CO2 SERPL-SCNC: 17 MMOL/L (ref 22–29)
CREAT SERPL-MCNC: 3.33 MG/DL (ref 0.57–1)
DEPRECATED RDW RBC AUTO: 56.4 FL (ref 37–54)
EGFRCR SERPLBLD CKD-EPI 2021: 13.3 ML/MIN/1.73
EOSINOPHIL # BLD AUTO: 0.1 10*3/MM3 (ref 0–0.4)
EOSINOPHIL NFR BLD AUTO: 1.6 % (ref 0.3–6.2)
ERYTHROCYTE [DISTWIDTH] IN BLOOD BY AUTOMATED COUNT: 16.6 % (ref 12.3–15.4)
GLOBULIN UR ELPH-MCNC: 2.4 GM/DL
GLUCOSE BLDC GLUCOMTR-MCNC: 70 MG/DL (ref 70–105)
GLUCOSE BLDC GLUCOMTR-MCNC: 73 MG/DL (ref 70–105)
GLUCOSE BLDC GLUCOMTR-MCNC: 75 MG/DL (ref 70–105)
GLUCOSE SERPL-MCNC: 70 MG/DL (ref 65–99)
HCT VFR BLD AUTO: 29.9 % (ref 34–46.6)
HGB BLD-MCNC: 9.7 G/DL (ref 12–15.9)
LYMPHOCYTES # BLD AUTO: 1.8 10*3/MM3 (ref 0.7–3.1)
LYMPHOCYTES NFR BLD AUTO: 21.2 % (ref 19.6–45.3)
MAGNESIUM SERPL-MCNC: 1.5 MG/DL (ref 1.6–2.4)
MCH RBC QN AUTO: 31.5 PG (ref 26.6–33)
MCHC RBC AUTO-ENTMCNC: 32.3 G/DL (ref 31.5–35.7)
MCV RBC AUTO: 97.5 FL (ref 79–97)
MONOCYTES # BLD AUTO: 0.9 10*3/MM3 (ref 0.1–0.9)
MONOCYTES NFR BLD AUTO: 10.6 % (ref 5–12)
NEUTROPHILS NFR BLD AUTO: 5.5 10*3/MM3 (ref 1.7–7)
NEUTROPHILS NFR BLD AUTO: 65.5 % (ref 42.7–76)
NRBC BLD AUTO-RTO: 0 /100 WBC (ref 0–0.2)
PHOSPHATE SERPL-MCNC: 3.9 MG/DL (ref 2.5–4.5)
PLATELET # BLD AUTO: 261 10*3/MM3 (ref 140–450)
PMV BLD AUTO: 6.8 FL (ref 6–12)
POTASSIUM SERPL-SCNC: 3.4 MMOL/L (ref 3.5–5.2)
PROT SERPL-MCNC: 5.2 G/DL (ref 6–8.5)
RBC # BLD AUTO: 3.07 10*6/MM3 (ref 3.77–5.28)
SODIUM SERPL-SCNC: 142 MMOL/L (ref 136–145)
WBC NRBC COR # BLD AUTO: 8.4 10*3/MM3 (ref 3.4–10.8)

## 2024-01-31 PROCEDURE — 97166 OT EVAL MOD COMPLEX 45 MIN: CPT

## 2024-01-31 PROCEDURE — 85025 COMPLETE CBC W/AUTO DIFF WBC: CPT | Performed by: STUDENT IN AN ORGANIZED HEALTH CARE EDUCATION/TRAINING PROGRAM

## 2024-01-31 PROCEDURE — 25010000002 ALBUMIN HUMAN 25% PER 50 ML: Performed by: INTERNAL MEDICINE

## 2024-01-31 PROCEDURE — 83735 ASSAY OF MAGNESIUM: CPT | Performed by: STUDENT IN AN ORGANIZED HEALTH CARE EDUCATION/TRAINING PROGRAM

## 2024-01-31 PROCEDURE — P9047 ALBUMIN (HUMAN), 25%, 50ML: HCPCS | Performed by: INTERNAL MEDICINE

## 2024-01-31 PROCEDURE — 82330 ASSAY OF CALCIUM: CPT | Performed by: STUDENT IN AN ORGANIZED HEALTH CARE EDUCATION/TRAINING PROGRAM

## 2024-01-31 PROCEDURE — 25010000002 HEPARIN (PORCINE) PER 1000 UNITS: Performed by: INTERNAL MEDICINE

## 2024-01-31 PROCEDURE — 84100 ASSAY OF PHOSPHORUS: CPT | Performed by: STUDENT IN AN ORGANIZED HEALTH CARE EDUCATION/TRAINING PROGRAM

## 2024-01-31 PROCEDURE — 97161 PT EVAL LOW COMPLEX 20 MIN: CPT

## 2024-01-31 PROCEDURE — 99232 SBSQ HOSP IP/OBS MODERATE 35: CPT | Performed by: INTERNAL MEDICINE

## 2024-01-31 PROCEDURE — 5A1D70Z PERFORMANCE OF URINARY FILTRATION, INTERMITTENT, LESS THAN 6 HOURS PER DAY: ICD-10-PCS | Performed by: INTERNAL MEDICINE

## 2024-01-31 PROCEDURE — 82948 REAGENT STRIP/BLOOD GLUCOSE: CPT | Performed by: STUDENT IN AN ORGANIZED HEALTH CARE EDUCATION/TRAINING PROGRAM

## 2024-01-31 PROCEDURE — 80053 COMPREHEN METABOLIC PANEL: CPT | Performed by: STUDENT IN AN ORGANIZED HEALTH CARE EDUCATION/TRAINING PROGRAM

## 2024-01-31 PROCEDURE — 82948 REAGENT STRIP/BLOOD GLUCOSE: CPT

## 2024-01-31 RX ORDER — METOPROLOL SUCCINATE 25 MG/1
25 TABLET, EXTENDED RELEASE ORAL 2 TIMES DAILY
Qty: 60 TABLET | Refills: 0 | Status: SHIPPED | OUTPATIENT
Start: 2024-01-31

## 2024-01-31 RX ORDER — HEPARIN SODIUM 1000 [USP'U]/ML
5000 INJECTION, SOLUTION INTRAVENOUS; SUBCUTANEOUS AS NEEDED
Status: DISCONTINUED | OUTPATIENT
Start: 2024-01-31 | End: 2024-01-31 | Stop reason: HOSPADM

## 2024-01-31 RX ORDER — MIDODRINE HYDROCHLORIDE 10 MG/1
10 TABLET ORAL EVERY 8 HOURS
Qty: 90 TABLET | Refills: 1 | Status: SHIPPED | OUTPATIENT
Start: 2024-01-31

## 2024-01-31 RX ORDER — FAMOTIDINE 10 MG
10 TABLET ORAL
Qty: 30 TABLET | Refills: 0 | Status: SHIPPED | OUTPATIENT
Start: 2024-01-31

## 2024-01-31 RX ORDER — ALBUMIN (HUMAN) 12.5 G/50ML
12.5 SOLUTION INTRAVENOUS AS NEEDED
Status: DISCONTINUED | OUTPATIENT
Start: 2024-01-31 | End: 2024-01-31 | Stop reason: HOSPADM

## 2024-01-31 RX ORDER — GABAPENTIN 100 MG/1
100 CAPSULE ORAL NIGHTLY
Qty: 30 CAPSULE | Refills: 0 | Status: SHIPPED | OUTPATIENT
Start: 2024-01-31

## 2024-01-31 RX ADMIN — HEPARIN SODIUM 5000 UNITS: 1000 INJECTION INTRAVENOUS; SUBCUTANEOUS at 11:14

## 2024-01-31 RX ADMIN — Medication 10 ML: at 08:17

## 2024-01-31 RX ADMIN — POVIDONE-IODINE: 10 SOLUTION TOPICAL at 08:17

## 2024-01-31 RX ADMIN — ALBUMIN (HUMAN) 12.5 G: 0.25 INJECTION, SOLUTION INTRAVENOUS at 08:03

## 2024-01-31 RX ADMIN — MIDODRINE HYDROCHLORIDE 10 MG: 5 TABLET ORAL at 01:43

## 2024-01-31 RX ADMIN — ALBUMIN (HUMAN) 12.5 G: 0.25 INJECTION, SOLUTION INTRAVENOUS at 09:35

## 2024-01-31 RX ADMIN — ATORVASTATIN CALCIUM 20 MG: 20 TABLET, FILM COATED ORAL at 08:16

## 2024-01-31 RX ADMIN — APIXABAN 2.5 MG: 2.5 TABLET, FILM COATED ORAL at 08:16

## 2024-01-31 RX ADMIN — FLUDROCORTISONE ACETATE 100 MCG: 0.1 TABLET ORAL at 08:16

## 2024-01-31 RX ADMIN — DICYCLOMINE HYDROCHLORIDE 10 MG: 10 CAPSULE ORAL at 08:16

## 2024-01-31 NOTE — THERAPY EVALUATION
Patient Name: Hazel Bucio  : 1940    MRN: [id number]                              Today's Date: 2024       Admit Date: 2024    Visit Dx:     ICD-10-CM ICD-9-CM   1. Chest pain, unspecified type  R07.9 786.50   2. RSV (acute bronchiolitis due to respiratory syncytial virus)  J21.0 466.11   3. Hypotension, unspecified hypotension type  I95.9 458.9     Patient Active Problem List   Diagnosis    Permanent atrial fibrillation    Dyslipidemia    Essential hypertension    Long term current use of anticoagulant    Presence of cardiac pacemaker    Type 2 diabetes mellitus    Ureteral cancer    Chronic insomnia    Seasonal allergies    Acquired absence of kidney    Athscl heart disease of native coronary 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    Acquired absence of other specified parts of digestive tract    Gastro-esophageal reflux disease without esophagitis    ESRD on dialysis    Hypotension    Mild cognitive impairment of uncertain or unknown etiology     Past Medical History:   Diagnosis Date    Acquired absence of kidney 10/04/2021    Acquired absence of other specified parts of digestive tract 2022    Athscl heart disease of native coronary 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 2022    Atrial fibrillation with rapid ventricular response     Bladder cancer     Cancer     breast, bladder    Cholecystitis with cholelithiasis     Chronic insomnia 2020    Deep vein thrombosis     Dyslipidemia 2017    E coli bacteremia     ESRD on dialysis 10/18/2023    Essential hypertension 2017    Fracture tibia/fibula, right, closed, initial encounter 2020    Gastro-esophageal reflux disease without esophagitis 2020    GERD (gastroesophageal reflux disease)     History of bladder cancer     History of falling     History of urostomy     Hyperkalemia     Immobility 2020    r/t broken Tibia     Irritable bowel syndrome without diarrhea     Kidney carcinoma, right     removed     Long term current use of  anticoagulant 01/09/2015    Mild cognitive impairment of uncertain or unknown etiology 08/31/2023    Myalgia due to statin     Other specified abdominal hernia without obstruction or gangrene     PAF (paroxysmal atrial fibrillation) 06/26/2019    Permanent atrial fibrillation 06/26/2019    Personal history of other venous thrombosis and embolism     Presence of cardiac pacemaker 11/03/2014    BS dual chamber PM placed 10/2014 with pocket revision 1/25/17.  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     Past Surgical History:   Procedure Laterality Date    BREAST LUMPECTOMY      CARDIAC CATHETERIZATION N/A 10/18/2023    Procedure: Left Heart Cath possible PCI, atherectomy, hemodynamic support;  Surgeon: Augustin Ellsworth MD;  Location: [facility] CATH INVASIVE LOCATION;  Service: Cardiology;  Laterality: N/A;    CARDIAC ELECTROPHYSIOLOGY PROCEDURE N/A 4/28/2023    Procedure: Pacemaker gen change, [place] AWARE $;  Surgeon: Jose Carlos Cheng MD;  Location: [facility] CATH INVASIVE LOCATION;  Service: Cardiovascular;  Laterality: N/A;    CHOLECYSTECTOMY N/A 10/12/2020    Procedure: CHOLECYSTECTOMY LAPAROSCOPIC;  Surgeon: Go Pereira DO;  Location: [facility] MAIN OR;  Service: General;  Laterality: N/A;    CYSTECTOMY W/ URETEROILEAL CONDUIT      HARDWARE REMOVAL Right 6/11/2021    Procedure: TIBIA HARDWARE REMOVAL;  Surgeon: Geoff Shah II, MD;  Location: [facility] MAIN OR;  Service: Orthopedics;  Laterality: Right;    HERNIA REPAIR      HYSTERECTOMY      INSERT / REPLACE / REMOVE PACEMAKER      NEPHRECTOMY Right     TIBIA IM NAILING/RODDING Right 8/28/2020    Procedure: TIBIA INTRAMEDULLARY NAIL/ABRAHAM INSERTION;  Surgeon: Geoff Shah II, MD;  Location: [facility] MAIN OR;  Service: Orthopedics;  Laterality: Right;       General Information       Row Name 01/31/24 1656          OT Time and Intention    Document Type evaluation  -MS     Mode of Treatment occupational therapy  -MS       Row Name 01/31/24 1656          General Information    Patient Profile Reviewed yes  -MS     Prior Level of Function mod assist:;ADL's;min assist:;transfer  poor historian  -MS     Existing Precautions/Restrictions fall  -MS     Barriers to Rehab previous functional deficit;hearing deficit  -MS       Row Name 01/31/24 1656          Occupational Profile    Reason for Services/Referral (Occupational Profile) Pt is an 84 y/o F admitted to [facility] 1/28/24 with c/o chest pain and hypotension. Hospital dx RSV. PMHx significant for hx bladder and breast cancer, ESRD on dialysis, right tibia/fibula fx, A. Fib, pacemaker, and SSS. Pt admitted from [facility], poor historian, likely requires assist for ADLs.  -MS       Row Name 01/31/24 1656          Living Environment    People in Home facility resident  -MS       Row Name 01/31/24 1656          Home Main Entrance    Number of Stairs, Main Entrance none  -MS       Row Name 01/31/24 1656          Stairs Within Home, Primary    Number of Stairs, Within Home, Primary none  -MS       Row Name 01/31/24 1656          Cognition    Orientation Status (Cognition) oriented to;person;place  difficulty providing PLOF, reports she is from [facility] for therapy  -MS       Row Name 01/31/24 1656          Safety Issues, Functional Mobility    Impairments Affecting Function (Mobility) endurance/activity tolerance;cognition  -MS               User Key  (r) = Recorded By, (t) = Taken By, (c) = Cosigned By      Initials Name Provider Type    MS Malika Gupta, NUPUR Occupational Therapist                     Mobility/ADL's       Row Name 01/31/24 1657          Bed Mobility    Bed Mobility supine-sit  -MS     Supine-Sit [place] (Bed Mobility) minimum assist (75% patient effort)  -MS     Assistive Device (Bed Mobility) head of bed  elevated;bed rails  -MS       [facility] Name 01/31/24 1657          Transfers    Transfers sit-stand transfer;bed-chair transfer  -MS       [facility] Name 01/31/24 1657          Bed-Chair Transfer    Bed-Chair [place] (Transfers) minimum assist (75% patient effort)  -MS       [facility] Name 01/31/24 1657          Sit-Stand Transfer    Sit-Stand [place] (Transfers) minimum assist (75% patient effort)  -MS       [facility] Name 01/31/24 1657          Activities of Daily Living    BADL Assessment/Intervention toileting  -MS       Row Name 01/31/24 1657          Toileting Assessment/Training    [place] Level (Toileting) perform perineal hygiene;maximum assist (25% patient effort)  -MS     Position (Toileting) supported standing  -MS     Comment, (Toileting) bowel incontinent upon standing  -MS               User Key  (r) = Recorded By, (t) = Taken By, (c) = Cosigned By      Initials Name Provider Type    Malika Camargo OT Occupational Therapist                   Obj/Interventions       [facility] Name 01/31/24 1657          Sensory Assessment (Somatosensory)    Sensory Assessment (Somatosensory) unable/difficult to assess  -MS       Row Name 01/31/24 1657          Vision Assessment/Intervention    Visual Impairment/Limitations unable/difficult to assess  -MS       [facility] Name 01/31/24 1657          Range of Motion Comprehensive    Comment, General Range of Motion BUE WFL  -MS       [facility] Name 01/31/24 1657          Strength Comprehensive (MMT)    Comment, General Manual Muscle Testing (MMT) Assessment BUE functionally 3+/5, unable to formally assessed  -MS       Row Name 01/31/24 1657          Balance    Balance Assessment sitting static balance;sitting dynamic balance;standing static balance;standing dynamic balance  -MS     Static Sitting Balance supervision  -MS     Dynamic Sitting Balance standby assist  -MS     Position, Sitting Balance unsupported;sitting edge of bed  -MS     Static Standing Balance minimal assist  -MS     Dynamic  Standing Balance minimal assist  -MS     Position/Device Used, Standing Balance supported  -MS               User Key  (r) = Recorded By, (t) = Taken By, (c) = Cosigned By      Initials Name Provider Type    MS Alonso Malika, OT Occupational Therapist                   Goals/Plan    No documentation.                  Clinical Impression       Row Name 01/31/24 1658          Pain Assessment    Pretreatment Pain Rating 0/10 - no pain  -MS     Posttreatment Pain Rating 0/10 - no pain  -MS       Row Name 01/31/24 1658          Plan of Care Review    Plan of Care Reviewed With patient  -MS     Progress no change  -MS     Outcome Evaluation Pt is an 84 y/o F admitted to [facility] 1/28/24 with c/o chest pain and hypotension. Hospital dx RSV. PMHx significant for hx bladder and breast cancer, ESRD on dialysis, right tibia/fibula fx, A. Fib, pacemaker, and SSS. Pt admitted from [facility], poor historian, likely requires assist for ADLs. This date pt oriented to person and place, poor historian and unable to provide PLOF. Pt requires min A for supine to sit, tolerates sitting edge of bed with supervision to SBA. Pt requires min A to come to standing, bowel incontinent upon standing, requiring max A for albaro hygiene with second assist to provide support in standing. Pt appears to function near baseline, recommend return to F with skilled OT intervention as needed. OT will complete orders at this time.  -MS       Row Name 01/31/24 1658          Therapy Assessment/Plan (OT)    Criteria for Skilled Therapeutic Interventions Met (OT) no;does not meet criteria for skilled intervention  -MS     Therapy Frequency (OT) evaluation only  -MS       Row Name 01/31/24 1658          Therapy Plan Review/Discharge Plan (OT)    Anticipated Discharge Disposition (OT) extended care facility  -MS       Row Name 01/31/24 1658          Vital Signs    Pre Systolic BP Rehab 117  -MS     Pre Treatment Diastolic BP 70  -MS     Post Systolic BP Rehab 136  -MS      Post Treatment Diastolic   -MS     Pretreatment Heart Rate (beats/min) 101  -MS     Intratreatment Heart Rate (beats/min) 133  -MS     Posttreatment Heart Rate (beats/min) 126  -MS     O2 Delivery Pre Treatment room air  -MS     O2 Delivery Intra Treatment room air  -MS     O2 Delivery Post Treatment room air  -MS     Pre Patient Position Supine  -MS     Intra Patient Position Standing  -MS     Post Patient Position Sitting  -MS       Row Name 01/31/24 1658          Positioning and Restraints    Pre-Treatment Position in bed  -MS     Post Treatment Position chair  -MS     In Chair notified nsg;sitting;call light within reach;encouraged to call for assist;exit alarm on  -MS               User Key  (r) = Recorded By, (t) = Taken By, (c) = Cosigned By      Initials Name Provider Type    Malika Camargo, OT Occupational Therapist                   Outcome Measures       Row Name 01/31/24 1700          How much help from another is currently needed...    Putting on and taking off regular lower body clothing? 2  -MS     Bathing (including washing, rinsing, and drying) 2  -MS     Toileting (which includes using toilet bed pan or urinal) 2  -MS     Putting on and taking off regular upper body clothing 3  -MS     Taking care of personal grooming (such as brushing teeth) 3  -MS     Eating meals 3  -MS     AM-PAC 6 Clicks Score (OT) 15  -MS       Row Name 01/31/24 1601          How much help from another person do you currently need...    Turning from your back to your side while in flat bed without using bedrails? 3  -CL     Moving from lying on back to sitting on the side of a flat bed without bedrails? 3  -CL     Moving to and from a bed to a chair (including a wheelchair)? 3  -CL     Standing up from a chair using your arms (e.g., wheelchair, bedside chair)? 3  -CL     Climbing 3-5 steps with a railing? 1  -CL     To walk in hospital room? 1  -CL     AM-PAC 6 Clicks Score (PT) 14  -CL     Highest Level of  Mobility Goal 4 --> Transfer to chair/commode  -CL       Row Name 01/31/24 1700          Functional Assessment    Outcome Measure Options AM-PAC 6 Clicks Daily Activity (OT)  -MS               User Key  (r) = Recorded By, (t) = Taken By, (c) = Cosigned By      Initials Name Provider Type    MS Malika Gupta, OT Occupational Therapist    CL Martha Katz PT Physical Therapist                    Occupational Therapy Education       Title: PT OT SLP Therapies (Done)       Topic: Occupational Therapy (Done)       Point: ADL training (Resolved)       Description:   Instruct learner(s) on proper safety adaptation and remediation techniques during self care or transfers.   Instruct in proper use of assistive devices.                  Learner Progress:  Not documented in this visit.              Point: Home exercise program (Resolved)       Description:   Instruct learner(s) on appropriate technique for monitoring, assisting and/or progressing therapeutic exercises/activities.                  Learner Progress:  Not documented in this visit.              Point: Precautions (Done)       Description:   Instruct learner(s) on prescribed precautions during self-care and functional transfers.                  Learning Progress Summary             Patient Acceptance, E,TB, VU by MS at 1/31/2024 1701                         Point: Body mechanics (Done)       Description:   Instruct learner(s) on proper positioning and spine alignment during self-care, functional mobility activities and/or exercises.                  Learning Progress Summary             Patient Acceptance, E,TB, VU by MS at 1/31/2024 1701                                         User Key       Initials Effective Dates Name Provider Type Discipline    MS 07/13/22 -  Malika Gupta OT Occupational Therapist OT                  OT Recommendation and Plan  Therapy Frequency (OT): evaluation only  Plan of Care Review  Plan of Care Reviewed With:  patient  Progress: no change  Outcome Evaluation: Pt is an 82 y/o F admitted to [facility] 1/28/24 with c/o chest pain and hypotension. Hospital dx RSV. PMHx significant for hx bladder and breast cancer, ESRD on dialysis, right tibia/fibula fx, A. Fib, pacemaker, and SSS. Pt admitted from [facility], poor historian, likely requires assist for ADLs. This date pt oriented to person and place, poor historian and unable to provide PLOF. Pt requires min A for supine to sit, tolerates sitting edge of bed with supervision to SBA. Pt requires min A to come to standing, bowel incontinent upon standing, requiring max A for albaro hygiene with second assist to provide support in standing. Pt appears to function near baseline, recommend return to ECF with skilled OT intervention as needed. OT will complete orders at this time.     Time Calculation:                   Malika Gupta OT  1/31/2024

## 2024-01-31 NOTE — CONSULTS
CARDIOLOGY CONSULT NOTE      Referring Provider: Dr. Henderson    Reason for Consultation: Atrial fibrillation, hypotension    Attending: Lora Henderson MD    Chief complaint    Atypical chest pain  Hypotension    Subjective .   Agree with narrative as discussed with nurse practitioner after face-to-face encounter scribed findings below  History of present illness:  Hazel Bucio is a 83 y.o. female who presents with reports of resolved chest pain and hypotension.    Patient resides at [facility].  She is known to have end-stage renal disease requiring dialysis, hypertension, GERD, atrial fibrillation.  She has been noted to have low blood pressures and was brought to the emergency room for further evaluation.    She was given a fluid bolus in the emergency room after her systolic blood pressure was in the 80s.  She had a respiratory virus panel that was positive for RSV.    Last echocardiogram demonstrated her EF of 55 to 60% with no significant valvular flow abnormalities.    Patient had cardiac catheterization in October 2023 that showed normal epicardial anatomy with no obstructive disease normal pulmonary pressures with no evidence of pulmonary hypertension, normal cardiac output and normal LV filling pressures.    Patient has had persistent hypotension requiring alteration in medical therapy and has had some mildly elevated ventricular rates with her atrial fibrillation.    At the time of my interview the patient is drowsy  She denies chest pain or shortness of breath at current time  =======================================================================  Seen and examined on date of service 1-30, ICU nurse present encounter  Resting comfortably without distress, no acute events overnight.  Rates are controlled 90s to low 100s presently    Historical data copied forward from previous encounters in EMR is unchanged        Review of Systems   Unable to perform ROS: Acuity of condition     Pertinent items are noted  in HPI, all other systems reviewed and negative  History  Past Medical History:   Diagnosis Date    Acquired absence of kidney 10/04/2021    Acquired absence of other specified parts of digestive tract 09/09/2022    Athscl heart disease of native coronary 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 09/09/2022    Atrial fibrillation with rapid ventricular response     Bladder cancer     Cancer     breast, bladder    Cholecystitis with cholelithiasis     Chronic insomnia 08/27/2020    Deep vein thrombosis     Dyslipidemia 01/17/2017    E coli bacteremia     ESRD on dialysis 10/18/2023    Essential hypertension 01/17/2017    Fracture tibia/fibula, right, closed, initial encounter 08/27/2020    Gastro-esophageal reflux disease without esophagitis 09/14/2020    GERD (gastroesophageal reflux disease)     History of bladder cancer     History of falling     History of urostomy     Hyperkalemia     Immobility 08/27/2020    r/t broken Tibia     Irritable bowel syndrome without diarrhea     Kidney carcinoma, right     removed     Long term current use of anticoagulant 01/09/2015    Mild cognitive impairment of uncertain or unknown etiology 08/31/2023    Myalgia due to statin     Other specified abdominal hernia without obstruction or gangrene     PAF (paroxysmal atrial fibrillation) 06/26/2019    Permanent atrial fibrillation 06/26/2019    Personal history of other venous thrombosis and embolism     Presence of cardiac pacemaker 11/03/2014    BS dual chamber PM placed 10/2014 with pocket revision 1/25/17.  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       Past Surgical History:   Procedure Laterality Date    BREAST LUMPECTOMY      CARDIAC CATHETERIZATION N/A 10/18/2023    Procedure: Left Heart Cath possible PCI, atherectomy, hemodynamic support;  Surgeon: Augustin Ellsworth  Cm Barnes MD;  Location: [facility] CATH INVASIVE LOCATION;  Service: Cardiology;  Laterality: N/A;    CARDIAC ELECTROPHYSIOLOGY PROCEDURE N/A 4/28/2023    Procedure: Pacemaker gen change, [place] AWARE $;  Surgeon: Jose Carlos Cheng MD;  Location: [facility] CATH INVASIVE LOCATION;  Service: Cardiovascular;  Laterality: N/A;    CHOLECYSTECTOMY N/A 10/12/2020    Procedure: CHOLECYSTECTOMY LAPAROSCOPIC;  Surgeon: Go Pereira DO;  Location: [facility] MAIN OR;  Service: General;  Laterality: N/A;    CYSTECTOMY W/ URETEROILEAL CONDUIT      HARDWARE REMOVAL Right 6/11/2021    Procedure: TIBIA HARDWARE REMOVAL;  Surgeon: Geoff Shah II, MD;  Location: [facility] MAIN OR;  Service: Orthopedics;  Laterality: Right;    HERNIA REPAIR      HYSTERECTOMY      INSERT / REPLACE / REMOVE PACEMAKER      NEPHRECTOMY Right     TIBIA IM NAILING/RODDING Right 8/28/2020    Procedure: TIBIA INTRAMEDULLARY NAIL/ABRAHAM INSERTION;  Surgeon: Geoff Shah II, MD;  Location: [facility] MAIN OR;  Service: Orthopedics;  Laterality: Right;       Family History   Problem Relation Age of Onset    COPD Father        Social History     Tobacco Use    Smoking status: Never     Passive exposure: Never    Smokeless tobacco: Never   Vaping Use    Vaping Use: Never used   Substance Use Topics    Alcohol use: Not Currently    Drug use: Never        Medications Prior to Admission   Medication Sig Dispense Refill Last Dose    apixaban (ELIQUIS) 2.5 MG tablet tablet Take 1 tablet by mouth Every 12 (Twelve) Hours. Indications: Atrial Fibrillation 60 tablet 0 1/28/2024 at 0830    atorvastatin (LIPITOR) 20 MG tablet Take 1 tablet by mouth Every Night.   1/27/2024 at 1911    calcium acetate (PHOSLO) 667 MG tablet    1/28/2024 at 1300    dicyclomine (BENTYL) 10 MG capsule Take 1 capsule by mouth 3 (Three) Times a Day. 60 capsule 0 1/28/2024 at 0830    dilTIAZem CD (CARDIZEM CD) 120 MG 24 hr capsule Take 1 capsule by mouth Daily. 30 capsule 0 1/28/2024 at  0830    furosemide (LASIX) 40 MG tablet Take 1 tablet by mouth Daily.   1/28/2024 at 0830    gabapentin (NEURONTIN) 300 MG capsule 1 capsule 3 (Three) Times a Day.   1/28/2024 at 0830    Melatonin-Pyridoxine 1-10 MG tablet Take 1 tablet by mouth every night at bedtime.   1/27/2024 at 1911    metoprolol tartrate (LOPRESSOR) 50 MG tablet Take 2 tablets by mouth Every Morning.   1/28/2024 at 0830    metoprolol tartrate (LOPRESSOR) 50 MG tablet Take 1 tablet by mouth Every Night.   1/27/2024 at 1911    midodrine (PROAMATINE) 10 MG tablet Take 1 tablet by mouth Daily As Needed (PRN dialysis days.). 15 tablet 0     polyethylene glycol (MiraLax) 17 GM/SCOOP powder Take 17 g by mouth Every Morning.   1/28/2024 at 0830    saccharomyces boulardii (FLORASTOR) 250 MG capsule Take 1 capsule by mouth 2 (Two) Times a Day. 60 capsule 0 1/28/2024 at 0830    sertraline (ZOLOFT) 25 MG tablet Take 1 tablet by mouth every night at bedtime.   1/27/2024 at 1911    topiramate (TOPAMAX) 100 MG tablet Take 1 tablet by mouth Every Night. 30 tablet 0 1/27/2024 at 1911    zinc oxide 20 % ointment Apply 1 application  topically to the appropriate area as directed. Every shift.       acetaminophen (TYLENOL) 325 MG tablet Take 2 tablets by mouth 4 (Four) Times a Day As Needed for Mild Pain.       ipratropium-albuterol (DUO-NEB) 0.5-2.5 mg/3 ml nebulizer Take 3 mL by nebulization. Six times daily PRN.       loperamide (IMODIUM) 2 MG capsule Take 2 capsules by mouth Daily As Needed for Diarrhea.       ondansetron ODT (ZOFRAN-ODT) 4 MG disintegrating tablet Place 1 tablet on the tongue 3 (Three) Times a Day As Needed for Nausea or Vomiting.            Amoxicillin, Ciprofloxacin, Oxycodone-acetaminophen, Penicillins, Sulfa antibiotics, Cephalexin, Clavulanic acid, Codeine, Contrast dye (echo or unknown ct/mr), Doxycycline, Fluconazole, Iodinated contrast media, Iodine, Macrobid [nitrofurantoin], Tobramycin, and Trimethoprim    Scheduled Meds:apixaban,  "2.5 mg, Oral, Q12H  atorvastatin, 20 mg, Oral, Daily  dicyclomine, 10 mg, Oral, TID  famotidine, 10 mg, Oral, Q48H  fludrocortisone, 100 mcg, Oral, Daily  gabapentin, 100 mg, Oral, Nightly  insulin lispro, 2-7 Units, Subcutaneous, 4x Daily With Meals & Nightly  midodrine, 10 mg, Oral, Q8H  polyethylene glycol, 17 g, Oral, Daily  povidone-iodine, , Topical, Daily  senna-docusate sodium, 2 tablet, Oral, BID  sodium chloride, 10 mL, Intravenous, Q12H  topiramate, 100 mg, Oral, Nightly      Continuous Infusions:phenylephrine, 0.5-3 mcg/kg/min, Last Rate: Stopped (01/30/24 0500)      PRN Meds:.  albumin human    senna-docusate sodium **AND** polyethylene glycol **AND** bisacodyl **AND** bisacodyl    Calcium Replacement - Follow Nurse / BPA Driven Protocol    dextrose    dextrose    glucagon (human recombinant)    heparin (porcine)    hydrALAZINE    ipratropium-albuterol    loperamide    Magnesium Standard Dose Replacement - Follow Nurse / BPA Driven Protocol    midodrine    nitroglycerin    ondansetron ODT **OR** ondansetron    Phosphorus Replacement - Follow Nurse / BPA Driven Protocol    Potassium Replacement - Follow Nurse / BPA Driven Protocol    sodium chloride    sodium chloride    sodium chloride    traMADol    Objective     VITAL SIGNS  Vitals:    01/31/24 0744 01/31/24 0755 01/31/24 0800 01/31/24 0808   BP:  133/69 149/84 133/69   BP Location:  Left arm Left arm Left arm   Patient Position:  Lying Lying Lying   Pulse:  97 94 96   Resp:  17 17 17   Temp: 97.2 °F (36.2 °C)      TempSrc: Oral      SpO2:   90%    Weight:       Height:           Flowsheet Rows      Flowsheet Row First Filed Value   Admission Height 170.2 cm (67\") Documented at 01/28/2024 1517   Admission Weight 65.8 kg (145 lb) Documented at 01/28/2024 1517            Body mass index is 19.54 kg/m².     TELEMETRY: A-fib with mildly elevated ventricular rates    Physical Exam:  Vitals reviewed.   Constitutional:       General: Not in acute distress.   "   Appearance: Normal appearance. Well-developed. Frail. Chronically ill-appearing.   Eyes:      Pupils: Pupils are equal, round, and reactive to light.   HENT:      Head: Normocephalic and atraumatic.   Neck:      Vascular: No JVD.   Pulmonary:      Effort: Pulmonary effort is normal.      Breath sounds: Decreased breath sounds present.   Cardiovascular:      Normal rate. Irregularly irregular rhythm.   Pulses:     Intact distal pulses.   Edema:     Peripheral edema absent.   Abdominal:      General: There is no distension.      Palpations: Abdomen is soft.      Tenderness: There is no abdominal tenderness.   Musculoskeletal: Normal range of motion.      Cervical back: Normal range of motion and neck supple. Skin:     General: Skin is warm and dry.   Neurological:      Mental Status: Lethargic.          Results Review:   I reviewed the patient's new clinical results.    CBC    Results from last 7 days   Lab Units 01/31/24  0741 01/30/24  1304 01/29/24  0645 01/28/24  1553   WBC 10*3/mm3 8.40 7.10 8.90 10.20   HEMOGLOBIN g/dL 9.7* 9.7* 10.4* 10.1*   PLATELETS 10*3/mm3 261 249 232 268     BMP   Results from last 7 days   Lab Units 01/30/24  1304 01/29/24  0645 01/28/24  1553   SODIUM mmol/L 135* 137 137   POTASSIUM mmol/L 5.4* 5.6* 4.9   CHLORIDE mmol/L 103 102 101   CO2 mmol/L 25.0 19.0* 24.0   BUN mg/dL 41* 89* 84*   CREATININE mg/dL 3.17* 5.25* 5.36*   GLUCOSE mg/dL 83 97 104*   MAGNESIUM mg/dL 1.9  --   --    PHOSPHORUS mg/dL 4.4  --   --      Cr Clearance Estimated Creatinine Clearance: 12.2 mL/min (A) (by C-G formula based on SCr of 3.17 mg/dL (H)).  Coag     HbA1C   Lab Results   Component Value Date    HGBA1C 4.90 01/30/2024    HGBA1C 5.4 10/11/2020    HGBA1C 6.2 (H) 09/08/2020     Blood Glucose   Glucose   Date/Time Value Ref Range Status   01/31/2024 0811 75 70 - 105 mg/dL Final     Comment:     Serial Number: [id number]:  [id number]   01/30/2024 2200 128 (H) 70 - 105 mg/dL Final     Comment:      "Serial Number: [id number]:  [id number]   01/30/2024 1556 92 70 - 105 mg/dL Final     Comment:     Serial Number: [id number]:  [id number]   01/30/2024 1147 102 70 - 105 mg/dL Final     Comment:     Serial Number: [id number]:  [id number]   01/30/2024 0700 72 70 - 105 mg/dL Final     Comment:     Serial Number: [id number]:  [id number]     Infection   Results from last 7 days   Lab Units 01/30/24  1304   PROCALCITONIN ng/mL 0.22     CMP   Results from last 7 days   Lab Units 01/30/24  1304 01/29/24  0645 01/28/24  1553   SODIUM mmol/L 135* 137 137   POTASSIUM mmol/L 5.4* 5.6* 4.9   CHLORIDE mmol/L 103 102 101   CO2 mmol/L 25.0 19.0* 24.0   BUN mg/dL 41* 89* 84*   CREATININE mg/dL 3.17* 5.25* 5.36*   GLUCOSE mg/dL 83 97 104*   ALBUMIN g/dL 3.3*  --  3.1*   BILIRUBIN mg/dL 0.4  --  0.4   ALK PHOS U/L 122*  --  118*   AST (SGOT) U/L 34*  --  32   ALT (SGPT) U/L 25  --  24     ABG      UA      DEUCE  No results found for: \"POCMETH\", \"POCAMPHET\", \"POCBARBITUR\", \"POCBENZO\", \"POCCOCAINE\", \"POCOPIATES\", \"POCOXYCODO\", \"POCPHENCYC\", \"POCPROPOXY\", \"POCTHC\", \"POCTRICYC\"  [facility]   Results from last 7 days   Lab Units 01/31/24  0741 01/30/24  1304 01/29/24  0645 01/28/24  1553   HEMOGLOBIN g/dL 9.7* 9.7* 10.4* 10.1*   PLATELETS 10*3/mm3 261 249 232 268   CREATININE mg/dL  --  3.17* 5.25* 5.36*     Radiology(recent) No radiology results for the last day    Results from last 7 days   Lab Units 01/28/24  1801   HSTROP T ng/L 80*       Imaging Results (Last 24 Hours)       ** No results found for the last 24 hours. **            EKG            I personally viewed and interpreted the patient's EKG/Telemetry data:    ECHOCARDIOGRAM:      STRESS MYOVIEW:    CARDIAC CATHETERIZATION:    OTHER:         Assessment & Plan       Hypotension    Permanent atrial fibrillation    Dyslipidemia    Essential hypertension    Long term current use of anticoagulant    Presence of cardiac pacemaker    Type 2 diabetes mellitus    " Ureteral cancer    Chronic insomnia    Seasonal allergies    Acquired absence of kidney    Athscl heart disease of native coronary 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    Acquired absence of other specified parts of digestive tract    Gastro-esophageal reflux disease without esophagitis    ESRD on dialysis    Mild cognitive impairment of uncertain or unknown etiology    Atrial fibrillation with RVR  Atrial fibrillation is permanent  Ventricular rates are mildly rapid  She is currently off beta-blocker and calcium channel blocker due to hypotension  Currently on IV dopamine    Hypotension  Metoprolol and diltiazem currently on hold due to low blood pressure  Has been started on midodrine  Patient is currently on IV dopamine  Would consider transition to IV west due to heart rate    End-stage renal disease requiring hemodialysis    Sick sinus syndrome with dual-chamber pacemaker  Stable device function last    RSV with respiratory insufficiency    Continue hemodynamic support  Midodrine, as needed West-Synephrine to keep systolics greater than 100  Off Cardizem  Gentle beta-blocker  As needed digoxin which could be dosed per level  Heart rates are controlled presently will continue present dose of rate control medication to avoid hypotension  No indication for amiodarone at this time but will evaluate if blood pressures limit further titration of beta-blockers      Follow hemodynamics  Continue telemetry  Continue Eliquis  Statin therapy on board  Renal following for dialysis need    Continue to follow  Thank you for the consult  Please call with any questions or concerns    Augustin Ellsworth MD, PhD      Augustin Ellsworth MD  01/31/24  08:21 EST

## 2024-01-31 NOTE — DISCHARGE SUMMARY
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]   Permanent atrial fibrillation [I48.21]   Dyslipidemia [E78.5]   Essential hypertension [I10]   Long term current use of anticoagulant [Z79.01]   Presence of cardiac pacemaker [Z95.0]   Type 2 diabetes mellitus [E11.9]       Presenting Problem/History of Present Illness  Active Hospital Problems    Diagnosis  POA    **Hypotension [I95.9]  Yes    ESRD on dialysis [N18.6, Z99.2]  Not Applicable    Mild cognitive impairment of uncertain or unknown etiology [G31.84]  Yes    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]  Yes    Acquired absence of other specified parts of digestive tract [Z90.49]  Not Applicable    Acquired absence of kidney [Z90.5]  Not Applicable    Gastro-esophageal reflux disease without esophagitis [K21.9]  Yes    Chronic insomnia [F51.04]  Yes    Seasonal allergies [J30.2]  Yes    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]  Yes    Permanent atrial fibrillation [I48.21]  Yes    Dyslipidemia [E78.5]  Yes    Essential hypertension [I10]  Yes    Long term current use of anticoagulant [Z79.01]  Not Applicable    Presence of cardiac pacemaker [Z95.0]  Yes    Type 2 diabetes mellitus [E11.9]  Yes      Resolved Hospital Problems    Diagnosis Date Resolved POA    Chest pain [R07.9] 01/29/2024 Yes    History of carcinoma in situ of breast [Z86.000] 01/29/2024 Yes          Hospital Course    May Nolet is an 83 year old female who admitted to [facility] on 1/28/24 and returned to [facility] on 1/31/24.  Patient was sent to ER for low blood pressure and chest pain. Chest pain resolved prior to ED. Patient does have ESRD on HD.  She received PRN midodrine at  and 500 cc bolus in ED.  Patient required short course of vasopressor.  Cardiology was consulted and adjusted medications to avoid hypotension.  Patient has a. Fib.  She will d/c on low dose beta blocker for rate control, along with her anticoagulation (Cardizem was stopped by cardiology).  She will also start schedule Midodrine 3 time daily.  Patient will return to scheduled dialysis per nephrology.  Patient is hemodynamically stable at time of discharge.  She will return to [facility].    Procedures Performed         Consults:   Consults       Date and Time Order Name Status Description    1/29/2024 12:10 PM Inpatient Cardiology Consult Completed     1/28/2024  7:01 PM Inpatient Nephrology Consult      1/28/2024  6:24 PM Family Medicine Consult              Pertinent Test Results:    Lab Results (most recent)       Procedure Component Value Units Date/Time    POC Glucose 4x Daily Before Meals & at Bedtime [[id number]]  (Normal) Collected: 01/31/24 1101    Specimen: Blood Updated: 01/31/24 1103     Glucose 73 mg/dL      Comment: Serial Number: [id number]:  [id number]       POC Glucose Once [[id number]]  (Normal) Collected: 01/31/24 0854    Specimen: Blood Updated: 01/31/24 0856     Glucose 70 mg/dL      Comment: Serial Number: [id number]:  [id number]       Magnesium [[id number]]  (Abnormal) Collected: 01/31/24 0741    Specimen: Blood Updated: 01/31/24 0848     Magnesium 1.5 mg/dL     Comprehensive Metabolic Panel [[id number]]  (Abnormal) Collected: 01/31/24 0741    Specimen: Blood Updated: 01/31/24 0848     Glucose 70 mg/dL      BUN 46 mg/dL      Creatinine 3.33 mg/dL      Sodium 142 mmol/L      Potassium 3.4 mmol/L      Chloride 111 mmol/L      CO2 17.0 mmol/L      Calcium 7.4 mg/dL      Total Protein 5.2 g/dL      Albumin 2.8  g/dL      ALT (SGPT) 20 U/L      AST (SGOT) 22 U/L      Alkaline Phosphatase 96 U/L      Total Bilirubin 0.4 mg/dL      Globulin 2.4 gm/dL      A/G Ratio 1.2 g/dL      BUN/Creatinine Ratio 13.8     Anion Gap 14.0 mmol/L      eGFR 13.3 mL/min/1.73      Comment: <15 Indicative of kidney failure       Narrative:      GFR Normal >60  Chronic Kidney Disease <60  Kidney Failure <15    The GFR formula is only valid for adults with stable renal function between ages 18 and 70.    Phosphorus [[id number]]  (Normal) Collected: 01/31/24 0741    Specimen: Blood Updated: 01/31/24 0829     Phosphorus 3.9 mg/dL     Calcium, Ionized [[id number]]  (Normal) Collected: 01/31/24 0741    Specimen: Blood Updated: 01/31/24 0827     Ionized Calcium 1.22 mmol/L     CBC & Differential [[id number]]  (Abnormal) Collected: 01/31/24 0741    Specimen: Blood Updated: 01/31/24 0802    Narrative:      The following orders were created for panel order CBC & Differential.  Procedure                               Abnormality         Status                     ---------                               -----------         ------                     CBC Auto Differential[[id number]]        Abnormal            Final result                 Please view results for these tests on the individual orders.    CBC Auto Differential [[id number]]  (Abnormal) Collected: 01/31/24 0741    Specimen: Blood Updated: 01/31/24 0802     WBC 8.40 10*3/mm3      RBC 3.07 10*6/mm3      Hemoglobin 9.7 g/dL      Hematocrit 29.9 %      MCV 97.5 fL      MCH 31.5 pg      MCHC 32.3 g/dL      RDW 16.6 %      RDW-SD 56.4 fl      MPV 6.8 fL      Platelets 261 10*3/mm3      Neutrophil % 65.5 %      Lymphocyte % 21.2 %      Monocyte % 10.6 %      Eosinophil % 1.6 %      Basophil % 1.1 %      Neutrophils, Absolute 5.50 10*3/mm3      Lymphocytes, Absolute 1.80 10*3/mm3      Monocytes, Absolute 0.90 10*3/mm3      Eosinophils, Absolute 0.10 10*3/mm3      Basophils, Absolute 0.10 10*3/mm3      nRBC 0.0  "/100 WBC     CANDIDA AURIS SCREEN - Swab, Axilla Right, Axilla Left and Groin [[id number]]  (Normal) Collected: 01/29/24 0348    Specimen: Swab from Axilla Right, Axilla Left and Groin Updated: 01/31/24 0545     Kizzy Auris Screen Culture No Candida auris isolated at 2 days    TSH [[id number]]  (Normal) Collected: 01/30/24 1304    Specimen: Blood Updated: 01/30/24 1404     TSH 2.990 uIU/mL     Procalcitonin [[id number]]  (Normal) Collected: 01/30/24 1304    Specimen: Blood Updated: 01/30/24 1404     Procalcitonin 0.22 ng/mL     Narrative:      As a Marker for Sepsis (Non-Neonates):    1. <0.5 ng/mL represents a low risk of severe sepsis and/or septic shock.  2. >2 ng/mL represents a high risk of severe sepsis and/or septic shock.    As a Marker for Lower Respiratory Tract Infections that require antibiotic therapy:    PCT on Admission    Antibiotic Therapy       6-12 Hrs later    >0.5                Strongly Recommended  >0.25 - <0.5        Recommended   0.1 - 0.25          Discouraged              Remeasure/reassess PCT  <0.1                Strongly Discouraged     Remeasure/reassess PCT    As 28 day mortality risk marker: \"Change in Procalcitonin Result\" (>80% or <=80%) if Day 0 (or Day 1) and Day 4 values are available. Refer to http://www.[employer]-pct-calculator.com    Change in PCT <=80%  A decrease of PCT levels below or equal to 80% defines a positive change in PCT test result representing a higher risk for 28-day all-cause mortality of patients diagnosed with severe sepsis for septic shock.    Change in PCT >80%  A decrease of PCT levels of more than 80% defines a negative change in PCT result representing a lower risk for 28-day all-cause mortality of patients diagnosed with severe sepsis or septic shock.       Magnesium [[id number]]  (Normal) Collected: 01/30/24 1304    Specimen: Blood Updated: 01/30/24 1403     Magnesium 1.9 mg/dL     Comprehensive Metabolic Panel [[id number]]  (Abnormal) Collected: 01/30/24 " 1304    Specimen: Blood Updated: 01/30/24 1403     Glucose 83 mg/dL      BUN 41 mg/dL      Creatinine 3.17 mg/dL      Sodium 135 mmol/L      Potassium 5.4 mmol/L      Comment: Slight hemolysis detected by analyzer. Result may be falsely elevated.        Chloride 103 mmol/L      CO2 25.0 mmol/L      Calcium 9.1 mg/dL      Total Protein 6.5 g/dL      Albumin 3.3 g/dL      ALT (SGPT) 25 U/L      AST (SGOT) 34 U/L      Alkaline Phosphatase 122 U/L      Total Bilirubin 0.4 mg/dL      Globulin 3.2 gm/dL      A/G Ratio 1.0 g/dL      BUN/Creatinine Ratio 12.9     Anion Gap 7.0 mmol/L      eGFR 14.1 mL/min/1.73      Comment: <15 Indicative of kidney failure       Narrative:      GFR Normal >60  Chronic Kidney Disease <60  Kidney Failure <15    The GFR formula is only valid for adults with stable renal function between ages 18 and 70.    Phosphorus [[id number]]  (Normal) Collected: 01/30/24 1304    Specimen: Blood Updated: 01/30/24 1403     Phosphorus 4.4 mg/dL     Lipid Panel [[id number]] Collected: 01/30/24 1304    Specimen: Blood Updated: 01/30/24 1403     Total Cholesterol 86 mg/dL      Triglycerides 70 mg/dL      HDL Cholesterol 43 mg/dL      LDL Cholesterol  28 mg/dL      VLDL Cholesterol 15 mg/dL      LDL/HDL Ratio 0.67    Narrative:      Cholesterol Reference Ranges  (U.S. Department of Health and Human Services ATP III Classifications)    Desirable          <200 mg/dL  Borderline High    200-239 mg/dL  High Risk          >240 mg/dL      Triglyceride Reference Ranges  (U.S. Department of Health and Human Services ATP III Classifications)    Normal           <150 mg/dL  Borderline High  150-199 mg/dL  High             200-499 mg/dL  Very High        >500 mg/dL    HDL Reference Ranges  (U.S. Department of Health and Human Services ATP III Classifications)    Low     <40 mg/dl (major risk factor for CHD)  High    >60 mg/dl ('negative' risk factor for CHD)        LDL Reference Ranges  (U.S. Department of Health and Human  Services ATP III Classifications)    Optimal          <100 mg/dL  Near Optimal     100-129 mg/dL  Borderline High  130-159 mg/dL  High             160-189 mg/dL  Very High        >189 mg/dL    Hemoglobin A1c [[id number]]  (Normal) Collected: 01/30/24 1304    Specimen: Blood Updated: 01/30/24 1355     Hemoglobin A1C 4.90 %     Calcium, Ionized [[id number]]  (Normal) Collected: 01/30/24 1304    Specimen: Blood Updated: 01/30/24 1343     Ionized Calcium 1.22 mmol/L     CBC & Differential [[id number]]  (Abnormal) Collected: 01/30/24 1304    Specimen: Blood Updated: 01/30/24 1336    Narrative:      The following orders were created for panel order CBC & Differential.  Procedure                               Abnormality         Status                     ---------                               -----------         ------                     CBC Auto Differential[[id number]]        Abnormal            Final result                 Please view results for these tests on the individual orders.    CBC Auto Differential [[id number]]  (Abnormal) Collected: 01/30/24 1304    Specimen: Blood Updated: 01/30/24 1336     WBC 7.10 10*3/mm3      RBC 3.04 10*6/mm3      Hemoglobin 9.7 g/dL      Hematocrit 29.7 %      MCV 97.8 fL      MCH 32.1 pg      MCHC 32.8 g/dL      RDW 15.9 %      RDW-SD 56.0 fl      MPV 6.9 fL      Platelets 249 10*3/mm3      Neutrophil % 66.5 %      Lymphocyte % 18.6 %      Monocyte % 12.0 %      Eosinophil % 1.8 %      Basophil % 1.1 %      Neutrophils, Absolute 4.70 10*3/mm3      Lymphocytes, Absolute 1.30 10*3/mm3      Monocytes, Absolute 0.80 10*3/mm3      Eosinophils, Absolute 0.10 10*3/mm3      Basophils, Absolute 0.10 10*3/mm3      nRBC 0.1 /100 WBC     Hepatitis B Surface Antigen [[id number]]  (Normal) Collected: 01/28/24 1553    Specimen: Blood Updated: 01/29/24 1039     Hepatitis B Surface Ag Non-Reactive    Basic Metabolic Panel [[id number]]  (Abnormal) Collected: 01/29/24 0645    Specimen: Blood Updated:  01/29/24 0720     Glucose 97 mg/dL      BUN 89 mg/dL      Creatinine 5.25 mg/dL      Sodium 137 mmol/L      Potassium 5.6 mmol/L      Comment: Specimen hemolyzed.  Result may be falsely elevated.        Chloride 102 mmol/L      CO2 19.0 mmol/L      Calcium 8.4 mg/dL      BUN/Creatinine Ratio 17.0     Anion Gap 16.0 mmol/L      eGFR 7.7 mL/min/1.73      Comment: <15 Indicative of kidney failure       Narrative:      GFR Normal >60  Chronic Kidney Disease <60  Kidney Failure <15    The GFR formula is only valid for adults with stable renal function between ages 18 and 70.    High Sensitivity Troponin T 2Hr [[id number]]  (Abnormal) Collected: 01/28/24 1801    Specimen: Blood Updated: 01/28/24 1823     HS Troponin T 80 ng/L      Troponin T Delta -3 ng/L     Narrative:      High Sensitive Troponin T Reference Range:  <14.0 ng/L- Negative Female for AMI  <22.0 ng/L- Negative Male for AMI  >=14 - Abnormal Female indicating possible myocardial injury.  >=22 - Abnormal Male indicating possible myocardial injury.   Clinicians would have to utilize clinical acumen, EKG, Troponin, and serial changes to determine if it is an Acute Myocardial Infarction or myocardial injury due to an underlying chronic condition.         Respiratory Panel PCR w/COVID-19(SARS-CoV-2) VINCENT/STEFANY/ANNY/PAD/COR/MANASA In-House, NP Swab in [facility], 2 HR TAT - Swab, Nasopharynx [[id number]]  (Abnormal) Collected: 01/28/24 1713    Specimen: Swab from Nasopharynx Updated: 01/28/24 1819     ADENOVIRUS, PCR Not Detected     Coronavirus 229E Not Detected     Coronavirus HKU1 Not Detected     Coronavirus NL63 Not Detected     Coronavirus OC43 Not Detected     COVID19 Not Detected     Human Metapneumovirus Not Detected     Human Rhinovirus/Enterovirus Not Detected     Influenza A PCR Not Detected     Influenza B PCR Not Detected     Parainfluenza Virus 1 Not Detected     Parainfluenza Virus 2 Not Detected     Parainfluenza Virus 3 Not Detected     Parainfluenza Virus 4  Not Detected     RSV, PCR Detected     Bordetella pertussis pcr Not Detected     Bordetella parapertussis PCR Not Detected     Chlamydophila pneumoniae PCR Not Detected     Mycoplasma pneumo by PCR Not Detected    Narrative:      In the setting of a positive respiratory panel with a viral infection PLUS a negative procalcitonin without other underlying concern for bacterial infection, consider observing off antibiotics or discontinuation of antibiotics and continue supportive care. If the respiratory panel is positive for atypical bacterial infection (Bordetella pertussis, Chlamydophila pneumoniae, or Mycoplasma pneumoniae), consider antibiotic de-escalation to target atypical bacterial infection.    [place] Draw [[id number]] Collected: 01/28/24 1553    Specimen: Blood Updated: 01/28/24 1700    Narrative:      The following orders were created for panel order [place] Draw.  Procedure                               Abnormality         Status                     ---------                               -----------         ------                     Green Top (Gel)[[id number]]                                  Final result               Lavender Top[[id number]]                                     Final result               Gold Top - SST[[id number]]                                   Final result               Light Blue Top[[id number]]                                   Final result                 Please view results for these tests on the individual orders.    Lavender Top [[id number]] Collected: 01/28/24 1553    Specimen: Blood Updated: 01/28/24 1700     Extra Tube hold for add-on     Comment: Auto resulted       Gold Top - SST [[id number]] Collected: 01/28/24 1553    Specimen: Blood Updated: 01/28/24 1700     Extra Tube Hold for add-ons.     Comment: Auto resulted.       Light Blue Top [[id number]] Collected: 01/28/24 1553    Specimen: Blood Updated: 01/28/24 1700     Extra Tube Hold for add-ons.     Comment: Auto resulted        High Sensitivity Troponin T [[id number]]  (Abnormal) Collected: 01/28/24 1553    Specimen: Blood Updated: 01/28/24 1639     HS Troponin T 83 ng/L     Narrative:      High Sensitive Troponin T Reference Range:  <14.0 ng/L- Negative Female for AMI  <22.0 ng/L- Negative Male for AMI  >=14 - Abnormal Female indicating possible myocardial injury.  >=22 - Abnormal Male indicating possible myocardial injury.   Clinicians would have to utilize clinical acumen, EKG, Troponin, and serial changes to determine if it is an Acute Myocardial Infarction or myocardial injury due to an underlying chronic condition.         Green Top (Gel) [[id number]] Collected: 01/28/24 1553    Specimen: Blood Updated: 01/28/24 1638     Extra Tube HOLD             Results for orders placed during the hospital encounter of 10/13/23    Adult Transthoracic Echo Complete W/ Cont if Necessary Per Protocol    Interpretation Summary    Left ventricular systolic function is normal. Left ventricular ejection fraction appears to be 56 - 60%.    Left ventricular wall thickness is consistent with mild concentric hypertrophy.    Left ventricular diastolic function was indeterminate.    The right ventricular cavity is mildly dilated.    The left atrial cavity is severely dilated.    Left atrial volume is severely increased.    The right atrial cavity is moderately  dilated.    Estimated right ventricular systolic pressure from tricuspid regurgitation is normal (<35 mmHg).    The main pulmonary artery is severely dilated.         Condition on Discharge:  stable    Vital Signs  Temp:  [97.2 °F (36.2 °C)-98.6 °F (37 °C)] 97.5 °F (36.4 °C)  Heart Rate:  [] 90  Resp:  [12-23] 16  BP: (100-158)/(45-85) 141/82      Physical Exam  Vitals reviewed.   HENT:      Right Ear: External ear normal.      Left Ear: External ear normal.      Nose: Nose normal.      Mouth/Throat:      Mouth: Mucous membranes are dry.   Eyes:      General:         Right eye: No discharge.          Left eye: No discharge.   Cardiovascular:      Pulses: Normal pulses.   Pulmonary:      Effort: Pulmonary effort is normal.      Breath sounds: Rhonchi present.   Abdominal:      General: Bowel sounds are normal.   Skin:     General: Skin is warm and dry.      Coloration: Skin is pale.   Neurological:      Mental Status: She is alert. Mental status is at baseline.   Psychiatric:         Mood and Affect: Mood normal.         Behavior: Behavior normal.              Discharge Disposition  Skilled Nursing Facility (DC - External)    Discharge Medications     Discharge Medications        New Medications        Instructions Start Date   famotidine 10 MG tablet  Commonly known as: PEPCID   10 mg, Oral, Every 48 Hours      metoprolol succinate XL 25 MG 24 hr tablet  Commonly known as: Toprol XL   25 mg, Oral, 2 Times Daily             Changes to Medications        Instructions Start Date   gabapentin 100 MG capsule  Commonly known as: NEURONTIN  What changed:   See the new instructions.  Another medication with the same name was removed. Continue taking this medication, and follow the directions you see here.   100 mg, Oral, Nightly      midodrine 10 MG tablet  Commonly known as: PROAMATINE  What changed:   when to take this  reasons to take this   10 mg, Oral, Every 8 Hours             Continue These Medications        Instructions Start Date   acetaminophen 325 MG tablet  Commonly known as: TYLENOL   650 mg, Oral, 4 Times Daily PRN      apixaban 2.5 MG tablet tablet  Commonly known as: ELIQUIS   2.5 mg, Oral, Every 12 Hours Scheduled      atorvastatin 20 MG tablet  Commonly known as: LIPITOR   20 mg, Oral, Nightly      dicyclomine 10 MG capsule  Commonly known as: BENTYL   10 mg, Oral, 3 Times Daily      ipratropium-albuterol 0.5-2.5 mg/3 ml nebulizer  Commonly known as: DUO-NEB   3 mL, Nebulization, Six times daily PRN.      loperamide 2 MG capsule  Commonly known as: IMODIUM   4 mg, Oral, Daily PRN      MiraLax 17  GM/SCOOP powder  Generic drug: polyethylene glycol   17 g, Oral, Every Morning      ondansetron ODT 4 MG disintegrating tablet  Commonly known as: ZOFRAN-ODT   4 mg, Translingual, 3 Times Daily PRN      saccharomyces boulardii 250 MG capsule  Commonly known as: FLORASTOR   250 mg, Oral, 2 Times Daily      sertraline 25 MG tablet  Commonly known as: ZOLOFT   Take 1 tablet by mouth every night at bedtime.      topiramate 100 MG tablet  Commonly known as: TOPAMAX   100 mg, Oral, Nightly      zinc oxide 20 % ointment   1 application , Topical, Every shift.             Stop These Medications      calcium acetate 667 MG tablet  Commonly known as: PHOSLO     dilTIAZem  MG 24 hr capsule  Commonly known as: CARDIZEM CD     furosemide 40 MG tablet  Commonly known as: LASIX     Melatonin-Pyridoxine 1-10 MG tablet     metoprolol tartrate 50 MG tablet  Commonly known as: LOPRESSOR              Discharge Diet:   Diet Instructions       Diet: Cardiac Diets, Diabetic Diets, Renal Diets; Healthy Heart (2-3 Na+); Thin (IDDSI 0); Consistent Carbohydrate; Low Potassium      Discharge Diet:  Cardiac Diets  Diabetic Diets  Renal Diets       Cardiac Diet: Healthy Heart (2-3 Na+)    Fluid Consistency: Thin (IDDSI 0)    Diabetic Diet: Consistent Carbohydrate    Renal Diet: Low Potassium            Activity at Discharge:   Activity Instructions       Activity as Tolerated              Follow-up Appointments  Future Appointments   Date Time Provider Department Center   6/26/2024  2:30 PM Augustin Ellsworth MD [facility]   6/26/2024  2:30 PM [facility] [place] DEVICE CHECK [facility]     Additional Instructions for the Follow-ups that You Need to Schedule       Discharge Follow-up with PCP   As directed       Currently Documented PCP:    John Cano MD    PCP Phone Number:    [phone]     Follow Up Details: 1 week at facility                Test Results Pending at Discharge  Pending Labs       Order  Current Status    CANDIDA AURIS SCREEN - Swab, Axilla Right, Axilla Left and Groin Preliminary result             Shantelemmy Moulton, APRN  01/31/24  14:59 EST    Time: Discharge 29 min

## 2024-01-31 NOTE — THERAPY EVALUATION
Patient Name: Hazel Bucio  : 1940    MRN: [id number]                              Today's Date: 2024       Admit Date: 2024    Visit Dx:     ICD-10-CM ICD-9-CM   1. Chest pain, unspecified type  R07.9 786.50   2. RSV (acute bronchiolitis due to respiratory syncytial virus)  J21.0 466.11   3. Hypotension, unspecified hypotension type  I95.9 458.9     Patient Active Problem List   Diagnosis    Permanent atrial fibrillation    Dyslipidemia    Essential hypertension    Long term current use of anticoagulant    Presence of cardiac pacemaker    Type 2 diabetes mellitus    Ureteral cancer    Chronic insomnia    Seasonal allergies    Acquired absence of kidney    Athscl heart disease of native coronary 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    Acquired absence of other specified parts of digestive tract    Gastro-esophageal reflux disease without esophagitis    ESRD on dialysis    Hypotension    Mild cognitive impairment of uncertain or unknown etiology     Past Medical History:   Diagnosis Date    Acquired absence of kidney 10/04/2021    Acquired absence of other specified parts of digestive tract 2022    Athscl heart disease of native coronary 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 2022    Atrial fibrillation with rapid ventricular response     Bladder cancer     Cancer     breast, bladder    Cholecystitis with cholelithiasis     Chronic insomnia 2020    Deep vein thrombosis     Dyslipidemia 2017    E coli bacteremia     ESRD on dialysis 10/18/2023    Essential hypertension 2017    Fracture tibia/fibula, right, closed, initial encounter 2020    Gastro-esophageal reflux disease without esophagitis 2020    GERD (gastroesophageal reflux disease)     History of bladder cancer     History of falling     History of urostomy     Hyperkalemia     Immobility 2020    r/t broken Tibia     Irritable bowel syndrome without diarrhea     Kidney carcinoma, right     removed     Long term current use of  anticoagulant 01/09/2015    Mild cognitive impairment of uncertain or unknown etiology 08/31/2023    Myalgia due to statin     Other specified abdominal hernia without obstruction or gangrene     PAF (paroxysmal atrial fibrillation) 06/26/2019    Permanent atrial fibrillation 06/26/2019    Personal history of other venous thrombosis and embolism     Presence of cardiac pacemaker 11/03/2014    BS dual chamber PM placed 10/2014 with pocket revision 1/25/17.  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     Past Surgical History:   Procedure Laterality Date    BREAST LUMPECTOMY      CARDIAC CATHETERIZATION N/A 10/18/2023    Procedure: Left Heart Cath possible PCI, atherectomy, hemodynamic support;  Surgeon: Augustin Ellsworth MD;  Location: [facility] CATH INVASIVE LOCATION;  Service: Cardiology;  Laterality: N/A;    CARDIAC ELECTROPHYSIOLOGY PROCEDURE N/A 4/28/2023    Procedure: Pacemaker gen change, [place] AWARE $;  Surgeon: Jose Carlos Cheng MD;  Location: [facility] CATH INVASIVE LOCATION;  Service: Cardiovascular;  Laterality: N/A;    CHOLECYSTECTOMY N/A 10/12/2020    Procedure: CHOLECYSTECTOMY LAPAROSCOPIC;  Surgeon: Go Pereira DO;  Location: [facility] MAIN OR;  Service: General;  Laterality: N/A;    CYSTECTOMY W/ URETEROILEAL CONDUIT      HARDWARE REMOVAL Right 6/11/2021    Procedure: TIBIA HARDWARE REMOVAL;  Surgeon: Geoff Shah II, MD;  Location: [facility] MAIN OR;  Service: Orthopedics;  Laterality: Right;    HERNIA REPAIR      HYSTERECTOMY      INSERT / REPLACE / REMOVE PACEMAKER      NEPHRECTOMY Right     TIBIA IM NAILING/RODDING Right 8/28/2020    Procedure: TIBIA INTRAMEDULLARY NAIL/ABRAHAM INSERTION;  Surgeon: Geoff Shah II, MD;  Location: [facility] MAIN OR;  Service: Orthopedics;  Laterality: Right;       General Information       Row Name 01/31/24 1555          Physical Therapy Time and Intention    Document Type evaluation  -CL     Mode of Treatment physical therapy  -CL       Row Name 01/31/24 1555          General Information    Patient Profile Reviewed yes  -CL     Prior Level of Function min assist:;transfer  -CL     Existing Precautions/Restrictions no known precautions/restrictions  -CL     Barriers to Rehab previous functional deficit;hearing deficit  -CL       Row Name 01/31/24 1555          Living Environment    People in Home facility resident  -CL     Name(s) of People in Home [facility]  -CL       Row Name 01/31/24 1555          Home Main Entrance    Number of Stairs, Main Entrance none  -CL       Row Name 01/31/24 1555          Cognition    Orientation Status (Cognition) person;place  -CL       Row Name 01/31/24 1555          Safety Issues, Functional Mobility    Impairments Affecting Function (Mobility) cognition  -CL               User Key  (r) = Recorded By, (t) = Taken By, (c) = Cosigned By      Initials Name Provider Type    CL Martha Katz PT Physical Therapist                   Mobility       Row Name 01/31/24 1557          Bed Mobility    Bed Mobility supine-sit  -CL     Supine-Sit [place] (Bed Mobility) minimum assist (75% patient effort)  -CL       Row Name 01/31/24 1557          Bed-Chair Transfer    Bed-Chair [place] (Transfers) minimum assist (75% patient effort)  -CL       Row Name 01/31/24 1557          Sit-Stand Transfer    Sit-Stand [place] (Transfers) minimum assist (75% patient effort)  -CL       Row Name 01/31/24 1557          Gait/Stairs (Locomotion)    Distance in Feet (Gait) Stand-pivot only; pt states she is in w/c at baseline  -CL               User Key  (r) = Recorded By, (t) = Taken By, (c) = Cosigned By      Initials Name Provider Type    Martha Weinstein PT Physical Therapist                   Obj/Interventions       Row Name 01/31/24 1559           Range of Motion Comprehensive    General Range of Motion bilateral lower extremity ROM WFL  -CL       Row Name 01/31/24 1558          Strength Comprehensive (MMT)    Comment, General Manual Muscle Testing (MMT) Assessment Unable to follow MMT  -CL       Row Name 01/31/24 1552          Balance    Balance Assessment sitting static balance;sitting dynamic balance;standing static balance;standing dynamic balance  -CL     Static Sitting Balance supervision  -CL     Dynamic Sitting Balance supervision  -CL     Position, Sitting Balance sitting edge of bed  -CL     Static Standing Balance minimal assist  -CL     Dynamic Standing Balance minimal assist  -CL               User Key  (r) = Recorded By, (t) = Taken By, (c) = Cosigned By      Initials Name Provider Type    Martha Weinstein, PT Physical Therapist                   Goals/Plan    No documentation.                  Clinical Impression       Row Name 01/31/24 1558          Pain    Pretreatment Pain Rating 0/10 - no pain  -CL     Posttreatment Pain Rating 0/10 - no pain  -CL       Row Name 01/31/24 1552          Plan of Care Review    Plan of Care Reviewed With patient  -CL     Outcome Evaluation Hazel Bucio is an 83 y.o. female with hx of CKD, HTN, GERD, Afib who presents with hypotension and is positive for RSV.  Pt reports that she is typically in a w/c at [facility] but is able to transfer with assist.  At time of PT evaluation, she is A&O to person and place. Pt requires min A for all bed mobility and transfer.  She is incontinent of stool and unaware, requiring assist of 2nd person to clean.  Pt appears at baseline.  REcommend return to prior level of care.  -CL       Row Name 01/31/24 1558          Therapy Assessment/Plan (PT)    Criteria for Skilled Interventions Met (PT) no problems identified which require skilled intervention  -CL     Therapy Frequency (PT) evaluation only  -CL       Row Name 01/31/24 1556          Vital Signs    Pre Systolic  BP Rehab 117  -CL     Pre Treatment Diastolic BP 70  -CL     Post Systolic BP Rehab 136  -CL     Post Treatment Diastolic   -CL     Pretreatment Heart Rate (beats/min) 101  -CL     Posttreatment Heart Rate (beats/min) 126  -CL     Pretreatment Resp Rate (breaths/min) 16  -CL     Posttreatment Resp Rate (breaths/min) 18  -CL     Pre SpO2 (%) 91  -CL     O2 Delivery Pre Treatment room air  -CL     Post SpO2 (%) 92  -CL     O2 Delivery Post Treatment room air  -CL     Pre Patient Position Supine  -CL     Intra Patient Position Standing  -CL     Post Patient Position Sitting  -CL       Row Name 01/31/24 1558          Positioning and Restraints    Pre-Treatment Position in bed  -CL     Post Treatment Position chair  -CL     In Chair notified nsg;sitting;call light within reach;encouraged to call for assist;exit alarm on  -CL               User Key  (r) = Recorded By, (t) = Taken By, (c) = Cosigned By      Initials Name Provider Type    CL Martha Katz PT Physical Therapist                   Outcome Measures       Row Name 01/31/24 1601          How much help from another person do you currently need...    Turning from your back to your side while in flat bed without using bedrails? 3  -CL     Moving from lying on back to sitting on the side of a flat bed without bedrails? 3  -CL     Moving to and from a bed to a chair (including a wheelchair)? 3  -CL     Standing up from a chair using your arms (e.g., wheelchair, bedside chair)? 3  -CL     Climbing 3-5 steps with a railing? 1  -CL     To walk in hospital room? 1  -CL     AM-PAC 6 Clicks Score (PT) 14  -CL     Highest Level of Mobility Goal 4 --> Transfer to chair/commode  -CL               User Key  (r) = Recorded By, (t) = Taken By, (c) = Cosigned By      Initials Name Provider Type    Martha Weinstein PT Physical Therapist                                 Physical Therapy Education       Title: PT OT SLP Therapies (Done)       Topic: Physical Therapy  (Done)       Point: Mobility training (Done)       Learning Progress Summary             Patient Acceptance, E,TB, VU by CL at 1/31/2024 1601                         Point: Home exercise program (Resolved)       Learner Progress:  Not documented in this visit.              Point: Body mechanics (Resolved)       Learner Progress:  Not documented in this visit.              Point: Precautions (Resolved)       Learner Progress:  Not documented in this visit.                              User Key       Initials Effective Dates Name Provider Type Discipline     03/02/22 -  Martha Katz, PT Physical Therapist PT                  PT Recommendation and Plan     Plan of Care Reviewed With: patient  Outcome Evaluation: Hazel Bucio is an 83 y.o. female with hx of CKD, HTN, GERD, Afib who presents with hypotension and is positive for RSV.  Pt reports that she is typically in a w/c at [facility] but is able to transfer with assist.  At time of PT evaluation, she is A&O to person and place. Pt requires min A for all bed mobility and transfer.  She is incontinent of stool and unaware, requiring assist of 2nd person to clean.  Pt appears at baseline.  REcommend return to prior level of care.     Time Calculation:   PT Evaluation Complexity  History, PT Evaluation Complexity: 3 or more personal factors and/or comorbidities  Examination of Body Systems (PT Eval Complexity): total of 3 or more elements  Clinical Presentation (PT Evaluation Complexity): evolving  Clinical Decision Making (PT Evaluation Complexity): low complexity  Overall Complexity (PT Evaluation Complexity): low complexity     PT Charges       Row Name 01/31/24 1601             Time Calculation    Start Time 1307  -CL      Stop Time 1326  -CL      Time Calculation (min) 19 min  -CL      PT Received On 01/31/24  -CL         Time Calculation- PT    Total Timed Code Minutes- PT 0 minute(s)  -CL                User Key  (r) = Recorded By, (t) = Taken By, (c)  = Cosigned By      Initials Name Provider Type    CL Martha Katz, PT Physical Therapist                  Therapy Charges for Today       Code Description Service Date Service Provider Modifiers Qty    48390035817 HC PT EVAL LOW COMPLEXITY 4 1/31/2024 Martha Katz, PT GP 1            PT G-Codes  AM-PAC 6 Clicks Score (PT): 14  PT Discharge Summary  Reason for Discharge: Discharge from facility    Martha Katz, PT  1/31/2024

## 2024-01-31 NOTE — CASE MANAGEMENT/SOCIAL WORK
Continued Stay Note  NYDIA Luis Felipe     Patient Name: Hazel Bucio  MRN: [id number]  Today's Date: 1/31/2024    Admit Date: 1/28/2024    Plan: DC Plan: Return to [facility]. No Precert required. No PASRR required. Chronic HD- [facility]   Discharge Plan       Row Name 01/31/24 1424       Plan    Plan DC Plan: Return to [facility]. No Precert required. No PASRR required. Chronic HD- [facility]    Provided Post Acute Provider List? N/A    Provided Post Acute Provider Quality & Resource List? N/A    Plan Comments CM reached out to MD and nursing this morning to obtain clinical updates and discuss DC planning. MD anticipates discharge today and orders currently being entered. Patient had HD treatment today and PT/OT states patient is safe to return to facility via WC van. CM contacted liaison to inform of return. Bed is ready and facility will provide transportation via wc van at approximately 2:45 pm to 3:00 pm. No barriers to DC.               Expected Discharge Date and Time       Expected Discharge Date Expected Discharge Time    Jan 31, 2024               Chantal Kennedy RN     Office Phone: [phone]  Office Cell:     [phone]

## 2024-01-31 NOTE — CONSULTS
CARDIOLOGY CONSULT NOTE      Referring Provider: Dr. Henderson    Reason for Consultation: Atrial fibrillation, hypotension    Attending: Lora Henderson MD    Chief complaint    Atypical chest pain  Hypotension    Subjective .   Agree with narrative as discussed with nurse practitioner after face-to-face encounter scribed findings below  History of present illness:  Hazel Bucio is a 83 y.o. female who presents with reports of resolved chest pain and hypotension.    Patient resides at [facility].  She is known to have end-stage renal disease requiring dialysis, hypertension, GERD, atrial fibrillation.  She has been noted to have low blood pressures and was brought to the emergency room for further evaluation.    She was given a fluid bolus in the emergency room after her systolic blood pressure was in the 80s.  She had a respiratory virus panel that was positive for RSV.    Last echocardiogram demonstrated her EF of 55 to 60% with no significant valvular flow abnormalities.    Patient had cardiac catheterization in October 2023 that showed normal epicardial anatomy with no obstructive disease normal pulmonary pressures with no evidence of pulmonary hypertension, normal cardiac output and normal LV filling pressures.    Patient has had persistent hypotension requiring alteration in medical therapy and has had some mildly elevated ventricular rates with her atrial fibrillation.    At the time of my interview the patient is drowsy  She denies chest pain or shortness of breath at current time  =======================================================================  Rates are controlled, blood pressure slightly better this morning  Status post volume resuscitation  Midodrine will continue    Patient very frail deconditioned  Blood pressure continues to be an issue with severe malnutrition and failure to thrive which is likely her largest issue     she appears without distress on my encounter today,        Historical data  copied forward from previous encounters in EMR is unchanged        Review of Systems   Unable to perform ROS: Acuity of condition     Pertinent items are noted in HPI, all other systems reviewed and negative  History  Past Medical History:   Diagnosis Date    Acquired absence of kidney 10/04/2021    Acquired absence of other specified parts of digestive tract 09/09/2022    Athscl heart disease of native coronary 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 09/09/2022    Atrial fibrillation with rapid ventricular response     Bladder cancer     Cancer     breast, bladder    Cholecystitis with cholelithiasis     Chronic insomnia 08/27/2020    Deep vein thrombosis     Dyslipidemia 01/17/2017    E coli bacteremia     ESRD on dialysis 10/18/2023    Essential hypertension 01/17/2017    Fracture tibia/fibula, right, closed, initial encounter 08/27/2020    Gastro-esophageal reflux disease without esophagitis 09/14/2020    GERD (gastroesophageal reflux disease)     History of bladder cancer     History of falling     History of urostomy     Hyperkalemia     Immobility 08/27/2020    r/t broken Tibia     Irritable bowel syndrome without diarrhea     Kidney carcinoma, right     removed     Long term current use of anticoagulant 01/09/2015    Mild cognitive impairment of uncertain or unknown etiology 08/31/2023    Myalgia due to statin     Other specified abdominal hernia without obstruction or gangrene     PAF (paroxysmal atrial fibrillation) 06/26/2019    Permanent atrial fibrillation 06/26/2019    Personal history of other venous thrombosis and embolism     Presence of cardiac pacemaker 11/03/2014    BS dual chamber PM placed 10/2014 with pocket revision 1/25/17.  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       Past Surgical History:   Procedure Laterality Date    BREAST  LUMPECTOMY      CARDIAC CATHETERIZATION N/A 10/18/2023    Procedure: Left Heart Cath possible PCI, atherectomy, hemodynamic support;  Surgeon: Augustin Ellsworth MD;  Location: [facility] CATH INVASIVE LOCATION;  Service: Cardiology;  Laterality: N/A;    CARDIAC ELECTROPHYSIOLOGY PROCEDURE N/A 4/28/2023    Procedure: Pacemaker gen change, [place] AWARE $;  Surgeon: Jose Carlos Cheng MD;  Location: [facility] CATH INVASIVE LOCATION;  Service: Cardiovascular;  Laterality: N/A;    CHOLECYSTECTOMY N/A 10/12/2020    Procedure: CHOLECYSTECTOMY LAPAROSCOPIC;  Surgeon: Go Pereira DO;  Location: [facility] MAIN OR;  Service: General;  Laterality: N/A;    CYSTECTOMY W/ URETEROILEAL CONDUIT      HARDWARE REMOVAL Right 6/11/2021    Procedure: TIBIA HARDWARE REMOVAL;  Surgeon: Geoff Shah II, MD;  Location: [facility] MAIN OR;  Service: Orthopedics;  Laterality: Right;    HERNIA REPAIR      HYSTERECTOMY      INSERT / REPLACE / REMOVE PACEMAKER      NEPHRECTOMY Right     TIBIA IM NAILING/RODDING Right 8/28/2020    Procedure: TIBIA INTRAMEDULLARY NAIL/ABRAHAM INSERTION;  Surgeon: Geoff Shah II, MD;  Location: [facility] MAIN OR;  Service: Orthopedics;  Laterality: Right;       Family History   Problem Relation Age of Onset    COPD Father        Social History     Tobacco Use    Smoking status: Never     Passive exposure: Never    Smokeless tobacco: Never   Vaping Use    Vaping Use: Never used   Substance Use Topics    Alcohol use: Not Currently    Drug use: Never        Medications Prior to Admission   Medication Sig Dispense Refill Last Dose    apixaban (ELIQUIS) 2.5 MG tablet tablet Take 1 tablet by mouth Every 12 (Twelve) Hours. Indications: Atrial Fibrillation 60 tablet 0 1/28/2024 at 0830    atorvastatin (LIPITOR) 20 MG tablet Take 1 tablet by mouth Every Night.   1/27/2024 at 1911    calcium acetate (PHOSLO) 667 MG tablet    1/28/2024 at 1300    dicyclomine (BENTYL) 10 MG capsule Take 1 capsule by mouth 3  (Three) Times a Day. 60 capsule 0 1/28/2024 at 0830    dilTIAZem CD (CARDIZEM CD) 120 MG 24 hr capsule Take 1 capsule by mouth Daily. 30 capsule 0 1/28/2024 at 0830    furosemide (LASIX) 40 MG tablet Take 1 tablet by mouth Daily.   1/28/2024 at 0830    gabapentin (NEURONTIN) 300 MG capsule 1 capsule 3 (Three) Times a Day.   1/28/2024 at 0830    Melatonin-Pyridoxine 1-10 MG tablet Take 1 tablet by mouth every night at bedtime.   1/27/2024 at 1911    metoprolol tartrate (LOPRESSOR) 50 MG tablet Take 2 tablets by mouth Every Morning.   1/28/2024 at 0830    metoprolol tartrate (LOPRESSOR) 50 MG tablet Take 1 tablet by mouth Every Night.   1/27/2024 at 1911    midodrine (PROAMATINE) 10 MG tablet Take 1 tablet by mouth Daily As Needed (PRN dialysis days.). 15 tablet 0     polyethylene glycol (MiraLax) 17 GM/SCOOP powder Take 17 g by mouth Every Morning.   1/28/2024 at 0830    saccharomyces boulardii (FLORASTOR) 250 MG capsule Take 1 capsule by mouth 2 (Two) Times a Day. 60 capsule 0 1/28/2024 at 0830    sertraline (ZOLOFT) 25 MG tablet Take 1 tablet by mouth every night at bedtime.   1/27/2024 at 1911    topiramate (TOPAMAX) 100 MG tablet Take 1 tablet by mouth Every Night. 30 tablet 0 1/27/2024 at 1911    zinc oxide 20 % ointment Apply 1 application  topically to the appropriate area as directed. Every shift.       acetaminophen (TYLENOL) 325 MG tablet Take 2 tablets by mouth 4 (Four) Times a Day As Needed for Mild Pain.       ipratropium-albuterol (DUO-NEB) 0.5-2.5 mg/3 ml nebulizer Take 3 mL by nebulization. Six times daily PRN.       loperamide (IMODIUM) 2 MG capsule Take 2 capsules by mouth Daily As Needed for Diarrhea.       ondansetron ODT (ZOFRAN-ODT) 4 MG disintegrating tablet Place 1 tablet on the tongue 3 (Three) Times a Day As Needed for Nausea or Vomiting.            Amoxicillin, Ciprofloxacin, Oxycodone-acetaminophen, Penicillins, Sulfa antibiotics, Cephalexin, Clavulanic acid, Codeine, Contrast dye (echo or  "unknown ct/mr), Doxycycline, Fluconazole, Iodinated contrast media, Iodine, Macrobid [nitrofurantoin], Tobramycin, and Trimethoprim    Scheduled Meds:apixaban, 2.5 mg, Oral, Q12H  atorvastatin, 20 mg, Oral, Daily  dicyclomine, 10 mg, Oral, TID  famotidine, 10 mg, Oral, Q48H  gabapentin, 100 mg, Oral, Nightly  insulin lispro, 2-7 Units, Subcutaneous, 4x Daily With Meals & Nightly  midodrine, 10 mg, Oral, Q8H  polyethylene glycol, 17 g, Oral, Daily  povidone-iodine, , Topical, Daily  senna-docusate sodium, 2 tablet, Oral, BID  sodium chloride, 10 mL, Intravenous, Q12H  topiramate, 100 mg, Oral, Nightly      Continuous Infusions:phenylephrine, 0.5-3 mcg/kg/min, Last Rate: Stopped (01/30/24 0500)      PRN Meds:.  albumin human    senna-docusate sodium **AND** polyethylene glycol **AND** bisacodyl **AND** bisacodyl    Calcium Replacement - Follow Nurse / BPA Driven Protocol    dextrose    dextrose    glucagon (human recombinant)    heparin (porcine)    hydrALAZINE    ipratropium-albuterol    loperamide    Magnesium Standard Dose Replacement - Follow Nurse / BPA Driven Protocol    midodrine    nitroglycerin    ondansetron ODT **OR** ondansetron    Phosphorus Replacement - Follow Nurse / BPA Driven Protocol    Potassium Replacement - Follow Nurse / BPA Driven Protocol    sodium chloride    sodium chloride    sodium chloride    traMADol    Objective     VITAL SIGNS  Vitals:    01/31/24 0700 01/31/24 0744 01/31/24 0755 01/31/24 0800   BP: 139/71  133/69 149/84   BP Location:   Left arm Left arm   Patient Position:   Lying Lying   Pulse: 94  97 94   Resp:   17 17   Temp:  97.2 °F (36.2 °C)     TempSrc:  Oral     SpO2: 94%   90%   Weight: 56.6 kg (124 lb 12.5 oz)      Height:           Flowsheet Rows      Flowsheet Row First Filed Value   Admission Height 170.2 cm (67\") Documented at 01/28/2024 1517   Admission Weight 65.8 kg (145 lb) Documented at 01/28/2024 1517            Body mass index is 19.54 kg/m².     TELEMETRY: A-fib " with mildly elevated ventricular rates    Physical Exam:  Vitals reviewed.   Constitutional:       General: Not in acute distress.     Appearance: Normal appearance. Well-developed. Frail. Chronically ill-appearing.   Eyes:      Pupils: Pupils are equal, round, and reactive to light.   HENT:      Head: Normocephalic and atraumatic.   Neck:      Vascular: No JVD.   Pulmonary:      Effort: Pulmonary effort is normal.      Breath sounds: Decreased breath sounds present.   Cardiovascular:      Normal rate. Irregularly irregular rhythm.   Pulses:     Intact distal pulses.   Edema:     Peripheral edema absent.   Abdominal:      General: There is no distension.      Palpations: Abdomen is soft.      Tenderness: There is no abdominal tenderness.   Musculoskeletal: Normal range of motion.      Cervical back: Normal range of motion and neck supple. Skin:     General: Skin is warm and dry.   Neurological:      Mental Status: Lethargic.          Results Review:   I reviewed the patient's new clinical results.    CBC    Results from last 7 days   Lab Units 01/31/24  0741 01/30/24  1304 01/29/24  0645 01/28/24  1553   WBC 10*3/mm3 8.40 7.10 8.90 10.20   HEMOGLOBIN g/dL 9.7* 9.7* 10.4* 10.1*   PLATELETS 10*3/mm3 261 249 232 268     BMP   Results from last 7 days   Lab Units 01/30/24  1304 01/29/24  0645 01/28/24  1553   SODIUM mmol/L 135* 137 137   POTASSIUM mmol/L 5.4* 5.6* 4.9   CHLORIDE mmol/L 103 102 101   CO2 mmol/L 25.0 19.0* 24.0   BUN mg/dL 41* 89* 84*   CREATININE mg/dL 3.17* 5.25* 5.36*   GLUCOSE mg/dL 83 97 104*   MAGNESIUM mg/dL 1.9  --   --    PHOSPHORUS mg/dL 4.4  --   --      Cr Clearance Estimated Creatinine Clearance: 12.2 mL/min (A) (by C-G formula based on SCr of 3.17 mg/dL (H)).  Coag     HbA1C   Lab Results   Component Value Date    HGBA1C 4.90 01/30/2024    HGBA1C 5.4 10/11/2020    HGBA1C 6.2 (H) 09/08/2020     Blood Glucose   Glucose   Date/Time Value Ref Range Status   01/31/2024 0811 75 70 - 105 mg/dL  "Final     Comment:     Serial Number: [id number]:  [id number]   01/30/2024 2200 128 (H) 70 - 105 mg/dL Final     Comment:     Serial Number: [id number]:  [id number]   01/30/2024 1556 92 70 - 105 mg/dL Final     Comment:     Serial Number: [id number]:  [id number]   01/30/2024 1147 102 70 - 105 mg/dL Final     Comment:     Serial Number: [id number]:  [id number]   01/30/2024 0700 72 70 - 105 mg/dL Final     Comment:     Serial Number: [id number]:  [id number]     Infection   Results from last 7 days   Lab Units 01/30/24  1304   PROCALCITONIN ng/mL 0.22     CMP   Results from last 7 days   Lab Units 01/30/24  1304 01/29/24  0645 01/28/24  1553   SODIUM mmol/L 135* 137 137   POTASSIUM mmol/L 5.4* 5.6* 4.9   CHLORIDE mmol/L 103 102 101   CO2 mmol/L 25.0 19.0* 24.0   BUN mg/dL 41* 89* 84*   CREATININE mg/dL 3.17* 5.25* 5.36*   GLUCOSE mg/dL 83 97 104*   ALBUMIN g/dL 3.3*  --  3.1*   BILIRUBIN mg/dL 0.4  --  0.4   ALK PHOS U/L 122*  --  118*   AST (SGOT) U/L 34*  --  32   ALT (SGPT) U/L 25  --  24     ABG      UA      DEUCE  No results found for: \"POCMETH\", \"POCAMPHET\", \"POCBARBITUR\", \"POCBENZO\", \"POCCOCAINE\", \"POCOPIATES\", \"POCOXYCODO\", \"POCPHENCYC\", \"POCPROPOXY\", \"POCTHC\", \"POCTRICYC\"  [facility]   Results from last 7 days   Lab Units 01/31/24  0741 01/30/24  1304 01/29/24  0645 01/28/24  1553   HEMOGLOBIN g/dL 9.7* 9.7* 10.4* 10.1*   PLATELETS 10*3/mm3 261 249 232 268   CREATININE mg/dL  --  3.17* 5.25* 5.36*     Radiology(recent) No radiology results for the last day    Results from last 7 days   Lab Units 01/28/24  1801   HSTROP T ng/L 80*       Imaging Results (Last 24 Hours)       ** No results found for the last 24 hours. **            EKG            I personally viewed and interpreted the patient's EKG/Telemetry data:    ECHOCARDIOGRAM:      STRESS MYOVIEW:    CARDIAC CATHETERIZATION:    OTHER:         Assessment & Plan       Hypotension    Permanent atrial fibrillation    Dyslipidemia    " Essential hypertension    Long term current use of anticoagulant    Presence of cardiac pacemaker    Type 2 diabetes mellitus    Ureteral cancer    Chronic insomnia    Seasonal allergies    Acquired absence of kidney    Athscl heart disease of native coronary 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    Acquired absence of other specified parts of digestive tract    Gastro-esophageal reflux disease without esophagitis    ESRD on dialysis    Mild cognitive impairment of uncertain or unknown etiology    Atrial fibrillation with RVR  Atrial fibrillation is permanent  Ventricular rates are mildly rapid  She is currently off beta-blocker and calcium channel blocker due to hypotension  Currently on IV dopamine    Hypotension  Metoprolol for rate control, off Cardizem  Has been started on midodrine 3 times daily  Off vasopressors    End-stage renal disease requiring hemodialysis, management per nephrology    Sick sinus syndrome with dual-chamber pacemaker  Stable device function last    RSV    Continue hemodynamic support  Midodrine, continue 3 times daily dosing  Continue HD efforts for volume removal, may need albumin  Off Cardizem, will not restart  Gentle beta-blocker on board twice daily  Add amiodarone if blood pressures are limiting further titration  Not optimal candidate for digoxin with ESRD on dialysis      Follow hemodynamics  Continue telemetry  Continue Eliquis  Statin therapy on board  Renal following for dialysis need    Continue to follow while in house, okay to send home from a cardiac standpoint versus rehab, very frail deconditioned  Thank you for the consult  Please call with any questions or concerns    Augustin Ellsworth MD, PhD      Augustin Ellsworth MD  01/31/24  08:25 EST

## 2024-01-31 NOTE — PLAN OF CARE
Goal Outcome Evaluation:  Plan of Care Reviewed With: patient           Outcome Evaluation: Hazel Bucio is an 83 y.o. female with hx of CKD, HTN, GERD, Afib who presents with hypotension and is positive for RSV.  Pt reports that she is typically in a w/c at [facility] but is able to transfer with assist.  At time of PT evaluation, she is A&O to person and place. Pt requires min A for all bed mobility and transfer.  She is incontinent of stool and unaware, requiring assist of 2nd person to clean.  Pt appears at baseline.  REcommend return to prior level of care.

## 2024-01-31 NOTE — PLAN OF CARE
Goal Outcome Evaluation:  Plan of Care Reviewed With: patient        Progress: no change  Outcome Evaluation: Pt is an 84 y/o F admitted to [facility] 1/28/24 with c/o chest pain and hypotension. Hospital dx RSV. PMHx significant for hx bladder and breast cancer, ESRD on dialysis, right tibia/fibula fx, A. Fib, pacemaker, and SSS. Pt admitted from ECF, poor historian, likely requires assist for ADLs. This date pt oriented to person and place, poor historian and unable to provide PLOF. Pt requires min A for supine to sit, tolerates sitting edge of bed with supervision to SBA. Pt requires min A to come to standing, bowel incontinent upon standing, requiring max A for albaro hygiene with second assist to provide support in standing. Pt appears to function near baseline, recommend return to ECF with skilled OT intervention as needed. OT will complete orders at this time.      Anticipated Discharge Disposition (OT): extended care facility

## 2024-01-31 NOTE — PROGRESS NOTES
"                                                                                                                                      Nephrology  Progress Note                                        [facility]    Patient Identification    Name: Hazel Bucio  Age: 83 y.o.  Sex: female  :  1940  MRN: [id number]      DATE OF SERVICE:  2024        Subective    Seen on dialysis   On West-Synephrine     Objective   Scheduled Meds:apixaban, 2.5 mg, Oral, Q12H  atorvastatin, 20 mg, Oral, Daily  dicyclomine, 10 mg, Oral, TID  famotidine, 10 mg, Oral, Q48H  fludrocortisone, 100 mcg, Oral, Daily  gabapentin, 100 mg, Oral, Nightly  insulin lispro, 2-7 Units, Subcutaneous, 4x Daily With Meals & Nightly  midodrine, 10 mg, Oral, Q8H  polyethylene glycol, 17 g, Oral, Daily  povidone-iodine, , Topical, Daily  senna-docusate sodium, 2 tablet, Oral, BID  sodium chloride, 10 mL, Intravenous, Q12H  topiramate, 100 mg, Oral, Nightly          Continuous Infusions:phenylephrine, 0.5-3 mcg/kg/min, Last Rate: Stopped (24 0500)        PRN Meds:  senna-docusate sodium **AND** polyethylene glycol **AND** bisacodyl **AND** bisacodyl    Calcium Replacement - Follow Nurse / BPA Driven Protocol    dextrose    dextrose    glucagon (human recombinant)    hydrALAZINE    ipratropium-albuterol    loperamide    Magnesium Standard Dose Replacement - Follow Nurse / BPA Driven Protocol    midodrine    nitroglycerin    ondansetron ODT **OR** ondansetron    Phosphorus Replacement - Follow Nurse / BPA Driven Protocol    Potassium Replacement - Follow Nurse / BPA Driven Protocol    sodium chloride    sodium chloride    sodium chloride    traMADol     Exam:  /85   Pulse 102   Temp 98.5 °F (36.9 °C) (Oral)   Resp 14   Ht 170.2 cm (67.01\")   Wt 56 kg (123 lb 7.3 oz)   SpO2 97%   BMI 19.33 kg/m²     Intake/Output last 3 shifts:  I/O last 3 completed shifts:  In: 882 [P.O.:660; I.V.:222]  Out: 450 " [Urine:450]    Intake/Output this shift:  No intake/output data recorded.    Physical exam:  General Appearance:  Alert  Head:  Normocephalic, without obvious abnormality, atraumatic  Eyes:  PERRL, conjunctiva/corneas clear     Neck:  Supple,  no adenopathy;      Lungs:  Decreased BS occasion ronchi  Heart:  Regular rate and rhythm, S1 and S2 normal  Abdomen:  Soft, non-tender, bowel sounds active   Extremities: trace edema  Pulses: 2+ and symmetric all extremities  Skin:  No rashes or lesions       Data Review:  All labs (24hrs):   Recent Results (from the past 24 hour(s))   POC Glucose 4x Daily Before Meals & at Bedtime    Collection Time: 01/30/24 11:47 AM    Specimen: Blood   Result Value Ref Range    Glucose 102 70 - 105 mg/dL   Magnesium    Collection Time: 01/30/24  1:04 PM    Specimen: Blood   Result Value Ref Range    Magnesium 1.9 1.6 - 2.4 mg/dL   Phosphorus    Collection Time: 01/30/24  1:04 PM    Specimen: Blood   Result Value Ref Range    Phosphorus 4.4 2.5 - 4.5 mg/dL   Calcium, Ionized    Collection Time: 01/30/24  1:04 PM    Specimen: Blood   Result Value Ref Range    Ionized Calcium 1.22 1.20 - 1.30 mmol/L   Lipid Panel    Collection Time: 01/30/24  1:04 PM    Specimen: Blood   Result Value Ref Range    Total Cholesterol 86 0 - 200 mg/dL    Triglycerides 70 0 - 150 mg/dL    HDL Cholesterol 43 40 - 60 mg/dL    LDL Cholesterol  28 0 - 100 mg/dL    VLDL Cholesterol 15 5 - 40 mg/dL    LDL/HDL Ratio 0.67    TSH    Collection Time: 01/30/24  1:04 PM    Specimen: Blood   Result Value Ref Range    TSH 2.990 0.270 - 4.200 uIU/mL   CBC Auto Differential    Collection Time: 01/30/24  1:04 PM    Specimen: Blood   Result Value Ref Range    WBC 7.10 3.40 - 10.80 10*3/mm3    RBC 3.04 (L) 3.77 - 5.28 10*6/mm3    Hemoglobin 9.7 (L) 12.0 - 15.9 g/dL    Hematocrit 29.7 (L) 34.0 - 46.6 %    MCV 97.8 (H) 79.0 - 97.0 fL    MCH 32.1 26.6 - 33.0 pg    MCHC 32.8 31.5 - 35.7 g/dL    RDW 15.9 (H) 12.3 - 15.4 %    RDW-SD 56.0  (H) 37.0 - 54.0 fl    MPV 6.9 6.0 - 12.0 fL    Platelets 249 140 - 450 10*3/mm3    Neutrophil % 66.5 42.7 - 76.0 %    Lymphocyte % 18.6 (L) 19.6 - 45.3 %    Monocyte % 12.0 5.0 - 12.0 %    Eosinophil % 1.8 0.3 - 6.2 %    Basophil % 1.1 0.0 - 1.5 %    Neutrophils, Absolute 4.70 1.70 - 7.00 10*3/mm3    Lymphocytes, Absolute 1.30 0.70 - 3.10 10*3/mm3    Monocytes, Absolute 0.80 0.10 - 0.90 10*3/mm3    Eosinophils, Absolute 0.10 0.00 - 0.40 10*3/mm3    Basophils, Absolute 0.10 0.00 - 0.20 10*3/mm3    nRBC 0.1 0.0 - 0.2 /100 WBC   Hemoglobin A1c    Collection Time: 01/30/24  1:04 PM    Specimen: Blood   Result Value Ref Range    Hemoglobin A1C 4.90 4.80 - 5.60 %   Procalcitonin    Collection Time: 01/30/24  1:04 PM    Specimen: Blood   Result Value Ref Range    Procalcitonin 0.22 0.00 - 0.25 ng/mL   Comprehensive Metabolic Panel    Collection Time: 01/30/24  1:04 PM    Specimen: Blood   Result Value Ref Range    Glucose 83 65 - 99 mg/dL    BUN 41 (H) 8 - 23 mg/dL    Creatinine 3.17 (H) 0.57 - 1.00 mg/dL    Sodium 135 (L) 136 - 145 mmol/L    Potassium 5.4 (H) 3.5 - 5.2 mmol/L    Chloride 103 98 - 107 mmol/L    CO2 25.0 22.0 - 29.0 mmol/L    Calcium 9.1 8.6 - 10.5 mg/dL    Total Protein 6.5 6.0 - 8.5 g/dL    Albumin 3.3 (L) 3.5 - 5.2 g/dL    ALT (SGPT) 25 1 - 33 U/L    AST (SGOT) 34 (H) 1 - 32 U/L    Alkaline Phosphatase 122 (H) 39 - 117 U/L    Total Bilirubin 0.4 0.0 - 1.2 mg/dL    Globulin 3.2 gm/dL    A/G Ratio 1.0 g/dL    BUN/Creatinine Ratio 12.9 7.0 - 25.0    Anion Gap 7.0 5.0 - 15.0 mmol/L    eGFR 14.1 (L) >60.0 mL/min/1.73   POC Glucose Once    Collection Time: 01/30/24  3:56 PM    Specimen: Blood   Result Value Ref Range    Glucose 92 70 - 105 mg/dL   POC Glucose Once    Collection Time: 01/30/24 10:00 PM    Specimen: Blood   Result Value Ref Range    Glucose 128 (H) 70 - 105 mg/dL          Imaging:  XR Chest 1 View    Result Date: 1/28/2024  Impression: 1.Cardiomegaly. No acute radiographic abnormality is  identified. Electronically Signed: Joshua Fregoso MD  1/28/2024 4:11 PM EST  Workstation ID: [id number]     Assessment/Plan:     Hypotension    Permanent atrial fibrillation    Dyslipidemia    Essential hypertension    Long term current use of anticoagulant    Presence of cardiac pacemaker    Type 2 diabetes mellitus    Ureteral cancer    Chronic insomnia    Seasonal allergies    Acquired absence of kidney    Athscl heart disease of native coronary 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    Acquired absence of other specified parts of digestive tract    Gastro-esophageal reflux disease without esophagitis    ESRD on dialysis    Mild cognitive impairment of uncertain or unknown etiology       ESRD hemodialysis dependent   Status post hypotension requiring dopamine   Chest pain rule out acute coronary syndrome   Afib   Status post hyperkalemia     Recommendations:   Hemodialysis today   Adjust midodrine dose   Wean off pressors as possible

## 2024-02-01 NOTE — CASE MANAGEMENT/SOCIAL WORK
Case Management Discharge Note        Selected Continued Care - Discharged on 1/31/2024 Admission date: 1/28/2024 - Discharge disposition: Skilled Nursing Facility (DC - External)      Destination Coordination complete.      Service Provider Selected Services Address Phone Fax Patient Preferred    [facility] [address] [place] [address] [phone] --                   Transportation Services  W/C Van: Skilled Nursing Facility Van    Final Discharge Disposition Code: 04 - intermediate care facility

## 2024-02-03 LAB — BACTERIA ISLT: NORMAL

## 2024-02-08 NOTE — PROGRESS NOTES
"Enter Query Response Below      Query Response:     Electronically signed by Augustin Ellsworth MD, 24, 4:34 AM EST.   Unable to determine        If applicable, please update the problem list.   Patient: Hazel Bucio        : 1940  Account: [id number]           Admit Date: 2024        How to Respond to this query:       a. Click New Note     b. Answer query within the yellow box.                c. Update the Problem List, if applicable.    If you have any questions about this query contact me at: [email or web address]     Dr. 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  Clinical indicators: Progress note 24 Dr. Ellsworth \"Patient very frail deconditioned. Blood pressure continues to be an issue with severe malnutrition and failure to thrive which is likely her largest issue.\"  Registered Dietitian assessment 24 \"Moderate chronic disease-related malnutrition related to chronic suboptimal intake in the setting of multiple medical problems as evidenced by 3+ severe edema in left/right knees, and moderate fat/muscle loss per NFPE. Body mass index 22.65.\"  Treatment: Registered Dietitian assessment. Recommendation for diabetic healthy heart consistent carbohydrate potassium restricted diet.\"    Please clarify diagnosis treated/monitored:    Non-Severe (Moderate) chronic illness related malnutrition  Severe Malnutrition (please provide additional supporting clinical indicators)_________  Other- specify __________  Unable to determine     By submitting this query, we are merely seeking further clarification of documentation to accurately reflect all conditions that you are monitoring, evaluating, treating or that extend the hospitalization or utilize additional resources of care. Please utilize your independent clinical judgment when addressing the question(s) above.     This query and your response, once " RX called into preferred pharmacy per [place] protocols      completed, will be entered into the legal medical record.    Sincerely,  Ivy SANCHEZ RN CCDS  System Director Clinical Documentation Integrity Program

## 2024-03-04 ENCOUNTER — TELEPHONE (OUTPATIENT)
Dept: CARDIOLOGY | Facility: CLINIC | Age: 84
End: 2024-03-04
Payer: MEDICARE

## 2024-03-04 NOTE — TELEPHONE ENCOUNTER
Called and LVM to send a manual transmission from monitoring device  OK FOR THE HUB TO RELEASE     ------------------------------------------------     Angela CORBETT from Dr Camarillo and Dr Ellsworth's office has not received a transmission from your home monitoring device. Please check all connections on the monitor and push the button to transmit. Please contact the office if you have any questions [phone]. Thank you for your assistance.  You may also contact [place] [phone]

## 2024-04-12 ENCOUNTER — TELEPHONE (OUTPATIENT)
Dept: CARDIOLOGY | Facility: CLINIC | Age: 84
End: 2024-04-12
Payer: MEDICARE

## 2024-04-12 NOTE — TELEPHONE ENCOUNTER
Called and LVM to send a manual transmission from monitoring device  OK FOR THE HUB TO RELEASE       Angela CORBETT from Dr Camarillo and Dr Ellsworth's office has not received a transmission from your home monitoring device. Please check all connections on the monitor and push the button to transmit. Please contact the office if you have any questions [phone]. Thank you for your assistance.  You may also contact [place] [phone]

## 2024-04-16 ENCOUNTER — TELEPHONE (OUTPATIENT)
Dept: CARDIOLOGY | Facility: CLINIC | Age: 84
End: 2024-04-16
Payer: MEDICARE

## 2024-04-16 NOTE — TELEPHONE ENCOUNTER
Called and LVM to send a manual transmission from monitoring device  OK FOR THE HUB TO RELEASE        Angela CORBETT from Dr Camarillo and Dr Ellsworth's office has not received a transmission from your home monitoring device. Please check all connections on the monitor and push the button to transmit. Please contact the office if you have any questions [phone]. Thank you for your assistance.  You may also contact [place] [phone]

## 2024-06-26 ENCOUNTER — CLINICAL SUPPORT NO REQUIREMENTS (OUTPATIENT)
Dept: CARDIOLOGY | Facility: CLINIC | Age: 84
End: 2024-06-26
Payer: MEDICARE

## 2024-06-26 ENCOUNTER — OFFICE VISIT (OUTPATIENT)
Dept: CARDIOLOGY | Facility: CLINIC | Age: 84
End: 2024-06-26
Payer: MEDICARE

## 2024-06-26 VITALS
RESPIRATION RATE: 18 BRPM | SYSTOLIC BLOOD PRESSURE: 91 MMHG | WEIGHT: 122 LBS | DIASTOLIC BLOOD PRESSURE: 54 MMHG | HEIGHT: 67 IN | HEART RATE: 81 BPM | BODY MASS INDEX: 19.15 KG/M2

## 2024-06-26 DIAGNOSIS — Z95.0 PRESENCE OF CARDIAC PACEMAKER: Chronic | ICD-10-CM

## 2024-06-26 DIAGNOSIS — I48.21 PERMANENT ATRIAL FIBRILLATION: Chronic | ICD-10-CM

## 2024-06-26 DIAGNOSIS — I48.21 PERMANENT ATRIAL FIBRILLATION: Primary | Chronic | ICD-10-CM

## 2024-06-26 DIAGNOSIS — I49.5 SICK SINUS SYNDROME: Primary | ICD-10-CM

## 2024-06-26 DIAGNOSIS — I48.20 ATRIAL FIBRILLATION, CHRONIC: ICD-10-CM

## 2024-06-26 DIAGNOSIS — I10 ESSENTIAL HYPERTENSION: Chronic | ICD-10-CM

## 2024-06-26 DIAGNOSIS — E78.5 DYSLIPIDEMIA: Chronic | ICD-10-CM

## 2024-06-26 DIAGNOSIS — Z95.0 PRESENCE OF CARDIAC PACEMAKER: ICD-10-CM

## 2024-06-26 RX ORDER — FUROSEMIDE 40 MG/1
40 TABLET ORAL DAILY
COMMUNITY
Start: 2024-06-10

## 2024-06-26 RX ORDER — NITROGLYCERIN 0.4 MG/1
TABLET SUBLINGUAL
COMMUNITY
Start: 2024-06-21

## 2024-06-26 RX ORDER — SEVELAMER CARBONATE 800 MG/1
TABLET, FILM COATED ORAL
COMMUNITY
Start: 2024-06-18

## 2024-06-26 RX ORDER — TRAMADOL HYDROCHLORIDE 50 MG/1
TABLET ORAL EVERY 6 HOURS PRN
COMMUNITY
Start: 2024-06-08

## 2024-06-26 RX ORDER — MIRTAZAPINE 7.5 MG/1
TABLET, FILM COATED ORAL NIGHTLY
COMMUNITY
Start: 2024-06-06

## 2024-06-27 RX ORDER — ATORVASTATIN CALCIUM 10 MG/1
10 TABLET, FILM COATED ORAL DAILY
Qty: 90 TABLET | Refills: 3 | Status: SHIPPED | OUTPATIENT
Start: 2024-06-27

## 2024-06-27 NOTE — PROGRESS NOTES
Cardiology Clinic Note  Augustin Ellsworth MD, PhD    Subjective:     Encounter Date:06/26/2024      Patient ID: Hazel Bucio is a 84 y.o. female.    Chief Complaint:  Chief Complaint   Patient presents with    Follow-up       HPI:    I the pleasure to see this 84-year-old female back in clinic today well-known to us from prior encounters and hospitalizations.  She has been admitted multiple times over the last 6 months to the hospital for various reasons including dehydration, falls weakness debility, abdominal pain, she has a history of sick sinus syndrome with [employer] dual-chamber pacemaker in place which is normal functioning on repeat interrogations.  She has essential hypertension, permanent A-fib, long-term use of anticoagulants on Eliquis, orthostatic hypotension on midodrine, dyslipidemia, type 2 diabetes, mild cognitive impairment as well with underlying dementia and lives at [facility].  She has underlying CKD which is progressed to ESRD now on hemodialysis.  She continues on low-dose beta-blocker for rate control, she is off Cardizem with hypotension, she has had recurrent RVR with limitations and rate control medicines limited by blood pressure previously.  She is 91/54 today with heart rates in the 80s and she is 122 pounds.  She denies any chest pain she has chronic shortness of breath, volume status stable, she has a caregiver with her today and we reviewed all of her medicines, device interrogation, last 2D echo reports and findings as well as heart cath results with recurrent chest pain from October 2023.  This had minimal atherosclerotic disease less than 30% in any distribution with ANNA-3 flow throughout, right heart catheterization and normal findings    Review of systems otherwise negative x 14 point review of systems except as mentioned above    Historical data copied forward from previous encounters in EMR including the history, exam, and assessment/plan has been  "reviewed and is unchanged unless noted otherwise.    Cardiac medicines reviewed with risk, benefits, and necessity of each discussed.    Risk and benefit of cardiac testing reviewed including death heart attack stroke pain bleeding infection need for vascular /cardiovascular surgery were discussed and the patient     Objective:         BP 91/54 (BP Location: Left arm, Patient Position: Sitting)   Pulse 81   Resp 18   Ht 170.2 cm (67\")   Wt 55.3 kg (122 lb)   BMI 19.11 kg/m²     Physical Exam  Irregularly irregular, 1 out of 6 murmur unchanged  Clear to auscultation  No clubbing cyanosis or edema  Dialysis catheter right-sided  Skin is warm and dry  Radial pulses 1+ equal bilaterally  Normal cap refill  Intact grossly, mildly impaired cognition    Assessment:         Independently manage medical conditions  Permanent atrial fibrillation  Long-term use of anticoagulants  Presence of pacemaker  Nonobstructive coronary disease  Recurrent atypical chest pain  Essential hypertension  History of dyslipidemia  Orthostatic hypotension  Comorbidity of ESRD on dialysis complicating care  Mild dementia complicating care    Patient will continue current therapy  Device interrogation with 9.5 years of battery life, minimal pacing with VVI settings  Continue beta-blocker for rate control of atrial fibrillation  40 daily of Lasix  Atorvastatin will be decreased to 10 daily, LDL level was 28  Recheck would be advised, may consider complete cessation  Aspirin not recommended is on Eliquis which will be continued    Follow-up 6 months for device interrogation, encourage good nutrition and fluid intake        The pleasure to be involved in this patient's cardiovascular care.  Please call with any questions or concerns  Augustin Ellsworth MD, PhD    Most recent EKG as reviewed and interpreted by me:  Procedures     Most recent echo as reviewed and interpreted by me:  Results for orders placed during the hospital encounter of " 10/13/23    Adult Transthoracic Echo Complete W/ Cont if Necessary Per Protocol    Interpretation Summary    Left ventricular systolic function is normal. Left ventricular ejection fraction appears to be 56 - 60%.    Left ventricular wall thickness is consistent with mild concentric hypertrophy.    Left ventricular diastolic function was indeterminate.    The right ventricular cavity is mildly dilated.    The left atrial cavity is severely dilated.    Left atrial volume is severely increased.    The right atrial cavity is moderately  dilated.    Estimated right ventricular systolic pressure from tricuspid regurgitation is normal (<35 mmHg).    The main pulmonary artery is severely dilated.      Most recent stress test as reviewed and interpreted by me:  Results for orders placed during the hospital encounter of 04/18/23    Stress Test With Myocardial Perfusion One Day    Interpretation Summary    Left ventricular ejection fraction is normal (Calculated EF = 60%).    Myocardial perfusion imaging indicates a normal myocardial perfusion study with no evidence of ischemia.    Impressions are consistent with a low risk study.    Diaphragmatic attenuation and GI artifacts are present.    There is no prior study available for comparison.    Findings consistent with an equivocal ECG stress test.    Underlying atrial fibrillation during the study, normal ST-T wave findings  Heart rates reached 1 20-1 30 with pharmacologic stress, no changes at submaximal stress with respect to ischemic ST-T wave findings  No arrhythmias seen other than baseline atrial fibrillation  Nuclear perfusion demonstrated fixed small basal inferolateral defect with no reversibility, possible diaphragmatic GI attenuation, normal EF 60% with normal LV size and geometry and LV function    Low risk study for any reversible ischemia, appears to have normal perfusion other than diaphragmatic GI attenuation affected diminished inferolateral counts observed  "in stress and rest with normal EF 60%      Most recent cardiac catheterization as reviewed interpreted by me:  Results for orders placed during the hospital encounter of 10/13/23    Cardiac Catheterization/Vascular Study    Conclusion  Primary operative Augustin Ellsworth MD, PhD  [facility] cardiology  Date of service 10-    Procedure  1.  Left and right heart cath coronary angiography, LV pressure and functional assessment, LV gram, Abdulaziz cardiac output calculations with PA and FA saturations    Indication  Refractory chest pain medical therapy, estimation of filling pressures to guide dialysis and volume removal with recurrent hospitalization    After informed consent patient brought to the Cath Lab sterilely prepped and draped in usual fashion with exposure of the right groin for right common femoral arterial and venous access via micropuncture and modified cylinder technique with placement of 6 Welsh sheaths.  [place] catheter was advanced to the wedge position followed by pullback to the PA with PA artery saturation obtained, pullback the RV and RA with pressures recorded at each level.  FA saturation was also obtained.  [place] was removed followed by coronary angiography with JL 4 and JR4 catheters for selective left and right coronary angiography respectively, JR4 was used to cross the aortic valve with hand-injection for LV gram EDP assessment and pullback assessment of the transaortic valve gradient.  There was no CAD of any angiographic significance maximally 30% in the RCA otherwise left-sided.  Without angiographic disease.  Filling pressures were normal with normal LVEDP, normal EF at 60%, right atrial pressure was 5 with adequate  dialysis efforts daily over the last 3 to 4 days, normal pulmonary pressures, normal aortic transvalvular gradient.  All catheters\" removed.  She left Cath Lab chest pain-free hemodynamically electrically stable returning to staff neurologically grossly intact " bilaterally    Complications none  Blood loss less than 5 cc  Contrast 65 cc  Sedation time of 45 minutes    Findings  1.  Wedge pressure of 9  2.  PA pressure 28/11 with a mean of 20  3.  RV pressure 27/1 with an EDP of 3  4.  LV pressure of 91/0 with an EDP of 5-6  5.  Closing pressure 96/48 with a mean of 72  6.  Aortic saturation 100%  7.  PA saturation 80%  8.  Abdulaziz cardiac output calculation 7.7 L/min with index of 4.7 which is normal  9.  Normal cardiac output,  which is normal,  which is normal less than 2 Wood unit    Angiography  1.  Left main normal no disease  Over 2 the LAD is a medium caliber vessel with 1 diagonal branch on its course to around the apex with no angiographic disease throughout the LAD  3.  The circumflex is nondominant with 2 obtuse marginal branches with no angiographic disease throughout  4.  The RCA is a very large caliber dominant vessel with mild diffuse but nonobstructive disease 30% maximally in the proximal to mid segment with PLV and PDA with no obstructive disease less than 20%    Conclusions recommendations  1.  Normal epicardial coronary anatomy with no obstructive atherosclerotic disease of any significance maximally 30% with ANNA-3 flow in all vessels  2.  Normal LV filling pressures with normal LV function with normal transaortic valve gradient  3.  Normal pulmonary pressures with no evidence of pulmonary hypertension, normal cardiac output with normal peripheral and systemic vascular resistance, normal right atrial pressure with adequate dialysis efforts over the last few days    Continue dialysis efforts, dry weight has been established, note this for future dialysis efforts to avoid volume accumulation volume overload, 2D echo on presentation revealed severely elevated IVC diameter with minimal collapsibility right toe pressure was greater than 20 at that time by noninvasive assessment    Augustin Ellsworth MD, PhD    The following portions of the patient's  history were reviewed and updated as appropriate: allergies, current medications, past family history, past medical history, past social history, past surgical history, and problem list.      ROS:  14 point review of systems negative except as mentioned above    Current Outpatient Medications:     acetaminophen (TYLENOL) 325 MG tablet, Take 2 tablets by mouth 4 (Four) Times a Day As Needed for Mild Pain., Disp: , Rfl:     apixaban (ELIQUIS) 2.5 MG tablet tablet, Take 1 tablet by mouth Every 12 (Twelve) Hours. Indications: Atrial Fibrillation, Disp: 60 tablet, Rfl: 0    atorvastatin (LIPITOR) 20 MG tablet, Take 1 tablet by mouth Every Night., Disp: , Rfl:     dicyclomine (BENTYL) 10 MG capsule, Take 1 capsule by mouth 3 (Three) Times a Day., Disp: 60 capsule, Rfl: 0    famotidine (PEPCID) 10 MG tablet, Take 1 tablet by mouth Every Other Day., Disp: 30 tablet, Rfl: 0    furosemide (LASIX) 40 MG tablet, 1 tablet Daily., Disp: , Rfl:     gabapentin (NEURONTIN) 100 MG capsule, Take 1 capsule by mouth Every Night., Disp: 30 capsule, Rfl: 0    metoprolol succinate XL (Toprol XL) 25 MG 24 hr tablet, Take 1 tablet by mouth 2 (Two) Times a Day. (Patient taking differently: Take 2 tablets by mouth Daily.), Disp: 60 tablet, Rfl: 0    midodrine (PROAMATINE) 10 MG tablet, Take 1 tablet by mouth Every 8 (Eight) Hours., Disp: 90 tablet, Rfl: 1    mirtazapine (REMERON) 7.5 MG tablet, Every Night., Disp: , Rfl:     nitroglycerin (NITROSTAT) 0.4 MG SL tablet, Place  under the tongue Every 5 (Five) Minutes As Needed., Disp: , Rfl:     ondansetron ODT (ZOFRAN-ODT) 4 MG disintegrating tablet, Place 1 tablet on the tongue 3 (Three) Times a Day As Needed for Nausea or Vomiting., Disp: , Rfl:     polyethylene glycol (MiraLax) 17 GM/SCOOP powder, Take 17 g by mouth Every Morning., Disp: , Rfl:     sertraline (ZOLOFT) 25 MG tablet, Take 3 tablets by mouth every night at bedtime., Disp: , Rfl:     sevelamer (RENVELA) 800 MG tablet, 3 (Three)  Times a Day With Meals., Disp: , Rfl:     topiramate (TOPAMAX) 100 MG tablet, Take 1 tablet by mouth Every Night., Disp: 30 tablet, Rfl: 0    traMADol (ULTRAM) 50 MG tablet, Every 6 (Six) Hours As Needed., Disp: , Rfl:     Problem List:  Patient Active Problem List   Diagnosis    Permanent atrial fibrillation    Dyslipidemia    Essential hypertension    Long term current use of anticoagulant    Presence of cardiac pacemaker    Type 2 diabetes mellitus    Ureteral cancer    Chronic insomnia    Seasonal allergies    Acquired absence of kidney    Athscl heart disease of native coronary 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    Acquired absence of other specified parts of digestive tract    Gastro-esophageal reflux disease without esophagitis    ESRD on dialysis    Hypotension    Mild cognitive impairment of uncertain or unknown etiology     Past Medical History:  Past Medical History:   Diagnosis Date    Acquired absence of kidney 10/04/2021    Acquired absence of other specified parts of digestive tract 09/09/2022    Athscl heart disease of native coronary 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 09/09/2022    Atrial fibrillation with rapid ventricular response     Bladder cancer     Cancer     breast, bladder    Cholecystitis with cholelithiasis     Chronic insomnia 08/27/2020    Deep vein thrombosis     Dyslipidemia 01/17/2017    E coli bacteremia     ESRD on dialysis 10/18/2023    Essential hypertension 01/17/2017    Fracture tibia/fibula, right, closed, initial encounter 08/27/2020    Gastro-esophageal reflux disease without esophagitis 09/14/2020    GERD (gastroesophageal reflux disease)     History of bladder cancer     History of falling     History of urostomy     Hyperkalemia     Immobility 08/27/2020    r/t broken Tibia     Irritable bowel syndrome without diarrhea     Kidney carcinoma, right     removed     Long term current use of anticoagulant 01/09/2015    Mild cognitive impairment of uncertain or unknown etiology 08/31/2023    Myalgia due  to statin     Other specified abdominal hernia without obstruction or gangrene     PAF (paroxysmal atrial fibrillation) 06/26/2019    Permanent atrial fibrillation 06/26/2019    Personal history of other venous thrombosis and embolism     Presence of cardiac pacemaker 11/03/2014    BS dual chamber PM placed 10/2014 with pocket revision 1/25/17.  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     Past Surgical History:  Past Surgical History:   Procedure Laterality Date    BREAST LUMPECTOMY      CARDIAC CATHETERIZATION N/A 10/18/2023    Procedure: Left Heart Cath possible PCI, atherectomy, hemodynamic support;  Surgeon: Augustin Ellsworth MD;  Location: [facility] CATH INVASIVE LOCATION;  Service: Cardiology;  Laterality: N/A;    CARDIAC ELECTROPHYSIOLOGY PROCEDURE N/A 4/28/2023    Procedure: Pacemaker gen change, [employer] AWARE $;  Surgeon: Jose Carlos Cheng MD;  Location: [facility] CATH INVASIVE LOCATION;  Service: Cardiovascular;  Laterality: N/A;    CHOLECYSTECTOMY N/A 10/12/2020    Procedure: CHOLECYSTECTOMY LAPAROSCOPIC;  Surgeon: Go Pereira DO;  Location: [facility] MAIN OR;  Service: General;  Laterality: N/A;    CYSTECTOMY W/ URETEROILEAL CONDUIT      HARDWARE REMOVAL Right 6/11/2021    Procedure: TIBIA HARDWARE REMOVAL;  Surgeon: Geoff Shah II, MD;  Location: [facility] MAIN OR;  Service: Orthopedics;  Laterality: Right;    HERNIA REPAIR      HYSTERECTOMY      INSERT / REPLACE / REMOVE PACEMAKER      NEPHRECTOMY Right     TIBIA IM NAILING/RODDING Right 8/28/2020    Procedure: TIBIA INTRAMEDULLARY NAIL/ABRAHAM INSERTION;  Surgeon: Geoff Shah II, MD;  Location: [facility] MAIN OR;  Service: Orthopedics;  Laterality: Right;     Social History:  Social History     Socioeconomic History    Marital status:    Tobacco  "Use    Smoking status: Never     Passive exposure: Never    Smokeless tobacco: Never   Vaping Use    Vaping status: Never Used   Substance and Sexual Activity    Alcohol use: Not Currently    Drug use: Never    Sexual activity: Defer     Allergies:  Allergies   Allergen Reactions    Amoxicillin Shortness Of Breath    Ciprofloxacin Hives    Oxycodone-Acetaminophen Unknown - High Severity     Pt's son stated when pt fell and broke her leg she was put on oxycodone; pt's son stated pt's \"vitals went all out of wack, she couldn't remember things.\"    Penicillins Rash    Sulfa Antibiotics Hives    Cephalexin Other (See Comments)    Clavulanic Acid Other (See Comments)    Codeine Other (See Comments)    Contrast Dye (Echo Or Unknown Ct/Mr) Other (See Comments)     8/18/22     Doxycycline Other (See Comments)    Fluconazole Other (See Comments)    Iodinated Contrast Media Other (See Comments)     8/18/22    Iodine Hives    Macrobid [Nitrofurantoin] Hives    Tobramycin Other (See Comments)    Trimethoprim Other (See Comments)     Immunizations:  Immunization History   Administered Date(s) Administered    COVID-19 (PFIZER) Purple Cap Monovalent 02/03/2021, 02/26/2021    COVID-19 (UNSPECIFIED) 11/09/2020, 11/17/2020, 02/02/2021, 02/26/2021, 09/05/2023, 12/12/2023    FLUAD TRI 65YR+ 09/07/2012, 09/24/2014    Fluzone High Dose =>65 Years ([employer] ONLY) 12/16/2015, 11/04/2016, 10/31/2017, 10/18/2018, 09/27/2019    Fluzone High-Dose 65+yrs 11/30/2020    Influenza Seasonal Injectable 11/30/2020    Influenza, Unspecified 10/06/2023    Pneumococcal Conjugate 13-Valent (PCV13) 11/04/2016, 11/04/2016, 09/06/2023    Tdap 04/01/2016            In-Office Procedure(s):  No orders to display        ASCVD RIsk Score::  The ASCVD Risk score (Chelly ANGEL, et al., 2019) failed to calculate.    Imaging:    Results for orders placed during the hospital encounter of 01/28/24    XR Chest 1 View    Narrative  XR CHEST 1 VW    Date of Exam: 1/28/2024 " 4:01 PM EST    Indication: Chest Pain Protocol  Chest Pain Protocol    Comparison: Chest x-ray dated 12/10/2023    Findings:  Left chest wall pacemaker is present. Right IJ dialysis catheter overlies the right atrium. The lungs appear adequately aerated without consolidation or mass. No pleural effusion or pneumothorax is identified. Cardiac silhouette is enlarged. Pulmonary  vasculature is unremarkable. No acute or suspicious osseous lesion is identified.    Impression  Impression:  1.Cardiomegaly. No acute radiographic abnormality is identified.    Electronically Signed: Joshua Fregoso MD  1/28/2024 4:11 PM EST  Workstation ID: [id number]       Results for orders placed during the hospital encounter of 06/16/23    CT Abdomen Pelvis Without Contrast    Narrative  EXAMINATION:  CT SCAN OF THE ABDOMEN AND PELVIS WITHOUT INTRAVENOUS CONTRAST    DATE OF EXAM: 6/16/2023 3:51 AM    HISTORY: Left flank pain    COMPARISON: 3/21/2023.    TECHNIQUE: CT examination of the abdomen and pelvis with sagittal and coronal reformations was performed without intravenous contrast.  CT dose lowering techniques were used, to include: automated exposure control, adjustment for patient size, and/or use  of iterative reconstruction.    Note: The exam is limited because some types of pathology may not be adequately demonstrated due to lack of contrast enhancement.    FINDINGS:    ABDOMEN/PELVIS:    Lower Chest:  No evidence of acute abnormality. Bibasal scarring. Stable cardiomegaly.    Liver: Normal.    Gallbladder/Biliary: Status post cholecystectomy.    Pancreas: Normal.    Spleen: Normal.    Adrenal Glands: Normal.    Kidneys: Stable nonobstructing left nephrolithiasis. No hydronephrosis or nephrolithiasis. Postsurgical changes are again seen with reimplantation of the left ureter into an ileal conduit in the right pelvis, which is seen coursing into the right lower  ostomy site. Prior right nephrectomy..    GI Tract: Herniation of a  short segment of the transverse colon into the right lower anterior abdominal wall parastomal hernia. No obstruction. Moderate stool burden    Mesentery/Peritoneum: Normal.    Vasculature: Normal.    Lymph Nodes: Normal.    Abdominal Wall: Normal.    Bladder: Normal.    Reproductive: Status post hysterectomy.    Musculoskeletal: No evidence of acute abnormality. Redemonstration of calcification within the right rectus muscle. Redemonstration of severe generalized osteopenia. Similar degenerative changes.    Impression  1.  Stable nonobstructing left nephrolithiasis. Status post right nephrectomy. Moderate stool burden.  2.  Parastomal hernia contains a short segment of stool filled transverse colon without evidence of obstruction.  3.  Severe osteopenia.  4.  Chronic changes as above.    Electronically signed by:  Trev Sanders D.O.  6/16/2023 2:17 AM Mountain Time      Results for orders placed during the hospital encounter of 06/16/23    CT Abdomen Pelvis Without Contrast    Narrative  EXAMINATION:  CT SCAN OF THE ABDOMEN AND PELVIS WITHOUT INTRAVENOUS CONTRAST    DATE OF EXAM: 6/16/2023 3:51 AM    HISTORY: Left flank pain    COMPARISON: 3/21/2023.    TECHNIQUE: CT examination of the abdomen and pelvis with sagittal and coronal reformations was performed without intravenous contrast.  CT dose lowering techniques were used, to include: automated exposure control, adjustment for patient size, and/or use  of iterative reconstruction.    Note: The exam is limited because some types of pathology may not be adequately demonstrated due to lack of contrast enhancement.    FINDINGS:    ABDOMEN/PELVIS:    Lower Chest:  No evidence of acute abnormality. Bibasal scarring. Stable cardiomegaly.    Liver: Normal.    Gallbladder/Biliary: Status post cholecystectomy.    Pancreas: Normal.    Spleen: Normal.    Adrenal Glands: Normal.    Kidneys: Stable nonobstructing left nephrolithiasis. No hydronephrosis or  nephrolithiasis. Postsurgical changes are again seen with reimplantation of the left ureter into an ileal conduit in the right pelvis, which is seen coursing into the right lower  ostomy site. Prior right nephrectomy..    GI Tract: Herniation of a short segment of the transverse colon into the right lower anterior abdominal wall parastomal hernia. No obstruction. Moderate stool burden    Mesentery/Peritoneum: Normal.    Vasculature: Normal.    Lymph Nodes: Normal.    Abdominal Wall: Normal.    Bladder: Normal.    Reproductive: Status post hysterectomy.    Musculoskeletal: No evidence of acute abnormality. Redemonstration of calcification within the right rectus muscle. Redemonstration of severe generalized osteopenia. Similar degenerative changes.    Impression  1.  Stable nonobstructing left nephrolithiasis. Status post right nephrectomy. Moderate stool burden.  2.  Parastomal hernia contains a short segment of stool filled transverse colon without evidence of obstruction.  3.  Severe osteopenia.  4.  Chronic changes as above.    Electronically signed by:  Trev Sanders D.O.  6/16/2023 2:17 AM Mountain Time      Lab Review:   No results displayed because visit has over 200 results.      Lab Requisition on 01/02/2024   Component Date Value    ADENOVIRUS, PCR 01/02/2024 Not Detected     Coronavirus 229E 01/02/2024 Not Detected     Coronavirus HKU1 01/02/2024 Not Detected     Coronavirus NL63 01/02/2024 Not Detected     Coronavirus OC43 01/02/2024 Not Detected     COVID19 01/02/2024 Not Detected     Human Metapneumovirus 01/02/2024 Not Detected     Human Rhinovirus/Enterov* 01/02/2024 Not Detected     Influenza A H1 2009 PCR 01/02/2024 Detected (A)     Influenza B PCR 01/02/2024 Not Detected     Parainfluenza Virus 1 01/02/2024 Not Detected     Parainfluenza Virus 2 01/02/2024 Not Detected     Parainfluenza Virus 3 01/02/2024 Not Detected     Parainfluenza Virus 4 01/02/2024 Not Detected     RSV, PCR  01/02/2024 Not Detected     Bordetella pertussis pcr 01/02/2024 Not Detected     Bordetella parapertussis* 01/02/2024 Not Detected     Chlamydophila pneumoniae* 01/02/2024 Not Detected     Mycoplasma pneumo by PCR 01/02/2024 Not Detected      Recent labs reviewed and interpreted for clinical significance and application            Level of Care:           Augustin Ellsworth MD  06/27/24  .

## 2024-06-27 NOTE — PROGRESS NOTES
DEVICE INTERROGATION:  IN OFFICE    DEVICE TYPE:   Single-chamber pacemaker    :   [employer]    BATTERY:  Stable    TIME TO ELECTIVE REPLACEMENT INDICATORS:   9.5 years    CHARGE TIME:   Not applicable        LEAD DATA:       DEVICE REPROGRAMMED FOR TESTING      Atrial:        Ventricular:     25 mV, 673 ohms, 0.8 V@1.4 ms    LV:      Pacemaker dependent:   No      Atrial pacing percentage: 0%    Ventricular pacing percentage: Less than 1%      Arrhythmia Logbook Reviewed: Intermittent RVR        Summary:    Stable Device Function    Permanent A-fib    Battery status is stable.    Reviewed with: Dr. Ellsworth      NEXT IN OFFICE DEVICE CHECK DUE: 6 months    REMOTE DEVICE INTERROGATIONS: Will order home monitor

## 2024-07-23 ENCOUNTER — APPOINTMENT (OUTPATIENT)
Dept: GENERAL RADIOLOGY | Facility: HOSPITAL | Age: 84
End: 2024-07-23
Payer: MEDICARE

## 2024-07-23 ENCOUNTER — HOSPITAL ENCOUNTER (INPATIENT)
Facility: HOSPITAL | Age: 84
LOS: 7 days | Discharge: NURSING, REHAB OR LONG-TERM CARE | End: 2024-07-30
Attending: EMERGENCY MEDICINE | Admitting: INTERNAL MEDICINE
Payer: MEDICARE

## 2024-07-23 ENCOUNTER — APPOINTMENT (OUTPATIENT)
Dept: CT IMAGING | Facility: HOSPITAL | Age: 84
End: 2024-07-23
Payer: MEDICARE

## 2024-07-23 DIAGNOSIS — M25.551 RIGHT HIP PAIN: ICD-10-CM

## 2024-07-23 DIAGNOSIS — Z79.01 ANTICOAGULATED: ICD-10-CM

## 2024-07-23 DIAGNOSIS — Z86.59 HISTORY OF DEMENTIA: ICD-10-CM

## 2024-07-23 DIAGNOSIS — M79.601 RIGHT ARM PAIN: ICD-10-CM

## 2024-07-23 DIAGNOSIS — S72.401A CLOSED FRACTURE OF DISTAL END OF RIGHT FEMUR, UNSPECIFIED FRACTURE MORPHOLOGY, INITIAL ENCOUNTER: Primary | ICD-10-CM

## 2024-07-23 PROBLEM — S72.91XA FEMUR FRACTURE, RIGHT: Status: ACTIVE | Noted: 2024-07-23

## 2024-07-23 LAB
ALBUMIN SERPL-MCNC: 3.4 G/DL (ref 3.5–5.2)
ALBUMIN/GLOB SERPL: 0.9 G/DL
ALP SERPL-CCNC: 154 U/L (ref 39–117)
ALT SERPL W P-5'-P-CCNC: 17 U/L (ref 1–33)
ANION GAP SERPL CALCULATED.3IONS-SCNC: 12.2 MMOL/L (ref 5–15)
APTT PPP: 35.1 SECONDS (ref 22.7–35.4)
AST SERPL-CCNC: 26 U/L (ref 1–32)
BASOPHILS # BLD AUTO: 0.02 10*3/MM3 (ref 0–0.2)
BASOPHILS NFR BLD AUTO: 0.2 % (ref 0–1.5)
BILIRUB SERPL-MCNC: 0.5 MG/DL (ref 0–1.2)
BUN SERPL-MCNC: 40 MG/DL (ref 8–23)
BUN/CREAT SERPL: 10.2 (ref 7–25)
CALCIUM SPEC-SCNC: 9.9 MG/DL (ref 8.6–10.5)
CHLORIDE SERPL-SCNC: 95 MMOL/L (ref 98–107)
CO2 SERPL-SCNC: 27.8 MMOL/L (ref 22–29)
CREAT SERPL-MCNC: 3.92 MG/DL (ref 0.57–1)
DEPRECATED RDW RBC AUTO: 49.8 FL (ref 37–54)
EGFRCR SERPLBLD CKD-EPI 2021: 10.9 ML/MIN/1.73
EOSINOPHIL # BLD AUTO: 0.06 10*3/MM3 (ref 0–0.4)
EOSINOPHIL NFR BLD AUTO: 0.7 % (ref 0.3–6.2)
ERYTHROCYTE [DISTWIDTH] IN BLOOD BY AUTOMATED COUNT: 13.8 % (ref 12.3–15.4)
GLOBULIN UR ELPH-MCNC: 3.6 GM/DL
GLUCOSE SERPL-MCNC: 109 MG/DL (ref 65–99)
HCT VFR BLD AUTO: 33.5 % (ref 34–46.6)
HGB BLD-MCNC: 10.7 G/DL (ref 12–15.9)
IMM GRANULOCYTES # BLD AUTO: 0.03 10*3/MM3 (ref 0–0.05)
IMM GRANULOCYTES NFR BLD AUTO: 0.3 % (ref 0–0.5)
INR PPP: 1.65 (ref 0.9–1.1)
LYMPHOCYTES # BLD AUTO: 1.84 10*3/MM3 (ref 0.7–3.1)
LYMPHOCYTES NFR BLD AUTO: 21.4 % (ref 19.6–45.3)
MCH RBC QN AUTO: 31.4 PG (ref 26.6–33)
MCHC RBC AUTO-ENTMCNC: 31.9 G/DL (ref 31.5–35.7)
MCV RBC AUTO: 98.2 FL (ref 79–97)
MONOCYTES # BLD AUTO: 1.1 10*3/MM3 (ref 0.1–0.9)
MONOCYTES NFR BLD AUTO: 12.8 % (ref 5–12)
NEUTROPHILS NFR BLD AUTO: 5.53 10*3/MM3 (ref 1.7–7)
NEUTROPHILS NFR BLD AUTO: 64.6 % (ref 42.7–76)
NRBC BLD AUTO-RTO: 0 /100 WBC (ref 0–0.2)
PLATELET # BLD AUTO: 227 10*3/MM3 (ref 140–450)
PMV BLD AUTO: 9.1 FL (ref 6–12)
POTASSIUM SERPL-SCNC: 4.5 MMOL/L (ref 3.5–5.2)
PROT SERPL-MCNC: 7 G/DL (ref 6–8.5)
PROTHROMBIN TIME: 19.8 SECONDS (ref 11.7–14.2)
RBC # BLD AUTO: 3.41 10*6/MM3 (ref 3.77–5.28)
SODIUM SERPL-SCNC: 135 MMOL/L (ref 136–145)
WBC NRBC COR # BLD AUTO: 8.58 10*3/MM3 (ref 3.4–10.8)

## 2024-07-23 PROCEDURE — 85610 PROTHROMBIN TIME: CPT | Performed by: PHYSICIAN ASSISTANT

## 2024-07-23 PROCEDURE — 73070 X-RAY EXAM OF ELBOW: CPT

## 2024-07-23 PROCEDURE — 25010000002 MORPHINE PER 10 MG: Performed by: EMERGENCY MEDICINE

## 2024-07-23 PROCEDURE — 70450 CT HEAD/BRAIN W/O DYE: CPT

## 2024-07-23 PROCEDURE — 73502 X-RAY EXAM HIP UNI 2-3 VIEWS: CPT

## 2024-07-23 PROCEDURE — 73060 X-RAY EXAM OF HUMERUS: CPT

## 2024-07-23 PROCEDURE — 36415 COLL VENOUS BLD VENIPUNCTURE: CPT

## 2024-07-23 PROCEDURE — 73560 X-RAY EXAM OF KNEE 1 OR 2: CPT

## 2024-07-23 PROCEDURE — 80053 COMPREHEN METABOLIC PANEL: CPT | Performed by: PHYSICIAN ASSISTANT

## 2024-07-23 PROCEDURE — 72125 CT NECK SPINE W/O DYE: CPT

## 2024-07-23 PROCEDURE — 25010000002 ONDANSETRON PER 1 MG: Performed by: EMERGENCY MEDICINE

## 2024-07-23 PROCEDURE — 85730 THROMBOPLASTIN TIME PARTIAL: CPT | Performed by: PHYSICIAN ASSISTANT

## 2024-07-23 PROCEDURE — 99285 EMERGENCY DEPT VISIT HI MDM: CPT

## 2024-07-23 PROCEDURE — 85025 COMPLETE CBC W/AUTO DIFF WBC: CPT | Performed by: PHYSICIAN ASSISTANT

## 2024-07-23 PROCEDURE — 25010000002 HYDROMORPHONE PER 4 MG: Performed by: INTERNAL MEDICINE

## 2024-07-23 PROCEDURE — 25010000002 HYDROMORPHONE PER 4 MG: Performed by: EMERGENCY MEDICINE

## 2024-07-23 RX ORDER — METOPROLOL SUCCINATE 50 MG/1
50 TABLET, EXTENDED RELEASE ORAL DAILY
COMMUNITY
End: 2024-07-30 | Stop reason: HOSPADM

## 2024-07-23 RX ORDER — HYDROMORPHONE HYDROCHLORIDE 1 MG/ML
0.5 INJECTION, SOLUTION INTRAMUSCULAR; INTRAVENOUS; SUBCUTANEOUS
Status: DISPENSED | OUTPATIENT
Start: 2024-07-23 | End: 2024-07-28

## 2024-07-23 RX ORDER — TOPIRAMATE 100 MG/1
100 TABLET, FILM COATED ORAL NIGHTLY
Status: DISCONTINUED | OUTPATIENT
Start: 2024-07-24 | End: 2024-07-30 | Stop reason: HOSPADM

## 2024-07-23 RX ORDER — GABAPENTIN 100 MG/1
100 CAPSULE ORAL NIGHTLY
Status: DISCONTINUED | OUTPATIENT
Start: 2024-07-24 | End: 2024-07-30 | Stop reason: HOSPADM

## 2024-07-23 RX ORDER — ACETAMINOPHEN 325 MG/1
650 TABLET ORAL 4 TIMES DAILY PRN
Status: DISCONTINUED | OUTPATIENT
Start: 2024-07-23 | End: 2024-07-30 | Stop reason: HOSPADM

## 2024-07-23 RX ORDER — ATORVASTATIN CALCIUM 20 MG/1
20 TABLET, FILM COATED ORAL NIGHTLY
Status: DISCONTINUED | OUTPATIENT
Start: 2024-07-24 | End: 2024-07-30 | Stop reason: HOSPADM

## 2024-07-23 RX ORDER — LANOLIN ALCOHOL/MO/W.PET/CERES
3 CREAM (GRAM) TOPICAL NIGHTLY
COMMUNITY

## 2024-07-23 RX ORDER — METOPROLOL SUCCINATE 50 MG/1
50 TABLET, EXTENDED RELEASE ORAL DAILY
Status: DISCONTINUED | OUTPATIENT
Start: 2024-07-24 | End: 2024-07-24

## 2024-07-23 RX ORDER — AMOXICILLIN 250 MG
2 CAPSULE ORAL 2 TIMES DAILY PRN
Status: DISCONTINUED | OUTPATIENT
Start: 2024-07-23 | End: 2024-07-30 | Stop reason: HOSPADM

## 2024-07-23 RX ORDER — SEVELAMER CARBONATE 800 MG/1
800 TABLET, FILM COATED ORAL
Status: DISCONTINUED | OUTPATIENT
Start: 2024-07-24 | End: 2024-07-30 | Stop reason: HOSPADM

## 2024-07-23 RX ORDER — NALOXONE HCL 0.4 MG/ML
0.4 VIAL (ML) INJECTION
Status: DISCONTINUED | OUTPATIENT
Start: 2024-07-23 | End: 2024-07-30 | Stop reason: HOSPADM

## 2024-07-23 RX ORDER — ONDANSETRON 2 MG/ML
4 INJECTION INTRAMUSCULAR; INTRAVENOUS EVERY 6 HOURS PRN
Status: DISCONTINUED | OUTPATIENT
Start: 2024-07-23 | End: 2024-07-30 | Stop reason: HOSPADM

## 2024-07-23 RX ORDER — ATORVASTATIN CALCIUM 20 MG/1
20 TABLET, FILM COATED ORAL NIGHTLY
COMMUNITY

## 2024-07-23 RX ORDER — POLYETHYLENE GLYCOL 3350 17 G/17G
17 POWDER, FOR SOLUTION ORAL EVERY MORNING
Status: DISCONTINUED | OUTPATIENT
Start: 2024-07-24 | End: 2024-07-30 | Stop reason: HOSPADM

## 2024-07-23 RX ORDER — POLYETHYLENE GLYCOL 3350 17 G/17G
17 POWDER, FOR SOLUTION ORAL DAILY PRN
Status: DISCONTINUED | OUTPATIENT
Start: 2024-07-23 | End: 2024-07-30 | Stop reason: HOSPADM

## 2024-07-23 RX ORDER — ONDANSETRON 2 MG/ML
4 INJECTION INTRAMUSCULAR; INTRAVENOUS ONCE
Status: COMPLETED | OUTPATIENT
Start: 2024-07-23 | End: 2024-07-23

## 2024-07-23 RX ORDER — SODIUM CHLORIDE 0.9 % (FLUSH) 0.9 %
10 SYRINGE (ML) INJECTION AS NEEDED
Status: DISCONTINUED | OUTPATIENT
Start: 2024-07-23 | End: 2024-07-30 | Stop reason: HOSPADM

## 2024-07-23 RX ORDER — HYDROCODONE BITARTRATE AND ACETAMINOPHEN 5; 325 MG/1; MG/1
1 TABLET ORAL EVERY 6 HOURS PRN
Status: DISCONTINUED | OUTPATIENT
Start: 2024-07-23 | End: 2024-07-26

## 2024-07-23 RX ORDER — ZINC OXIDE 20 %
1 OINTMENT (GRAM) TOPICAL 2 TIMES DAILY
Status: DISCONTINUED | OUTPATIENT
Start: 2024-07-24 | End: 2024-07-23

## 2024-07-23 RX ORDER — MIRTAZAPINE 15 MG/1
7.5 TABLET, FILM COATED ORAL NIGHTLY
Status: DISCONTINUED | OUTPATIENT
Start: 2024-07-24 | End: 2024-07-30 | Stop reason: HOSPADM

## 2024-07-23 RX ORDER — HYDROMORPHONE HYDROCHLORIDE 1 MG/ML
0.25 INJECTION, SOLUTION INTRAMUSCULAR; INTRAVENOUS; SUBCUTANEOUS ONCE
Status: COMPLETED | OUTPATIENT
Start: 2024-07-23 | End: 2024-07-23

## 2024-07-23 RX ORDER — SODIUM CHLORIDE 9 MG/ML
50 INJECTION, SOLUTION INTRAVENOUS CONTINUOUS
Status: DISCONTINUED | OUTPATIENT
Start: 2024-07-23 | End: 2024-07-28

## 2024-07-23 RX ORDER — FAMOTIDINE 20 MG/1
10 TABLET, FILM COATED ORAL
Status: DISCONTINUED | OUTPATIENT
Start: 2024-07-24 | End: 2024-07-30 | Stop reason: HOSPADM

## 2024-07-23 RX ORDER — ZINC OXIDE 20 %
1 OINTMENT (GRAM) TOPICAL 2 TIMES DAILY
COMMUNITY
End: 2024-07-30 | Stop reason: HOSPADM

## 2024-07-23 RX ORDER — BISACODYL 5 MG/1
5 TABLET, DELAYED RELEASE ORAL DAILY PRN
Status: DISCONTINUED | OUTPATIENT
Start: 2024-07-23 | End: 2024-07-30 | Stop reason: HOSPADM

## 2024-07-23 RX ORDER — BISACODYL 10 MG
10 SUPPOSITORY, RECTAL RECTAL DAILY PRN
Status: DISCONTINUED | OUTPATIENT
Start: 2024-07-23 | End: 2024-07-30 | Stop reason: HOSPADM

## 2024-07-23 RX ORDER — ONDANSETRON 4 MG/1
4 TABLET, ORALLY DISINTEGRATING ORAL EVERY 6 HOURS PRN
Status: DISCONTINUED | OUTPATIENT
Start: 2024-07-23 | End: 2024-07-30 | Stop reason: HOSPADM

## 2024-07-23 RX ORDER — LIDOCAINE 4 G/G
2 PATCH TOPICAL EVERY 24 HOURS
COMMUNITY
End: 2024-07-30 | Stop reason: HOSPADM

## 2024-07-23 RX ORDER — MIDODRINE HYDROCHLORIDE 5 MG/1
10 TABLET ORAL EVERY 8 HOURS
Status: DISCONTINUED | OUTPATIENT
Start: 2024-07-24 | End: 2024-07-26

## 2024-07-23 RX ORDER — MORPHINE SULFATE 2 MG/ML
2 INJECTION, SOLUTION INTRAMUSCULAR; INTRAVENOUS ONCE
Status: COMPLETED | OUTPATIENT
Start: 2024-07-23 | End: 2024-07-23

## 2024-07-23 RX ADMIN — MORPHINE SULFATE 2 MG: 2 INJECTION, SOLUTION INTRAMUSCULAR; INTRAVENOUS at 19:47

## 2024-07-23 RX ADMIN — ONDANSETRON 4 MG: 2 INJECTION INTRAMUSCULAR; INTRAVENOUS at 19:47

## 2024-07-23 RX ADMIN — HYDROMORPHONE HYDROCHLORIDE 0.5 MG: 1 INJECTION, SOLUTION INTRAMUSCULAR; INTRAVENOUS; SUBCUTANEOUS at 21:37

## 2024-07-23 RX ADMIN — SODIUM CHLORIDE 75 ML/HR: 9 INJECTION, SOLUTION INTRAVENOUS at 22:00

## 2024-07-23 RX ADMIN — HYDROMORPHONE HYDROCHLORIDE 0.25 MG: 1 INJECTION, SOLUTION INTRAMUSCULAR; INTRAVENOUS; SUBCUTANEOUS at 19:08

## 2024-07-23 NOTE — ED PROVIDER NOTES
EMERGENCY DEPARTMENT MD ATTESTATION NOTE    Room Number:  P793/1  PCP: John Cano MD  Independent Historians: Patient and EMS    HPI:  A complete HPI/ROS/PMH/PSH/SH/FH are unobtainable due to: Poor historian    Chronic or social conditions impacting patient care (Social Determinants of Health): None      Context: Hazel Bucio is a 84 y.o. female with a medical history of hypertension, diabetes, dialysis dependent, atrial fibrillation who presents to the ED c/o acute leg pain.  The patient presents from the nursing home for leg pain.  Is unclear when it started.  She is on Eliquis for A-fib.  Reports severe pain to her right knee.  She has a history of dementia and is not able to provide any further history.        Review of prior external notes (non-ED) -and- Review of prior external test results outside of this encounter:  Laboratory evaluation 1/31/2024 shows CBC with a hemoglobin 9.7    Prescription drug monitoring program review:           PHYSICAL EXAM    I have reviewed the triage vital signs and nursing notes.    ED Triage Vitals [07/23/24 1712]   Temp Heart Rate Resp BP SpO2   98.5 °F (36.9 °C) 63 16 147/73 98 %      Temp src Heart Rate Source Patient Position BP Location FiO2 (%)   -- -- -- -- --       Physical Exam  GENERAL: Awake, alert, appears uncomfortable s  SKIN: Warm, dry  HENT: Normocephalic, atraumatic  EYES: no scleral icterus  CV: regular rhythm, regular rate  RESPIRATORY: normal effort, lungs clear  ABDOMEN: soft, nontender, nondistended  MUSCULOSKELETAL: Deformity to the right knee.  Severe pain to the right knee.  No tenderness to the hips.  NEURO: alert, moves all extremities, follows commands            MEDICATIONS GIVEN IN ER  Medications   sodium chloride 0.9 % flush 10 mL (has no administration in time range)   sodium chloride 0.9 % infusion (has no administration in time range)   HYDROcodone-acetaminophen (NORCO) 5-325 MG per tablet 1 tablet (has no administration in time range)    ondansetron ODT (ZOFRAN-ODT) disintegrating tablet 4 mg (has no administration in time range)     Or   ondansetron (ZOFRAN) injection 4 mg (has no administration in time range)   melatonin tablet 2.5 mg (has no administration in time range)   sennosides-docusate (PERICOLACE) 8.6-50 MG per tablet 2 tablet (has no administration in time range)     And   polyethylene glycol (MIRALAX) packet 17 g (has no administration in time range)     And   bisacodyl (DULCOLAX) EC tablet 5 mg (has no administration in time range)     And   bisacodyl (DULCOLAX) suppository 10 mg (has no administration in time range)   HYDROmorphone (DILAUDID) injection 0.5 mg (0.5 mg Intravenous Incomplete 7/23/24 2137)     And   naloxone (NARCAN) injection 0.4 mg (has no administration in time range)   HYDROmorphone (DILAUDID) injection 0.25 mg (0.25 mg Intravenous Given 7/23/24 1908)   ondansetron (ZOFRAN) injection 4 mg (4 mg Intravenous Given 7/23/24 1947)   morphine injection 2 mg (2 mg Intravenous Given 7/23/24 1947)         ORDERS PLACED DURING THIS VISIT:  Orders Placed This Encounter   Procedures    Splint Cast Strapping    CT Head Without Contrast    CT Cervical Spine Without Contrast    XR Hip With or Without Pelvis 2 - 3 View Right    XR Knee 1 or 2 View Right    XR Humerus Right    XR Elbow 2 View Right    Comprehensive Metabolic Panel    aPTT    Protime-INR    CBC Auto Differential    Basic Metabolic Panel    CBC (No Diff)    NPO Diet NPO Type: Strict NPO    Diet: Cardiac; Healthy Heart (2-3 Na+); Fluid Consistency: Thin (IDDSI 0)    Vital Signs    Activity - Strict Bed Rest    Intake & Output    Oral Care    Place Sequential Compression Device    Maintain Sequential Compression Device    Code Status and Medical Interventions: CPR (Attempt to Resuscitate); Full Support    Inpatient Case Management  Consult    Inpatient Orthopedic Surgery Consult    Incentive Spirometry    Insert Peripheral IV    Inpatient Admission     CBC & Differential         PROCEDURES  Procedures            PROGRESS, DATA ANALYSIS, CONSULTS, AND MEDICAL DECISION MAKING  All labs have been independently interpreted by me.  All radiology studies have been reviewed by me. All EKG's have been independently viewed and interpreted by me.  Discussion below represents my analysis of pertinent findings related to patient's condition, differential diagnosis, treatment plan and final disposition.    Differential diagnosis includes but is not limited to knee fracture, patella fracture, tibial plateau fracture, fibula fracture, femur fracture, intracranial hemorrhage.    Clinical Scores:                   ED Course as of 07/23/24 2302   Tue Jul 23, 2024   1819 X-ray of right knee independently interpreted by me as distal femur fracture [MP]   1829 XR Knee 1 or 2 View Right  My independent interpretation of the imaging study is distal femur fracture [TR]   1952 Preliminary report per Dr. Castillo is negative acute head and cervical spine CT [MP]   1955 I spoke with Dr. Merino with [facility].  Discussed patient presentation and ED findings.  She agrees to admit patient to a [facility] bed. [MP]      ED Course User Index  [MP] Nya Grayson PA-C  [TR] Blaine Vargas MD       MDM: History is limited with the patient's dementia.  There was no witnessed fall.  Plan to x-ray the patient's knee and hip.  I suspect she had a fall.  She will likely require admission as I suspect she has a fracture.  Will provide medication for pain.      COMPLEXITY OF CARE  The patient requires admission.    Please note that portions of this document were completed with a voice recognition program.    Note Disclaimer: At [facility], we believe that sharing information builds trust and better relationships. You are receiving this note because you recently visited [facility]. It is possible you will see health information before a provider has talked with you about it. This kind of information can  be easy to misunderstand. To help you fully understand what it means for your health, we urge you to discuss this note with your provider.         Blaine Vargas MD  07/23/24 1900       Blaine Vargas MD  07/23/24 1202

## 2024-07-23 NOTE — PROGRESS NOTES
Clinical Pharmacy Services: Medication History    Hazel Bucio is a 84 y.o. female presenting to [facility] for   Chief Complaint   Patient presents with    Leg Injury       She  has a past medical history of Acquired absence of kidney (10/04/2021), Acquired absence of other specified parts of digestive tract (09/09/2022), Athscl heart disease of native coronary 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 (09/09/2022), Atrial fibrillation with rapid ventricular response, Bladder cancer, Cancer, Cholecystitis with cholelithiasis, Chronic insomnia (08/27/2020), Deep vein thrombosis, Dyslipidemia (01/17/2017), E coli bacteremia, ESRD on dialysis (10/18/2023), Essential hypertension (01/17/2017), Fracture tibia/fibula, right, closed, initial encounter (08/27/2020), Gastro-esophageal reflux disease without esophagitis (09/14/2020), GERD (gastroesophageal reflux disease), History of bladder cancer, History of falling, History of urostomy, Hyperkalemia, Immobility (08/27/2020), Irritable bowel syndrome without diarrhea, Kidney carcinoma, right, Long term current use of anticoagulant (01/09/2015), Mild cognitive impairment of uncertain or unknown etiology (08/31/2023), Myalgia due to statin, Other specified abdominal hernia without obstruction or gangrene, PAF (paroxysmal atrial fibrillation) (06/26/2019), Permanent atrial fibrillation (06/26/2019), Personal history of other venous thrombosis and embolism, Presence of cardiac pacemaker (11/03/2014), Seasonal allergies (08/27/2020), Sepsis due to gram-negative UTI, Sick sinus syndrome (11/03/2014), Tear film insufficiency, Type 2 diabetes mellitus (09/05/2012), and 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    Allergies as of 07/23/2024 - Reviewed 07/23/2024   Allergen Reaction Noted    Amoxicillin Shortness Of Breath 07/19/2019    Ciprofloxacin Hives 01/31/2013    Oxycodone-acetaminophen Unknown - High Severity 09/25/2021    Penicillins Rash 05/23/2013    Sulfa antibiotics Hives 01/31/2013     Cephalexin Other (See Comments) 07/05/2017    Clavulanic acid Other (See Comments) 07/05/2017    Codeine Other (See Comments) 07/05/2017    Contrast dye (echo or unknown ct/mr) Other (See Comments) 07/05/2017    Doxycycline Other (See Comments) 07/05/2017    Fluconazole Other (See Comments) 07/05/2017    Iodinated contrast media Other (See Comments) 07/05/2017    Iodine Hives 07/19/2019    Macrobid [nitrofurantoin] Hives 07/19/2019    Tobramycin Other (See Comments) 07/05/2017    Trimethoprim Other (See Comments) 07/05/2017       Medication information was obtained from: [facility]  Home Paperwork  Pharmacy and Phone Number:     Prior to Admission Medications       Prescriptions Last Dose Informant Patient Reported? Taking?    acetaminophen (TYLENOL) 325 MG tablet 7/23/2024 [facility] Yes Yes    Take 2 tablets by mouth 4 (Four) Times a Day As Needed for Mild Pain.    apixaban (ELIQUIS) 2.5 MG tablet tablet 7/23/2024 Nursing Home No Yes    Take 1 tablet by mouth Every 12 (Twelve) Hours. Indications: Atrial Fibrillation    atorvastatin (LIPITOR) 20 MG tablet 7/22/2024 [facility] Yes Yes    Take 1 tablet by mouth Every Night.    dicyclomine (BENTYL) 10 MG capsule 7/23/2024 Nursing Home No Yes    Take 1 capsule by mouth 3 (Three) Times a Day.    famotidine (PEPCID) 10 MG tablet 7/23/2024 Nursing Home No Yes    Take 1 tablet by mouth Every Other Day.    furosemide (LASIX) 40 MG tablet 7/23/2024 [facility] Yes Yes    Take 1 tablet by mouth Daily.    gabapentin (NEURONTIN) 100 MG capsule 7/22/2024 Nursing Home No Yes    Take 1 capsule by mouth Every Night.    Lidocaine 4 %  Nursing Home Yes Yes    Place 2 patches on the skin as directed by provider Daily. Remove & Discard patch within 12 hours or as directed by MD    melatonin 3 MG tablet 7/22/2024 [facility]P Yes Yes    Take 1 tablet by mouth Every Night.    metoprolol succinate XL (TOPROL-XL) 50 MG 24 hr tablet 7/23/2024 [facility] Yes Yes    Take 1 tablet by mouth  Daily.    midodrine (PROAMATINE) 10 MG tablet 7/23/2024 Nursing Home No Yes    Take 1 tablet by mouth Every 8 (Eight) Hours.    mirtazapine (REMERON) 7.5 MG tablet 7/22/2024 [facility] Yes Yes    Take 1 tablet by mouth Every Night.    nitroglycerin (NITROSTAT) 0.4 MG SL tablet 7/11/2024 Nursing Home Yes Yes    Place 1 tablet under the tongue Every 5 (Five) Minutes As Needed for Chest Pain.    ondansetron ODT (ZOFRAN-ODT) 4 MG disintegrating tablet 6/10/2024 Nursing Home Yes Yes    Place 1 tablet on the tongue Every 4 (Four) Hours As Needed for Nausea or Vomiting.    polyethylene glycol (MiraLax) 17 GM/SCOOP powder 7/23/2024 Nursing Home Yes Yes    Take 17 g by mouth Every Morning.    sertraline (ZOLOFT) 50 MG tablet 7/22/2024 [facility] Yes Yes    Take 1 tablet by mouth every night at bedtime.    sevelamer (RENVELA) 800 MG tablet 7/23/2024 [facility] Yes Yes    Take 1 tablet by mouth 3 (Three) Times a Day With Meals.    topiramate (TOPAMAX) 100 MG tablet 7/22/2024 [facility] No Yes    Take 1 tablet by mouth Every Night.    traMADol (ULTRAM) 50 MG tablet 7/23/2024 [facility] Yes Yes    Take 1 tablet by mouth Every 6 (Six) Hours As Needed for Moderate Pain.    zinc oxide 20 % ointment 7/23/2024 [facility] Yes Yes    Apply 1 Application topically to the appropriate area as directed 2 (Two) Times a Day.    atorvastatin (LIPITOR) 10 MG tablet   No No    Take 1 tablet by mouth Daily.    metoprolol succinate XL (Toprol XL) 25 MG 24 hr tablet   No No    Take 1 tablet by mouth 2 (Two) Times a Day.    Patient taking differently:  Take 2 tablets by mouth Daily.              Medication notes:     This medication list is complete to the best of my knowledge as of 7/23/2024    Please call if questions.    Aliza Cali  Medication History Technician   [phone]    7/23/2024 19:41 EDT

## 2024-07-23 NOTE — ED NOTES
Pt states she saw her PCP today for back spasms. States pharmacy won't release T3 due to this med being in her chart as an allergy.   Pharmacist unable to fill due to allergy list. Advised md to send a new prescription tomorrow.   Pt called back and updated on plan of care.    Spoke with patient son Manish. Son updated as to patient condition.

## 2024-07-23 NOTE — ED PROVIDER NOTES
EMERGENCY DEPARTMENT ENCOUNTER  Room Number:  04/04  PCP: John Cano MD  Independent Historians: EMS      HPI:  Chief Complaint: had concerns including Leg Injury.     A complete HPI/ROS/PMH/PSH/SH/FH are unobtainable due to: Dementia    Chronic or social conditions impacting patient care (Social Determinants of Health): None      Context: Hazel Bucio is a 84 y.o. 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  Patient presents from nursing facility after she complained of leg pain.  Was noted to have right knee effusion.  Unclear if patient is ambulatory at baseline.  She is oriented only to self at baseline.  Unknown if patient had a fall.  She is anticoagulated on Eliquis.  Patient has Aspercreme patches on her right arm and her right leg.  Patient reports she was out with a friend and ran into a telephone pole.  History otherwise limited due to dementia.      Review of prior external notes (non-ED) -and- Review of prior external test results outside of this encounter:  Patient seen in office by cardiology on 6/26/2024 for atrial fibrillation.  Reviewed assessment and plan.  Patient will continue current therapy and will follow-up in 6 months.  Reviewed labs collected on 1/31/2024.  CBC with hemoglobin 9.7, CMP with creatinine 3.33.    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 09/09/2022    Acquired absence of other specified parts of digestive tract 09/09/2022    Gastro-esophageal reflux disease without esophagitis 09/14/2020    ESRD on dialysis 10/18/2023    Hypotension 01/29/2024    Mild cognitive impairment of uncertain or unknown etiology 08/31/2023     Resolved Ambulatory Problems     Diagnosis Date Noted    Sick sinus syndrome 09/14/2020    Tear film insufficiency 09/05/2012    Presbyopia 09/05/2012    Macular puckering of retina 09/05/2012    Tear film insufficiency, bilateral 02/13/2020    Pseudophakia, both eyes 02/13/2020    Lens replaced by other means 09/05/2012    Epiretinal membrane, bilateral 02/13/2020    Chest pain 04/28/2020    Coumadin toxicity, accidental or unintentional, initial encounter 04/29/2020    E. coli urinary tract infection 04/30/2020    Fracture tibia/fibula, right, closed, initial encounter 08/27/2020    Acute encephalopathy 08/27/2020    History of carcinoma in situ of breast 08/27/2020    Altered mental status 09/07/2020    MACRINA (acute kidney injury) 09/07/2020    Abdominal pain 10/10/2020    Cholecystitis with cholelithiasis 11/12/2020    Acute UTI 09/25/2021    Abdominal pain 12/07/2021    Acute UTI 12/10/2021    Other artificial openings of urinary tract status 10/04/2021    History of bladder cancer 10/04/2021    Sepsis, due to unspecified organism, unspecified whether acute organ dysfunction present 06/12/2022    Hydronephrosis 06/17/2022    RLS (restless legs syndrome) 06/17/2022    Weakness 06/17/2022    Preop cardiovascular exam 09/02/2022    Other specified abdominal hernia without obstruction or gangrene 09/02/2022    Fever 03/18/2023    Chronic kidney disease, stage 3a 09/09/2022    Hyperlipidemia, unspecified 09/14/2020    Paroxysmal atrial fibrillation 09/14/2020    Essential (primary) hypertension 09/14/2020    Personal history of breast cancer 09/14/2020    Presence of cardiac pacemaker  09/14/2020    Sick sinus syndrome 09/14/2020    Type 2 diabetes mellitus with diabetic chronic kidney disease 09/09/2022    Type 2 diabetes mellitus with diabetic polyneuropathy 09/09/2022    Malignant neoplasm of unspecified ureter 09/14/2020    Atrial fibrillation with RVR 03/20/2023    Sepsis due to gram-negative UTI 03/24/2023    E coli bacteremia 03/24/2023    Chest pain, unspecified type 04/18/2023    Left flank pain 06/16/2023    Myalgia due to statin 06/30/2023    Chest pain 10/13/2023    Atrial fibrillation with rapid ventricular response 11/09/2023    History of falling 04/11/2023    Personal history of other venous thrombosis and embolism 04/11/2023    Hyperkalemia 08/31/2023    Irritable bowel syndrome without diarrhea 08/31/2023     Past Medical History:   Diagnosis Date    Bladder cancer     Cancer     Deep vein thrombosis     GERD (gastroesophageal reflux disease)     History of urostomy     Immobility 08/27/2020    Kidney carcinoma, right     PAF (paroxysmal atrial fibrillation) 06/26/2019         PAST SURGICAL HISTORY  Past Surgical History:   Procedure Laterality Date    BREAST LUMPECTOMY      CARDIAC CATHETERIZATION N/A 10/18/2023    Procedure: Left Heart Cath possible PCI, atherectomy, hemodynamic support;  Surgeon: Augustin Ellsworth MD;  Location: [facility] CATH INVASIVE LOCATION;  Service: Cardiology;  Laterality: N/A;    CARDIAC ELECTROPHYSIOLOGY PROCEDURE N/A 4/28/2023    Procedure: Pacemaker gen change, [place] AWARE $;  Surgeon: Jose Carlos Cheng MD;  Location: [facility] CATH INVASIVE LOCATION;  Service: Cardiovascular;  Laterality: N/A;    CHOLECYSTECTOMY N/A 10/12/2020    Procedure: CHOLECYSTECTOMY LAPAROSCOPIC;  Surgeon: Go Pereira DO;  Location: [facility] MAIN OR;  Service: General;  Laterality: N/A;    CYSTECTOMY W/ URETEROILEAL CONDUIT      HARDWARE REMOVAL Right 6/11/2021    Procedure: TIBIA HARDWARE REMOVAL;  Surgeon: Geoff Shah II, MD;  Location: [facility] MAIN OR;   Service: Orthopedics;  Laterality: Right;    HERNIA REPAIR      HYSTERECTOMY      INSERT / REPLACE / REMOVE PACEMAKER      NEPHRECTOMY Right     TIBIA IM NAILING/RODDING Right 8/28/2020    Procedure: TIBIA INTRAMEDULLARY NAIL/ABRAHAM INSERTION;  Surgeon: Geoff Shah II, MD;  Location: [facility] MAIN OR;  Service: Orthopedics;  Laterality: Right;         FAMILY HISTORY  Family History   Problem Relation Age of Onset    COPD Father          SOCIAL HISTORY  Social History     Socioeconomic History    Marital status:    Tobacco Use    Smoking status: Never     Passive exposure: Never    Smokeless tobacco: Never   Vaping Use    Vaping status: Never Used   Substance and Sexual Activity    Alcohol use: Not Currently    Drug use: Never    Sexual activity: Defer         ALLERGIES  Amoxicillin, Ciprofloxacin, Oxycodone-acetaminophen, Penicillins, Sulfa antibiotics, Cephalexin, Clavulanic acid, Codeine, Contrast dye (echo or unknown ct/mr), Doxycycline, Fluconazole, Iodinated contrast media, Iodine, Macrobid [nitrofurantoin], Tobramycin, and Trimethoprim      REVIEW OF SYSTEMS  Review of Systems   Unable to perform ROS: Dementia     Included in HPI  All systems reviewed and negative except for those discussed in HPI.      PHYSICAL EXAM    I have reviewed the triage vital signs and nursing notes.    ED Triage Vitals [07/23/24 1712]   Temp Heart Rate Resp BP SpO2   98.5 °F (36.9 °C) 63 16 147/73 98 %      Temp src Heart Rate Source Patient Position BP Location FiO2 (%)   -- -- -- -- --       Physical Exam  Constitutional:       General: She is not in acute distress.     Appearance: She is well-developed.   HENT:      Head: Normocephalic and atraumatic.   Eyes:      Extraocular Movements: Extraocular movements intact.   Cardiovascular:      Rate and Rhythm: Normal rate and regular rhythm.      Heart sounds: Normal heart sounds.      Comments: Distal pulses intact  Pulmonary:      Effort: Pulmonary effort is normal.       Breath sounds: Normal breath sounds.   Abdominal:      General: There is no distension.   Musculoskeletal:      Comments: Right leg is shortened and externally rotated.  She has tenderness over the right hip.  There is right knee effusion noted.  Patient is tender over the distal humerus and elbow on the right.   Skin:     General: Skin is warm.   Neurological:      General: No focal deficit present.      Mental Status: She is alert.      Comments: Patient oriented to self only   Psychiatric:         Mood and Affect: Mood normal.           LAB RESULTS  Recent Results (from the past 24 hour(s))   Comprehensive Metabolic Panel    Collection Time: 07/23/24  6:49 PM    Specimen: Arm, Left; Blood   Result Value Ref Range    Glucose 109 (H) 65 - 99 mg/dL    BUN 40 (H) 8 - 23 mg/dL    Creatinine 3.92 (H) 0.57 - 1.00 mg/dL    Sodium 135 (L) 136 - 145 mmol/L    Potassium 4.5 3.5 - 5.2 mmol/L    Chloride 95 (L) 98 - 107 mmol/L    CO2 27.8 22.0 - 29.0 mmol/L    Calcium 9.9 8.6 - 10.5 mg/dL    Total Protein 7.0 6.0 - 8.5 g/dL    Albumin 3.4 (L) 3.5 - 5.2 g/dL    ALT (SGPT) 17 1 - 33 U/L    AST (SGOT) 26 1 - 32 U/L    Alkaline Phosphatase 154 (H) 39 - 117 U/L    Total Bilirubin 0.5 0.0 - 1.2 mg/dL    Globulin 3.6 gm/dL    A/G Ratio 0.9 g/dL    BUN/Creatinine Ratio 10.2 7.0 - 25.0    Anion Gap 12.2 5.0 - 15.0 mmol/L    eGFR 10.9 (L) >60.0 mL/min/1.73   aPTT    Collection Time: 07/23/24  6:49 PM    Specimen: Arm, Left; Blood   Result Value Ref Range    PTT 35.1 22.7 - 35.4 seconds   Protime-INR    Collection Time: 07/23/24  6:49 PM    Specimen: Arm, Left; Blood   Result Value Ref Range    Protime 19.8 (H) 11.7 - 14.2 Seconds    INR 1.65 (H) 0.90 - 1.10   CBC Auto Differential    Collection Time: 07/23/24  6:49 PM    Specimen: Arm, Left; Blood   Result Value Ref Range    WBC 8.58 3.40 - 10.80 10*3/mm3    RBC 3.41 (L) 3.77 - 5.28 10*6/mm3    Hemoglobin 10.7 (L) 12.0 - 15.9 g/dL    Hematocrit 33.5 (L) 34.0 - 46.6 %    MCV 98.2  (H) 79.0 - 97.0 fL    MCH 31.4 26.6 - 33.0 pg    MCHC 31.9 31.5 - 35.7 g/dL    RDW 13.8 12.3 - 15.4 %    RDW-SD 49.8 37.0 - 54.0 fl    MPV 9.1 6.0 - 12.0 fL    Platelets 227 140 - 450 10*3/mm3    Neutrophil % 64.6 42.7 - 76.0 %    Lymphocyte % 21.4 19.6 - 45.3 %    Monocyte % 12.8 (H) 5.0 - 12.0 %    Eosinophil % 0.7 0.3 - 6.2 %    Basophil % 0.2 0.0 - 1.5 %    Immature Grans % 0.3 0.0 - 0.5 %    Neutrophils, Absolute 5.53 1.70 - 7.00 10*3/mm3    Lymphocytes, Absolute 1.84 0.70 - 3.10 10*3/mm3    Monocytes, Absolute 1.10 (H) 0.10 - 0.90 10*3/mm3    Eosinophils, Absolute 0.06 0.00 - 0.40 10*3/mm3    Basophils, Absolute 0.02 0.00 - 0.20 10*3/mm3    Immature Grans, Absolute 0.03 0.00 - 0.05 10*3/mm3    nRBC 0.0 0.0 - 0.2 /100 WBC         RADIOLOGY  CT Head Without Contrast, CT Cervical Spine Without Contrast    Result Date: 7/23/2024  EMERGENCY CT SCAN OF THE HEAD AND CERVICAL SPINE WITHOUT CONTRAST ON 07/23/2024  CLINICAL HISTORY: This is an 84-year-old female patient who has a history of dementia and had a fall. The patient is anticoagulated, on Eliquis.  HEAD CT TECHNIQUE: Spiral CT images were obtained from the base of the skull to the vertex without intravenous contrast. The images were reformatted and are submitted in 3 mm thick axial, sagittal and coronal CT sections with brain algorithm and 2 mm thick axial CT sections with high-resolution bone algorithm.  COMPARISON: This is correlated to a prior head CT from [facility] on 12/07/2022.  FINDINGS: There are extensive patchy and confluent areas of low-density throughout the white matter of the cerebral hemispheres consistent with moderate to severe small vessel disease. There is an old lacunar infarct in the left thalamus. The remainder of the brain parenchyma is normal in attenuation. There is diffuse cerebral atrophy. The ventricles are normal in size. I see no focal mass effect. There is no midline shift. No extra axial fluid collections  are identified. There is no evidence of acute intracranial hemorrhage. There is a 4 mm rounded nodular density projecting over the left anterior cerebral artery on axial image 34, could potentially be a callosomarginal origin aneurysm that is unchanged when compared to head CT 12/07/2022. No acute skull fractures identified. The calvarium and the skull base are normal in appearance. The paranasal sinuses and the mastoid air cells and the middle ear cavities are clear. Bilateral intraocular lens implants are in place in the globes from previous bilateral cataract surgery, otherwise orbits are normal in appearance.      1. No acute intracranial abnormalities identified with no significant change when compared to prior head CT on 12/07/2022  2. There is confluent low-density throughout the cerebral white matter consistent with moderate to severe small vessel disease and there is diffuse cerebral atrophy. There is a 4 mm nodular density projecting over the left anterior cerebral artery on axial image 34 and coronal image 26 and it could be a small pericallosal artery origin aneurysm that is unchanged since head CT on 12/07/2022 but could be further assessed with an MRA or CTA of the of the head if clinically indicated. The remainder of the head CT is normal with no acute skull fracture or intracranial hemorrhage identified.  CERVICAL SPINE CT TECHNIQUE: Spiral CT images were obtained from the skull base down to the T2 thoracic level and images were reformatted and submitted in 2 mm thick axial and sagittal CT sections with soft tissue algorithm, 1 mm thick axial, sagittal and coronal CT sections with high-resolution bone algorithm  FINDINGS: The atlantooccipital articulation is within normal limits.  At C1-2 there is failure of fusion of the anterior midline, posterior midline of the ring of C1 with well-corticated margins may be congenital in nature, its nonacute finding.  At C2-3 there is mild to moderate left,  minimal right facet overgrowth, small posterior central disc bulge. There is some thickening of the interlaminar interspinous ligaments that indents the posterior thecal sac, abuts the posterior cord mild to moderately narrowing the canal. There is no foraminal narrowing.  At C3-4 there is moderate left facet overgrowth. Right facets are normal, there is posterior central disc bulge, there is mild canal narrowing, there is some left uncovertebral joint hypertrophy and combination with left facet spurs result in moderate left bony foraminal narrowing.  At C4-5 there is mild-moderate left facet overgrowth, there is some distention of the left facet joint likely with fluid. There is minimal right facet overgrowth, there is 1 to 2 mm anterolisthesis of C4 on C5, posterior central disc bulge. There is mild canal narrowing, some left uncovertebral joint hypertrophy in combination with left facet spurs moderately narrows the left neural foramen at C4-5.  At C5-6 there is posterior disc bulge and mild canal narrowing. There is mild left and no right facet overgrowth, mild uncovertebral joint hypertrophy and mild left and no right foraminal narrowing.  At C6-7 some degenerative disc and endplate changes, mild posterior spurring, mild canal narrowing, facets are victorino. There is some uncovertebral joint spurring in the left foramen with mild to moderate left bony foraminal narrowing.  At C7-T1 posterior endplates and facets are normal with no bony canal or foraminal narrowing.  No acute fractures seen in the cervical spine.  IMPRESSION: 1. No acute fractures seen in the cervical spine. There is cervical spondylosis as described above.  Radiation dose reduction techniques were utilized, including automated exposure control and exposure modulation based on body size.       XR Hip With or Without Pelvis 2 - 3 View Right, XR Knee 1 or 2 View Right, XR Humerus Right, XR Elbow 2 View Right    Result Date: 7/23/2024  RIGHT HUMERUS,  RIGHT ELBOW, PELVIS/RIGHT HIP, RIGHT KNEE.  HISTORY: Injury. Fall.  COMPARISON: Right shoulder x-ray 03/18/2023.  FINDINGS: RIGHT HUMERUS: 2 VIEWS.  FINDINGS: The bones are osteopenic. There is glenohumeral joint osteoarthritis with marginal spurring at the glenohumeral joint. Evaluation of the right humeral head and shoulder is limited though there is no definite humeral fracture. No fracture or acute abnormality is demonstrated mid to distal right humerus.  RIGHT ELBOW: 2 VIEWS:  FINDINGS: No evidence for fracture or dislocation. Soft tissues appear normal and there is no evidence to suggest hemarthrosis.  AP PELVIS AND AP AND FROG LATERAL RIGHT HIP:  FINDINGS: Evaluation for fracture is limited by the degree of osteopenia. No fracture or acute osseous abnormality is evident.  RIGHT KNEE: AP, LATERAL:  FINDINGS: There is an acute distal femoral metaphyseal fracture associated with impaction and anterior lateral angulation. There is potential intracondylar extension. Bones appear osteopenic. There has s been previous antegrade placement of a tibial IM jasmine with 2 proximal interlocking screws. There appear to be old healed fractures of the proximal tibial and fibular metaphyses. Vascular calcifications are present..      1. Distal right femoral metaphyseal fracture with impaction and anterior lateral angulation and potential intracondylar extension. Osteopenia. 2. Limited evaluation of the right shoulder/proximal humerus. No definite right humeral fracture is evident though if there is ongoing suspicion for right humeral head or shoulder fracture, I would recommend dedicated right shoulder x-rays. 3. Osteopenia limits evaluation of the pelvis/right hip without evidence for acute abnormality of the pelvis or right hip. 4. No evidence for acute abnormality of the right elbow.  This report was finalized on 7/23/2024 7:24 PM by Dr. Italo Hackett M.D on Workstation: [id number]         MEDICATIONS GIVEN IN  ER  Medications   sodium chloride 0.9 % flush 10 mL (has no administration in time range)   sodium chloride 0.9 % infusion (has no administration in time range)   HYDROcodone-acetaminophen (NORCO) 5-325 MG per tablet 1 tablet (has no administration in time range)   ondansetron ODT (ZOFRAN-ODT) disintegrating tablet 4 mg (has no administration in time range)     Or   ondansetron (ZOFRAN) injection 4 mg (has no administration in time range)   melatonin tablet 2.5 mg (has no administration in time range)   sennosides-docusate (PERICOLACE) 8.6-50 MG per tablet 2 tablet (has no administration in time range)     And   polyethylene glycol (MIRALAX) packet 17 g (has no administration in time range)     And   bisacodyl (DULCOLAX) EC tablet 5 mg (has no administration in time range)     And   bisacodyl (DULCOLAX) suppository 10 mg (has no administration in time range)   HYDROmorphone (DILAUDID) injection 0.5 mg (has no administration in time range)     And   naloxone (NARCAN) injection 0.4 mg (has no administration in time range)   HYDROmorphone (DILAUDID) injection 0.25 mg (0.25 mg Intravenous Given 7/23/24 1908)   ondansetron (ZOFRAN) injection 4 mg (4 mg Intravenous Given 7/23/24 1947)   morphine injection 2 mg (2 mg Intravenous Given 7/23/24 1947)         ORDERS PLACED DURING THIS VISIT:  Orders Placed This Encounter   Procedures    Splint Cast Strapping    CT Head Without Contrast    CT Cervical Spine Without Contrast    XR Hip With or Without Pelvis 2 - 3 View Right    XR Knee 1 or 2 View Right    XR Humerus Right    XR Elbow 2 View Right    Comprehensive Metabolic Panel    aPTT    Protime-INR    CBC Auto Differential    Basic Metabolic Panel    CBC (No Diff)    NPO Diet NPO Type: Strict NPO    Diet: Cardiac; Healthy Heart (2-3 Na+); Fluid Consistency: Thin (IDDSI 0)    Vital Signs    Activity - Strict Bed Rest    Intake & Output    Oral Care    Place Sequential Compression Device    Maintain Sequential Compression  Device    Code Status and Medical Interventions: CPR (Attempt to Resuscitate); Full Support    Inpatient Case Management  Consult    Inpatient Orthopedic Surgery Consult    Incentive Spirometry    Insert Peripheral IV    Inpatient Admission    CBC & Differential         OUTPATIENT MEDICATION MANAGEMENT:  Current Facility-Administered Medications Ordered in Epic   Medication Dose Route Frequency Provider Last Rate Last Admin    sennosides-docusate (PERICOLACE) 8.6-50 MG per tablet 2 tablet  2 tablet Oral BID PRN Angelique, Kay Noble MD        And    polyethylene glycol (MIRALAX) packet 17 g  17 g Oral Daily PRN Angelique, Kay Noble MD        And    bisacodyl (DULCOLAX) EC tablet 5 mg  5 mg Oral Daily PRN Angelique, Kay Noble MD        And    bisacodyl (DULCOLAX) suppository 10 mg  10 mg Rectal Daily PRN Angelique, Kay Noble MD        HYDROcodone-acetaminophen (NORCO) 5-325 MG per tablet 1 tablet  1 tablet Oral Q6H PRN Angelique, Kay Noble MD        HYDROmorphone (DILAUDID) injection 0.5 mg  0.5 mg Intravenous Q2H PRN Kay Merino MD        And    naloxone (NARCAN) injection 0.4 mg  0.4 mg Intravenous Q5 Min PRN Angelique, aKy Noble MD        melatonin tablet 2.5 mg  2.5 mg Oral Nightly PRN Kay Merino MD        ondansetron ODT (ZOFRAN-ODT) disintegrating tablet 4 mg  4 mg Oral Q6H PRN Kay Merino MD        Or    ondansetron (ZOFRAN) injection 4 mg  4 mg Intravenous Q6H PRN Kay Merino MD        sodium chloride 0.9 % flush 10 mL  10 mL Intravenous PRN Nya Grayson PA-C        sodium chloride 0.9 % infusion  75 mL/hr Intravenous Continuous StingKay ruth MD         Current Outpatient Medications Ordered in Epic   Medication Sig Dispense Refill    acetaminophen (TYLENOL) 325 MG tablet Take 2 tablets by mouth 4 (Four) Times a Day As Needed for Mild Pain.      apixaban (ELIQUIS) 2.5 MG tablet tablet Take 1 tablet by mouth Every 12 (Twelve)  Hours. Indications: Atrial Fibrillation 60 tablet 0    atorvastatin (LIPITOR) 20 MG tablet Take 1 tablet by mouth Every Night.      dicyclomine (BENTYL) 10 MG capsule Take 1 capsule by mouth 3 (Three) Times a Day. 60 capsule 0    famotidine (PEPCID) 10 MG tablet Take 1 tablet by mouth Every Other Day. 30 tablet 0    furosemide (LASIX) 40 MG tablet Take 1 tablet by mouth Daily.      gabapentin (NEURONTIN) 100 MG capsule Take 1 capsule by mouth Every Night. 30 capsule 0    Lidocaine 4 % Place 2 patches on the skin as directed by provider Daily. Remove & Discard patch within 12 hours or as directed by MD      melatonin 3 MG tablet Take 1 tablet by mouth Every Night.      metoprolol succinate XL (TOPROL-XL) 50 MG 24 hr tablet Take 1 tablet by mouth Daily.      midodrine (PROAMATINE) 10 MG tablet Take 1 tablet by mouth Every 8 (Eight) Hours. 90 tablet 1    mirtazapine (REMERON) 7.5 MG tablet Take 1 tablet by mouth Every Night.      nitroglycerin (NITROSTAT) 0.4 MG SL tablet Place 1 tablet under the tongue Every 5 (Five) Minutes As Needed for Chest Pain.      ondansetron ODT (ZOFRAN-ODT) 4 MG disintegrating tablet Place 1 tablet on the tongue Every 4 (Four) Hours As Needed for Nausea or Vomiting.      polyethylene glycol (MiraLax) 17 GM/SCOOP powder Take 17 g by mouth Every Morning.      sertraline (ZOLOFT) 50 MG tablet Take 1 tablet by mouth every night at bedtime.      sevelamer (RENVELA) 800 MG tablet Take 1 tablet by mouth 3 (Three) Times a Day With Meals.      topiramate (TOPAMAX) 100 MG tablet Take 1 tablet by mouth Every Night. 30 tablet 0    traMADol (ULTRAM) 50 MG tablet Take 1 tablet by mouth Every 6 (Six) Hours As Needed for Moderate Pain.      zinc oxide 20 % ointment Apply 1 Application topically to the appropriate area as directed 2 (Two) Times a Day.      atorvastatin (LIPITOR) 10 MG tablet Take 1 tablet by mouth Daily. 90 tablet 3    metoprolol succinate XL (Toprol XL) 25 MG 24 hr tablet Take 1 tablet by  mouth 2 (Two) Times a Day. (Patient taking differently: Take 2 tablets by mouth Daily.) 60 tablet 0         PROCEDURES  Splint - Cast - Strapping    Date/Time: 7/23/2024 7:55 PM    Performed by: Nya Grayson PA-C  Authorized by: Kay Merino MD    Consent:     Consent obtained:  Verbal    Consent given by:  Patient    Risks, benefits, and alternatives were discussed: yes      Risks discussed:  Pain    Alternatives discussed:  No treatment  Universal protocol:     Procedure explained and questions answered to patient or proxy's satisfaction: yes      Patient identity confirmed:  Arm band  Pre-procedure details:     Distal neurologic exam:  Normal    Distal perfusion: distal pulses strong    Procedure details:     Location:  Knee    Knee location:  R knee    Splint type:  Long leg    Supplies:  Cotton padding, fiberglass and elastic bandage    Attestation: Splint applied and adjusted personally by me    Post-procedure details:     Distal neurologic exam:  Normal    Distal perfusion: distal pulses strong      Procedure completion:  Tolerated well, no immediate complications    Post-procedure imaging: not applicable            PROGRESS, DATA ANALYSIS, CONSULTS, AND MEDICAL DECISION MAKING  All labs have been independently interpreted by me.  All radiology studies have been reviewed by me. All EKG's have been independently viewed and interpreted by me.  Discussion below represents my analysis of pertinent findings related to patient's condition, differential diagnosis, treatment plan and final disposition.    Differential diagnosis includes but is not limited to right hip fracture, right arm fracture, right knee fracture.        ED Course as of 07/23/24 2059   Tue Jul 23, 2024 1819 X-ray of right knee independently interpreted by me as distal femur fracture [MP]   1829 XR Knee 1 or 2 View Right  My independent interpretation of the imaging study is distal femur fracture [TR]   1952 Preliminary report  per Dr. Castillo is negative acute head and cervical spine CT [MP]   1955 I spoke with Dr. Merino with [facility].  Discussed patient presentation and ED findings.  She agrees to admit patient to a [facility] bed. [MP]      ED Course User Index  [MP] Nya Grayson PA-C  [TR] Blanie Vargas MD             AS OF 20:59 EDT VITALS:    BP - 113/61  HR - 118  TEMP - 98.5 °F (36.9 °C)  O2 SATS - 98%    COMPLEXITY OF CARE  The patient requires admission.      DIAGNOSIS  Final diagnoses:   Closed fracture of distal end of right femur, unspecified fracture morphology, initial encounter   Right hip pain   Right arm pain   History of dementia   Anticoagulated         DISPOSITION  ED Disposition       ED Disposition   Decision to Admit    Condition   --    Comment   Level of Care: Med/Surg [1]   Diagnosis: Femur fracture, right [132066]   Admitting Physician: GERARD MERINO [[id number]]   Attending Physician: GERARD MERINO [[id number]]   Certification: I Certify That Inpatient Hospital Services Are Medically Necessary For Greater Than 2 Midnights                  Please note that portions of this document were completed with a voice recognition program.    Note Disclaimer: At [facility], we believe that sharing information builds trust and better relationships. You are receiving this note because you recently visited [facility]. It is possible you will see health information before a provider has talked with you about it. This kind of information can be easy to misunderstand. To help you fully understand what it means for your health, we urge you to discuss this note with your provider.         Nya Grayson PA-C  07/23/24 3125

## 2024-07-24 ENCOUNTER — APPOINTMENT (OUTPATIENT)
Dept: GENERAL RADIOLOGY | Facility: HOSPITAL | Age: 84
End: 2024-07-24
Payer: MEDICARE

## 2024-07-24 ENCOUNTER — APPOINTMENT (OUTPATIENT)
Dept: CT IMAGING | Facility: HOSPITAL | Age: 84
End: 2024-07-24
Payer: MEDICARE

## 2024-07-24 LAB
ANION GAP SERPL CALCULATED.3IONS-SCNC: 11 MMOL/L (ref 5–15)
BUN SERPL-MCNC: 50 MG/DL (ref 8–23)
BUN/CREAT SERPL: 12.4 (ref 7–25)
CALCIUM SPEC-SCNC: 9.1 MG/DL (ref 8.6–10.5)
CHLORIDE SERPL-SCNC: 95 MMOL/L (ref 98–107)
CO2 SERPL-SCNC: 25 MMOL/L (ref 22–29)
CREAT SERPL-MCNC: 4.03 MG/DL (ref 0.57–1)
DEPRECATED RDW RBC AUTO: 48.9 FL (ref 37–54)
EGFRCR SERPLBLD CKD-EPI 2021: 10.5 ML/MIN/1.73
ERYTHROCYTE [DISTWIDTH] IN BLOOD BY AUTOMATED COUNT: 13.5 % (ref 12.3–15.4)
GLUCOSE SERPL-MCNC: 101 MG/DL (ref 65–99)
HCT VFR BLD AUTO: 31 % (ref 34–46.6)
HGB BLD-MCNC: 9.9 G/DL (ref 12–15.9)
MAGNESIUM SERPL-MCNC: 2.5 MG/DL (ref 1.6–2.4)
MCH RBC QN AUTO: 31.6 PG (ref 26.6–33)
MCHC RBC AUTO-ENTMCNC: 31.9 G/DL (ref 31.5–35.7)
MCV RBC AUTO: 99 FL (ref 79–97)
PLATELET # BLD AUTO: 209 10*3/MM3 (ref 140–450)
PMV BLD AUTO: 9.2 FL (ref 6–12)
POTASSIUM SERPL-SCNC: 5 MMOL/L (ref 3.5–5.2)
QT INTERVAL: 326 MS
QTC INTERVAL: 498 MS
RBC # BLD AUTO: 3.13 10*6/MM3 (ref 3.77–5.28)
SODIUM SERPL-SCNC: 131 MMOL/L (ref 136–145)
WBC NRBC COR # BLD AUTO: 10.48 10*3/MM3 (ref 3.4–10.8)

## 2024-07-24 PROCEDURE — 93005 ELECTROCARDIOGRAM TRACING: CPT | Performed by: HOSPITALIST

## 2024-07-24 PROCEDURE — 25010000002 HYDROMORPHONE 1 MG/ML SOLUTION: Performed by: INTERNAL MEDICINE

## 2024-07-24 PROCEDURE — 99222 1ST HOSP IP/OBS MODERATE 55: CPT | Performed by: INTERNAL MEDICINE

## 2024-07-24 PROCEDURE — 73552 X-RAY EXAM OF FEMUR 2/>: CPT

## 2024-07-24 PROCEDURE — 25810000003 SODIUM CHLORIDE 0.9 % SOLUTION: Performed by: INTERNAL MEDICINE

## 2024-07-24 PROCEDURE — 87102 FUNGUS ISOLATION CULTURE: CPT | Performed by: HOSPITALIST

## 2024-07-24 PROCEDURE — 93010 ELECTROCARDIOGRAM REPORT: CPT | Performed by: INTERNAL MEDICINE

## 2024-07-24 PROCEDURE — 93005 ELECTROCARDIOGRAM TRACING: CPT

## 2024-07-24 PROCEDURE — 85027 COMPLETE CBC AUTOMATED: CPT | Performed by: INTERNAL MEDICINE

## 2024-07-24 PROCEDURE — 25010000002 DIGOXIN PER 500 MCG: Performed by: INTERNAL MEDICINE

## 2024-07-24 PROCEDURE — 36415 COLL VENOUS BLD VENIPUNCTURE: CPT | Performed by: INTERNAL MEDICINE

## 2024-07-24 PROCEDURE — 25010000002 HYDROMORPHONE PER 4 MG: Performed by: INTERNAL MEDICINE

## 2024-07-24 PROCEDURE — 83735 ASSAY OF MAGNESIUM: CPT | Performed by: HOSPITALIST

## 2024-07-24 PROCEDURE — 80048 BASIC METABOLIC PNL TOTAL CA: CPT | Performed by: INTERNAL MEDICINE

## 2024-07-24 RX ORDER — DIGOXIN 0.25 MG/ML
250 INJECTION INTRAMUSCULAR; INTRAVENOUS ONCE
Status: COMPLETED | OUTPATIENT
Start: 2024-07-24 | End: 2024-07-24

## 2024-07-24 RX ORDER — METOPROLOL SUCCINATE 50 MG/1
50 TABLET, EXTENDED RELEASE ORAL
Status: DISCONTINUED | OUTPATIENT
Start: 2024-07-25 | End: 2024-07-26

## 2024-07-24 RX ORDER — NITROGLYCERIN 0.4 MG/1
0.4 TABLET SUBLINGUAL
Status: DISCONTINUED | OUTPATIENT
Start: 2024-07-24 | End: 2024-07-30 | Stop reason: HOSPADM

## 2024-07-24 RX ADMIN — HYDROMORPHONE HYDROCHLORIDE 0.5 MG: 1 INJECTION, SOLUTION INTRAMUSCULAR; INTRAVENOUS; SUBCUTANEOUS at 04:05

## 2024-07-24 RX ADMIN — ANORECTAL OINTMENT 1 APPLICATION: 15.7; .44; 24; 20.6 OINTMENT TOPICAL at 21:43

## 2024-07-24 RX ADMIN — HYDROMORPHONE HYDROCHLORIDE 0.5 MG: 1 INJECTION, SOLUTION INTRAMUSCULAR; INTRAVENOUS; SUBCUTANEOUS at 17:51

## 2024-07-24 RX ADMIN — HYDROMORPHONE HYDROCHLORIDE 0.5 MG: 1 INJECTION, SOLUTION INTRAMUSCULAR; INTRAVENOUS; SUBCUTANEOUS at 10:25

## 2024-07-24 RX ADMIN — MIDODRINE HYDROCHLORIDE 10 MG: 5 TABLET ORAL at 17:58

## 2024-07-24 RX ADMIN — HYDROMORPHONE HYDROCHLORIDE 0.5 MG: 1 INJECTION, SOLUTION INTRAMUSCULAR; INTRAVENOUS; SUBCUTANEOUS at 21:42

## 2024-07-24 RX ADMIN — METOPROLOL SUCCINATE 50 MG: 50 TABLET, EXTENDED RELEASE ORAL at 07:00

## 2024-07-24 RX ADMIN — DIGOXIN 250 MCG: 0.25 INJECTION INTRAMUSCULAR; INTRAVENOUS at 12:18

## 2024-07-24 RX ADMIN — ATORVASTATIN CALCIUM 20 MG: 20 TABLET, FILM COATED ORAL at 01:43

## 2024-07-24 RX ADMIN — SODIUM CHLORIDE 75 ML/HR: 9 INJECTION, SOLUTION INTRAVENOUS at 09:10

## 2024-07-24 RX ADMIN — TOPIRAMATE 100 MG: 100 TABLET, FILM COATED ORAL at 01:44

## 2024-07-24 RX ADMIN — HYDROMORPHONE HYDROCHLORIDE 0.5 MG: 1 INJECTION, SOLUTION INTRAMUSCULAR; INTRAVENOUS; SUBCUTANEOUS at 02:07

## 2024-07-24 RX ADMIN — HYDROMORPHONE HYDROCHLORIDE 0.5 MG: 1 INJECTION, SOLUTION INTRAMUSCULAR; INTRAVENOUS; SUBCUTANEOUS at 14:41

## 2024-07-24 RX ADMIN — GABAPENTIN 100 MG: 100 CAPSULE ORAL at 01:44

## 2024-07-24 RX ADMIN — Medication: at 10:25

## 2024-07-24 RX ADMIN — FAMOTIDINE 10 MG: 20 TABLET, FILM COATED ORAL at 01:44

## 2024-07-24 RX ADMIN — POLYETHYLENE GLYCOL 3350 17 G: 17 POWDER, FOR SOLUTION ORAL at 12:24

## 2024-07-24 RX ADMIN — Medication 2.5 MG: at 01:44

## 2024-07-24 NOTE — PROGRESS NOTES
84-year-old comes the hospital because of leg pain and found to have fracture  -Called somewhat emergently this morning because her heart rate was 160.  She was agitated.  -He was given pain medication with the nurses worked with the patient and slowly her agitation improved and her heart rate improved.  She was also given metoprolol this morning    Patient remains afebrile - heart rate peaked at about 165 this morning.  Heart rate now is 100-110.  Blood pressures reported to be low this afternoon will repeat it.  O2 sats 97% on 3 L    She is awake and alert was agitated this morning but then when I revisited her her agitation had improved when she was transferred to  S.  Heart rate was tachycardic this morning when I saw her but it is better this afternoon  Lungs are clear to auscultation bilaterally  Abdomen soft nontender nondistended  Legs are both warm to the touch      Potassium is 5 0 this morning and creatinine is 4      Hemoglobin 7.9    Assessment and plan:    Right femur fracture-plan is for ORIF tomorrow  Patient remain n.p.o.  -Patient seen by cardiology    Atrial fibrillation  -She was given low-dose digoxin and continue with her metoprolol    Chronic kidney disease  -Nephrology consulted, make sure they have been called  -Emily Joe

## 2024-07-24 NOTE — NURSING NOTE
HR sustaining between 135-150, Stat EKG ordered, A-fib RVR. Spoke with Dr. Mendieta, telemetry orders placed and PO 50mg metoprolol given early. Cardiology consult placed. Telemetry placed on patient.

## 2024-07-24 NOTE — PROGRESS NOTES
Discharge Planning Assessment  [facility]     Patient Name: Hazel Bucio  MRN: [id number]  Today's Date: 7/24/2024    Admit Date: 7/23/2024    Plan: return to [facility] , will need EMS transport   Discharge Needs Assessment       Row Name 07/24/24 1634       Living Environment    People in Home facility resident    Current Living Arrangements extended care facility    Potentially Unsafe Housing Conditions none    Primary Care Provided by other (see comments)    Family Caregiver if Needed child(surekha), adult    Family Caregiver Names vikas Bucio [phone]    Quality of Family Relationships supportive    Able to Return to Prior Arrangements yes       Transition Planning    Patient/Family Anticipates Transition to long-term care facility    Transportation Anticipated health plan transportation       Discharge Needs Assessment    Concerns to be Addressed no discharge needs identified    Provided Post Acute Provider List? N/A    N/A Provider List Comment from long term care    Provided Post Acute Provider Quality & Resource List? N/A    N/A Quality & Resource List Comment from long term care                   Discharge Plan       Row Name 07/24/24 1637       Plan    Plan return to [facility] , will need EMS transport    Plan Comments Call placed to vikas Hammond [phone] via phone as patient is confused. Son stated she will return to [facility]. Call placed to [facility] [phone] spoke with nursing director, she gave [facility] the phone number for the admissions director Stacey Wallace /[phone] and left a message to call [facility] back. She will need ambulance transport at discharge. Pharmacy updated and packet in [facility] office.                  Continued Care and Services - Admitted Since 7/23/2024       Destination       Service Provider Request Status Selected Services Address Phone Fax Patient Preferred    [facility] [place] [facility] - Request  Sent N/A [address] [place] [address] [phone] --                     Demographic Summary       Row Name 07/24/24 4808       General Information    Admission Type inpatient    Arrived From emergency department    Referral Source admission list    Reason for Consult discharge planning    Preferred Language English                   Functional Status    No documentation.                  Psychosocial    No documentation.                  Abuse/Neglect    No documentation.                  Legal    No documentation.                  Substance Abuse    No documentation.                  Patient Forms    No documentation.                     Vilma Cruz RN

## 2024-07-24 NOTE — PLAN OF CARE
Goal Outcome Evaluation:  Plan of Care Reviewed With: patient        Progress: no change  Outcome Evaluation: A&ox1-2, dilaudid given for pain, currently NPO, 2L nc, wound care consulted for urostomy management, ortho and nephrology consulted, Iv fluids, turned q2.

## 2024-07-24 NOTE — NURSING NOTE
"   07/24/24 1040   Skin Interventions   Pressure Reduction Devices specialty bed utilized  (LA requested)   Pressure Reduction Techniques heels elevated off bed;positioned off wounds   Urostomy RLQ   Placement date: If unknown, DO NOT use \"Add Comment\" note/Placement time: If unknown, DO NOT use \"Add Comment\" note: (c) 12/07/21 1900   Present on Admission? Select all that apply: Unknown placement date/time  Inserted by: rusty  Location: RLQ   Stomal Appliance 2 piece;Clean;Dry;Intact;Changed;Drainable   Stoma Appearance round;moist;flush with skin   Peristomal Assessment Maceration   Treatment Bag change;Site care   Output (mL) 50 mL     Wound/Ostomy: We see patient at the request of the floor nurse regarding skin issue on lt buttock. Once examined, redness, blanchable is observed in the Coccyx/Buttocks area, possibly related to moisture and/or friction and shear, Calmoseptine is ordered.  Left Heel is with intact skin and normal coloration, protective padding should be used to facilitate cushioning,  must remain off-loaded at all the time, the right heel could not be assessed, due to the presence of bandages for the hip  fracture  Wound care order and pressure ulcer preventives  measures have been implemented into Epic.   Low air loss mattress for adequate pressure redistribution and pressure relief from Agility and frame from EVS will be requested, the unit secretary will call.  The Ostomy care was performed. Existing appliance removed. We could see around the albaro-stoma, macerated skin, skin protectant and  stoma powder applied, Windfall 2-piece 2 1/4, barrier ring, soft convex wafer and elastic barrier strip to support the ostomy baseplate used, The ostomy bag was connected to leigh drainage bag. Good seal noted.   Supplies left in the room.   She is at risk for further skin problems, is completely bedridden, has multiple contractures, impaired mobility and is incontinent, repositioning her every two hours " and minimizing any skin contact with urine or stool would be beneficial.  Rn notified.  Please re-consult for any additional needs.
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
## 2024-07-24 NOTE — PLAN OF CARE
Problem: Skin Injury Risk Increased  Goal: Skin Health and Integrity  Intervention: Optimize Skin Protection  Recent Flowsheet Documentation  Taken 7/24/2024 1800 by Naomi Carrasco, JORDAN  Head of Bed (HOB) Positioning: HOB at 45 degrees  Taken 7/24/2024 1441 by Naomi Carrasco, RN  Pressure Reduction Techniques: frequent weight shift encouraged  Head of Bed (HOB) Positioning: HOB at 30-45 degrees  Pressure Reduction Devices: specialty bed utilized  Taken 7/24/2024 1025 by Naomi Carrasco RN  Pressure Reduction Devices: alternating pressure pump (ADD)     Problem: Fall Injury Risk  Goal: Absence of Fall and Fall-Related Injury  Intervention: Identify and Manage Contributors  Recent Flowsheet Documentation  Taken 7/24/2024 1800 by Naomi Carrasco RN  Self-Care Promotion: BADL personal objects within reach  Taken 7/24/2024 1441 by Naomi Carrasco RN  Self-Care Promotion: BADL personal objects within reach   Goal Outcome Evaluation:           Progress: no change  Outcome Evaluation: Patient AOx 1 [place] with dilaudid for pain ,Xray done today continue with O2 nasal cannula @ 5 lt/min, with IV fluids @ 50ml/hr ortho evaluation possible intervention tomorrow waiting for heart rate improved, Nephrology order Stat Dialysis today notify dialysis center.

## 2024-07-24 NOTE — CONSULTS
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 She has orthostatic hypotension on midodrine.     Pt has been admitted multiple times overt th past 6 months for various reason including fall, weakness, dehydration and abd pain. Pt was last seen in office by Dr. Ellsworth on 24. Her Afib is maintained on low-dose beta-blocker . She is off cardizem due to hypotension. BP in office in 91/54. She denied any chest pain. She has chronic shortness of breath. Volume status was stable. A cardiac cath in 10/23 showed minimal atherosclerotic disease less than 30% in any distribution with ANNA-3 flow throughout, right heart catheterization and normal findings Pt was started on atorvastatin  10 mg daily.     Pt presented to ER on 24 from nursing facility with complaints of leg pain from leg injury. Pt was noted to have right knee effusion. Pt reported she was out with a friend and ran into a telephone pole. In ER, BUN/CR 40/3.92, , INR 1.65, HGB 10.7, CT of head showed nothing acute, Imaging showed . Distal right femoral metaphyseal fracture with impaction and anterior lateral angulation and potential intracondylar extension. Osteopenia. 2. Limited evaluation of the right shoulder/proximal humerus. No definite right humeral fracture is evident though if there is ongoing suspicion for right humeral head or shoulder fracture,     EKG shows atrial fibrillation with no acute ischemic changes heart rate 117 bpm.  Patient is had multiple hospitalizations for A-fib RVR and acute on  chronic diastolic heart failure.  She is euvolemic on my exam today.  She is a poor historian and the majority of historical details are taken from electronic health records and a phone conversation with her primary cardiologist Dr. Ellsworth.  Last echocardiogram October 2023 showed normal EF with no significant valvular heart disease.  Nuclear stress study in April 2023 showed no evidence of ischemia.  Heart rate has been labile here in the hospital ranging between 101 160 bpm.  She seems asymptomatic as she was resting comfortably at time of interview.    HPI was reviewed, updated and amended when necessary.      CATH 10/18/23  Conclusions recommendations  1.  Normal epicardial coronary anatomy with no obstructive atherosclerotic disease of any significance maximally 30% with ANNA-3 flow in all vessels  2.  Normal LV filling pressures with normal LV function with normal transaortic valve gradient  3.  Normal pulmonary pressures with no evidence of pulmonary hypertension, normal cardiac output with normal peripheral and systemic vascular resistance, normal right atrial pressure with adequate dialysis efforts over the last few days              ECHO 10/16/23   Left ventricular systolic function is normal. Left ventricular ejection fraction appears to be 56 - 60%.    Left ventricular wall thickness is consistent with mild concentric hypertrophy.    Left ventricular diastolic function was indeterminate.    The right ventricular cavity is mildly dilated.    The left atrial cavity is severely dilated.    Left atrial volume is severely increased.    The right atrial cavity is moderately  dilated.    Estimated right ventricular systolic pressure from tricuspid regurgitation is normal (<35 mmHg).    The main pulmonary artery is severely dilated.             Stress test 4/19/23    Left ventricular ejection fraction is normal (Calculated EF = 60%).    Myocardial perfusion imaging indicates a normal myocardial perfusion study  with no evidence of ischemia.    Impressions are consistent with a low risk study.    Diaphragmatic attenuation and GI artifacts are present.    There is no prior study available for comparison.    Findings consistent with an equivocal ECG stress test.          Past Medical History:   Diagnosis Date    Acquired absence of kidney 10/04/2021    Acquired absence of other specified parts of digestive tract 09/09/2022    Athscl heart disease of native coronary 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 09/09/2022    Atrial fibrillation with rapid ventricular response     Bladder cancer     Cancer     breast, bladder    Cholecystitis with cholelithiasis     Chronic insomnia 08/27/2020    Deep vein thrombosis     Dyslipidemia 01/17/2017    E coli bacteremia     ESRD on dialysis 10/18/2023    Essential hypertension 01/17/2017    Fracture tibia/fibula, right, closed, initial encounter 08/27/2020    Gastro-esophageal reflux disease without esophagitis 09/14/2020    GERD (gastroesophageal reflux disease)     History of bladder cancer     History of falling     History of urostomy     Hyperkalemia     Immobility 08/27/2020    r/t broken Tibia     Irritable bowel syndrome without diarrhea     Kidney carcinoma, right     removed     Long term current use of anticoagulant 01/09/2015    Mild cognitive impairment of uncertain or unknown etiology 08/31/2023    Myalgia due to statin     Other specified abdominal hernia without obstruction or gangrene     PAF (paroxysmal atrial fibrillation) 06/26/2019    Permanent atrial fibrillation 06/26/2019    Personal history of other venous thrombosis and embolism     Presence of cardiac pacemaker 11/03/2014    BS dual chamber PM placed 10/2014 with pocket revision 1/25/17.  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       Past Surgical  History:   Procedure Laterality Date    BREAST LUMPECTOMY      CARDIAC CATHETERIZATION N/A 10/18/2023    Procedure: Left Heart Cath possible PCI, atherectomy, hemodynamic support;  Surgeon: Augustin Ellsworth MD;  Location: [facility] CATH INVASIVE LOCATION;  Service: Cardiology;  Laterality: N/A;    CARDIAC ELECTROPHYSIOLOGY PROCEDURE N/A 4/28/2023    Procedure: Pacemaker gen change, [place] AWARE $;  Surgeon: Jose Carlos Cheng MD;  Location: [facility] CATH INVASIVE LOCATION;  Service: Cardiovascular;  Laterality: N/A;    CHOLECYSTECTOMY N/A 10/12/2020    Procedure: CHOLECYSTECTOMY LAPAROSCOPIC;  Surgeon: Go Pereira DO;  Location: [facility] MAIN OR;  Service: General;  Laterality: N/A;    CYSTECTOMY W/ URETEROILEAL CONDUIT      HARDWARE REMOVAL Right 6/11/2021    Procedure: TIBIA HARDWARE REMOVAL;  Surgeon: Geoff Shah II, MD;  Location: [facility] MAIN OR;  Service: Orthopedics;  Laterality: Right;    HERNIA REPAIR      HYSTERECTOMY      INSERT / REPLACE / REMOVE PACEMAKER      NEPHRECTOMY Right     TIBIA IM NAILING/RODDING Right 8/28/2020    Procedure: TIBIA INTRAMEDULLARY NAIL/ABRAHAM INSERTION;  Surgeon: Geoff Shah II, MD;  Location: [facility] MAIN OR;  Service: Orthopedics;  Laterality: Right;       Medications Prior to Admission   Medication Sig Dispense Refill Last Dose    acetaminophen (TYLENOL) 325 MG tablet Take 2 tablets by mouth 4 (Four) Times a Day As Needed for Mild Pain.   7/23/2024    apixaban (ELIQUIS) 2.5 MG tablet tablet Take 1 tablet by mouth Every 12 (Twelve) Hours. Indications: Atrial Fibrillation 60 tablet 0 7/23/2024    atorvastatin (LIPITOR) 20 MG tablet Take 1 tablet by mouth Every Night.   7/22/2024    dicyclomine (BENTYL) 10 MG capsule Take 1 capsule by mouth 3 (Three) Times a Day. 60 capsule 0 7/23/2024    famotidine (PEPCID) 10 MG tablet Take 1 tablet by mouth Every Other Day. 30 tablet 0 7/23/2024    furosemide (LASIX) 40 MG tablet Take 1 tablet by mouth Daily.    7/23/2024    gabapentin (NEURONTIN) 100 MG capsule Take 1 capsule by mouth Every Night. 30 capsule 0 7/22/2024    Lidocaine 4 % Place 2 patches on the skin as directed by provider Daily. Remove & Discard patch within 12 hours or as directed by MD       melatonin 3 MG tablet Take 1 tablet by mouth Every Night.   7/22/2024    metoprolol succinate XL (TOPROL-XL) 50 MG 24 hr tablet Take 1 tablet by mouth Daily.   7/23/2024    midodrine (PROAMATINE) 10 MG tablet Take 1 tablet by mouth Every 8 (Eight) Hours. 90 tablet 1 7/23/2024    mirtazapine (REMERON) 7.5 MG tablet Take 1 tablet by mouth Every Night.   7/22/2024    nitroglycerin (NITROSTAT) 0.4 MG SL tablet Place 1 tablet under the tongue Every 5 (Five) Minutes As Needed for Chest Pain.   7/11/2024    ondansetron ODT (ZOFRAN-ODT) 4 MG disintegrating tablet Place 1 tablet on the tongue Every 4 (Four) Hours As Needed for Nausea or Vomiting.   6/10/2024    polyethylene glycol (MiraLax) 17 GM/SCOOP powder Take 17 g by mouth Every Morning.   7/23/2024    sertraline (ZOLOFT) 50 MG tablet Take 1 tablet by mouth every night at bedtime.   7/22/2024    sevelamer (RENVELA) 800 MG tablet Take 1 tablet by mouth 3 (Three) Times a Day With Meals.   7/23/2024    topiramate (TOPAMAX) 100 MG tablet Take 1 tablet by mouth Every Night. 30 tablet 0 7/22/2024    traMADol (ULTRAM) 50 MG tablet Take 1 tablet by mouth Every 6 (Six) Hours As Needed for Moderate Pain.   7/23/2024    zinc oxide 20 % ointment Apply 1 Application topically to the appropriate area as directed 2 (Two) Times a Day.   7/23/2024       Current Meds  Scheduled Meds:atorvastatin, 20 mg, Oral, Nightly  famotidine, 10 mg, Oral, Q48H  gabapentin, 100 mg, Oral, Nightly  metoprolol succinate XL, 50 mg, Oral, Daily  midodrine, 10 mg, Oral, Q8H  mirtazapine, 7.5 mg, Oral, Nightly  polyethylene glycol, 17 g, Oral, QAM  sertraline, 50 mg, Oral, Nightly  sevelamer, 800 mg, Oral, TID With Meals  topiramate, 100 mg, Oral,  Nightly  zinc oxide, , Topical, BID      Continuous Infusions:sodium chloride, 75 mL/hr, Last Rate: 75 mL/hr (07/24/24 0910)      PRN Meds:.  acetaminophen    senna-docusate sodium **AND** polyethylene glycol **AND** bisacodyl **AND** bisacodyl    HYDROcodone-acetaminophen    HYDROmorphone **AND** naloxone    melatonin    nitroglycerin    ondansetron ODT **OR** ondansetron    [COMPLETED] Insert Peripheral IV **AND** sodium chloride    Allergies as of 07/23/2024 - Reviewed 07/23/2024   Allergen Reaction Noted    Amoxicillin Shortness Of Breath 07/19/2019    Ciprofloxacin Hives 01/31/2013    Oxycodone-acetaminophen Unknown - High Severity 09/25/2021    Penicillins Rash 05/23/2013    Sulfa antibiotics Hives 01/31/2013    Cephalexin Other (See Comments) 07/05/2017    Clavulanic acid Other (See Comments) 07/05/2017    Codeine Other (See Comments) 07/05/2017    Contrast dye (echo or unknown ct/mr) Other (See Comments) 07/05/2017    Doxycycline Other (See Comments) 07/05/2017    Fluconazole Other (See Comments) 07/05/2017    Iodinated contrast media Other (See Comments) 07/05/2017    Iodine Hives 07/19/2019    Macrobid [nitrofurantoin] Hives 07/19/2019    Tobramycin Other (See Comments) 07/05/2017    Trimethoprim Other (See Comments) 07/05/2017       Social History     Socioeconomic History    Marital status:    Tobacco Use    Smoking status: Never     Passive exposure: Never    Smokeless tobacco: Never   Vaping Use    Vaping status: Never Used   Substance and Sexual Activity    Alcohol use: Not Currently    Drug use: Never    Sexual activity: Defer       Family History   Problem Relation Age of Onset    COPD Father      Past surgical, medical, social and family history was reviewed, updated and amended when necessary.    Review of Systems   Unable to perform ROS: Dementia            Objective:   Temp:  [98.2 °F (36.8 °C)-98.5 °F (36.9 °C)] 98.2 °F (36.8 °C)  Heart Rate:  [] 135  Resp:  [16-18] 18  BP:  "(113-149)/(56-97) 139/97  Body mass index is 19.09 kg/m².  Flowsheet Rows      Flowsheet Row First Filed Value   Admission Height 170.2 cm (67\") Documented at 07/23/2024 1729   Admission Weight 55.3 kg (121 lb 14.6 oz) Documented at 07/23/2024 1729          Vitals:    07/24/24 0907   BP: 139/97   Pulse: (!) 135   Resp:    Temp:    SpO2: 99%       Vitals reviewed.   Constitutional:       Appearance: Not in distress. Frail. Chronically ill-appearing.   Neck:      Vascular: No JVR. JVD normal.   Pulmonary:      Effort: Pulmonary effort is normal.      Breath sounds: No wheezing. No rhonchi. No rales.   Chest:      Chest wall: Not tender to palpatation.   Cardiovascular:      PMI at left midclavicular line. Tachycardia present. Irregularly irregular rhythm. Normal S1. Normal S2.       Murmurs: There is no murmur.      No gallop.  No click. No rub.   Pulses:     Intact distal pulses.   Edema:     Peripheral edema absent.   Abdominal:      General: Bowel sounds are normal.      Palpations: Abdomen is soft.      Tenderness: There is no abdominal tenderness.   Musculoskeletal: Normal range of motion.         General: No tenderness. Skin:     General: Skin is warm and dry.   Neurological:      Mental Status: Exhibits altered mental status.                 Lab Review:      Results from last 7 days   Lab Units 07/24/24  0542 07/23/24  1849   SODIUM mmol/L 131* 135*   POTASSIUM mmol/L 5.0 4.5   CHLORIDE mmol/L 95* 95*   CO2 mmol/L 25.0 27.8   BUN mg/dL 50* 40*   CREATININE mg/dL 4.03* 3.92*   CALCIUM mg/dL 9.1 9.9   BILIRUBIN mg/dL  --  0.5   ALK PHOS U/L  --  154*   ALT (SGPT) U/L  --  17   AST (SGOT) U/L  --  26   GLUCOSE mg/dL 101* 109*         @LABRCNTbnp@  Results from last 7 days   Lab Units 07/24/24  0542 07/23/24  1849   WBC 10*3/mm3 10.48 8.58   HEMOGLOBIN g/dL 9.9* 10.7*   HEMATOCRIT % 31.0* 33.5*   PLATELETS 10*3/mm3 209 227     Results from last 7 days   Lab Units 07/23/24  1849   INR  1.65*   APTT seconds 35.1 "     Results from last 7 days   Lab Units 07/24/24  0542   MAGNESIUM mg/dL 2.5*     @LABRCNTIP(chol,trig,hdl,ldl)                          I personally viewed and interpreted the patient's EKG/Telemetry data    Femur fracture, right    Assessment and Plan:    Atrial fibrillation with RVR -recurrent problem difficult to control.  She also has significant chronic kidney disease.  I am going to give her a low-dose of IV digoxin.  Continue oral metoprolol.  As needed IV Lopressor will be ordered as well.  Preoperative risk assessment -patient has intermediate risk of perioperative MACE.  Recent echocardiogram and stress study shows no significant cardiac pathology.  No indication for further testing.  Continue beta-blocker.  If heart rate is better controlled later this afternoon she would be reasonable for surgical intervention.  CKD -defer to internal medicine for ongoing workup and management.  Consider nephrology consult.  She is euvolemic at this time and electrolytes are within normal range.  Dementia -it sounds as though she is at baseline mental status  Anemia of chronic disease    Go Loera MD  07/24/24  10:39 EDT.  Time spent in reviewing chart, discussion and examination:

## 2024-07-24 NOTE — H&P
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 There is glenohumeral joint  osteoarthritis with marginal spurring at the glenohumeral joint.  Evaluation of the right humeral head and shoulder is limited though  there is no definite humeral fracture. No fracture or acute abnormality  is demonstrated mid to distal right humerus.      RIGHT ELBOW: 2 VIEWS:     FINDINGS: No evidence for fracture or dislocation. Soft tissues appear  normal and there is no evidence to suggest hemarthrosis.     AP PELVIS AND AP AND FROG LATERAL RIGHT HIP:      FINDINGS: Evaluation for fracture is limited by the degree of  osteopenia. No fracture or acute osseous abnormality is evident.     RIGHT KNEE: AP, LATERAL:      FINDINGS: There is an acute distal femoral metaphyseal fracture  associated with impaction and anterior lateral angulation. There is  potential intracondylar extension. Bones appear osteopenic. There has s  been previous antegrade placement of a tibial IM jasmine with 2 proximal  interlocking screws. There appear to be old healed fractures of the  proximal tibial and fibular metaphyses. Vascular calcifications are  present..       Impression:      1. Distal right femoral metaphyseal fracture with impaction and  anterior  lateral angulation and potential intracondylar extension. Osteopenia.  2. Limited evaluation of the right shoulder/proximal humerus. No  definite right humeral fracture is evident though if there is ongoing  suspicion for right humeral head or shoulder fracture, I would recommend  dedicated right shoulder x-rays.  3. Osteopenia limits evaluation of the pelvis/right hip without evidence  for acute abnormality of the pelvis or right hip.  4. No evidence for acute abnormality of the right elbow.     This report was finalized on 7/23/2024 7:24 PM by Dr. Italo Hackett M.D on Workstation: [id number]       XR Humerus Right [[id number]] Collected: 07/23/24 1837     Updated: 07/23/24 1928    Narrative:      RIGHT HUMERUS, RIGHT ELBOW, PELVIS/RIGHT HIP, RIGHT KNEE.     HISTORY: Injury. Fall.     COMPARISON: Right shoulder x-ray 03/18/2023.     FINDINGS:  RIGHT HUMERUS: 2 VIEWS.      FINDINGS: The bones are osteopenic. There is glenohumeral joint  osteoarthritis with marginal spurring at the glenohumeral joint.  Evaluation of the right humeral head and shoulder is limited though  there is no definite humeral fracture. No fracture or acute abnormality  is demonstrated mid to distal right humerus.      RIGHT ELBOW: 2 VIEWS:     FINDINGS: No evidence for fracture or dislocation. Soft tissues appear  normal and there is no evidence to suggest hemarthrosis.     AP PELVIS AND AP AND FROG LATERAL RIGHT HIP:      FINDINGS: Evaluation for fracture is limited by the degree of  osteopenia. No fracture or acute osseous abnormality is evident.     RIGHT KNEE: AP, LATERAL:      FINDINGS: There is an acute distal femoral metaphyseal fracture  associated with impaction and anterior lateral angulation. There is  potential intracondylar extension. Bones appear osteopenic. There has s  been previous antegrade placement of a tibial IM jasmine with 2 proximal  interlocking screws. There appear to be old healed fractures of the  proximal  tibial and fibular metaphyses. Vascular calcifications are  present..       Impression:      1. Distal right femoral metaphyseal fracture with impaction and anterior  lateral angulation and potential intracondylar extension. Osteopenia.  2. Limited evaluation of the right shoulder/proximal humerus. No  definite right humeral fracture is evident though if there is ongoing  suspicion for right humeral head or shoulder fracture, I would recommend  dedicated right shoulder x-rays.  3. Osteopenia limits evaluation of the pelvis/right hip without evidence  for acute abnormality of the pelvis or right hip.  4. No evidence for acute abnormality of the right elbow.     This report was finalized on 7/23/2024 7:24 PM by Dr. Italo Hackett M.D on Workstation: [id number]       XR Elbow 2 View Right [[id number]] Collected: 07/23/24 1837     Updated: 07/23/24 1928    Narrative:      RIGHT HUMERUS, RIGHT ELBOW, PELVIS/RIGHT HIP, RIGHT KNEE.     HISTORY: Injury. Fall.     COMPARISON: Right shoulder x-ray 03/18/2023.     FINDINGS:  RIGHT HUMERUS: 2 VIEWS.      FINDINGS: The bones are osteopenic. There is glenohumeral joint  osteoarthritis with marginal spurring at the glenohumeral joint.  Evaluation of the right humeral head and shoulder is limited though  there is no definite humeral fracture. No fracture or acute abnormality  is demonstrated mid to distal right humerus.      RIGHT ELBOW: 2 VIEWS:     FINDINGS: No evidence for fracture or dislocation. Soft tissues appear  normal and there is no evidence to suggest hemarthrosis.     AP PELVIS AND AP AND FROG LATERAL RIGHT HIP:      FINDINGS: Evaluation for fracture is limited by the degree of  osteopenia. No fracture or acute osseous abnormality is evident.     RIGHT KNEE: AP, LATERAL:      FINDINGS: There is an acute distal femoral metaphyseal fracture  associated with impaction and anterior lateral angulation. There is  potential intracondylar extension. Bones appear  osteopenic. There has s  been previous antegrade placement of a tibial IM jasmine with 2 proximal  interlocking screws. There appear to be old healed fractures of the  proximal tibial and fibular metaphyses. Vascular calcifications are  present..       Impression:      1. Distal right femoral metaphyseal fracture with impaction and anterior  lateral angulation and potential intracondylar extension. Osteopenia.  2. Limited evaluation of the right shoulder/proximal humerus. No  definite right humeral fracture is evident though if there is ongoing  suspicion for right humeral head or shoulder fracture, I would recommend  dedicated right shoulder x-rays.  3. Osteopenia limits evaluation of the pelvis/right hip without evidence  for acute abnormality of the pelvis or right hip.  4. No evidence for acute abnormality of the right elbow.     This report was finalized on 7/23/2024 7:24 PM by Dr. Italo Hackett M.D on Workstation: [id number]                 Assessment:  Active Hospital Problems    Diagnosis  POA    **Femur fracture, right [S72.91XA]  Yes      Resolved Hospital Problems   No resolved problems to display.   Fall  Dementia  A fib  Hypertension  Hyperipidemia  Diabetes  esrd    Plan:  Will need ortho to see  Hold eliquis   Nephrology to see regarding dialysis  Pain control  Trend labs  Dw patient and ed provider    Kay Merino MD  7/23/2024  23:19 EDT

## 2024-07-24 NOTE — CODE DOCUMENTATION
"Patient Name:  Hazel Bucio  YOB: 1940  MRN:  [id number]  Admit Date:  7/23/2024    Visit Diagnoses:     ICD-10-CM ICD-9-CM   1. Closed fracture of distal end of right femur, unspecified fracture morphology, initial encounter  S72.401A 821.20   2. Right hip pain  M25.551 719.45   3. Right arm pain  M79.601 729.5   4. History of dementia  Z86.59 V11.8   5. Anticoagulated  Z79.01 V58.61       Reason For Rapid:   new onset Afib RVR    RN Communicated With:   primary RN communicated w/ Dr. Devries; Samia ONTIVEROS RN communicated in follow-up w/ Dr. Devries      Rapid Outcome:  transfer to telemetry    Communication From Rapid Team:    New onset afib rvr. BP stable 130s. -150s. Sats above 93% when patient stops gripping hands and allows probe to read. Pt is baseline confused, oriented only to self. No c/o of chest pain/shortness of air. Magnesium added to AM labs. EKG obtained. Dr. Devries elects to do no medicine management at this time, until pt able to be transferred to telemetry and he is able to assess in person. 4S bed in process of being cleaned now. Primary RN team state they feel comfortable facilitating transfer.       Most Recent Vital Signs  Temp:  [98.2 °F (36.8 °C)-98.5 °F (36.9 °C)] 98.2 °F (36.8 °C)  Heart Rate:  [] 143  Resp:  [16-18] 18  BP: (113-149)/(56-97) 133/56  SpO2:  [91 %-100 %] 95 %  on  Flow (L/min):  [2] 2;   Device (Oxygen Therapy): nasal cannula    Labs:      No results found for: \"POCGLU\"  No results found for: \"SITE\", \"ALLENTEST\", \"PHART\", \"XNV0FSZ\", \"PO2ART\", \"AQV7ETM\", \"BASEEXCESS\", \"Q9XELRAH\", \"HGBBG\", \"HCTABG\", \"OXYHEMOGLOBI\", \"METHHGBN\", \"CARBOXYHGB\", \"CO2CT\", \"BAROMETRIC\", \"MODALITY\", \"FIO2\"  Results from last 7 days   Lab Units 07/24/24  0542 07/23/24  1849   WBC 10*3/mm3 10.48 8.58   HEMOGLOBIN g/dL 9.9* 10.7*   PLATELETS 10*3/mm3 209 227     Results from last 7 days   Lab Units 07/24/24  0542 07/23/24  1849   SODIUM mmol/L 131* 135*   POTASSIUM mmol/L 5.0 4.5 " "  CHLORIDE mmol/L 95* 95*   CO2 mmol/L 25.0 27.8   BUN mg/dL 50* 40*   CREATININE mg/dL 4.03* 3.92*   GLUCOSE mg/dL 101* 109*   ALBUMIN g/dL  --  3.4*   BILIRUBIN mg/dL  --  0.5   ALK PHOS U/L  --  154*   AST (SGOT) U/L  --  26   ALT (SGPT) U/L  --  17   Estimated Creatinine Clearance: 9.2 mL/min (A) (by C-G formula based on SCr of 4.03 mg/dL (H)).          No results found for: \"STREPPNEUAG\", \"LEGANTIGENUR\"        NIH Stroke Scale:  n/a                                                         Please refer to full rapid documentation on summary page under Index / Code Timeline  "

## 2024-07-24 NOTE — PROGRESS NOTES
84 year old female with ESRD admitted following fall with right hip fx. Plan for surgery in AM. On 6 liters of oxygen and will need dialysis tonight prior to surgery. Orders placed.  Thanks

## 2024-07-24 NOTE — PROGRESS NOTES
Patient still fairly labile from a cardiac standpoint, pulse 146 at last vitals check.  Will delay operative intervention for now.  Ok for diet.  Surgery tentatively tomorrow.

## 2024-07-24 NOTE — NURSING NOTE
Shift change at 0700 pt was in a-fib between 140-160's. Pt on 2L of O2 sating in 90's. Pt extremely anxious. A/O x1 at baseline. Night nurse stated started over night. She was controlled in the 80's when she came in. Night nurse stated she called Dr. Mae over night and was told to give metoprolol early and monitor. By shift change mo change in HR.  Management started working on getting a room for pt on cardiac floor but needed an order. Called A Dr. Devries to notify about this and was told to call a rapid.     Called rapid, NEW EKG was done and orders placed to transfer pt. Pt was transferred off to [address] [place] [address] by staff.

## 2024-07-25 ENCOUNTER — APPOINTMENT (OUTPATIENT)
Dept: CT IMAGING | Facility: HOSPITAL | Age: 84
End: 2024-07-25
Payer: MEDICARE

## 2024-07-25 ENCOUNTER — ANESTHESIA (OUTPATIENT)
Dept: PERIOP | Facility: HOSPITAL | Age: 84
End: 2024-07-25
Payer: MEDICARE

## 2024-07-25 ENCOUNTER — ANESTHESIA EVENT (OUTPATIENT)
Dept: PERIOP | Facility: HOSPITAL | Age: 84
End: 2024-07-25
Payer: MEDICARE

## 2024-07-25 ENCOUNTER — APPOINTMENT (OUTPATIENT)
Dept: GENERAL RADIOLOGY | Facility: HOSPITAL | Age: 84
End: 2024-07-25
Payer: MEDICARE

## 2024-07-25 LAB
ALBUMIN SERPL-MCNC: 3.2 G/DL (ref 3.5–5.2)
ALBUMIN/GLOB SERPL: 1 G/DL
ALP SERPL-CCNC: 114 U/L (ref 39–117)
ALT SERPL W P-5'-P-CCNC: 16 U/L (ref 1–33)
ANION GAP SERPL CALCULATED.3IONS-SCNC: 11 MMOL/L (ref 5–15)
AST SERPL-CCNC: 26 U/L (ref 1–32)
BILIRUB SERPL-MCNC: 0.5 MG/DL (ref 0–1.2)
BUN SERPL-MCNC: 19 MG/DL (ref 8–23)
BUN/CREAT SERPL: 7.9 (ref 7–25)
CALCIUM SPEC-SCNC: 9 MG/DL (ref 8.6–10.5)
CHLORIDE SERPL-SCNC: 102 MMOL/L (ref 98–107)
CO2 SERPL-SCNC: 23 MMOL/L (ref 22–29)
CREAT SERPL-MCNC: 2.4 MG/DL (ref 0.57–1)
EGFRCR SERPLBLD CKD-EPI 2021: 19.6 ML/MIN/1.73
GLOBULIN UR ELPH-MCNC: 3.2 GM/DL
GLUCOSE BLDC GLUCOMTR-MCNC: 72 MG/DL (ref 70–130)
GLUCOSE BLDC GLUCOMTR-MCNC: 74 MG/DL (ref 70–130)
GLUCOSE SERPL-MCNC: 70 MG/DL (ref 65–99)
HBV SURFACE AG SERPL QL IA: NORMAL
POTASSIUM SERPL-SCNC: 4.1 MMOL/L (ref 3.5–5.2)
PROT SERPL-MCNC: 6.4 G/DL (ref 6–8.5)
QT INTERVAL: 323 MS
QTC INTERVAL: 451 MS
SODIUM SERPL-SCNC: 136 MMOL/L (ref 136–145)
WHOLE BLOOD HOLD SPECIMEN: NORMAL

## 2024-07-25 PROCEDURE — 25010000002 ONDANSETRON PER 1 MG: Performed by: INTERNAL MEDICINE

## 2024-07-25 PROCEDURE — C1713 ANCHOR/SCREW BN/BN,TIS/BN: HCPCS | Performed by: ORTHOPAEDIC SURGERY

## 2024-07-25 PROCEDURE — 25010000002 PROPOFOL 200 MG/20ML EMULSION: Performed by: ANESTHESIOLOGY

## 2024-07-25 PROCEDURE — C1769 GUIDE WIRE: HCPCS | Performed by: ORTHOPAEDIC SURGERY

## 2024-07-25 PROCEDURE — 25010000002 SUGAMMADEX 200 MG/2ML SOLUTION: Performed by: NURSE ANESTHETIST, CERTIFIED REGISTERED

## 2024-07-25 PROCEDURE — 73552 X-RAY EXAM OF FEMUR 2/>: CPT

## 2024-07-25 PROCEDURE — 25010000002 CEFAZOLIN PER 500 MG: Performed by: ORTHOPAEDIC SURGERY

## 2024-07-25 PROCEDURE — 73700 CT LOWER EXTREMITY W/O DYE: CPT

## 2024-07-25 PROCEDURE — 80053 COMPREHEN METABOLIC PANEL: CPT | Performed by: HOSPITALIST

## 2024-07-25 PROCEDURE — 82948 REAGENT STRIP/BLOOD GLUCOSE: CPT

## 2024-07-25 PROCEDURE — 0QSB06Z REPOSITION RIGHT LOWER FEMUR WITH INTRAMEDULLARY INTERNAL FIXATION DEVICE, OPEN APPROACH: ICD-10-PCS | Performed by: ORTHOPAEDIC SURGERY

## 2024-07-25 PROCEDURE — 25010000002 HEPARIN (PORCINE) PER 1000 UNITS: Performed by: INTERNAL MEDICINE

## 2024-07-25 PROCEDURE — 5A1D70Z PERFORMANCE OF URINARY FILTRATION, INTERMITTENT, LESS THAN 6 HOURS PER DAY: ICD-10-PCS | Performed by: HOSPITALIST

## 2024-07-25 PROCEDURE — 76000 FLUOROSCOPY <1 HR PHYS/QHP: CPT

## 2024-07-25 PROCEDURE — 0 LABETALOL 5 MG/ML SOLUTION: Performed by: ANESTHESIOLOGY

## 2024-07-25 PROCEDURE — 25810000003 LACTATED RINGERS PER 1000 ML: Performed by: ANESTHESIOLOGY

## 2024-07-25 PROCEDURE — 25010000002 FENTANYL CITRATE (PF) 50 MCG/ML SOLUTION: Performed by: ANESTHESIOLOGY

## 2024-07-25 PROCEDURE — 25010000002 HYDROMORPHONE PER 4 MG: Performed by: INTERNAL MEDICINE

## 2024-07-25 PROCEDURE — 99232 SBSQ HOSP IP/OBS MODERATE 35: CPT

## 2024-07-25 PROCEDURE — 87340 HEPATITIS B SURFACE AG IA: CPT | Performed by: INTERNAL MEDICINE

## 2024-07-25 PROCEDURE — 0QSB04Z REPOSITION RIGHT LOWER FEMUR WITH INTERNAL FIXATION DEVICE, OPEN APPROACH: ICD-10-PCS | Performed by: ORTHOPAEDIC SURGERY

## 2024-07-25 DEVICE — LOCKING SCREW
Type: IMPLANTABLE DEVICE | Site: FEMUR | Status: FUNCTIONAL
Brand: AXSOS

## 2024-07-25 DEVICE — LOCKING SCREW
Type: IMPLANTABLE DEVICE | Site: FEMUR | Status: FUNCTIONAL
Brand: T2 ALPHA

## 2024-07-25 DEVICE — CORTEX SCREW
Type: IMPLANTABLE DEVICE | Site: FEMUR | Status: FUNCTIONAL
Brand: AXSOS

## 2024-07-25 DEVICE — ADVANCED LOCKING SCREW: Type: IMPLANTABLE DEVICE | Site: FEMUR | Status: FUNCTIONAL

## 2024-07-25 DEVICE — IMPLANTABLE DEVICE
Type: IMPLANTABLE DEVICE | Site: FEMUR | Status: FUNCTIONAL
Brand: T2

## 2024-07-25 DEVICE — DISTAL LATERAL FEMUR PLATE
Type: IMPLANTABLE DEVICE | Site: FEMUR | Status: FUNCTIONAL
Brand: AXSOS

## 2024-07-25 RX ORDER — LIDOCAINE HYDROCHLORIDE 20 MG/ML
INJECTION, SOLUTION INFILTRATION; PERINEURAL AS NEEDED
Status: DISCONTINUED | OUTPATIENT
Start: 2024-07-25 | End: 2024-07-25 | Stop reason: SURG

## 2024-07-25 RX ORDER — HYDRALAZINE HYDROCHLORIDE 20 MG/ML
5 INJECTION INTRAMUSCULAR; INTRAVENOUS
Status: DISCONTINUED | OUTPATIENT
Start: 2024-07-25 | End: 2024-07-26 | Stop reason: HOSPADM

## 2024-07-25 RX ORDER — ROCURONIUM BROMIDE 10 MG/ML
INJECTION, SOLUTION INTRAVENOUS AS NEEDED
Status: DISCONTINUED | OUTPATIENT
Start: 2024-07-25 | End: 2024-07-25 | Stop reason: SURG

## 2024-07-25 RX ORDER — DIPHENHYDRAMINE HYDROCHLORIDE 50 MG/ML
12.5 INJECTION INTRAMUSCULAR; INTRAVENOUS
Status: DISCONTINUED | OUTPATIENT
Start: 2024-07-25 | End: 2024-07-26 | Stop reason: HOSPADM

## 2024-07-25 RX ORDER — FENTANYL CITRATE 50 UG/ML
INJECTION, SOLUTION INTRAMUSCULAR; INTRAVENOUS AS NEEDED
Status: DISCONTINUED | OUTPATIENT
Start: 2024-07-25 | End: 2024-07-25 | Stop reason: SURG

## 2024-07-25 RX ORDER — IPRATROPIUM BROMIDE AND ALBUTEROL SULFATE 2.5; .5 MG/3ML; MG/3ML
3 SOLUTION RESPIRATORY (INHALATION) ONCE AS NEEDED
Status: DISCONTINUED | OUTPATIENT
Start: 2024-07-25 | End: 2024-07-26 | Stop reason: HOSPADM

## 2024-07-25 RX ORDER — HEPARIN SODIUM 1000 [USP'U]/ML
5000 INJECTION, SOLUTION INTRAVENOUS; SUBCUTANEOUS ONCE
Status: COMPLETED | OUTPATIENT
Start: 2024-07-25 | End: 2024-07-25

## 2024-07-25 RX ORDER — LABETALOL HYDROCHLORIDE 5 MG/ML
5 INJECTION, SOLUTION INTRAVENOUS
Status: DISCONTINUED | OUTPATIENT
Start: 2024-07-25 | End: 2024-07-26 | Stop reason: HOSPADM

## 2024-07-25 RX ORDER — PHENYLEPHRINE HCL IN 0.9% NACL 1 MG/10 ML
SYRINGE (ML) INTRAVENOUS AS NEEDED
Status: DISCONTINUED | OUTPATIENT
Start: 2024-07-25 | End: 2024-07-25 | Stop reason: SURG

## 2024-07-25 RX ORDER — ONDANSETRON 2 MG/ML
4 INJECTION INTRAMUSCULAR; INTRAVENOUS ONCE AS NEEDED
Status: DISCONTINUED | OUTPATIENT
Start: 2024-07-25 | End: 2024-07-26 | Stop reason: HOSPADM

## 2024-07-25 RX ORDER — SODIUM CHLORIDE 0.9 % (FLUSH) 0.9 %
3-10 SYRINGE (ML) INJECTION AS NEEDED
Status: DISCONTINUED | OUTPATIENT
Start: 2024-07-25 | End: 2024-07-25 | Stop reason: HOSPADM

## 2024-07-25 RX ORDER — SODIUM CHLORIDE, SODIUM LACTATE, POTASSIUM CHLORIDE, CALCIUM CHLORIDE 600; 310; 30; 20 MG/100ML; MG/100ML; MG/100ML; MG/100ML
9 INJECTION, SOLUTION INTRAVENOUS CONTINUOUS
Status: DISCONTINUED | OUTPATIENT
Start: 2024-07-25 | End: 2024-07-28

## 2024-07-25 RX ORDER — LORAZEPAM 0.5 MG/1
0.5 TABLET ORAL EVERY 6 HOURS PRN
Status: DISCONTINUED | OUTPATIENT
Start: 2024-07-25 | End: 2024-07-30 | Stop reason: HOSPADM

## 2024-07-25 RX ORDER — FENTANYL CITRATE 50 UG/ML
25 INJECTION, SOLUTION INTRAMUSCULAR; INTRAVENOUS
Status: DISCONTINUED | OUTPATIENT
Start: 2024-07-25 | End: 2024-07-26 | Stop reason: HOSPADM

## 2024-07-25 RX ORDER — EPHEDRINE SULFATE 50 MG/ML
5 INJECTION, SOLUTION INTRAVENOUS ONCE AS NEEDED
Status: DISCONTINUED | OUTPATIENT
Start: 2024-07-25 | End: 2024-07-26 | Stop reason: HOSPADM

## 2024-07-25 RX ORDER — FAMOTIDINE 10 MG/ML
20 INJECTION, SOLUTION INTRAVENOUS ONCE
Status: COMPLETED | OUTPATIENT
Start: 2024-07-25 | End: 2024-07-25

## 2024-07-25 RX ORDER — LIDOCAINE HYDROCHLORIDE 10 MG/ML
0.5 INJECTION, SOLUTION INFILTRATION; PERINEURAL ONCE AS NEEDED
Status: DISCONTINUED | OUTPATIENT
Start: 2024-07-25 | End: 2024-07-25 | Stop reason: HOSPADM

## 2024-07-25 RX ORDER — DROPERIDOL 2.5 MG/ML
0.62 INJECTION, SOLUTION INTRAMUSCULAR; INTRAVENOUS
Status: DISCONTINUED | OUTPATIENT
Start: 2024-07-25 | End: 2024-07-26 | Stop reason: HOSPADM

## 2024-07-25 RX ORDER — PROMETHAZINE HYDROCHLORIDE 25 MG/1
25 SUPPOSITORY RECTAL ONCE AS NEEDED
Status: DISCONTINUED | OUTPATIENT
Start: 2024-07-25 | End: 2024-07-26 | Stop reason: HOSPADM

## 2024-07-25 RX ORDER — SODIUM CHLORIDE 0.9 % (FLUSH) 0.9 %
3 SYRINGE (ML) INJECTION EVERY 12 HOURS SCHEDULED
Status: DISCONTINUED | OUTPATIENT
Start: 2024-07-25 | End: 2024-07-25 | Stop reason: HOSPADM

## 2024-07-25 RX ORDER — NALOXONE HCL 0.4 MG/ML
0.2 VIAL (ML) INJECTION AS NEEDED
Status: DISCONTINUED | OUTPATIENT
Start: 2024-07-25 | End: 2024-07-26 | Stop reason: HOSPADM

## 2024-07-25 RX ORDER — FLUMAZENIL 0.1 MG/ML
0.2 INJECTION INTRAVENOUS AS NEEDED
Status: DISCONTINUED | OUTPATIENT
Start: 2024-07-25 | End: 2024-07-26 | Stop reason: HOSPADM

## 2024-07-25 RX ORDER — PROMETHAZINE HYDROCHLORIDE 25 MG/1
25 TABLET ORAL ONCE AS NEEDED
Status: DISCONTINUED | OUTPATIENT
Start: 2024-07-25 | End: 2024-07-26 | Stop reason: HOSPADM

## 2024-07-25 RX ORDER — ACETAMINOPHEN 325 MG/1
650 TABLET ORAL ONCE AS NEEDED
Status: DISCONTINUED | OUTPATIENT
Start: 2024-07-25 | End: 2024-07-26 | Stop reason: HOSPADM

## 2024-07-25 RX ORDER — LABETALOL HYDROCHLORIDE 5 MG/ML
INJECTION, SOLUTION INTRAVENOUS AS NEEDED
Status: DISCONTINUED | OUTPATIENT
Start: 2024-07-25 | End: 2024-07-25 | Stop reason: SURG

## 2024-07-25 RX ORDER — PROPOFOL 10 MG/ML
INJECTION, EMULSION INTRAVENOUS AS NEEDED
Status: DISCONTINUED | OUTPATIENT
Start: 2024-07-25 | End: 2024-07-25 | Stop reason: SURG

## 2024-07-25 RX ADMIN — ONDANSETRON 4 MG: 2 INJECTION INTRAMUSCULAR; INTRAVENOUS at 22:07

## 2024-07-25 RX ADMIN — LORAZEPAM 0.5 MG: 0.5 TABLET ORAL at 14:13

## 2024-07-25 RX ADMIN — FAMOTIDINE 20 MG: 10 INJECTION INTRAVENOUS at 18:10

## 2024-07-25 RX ADMIN — LABETALOL HYDROCHLORIDE 5 MG: 5 INJECTION, SOLUTION INTRAVENOUS at 20:12

## 2024-07-25 RX ADMIN — FENTANYL CITRATE 25 MCG: 50 INJECTION, SOLUTION INTRAMUSCULAR; INTRAVENOUS at 22:13

## 2024-07-25 RX ADMIN — HYDROMORPHONE HYDROCHLORIDE 0.5 MG: 1 INJECTION, SOLUTION INTRAMUSCULAR; INTRAVENOUS; SUBCUTANEOUS at 02:14

## 2024-07-25 RX ADMIN — LIDOCAINE HYDROCHLORIDE 100 MG: 20 INJECTION, SOLUTION INFILTRATION; PERINEURAL at 19:53

## 2024-07-25 RX ADMIN — Medication 100 MCG: at 20:00

## 2024-07-25 RX ADMIN — ANORECTAL OINTMENT 1 APPLICATION: 15.7; .44; 24; 20.6 OINTMENT TOPICAL at 15:45

## 2024-07-25 RX ADMIN — METOPROLOL SUCCINATE 50 MG: 50 TABLET, FILM COATED, EXTENDED RELEASE ORAL at 12:57

## 2024-07-25 RX ADMIN — HYDROMORPHONE HYDROCHLORIDE 0.5 MG: 1 INJECTION, SOLUTION INTRAMUSCULAR; INTRAVENOUS; SUBCUTANEOUS at 05:34

## 2024-07-25 RX ADMIN — MIDODRINE HYDROCHLORIDE 10 MG: 5 TABLET ORAL at 01:18

## 2024-07-25 RX ADMIN — SUGAMMADEX 200 MG: 100 INJECTION, SOLUTION INTRAVENOUS at 22:25

## 2024-07-25 RX ADMIN — HYDROMORPHONE HYDROCHLORIDE 0.5 MG: 1 INJECTION, SOLUTION INTRAMUSCULAR; INTRAVENOUS; SUBCUTANEOUS at 08:25

## 2024-07-25 RX ADMIN — SODIUM CHLORIDE 2000 MG: 900 INJECTION INTRAVENOUS at 19:42

## 2024-07-25 RX ADMIN — FENTANYL CITRATE 25 MCG: 50 INJECTION, SOLUTION INTRAMUSCULAR; INTRAVENOUS at 20:37

## 2024-07-25 RX ADMIN — PROPOFOL 50 MG: 10 INJECTION, EMULSION INTRAVENOUS at 19:53

## 2024-07-25 RX ADMIN — SODIUM CHLORIDE, POTASSIUM CHLORIDE, SODIUM LACTATE AND CALCIUM CHLORIDE 9 ML/HR: 600; 310; 30; 20 INJECTION, SOLUTION INTRAVENOUS at 18:10

## 2024-07-25 RX ADMIN — ROCURONIUM BROMIDE 40 MG: 10 INJECTION, SOLUTION INTRAVENOUS at 19:53

## 2024-07-25 RX ADMIN — HEPARIN SODIUM 5000 UNITS: 1000 INJECTION INTRAVENOUS; SUBCUTANEOUS at 05:00

## 2024-07-25 NOTE — PROGRESS NOTES
Elieser Diggs MD     Orthopedic Progress Note    Subjective :   Patient remains confused.  Does not follow commands.  Poor historian.  No family at bedside    Cardiology has evaluated patient.  Heart rate appears better controlled at present time.    Objective :    Vital signs in last 24 hours:  Temp:  [97.5 °F (36.4 °C)-99.1 °F (37.3 °C)] 98.2 °F (36.8 °C)  Heart Rate:  [] 73  Resp:  [16-22] 20  BP: ()/() 156/131  Vitals:    07/25/24 0554 07/25/24 0649 07/25/24 0652 07/25/24 0723   BP: 128/96   (!) 156/131   BP Location: Left arm   Right arm   Patient Position: Lying   Lying   Pulse: 104   73   Resp: 22   20   Temp: 98.2 °F (36.8 °C)   98.2 °F (36.8 °C)   TempSrc: Oral   Oral   SpO2:  (!) 89% 91% 94%   Weight:       Height:           PHYSICAL EXAM:  Patient is not oriented to person place or time at present.  Right lower extremity demonstrates a splint in place.  She is flexing and extending her knee.  Does not follow commands.  Motor function is intact distally.  Unable to assess sensation.    LABS:  Results from last 7 days   Lab Units 07/24/24  0542   WBC 10*3/mm3 10.48   HEMOGLOBIN g/dL 9.9*   HEMATOCRIT % 31.0*   PLATELETS 10*3/mm3 209     Results from last 7 days   Lab Units 07/24/24  0542   SODIUM mmol/L 131*   POTASSIUM mmol/L 5.0   CHLORIDE mmol/L 95*   CO2 mmol/L 25.0   BUN mg/dL 50*   CREATININE mg/dL 4.03*   GLUCOSE mg/dL 101*   CALCIUM mg/dL 9.1     Results from last 7 days   Lab Units 07/23/24  1849   INR  1.65*   APTT seconds 35.1       Intake/Output                         07/23/24 0701 - 07/24/24 0700 07/24/24 0701 - 07/25/24 0700     4201-2786 5037-1690 Total 2121-4123 0097-1399 Total                 Intake    P.O.  --  -- --  60  -- 60    I.V.  --  -- --  703.8  -- 703.8    Total Intake -- -- -- 763.8 -- 763.8       Output    Urine  --  200 200  50  700 750    Dialysis  --  -- --  --  850 850    Total Output -- 200 097 66 5868 1600             ASSESSMENT:  Right distal femur  fracture    Plan:  Remain on bedrest for now    CT scan of the right lower extremity has not yet been performed due to cardiac issues yesterday.  Will need to have this obtained before surgery.    Patient is tentatively scheduled for this evening in the operating room as this is first available.  Will be planning for retrograde intramedullary nail versus open reduction and internal fixation.    Remain n.p.o. hold anticoagulation.    Elieser Diggs MD    Date: 7/25/2024  Time: 07:38 EDT    Elieser Diggs MD  Orthopaedic Surgeon  [facility]

## 2024-07-25 NOTE — CONSULTS
"Nephrology Progress Note:     LOS: 1 day   Patient Care Team:  John Cano MD as PCP - General (Family Medicine)  Jose Carlos Cheng MD as Consulting Physician (Cardiology)      Subjective     Interval History:   Renal evaluation of ESRD.    84 year old female with dementia, ESRD, DM, SSS with pacemaker, HTN, admitted from nursing home after a fall with acute right hip fracture. Surgery delayed due to need of dialysis and hypoxia.    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O.:60; I.V.:703.8]  Out: 250 [Urine:250]  No intake/output data recorded.      Physical Exam:  Pleasant in NAD   Chronically ill appearing  No JVD  IRRR with tachycardia  Lungs diminished but fairly clear  Abdomen soft positive bowel sounds with urostomy  Trace edema   Right leg wrapped   No focal neuro   Results Review:       Imaging Results (Last 24 Hours)       Procedure Component Value Units Date/Time    XR Femur 2 View Right [[id number]] Collected: 07/24/24 1437     Updated: 07/24/24 1442    Narrative:      2 VIEW RIGHT FEMUR     HISTORY: Fracture. Pain.     FINDINGS: There is severe diffuse osteoporosis and there is a  supracondylar fracture of the distal femur with posterior angulation of  the distal portion relative to the femoral shaft.     This " report was finalized on 7/24/2024 2:39 PM by Dr. Daniel Ramos M.D  on Workstation: [id number]       XR Femur 2 View Left [[id number]] Collected: 07/24/24 1100     Updated: 07/24/24 1217    Narrative:      XR FEMUR 2 VW LEFT-07/24/2024     HISTORY: Left leg injury with pain.     No acute fractures are seen in the left femur. Bones are somewhat  demineralized. There is degenerative disease of the left knee.       Impression:      1. Degenerative arthritis of the left knee with demineralization of the  left femur.  2. No acute femur fractures are seen.        This report was finalized on 7/24/2024 12:14 PM by Dr. Dakota Tapia M.D on Workstation: [id number]       CT Head Without Contrast [[id number]] Collected: 07/23/24 2100     Updated: 07/24/24 0644    Narrative:      EMERGENCY CT SCAN OF THE HEAD AND CERVICAL SPINE WITHOUT CONTRAST ON  07/23/2024     CLINICAL HISTORY: This is an 84-year-old female patient who has a  history of dementia and had a fall. The patient is anticoagulated, on  Eliquis.     HEAD CT TECHNIQUE: Spiral CT images were obtained from the base of the  skull to the vertex without intravenous contrast. The images were  reformatted and are submitted in 3 mm thick axial, sagittal and coronal  CT sections with brain algorithm and 2 mm thick axial CT sections with  high-resolution bone algorithm.     COMPARISON: This is correlated to a prior head CT from [facility] on 12/07/2022.     FINDINGS: There are extensive patchy and confluent areas of low-density  throughout the white matter of the cerebral hemispheres consistent with  moderate to severe small vessel disease. 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 The remainder of the brain parenchyma is normal in  attenuation. There is diffuse cerebral atrophy. The ventricles are  normal in size. I see no focal mass effect. There is no midline shift.  No extra axial fluid collections are identified. There is no  evidence of  acute intracranial hemorrhage. There is a 4 mm rounded nodular density  projecting over the left anterior cerebral artery on axial image 34,  could potentially be a callosomarginal origin aneurysm that is unchanged  when compared to head CT 12/07/2022. No acute skull fractures  identified. The calvarium and the skull base are normal in appearance.  The paranasal sinuses and the mastoid air cells and the middle ear  cavities are clear. Bilateral intraocular lens implants are in place in  the globes from previous bilateral cataract surgery, otherwise orbits  are normal in appearance.       Impression:      1. No acute intracranial abnormalities identified with no significant  change when compared to prior head CT on 12/07/2022     2. There is confluent low-density throughout the cerebral white matter  consistent with moderate to severe small vessel disease and there is  diffuse cerebral atrophy. There is a 4 mm nodular density projecting  over the left anterior cerebral artery on axial image 34 and coronal  image 26 and it could be a small pericallosal artery origin aneurysm  that is unchanged since head CT on 12/07/2022 but could be further  assessed with an MRA or CTA of the of the head if clinically indicated.  The remainder of the head CT is normal with no acute skull fracture or  intracranial hemorrhage identified.     CERVICAL SPINE CT TECHNIQUE: Spiral CT images were obtained from the  skull base down to the T2 thoracic level and images were reformatted and  submitted in 2 mm thick axial and sagittal CT sections with soft tissue  algorithm, 1 mm thick axial, sagittal and coronal CT sections with  high-resolution bone algorithm     FINDINGS: The atlantooccipital articulation is within normal limits.     At C1-2 there is failure of fusion of the anterior midline, posterior  midline of the ring of C1 with well-corticated margins may be congenital  in nature, its nonacute finding.     At C2-3 there is  mild to moderate left, minimal right facet overgrowth,  small posterior central disc bulge. There is some thickening of the  interlaminar interspinous ligaments that indents the posterior thecal  sac, abuts the posterior cord mild to moderately narrowing the canal.  There is no foraminal narrowing.     At C3-4 there is moderate left facet overgrowth. Right facets are  normal, there is posterior central disc bulge, there is mild canal  narrowing, there is some left uncovertebral joint hypertrophy and  combination with left facet spurs result in moderate left bony foraminal  narrowing.     At C4-5 there is mild-moderate left facet overgrowth, there is some  distention of the left facet joint likely with fluid. There is minimal  right facet overgrowth, there is 1 to 2 mm anterolisthesis of C4 on C5,  posterior central disc bulge. There is mild canal narrowing, some left  uncovertebral joint hypertrophy in combination with left facet spurs  moderately narrows the left neural foramen at C4-5.     At C5-6 there is posterior disc bulge and mild canal narrowing. There is  mild left and no right facet overgrowth, mild uncovertebral joint  hypertrophy and mild left and no right foraminal narrowing.     At C6-7 some degenerative disc and endplate changes, mild posterior  spurring, mild canal narrowing, facets are victorino. There is some  uncovertebral joint spurring in the left foramen with mild to moderate  left bony foraminal narrowing.     At C7-T1 posterior endplates and facets are normal with no bony canal or  foraminal narrowing.     No acute fractures seen in the cervical spine.     IMPRESSION:  1. No acute fracture is seen in the cervical spine. There is cervical  spondylosis as described above.      Radiation dose reduction techniques were utilized, including automated  exposure control and exposure modulation based on body size.        This report was finalized on 7/24/2024 6:41 AM by Dr. Joe Castillo M.D  on  Workstation: [id number]       CT Cervical Spine Without Contrast [[id number]] Collected: 07/23/24 2100     Updated: 07/24/24 0644    Narrative:      EMERGENCY CT SCAN OF THE HEAD AND CERVICAL SPINE WITHOUT CONTRAST ON  07/23/2024     CLINICAL HISTORY: This is an 84-year-old female patient who has a  history of dementia and had a fall. The patient is anticoagulated, on  Eliquis.     HEAD CT TECHNIQUE: Spiral CT images were obtained from the base of the  skull to the vertex without intravenous contrast. The images were  reformatted and are submitted in 3 mm thick axial, sagittal and coronal  CT sections with brain algorithm and 2 mm thick axial CT sections with  high-resolution bone algorithm.     COMPARISON: This is correlated to a prior head CT from [facility] on 12/07/2022.     FINDINGS: There are extensive patchy and confluent areas of low-density  throughout the white matter of the cerebral hemispheres consistent with  moderate to severe small vessel disease. 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 The remainder of the brain parenchyma is normal in  attenuation. There is diffuse cerebral atrophy. The ventricles are  normal in size. I see no focal mass effect. There is no midline shift.  No extra axial fluid collections are identified. There is no evidence of  acute intracranial hemorrhage. There is a 4 mm rounded nodular density  projecting over the left anterior cerebral artery on axial image 34,  could potentially be a callosomarginal origin aneurysm that is unchanged  when compared to head CT 12/07/2022. No acute skull fractures  identified. The calvarium and the skull base are normal in appearance.  The paranasal sinuses and the mastoid air cells and the middle ear  cavities are clear. Bilateral intraocular lens implants are in place in  the globes from previous bilateral cataract surgery, otherwise orbits  are normal in appearance.       Impression:      1. No acute  intracranial abnormalities identified with no significant  change when compared to prior head CT on 12/07/2022     2. There is confluent low-density throughout the cerebral white matter  consistent with moderate to severe small vessel disease and there is  diffuse cerebral atrophy. There is a 4 mm nodular density projecting  over the left anterior cerebral artery on axial image 34 and coronal  image 26 and it could be a small pericallosal artery origin aneurysm  that is unchanged since head CT on 12/07/2022 but could be further  assessed with an MRA or CTA of the of the head if clinically indicated.  The remainder of the head CT is normal with no acute skull fracture or  intracranial hemorrhage identified.     CERVICAL SPINE CT TECHNIQUE: Spiral CT images were obtained from the  skull base down to the T2 thoracic level and images were reformatted and  submitted in 2 mm thick axial and sagittal CT sections with soft tissue  algorithm, 1 mm thick axial, sagittal and coronal CT sections with  high-resolution bone algorithm     FINDINGS: The atlantooccipital articulation is within normal limits.     At C1-2 there is failure of fusion of the anterior midline, posterior  midline of the ring of C1 with well-corticated margins may be congenital  in nature, its nonacute finding.     At C2-3 there is mild to moderate left, minimal right facet overgrowth,  small posterior central disc bulge. There is some thickening of the  interlaminar interspinous ligaments that indents the posterior thecal  sac, abuts the posterior cord mild to moderately narrowing the canal.  There is no foraminal narrowing.     At C3-4 there is moderate left facet overgrowth. Right facets are  normal, there is posterior central disc bulge, there is mild canal  narrowing, there is some left uncovertebral joint hypertrophy and  combination with left facet spurs result in moderate left bony foraminal  narrowing.     At C4-5 there is mild-moderate left facet  overgrowth, there is some  distention of the left facet joint likely with fluid. There is minimal  right facet overgrowth, there is 1 to 2 mm anterolisthesis of C4 on C5,  posterior central disc bulge. There is mild canal narrowing, some left  uncovertebral joint hypertrophy in combination with left facet spurs  moderately narrows the left neural foramen at C4-5.     At C5-6 there is posterior disc bulge and mild canal narrowing. There is  mild left and no right facet overgrowth, mild uncovertebral joint  hypertrophy and mild left and no right foraminal narrowing.     At C6-7 some degenerative disc and endplate changes, mild posterior  spurring, mild canal narrowing, facets are victorino. There is some  uncovertebral joint spurring in the left foramen with mild to moderate  left bony foraminal narrowing.     At C7-T1 posterior endplates and facets are normal with no bony canal or  foraminal narrowing.     No acute fractures seen in the cervical spine.     IMPRESSION:  1. No acute fracture is seen in the cervical spine. There is cervical  spondylosis as described above.      Radiation dose reduction techniques were utilized, including automated  exposure control and exposure modulation based on body size.        This report was finalized on 7/24/2024 6:41 AM by Dr. Joe Castillo M.D  on Workstation: [id number]               Medication Review:  atorvastatin, 20 mg, Oral, Nightly  famotidine, 10 mg, Oral, Q48H  gabapentin, 100 mg, Oral, Nightly  Menthol-Zinc Oxide, 1 Application, Topical, Q12H  [START ON 7/25/2024] metoprolol succinate XL, 50 mg, Oral, Q24H  midodrine, 10 mg, Oral, Q8H  mirtazapine, 7.5 mg, Oral, Nightly  polyethylene glycol, 17 g, Oral, QAM  sertraline, 50 mg, Oral, Nightly  sevelamer, 800 mg, Oral, TID With Meals  topiramate, 100 mg, Oral, Nightly        Assessment & Plan   ESRD with need of dialysis prior to surgery. Ordered for this evening.  Acute right hip fx with surgery planned in AM  A Fib with  "RVR. Cardiology evaluating  DM   Dementia at baseline  Acute hypoxic respiratory failure secondary to Afib RVR and mild volume excess. Dialysis planned    Femur fracture, right      Sean Josias Xiao MD  07/24/24  21:09 EDT      Nephrology Progress Note:     LOS: 1 day   Patient Care Team:  John Cano MD as PCP - General (Family Medicine)  ChemJose Carlos haro MD as Consulting Physician (Cardiology)      Subjective     Interval History:         Review of Systems:        Objective     Vital Signs  /78 (BP Location: Left arm, Patient Position: Lying)   Pulse 105   Temp 98.8 °F (37.1 °C) (Oral)   Resp 18   Ht 170.2 cm (67\")   Wt 55.3 kg (121 lb 14.6 oz)   SpO2 99%   BMI 19.09 kg/m²     Intake/Output  I/O last 3 completed shifts:  In: 763.8 [P.O.:60; I.V.:703.8]  Out: 250 [Urine:250]  No intake/output data recorded.      Physical Exam:        Results Review:       Imaging Results (Last 24 Hours)       Procedure Component Value Units Date/Time    XR Femur 2 View Right [[id number]] Collected: 07/24/24 1437     Updated: 07/24/24 1442    Narrative:      2 VIEW RIGHT FEMUR     HISTORY: Fracture. Pain.     FINDINGS: There is severe diffuse osteoporosis and there is a  supracondylar fracture of the distal femur with posterior angulation of  the distal portion relative to the femoral shaft.     This report was finalized on 7/24/2024 2:39 PM by Dr. Daniel Ramos M.D  on Workstation: [id number]       XR Femur 2 View Left [[id number]] Collected: 07/24/24 1100     Updated: 07/24/24 1217    Narrative:      XR FEMUR 2 VW LEFT-07/24/2024     HISTORY: Left leg injury with pain.     No acute fractures are seen in the left femur. Bones are somewhat  demineralized. There is degenerative disease of the left knee.       Impression:      1. Degenerative arthritis of the left knee with demineralization of the  left femur.  2. No acute femur fractures are seen.        This report was finalized on 7/24/2024 12:14 PM by Dr. Duncan" KOTA Tapia on Workstation: [id number]       CT Head Without Contrast [[id number]] Collected: 07/23/24 2100     Updated: 07/24/24 0644    Narrative:      EMERGENCY CT SCAN OF THE HEAD AND CERVICAL SPINE WITHOUT CONTRAST ON  07/23/2024     CLINICAL HISTORY: This is an 84-year-old female patient who has a  history of dementia and had a fall. The patient is anticoagulated, on  Eliquis.     HEAD CT TECHNIQUE: Spiral CT images were obtained from the base of the  skull to the vertex without intravenous contrast. The images were  reformatted and are submitted in 3 mm thick axial, sagittal and coronal  CT sections with brain algorithm and 2 mm thick axial CT sections with  high-resolution bone algorithm.     COMPARISON: This is correlated to a prior head CT from [facility] on 12/07/2022.     FINDINGS: There are extensive patchy and confluent areas of low-density  throughout the white matter of the cerebral hemispheres consistent with  moderate to severe small vessel disease. 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 The remainder of the brain parenchyma is normal in  attenuation. There is diffuse cerebral atrophy. The ventricles are  normal in size. I see no focal mass effect. There is no midline shift.  No extra axial fluid collections are identified. There is no evidence of  acute intracranial hemorrhage. There is a 4 mm rounded nodular density  projecting over the left anterior cerebral artery on axial image 34,  could potentially be a callosomarginal origin aneurysm that is unchanged  when compared to head CT 12/07/2022. No acute skull fractures  identified. The calvarium and the skull base are normal in appearance.  The paranasal sinuses and the mastoid air cells and the middle ear  cavities are clear. Bilateral intraocular lens implants are in place in  the globes from previous bilateral cataract surgery, otherwise orbits  are normal in appearance.       Impression:      1. No  acute intracranial abnormalities identified with no significant  change when compared to prior head CT on 12/07/2022     2. There is confluent low-density throughout the cerebral white matter  consistent with moderate to severe small vessel disease and there is  diffuse cerebral atrophy. There is a 4 mm nodular density projecting  over the left anterior cerebral artery on axial image 34 and coronal  image 26 and it could be a small pericallosal artery origin aneurysm  that is unchanged since head CT on 12/07/2022 but could be further  assessed with an MRA or CTA of the of the head if clinically indicated.  The remainder of the head CT is normal with no acute skull fracture or  intracranial hemorrhage identified.     CERVICAL SPINE CT TECHNIQUE: Spiral CT images were obtained from the  skull base down to the T2 thoracic level and images were reformatted and  submitted in 2 mm thick axial and sagittal CT sections with soft tissue  algorithm, 1 mm thick axial, sagittal and coronal CT sections with  high-resolution bone algorithm     FINDINGS: The atlantooccipital articulation is within normal limits.     At C1-2 there is failure of fusion of the anterior midline, posterior  midline of the ring of C1 with well-corticated margins may be congenital  in nature, its nonacute finding.     At C2-3 there is mild to moderate left, minimal right facet overgrowth,  small posterior central disc bulge. There is some thickening of the  interlaminar interspinous ligaments that indents the posterior thecal  sac, abuts the posterior cord mild to moderately narrowing the canal.  There is no foraminal narrowing.     At C3-4 there is moderate left facet overgrowth. Right facets are  normal, there is posterior central disc bulge, there is mild canal  narrowing, there is some left uncovertebral joint hypertrophy and  combination with left facet spurs result in moderate left bony foraminal  narrowing.     At C4-5 there is mild-moderate left  facet overgrowth, there is some  distention of the left facet joint likely with fluid. There is minimal  right facet overgrowth, there is 1 to 2 mm anterolisthesis of C4 on C5,  posterior central disc bulge. There is mild canal narrowing, some left  uncovertebral joint hypertrophy in combination with left facet spurs  moderately narrows the left neural foramen at C4-5.     At C5-6 there is posterior disc bulge and mild canal narrowing. There is  mild left and no right facet overgrowth, mild uncovertebral joint  hypertrophy and mild left and no right foraminal narrowing.     At C6-7 some degenerative disc and endplate changes, mild posterior  spurring, mild canal narrowing, facets are victorino. There is some  uncovertebral joint spurring in the left foramen with mild to moderate  left bony foraminal narrowing.     At C7-T1 posterior endplates and facets are normal with no bony canal or  foraminal narrowing.     No acute fractures seen in the cervical spine.     IMPRESSION:  1. No acute fracture is seen in the cervical spine. There is cervical  spondylosis as described above.      Radiation dose reduction techniques were utilized, including automated  exposure control and exposure modulation based on body size.        This report was finalized on 7/24/2024 6:41 AM by Dr. Joe Castillo M.D  on Workstation: [id number]       CT Cervical Spine Without Contrast [[id number]] Collected: 07/23/24 2100     Updated: 07/24/24 0644    Narrative:      EMERGENCY CT SCAN OF THE HEAD AND CERVICAL SPINE WITHOUT CONTRAST ON  07/23/2024     CLINICAL HISTORY: This is an 84-year-old female patient who has a  history of dementia and had a fall. The patient is anticoagulated, on  Eliquis.     HEAD CT TECHNIQUE: Spiral CT images were obtained from the base of the  skull to the vertex without intravenous contrast. The images were  reformatted and are submitted in 3 mm thick axial, sagittal and coronal  CT sections with brain algorithm and 2 mm  thick axial CT sections with  high-resolution bone algorithm.     COMPARISON: This is correlated to a prior head CT from [facility] on 12/07/2022.     FINDINGS: There are extensive patchy and confluent areas of low-density  throughout the white matter of the cerebral hemispheres consistent with  moderate to severe small vessel disease. 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 The remainder of the brain parenchyma is normal in  attenuation. There is diffuse cerebral atrophy. The ventricles are  normal in size. I see no focal mass effect. There is no midline shift.  No extra axial fluid collections are identified. There is no evidence of  acute intracranial hemorrhage. There is a 4 mm rounded nodular density  projecting over the left anterior cerebral artery on axial image 34,  could potentially be a callosomarginal origin aneurysm that is unchanged  when compared to head CT 12/07/2022. No acute skull fractures  identified. The calvarium and the skull base are normal in appearance.  The paranasal sinuses and the mastoid air cells and the middle ear  cavities are clear. Bilateral intraocular lens implants are in place in  the globes from previous bilateral cataract surgery, otherwise orbits  are normal in appearance.       Impression:      1. No acute intracranial abnormalities identified with no significant  change when compared to prior head CT on 12/07/2022     2. There is confluent low-density throughout the cerebral white matter  consistent with moderate to severe small vessel disease and there is  diffuse cerebral atrophy. There is a 4 mm nodular density projecting  over the left anterior cerebral artery on axial image 34 and coronal  image 26 and it could be a small pericallosal artery origin aneurysm  that is unchanged since head CT on 12/07/2022 but could be further  assessed with an MRA or CTA of the of the head if clinically indicated.  The remainder of the head CT is  normal with no acute skull fracture or  intracranial hemorrhage identified.     CERVICAL SPINE CT TECHNIQUE: Spiral CT images were obtained from the  skull base down to the T2 thoracic level and images were reformatted and  submitted in 2 mm thick axial and sagittal CT sections with soft tissue  algorithm, 1 mm thick axial, sagittal and coronal CT sections with  high-resolution bone algorithm     FINDINGS: The atlantooccipital articulation is within normal limits.     At C1-2 there is failure of fusion of the anterior midline, posterior  midline of the ring of C1 with well-corticated margins may be congenital  in nature, its nonacute finding.     At C2-3 there is mild to moderate left, minimal right facet overgrowth,  small posterior central disc bulge. There is some thickening of the  interlaminar interspinous ligaments that indents the posterior thecal  sac, abuts the posterior cord mild to moderately narrowing the canal.  There is no foraminal narrowing.     At C3-4 there is moderate left facet overgrowth. Right facets are  normal, there is posterior central disc bulge, there is mild canal  narrowing, there is some left uncovertebral joint hypertrophy and  combination with left facet spurs result in moderate left bony foraminal  narrowing.     At C4-5 there is mild-moderate left facet overgrowth, there is some  distention of the left facet joint likely with fluid. There is minimal  right facet overgrowth, there is 1 to 2 mm anterolisthesis of C4 on C5,  posterior central disc bulge. There is mild canal narrowing, some left  uncovertebral joint hypertrophy in combination with left facet spurs  moderately narrows the left neural foramen at C4-5.     At C5-6 there is posterior disc bulge and mild canal narrowing. There is  mild left and no right facet overgrowth, mild uncovertebral joint  hypertrophy and mild left and no right foraminal narrowing.     At C6-7 some degenerative disc and endplate changes, mild  posterior  spurring, mild canal narrowing, facets are victorino. There is some  uncovertebral joint spurring in the left foramen with mild to moderate  left bony foraminal narrowing.     At C7-T1 posterior endplates and facets are normal with no bony canal or  foraminal narrowing.     No acute fractures seen in the cervical spine.     IMPRESSION:  1. No acute fracture is seen in the cervical spine. There is cervical  spondylosis as described above.      Radiation dose reduction techniques were utilized, including automated  exposure control and exposure modulation based on body size.        This report was finalized on 7/24/2024 6:41 AM by Dr. Joe Castillo M.D  on Workstation: [id number]               Medication Review:  atorvastatin, 20 mg, Oral, Nightly  famotidine, 10 mg, Oral, Q48H  gabapentin, 100 mg, Oral, Nightly  Menthol-Zinc Oxide, 1 Application, Topical, Q12H  [START ON 7/25/2024] metoprolol succinate XL, 50 mg, Oral, Q24H  midodrine, 10 mg, Oral, Q8H  mirtazapine, 7.5 mg, Oral, Nightly  polyethylene glycol, 17 g, Oral, QAM  sertraline, 50 mg, Oral, Nightly  sevelamer, 800 mg, Oral, TID With Meals  topiramate, 100 mg, Oral, Nightly        Assessment & Plan       Femur fracture, right      Don Josias Xiao MD  07/24/24  21:09 EDT

## 2024-07-25 NOTE — PROGRESS NOTES
LOS: 2 days   Patient Care Team:  John Cano MD as PCP - General (Family Medicine)  Jose Carlos Cheng MD as Consulting Physician (Cardiology)    Chief Complaint: follow up atrial fibrillation     Interval History: She is laying in bed. She is in bilateral wrist restraints. Her heart rate trend has improved.     Vital Signs:  Temp:  [98.2 °F (36.8 °C)-99.1 °F (37.3 °C)] 98.2 °F (36.8 °C)  Heart Rate:  [] 73  Resp:  [16-22] 20  BP: ()/() 155/77    Intake/Output Summary (Last 24 hours) at 7/25/2024 1046  Last data filed at 7/25/2024 0555  Gross per 24 hour   Intake 763.75 ml   Output 1550 ml   Net -786.25 ml        Physical Exam  Vitals reviewed.   Constitutional:       General: She is not in acute distress.  HENT:      Head: Normocephalic.   Eyes:      Extraocular Movements: Extraocular movements intact.      Pupils: Pupils are equal, round, and reactive to light.   Cardiovascular:      Rate and Rhythm: Rhythm irregularly irregular.      Pulses: Normal pulses.      Heart sounds: Normal heart sounds, S1 normal and S2 normal. Heart sounds not distant. No murmur heard.     No friction rub. No gallop. No S3 or S4 sounds.   Pulmonary:      Effort: Pulmonary effort is normal.      Breath sounds: Normal breath sounds.   Abdominal:      General: Abdomen is flat. Bowel sounds are normal.      Palpations: Abdomen is soft.      Tenderness: There is no abdominal tenderness.   Skin:     General: Skin is warm and dry.   Neurological:      Mental Status: She is alert.         Results Review:    Results from last 7 days   Lab Units 07/25/24  0848   SODIUM mmol/L 136   POTASSIUM mmol/L 4.1   CHLORIDE mmol/L 102   CO2 mmol/L 23.0   BUN mg/dL 19   CREATININE mg/dL 2.40*   GLUCOSE mg/dL 70   CALCIUM mg/dL 9.0         Results from last 7 days   Lab Units 07/24/24  0542   WBC 10*3/mm3 10.48   HEMOGLOBIN g/dL 9.9*   HEMATOCRIT % 31.0*   PLATELETS 10*3/mm3 209     Results from last 7 days   Lab Units 07/23/24  1849    INR  1.65*   APTT seconds 35.1         Results from last 7 days   Lab Units 07/24/24  0542   MAGNESIUM mg/dL 2.5*           I reviewed the patient's new clinical results.        Assessment & Plan:  Atrial fibrillation with RVR  This has been a recurrent issue for her   She received low dose IV digoxin  Continue oral metoprolol  As needed IV Lopressor available   Heart rate better today.   Preoperative risk assessment  As noted by Dr. Loera yesterday, recent echocardiogram and stress study show no significant cardiac pathology. She has intermediate risk of perioperative MACE.   Chronic kidney disease, ESRD   Dementia  Anemia of chronic disease     Sudha Machado, APRN  07/25/24  10:46 EDT

## 2024-07-25 NOTE — PLAN OF CARE
Goal Outcome Evaluation:  Plan of Care Reviewed With: patient, family        Progress: no change  Outcome Evaluation: Pt started day in restraints d/t extreme restlessness, pulling at leads. CT attempted in am but unable to complete d/t pt restlessness. However, CT completed this afternoon after ativan 0.5mg given. O2 @ 2L via nc. Urostomy to bsd. Dialysis done last nite. IVF continue. Femur nailing to be done later this afternoon. Safety measures maintained.

## 2024-07-25 NOTE — ANESTHESIA PREPROCEDURE EVALUATION
" Anesthesia Evaluation     Patient summary reviewed and Nursing notes reviewed   history of anesthetic complications (oxycodone):   NPO Solid Status: > 8 hours  NPO Liquid Status: > 2 hours           Airway   Mallampati: II  TM distance: >3 FB  Neck ROM: full  No difficulty expected  Dental    (+) upper dentures        Pulmonary - normal exam   Cardiovascular     ECG reviewed  PT is on anticoagulation therapy  Patient on routine beta blocker and Beta blocker given within 24 hours of surgery  Rhythm: irregular  Rate: normal    (+) pacemaker (SSS, dual chamber) pacemaker, hypertension (hypotension), CAD, dysrhythmias (PVCs, PM) Atrial Fib, DVT      Neuro/Psych  GI/Hepatic/Renal/Endo    (+) GERD, renal disease (ESRD, Cr 2.4)- ESRD, diabetes mellitus type 2    Musculoskeletal     Abdominal    Substance History      OB/GYN          Other   blood dyscrasia (9.9) anemia,   history of cancer (bladder, ureteral, kidney)    ROS/Med Hx Other: 93% on 3L/min NC                Anesthesia Plan    ASA 4     general     (I have reviewed the patient's history and chart with the patient, including all pertinent laboratory results and imaging. I have explained the risks of anesthesia including but not limited to dental damage, corneal abrasion, nerve injury, MI, stroke, aspiration, and death. Patient has agreed to proceed.      /57 (BP Location: Left arm, Patient Position: Lying)   Pulse 63   Temp 36.8 °C (98.2 °F) (Oral)   Resp 16   Ht 170.2 cm (67\")   Wt 55.3 kg (121 lb 14.6 oz)   SpO2 92%   BMI 19.09 kg/m²   )  intravenous induction     Anesthetic plan, risks, benefits, and alternatives have been provided, discussed and informed consent has been obtained with: patient.    CODE STATUS:    Code Status (Patient has no pulse and is not breathing): CPR (Attempt to Resuscitate)  Medical Interventions (Patient has pulse or is breathing): Full Support      "

## 2024-07-25 NOTE — PLAN OF CARE
Problem: Restraint, Nonviolent  Goal: Absence of Harm or Injury  Outcome: Ongoing, Progressing  Intervention: Implement Least Restrictive Safety Strategies  Recent Flowsheet Documentation  Taken 7/25/2024 0400 by Neida Manzo RN  Medical Device Protection:   IV pole/bag removed from visual field   torso covered   tubing secured  Less Restrictive Alternative:   appropriate expression promoted   bed alarm in use   calming techniques promoted   sensory stimulation limited   therapeutic music  De-Escalation Techniques:   appropriate behavior reinforced   quiet time facilitated   reoriented   stimulation decreased   verbally redirected   time out facilitated  Diversional Activities: television  Taken 7/25/2024 0200 by Neida Manzo, RN  Medical Device Protection:   IV pole/bag removed from visual field   torso covered   tubing secured  Less Restrictive Alternative:   appropriate expression promoted   bed alarm in use   calming techniques promoted   sensory stimulation limited   therapeutic music  De-Escalation Techniques:   appropriate behavior reinforced   diversional activity encouraged   quiet time facilitated   reoriented   stimulation decreased   verbally redirected  Diversional Activities: television  Taken 7/25/2024 0000 by Neida Manzo RN  Medical Device Protection:   IV pole/bag removed from visual field   torso covered   tubing secured  Less Restrictive Alternative:   appropriate expression promoted   bed alarm in use   calming techniques promoted   sensory stimulation limited   therapeutic music  De-Escalation Techniques:   appropriate behavior reinforced   diversional activity encouraged   quiet time facilitated   reoriented   stimulation decreased   verbally redirected  Diversional Activities: television  Taken 7/24/2024 2211 by Neida Manzo RN  Medical Device Protection:   IV pole/bag removed from visual field   long sleeve gown   tubing secured   torso covered  Less Restrictive  Alternative:   appropriate expression promoted   bed alarm in use   calming techniques promoted   emotional support provided   medication offered   self-care activities encouraged   sensory stimulation limited  De-Escalation Techniques:   appropriate behavior reinforced   diversional activity encouraged   medication administered   medication offered   quiet time facilitated   reoriented   stimulation decreased  Diversional Activities: television  Taken 7/24/2024 2142 by Neida Manzo RN  Diversional Activities: television  Intervention: Protect Dignity, Rights, and Personal Wellbeing  Recent Flowsheet Documentation  Taken 7/25/2024 0000 by Neida Manzo RN  Trust Relationship/Rapport:   care explained   choices provided   questions answered   questions encouraged   reassurance provided   thoughts/feelings acknowledged  Taken 7/24/2024 2142 by Neida Manzo RN  Trust Relationship/Rapport:   care explained   choices provided   questions answered   reassurance provided   questions encouraged   thoughts/feelings acknowledged  Intervention: Protect Skin and Joint Integrity  Recent Flowsheet Documentation  Taken 7/25/2024 0420 by Neida Manzo RN  Body Position: supine  Taken 7/25/2024 0211 by Neida Manzo RN  Body Position:   supine   turned  Taken 7/25/2024 0000 by Neida Manzo RN  Body Position:   left   tilted  Taken 7/24/2024 2211 by Neida Manzo RN  Body Position:   right   tilted  Taken 7/24/2024 2142 by Neida Manzo RN  Body Position:   left   tilted   Goal Outcome Evaluation:   Pt remains in afib. Pt c/o pain in her leg. Pain medication given prn. Pt agitated and pulling at lines, attempting to hit staff. Orders for nonviolent wrist restraints. Pt son notified regarding restraints and plan of care. Plan for surgery on right leg today. NPO since midnight. HD completed around 0500.

## 2024-07-26 LAB
BASOPHILS # BLD AUTO: 0.03 10*3/MM3 (ref 0–0.2)
BASOPHILS NFR BLD AUTO: 0.2 % (ref 0–1.5)
DEPRECATED RDW RBC AUTO: 49.3 FL (ref 37–54)
EOSINOPHIL # BLD AUTO: 0 10*3/MM3 (ref 0–0.4)
EOSINOPHIL NFR BLD AUTO: 0 % (ref 0.3–6.2)
ERYTHROCYTE [DISTWIDTH] IN BLOOD BY AUTOMATED COUNT: 13.5 % (ref 12.3–15.4)
HCT VFR BLD AUTO: 28.3 % (ref 34–46.6)
HGB BLD-MCNC: 8.9 G/DL (ref 12–15.9)
IMM GRANULOCYTES # BLD AUTO: 0.1 10*3/MM3 (ref 0–0.05)
IMM GRANULOCYTES NFR BLD AUTO: 0.8 % (ref 0–0.5)
LYMPHOCYTES # BLD AUTO: 0.93 10*3/MM3 (ref 0.7–3.1)
LYMPHOCYTES NFR BLD AUTO: 7.2 % (ref 19.6–45.3)
MCH RBC QN AUTO: 31.6 PG (ref 26.6–33)
MCHC RBC AUTO-ENTMCNC: 31.4 G/DL (ref 31.5–35.7)
MCV RBC AUTO: 100.4 FL (ref 79–97)
MONOCYTES # BLD AUTO: 1.52 10*3/MM3 (ref 0.1–0.9)
MONOCYTES NFR BLD AUTO: 11.8 % (ref 5–12)
NEUTROPHILS NFR BLD AUTO: 10.31 10*3/MM3 (ref 1.7–7)
NEUTROPHILS NFR BLD AUTO: 80 % (ref 42.7–76)
PLAT MORPH BLD: NORMAL
PLATELET # BLD AUTO: 265 10*3/MM3 (ref 140–450)
PMV BLD AUTO: 9.7 FL (ref 6–12)
RBC # BLD AUTO: 2.82 10*6/MM3 (ref 3.77–5.28)
RBC MORPH BLD: NORMAL
WBC MORPH BLD: NORMAL
WBC NRBC COR # BLD AUTO: 12.89 10*3/MM3 (ref 3.4–10.8)

## 2024-07-26 PROCEDURE — 97530 THERAPEUTIC ACTIVITIES: CPT

## 2024-07-26 PROCEDURE — 25010000002 HYDROMORPHONE PER 4 MG: Performed by: ORTHOPAEDIC SURGERY

## 2024-07-26 PROCEDURE — 85007 BL SMEAR W/DIFF WBC COUNT: CPT | Performed by: HOSPITALIST

## 2024-07-26 PROCEDURE — 85025 COMPLETE CBC W/AUTO DIFF WBC: CPT | Performed by: HOSPITALIST

## 2024-07-26 PROCEDURE — 97535 SELF CARE MNGMENT TRAINING: CPT

## 2024-07-26 PROCEDURE — 97162 PT EVAL MOD COMPLEX 30 MIN: CPT

## 2024-07-26 PROCEDURE — 25010000002 CEFAZOLIN PER 500 MG: Performed by: HOSPITALIST

## 2024-07-26 PROCEDURE — 25010000002 HEPARIN (PORCINE) PER 1000 UNITS: Performed by: INTERNAL MEDICINE

## 2024-07-26 PROCEDURE — 97166 OT EVAL MOD COMPLEX 45 MIN: CPT

## 2024-07-26 PROCEDURE — 99232 SBSQ HOSP IP/OBS MODERATE 35: CPT | Performed by: NURSE PRACTITIONER

## 2024-07-26 RX ORDER — MIDODRINE HYDROCHLORIDE 5 MG/1
5 TABLET ORAL EVERY 8 HOURS
Status: DISCONTINUED | OUTPATIENT
Start: 2024-07-26 | End: 2024-07-29

## 2024-07-26 RX ORDER — ATENOLOL 25 MG/1
25 TABLET ORAL EVERY 12 HOURS SCHEDULED
Status: DISCONTINUED | OUTPATIENT
Start: 2024-07-26 | End: 2024-07-26

## 2024-07-26 RX ORDER — HYDROCODONE BITARTRATE AND ACETAMINOPHEN 5; 325 MG/1; MG/1
1 TABLET ORAL EVERY 4 HOURS PRN
Status: DISCONTINUED | OUTPATIENT
Start: 2024-07-26 | End: 2024-07-30 | Stop reason: HOSPADM

## 2024-07-26 RX ORDER — HEPARIN SODIUM 1000 [USP'U]/ML
5000 INJECTION, SOLUTION INTRAVENOUS; SUBCUTANEOUS ONCE
Status: COMPLETED | OUTPATIENT
Start: 2024-07-26 | End: 2024-07-26

## 2024-07-26 RX ORDER — HYDROCODONE BITARTRATE AND ACETAMINOPHEN 7.5; 325 MG/1; MG/1
1 TABLET ORAL EVERY 4 HOURS PRN
Status: DISCONTINUED | OUTPATIENT
Start: 2024-07-26 | End: 2024-07-30 | Stop reason: HOSPADM

## 2024-07-26 RX ADMIN — METOPROLOL TARTRATE 25 MG: 25 TABLET, FILM COATED ORAL at 18:59

## 2024-07-26 RX ADMIN — MIRTAZAPINE 7.5 MG: 15 TABLET, FILM COATED ORAL at 21:03

## 2024-07-26 RX ADMIN — HYDROMORPHONE HYDROCHLORIDE 0.5 MG: 1 INJECTION, SOLUTION INTRAMUSCULAR; INTRAVENOUS; SUBCUTANEOUS at 21:22

## 2024-07-26 RX ADMIN — MIDODRINE HYDROCHLORIDE 5 MG: 5 TABLET ORAL at 18:58

## 2024-07-26 RX ADMIN — CEFAZOLIN 1000 MG: 1 INJECTION, POWDER, FOR SOLUTION INTRAMUSCULAR; INTRAVENOUS at 21:01

## 2024-07-26 RX ADMIN — LORAZEPAM 0.5 MG: 0.5 TABLET ORAL at 21:02

## 2024-07-26 RX ADMIN — APIXABAN 2.5 MG: 2.5 TABLET, FILM COATED ORAL at 21:03

## 2024-07-26 RX ADMIN — METOPROLOL SUCCINATE 50 MG: 50 TABLET, FILM COATED, EXTENDED RELEASE ORAL at 09:35

## 2024-07-26 RX ADMIN — LORAZEPAM 0.5 MG: 0.5 TABLET ORAL at 07:28

## 2024-07-26 RX ADMIN — POLYETHYLENE GLYCOL 3350 17 G: 17 POWDER, FOR SOLUTION ORAL at 05:57

## 2024-07-26 RX ADMIN — SODIUM CHLORIDE 50 ML/HR: 9 INJECTION, SOLUTION INTRAVENOUS at 01:02

## 2024-07-26 RX ADMIN — SERTRALINE 50 MG: 50 TABLET, FILM COATED ORAL at 21:03

## 2024-07-26 RX ADMIN — HYDROCODONE BITARTRATE AND ACETAMINOPHEN 1 TABLET: 5; 325 TABLET ORAL at 07:28

## 2024-07-26 RX ADMIN — HEPARIN SODIUM 5000 UNITS: 1000 INJECTION, SOLUTION INTRAVENOUS; SUBCUTANEOUS at 18:59

## 2024-07-26 RX ADMIN — SEVELAMER CARBONATE 800 MG: 800 TABLET, FILM COATED ORAL at 11:53

## 2024-07-26 RX ADMIN — ANORECTAL OINTMENT 1 APPLICATION: 15.7; .44; 24; 20.6 OINTMENT TOPICAL at 21:03

## 2024-07-26 RX ADMIN — ANORECTAL OINTMENT 1 APPLICATION: 15.7; .44; 24; 20.6 OINTMENT TOPICAL at 09:35

## 2024-07-26 RX ADMIN — SEVELAMER CARBONATE 800 MG: 800 TABLET, FILM COATED ORAL at 18:59

## 2024-07-26 RX ADMIN — ATORVASTATIN CALCIUM 20 MG: 20 TABLET, FILM COATED ORAL at 21:04

## 2024-07-26 RX ADMIN — GABAPENTIN 100 MG: 100 CAPSULE ORAL at 21:04

## 2024-07-26 RX ADMIN — SEVELAMER CARBONATE 800 MG: 800 TABLET, FILM COATED ORAL at 09:35

## 2024-07-26 RX ADMIN — MIDODRINE HYDROCHLORIDE 10 MG: 5 TABLET ORAL at 09:35

## 2024-07-26 NOTE — PROGRESS NOTES
Patient S/P ORIF of right hip fx yesterday. Dialysis orders placed for today. Will continue to follow.

## 2024-07-26 NOTE — PROGRESS NOTES
Nutrition Services    Patient Name:  Hazel Bucio  YOB: 1940  MRN: [id number]  Admit Date:  7/23/2024    Assessment Date:  07/26/24    Summary: Initial Visit for MST 3  Pt is a 85 y/o female who presented with leg injury. Pt has a hx of a fib, HTN, T2DM, ureteral cancer, acquired absence of kidney, GERD, ESRD.  Pt laying in bed when I visited. Pt is pleasantly confused and is not able to provide a hx at this moment. Pt breakfast tray in room and it appears pt ate only a few bites of breakfast this morning. Pt ate 25% of dinner on 7/24. Per EMR, pt has had a 16% weight loss x 6 months (significant). Pt underwent retrograde intramedullary nail on right femoral fracture on 7/25. Pt to have HD today. Rd performed NFPE which revealed significant muscle wasting and fat loss. RD will add Boost Plus.     Patient meets ASPEN/AND criteria for nutrition diagnosis of severe malnutrition of chronic illness based on: weight loss, muscle wasting and fat loss.     NFPE results below    RECS:  Boost Plus Vanilla BID    CLINICAL NUTRITION ASSESSMENT      Reason for Assessment MST score 2+     Diagnosis/Problem   leg injury. Pt has a hx of a fib, HTN, T2DM, ureteral cancer, acquired absence of kidney, GERD, ESRD.   Medical/Surgical History Past Medical History:   Diagnosis Date    Acquired absence of kidney 10/04/2021    Acquired absence of other specified parts of digestive tract 09/09/2022    Athscl heart disease of native coronary 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 09/09/2022    Atrial fibrillation with rapid ventricular response     Bladder cancer     Cancer     breast, bladder    Cholecystitis with cholelithiasis     Chronic insomnia 08/27/2020    Deep vein thrombosis     Dyslipidemia 01/17/2017    E coli bacteremia     ESRD on dialysis 10/18/2023    Essential hypertension 01/17/2017    Fracture tibia/fibula, right, closed, initial encounter 08/27/2020    Gastro-esophageal reflux disease without esophagitis 09/14/2020    GERD  (gastroesophageal reflux disease)     History of bladder cancer     History of falling     History of urostomy     Hyperkalemia     Immobility 08/27/2020    r/t broken Tibia     Irritable bowel syndrome without diarrhea     Kidney carcinoma, right     removed     Long term current use of anticoagulant 01/09/2015    Mild cognitive impairment of uncertain or unknown etiology 08/31/2023    Myalgia due to statin     Other specified abdominal hernia without obstruction or gangrene     PAF (paroxysmal atrial fibrillation) 06/26/2019    Permanent atrial fibrillation 06/26/2019    Personal history of other venous thrombosis and embolism     Presence of cardiac pacemaker 11/03/2014    BS dual chamber PM placed 10/2014 with pocket revision 1/25/17.  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       Past Surgical History:   Procedure Laterality Date    BREAST LUMPECTOMY      CARDIAC CATHETERIZATION N/A 10/18/2023    Procedure: Left Heart Cath possible PCI, atherectomy, hemodynamic support;  Surgeon: Augustin Ellsworth MD;  Location: [facility] CATH INVASIVE LOCATION;  Service: Cardiology;  Laterality: N/A;    CARDIAC ELECTROPHYSIOLOGY PROCEDURE N/A 4/28/2023    Procedure: Pacemaker gen change, [place] AWARE $;  Surgeon: Jose Carlos Cheng MD;  Location: [facility] CATH INVASIVE LOCATION;  Service: Cardiovascular;  Laterality: N/A;    CHOLECYSTECTOMY N/A 10/12/2020    Procedure: CHOLECYSTECTOMY LAPAROSCOPIC;  Surgeon: Go Pereira DO;  Location: [facility] MAIN OR;  Service: General;  Laterality: N/A;    CYSTECTOMY W/ URETEROILEAL CONDUIT      HARDWARE REMOVAL Right 6/11/2021    Procedure: TIBIA HARDWARE REMOVAL;  Surgeon: Geoff Shah II, MD;  Location: [facility] MAIN OR;  Service: Orthopedics;  Laterality: Right;    HERNIA REPAIR       "HYSTERECTOMY      INSERT / REPLACE / REMOVE PACEMAKER      NEPHRECTOMY Right     TIBIA IM NAILING/RODDING Right 8/28/2020    Procedure: TIBIA INTRAMEDULLARY NAIL/ABRAHAM INSERTION;  Surgeon: Geoff Shah II, MD;  Location: [facility] MAIN OR;  Service: Orthopedics;  Laterality: Right;        Anthropometrics        Current Height  Current Weight  BMI kg/m2 Height: 170.2 cm (67\")  Weight: 55.3 kg (121 lb 14.6 oz) (07/23/24 1729)  Body mass index is 19.09 kg/m².   Adjusted BMI (if applicable)    BMI Category Normal/Healthy (18.4 - 24.9)   Ideal Body Weight (IBW) 135 lbs   Usual Body Weight (UBW) 140 lbs   Weight Trend Loss   Weight History Wt Readings from Last 30 Encounters:   07/23/24 1729 55.3 kg (121 lb 14.6 oz)   06/26/24 1450 55.3 kg (122 lb)   01/31/24 0700 56.6 kg (124 lb 12.5 oz)   01/30/24 0600 56 kg (123 lb 7.3 oz)   01/30/24 0015 56 kg (123 lb 7.3 oz)   01/28/24 2125 65.6 kg (144 lb 10 oz)   01/28/24 1517 65.8 kg (145 lb)   12/26/23 1418 65.8 kg (145 lb)   12/10/23 0916 65.8 kg (145 lb)   11/11/23 0416 60.9 kg (134 lb 4.2 oz)   11/10/23 0352 58.8 kg (129 lb 10.1 oz)   11/08/23 1328 56.2 kg (124 lb)   10/18/23 0413 56.5 kg (124 lb 9 oz)   10/13/23 2035 54.6 kg (120 lb 5.9 oz)   10/13/23 1453 57.6 kg (127 lb)   09/26/23 0954 57.6 kg (127 lb)   06/30/23 0714 68.6 kg (151 lb 3.8 oz)   06/26/23 0700 69.7 kg (153 lb 10.6 oz)   06/26/23 0450 69.5 kg (153 lb 3.5 oz)   06/25/23 0445 69.6 kg (153 lb 7 oz)   06/24/23 0500 69.6 kg (153 lb 7 oz)   06/23/23 0544 68.8 kg (151 lb 10.8 oz)   06/22/23 0553 70 kg (154 lb 5.2 oz)   06/21/23 0454 67.4 kg (148 lb 9.4 oz)   06/20/23 0526 68 kg (149 lb 14.6 oz)   06/19/23 0455 67.4 kg (148 lb 9.4 oz)   06/18/23 0500 64.9 kg (143 lb 1.3 oz)   06/16/23 0303 59 kg (130 lb)   04/28/23 1003 59 kg (130 lb 1.1 oz)   04/18/23 1100 61.2 kg (135 lb)   04/04/23 0953 59.4 kg (131 lb)   03/24/23 0333 61.9 kg (136 lb 7.4 oz)   03/22/23 2354 60.4 kg (133 lb 2.5 oz)   03/22/23 0505 60 kg (132 " lb 4.4 oz)   03/21/23 0516 61.4 kg (135 lb 5.8 oz)   03/20/23 0513 61 kg (134 lb 7.7 oz)   03/19/23 1204 63.4 kg (139 lb 12.4 oz)   03/19/23 0538 58.1 kg (128 lb 1.4 oz)   03/18/23 2312 67.8 kg (149 lb 7.6 oz)   03/18/23 2200 69.1 kg (152 lb 5.4 oz)   03/18/23 2010 62.8 kg (138 lb 7.2 oz)   12/07/22 1204 65.8 kg (145 lb)   09/01/22 1239 68 kg (150 lb)   06/28/22 1410 69.9 kg (154 lb 3.2 oz)   06/17/22 0310 69.4 kg (153 lb)   06/16/22 0350 70.8 kg (156 lb 1.4 oz)   06/15/22 0517 69.8 kg (153 lb 14.1 oz)   06/14/22 0458 70.8 kg (156 lb 1.4 oz)   06/11/22 2235 65.8 kg (145 lb)   03/22/22 1350 69.9 kg (154 lb)   02/22/22 1344 69.2 kg (152 lb 9.6 oz)   12/09/21 1927 71.2 kg (157 lb)   12/09/21 0339 69.1 kg (152 lb 6.4 oz)   12/08/21 0353 67 kg (147 lb 9.6 oz)   12/07/21 1955 67.4 kg (148 lb 9.6 oz)   12/06/21 1958 69.4 kg (153 lb)   11/02/21 1507 69.5 kg (153 lb 3.2 oz)   09/30/21 0318 70.9 kg (156 lb 4.9 oz)   09/29/21 0310 73.4 kg (161 lb 13.1 oz)   09/28/21 1736 72.6 kg (160 lb)   09/28/21 0000 73 kg (160 lb 15 oz)   09/27/21 0329 68 kg (149 lb 14.6 oz)   09/26/21 0307 68.2 kg (150 lb 5.7 oz)   09/24/21 2014 63.5 kg (140 lb)   08/17/21 1454 68.5 kg (151 lb)   06/03/21 1046 65.8 kg (145 lb)   11/17/20 1300 64.4 kg (142 lb)   11/12/20 1555 72.6 kg (160 lb)   11/08/20 1027 73.9 kg (163 lb)   10/13/20 0546 73.1 kg (161 lb 2.5 oz)   10/12/20 0500 72.4 kg (159 lb 9.8 oz)   10/11/20 0600 74.7 kg (164 lb 10.9 oz)   10/11/20 0100 74.7 kg (164 lb 9.6 oz)   10/10/20 2344 74.6 kg (164 lb 8 oz)   10/10/20 1937 71.7 kg (158 lb)   09/14/20 0636 81.3 kg (179 lb 3.7 oz)   09/13/20 0605 78 kg (171 lb 15.3 oz)   09/12/20 0605 79 kg (174 lb 2.6 oz)   09/11/20 0602 78 kg (171 lb 15.3 oz)   09/10/20 0614 79.3 kg (174 lb 13.2 oz)   09/08/20 0620 76.9 kg (169 lb 8.5 oz)   09/07/20 1107 76.9 kg (169 lb 8.5 oz)   09/07/20 1039 73.2 kg (161 lb 6.4 oz)   09/07/20 0826 72.6 kg (160 lb 0.9 oz)   08/31/20 0531 76.7 kg (169 lb 1.5 oz)   08/30/20  0600 72.5 kg (159 lb 13.3 oz)   08/29/20 0555 73 kg (160 lb 15 oz)   08/29/20 0455 73 kg (160 lb 15 oz)   08/28/20 0344 71.6 kg (157 lb 13.6 oz)   08/27/20 1756 72.2 kg (159 lb 2.8 oz)   08/27/20 1552 74.8 kg (165 lb)        Estimated/Assessed Needs        Energy Requirements    Weight for Calculation 55 kg   Method for Estimation  25-30 kcal/kg   EST Needs (kcal/day) 1620-5187       Protein Requirements    Weight for Calculation 55 kg   EST Protein Needs (g/kg) 1.5 - 2.0 gm/kg   EST Daily Needs (g/day)        Fluid Requirements     Method for Estimation 1 mL/kcal    Estimated Needs (mL/day)        Fluid Deficit    Current Na Level (mEq/L)    Desired Na Level (mEq/L)    Estimated Fluid Deficit (L)       Labs       Pertinent Labs    Results from last 7 days   Lab Units 07/25/24  0848 07/24/24  0542 07/23/24  1849   SODIUM mmol/L 136 131* 135*   POTASSIUM mmol/L 4.1 5.0 4.5   CHLORIDE mmol/L 102 95* 95*   CO2 mmol/L 23.0 25.0 27.8   BUN mg/dL 19 50* 40*   CREATININE mg/dL 2.40* 4.03* 3.92*   CALCIUM mg/dL 9.0 9.1 9.9   BILIRUBIN mg/dL 0.5  --  0.5   ALK PHOS U/L 114  --  154*   ALT (SGPT) U/L 16  --  17   AST (SGOT) U/L 26  --  26   GLUCOSE mg/dL 70 101* 109*     Results from last 7 days   Lab Units 07/26/24  0230 07/25/24  0848 07/24/24  0542   MAGNESIUM mg/dL  --   --  2.5*   HEMOGLOBIN g/dL 8.9*  --  9.9*   HEMATOCRIT % 28.3*  --  31.0*   WBC 10*3/mm3 12.89*  --  10.48   ALBUMIN g/dL  --  3.2*  --      Results from last 7 days   Lab Units 07/26/24  0230 07/24/24  0542 07/23/24  1849   INR   --   --  1.65*   APTT seconds  --   --  35.1   PLATELETS 10*3/mm3 265 209 227     COVID19   Date Value Ref Range Status   01/28/2024 Not Detected Not Detected - Ref. Range Final     Lab Results   Component Value Date    HGBA1C 4.90 01/30/2024          Medications           Scheduled Medications apixaban, 2.5 mg, Oral, Q12H  atorvastatin, 20 mg, Oral, Nightly  ceFAZolin, 1,000 mg, Intravenous, Q24H  famotidine, 10 mg, Oral,  Q48H  gabapentin, 100 mg, Oral, Nightly  Menthol-Zinc Oxide, 1 Application, Topical, Q12H  metoprolol succinate XL, 50 mg, Oral, Q24H  midodrine, 10 mg, Oral, Q8H  mirtazapine, 7.5 mg, Oral, Nightly  polyethylene glycol, 17 g, Oral, QAM  sertraline, 50 mg, Oral, Nightly  sevelamer, 800 mg, Oral, TID With Meals  topiramate, 100 mg, Oral, Nightly       Infusions lactated ringers, 9 mL/hr, Last Rate: 9 mL/hr (07/25/24 1810)  sodium chloride, 50 mL/hr, Last Rate: 50 mL/hr (07/26/24 0102)       PRN Medications   acetaminophen    senna-docusate sodium **AND** polyethylene glycol **AND** bisacodyl **AND** bisacodyl    HYDROcodone-acetaminophen    HYDROcodone-acetaminophen    HYDROmorphone **AND** naloxone    LORazepam    melatonin    nitroglycerin    ondansetron ODT **OR** ondansetron    [COMPLETED] Insert Peripheral IV **AND** sodium chloride     Physical Findings          General Findings alert, confused, disoriented, frail, generalized wasting, loss of muscle mass, loss of subcutaneous fat, room air   Oral/Mouth Cavity tooth or teeth missing   Edema  2+ (mild)   Gastrointestinal last bowel movement: 7/23   Skin  MASD, surgical incision: right leg   Tubes/Drains/Lines urostomy    NFPE See Malnutrition Severity Assessment     Malnutrition Severity Assessment      Patient meets criteria for : Severe Malnutrition  Malnutrition Type (Last 8 Hours)       Malnutrition Severity Assessment       Row Name 07/26/24 1106       Malnutrition Severity Assessment    Malnutrition Type Chronic Disease - Related Malnutrition      Row Name 07/26/24 1106       Insufficient Energy Intake     Insufficient Energy Intake Findings Moderate    Insufficient Energy Intake  < or equal to 50% of est. energy requirement for > or equal to 5d)      Row Name 07/26/24 1106       Unintentional Weight Loss     Unintentional Weight Loss Findings Severe    Unintentional Weight Loss  Weight loss greater than 10% in six months      Row Name 07/26/24 1106        Muscle Loss    Loss of Muscle Mass Findings Severe    Religious Region Severe - deep hollowing/scooping, lack of muscle to touch, facial bones well defined    Clavicle Bone Region Severe - protruding prominent bone    Acromion Bone Region Severe - squared shoulders, bones, and acromion process protrusion prominent    Scapular Bone Region Severe - prominent bones, depressions easily visible between ribs, scapula, spine, shoulders    Dorsal Hand Region Severe - prominent depression      Row Name 07/26/24 1106       Fat Loss    Subcutaneous Fat Loss Findings Severe    Orbital Region  Severe - pronounced hollowness/depression, dark circles, loose saggy skin    Upper Arm Region Severe - mostly skin, very little space between folds, fingers touch      Row Name 07/26/24 1106       Criteria Met (Must meet criteria for severity in at least 2 of these categories: M Wasting, Fat Loss, Fluid, Secondary Signs, Wt. Status, Intake)    Patient meets criteria for  Severe Malnutrition                     Current Nutrition Orders & Evaluation of Intake       Oral Nutrition     Food Allergies NKFA   Current PO Diet Diet: Regular/House; Fluid Consistency: Thin (IDDSI 0)   Supplement n/a   PO Evaluation     % PO Intake 25% of breakfast today.     Factors Affecting Intake: altered mental status     PES STATEMENT / NUTRITION DIAGNOSIS      Nutrition Dx Problem  Problem: Malnutrition (severe)  Etiology: Medical Diagnosis - leg injury. Pt has a hx of a fib, HTN, T2DM, ureteral cancer, acquired absence of kidney, GERD, ESRD.    Signs/Symptoms: NFPE Results and Unintended Weight Change   --  NUTRITION INTERVENTION / PLAN OF CARE      Intervention Goal(s) Maintain nutrition status, Establish goals of care, Meet estimated needs, Increase intake, Accepts oral nutrition supplement, Maintain weight, No significant weight loss, and PO intake goal %: 75%         RD Intervention/Action Encourage intake, Continue to monitor, Care plan reviewed, and  Recommend/order: Boost BID         Prescription/Orders:       PO Diet       Supplements Boost BID      Enteral Nutrition       Parenteral Nutrition    New Prescription Ordered? Yes   --      Monitor/Evaluation Per protocol, PO intake, Supplement intake, Weight, Skin status, GI status, Symptoms, POC/GOC, Swallow function   Discharge Plan/Needs No discharge needs identified at this time       RD to follow per protocol.      Electronically signed by:  Sabine Escobar RD  07/26/24 12:45 EDT

## 2024-07-26 NOTE — NURSING NOTE
Wound/Ostomy: Follow up Ostomy care. Existing appliance remain intact, no leaking, good seal noted.  Patient prefers not to change it today.  Supplies left in the room.   Rn notified.  Please re-consult for any additional needs.

## 2024-07-26 NOTE — PLAN OF CARE
Goal Outcome Evaluation:  Plan of Care Reviewed With: patient           Outcome Evaluation: Pt is a 85 y/o F admitted to [facility] after falling at her facility resulting in a R distal femur fx now POD 1 s/p retrograde intramedullary nail and ORIF. Per ortho pt is NWB on R LE with KI. Pt AxOx1 and per chart review from an ECF. Unsure PLOF due to baseline dementia. Pt presents to PT with expected post-op pain and weakness, poor activity tolerance, and impaired functional mobility. Pt required max A x 2 to complete bed mobility with overall poor initiation to tasks. Pt attempted to stand but was unable to maintain NWB on R LE with poor upright posture and ability to weight shift. Further mobility deferred. PT recommends SNF at D/C to address functional deficits.      Anticipated Discharge Disposition (PT): skilled nursing facility

## 2024-07-26 NOTE — PROGRESS NOTES
Procedure(s):  RIGHT FEMUR INTRAMEDULLARY NAILING RETROGRADE AND OPEN REDUCTION INTERNAL FIXATION     LOS: 3 days     Subjective :   Complains of pain not adequately controlled with meds.  Pain 6/10    Objective :    Vital signs in last 24 hours:  Vitals:    07/26/24 0100 07/26/24 0200 07/26/24 0300 07/26/24 0400   BP: 131/79 128/49 132/88 130/62   BP Location: Left arm      Patient Position: Lying      Pulse: (!) 121 113  108   Resp: 20      Temp:       TempSrc:       SpO2: 97% 100% 99%    Weight:       Height:           PHYSICAL EXAM:  Patient is calm, in no acute distress, awake and oriented x 3.  Dressing is clean, dry and intact.  Knee immobilizer in place  No signs of infection.  Swelling is appropriate in amount.  Ecchymosis is appropriate in amount.  Homans test is negative.  Patient is neurovascularly intact distally.    LABS:  Results from last 7 days   Lab Units 07/26/24  0230   WBC 10*3/mm3 12.89*   HEMOGLOBIN g/dL 8.9*   HEMATOCRIT % 28.3*   PLATELETS 10*3/mm3 265     Results from last 7 days   Lab Units 07/25/24  0848   SODIUM mmol/L 136   POTASSIUM mmol/L 4.1   CHLORIDE mmol/L 102   CO2 mmol/L 23.0   BUN mg/dL 19   CREATININE mg/dL 2.40*   GLUCOSE mg/dL 70   CALCIUM mg/dL 9.0     Results from last 7 days   Lab Units 07/23/24  1849   INR  1.65*   APTT seconds 35.1         ASSESSMENT:  Status post Procedure(s):  RIGHT FEMUR INTRAMEDULLARY NAILING RETROGRADE AND OPEN REDUCTION INTERNAL FIXATION      Plan:  Continue Physical Therapy, increase mobility as tolerated.  Increased norco strength.  Continue SCDs, Continue DVT prophylaxis.  Eliquis 2.5 mg after discharge.  Dispo planning for nursing home rehab when medically stable.  NWB  Keep knee in immobilizer except for skin checks.    Follow up in the office in 2 weeks.   Will sign off.    Santiago Mckeon MD    Date: 7/26/2024  Time: 07:37 EDT

## 2024-07-26 NOTE — PROGRESS NOTES
-s/p surgery  -eating some    Af 108 130/62 sat 99%    Awake and alert, calm, says she can't hear without her hearing aides  Heart rate irregular, tachycardia  Lungs are clear to auscultation bilaterally  Abdomen soft nontender nondistended  Legs are both warm to the touch    Labs:  Sodium 136  K 4.1    Wbc 12.89,  8.9      Assessment and plan:    Right femur fracture-plan is for ORIF 7/25  -Patient seen by cardiology    Atrial fibrillation  -She was given low-dose digoxin and continue with her metoprolol  -HR is good    Chronic kidney disease  -she was seen by Dr Xiao  -dialysis today    Dementia    Anemia  -monitor

## 2024-07-26 NOTE — ANESTHESIA PROCEDURE NOTES
Airway  Urgency: elective    Date/Time: 7/25/2024 7:55 PM  Airway not difficult    General Information and Staff    Patient location during procedure: OR  Anesthesiologist: Gabi Sousa MD    Indications and Patient Condition  Indications for airway management: airway protection    Preoxygenated: yes  Mask difficulty assessment: 1 - vent by mask    Final Airway Details  Final airway type: endotracheal airway      Successful airway: ETT  Cuffed: yes   Successful intubation technique: direct laryngoscopy  Endotracheal tube insertion site: oral  Blade: Day  Blade size: 3  ETT size (mm): 7.0  Cormack-Lehane Classification: grade I - full view of glottis  Placement verified by: chest auscultation and capnometry   Measured from: gums  Number of attempts at approach: 1  Assessment: lips, teeth, and gum same as pre-op and atraumatic intubation

## 2024-07-26 NOTE — ANESTHESIA POSTPROCEDURE EVALUATION
"Patient: Hazel Bucio    Procedure Summary       Date: 07/25/24 Room / Location: [facility] OR [address] [place] [address] MAIN OR    Anesthesia Start: 1947 Anesthesia Stop: 2252    Procedure: RIGHT FEMUR INTRAMEDULLARY NAILING RETROGRADE AND OPEN REDUCTION INTERNAL FIXATION (Right: Thigh) Diagnosis:     Surgeons: Elieser Diggs MD Provider: Gabi Sousa MD    Anesthesia Type: general ASA Status: 4            Anesthesia Type: general    Vitals  Vitals Value Taken Time   /80 07/25/24 2251   Temp 37.1 °C (98.7 °F) 07/25/24 2248   Pulse 124 07/25/24 2314   Resp 18 07/25/24 2248   SpO2 97 % 07/25/24 2314   Vitals shown include unfiled device data.        Post Anesthesia Care and Evaluation    Patient location during evaluation: bedside  Patient participation: complete - patient participated  Level of consciousness: awake  Pain management: adequate    Airway patency: patent  Anesthetic complications: No anesthetic complications  PONV Status: controlled  Cardiovascular status: acceptable  Respiratory status: acceptable  Hydration status: acceptable    Comments: /68 (BP Location: Left arm, Patient Position: Lying)   Pulse 101   Temp 37.1 °C (98.7 °F) (Oral)   Resp 18   Ht 170.2 cm (67\")   Wt 55.3 kg (121 lb 14.6 oz)   SpO2 97%   BMI 19.09 kg/m²       "

## 2024-07-26 NOTE — OP NOTE
Right femur retrograde intramedullary nail and open reduction internal fixation    Hazel Bucio  7/25/2024    Pre-op Diagnosis: Right comminuted distal femur fracture, severe osteopenia.   Post-op Diagnosis: Same  Procedure:    Retrograde intramedullary nail right femoral fracture CPT code 55991    Open treatment of femoral supracondylar fracture with lateral locking plate CPT code 48375    Surgeon:  Elieser Diggs MD  Assistant: Daren Orozco md  The services of a first assist were necessary for performing the procedure safely and expeditiously.  The first assist was present for the entire duration of the case and helped with positioning, retraction and closure of the incision.    Anesthesia: General, Anesthesiologist: Gabi Sousa MD  CRNA: Yari Dickinson CRNA  Staff: Circulator: Marla Foster RN  Scrub Person: Pauline Fischer CFA  Estimated Blood Loss: 100ml  Specimens: * No orders in the log *  Drains: none  Complications: None    Components Utilized:    Implant Name Type Inv. Item Serial No.  Lot No. LRB No. Used Action   NAIL IM FEM [id number] [id number] STRL - [id number] Implant NAIL IM FEM [id number] RETRGR [id number] STRL  NICK TANJA [id number] Right 1 Implanted   SCRW LK T2 ALPHA ADV 5X55MM - [id number] Implant SCRW LK T2 ALPHA ADV 5X55MM  NICK TANJA [id number] Right 1 Implanted   SCRW LK T2 ALPHA ADV 5X70MM - [id number] Implant SCRW LK T2 ALPHA ADV 5X70MM  NICK TANJA [id number] Right 1 Implanted   SCRW LK FEM T2 ALPHA ADV 5X65MM - [id number] Implant SCRW LK FEM T2 ALPHA ADV 5X65MM  NICK TANJA [id number] Right 1 Implanted   SCRW LK FEM ALPHAT2 ADV 75MM - [id number] Implant SCRW LK FEM ALPHAT2 ADV 75MM  NICK TANJA [id number] Right 1 Implanted   SCRW LK T2 ALPHA TI 5X37.5MM STRL - [id number] Implant SCRW LK T2 ALPHA TI 5X37.5MM STRL  NICK TANJA [id number] Right 1 Implanted   SCRW LK T2 ALPHA TI 5X37.5MM STRL - [id number] Implant SCRW LK T2 ALPHA TI 5X37.5MM STRL   NICK [employer] [id number] Right 1 Implanted   SCRW LK T2 ALPHA TI 5X42.5MM STRL - [id number] Implant SCRW LK T2 ALPHA TI 5X42.5MM STRL  NICK TANJA [id number] Right 1 Implanted   SCRW LK AXSOS 5X40MM - [id number] Implant SCRW LK AXSOS 5X40MM  NICK TANJA . Right 1 Implanted   SCRW SJ AXSOS TI 4.5X40MM - [id number] Implant SCRW SJ AXSOS TI 4.5X40MM  NICK TANJA . Right 1 Implanted   SCRW LK AXSOS 5X50MM - [id number] Implant SCRW LK AXSOS 5X50MM  NICK TANJA . Right 2 Implanted       Indication for Procedure:  This patient is a 84 y.o. female who sustained a mechanical fall.  She resides in a memory care unit.  She has severe dementia.  She had gross deformity and pain in her right lower extremity.  Radiographs demonstrated severe osteopenia as well as a femoral fracture.  A CT scan was also performed which demonstrated a very comminuted femoral fracture with again severe osteopenia.  She also has a history of a tibial fracture in the metaphyseal segment treated with an intramedullary nail about 4 years previously.  Surgical and nonsurgical options were discussed with the patient's family.  The patient's family ultimately elected to proceed with operative intervention for palliation as well as to restore the ability to ambulate as best possible.  The risks and benefits of surgery were discussed with patient and informed consent was obtained.  Risks include but are not limited to, infection, bleeding, nerve injury, blood clots, risks associated with anesthesia, need for further surgery, persistent pain, and possibly death.  In relation to this particular fracture and patient, the risk of hardware failure, nonunion, malunion as well as need for additional procedures is high given the history of renal failure on dialysis as well as severe osteopenia.    Protocols for intravenous antibiotics and venous thrombosis were followed for this patient.  IV antibiotics were infused prior to surgery and will be discontinued within  24 hours of completion of the surgical procedure.       DESCRIPTION OF PROCEDURE:     The patient was seen in Preoperative Holding Area where their surgical site was marked. Preoperative antibiotics were received. H&P and consent updated. They were taken to the Operating Room and provided general anesthesia on the Operating Room table.  The patient was transferred to the supine position on a radiolucent Eusebio flat top.  The extremities prepped and draped carefully holding the leg.  Two-stage prep was performed using alcohol followed by ChloraPrep.  Gloves were changed.  A formal surgical timeout confirmed the correct patient, procedure performed laterality as well as she received cefazolin within 60 minutes per SCIP guidelines.  Attention was first directed towards a closed reduction maneuver.  The leg was placed over tibial triangle and a gentle closed reduction maneuver was performed with traction and medialization.  This actually demonstrated very satisfactory reduction of the fracture.  I felt that given the severe osteopenia as well as patient's dementia and fracture location she would be very high risk for fixation failure.  I felt that the fracture would best be managed with a plate nail combo.  With this in mind, attention was first directed towards the open reduction internal fixation component of the procedure.  The lateral locking plate was selected which was about 6 holes.  I made incision centered over the iliotibial band.  Full-thickness flaps were elevated medially and laterally.  An incision was made over the iliotibial band and then the vastus was elevated.  The IT band was elevated distally using careful attention to protect the lateral collateral ligament insertion.  I slid the plate up in a submuscular fashion.  It was first positioned in the lateral buttressing fashion.  I positioned it where I felt that it would be most appropriate based upon the lateral image as distal as possible for best  fixation.  I placed 1 K wire through the center pin.  I then medialized the distal fragment.  Once I was satisfied with the shaft alignment I placed an additional K wire proximally.  The bone was noted to be extremely poor quality would barely hold the K wire.  I then placed 2 unicortical locking screws in the shaft to hold the plate in place while I proceeded with the retrograde nailing component.  I then placed 2 distal screws into the plate anterior and posterior most holes in the plate that I felt would be out of the way of the retrograde nail.  Attention was then directed towards the nail.  A longitudinal incision was made over the patellar tendon and then I incised the patellar tendon.  I then used AP and lateral imaging at the knee to achieve a good starting point and then placed my guidewire up into the canal center center.  This was just anterior to Blumensaat's line.  I then placed the opening reamer into the proximal femur using careful attention to protect my reduction with the plate.  Attention was then directed towards the ball-tipped guidewire this was passed up the canal and easily all the way to the proximal aspect of the femur.  I actually pulled it back a little bit to the level of the lesser trochanter and measured a 380 mm nail.  I reamed all the way up to a size 14 and elected to utilize a 13 mm nail given the very capacious canal.  13 mm x 380 mm Darren retrograde nail was then open and placed over the guidewire and Plast across the fracture.  I confirmed that it was beneath the subchondral plate yet still remaining as distal as possible.  I rotated the nail just a little bit to allow my lateral screws to be just anterior to the plate.  The plate was also positioned posteriorly intentionally to allow the screw placement.  And then placed the 2 lateral to medial distal interlocks I used the advanced interlock screws by Darren due to the very osteoporotic nature of the distal bone.  I then  placed the 2 oblique screws also achieving good fixation.  Total 4 screws were placed across the nail into the distal fragment.  I felt that at least 3 of these had good fixation in the distal block.  Attention was then directed proximally.  Using perfect circles freehand technique 2 interlocks were placed.  The first 1 without any issue the second 1 was somewhat difficult to place due to again the very thin cortical bone I did have to redrilled this a second time to get an appropriate trajectory but did finally achieve a bicortical purchase.  Imaging was saved at this point.  I then proceeded distally and completed the open reduction and internal fixation component of the procedure by placing 2 bicortical locking screws anterior and posterior to the nail and then 1 bicortical, cortical nonlocking screw.  I was unable to use a locking screw in this hole due to the trajectory.  2 unicortical locking screws were placed more proximally adjacent to the nail.  I do not feel the proximalmost hole.  I was unable to use the targeting guide due to some debris that have been found in some of the trocars for those guides of these were discarded off the field.  This necessitated the open incision laterally which did help with the reduction.  Finally, attention was directed distally and I placed 3 more screws in the distal block 2 of which went all the way across the the distal articular block.  2 screws were short and just met adjacent to the lateral aspect of the nail.  The knee was ranged it was noted that she had a flexion contracture due to a prominent tibial nail from her prior surgery.  She had no crepitance from intra-articular hardware.  Perfect lateral imaging demonstrated no evidence of intra-articular hardware and an appropriate reduction of the fracture.  Final imaging was saved.  The wounds were copiously irrigated and sterile saline.  The IT band was then closed using #1 strata fix PDS barbed suture followed by 0  Vicryl suture 2-0 Vicryl suture.  0 Vicryl suture was utilized to close the patellar tendon followed by 2-0 Vicryl suture and staples.  All wounds were dressed with sterile gauze and waterproof dressings.  The knee was placed in a knee immobilizer.  All counts were correct at the end of the procedure    Postoperative Plan:  The patient will be nonweightbearing to the right lower extremity we will try to keep her in a knee immobilizer but she will need careful skin checks to make sure she is not getting skin breakdown from the knee immobilizer.    SCDs for DVT prophylaxis, may resume Eliquis likely on postoperative day #1 or 2 as long as incision stay dry    Cefazolin for antibiotic prophylaxis    Elieser Diggs MD

## 2024-07-26 NOTE — PLAN OF CARE
Problem: Fall Injury Risk  Goal: Absence of Fall and Fall-Related Injury  Intervention: Identify and Manage Contributors  Recent Flowsheet Documentation  Taken 7/26/2024 1800 by Naomi Carrasco RN  Self-Care Promotion: BADL personal objects within reach  Taken 7/26/2024 0728 by Naomi Carrasco, RN  Self-Care Promotion: BADL personal objects within reach     Problem: Fall Injury Risk  Goal: Absence of Fall and Fall-Related Injury  Intervention: Promote Injury-Free Environment  Recent Flowsheet Documentation  Taken 7/26/2024 1800 by Naomi Carrasco, RN  Safety Promotion/Fall Prevention:   room organization consistent   safety round/check completed   activity supervised   assistive device/personal items within reach  Taken 7/26/2024 0728 by Naomi Carrasco RN  Safety Promotion/Fall Prevention:   room organization consistent   safety round/check completed   activity supervised   assistive device/personal items within reach   Goal Outcome Evaluation:           Progress: improving  Outcome Evaluation: Patient AO x1 disoriented time place situation, able to turn side by side qith assistant take pill whole witout difficulty increase appetite at lunch eat herself dialysis today in the afternoon

## 2024-07-26 NOTE — PROGRESS NOTES
"    Patient Name: Hazel Bucio  :1940  84 y.o.      Patient Care Team:  John Cano MD as PCP - General (Family Medicine)  Jose Carlos Cheng MD as Consulting Physician (Cardiology)    Chief Complaint: follow up atrial fibrillation     Interval History: resting in bed. No complaints. .       Objective   Vital Signs  Temp:  [98 °F (36.7 °C)-98.7 °F (37.1 °C)] 98.4 °F (36.9 °C)  Heart Rate:  [] 108  Resp:  [16-22] 20  BP: (110-149)/(49-92) 130/62    Intake/Output Summary (Last 24 hours) at 2024 1249  Last data filed at 2024 0728  Gross per 24 hour   Intake 438 ml   Output 100 ml   Net 338 ml     Flowsheet Rows      Flowsheet Row First Filed Value   Admission Height 170.2 cm (67\") Documented at 2024 1729   Admission Weight 55.3 kg (121 lb 14.6 oz) Documented at 2024 1729            Physical Exam:   General Appearance:    Alert, cooperative, in no acute distress   Lungs:     Clear to auscultation.  Normal respiratory effort and rate.      Heart:    irregular rhythm and normal rate, normal S1 and S2, no murmurs, gallops or rubs.     Chest Wall:    No abnormalities observed   Abdomen:     Soft, nontender, positive bowel sounds.     Extremities:   no cyanosis, clubbing or edema.  No marked joint deformities.  Adequate musculoskeletal strength.       Results Review:    Results from last 7 days   Lab Units 24  0848   SODIUM mmol/L 136   POTASSIUM mmol/L 4.1   CHLORIDE mmol/L 102   CO2 mmol/L 23.0   BUN mg/dL 19   CREATININE mg/dL 2.40*   GLUCOSE mg/dL 70   CALCIUM mg/dL 9.0         Results from last 7 days   Lab Units 24  0230   WBC 10*3/mm3 12.89*   HEMOGLOBIN g/dL 8.9*   HEMATOCRIT % 28.3*   PLATELETS 10*3/mm3 265     Results from last 7 days   Lab Units 24  1849   INR  1.65*   APTT seconds 35.1     Results from last 7 days   Lab Units 24  0542   MAGNESIUM mg/dL 2.5*                   Medication Review:   apixaban, 2.5 mg, Oral, Q12H  atorvastatin, 20 " mg, Oral, Nightly  ceFAZolin, 1,000 mg, Intravenous, Q24H  famotidine, 10 mg, Oral, Q48H  gabapentin, 100 mg, Oral, Nightly  Menthol-Zinc Oxide, 1 Application, Topical, Q12H  metoprolol succinate XL, 50 mg, Oral, Q24H  midodrine, 10 mg, Oral, Q8H  mirtazapine, 7.5 mg, Oral, Nightly  polyethylene glycol, 17 g, Oral, QAM  sertraline, 50 mg, Oral, Nightly  sevelamer, 800 mg, Oral, TID With Meals  topiramate, 100 mg, Oral, Nightly         lactated ringers, 9 mL/hr, Last Rate: 9 mL/hr (07/25/24 1810)  sodium chloride, 50 mL/hr, Last Rate: 50 mL/hr (07/26/24 0102)        Assessment & Plan   Distal femur fracture , status post IM nailing POD #1  Paroxysmal atrial fibrillation ,  limited options. She is on midodrine for BP support. She is also on metoprolol succinate as well as apixaban. Got IV digoxin 250 mcg 7/24.  Hypotension, midodrine held intermittently due to elevated BP.   Normal coronary anatomy without obstructive disease by cardiac cath 10/18/2023  Preserved LVEF.   Dementia   ESRD on HD   Sick sinus syndrome status post  [place] sci dual chamber permanent pacemaker.     Midodrine is being held intermittently. I'm going to see if she will tolerate a lower dose.     Adjusted beta blocker -- may do better on short acting.     She is on apixaban 2.5 md BID at home. She is certainly a high fall risk . Unclear if this femur injury was from a fall. Defer safety of AC to her primary cardiologist Dr. Ellsworth.     La Nena Woo, [facility]  07/26/24  12:49 EDT

## 2024-07-26 NOTE — PLAN OF CARE
Goal Outcome Evaluation:  Plan of Care Reviewed With: patient        Progress: no change  Outcome Evaluation: 83 y/o F admitted to [facility] after falling at her facility resulting in a R distal femur fx now POD 1 s/p retrograde intramedullary nail and ORIF. Per ortho pt is NWB on R LE with KI. Pt AxOx1, unsure PLOF due to baseline dementia. Currently pt. presents with deficits in balance, strength and act delores limited by pain impacting independence with self care. Pt. required max A x2 for bed mob and STS, unable to maintain NWB status. OT will follow and progress as able to address above mentioned deficits. OT rec DC to SNF      Anticipated Discharge Disposition (OT): skilled nursing facility

## 2024-07-26 NOTE — THERAPY EVALUATION
Patient Name: Hazel Bucio  : 1940    MRN: [id number]                              Today's Date: 2024       Admit Date: 2024    Visit Dx:     ICD-10-CM ICD-9-CM   1. Closed fracture of distal end of right femur, unspecified fracture morphology, initial encounter  S72.401A 821.20   2. Right hip pain  M25.551 719.45   3. Right arm pain  M79.601 729.5   4. History of dementia  Z86.59 V11.8   5. Anticoagulated  Z79.01 V58.61     Patient Active Problem List   Diagnosis    Permanent atrial fibrillation    Dyslipidemia    Essential hypertension    Long term current use of anticoagulant    Presence of cardiac pacemaker    Type 2 diabetes mellitus    Ureteral cancer    Chronic insomnia    Seasonal allergies    Acquired absence of kidney    Athscl heart disease of native coronary 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    Acquired absence of other specified parts of digestive tract    Gastro-esophageal reflux disease without esophagitis    ESRD on dialysis    Hypotension    Mild cognitive impairment of uncertain or unknown etiology    Femur fracture, right     Past Medical History:   Diagnosis Date    Acquired absence of kidney 10/04/2021    Acquired absence of other specified parts of digestive tract 2022    Athscl heart disease of native coronary 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 2022    Atrial fibrillation with rapid ventricular response     Bladder cancer     Cancer     breast, bladder    Cholecystitis with cholelithiasis     Chronic insomnia 2020    Deep vein thrombosis     Dyslipidemia 2017    E coli bacteremia     ESRD on dialysis 10/18/2023    Essential hypertension 2017    Fracture tibia/fibula, right, closed, initial encounter 2020    Gastro-esophageal reflux disease without esophagitis 2020    GERD (gastroesophageal reflux disease)     History of bladder cancer     History of falling     History of urostomy     Hyperkalemia     Immobility 2020    r/t broken Tibia     Irritable  bowel syndrome without diarrhea     Kidney carcinoma, right     removed     Long term current use of anticoagulant 01/09/2015    Mild cognitive impairment of uncertain or unknown etiology 08/31/2023    Myalgia due to statin     Other specified abdominal hernia without obstruction or gangrene     PAF (paroxysmal atrial fibrillation) 06/26/2019    Permanent atrial fibrillation 06/26/2019    Personal history of other venous thrombosis and embolism     Presence of cardiac pacemaker 11/03/2014    BS dual chamber PM placed 10/2014 with pocket revision 1/25/17.  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     Past Surgical History:   Procedure Laterality Date    BREAST LUMPECTOMY      CARDIAC CATHETERIZATION N/A 10/18/2023    Procedure: Left Heart Cath possible PCI, atherectomy, hemodynamic support;  Surgeon: Augustin Ellsworth MD;  Location: [facility] CATH INVASIVE LOCATION;  Service: Cardiology;  Laterality: N/A;    CARDIAC ELECTROPHYSIOLOGY PROCEDURE N/A 4/28/2023    Procedure: Pacemaker gen change, [place] AWARE $;  Surgeon: Jose Carlos Cheng MD;  Location: [facility] CATH INVASIVE LOCATION;  Service: Cardiovascular;  Laterality: N/A;    CHOLECYSTECTOMY N/A 10/12/2020    Procedure: CHOLECYSTECTOMY LAPAROSCOPIC;  Surgeon: Go Pereira DO;  Location: [facility] MAIN OR;  Service: General;  Laterality: N/A;    CYSTECTOMY W/ URETEROILEAL CONDUIT      HARDWARE REMOVAL Right 6/11/2021    Procedure: TIBIA HARDWARE REMOVAL;  Surgeon: Geoff Shah II, MD;  Location: [facility] MAIN OR;  Service: Orthopedics;  Laterality: Right;    HERNIA REPAIR      HYSTERECTOMY      INSERT / REPLACE / REMOVE PACEMAKER      NEPHRECTOMY Right     TIBIA IM NAILING/RODDING Right 8/28/2020    Procedure: TIBIA INTRAMEDULLARY NAIL/ABRAHAM INSERTION;  Surgeon: Alyssa  Geoff Feng II, MD;  Location: [facility] MAIN OR;  Service: Orthopedics;  Laterality: Right;      General Information       Row Name 07/26/24 1202          OT Time and Intention    Document Type evaluation  -AG     Mode of Treatment co-treatment;physical therapy;occupational therapy  -AG       Row Name 07/26/24 1202          General Information    Patient Profile Reviewed yes  -AG     Existing Precautions/Restrictions fall;non-weight bearing;other (see comments)  -AG     Barriers to Rehab medically complex;previous functional deficit;cognitive status  -AG       Row Name 07/26/24 1202          Living Environment    People in Home facility resident  -AG       Row Name 07/26/24 1202          Cognition    Orientation Status (Cognition) oriented to;person;verbal cues/prompts needed for orientation  -AG       Row Name 07/26/24 1202          Safety Issues, Functional Mobility    Safety Issues Affecting Function (Mobility) ability to follow commands;awareness of need for assistance;insight into deficits/self-awareness;judgment;positioning of assistive device;problem-solving;safety precaution awareness;safety precautions follow-through/compliance;sequencing abilities;unable to maintain weight-bearing restrictions  -AG     Impairments Affecting Function (Mobility) balance;endurance/activity tolerance;cognition;pain;range of motion (ROM);strength  -AG     Cognitive Impairments, Mobility Safety/Performance attention;awareness, need for assistance;insight into deficits/self-awareness;judgment;problem-solving/reasoning;safety precaution awareness;safety precaution follow-through;sequencing abilities  -AG               User Key  (r) = Recorded By, (t) = Taken By, (c) = Cosigned By      Initials Name Provider Type    AG Tavo Mckinley OT Occupational Therapist                     Mobility/ADL's       Row Name 07/26/24 1203          Bed Mobility    Bed Mobility supine-sit;sit-supine  -AG     Supine-Sit [place] (Bed  Mobility) maximum assist (25% patient effort);2 person assist;verbal cues;nonverbal cues (demo/gesture)  -AG     Sit-Supine [place] (Bed Mobility) maximum assist (25% patient effort);2 person assist;verbal cues;nonverbal cues (demo/gesture)  -AG     Assistive Device (Bed Mobility) bed rails;draw sheet;head of bed elevated  -AG       Row Name 07/26/24 1203          Transfers    Transfers sit-stand transfer  -AG       Row Name 07/26/24 [facility]          Bed-Chair Transfer    Bed-Chair [place] (Transfers) unable to assess  -AG       Row Name 07/26/24 [facility]          Sit-Stand Transfer    Sit-Stand [place] (Transfers) maximum assist (25% patient effort);2 person assist;verbal cues;nonverbal cues (demo/gesture)  -AG     Assistive Device (Sit-Stand Transfers) walker, front-wheeled  -AG     Comment, (Sit-Stand Transfer) unable to maintain NWB RLE  -AG       Row Name 07/26/24 1203          Activities of Daily Living    BADL Assessment/Intervention lower body dressing;grooming;toileting  -AG       Row Name 07/26/24 [facility]          Mobility    Extremity Weight-bearing Status right lower extremity  -AG     Right Lower Extremity (Weight-bearing Status) non weight-bearing (NWB);other (see comments)  -AG       Row Name 07/26/24 [facility]          Lower Body Dressing Assessment/Training    [place] Level (Lower Body Dressing) lower body dressing skills;don;socks;dependent (less than 25% patient effort)  -AG     Position (Lower Body Dressing) supine  -AG       Row Name 07/26/24 1203          Grooming Assessment/Training    [place] Level (Grooming) grooming skills;wash face, hands;set up  -AG     Position (Grooming) edge of bed sitting  -AG       Row Name 07/26/24 [facility]          Toileting Assessment/Training    [place] Level (Toileting) dependent (less than 25% patient effort)  -AG     Comment, (Toileting) purewick suction  -AG               User Key  (r) = Recorded By, (t) = Taken By, (c) = Cosigned By      Initials  Name Provider Type    AG Tavo Mckinley OT Occupational Therapist                   Obj/Interventions       Row Name 07/26/24 1203          Sensory Assessment (Somatosensory)    Sensory Assessment (Somatosensory) sensation intact  -AG       Row Name 07/26/24 1203          Vision Assessment/Intervention    Visual Impairment/Limitations WFL  -AG       Row Name 07/26/24 1203          Range of Motion Comprehensive    General Range of Motion no range of motion deficits identified  -AG       Row Name 07/26/24 1203          Strength Comprehensive (MMT)    Comment, General Manual Muscle Testing (MMT) Assessment generalized weakness  -AG       Row Name 07/26/24 1203          Motor Skills    Motor Skills functional endurance  -AG     Functional Endurance poor  -AG       Row Name 07/26/24 1203          Balance    Balance Assessment sitting static balance;sitting dynamic balance;standing static balance  -AG     Static Sitting Balance contact guard  -AG     Dynamic Sitting Balance minimal assist  -AG     Position, Sitting Balance unsupported;sitting edge of bed  -AG     Static Standing Balance moderate assist;maximum assist;2-person assist  -AG     Position/Device Used, Standing Balance supported;walker, front-wheeled  -AG     Balance Interventions sitting;standing  -AG               User Key  (r) = Recorded By, (t) = Taken By, (c) = Cosigned By      Initials Name Provider Type    AG Tavo Mckinley OT Occupational Therapist                   Goals/Plan       Row Name 07/26/24 1206          Bed Mobility Goal 1 (OT)    Activity/Assistive Device (Bed Mobility Goal 1, OT) bed mobility activities, all  -AG     [place] Level/Cues Needed (Bed Mobility Goal 1, OT) modified independence  -AG     Time Frame (Bed Mobility Goal 1, OT) short term goal (STG);2 weeks  -AG       Row Name 07/26/24 1206          Transfer Goal 1 (OT)    Activity/Assistive Device (Transfer Goal 1, OT) transfers, all  -AG     [place] Level/Cues  Needed (Transfer Goal 1, OT) modified independence  -AG     Time Frame (Transfer Goal 1, OT) short term goal (STG);2 weeks  -AG     Progress/Outcome (Transfer Goal 1, OT) new goal  -AG       Row Name 07/26/24 1206          Dressing Goal 1 (OT)    Activity/Device (Dressing Goal 1, OT) dressing skills, all;upper body dressing;lower body dressing  -AG     [place]/Cues Needed (Dressing Goal 1, OT) modified independence  -AG     Time Frame (Dressing Goal 1, OT) short term goal (STG);2 weeks  -AG     Progress/Outcome (Dressing Goal 1, OT) new goal  -AG       Row Name 07/26/24 1206          Toileting Goal 1 (OT)    Activity/Device (Toileting Goal 1, OT) toileting skills, all  -AG     [place] Level/Cues Needed (Toileting Goal 1, OT) modified independence  -AG     Time Frame (Toileting Goal 1, OT) short term goal (STG);2 weeks  -AG     Progress/Outcome (Toileting Goal 1, OT) new goal  -AG       Row Name 07/26/24 1206          Grooming Goal 1 (OT)    Activity/Device (Grooming Goal 1, OT) grooming skills, all  -AG     [place] (Grooming Goal 1, OT) modified independence  -AG     Time Frame (Grooming Goal 1, OT) short term goal (STG);2 weeks  -AG     Progress/Outcome (Grooming Goal 1, OT) new goal  -AG       Row Name 07/26/24 1206          Therapy Assessment/Plan (OT)    Planned Therapy Interventions (OT) activity tolerance training;BADL retraining;functional balance retraining;occupation/activity based interventions;patient/caregiver education/training;strengthening exercise;transfer/mobility retraining  -AG               User Key  (r) = Recorded By, (t) = Taken By, (c) = Cosigned By      Initials Name Provider Type    AG Tavo Mckinley, OT Occupational Therapist                   Clinical Impression       Row Name 07/26/24 1204          Pain Assessment    Pretreatment Pain Rating 0/10 - no pain  -AG     Pain Location - Side/Orientation Right  -AG     Pain Location lower  -AG     Pain Location - extremity  -AG      Pre/Posttreatment Pain Comment pt. grimaced with movement but did not rate pain  -AG     Pain Intervention(s) Repositioned;Rest  -AG       Row Name 07/26/24 1204          Plan of Care Review    Plan of Care Reviewed With patient  -     Progress no change  -     Outcome Evaluation 83 y/o F admitted to [facility] after falling at her facility resulting in a R distal femur fx now POD 1 s/p retrograde intramedullary nail and ORIF. Per ortho pt is NWB on R LE with KI. Pt AxOx1, unsure PLOF due to baseline dementia. Currently pt. presents with deficits in balance, strength and act delores limited by pain impacting independence with self care. Pt. required max A x2 for bed mob and STS, unable to maintain NWB status. OT will follow and progress as able to address above mentioned deficits. OT rec DC to SNF  -       Row Name 07/26/24 1204          Therapy Assessment/Plan (OT)    Rehab Potential (OT) fair, will monitor progress closely  -     Criteria for Skilled Therapeutic Interventions Met (OT) yes;meets criteria;skilled treatment is necessary  -     Therapy Frequency (OT) 5 times/wk  -AG       Row Name 07/26/24 1204          Therapy Plan Review/Discharge Plan (OT)    Anticipated Discharge Disposition (OT) skilled nursing facility  -       Row Name 07/26/24 1204          Vital Signs    O2 Delivery Pre Treatment room air  -       Row Name 07/26/24 1204          Positioning and Restraints    Pre-Treatment Position in bed  -AG     Post Treatment Position bed  -AG     In Bed notified nsg;call light within reach;encouraged to call for assist;exit alarm on;fowlers  -               User Key  (r) = Recorded By, (t) = Taken By, (c) = Cosigned By      Initials Name Provider Type     Tavo Mckinley, OT Occupational Therapist                   Outcome Measures       Row Name 07/26/24 1206          How much help from another is currently needed...    Putting on and taking off regular lower body clothing? 1  -AG     Bathing  (including washing, rinsing, and drying) 1  -AG     Toileting (which includes using toilet bed pan or urinal) 1  -AG     Putting on and taking off regular upper body clothing 2  -AG     Taking care of personal grooming (such as brushing teeth) 3  -AG     Eating meals 3  -AG     AM-PAC 6 Clicks Score (OT) 11  -AG       Row Name 07/26/24 1114 07/26/24 0728       How much help from another person do you currently need...    Turning from your back to your side while in flat bed without using bedrails? 2  -CS 2  -MM    Moving from lying on back to sitting on the side of a flat bed without bedrails? 2  -CS 2  -MM    Moving to and from a bed to a chair (including a wheelchair)? 2  -CS 2  -MM    Standing up from a chair using your arms (e.g., wheelchair, bedside chair)? 2  -CS 2  -MM    Climbing 3-5 steps with a railing? 1  -CS 1  -MM    To walk in hospital room? 1  -CS 1  -MM    AM-PAC 6 Clicks Score (PT) 10  -CS 10  -MM    Highest Level of Mobility Goal 4 --> Transfer to chair/commode  -CS 4 --> Transfer to chair/commode  -MM      Row Name 07/26/24 1206 07/26/24 1114       Functional Assessment    Outcome Measure Options AM-PAC 6 Clicks Daily Activity (OT)  - AM-PAC 6 Clicks Basic Mobility (PT)  -CS              User Key  (r) = Recorded By, (t) = Taken By, (c) = Cosigned By      Initials Name Provider Type    Naomi Schaefer, RN Registered Nurse    Sloane Gautam, PT Physical Therapist     Tavo Mckinley OT Occupational Therapist                    Occupational Therapy Education       Title: PT OT SLP Therapies (In Progress)       Topic: Occupational Therapy (Done)       Point: ADL training (Done)       Description:   Instruct learner(s) on proper safety adaptation and remediation techniques during self care or transfers.   Instruct in proper use of assistive devices.                  Learning Progress Summary             Patient Acceptance, E,TB, VU by  at 7/26/2024 1206                         Point: Home  exercise program (Done)       Description:   Instruct learner(s) on appropriate technique for monitoring, assisting and/or progressing therapeutic exercises/activities.                  Learning Progress Summary             Patient Acceptance, E,TB, VU by  at 7/26/2024 1206                         Point: Precautions (Done)       Description:   Instruct learner(s) on prescribed precautions during self-care and functional transfers.                  Learning Progress Summary             Patient Acceptance, E,TB, VU by  at 7/26/2024 1206                         Point: Body mechanics (Done)       Description:   Instruct learner(s) on proper positioning and spine alignment during self-care, functional mobility activities and/or exercises.                  Learning Progress Summary             Patient Acceptance, E,TB, VU by  at 7/26/2024 1206                                         User Key       Initials Effective Dates Name Provider Type Discipline     01/23/24 -  Taov Mckinley OT Occupational Therapist OT                  OT Recommendation and Plan  Planned Therapy Interventions (OT): activity tolerance training, BADL retraining, functional balance retraining, occupation/activity based interventions, patient/caregiver education/training, strengthening exercise, transfer/mobility retraining  Therapy Frequency (OT): 5 times/wk  Plan of Care Review  Plan of Care Reviewed With: patient  Progress: no change  Outcome Evaluation: 85 y/o F admitted to [facility] after falling at her facility resulting in a R distal femur fx now POD 1 s/p retrograde intramedullary nail and ORIF. Per ortho pt is NWB on R LE with KI. Pt AxOx1, unsure PLOF due to baseline dementia. Currently pt. presents with deficits in balance, strength and act delores limited by pain impacting independence with self care. Pt. required max A x2 for bed mob and STS, unable to maintain NWB status. OT will follow and progress as able to address above mentioned  deficits. OT rec DC to SNF     Time Calculation:   Evaluation Complexity (OT)  Review Occupational Profile/Medical/Therapy History Complexity: expanded/moderate complexity  Assessment, Occupational Performance/Identification of Deficit Complexity: 3-5 performance deficits  Clinical Decision Making Complexity (OT): detailed assessment/moderate complexity  Overall Complexity of Evaluation (OT): moderate complexity     Time Calculation- OT       Row Name 07/26/24 1206             Time Calculation- OT    OT Start Time 0955  -AG      OT Stop Time 1012  -AG      OT Time Calculation (min) 17 min  -AG      Total Timed Code Minutes- OT 10 minute(s)  -AG      OT Received On 07/26/24  -AG      OT - Next Appointment 07/29/24  -AG      OT Goal Re-Cert Due Date 08/09/24  -AG         Timed Charges    14447 - OT Self Care/Mgmt Minutes 10  -AG         Untimed Charges    OT Eval/Re-eval Minutes 7  -AG         Total Minutes    Timed Charges Total Minutes 10  -AG      Untimed Charges Total Minutes 7  -AG       Total Minutes 17  -AG                User Key  (r) = Recorded By, (t) = Taken By, (c) = Cosigned By      Initials Name Provider Type    AG Tavo Mckinley, OT Occupational Therapist                  Therapy Charges for Today       Code Description Service Date Service Provider Modifiers Qty    08958586774 HC OT SELF CARE/MGMT/TRAIN EA 15 MIN 7/26/2024 Tavo Mckinley, OT GO 1    52054381888 HC OT EVAL MOD COMPLEXITY 2 7/26/2024 Tavo Mckinley OT GO 1                 Tavo Mckinley OT  7/26/2024

## 2024-07-26 NOTE — THERAPY EVALUATION
Patient Name: Hazel Bucio  : 1940    MRN: [id number]                              Today's Date: 2024       Admit Date: 2024    Visit Dx:     ICD-10-CM ICD-9-CM   1. Closed fracture of distal end of right femur, unspecified fracture morphology, initial encounter  S72.401A 821.20   2. Right hip pain  M25.551 719.45   3. Right arm pain  M79.601 729.5   4. History of dementia  Z86.59 V11.8   5. Anticoagulated  Z79.01 V58.61     Patient Active Problem List   Diagnosis    Permanent atrial fibrillation    Dyslipidemia    Essential hypertension    Long term current use of anticoagulant    Presence of cardiac pacemaker    Type 2 diabetes mellitus    Ureteral cancer    Chronic insomnia    Seasonal allergies    Acquired absence of kidney    Athscl heart disease of native coronary 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    Acquired absence of other specified parts of digestive tract    Gastro-esophageal reflux disease without esophagitis    ESRD on dialysis    Hypotension    Mild cognitive impairment of uncertain or unknown etiology    Femur fracture, right     Past Medical History:   Diagnosis Date    Acquired absence of kidney 10/04/2021    Acquired absence of other specified parts of digestive tract 2022    Athscl heart disease of native coronary 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 2022    Atrial fibrillation with rapid ventricular response     Bladder cancer     Cancer     breast, bladder    Cholecystitis with cholelithiasis     Chronic insomnia 2020    Deep vein thrombosis     Dyslipidemia 2017    E coli bacteremia     ESRD on dialysis 10/18/2023    Essential hypertension 2017    Fracture tibia/fibula, right, closed, initial encounter 2020    Gastro-esophageal reflux disease without esophagitis 2020    GERD (gastroesophageal reflux disease)     History of bladder cancer     History of falling     History of urostomy     Hyperkalemia     Immobility 2020    r/t broken Tibia     Irritable  bowel syndrome without diarrhea     Kidney carcinoma, right     removed     Long term current use of anticoagulant 01/09/2015    Mild cognitive impairment of uncertain or unknown etiology 08/31/2023    Myalgia due to statin     Other specified abdominal hernia without obstruction or gangrene     PAF (paroxysmal atrial fibrillation) 06/26/2019    Permanent atrial fibrillation 06/26/2019    Personal history of other venous thrombosis and embolism     Presence of cardiac pacemaker 11/03/2014    BS dual chamber PM placed 10/2014 with pocket revision 1/25/17.  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     Past Surgical History:   Procedure Laterality Date    BREAST LUMPECTOMY      CARDIAC CATHETERIZATION N/A 10/18/2023    Procedure: Left Heart Cath possible PCI, atherectomy, hemodynamic support;  Surgeon: Augustin Ellsworth MD;  Location: [facility] CATH INVASIVE LOCATION;  Service: Cardiology;  Laterality: N/A;    CARDIAC ELECTROPHYSIOLOGY PROCEDURE N/A 4/28/2023    Procedure: Pacemaker gen change, [place] AWARE $;  Surgeon: Jose Carlos Cheng MD;  Location: [facility] CATH INVASIVE LOCATION;  Service: Cardiovascular;  Laterality: N/A;    CHOLECYSTECTOMY N/A 10/12/2020    Procedure: CHOLECYSTECTOMY LAPAROSCOPIC;  Surgeon: Go Pereira DO;  Location: [facility] MAIN OR;  Service: General;  Laterality: N/A;    CYSTECTOMY W/ URETEROILEAL CONDUIT      HARDWARE REMOVAL Right 6/11/2021    Procedure: TIBIA HARDWARE REMOVAL;  Surgeon: Geoff Shah II, MD;  Location: [facility] MAIN OR;  Service: Orthopedics;  Laterality: Right;    HERNIA REPAIR      HYSTERECTOMY      INSERT / REPLACE / REMOVE PACEMAKER      NEPHRECTOMY Right     TIBIA IM NAILING/RODDING Right 8/28/2020    Procedure: TIBIA INTRAMEDULLARY NAIL/ABRAHAM INSERTION;  Surgeon: Alyssa  Geoff Feng II, MD;  Location: [facility] MAIN OR;  Service: Orthopedics;  Laterality: Right;      General Information       Row Name 07/26/24 1105          Physical Therapy Time and Intention    Document Type evaluation  -CS     Mode of Treatment co-treatment;physical therapy;occupational therapy  -CS       Row Name 07/26/24 1105          General Information    Patient Profile Reviewed yes  -CS     Prior Level of Function gait;transfer;bed mobility  unsure PLOF due to baseline dementia  -CS     Existing Precautions/Restrictions fall;non-weight bearing;other (see comments)  R LE NWB + KI  -CS     Barriers to Rehab medically complex;previous functional deficit;cognitive status  -CS       Row Name 07/26/24 1105          Living Environment    People in Home facility resident  -CS       Row Name 07/26/24 1105          Cognition    Orientation Status (Cognition) oriented to;person;verbal cues/prompts needed for orientation  hx of dementia  -CS       Row Name 07/26/24 1105          Safety Issues, Functional Mobility    Safety Issues Affecting Function (Mobility) awareness of need for assistance;insight into deficits/self-awareness;judgment;positioning of assistive device;problem-solving;safety precaution awareness;safety precautions follow-through/compliance;sequencing abilities;unable to maintain weight-bearing restrictions  -CS     Impairments Affecting Function (Mobility) balance;endurance/activity tolerance;cognition;pain;range of motion (ROM);strength  -CS     Comment, Safety Issues/Impairments (Mobility) Co treatment medically appropriate and necessary due to patient acuity level, activity tolerance and safety of patient and staff. Evaluation established to achieve all goals in POC.  -CS               User Key  (r) = Recorded By, (t) = Taken By, (c) = Cosigned By      Initials Name Provider Type    CS Sloane Guzman PT Physical Therapist                   Mobility       Row Name 07/26/24 1102          Bed  Mobility    Bed Mobility supine-sit;sit-supine  -CS     Supine-Sit [place] (Bed Mobility) maximum assist (25% patient effort);2 person assist;verbal cues;nonverbal cues (demo/gesture)  -CS     Sit-Supine [place] (Bed Mobility) maximum assist (25% patient effort);2 person assist;verbal cues;nonverbal cues (demo/gesture)  -CS     Assistive Device (Bed Mobility) bed rails;draw sheet;head of bed elevated  -CS     Comment, (Bed Mobility) cues for sequencing + increased time to complete with noted poor initiation to tasks  -CS       Row Name 07/26/24 1107          Bed-Chair Transfer    Bed-Chair [place] (Transfers) unable to assess  -CS       Row Name 07/26/24 1107          Sit-Stand Transfer    Sit-Stand [place] (Transfers) maximum assist (25% patient effort);2 person assist;verbal cues;nonverbal cues (demo/gesture)  -CS     Assistive Device (Sit-Stand Transfers) walker, front-wheeled  -CS     Comment, (Sit-Stand Transfer) unable to maintain NWB on R LE with overall poor upright posture and weight shifting ability; further mobility deferred  -CS       Row Name 07/26/24 1107          Gait/Stairs (Locomotion)    [place] Level (Gait) unable to assess  -CS       Row Name 07/26/24 1107          Mobility    Extremity Weight-bearing Status right lower extremity  -CS     Right Lower Extremity (Weight-bearing Status) non weight-bearing (NWB);other (see comments)  + KI  -CS               User Key  (r) = Recorded By, (t) = Taken By, (c) = Cosigned By      Initials Name Provider Type    Sloane Gautam PT Physical Therapist                   Obj/Interventions       Row Name 07/26/24 1108          Range of Motion Comprehensive    General Range of Motion other (see comments)  -CS     Comment, General Range of Motion R LE limited secondary to post-op and restrictions; L LE WFL  -CS       Row Name 07/26/24 1108          Strength Comprehensive (MMT)    General Manual Muscle Testing (MMT) Assessment other (see  "comments)  -CS     Comment, General Manual Muscle Testing (MMT) Assessment R LE limited secondary to post-op and restrictions; L LE WFL  -CS       Row Name 07/26/24 1108          Balance    Balance Assessment sitting static balance;sitting dynamic balance;standing static balance  -CS     Static Sitting Balance contact guard  -CS     Dynamic Sitting Balance minimal assist  -CS     Position, Sitting Balance unsupported;sitting edge of bed  -CS     Static Standing Balance moderate assist;maximum assist;2-person assist  -CS     Position/Device Used, Standing Balance supported;walker, front-wheeled  -CS               User Key  (r) = Recorded By, (t) = Taken By, (c) = Cosigned By      Initials Name Provider Type    CS Sloane Guzman, PT Physical Therapist                   Goals/Plan       Row Name 07/26/24 1114          Bed Mobility Goal 1 (PT)    Activity/Assistive Device (Bed Mobility Goal 1, PT) bed mobility activities, all  -CS     [place] Level/Cues Needed (Bed Mobility Goal 1, PT) minimum assist (75% or more patient effort)  -CS     Time Frame (Bed Mobility Goal 1, PT) 1 week  -CS       Row Name 07/26/24 1114          Transfer Goal 1 (PT)    Activity/Assistive Device (Transfer Goal 1, PT) sit-to-stand/stand-to-sit;bed-to-chair/chair-to-bed  -CS     [place] Level/Cues Needed (Transfer Goal 1, PT) minimum assist (75% or more patient effort)  -CS     Time Frame (Transfer Goal 1, PT) 1 week  -CS               User Key  (r) = Recorded By, (t) = Taken By, (c) = Cosigned By      Initials Name Provider Type     Sloane Guzman, PT Physical Therapist                   Clinical Impression       Row Name 07/26/24 1109          Pain    Pretreatment Pain Rating 0/10 - no pain  -CS     Pain Location - Side/Orientation Right  -CS     Pain Location lower  -CS     Pain Location - extremity  -CS     Pre/Posttreatment Pain Comment reports \"a lot\" of pain during/following activity  -CS     Pain Intervention(s) " Rest;Repositioned  -CS       Row Name 07/26/24 1109          Plan of Care Review    Plan of Care Reviewed With patient  -CS     Outcome Evaluation Pt is a 85 y/o F admitted to [facility] after falling at her facility resulting in a R distal femur fx now POD 1 s/p retrograde intramedullary nail and ORIF. Per ortho pt is NWB on R LE with KI. Pt AxOx1 and per chart review from an ECF. Unsure PLOF due to baseline dementia. Pt presents to PT with expected post-op pain and weakness, poor activity tolerance, and impaired functional mobility. Pt required max A x 2 to complete bed mobility with overall poor initiation to tasks. Pt attempted to stand but was unable to maintain NWB on R LE with poor upright posture and ability to weight shift. Further mobility deferred. PT recommends SNF at D/C to address functional deficits.  -CS       Row Name 07/26/24 1108          Therapy Assessment/Plan (PT)    Rehab Potential (PT) fair, will monitor progress closely  -CS     Criteria for Skilled Interventions Met (PT) yes;meets criteria  -CS     Therapy Frequency (PT) 6 times/wk  -CS       Row Name 07/26/24 1109          Vital Signs    Pre SpO2 (%) 98  -CS     O2 Delivery Pre Treatment room air  -CS     O2 Delivery Intra Treatment room air  -CS     Post SpO2 (%) 96  -CS     O2 Delivery Post Treatment room air  -CS       Row Name 07/26/24 1109          Positioning and Restraints    Pre-Treatment Position in bed  -CS     Post Treatment Position bed  -CS     In Bed supine;call light within reach;encouraged to call for assist;exit alarm on;legs elevated  -CS               User Key  (r) = Recorded By, (t) = Taken By, (c) = Cosigned By      Initials Name Provider Type    CS Sloane Guzman, PT Physical Therapist                   Outcome Measures       Row Name 07/26/24 1115          How much help from another person do you currently need...    Turning from your back to your side while in flat bed without using bedrails? 2  -CS     Moving from  lying on back to sitting on the side of a flat bed without bedrails? 2  -CS     Moving to and from a bed to a chair (including a wheelchair)? 2  -CS     Standing up from a chair using your arms (e.g., wheelchair, bedside chair)? 2  -CS     Climbing 3-5 steps with a railing? 1  -CS     To walk in hospital room? 1  -CS     AM-PAC 6 Clicks Score (PT) 10  -CS     Highest Level of Mobility Goal 4 --> Transfer to chair/commode  -CS       Row Name 07/26/24 1114          Functional Assessment    Outcome Measure Options AM-PAC 6 Clicks Basic Mobility (PT)  -CS               User Key  (r) = Recorded By, (t) = Taken By, (c) = Cosigned By      Initials Name Provider Type    CS Sloane Guzman PT Physical Therapist                                 Physical Therapy Education       Title: PT OT SLP Therapies (In Progress)       Topic: Physical Therapy (In Progress)       Point: Mobility training (In Progress)       Learning Progress Summary             Patient Acceptance, E,TB, NR,NL by  at 7/26/2024 1114                         Point: Home exercise program (Not Started)       Learner Progress:  Not documented in this visit.              Point: Body mechanics (In Progress)       Learning Progress Summary             Patient Acceptance, E,TB, NR,NL by CS at 7/26/2024 1114                         Point: Precautions (In Progress)       Learning Progress Summary             Patient Acceptance, E,TB, NR,NL by  at 7/26/2024 1114                                         User Key       Initials Effective Dates Name Provider Type Discipline     09/22/22 -  Sloane Guzman PT Physical Therapist PT                  PT Recommendation and Plan     Plan of Care Reviewed With: patient  Outcome Evaluation: Pt is a 85 y/o F admitted to [facility] after falling at her facility resulting in a R distal femur fx now POD 1 s/p retrograde intramedullary nail and ORIF. Per ortho pt is NWB on R LE with KI. Pt AxOx1 and per chart review from an F.  Unsure PLOF due to baseline dementia. Pt presents to PT with expected post-op pain and weakness, poor activity tolerance, and impaired functional mobility. Pt required max A x 2 to complete bed mobility with overall poor initiation to tasks. Pt attempted to stand but was unable to maintain NWB on R LE with poor upright posture and ability to weight shift. Further mobility deferred. PT recommends SNF at D/C to address functional deficits.     Time Calculation:         PT Charges       Row Name 07/26/24 1115             Time Calculation    Start Time 0956  -CS      Stop Time 1013  -CS      Time Calculation (min) 17 min  -CS      PT Received On 07/26/24  -CS      PT - Next Appointment 07/27/24  -CS      PT Goal Re-Cert Due Date 08/02/24  -CS         Time Calculation- PT    Total Timed Code Minutes- PT 12 minute(s)  -CS         Timed Charges    69622 - PT Therapeutic Activity Minutes 12  -CS         Total Minutes    Timed Charges Total Minutes 12  -CS       Total Minutes 12  -CS                User Key  (r) = Recorded By, (t) = Taken By, (c) = Cosigned By      Initials Name Provider Type    CS Sloane Guzman, PT Physical Therapist                  Therapy Charges for Today       Code Description Service Date Service Provider Modifiers Qty    27693970422  PT THERAPEUTIC ACT EA 15 MIN 7/26/2024 Sloane Guzman, PT GP 1    16542632815 HC PT EVAL MOD COMPLEXITY 3 7/26/2024 Sloane Guzman, PT GP 1            PT G-Codes  Outcome Measure Options: AM-PAC 6 Clicks Basic Mobility (PT)  AM-PAC 6 Clicks Score (PT): 10  PT Discharge Summary  Anticipated Discharge Disposition (PT): skilled nursing facility    Sloane Guzman PT  7/26/2024

## 2024-07-27 PROBLEM — E43 SEVERE MALNUTRITION: Status: ACTIVE | Noted: 2024-07-27

## 2024-07-27 LAB
ANION GAP SERPL CALCULATED.3IONS-SCNC: 10 MMOL/L (ref 5–15)
BUN SERPL-MCNC: 28 MG/DL (ref 8–23)
BUN/CREAT SERPL: 11.1 (ref 7–25)
CALCIUM SPEC-SCNC: 8.6 MG/DL (ref 8.6–10.5)
CHLORIDE SERPL-SCNC: 102 MMOL/L (ref 98–107)
CO2 SERPL-SCNC: 22 MMOL/L (ref 22–29)
CREAT SERPL-MCNC: 2.53 MG/DL (ref 0.57–1)
EGFRCR SERPLBLD CKD-EPI 2021: 18.4 ML/MIN/1.73
GLUCOSE SERPL-MCNC: 148 MG/DL (ref 65–99)
POTASSIUM SERPL-SCNC: 4.1 MMOL/L (ref 3.5–5.2)
SODIUM SERPL-SCNC: 134 MMOL/L (ref 136–145)

## 2024-07-27 PROCEDURE — 25010000002 CEFAZOLIN PER 500 MG: Performed by: HOSPITALIST

## 2024-07-27 PROCEDURE — 80048 BASIC METABOLIC PNL TOTAL CA: CPT | Performed by: HOSPITALIST

## 2024-07-27 PROCEDURE — 97530 THERAPEUTIC ACTIVITIES: CPT

## 2024-07-27 PROCEDURE — 99232 SBSQ HOSP IP/OBS MODERATE 35: CPT | Performed by: INTERNAL MEDICINE

## 2024-07-27 PROCEDURE — 25810000003 SODIUM CHLORIDE 0.9 % SOLUTION: Performed by: ORTHOPAEDIC SURGERY

## 2024-07-27 RX ADMIN — POLYETHYLENE GLYCOL 3350 17 G: 17 POWDER, FOR SOLUTION ORAL at 06:15

## 2024-07-27 RX ADMIN — MIDODRINE HYDROCHLORIDE 5 MG: 5 TABLET ORAL at 08:28

## 2024-07-27 RX ADMIN — HYDROCODONE BITARTRATE AND ACETAMINOPHEN 1 TABLET: 5; 325 TABLET ORAL at 18:22

## 2024-07-27 RX ADMIN — METOPROLOL TARTRATE 25 MG: 25 TABLET, FILM COATED ORAL at 08:28

## 2024-07-27 RX ADMIN — TOPIRAMATE 100 MG: 100 TABLET, FILM COATED ORAL at 21:09

## 2024-07-27 RX ADMIN — SEVELAMER CARBONATE 800 MG: 800 TABLET, FILM COATED ORAL at 08:28

## 2024-07-27 RX ADMIN — MIDODRINE HYDROCHLORIDE 5 MG: 5 TABLET ORAL at 16:26

## 2024-07-27 RX ADMIN — CEFAZOLIN 1000 MG: 1 INJECTION, POWDER, FOR SOLUTION INTRAMUSCULAR; INTRAVENOUS at 21:11

## 2024-07-27 RX ADMIN — LORAZEPAM 0.5 MG: 0.5 TABLET ORAL at 21:00

## 2024-07-27 RX ADMIN — LORAZEPAM 0.5 MG: 0.5 TABLET ORAL at 11:27

## 2024-07-27 RX ADMIN — ANORECTAL OINTMENT 1 APPLICATION: 15.7; .44; 24; 20.6 OINTMENT TOPICAL at 21:10

## 2024-07-27 RX ADMIN — ATORVASTATIN CALCIUM 20 MG: 20 TABLET, FILM COATED ORAL at 21:11

## 2024-07-27 RX ADMIN — APIXABAN 2.5 MG: 2.5 TABLET, FILM COATED ORAL at 08:28

## 2024-07-27 RX ADMIN — SEVELAMER CARBONATE 800 MG: 800 TABLET, FILM COATED ORAL at 18:22

## 2024-07-27 RX ADMIN — ANORECTAL OINTMENT 1 APPLICATION: 15.7; .44; 24; 20.6 OINTMENT TOPICAL at 08:34

## 2024-07-27 RX ADMIN — GABAPENTIN 100 MG: 100 CAPSULE ORAL at 21:07

## 2024-07-27 RX ADMIN — MIRTAZAPINE 7.5 MG: 15 TABLET, FILM COATED ORAL at 21:06

## 2024-07-27 RX ADMIN — HYDROCODONE BITARTRATE AND ACETAMINOPHEN 1 TABLET: 5; 325 TABLET ORAL at 11:27

## 2024-07-27 RX ADMIN — APIXABAN 2.5 MG: 2.5 TABLET, FILM COATED ORAL at 21:11

## 2024-07-27 RX ADMIN — SODIUM CHLORIDE 50 ML/HR: 9 INJECTION, SOLUTION INTRAVENOUS at 08:39

## 2024-07-27 RX ADMIN — Medication 2.5 MG: at 21:04

## 2024-07-27 NOTE — PROGRESS NOTES
Patient was doing well this morning, little more agitated this afternoon according to nurse    Patient remains afebrile, temperature did get to 100.2, temperatures 100, respirate 18, blood pressure 126/70 O2 sats 2 L 96%    Awake and alert, mild discomfort from pain for which the patient is going to be getting pain medication  Heart rate irregular, tachycardia  Lungs are clear to auscultation bilaterally  Abdomen soft nontender nondistended  Legs are both warm to the touch    Labs:  Sodium 134  K 4.1    Wbc 12.89,  8.9      Assessment and plan:    Right femur fracture-plan is for ORIF 7/25  -Postoperatively doing well    Atrial fibrillation  -She was given low-dose digoxin and continue with her metoprolol  -HR is good  -Cardiology  -Patient on Eliquis low-dose    End-stage renal disease   -Followed by Dr. Xiao  -Dialysis done yesterday  -Chronic hypotension for she is on midodrine    Dementia    Anemia  -monitor

## 2024-07-27 NOTE — PROGRESS NOTES
"Nephrology Progress Note:     LOS: 3 days   Patient Care Team:  John Cano MD as PCP - General (Family Medicine)  Jose Carlos Cheng MD as Consulting Physician (Cardiology)      Subjective     Interval History:   Renal follow up of ESRD  POD # 1 S/P ORIF to right hip  Awake with chronic confusion  Not wearing oxygen set at 3 liters and sats only 82% and oxygen placed back on by nasal cannula  Reports pain controlled  Awaiting dialysis today      Review of Systems:        Objective     Vital Signs  /86 (BP Location: Left arm, Patient Position: Lying)   Pulse 113   Temp 98.2 °F (36.8 °C) (Oral)   Resp 18   Ht 170.2 cm (67\")   Wt 55.3 kg (121 lb 14.6 oz)   SpO2 99%   BMI 19.09 kg/m²     Intake/Output  I/O last 3 completed shifts:  In: 558 [P.O.:358; I.V.:200]  Out: 100 [Urine:100]  No intake/output data recorded.      Physical Exam:  Awake and alert with mild confusion  No icterus  No JVD  IRRR 2/6 systolic murmur  Lungs decreased but clear  Soft abdomen positive bowel sounds  Trace edema  Positive distal pulses      Results Review:       Imaging Results (Last 24 Hours)       Procedure Component Value Units Date/Time    CT Lower Extremity Right Without Contrast [[id number]] Collected: 07/25/24 1526     Updated: 07/26/24 1037    Narrative:      CT RIGHT LOWER EXTREMITY WITHOUT CONTRAST      HISTORY: Distal femoral knee fracture.      TECHNIQUE: CT includes axial imaging from the mid right femoral shaft to  the distal tibia without IV contrast. Data reconstructed in coronal and  sagittal planes.      COMPARISON: X-rays of the right femur 07/24/2024.     FINDINGS: There is a distal femoral metaphyseal fracture which exhibits  impaction and comminution as well as 55 degree anterior and 12 degree  lateral angulation. There are several cortical fragments at the margins  of the fracture. There is no evidence for extension between the femoral  condyles or to the condylar articular surface. There is " surrounding soft  tissue swelling. The bones are osteopenic.     Advanced osteoarthritic changes are present in the right knee most  pronounced at the lateral compartment where there is chronic remodeling  of the articular surfaces. There is marginal osteophyte formation.     There has been antegrade placement of a tibial IM jasmine which transfixes a  healed proximal tibial fracture. There is also an old healed proximal  fibular fracture. Atherosclerotic calcifications are present.       Impression:      1. Acute distal femoral metaphyseal fracture with comminution and  anterior medial angulation. No evidence for intracondylar extension or  extension to the articular surface.  2. Osteopenia.  3. Advanced tricompartment osteoarthritis greatest at the lateral  compartment. Previous antegrade placement of a tibial IM jasmine transfixing  a healed proximal tibial fracture. Old healed proximal fibular fracture.  4. Atherosclerotic disease.      Radiation dose reduction techniques were utilized, including automated  exposure control and exposure modulation based on body size.        This report was finalized on 7/26/2024 10:34 AM by Dr. Italo Hackett M.D on Workstation: [id number]       XR Femur 2 View Right [[id number]] Collected: 07/25/24 2323     Updated: 07/25/24 2328    Narrative:      2 VIEWS RIGHT FEMUR     HISTORY: Postop     COMPARISON: July 24, 2024     FINDINGS:  The patient has undergone intramedullary jasmine fixation and plate and  screw fixation of the previously identified distal femoral diaphyseal  fracture. Hardware appears to project in expected position. There is an  additional intramedullary jasmine identified within the proximal tibia.  There are old fractures of the proximal tibia and fibula. There is a  suprapatellar effusion. There is some subcutaneous gas.       Impression:      Postoperative findings, as noted above.           This report was finalized on 7/25/2024 11:24 PM by Dr. Kelsey Lacy M.D  on Workstation: [id number]       FL C Arm During Surgery [[id number]] Resulted: 07/25/24 2233     Updated: 07/25/24 2233    Narrative:      This procedure was auto-finalized with no dictation required.            Medication Review:  apixaban, 2.5 mg, Oral, Q12H  atorvastatin, 20 mg, Oral, Nightly  ceFAZolin, 1,000 mg, Intravenous, Q24H  famotidine, 10 mg, Oral, Q48H  gabapentin, 100 mg, Oral, Nightly  Menthol-Zinc Oxide, 1 Application, Topical, Q12H  metoprolol tartrate, 25 mg, Oral, Q12H  midodrine, 5 mg, Oral, Q8H  mirtazapine, 7.5 mg, Oral, Nightly  polyethylene glycol, 17 g, Oral, QAM  sertraline, 50 mg, Oral, Nightly  sevelamer, 800 mg, Oral, TID With Meals  topiramate, 100 mg, Oral, Nightly        Assessment & Plan   ESRD with dialysis today.  Right hip fx POD # 1 S/P ORIF right hip  Chronic Afib with controlled rate  Dementia  Treated hyperlipidemia  Hypoxic respiratory failure on oxygen    Femur fracture, right      Sean Xiao MD  07/26/24  20:29 EDT

## 2024-07-27 NOTE — PROGRESS NOTES
"Patient Name: Hazel Bucio  :1940  84 y.o.      Patient Care Team:  John Cano MD as PCP - General (Family Medicine)  ChemJose Carlos haro MD as Consulting Physician (Cardiology)    Interval History:   Paroxysmal A-fib with low blood pressure    Subjective:  Following for A-fib    Objective   Vital Signs  Temp:  [97.6 °F (36.4 °C)-100.2 °F (37.9 °C)] 97.6 °F (36.4 °C)  Heart Rate:  [] 106  Resp:  [16-18] 18  BP: (106-131)/(59-91) 120/91    Intake/Output Summary (Last 24 hours) at 2024 1001  Last data filed at 2024 0847  Gross per 24 hour   Intake 598 ml   Output 75 ml   Net 523 ml     Flowsheet Rows      Flowsheet Row First Filed Value   Admission Height 170.2 cm (67\") Documented at 2024 1729   Admission Weight 55.3 kg (121 lb 14.6 oz) Documented at 2024 1729            Physical Exam:   General Appearance:    Alert, cooperative, in no acute distress   Lungs:     Clear to auscultation.  Normal respiratory effort and rate.      Heart:        Chest Wall:    No abnormalities observed   Abdomen:     Soft, nontender, positive bowel sounds.     Extremities:   no cyanosis, clubbing or edema.  No marked joint deformities.  Adequate musculoskeletal strength.       Results Review:    Results from last 7 days   Lab Units 24  0823   SODIUM mmol/L 134*   POTASSIUM mmol/L 4.1   CHLORIDE mmol/L 102   CO2 mmol/L 22.0   BUN mg/dL 28*   CREATININE mg/dL 2.53*   GLUCOSE mg/dL 148*   CALCIUM mg/dL 8.6         Results from last 7 days   Lab Units 24  0230   WBC 10*3/mm3 12.89*   HEMOGLOBIN g/dL 8.9*   HEMATOCRIT % 28.3*   PLATELETS 10*3/mm3 265     Results from last 7 days   Lab Units 24  1849   INR  1.65*   APTT seconds 35.1         Results from last 7 days   Lab Units 24  0542   MAGNESIUM mg/dL 2.5*             Medication Review:   apixaban, 2.5 mg, Oral, Q12H  atorvastatin, 20 mg, Oral, Nightly  ceFAZolin, 1,000 mg, Intravenous, Q24H  famotidine, 10 mg, Oral, " Q48H  gabapentin, 100 mg, Oral, Nightly  Menthol-Zinc Oxide, 1 Application, Topical, Q12H  metoprolol tartrate, 25 mg, Oral, Q12H  midodrine, 5 mg, Oral, Q8H  mirtazapine, 7.5 mg, Oral, Nightly  polyethylene glycol, 17 g, Oral, QAM  sertraline, 50 mg, Oral, Nightly  sevelamer, 800 mg, Oral, TID With Meals  topiramate, 100 mg, Oral, Nightly         lactated ringers, 9 mL/hr, Last Rate: 9 mL/hr (07/25/24 1810)  sodium chloride, 50 mL/hr, Last Rate: 50 mL/hr (07/27/24 0839)        Assessment & Plan     1.  Distal femoral fracture status post surgery.  2.  Paroxysmal atrial fibrillation.  Treatment is limited by low blood pressure.  Agree with Eliquis 2.5 mg twice daily unless felt to be unsafe by her primary cardiologist Dr. Ellsworth.  Her blood pressure is limiting further treatment.  She received 250 mcg of digoxin on 7/24.  She received metoprolol tartrate 25 mg last night and this morning.  Digoxin is not a great option for her given her renal disease.    3.  Hypotension.  On midodrine  4.  Normal coronary arteries by cath in October 2023  5.  Normal LV systolic function  6.  End-stage renal disease on hemodialysis  7.  Sick sinus syndrome status post dual-chamber permanent pacemaker.    Given her age and medical comorbidities, heart rate in the low 100s is tolerable. No changes today    Ashley Campoverde MD, [facility]  07/27/24  10:01 EDT

## 2024-07-27 NOTE — PLAN OF CARE
Goal Outcome Evaluation:  Plan of Care Reviewed With: patient        Progress: improving  Outcome Evaluation: Pt supine in bed at start of session and agreeable to work with PT. Pt performs bed mobility with maxA x2 and sits EOB ~ 8 min with SBA/CGA. Cued through LE ther ex. Attempted STS today, but pt unable to following directions to safely initiate the stand. Returned to supine at end of the session. Pt continues to benefit from skilled PT.

## 2024-07-27 NOTE — PLAN OF CARE
Problem: Fall Injury Risk  Goal: Absence of Fall and Fall-Related Injury  Intervention: Identify and Manage Contributors  Recent Flowsheet Documentation  Taken 7/27/2024 1630 by Naomi Carrasco RN  Self-Care Promotion: BADL personal objects within reach  Taken 7/27/2024 1400 by Naomi Carrasco RN  Self-Care Promotion: BADL personal objects within reach  Taken 7/27/2024 1000 by Naomi Carrasco RN  Self-Care Promotion: BADL personal objects within reach  Taken 7/27/2024 0847 by Naomi Carrasco RN  Self-Care Promotion: BADL personal objects within reach     Problem: Fall Injury Risk  Goal: Absence of Fall and Fall-Related Injury  Intervention: Promote Injury-Free Environment  Recent Flowsheet Documentation  Taken 7/27/2024 1630 by Naomi Carrasco RN  Safety Promotion/Fall Prevention:   safety round/check completed   room organization consistent   activity supervised   assistive device/personal items within reach  Taken 7/27/2024 1400 by Naomi Carrasco RN  Safety Promotion/Fall Prevention:   room organization consistent   safety round/check completed   activity supervised   assistive device/personal items within reach  Taken 7/27/2024 1000 by Naomi Carrasco RN  Safety Promotion/Fall Prevention:   safety round/check completed   room organization consistent   assistive device/personal items within reach   activity supervised  Taken 7/27/2024 0847 by Naomi Carrasco RN  Safety Promotion/Fall Prevention:   safety round/check completed   room organization consistent   assistive device/personal items within reach   activity supervised   Goal Outcome Evaluation:           Progress: improving  Outcome Evaluation: Patient 2 daay post op femur distal fracture, more alert with antibiotic increase appetite

## 2024-07-28 PROCEDURE — 25810000003 SODIUM CHLORIDE 0.9 % SOLUTION: Performed by: ORTHOPAEDIC SURGERY

## 2024-07-28 RX ADMIN — POLYETHYLENE GLYCOL 3350 17 G: 17 POWDER, FOR SOLUTION ORAL at 06:51

## 2024-07-28 RX ADMIN — HYDROCODONE BITARTRATE AND ACETAMINOPHEN 1 TABLET: 5; 325 TABLET ORAL at 12:34

## 2024-07-28 RX ADMIN — BISACODYL 5 MG: 5 TABLET, COATED ORAL at 08:50

## 2024-07-28 RX ADMIN — MIDODRINE HYDROCHLORIDE 5 MG: 5 TABLET ORAL at 16:15

## 2024-07-28 RX ADMIN — TOPIRAMATE 100 MG: 100 TABLET, FILM COATED ORAL at 22:03

## 2024-07-28 RX ADMIN — FAMOTIDINE 10 MG: 20 TABLET, FILM COATED ORAL at 00:23

## 2024-07-28 RX ADMIN — ANORECTAL OINTMENT 1 APPLICATION: 15.7; .44; 24; 20.6 OINTMENT TOPICAL at 08:50

## 2024-07-28 RX ADMIN — SEVELAMER CARBONATE 800 MG: 800 TABLET, FILM COATED ORAL at 11:33

## 2024-07-28 RX ADMIN — METOPROLOL TARTRATE 25 MG: 25 TABLET, FILM COATED ORAL at 08:50

## 2024-07-28 RX ADMIN — GABAPENTIN 100 MG: 100 CAPSULE ORAL at 22:03

## 2024-07-28 RX ADMIN — Medication 2.5 MG: at 22:01

## 2024-07-28 RX ADMIN — ANORECTAL OINTMENT 1 APPLICATION: 15.7; .44; 24; 20.6 OINTMENT TOPICAL at 22:03

## 2024-07-28 RX ADMIN — MIRTAZAPINE 7.5 MG: 15 TABLET, FILM COATED ORAL at 22:02

## 2024-07-28 RX ADMIN — APIXABAN 2.5 MG: 2.5 TABLET, FILM COATED ORAL at 08:50

## 2024-07-28 RX ADMIN — SEVELAMER CARBONATE 800 MG: 800 TABLET, FILM COATED ORAL at 17:23

## 2024-07-28 RX ADMIN — ATORVASTATIN CALCIUM 20 MG: 20 TABLET, FILM COATED ORAL at 22:00

## 2024-07-28 RX ADMIN — SERTRALINE 50 MG: 50 TABLET, FILM COATED ORAL at 22:01

## 2024-07-28 RX ADMIN — HYDROCODONE BITARTRATE AND ACETAMINOPHEN 1 TABLET: 5; 325 TABLET ORAL at 00:23

## 2024-07-28 RX ADMIN — METOPROLOL TARTRATE 12.5 MG: 25 TABLET, FILM COATED ORAL at 22:00

## 2024-07-28 RX ADMIN — MIDODRINE HYDROCHLORIDE 5 MG: 5 TABLET ORAL at 08:49

## 2024-07-28 RX ADMIN — APIXABAN 2.5 MG: 2.5 TABLET, FILM COATED ORAL at 22:03

## 2024-07-28 RX ADMIN — SODIUM CHLORIDE 50 ML/HR: 9 INJECTION, SOLUTION INTRAVENOUS at 02:48

## 2024-07-28 RX ADMIN — SEVELAMER CARBONATE 800 MG: 800 TABLET, FILM COATED ORAL at 08:49

## 2024-07-28 NOTE — PROGRESS NOTES
Patient continues to do well, answering questions well    97.5 107 18 129/63 on 3 liiters of oxygen    Awake and alert, oriented to place and year  Heart rate irregular, mild tachycardia  Lungs are clear to auscultation bilaterally  Abdomen soft nontender nondistended  Legs are both warm to the touch    Labs:  Sodium 134  K 4.1    Wbc 12.89,  8.9      Assessment and plan:    Right femur fracture-plan is for ORIF 7/25  -Postoperatively doing well  -her physical therapy is limited by her dementia    Atrial fibrillation  -She was given low-dose digoxin on 7/24 and decrease her metoprolol to 12.5 bid  -HR is better  -Cardiology is following  -Patient on Eliquis low-dose    End-stage renal disease   -Followed by Dr. Xiao  -Dialysis planned for tomorrow  -Chronic hypotension for she is on midodrine    Dementia    Anemia  -CKD  -defer to nephrology    Hx of DM - but blood sugars are low here    D/w with Son in [place]

## 2024-07-28 NOTE — PLAN OF CARE
Problem: Fall Injury Risk  Goal: Absence of Fall and Fall-Related Injury  Intervention: Identify and Manage Contributors  Recent Flowsheet Documentation  Taken 7/28/2024 1600 by Naomi Carrasco RN  Self-Care Promotion: BADL personal objects within reach  Taken 7/28/2024 1136 by Naomi Carrasco RN  Self-Care Promotion: BADL personal objects within reach  Taken 7/28/2024 0850 by Naomi Carrasco RN  Self-Care Promotion: BADL personal objects within reach     Problem: Fall Injury Risk  Goal: Absence of Fall and Fall-Related Injury  Intervention: Promote Injury-Free Environment  Recent Flowsheet Documentation  Taken 7/28/2024 1600 by Naomi Carrasco RN  Safety Promotion/Fall Prevention:   safety round/check completed   room organization consistent   assistive device/personal items within reach   activity supervised  Taken 7/28/2024 1136 by Naomi Carrasco RN  Safety Promotion/Fall Prevention:   activity supervised   assistive device/personal items within reach   room organization consistent   safety round/check completed  Taken 7/28/2024 0850 by Naomi Carrasco RN  Safety Promotion/Fall Prevention: activity supervised   Goal Outcome Evaluation:           Progress: improving  Outcome Evaluation: Patient Ao x2 disoriented time increased appetite  eat by herself voiding through ileostomy yellow clear, post op Right femur repair due to fracture with brace on place NWB , discontinue IV fluid this am, end entibiotic last night . Order for dialysis tomorrow let message. total care for ADL's.

## 2024-07-29 LAB
ALBUMIN SERPL-MCNC: 2.5 G/DL (ref 3.5–5.2)
ALBUMIN/GLOB SERPL: 0.8 G/DL
ALP SERPL-CCNC: 135 U/L (ref 39–117)
ALT SERPL W P-5'-P-CCNC: <5 U/L (ref 1–33)
ANION GAP SERPL CALCULATED.3IONS-SCNC: 13.6 MMOL/L (ref 5–15)
AST SERPL-CCNC: 30 U/L (ref 1–32)
BACTERIA ISLT: NORMAL
BASOPHILS # BLD AUTO: 0.03 10*3/MM3 (ref 0–0.2)
BASOPHILS NFR BLD AUTO: 0.5 % (ref 0–1.5)
BILIRUB SERPL-MCNC: 0.4 MG/DL (ref 0–1.2)
BUN SERPL-MCNC: 59 MG/DL (ref 8–23)
BUN/CREAT SERPL: 14.3 (ref 7–25)
CALCIUM SPEC-SCNC: 8.2 MG/DL (ref 8.6–10.5)
CHLORIDE SERPL-SCNC: 107 MMOL/L (ref 98–107)
CO2 SERPL-SCNC: 14.4 MMOL/L (ref 22–29)
CREAT SERPL-MCNC: 4.13 MG/DL (ref 0.57–1)
DEPRECATED RDW RBC AUTO: 50.4 FL (ref 37–54)
EGFRCR SERPLBLD CKD-EPI 2021: 10.2 ML/MIN/1.73
EOSINOPHIL # BLD AUTO: 0.22 10*3/MM3 (ref 0–0.4)
EOSINOPHIL NFR BLD AUTO: 3.3 % (ref 0.3–6.2)
ERYTHROCYTE [DISTWIDTH] IN BLOOD BY AUTOMATED COUNT: 13.3 % (ref 12.3–15.4)
GLOBULIN UR ELPH-MCNC: 3.3 GM/DL
GLUCOSE SERPL-MCNC: 93 MG/DL (ref 65–99)
HCT VFR BLD AUTO: 25.8 % (ref 34–46.6)
HGB BLD-MCNC: 8.1 G/DL (ref 12–15.9)
IMM GRANULOCYTES # BLD AUTO: 0.02 10*3/MM3 (ref 0–0.05)
IMM GRANULOCYTES NFR BLD AUTO: 0.3 % (ref 0–0.5)
LYMPHOCYTES # BLD AUTO: 1.37 10*3/MM3 (ref 0.7–3.1)
LYMPHOCYTES NFR BLD AUTO: 20.8 % (ref 19.6–45.3)
MCH RBC QN AUTO: 32.7 PG (ref 26.6–33)
MCHC RBC AUTO-ENTMCNC: 31.4 G/DL (ref 31.5–35.7)
MCV RBC AUTO: 104 FL (ref 79–97)
MONOCYTES # BLD AUTO: 0.62 10*3/MM3 (ref 0.1–0.9)
MONOCYTES NFR BLD AUTO: 9.4 % (ref 5–12)
NEUTROPHILS NFR BLD AUTO: 4.32 10*3/MM3 (ref 1.7–7)
NEUTROPHILS NFR BLD AUTO: 65.7 % (ref 42.7–76)
NRBC BLD AUTO-RTO: 0 /100 WBC (ref 0–0.2)
PLATELET # BLD AUTO: 203 10*3/MM3 (ref 140–450)
PMV BLD AUTO: 9.7 FL (ref 6–12)
POTASSIUM SERPL-SCNC: 5.3 MMOL/L (ref 3.5–5.2)
PROT SERPL-MCNC: 5.8 G/DL (ref 6–8.5)
RBC # BLD AUTO: 2.48 10*6/MM3 (ref 3.77–5.28)
SODIUM SERPL-SCNC: 135 MMOL/L (ref 136–145)
WBC NRBC COR # BLD AUTO: 6.58 10*3/MM3 (ref 3.4–10.8)

## 2024-07-29 PROCEDURE — 85025 COMPLETE CBC W/AUTO DIFF WBC: CPT | Performed by: HOSPITALIST

## 2024-07-29 PROCEDURE — 25010000002 HEPARIN (PORCINE) PER 1000 UNITS: Performed by: INTERNAL MEDICINE

## 2024-07-29 PROCEDURE — 99232 SBSQ HOSP IP/OBS MODERATE 35: CPT | Performed by: NURSE PRACTITIONER

## 2024-07-29 PROCEDURE — 80053 COMPREHEN METABOLIC PANEL: CPT | Performed by: HOSPITALIST

## 2024-07-29 RX ORDER — HEPARIN SODIUM 1000 [USP'U]/ML
4000 INJECTION, SOLUTION INTRAVENOUS; SUBCUTANEOUS AS NEEDED
Status: DISCONTINUED | OUTPATIENT
Start: 2024-07-29 | End: 2024-07-30 | Stop reason: HOSPADM

## 2024-07-29 RX ORDER — MIDODRINE HYDROCHLORIDE 2.5 MG/1
2.5 TABLET ORAL EVERY 8 HOURS
Status: DISCONTINUED | OUTPATIENT
Start: 2024-07-29 | End: 2024-07-30 | Stop reason: HOSPADM

## 2024-07-29 RX ADMIN — GABAPENTIN 100 MG: 100 CAPSULE ORAL at 20:24

## 2024-07-29 RX ADMIN — MIDODRINE HYDROCHLORIDE 5 MG: 5 TABLET ORAL at 08:22

## 2024-07-29 RX ADMIN — HEPARIN SODIUM 4000 UNITS: 1000 INJECTION INTRAVENOUS; SUBCUTANEOUS at 11:55

## 2024-07-29 RX ADMIN — MIDODRINE HYDROCHLORIDE 2.5 MG: 2.5 TABLET ORAL at 17:39

## 2024-07-29 RX ADMIN — SEVELAMER CARBONATE 800 MG: 800 TABLET, FILM COATED ORAL at 08:22

## 2024-07-29 RX ADMIN — MIRTAZAPINE 7.5 MG: 15 TABLET, FILM COATED ORAL at 20:23

## 2024-07-29 RX ADMIN — HYDROCODONE BITARTRATE AND ACETAMINOPHEN 1 TABLET: 5; 325 TABLET ORAL at 00:23

## 2024-07-29 RX ADMIN — ANORECTAL OINTMENT 1 APPLICATION: 15.7; .44; 24; 20.6 OINTMENT TOPICAL at 08:26

## 2024-07-29 RX ADMIN — SERTRALINE 50 MG: 50 TABLET, FILM COATED ORAL at 20:24

## 2024-07-29 RX ADMIN — SEVELAMER CARBONATE 800 MG: 800 TABLET, FILM COATED ORAL at 17:39

## 2024-07-29 RX ADMIN — APIXABAN 2.5 MG: 2.5 TABLET, FILM COATED ORAL at 20:24

## 2024-07-29 RX ADMIN — ATORVASTATIN CALCIUM 20 MG: 20 TABLET, FILM COATED ORAL at 20:24

## 2024-07-29 RX ADMIN — APIXABAN 2.5 MG: 2.5 TABLET, FILM COATED ORAL at 08:23

## 2024-07-29 RX ADMIN — SEVELAMER CARBONATE 800 MG: 800 TABLET, FILM COATED ORAL at 12:46

## 2024-07-29 RX ADMIN — HYDROCODONE BITARTRATE AND ACETAMINOPHEN 1 TABLET: 7.5; 325 TABLET ORAL at 21:31

## 2024-07-29 RX ADMIN — METOPROLOL TARTRATE 12.5 MG: 25 TABLET, FILM COATED ORAL at 08:22

## 2024-07-29 RX ADMIN — ANORECTAL OINTMENT 1 APPLICATION: 15.7; .44; 24; 20.6 OINTMENT TOPICAL at 20:24

## 2024-07-29 RX ADMIN — TOPIRAMATE 100 MG: 100 TABLET, FILM COATED ORAL at 20:25

## 2024-07-29 RX ADMIN — METOPROLOL TARTRATE 12.5 MG: 25 TABLET, FILM COATED ORAL at 20:24

## 2024-07-29 NOTE — SIGNIFICANT NOTE
07/29/24 1442   OTHER   Discipline physical therapy assistant   Rehab Time/Intention   Session Not Performed patient/family declined treatment  (pt declined PT tx this PM. Pt reports of just getting back to bed and is fatigued. Encouragement provided to help assist pt OOB but pt politely refused. Will follow put with pt tomorrow.)   Recommendation   PT - Next Appointment 07/30/24

## 2024-07-29 NOTE — PLAN OF CARE
Goal Outcome Evaluation:            Outcome Evaluation: pt calm and cooperative. oriented to self and time. prn pain meds given. safety precautions maintained.

## 2024-07-29 NOTE — PLAN OF CARE
Problem: Fall Injury Risk  Goal: Absence of Fall and Fall-Related Injury  Intervention: Promote Injury-Free Environment  Recent Flowsheet Documentation  Taken 7/29/2024 1322 by Naomi Carrasco RN  Safety Promotion/Fall Prevention:   activity supervised   assistive device/personal items within reach   room organization consistent   safety round/check completed  Taken 7/29/2024 1200 by Naomi Carrasco RN  Safety Promotion/Fall Prevention: patient off unit  Taken 7/29/2024 1000 by Naomi Carrasco RN  Safety Promotion/Fall Prevention: patient off unit     Problem: Adult Inpatient Plan of Care  Goal: Plan of Care Review  Outcome: Ongoing, Progressing  Flowsheets  Taken 7/29/2024 1837 by Naomi Carrasco RN  Progress: improving  Outcome Evaluation: patient AO x 3  received Dialysis today removed 0.5 lt, tolerated well, increase appetite, change urostomy bag and change dressing pn coccyx.  Taken 7/27/2024 1521 by Virgie Izquierdo, REKHA  Plan of Care Reviewed With: patient     Problem: Adult Inpatient Plan of Care  Goal: Absence of Hospital-Acquired Illness or Injury  Intervention: Identify and Manage Fall Risk  Recent Flowsheet Documentation  Taken 7/29/2024 1322 by Naomi Carrasco RN  Safety Promotion/Fall Prevention:   activity supervised   assistive device/personal items within reach   room organization consistent   safety round/check completed  Taken 7/29/2024 1200 by Naomi Carrasco RN  Safety Promotion/Fall Prevention: patient off unit  Taken 7/29/2024 1000 by Naomi Carrasco RN  Safety Promotion/Fall Prevention: patient off unit   Goal Outcome Evaluation:           Progress: improving  Outcome Evaluation: patient AO x 3  received Dialysis today removed 0.5 lt, tolerated well, increase appetite, change urostomy bag and change dressing pn coccyx.

## 2024-07-29 NOTE — NURSING NOTE
"Hd completed. Flat affect. 0.5 net uf. Pvc reported last few minutes of tx. Denied c/o otherthan base line \"dont feel well\". No change since pre hd. Post vss. 162/75 hr 87  "

## 2024-07-29 NOTE — PLAN OF CARE
Goal Outcome Evaluation:      Patient: afib on the monitor , turned Q 2, eat lunch and dinner 75%, had dialysis today, pulse present bilaterally in both lower and upper extremities, has urostomy skin is intact, patient alert to self and time, patient improving, expected to be dc tomorrow and EMS arranged at 1pm tomorrow.

## 2024-07-29 NOTE — PROGRESS NOTES
"    Patient Name: Hazel Bucio  :1940  84 y.o.      Patient Care Team:  John Cano MD as PCP - General (Family Medicine)  Jose Carlos Cheng MD as Consulting Physician (Cardiology)    Chief Complaint: follow up atrial fibrillation     Interval History: no complaints. Poor historian.        Objective   Vital Signs  Temp:  [97.3 °F (36.3 °C)-98.4 °F (36.9 °C)] 97.3 °F (36.3 °C)  Heart Rate:  [] 87  Resp:  [14-18] 18  BP: (126-162)/(59-87) 150/62    Intake/Output Summary (Last 24 hours) at 2024 1259  Last data filed at 2024 1207  Gross per 24 hour   Intake 500 ml   Output 925 ml   Net -425 ml     Flowsheet Rows      Flowsheet Row First Filed Value   Admission Height 170.2 cm (67\") Documented at 2024 1729   Admission Weight 55.3 kg (121 lb 14.6 oz) Documented at 2024 1729            Physical Exam:   General Appearance:    Alert, cooperative, in no acute distress   Lungs:     Clear to auscultation.  Normal respiratory effort and rate.      Heart:    irregular rhythm and normal rate, normal S1 and S2, no murmurs, gallops or rubs.     Chest Wall:    No abnormalities observed   Abdomen:     Soft, nontender, positive bowel sounds.     Extremities:   no cyanosis, clubbing or edema.  No marked joint deformities.  Adequate musculoskeletal strength.       Results Review:    Results from last 7 days   Lab Units 24  0446   SODIUM mmol/L 135*   POTASSIUM mmol/L 5.3*   CHLORIDE mmol/L 107   CO2 mmol/L 14.4*   BUN mg/dL 59*   CREATININE mg/dL 4.13*   GLUCOSE mg/dL 93   CALCIUM mg/dL 8.2*         Results from last 7 days   Lab Units 24  0446   WBC 10*3/mm3 6.58   HEMOGLOBIN g/dL 8.1*   HEMATOCRIT % 25.8*   PLATELETS 10*3/mm3 203     Results from last 7 days   Lab Units 24  1849   INR  1.65*   APTT seconds 35.1     Results from last 7 days   Lab Units 24  0542   MAGNESIUM mg/dL 2.5*                   Medication Review:   apixaban, 2.5 mg, Oral, Q12H  atorvastatin, 20 mg, " Oral, Nightly  famotidine, 10 mg, Oral, Q48H  gabapentin, 100 mg, Oral, Nightly  Menthol-Zinc Oxide, 1 Application, Topical, Q12H  metoprolol tartrate, 12.5 mg, Oral, Q12H  midodrine, 5 mg, Oral, Q8H  mirtazapine, 7.5 mg, Oral, Nightly  polyethylene glycol, 17 g, Oral, QAM  sertraline, 50 mg, Oral, Nightly  sevelamer, 800 mg, Oral, TID With Meals  topiramate, 100 mg, Oral, Nightly                Assessment & Plan   Distal femur fracture , status post IM nailing POD #1  Paroxysmal atrial fibrillation ,  limited options. She is on midodrine for BP support. She is also on metoprolol succinate as well as apixaban. Got IV digoxin 250 mcg 7/24.  Hypotension, midodrine held intermittently due to elevated BP.   Normal coronary anatomy without obstructive disease by cardiac cath 10/18/2023  Preserved LVEF.   Dementia   ESRD on HD   Sick sinus syndrome status post  [employer] sci dual chamber permanent pacemaker.     Midodrine is being held intermittently. Will decrease further to 2.5 mg TID.    HR improved on metoprolol . Dose was reduced 7/28. Average  which is reasonable for clinical scenario.     She is on apixaban 2.5 md BID at home. She is certainly a high fall risk . Unclear if this femur injury was from a fall. Defer safety of AC to her primary cardiologist Dr. Ellsworth.     La Nena Woo, APRALECIA  [facility]  07/29/24  12:59 EDT

## 2024-07-29 NOTE — PLAN OF CARE
Problem: Fall Injury Risk  Goal: Absence of Fall and Fall-Related Injury  Intervention: Promote Injury-Free Environment  Recent Flowsheet Documentation  Taken 7/29/2024 1322 by Naomi Carrasco, RN  Safety Promotion/Fall Prevention:   activity supervised   assistive device/personal items within reach   room organization consistent   safety round/check completed  Taken 7/29/2024 1200 by Naomi Carrasco, RN  Safety Promotion/Fall Prevention: patient off unit  Taken 7/29/2024 1000 by Naomi Carrasco, RN  Safety Promotion/Fall Prevention: patient off unit   Goal Outcome Evaluation:           Progress: improving  Outcome Evaluation: patient AO x 3  received Dialysis today removed 0.5 lt, tolerated well, increase appetite, change urostomy bag and change dressing pn coccyx.

## 2024-07-29 NOTE — CASE MANAGEMENT/SOCIAL WORK
Continued Stay Note  [facility]     Patient Name: Hazel Bucio  MRN: [id number]  Today's Date: 7/29/2024    Admit Date: 7/23/2024    Plan: [facility] via [facility] at 1pm   Discharge Plan       Row Name 07/29/24 1507       Plan    Plan [facility] via [facility] at 1pm    Patient/Family in Agreement with Plan yes    Plan Comments MD notified CCP plans to DC pt back to facility tomorrow. [facility] arranged for 1pm tomorrow. MD, RN and Stacey/Mina Cadena notified. Partial packet in CCP office, pharmacy updated to [facility] in Epic. Toño ORTEGA/CCP                   Discharge Codes    No documentation.                 Expected Discharge Date and Time       Expected Discharge Date Expected Discharge Time    Jul 30, 2024               Joseline Hannon, RN

## 2024-07-30 VITALS
HEART RATE: 115 BPM | SYSTOLIC BLOOD PRESSURE: 131 MMHG | DIASTOLIC BLOOD PRESSURE: 85 MMHG | RESPIRATION RATE: 14 BRPM | OXYGEN SATURATION: 99 % | WEIGHT: 121.91 LBS | BODY MASS INDEX: 19.13 KG/M2 | HEIGHT: 67 IN | TEMPERATURE: 98.1 F

## 2024-07-30 LAB
ALBUMIN SERPL-MCNC: 2.6 G/DL (ref 3.5–5.2)
ALBUMIN/GLOB SERPL: 0.9 G/DL
ALP SERPL-CCNC: 133 U/L (ref 39–117)
ALT SERPL W P-5'-P-CCNC: <5 U/L (ref 1–33)
ANION GAP SERPL CALCULATED.3IONS-SCNC: 8 MMOL/L (ref 5–15)
AST SERPL-CCNC: 27 U/L (ref 1–32)
BILIRUB SERPL-MCNC: 0.5 MG/DL (ref 0–1.2)
BUN SERPL-MCNC: 29 MG/DL (ref 8–23)
BUN/CREAT SERPL: 13.1 (ref 7–25)
CALCIUM SPEC-SCNC: 8.3 MG/DL (ref 8.6–10.5)
CHLORIDE SERPL-SCNC: 103 MMOL/L (ref 98–107)
CO2 SERPL-SCNC: 23 MMOL/L (ref 22–29)
CREAT SERPL-MCNC: 2.21 MG/DL (ref 0.57–1)
DEPRECATED RDW RBC AUTO: 50.6 FL (ref 37–54)
EGFRCR SERPLBLD CKD-EPI 2021: 21.6 ML/MIN/1.73
ERYTHROCYTE [DISTWIDTH] IN BLOOD BY AUTOMATED COUNT: 13.6 % (ref 12.3–15.4)
GLOBULIN UR ELPH-MCNC: 2.9 GM/DL
GLUCOSE SERPL-MCNC: 91 MG/DL (ref 65–99)
HCT VFR BLD AUTO: 25.6 % (ref 34–46.6)
HGB BLD-MCNC: 7.9 G/DL (ref 12–15.9)
MAGNESIUM SERPL-MCNC: 2.3 MG/DL (ref 1.6–2.4)
MCH RBC QN AUTO: 31.3 PG (ref 26.6–33)
MCHC RBC AUTO-ENTMCNC: 30.9 G/DL (ref 31.5–35.7)
MCV RBC AUTO: 101.6 FL (ref 79–97)
PHOSPHATE SERPL-MCNC: 2 MG/DL (ref 2.5–4.5)
PLATELET # BLD AUTO: 231 10*3/MM3 (ref 140–450)
PMV BLD AUTO: 9.2 FL (ref 6–12)
POTASSIUM SERPL-SCNC: 4.6 MMOL/L (ref 3.5–5.2)
PROT SERPL-MCNC: 5.5 G/DL (ref 6–8.5)
RBC # BLD AUTO: 2.52 10*6/MM3 (ref 3.77–5.28)
SODIUM SERPL-SCNC: 134 MMOL/L (ref 136–145)
WBC NRBC COR # BLD AUTO: 5.72 10*3/MM3 (ref 3.4–10.8)

## 2024-07-30 PROCEDURE — 84100 ASSAY OF PHOSPHORUS: CPT | Performed by: HOSPITALIST

## 2024-07-30 PROCEDURE — 85027 COMPLETE CBC AUTOMATED: CPT | Performed by: HOSPITALIST

## 2024-07-30 PROCEDURE — 83735 ASSAY OF MAGNESIUM: CPT | Performed by: HOSPITALIST

## 2024-07-30 PROCEDURE — 99232 SBSQ HOSP IP/OBS MODERATE 35: CPT | Performed by: NURSE PRACTITIONER

## 2024-07-30 PROCEDURE — 80053 COMPREHEN METABOLIC PANEL: CPT | Performed by: HOSPITALIST

## 2024-07-30 RX ORDER — GABAPENTIN 100 MG/1
100 CAPSULE ORAL NIGHTLY
Qty: 3 CAPSULE | Refills: 0 | Status: SHIPPED | OUTPATIENT
Start: 2024-07-30

## 2024-07-30 RX ORDER — MIDODRINE HYDROCHLORIDE 2.5 MG/1
2.5 TABLET ORAL EVERY 8 HOURS
Start: 2024-07-30

## 2024-07-30 RX ADMIN — MIDODRINE HYDROCHLORIDE 2.5 MG: 2.5 TABLET ORAL at 01:10

## 2024-07-30 RX ADMIN — FAMOTIDINE 10 MG: 20 TABLET, FILM COATED ORAL at 01:10

## 2024-07-30 RX ADMIN — APIXABAN 2.5 MG: 2.5 TABLET, FILM COATED ORAL at 08:57

## 2024-07-30 RX ADMIN — HYDROCODONE BITARTRATE AND ACETAMINOPHEN 1 TABLET: 5; 325 TABLET ORAL at 12:53

## 2024-07-30 RX ADMIN — SEVELAMER CARBONATE 800 MG: 800 TABLET, FILM COATED ORAL at 08:57

## 2024-07-30 RX ADMIN — POLYETHYLENE GLYCOL 3350 17 G: 17 POWDER, FOR SOLUTION ORAL at 06:43

## 2024-07-30 RX ADMIN — METOPROLOL TARTRATE 12.5 MG: 25 TABLET, FILM COATED ORAL at 08:57

## 2024-07-30 RX ADMIN — MIDODRINE HYDROCHLORIDE 2.5 MG: 2.5 TABLET ORAL at 08:57

## 2024-07-30 RX ADMIN — ANORECTAL OINTMENT 1 APPLICATION: 15.7; .44; 24; 20.6 OINTMENT TOPICAL at 10:09

## 2024-07-30 NOTE — CASE MANAGEMENT/SOCIAL WORK
Case Management Discharge Note      Final Note: [facility] via [facility] at 1pm.    Provided Post Acute Provider List?: N/A  N/A Provider List Comment: from long term care  Provided Post Acute Provider Quality & Resource List?: N/A  N/A Quality & Resource List Comment: from long term care    Selected Continued Care - Admitted Since 7/23/2024       Destination Coordination complete.      Service Provider Selected Services Address Phone Fax Patient Preferred    [facility] [place] [address] [place] [facility] [phone] --              Durable Medical Equipment    No services have been selected for the patient.                Dialysis/Infusion    No services have been selected for the patient.                Home Medical Care    No services have been selected for the patient.                Therapy    No services have been selected for the patient.                Community Resources    No services have been selected for the patient.                Community & DME    No services have been selected for the patient.                         Final Discharge Disposition Code: 04 - intermediate care facility

## 2024-07-30 NOTE — PROGRESS NOTES
"    Patient Name: Hazel Bucio  :1940  84 y.o.      Patient Care Team:  John Cano MD as PCP - General (Family Medicine)  ChemchiJose Carlos goodrich MD as Consulting Physician (Cardiology)    Chief Complaint: follow up atrial fibrillation     Interval History: no complaints. Poor historian. HR less than 110.        Objective   Vital Signs  Temp:  [97.6 °F (36.4 °C)-99 °F (37.2 °C)] 98.1 °F (36.7 °C)  Heart Rate:  [] 115  Resp:  [14-20] 14  BP: (131-152)/(62-92) 131/85    Intake/Output Summary (Last 24 hours) at 2024 1232  Last data filed at 2024 0830  Gross per 24 hour   Intake 600 ml   Output 275 ml   Net 325 ml     Flowsheet Rows      Flowsheet Row First Filed Value   Admission Height 170.2 cm (67\") Documented at 2024 1729   Admission Weight 55.3 kg (121 lb 14.6 oz) Documented at 2024 1729            Physical Exam:   General Appearance:    Alert, confused   Lungs:     Clear to auscultation.  Normal respiratory effort and rate.      Heart:    irregular rhythm and normal rate, normal S1 and S2, no murmurs, gallops or rubs.     Chest Wall:    No abnormalities observed   Abdomen:     Soft, nontender, positive bowel sounds.     Extremities:   no cyanosis, clubbing or edema.  No marked joint deformities.  Adequate musculoskeletal strength.       Results Review:    Results from last 7 days   Lab Units 24  0246   SODIUM mmol/L 134*   POTASSIUM mmol/L 4.6   CHLORIDE mmol/L 103   CO2 mmol/L 23.0   BUN mg/dL 29*   CREATININE mg/dL 2.21*   GLUCOSE mg/dL 91   CALCIUM mg/dL 8.3*         Results from last 7 days   Lab Units 24  0246   WBC 10*3/mm3 5.72   HEMOGLOBIN g/dL 7.9*   HEMATOCRIT % 25.6*   PLATELETS 10*3/mm3 231     Results from last 7 days   Lab Units 24  1849   INR  1.65*   APTT seconds 35.1     Results from last 7 days   Lab Units 24  0246   MAGNESIUM mg/dL 2.3                   Medication Review:   apixaban, 2.5 mg, Oral, Q12H  atorvastatin, 20 mg, Oral, " Nightly  famotidine, 10 mg, Oral, Q48H  gabapentin, 100 mg, Oral, Nightly  Menthol-Zinc Oxide, 1 Application, Topical, Q12H  metoprolol tartrate, 12.5 mg, Oral, Q12H  midodrine, 2.5 mg, Oral, Q8H  mirtazapine, 7.5 mg, Oral, Nightly  polyethylene glycol, 17 g, Oral, QAM  sertraline, 50 mg, Oral, Nightly  sevelamer, 800 mg, Oral, TID With Meals  topiramate, 100 mg, Oral, Nightly                Assessment & Plan   Distal femur fracture , status post IM nailing POD #1  Paroxysmal atrial fibrillation ,  limited options. She is on midodrine for BP support. She is also on metoprolol succinate as well as apixaban. Got IV digoxin 250 mcg 7/24.  Hypotension, midodrine held intermittently due to elevated BP.   Normal coronary anatomy without obstructive disease by cardiac cath 10/18/2023  Preserved LVEF.   Dementia   ESRD on HD   Sick sinus syndrome status post  [employer] sci dual chamber permanent pacemaker.     BP stable on reduced dose of midodrine.      Average HR  which is reasonable for clinical scenario.     She is on apixaban 2.5 md BID at home. She is certainly a high fall risk . Unclear if this femur injury was from a fall. Defer safety of AC to her primary cardiologist Dr. Ellsworth.     Discharge plans noted. Will see as needed. Follow up with primary cards 4 weeks,    CHELLE Vaughn  [facility]  07/30/24  12:32 EDT

## 2024-07-30 NOTE — PLAN OF CARE
Goal Outcome Evaluation:         Pt alert, disoriented to time, place, and occasionally situation. Pt calm and cooperative throughout shift. Pain managed with 1 dose of norco, repositioning. Pt tolerated activity well. Pt still in AFIB, pt not symptomatic during shift, other VS WNL other than rate/rhythm. Pt awaiting d/c back to NH via EMS today.

## 2024-07-30 NOTE — PLAN OF CARE
Problem: Fall Injury Risk  Goal: Absence of Fall and Fall-Related Injury  Intervention: Identify and Manage Contributors  Recent Flowsheet Documentation  Taken 7/30/2024 1200 by Naomi Carrasco RN  Self-Care Promotion: BADL personal objects within reach  Taken 7/30/2024 0905 by Naomi Carrasco, RN  Self-Care Promotion: BADL personal objects within reach     Problem: Fall Injury Risk  Goal: Absence of Fall and Fall-Related Injury  Intervention: Promote Injury-Free Environment  Recent Flowsheet Documentation  Taken 7/30/2024 1200 by Naomi Carrasco, RN  Safety Promotion/Fall Prevention:   nonskid shoes/slippers when out of bed   room organization consistent   safety round/check completed   activity supervised   assistive device/personal items within reach   Goal Outcome Evaluation:  Plan of Care Reviewed With: patient        Progress: improving  Outcome Evaluation: Patient AO x3  increase appetite, Assistant x 2 for  ADL's dressing in coccyx changed apply ointment , will be discharge today at [facility] schedule EMS  at 1 pm give report at [facility] Nurse .

## 2024-07-30 NOTE — PROGRESS NOTES
"Nephrology Progress Note:     LOS: 7 days   Patient Care Team:  John Cano MD as PCP - General (Family Medicine)  ChemchirianJose Carlos MD as Consulting Physician (Cardiology)      Subjective     Interval History:   Renal follow up of ESRD  S/P dialysis yesterday  Admitted with acute right hip fx  Off all oxygen today  Feels well  No chest pain  No SOB  Leg pain controlled      Review of Systems:        Objective     Vital Signs  /85 (BP Location: Left arm, Patient Position: Lying)   Pulse 115   Temp 98.1 °F (36.7 °C) (Oral)   Resp 14   Ht 170.2 cm (67\")   Wt 55.3 kg (121 lb 14.6 oz)   SpO2 99%   BMI 19.09 kg/m²     Intake/Output  I/O last 3 completed shifts:  In: 560 [P.O.:560]  Out: 1200 [Urine:700]  I/O this shift:  In: 240 [P.O.:240]  Out: -       Physical Exam:  NAD  No icterus  No JVD  IRRR lungs clear  Soft abdomen positive bowel sounds  No C/C/E  Leg right soft immobilizer      Results Review:       Imaging Results (Last 24 Hours)       ** No results found for the last 24 hours. **            Medication Review:  apixaban, 2.5 mg, Oral, Q12H  atorvastatin, 20 mg, Oral, Nightly  famotidine, 10 mg, Oral, Q48H  gabapentin, 100 mg, Oral, Nightly  Menthol-Zinc Oxide, 1 Application, Topical, Q12H  metoprolol tartrate, 12.5 mg, Oral, Q12H  midodrine, 2.5 mg, Oral, Q8H  mirtazapine, 7.5 mg, Oral, Nightly  polyethylene glycol, 17 g, Oral, QAM  sertraline, 50 mg, Oral, Nightly  sevelamer, 800 mg, Oral, TID With Meals  topiramate, 100 mg, Oral, Nightly        Assessment & Plan   ESRD Continue 3 x weekly dialysis on D/C  Right hip fx S/P ORIF  PAFib with controlled rate  Hypoxia resolved   Mild dementia    Femur fracture, right    Permanent atrial fibrillation    Essential hypertension    Presence of cardiac pacemaker    Athscl heart disease of native coronary 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    ESRD on dialysis    Severe malnutrition      Sean Xiao MD  07/30/24  12:36 EDT        "

## 2024-07-30 NOTE — DISCHARGE SUMMARY
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        Hospital Course     Very pleasant 84 y.o. woman admitted with distal right femur fracture. Please see below for details of admission by problem:      Distal right femur fracture  -s/p ORIF  by Dr. Diggs  -Postoperatively doing well  -her physical therapy is limited by her dementia at times  -knee immobilizer except for skin checks  -Eliquis should suffice for DVT ppx  -f/u with Dr. Diggs in 2 weeks     Atrial fibrillation with RVR  -She was given low-dose digoxin on  and decreased her metoprolol to 12.5 bid  -HRs much better now  -Cardiology followed  -Patient on Eliquis low-dose prior to admission--continued here postop  -F/u with primary cardiologist (Seng) in 2 weeks     End-stage renal disease   -Followed by Dr. Xiao  -Dialysis per his orders    Chronic hypotension  -she is on midodrine but dose reduced here by Card     Dementia NOS  -no behavioral disturbance  -Continued Remeron, Zoloft, and Topamax     Anemia of CKD  -Hgb fairly stable  -defer to Nephrology     Hx of DM2  -blood sugars acceptable today  -not on meds for this  -A1c 4.9 six months ago        Eliquis (home med) sufficed for DVT prophylaxis.  Full code confirmed.  Discussed with patient and CCP.  Anticipate discharge back to [facility] this afternoon.    Day of  Discharge     Subjective:  Feeling fine today. Good appetite. No N/V/D/abd pain. No F/C/NS. No SOA or CP. Good report from staff.     Physical Exam:  Temp:  [97.3 °F (36.3 °C)-99 °F (37.2 °C)] 98.1 °F (36.7 °C)  Heart Rate:  [] 115  Resp:  [14-20] 14  BP: (131-162)/(62-92) 131/85  Body mass index is 19.09 kg/m².  Physical Exam  Vitals and nursing note reviewed.   Constitutional:       General: She is not in acute distress.     Appearance: She is ill-appearing (chronically). She is not toxic-appearing or diaphoretic.   Cardiovascular:      Rate and Rhythm: Normal rate and regular rhythm. TDC right upper chest     Pulses: Normal pulses.   Pulmonary:      Effort: Pulmonary effort is normal. No respiratory distress.      Breath sounds: Normal breath sounds. No wheezing or rales.   Abdominal:      General: Bowel sounds are normal. There is no distension.      Palpations: Abdomen is soft.      Tenderness: There is no abdominal tenderness.   Musculoskeletal:         General: No swelling or deformity.      Cervical back: Neck supple.      Comments: NVI in distal BLEs, knee immobilizer on right  Skin:     General: Skin is warm and dry.      Capillary Refill: Capillary refill takes less than 2 seconds.      Coloration: Skin is pale. Skin is not jaundiced.   Neurological:      Mental Status: She is alert. Mental status is at baseline.      Cranial Nerves: No cranial nerve deficit.      Coordination: Coordination normal.      Comments: Oriented to person   Psychiatric:         Mood and Affect: Mood normal.         Behavior: Behavior normal.      Comments: Very pleasant      Consultants     Consult Orders (all) (From admission, onward)       Start     Ordered    07/24/24 2236  Inpatient Case Management  Consult  Once        Provider:  (Not yet assigned)    07/24/24 2236 07/24/24 1035  Inpatient Cardiology Consult  Once        Specialty:  Cardiology  Provider:  Cayetano Cassidy MD    07/24/24 1034     07/24/24 0707  Inpatient Cardiology Consult  Once,   Status:  Canceled        Specialty:  Cardiology  Provider:  Augustin Ellsworth MD    07/24/24 0706    07/24/24 0705  Inpatient Cardiology Consult  Once,   Status:  Canceled        Specialty:  Cardiology  Provider:  (Not yet assigned)    07/24/24 0705    07/24/24 0702  Inpatient Cardiology Consult  Once,   Status:  Canceled        Specialty:  Cardiology  Provider:  (Not yet assigned)    07/24/24 0702    07/24/24 0000  Inpatient Case Management  Consult  Once        Provider:  (Not yet assigned)    07/23/24 2001 07/23/24 2321  Inpatient Nephrology Consult  Once        Specialty:  Nephrology  Provider:  June Keller MD    07/23/24 2320    07/23/24 2001  Inpatient Orthopedic Surgery Consult  Once        Specialty:  Orthopedic Surgery  Provider:  (Not yet assigned)    07/23/24 2001                  Procedures     RIGHT FEMUR INTRAMEDULLARY NAILING RETROGRADE AND OPEN REDUCTION INTERNAL FIXATION    Imaging Results (All)       Procedure Component Value Units Date/Time    CT Lower Extremity Right Without Contrast [[id number]] Collected: 07/25/24 1526     Updated: 07/26/24 1037    Narrative:      CT RIGHT LOWER EXTREMITY WITHOUT CONTRAST      HISTORY: Distal femoral knee fracture.      TECHNIQUE: CT includes axial imaging from the mid right femoral shaft to  the distal tibia without IV contrast. Data reconstructed in coronal and  sagittal planes.      COMPARISON: X-rays of the right femur 07/24/2024.     FINDINGS: There is a distal femoral metaphyseal fracture which exhibits  impaction and comminution as well as 55 degree anterior and 12 degree  lateral angulation. There are several cortical fragments at the margins  of the fracture. There is no evidence for extension between the femoral  condyles or to the condylar articular surface. There is surrounding soft  tissue swelling. The bones are osteopenic.     Advanced osteoarthritic changes are present  in the right knee most  pronounced at the lateral compartment where there is chronic remodeling  of the articular surfaces. There is marginal osteophyte formation.     There has been antegrade placement of a tibial IM jasmine which transfixes a  healed proximal tibial fracture. There is also an old healed proximal  fibular fracture. Atherosclerotic calcifications are present.       Impression:      1. Acute distal femoral metaphyseal fracture with comminution and  anterior medial angulation. No evidence for intracondylar extension or  extension to the articular surface.  2. Osteopenia.  3. Advanced tricompartment osteoarthritis greatest at the lateral  compartment. Previous antegrade placement of a tibial IM jasmine transfixing  a healed proximal tibial fracture. Old healed proximal fibular fracture.  4. Atherosclerotic disease.      Radiation dose reduction techniques were utilized, including automated  exposure control and exposure modulation based on body size.        This report was finalized on 7/26/2024 10:34 AM by Dr. Italo Hackett M.D on Workstation: [id number]       XR Femur 2 View Right [[id number]] Collected: 07/25/24 2323     Updated: 07/25/24 2328    Narrative:      2 VIEWS RIGHT FEMUR     HISTORY: Postop     COMPARISON: July 24, 2024     FINDINGS:  The patient has undergone intramedullary jasmine fixation and plate and  screw fixation of the previously identified distal femoral diaphyseal  fracture. Hardware appears to project in expected position. There is an  additional intramedullary jasmine identified within the proximal tibia.  There are old fractures of the proximal tibia and fibula. There is a  suprapatellar effusion. There is some subcutaneous gas.       Impression:      Postoperative findings, as noted above.           This report was finalized on 7/25/2024 11:24 PM by Dr. Kelsey Lacy M.D on Workstation: [id number]       FL C Arm During Surgery [[id number]] Resulted: 07/25/24 2233     Updated:  07/25/24 2233    Narrative:      This procedure was auto-finalized with no dictation required.    XR Femur 2 View Right [[id number]] Collected: 07/24/24 1437     Updated: 07/24/24 1442    Narrative:      2 VIEW RIGHT FEMUR     HISTORY: Fracture. Pain.     FINDINGS: There is severe diffuse osteoporosis and there is a  supracondylar fracture of the distal femur with posterior angulation of  the distal portion relative to the femoral shaft.     This report was finalized on 7/24/2024 2:39 PM by Dr. Daniel Ramos M.D  on Workstation: [id number]       XR Femur 2 View Left [[id number]] Collected: 07/24/24 1100     Updated: 07/24/24 1217    Narrative:      XR FEMUR 2 VW LEFT-07/24/2024     HISTORY: Left leg injury with pain.     No acute fractures are seen in the left femur. Bones are somewhat  demineralized. There is degenerative disease of the left knee.       Impression:      1. Degenerative arthritis of the left knee with demineralization of the  left femur.  2. No acute femur fractures are seen.        This report was finalized on 7/24/2024 12:14 PM by Dr. Dakota Tapia M.D on Workstation: [id number]       CT Head Without Contrast [[id number]] Collected: 07/23/24 2100     Updated: 07/24/24 0644    Narrative:      EMERGENCY CT SCAN OF THE HEAD AND CERVICAL SPINE WITHOUT CONTRAST ON  07/23/2024     CLINICAL HISTORY: This is an 84-year-old female patient who has a  history of dementia and had a fall. The patient is anticoagulated, on  Eliquis.     HEAD CT TECHNIQUE: Spiral CT images were obtained from the base of the  skull to the vertex without intravenous contrast. The images were  reformatted and are submitted in 3 mm thick axial, sagittal and coronal  CT sections with brain algorithm and 2 mm thick axial CT sections with  high-resolution bone algorithm.     COMPARISON: This is correlated to a prior head CT from [facility] on 12/07/2022.     FINDINGS: There are extensive patchy and confluent  areas of low-density  throughout the white matter of the cerebral hemispheres consistent with  moderate to severe small vessel disease. 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 The remainder of the brain parenchyma is normal in  attenuation. There is diffuse cerebral atrophy. The ventricles are  normal in size. I see no focal mass effect. There is no midline shift.  No extra axial fluid collections are identified. There is no evidence of  acute intracranial hemorrhage. There is a 4 mm rounded nodular density  projecting over the left anterior cerebral artery on axial image 34,  could potentially be a callosomarginal origin aneurysm that is unchanged  when compared to head CT 12/07/2022. No acute skull fractures  identified. The calvarium and the skull base are normal in appearance.  The paranasal sinuses and the mastoid air cells and the middle ear  cavities are clear. Bilateral intraocular lens implants are in place in  the globes from previous bilateral cataract surgery, otherwise orbits  are normal in appearance.       Impression:      1. No acute intracranial abnormalities identified with no significant  change when compared to prior head CT on 12/07/2022     2. There is confluent low-density throughout the cerebral white matter  consistent with moderate to severe small vessel disease and there is  diffuse cerebral atrophy. There is a 4 mm nodular density projecting  over the left anterior cerebral artery on axial image 34 and coronal  image 26 and it could be a small pericallosal artery origin aneurysm  that is unchanged since head CT on 12/07/2022 but could be further  assessed with an MRA or CTA of the of the head if clinically indicated.  The remainder of the head CT is normal with no acute skull fracture or  intracranial hemorrhage identified.     CERVICAL SPINE CT TECHNIQUE: Spiral CT images were obtained from the  skull base down to the T2 thoracic level and images were reformatted  and  submitted in 2 mm thick axial and sagittal CT sections with soft tissue  algorithm, 1 mm thick axial, sagittal and coronal CT sections with  high-resolution bone algorithm     FINDINGS: The atlantooccipital articulation is within normal limits.     At C1-2 there is failure of fusion of the anterior midline, posterior  midline of the ring of C1 with well-corticated margins may be congenital  in nature, its nonacute finding.     At C2-3 there is mild to moderate left, minimal right facet overgrowth,  small posterior central disc bulge. There is some thickening of the  interlaminar interspinous ligaments that indents the posterior thecal  sac, abuts the posterior cord mild to moderately narrowing the canal.  There is no foraminal narrowing.     At C3-4 there is moderate left facet overgrowth. Right facets are  normal, there is posterior central disc bulge, there is mild canal  narrowing, there is some left uncovertebral joint hypertrophy and  combination with left facet spurs result in moderate left bony foraminal  narrowing.     At C4-5 there is mild-moderate left facet overgrowth, there is some  distention of the left facet joint likely with fluid. There is minimal  right facet overgrowth, there is 1 to 2 mm anterolisthesis of C4 on C5,  posterior central disc bulge. There is mild canal narrowing, some left  uncovertebral joint hypertrophy in combination with left facet spurs  moderately narrows the left neural foramen at C4-5.     At C5-6 there is posterior disc bulge and mild canal narrowing. There is  mild left and no right facet overgrowth, mild uncovertebral joint  hypertrophy and mild left and no right foraminal narrowing.     At C6-7 some degenerative disc and endplate changes, mild posterior  spurring, mild canal narrowing, facets are victorino. There is some  uncovertebral joint spurring in the left foramen with mild to moderate  left bony foraminal narrowing.     At C7-T1 posterior endplates and facets are  normal with no bony canal or  foraminal narrowing.     No acute fractures seen in the cervical spine.     IMPRESSION:  1. No acute fracture is seen in the cervical spine. There is cervical  spondylosis as described above.      Radiation dose reduction techniques were utilized, including automated  exposure control and exposure modulation based on body size.        This report was finalized on 7/24/2024 6:41 AM by Dr. Joe Castillo M.D  on Workstation: [id number]       CT Cervical Spine Without Contrast [[id number]] Collected: 07/23/24 2100     Updated: 07/24/24 0644    Narrative:      EMERGENCY CT SCAN OF THE HEAD AND CERVICAL SPINE WITHOUT CONTRAST ON  07/23/2024     CLINICAL HISTORY: This is an 84-year-old female patient who has a  history of dementia and had a fall. The patient is anticoagulated, on  Eliquis.     HEAD CT TECHNIQUE: Spiral CT images were obtained from the base of the  skull to the vertex without intravenous contrast. The images were  reformatted and are submitted in 3 mm thick axial, sagittal and coronal  CT sections with brain algorithm and 2 mm thick axial CT sections with  high-resolution bone algorithm.     COMPARISON: This is correlated to a prior head CT from [facility] on 12/07/2022.     FINDINGS: There are extensive patchy and confluent areas of low-density  throughout the white matter of the cerebral hemispheres consistent with  moderate to severe small vessel disease. 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 The remainder of the brain parenchyma is normal in  attenuation. There is diffuse cerebral atrophy. The ventricles are  normal in size. I see no focal mass effect. There is no midline shift.  No extra axial fluid collections are identified. There is no evidence of  acute intracranial hemorrhage. There is a 4 mm rounded nodular density  projecting over the left anterior cerebral artery on axial image 34,  could potentially be a callosomarginal  origin aneurysm that is unchanged  when compared to head CT 12/07/2022. No acute skull fractures  identified. The calvarium and the skull base are normal in appearance.  The paranasal sinuses and the mastoid air cells and the middle ear  cavities are clear. Bilateral intraocular lens implants are in place in  the globes from previous bilateral cataract surgery, otherwise orbits  are normal in appearance.       Impression:      1. No acute intracranial abnormalities identified with no significant  change when compared to prior head CT on 12/07/2022     2. There is confluent low-density throughout the cerebral white matter  consistent with moderate to severe small vessel disease and there is  diffuse cerebral atrophy. There is a 4 mm nodular density projecting  over the left anterior cerebral artery on axial image 34 and coronal  image 26 and it could be a small pericallosal artery origin aneurysm  that is unchanged since head CT on 12/07/2022 but could be further  assessed with an MRA or CTA of the of the head if clinically indicated.  The remainder of the head CT is normal with no acute skull fracture or  intracranial hemorrhage identified.     CERVICAL SPINE CT TECHNIQUE: Spiral CT images were obtained from the  skull base down to the T2 thoracic level and images were reformatted and  submitted in 2 mm thick axial and sagittal CT sections with soft tissue  algorithm, 1 mm thick axial, sagittal and coronal CT sections with  high-resolution bone algorithm     FINDINGS: The atlantooccipital articulation is within normal limits.     At C1-2 there is failure of fusion of the anterior midline, posterior  midline of the ring of C1 with well-corticated margins may be congenital  in nature, its nonacute finding.     At C2-3 there is mild to moderate left, minimal right facet overgrowth,  small posterior central disc bulge. There is some thickening of the  interlaminar interspinous ligaments that indents the posterior  thecal  sac, abuts the posterior cord mild to moderately narrowing the canal.  There is no foraminal narrowing.     At C3-4 there is moderate left facet overgrowth. Right facets are  normal, there is posterior central disc bulge, there is mild canal  narrowing, there is some left uncovertebral joint hypertrophy and  combination with left facet spurs result in moderate left bony foraminal  narrowing.     At C4-5 there is mild-moderate left facet overgrowth, there is some  distention of the left facet joint likely with fluid. There is minimal  right facet overgrowth, there is 1 to 2 mm anterolisthesis of C4 on C5,  posterior central disc bulge. There is mild canal narrowing, some left  uncovertebral joint hypertrophy in combination with left facet spurs  moderately narrows the left neural foramen at C4-5.     At C5-6 there is posterior disc bulge and mild canal narrowing. There is  mild left and no right facet overgrowth, mild uncovertebral joint  hypertrophy and mild left and no right foraminal narrowing.     At C6-7 some degenerative disc and endplate changes, mild posterior  spurring, mild canal narrowing, facets are victorino. There is some  uncovertebral joint spurring in the left foramen with mild to moderate  left bony foraminal narrowing.     At C7-T1 posterior endplates and facets are normal with no bony canal or  foraminal narrowing.     No acute fractures seen in the cervical spine.     IMPRESSION:  1. No acute fracture is seen in the cervical spine. There is cervical  spondylosis as described above.      Radiation dose reduction techniques were utilized, including automated  exposure control and exposure modulation based on body size.        This report was finalized on 7/24/2024 6:41 AM by Dr. Joe Castillo M.D  on Workstation: [id number]       XR Hip With or Without Pelvis 2 - 3 View Right [[id number]] Collected: 07/23/24 1837     Updated: 07/23/24 1928    Narrative:      RIGHT HUMERUS, RIGHT ELBOW,  PELVIS/RIGHT HIP, RIGHT KNEE.     HISTORY: Injury. Fall.     COMPARISON: Right shoulder x-ray 03/18/2023.     FINDINGS:  RIGHT HUMERUS: 2 VIEWS.      FINDINGS: The bones are osteopenic. There is glenohumeral joint  osteoarthritis with marginal spurring at the glenohumeral joint.  Evaluation of the right humeral head and shoulder is limited though  there is no definite humeral fracture. No fracture or acute abnormality  is demonstrated mid to distal right humerus.      RIGHT ELBOW: 2 VIEWS:     FINDINGS: No evidence for fracture or dislocation. Soft tissues appear  normal and there is no evidence to suggest hemarthrosis.     AP PELVIS AND AP AND FROG LATERAL RIGHT HIP:      FINDINGS: Evaluation for fracture is limited by the degree of  osteopenia. No fracture or acute osseous abnormality is evident.     RIGHT KNEE: AP, LATERAL:      FINDINGS: There is an acute distal femoral metaphyseal fracture  associated with impaction and anterior lateral angulation. There is  potential intracondylar extension. Bones appear osteopenic. There has s  been previous antegrade placement of a tibial IM jasmine with 2 proximal  interlocking screws. There appear to be old healed fractures of the  proximal tibial and fibular metaphyses. Vascular calcifications are  present..       Impression:      1. Distal right femoral metaphyseal fracture with impaction and anterior  lateral angulation and potential intracondylar extension. Osteopenia.  2. Limited evaluation of the right shoulder/proximal humerus. No  definite right humeral fracture is evident though if there is ongoing  suspicion for right humeral head or shoulder fracture, I would recommend  dedicated right shoulder x-rays.  3. Osteopenia limits evaluation of the pelvis/right hip without evidence  for acute abnormality of the pelvis or right hip.  4. No evidence for acute abnormality of the right elbow.     This report was finalized on 7/23/2024 7:24 PM by Dr. Italo Hackett M.D on  Workstation: [id number]       XR Knee 1 or 2 View Right [[id number]] Collected: 07/23/24 1837     Updated: 07/23/24 1928    Narrative:      RIGHT HUMERUS, RIGHT ELBOW, PELVIS/RIGHT HIP, RIGHT KNEE.     HISTORY: Injury. Fall.     COMPARISON: Right shoulder x-ray 03/18/2023.     FINDINGS:  RIGHT HUMERUS: 2 VIEWS.      FINDINGS: The bones are osteopenic. There is glenohumeral joint  osteoarthritis with marginal spurring at the glenohumeral joint.  Evaluation of the right humeral head and shoulder is limited though  there is no definite humeral fracture. No fracture or acute abnormality  is demonstrated mid to distal right humerus.      RIGHT ELBOW: 2 VIEWS:     FINDINGS: No evidence for fracture or dislocation. Soft tissues appear  normal and there is no evidence to suggest hemarthrosis.     AP PELVIS AND AP AND FROG LATERAL RIGHT HIP:      FINDINGS: Evaluation for fracture is limited by the degree of  osteopenia. No fracture or acute osseous abnormality is evident.     RIGHT KNEE: AP, LATERAL:      FINDINGS: There is an acute distal femoral metaphyseal fracture  associated with impaction and anterior lateral angulation. There is  potential intracondylar extension. Bones appear osteopenic. There has s  been previous antegrade placement of a tibial IM jasmine with 2 proximal  interlocking screws. There appear to be old healed fractures of the  proximal tibial and fibular metaphyses. Vascular calcifications are  present..       Impression:      1. Distal right femoral metaphyseal fracture with impaction and anterior  lateral angulation and potential intracondylar extension. Osteopenia.  2. Limited evaluation of the right shoulder/proximal humerus. No  definite right humeral fracture is evident though if there is ongoing  suspicion for right humeral head or shoulder fracture, I would recommend  dedicated right shoulder x-rays.  3. Osteopenia limits evaluation of the pelvis/right hip without evidence  for acute abnormality of  the pelvis or right hip.  4. No evidence for acute abnormality of the right elbow.     This report was finalized on 7/23/2024 7:24 PM by Dr. Italo Hackett M.D on Workstation: [id number]       XR Humerus Right [[id number]] Collected: 07/23/24 1837     Updated: 07/23/24 1928    Narrative:      RIGHT HUMERUS, RIGHT ELBOW, PELVIS/RIGHT HIP, RIGHT KNEE.     HISTORY: Injury. Fall.     COMPARISON: Right shoulder x-ray 03/18/2023.     FINDINGS:  RIGHT HUMERUS: 2 VIEWS.      FINDINGS: The bones are osteopenic. There is glenohumeral joint  osteoarthritis with marginal spurring at the glenohumeral joint.  Evaluation of the right humeral head and shoulder is limited though  there is no definite humeral fracture. No fracture or acute abnormality  is demonstrated mid to distal right humerus.      RIGHT ELBOW: 2 VIEWS:     FINDINGS: No evidence for fracture or dislocation. Soft tissues appear  normal and there is no evidence to suggest hemarthrosis.     AP PELVIS AND AP AND FROG LATERAL RIGHT HIP:      FINDINGS: Evaluation for fracture is limited by the degree of  osteopenia. No fracture or acute osseous abnormality is evident.     RIGHT KNEE: AP, LATERAL:      FINDINGS: There is an acute distal femoral metaphyseal fracture  associated with impaction and anterior lateral angulation. There is  potential intracondylar extension. Bones appear osteopenic. There has s  been previous antegrade placement of a tibial IM jasmine with 2 proximal  interlocking screws. There appear to be old healed fractures of the  proximal tibial and fibular metaphyses. Vascular calcifications are  present..       Impression:      1. Distal right femoral metaphyseal fracture with impaction and anterior  lateral angulation and potential intracondylar extension. Osteopenia.  2. Limited evaluation of the right shoulder/proximal humerus. No  definite right humeral fracture is evident though if there is ongoing  suspicion for right humeral head or shoulder  fracture, I would recommend  dedicated right shoulder x-rays.  3. Osteopenia limits evaluation of the pelvis/right hip without evidence  for acute abnormality of the pelvis or right hip.  4. No evidence for acute abnormality of the right elbow.     This report was finalized on 7/23/2024 7:24 PM by Dr. Italo Hackett M.D on Workstation: [id number]       XR Elbow 2 View Right [[id number]] Collected: 07/23/24 1837     Updated: 07/23/24 1928    Narrative:      RIGHT HUMERUS, RIGHT ELBOW, PELVIS/RIGHT HIP, RIGHT KNEE.     HISTORY: Injury. Fall.     COMPARISON: Right shoulder x-ray 03/18/2023.     FINDINGS:  RIGHT HUMERUS: 2 VIEWS.      FINDINGS: The bones are osteopenic. There is glenohumeral joint  osteoarthritis with marginal spurring at the glenohumeral joint.  Evaluation of the right humeral head and shoulder is limited though  there is no definite humeral fracture. No fracture or acute abnormality  is demonstrated mid to distal right humerus.      RIGHT ELBOW: 2 VIEWS:     FINDINGS: No evidence for fracture or dislocation. Soft tissues appear  normal and there is no evidence to suggest hemarthrosis.     AP PELVIS AND AP AND FROG LATERAL RIGHT HIP:      FINDINGS: Evaluation for fracture is limited by the degree of  osteopenia. No fracture or acute osseous abnormality is evident.     RIGHT KNEE: AP, LATERAL:      FINDINGS: There is an acute distal femoral metaphyseal fracture  associated with impaction and anterior lateral angulation. There is  potential intracondylar extension. Bones appear osteopenic. There has s  been previous antegrade placement of a tibial IM jasmine with 2 proximal  interlocking screws. There appear to be old healed fractures of the  proximal tibial and fibular metaphyses. Vascular calcifications are  present..       Impression:      1. Distal right femoral metaphyseal fracture with impaction and anterior  lateral angulation and potential intracondylar extension. Osteopenia.  2. Limited  evaluation of the right shoulder/proximal humerus. No  definite right humeral fracture is evident though if there is ongoing  suspicion for right humeral head or shoulder fracture, I would recommend  dedicated right shoulder x-rays.  3. Osteopenia limits evaluation of the pelvis/right hip without evidence  for acute abnormality of the pelvis or right hip.  4. No evidence for acute abnormality of the right elbow.     This report was finalized on 7/23/2024 7:24 PM by Dr. Italo Hackett M.D on Workstation: [id number]             Results for orders placed during the hospital encounter of 09/07/20    Duplex Carotid Ultrasound CAR    Interpretation Summary  · Right internal carotid artery is normal. Elevated velocities are noted in the distal right internal carotid artery. However, this is felt to be related to tortuosity as opposed to a diseased segment.  · Left internal carotid artery is normal.    Results for orders placed during the hospital encounter of 10/13/23    Adult Transthoracic Echo Complete W/ Cont if Necessary Per Protocol    Interpretation Summary    Left ventricular systolic function is normal. Left ventricular ejection fraction appears to be 56 - 60%.    Left ventricular wall thickness is consistent with mild concentric hypertrophy.    Left ventricular diastolic function was indeterminate.    The right ventricular cavity is mildly dilated.    The left atrial cavity is severely dilated.    Left atrial volume is severely increased.    The right atrial cavity is moderately  dilated.    Estimated right ventricular systolic pressure from tricuspid regurgitation is normal (<35 mmHg).    The main pulmonary artery is severely dilated.    Pertinent Labs     Results from last 7 days   Lab Units 07/30/24  0246 07/29/24  0446 07/26/24  0230 07/24/24  0542   WBC 10*3/mm3 5.72 6.58 12.89* 10.48   HEMOGLOBIN g/dL 7.9* 8.1* 8.9* 9.9*   PLATELETS 10*3/mm3 231 203 265 209     Results from last 7 days   Lab Units  "07/30/24  0246 07/29/24 0446 07/27/24  0823 07/25/24  0848   SODIUM mmol/L 134* 135* 134* 136   POTASSIUM mmol/L 4.6 5.3* 4.1 4.1   CHLORIDE mmol/L 103 107 102 102   CO2 mmol/L 23.0 14.4* 22.0 23.0   BUN mg/dL 29* 59* 28* 19   CREATININE mg/dL 2.21* 4.13* 2.53* 2.40*   GLUCOSE mg/dL 91 93 148* 70   EGFR mL/min/1.73 21.6* 10.2* 18.4* 19.6*     Results from last 7 days   Lab Units 07/30/24 0246 07/29/24 0446 07/25/24  0848 07/23/24  1849   ALBUMIN g/dL 2.6* 2.5* 3.2* 3.4*   BILIRUBIN mg/dL 0.5 0.4 0.5 0.5   ALK PHOS U/L 133* 135* 114 154*   AST (SGOT) U/L 27 30 26 26   ALT (SGPT) U/L <5 <5 16 17     Results from last 7 days   Lab Units 07/30/24 0246 07/29/24 0446 07/27/24  0823 07/25/24  0848 07/24/24  0542 07/23/24  1849   CALCIUM mg/dL 8.3* 8.2* 8.6 9.0 9.1 9.9   ALBUMIN g/dL 2.6* 2.5*  --  3.2*  --  3.4*   MAGNESIUM mg/dL 2.3  --   --   --  2.5*  --    PHOSPHORUS mg/dL 2.0*  --   --   --   --   --                Invalid input(s): \"LDLCALC\"          Test Results Pending at Discharge       Discharge Details        Discharge Medications        New Medications        Instructions Start Date   metoprolol tartrate 25 MG tablet  Commonly known as: LOPRESSOR   12.5 mg, Oral, Every 12 Hours Scheduled             Changes to Medications        Instructions Start Date   midodrine 2.5 MG tablet  Commonly known as: PROAMATINE  What changed:   medication strength  how much to take   2.5 mg, Oral, Every 8 Hours             Continue These Medications        Instructions Start Date   acetaminophen 325 MG tablet  Commonly known as: TYLENOL   650 mg, Oral, 4 Times Daily PRN      apixaban 2.5 MG tablet tablet  Commonly known as: ELIQUIS   2.5 mg, Oral, Every 12 Hours Scheduled      atorvastatin 20 MG tablet  Commonly known as: LIPITOR   20 mg, Oral, Nightly      famotidine 10 MG tablet  Commonly known as: PEPCID   10 mg, Oral, Every 48 Hours      gabapentin 100 MG capsule  Commonly known as: NEURONTIN   100 mg, Oral, Nightly    " "  melatonin 3 MG tablet   3 mg, Oral, Nightly      MiraLax 17 GM/SCOOP powder  Generic drug: polyethylene glycol   17 g, Oral, Every Morning      mirtazapine 7.5 MG tablet  Commonly known as: REMERON   7.5 mg, Oral, Nightly      nitroglycerin 0.4 MG SL tablet  Commonly known as: NITROSTAT   0.4 mg, Sublingual, Every 5 Minutes PRN      sertraline 50 MG tablet  Commonly known as: ZOLOFT   Take 1 tablet by mouth every night at bedtime.      sevelamer 800 MG tablet  Commonly known as: RENVELA   800 mg, Oral, 3 Times Daily With Meals      topiramate 100 MG tablet  Commonly known as: TOPAMAX   100 mg, Oral, Nightly             Stop These Medications      dicyclomine 10 MG capsule  Commonly known as: BENTYL     furosemide 40 MG tablet  Commonly known as: LASIX     Lidocaine 4 %     metoprolol succinate XL 50 MG 24 hr tablet  Commonly known as: TOPROL-XL     ondansetron ODT 4 MG disintegrating tablet  Commonly known as: ZOFRAN-ODT     traMADol 50 MG tablet  Commonly known as: ULTRAM     zinc oxide 20 % ointment              Allergies   Allergen Reactions    Amoxicillin Shortness Of Breath    Ciprofloxacin Hives    Oxycodone-Acetaminophen Unknown - High Severity     Pt's son stated when pt fell and broke her leg she was put on oxycodone; pt's son stated pt's \"vitals went all out of wack, she couldn't remember things.\"    Penicillins Rash    Sulfa Antibiotics Hives    Cephalexin Other (See Comments)     Has tolerated multiple courses of cephalosporins- cefazolin, ceftriaxone, ceftazidime     Clavulanic Acid Other (See Comments)    Codeine Other (See Comments)    Contrast Dye (Echo Or Unknown Ct/Mr) Other (See Comments)     8/18/22     Doxycycline Other (See Comments)    Fluconazole Other (See Comments)    Iodinated Contrast Media Other (See Comments)     8/18/22    Iodine Hives    Macrobid [Nitrofurantoin] Hives    Tobramycin Other (See Comments)    Trimethoprim Other (See Comments)       Discharge Disposition:  Skilled " Nursing Facility (DC - External)      Discharge Diet:  Diet Order   Procedures    Diet: Regular/House; Fluid Consistency: Thin (IDDSI 0)       Discharge Activity:   NWB RLE, knee immobilizer at all times except for skin checks    CODE STATUS:    Code Status and Medical Interventions: CPR (Attempt to Resuscitate); Full Support   Ordered at: 07/23/24 2001     Code Status (Patient has no pulse and is not breathing):    CPR (Attempt to Resuscitate)     Medical Interventions (Patient has pulse or is breathing):    Full Support       Future Appointments   Date Time Provider Department Center   7/30/2024  1:00 PM EMS MED [facility]   1/14/2025 12:45 PM Augustin Ellsworth MD [facility]   1/14/2025  1:00 PM [facility] CARD [place] DEVICE CHECK [facility]     Additional Instructions for the Follow-ups that You Need to Schedule       Discharge Follow-up with PCP   As directed       Currently Documented PCP:    John Cano MD    PCP Phone Number:    [phone]     Follow Up Details: Dr. Cano (PCP) after dc from facility        Discharge Follow-up with Specified Provider: Dr. Ellsworth (Card); 2 Weeks   As directed      To: Dr. Ellsworth (Card)   Follow Up: 2 Weeks        Discharge Follow-up with Specified Provider: Dr. Diggs (Ortho); 2 Weeks   As directed      To: Dr. Diggs (Ortho)   Follow Up: 2 Weeks               Contact information for follow-up providers       John Cano MD .    Specialty: Family Medicine  Why: Dr. Cano (PCP) after dc from facility  Contact information:  [address] [facility] [address] [phone]                       Contact information for after-discharge care       Destination       [facility] .    Service: Nursing Home  Contact information:  [address] [facility] [address] [phone]                                   Additional Instructions for the Follow-ups that You Need to Schedule       Discharge  Follow-up with PCP   As directed       Currently Documented PCP:    John Cano MD    PCP Phone Number:    [phone]     Follow Up Details: Dr. Cano (PCP) after dc from facility        Discharge Follow-up with Specified Provider: Dr. Ellsworth (Card); 2 Weeks   As directed      To: Dr. Ellsworth (Card)   Follow Up: 2 Weeks        Discharge Follow-up with Specified Provider: Dr. Diggs (Ortho); 2 Weeks   As directed      To: Dr. Diggs (Ortho)   Follow Up: 2 Weeks            Time Spent on Discharge:  Greater than 30 minutes      Go Veronica MD  [facility]  07/30/24  11:45 EDT

## 2024-07-30 NOTE — NURSING NOTE
"   07/30/24 0825   Urostomy RLQ   Placement date: If unknown, DO NOT use \"Add Comment\" note/Placement time: If unknown, DO NOT use \"Add Comment\" note: (c) 12/07/21 1900   Present on Admission? Select all that apply: Unknown placement date/time  Inserted by: rusty  Location: RLQ   Stomal Appliance 2 piece;Clean;Dry;Intact;Changed;Drainable   Stoma Appearance round;rosebud appearance;moist;red;protruding above skin level   Peristomal Assessment Maceration   Treatment Bag change;Site care     Wound/Ostomy: Follow up Ostomy care. The Ostomy care was performed. Joselin 2-piece 2 1/4, convex wafer, barrier ring and elastic barrier strip used. Good seal noted. Supplies left in the room. Please re-consult for any additional needs.  "

## 2024-09-10 NOTE — Clinical Note
The physician has confirmed that the patient has been reassessed and is appropriate for moderate sedation obese abdomen

## 2024-10-14 PROCEDURE — 93294 REM INTERROG EVL PM/LDLS PM: CPT | Performed by: NURSE PRACTITIONER

## 2024-10-14 PROCEDURE — 93296 REM INTERROG EVL PM/IDS: CPT | Performed by: NURSE PRACTITIONER

## 2024-10-21 ENCOUNTER — TELEPHONE (OUTPATIENT)
Dept: CARDIOLOGY | Facility: CLINIC | Age: 84
End: 2024-10-21
Payer: MEDICARE

## 2024-10-21 NOTE — TELEPHONE ENCOUNTER
Please see if we can contact her nursing home for an update.     Received remote monitoring transmission.  Patient has been having periods of atrial fibrillation with RVR over the last week or so.  Please confirm if she is still receiving metoprolol as prescribed.     Also see if you can get an update on what her blood pressure has been running so we could possibly make medication adjustments.        Please send to the office and ask for jo-ann

## 2024-10-21 NOTE — PROGRESS NOTES
Please see if we can contact her nursing home for an update.    Received remote monitoring transmission.  Patient has been having periods of atrial fibrillation with RVR over the last week or so.  Please confirm if she is still receiving metoprolol as prescribed.    Also see if you can get an update on what her blood pressure has been running so we could possibly make medication adjustments.

## 2025-02-04 ENCOUNTER — HOSPITAL ENCOUNTER (INPATIENT)
Facility: HOSPITAL | Age: 85
LOS: 2 days | Discharge: NURSING, REHAB OR LONG-TERM CARE | DRG: 689 | End: 2025-02-07
Attending: INTERNAL MEDICINE | Admitting: FAMILY MEDICINE
Payer: MEDICARE

## 2025-02-04 ENCOUNTER — APPOINTMENT (OUTPATIENT)
Dept: GENERAL RADIOLOGY | Facility: HOSPITAL | Age: 85
DRG: 689 | End: 2025-02-04
Payer: MEDICARE

## 2025-02-04 DIAGNOSIS — E87.1 HYPONATREMIA: ICD-10-CM

## 2025-02-04 DIAGNOSIS — N39.0 URINARY TRACT INFECTION WITHOUT HEMATURIA, SITE UNSPECIFIED: ICD-10-CM

## 2025-02-04 DIAGNOSIS — I48.20 CHRONIC ATRIAL FIBRILLATION: Primary | ICD-10-CM

## 2025-02-04 DIAGNOSIS — N18.6 CKD (CHRONIC KIDNEY DISEASE) REQUIRING CHRONIC DIALYSIS: ICD-10-CM

## 2025-02-04 DIAGNOSIS — D63.8 ANEMIA OF CHRONIC DISEASE: ICD-10-CM

## 2025-02-04 DIAGNOSIS — Z99.2 CKD (CHRONIC KIDNEY DISEASE) REQUIRING CHRONIC DIALYSIS: ICD-10-CM

## 2025-02-04 LAB
ALBUMIN SERPL-MCNC: 3.8 G/DL (ref 3.5–5.2)
ALBUMIN/GLOB SERPL: 1 G/DL
ALP SERPL-CCNC: 132 U/L (ref 39–117)
ALT SERPL W P-5'-P-CCNC: 17 U/L (ref 1–33)
ANION GAP SERPL CALCULATED.3IONS-SCNC: 13.6 MMOL/L (ref 5–15)
AST SERPL-CCNC: 33 U/L (ref 1–32)
BACTERIA UR QL AUTO: ABNORMAL /HPF
BASOPHILS # BLD AUTO: 0.03 10*3/MM3 (ref 0–0.2)
BASOPHILS NFR BLD AUTO: 0.5 % (ref 0–1.5)
BILIRUB SERPL-MCNC: 0.6 MG/DL (ref 0–1.2)
BILIRUB UR QL STRIP: ABNORMAL
BUN SERPL-MCNC: 28 MG/DL (ref 8–23)
BUN/CREAT SERPL: 8.4 (ref 7–25)
CALCIUM SPEC-SCNC: 9.3 MG/DL (ref 8.6–10.5)
CHLORIDE SERPL-SCNC: 94 MMOL/L (ref 98–107)
CLARITY UR: ABNORMAL
CO2 SERPL-SCNC: 25.4 MMOL/L (ref 22–29)
COLOR UR: ABNORMAL
CREAT SERPL-MCNC: 3.33 MG/DL (ref 0.57–1)
DEPRECATED RDW RBC AUTO: 61.5 FL (ref 37–54)
EGFRCR SERPLBLD CKD-EPI 2021: 13.2 ML/MIN/1.73
EOSINOPHIL # BLD AUTO: 0.06 10*3/MM3 (ref 0–0.4)
EOSINOPHIL NFR BLD AUTO: 0.9 % (ref 0.3–6.2)
ERYTHROCYTE [DISTWIDTH] IN BLOOD BY AUTOMATED COUNT: 16.4 % (ref 12.3–15.4)
GEN 5 1HR TROPONIN T REFLEX: 65 NG/L
GLOBULIN UR ELPH-MCNC: 3.7 GM/DL
GLUCOSE SERPL-MCNC: 119 MG/DL (ref 65–99)
GLUCOSE UR STRIP-MCNC: NEGATIVE MG/DL
HCT VFR BLD AUTO: 35.9 % (ref 34–46.6)
HGB BLD-MCNC: 11.1 G/DL (ref 12–15.9)
HGB UR QL STRIP.AUTO: ABNORMAL
HOLD SPECIMEN: NORMAL
HOLD SPECIMEN: NORMAL
HYALINE CASTS UR QL AUTO: ABNORMAL /LPF
IMM GRANULOCYTES # BLD AUTO: 0.02 10*3/MM3 (ref 0–0.05)
IMM GRANULOCYTES NFR BLD AUTO: 0.3 % (ref 0–0.5)
KETONES UR QL STRIP: ABNORMAL
LEUKOCYTE ESTERASE UR QL STRIP.AUTO: ABNORMAL
LYMPHOCYTES # BLD AUTO: 1.17 10*3/MM3 (ref 0.7–3.1)
LYMPHOCYTES NFR BLD AUTO: 17.9 % (ref 19.6–45.3)
MAGNESIUM SERPL-MCNC: 2.2 MG/DL (ref 1.6–2.4)
MCH RBC QN AUTO: 32.4 PG (ref 26.6–33)
MCHC RBC AUTO-ENTMCNC: 30.9 G/DL (ref 31.5–35.7)
MCV RBC AUTO: 104.7 FL (ref 79–97)
MONOCYTES # BLD AUTO: 0.57 10*3/MM3 (ref 0.1–0.9)
MONOCYTES NFR BLD AUTO: 8.7 % (ref 5–12)
NEUTROPHILS NFR BLD AUTO: 4.7 10*3/MM3 (ref 1.7–7)
NEUTROPHILS NFR BLD AUTO: 71.7 % (ref 42.7–76)
NITRITE UR QL STRIP: NEGATIVE
NRBC BLD AUTO-RTO: 0 /100 WBC (ref 0–0.2)
NT-PROBNP SERPL-MCNC: ABNORMAL PG/ML (ref 0–1800)
PH UR STRIP.AUTO: 8.5 [PH] (ref 5–8)
PLATELET # BLD AUTO: 181 10*3/MM3 (ref 140–450)
PMV BLD AUTO: 9.4 FL (ref 6–12)
POTASSIUM SERPL-SCNC: 4.6 MMOL/L (ref 3.5–5.2)
PROT SERPL-MCNC: 7.5 G/DL (ref 6–8.5)
PROT UR QL STRIP: ABNORMAL
RBC # BLD AUTO: 3.43 10*6/MM3 (ref 3.77–5.28)
RBC # UR STRIP: ABNORMAL /HPF
REF LAB TEST METHOD: ABNORMAL
SODIUM SERPL-SCNC: 133 MMOL/L (ref 136–145)
SP GR UR STRIP: 1.01 (ref 1–1.03)
SQUAMOUS #/AREA URNS HPF: ABNORMAL /HPF
TRI-PHOS CRY URNS QL MICRO: ABNORMAL /HPF
TROPONIN T % DELTA: -7
TROPONIN T NUMERIC DELTA: -5 NG/L
TROPONIN T SERPL HS-MCNC: 70 NG/L
TSH SERPL DL<=0.05 MIU/L-ACNC: 1.99 UIU/ML (ref 0.27–4.2)
UROBILINOGEN UR QL STRIP: ABNORMAL
WBC # UR STRIP: ABNORMAL /HPF
WBC NRBC COR # BLD AUTO: 6.55 10*3/MM3 (ref 3.4–10.8)
WHOLE BLOOD HOLD COAG: NORMAL
WHOLE BLOOD HOLD SPECIMEN: NORMAL

## 2025-02-04 PROCEDURE — 83735 ASSAY OF MAGNESIUM: CPT | Performed by: NURSE PRACTITIONER

## 2025-02-04 PROCEDURE — 25010000002 CEFTRIAXONE PER 250 MG: Performed by: NURSE PRACTITIONER

## 2025-02-04 PROCEDURE — 93005 ELECTROCARDIOGRAM TRACING: CPT | Performed by: INTERNAL MEDICINE

## 2025-02-04 PROCEDURE — 80053 COMPREHEN METABOLIC PANEL: CPT | Performed by: NURSE PRACTITIONER

## 2025-02-04 PROCEDURE — 87077 CULTURE AEROBIC IDENTIFY: CPT | Performed by: NURSE PRACTITIONER

## 2025-02-04 PROCEDURE — 84484 ASSAY OF TROPONIN QUANT: CPT | Performed by: NURSE PRACTITIONER

## 2025-02-04 PROCEDURE — 81001 URINALYSIS AUTO W/SCOPE: CPT | Performed by: NURSE PRACTITIONER

## 2025-02-04 PROCEDURE — 99285 EMERGENCY DEPT VISIT HI MDM: CPT

## 2025-02-04 PROCEDURE — 71045 X-RAY EXAM CHEST 1 VIEW: CPT

## 2025-02-04 PROCEDURE — 83880 ASSAY OF NATRIURETIC PEPTIDE: CPT | Performed by: NURSE PRACTITIONER

## 2025-02-04 PROCEDURE — 36415 COLL VENOUS BLD VENIPUNCTURE: CPT

## 2025-02-04 PROCEDURE — 25810000003 SODIUM CHLORIDE 0.9 % SOLUTION: Performed by: NURSE PRACTITIONER

## 2025-02-04 PROCEDURE — 87086 URINE CULTURE/COLONY COUNT: CPT | Performed by: NURSE PRACTITIONER

## 2025-02-04 PROCEDURE — 93005 ELECTROCARDIOGRAM TRACING: CPT

## 2025-02-04 PROCEDURE — 87186 SC STD MICRODIL/AGAR DIL: CPT | Performed by: NURSE PRACTITIONER

## 2025-02-04 PROCEDURE — 25010000002 HYDROMORPHONE 1 MG/ML SOLUTION: Performed by: NURSE PRACTITIONER

## 2025-02-04 PROCEDURE — 84443 ASSAY THYROID STIM HORMONE: CPT | Performed by: NURSE PRACTITIONER

## 2025-02-04 PROCEDURE — 93005 ELECTROCARDIOGRAM TRACING: CPT | Performed by: NURSE PRACTITIONER

## 2025-02-04 PROCEDURE — 85025 COMPLETE CBC W/AUTO DIFF WBC: CPT | Performed by: NURSE PRACTITIONER

## 2025-02-04 RX ORDER — SODIUM CHLORIDE 0.9 % (FLUSH) 0.9 %
10 SYRINGE (ML) INJECTION AS NEEDED
Status: DISCONTINUED | OUTPATIENT
Start: 2025-02-04 | End: 2025-02-07 | Stop reason: HOSPADM

## 2025-02-04 RX ORDER — FUROSEMIDE 40 MG/1
40 TABLET ORAL DAILY
COMMUNITY
End: 2025-02-07 | Stop reason: HOSPADM

## 2025-02-04 RX ORDER — IPRATROPIUM BROMIDE AND ALBUTEROL SULFATE 2.5; .5 MG/3ML; MG/3ML
3 SOLUTION RESPIRATORY (INHALATION) EVERY 4 HOURS PRN
COMMUNITY

## 2025-02-04 RX ORDER — MIDODRINE HYDROCHLORIDE 2.5 MG/1
2.5 TABLET ORAL
COMMUNITY

## 2025-02-04 RX ORDER — ZINC OXIDE 20 %
1 OINTMENT (GRAM) TOPICAL 2 TIMES DAILY
COMMUNITY

## 2025-02-04 RX ORDER — METOPROLOL TARTRATE 25 MG/1
25 TABLET, FILM COATED ORAL 2 TIMES DAILY
COMMUNITY
End: 2025-02-07 | Stop reason: HOSPADM

## 2025-02-04 RX ORDER — TRAMADOL HYDROCHLORIDE 50 MG/1
50 TABLET ORAL EVERY 6 HOURS PRN
COMMUNITY

## 2025-02-04 RX ORDER — MORPHINE SULFATE 2 MG/ML
2 INJECTION, SOLUTION INTRAMUSCULAR; INTRAVENOUS ONCE
Status: DISCONTINUED | OUTPATIENT
Start: 2025-02-04 | End: 2025-02-04

## 2025-02-04 RX ADMIN — HYDROMORPHONE HYDROCHLORIDE 0.5 MG: 1 INJECTION, SOLUTION INTRAMUSCULAR; INTRAVENOUS; SUBCUTANEOUS at 22:25

## 2025-02-04 RX ADMIN — CEFTRIAXONE SODIUM 1000 MG: 1 INJECTION, POWDER, FOR SOLUTION INTRAMUSCULAR; INTRAVENOUS at 22:52

## 2025-02-04 RX ADMIN — SODIUM CHLORIDE 500 ML: 9 INJECTION, SOLUTION INTRAVENOUS at 21:12

## 2025-02-04 NOTE — LETTER
EMS Transport Request  For use at [facility], [place], [place], [place], and [place] only   Patient Name: Hazel Bucio : 1940   Weight:58.1 kg (128 lb) Pick-up Location:  BLS/ALS: BLS/ALS: BLS   Insurance: MEDICARE Auth End Date: 2025   Pre-Cert #: D/C Summary complete:    Destination: Other [facility]   Contact Precautions: None   Equipment (O2, Fluids, etc.): None   Arrive By Date/Time: 2025 1530 Stretcher/WC: Wheelchair   CM Requesting: Dolores King RN Ext: [phone]   Notes/Medical Necessity: 128lbs, RA, standby transfer     ______________________________________________________________________    *Only 2 patient bags OR 1 carry-on size bag are permitted.  Wheelchairs and walkers CANNOT transported with the patient. Acknowledge: Yes

## 2025-02-05 ENCOUNTER — APPOINTMENT (OUTPATIENT)
Dept: CARDIOLOGY | Facility: HOSPITAL | Age: 85
DRG: 689 | End: 2025-02-05
Payer: MEDICARE

## 2025-02-05 PROBLEM — H91.90 HARD OF HEARING: Status: ACTIVE | Noted: 2025-02-05

## 2025-02-05 PROBLEM — R07.89 CHEST PAIN, ATYPICAL: Status: ACTIVE | Noted: 2023-10-13

## 2025-02-05 PROBLEM — N39.0 UTI (URINARY TRACT INFECTION): Status: ACTIVE | Noted: 2025-02-05

## 2025-02-05 LAB
ANION GAP SERPL CALCULATED.3IONS-SCNC: 12.6 MMOL/L (ref 5–15)
AORTIC DIMENSIONLESS INDEX: 0.7 (DI)
AV MEAN PRESS GRAD SYS DOP V1V2: 3 MMHG
AV VMAX SYS DOP: 116 CM/SEC
BASOPHILS # BLD AUTO: 0.04 10*3/MM3 (ref 0–0.2)
BASOPHILS NFR BLD AUTO: 0.6 % (ref 0–1.5)
BH CV ECHO MEAS - ACS: 2 CM
BH CV ECHO MEAS - AO MAX PG: 5.4 MMHG
BH CV ECHO MEAS - AO V2 VTI: 19 CM
BH CV ECHO MEAS - AVA(I,D): 2.25 CM2
BH CV ECHO MEAS - EDV(CUBED): 85.2 ML
BH CV ECHO MEAS - EDV(MOD-SP2): 61.7 ML
BH CV ECHO MEAS - EDV(MOD-SP4): 71.9 ML
BH CV ECHO MEAS - EF(MOD-SP2): 63.2 %
BH CV ECHO MEAS - EF(MOD-SP4): 62.9 %
BH CV ECHO MEAS - ESV(CUBED): 24.4 ML
BH CV ECHO MEAS - ESV(MOD-SP2): 22.7 ML
BH CV ECHO MEAS - ESV(MOD-SP4): 26.7 ML
BH CV ECHO MEAS - FS: 34.1 %
BH CV ECHO MEAS - IVS/LVPW: 1 CM
BH CV ECHO MEAS - IVSD: 0.7 CM
BH CV ECHO MEAS - LA DIMENSION: 4.2 CM
BH CV ECHO MEAS - LAT PEAK E' VEL: 13.8 CM/SEC
BH CV ECHO MEAS - LV DIASTOLIC VOL/BSA (35-75): 43 CM2
BH CV ECHO MEAS - LV MASS(C)D: 92.1 GRAMS
BH CV ECHO MEAS - LV MAX PG: 2.6 MMHG
BH CV ECHO MEAS - LV MEAN PG: 1 MMHG
BH CV ECHO MEAS - LV SYSTOLIC VOL/BSA (12-30): 16 CM2
BH CV ECHO MEAS - LV V1 MAX: 80.9 CM/SEC
BH CV ECHO MEAS - LV V1 VTI: 13.6 CM
BH CV ECHO MEAS - LVIDD: 4.4 CM
BH CV ECHO MEAS - LVIDS: 2.9 CM
BH CV ECHO MEAS - LVOT AREA: 3.1 CM2
BH CV ECHO MEAS - LVOT DIAM: 2 CM
BH CV ECHO MEAS - LVPWD: 0.7 CM
BH CV ECHO MEAS - MED PEAK E' VEL: 9.8 CM/SEC
BH CV ECHO MEAS - MR MAX PG: 40.4 MMHG
BH CV ECHO MEAS - MR MAX VEL: 318 CM/SEC
BH CV ECHO MEAS - MV DEC SLOPE: 659 CM/SEC2
BH CV ECHO MEAS - MV E MAX VEL: 129 CM/SEC
BH CV ECHO MEAS - MV MAX PG: 7.1 MMHG
BH CV ECHO MEAS - MV MEAN PG: 3 MMHG
BH CV ECHO MEAS - MV P1/2T: 57.3 MSEC
BH CV ECHO MEAS - MV V2 VTI: 22.9 CM
BH CV ECHO MEAS - MVA(P1/2T): 3.8 CM2
BH CV ECHO MEAS - MVA(VTI): 1.87 CM2
BH CV ECHO MEAS - PA ACC TIME: 0.07 SEC
BH CV ECHO MEAS - PA V2 MAX: 94.5 CM/SEC
BH CV ECHO MEAS - RAP SYSTOLE: 3 MMHG
BH CV ECHO MEAS - RV MAX PG: 2.16 MMHG
BH CV ECHO MEAS - RV V1 MAX: 73.4 CM/SEC
BH CV ECHO MEAS - RV V1 VTI: 14.8 CM
BH CV ECHO MEAS - RVSP: 29.2 MMHG
BH CV ECHO MEAS - SV(LVOT): 42.7 ML
BH CV ECHO MEAS - SV(MOD-SP2): 39 ML
BH CV ECHO MEAS - SV(MOD-SP4): 45.2 ML
BH CV ECHO MEAS - SVI(LVOT): 25.5 ML/M2
BH CV ECHO MEAS - SVI(MOD-SP2): 23.3 ML/M2
BH CV ECHO MEAS - SVI(MOD-SP4): 27 ML/M2
BH CV ECHO MEAS - TAPSE (>1.6): 1.26 CM
BH CV ECHO MEAS - TR MAX PG: 26.2 MMHG
BH CV ECHO MEAS - TR MAX VEL: 256 CM/SEC
BH CV ECHO MEASUREMENTS AVERAGE E/E' RATIO: 10.93
BH CV XLRA - RV BASE: 2.7 CM
BH CV XLRA - RV MID: 2.2 CM
BH CV XLRA - TDI S': 6.1 CM/SEC
BUN SERPL-MCNC: 31 MG/DL (ref 8–23)
BUN/CREAT SERPL: 8.9 (ref 7–25)
CALCIUM SPEC-SCNC: 8.9 MG/DL (ref 8.6–10.5)
CHLORIDE SERPL-SCNC: 99 MMOL/L (ref 98–107)
CO2 SERPL-SCNC: 22.4 MMOL/L (ref 22–29)
CREAT SERPL-MCNC: 3.49 MG/DL (ref 0.57–1)
D-LACTATE SERPL-SCNC: 1.1 MMOL/L (ref 0.5–2)
DEPRECATED RDW RBC AUTO: 64.4 FL (ref 37–54)
EGFRCR SERPLBLD CKD-EPI 2021: 12.5 ML/MIN/1.73
EOSINOPHIL # BLD AUTO: 0.12 10*3/MM3 (ref 0–0.4)
EOSINOPHIL NFR BLD AUTO: 1.7 % (ref 0.3–6.2)
ERYTHROCYTE [DISTWIDTH] IN BLOOD BY AUTOMATED COUNT: 16.8 % (ref 12.3–15.4)
GLUCOSE SERPL-MCNC: 89 MG/DL (ref 65–99)
HCT VFR BLD AUTO: 31.3 % (ref 34–46.6)
HGB BLD-MCNC: 9.3 G/DL (ref 12–15.9)
IMM GRANULOCYTES # BLD AUTO: 0.02 10*3/MM3 (ref 0–0.05)
IMM GRANULOCYTES NFR BLD AUTO: 0.3 % (ref 0–0.5)
LEFT ATRIUM VOLUME INDEX: 29 ML/M2
LV EF BIPLANE MOD: 62.4 %
LYMPHOCYTES # BLD AUTO: 1.61 10*3/MM3 (ref 0.7–3.1)
LYMPHOCYTES NFR BLD AUTO: 22.5 % (ref 19.6–45.3)
MCH RBC QN AUTO: 31.8 PG (ref 26.6–33)
MCHC RBC AUTO-ENTMCNC: 29.7 G/DL (ref 31.5–35.7)
MCV RBC AUTO: 107.2 FL (ref 79–97)
MONOCYTES # BLD AUTO: 0.75 10*3/MM3 (ref 0.1–0.9)
MONOCYTES NFR BLD AUTO: 10.5 % (ref 5–12)
NEUTROPHILS NFR BLD AUTO: 4.63 10*3/MM3 (ref 1.7–7)
NEUTROPHILS NFR BLD AUTO: 64.4 % (ref 42.7–76)
NRBC BLD AUTO-RTO: 0 /100 WBC (ref 0–0.2)
PHOSPHATE SERPL-MCNC: 5 MG/DL (ref 2.5–4.5)
PLATELET # BLD AUTO: 155 10*3/MM3 (ref 140–450)
PMV BLD AUTO: 9.2 FL (ref 6–12)
POTASSIUM SERPL-SCNC: 4.7 MMOL/L (ref 3.5–5.2)
QT INTERVAL: 352 MS
QT INTERVAL: 358 MS
QTC INTERVAL: 462 MS
QTC INTERVAL: 473 MS
RBC # BLD AUTO: 2.92 10*6/MM3 (ref 3.77–5.28)
SINUS: 3.4 CM
SODIUM SERPL-SCNC: 134 MMOL/L (ref 136–145)
STJ: 3.2 CM
TROPONIN T SERPL HS-MCNC: 61 NG/L
WBC NRBC COR # BLD AUTO: 7.17 10*3/MM3 (ref 3.4–10.8)

## 2025-02-05 PROCEDURE — 25010000002 SULFUR HEXAFLUORIDE MICROSPH 60.7-25 MG RECONSTITUTED SUSPENSION: Performed by: FAMILY MEDICINE

## 2025-02-05 PROCEDURE — 93306 TTE W/DOPPLER COMPLETE: CPT | Performed by: INTERNAL MEDICINE

## 2025-02-05 PROCEDURE — 83605 ASSAY OF LACTIC ACID: CPT | Performed by: FAMILY MEDICINE

## 2025-02-05 PROCEDURE — 25810000003 SODIUM CHLORIDE 0.9 % SOLUTION

## 2025-02-05 PROCEDURE — 87040 BLOOD CULTURE FOR BACTERIA: CPT | Performed by: FAMILY MEDICINE

## 2025-02-05 PROCEDURE — 84484 ASSAY OF TROPONIN QUANT: CPT | Performed by: FAMILY MEDICINE

## 2025-02-05 PROCEDURE — 93306 TTE W/DOPPLER COMPLETE: CPT

## 2025-02-05 PROCEDURE — 25010000002 CEFTRIAXONE PER 250 MG: Performed by: FAMILY MEDICINE

## 2025-02-05 PROCEDURE — 84100 ASSAY OF PHOSPHORUS: CPT | Performed by: FAMILY MEDICINE

## 2025-02-05 PROCEDURE — 80048 BASIC METABOLIC PNL TOTAL CA: CPT | Performed by: FAMILY MEDICINE

## 2025-02-05 PROCEDURE — 85025 COMPLETE CBC W/AUTO DIFF WBC: CPT | Performed by: FAMILY MEDICINE

## 2025-02-05 RX ORDER — FAMOTIDINE 20 MG/1
10 TABLET, FILM COATED ORAL
Status: DISCONTINUED | OUTPATIENT
Start: 2025-02-05 | End: 2025-02-07 | Stop reason: HOSPADM

## 2025-02-05 RX ORDER — POLYETHYLENE GLYCOL 3350 17 G/17G
17 POWDER, FOR SOLUTION ORAL DAILY PRN
Status: DISCONTINUED | OUTPATIENT
Start: 2025-02-05 | End: 2025-02-07 | Stop reason: HOSPADM

## 2025-02-05 RX ORDER — POLYETHYLENE GLYCOL 3350 17 G/17G
17 POWDER, FOR SOLUTION ORAL DAILY
Status: DISCONTINUED | OUTPATIENT
Start: 2025-02-05 | End: 2025-02-07 | Stop reason: HOSPADM

## 2025-02-05 RX ORDER — SERTRALINE HYDROCHLORIDE 25 MG/1
12.5 TABLET, FILM COATED ORAL NIGHTLY
Status: DISCONTINUED | OUTPATIENT
Start: 2025-02-05 | End: 2025-02-07 | Stop reason: HOSPADM

## 2025-02-05 RX ORDER — IPRATROPIUM BROMIDE AND ALBUTEROL SULFATE 2.5; .5 MG/3ML; MG/3ML
3 SOLUTION RESPIRATORY (INHALATION) EVERY 4 HOURS PRN
Status: DISCONTINUED | OUTPATIENT
Start: 2025-02-05 | End: 2025-02-07 | Stop reason: HOSPADM

## 2025-02-05 RX ORDER — ONDANSETRON 2 MG/ML
4 INJECTION INTRAMUSCULAR; INTRAVENOUS EVERY 6 HOURS PRN
Status: DISCONTINUED | OUTPATIENT
Start: 2025-02-05 | End: 2025-02-07 | Stop reason: HOSPADM

## 2025-02-05 RX ORDER — SEVELAMER CARBONATE 800 MG/1
800 TABLET, FILM COATED ORAL
Status: DISCONTINUED | OUTPATIENT
Start: 2025-02-05 | End: 2025-02-07 | Stop reason: HOSPADM

## 2025-02-05 RX ORDER — SODIUM CHLORIDE 0.9 % (FLUSH) 0.9 %
10 SYRINGE (ML) INJECTION AS NEEDED
Status: DISCONTINUED | OUTPATIENT
Start: 2025-02-05 | End: 2025-02-07 | Stop reason: HOSPADM

## 2025-02-05 RX ORDER — GABAPENTIN 100 MG/1
100 CAPSULE ORAL NIGHTLY
Status: DISCONTINUED | OUTPATIENT
Start: 2025-02-05 | End: 2025-02-07 | Stop reason: HOSPADM

## 2025-02-05 RX ORDER — MIDODRINE HYDROCHLORIDE 5 MG/1
2.5 TABLET ORAL EVERY 8 HOURS SCHEDULED
Status: DISCONTINUED | OUTPATIENT
Start: 2025-02-05 | End: 2025-02-07 | Stop reason: HOSPADM

## 2025-02-05 RX ORDER — NITROGLYCERIN 0.4 MG/1
0.4 TABLET SUBLINGUAL
Status: DISCONTINUED | OUTPATIENT
Start: 2025-02-05 | End: 2025-02-07 | Stop reason: HOSPADM

## 2025-02-05 RX ORDER — SODIUM CHLORIDE 0.9 % (FLUSH) 0.9 %
10 SYRINGE (ML) INJECTION EVERY 12 HOURS SCHEDULED
Status: DISCONTINUED | OUTPATIENT
Start: 2025-02-05 | End: 2025-02-07 | Stop reason: HOSPADM

## 2025-02-05 RX ORDER — AMOXICILLIN 250 MG
2 CAPSULE ORAL 2 TIMES DAILY PRN
Status: DISCONTINUED | OUTPATIENT
Start: 2025-02-05 | End: 2025-02-07 | Stop reason: HOSPADM

## 2025-02-05 RX ORDER — ACETAMINOPHEN 325 MG/1
650 TABLET ORAL EVERY 4 HOURS PRN
Status: DISCONTINUED | OUTPATIENT
Start: 2025-02-05 | End: 2025-02-07 | Stop reason: HOSPADM

## 2025-02-05 RX ORDER — SODIUM CHLORIDE 9 MG/ML
40 INJECTION, SOLUTION INTRAVENOUS AS NEEDED
Status: DISCONTINUED | OUTPATIENT
Start: 2025-02-05 | End: 2025-02-07 | Stop reason: HOSPADM

## 2025-02-05 RX ORDER — BISACODYL 5 MG/1
5 TABLET, DELAYED RELEASE ORAL DAILY PRN
Status: DISCONTINUED | OUTPATIENT
Start: 2025-02-05 | End: 2025-02-07 | Stop reason: HOSPADM

## 2025-02-05 RX ORDER — BISACODYL 10 MG
10 SUPPOSITORY, RECTAL RECTAL DAILY PRN
Status: DISCONTINUED | OUTPATIENT
Start: 2025-02-05 | End: 2025-02-07 | Stop reason: HOSPADM

## 2025-02-05 RX ORDER — ATORVASTATIN CALCIUM 20 MG/1
20 TABLET, FILM COATED ORAL NIGHTLY
Status: DISCONTINUED | OUTPATIENT
Start: 2025-02-05 | End: 2025-02-07 | Stop reason: HOSPADM

## 2025-02-05 RX ORDER — ONDANSETRON 4 MG/1
4 TABLET, ORALLY DISINTEGRATING ORAL EVERY 6 HOURS PRN
Status: DISCONTINUED | OUTPATIENT
Start: 2025-02-05 | End: 2025-02-07 | Stop reason: HOSPADM

## 2025-02-05 RX ADMIN — Medication 12.5 MG: at 14:49

## 2025-02-05 RX ADMIN — ATORVASTATIN CALCIUM 20 MG: 20 TABLET, FILM COATED ORAL at 02:45

## 2025-02-05 RX ADMIN — CEFTRIAXONE SODIUM 1000 MG: 1 INJECTION, POWDER, FOR SOLUTION INTRAMUSCULAR; INTRAVENOUS at 14:49

## 2025-02-05 RX ADMIN — SERTRALINE HYDROCHLORIDE 12.5 MG: 25 TABLET, FILM COATED ORAL at 21:59

## 2025-02-05 RX ADMIN — MIDODRINE HYDROCHLORIDE 2.5 MG: 5 TABLET ORAL at 21:59

## 2025-02-05 RX ADMIN — GABAPENTIN 100 MG: 100 CAPSULE ORAL at 21:59

## 2025-02-05 RX ADMIN — APIXABAN 2.5 MG: 2.5 TABLET, FILM COATED ORAL at 21:59

## 2025-02-05 RX ADMIN — MIDODRINE HYDROCHLORIDE 2.5 MG: 5 TABLET ORAL at 14:49

## 2025-02-05 RX ADMIN — MIDODRINE HYDROCHLORIDE 2.5 MG: 5 TABLET ORAL at 02:45

## 2025-02-05 RX ADMIN — SEVELAMER CARBONATE 800 MG: 800 TABLET, FILM COATED ORAL at 08:26

## 2025-02-05 RX ADMIN — ATORVASTATIN CALCIUM 20 MG: 20 TABLET, FILM COATED ORAL at 22:02

## 2025-02-05 RX ADMIN — SULFUR HEXAFLUORIDE 2 ML: KIT at 09:44

## 2025-02-05 RX ADMIN — Medication 10 ML: at 08:26

## 2025-02-05 RX ADMIN — Medication 10 ML: at 02:14

## 2025-02-05 RX ADMIN — Medication 10 ML: at 22:02

## 2025-02-05 RX ADMIN — APIXABAN 2.5 MG: 2.5 TABLET, FILM COATED ORAL at 08:26

## 2025-02-05 RX ADMIN — SODIUM CHLORIDE 500 ML: 9 INJECTION, SOLUTION INTRAVENOUS at 02:45

## 2025-02-05 RX ADMIN — SEVELAMER CARBONATE 800 MG: 800 TABLET, FILM COATED ORAL at 12:40

## 2025-02-05 RX ADMIN — Medication 12.5 MG: at 22:02

## 2025-02-05 RX ADMIN — FAMOTIDINE 10 MG: 20 TABLET, FILM COATED ORAL at 08:26

## 2025-02-05 RX ADMIN — POLYETHYLENE GLYCOL 3350 17 G: 17 POWDER, FOR SOLUTION ORAL at 08:25

## 2025-02-05 RX ADMIN — SEVELAMER CARBONATE 800 MG: 800 TABLET, FILM COATED ORAL at 18:41

## 2025-02-05 NOTE — H&P
Patient Care Team:  John Cano MD as PCP - General (Family Medicine)  Jose Carlos Cheng MD as Consulting Physician (Cardiology)    Chief complaint chest pain    Subjective     Patient is a 84 y.o. female who presented to the ER with complaints of chest pain, although she was unable to describe duration or characteristics to this provider or even recall why she presented to the hospital.  To ER provider she reported it had been waxing and waning for some time. She reported some Shortness of breath without any timeline either.  She denied abdominal pain, nausea, vomiting, headache.  No known fever.  She is alert to person and place and resides at [facility].  Per documentation from [facility], she is a DNR/DNI. She has a dialysis catheter in her right upper chest and colostomy present.  At time of exam she was resting in bed without any complaints of pain, only itching under her right breast.      In the ER, chest x ray was negative for acute process.  Trop was elevated but without significant change 70->65.  BNP elevated at 13,498.  Na mildly low at 133 which appears to be near her baseline.  Creatinine 3.33/BUN 28, GFR 13.2.  alk veronique 132 which again is her baseline. Normal LFT.  WBC WNL at 6.55 with HGB 11.1.  UA was obtained with Green cloudy urine. 3+ leukocytes, negative nitrite,  large WBC and 4+ bacteria.  Urine culture is pending.  She received a small 500 cc bolus NS, 0.5 mg of dilaudid, and 1g Rocephin after discussion with pharmacy regarding  her multiple allergies.      Review of Systems   Unable to perform ROS: Dementia   Skin:         itching   Psychiatric/Behavioral:  Positive for confusion.           History  Past Medical History:   Diagnosis Date    Acquired absence of kidney 10/04/2021    Acquired absence of other specified parts of digestive tract 09/09/2022    Athscl heart disease of native coronary 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 09/09/2022    Atrial fibrillation with rapid  ventricular response     Bladder cancer     Cancer     breast, bladder    Cholecystitis with cholelithiasis     Chronic insomnia 08/27/2020    Deep vein thrombosis     Dyslipidemia 01/17/2017    E coli bacteremia     ESRD on dialysis 10/18/2023    Essential hypertension 01/17/2017    Fracture tibia/fibula, right, closed, initial encounter 08/27/2020    Gastro-esophageal reflux disease without esophagitis 09/14/2020    GERD (gastroesophageal reflux disease)     History of bladder cancer     History of falling     History of urostomy     Hyperkalemia     Immobility 08/27/2020    r/t broken Tibia     Irritable bowel syndrome without diarrhea     Kidney carcinoma, right     removed     Long term current use of anticoagulant 01/09/2015    Mild cognitive impairment of uncertain or unknown etiology 08/31/2023    Myalgia due to statin     Other specified abdominal hernia without obstruction or gangrene     PAF (paroxysmal atrial fibrillation) 06/26/2019    Permanent atrial fibrillation 06/26/2019    Personal history of other venous thrombosis and embolism     Presence of cardiac pacemaker 11/03/2014    BS dual chamber PM placed 10/2014 with pocket revision 1/25/17.  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     Past Surgical History:   Procedure Laterality Date    BREAST LUMPECTOMY      CARDIAC CATHETERIZATION N/A 10/18/2023    Procedure: Left Heart Cath possible PCI, atherectomy, hemodynamic support;  Surgeon: Augustin Ellsworth MD;  Location: [facility] CATH INVASIVE LOCATION;  Service: Cardiology;  Laterality: N/A;    CARDIAC ELECTROPHYSIOLOGY PROCEDURE N/A 4/28/2023    Procedure: Pacemaker gen change, [place] AWARE $;  Surgeon: Jose Cralos Cheng MD;  Location: [facility] CATH INVASIVE LOCATION;  Service: Cardiovascular;  Laterality: N/A;     CHOLECYSTECTOMY N/A 10/12/2020    Procedure: CHOLECYSTECTOMY LAPAROSCOPIC;  Surgeon: Go Pereira DO;  Location: [facility] MAIN OR;  Service: General;  Laterality: N/A;    CYSTECTOMY W/ URETEROILEAL CONDUIT      FEMUR IM NAILING RETROGRADE Right 7/25/2024    Procedure: RIGHT FEMUR INTRAMEDULLARY NAILING RETROGRADE AND OPEN REDUCTION INTERNAL FIXATION;  Surgeon: Elieser Diggs MD;  Location: [facility] MAIN OR;  Service: Orthopedics;  Laterality: Right;    HARDWARE REMOVAL Right 6/11/2021    Procedure: TIBIA HARDWARE REMOVAL;  Surgeon: Geoff Shah II, MD;  Location: [facility] MAIN OR;  Service: Orthopedics;  Laterality: Right;    HERNIA REPAIR      HYSTERECTOMY      INSERT / REPLACE / REMOVE PACEMAKER      NEPHRECTOMY Right     TIBIA IM NAILING/RODDING Right 8/28/2020    Procedure: TIBIA INTRAMEDULLARY NAIL/ABRAHAM INSERTION;  Surgeon: Geoff Shah II, MD;  Location: [facility] MAIN OR;  Service: Orthopedics;  Laterality: Right;     Family History   Problem Relation Age of Onset    COPD Father      Social History     Tobacco Use    Smoking status: Never     Passive exposure: Never    Smokeless tobacco: Never   Vaping Use    Vaping status: Never Used   Substance Use Topics    Alcohol use: Not Currently    Drug use: Never     Medications Prior to Admission   Medication Sig Dispense Refill Last Dose/Taking    acetaminophen (TYLENOL) 325 MG tablet Take 2 tablets by mouth 4 (Four) Times a Day As Needed for Mild Pain.   Taking As Needed    apixaban (ELIQUIS) 2.5 MG tablet tablet Take 1 tablet by mouth Every 12 (Twelve) Hours. Indications: Atrial Fibrillation 60 tablet 0 Taking    atorvastatin (LIPITOR) 20 MG tablet Take 1 tablet by mouth Every Night.   Taking    famotidine (PEPCID) 10 MG tablet Take 1 tablet by mouth Every Other Day. 30 tablet 0 Taking    furosemide (LASIX) 40 MG tablet Take 1 tablet by mouth Daily.   Taking    gabapentin (NEURONTIN) 100 MG capsule Take 1 capsule by mouth Every Night. 3 capsule  0 Taking    ipratropium-albuterol (DUO-NEB) 0.5-2.5 mg/3 ml nebulizer Take 3 mL by nebulization Every 4 (Four) Hours As Needed for Wheezing.   Taking As Needed    metoprolol tartrate (LOPRESSOR) 25 MG tablet Take 1 tablet by mouth 2 (Two) Times a Day.   Taking    midodrine (PROAMATINE) 2.5 MG tablet Take 1 tablet by mouth 3 (Three) Times a Day Before Meals.   Taking    nitroglycerin (NITROSTAT) 0.4 MG SL tablet Place 1 tablet under the tongue Every 5 (Five) Minutes As Needed for Chest Pain.   Taking As Needed    polyethylene glycol (MiraLax) 17 GM/SCOOP powder Take 17 g by mouth Every Morning.   Taking    sertraline (ZOLOFT) 25 MG tablet Take 0.5 tablets by mouth every night at bedtime.   Taking    sevelamer (RENVELA) 800 MG tablet Take 1 tablet by mouth 3 (Three) Times a Day With Meals.   Taking    topiramate (TOPAMAX) 100 MG tablet Take 1 tablet by mouth Every Night. 30 tablet 0 Taking    traMADol (ULTRAM) 50 MG tablet Take 1 tablet by mouth Every 6 (Six) Hours As Needed for Moderate Pain.   Taking As Needed    zinc oxide 20 % ointment Apply 1 Application topically to the appropriate area as directed 2 (Two) Times a Day.   Taking     Allergies:  Amoxicillin, Ciprofloxacin, Oxycodone-acetaminophen, Penicillins, Sulfa antibiotics, Cephalexin, Clavulanic acid, Codeine, Contrast dye (echo or unknown ct/mr), Doxycycline, Fluconazole, Iodinated contrast media, Iodine, Macrobid [nitrofurantoin], Tobramycin, and Trimethoprim    Objective     Vital Signs  Temp:  [98.2 °F (36.8 °C)-99 °F (37.2 °C)] 98.2 °F (36.8 °C)  Heart Rate:  [] 102  Resp:  [11-16] 11  BP: ()/(42-93) 85/51     Physical Exam:      General Appearance:    Alert, cooperative, in no acute distress, oriented x2.  Very hard of hearing, frail   Head:    Normocephalic, without obvious abnormality, atraumatic   Eyes:            Lids and lashes normal, conjunctivae and sclerae normal, no   icterus, no pallor, corneas clear, PERRLA   Ears:    Ears  appear intact with no abnormalities noted   Throat:   No oral lesions, no thrush, oral mucosa moist   Neck:   No adenopathy, supple, trachea midline, no thyromegaly, no   carotid bruit, no JVD   Lungs:     Clear to auscultation, diminshed bases,respirations regular, even and                  unlabored    Heart:    Irregular rhythm and normal rate, normal S1 and S2,          murmur, no gallop, no rub, no click   Chest Wall:    No abnormalities observed; right chest dialysis catheter   Abdomen:     Normal bowel sounds, no masses, no organomegaly, soft        non-tender, non-distended, no guarding, no rebound                tenderness, colostomy present   Extremities:   Moves all extremities well, no edema, no cyanosis, no             redness   Pulses:   Pulses palpable and equal bilaterally   Skin:   No bleeding, bruising or rash; excoriation under right breast   Lymph nodes:   No palpable adenopathy   Neurologic:   Cranial nerves 2 - 12 grossly intact, sensation intact, DTR       present and equal bilaterally       Results Review:     Imaging Results (Last 24 Hours)       Procedure Component Value Units Date/Time    XR Chest 1 View [[id number]] Collected: 02/04/25 2058     Updated: 02/04/25 2101    Narrative:      XR CHEST 1 VW    Date of Exam: 2/4/2025 8:50 PM EST    Indication: CHF/COPD Protocol  CHF/COPD Protocol    Comparison: None available.    Findings:  Patient's head and neck obscure the lung apices due to kyphosis and positioning. Cardiomegaly. Transvenous pacemaker device. Dual lumen central venous catheter tip terminates at the cavoatrial junction. Pulmonary vessels are distinct. No pneumothorax or   pleural effusion or focal pulmonary parenchymal opacity. Pulmonary vessels are distinct.      Impression:      Impression:  No acute pulm abnormality on radiograph.      Electronically Signed: Sonya Vital MD    2/4/2025 8:59 PM EST    Workstation ID: [id number]             Lab Results (last 24 hours)        Procedure Component Value Units Date/Time    High Sensitivity Troponin T 1Hr [[id number]]  (Abnormal) Collected: 02/04/25 2139    Specimen: Blood Updated: 02/04/25 2203     HS Troponin T 65 ng/L      Troponin T Numeric Delta -5 ng/L      Troponin T % Delta -7    Narrative:      High Sensitive Troponin T Reference Range:  <14.0 ng/L- Negative Female for AMI  <22.0 ng/L- Negative Male for AMI  >=14 - Abnormal Female indicating possible myocardial injury.  >=22 - Abnormal Male indicating possible myocardial injury.   Clinicians would have to utilize clinical acumen, EKG, Troponin, and serial changes to determine if it is an Acute Myocardial Infarction or myocardial injury due to an underlying chronic condition.         Urinalysis, Microscopic Only - Urostomy [[id number]]  (Abnormal) Collected: 02/04/25 2116    Specimen: Urine from Urostomy Updated: 02/04/25 2137     RBC, UA 0-2 /HPF      WBC, UA 11-20 /HPF      Bacteria, UA 4+ /HPF      Squamous Epithelial Cells, UA 0-2 /HPF      Hyaline Casts, UA       Unable to determine due to loaded field     /LPF     Triple Phosphate Crystals, UA Small/1+ /HPF      Methodology Automated Microscopy    Urine Culture - Urine, Urostomy [[id number]] Collected: 02/04/25 2116    Specimen: Urine from Urostomy Updated: 02/04/25 2137    Urinalysis With Culture If Indicated - Urostomy [[id number]]  (Abnormal) Collected: 02/04/25 2116    Specimen: Urine from Urostomy Updated: 02/04/25 2128     Color, UA Green     Comment: Any Substance that causes an abnormal urine color can alter the accuracy of the chemical reactions.        Appearance, UA Cloudy     pH, UA 8.5     Specific Gravity, UA 1.015     Glucose, UA Negative     Ketones, UA 15 mg/dL (1+)     Bilirubin, UA Large (3+)     Comment: Confirmation testing is unavailable.  A serum bilirubin is recommended for further assessment.        Blood, UA Trace     Protein, UA >=300 mg/dL (3+)     Leuk Esterase, UA Large (3+)     Nitrite, UA Negative      Urobilinogen, UA 2.0 E.U./dL    Narrative:      In absence of clinical symptoms, the presence of pyuria, bacteria, and/or nitrites on the urinalysis result does not correlate with infection.    TSH Rfx On Abnormal To Free T4 [[id number]]  (Normal) Collected: 02/04/25 2030    Specimen: Blood Updated: 02/04/25 2108     TSH 1.990 uIU/mL     High Sensitivity Troponin T [[id number]]  (Abnormal) Collected: 02/04/25 2030    Specimen: Blood Updated: 02/04/25 2105     HS Troponin T 70 ng/L     Narrative:      High Sensitive Troponin T Reference Range:  <14.0 ng/L- Negative Female for AMI  <22.0 ng/L- Negative Male for AMI  >=14 - Abnormal Female indicating possible myocardial injury.  >=22 - Abnormal Male indicating possible myocardial injury.   Clinicians would have to utilize clinical acumen, EKG, Troponin, and serial changes to determine if it is an Acute Myocardial Infarction or myocardial injury due to an underlying chronic condition.         Comprehensive Metabolic Panel [[id number]]  (Abnormal) Collected: 02/04/25 2030    Specimen: Blood Updated: 02/04/25 2102     Glucose 119 mg/dL      BUN 28 mg/dL      Creatinine 3.33 mg/dL      Sodium 133 mmol/L      Potassium 4.6 mmol/L      Chloride 94 mmol/L      CO2 25.4 mmol/L      Calcium 9.3 mg/dL      Total Protein 7.5 g/dL      Albumin 3.8 g/dL      ALT (SGPT) 17 U/L      AST (SGOT) 33 U/L      Alkaline Phosphatase 132 U/L      Total Bilirubin 0.6 mg/dL      Globulin 3.7 gm/dL      A/G Ratio 1.0 g/dL      BUN/Creatinine Ratio 8.4     Anion Gap 13.6 mmol/L      eGFR 13.2 mL/min/1.73     Narrative:      GFR Categories in Chronic Kidney Disease (CKD)      GFR Category          GFR (mL/min/1.73)    Interpretation  G1                     90 or greater         Normal or high (1)  G2                      60-89                Mild decrease (1)  G3a                   45-59                Mild to moderate decrease  G3b                   30-44                Moderate to severe  decrease  G4                    15-29                Severe decrease  G5                    14 or less           Kidney failure          (1)In the absence of evidence of kidney disease, neither GFR category G1 or G2 fulfill the criteria for CKD.    eGFR calculation 2021 CKD-EPI creatinine equation, which does not include race as a factor    BNP [[id number]]  (Abnormal) Collected: 02/04/25 2030    Specimen: Blood Updated: 02/04/25 2102     proBNP 13,498.0 pg/mL     Narrative:      This assay is used as an aid in the diagnosis of individuals suspected of having heart failure. It can be used as an aid in the diagnosis of acute decompensated heart failure (ADHF) in patients presenting with signs and symptoms of ADHF to the emergency department (ED). In addition, NT-proBNP of <300 pg/mL indicates ADHF is not likely.    Age Range Result Interpretation  NT-proBNP Concentration (pg/mL:      <50             Positive            >450                   Gray                 300-450                    Negative             <300    50-75           Positive            >900                  Gray                300-900                  Negative            <300      >75             Positive            >1800                  Gray                300-1800                  Negative            <300    Magnesium [[id number]]  (Normal) Collected: 02/04/25 2030    Specimen: Blood Updated: 02/04/25 2102     Magnesium 2.2 mg/dL     CBC & Differential [[id number]]  (Abnormal) Collected: 02/04/25 2030    Specimen: Blood Updated: 02/04/25 2047    Narrative:      The following orders were created for panel order CBC & Differential.  Procedure                               Abnormality         Status                     ---------                               -----------         ------                     CBC Auto Differential[[id number]]        Abnormal            Final result                 Please view results for these tests on the individual orders.     CBC Auto Differential [[id number]]  (Abnormal) Collected: 02/04/25 2030    Specimen: Blood Updated: 02/04/25 2047     WBC 6.55 10*3/mm3      RBC 3.43 10*6/mm3      Hemoglobin 11.1 g/dL      Hematocrit 35.9 %      .7 fL      MCH 32.4 pg      MCHC 30.9 g/dL      RDW 16.4 %      RDW-SD 61.5 fl      MPV 9.4 fL      Platelets 181 10*3/mm3      Neutrophil % 71.7 %      Lymphocyte % 17.9 %      Monocyte % 8.7 %      Eosinophil % 0.9 %      Basophil % 0.5 %      Immature Grans % 0.3 %      Neutrophils, Absolute 4.70 10*3/mm3      Lymphocytes, Absolute 1.17 10*3/mm3      Monocytes, Absolute 0.57 10*3/mm3      Eosinophils, Absolute 0.06 10*3/mm3      Basophils, Absolute 0.03 10*3/mm3      Immature Grans, Absolute 0.02 10*3/mm3      nRBC 0.0 /100 WBC     [place] Draw [[id number]] Collected: 02/04/25 2030    Specimen: Blood Updated: 02/04/25 2045    Narrative:      The following orders were created for panel order [place] Draw.  Procedure                               Abnormality         Status                     ---------                               -----------         ------                     Green Top (Gel)[[id number]]                                  Final result               Lavender Top[[id number]]                                     Final result               Gold Top - SST[[id number]]                                   Final result               Light Blue Top[[id number]]                                   Final result                 Please view results for these tests on the individual orders.    Green Top (Gel) [[id number]] Collected: 02/04/25 2030    Specimen: Blood Updated: 02/04/25 2045     Extra Tube Hold for add-ons.     Comment: Auto resulted.       Lavender Top [[id number]] Collected: 02/04/25 2030    Specimen: Blood Updated: 02/04/25 2045     Extra Tube hold for add-on     Comment: Auto resulted       Gold Top - SST [[id number]] Collected: 02/04/25 2030    Specimen: Blood Updated: 02/04/25 2045     Extra  Tube Hold for add-ons.     Comment: Auto resulted.       Light Blue Top [[id number]] Collected: 02/04/25 2030    Specimen: Blood Updated: 02/04/25 2045     Extra Tube Hold for add-ons.     Comment: Auto resulted                I reviewed the patient's new clinical results.    Assessment & Plan       Acute UTI (urinary tract infection)    Permanent atrial fibrillation    Long term current use of anticoagulant    Presence of cardiac pacemaker    Type 2 diabetes mellitus    Acquired absence of kidney    Acquired absence of other specified parts of digestive tract    Chest pain, atypical    ESRD on dialysis    Hypotension    Mild cognitive impairment of uncertain or unknown etiology    Hard of hearing    UTI (urinary tract infection)      UTI   -IV Rocephin   -urine culture pending   -blood cultures ordered   -lactic acid ordered   -afebrile   -unable to answer if she normally still produces urine    CKD on dialysis   -on dialysis   -catheter in right upper chest   -consult nephrology   -lasix on hold while hypotensive    Type 2 DM   -not on home meds   -monitor sugar on morning labs    Afib (followed by Dr. Ellsworth/ s/p pacemaker   -on home dose of Eliquis   -Metoprolol currently on hold due to hypotension    Chest pain   -Denies any pain upon this exam   -consider cardio consult if recurrent complaint   -troponin elevated but without change; check am troponin   -BNP 13,498. Does not appear to be overloaded upon exam.   -EKG Afib 103   -echo ordered.    Hypotension.  Has HX of hypertension   -continue home midodrine    Exam limited due to severe hard of hearing.    VTE: on eliquis  PPI: pepcid  Code status: DNR/DNI    I discussed the patient's findings and my recommendations with patient.     CHELLE Small  02/05/25  01:03 EST

## 2025-02-05 NOTE — ED PROVIDER NOTES
Subjective   History of Present Illness  Patient is 34-year-old female with chief complaint of chest pain.  Patient states that chest pain has been coming and going for some time but cannot specify when.  Patient also complains of some shortness of breath but cannot give any timeframe on this complaint either.  Patient denies any abdominal pain, nausea vomiting diarrhea, headache.  Patient is alert to self and time; she is confused at baseline.  Patient is a resident of [facility].  Paperwork has been sent including patient's DNR.      Review of Systems   Respiratory:  Positive for shortness of breath.    Cardiovascular:  Positive for chest pain.       Past Medical History:   Diagnosis Date    Acquired absence of kidney 10/04/2021    Acquired absence of other specified parts of digestive tract 09/09/2022    Athscl heart disease of native coronary 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 09/09/2022    Atrial fibrillation with rapid ventricular response     Bladder cancer     Cancer     breast, bladder    Cholecystitis with cholelithiasis     Chronic insomnia 08/27/2020    Deep vein thrombosis     Dyslipidemia 01/17/2017    E coli bacteremia     ESRD on dialysis 10/18/2023    Essential hypertension 01/17/2017    Fracture tibia/fibula, right, closed, initial encounter 08/27/2020    Gastro-esophageal reflux disease without esophagitis 09/14/2020    GERD (gastroesophageal reflux disease)     History of bladder cancer     History of falling     History of urostomy     Hyperkalemia     Immobility 08/27/2020    r/t broken Tibia     Irritable bowel syndrome without diarrhea     Kidney carcinoma, right     removed     Long term current use of anticoagulant 01/09/2015    Mild cognitive impairment of uncertain or unknown etiology 08/31/2023    Myalgia due to statin     Other specified abdominal hernia without obstruction or gangrene     PAF (paroxysmal atrial fibrillation) 06/26/2019    Permanent atrial fibrillation 06/26/2019    Personal  "history of other venous thrombosis and embolism     Presence of cardiac pacemaker 11/03/2014    BS dual chamber PM placed 10/2014 with pocket revision 1/25/17.  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       Allergies   Allergen Reactions    Amoxicillin Shortness Of Breath    Ciprofloxacin Hives    Oxycodone-Acetaminophen Unknown - High Severity     Pt's son stated when pt fell and broke her leg she was put on oxycodone; pt's son stated pt's \"vitals went all out of wack, she couldn't remember things.\"    Penicillins Rash    Sulfa Antibiotics Hives    Cephalexin Other (See Comments)     Has tolerated multiple courses of cephalosporins- cefazolin, ceftriaxone, ceftazidime     Clavulanic Acid Other (See Comments)    Codeine Other (See Comments)    Contrast Dye (Echo Or Unknown Ct/Mr) Other (See Comments)     8/18/22     Doxycycline Other (See Comments)    Fluconazole Other (See Comments)    Iodinated Contrast Media Other (See Comments)     8/18/22    Iodine Hives    Macrobid [Nitrofurantoin] Hives    Tobramycin Other (See Comments)    Trimethoprim Other (See Comments)       Past Surgical History:   Procedure Laterality Date    BREAST LUMPECTOMY      CARDIAC CATHETERIZATION N/A 10/18/2023    Procedure: Left Heart Cath possible PCI, atherectomy, hemodynamic support;  Surgeon: Augustin Ellsworth MD;  Location: [facility] CATH INVASIVE LOCATION;  Service: Cardiology;  Laterality: N/A;    CARDIAC ELECTROPHYSIOLOGY PROCEDURE N/A 4/28/2023    Procedure: Pacemaker gen change, [place] AWARE $;  Surgeon: Jose Carlos Cheng MD;  Location: [facility] CATH INVASIVE LOCATION;  Service: Cardiovascular;  Laterality: N/A;    CHOLECYSTECTOMY N/A 10/12/2020    Procedure: CHOLECYSTECTOMY LAPAROSCOPIC;  Surgeon: Go Pereira DO;  Location: [facility] MAIN OR;  " Service: General;  Laterality: N/A;    CYSTECTOMY W/ URETEROILEAL CONDUIT      FEMUR IM NAILING RETROGRADE Right 7/25/2024    Procedure: RIGHT FEMUR INTRAMEDULLARY NAILING RETROGRADE AND OPEN REDUCTION INTERNAL FIXATION;  Surgeon: Elieser Diggs MD;  Location: [facility] MAIN OR;  Service: Orthopedics;  Laterality: Right;    HARDWARE REMOVAL Right 6/11/2021    Procedure: TIBIA HARDWARE REMOVAL;  Surgeon: Geoff Shah II, MD;  Location: [facility] MAIN OR;  Service: Orthopedics;  Laterality: Right;    HERNIA REPAIR      HYSTERECTOMY      INSERT / REPLACE / REMOVE PACEMAKER      NEPHRECTOMY Right     TIBIA IM NAILING/RODDING Right 8/28/2020    Procedure: TIBIA INTRAMEDULLARY NAIL/ABRAHAM INSERTION;  Surgeon: Geoff Shah II, MD;  Location: [facility] MAIN OR;  Service: Orthopedics;  Laterality: Right;       Family History   Problem Relation Age of Onset    COPD Father        Social History     Socioeconomic History    Marital status:    Tobacco Use    Smoking status: Never     Passive exposure: Never    Smokeless tobacco: Never   Vaping Use    Vaping status: Never Used   Substance and Sexual Activity    Alcohol use: Not Currently    Drug use: Never    Sexual activity: Defer           Objective   Physical Exam  Constitutional:       General: She is not in acute distress.     Appearance: She is ill-appearing. She is not toxic-appearing.   HENT:      Head: Normocephalic and atraumatic.   Eyes:      Conjunctiva/sclera: Conjunctivae normal.      Pupils: Pupils are equal, round, and reactive to light.   Neck:      Vascular: No JVD.      Trachea: No tracheal deviation.   Cardiovascular:      Rate and Rhythm: Tachycardia present. Rhythm irregular.      Heart sounds: Normal heart sounds.   Pulmonary:      Effort: Pulmonary effort is normal. No respiratory distress.      Breath sounds: Normal breath sounds. No wheezing.   Abdominal:      General: Bowel sounds are normal.      Palpations: Abdomen is soft.       "Tenderness: There is no abdominal tenderness.   Musculoskeletal:         General: No deformity. Normal range of motion.      Cervical back: Neck supple.      Right lower leg: No edema.      Left lower leg: No edema.   Skin:     General: Skin is warm and dry.      Capillary Refill: Capillary refill takes less than 2 seconds.   Neurological:      Mental Status: She is alert.      GCS: GCS eye subscore is 4. GCS verbal subscore is 5. GCS motor subscore is 6.      Motor: No weakness.   Psychiatric:         Mood and Affect: Mood normal.         Behavior: Behavior normal.         Procedures           ED Course  ED Course as of 02/04/25 2302 Tue Feb 04, 2025 2121 Abnormal labs evaluated. No nitro given to patient due to soft blood pressure.   DNR from [facility] reviewed and placed with chart. [KW]   2238 Spoke with Deborah in pharmacy about abx for patient's UTI. He recommended using rocephin despite her listed allergies since she has tolerated it multiple times in the past. [KW]      ED Course User Index  [KW] Alia Sanchez, APRN                                           BP (!) 85/42   Pulse 113   Temp 99 °F (37.2 °C) (Oral)   Resp 16   Ht 170.2 cm (67\")   SpO2 93%   BMI 19.09 kg/m²   Labs Reviewed   COMPREHENSIVE METABOLIC PANEL - Abnormal; Notable for the following components:       Result Value    Glucose 119 (*)     BUN 28 (*)     Creatinine 3.33 (*)     Sodium 133 (*)     Chloride 94 (*)     AST (SGOT) 33 (*)     Alkaline Phosphatase 132 (*)     eGFR 13.2 (*)     All other components within normal limits    Narrative:     GFR Categories in Chronic Kidney Disease (CKD)      GFR Category          GFR (mL/min/1.73)    Interpretation  G1                     90 or greater         Normal or high (1)  G2                      60-89                Mild decrease (1)  G3a                   45-59                Mild to moderate decrease  G3b                   30-44                Moderate to severe " decrease  G4                    15-29                Severe decrease  G5                    14 or less           Kidney failure          (1)In the absence of evidence of kidney disease, neither GFR category G1 or G2 fulfill the criteria for CKD.    eGFR calculation 2021 CKD-EPI creatinine equation, which does not include race as a factor   BNP (IN-HOUSE) - Abnormal; Notable for the following components:    proBNP 13,498.0 (*)     All other components within normal limits    Narrative:     This assay is used as an aid in the diagnosis of individuals suspected of having heart failure. It can be used as an aid in the diagnosis of acute decompensated heart failure (ADHF) in patients presenting with signs and symptoms of ADHF to the emergency department (ED). In addition, NT-proBNP of <300 pg/mL indicates ADHF is not likely.    Age Range Result Interpretation  NT-proBNP Concentration (pg/mL:      <50             Positive            >450                   Gray                 300-450                    Negative             <300    50-75           Positive            >900                  Gray                300-900                  Negative            <300      >75             Positive            >1800                  Gray                300-1800                  Negative            <300   TROPONIN - Abnormal; Notable for the following components:    HS Troponin T 70 (*)     All other components within normal limits    Narrative:     High Sensitive Troponin T Reference Range:  <14.0 ng/L- Negative Female for AMI  <22.0 ng/L- Negative Male for AMI  >=14 - Abnormal Female indicating possible myocardial injury.  >=22 - Abnormal Male indicating possible myocardial injury.   Clinicians would have to utilize clinical acumen, EKG, Troponin, and serial changes to determine if it is an Acute Myocardial Infarction or myocardial injury due to an underlying chronic condition.        CBC WITH AUTO DIFFERENTIAL - Abnormal; Notable for  the following components:    RBC 3.43 (*)     Hemoglobin 11.1 (*)     .7 (*)     MCHC 30.9 (*)     RDW 16.4 (*)     RDW-SD 61.5 (*)     Lymphocyte % 17.9 (*)     All other components within normal limits   HIGH SENSITIVITIY TROPONIN T 1HR - Abnormal; Notable for the following components:    HS Troponin T 65 (*)     All other components within normal limits    Narrative:     High Sensitive Troponin T Reference Range:  <14.0 ng/L- Negative Female for AMI  <22.0 ng/L- Negative Male for AMI  >=14 - Abnormal Female indicating possible myocardial injury.  >=22 - Abnormal Male indicating possible myocardial injury.   Clinicians would have to utilize clinical acumen, EKG, Troponin, and serial changes to determine if it is an Acute Myocardial Infarction or myocardial injury due to an underlying chronic condition.        URINALYSIS W/ CULTURE IF INDICATED - Abnormal; Notable for the following components:    Color, UA Green (*)     Appearance, UA Cloudy (*)     pH, UA 8.5 (*)     Ketones, UA 15 mg/dL (1+) (*)     Bilirubin, UA Large (3+) (*)     Blood, UA Trace (*)     Protein, UA >=300 mg/dL (3+) (*)     Leuk Esterase, UA Large (3+) (*)     Urobilinogen, UA 2.0 E.U./dL (*)     All other components within normal limits    Narrative:     In absence of clinical symptoms, the presence of pyuria, bacteria, and/or nitrites on the urinalysis result does not correlate with infection.   URINALYSIS, MICROSCOPIC ONLY - Abnormal; Notable for the following components:    WBC, UA 11-20 (*)     Bacteria, UA 4+ (*)     All other components within normal limits   MAGNESIUM - Normal   TSH RFX ON ABNORMAL TO FREE T4 - Normal   URINE CULTURE   RAINBOW DRAW    Narrative:     The following orders were created for panel order [facility] Draw.  Procedure                               Abnormality         Status                     ---------                               -----------         ------                     Green Top (Gel)[[id number]]                                   Final result               Lavender Top[[id number]]                                     Final result               Gold Top - SST[[id number]]                                   Final result               Light Blue Top[[id number]]                                   Final result                 Please view results for these tests on the individual orders.   GREEN TOP   LAVENDER TOP   GOLD TOP - SST   LIGHT BLUE TOP   CBC AND DIFFERENTIAL    Narrative:     The following orders were created for panel order CBC & Differential.  Procedure                               Abnormality         Status                     ---------                               -----------         ------                     CBC Auto Differential[[id number]]        Abnormal            Final result                 Please view results for these tests on the individual orders.     Medications   sodium chloride 0.9 % flush 10 mL (has no administration in time range)   cefTRIAXone (ROCEPHIN) 1,000 mg in sodium chloride 0.9 % 100 mL MBP (1,000 mg Intravenous New Bag 2/4/25 2252)   sodium chloride 0.9 % bolus 500 mL (0 mL Intravenous Stopped 2/4/25 2225)   HYDROmorphone (DILAUDID) injection 0.5 mg (0.5 mg Intravenous Given 2/4/25 2225)     XR Chest 1 View    Result Date: 2/4/2025  Impression: No acute pulm abnormality on radiograph. Electronically Signed: Sonya Vital MD  2/4/2025 8:59 PM EST  Workstation ID: [id number]               Medical Decision Making  Patient is 84-year-old female presents to emergency department via EMS for complaints listed above; see HPI.    Primary assessment and initial physical exam noted above.    Patient has a valid DNR along with paperwork from her primary residence at [facility].    Patient placed in hospital bed IV access established and labs drawn.  Initial differentials are ACS, electrolyte derangement, sepsis, A-fib with RVR exacerbation.  Patient lab results remarkable for elevated troponin  and 1 hour troponin, elevated BNP at +13,000, anemia that is likely secondary to chronic kidney disease, BUN 28, creatinine 3.3, with a GFR of 13.2.    Nitroglycerin initially considered due to patient's elevated troponin, however patient's blood pressure was on low end of normal dipping into a hypotensive range.  The patient was given a fluid bolus 500 normal saline.    EKG interpreted as atrial fibrillation; rate 103, , low voltage in extremity and precordial leads which is consistent with previous.    XR Chest 1 View    Result Date: 2/4/2025  Impression: No acute pulm abnormality on radiograph. Electronically Signed: Sonya Vital MD  2/4/2025 8:59 PM EST  Workstation ID: [id number]    Images and EKG reviewed by myself and Dr. Burnham.    Repeat physical assessment performed on patient.  Patient looks visibly more uncomfortable than in previous exam and complains of pain everywhere.  Patient treated with hydromorphone for pain.    Decision made to admit patient to [facility] service with Dr. Henderson.  I spoke with Patsy to give report on patient and she states she has no questions with the patient at this time.    Problems Addressed:  Anemia of chronic disease: complicated acute illness or injury  Chronic atrial fibrillation: complicated acute illness or injury  CKD (chronic kidney disease) requiring chronic dialysis: complicated acute illness or injury  Hyponatremia: complicated acute illness or injury  Urinary tract infection without hematuria, site unspecified: complicated acute illness or injury    Amount and/or Complexity of Data Reviewed  Labs: ordered.  Radiology: ordered.  ECG/medicine tests: ordered.    Risk  Prescription drug management.  Decision regarding hospitalization.            Final diagnoses:   Chronic atrial fibrillation   Hyponatremia   Anemia of chronic disease   CKD (chronic kidney disease) requiring chronic dialysis   Urinary tract infection without hematuria, site unspecified       ED  Disposition  ED Disposition       ED Disposition   Decision to Admit    Condition   --    Comment   Level of Care: Telemetry [5]   Admitting Physician: BRIELLE KNOWLES [[id number]]   Attending Physician: BRIELLE KNOWLES [[id number]]                 No follow-up provider specified.       Medication List      No changes were made to your prescriptions during this visit.            Alia Sanchez, APRN  02/04/25 [id number]

## 2025-02-05 NOTE — DISCHARGE PLACEMENT REQUEST
"Nell Liu (84 y.o. Female)       Date of Birth   1940    Social Security Number       Address   [place] [address] [place] [address]    Home Phone   [phone]    MRN   [id number]       [facility]    Marital Status                               Admission Date   2/4/25    Admission Type   Emergency    Admitting Provider   Bethanie Mena MD    Attending Provider   Bethanie Mena MD    Department, Room/Bed   [facility] MEDICAL INPATIENT, 202/1       Discharge Date       Discharge Disposition       Discharge Destination                                 Attending Provider: Bethanie Mena MD    Allergies: Amoxicillin, Ciprofloxacin, Oxycodone-acetaminophen, Penicillins, Sulfa Antibiotics, Cephalexin, Clavulanic Acid, Codeine, Contrast Dye (Echo Or Unknown Ct/mr), Doxycycline, Fluconazole, Iodinated Contrast Media, Iodine, Macrobid [Nitrofurantoin], Tobramycin, Trimethoprim    Isolation: None   Infection: None   Code Status: No CPR    Ht: 170.2 cm (67\")   Wt: 58.1 kg (128 lb)    Admission Cmt: None   Principal Problem: Acute UTI (urinary tract infection) [N39.0]                   Active Insurance as of 2/4/2025       Primary Coverage       Payor Plan Insurance Group Employer/Plan Group    MEDICARE MEDICARE A & B        Payor Plan Address Payor Plan Phone Number Payor Plan Fax Number Effective Dates    [address] [phone]  4/1/2006 - None Entered    [facility] [address]         Subscriber Name Subscriber Birth Date Member ID       NELL LIU 1940 [id number]               Secondary Coverage       Payor Plan Insurance Group Employer/Plan Group    CIGNA CIGNA [id number]       Payor Plan Address Payor Plan Phone Number Payor Plan Fax Number Effective Dates    [address] [phone]  1/1/2022 - None Entered    [place] [id number]         Subscriber Name Subscriber Birth Date Member ID       NELL LIU 1940 [id number]  "                    Emergency Contacts        (Rel.) Home Phone Work Phone Mobile Phone    Manish Bucio (Son) [phone] -- [phone]    Brianna Patel (Niece) (Relative) -- -- [phone]    RANJITH WATKINS (Grandchild) -- -- [phone]

## 2025-02-05 NOTE — PLAN OF CARE
Problem: Adult Inpatient Plan of Care  Goal: Plan of Care Review  Outcome: Progressing  Goal: Patient-Specific Goal (Individualized)  Outcome: Progressing  Goal: Absence of Hospital-Acquired Illness or Injury  Outcome: Progressing  Intervention: Identify and Manage Fall Risk  Recent Flowsheet Documentation  Taken 2/5/2025 1800 by Christine Roberts RN  Safety Promotion/Fall Prevention: safety round/check completed  Taken 2/5/2025 1659 by Christine Roberts RN  Safety Promotion/Fall Prevention: safety round/check completed  Taken 2/5/2025 1454 by Christine Roberts RN  Safety Promotion/Fall Prevention: safety round/check completed  Taken 2/5/2025 1259 by Christine Roberts RN  Safety Promotion/Fall Prevention: safety round/check completed  Taken 2/5/2025 1059 by Christine Roberts RN  Safety Promotion/Fall Prevention: safety round/check completed  Taken 2/5/2025 0859 by Christine Roberts RN  Safety Promotion/Fall Prevention: safety round/check completed  Intervention: Prevent Skin Injury  Recent Flowsheet Documentation  Taken 2/5/2025 0859 by Christine Roberts RN  Body Position:   turned   left   supine  Intervention: Prevent and Manage VTE (Venous Thromboembolism) Risk  Recent Flowsheet Documentation  Taken 2/5/2025 0859 by Christine Roberts RN  VTE Prevention/Management:   bilateral   SCDs (sequential compression devices) off  Intervention: Prevent Infection  Recent Flowsheet Documentation  Taken 2/5/2025 0859 by Christine Roberts RN  Infection Prevention: single patient room provided  Goal: Optimal Comfort and Wellbeing  Outcome: Progressing  Intervention: Provide Person-Centered Care  Recent Flowsheet Documentation  Taken 2/5/2025 0859 by Christine Roberts RN  Trust Relationship/Rapport:   choices provided   care explained   emotional support provided   questions answered   empathic listening provided   questions encouraged  Goal: Readiness for Transition of Care  Outcome: Progressing      Problem: Skin Injury Risk Increased  Goal: Skin Health and Integrity  Outcome: Progressing     Problem: Comorbidity Management  Goal: Maintenance of Heart Failure Symptom Control  Outcome: Progressing  Intervention: Maintain Heart Failure Management  Recent Flowsheet Documentation  Taken 2/5/2025 0859 by Christine Roberts RN  Medication Review/Management: medications reviewed     Problem: Violence Risk or Actual  Goal: Anger and Impulse Control  Outcome: Progressing  Intervention: Minimize Safety Risk  Recent Flowsheet Documentation  Taken 2/5/2025 0859 by Christine Roberts RN  Enhanced Safety Measures: bed alarm set   Goal Outcome Evaluation:   Pt has been relaxing in bed all day. Q2hr turns have been completed as well. No concerns at this time and call light within reach.

## 2025-02-05 NOTE — CASE MANAGEMENT/SOCIAL WORK
Discharge Planning Assessment   Luis Felipe     Patient Name: Hazel Bucio  MRN: [id number]  Today's Date: 2/5/2025    Admit Date: 2/4/2025    Plan: Return to [facility] (returning.) No PASRR or precert required.   Discharge Needs Assessment       Row Name 02/05/25 1411       Living Environment    People in Home other (see comments)    Unique Family Situation [facility]    Current Living Arrangements extended care facility    Potentially Unsafe Housing Conditions none    In the past 12 months has the electric, gas, oil, or water company threatened to shut off services in your home? No    Provides Primary Care For no one    Family Caregiver if Needed other (see comments)    Quality of Family Relationships helpful;involved;supportive    Able to Return to Prior Arrangements yes       Resource/Environmental Concerns    Resource/Environmental Concerns none    Transportation Concerns none       Transportation Needs    In the past 12 months, has lack of transportation kept you from medical appointments or from getting medications? no    In the past 12 months, has lack of transportation kept you from meetings, work, or from getting things needed for daily living? No       Food Insecurity    Within the past 12 months, you worried that your food would run out before you got the money to buy more. Never true    Within the past 12 months, the food you bought just didn't last and you didn't have money to get more. Never true       Transition Planning    Patient/Family Anticipates Transition to long-term care facility    Patient/Family Anticipated Services at Transition none    Transportation Anticipated health plan transportation       Discharge Needs Assessment    Readmission Within the Last 30 Days no previous admission in last 30 days    Equipment Currently Used at Home wheelchair    Concerns to be Addressed discharge planning    Anticipated Changes Related to Illness none    Equipment Needed After Discharge none     Current Discharge Risk dependent with mobility/activities of daily living                   Discharge Plan       Row Name 02/05/25 1412       Plan    Plan Return to [facility] (returning.) No PASRR or precert required.    Patient/Family in Agreement with Plan yes    Plan Comments CM met with pt at bedside to discuss discharge needs, pt is confused so CM called son Manish via phone to assist with discharge questions. Pt lives at [facility] and will be returning at discharge, does not drive and is dependent with ADLs. Facility PCP and pharmacy verified. No issues stated affording food/ medications or transport, and home environment is safe. Current DME: wheelchair- facility provided. Transport TBD.                  Continued Care and Services - Admitted Since 2/4/2025       Destination       Service Provider Request Status Services Address Phone Fax Patient Preferred    [facility] [place] Accepted -- [address] [facility] [place] [address] [phone] --                  Demographic Summary       Row Name 02/05/25 1410       General Information    Admission Type inpatient    Arrived From emergency department    Required Notices Provided Important Message from Medicare    Referral Source admission list    Reason for Consult discharge planning    Preferred Language English       Contact Information    Permission Granted to Share Info With                    Functional Status       Row Name 02/05/25 1410       Functional Status    Usual Activity Tolerance fair    Current Activity Tolerance poor       Functional Status, IADL    Medications completely dependent    Meal Preparation completely dependent    Housekeeping completely dependent    Laundry completely dependent    Shopping completely dependent       Mental Status    General Appearance WDL WDL       Mental Status Summary    Recent Changes in Mental Status/Cognitive Functioning no changes        Employment/    Current or Previous Occupation not applicable                    Patient Forms       Row Name 02/05/25 1408       Patient Forms    Important Message from Medicare (IMM) Delivered  IMM 2/5 per CM    Delivered to Support person  vikas Hammond    Method of delivery Telephone  email copy declined.                      Dolores King RN     Office phone: [phone]  Office fax: [phone]

## 2025-02-05 NOTE — PROGRESS NOTES
LOS: 0 days   Patient Care Team:  John Cano MD as PCP - General (Family Medicine)  Jose Carlos Cheng MD as Consulting Physician (Cardiology)    Subjective     Interval History:     Patient Complaints: no new sx    History taken from: patient    Review of Systems   Unable to perform ROS: Dementia           Objective     Vital Signs  Temp:  [98.2 °F (36.8 °C)-99 °F (37.2 °C)] 98.6 °F (37 °C)  Heart Rate:  [] 118  Resp:  [11-16] 11  BP: ()/(42-93) 94/50    Physical Exam:     General Appearance:    Alert, cooperative, in no acute distress, frail, confused   Head:    Normocephalic, without obvious abnormality, atraumatic   Eyes:            Lids and lashes normal, conjunctivae and sclerae normal, no   icterus, no pallor, corneas clear, PERRLA   Ears:    Ears appear intact with no abnormalities noted   Throat:   No oral lesions, no thrush, oral mucosa moist   Neck:   No adenopathy, supple, trachea midline, no thyromegaly, no   carotid bruit, no JVD   Lungs:     Clear to auscultation,respirations regular, even and                  unlabored    Heart:    Irregular rhythm and normal rate, normal S1 and S2, no            murmur, no gallop, no rub, no click   Chest Wall:    No abnormalities observed   Abdomen:     Normal bowel sounds, no masses, no organomegaly, soft        non-tender, non-distended, no guarding, no rebound                tenderness   Extremities:   Moves all extremities well, no edema, no cyanosis, no             redness   Pulses:   Pulses palpable and equal bilaterally   Skin:   No bleeding, bruising or rash   Lymph nodes:   No palpable adenopathy   Neurologic:   Cranial nerves 2 - 12 grossly intact, sensation intact, DTR       present and equal bilaterally        Results Review:    Lab Results (last 24 hours)       Procedure Component Value Units Date/Time    CBC & Differential [[id number]]  (Abnormal) Collected: 02/05/25 0455    Specimen: Blood Updated: 02/05/25 0527    Narrative:       The following orders were created for panel order CBC & Differential.  Procedure                               Abnormality         Status                     ---------                               -----------         ------                     CBC Auto Differential[[id number]]        Abnormal            Final result               Scan Slide[[id number]]                                                                    Please view results for these tests on the individual orders.    CBC Auto Differential [[id number]]  (Abnormal) Collected: 02/05/25 0455    Specimen: Blood Updated: 02/05/25 0534     WBC 7.17 10*3/mm3      RBC 2.92 10*6/mm3      Hemoglobin 9.3 g/dL      Hematocrit 31.3 %      .2 fL      MCH 31.8 pg      MCHC 29.7 g/dL      RDW 16.8 %      RDW-SD 64.4 fl      MPV 9.2 fL      Platelets 155 10*3/mm3      Neutrophil % 64.4 %      Lymphocyte % 22.5 %      Monocyte % 10.5 %      Eosinophil % 1.7 %      Basophil % 0.6 %      Immature Grans % 0.3 %      Neutrophils, Absolute 4.63 10*3/mm3      Lymphocytes, Absolute 1.61 10*3/mm3      Monocytes, Absolute 0.75 10*3/mm3      Eosinophils, Absolute 0.12 10*3/mm3      Basophils, Absolute 0.04 10*3/mm3      Immature Grans, Absolute 0.02 10*3/mm3      nRBC 0.0 /100 WBC     High Sensitivity Troponin T [[id number]]  (Abnormal) Collected: 02/05/25 0205    Specimen: Blood Updated: 02/05/25 0254     HS Troponin T 61 ng/L     Narrative:      High Sensitive Troponin T Reference Range:  <14.0 ng/L- Negative Female for AMI  <22.0 ng/L- Negative Male for AMI  >=14 - Abnormal Female indicating possible myocardial injury.  >=22 - Abnormal Male indicating possible myocardial injury.   Clinicians would have to utilize clinical acumen, EKG, Troponin, and serial changes to determine if it is an Acute Myocardial Infarction or myocardial injury due to an underlying chronic condition.         Basic Metabolic Panel [[id number]]  (Abnormal) Collected: 02/05/25 0205    Specimen: Blood  Updated: 02/05/25 0250     Glucose 89 mg/dL      BUN 31 mg/dL      Creatinine 3.49 mg/dL      Sodium 134 mmol/L      Potassium 4.7 mmol/L      Chloride 99 mmol/L      CO2 22.4 mmol/L      Calcium 8.9 mg/dL      BUN/Creatinine Ratio 8.9     Anion Gap 12.6 mmol/L      eGFR 12.5 mL/min/1.73     Narrative:      GFR Categories in Chronic Kidney Disease (CKD)      GFR Category          GFR (mL/min/1.73)    Interpretation  G1                     90 or greater         Normal or high (1)  G2                      60-89                Mild decrease (1)  G3a                   45-59                Mild to moderate decrease  G3b                   30-44                Moderate to severe decrease  G4                    15-29                Severe decrease  G5                    14 or less           Kidney failure          (1)In the absence of evidence of kidney disease, neither GFR category G1 or G2 fulfill the criteria for CKD.    eGFR calculation 2021 CKD-EPI creatinine equation, which does not include race as a factor    Phosphorus [[id number]]  (Abnormal) Collected: 02/05/25 0205    Specimen: Blood Updated: 02/05/25 0250     Phosphorus 5.0 mg/dL     Lactic Acid, Plasma [[id number]]  (Normal) Collected: 02/05/25 0205    Specimen: Blood Updated: 02/05/25 0247     Lactate 1.1 mmol/L     Blood Culture - Blood, Arm, Left [[id number]] Collected: 02/05/25 0204    Specimen: Blood from Arm, Left Updated: 02/05/25 0217    High Sensitivity Troponin T 1Hr [[id number]]  (Abnormal) Collected: 02/04/25 2139    Specimen: Blood Updated: 02/04/25 2203     HS Troponin T 65 ng/L      Troponin T Numeric Delta -5 ng/L      Troponin T % Delta -7    Narrative:      High Sensitive Troponin T Reference Range:  <14.0 ng/L- Negative Female for AMI  <22.0 ng/L- Negative Male for AMI  >=14 - Abnormal Female indicating possible myocardial injury.  >=22 - Abnormal Male indicating possible myocardial injury.   Clinicians would have to utilize clinical acumen, EKG,  Troponin, and serial changes to determine if it is an Acute Myocardial Infarction or myocardial injury due to an underlying chronic condition.         Urinalysis, Microscopic Only - Urostomy [[id number]]  (Abnormal) Collected: 02/04/25 2116    Specimen: Urine from Urostomy Updated: 02/04/25 2137     RBC, UA 0-2 /HPF      WBC, UA 11-20 /HPF      Bacteria, UA 4+ /HPF      Squamous Epithelial Cells, UA 0-2 /HPF      Hyaline Casts, UA       Unable to determine due to loaded field     /LPF     Triple Phosphate Crystals, UA Small/1+ /HPF      Methodology Automated Microscopy    Urine Culture - Urine, Urostomy [[id number]] Collected: 02/04/25 2116    Specimen: Urine from Urostomy Updated: 02/04/25 2137    Urinalysis With Culture If Indicated - Urostomy [[id number]]  (Abnormal) Collected: 02/04/25 2116    Specimen: Urine from Urostomy Updated: 02/04/25 2128     Color, UA Green     Comment: Any Substance that causes an abnormal urine color can alter the accuracy of the chemical reactions.        Appearance, UA Cloudy     pH, UA 8.5     Specific Gravity, UA 1.015     Glucose, UA Negative     Ketones, UA 15 mg/dL (1+)     Bilirubin, UA Large (3+)     Comment: Confirmation testing is unavailable.  A serum bilirubin is recommended for further assessment.        Blood, UA Trace     Protein, UA >=300 mg/dL (3+)     Leuk Esterase, UA Large (3+)     Nitrite, UA Negative     Urobilinogen, UA 2.0 E.U./dL    Narrative:      In absence of clinical symptoms, the presence of pyuria, bacteria, and/or nitrites on the urinalysis result does not correlate with infection.    TSH Rfx On Abnormal To Free T4 [[id number]]  (Normal) Collected: 02/04/25 2030    Specimen: Blood Updated: 02/04/25 2108     TSH 1.990 uIU/mL     High Sensitivity Troponin T [[id number]]  (Abnormal) Collected: 02/04/25 2030    Specimen: Blood Updated: 02/04/25 2105     HS Troponin T 70 ng/L     Narrative:      High Sensitive Troponin T Reference Range:  <14.0 ng/L- Negative  Female for AMI  <22.0 ng/L- Negative Male for AMI  >=14 - Abnormal Female indicating possible myocardial injury.  >=22 - Abnormal Male indicating possible myocardial injury.   Clinicians would have to utilize clinical acumen, EKG, Troponin, and serial changes to determine if it is an Acute Myocardial Infarction or myocardial injury due to an underlying chronic condition.         Comprehensive Metabolic Panel [[id number]]  (Abnormal) Collected: 02/04/25 2030    Specimen: Blood Updated: 02/04/25 2102     Glucose 119 mg/dL      BUN 28 mg/dL      Creatinine 3.33 mg/dL      Sodium 133 mmol/L      Potassium 4.6 mmol/L      Chloride 94 mmol/L      CO2 25.4 mmol/L      Calcium 9.3 mg/dL      Total Protein 7.5 g/dL      Albumin 3.8 g/dL      ALT (SGPT) 17 U/L      AST (SGOT) 33 U/L      Alkaline Phosphatase 132 U/L      Total Bilirubin 0.6 mg/dL      Globulin 3.7 gm/dL      A/G Ratio 1.0 g/dL      BUN/Creatinine Ratio 8.4     Anion Gap 13.6 mmol/L      eGFR 13.2 mL/min/1.73     Narrative:      GFR Categories in Chronic Kidney Disease (CKD)      GFR Category          GFR (mL/min/1.73)    Interpretation  G1                     90 or greater         Normal or high (1)  G2                      60-89                Mild decrease (1)  G3a                   45-59                Mild to moderate decrease  G3b                   30-44                Moderate to severe decrease  G4                    15-29                Severe decrease  G5                    14 or less           Kidney failure          (1)In the absence of evidence of kidney disease, neither GFR category G1 or G2 fulfill the criteria for CKD.    eGFR calculation 2021 CKD-EPI creatinine equation, which does not include race as a factor    BNP [[id number]]  (Abnormal) Collected: 02/04/25 2030    Specimen: Blood Updated: 02/04/25 2102     proBNP 13,498.0 pg/mL     Narrative:      This assay is used as an aid in the diagnosis of individuals suspected of having heart  failure. It can be used as an aid in the diagnosis of acute decompensated heart failure (ADHF) in patients presenting with signs and symptoms of ADHF to the emergency department (ED). In addition, NT-proBNP of <300 pg/mL indicates ADHF is not likely.    Age Range Result Interpretation  NT-proBNP Concentration (pg/mL:      <50             Positive            >450                   Gray                 300-450                    Negative             <300    50-75           Positive            >900                  Gray                300-900                  Negative            <300      >75             Positive            >1800                  Gray                300-1800                  Negative            <300    Magnesium [[id number]]  (Normal) Collected: 02/04/25 2030    Specimen: Blood Updated: 02/04/25 2102     Magnesium 2.2 mg/dL     CBC & Differential [[id number]]  (Abnormal) Collected: 02/04/25 2030    Specimen: Blood Updated: 02/04/25 2047    Narrative:      The following orders were created for panel order CBC & Differential.  Procedure                               Abnormality         Status                     ---------                               -----------         ------                     CBC Auto Differential[[id number]]        Abnormal            Final result                 Please view results for these tests on the individual orders.    CBC Auto Differential [[id number]]  (Abnormal) Collected: 02/04/25 2030    Specimen: Blood Updated: 02/04/25 2047     WBC 6.55 10*3/mm3      RBC 3.43 10*6/mm3      Hemoglobin 11.1 g/dL      Hematocrit 35.9 %      .7 fL      MCH 32.4 pg      MCHC 30.9 g/dL      RDW 16.4 %      RDW-SD 61.5 fl      MPV 9.4 fL      Platelets 181 10*3/mm3      Neutrophil % 71.7 %      Lymphocyte % 17.9 %      Monocyte % 8.7 %      Eosinophil % 0.9 %      Basophil % 0.5 %      Immature Grans % 0.3 %      Neutrophils, Absolute 4.70 10*3/mm3      Lymphocytes, Absolute 1.17  10*3/mm3      Monocytes, Absolute 0.57 10*3/mm3      Eosinophils, Absolute 0.06 10*3/mm3      Basophils, Absolute 0.03 10*3/mm3      Immature Grans, Absolute 0.02 10*3/mm3      nRBC 0.0 /100 WBC     [place] Draw [[id number]] Collected: 02/04/25 2030    Specimen: Blood Updated: 02/04/25 2045    Narrative:      The following orders were created for panel order [place] Draw.  Procedure                               Abnormality         Status                     ---------                               -----------         ------                     Green Top (Gel)[[id number]]                                  Final result               Lavender Top[[id number]]                                     Final result               Gold Top - SST[[id number]]                                   Final result               Light Blue Top[[id number]]                                   Final result                 Please view results for these tests on the individual orders.    Green Top (Gel) [[id number]] Collected: 02/04/25 2030    Specimen: Blood Updated: 02/04/25 2045     Extra Tube Hold for add-ons.     Comment: Auto resulted.       Lavender Top [[id number]] Collected: 02/04/25 2030    Specimen: Blood Updated: 02/04/25 2045     Extra Tube hold for add-on     Comment: Auto resulted       Gold Top - SST [[id number]] Collected: 02/04/25 2030    Specimen: Blood Updated: 02/04/25 2045     Extra Tube Hold for add-ons.     Comment: Auto resulted.       Light Blue Top [[id number]] Collected: 02/04/25 2030    Specimen: Blood Updated: 02/04/25 2045     Extra Tube Hold for add-ons.     Comment: Auto resulted                Imaging Results (Last 24 Hours)       Procedure Component Value Units Date/Time    XR Chest 1 View [[id number]] Collected: 02/04/25 2058     Updated: 02/04/25 2101    Narrative:      XR CHEST 1 VW    Date of Exam: 2/4/2025 8:50 PM EST    Indication: CHF/COPD Protocol  CHF/COPD Protocol    Comparison: None  available.    Findings:  Patient's head and neck obscure the lung apices due to kyphosis and positioning. Cardiomegaly. Transvenous pacemaker device. Dual lumen central venous catheter tip terminates at the cavoatrial junction. Pulmonary vessels are distinct. No pneumothorax or   pleural effusion or focal pulmonary parenchymal opacity. Pulmonary vessels are distinct.      Impression:      Impression:  No acute pulm abnormality on radiograph.      Electronically Signed: Sonya Vital MD    2/4/2025 8:59 PM EST    Workstation ID: [id number]                 I reviewed the patient's new clinical results.    Medication Review:   Scheduled Meds:apixaban, 2.5 mg, Oral, Q12H  atorvastatin, 20 mg, Oral, Nightly  famotidine, 10 mg, Oral, Q48H  gabapentin, 100 mg, Oral, Nightly  midodrine, 2.5 mg, Oral, Q8H  polyethylene glycol, 17 g, Oral, Daily  sertraline, 12.5 mg, Oral, Nightly  sevelamer, 800 mg, Oral, TID With Meals  sodium chloride, 10 mL, Intravenous, Q12H      Continuous Infusions:   PRN Meds:.  acetaminophen    senna-docusate sodium **AND** polyethylene glycol **AND** bisacodyl **AND** bisacodyl    ipratropium-albuterol    nitroglycerin    ondansetron ODT **OR** ondansetron    sodium chloride    sodium chloride    sodium chloride     Assessment & Plan       Acute UTI (urinary tract infection)    Permanent atrial fibrillation    Long term current use of anticoagulant    Presence of cardiac pacemaker    Type 2 diabetes mellitus    Acquired absence of kidney    Acquired absence of other specified parts of digestive tract    Chest pain, atypical    ESRD on dialysis    Hypotension    Mild cognitive impairment of uncertain or unknown etiology    Hard of hearing    UTI (urinary tract infection)    UTI              -IV Rocephin              -urine culture pending              -blood cultures pending              -lactic acid 1.1              -afebrile     ESRD on dialysis              -catheter in right upper chest               -consult nephrology              -lasix on hold while hypotensive     Type 2 DM              -not on home meds              -monitor sugar on morning labs     Afib (followed by Dr. Ellsworth/ s/p pacemaker)              -on home dose of Eliquis              -Metoprolol restarted but at 12.5mg BID (half dose)      Chest pain              -Denies any pain upon this exam              -consider cardio consult if recurrent complaint  -troponin elevated but without change; am troponin decreased              -BNP 13,498. Does not appear to be overloaded             - - EKG Afib 103              -echo EF 61-65%     Hypotension.  Has HX of hypertension              -continue home midodrine     Exam limited due to severe hard of hearing.     VTE: on eliquis  PPI: pepcid  Code status: DNR/DNI      Plan for disposition:SRINIVAS Mena MD  02/05/25  09:14 EST

## 2025-02-05 NOTE — PLAN OF CARE
Goal Outcome Evaluation:   Pt is resting, vss, bp usually runs soft. Pt received 500ml bolus of NS, right arm restricted d/t AV fiscula. Patient is pleasantly confused. Call light in reach, POC ongoing.  PT from [facility]. Came up from ED alone.

## 2025-02-05 NOTE — NURSING NOTE
Tried to get patient's labs plus 2 sets of blood cultures. I had two failed attempts. Another nurse tried without with no success. Lab came up and got 1 blue bottle of the culture and barely enough for her tubes. Asked on-call about PICC or midline and she said that it would be up to nephrology.

## 2025-02-05 NOTE — ED NOTES
Nursing report ED to floor  Hazel Bucio  84 y.o.  female    HPI:   Chief Complaint   Patient presents with    Chest Pain       Admitting doctor:   Lora Henderson MD    Admitting diagnosis:   The primary encounter diagnosis was Chronic atrial fibrillation. Diagnoses of Hyponatremia, Anemia of chronic disease, CKD (chronic kidney disease) requiring chronic dialysis, and Urinary tract infection without hematuria, site unspecified were also pertinent to this visit.    Code status:   Current Code Status       Date Active Code Status Order ID Comments User Context       2/4/2025 2108 No CPR (Do Not Attempt to Resuscitate) [id number]  Alia Sanchez, [facility]        Question Answer    Code Status (Patient has no pulse and is not breathing) No CPR (Do Not Attempt to Resuscitate)    Medical Interventions (Patient has pulse or is breathing) Limited Support    Medical Intervention Limits: No intubation (DNI)     No cardioversion     No dialysis    Comments DNR visualized from [facility]                    Allergies:   Amoxicillin, Ciprofloxacin, Oxycodone-acetaminophen, Penicillins, Sulfa antibiotics, Cephalexin, Clavulanic acid, Codeine, Contrast dye (echo or unknown ct/mr), Doxycycline, Fluconazole, Iodinated contrast media, Iodine, Macrobid [nitrofurantoin], Tobramycin, and Trimethoprim    Isolation:  No active isolations     Fall Risk:  Fall Risk Assessment was completed, and patient is at high risk for falls.   Predictive Model Details         20 (Low) Factor Value    Calculated 2/4/2025 22:57 Age 84    Risk of Fall Model Active Peripheral IV Present     Imaging order in this encounter Present     Diastolic BP 42     Magnesium 2.2 mg/dL     Number of Distinct Medication Classes administered 3     Creatinine 3.33 mg/dL     Chloride 94 mmol/L     Josh Scale not on file     Albumin 3.8 g/dL     Cardiac Assessment X     Number of administrations of Analgesic Narcotics 1     Total Bilirubin 0.6 mg/dL     Respiratory  Rate 16     Calcium 9.3 mg/dL     Potassium 4.6 mmol/L     Days after Admission 0.121     ALT 17 U/L         Weight:   There were no vitals filed for this visit.    Intake and Output  No intake or output data in the 24 hours ending 02/04/25 2301    Diet:        Most recent vitals:   Vitals:    02/04/25 2147 02/04/25 2217 02/04/25 2232 02/04/25 2249   BP: 112/93 96/52 92/47 (!) 85/42   Pulse: 119 108 116 113   Resp:       Temp:       TempSrc:       SpO2: 94% 97% 97% 93%   Height:           Active LDAs/IV Access:   Lines, Drains & Airways       Active LDAs       Name Placement date Placement time Site Days    Peripheral IV 02/04/25 2032 Anterior;Left Forearm 02/04/25 2032  Forearm  less than 1    Urostomy RLQ 12/07/21  permanent had on admission  1900  RLQ  1155    Hemodialysis Cath Double 06/23/23  1243  Chest  592                    Skin Condition:   Skin Assessments (last day)       Date/Time Skin WDL    02/04/25 20:44:44 WDL             Labs (abnormal labs have a star):   Labs Reviewed   COMPREHENSIVE METABOLIC PANEL - Abnormal; Notable for the following components:       Result Value    Glucose 119 (*)     BUN 28 (*)     Creatinine 3.33 (*)     Sodium 133 (*)     Chloride 94 (*)     AST (SGOT) 33 (*)     Alkaline Phosphatase 132 (*)     eGFR 13.2 (*)     All other components within normal limits    Narrative:     GFR Categories in Chronic Kidney Disease (CKD)      GFR Category          GFR (mL/min/1.73)    Interpretation  G1                     90 or greater         Normal or high (1)  G2                      60-89                Mild decrease (1)  G3a                   45-59                Mild to moderate decrease  G3b                   30-44                Moderate to severe decrease  G4                    15-29                Severe decrease  G5                    14 or less           Kidney failure          (1)In the absence of evidence of kidney disease, neither GFR category G1 or G2 fulfill the criteria  for CKD.    eGFR calculation 2021 CKD-EPI creatinine equation, which does not include race as a factor   BNP (IN-HOUSE) - Abnormal; Notable for the following components:    proBNP 13,498.0 (*)     All other components within normal limits    Narrative:     This assay is used as an aid in the diagnosis of individuals suspected of having heart failure. It can be used as an aid in the diagnosis of acute decompensated heart failure (ADHF) in patients presenting with signs and symptoms of ADHF to the emergency department (ED). In addition, NT-proBNP of <300 pg/mL indicates ADHF is not likely.    Age Range Result Interpretation  NT-proBNP Concentration (pg/mL:      <50             Positive            >450                   Gray                 300-450                    Negative             <300    50-75           Positive            >900                  Gray                300-900                  Negative            <300      >75             Positive            >1800                  Gray                300-1800                  Negative            <300   TROPONIN - Abnormal; Notable for the following components:    HS Troponin T 70 (*)     All other components within normal limits    Narrative:     High Sensitive Troponin T Reference Range:  <14.0 ng/L- Negative Female for AMI  <22.0 ng/L- Negative Male for AMI  >=14 - Abnormal Female indicating possible myocardial injury.  >=22 - Abnormal Male indicating possible myocardial injury.   Clinicians would have to utilize clinical acumen, EKG, Troponin, and serial changes to determine if it is an Acute Myocardial Infarction or myocardial injury due to an underlying chronic condition.        CBC WITH AUTO DIFFERENTIAL - Abnormal; Notable for the following components:    RBC 3.43 (*)     Hemoglobin 11.1 (*)     .7 (*)     MCHC 30.9 (*)     RDW 16.4 (*)     RDW-SD 61.5 (*)     Lymphocyte % 17.9 (*)     All other components within normal limits   HIGH SENSITIVITIY  TROPONIN T 1HR - Abnormal; Notable for the following components:    HS Troponin T 65 (*)     All other components within normal limits    Narrative:     High Sensitive Troponin T Reference Range:  <14.0 ng/L- Negative Female for AMI  <22.0 ng/L- Negative Male for AMI  >=14 - Abnormal Female indicating possible myocardial injury.  >=22 - Abnormal Male indicating possible myocardial injury.   Clinicians would have to utilize clinical acumen, EKG, Troponin, and serial changes to determine if it is an Acute Myocardial Infarction or myocardial injury due to an underlying chronic condition.        URINALYSIS W/ CULTURE IF INDICATED - Abnormal; Notable for the following components:    Color, UA Green (*)     Appearance, UA Cloudy (*)     pH, UA 8.5 (*)     Ketones, UA 15 mg/dL (1+) (*)     Bilirubin, UA Large (3+) (*)     Blood, UA Trace (*)     Protein, UA >=300 mg/dL (3+) (*)     Leuk Esterase, UA Large (3+) (*)     Urobilinogen, UA 2.0 E.U./dL (*)     All other components within normal limits    Narrative:     In absence of clinical symptoms, the presence of pyuria, bacteria, and/or nitrites on the urinalysis result does not correlate with infection.   URINALYSIS, MICROSCOPIC ONLY - Abnormal; Notable for the following components:    WBC, UA 11-20 (*)     Bacteria, UA 4+ (*)     All other components within normal limits   MAGNESIUM - Normal   TSH RFX ON ABNORMAL TO FREE T4 - Normal   URINE CULTURE   RAINBOW DRAW    Narrative:     The following orders were created for panel order Daniel Draw.  Procedure                               Abnormality         Status                     ---------                               -----------         ------                     Green Top (Gel)[[id number]]                                  Final result               Lavender Top[[id number]]                                     Final result               Gold Top - [facility][[id number]]                                   Final result                Light Blue Top[[id number]]                                   Final result                 Please view results for these tests on the individual orders.   GREEN TOP   LAVENDER TOP   GOLD TOP - [facility]   LIGHT BLUE TOP   CBC AND DIFFERENTIAL    Narrative:     The following orders were created for panel order CBC & Differential.  Procedure                               Abnormality         Status                     ---------                               -----------         ------                     CBC Auto Differential[[id number]]        Abnormal            Final result                 Please view results for these tests on the individual orders.       LOC: Person, Place, Time, and Situation    Telemetry:  Telemetry    Cardiac Monitoring Ordered: yes    EKG:   ECG 12 Lead Chest Pain   Preliminary Result   HEART SZYA=750  bpm   RR Roctzsgj=763  ms   MS Interval=  ms   P Horizontal Axis=  deg   P Front Axis=  deg   QRSD Interval=78  ms   QT Dgfhxizj=521  ms   LBsA=732  ms   QRS Axis=66  deg   T Wave Axis=42  deg   - ABNORMAL ECG -   Atrial fibrillation   Low voltage, extremity and precordial leads   Date and Time of Study:2025-02-04 21:50:33      ECG 12 Lead Chest Pain   Preliminary Result   HEART DWYR=458  bpm   RR Hlygmsii=429  ms   MS Interval=  ms   P Horizontal Axis=  deg   P Front Axis=  deg   QRSD Interval=81  ms   QT Brzkaqtk=375  ms   QRsX=964  ms   QRS Axis=61  deg   T Wave Axis=52  deg   - ABNORMAL ECG -   Atrial fibrillation   Low voltage, extremity and precordial leads   Date and Time of Study:2025-02-04 20:06:31          Medications Given in the ED:   Medications   sodium chloride 0.9 % flush 10 mL (has no administration in time range)   cefTRIAXone (ROCEPHIN) 1,000 mg in sodium chloride 0.9 % 100 mL MBP (1,000 mg Intravenous New Bag 2/4/25 2252)   sodium chloride 0.9 % bolus 500 mL (0 mL Intravenous Stopped 2/4/25 2225)   HYDROmorphone (DILAUDID) injection 0.5 mg (0.5 mg Intravenous Given 2/4/25 2225)        Imaging results:  XR Chest 1 View    Result Date: 2/4/2025  Impression: No acute pulm abnormality on radiograph. Electronically Signed: Sonya Vital MD  2/4/2025 8:59 PM EST  Workstation ID: [id number]     Social issues:   Social History     Socioeconomic History    Marital status:    Tobacco Use    Smoking status: Never     Passive exposure: Never    Smokeless tobacco: Never   Vaping Use    Vaping status: Never Used   Substance and Sexual Activity    Alcohol use: Not Currently    Drug use: Never    Sexual activity: Defer       NIH Stroke Scale:  Interval: (not recorded)  1a. Level of Consciousness: (not recorded)  1b. LOC Questions: (not recorded)  1c. LOC Commands: (not recorded)  2. Best Gaze: (not recorded)  3. Visual: (not recorded)  4. Facial Palsy: (not recorded)  5a. Motor Arm, Left: (not recorded)  5b. Motor Arm, Right: (not recorded)  6a. Motor Leg, Left: (not recorded)  6b. Motor Leg, Right: (not recorded)  7. Limb Ataxia: (not recorded)  8. Sensory: (not recorded)  9. Best Language: (not recorded)  10. Dysarthria: (not recorded)  11. Extinction and Inattention (formerly Neglect): (not recorded)    Total (NIH Stroke Scale): (not recorded)     Additional notable assessment information:NA     Nursing report ED to floor:  JORDAN Lopez RN   02/04/25 23:01 EST

## 2025-02-05 NOTE — CONSULTS
Nephrology Consult Note                                                [facility]      Patient Identification:  Name: Hazel Bucio  Age: 84 y.o.  Sex: female  :  1940  MRN: [id number]               Requesting Physician: Bethanie Mena MD  Reason for Consultation: management recommendations      History of Present Illness:    Patient is an 84-year-old white female patient known to me with history of ESRD on hemodialysis TTS who presented to the hospital with chest pain patient does not recall when she had her last dialysis but she typically would have it on Tuesday yesterday  Patient was found to have elevated troponin and evidence of UTI given Rocephin and given a bolus of fluid and I was consulted to manage CKD on dialysis  Problem List:    Acute UTI (urinary tract infection)    Permanent atrial fibrillation    Long term current use of anticoagulant    Presence of cardiac pacemaker    Type 2 diabetes mellitus    Acquired absence of kidney    Acquired absence of other specified parts of digestive tract    Chest pain, atypical    ESRD on dialysis    Hypotension    Mild cognitive impairment of uncertain or unknown etiology    Hard of hearing    UTI (urinary tract infection)     Past Medical History:  Past Medical History:   Diagnosis Date    Acquired absence of kidney 10/04/2021    Acquired absence of other specified parts of digestive tract 2022    Athscl heart disease of native coronary 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 2022    Atrial fibrillation with rapid ventricular response     Bladder cancer     Cancer     breast, bladder    Cholecystitis with cholelithiasis     Chronic insomnia 2020    Deep vein thrombosis     Dyslipidemia 2017    E coli bacteremia     ESRD on dialysis 10/18/2023    Essential hypertension 2017    Fracture tibia/fibula, right, closed, initial encounter 2020    Gastro-esophageal reflux disease without esophagitis 2020    GERD  (gastroesophageal reflux disease)     History of bladder cancer     History of falling     History of urostomy     Hyperkalemia     Immobility 08/27/2020    r/t broken Tibia     Irritable bowel syndrome without diarrhea     Kidney carcinoma, right     removed     Long term current use of anticoagulant 01/09/2015    Mild cognitive impairment of uncertain or unknown etiology 08/31/2023    Myalgia due to statin     Other specified abdominal hernia without obstruction or gangrene     PAF (paroxysmal atrial fibrillation) 06/26/2019    Permanent atrial fibrillation 06/26/2019    Personal history of other venous thrombosis and embolism     Presence of cardiac pacemaker 11/03/2014    BS dual chamber PM placed 10/2014 with pocket revision 1/25/17.  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     Past Surgical History:  Past Surgical History:   Procedure Laterality Date    BREAST LUMPECTOMY      CARDIAC CATHETERIZATION N/A 10/18/2023    Procedure: Left Heart Cath possible PCI, atherectomy, hemodynamic support;  Surgeon: Augustin Ellsworth MD;  Location: [facility] CATH INVASIVE LOCATION;  Service: Cardiology;  Laterality: N/A;    CARDIAC ELECTROPHYSIOLOGY PROCEDURE N/A 4/28/2023    Procedure: Pacemaker gen change, [place] AWARE $;  Surgeon: Jose Carlos Cheng MD;  Location: [facility] CATH INVASIVE LOCATION;  Service: Cardiovascular;  Laterality: N/A;    CHOLECYSTECTOMY N/A 10/12/2020    Procedure: CHOLECYSTECTOMY LAPAROSCOPIC;  Surgeon: Go Pereira DO;  Location: [facility] MAIN OR;  Service: General;  Laterality: N/A;    CYSTECTOMY W/ URETEROILEAL CONDUIT      FEMUR IM NAILING RETROGRADE Right 7/25/2024    Procedure: RIGHT FEMUR INTRAMEDULLARY NAILING RETROGRADE AND OPEN REDUCTION INTERNAL FIXATION;  Surgeon: Elieser Diggs MD;  Location: [facility]  MAIN OR;  Service: Orthopedics;  Laterality: Right;    HARDWARE REMOVAL Right 6/11/2021    Procedure: TIBIA HARDWARE REMOVAL;  Surgeon: Geoff Shah II, MD;  Location: [facility] MAIN OR;  Service: Orthopedics;  Laterality: Right;    HERNIA REPAIR      HYSTERECTOMY      INSERT / REPLACE / REMOVE PACEMAKER      NEPHRECTOMY Right     TIBIA IM NAILING/RODDING Right 8/28/2020    Procedure: TIBIA INTRAMEDULLARY NAIL/ABRAHAM INSERTION;  Surgeon: Geoff Shah II, MD;  Location: [facility] MAIN OR;  Service: Orthopedics;  Laterality: Right;      Home Meds:  Medications Prior to Admission   Medication Sig Dispense Refill Last Dose/Taking    acetaminophen (TYLENOL) 325 MG tablet Take 2 tablets by mouth 4 (Four) Times a Day As Needed for Mild Pain.   Taking As Needed    apixaban (ELIQUIS) 2.5 MG tablet tablet Take 1 tablet by mouth Every 12 (Twelve) Hours. Indications: Atrial Fibrillation 60 tablet 0 Taking    atorvastatin (LIPITOR) 20 MG tablet Take 1 tablet by mouth Every Night.   Taking    famotidine (PEPCID) 10 MG tablet Take 1 tablet by mouth Every Other Day. 30 tablet 0 Taking    furosemide (LASIX) 40 MG tablet Take 1 tablet by mouth Daily.   Taking    gabapentin (NEURONTIN) 100 MG capsule Take 1 capsule by mouth Every Night. 3 capsule 0 Taking    ipratropium-albuterol (DUO-NEB) 0.5-2.5 mg/3 ml nebulizer Take 3 mL by nebulization Every 4 (Four) Hours As Needed for Wheezing.   Taking As Needed    metoprolol tartrate (LOPRESSOR) 25 MG tablet Take 1 tablet by mouth 2 (Two) Times a Day.   Taking    midodrine (PROAMATINE) 2.5 MG tablet Take 1 tablet by mouth 3 (Three) Times a Day Before Meals.   Taking    nitroglycerin (NITROSTAT) 0.4 MG SL tablet Place 1 tablet under the tongue Every 5 (Five) Minutes As Needed for Chest Pain.   Taking As Needed    polyethylene glycol (MiraLax) 17 GM/SCOOP powder Take 17 g by mouth Every Morning.   Taking    sertraline (ZOLOFT) 25 MG tablet Take 0.5 tablets by mouth every night at  bedtime.   Taking    sevelamer (RENVELA) 800 MG tablet Take 1 tablet by mouth 3 (Three) Times a Day With Meals.   Taking    topiramate (TOPAMAX) 100 MG tablet Take 1 tablet by mouth Every Night. 30 tablet 0 Taking    traMADol (ULTRAM) 50 MG tablet Take 1 tablet by mouth Every 6 (Six) Hours As Needed for Moderate Pain.   Taking As Needed    zinc oxide 20 % ointment Apply 1 Application topically to the appropriate area as directed 2 (Two) Times a Day.   Taking     Current Meds:     Current Facility-Administered Medications:     acetaminophen (TYLENOL) tablet 650 mg, 650 mg, Oral, Q4H PRN, Green, Patsy N, APRN    apixaban (ELIQUIS) tablet 2.5 mg, 2.5 mg, Oral, Q12H, Green, Patsy N, APRN, 2.5 mg at 02/05/25 0826    atorvastatin (LIPITOR) tablet 20 mg, 20 mg, Oral, Nightly, Green, Patsy N, APRN, 20 mg at 02/05/25 0245    sennosides-docusate (PERICOLACE) 8.6-50 MG per tablet 2 tablet, 2 tablet, Oral, BID PRN **AND** polyethylene glycol (MIRALAX) packet 17 g, 17 g, Oral, Daily PRN **AND** bisacodyl (DULCOLAX) EC tablet 5 mg, 5 mg, Oral, Daily PRN **AND** bisacodyl (DULCOLAX) suppository 10 mg, 10 mg, Rectal, Daily PRN, Green, Patsy N, APRN    famotidine (PEPCID) tablet 10 mg, 10 mg, Oral, Q48H, Green, Patsy N, APRN, 10 mg at 02/05/25 0826    gabapentin (NEURONTIN) capsule 100 mg, 100 mg, Oral, Nightly, Green, Patsy N, APRN    ipratropium-albuterol (DUO-NEB) nebulizer solution 3 mL, 3 mL, Nebulization, Q4H PRN, Green, Aptsy N, APRN    midodrine (PROAMATINE) tablet 2.5 mg, 2.5 mg, Oral, Q8H, Green, Patsy N, APRN, 2.5 mg at 02/05/25 0245    nitroglycerin (NITROSTAT) SL tablet 0.4 mg, 0.4 mg, Sublingual, Q5 Min PRN, Patsy Abel APRN    ondansetron ODT (ZOFRAN-ODT) disintegrating tablet 4 mg, 4 mg, Oral, Q6H PRN **OR** ondansetron (ZOFRAN) injection 4 mg, 4 mg, Intravenous, Q6H PRN, Patsy Abel, APRN    polyethylene glycol (MIRALAX) packet 17 g, 17 g, Oral, Daily, Patsy Abel APRN, 17 g at 02/05/25 1753     "sertraline (ZOLOFT) tablet 12.5 mg, 12.5 mg, Oral, Nightly, Green, Patsy N, APRN    sevelamer (RENVELA) tablet 800 mg, 800 mg, Oral, TID With Meals, Green, Patsy N, APRN, 800 mg at 02/05/25 0826    sodium chloride 0.9 % flush 10 mL, 10 mL, Intravenous, PRN, Green, Patsy N, APRN    sodium chloride 0.9 % flush 10 mL, 10 mL, Intravenous, Q12H, Green, Patsy N, APRN, 10 mL at 02/05/25 0826    sodium chloride 0.9 % flush 10 mL, 10 mL, Intravenous, PRN, Green, Patsy N, APRN    sodium chloride 0.9 % infusion 40 mL, 40 mL, Intravenous, PRN, Green, Patsy N, APRN    Allergies:  Allergies   Allergen Reactions    Amoxicillin Shortness Of Breath    Ciprofloxacin Hives    Oxycodone-Acetaminophen Unknown - High Severity     Pt's son stated when pt fell and broke her leg she was put on oxycodone; pt's son stated pt's \"vitals went all out of wack, she couldn't remember things.\"    Penicillins Rash    Sulfa Antibiotics Hives    Cephalexin Other (See Comments)     Has tolerated multiple courses of cephalosporins- cefazolin, ceftriaxone, ceftazidime     Clavulanic Acid Other (See Comments)    Codeine Other (See Comments)    Contrast Dye (Echo Or Unknown Ct/Mr) Other (See Comments)     8/18/22     Doxycycline Other (See Comments)    Fluconazole Other (See Comments)    Iodinated Contrast Media Other (See Comments)     8/18/22    Iodine Hives    Macrobid [Nitrofurantoin] Hives    Tobramycin Other (See Comments)    Trimethoprim Other (See Comments)     Social History:   Social History     Tobacco Use    Smoking status: Never     Passive exposure: Never    Smokeless tobacco: Never   Substance Use Topics    Alcohol use: Not Currently      Family History:  Family History   Problem Relation Age of Onset    COPD Father         Review of Systems unobtainable  Objective:  Vitals:   /47 (BP Location: Right arm, Patient Position: Sitting)   Pulse 101   Temp 98.4 °F (36.9 °C) (Oral)   Resp 11   Ht 170.2 cm (67\")   Wt 58.1 kg (128 lb) "   SpO2 99%   BMI 20.05 kg/m²   I/O:     Intake/Output Summary (Last 24 hours) at 2/5/2025 1238  Last data filed at 2/5/2025 0310  Gross per 24 hour   Intake --   Output 75 ml   Net -75 ml       Exam:  General Appearance: Awake  Dialysis catheter.  Head:  Normocephalic, without obvious abnormality, atraumatic  Eyes:  PERRL, conjunctiva/corneas clear     Neck:  Supple,  no adenopathy;      Lungs:  Decreased BS occasion ronchi  Heart:  Regular rate and rhythm, S1 and S2 normal  Abdomen:  Soft, non-tender, bowel sounds active   Extremities: trace edema  Pulses: 2+ and symmetric all extremities  Skin:  No rashes or lesions  Data Review:  All labs (24hrs):   Recent Results (from the past 24 hours)   ECG 12 Lead Chest Pain    Collection Time: 02/04/25  8:06 PM   Result Value Ref Range    QT Interval 352 ms    QTC Interval 462 ms   Green Top (Gel)    Collection Time: 02/04/25  8:30 PM   Result Value Ref Range    Extra Tube Hold for add-ons.    Lavender Top    Collection Time: 02/04/25  8:30 PM   Result Value Ref Range    Extra Tube hold for add-on    Gold Top - SST    Collection Time: 02/04/25  8:30 PM   Result Value Ref Range    Extra Tube Hold for add-ons.    Light Blue Top    Collection Time: 02/04/25  8:30 PM   Result Value Ref Range    Extra Tube Hold for add-ons.    Comprehensive Metabolic Panel    Collection Time: 02/04/25  8:30 PM    Specimen: Blood   Result Value Ref Range    Glucose 119 (H) 65 - 99 mg/dL    BUN 28 (H) 8 - 23 mg/dL    Creatinine 3.33 (H) 0.57 - 1.00 mg/dL    Sodium 133 (L) 136 - 145 mmol/L    Potassium 4.6 3.5 - 5.2 mmol/L    Chloride 94 (L) 98 - 107 mmol/L    CO2 25.4 22.0 - 29.0 mmol/L    Calcium 9.3 8.6 - 10.5 mg/dL    Total Protein 7.5 6.0 - 8.5 g/dL    Albumin 3.8 3.5 - 5.2 g/dL    ALT (SGPT) 17 1 - 33 U/L    AST (SGOT) 33 (H) 1 - 32 U/L    Alkaline Phosphatase 132 (H) 39 - 117 U/L    Total Bilirubin 0.6 0.0 - 1.2 mg/dL    Globulin 3.7 gm/dL    A/G Ratio 1.0 g/dL    BUN/Creatinine Ratio 8.4  7.0 - 25.0    Anion Gap 13.6 5.0 - 15.0 mmol/L    eGFR 13.2 (L) >60.0 mL/min/1.73   BNP    Collection Time: 02/04/25  8:30 PM    Specimen: Blood   Result Value Ref Range    proBNP 13,498.0 (H) 0.0 - 1,800.0 pg/mL   High Sensitivity Troponin T    Collection Time: 02/04/25  8:30 PM    Specimen: Blood   Result Value Ref Range    HS Troponin T 70 (C) <14 ng/L   Magnesium    Collection Time: 02/04/25  8:30 PM    Specimen: Blood   Result Value Ref Range    Magnesium 2.2 1.6 - 2.4 mg/dL   TSH Rfx On Abnormal To Free T4    Collection Time: 02/04/25  8:30 PM    Specimen: Blood   Result Value Ref Range    TSH 1.990 0.270 - 4.200 uIU/mL   CBC Auto Differential    Collection Time: 02/04/25  8:30 PM    Specimen: Blood   Result Value Ref Range    WBC 6.55 3.40 - 10.80 10*3/mm3    RBC 3.43 (L) 3.77 - 5.28 10*6/mm3    Hemoglobin 11.1 (L) 12.0 - 15.9 g/dL    Hematocrit 35.9 34.0 - 46.6 %    .7 (H) 79.0 - 97.0 fL    MCH 32.4 26.6 - 33.0 pg    MCHC 30.9 (L) 31.5 - 35.7 g/dL    RDW 16.4 (H) 12.3 - 15.4 %    RDW-SD 61.5 (H) 37.0 - 54.0 fl    MPV 9.4 6.0 - 12.0 fL    Platelets 181 140 - 450 10*3/mm3    Neutrophil % 71.7 42.7 - 76.0 %    Lymphocyte % 17.9 (L) 19.6 - 45.3 %    Monocyte % 8.7 5.0 - 12.0 %    Eosinophil % 0.9 0.3 - 6.2 %    Basophil % 0.5 0.0 - 1.5 %    Immature Grans % 0.3 0.0 - 0.5 %    Neutrophils, Absolute 4.70 1.70 - 7.00 10*3/mm3    Lymphocytes, Absolute 1.17 0.70 - 3.10 10*3/mm3    Monocytes, Absolute 0.57 0.10 - 0.90 10*3/mm3    Eosinophils, Absolute 0.06 0.00 - 0.40 10*3/mm3    Basophils, Absolute 0.03 0.00 - 0.20 10*3/mm3    Immature Grans, Absolute 0.02 0.00 - 0.05 10*3/mm3    nRBC 0.0 0.0 - 0.2 /100 WBC   Urinalysis With Culture If Indicated - Urostomy    Collection Time: 02/04/25  9:16 PM    Specimen: Urostomy; Urine   Result Value Ref Range    Color, UA Green (A) Yellow, Straw    Appearance, UA Cloudy (A) Clear    pH, UA 8.5 (H) 5.0 - 8.0    Specific Gravity, UA 1.015 1.005 - 1.030    Glucose, UA  Negative Negative    Ketones, UA 15 mg/dL (1+) (A) Negative    Bilirubin, UA Large (3+) (A) Negative    Blood, UA Trace (A) Negative    Protein, UA >=300 mg/dL (3+) (A) Negative    Leuk Esterase, UA Large (3+) (A) Negative    Nitrite, UA Negative Negative    Urobilinogen, UA 2.0 E.U./dL (A) 0.2 - 1.0 E.U./dL   Urinalysis, Microscopic Only - Urostomy    Collection Time: 02/04/25  9:16 PM    Specimen: Urostomy; Urine   Result Value Ref Range    RBC, UA 0-2 None Seen, 0-2 /HPF    WBC, UA 11-20 (A) None Seen, 0-2 /HPF    Bacteria, UA 4+ (A) None Seen /HPF    Squamous Epithelial Cells, UA 0-2 None Seen, 0-2 /HPF    Hyaline Casts, UA Unable to determine due to loaded field None Seen /LPF    Triple Phosphate Crystals, UA Small/1+ None Seen /HPF    Methodology Automated Microscopy    High Sensitivity Troponin T 1Hr    Collection Time: 02/04/25  9:39 PM    Specimen: Blood   Result Value Ref Range    HS Troponin T 65 (C) <14 ng/L    Troponin T Numeric Delta -5 ng/L    Troponin T % Delta -7 Abnormal if >/= 20%   ECG 12 Lead Chest Pain    Collection Time: 02/04/25  9:50 PM   Result Value Ref Range    QT Interval 358 ms    QTC Interval 473 ms   Lactic Acid, Plasma    Collection Time: 02/05/25  2:05 AM    Specimen: Blood   Result Value Ref Range    Lactate 1.1 0.5 - 2.0 mmol/L   Basic Metabolic Panel    Collection Time: 02/05/25  2:05 AM    Specimen: Blood   Result Value Ref Range    Glucose 89 65 - 99 mg/dL    BUN 31 (H) 8 - 23 mg/dL    Creatinine 3.49 (H) 0.57 - 1.00 mg/dL    Sodium 134 (L) 136 - 145 mmol/L    Potassium 4.7 3.5 - 5.2 mmol/L    Chloride 99 98 - 107 mmol/L    CO2 22.4 22.0 - 29.0 mmol/L    Calcium 8.9 8.6 - 10.5 mg/dL    BUN/Creatinine Ratio 8.9 7.0 - 25.0    Anion Gap 12.6 5.0 - 15.0 mmol/L    eGFR 12.5 (L) >60.0 mL/min/1.73   High Sensitivity Troponin T    Collection Time: 02/05/25  2:05 AM    Specimen: Blood   Result Value Ref Range    HS Troponin T 61 (C) <14 ng/L   Phosphorus    Collection Time: 02/05/25   2:05 AM    Specimen: Blood   Result Value Ref Range    Phosphorus 5.0 (H) 2.5 - 4.5 mg/dL   CBC Auto Differential    Collection Time: 02/05/25  4:55 AM    Specimen: Blood   Result Value Ref Range    WBC 7.17 3.40 - 10.80 10*3/mm3    RBC 2.92 (L) 3.77 - 5.28 10*6/mm3    Hemoglobin 9.3 (L) 12.0 - 15.9 g/dL    Hematocrit 31.3 (L) 34.0 - 46.6 %    .2 (H) 79.0 - 97.0 fL    MCH 31.8 26.6 - 33.0 pg    MCHC 29.7 (L) 31.5 - 35.7 g/dL    RDW 16.8 (H) 12.3 - 15.4 %    RDW-SD 64.4 (H) 37.0 - 54.0 fl    MPV 9.2 6.0 - 12.0 fL    Platelets 155 140 - 450 10*3/mm3    Neutrophil % 64.4 42.7 - 76.0 %    Lymphocyte % 22.5 19.6 - 45.3 %    Monocyte % 10.5 5.0 - 12.0 %    Eosinophil % 1.7 0.3 - 6.2 %    Basophil % 0.6 0.0 - 1.5 %    Immature Grans % 0.3 0.0 - 0.5 %    Neutrophils, Absolute 4.63 1.70 - 7.00 10*3/mm3    Lymphocytes, Absolute 1.61 0.70 - 3.10 10*3/mm3    Monocytes, Absolute 0.75 0.10 - 0.90 10*3/mm3    Eosinophils, Absolute 0.12 0.00 - 0.40 10*3/mm3    Basophils, Absolute 0.04 0.00 - 0.20 10*3/mm3    Immature Grans, Absolute 0.02 0.00 - 0.05 10*3/mm3    nRBC 0.0 0.0 - 0.2 /100 WBC   Adult Transthoracic Echo Complete W/ Cont if Necessary Per Protocol    Collection Time: 02/05/25  9:43 AM   Result Value Ref Range    EF(MOD-bp) 62.4 %    LVIDd 4.4 cm    LVIDs 2.9 cm    IVSd 0.70 cm    LVPWd 0.70 cm    FS 34.1 %    IVS/LVPW 1.00 cm    ESV(cubed) 24.4 ml    LV Sys Vol (BSA corrected) 16.0 cm2    EDV(cubed) 85.2 ml    LV [place] Vol (BSA corrected) 43.0 cm2    LV mass(C)d 92.1 grams    LVOT area 3.1 cm2    LVOT diam 2.00 cm    EDV(MOD-sp2) 61.7 ml    EDV(MOD-sp4) 71.9 ml    ESV(MOD-sp2) 22.7 ml    ESV(MOD-sp4) 26.7 ml    SV(MOD-sp2) 39.0 ml    SV(MOD-sp4) 45.2 ml    SVi(MOD-SP2) 23.3 ml/m2    SVi(MOD-SP4) 27.0 ml/m2    SVi (LVOT) 25.5 ml/m2    EF(MOD-sp2) 63.2 %    EF(MOD-sp4) 62.9 %    MV E max mj 129.0 cm/sec    LA ESV Index (BP) 29.0 ml/m2    Med Peak E' Mj 9.8 cm/sec    Lat Peak E' Mj 13.8 cm/sec    TR max mj 256.0  cm/sec    Avg E/e' ratio 10.93     SV(LVOT) 42.7 ml    RV Base 2.7 cm    RV Mid 2.20 cm    TAPSE (>1.6) 1.26 cm    RV S' 6.1 cm/sec    LA dimension (2D)  4.2 cm    LV V1 max 80.9 cm/sec    LV V1 max PG 2.6 mmHg    LV V1 mean PG 1.00 mmHg    LV V1 VTI 13.6 cm    Ao pk kenya 116.0 cm/sec    Ao max PG 5.4 mmHg    Ao mean PG 3.0 mmHg    Ao V2 VTI 19.0 cm    KRIS(I,D) 2.25 cm2    MV max PG 7.1 mmHg    MV mean PG 3.0 mmHg    MV V2 VTI 22.9 cm    MV P1/2t 57.3 msec    MVA(P1/2t) 3.8 cm2    MVA(VTI) 1.87 cm2    MV dec slope 659.0 cm/sec2    MR max kenya 318.0 cm/sec    MR max PG 40.4 mmHg    TR max PG 26.2 mmHg    RVSP(TR) 29.2 mmHg    RAP systole 3.0 mmHg    RV V1 max PG 2.16 mmHg    RV V1 max 73.4 cm/sec    RV V1 VTI 14.8 cm    PA V2 max 94.5 cm/sec    PA acc time 0.07 sec    ACS 2.00 cm    Sinus 3.4 cm    STJ 3.2 cm    Dimensionless Index 0.70 (DI)       Current Facility-Administered Medications:     acetaminophen (TYLENOL) tablet 650 mg, 650 mg, Oral, Q4H PRN, Green, Patsy N, APRN    apixaban (ELIQUIS) tablet 2.5 mg, 2.5 mg, Oral, Q12H, Green, Patsy N, APRN, 2.5 mg at 02/05/25 0826    atorvastatin (LIPITOR) tablet 20 mg, 20 mg, Oral, Nightly, Green, Patsy N, APRN, 20 mg at 02/05/25 0245    sennosides-docusate (PERICOLACE) 8.6-50 MG per tablet 2 tablet, 2 tablet, Oral, BID PRN **AND** polyethylene glycol (MIRALAX) packet 17 g, 17 g, Oral, Daily PRN **AND** bisacodyl (DULCOLAX) EC tablet 5 mg, 5 mg, Oral, Daily PRN **AND** bisacodyl (DULCOLAX) suppository 10 mg, 10 mg, Rectal, Daily PRN, Green, Patsy N, APRN    famotidine (PEPCID) tablet 10 mg, 10 mg, Oral, Q48H, Green, Patsy N, APRN, 10 mg at 02/05/25 0826    gabapentin (NEURONTIN) capsule 100 mg, 100 mg, Oral, Nightly, Green, Patsy N, APRN    ipratropium-albuterol (DUO-NEB) nebulizer solution 3 mL, 3 mL, Nebulization, Q4H PRN, Green, Patsy N, APRN    midodrine (PROAMATINE) tablet 2.5 mg, 2.5 mg, Oral, Q8H, Green, Patsy N, APRN, 2.5 mg at 02/05/25 0248     nitroglycerin (NITROSTAT) SL tablet 0.4 mg, 0.4 mg, Sublingual, Q5 Min PRN, Green, Patsy N, APRN    ondansetron ODT (ZOFRAN-ODT) disintegrating tablet 4 mg, 4 mg, Oral, Q6H PRN **OR** ondansetron (ZOFRAN) injection 4 mg, 4 mg, Intravenous, Q6H PRN, Green, Patsy N, APRN    polyethylene glycol (MIRALAX) packet 17 g, 17 g, Oral, Daily, Green, Patsy N, APRN, 17 g at 02/05/25 0825    sertraline (ZOLOFT) tablet 12.5 mg, 12.5 mg, Oral, Nightly, Green, Patsy N, APRN    sevelamer (RENVELA) tablet 800 mg, 800 mg, Oral, TID With Meals, Green, Patsy N, APRN, 800 mg at 02/05/25 0826    sodium chloride 0.9 % flush 10 mL, 10 mL, Intravenous, PRN, Green, Patsy N, APRN    sodium chloride 0.9 % flush 10 mL, 10 mL, Intravenous, Q12H, Green, Patsy N, APRN, 10 mL at 02/05/25 0826    sodium chloride 0.9 % flush 10 mL, 10 mL, Intravenous, PRN, Green, Patsy N, APRN    sodium chloride 0.9 % infusion 40 mL, 40 mL, Intravenous, PRN, Green, Patsy N, APRN    Assessment:  ESRD hemodialysis TTS  UTI  Hyponatremia  Type 2 diabetes  Atrial fibrillation  Chest pain  Status post hypotension      Recommendations:  No need for dialysis today  Watch electrolytes and volume  Avoid excessive fluids  If needs cardiac catheterization may proceed anytime  Will reevaluate tomorrow for dialysis        June Keller MD  2/5/2025  12:38 EST

## 2025-02-06 ENCOUNTER — APPOINTMENT (OUTPATIENT)
Dept: NEPHROLOGY | Facility: HOSPITAL | Age: 85
DRG: 689 | End: 2025-02-06
Payer: MEDICARE

## 2025-02-06 LAB
ANION GAP SERPL CALCULATED.3IONS-SCNC: 11.9 MMOL/L (ref 5–15)
BASOPHILS # BLD AUTO: 0.02 10*3/MM3 (ref 0–0.2)
BASOPHILS NFR BLD AUTO: 0.3 % (ref 0–1.5)
BUN SERPL-MCNC: 43 MG/DL (ref 8–23)
BUN/CREAT SERPL: 9.6 (ref 7–25)
CALCIUM SPEC-SCNC: 9.3 MG/DL (ref 8.6–10.5)
CHLORIDE SERPL-SCNC: 102 MMOL/L (ref 98–107)
CO2 SERPL-SCNC: 25.1 MMOL/L (ref 22–29)
CREAT SERPL-MCNC: 4.47 MG/DL (ref 0.57–1)
DEPRECATED RDW RBC AUTO: 62.9 FL (ref 37–54)
EGFRCR SERPLBLD CKD-EPI 2021: 9.3 ML/MIN/1.73
EOSINOPHIL # BLD AUTO: 0.11 10*3/MM3 (ref 0–0.4)
EOSINOPHIL NFR BLD AUTO: 1.7 % (ref 0.3–6.2)
ERYTHROCYTE [DISTWIDTH] IN BLOOD BY AUTOMATED COUNT: 16.6 % (ref 12.3–15.4)
GLUCOSE SERPL-MCNC: 80 MG/DL (ref 65–99)
HBV SURFACE AG SERPL QL IA: NORMAL
HCT VFR BLD AUTO: 33.7 % (ref 34–46.6)
HGB BLD-MCNC: 10.4 G/DL (ref 12–15.9)
IMM GRANULOCYTES # BLD AUTO: 0.02 10*3/MM3 (ref 0–0.05)
IMM GRANULOCYTES NFR BLD AUTO: 0.3 % (ref 0–0.5)
LYMPHOCYTES # BLD AUTO: 1.25 10*3/MM3 (ref 0.7–3.1)
LYMPHOCYTES NFR BLD AUTO: 19.4 % (ref 19.6–45.3)
MCH RBC QN AUTO: 32.2 PG (ref 26.6–33)
MCHC RBC AUTO-ENTMCNC: 30.9 G/DL (ref 31.5–35.7)
MCV RBC AUTO: 104.3 FL (ref 79–97)
MONOCYTES # BLD AUTO: 0.62 10*3/MM3 (ref 0.1–0.9)
MONOCYTES NFR BLD AUTO: 9.6 % (ref 5–12)
NEUTROPHILS NFR BLD AUTO: 4.43 10*3/MM3 (ref 1.7–7)
NEUTROPHILS NFR BLD AUTO: 68.7 % (ref 42.7–76)
NRBC BLD AUTO-RTO: 0 /100 WBC (ref 0–0.2)
PLATELET # BLD AUTO: 171 10*3/MM3 (ref 140–450)
PMV BLD AUTO: 9.7 FL (ref 6–12)
POTASSIUM SERPL-SCNC: 5.3 MMOL/L (ref 3.5–5.2)
RBC # BLD AUTO: 3.23 10*6/MM3 (ref 3.77–5.28)
SODIUM SERPL-SCNC: 139 MMOL/L (ref 136–145)
WBC NRBC COR # BLD AUTO: 6.45 10*3/MM3 (ref 3.4–10.8)

## 2025-02-06 PROCEDURE — 25010000002 HEPARIN (PORCINE) PER 1000 UNITS: Performed by: INTERNAL MEDICINE

## 2025-02-06 PROCEDURE — 85025 COMPLETE CBC W/AUTO DIFF WBC: CPT | Performed by: FAMILY MEDICINE

## 2025-02-06 PROCEDURE — 5A1D70Z PERFORMANCE OF URINARY FILTRATION, INTERMITTENT, LESS THAN 6 HOURS PER DAY: ICD-10-PCS | Performed by: INTERNAL MEDICINE

## 2025-02-06 PROCEDURE — 80048 BASIC METABOLIC PNL TOTAL CA: CPT | Performed by: FAMILY MEDICINE

## 2025-02-06 PROCEDURE — 25010000002 CEFTRIAXONE PER 250 MG: Performed by: FAMILY MEDICINE

## 2025-02-06 PROCEDURE — 97162 PT EVAL MOD COMPLEX 30 MIN: CPT

## 2025-02-06 PROCEDURE — 87340 HEPATITIS B SURFACE AG IA: CPT | Performed by: INTERNAL MEDICINE

## 2025-02-06 RX ORDER — HEPARIN SODIUM 1000 [USP'U]/ML
5000 INJECTION, SOLUTION INTRAVENOUS; SUBCUTANEOUS ONCE
Status: COMPLETED | OUTPATIENT
Start: 2025-02-06 | End: 2025-02-06

## 2025-02-06 RX ADMIN — POLYETHYLENE GLYCOL 3350 17 G: 17 POWDER, FOR SOLUTION ORAL at 12:29

## 2025-02-06 RX ADMIN — SERTRALINE HYDROCHLORIDE 12.5 MG: 25 TABLET, FILM COATED ORAL at 21:58

## 2025-02-06 RX ADMIN — MIDODRINE HYDROCHLORIDE 2.5 MG: 5 TABLET ORAL at 14:03

## 2025-02-06 RX ADMIN — GABAPENTIN 100 MG: 100 CAPSULE ORAL at 21:58

## 2025-02-06 RX ADMIN — ATORVASTATIN CALCIUM 20 MG: 20 TABLET, FILM COATED ORAL at 21:58

## 2025-02-06 RX ADMIN — HEPARIN SODIUM 5000 UNITS: 1000 INJECTION INTRAVENOUS; SUBCUTANEOUS at 12:09

## 2025-02-06 RX ADMIN — APIXABAN 2.5 MG: 2.5 TABLET, FILM COATED ORAL at 12:29

## 2025-02-06 RX ADMIN — Medication 10 ML: at 12:29

## 2025-02-06 RX ADMIN — MIDODRINE HYDROCHLORIDE 2.5 MG: 5 TABLET ORAL at 21:58

## 2025-02-06 RX ADMIN — Medication 12.5 MG: at 12:29

## 2025-02-06 RX ADMIN — Medication 10 ML: at 21:58

## 2025-02-06 RX ADMIN — APIXABAN 2.5 MG: 2.5 TABLET, FILM COATED ORAL at 21:58

## 2025-02-06 RX ADMIN — SEVELAMER CARBONATE 800 MG: 800 TABLET, FILM COATED ORAL at 12:29

## 2025-02-06 RX ADMIN — SEVELAMER CARBONATE 800 MG: 800 TABLET, FILM COATED ORAL at 17:43

## 2025-02-06 RX ADMIN — MIDODRINE HYDROCHLORIDE 2.5 MG: 5 TABLET ORAL at 05:28

## 2025-02-06 RX ADMIN — CEFTRIAXONE SODIUM 1000 MG: 1 INJECTION, POWDER, FOR SOLUTION INTRAMUSCULAR; INTRAVENOUS at 14:03

## 2025-02-06 RX ADMIN — Medication 12.5 MG: at 21:58

## 2025-02-06 NOTE — PROGRESS NOTES
LOS: 1 day   Patient Care Team:  John Cano MD as PCP - General (Family Medicine)  Jose Carlos Cheng MD as Consulting Physician (Cardiology)    Subjective     Patient denies any complaints this am    Review of Systems   Constitutional:  Positive for activity change and fatigue.   HENT: Negative.     Respiratory: Negative.     Cardiovascular: Negative.    Gastrointestinal: Negative.    Genitourinary: Negative.    Musculoskeletal: Negative.    Neurological: Negative.    Psychiatric/Behavioral: Negative.             Objective     Vital Signs  Temp:  [97.9 °F (36.6 °C)-98.5 °F (36.9 °C)] 98.5 °F (36.9 °C)  Heart Rate:  [] 104  Resp:  [9-18] 9  BP: ()/() 124/81      Physical Exam  Vitals reviewed.   Constitutional:       Appearance: She is not ill-appearing.   HENT:      Head: Normocephalic and atraumatic.      Right Ear: External ear normal.      Left Ear: External ear normal.      Mouth/Throat:      Mouth: Mucous membranes are moist.   Eyes:      General:         Right eye: No discharge.         Left eye: No discharge.   Cardiovascular:      Rate and Rhythm: Normal rate and regular rhythm.      Pulses: Normal pulses.      Heart sounds: Normal heart sounds.   Pulmonary:      Effort: Pulmonary effort is normal.      Breath sounds: Normal breath sounds.   Abdominal:      General: Bowel sounds are normal.      Palpations: Abdomen is soft.   Musculoskeletal:         General: Normal range of motion.      Cervical back: Normal range of motion.   Skin:     General: Skin is warm and dry.   Neurological:      Mental Status: She is alert and oriented to person, place, and time.   Psychiatric:         Behavior: Behavior normal.              Results Review:    Lab Results (last 24 hours)       Procedure Component Value Units Date/Time    Hepatitis B Surface Antigen [[id number]]  (Normal) Collected: 02/06/25 0908    Specimen: Blood from Cannula Updated: 02/06/25 1003     Hepatitis B Surface Ag  Non-Reactive    Blood Culture - Blood, Hand, Left [[id number]]  (Normal) Collected: 02/05/25 0946    Specimen: Blood from Hand, Left Updated: 02/06/25 1000     Blood Culture No growth at 24 hours    Narrative:      Less than seven (7) mL's of blood was collected.  Insufficient quantity may yield false negative results.    Basic Metabolic Panel [[id number]]  (Abnormal) Collected: 02/06/25 0458    Specimen: Blood Updated: 02/06/25 0600     Glucose 80 mg/dL      BUN 43 mg/dL      Creatinine 4.47 mg/dL      Sodium 139 mmol/L      Potassium 5.3 mmol/L      Chloride 102 mmol/L      CO2 25.1 mmol/L      Calcium 9.3 mg/dL      BUN/Creatinine Ratio 9.6     Anion Gap 11.9 mmol/L      eGFR 9.3 mL/min/1.73     Narrative:      GFR Categories in Chronic Kidney Disease (CKD)      GFR Category          GFR (mL/min/1.73)    Interpretation  G1                     90 or greater         Normal or high (1)  G2                      60-89                Mild decrease (1)  G3a                   45-59                Mild to moderate decrease  G3b                   30-44                Moderate to severe decrease  G4                    15-29                Severe decrease  G5                    14 or less           Kidney failure          (1)In the absence of evidence of kidney disease, neither GFR category G1 or G2 fulfill the criteria for CKD.    eGFR calculation 2021 CKD-EPI creatinine equation, which does not include race as a factor    CBC & Differential [[id number]]  (Abnormal) Collected: 02/06/25 0458    Specimen: Blood Updated: 02/06/25 0536    Narrative:      The following orders were created for panel order CBC & Differential.  Procedure                               Abnormality         Status                     ---------                               -----------         ------                     CBC Auto Differential[[id number]]        Abnormal            Final result                 Please view results for these tests on the  individual orders.    CBC Auto Differential [[id number]]  (Abnormal) Collected: 02/06/25 0458    Specimen: Blood Updated: 02/06/25 0536     WBC 6.45 10*3/mm3      RBC 3.23 10*6/mm3      Hemoglobin 10.4 g/dL      Hematocrit 33.7 %      .3 fL      MCH 32.2 pg      MCHC 30.9 g/dL      RDW 16.6 %      RDW-SD 62.9 fl      MPV 9.7 fL      Platelets 171 10*3/mm3      Neutrophil % 68.7 %      Lymphocyte % 19.4 %      Monocyte % 9.6 %      Eosinophil % 1.7 %      Basophil % 0.3 %      Immature Grans % 0.3 %      Neutrophils, Absolute 4.43 10*3/mm3      Lymphocytes, Absolute 1.25 10*3/mm3      Monocytes, Absolute 0.62 10*3/mm3      Eosinophils, Absolute 0.11 10*3/mm3      Basophils, Absolute 0.02 10*3/mm3      Immature Grans, Absolute 0.02 10*3/mm3      nRBC 0.0 /100 WBC     Blood Culture - Blood, Arm, Left [[id number]]  (Normal) Collected: 02/05/25 0204    Specimen: Blood from Arm, Left Updated: 02/06/25 0230     Blood Culture No growth at 24 hours    Urine Culture - Urine, Urostomy [[id number]]  (Normal) Collected: 02/04/25 2116    Specimen: Urine from Urostomy Updated: 02/05/25 1259     Urine Culture Culture in progress             Imaging Results (Last 24 Hours)       ** No results found for the last 24 hours. **                 I reviewed the patient's new clinical results.    Medication Review:   Scheduled Meds:apixaban, 2.5 mg, Oral, Q12H  atorvastatin, 20 mg, Oral, Nightly  cefTRIAXone, 1,000 mg, Intravenous, Q24H  famotidine, 10 mg, Oral, Q48H  gabapentin, 100 mg, Oral, Nightly  metoprolol tartrate, 12.5 mg, Oral, BID  midodrine, 2.5 mg, Oral, Q8H  polyethylene glycol, 17 g, Oral, Daily  sertraline, 12.5 mg, Oral, Nightly  sevelamer, 800 mg, Oral, TID With Meals  sodium chloride, 10 mL, Intravenous, Q12H      Continuous Infusions:   PRN Meds:.  acetaminophen    senna-docusate sodium **AND** polyethylene glycol **AND** bisacodyl **AND** bisacodyl    ipratropium-albuterol    nitroglycerin    ondansetron ODT **OR**  ondansetron    sodium chloride    sodium chloride    sodium chloride     Interval History:    Assessment & Plan        Acute UTI (urinary tract infection)    Permanent atrial fibrillation    Long term current use of anticoagulant    Presence of cardiac pacemaker    Type 2 diabetes mellitus    Acquired absence of kidney    Acquired absence of other specified parts of digestive tract    Chest pain, atypical    ESRD on dialysis    Hypotension    Mild cognitive impairment of uncertain or unknown etiology    Hard of hearing    Plan of care  Sent in by [facility] due to complaints of chest pain however has had no other complaints troponin flat, EKG without ST elevation; cardiac event ruled out.     Patient was found to have UTI continue current antibiotic await culture. Receiving HD today. BS and afib controlled with home medications.    Plan for disposition:Back to [facility]    Preeti James, CHELLE  02/06/25  10:37 EST

## 2025-02-06 NOTE — THERAPY EVALUATION
Patient Name: Hazel Bucio  : 1940    MRN: [id number]                              Today's Date: 2025       Admit Date: 2025    Visit Dx:     ICD-10-CM ICD-9-CM   1. Chronic atrial fibrillation  I48.20 427.31   2. Hyponatremia  E87.1 276.1   3. Anemia of chronic disease  D63.8 285.29   4. CKD (chronic kidney disease) requiring chronic dialysis  N18.6 585.6    Z99.2 V45.11   5. Urinary tract infection without hematuria, site unspecified  N39.0 599.0     Patient Active Problem List   Diagnosis    Permanent atrial fibrillation    Dyslipidemia    Essential hypertension    Long term current use of anticoagulant    Presence of cardiac pacemaker    Type 2 diabetes mellitus    Ureteral cancer    Chronic insomnia    Seasonal allergies    Acute UTI (urinary tract infection)    Acquired absence of kidney    Athscl heart disease of native coronary 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    Acquired absence of other specified parts of digestive tract    Gastro-esophageal reflux disease without esophagitis    Chest pain, atypical    ESRD on dialysis    Hypotension    Mild cognitive impairment of uncertain or unknown etiology    Femur fracture, right    Severe malnutrition    Hard of hearing    UTI (urinary tract infection)     Past Medical History:   Diagnosis Date    Acquired absence of kidney 10/04/2021    Acquired absence of other specified parts of digestive tract 2022    Athscl heart disease of native coronary 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 2022    Atrial fibrillation with rapid ventricular response     Bladder cancer     Cancer     breast, bladder    Cholecystitis with cholelithiasis     Chronic insomnia 2020    Deep vein thrombosis     Dyslipidemia 2017    E coli bacteremia     ESRD on dialysis 10/18/2023    Essential hypertension 2017    Fracture tibia/fibula, right, closed, initial encounter 2020    Gastro-esophageal reflux disease without esophagitis 2020    GERD (gastroesophageal reflux  disease)     History of bladder cancer     History of falling     History of urostomy     Hyperkalemia     Immobility 08/27/2020    r/t broken Tibia     Irritable bowel syndrome without diarrhea     Kidney carcinoma, right     removed     Long term current use of anticoagulant 01/09/2015    Mild cognitive impairment of uncertain or unknown etiology 08/31/2023    Myalgia due to statin     Other specified abdominal hernia without obstruction or gangrene     PAF (paroxysmal atrial fibrillation) 06/26/2019    Permanent atrial fibrillation 06/26/2019    Personal history of other venous thrombosis and embolism     Presence of cardiac pacemaker 11/03/2014    BS dual chamber PM placed 10/2014 with pocket revision 1/25/17.  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     Past Surgical History:   Procedure Laterality Date    BREAST LUMPECTOMY      CARDIAC CATHETERIZATION N/A 10/18/2023    Procedure: Left Heart Cath possible PCI, atherectomy, hemodynamic support;  Surgeon: Augustin Ellsworth MD;  Location: [facility] CATH INVASIVE LOCATION;  Service: Cardiology;  Laterality: N/A;    CARDIAC ELECTROPHYSIOLOGY PROCEDURE N/A 4/28/2023    Procedure: Pacemaker gen change, [place] AWARE $;  Surgeon: Jose Carlos Cheng MD;  Location: [facility] CATH INVASIVE LOCATION;  Service: Cardiovascular;  Laterality: N/A;    CHOLECYSTECTOMY N/A 10/12/2020    Procedure: CHOLECYSTECTOMY LAPAROSCOPIC;  Surgeon: Go Pereira DO;  Location: [facility] MAIN OR;  Service: General;  Laterality: N/A;    CYSTECTOMY W/ URETEROILEAL CONDUIT      FEMUR IM NAILING RETROGRADE Right 7/25/2024    Procedure: RIGHT FEMUR INTRAMEDULLARY NAILING RETROGRADE AND OPEN REDUCTION INTERNAL FIXATION;  Surgeon: Elieser Diggs MD;  Location: [facility] MAIN OR;  Service: Orthopedics;  Laterality: Right;     HARDWARE REMOVAL Right 6/11/2021    Procedure: TIBIA HARDWARE REMOVAL;  Surgeon: Geoff Shah II, MD;  Location: [facility] MAIN OR;  Service: Orthopedics;  Laterality: Right;    HERNIA REPAIR      HYSTERECTOMY      INSERT / REPLACE / REMOVE PACEMAKER      NEPHRECTOMY Right     TIBIA IM NAILING/RODDING Right 8/28/2020    Procedure: TIBIA INTRAMEDULLARY NAIL/ABRAHAM INSERTION;  Surgeon: Geoff Shah II, MD;  Location: [facility] MAIN OR;  Service: Orthopedics;  Laterality: Right;      General Information       Row Name 02/06/25 [facility]          Physical Therapy Time and Intention    Document Type evaluation  -AO     Mode of Treatment physical therapy  -AO       Row Name 02/06/25 1412          General Information    Patient Profile Reviewed yes  -AO     Prior Level of Function --  Pt reports she was independent w/ no AD in facility, used w/c for community level mobility when out with her son, reports she was independent w/ bating/dressing (unsure of accuracy of report)  -AO     Existing Precautions/Restrictions fall  A-fib/tachycardia  -AO     Barriers to Rehab cognitive status  -AO       Row Name 02/06/25 1412          Living Environment    People in Home facility resident  [facility]  -AO       Row Name 02/06/25 1412          Home Main Entrance    Number of Stairs, Main Entrance none  -AO       Row Name 02/06/25 1412          Stairs Within Home, Primary    Number of Stairs, Within Home, Primary none  -AO       Row Name 02/06/25 1412          Cognition    Orientation Status (Cognition) oriented x 4  -AO       Row Name 02/06/25 1412          Safety Issues/Impairments Affecting Functional Mobility    Impairments Affecting Function (Mobility) balance;endurance/activity tolerance;shortness of breath;strength;pain;range of motion (ROM)  -AO               User Key  (r) = Recorded By, (t) = Taken By, (c) = Cosigned By      Initials Name Provider Type    AO Ana Rosa Escoto, PT Physical Therapist                    Mobility       Row Name 02/06/25 1412          Bed Mobility    Bed Mobility rolling left;rolling right;supine-sit;sit-supine  -AO     Rolling Left [place] (Bed Mobility) moderate assist (50% patient effort);2 person assist  -AO     Rolling Right [place] (Bed Mobility) moderate assist (50% patient effort);2 person assist  -AO     Supine-Sit [place] (Bed Mobility) minimum assist (75% patient effort)  -AO     Sit-Supine [place] (Bed Mobility) maximum assist (25% patient effort)  -AO     Assistive Device (Bed Mobility) repositioning sheet  -AO       Row Name 02/06/25 1412          Bed-Chair Transfer    Bed-Chair [place] (Transfers) unable to assess  -AO       Row Name 02/06/25 1412          Sit-Stand Transfer    Sit-Stand [place] (Transfers) maximum assist (25% patient effort)  -AO     Assistive Device (Sit-Stand Transfers) walker, front-wheeled  -AO     Comment, (Sit-Stand Transfer) Required x2 attempts, able to come to stand on second attempt but with posterior LOB/lean and difficulty shifting weight forward  -AO       Row Name 02/06/25 1412          Gait/Stairs (Locomotion)    [place] Level (Gait) unable to assess  -AO     Patient was able to Ambulate no, other medical factors prevent ambulation  -AO     Reason Patient was unable to Ambulate Excessive Weakness  -AO               User Key  (r) = Recorded By, (t) = Taken By, (c) = Cosigned By      Initials Name Provider Type    AO Ana Rosa Escoto, PT Physical Therapist                   Obj/Interventions       Row Name 02/06/25 1412          Range of Motion Comprehensive    General Range of Motion lower extremity range of motion deficits identified  -AO     Comment, General Range of Motion R knee flexion impaired ~50-75%  -AO       Row Name 02/06/25 1412          Strength Comprehensive (MMT)    Comment, General Manual Muscle Testing (MMT) Assessment B hip flexion: 3/5, R knee flexion/extension: 2/5, L knee  flexion/extension: 3+/5  -AO       Row Name 02/06/25 1412          Balance    Balance Assessment sitting static balance;sitting dynamic balance;standing static balance;standing dynamic balance  -AO     Static Sitting Balance contact guard  -AO     Dynamic Sitting Balance contact guard  -AO     Position, Sitting Balance unsupported;sitting edge of bed  -AO     Static Standing Balance moderate assist  -AO     Dynamic Standing Balance unable to assess  -AO     Position/Device Used, Standing Balance supported;walker, rolling  -AO     Comment, Balance Pt completed ~10 second standing bout  -AO       Row Name 02/06/25 1412          Sensory Assessment (Somatosensory)    Sensory Assessment (Somatosensory) sensation intact  -AO               User Key  (r) = Recorded By, (t) = Taken By, (c) = Cosigned By      Initials Name Provider Type    AO Ana Rosa Escoto, PT Physical Therapist                   Goals/Plan       [facility] Name 02/06/25 1412          Bed Mobility Goal 1 (PT)    Activity/Assistive Device (Bed Mobility Goal 1, PT) bed mobility activities, all  -AO     [place] Level/Cues Needed (Bed Mobility Goal 1, PT) supervision required  -AO     Time Frame (Bed Mobility Goal 1, PT) long term goal (LTG);2 weeks  -AO     Progress/Outcomes (Bed Mobility Goal 1, PT) goal not met  -AO       Row Name 02/06/25 1412          Transfer Goal 1 (PT)    Activity/Assistive Device (Transfer Goal 1, PT) transfers, all;walker, rolling  -AO     [place] Level/Cues Needed (Transfer Goal 1, PT) minimum assist (75% or more patient effort)  -AO     Time Frame (Transfer Goal 1, PT) long term goal (LTG);2 weeks  -AO     Progress/Outcome (Transfer Goal 1, PT) goal not met  -AO       Row Name 02/06/25 1412          Gait Training Goal 1 (PT)    Activity/Assistive Device (Gait Training Goal 1, PT) gait (walking locomotion);walker, rolling  -AO     [place] Level (Gait Training Goal 1, PT) minimum assist (75% or more patient effort)  -AO      "Distance (Gait Training Goal 1, PT) 25 ft  -AO     Time Frame (Gait Training Goal 1, PT) long term goal (LTG);2 weeks  -AO     Progress/Outcome (Gait Training Goal 1, PT) goal not met  -AO       Row Name 02/06/25 1412          Therapy Assessment/Plan (PT)    Planned Therapy Interventions (PT) balance training;bed mobility training;gait training;home exercise program;neuromuscular re-education;patient/family education;strengthening;transfer training  -AO               User Key  (r) = Recorded By, (t) = Taken By, (c) = Cosigned By      Initials Name Provider Type    AO Ana Rosa Escoto, PT Physical Therapist                   Clinical Impression       Row Name 02/06/25 1412          Pain    Pretreatment Pain Rating 8/10  -AO     Posttreatment Pain Rating 8/10  -AO     Pain Side/Orientation generalized  -AO     Pain Management Interventions exercise or physical activity utilized  -AO     Response to Pain Interventions no change per patient report  -AO       Row Name 02/06/25 1412          Plan of Care Review    Plan of Care Reviewed With patient  -AO     Progress no change  -AO     Outcome Evaluation Pt is an 83 y/o F who presented to [facility] from [facility] facility w/ c/o chest pain (troponin (-), EKG (-)), found to have acute UTI and with hx of A-fib, DM type II, and ESRD on HD. Pt alert and oriented to person, place, stated it was \"March 2024\" but able to correct with verbal cues, reports she was  independent w/ no AD in facility, used w/c for community level mobility when out with her son, reports she was independent w/ bating/dressing (unsure of accuracy of report). During eval, pt required MIN A for supine to sit transfer with difficulty advancing BLEs to EOB, required CGA for static sitting balance, MAX A for sit to/from stand transfer with RW with episodes of A-fib/tachycardia and HR elevated to 162 bpm requiring return to seated position. Pt with posterior lean/LOB and difficulty weightshifting over center " of gravity. HR returns to 110bpm with ~30 second seated rest break, pt required MAX A for sit to supine transfer and MOD A x 2 for multiple bouts of rolling to perform pericare needs after BM in bed. Anticipate pt will be safe to return to LTC facility with skilled PT to return to OF. Plan to see 3x/week at [facility] for progression of mobility. PPE: gloves  -AO       Row Name 02/06/25 1412          Therapy Assessment/Plan (PT)    Rehab Potential (PT) fair  -AO     Criteria for Skilled Interventions Met (PT) yes;meets criteria;skilled treatment is necessary  -AO     Therapy Frequency (PT) 3 times/wk  -AO     Predicted Duration of Therapy Intervention (PT) until d/c  -AO       Row Name 02/06/25 1412          Vital Signs    Recovery Time A-fib/tachycardia w/ HR elevated to 162 bpm during sit to/from stand transfer, -162 bpm throughout session (RN notified and aware), all other vitals WNL  -AO       Row Name 02/06/25 1412          Positioning and Restraints    Pre-Treatment Position in bed  -AO     Post Treatment Position bed  -AO     In Bed notified nsg;exit alarm on;call light within reach;encouraged to call for assist  -AO               User Key  (r) = Recorded By, (t) = Taken By, (c) = Cosigned By      Initials Name Provider Type    Ana Rosa Rebolledo, PT Physical Therapist                   Outcome Measures       Row Name 02/06/25 1412 02/06/25 1227       How much help from another person do you currently need...    Turning from your back to your side while in flat bed without using bedrails? 2  -AO 3  -BT    Moving from lying on back to sitting on the side of a flat bed without bedrails? 2  -AO 2  -BT    Moving to and from a bed to a chair (including a wheelchair)? 2  -AO 2  -BT    Standing up from a chair using your arms (e.g., wheelchair, bedside chair)? 2  -AO 2  -BT    Climbing 3-5 steps with a railing? 1  -AO 1  -BT    To walk in hospital room? 1  -AO 2  -BT    AM-PAC 6 Clicks Score (PT) 10  -AO 12  -BT  "   Highest Level of Mobility Goal 4 --> Transfer to chair/commode  -AO 4 --> Transfer to chair/commode  -BT      Row Name 02/06/25 1412          Functional Assessment    Outcome Measure Options AM-PAC 6 Clicks Basic Mobility (PT)  -AO               User Key  (r) = Recorded By, (t) = Taken By, (c) = Cosigned By      Initials Name Provider Type    AO Ana Rosa Escoto, PT Physical Therapist    Suzette Staley, RN Registered Nurse                                 Physical Therapy Education       Title: PT OT SLP Therapies (Done)       Topic: Physical Therapy (Done)       Point: Mobility training (Done)       Learning Progress Summary            Patient Acceptance, E,TB, VU by AO at 2/6/2025 1540                      Point: Body mechanics (Done)       Learning Progress Summary            Patient Acceptance, E,TB, VU by AO at 2/6/2025 1540                                      User Key       Initials Effective Dates Name Provider Type Discipline    AO 06/16/21 -  Ana Rosa Escoto, PT Physical Therapist PT                  PT Recommendation and Plan  Planned Therapy Interventions (PT): balance training, bed mobility training, gait training, home exercise program, neuromuscular re-education, patient/family education, strengthening, transfer training  Progress: no change  Outcome Evaluation: Pt is an 83 y/o F who presented to [facility] from [facility] facility w/ c/o chest pain (troponin (-), EKG (-)), found to have acute UTI and with hx of A-fib, DM type II, and ESRD on HD. Pt alert and oriented to person, place, stated it was \"March 2024\" but able to correct with verbal cues, reports she was  independent w/ no AD in facility, used w/c for community level mobility when out with her son, reports she was independent w/ bating/dressing (unsure of accuracy of report). During eval, pt required MIN A for supine to sit transfer with difficulty advancing BLEs to EOB, required CGA for static sitting balance, MAX A for sit " to/from stand transfer with RW with episodes of A-fib/tachycardia and HR elevated to 162 bpm requiring return to seated position. Pt with posterior lean/LOB and difficulty weightshifting over center of gravity. HR returns to 110bpm with ~30 second seated rest break, pt required MAX A for sit to supine transfer and MOD A x 2 for multiple bouts of rolling to perform pericare needs after BM in bed. Anticipate pt will be safe to return to LTC facility with skilled PT to return to [facility]. Plan to see 3x/week at [facility] for progression of mobility. PPE: gloves     Time Calculation:   PT Evaluation Complexity  History, PT Evaluation Complexity: 3 or more personal factors and/or comorbidities  Examination of Body Systems (PT Eval Complexity): total of 3 or more elements  Clinical Presentation (PT Evaluation Complexity): evolving  Clinical Decision Making (PT Evaluation Complexity): moderate complexity  Overall Complexity (PT Evaluation Complexity): moderate complexity     PT Charges       Row Name 02/06/25 1541             Time Calculation    Start Time 1412  -AO      Stop Time 1437  -AO      Time Calculation (min) 25 min  -AO      PT Received On 02/06/25  -AO      PT - Next Appointment 02/07/25  -AO      PT Goal Re-Cert Due Date 02/20/25  -AO         Time Calculation- PT    Total Timed Code Minutes- PT 0 minute(s)  -AO                User Key  (r) = Recorded By, (t) = Taken By, (c) = Cosigned By      Initials Name Provider Type    Ana Rosa Rebolledo, PT Physical Therapist                  Therapy Charges for Today       Code Description Service Date Service Provider Modifiers Qty    40529991136 HC PT EVAL MOD COMPLEXITY 4 2/6/2025 Ana Rosa Escoto, PT GP 1            PT G-Codes  Outcome Measure Options: AM-PAC 6 Clicks Basic Mobility (PT)  AM-PAC 6 Clicks Score (PT): 10  PT Discharge Summary  Anticipated Discharge Disposition (PT): extended care facility    Ana Rosa Escoto PT  2/6/2025

## 2025-02-06 NOTE — PLAN OF CARE
Goal Outcome Evaluation:      Pt is resting, vss, no complaints during shift. Pt was turn Q2, call light in reach, POC ongoing.

## 2025-02-06 NOTE — PLAN OF CARE
"Goal Outcome Evaluation:  Plan of Care Reviewed With: patient        Progress: no change  Outcome Evaluation: Pt is an 83 y/o F who presented to [facility] from [facility] facility w/ c/o chest pain (troponin (-), EKG (-)), found to have acute UTI and with hx of A-fib, DM type II, and ESRD on HD. Pt alert and oriented to person, place, stated it was \"March 2024\" but able to correct with verbal cues, reports she was  independent w/ no AD in facility, used w/c for community level mobility when out with her son, reports she was independent w/ bating/dressing (unsure of accuracy of report). During eval, pt required MIN A for supine to sit transfer with difficulty advancing BLEs to EOB, required CGA for static sitting balance, MAX A for sit to/from stand transfer with RW with episodes of A-fib/tachycardia and HR elevated to 162 bpm requiring return to seated position. Pt with posterior lean/LOB and difficulty weightshifting over center of gravity. HR returns to 110bpm with ~30 second seated rest break, pt required MAX A for sit to supine transfer and MOD A x 2 for multiple bouts of rolling to perform pericare needs after BM in bed. Anticipate pt will be safe to return to LTC facility with skilled PT to return to PLOF. Plan to see 3x/week at [facility] for progression of mobility. PPE: gloves    Anticipated Discharge Disposition (PT): extended care facility                        "

## 2025-02-06 NOTE — NURSING NOTE
Pt left dialysis unit via bed with transport.  Pt alert and oriented to self, place,situation.  Pt tolerated HD treatment.   Total fluid removed: 1L  Returned blood back via right TDC. Flushed with Normal saline and locked with heparin in both ports. Secured dressing with gauze.  Report given to receiving RN.

## 2025-02-06 NOTE — PLAN OF CARE
Problem: Adult Inpatient Plan of Care  Goal: Plan of Care Review  Outcome: Progressing  Goal: Patient-Specific Goal (Individualized)  Outcome: Progressing  Goal: Absence of Hospital-Acquired Illness or Injury  Outcome: Progressing  Intervention: Identify and Manage Fall Risk  Recent Flowsheet Documentation  Taken 2/6/2025 1227 by Suzette Hutton RN  Safety Promotion/Fall Prevention:   fall prevention program maintained   safety round/check completed  Taken 2/6/2025 1000 by Suzette Hutton RN  Safety Promotion/Fall Prevention: patient off unit  Taken 2/6/2025 0810 by Suzette Hutton RN  Safety Promotion/Fall Prevention: patient off unit  Intervention: Prevent Skin Injury  Recent Flowsheet Documentation  Taken 2/6/2025 1227 by Suzette Hutton RN  Body Position: supine  Skin Protection: incontinence pads utilized  Intervention: Prevent and Manage VTE (Venous Thromboembolism) Risk  Recent Flowsheet Documentation  Taken 2/6/2025 1227 by Suzette Hutton RN  VTE Prevention/Management: SCDs (sequential compression devices) off  Intervention: Prevent Infection  Recent Flowsheet Documentation  Taken 2/6/2025 1227 by Suzette Hutton RN  Infection Prevention: single patient room provided  Goal: Optimal Comfort and Wellbeing  Outcome: Progressing  Intervention: Provide Person-Centered Care  Recent Flowsheet Documentation  Taken 2/6/2025 1227 by Suzette Hutton RN  Trust Relationship/Rapport:   care explained   choices provided  Goal: Readiness for Transition of Care  Outcome: Progressing     Problem: Skin Injury Risk Increased  Goal: Skin Health and Integrity  Outcome: Progressing  Intervention: Optimize Skin Protection  Recent Flowsheet Documentation  Taken 2/6/2025 1227 by Suzette Hutton RN  Activity Management: activity encouraged  Pressure Reduction Techniques: frequent weight shift encouraged  Head of Bed (HOB) Positioning: HOB elevated  Pressure Reduction Devices: pressure-redistributing mattress  utilized  Skin Protection: incontinence pads utilized     Problem: Comorbidity Management  Goal: Maintenance of Heart Failure Symptom Control  Outcome: Progressing  Intervention: Maintain Heart Failure Management  Recent Flowsheet Documentation  Taken 2/6/2025 1227 by Suzette Hutton RN  Medication Review/Management: medications reviewed     Problem: Violence Risk or Actual  Goal: Anger and Impulse Control  Outcome: Progressing  Intervention: Minimize Safety Risk  Recent Flowsheet Documentation  Taken 2/6/2025 1227 by Suzette Hutton RN  Enhanced Safety Measures: bed alarm set  Intervention: Promote Self-Control  Recent Flowsheet Documentation  Taken 2/6/2025 1227 by Suzette Hutton RN  Supportive Measures: active listening utilized     Problem: Fall Injury Risk  Goal: Absence of Fall and Fall-Related Injury  Outcome: Progressing  Intervention: Identify and Manage Contributors  Recent Flowsheet Documentation  Taken 2/6/2025 1227 by Suzette Hutton RN  Medication Review/Management: medications reviewed  Self-Care Promotion: independence encouraged  Intervention: Promote Injury-Free Environment  Recent Flowsheet Documentation  Taken 2/6/2025 1227 by Suzette Hutton RN  Safety Promotion/Fall Prevention:   fall prevention program maintained   safety round/check completed  Taken 2/6/2025 1000 by Suzette Hutton RN  Safety Promotion/Fall Prevention: patient off unit  Taken 2/6/2025 0810 by Suzette Hutton RN  Safety Promotion/Fall Prevention: patient off unit   Goal Outcome Evaluation:   Pt had dialysis completed today. She will receive scheduled abx. Pt is currently resting comfortably with call light in reach.

## 2025-02-06 NOTE — NURSING NOTE
Patient arrived in dialysis unit via bed with transport.  Pt alert and oriented to self, place. Pt requested if she can eat her breakfast tray prior dialysis treatment.   Accessed via right CVC.  Clean and prepped as per protocol. Aspirate heparin and flushed Normal saline without any resistance.  HD tx initiated.

## 2025-02-06 NOTE — PROGRESS NOTES
"                                                                                                                                      Nephrology  Progress Note                                        [facility]    Patient Identification    Name: Hazel Bucio  Age: 84 y.o.  Sex: female  :  1940  MRN: [id number]      DATE OF SERVICE:  2025        Subective    Awake and alert     Objective   Scheduled Meds:apixaban, 2.5 mg, Oral, Q12H  atorvastatin, 20 mg, Oral, Nightly  cefTRIAXone, 1,000 mg, Intravenous, Q24H  famotidine, 10 mg, Oral, Q48H  gabapentin, 100 mg, Oral, Nightly  metoprolol tartrate, 12.5 mg, Oral, BID  midodrine, 2.5 mg, Oral, Q8H  polyethylene glycol, 17 g, Oral, Daily  sertraline, 12.5 mg, Oral, Nightly  sevelamer, 800 mg, Oral, TID With Meals  sodium chloride, 10 mL, Intravenous, Q12H          Continuous Infusions:     PRN Meds:  acetaminophen    senna-docusate sodium **AND** polyethylene glycol **AND** bisacodyl **AND** bisacodyl    ipratropium-albuterol    nitroglycerin    ondansetron ODT **OR** ondansetron    sodium chloride    sodium chloride    sodium chloride     Exam:  /69   Pulse 97   Temp 98.2 °F (36.8 °C) (Oral)   Resp 18   Ht 170.2 cm (67\")   Wt 58.1 kg (128 lb)   SpO2 96%   BMI 20.05 kg/m²     Intake/Output last 3 shifts:  I/O last 3 completed shifts:  In: -   Out: 75 [Urine:75]    Intake/Output this shift:  No intake/output data recorded.    Physical exam:  General Appearance:  Alert  Head:  Normocephalic, without obvious abnormality, atraumatic  Eyes:  PERRL, conjunctiva/corneas clear     Neck:  Supple,  no adenopathy;      Lungs:  Decreased BS occasion ronchi  Heart:  Regular rate and rhythm, S1 and S2 normal  Abdomen:  Soft, non-tender, bowel sounds active   Extremities: trace edema  Pulses: 2+ and symmetric all extremities  Skin:  No rashes or lesions       Data Review:  All labs (24hrs):   Recent Results (from the past 24 hours)   Adult " Transthoracic Echo Complete W/ Cont if Necessary Per Protocol    Collection Time: 02/05/25  9:43 AM   Result Value Ref Range    EF(MOD-bp) 62.4 %    LVIDd 4.4 cm    LVIDs 2.9 cm    IVSd 0.70 cm    LVPWd 0.70 cm    FS 34.1 %    IVS/LVPW 1.00 cm    ESV(cubed) 24.4 ml    LV Sys Vol (BSA corrected) 16.0 cm2    EDV(cubed) 85.2 ml    LV [place] Vol (BSA corrected) 43.0 cm2    LV mass(C)d 92.1 grams    LVOT area 3.1 cm2    LVOT diam 2.00 cm    EDV(MOD-sp2) 61.7 ml    EDV(MOD-sp4) 71.9 ml    ESV(MOD-sp2) 22.7 ml    ESV(MOD-sp4) 26.7 ml    SV(MOD-sp2) 39.0 ml    SV(MOD-sp4) 45.2 ml    SVi(MOD-SP2) 23.3 ml/m2    SVi(MOD-SP4) 27.0 ml/m2    SVi (LVOT) 25.5 ml/m2    EF(MOD-sp2) 63.2 %    EF(MOD-sp4) 62.9 %    MV E max mj 129.0 cm/sec    LA ESV Index (BP) 29.0 ml/m2    Med Peak E' Mj 9.8 cm/sec    Lat Peak E' Mj 13.8 cm/sec    TR max mj 256.0 cm/sec    Avg E/e' ratio 10.93     SV(LVOT) 42.7 ml    RV Base 2.7 cm    RV Mid 2.20 cm    TAPSE (>1.6) 1.26 cm    RV S' 6.1 cm/sec    LA dimension (2D)  4.2 cm    LV V1 max 80.9 cm/sec    LV V1 max PG 2.6 mmHg    LV V1 mean PG 1.00 mmHg    LV V1 VTI 13.6 cm    Ao pk mj 116.0 cm/sec    Ao max PG 5.4 mmHg    Ao mean PG 3.0 mmHg    Ao V2 VTI 19.0 cm    KRIS(I,D) 2.25 cm2    MV max PG 7.1 mmHg    MV mean PG 3.0 mmHg    MV V2 VTI 22.9 cm    MV P1/2t 57.3 msec    MVA(P1/2t) 3.8 cm2    MVA(VTI) 1.87 cm2    MV dec slope 659.0 cm/sec2    MR max mj 318.0 cm/sec    MR max PG 40.4 mmHg    TR max PG 26.2 mmHg    RVSP(TR) 29.2 mmHg    RAP systole 3.0 mmHg    RV V1 max PG 2.16 mmHg    RV V1 max 73.4 cm/sec    RV V1 VTI 14.8 cm    PA V2 max 94.5 cm/sec    PA acc time 0.07 sec    ACS 2.00 cm    Sinus 3.4 cm    STJ 3.2 cm    Dimensionless Index 0.70 (DI)   Basic Metabolic Panel    Collection Time: 02/06/25  4:58 AM    Specimen: Blood   Result Value Ref Range    Glucose 80 65 - 99 mg/dL    BUN 43 (H) 8 - 23 mg/dL    Creatinine 4.47 (H) 0.57 - 1.00 mg/dL    Sodium 139 136 - 145 mmol/L    Potassium 5.3 (H)  3.5 - 5.2 mmol/L    Chloride 102 98 - 107 mmol/L    CO2 25.1 22.0 - 29.0 mmol/L    Calcium 9.3 8.6 - 10.5 mg/dL    BUN/Creatinine Ratio 9.6 7.0 - 25.0    Anion Gap 11.9 5.0 - 15.0 mmol/L    eGFR 9.3 (L) >60.0 mL/min/1.73   CBC Auto Differential    Collection Time: 02/06/25  4:58 AM    Specimen: Blood   Result Value Ref Range    WBC 6.45 3.40 - 10.80 10*3/mm3    RBC 3.23 (L) 3.77 - 5.28 10*6/mm3    Hemoglobin 10.4 (L) 12.0 - 15.9 g/dL    Hematocrit 33.7 (L) 34.0 - 46.6 %    .3 (H) 79.0 - 97.0 fL    MCH 32.2 26.6 - 33.0 pg    MCHC 30.9 (L) 31.5 - 35.7 g/dL    RDW 16.6 (H) 12.3 - 15.4 %    RDW-SD 62.9 (H) 37.0 - 54.0 fl    MPV 9.7 6.0 - 12.0 fL    Platelets 171 140 - 450 10*3/mm3    Neutrophil % 68.7 42.7 - 76.0 %    Lymphocyte % 19.4 (L) 19.6 - 45.3 %    Monocyte % 9.6 5.0 - 12.0 %    Eosinophil % 1.7 0.3 - 6.2 %    Basophil % 0.3 0.0 - 1.5 %    Immature Grans % 0.3 0.0 - 0.5 %    Neutrophils, Absolute 4.43 1.70 - 7.00 10*3/mm3    Lymphocytes, Absolute 1.25 0.70 - 3.10 10*3/mm3    Monocytes, Absolute 0.62 0.10 - 0.90 10*3/mm3    Eosinophils, Absolute 0.11 0.00 - 0.40 10*3/mm3    Basophils, Absolute 0.02 0.00 - 0.20 10*3/mm3    Immature Grans, Absolute 0.02 0.00 - 0.05 10*3/mm3    nRBC 0.0 0.0 - 0.2 /100 WBC          Imaging:  XR Chest 1 View    Result Date: 2/4/2025  Impression: No acute pulm abnormality on radiograph. Electronically Signed: Sonya Vital MD  2/4/2025 8:59 PM EST  Workstation ID: [id number]     Assessment/Plan:     Acute UTI (urinary tract infection)    Permanent atrial fibrillation    Long term current use of anticoagulant    Presence of cardiac pacemaker    Type 2 diabetes mellitus    Acquired absence of kidney    Acquired absence of other specified parts of digestive tract    Chest pain, atypical    ESRD on dialysis    Hypotension    Mild cognitive impairment of uncertain or unknown etiology    Hard of hearing    UTI (urinary tract infection)         ESRD hemodialysis  TTS  UTI  Hyponatremia  Type 2 diabetes  Atrial fibrillation  Chest pain  Status post hypotension        Recommendations:    for dialysis today potassium 5.3  Watch electrolytes and volume  Avoid excessive fluids  If needs cardiac catheterization may proceed anytime

## 2025-02-07 VITALS
RESPIRATION RATE: 12 BRPM | HEIGHT: 67 IN | WEIGHT: 128 LBS | DIASTOLIC BLOOD PRESSURE: 63 MMHG | OXYGEN SATURATION: 97 % | BODY MASS INDEX: 20.09 KG/M2 | HEART RATE: 109 BPM | SYSTOLIC BLOOD PRESSURE: 108 MMHG | TEMPERATURE: 98.2 F

## 2025-02-07 LAB
ANION GAP SERPL CALCULATED.3IONS-SCNC: 11.6 MMOL/L (ref 5–15)
BACTERIA SPEC AEROBE CULT: ABNORMAL
BASOPHILS # BLD AUTO: 0.03 10*3/MM3 (ref 0–0.2)
BASOPHILS NFR BLD AUTO: 0.4 % (ref 0–1.5)
BUN SERPL-MCNC: 20 MG/DL (ref 8–23)
BUN/CREAT SERPL: 7.1 (ref 7–25)
CALCIUM SPEC-SCNC: 9.3 MG/DL (ref 8.6–10.5)
CHLORIDE SERPL-SCNC: 100 MMOL/L (ref 98–107)
CO2 SERPL-SCNC: 24.4 MMOL/L (ref 22–29)
CREAT SERPL-MCNC: 2.81 MG/DL (ref 0.57–1)
DEPRECATED RDW RBC AUTO: 61.6 FL (ref 37–54)
EGFRCR SERPLBLD CKD-EPI 2021: 16.2 ML/MIN/1.73
EOSINOPHIL # BLD AUTO: 0.14 10*3/MM3 (ref 0–0.4)
EOSINOPHIL NFR BLD AUTO: 1.9 % (ref 0.3–6.2)
ERYTHROCYTE [DISTWIDTH] IN BLOOD BY AUTOMATED COUNT: 16.5 % (ref 12.3–15.4)
GLUCOSE SERPL-MCNC: 89 MG/DL (ref 65–99)
HCT VFR BLD AUTO: 32 % (ref 34–46.6)
HGB BLD-MCNC: 10.1 G/DL (ref 12–15.9)
IMM GRANULOCYTES # BLD AUTO: 0.02 10*3/MM3 (ref 0–0.05)
IMM GRANULOCYTES NFR BLD AUTO: 0.3 % (ref 0–0.5)
LYMPHOCYTES # BLD AUTO: 1.58 10*3/MM3 (ref 0.7–3.1)
LYMPHOCYTES NFR BLD AUTO: 21.9 % (ref 19.6–45.3)
MCH RBC QN AUTO: 32.2 PG (ref 26.6–33)
MCHC RBC AUTO-ENTMCNC: 31.6 G/DL (ref 31.5–35.7)
MCV RBC AUTO: 101.9 FL (ref 79–97)
MONOCYTES # BLD AUTO: 0.76 10*3/MM3 (ref 0.1–0.9)
MONOCYTES NFR BLD AUTO: 10.5 % (ref 5–12)
NEUTROPHILS NFR BLD AUTO: 4.68 10*3/MM3 (ref 1.7–7)
NEUTROPHILS NFR BLD AUTO: 65 % (ref 42.7–76)
NRBC BLD AUTO-RTO: 0 /100 WBC (ref 0–0.2)
PLATELET # BLD AUTO: 194 10*3/MM3 (ref 140–450)
PMV BLD AUTO: 9.6 FL (ref 6–12)
POTASSIUM SERPL-SCNC: 4 MMOL/L (ref 3.5–5.2)
RBC # BLD AUTO: 3.14 10*6/MM3 (ref 3.77–5.28)
SODIUM SERPL-SCNC: 136 MMOL/L (ref 136–145)
WBC NRBC COR # BLD AUTO: 7.21 10*3/MM3 (ref 3.4–10.8)

## 2025-02-07 PROCEDURE — 25010000002 CEFTRIAXONE PER 250 MG: Performed by: FAMILY MEDICINE

## 2025-02-07 PROCEDURE — 80048 BASIC METABOLIC PNL TOTAL CA: CPT | Performed by: FAMILY MEDICINE

## 2025-02-07 PROCEDURE — 85025 COMPLETE CBC W/AUTO DIFF WBC: CPT | Performed by: FAMILY MEDICINE

## 2025-02-07 RX ORDER — CEFDINIR 300 MG/1
300 CAPSULE ORAL 2 TIMES DAILY
Qty: 10 CAPSULE | Refills: 0 | Status: SHIPPED | OUTPATIENT
Start: 2025-02-07 | End: 2025-02-07

## 2025-02-07 RX ORDER — METOPROLOL TARTRATE 25 MG/1
12.5 TABLET, FILM COATED ORAL 2 TIMES DAILY
Start: 2025-02-07

## 2025-02-07 RX ORDER — CEFDINIR 300 MG/1
300 CAPSULE ORAL DAILY
Start: 2025-02-07 | End: 2025-02-10

## 2025-02-07 RX ADMIN — SEVELAMER CARBONATE 800 MG: 800 TABLET, FILM COATED ORAL at 09:39

## 2025-02-07 RX ADMIN — MIDODRINE HYDROCHLORIDE 2.5 MG: 5 TABLET ORAL at 13:09

## 2025-02-07 RX ADMIN — Medication 10 ML: at 09:40

## 2025-02-07 RX ADMIN — APIXABAN 2.5 MG: 2.5 TABLET, FILM COATED ORAL at 09:39

## 2025-02-07 RX ADMIN — FAMOTIDINE 10 MG: 20 TABLET, FILM COATED ORAL at 09:39

## 2025-02-07 RX ADMIN — POLYETHYLENE GLYCOL 3350 17 G: 17 POWDER, FOR SOLUTION ORAL at 09:39

## 2025-02-07 RX ADMIN — MIDODRINE HYDROCHLORIDE 2.5 MG: 5 TABLET ORAL at 05:17

## 2025-02-07 RX ADMIN — SEVELAMER CARBONATE 800 MG: 800 TABLET, FILM COATED ORAL at 12:09

## 2025-02-07 RX ADMIN — Medication 12.5 MG: at 09:39

## 2025-02-07 NOTE — NURSING NOTE
Attempt #1 to call report to [facility], was transferred to nurses station and no answer.

## 2025-02-07 NOTE — NURSING NOTE
Attempt #4 to call report to [facility]. No response and no option for voicemail.

## 2025-02-07 NOTE — PLAN OF CARE
Problem: Adult Inpatient Plan of Care  Goal: Plan of Care Review  Outcome: Met  Goal: Patient-Specific Goal (Individualized)  Outcome: Met  Goal: Absence of Hospital-Acquired Illness or Injury  Outcome: Met  Intervention: Identify and Manage Fall Risk  Recent Flowsheet Documentation  Taken 2/7/2025 1425 by Suzette Hutton RN  Safety Promotion/Fall Prevention:   fall prevention program maintained   safety round/check completed  Taken 2/7/2025 1200 by Suzette Hutton RN  Safety Promotion/Fall Prevention:   fall prevention program maintained   safety round/check completed  Taken 2/7/2025 1010 by Suzette Hutton RN  Safety Promotion/Fall Prevention:   fall prevention program maintained   safety round/check completed  Taken 2/7/2025 0855 by Suzette Hutton RN  Safety Promotion/Fall Prevention:   safety round/check completed   fall prevention program maintained  Intervention: Prevent Skin Injury  Recent Flowsheet Documentation  Taken 2/7/2025 1010 by Suzette Hutton RN  Body Position:   turned   left  Taken 2/7/2025 0855 by Suzette Hutton RN  Body Position: supine  Skin Protection: incontinence pads utilized  Intervention: Prevent and Manage VTE (Venous Thromboembolism) Risk  Recent Flowsheet Documentation  Taken 2/7/2025 0855 by Suzette Hutton RN  VTE Prevention/Management: SCDs (sequential compression devices) off  Intervention: Prevent Infection  Recent Flowsheet Documentation  Taken 2/7/2025 0855 by Suzette Hutton RN  Infection Prevention: single patient room provided  Goal: Optimal Comfort and Wellbeing  Outcome: Met  Intervention: Provide Person-Centered Care  Recent Flowsheet Documentation  Taken 2/7/2025 0855 by Suzette Hutton RN  Trust Relationship/Rapport:   care explained   choices provided  Goal: Readiness for Transition of Care  Outcome: Met     Problem: Skin Injury Risk Increased  Goal: Skin Health and Integrity  Outcome: Met  Intervention: Optimize Skin Protection  Recent  Flowsheet Documentation  Taken 2/7/2025 0855 by Suzette Hutton RN  Activity Management: activity encouraged  Pressure Reduction Techniques: frequent weight shift encouraged  Head of Bed (HOB) Positioning: HOB elevated  Pressure Reduction Devices: pressure-redistributing mattress utilized  Skin Protection: incontinence pads utilized     Problem: Comorbidity Management  Goal: Maintenance of Heart Failure Symptom Control  Outcome: Met  Intervention: Maintain Heart Failure Management  Recent Flowsheet Documentation  Taken 2/7/2025 0855 by Suzette Hutton RN  Medication Review/Management: medications reviewed     Problem: Violence Risk or Actual  Goal: Anger and Impulse Control  Outcome: Met  Intervention: Minimize Safety Risk  Recent Flowsheet Documentation  Taken 2/7/2025 0855 by Suzette Hutton RN  Enhanced Safety Measures: bed alarm set  Intervention: Promote Self-Control  Recent Flowsheet Documentation  Taken 2/7/2025 0855 by Suzette Hutton RN  Supportive Measures: active listening utilized  Environmental Support: calm environment promoted     Problem: Fall Injury Risk  Goal: Absence of Fall and Fall-Related Injury  Outcome: Met  Intervention: Identify and Manage Contributors  Recent Flowsheet Documentation  Taken 2/7/2025 0855 by Suzette Hutton RN  Medication Review/Management: medications reviewed  Self-Care Promotion: independence encouraged  Intervention: Promote Injury-Free Environment  Recent Flowsheet Documentation  Taken 2/7/2025 1425 by Suzette Hutton RN  Safety Promotion/Fall Prevention:   fall prevention program maintained   safety round/check completed  Taken 2/7/2025 1200 by Suzette Hutton RN  Safety Promotion/Fall Prevention:   fall prevention program maintained   safety round/check completed  Taken 2/7/2025 1010 by Suzette Hutton RN  Safety Promotion/Fall Prevention:   fall prevention program maintained   safety round/check completed  Taken 2/7/2025 0855 by Brennen  Suzette RN  Safety Promotion/Fall Prevention:   safety round/check completed   fall prevention program maintained   Goal Outcome Evaluation:   Pt to discharge to [facility]. Report attempted to be called. IV removed. Discharge paperwork sent with pt to facility. No concerns noted.

## 2025-02-07 NOTE — DISCHARGE SUMMARY
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          Hospital Course  84 y.o. female who presented to the ER with complaints of chest pain, although she was unable to describe duration or characteristics to this provider or even recall why she presented to the hospital.  To ER provider she reported it had been waxing and waning for some time. She reported some Shortness of breath without any timeline either.  She denied abdominal pain, nausea, vomiting, headache.  No known fever.  She is alert to person and place and resides at [facility].  Per documentation from [facility], she is a DNR/DNI. She has a dialysis catheter in her right upper chest and colostomy present.     In the ER, chest x ray was negative for acute process.  Trop was elevated but without significant change 70->65.  BNP elevated at 13,498.  Na mildly low at 133 which appears to be near her baseline.  Creatinine 3.33/BUN 28, GFR 13.2.  alk veronique 132 which again is her baseline. Normal LFT.  WBC WNL at 6.55 with HGB 11.1.  UA was obtained with Green cloudy urine. 3+ leukocytes, negative nitrite,  large  WBC and 4+ bacteria.  Urine culture is pending.  She received a small 500 cc bolus NS, 0.5 mg of dilaudid, and 1g Rocephin after discussion with pharmacy regarding  her multiple allergies. She was admitted.  She ruled out for acute cardiac event.  Her improved with the treatment of her UTI.  She was followed by nephrology and received her HD per her usual schedule. Her blood pressure remained low.  Her lasix was held and her beta blocker dose was decreased.  Today, she is at her baseline and ready to d/c back to [facility]    Procedures Performed         Consults:   Consults       Date and Time Order Name Status Description    2/5/2025 11:37 AM Inpatient Nephrology Consult Completed     2/4/2025 10:45 PM Family Medicine Consult              Pertinent Test Results:    Lab Results (most recent)       Procedure Component Value Units Date/Time    Blood Culture - Blood, Hand, Left [[id number]]  (Normal) Collected: 02/05/25 0946    Specimen: Blood from Hand, Left Updated: 02/07/25 1000     Blood Culture No growth at 2 days    Narrative:      Less than seven (7) mL's of blood was collected.  Insufficient quantity may yield false negative results.    Basic Metabolic Panel [[id number]]  (Abnormal) Collected: 02/07/25 0421    Specimen: Blood from Arm, Left Updated: 02/07/25 0503     Glucose 89 mg/dL      BUN 20 mg/dL      Creatinine 2.81 mg/dL      Sodium 136 mmol/L      Potassium 4.0 mmol/L      Chloride 100 mmol/L      CO2 24.4 mmol/L      Calcium 9.3 mg/dL      BUN/Creatinine Ratio 7.1     Anion Gap 11.6 mmol/L      eGFR 16.2 mL/min/1.73     Narrative:      GFR Categories in Chronic Kidney Disease (CKD)      GFR Category          GFR (mL/min/1.73)    Interpretation  G1                     90 or greater         Normal or high (1)  G2                      60-89                Mild decrease (1)  G3a                   45-59                Mild to moderate decrease  G3b                   30-44                Moderate to severe  decrease  G4                    15-29                Severe decrease  G5                    14 or less           Kidney failure          (1)In the absence of evidence of kidney disease, neither GFR category G1 or G2 fulfill the criteria for CKD.    eGFR calculation 2021 CKD-EPI creatinine equation, which does not include race as a factor    CBC & Differential [[id number]]  (Abnormal) Collected: 02/07/25 0421    Specimen: Blood from Arm, Left Updated: 02/07/25 0428    Narrative:      The following orders were created for panel order CBC & Differential.  Procedure                               Abnormality         Status                     ---------                               -----------         ------                     CBC Auto Differential[[id number]]        Abnormal            Final result                 Please view results for these tests on the individual orders.    CBC Auto Differential [[id number]]  (Abnormal) Collected: 02/07/25 0421    Specimen: Blood from Arm, Left Updated: 02/07/25 0428     WBC 7.21 10*3/mm3      RBC 3.14 10*6/mm3      Hemoglobin 10.1 g/dL      Hematocrit 32.0 %      .9 fL      MCH 32.2 pg      MCHC 31.6 g/dL      RDW 16.5 %      RDW-SD 61.6 fl      MPV 9.6 fL      Platelets 194 10*3/mm3      Neutrophil % 65.0 %      Lymphocyte % 21.9 %      Monocyte % 10.5 %      Eosinophil % 1.9 %      Basophil % 0.4 %      Immature Grans % 0.3 %      Neutrophils, Absolute 4.68 10*3/mm3      Lymphocytes, Absolute 1.58 10*3/mm3      Monocytes, Absolute 0.76 10*3/mm3      Eosinophils, Absolute 0.14 10*3/mm3      Basophils, Absolute 0.03 10*3/mm3      Immature Grans, Absolute 0.02 10*3/mm3      nRBC 0.0 /100 WBC     Blood Culture - Blood, Arm, Left [[id number]]  (Normal) Collected: 02/05/25 0204    Specimen: Blood from Arm, Left Updated: 02/07/25 0230     Blood Culture No growth at 2 days    Urine Culture - Urine, Urostomy [[id number]]  (Abnormal) Collected: 02/04/25 2116    Specimen: Urine from  Urostomy Updated: 02/06/25 1045     Urine Culture 50,000 CFU/mL Gram Negative Bacilli      50,000 CFU/mL Gram Positive Cocci    Narrative:      Colonization of the urinary tract without infection is common. Treatment is discouraged unless the patient is symptomatic, pregnant, or undergoing an invasive urologic procedure.    Hepatitis B Surface Antigen [[id number]]  (Normal) Collected: 02/06/25 0908    Specimen: Blood from Cannula Updated: 02/06/25 1003     Hepatitis B Surface Ag Non-Reactive    Basic Metabolic Panel [[id number]]  (Abnormal) Collected: 02/06/25 0458    Specimen: Blood Updated: 02/06/25 0600     Glucose 80 mg/dL      BUN 43 mg/dL      Creatinine 4.47 mg/dL      Sodium 139 mmol/L      Potassium 5.3 mmol/L      Chloride 102 mmol/L      CO2 25.1 mmol/L      Calcium 9.3 mg/dL      BUN/Creatinine Ratio 9.6     Anion Gap 11.9 mmol/L      eGFR 9.3 mL/min/1.73     Narrative:      GFR Categories in Chronic Kidney Disease (CKD)      GFR Category          GFR (mL/min/1.73)    Interpretation  G1                     90 or greater         Normal or high (1)  G2                      60-89                Mild decrease (1)  G3a                   45-59                Mild to moderate decrease  G3b                   30-44                Moderate to severe decrease  G4                    15-29                Severe decrease  G5                    14 or less           Kidney failure          (1)In the absence of evidence of kidney disease, neither GFR category G1 or G2 fulfill the criteria for CKD.    eGFR calculation 2021 CKD-EPI creatinine equation, which does not include race as a factor    CBC & Differential [[id number]]  (Abnormal) Collected: 02/06/25 0458    Specimen: Blood Updated: 02/06/25 0536    Narrative:      The following orders were created for panel order CBC & Differential.  Procedure                               Abnormality         Status                     ---------                                -----------         ------                     CBC Auto Differential[[id number]]        Abnormal            Final result                 Please view results for these tests on the individual orders.    CBC Auto Differential [[id number]]  (Abnormal) Collected: 02/06/25 0458    Specimen: Blood Updated: 02/06/25 0536     WBC 6.45 10*3/mm3      RBC 3.23 10*6/mm3      Hemoglobin 10.4 g/dL      Hematocrit 33.7 %      .3 fL      MCH 32.2 pg      MCHC 30.9 g/dL      RDW 16.6 %      RDW-SD 62.9 fl      MPV 9.7 fL      Platelets 171 10*3/mm3      Neutrophil % 68.7 %      Lymphocyte % 19.4 %      Monocyte % 9.6 %      Eosinophil % 1.7 %      Basophil % 0.3 %      Immature Grans % 0.3 %      Neutrophils, Absolute 4.43 10*3/mm3      Lymphocytes, Absolute 1.25 10*3/mm3      Monocytes, Absolute 0.62 10*3/mm3      Eosinophils, Absolute 0.11 10*3/mm3      Basophils, Absolute 0.02 10*3/mm3      Immature Grans, Absolute 0.02 10*3/mm3      nRBC 0.0 /100 WBC     High Sensitivity Troponin T [[id number]]  (Abnormal) Collected: 02/05/25 0205    Specimen: Blood Updated: 02/05/25 0254     HS Troponin T 61 ng/L     Narrative:      High Sensitive Troponin T Reference Range:  <14.0 ng/L- Negative Female for AMI  <22.0 ng/L- Negative Male for AMI  >=14 - Abnormal Female indicating possible myocardial injury.  >=22 - Abnormal Male indicating possible myocardial injury.   Clinicians would have to utilize clinical acumen, EKG, Troponin, and serial changes to determine if it is an Acute Myocardial Infarction or myocardial injury due to an underlying chronic condition.         Phosphorus [[id number]]  (Abnormal) Collected: 02/05/25 0205    Specimen: Blood Updated: 02/05/25 0250     Phosphorus 5.0 mg/dL     Lactic Acid, Plasma [[id number]]  (Normal) Collected: 02/05/25 0205    Specimen: Blood Updated: 02/05/25 0247     Lactate 1.1 mmol/L     High Sensitivity Troponin T 1Hr [[id number]]  (Abnormal) Collected: 02/04/25 2139    Specimen: Blood Updated:  02/04/25 2203     HS Troponin T 65 ng/L      Troponin T Numeric Delta -5 ng/L      Troponin T % Delta -7    Narrative:      High Sensitive Troponin T Reference Range:  <14.0 ng/L- Negative Female for AMI  <22.0 ng/L- Negative Male for AMI  >=14 - Abnormal Female indicating possible myocardial injury.  >=22 - Abnormal Male indicating possible myocardial injury.   Clinicians would have to utilize clinical acumen, EKG, Troponin, and serial changes to determine if it is an Acute Myocardial Infarction or myocardial injury due to an underlying chronic condition.         Urinalysis, Microscopic Only - Urostomy [[id number]]  (Abnormal) Collected: 02/04/25 2116    Specimen: Urine from Urostomy Updated: 02/04/25 2137     RBC, UA 0-2 /HPF      WBC, UA 11-20 /HPF      Bacteria, UA 4+ /HPF      Squamous Epithelial Cells, UA 0-2 /HPF      Hyaline Casts, UA       Unable to determine due to loaded field     /LPF     Triple Phosphate Crystals, UA Small/1+ /HPF      Methodology Automated Microscopy    Urinalysis With Culture If Indicated - Urostomy [[id number]]  (Abnormal) Collected: 02/04/25 2116    Specimen: Urine from Urostomy Updated: 02/04/25 2128     Color, UA Green     Comment: Any Substance that causes an abnormal urine color can alter the accuracy of the chemical reactions.        Appearance, UA Cloudy     pH, UA 8.5     Specific Gravity, UA 1.015     Glucose, UA Negative     Ketones, UA 15 mg/dL (1+)     Bilirubin, UA Large (3+)     Comment: Confirmation testing is unavailable.  A serum bilirubin is recommended for further assessment.        Blood, UA Trace     Protein, UA >=300 mg/dL (3+)     Leuk Esterase, UA Large (3+)     Nitrite, UA Negative     Urobilinogen, UA 2.0 E.U./dL    Narrative:      In absence of clinical symptoms, the presence of pyuria, bacteria, and/or nitrites on the urinalysis result does not correlate with infection.    TSH Rfx On Abnormal To Free T4 [[id number]]  (Normal) Collected: 02/04/25 2030     Specimen: Blood Updated: 02/04/25 2108     TSH 1.990 uIU/mL     High Sensitivity Troponin T [[id number]]  (Abnormal) Collected: 02/04/25 2030    Specimen: Blood Updated: 02/04/25 2105     HS Troponin T 70 ng/L     Narrative:      High Sensitive Troponin T Reference Range:  <14.0 ng/L- Negative Female for AMI  <22.0 ng/L- Negative Male for AMI  >=14 - Abnormal Female indicating possible myocardial injury.  >=22 - Abnormal Male indicating possible myocardial injury.   Clinicians would have to utilize clinical acumen, EKG, Troponin, and serial changes to determine if it is an Acute Myocardial Infarction or myocardial injury due to an underlying chronic condition.         Comprehensive Metabolic Panel [[id number]]  (Abnormal) Collected: 02/04/25 2030    Specimen: Blood Updated: 02/04/25 2102     Glucose 119 mg/dL      BUN 28 mg/dL      Creatinine 3.33 mg/dL      Sodium 133 mmol/L      Potassium 4.6 mmol/L      Chloride 94 mmol/L      CO2 25.4 mmol/L      Calcium 9.3 mg/dL      Total Protein 7.5 g/dL      Albumin 3.8 g/dL      ALT (SGPT) 17 U/L      AST (SGOT) 33 U/L      Alkaline Phosphatase 132 U/L      Total Bilirubin 0.6 mg/dL      Globulin 3.7 gm/dL      A/G Ratio 1.0 g/dL      BUN/Creatinine Ratio 8.4     Anion Gap 13.6 mmol/L      eGFR 13.2 mL/min/1.73     Narrative:      GFR Categories in Chronic Kidney Disease (CKD)      GFR Category          GFR (mL/min/1.73)    Interpretation  G1                     90 or greater         Normal or high (1)  G2                      60-89                Mild decrease (1)  G3a                   45-59                Mild to moderate decrease  G3b                   30-44                Moderate to severe decrease  G4                    15-29                Severe decrease  G5                    14 or less           Kidney failure          (1)In the absence of evidence of kidney disease, neither GFR category G1 or G2 fulfill the criteria for CKD.    eGFR calculation 2021 CKD-EPI  creatinine equation, which does not include race as a factor    BNP [[id number]]  (Abnormal) Collected: 02/04/25 2030    Specimen: Blood Updated: 02/04/25 2102     proBNP 13,498.0 pg/mL     Narrative:      This assay is used as an aid in the diagnosis of individuals suspected of having heart failure. It can be used as an aid in the diagnosis of acute decompensated heart failure (ADHF) in patients presenting with signs and symptoms of ADHF to the emergency department (ED). In addition, NT-proBNP of <300 pg/mL indicates ADHF is not likely.    Age Range Result Interpretation  NT-proBNP Concentration (pg/mL:      <50             Positive            >450                   Gray                 300-450                    Negative             <300    50-75           Positive            >900                  Gray                300-900                  Negative            <300      >75             Positive            >1800                  Gray                300-1800                  Negative            <300    Magnesium [[id number]]  (Normal) Collected: 02/04/25 2030    Specimen: Blood Updated: 02/04/25 2102     Magnesium 2.2 mg/dL     [facility] Draw [[id number]] Collected: 02/04/25 2030    Specimen: Blood Updated: 02/04/25 2045    Narrative:      The following orders were created for panel order [facility].  Procedure                               Abnormality         Status                     ---------                               -----------         ------                     Green Top (Gel)[[id number]]                                  Final result               Lavender Top[[id number]]                                     Final result               Gold Top - SST[[id number]]                                   Final result               Light Blue Top[[id number]]                                   Final result                 Please view results for these tests on the individual orders.    Green Top (Gel) [[id number]] Collected:  02/04/25 2030    Specimen: Blood Updated: 02/04/25 2045     Extra Tube Hold for add-ons.     Comment: Auto resulted.       Lavender Top [[id number]] Collected: 02/04/25 2030    Specimen: Blood Updated: 02/04/25 2045     Extra Tube hold for add-on     Comment: Auto resulted       Gold Top - SST [[id number]] Collected: 02/04/25 2030    Specimen: Blood Updated: 02/04/25 2045     Extra Tube Hold for add-ons.     Comment: Auto resulted.       Light Blue Top [[id number]] Collected: 02/04/25 2030    Specimen: Blood Updated: 02/04/25 2045     Extra Tube Hold for add-ons.     Comment: Auto resulted                Results for orders placed during the hospital encounter of 02/04/25    Adult Transthoracic Echo Complete W/ Cont if Necessary Per Protocol    Interpretation Summary    Left ventricular systolic function is normal. Left ventricular ejection fraction appears to be 61 - 65%.    Left ventricular diastolic function was indeterminate.    The right ventricular cavity is mildly dilated.    The left atrial cavity is mild to moderately dilated.    Left atrial volume is mildly increased.    The right atrial cavity is mildly  dilated.    Estimated right ventricular systolic pressure from tricuspid regurgitation is normal (<35 mmHg).              Condition on Discharge:  stable    Vital Signs  Temp:  [98 °F (36.7 °C)-98.6 °F (37 °C)] 98.4 °F (36.9 °C)  Heart Rate:  [] 106  Resp:  [11-16] 11  BP: (106-124)/(67-91) 124/72    Physical Exam:     General Appearance:    Alert, cooperative, in no acute distress, pleasantly confused   Head:    Normocephalic, without obvious abnormality, atraumatic   Eyes:            Lids and lashes normal, conjunctivae and sclerae normal, no   icterus, no pallor, corneas clear, PERRLA   Ears:    Ears appear intact with no abnormalities noted   Throat:   No oral lesions, no thrush, oral mucosa moist   Neck:   No adenopathy, supple, trachea midline, no thyromegaly, no   carotid bruit, no JVD   Lungs:      Clear to auscultation,respirations regular, even and                  unlabored    Heart:    Regular rhythm and normal rate, normal S1 and S2, no            murmur, no gallop, no rub, no click   Chest Wall:    No abnormalities observed   Abdomen:     Normal bowel sounds, no masses, no organomegaly, soft        non-tender, non-distended, no guarding, no rebound                tenderness   Extremities:   Moves all extremities well, no edema, no cyanosis, no             redness   Pulses:   Pulses palpable and equal bilaterally   Skin:   No bleeding, bruising or rash   Lymph nodes:   No palpable adenopathy   Neurologic:   Cranial nerves 2 - 12 grossly intact, sensation intact, DTR       present and equal bilaterally       Discharge Disposition  Long Term Care (DC - External)    Discharge Medications     Discharge Medications        New Medications        Instructions Start Date   cefdinir 300 MG capsule  Commonly known as: OMNICEF   300 mg, Oral, 2 Times Daily             Changes to Medications        Instructions Start Date   metoprolol tartrate 25 MG tablet  Commonly known as: LOPRESSOR  What changed: how much to take   12.5 mg, Oral, 2 Times Daily             Continue These Medications        Instructions Start Date   acetaminophen 325 MG tablet  Commonly known as: TYLENOL   650 mg, 4 Times Daily PRN      apixaban 2.5 MG tablet tablet  Commonly known as: ELIQUIS   2.5 mg, Oral, Every 12 Hours Scheduled      atorvastatin 20 MG tablet  Commonly known as: LIPITOR   20 mg, Nightly      famotidine 10 MG tablet  Commonly known as: PEPCID   10 mg, Oral, Every 48 Hours      gabapentin 100 MG capsule  Commonly known as: NEURONTIN   100 mg, Oral, Nightly      ipratropium-albuterol 0.5-2.5 mg/3 ml nebulizer  Commonly known as: DUO-NEB   3 mL, Every 4 Hours PRN      midodrine 2.5 MG tablet  Commonly known as: PROAMATINE   2.5 mg, 3 Times Daily Before Meals      MiraLax 17 GM/SCOOP powder  Generic drug: polyethylene glycol   17  g, Every Morning      nitroglycerin 0.4 MG SL tablet  Commonly known as: NITROSTAT   0.4 mg, Every 5 Minutes PRN      sertraline 25 MG tablet  Commonly known as: ZOLOFT   Take 0.5 tablets by mouth every night at bedtime.      sevelamer 800 MG tablet  Commonly known as: RENVELA   800 mg, 3 Times Daily With Meals      topiramate 100 MG tablet  Commonly known as: TOPAMAX   100 mg, Oral, Nightly      traMADol 50 MG tablet  Commonly known as: ULTRAM   50 mg, Every 6 Hours PRN      zinc oxide 20 % ointment   1 Application, 2 Times Daily             Stop These Medications      furosemide 40 MG tablet  Commonly known as: LASIX              Discharge Diet:     Activity at Discharge:     Follow-up Appointments  No future appointments.      Test Results Pending at Discharge  Pending Labs       Order Current Status    Blood Culture - Blood, Arm, Left Preliminary result    Blood Culture - Blood, Hand, Left Preliminary result    Urine Culture - Urine, Urostomy Preliminary result             Bethanie Mena MD  02/07/25  12:14 EST

## 2025-02-07 NOTE — NURSING NOTE
Faxed updated AVS after MD updated medications after patient had discharged. Attempted to call facility to update them with no response.

## 2025-02-07 NOTE — NURSING NOTE
I have attempted to call report 5 times to arianna frost. They either don't answer originally or the  answers and they don't answer when transferred to the nurses station. I have notified  and am waiting for a response.

## 2025-02-07 NOTE — NURSING NOTE
Attempt #6 to call report to M[facility] Cadena. Left a voicemail with nurses station with name and number.

## 2025-02-07 NOTE — NURSING NOTE
Attempt #3 to call report to [facility]. Nobody answered the phone. No voicemail could be left.

## 2025-02-07 NOTE — PLAN OF CARE
Goal Outcome Evaluation:              Outcome Evaluation: Patient pleasant and cooperative. Currently resting. No complaints at this time.

## 2025-02-07 NOTE — PROGRESS NOTES
"                                                                                                                                       Nephrology  Progress Note                                        [facility]    Patient Identification    Name: Hazel Bucio  Age: 84 y.o.  Sex: female  :  1940  MRN: [id number]      DATE OF SERVICE:  2025        Subective    Awake and alert     Objective   Scheduled Meds:apixaban, 2.5 mg, Oral, Q12H  atorvastatin, 20 mg, Oral, Nightly  cefTRIAXone, 1,000 mg, Intravenous, Q24H  famotidine, 10 mg, Oral, Q48H  gabapentin, 100 mg, Oral, Nightly  metoprolol tartrate, 12.5 mg, Oral, BID  midodrine, 2.5 mg, Oral, Q8H  polyethylene glycol, 17 g, Oral, Daily  sertraline, 12.5 mg, Oral, Nightly  sevelamer, 800 mg, Oral, TID With Meals  sodium chloride, 10 mL, Intravenous, Q12H          Continuous Infusions:     PRN Meds:  acetaminophen    senna-docusate sodium **AND** polyethylene glycol **AND** bisacodyl **AND** bisacodyl    ipratropium-albuterol    nitroglycerin    ondansetron ODT **OR** ondansetron    sodium chloride    sodium chloride    sodium chloride     Exam:  /91 (BP Location: Left arm, Patient Position: Lying)   Pulse 108   Temp 98 °F (36.7 °C) (Oral)   Resp 16   Ht 170.2 cm (67\")   Wt 58.1 kg (128 lb)   SpO2 97%   BMI 20.05 kg/m²     Intake/Output last 3 shifts:  I/O last 3 completed shifts:  In: 100 [P.O.:100]  Out: 1250 [Urine:250]    Intake/Output this shift:  No intake/output data recorded.    Physical exam:  General Appearance:  Alert  Head:  Normocephalic, without obvious abnormality, atraumatic  Eyes:  PERRL, conjunctiva/corneas clear     Neck:  Supple,  no adenopathy;      Lungs:  Decreased BS occasion ronchi  Heart:  Regular rate and rhythm, S1 and S2 normal  Abdomen:  Soft, non-tender, bowel sounds active   Extremities: trace edema  Pulses: 2+ and symmetric all extremities  Skin:  No rashes or lesions       Data Review:  All labs " (24hrs):   Recent Results (from the past 24 hours)   Hepatitis B Surface Antigen    Collection Time: 02/06/25  9:08 AM    Specimen: Cannula; Blood   Result Value Ref Range    Hepatitis B Surface Ag Non-Reactive Non-Reactive   Basic Metabolic Panel    Collection Time: 02/07/25  4:21 AM    Specimen: Arm, Left; Blood   Result Value Ref Range    Glucose 89 65 - 99 mg/dL    BUN 20 8 - 23 mg/dL    Creatinine 2.81 (H) 0.57 - 1.00 mg/dL    Sodium 136 136 - 145 mmol/L    Potassium 4.0 3.5 - 5.2 mmol/L    Chloride 100 98 - 107 mmol/L    CO2 24.4 22.0 - 29.0 mmol/L    Calcium 9.3 8.6 - 10.5 mg/dL    BUN/Creatinine Ratio 7.1 7.0 - 25.0    Anion Gap 11.6 5.0 - 15.0 mmol/L    eGFR 16.2 (L) >60.0 mL/min/1.73   CBC Auto Differential    Collection Time: 02/07/25  4:21 AM    Specimen: Arm, Left; Blood   Result Value Ref Range    WBC 7.21 3.40 - 10.80 10*3/mm3    RBC 3.14 (L) 3.77 - 5.28 10*6/mm3    Hemoglobin 10.1 (L) 12.0 - 15.9 g/dL    Hematocrit 32.0 (L) 34.0 - 46.6 %    .9 (H) 79.0 - 97.0 fL    MCH 32.2 26.6 - 33.0 pg    MCHC 31.6 31.5 - 35.7 g/dL    RDW 16.5 (H) 12.3 - 15.4 %    RDW-SD 61.6 (H) 37.0 - 54.0 fl    MPV 9.6 6.0 - 12.0 fL    Platelets 194 140 - 450 10*3/mm3    Neutrophil % 65.0 42.7 - 76.0 %    Lymphocyte % 21.9 19.6 - 45.3 %    Monocyte % 10.5 5.0 - 12.0 %    Eosinophil % 1.9 0.3 - 6.2 %    Basophil % 0.4 0.0 - 1.5 %    Immature Grans % 0.3 0.0 - 0.5 %    Neutrophils, Absolute 4.68 1.70 - 7.00 10*3/mm3    Lymphocytes, Absolute 1.58 0.70 - 3.10 10*3/mm3    Monocytes, Absolute 0.76 0.10 - 0.90 10*3/mm3    Eosinophils, Absolute 0.14 0.00 - 0.40 10*3/mm3    Basophils, Absolute 0.03 0.00 - 0.20 10*3/mm3    Immature Grans, Absolute 0.02 0.00 - 0.05 10*3/mm3    nRBC 0.0 0.0 - 0.2 /100 WBC          Imaging:  XR Chest 1 View    Result Date: 2/4/2025  Impression: No acute pulm abnormality on radiograph. Electronically Signed: Sonya Vital MD  2/4/2025 8:59 PM EST  Workstation ID: [id number]     Assessment/Plan:     Acute  UTI (urinary tract infection)    Permanent atrial fibrillation    Long term current use of anticoagulant    Presence of cardiac pacemaker    Type 2 diabetes mellitus    Acquired absence of kidney    Acquired absence of other specified parts of digestive tract    Chest pain, atypical    ESRD on dialysis    Hypotension    Mild cognitive impairment of uncertain or unknown etiology    Hard of hearing    UTI (urinary tract infection)         ESRD hemodialysis TTS  UTI  Hyponatremia  Type 2 diabetes  Atrial fibrillation  Chest pain  Status post hypotension        Recommendations:  S/p dialysis yesterday    Next dialysis is tomorrow  Watch electrolytes and volume  Avoid excessive fluids  If needs cardiac catheterization may proceed anytime

## 2025-02-07 NOTE — NURSING NOTE
Attempt number [phone] to call report to [facility] [place].  answered and transferred me to nurses station with no answer. No voicemail could be left.

## 2025-02-10 LAB
BACTERIA SPEC AEROBE CULT: NORMAL
BACTERIA SPEC AEROBE CULT: NORMAL

## 2025-02-13 ENCOUNTER — APPOINTMENT (OUTPATIENT)
Dept: GENERAL RADIOLOGY | Facility: HOSPITAL | Age: 85
End: 2025-02-13
Payer: MEDICARE

## 2025-02-13 ENCOUNTER — HOSPITAL ENCOUNTER (EMERGENCY)
Facility: HOSPITAL | Age: 85
Discharge: HOME | End: 2025-02-13
Payer: MEDICARE

## 2025-02-13 VITALS
HEART RATE: 99 BPM | DIASTOLIC BLOOD PRESSURE: 51 MMHG | WEIGHT: 128 LBS | RESPIRATION RATE: 15 BRPM | BODY MASS INDEX: 20.09 KG/M2 | OXYGEN SATURATION: 92 % | HEIGHT: 67 IN | SYSTOLIC BLOOD PRESSURE: 117 MMHG | TEMPERATURE: 98.2 F

## 2025-02-13 DIAGNOSIS — R07.9 CHEST PAIN, UNSPECIFIED TYPE: ICD-10-CM

## 2025-02-13 DIAGNOSIS — I95.9 HYPOTENSION, UNSPECIFIED HYPOTENSION TYPE: Primary | ICD-10-CM

## 2025-02-13 LAB
ALBUMIN SERPL-MCNC: 3.6 G/DL (ref 3.5–5.2)
ALBUMIN/GLOB SERPL: 1.1 G/DL
ALP SERPL-CCNC: 124 U/L (ref 39–117)
ALT SERPL W P-5'-P-CCNC: 14 U/L (ref 1–33)
ANION GAP SERPL CALCULATED.3IONS-SCNC: 12.9 MMOL/L (ref 5–15)
AST SERPL-CCNC: 31 U/L (ref 1–32)
BASOPHILS # BLD AUTO: 0.03 10*3/MM3 (ref 0–0.2)
BASOPHILS NFR BLD AUTO: 0.5 % (ref 0–1.5)
BILIRUB SERPL-MCNC: 0.4 MG/DL (ref 0–1.2)
BUN SERPL-MCNC: 53 MG/DL (ref 8–23)
BUN/CREAT SERPL: 11.5 (ref 7–25)
CALCIUM SPEC-SCNC: 9 MG/DL (ref 8.6–10.5)
CHLORIDE SERPL-SCNC: 99 MMOL/L (ref 98–107)
CO2 SERPL-SCNC: 25.1 MMOL/L (ref 22–29)
CREAT SERPL-MCNC: 4.62 MG/DL (ref 0.57–1)
DEPRECATED RDW RBC AUTO: 61.5 FL (ref 37–54)
EGFRCR SERPLBLD CKD-EPI 2021: 8.9 ML/MIN/1.73
EOSINOPHIL # BLD AUTO: 0.17 10*3/MM3 (ref 0–0.4)
EOSINOPHIL NFR BLD AUTO: 2.7 % (ref 0.3–6.2)
ERYTHROCYTE [DISTWIDTH] IN BLOOD BY AUTOMATED COUNT: 15.9 % (ref 12.3–15.4)
GEN 5 1HR TROPONIN T REFLEX: 58 NG/L
GLOBULIN UR ELPH-MCNC: 3.3 GM/DL
GLUCOSE SERPL-MCNC: 106 MG/DL (ref 65–99)
HCT VFR BLD AUTO: 36.2 % (ref 34–46.6)
HGB BLD-MCNC: 11.1 G/DL (ref 12–15.9)
IMM GRANULOCYTES # BLD AUTO: 0.01 10*3/MM3 (ref 0–0.05)
IMM GRANULOCYTES NFR BLD AUTO: 0.2 % (ref 0–0.5)
LYMPHOCYTES # BLD AUTO: 1.49 10*3/MM3 (ref 0.7–3.1)
LYMPHOCYTES NFR BLD AUTO: 24.1 % (ref 19.6–45.3)
MAGNESIUM SERPL-MCNC: 2.4 MG/DL (ref 1.6–2.4)
MCH RBC QN AUTO: 32.4 PG (ref 26.6–33)
MCHC RBC AUTO-ENTMCNC: 30.7 G/DL (ref 31.5–35.7)
MCV RBC AUTO: 105.5 FL (ref 79–97)
MONOCYTES # BLD AUTO: 0.63 10*3/MM3 (ref 0.1–0.9)
MONOCYTES NFR BLD AUTO: 10.2 % (ref 5–12)
NEUTROPHILS NFR BLD AUTO: 3.86 10*3/MM3 (ref 1.7–7)
NEUTROPHILS NFR BLD AUTO: 62.3 % (ref 42.7–76)
NRBC BLD AUTO-RTO: 0 /100 WBC (ref 0–0.2)
NT-PROBNP SERPL-MCNC: ABNORMAL PG/ML (ref 0–1800)
PLATELET # BLD AUTO: 192 10*3/MM3 (ref 140–450)
PMV BLD AUTO: 9.6 FL (ref 6–12)
POTASSIUM SERPL-SCNC: 4.7 MMOL/L (ref 3.5–5.2)
PROT SERPL-MCNC: 6.9 G/DL (ref 6–8.5)
RBC # BLD AUTO: 3.43 10*6/MM3 (ref 3.77–5.28)
SODIUM SERPL-SCNC: 137 MMOL/L (ref 136–145)
TROPONIN T % DELTA: 2
TROPONIN T NUMERIC DELTA: 1 NG/L
TROPONIN T SERPL HS-MCNC: 57 NG/L
WBC NRBC COR # BLD AUTO: 6.19 10*3/MM3 (ref 3.4–10.8)
WHOLE BLOOD HOLD SPECIMEN: NORMAL

## 2025-02-13 PROCEDURE — 85025 COMPLETE CBC W/AUTO DIFF WBC: CPT

## 2025-02-13 PROCEDURE — 99284 EMERGENCY DEPT VISIT MOD MDM: CPT

## 2025-02-13 PROCEDURE — 83880 ASSAY OF NATRIURETIC PEPTIDE: CPT

## 2025-02-13 PROCEDURE — 71045 X-RAY EXAM CHEST 1 VIEW: CPT

## 2025-02-13 PROCEDURE — 36415 COLL VENOUS BLD VENIPUNCTURE: CPT

## 2025-02-13 PROCEDURE — 25810000003 SODIUM CHLORIDE 0.9 % SOLUTION

## 2025-02-13 PROCEDURE — C1751 CATH, INF, PER/CENT/MIDLINE: HCPCS

## 2025-02-13 PROCEDURE — 84484 ASSAY OF TROPONIN QUANT: CPT

## 2025-02-13 PROCEDURE — 80053 COMPREHEN METABOLIC PANEL: CPT

## 2025-02-13 PROCEDURE — 83735 ASSAY OF MAGNESIUM: CPT

## 2025-02-13 PROCEDURE — 93005 ELECTROCARDIOGRAM TRACING: CPT

## 2025-02-13 RX ORDER — MIDODRINE HYDROCHLORIDE 5 MG/1
2.5 TABLET ORAL ONCE
Status: COMPLETED | OUTPATIENT
Start: 2025-02-13 | End: 2025-02-13

## 2025-02-13 RX ORDER — SODIUM CHLORIDE 0.9 % (FLUSH) 0.9 %
10 SYRINGE (ML) INJECTION AS NEEDED
Status: DISCONTINUED | OUTPATIENT
Start: 2025-02-13 | End: 2025-02-13 | Stop reason: HOSPADM

## 2025-02-13 RX ORDER — ACETAMINOPHEN 500 MG
1000 TABLET ORAL ONCE
Status: COMPLETED | OUTPATIENT
Start: 2025-02-13 | End: 2025-02-13

## 2025-02-13 RX ORDER — MIDODRINE HYDROCHLORIDE 5 MG/1
5 TABLET ORAL ONCE
Status: DISCONTINUED | OUTPATIENT
Start: 2025-02-13 | End: 2025-02-13

## 2025-02-13 RX ADMIN — MIDODRINE HYDROCHLORIDE 2.5 MG: 5 TABLET ORAL at 08:10

## 2025-02-13 RX ADMIN — ACETAMINOPHEN 1000 MG: 500 TABLET, FILM COATED ORAL at 11:13

## 2025-02-13 RX ADMIN — SODIUM CHLORIDE 500 ML: 9 INJECTION, SOLUTION INTRAVENOUS at 09:25

## 2025-02-13 NOTE — ED PROVIDER NOTES
Subjective   History of Present Illness  84-year-old female with history of end-stage renal disease on dialysis with a right chest port, hypertension, atrial fibrillation, hypotension, AICD, diabetes, mild cognitive impairment presents the ED today from [facility] due to complaints of generalized chest pain that she states occurred approximately 2 to 3 hours prior to arrival.  Patient states she was given nitroglycerin under her tongue and the pain has resolved.  Patient denies shortness of breath, arm or jaw pain, fever, cough or congestion, nausea or vomiting, syncope, abdominal pain.    PCP: Rachael        Review of Systems   Constitutional:  Negative for fever.   HENT:  Negative for congestion.    Respiratory:  Negative for cough and shortness of breath.    Cardiovascular:  Positive for chest pain.   Gastrointestinal:  Negative for abdominal pain, nausea and vomiting.       Past Medical History:   Diagnosis Date    Acquired absence of kidney 10/04/2021    Acquired absence of other specified parts of digestive tract 09/09/2022    Athscl heart disease of native coronary 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 09/09/2022    Atrial fibrillation with rapid ventricular response     Bladder cancer     Cancer     breast, bladder    Cholecystitis with cholelithiasis     Chronic insomnia 08/27/2020    Deep vein thrombosis     Dyslipidemia 01/17/2017    E coli bacteremia     ESRD on dialysis 10/18/2023    Essential hypertension 01/17/2017    Fracture tibia/fibula, right, closed, initial encounter 08/27/2020    Gastro-esophageal reflux disease without esophagitis 09/14/2020    GERD (gastroesophageal reflux disease)     History of bladder cancer     History of falling     History of urostomy     Hyperkalemia     Immobility 08/27/2020    r/t broken Tibia     Irritable bowel syndrome without diarrhea     Kidney carcinoma, right     removed     Long term current use of anticoagulant 01/09/2015    Mild cognitive impairment of uncertain or  "unknown etiology 08/31/2023    Myalgia due to statin     Other specified abdominal hernia without obstruction or gangrene     PAF (paroxysmal atrial fibrillation) 06/26/2019    Permanent atrial fibrillation 06/26/2019    Personal history of other venous thrombosis and embolism     Presence of cardiac pacemaker 11/03/2014    BS dual chamber PM placed 10/2014 with pocket revision 1/25/17.  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       Allergies   Allergen Reactions    Amoxicillin Shortness Of Breath    Ciprofloxacin Hives    Oxycodone-Acetaminophen Unknown - High Severity     Pt's son stated when pt fell and broke her leg she was put on oxycodone; pt's son stated pt's \"vitals went all out of wack, she couldn't remember things.\"    Penicillins Rash    Sulfa Antibiotics Hives    Cephalexin Other (See Comments)     Has tolerated multiple courses of cephalosporins- cefazolin, ceftriaxone, ceftazidime     Clavulanic Acid Other (See Comments)    Codeine Other (See Comments)    Contrast Dye (Echo Or Unknown Ct/Mr) Other (See Comments)     8/18/22     Doxycycline Other (See Comments)    Fluconazole Other (See Comments)    Iodinated Contrast Media Other (See Comments)     8/18/22    Iodine Hives    Macrobid [Nitrofurantoin] Hives    Tobramycin Other (See Comments)    Trimethoprim Other (See Comments)       Past Surgical History:   Procedure Laterality Date    BREAST LUMPECTOMY      CARDIAC CATHETERIZATION N/A 10/18/2023    Procedure: Left Heart Cath possible PCI, atherectomy, hemodynamic support;  Surgeon: Augustin Ellsworth MD;  Location: [facility] CATH INVASIVE LOCATION;  Service: Cardiology;  Laterality: N/A;    CARDIAC ELECTROPHYSIOLOGY PROCEDURE N/A 4/28/2023    Procedure: Pacemaker gen change, [place] AWARE $;  Surgeon: Jose Carlos Cheng, " MD;  Location: [facility] CATH INVASIVE LOCATION;  Service: Cardiovascular;  Laterality: N/A;    CHOLECYSTECTOMY N/A 10/12/2020    Procedure: CHOLECYSTECTOMY LAPAROSCOPIC;  Surgeon: Go Pereira DO;  Location: [facility] MAIN OR;  Service: General;  Laterality: N/A;    CYSTECTOMY W/ URETEROILEAL CONDUIT      FEMUR IM NAILING RETROGRADE Right 7/25/2024    Procedure: RIGHT FEMUR INTRAMEDULLARY NAILING RETROGRADE AND OPEN REDUCTION INTERNAL FIXATION;  Surgeon: Elieser Diggs MD;  Location: [facility] MAIN OR;  Service: Orthopedics;  Laterality: Right;    HARDWARE REMOVAL Right 6/11/2021    Procedure: TIBIA HARDWARE REMOVAL;  Surgeon: Geoff Shah II, MD;  Location: [facility] MAIN OR;  Service: Orthopedics;  Laterality: Right;    HERNIA REPAIR      HYSTERECTOMY      INSERT / REPLACE / REMOVE PACEMAKER      NEPHRECTOMY Right     TIBIA IM NAILING/RODDING Right 8/28/2020    Procedure: TIBIA INTRAMEDULLARY NAIL/ABRAHAM INSERTION;  Surgeon: Geoff Shah II, MD;  Location: [facility] MAIN OR;  Service: Orthopedics;  Laterality: Right;       Family History   Problem Relation Age of Onset    COPD Father        Social History     Socioeconomic History    Marital status:    Tobacco Use    Smoking status: Never     Passive exposure: Never    Smokeless tobacco: Never   Vaping Use    Vaping status: Never Used   Substance and Sexual Activity    Alcohol use: Not Currently    Drug use: Never    Sexual activity: Defer           Objective   Physical Exam  Vitals reviewed.   HENT:      Head: Normocephalic.   Eyes:      Extraocular Movements: Extraocular movements intact.      Conjunctiva/sclera: Conjunctivae normal.   Cardiovascular:      Rate and Rhythm: Normal rate. Rhythm irregular.      Pulses: Normal pulses.      Heart sounds: Normal heart sounds.      Comments: Chronic atrial fibrillation. R chest dialysis port present  Pulmonary:      Effort: Pulmonary effort is normal.      Comments: Diminished bilaterally  Abdominal:  "     General: Bowel sounds are normal.      Palpations: Abdomen is soft.      Tenderness: There is no abdominal tenderness.      Comments: Colostomy present   Musculoskeletal:         General: Normal range of motion.      Right lower leg: No edema.      Left lower leg: No edema.   Skin:     General: Skin is warm and dry.   Neurological:      Mental Status: She is alert. Mental status is at baseline.      Comments: Patient is able to answer age, date of birth, location, and situation.  States it is 2024 and a correct herself to 2025.   Psychiatric:         Mood and Affect: Mood normal.         Behavior: Behavior normal.         Procedures    EKG independently interpreted by Dr. Vidal as atrial fibrillation at a rate of 86.  Compared to previous from 2/4/2025 that was atrial fibrillation at a rate of 105         ED Course  ED Course as of 02/13/25 1155   Thu Feb 13, 2025   0746 BP(!): 81/49  Fluids ordered [KB]   0804 Patient does not have IV access per nursing staff. Awaiting IV team at this time. Patient was given 2.5mg midodrine this AM around 0400. Another 2.5mg ordered due to blood pressure [KB]   0837 Awaiting 2 hr trop [KB]   0935 Spoke to Preeti CORBETT with [facility] regarding patient presentation, blood work and chest XR. Patient has been chest pain free since initial assessment and BP has improved. Preeti CORBETT agreed with discharge back to facility as patient was recently admitted and discharged 7 days ago [KB]   1025 Patient reports intermittent bilat leg cramping, usually worse at night. Will give tylenol prior to discharge [KB]      ED Course User Index  [KB] Christen Randolph APRN      /51   Pulse 99   Temp 98.2 °F (36.8 °C) (Oral)   Resp 15   Ht 170.2 cm (67\")   Wt 58.1 kg (128 lb)   SpO2 92%   BMI 20.05 kg/m²   Labs Reviewed   COMPREHENSIVE METABOLIC PANEL - Abnormal; Notable for the following components:       Result Value    Glucose 106 (*)     BUN 53 (*)     Creatinine 4.62 (*)     Alkaline " Phosphatase 124 (*)     eGFR 8.9 (*)     All other components within normal limits    Narrative:     GFR Categories in Chronic Kidney Disease (CKD)      GFR Category          GFR (mL/min/1.73)    Interpretation  G1                     90 or greater         Normal or high (1)  G2                      60-89                Mild decrease (1)  G3a                   45-59                Mild to moderate decrease  G3b                   30-44                Moderate to severe decrease  G4                    15-29                Severe decrease  G5                    14 or less           Kidney failure          (1)In the absence of evidence of kidney disease, neither GFR category G1 or G2 fulfill the criteria for CKD.    eGFR calculation 2021 CKD-EPI creatinine equation, which does not include race as a factor   BNP (IN-HOUSE) - Abnormal; Notable for the following components:    proBNP 19,690.0 (*)     All other components within normal limits    Narrative:     This assay is used as an aid in the diagnosis of individuals suspected of having heart failure. It can be used as an aid in the diagnosis of acute decompensated heart failure (ADHF) in patients presenting with signs and symptoms of ADHF to the emergency department (ED). In addition, NT-proBNP of <300 pg/mL indicates ADHF is not likely.    Age Range Result Interpretation  NT-proBNP Concentration (pg/mL:      <50             Positive            >450                   Gray                 300-450                    Negative             <300    50-75           Positive            >900                  Gray                300-900                  Negative            <300      >75             Positive            >1800                  Gray                300-1800                  Negative            <300   TROPONIN - Abnormal; Notable for the following components:    HS Troponin T 57 (*)     All other components within normal limits    Narrative:     High Sensitive Troponin  T Reference Range:  <14.0 ng/L- Negative Female for AMI  <22.0 ng/L- Negative Male for AMI  >=14 - Abnormal Female indicating possible myocardial injury.  >=22 - Abnormal Male indicating possible myocardial injury.   Clinicians would have to utilize clinical acumen, EKG, Troponin, and serial changes to determine if it is an Acute Myocardial Infarction or myocardial injury due to an underlying chronic condition.        CBC WITH AUTO DIFFERENTIAL - Abnormal; Notable for the following components:    RBC 3.43 (*)     Hemoglobin 11.1 (*)     .5 (*)     MCHC 30.7 (*)     RDW 15.9 (*)     RDW-SD 61.5 (*)     All other components within normal limits   HIGH SENSITIVITIY TROPONIN T 1HR - Abnormal; Notable for the following components:    HS Troponin T 58 (*)     All other components within normal limits    Narrative:     High Sensitive Troponin T Reference Range:  <14.0 ng/L- Negative Female for AMI  <22.0 ng/L- Negative Male for AMI  >=14 - Abnormal Female indicating possible myocardial injury.  >=22 - Abnormal Male indicating possible myocardial injury.   Clinicians would have to utilize clinical acumen, EKG, Troponin, and serial changes to determine if it is an Acute Myocardial Infarction or myocardial injury due to an underlying chronic condition.        MAGNESIUM - Normal   CBC AND DIFFERENTIAL    Narrative:     The following orders were created for panel order CBC & Differential.  Procedure                               Abnormality         Status                     ---------                               -----------         ------                     CBC Auto Differential[[id number]]        Abnormal            Final result               Scan Slide[[id number]]                                                                    Please view results for these tests on the individual orders.   EXTRA TUBES    Narrative:     The following orders were created for panel order Extra Tubes.  Procedure                                Abnormality         Status                     ---------                               -----------         ------                     Lavender Top[[id number]]                                     Final result                 Please view results for these tests on the individual orders.   LAVENDER TOP     Medications   sodium chloride 0.9 % flush 10 mL (has no administration in time range)   sodium chloride 0.9 % bolus 500 mL (0 mL Intravenous Stopped 2/13/25 1033)   midodrine (PROAMATINE) tablet 2.5 mg (2.5 mg Oral Given 2/13/25 0810)   acetaminophen (TYLENOL) tablet 1,000 mg (1,000 mg Oral Given 2/13/25 1113)     XR Chest 1 View    Result Date: 2/13/2025  Impression: Stable chronic findings without acute process. Electronically Signed: Jacoby Elise MD  2/13/2025 7:32 AM EST  Workstation ID: [id number]                                                    Medical Decision Making  Patient seen for the above complaints.  Patient appears to be along baseline orientation status per recent chart review as patient was recently discharged on 2/7/2025 for similar complaints of chest pain.  IV was established and blood work was obtained to assess for electrolyte abnormality, kidney function, infection, cardiac enzymes.  Hemoglobin 11.1 along baseline, white blood cell count 6.19, mag 2.4, initial troponin 57 with repeat of 58 however has been elevated in the past likely due to kidney function, kidney function elevated however appears to be along baseline from fluctuating lab work. BNP 19,690- echo 2/5/25:  ·  Left ventricular systolic function is normal. Left ventricular ejection fraction appears to be 61 - 65%.  ·  Left ventricular diastolic function was indeterminate.  ·  The right ventricular cavity is mildly dilated.  ·  The left atrial cavity is mild to moderately dilated.  ·  Left atrial volume is mildly increased.  ·  The right atrial cavity is mildly  dilated.  ·  Estimated right ventricular systolic pressure from  tricuspid regurgitation is normal (<35 mmHg).     Patient continued to deny chest pain during ER course.  Chest x-ray independently interpreted by the radiologist with stable chronic findings without acute process.  Patient was in atrial fibrillation however this is chronic.  Patient is given Tylenol during ER course for complaints of bilateral leg cramping as she states this is been ongoing for quite some time and is worse at night.  Discussed with Preeti CORBETT with the [facility] service who agreed with discharge as patient was recently admitted for something similar and had flat troponins.  Also discussed blood pressure as patient was initially hypotensive however has a history of this and takes midodrine.  Was given fluids and midodrine during ER course and had blood pressure 117/51 prior to discharge.  On reevaluation, patient reports she feels significantly better and continued to deny chest pain.  Will be discharged back to the facility with instruction to follow-up with primary care as soon as possible for recheck.  Discussed return precautions.  Patient verbalized understanding is agreeable plan of care at this time.  Discussed with Dr. Marquez who agrees with plan of care.    I discussed findings with patient who voices understanding of discharge instructions, signs and symptoms requiring return to ED; discharged improved and in stable condition with follow up for re-evaluation.  This document is intended for medical expert use only. Reading of this document by patients and/or patient's family without participating medical staff guidance may result in misinterpretation and unintended morbidity.  Any interpretation of such data is the responsibility of the patient and/or family member responsible for the patient in concert with their primary or specialist providers, not to be left for sources of online searches such as "[employer] or similar queries. Relying on these approaches to knowledge may result in  misinterpretation, misguided goals of care and even death should patients or family members try recommendations outside of the realm of professional medical care in a supervised inpatient environment.     Problems Addressed:  Chest pain, unspecified type: acute illness or injury  Hypotension, unspecified hypotension type: acute illness or injury    Amount and/or Complexity of Data Reviewed  Labs: ordered.  Radiology: ordered.    Risk  OTC drugs.  Prescription drug management.        Final diagnoses:   Hypotension, unspecified hypotension type   Chest pain, unspecified type       ED Disposition  ED Disposition       ED Disposition   Discharge    Condition   Stable    Comment   --               John Cano MD  [address] [place] [address]  [phone]    Schedule an appointment as soon as possible for a visit            Medication List      No changes were made to your prescriptions during this visit.            Christen Randolph, APRN  02/13/25 1157

## 2025-02-13 NOTE — DISCHARGE INSTRUCTIONS
Please continue to take your blood pressure daily and to take your medications as prescribed.  Drink plenty of clear fluids to stay hydrated.  Also continue to go to dialysis    Follow up with your primary care provider as soon as possible for recheck    Return with any new or worsening symptoms

## 2025-02-14 LAB
QT INTERVAL: 386 MS
QTC INTERVAL: 461 MS

## 2025-02-27 ENCOUNTER — HOSPITAL ENCOUNTER (INPATIENT)
Facility: HOSPITAL | Age: 85
LOS: 5 days | Discharge: NURSING, REHAB OR LONG-TERM CARE | End: 2025-03-05
Attending: INTERNAL MEDICINE | Admitting: INTERNAL MEDICINE
Payer: MEDICARE

## 2025-02-27 ENCOUNTER — APPOINTMENT (OUTPATIENT)
Dept: CT IMAGING | Facility: HOSPITAL | Age: 85
End: 2025-02-27
Payer: MEDICARE

## 2025-02-27 ENCOUNTER — APPOINTMENT (OUTPATIENT)
Dept: GENERAL RADIOLOGY | Facility: HOSPITAL | Age: 85
End: 2025-02-27
Payer: MEDICARE

## 2025-02-27 DIAGNOSIS — N30.01 ACUTE CYSTITIS WITH HEMATURIA: ICD-10-CM

## 2025-02-27 DIAGNOSIS — Z91.158 NONCOMPLIANCE WITH RENAL DIALYSIS: ICD-10-CM

## 2025-02-27 DIAGNOSIS — R41.82 ALTERED MENTAL STATUS, UNSPECIFIED ALTERED MENTAL STATUS TYPE: Primary | ICD-10-CM

## 2025-02-27 LAB
ALBUMIN SERPL-MCNC: 3.7 G/DL (ref 3.5–5.2)
ALBUMIN/GLOB SERPL: 1.1 G/DL
ALP SERPL-CCNC: 143 U/L (ref 39–117)
ALT SERPL W P-5'-P-CCNC: 10 U/L (ref 1–33)
ANION GAP SERPL CALCULATED.3IONS-SCNC: 11.9 MMOL/L (ref 5–15)
AST SERPL-CCNC: 32 U/L (ref 1–32)
BACTERIA UR QL AUTO: ABNORMAL /HPF
BASOPHILS # BLD AUTO: 0.02 10*3/MM3 (ref 0–0.2)
BASOPHILS NFR BLD AUTO: 0.3 % (ref 0–1.5)
BILIRUB SERPL-MCNC: 0.2 MG/DL (ref 0–1.2)
BILIRUB UR QL STRIP: NEGATIVE
BUN SERPL-MCNC: 42 MG/DL (ref 8–23)
BUN/CREAT SERPL: 9.3 (ref 7–25)
CALCIUM SPEC-SCNC: 9 MG/DL (ref 8.6–10.5)
CHLORIDE SERPL-SCNC: 100 MMOL/L (ref 98–107)
CLARITY UR: ABNORMAL
CO2 SERPL-SCNC: 27.1 MMOL/L (ref 22–29)
COLOR UR: YELLOW
CREAT SERPL-MCNC: 4.54 MG/DL (ref 0.57–1)
D-LACTATE SERPL-SCNC: 1.3 MMOL/L (ref 0.3–2)
DEPRECATED RDW RBC AUTO: 58.5 FL (ref 37–54)
EGFRCR SERPLBLD CKD-EPI 2021: 9.1 ML/MIN/1.73
EOSINOPHIL # BLD AUTO: 0.2 10*3/MM3 (ref 0–0.4)
EOSINOPHIL NFR BLD AUTO: 2.6 % (ref 0.3–6.2)
ERYTHROCYTE [DISTWIDTH] IN BLOOD BY AUTOMATED COUNT: 15.1 % (ref 12.3–15.4)
GLOBULIN UR ELPH-MCNC: 3.3 GM/DL
GLUCOSE SERPL-MCNC: 100 MG/DL (ref 65–99)
GLUCOSE UR STRIP-MCNC: NEGATIVE MG/DL
HCT VFR BLD AUTO: 35.9 % (ref 34–46.6)
HGB BLD-MCNC: 10.9 G/DL (ref 12–15.9)
HGB UR QL STRIP.AUTO: ABNORMAL
HOLD SPECIMEN: NORMAL
HOLD SPECIMEN: NORMAL
HYALINE CASTS UR QL AUTO: ABNORMAL /LPF
IMM GRANULOCYTES # BLD AUTO: 0.03 10*3/MM3 (ref 0–0.05)
IMM GRANULOCYTES NFR BLD AUTO: 0.4 % (ref 0–0.5)
KETONES UR QL STRIP: ABNORMAL
LEUKOCYTE ESTERASE UR QL STRIP.AUTO: ABNORMAL
LYMPHOCYTES # BLD AUTO: 1.15 10*3/MM3 (ref 0.7–3.1)
LYMPHOCYTES NFR BLD AUTO: 15.2 % (ref 19.6–45.3)
MCH RBC QN AUTO: 32.2 PG (ref 26.6–33)
MCHC RBC AUTO-ENTMCNC: 30.4 G/DL (ref 31.5–35.7)
MCV RBC AUTO: 105.9 FL (ref 79–97)
MONOCYTES # BLD AUTO: 0.65 10*3/MM3 (ref 0.1–0.9)
MONOCYTES NFR BLD AUTO: 8.6 % (ref 5–12)
NEUTROPHILS NFR BLD AUTO: 5.54 10*3/MM3 (ref 1.7–7)
NEUTROPHILS NFR BLD AUTO: 72.9 % (ref 42.7–76)
NITRITE UR QL STRIP: NEGATIVE
NRBC BLD AUTO-RTO: 0 /100 WBC (ref 0–0.2)
PH UR STRIP.AUTO: 8 [PH] (ref 5–8)
PLATELET # BLD AUTO: 192 10*3/MM3 (ref 140–450)
PMV BLD AUTO: 9.5 FL (ref 6–12)
POTASSIUM SERPL-SCNC: 4.2 MMOL/L (ref 3.5–5.2)
PROT SERPL-MCNC: 7 G/DL (ref 6–8.5)
PROT UR QL STRIP: ABNORMAL
RBC # BLD AUTO: 3.39 10*6/MM3 (ref 3.77–5.28)
RBC # UR STRIP: ABNORMAL /HPF
REF LAB TEST METHOD: ABNORMAL
SODIUM SERPL-SCNC: 139 MMOL/L (ref 136–145)
SP GR UR STRIP: 1.01 (ref 1–1.03)
SQUAMOUS #/AREA URNS HPF: ABNORMAL /HPF
UROBILINOGEN UR QL STRIP: ABNORMAL
WBC # UR STRIP: ABNORMAL /HPF
WBC NRBC COR # BLD AUTO: 7.59 10*3/MM3 (ref 3.4–10.8)
WHOLE BLOOD HOLD COAG: NORMAL
WHOLE BLOOD HOLD SPECIMEN: NORMAL

## 2025-02-27 PROCEDURE — 71045 X-RAY EXAM CHEST 1 VIEW: CPT

## 2025-02-27 PROCEDURE — 93005 ELECTROCARDIOGRAM TRACING: CPT

## 2025-02-27 PROCEDURE — 87040 BLOOD CULTURE FOR BACTERIA: CPT | Performed by: NURSE PRACTITIONER

## 2025-02-27 PROCEDURE — 93005 ELECTROCARDIOGRAM TRACING: CPT | Performed by: INTERNAL MEDICINE

## 2025-02-27 PROCEDURE — 99285 EMERGENCY DEPT VISIT HI MDM: CPT

## 2025-02-27 PROCEDURE — 83605 ASSAY OF LACTIC ACID: CPT | Performed by: NURSE PRACTITIONER

## 2025-02-27 PROCEDURE — 87086 URINE CULTURE/COLONY COUNT: CPT | Performed by: INTERNAL MEDICINE

## 2025-02-27 PROCEDURE — 80053 COMPREHEN METABOLIC PANEL: CPT | Performed by: NURSE PRACTITIONER

## 2025-02-27 PROCEDURE — 85025 COMPLETE CBC W/AUTO DIFF WBC: CPT | Performed by: NURSE PRACTITIONER

## 2025-02-27 PROCEDURE — 81001 URINALYSIS AUTO W/SCOPE: CPT | Performed by: NURSE PRACTITIONER

## 2025-02-27 PROCEDURE — G0378 HOSPITAL OBSERVATION PER HR: HCPCS

## 2025-02-27 PROCEDURE — 36415 COLL VENOUS BLD VENIPUNCTURE: CPT

## 2025-02-27 PROCEDURE — 70450 CT HEAD/BRAIN W/O DYE: CPT

## 2025-02-27 RX ORDER — SEVELAMER CARBONATE 800 MG/1
800 TABLET, FILM COATED ORAL
Status: DISCONTINUED | OUTPATIENT
Start: 2025-02-27 | End: 2025-03-05 | Stop reason: HOSPADM

## 2025-02-27 RX ORDER — BISACODYL 10 MG
10 SUPPOSITORY, RECTAL RECTAL DAILY PRN
Status: DISCONTINUED | OUTPATIENT
Start: 2025-02-27 | End: 2025-03-05 | Stop reason: HOSPADM

## 2025-02-27 RX ORDER — AMOXICILLIN 250 MG
2 CAPSULE ORAL 2 TIMES DAILY PRN
Status: DISCONTINUED | OUTPATIENT
Start: 2025-02-27 | End: 2025-03-05 | Stop reason: HOSPADM

## 2025-02-27 RX ORDER — SERTRALINE HYDROCHLORIDE 25 MG/1
25 TABLET, FILM COATED ORAL NIGHTLY
Status: DISCONTINUED | OUTPATIENT
Start: 2025-02-27 | End: 2025-03-05 | Stop reason: HOSPADM

## 2025-02-27 RX ORDER — HYDRALAZINE HYDROCHLORIDE 20 MG/ML
10 INJECTION INTRAMUSCULAR; INTRAVENOUS EVERY 6 HOURS PRN
Status: DISCONTINUED | OUTPATIENT
Start: 2025-02-27 | End: 2025-03-05 | Stop reason: HOSPADM

## 2025-02-27 RX ORDER — ONDANSETRON 2 MG/ML
4 INJECTION INTRAMUSCULAR; INTRAVENOUS EVERY 6 HOURS PRN
Status: DISCONTINUED | OUTPATIENT
Start: 2025-02-27 | End: 2025-03-05 | Stop reason: HOSPADM

## 2025-02-27 RX ORDER — MIDODRINE HYDROCHLORIDE 2.5 MG/1
2.5 TABLET ORAL
Status: DISCONTINUED | OUTPATIENT
Start: 2025-02-27 | End: 2025-03-04

## 2025-02-27 RX ORDER — FAMOTIDINE 20 MG/1
10 TABLET, FILM COATED ORAL
Status: DISCONTINUED | OUTPATIENT
Start: 2025-02-28 | End: 2025-03-05 | Stop reason: HOSPADM

## 2025-02-27 RX ORDER — ONDANSETRON 4 MG/1
4 TABLET, ORALLY DISINTEGRATING ORAL EVERY 6 HOURS PRN
Status: DISCONTINUED | OUTPATIENT
Start: 2025-02-27 | End: 2025-03-05 | Stop reason: HOSPADM

## 2025-02-27 RX ORDER — SODIUM CHLORIDE 9 MG/ML
40 INJECTION, SOLUTION INTRAVENOUS AS NEEDED
Status: DISCONTINUED | OUTPATIENT
Start: 2025-02-27 | End: 2025-03-05 | Stop reason: HOSPADM

## 2025-02-27 RX ORDER — NITROGLYCERIN 0.4 MG/1
0.4 TABLET SUBLINGUAL
Status: DISCONTINUED | OUTPATIENT
Start: 2025-02-27 | End: 2025-03-05 | Stop reason: HOSPADM

## 2025-02-27 RX ORDER — ATORVASTATIN CALCIUM 20 MG/1
20 TABLET, FILM COATED ORAL NIGHTLY
Status: DISCONTINUED | OUTPATIENT
Start: 2025-02-27 | End: 2025-03-04

## 2025-02-27 RX ORDER — POLYETHYLENE GLYCOL 3350 17 G/17G
17 POWDER, FOR SOLUTION ORAL DAILY PRN
Status: DISCONTINUED | OUTPATIENT
Start: 2025-02-27 | End: 2025-03-05 | Stop reason: HOSPADM

## 2025-02-27 RX ORDER — SODIUM CHLORIDE 0.9 % (FLUSH) 0.9 %
10 SYRINGE (ML) INJECTION AS NEEDED
Status: DISCONTINUED | OUTPATIENT
Start: 2025-02-27 | End: 2025-03-05 | Stop reason: HOSPADM

## 2025-02-27 RX ORDER — SODIUM CHLORIDE 0.9 % (FLUSH) 0.9 %
10 SYRINGE (ML) INJECTION EVERY 12 HOURS SCHEDULED
Status: DISCONTINUED | OUTPATIENT
Start: 2025-02-27 | End: 2025-03-05 | Stop reason: HOSPADM

## 2025-02-27 RX ORDER — CYCLOBENZAPRINE HCL 5 MG
5 TABLET ORAL 3 TIMES DAILY
COMMUNITY
End: 2025-03-05 | Stop reason: HOSPADM

## 2025-02-27 RX ORDER — BISACODYL 5 MG/1
5 TABLET, DELAYED RELEASE ORAL DAILY PRN
Status: DISCONTINUED | OUTPATIENT
Start: 2025-02-27 | End: 2025-03-05 | Stop reason: HOSPADM

## 2025-02-27 RX ORDER — IPRATROPIUM BROMIDE AND ALBUTEROL SULFATE 2.5; .5 MG/3ML; MG/3ML
3 SOLUTION RESPIRATORY (INHALATION) EVERY 4 HOURS PRN
Status: DISCONTINUED | OUTPATIENT
Start: 2025-02-27 | End: 2025-03-05 | Stop reason: HOSPADM

## 2025-02-27 RX ORDER — TOPIRAMATE 100 MG/1
100 TABLET, FILM COATED ORAL NIGHTLY
Status: DISCONTINUED | OUTPATIENT
Start: 2025-02-27 | End: 2025-03-05 | Stop reason: HOSPADM

## 2025-02-27 RX ORDER — POLYETHYLENE GLYCOL 3350 17 G/17G
17 POWDER, FOR SOLUTION ORAL DAILY
Status: DISCONTINUED | OUTPATIENT
Start: 2025-02-27 | End: 2025-03-05 | Stop reason: HOSPADM

## 2025-02-27 RX ADMIN — TOPIRAMATE 100 MG: 100 TABLET, FILM COATED ORAL at 20:36

## 2025-02-27 RX ADMIN — POLYETHYLENE GLYCOL 3350 17 G: 17 POWDER, FOR SOLUTION ORAL at 21:27

## 2025-02-27 RX ADMIN — SEVELAMER CARBONATE 800 MG: 800 TABLET, FILM COATED ORAL at 20:37

## 2025-02-27 RX ADMIN — SERTRALINE HYDROCHLORIDE 25 MG: 25 TABLET, FILM COATED ORAL at 23:55

## 2025-02-27 RX ADMIN — Medication 10 ML: at 20:36

## 2025-02-27 RX ADMIN — Medication 12.5 MG: at 20:37

## 2025-02-27 NOTE — ED NOTES
Pt from [facility] for new muffled speech LKW at 0700, EMS had reported decreased oxygen saturation but pt placed back on RA this time. Pt has hx of dementia, Kidney disease, and has a current UTI. Pt was able to tell me name and place. Pt is combative while placing an IV, but has been calm and cooperative otherwise. Pt stated to the provider that she has been in a wheelchair at the facility for weakness.

## 2025-02-27 NOTE — H&P
Patient Care Team:  John Cano MD as PCP - General (Family Medicine)  Jose Carlos Cheng MD as Consulting Physician (Cardiology)    Chief complaint AMS    Subjective     Patient is a 84 y.o. female who presents with presents with past cardiac history of permanent afib (anticoaguled with Eliquis) and SSS s/p PPM, HTN, CKD stage 3, bladder cancer, and renal cancer, HD, [place], mild cognitive impairment . She presents from [facility] due to AMS and was currently being treated for UTI which she frequently has and its accompanied by AMS. She was being treated for UTI at the facility with  entrapenum 1g daily and the culture did not grow anything. On examination she is confused, and unable to give me any answers. She has also missed her HD today.    Review of Systems   Unable to perform ROS: Other          History  Past Medical History:   Diagnosis Date    Acquired absence of kidney 10/04/2021    Acquired absence of other specified parts of digestive tract 09/09/2022    Athscl heart disease of native coronary 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 09/09/2022    Atrial fibrillation with rapid ventricular response     Bladder cancer     Cancer     breast, bladder    Cholecystitis with cholelithiasis     Chronic insomnia 08/27/2020    Deep vein thrombosis     Dyslipidemia 01/17/2017    E coli bacteremia     ESRD on dialysis 10/18/2023    Essential hypertension 01/17/2017    Fracture tibia/fibula, right, closed, initial encounter 08/27/2020    Gastro-esophageal reflux disease without esophagitis 09/14/2020    GERD (gastroesophageal reflux disease)     History of bladder cancer     History of falling     History of urostomy     Hyperkalemia     Immobility 08/27/2020    r/t broken Tibia     Irritable bowel syndrome without diarrhea     Kidney carcinoma, right     removed     Long term current use of anticoagulant 01/09/2015    Mild cognitive impairment of uncertain or unknown etiology 08/31/2023    Myalgia due to statin     Other specified  abdominal hernia without obstruction or gangrene     PAF (paroxysmal atrial fibrillation) 06/26/2019    Permanent atrial fibrillation 06/26/2019    Personal history of other venous thrombosis and embolism     Presence of cardiac pacemaker 11/03/2014    BS dual chamber PM placed 10/2014 with pocket revision 1/25/17.  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     Past Surgical History:   Procedure Laterality Date    BREAST LUMPECTOMY      CARDIAC CATHETERIZATION N/A 10/18/2023    Procedure: Left Heart Cath possible PCI, atherectomy, hemodynamic support;  Surgeon: Augustin Ellsworth MD;  Location: [facility] CATH INVASIVE LOCATION;  Service: Cardiology;  Laterality: N/A;    CARDIAC ELECTROPHYSIOLOGY PROCEDURE N/A 4/28/2023    Procedure: Pacemaker gen change, [place] AWARE $;  Surgeon: Jose Carlos Cheng MD;  Location: [facility] CATH INVASIVE LOCATION;  Service: Cardiovascular;  Laterality: N/A;    CHOLECYSTECTOMY N/A 10/12/2020    Procedure: CHOLECYSTECTOMY LAPAROSCOPIC;  Surgeon: Go Pereira DO;  Location: [facility] MAIN OR;  Service: General;  Laterality: N/A;    CYSTECTOMY W/ URETEROILEAL CONDUIT      FEMUR IM NAILING RETROGRADE Right 7/25/2024    Procedure: RIGHT FEMUR INTRAMEDULLARY NAILING RETROGRADE AND OPEN REDUCTION INTERNAL FIXATION;  Surgeon: Elieser Diggs MD;  Location: [facility] MAIN OR;  Service: Orthopedics;  Laterality: Right;    HARDWARE REMOVAL Right 6/11/2021    Procedure: TIBIA HARDWARE REMOVAL;  Surgeon: Geoff Shah II, MD;  Location: [facility] MAIN OR;  Service: Orthopedics;  Laterality: Right;    HERNIA REPAIR      HYSTERECTOMY      INSERT / REPLACE / REMOVE PACEMAKER      NEPHRECTOMY Right     TIBIA IM NAILING/RODDING Right 8/28/2020    Procedure: TIBIA INTRAMEDULLARY NAIL/ABRAHAM INSERTION;  Surgeon: Alyssa  Geoff Feng II, MD;  Location: [facility] MAIN OR;  Service: Orthopedics;  Laterality: Right;     Family History   Problem Relation Age of Onset    COPD Father      Social History     Tobacco Use    Smoking status: Never     Passive exposure: Never    Smokeless tobacco: Never   Vaping Use    Vaping status: Never Used   Substance Use Topics    Alcohol use: Not Currently    Drug use: Never     (Not in a hospital admission)    Allergies:  Amoxicillin, Ciprofloxacin, Oxycodone-acetaminophen, Penicillins, Sulfa antibiotics, Cephalexin, Clavulanic acid, Codeine, Contrast dye (echo or unknown ct/mr), Doxycycline, Fluconazole, Iodinated contrast media, Iodine, Macrobid [nitrofurantoin], Tobramycin, and Trimethoprim    Objective     Vital Signs  Temp:  [97.7 °F (36.5 °C)-98.2 °F (36.8 °C)] 98.2 °F (36.8 °C)  Heart Rate:  [] 108  Resp:  [20] 20  BP: (111)/(72) 111/72       Physical Exam  Vitals reviewed.   Constitutional:       Appearance: She is not ill-appearing.   HENT:      Head: Normocephalic and atraumatic.      Right Ear: External ear normal.      Left Ear: External ear normal.      Nose: Nose normal.      Mouth/Throat:      Mouth: Mucous membranes are dry.   Eyes:      General:         Right eye: No discharge.         Left eye: No discharge.   Cardiovascular:      Rate and Rhythm: Normal rate and regular rhythm.      Pulses: Normal pulses.      Heart sounds: Normal heart sounds.   Pulmonary:      Effort: Pulmonary effort is normal.      Breath sounds: Normal breath sounds.   Abdominal:      General: Bowel sounds are normal.      Palpations: Abdomen is soft.   Musculoskeletal:         General: Normal range of motion.      Cervical back: Normal range of motion.   Skin:     Coloration: Skin is pale.   Neurological:      Mental Status: She is alert. She is disoriented.   Psychiatric:         Behavior: Behavior normal.          Results Review:     Imaging Results (Last 24 Hours)       Procedure Component Value  Units Date/Time    CT Head Without Contrast [[id number]] Collected: 02/27/25 1230     Updated: 02/27/25 1238    Narrative:      CT HEAD WO CONTRAST    Date of Exam: 2/27/2025 12:15 PM EST    Indication: Left posterior headache.    Comparison: CT head without contrast 7/23/2024    Technique: Axial CT images were obtained of the head without contrast administration.  Coronal reconstructions were performed.  Automated exposure control and iterative reconstruction methods were used.    Findings:  No intracranial hemorrhage. Mild cerebral atrophy. Moderate white matter findings most suggestive of chronic microvascular disease. No abnormal extra-axial fluid collection. Posterior fossa without acute abnormality. No intraventricular hemorrhage.   Globes are symmetric. No retro-orbital abnormality. The mastoid air cells are well-aerated. Negative for calvarial fracture. 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 Small rounded 4 mm of nodularity abutting left   aspect of the falx cerebri again could relate to a small DAVID pericallosal aneurysm or developing dural calcification, unchanged from prior studies (image 38).      Impression:      Impression:  1. No acute appearing abnormality.  2. Stable chronic findings above.          Electronically Signed: Jacoby Elise MD    2/27/2025 12:36 PM EST    Workstation ID: [id number]    XR Chest 1 View [[id number]] Collected: 02/27/25 1212     Updated: 02/27/25 1217    Narrative:      XR CHEST 1 VW    Date of Exam: 2/27/2025 11:56 AM EST    Indication: AMS Protocol    Comparison: AP chest x-ray 2/13/2025    Findings:  Cardiac silhouette remains mildly enlarged. Pacemaker leads appear stable. Tip of the right internal jugular central venous catheter is stable, terminating in the right atrium. Lungs are adequately expanded and appear clear. No pneumothorax or large   pleural effusion is seen.      Impression:      Impression:  No acute cardiopulmonary  abnormality is identified.      Electronically Signed: Floridalma East    2/27/2025 12:15 PM EST    Workstation ID: [id number]             Lab Results (last 24 hours)       Procedure Component Value Units Date/Time    Urinalysis, Microscopic Only - Urine, Catheter [[id number]]  (Abnormal) Collected: 02/27/25 1337    Specimen: Urine, Catheter Updated: 02/27/25 1357     RBC, UA None Seen /HPF      WBC, UA 6-10 /HPF      Bacteria, UA 1+ /HPF      Squamous Epithelial Cells, UA None Seen /HPF      Hyaline Casts, UA None Seen /LPF      Methodology Manual Light Microscopy    Urinalysis With Microscopic If Indicated (No Culture) - Urine, Catheter [[id number]]  (Abnormal) Collected: 02/27/25 1337    Specimen: Urine, Catheter Updated: 02/27/25 1343     Color, UA Yellow     Appearance, UA Cloudy     Comment: Result checked          pH, UA 8.0     Specific Gravity, UA 1.014     Glucose, UA Negative     Ketones, UA Trace     Bilirubin, UA Negative     Blood, UA Trace     Protein,  mg/dL (2+)     Leuk Esterase, UA Large (3+)     Nitrite, UA Negative     Urobilinogen, UA 1.0 E.U./dL    Comprehensive Metabolic Panel [[id number]]  (Abnormal) Collected: 02/27/25 1140    Specimen: Blood Updated: 02/27/25 1226     Glucose 100 mg/dL      BUN 42 mg/dL      Creatinine 4.54 mg/dL      Sodium 139 mmol/L      Potassium 4.2 mmol/L      Comment: Slight hemolysis detected by analyzer. Result may be falsely elevated.        Chloride 100 mmol/L      CO2 27.1 mmol/L      Calcium 9.0 mg/dL      Total Protein 7.0 g/dL      Albumin 3.7 g/dL      ALT (SGPT) 10 U/L      AST (SGOT) 32 U/L      Alkaline Phosphatase 143 U/L      Total Bilirubin 0.2 mg/dL      Globulin 3.3 gm/dL      A/G Ratio 1.1 g/dL      BUN/Creatinine Ratio 9.3     Anion Gap 11.9 mmol/L      eGFR 9.1 mL/min/1.73     Narrative:      GFR Categories in Chronic Kidney Disease (CKD)      GFR Category          GFR (mL/min/1.73)    Interpretation  G1                     90 or greater          Normal or high (1)  G2                      60-89                Mild decrease (1)  G3a                   45-59                Mild to moderate decrease  G3b                   30-44                Moderate to severe decrease  G4                    15-29                Severe decrease  G5                    14 or less           Kidney failure          (1)In the absence of evidence of kidney disease, neither GFR category G1 or G2 fulfill the criteria for CKD.    eGFR calculation 2021 CKD-EPI creatinine equation, which does not include race as a factor    CBC & Differential [[id number]]  (Abnormal) Collected: 02/27/25 1140    Specimen: Blood Updated: 02/27/25 1150    Narrative:      The following orders were created for panel order CBC & Differential.  Procedure                               Abnormality         Status                     ---------                               -----------         ------                     CBC Auto Differential[[id number]]        Abnormal            Final result               Scan Slide[[id number]]                                                                    Please view results for these tests on the individual orders.    CBC Auto Differential [[id number]]  (Abnormal) Collected: 02/27/25 1140    Specimen: Blood Updated: 02/27/25 1150     WBC 7.59 10*3/mm3      RBC 3.39 10*6/mm3      Hemoglobin 10.9 g/dL      Hematocrit 35.9 %      .9 fL      MCH 32.2 pg      MCHC 30.4 g/dL      RDW 15.1 %      RDW-SD 58.5 fl      MPV 9.5 fL      Platelets 192 10*3/mm3      Neutrophil % 72.9 %      Lymphocyte % 15.2 %      Monocyte % 8.6 %      Eosinophil % 2.6 %      Basophil % 0.3 %      Immature Grans % 0.4 %      Neutrophils, Absolute 5.54 10*3/mm3      Lymphocytes, Absolute 1.15 10*3/mm3      Monocytes, Absolute 0.65 10*3/mm3      Eosinophils, Absolute 0.20 10*3/mm3      Basophils, Absolute 0.02 10*3/mm3      Immature Grans, Absolute 0.03 10*3/mm3      nRBC 0.0 /100 WBC     POC Lactate  [[id number]]  (Normal) Collected: 02/27/25 1144    Specimen: Blood Updated: 02/27/25 1146     Lactate 1.3 mmol/L      Comment: Serial Number: [id number]:  [id number]       [place] Draw [[id number]] Collected: 02/27/25 1140    Specimen: Blood Updated: 02/27/25 1145    Narrative:      The following orders were created for panel order [place] Draw.  Procedure                               Abnormality         Status                     ---------                               -----------         ------                     Green Top (Gel)[[id number]]                                  Final result               Lavender Top[[id number]]                                     Final result               Gold Top - SST[[id number]]                                   Final result               Light Blue Top[[id number]]                                   Final result                 Please view results for these tests on the individual orders.    Green Top (Gel) [[id number]] Collected: 02/27/25 1140    Specimen: Blood Updated: 02/27/25 1145     Extra Tube Hold for add-ons.     Comment: Auto resulted.       Lavender Top [[id number]] Collected: 02/27/25 1140    Specimen: Blood Updated: 02/27/25 1145     Extra Tube hold for add-on     Comment: Auto resulted       Gold Top - SST [[id number]] Collected: 02/27/25 1140    Specimen: Blood Updated: 02/27/25 1145     Extra Tube Hold for add-ons.     Comment: Auto resulted.       Light Blue Top [[id number]] Collected: 02/27/25 1140    Specimen: Blood Updated: 02/27/25 1145     Extra Tube Hold for add-ons.     Comment: Auto resulted       Blood Culture - Blood, Arm, Left [[id number]] Collected: 02/27/25 1140    Specimen: Blood from Arm, Left Updated: 02/27/25 1144    Blood Culture - Blood, Arm, Left [[id number]] Collected: 02/27/25 1140    Specimen: Blood from Arm, Left Updated: 02/27/25 1144             I reviewed the patient's new clinical results.    Assessment & Plan     AMS  Acute UTI (urinary  tract infection)  Permanent atrial fibrillation  Long term current use of anticoagulant  Presence of cardiac pacemaker  Type 2 diabetes mellitus  Acquired absence of kidney  ESRD on dialysis  Mild cognitive impairment of uncertain or unknown etiology  Hard of hearing    Plan of care  Nephrology for HD orders. Monitor overnight. No leukocytosis or fever. It looks like she was getting antibiotics with HD. Will ask pharm to continue with HD today and her last dose is slated for Saturday HD. Continue home medications.      I discussed the patient's findings and my recommendations with patient.     Preeti James, CHELLE  02/27/25  15:44 EST

## 2025-02-27 NOTE — Clinical Note
Level of Care: Telemetry [5]   Admitting Physician: BRIELLE KNOWLES [[id number]]   Attending Physician: BRIELLE KNWOLES [[id number]]

## 2025-02-27 NOTE — CONSULTS
Nephrology Consult Note                                                [facility]      Patient Identification:  Name: Hazel Bucio  Age: 84 y.o.  Sex: female  :  1940  MRN: [id number]               Requesting Physician: Lora Henderson MD  Reason for Consultation: management recommendations      History of Present Illness:    84 yr old female pt ESRD HD  MWF CAD HTN bladder ca RCC here w ams uti   I was consulted to help manage ESRD  Problem List:    AMS (altered mental status)     Past Medical History:  Past Medical History:   Diagnosis Date    Acquired absence of kidney 10/04/2021    Acquired absence of other specified parts of digestive tract 2022    Athscl heart disease of native coronary 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 2022    Atrial fibrillation with rapid ventricular response     Bladder cancer     Cancer     breast, bladder    Cholecystitis with cholelithiasis     Chronic insomnia 2020    Deep vein thrombosis     Dyslipidemia 2017    E coli bacteremia     ESRD on dialysis 10/18/2023    Essential hypertension 2017    Fracture tibia/fibula, right, closed, initial encounter 2020    Gastro-esophageal reflux disease without esophagitis 2020    GERD (gastroesophageal reflux disease)     History of bladder cancer     History of falling     History of urostomy     Hyperkalemia     Immobility 2020    r/t broken Tibia     Irritable bowel syndrome without diarrhea     Kidney carcinoma, right     removed     Long term current use of anticoagulant 2015    Mild cognitive impairment of uncertain or unknown etiology 2023    Myalgia due to statin     Other specified abdominal hernia without obstruction or gangrene     PAF (paroxysmal atrial fibrillation) 2019    Permanent atrial fibrillation 2019    Personal history of other venous thrombosis and embolism     Presence of cardiac pacemaker 2014    BS dual chamber PM placed  10/2014 with pocket revision 1/25/17.  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     Past Surgical History:  Past Surgical History:   Procedure Laterality Date    BREAST LUMPECTOMY      CARDIAC CATHETERIZATION N/A 10/18/2023    Procedure: Left Heart Cath possible PCI, atherectomy, hemodynamic support;  Surgeon: Augustin Ellsworth MD;  Location: [facility] CATH INVASIVE LOCATION;  Service: Cardiology;  Laterality: N/A;    CARDIAC ELECTROPHYSIOLOGY PROCEDURE N/A 4/28/2023    Procedure: Pacemaker gen change, [place] AWARE $;  Surgeon: Jose Carlos Cheng MD;  Location: [facility] CATH INVASIVE LOCATION;  Service: Cardiovascular;  Laterality: N/A;    CHOLECYSTECTOMY N/A 10/12/2020    Procedure: CHOLECYSTECTOMY LAPAROSCOPIC;  Surgeon: Go Pereira DO;  Location: [facility] MAIN OR;  Service: General;  Laterality: N/A;    CYSTECTOMY W/ URETEROILEAL CONDUIT      FEMUR IM NAILING RETROGRADE Right 7/25/2024    Procedure: RIGHT FEMUR INTRAMEDULLARY NAILING RETROGRADE AND OPEN REDUCTION INTERNAL FIXATION;  Surgeon: Elieser Diggs MD;  Location: [facility] MAIN OR;  Service: Orthopedics;  Laterality: Right;    HARDWARE REMOVAL Right 6/11/2021    Procedure: TIBIA HARDWARE REMOVAL;  Surgeon: Geoff Shah II, MD;  Location: [facility] MAIN OR;  Service: Orthopedics;  Laterality: Right;    HERNIA REPAIR      HYSTERECTOMY      INSERT / REPLACE / REMOVE PACEMAKER      NEPHRECTOMY Right     TIBIA IM NAILING/RODDING Right 8/28/2020    Procedure: TIBIA INTRAMEDULLARY NAIL/ABRAHAM INSERTION;  Surgeon: Geoff Shah II, MD;  Location: [facility] MAIN OR;  Service: Orthopedics;  Laterality: Right;      Home Meds:  (Not in a hospital admission)    Current Meds:     Current Facility-Administered Medications:     sennosides-docusate (PERICOLACE) 8.6-50 MG per  tablet 2 tablet, 2 tablet, Oral, BID PRN **AND** polyethylene glycol (MIRALAX) packet 17 g, 17 g, Oral, Daily PRN **AND** bisacodyl (DULCOLAX) EC tablet 5 mg, 5 mg, Oral, Daily PRN **AND** bisacodyl (DULCOLAX) suppository 10 mg, 10 mg, Rectal, Daily PRN, Houlton, Preeti M, APRN    hydrALAZINE (APRESOLINE) injection 10 mg, 10 mg, Intravenous, Q6H PRN, Houlton, Preeti M, APRN    nitroglycerin (NITROSTAT) SL tablet 0.4 mg, 0.4 mg, Sublingual, Q5 Min PRN, Houlton, Preeti M, APRN    ondansetron ODT (ZOFRAN-ODT) disintegrating tablet 4 mg, 4 mg, Oral, Q6H PRN **OR** ondansetron (ZOFRAN) injection 4 mg, 4 mg, Intravenous, Q6H PRN, Houlton, Preeti M, APRN    sodium chloride 0.9 % flush 10 mL, 10 mL, Intravenous, PRN, Long, Anitra M, APRN    sodium chloride 0.9 % flush 10 mL, 10 mL, Intravenous, Q12H, Houlton, Preeti M, APRN    sodium chloride 0.9 % flush 10 mL, 10 mL, Intravenous, PRN, Houlton, Preeti M, APRN    sodium chloride 0.9 % infusion 40 mL, 40 mL, Intravenous, PRN, Houlton, Preeti M, APRN    Current Outpatient Medications:     acetaminophen (TYLENOL) 325 MG tablet, Take 2 tablets by mouth 4 (Four) Times a Day As Needed for Mild Pain., Disp: , Rfl:     apixaban (ELIQUIS) 2.5 MG tablet tablet, Take 1 tablet by mouth Every 12 (Twelve) Hours. Indications: Atrial Fibrillation, Disp: 60 tablet, Rfl: 0    atorvastatin (LIPITOR) 20 MG tablet, Take 1 tablet by mouth Every Night., Disp: , Rfl:     cyclobenzaprine (FLEXERIL) 5 MG tablet, Take 1 tablet by mouth 3 times a day., Disp: , Rfl:     ertapenem (INVanz) 1000 mg/100 mL 0.9% NS VTB (mbp), Infuse 100 mL into a venous catheter Daily., Disp: , Rfl:     famotidine (PEPCID) 10 MG tablet, Take 1 tablet by mouth Every Other Day., Disp: 30 tablet, Rfl: 0    gabapentin (NEURONTIN) 100 MG capsule, Take 1 capsule by mouth Every Night., Disp: 3 capsule, Rfl: 0    metoprolol tartrate (LOPRESSOR) 25 MG tablet, Take 0.5 tablets by mouth 2 (Two) Times a Day., Disp: , Rfl:     midodrine (PROAMATINE)  "2.5 MG tablet, Take 1 tablet by mouth 3 (Three) Times a Day Before Meals., Disp: , Rfl:     polyethylene glycol (MiraLax) 17 GM/SCOOP powder, Take 17 g by mouth Every Morning., Disp: , Rfl:     sertraline (ZOLOFT) 25 MG tablet, Take 1 tablet by mouth every night at bedtime., Disp: , Rfl:     sevelamer (RENVELA) 800 MG tablet, Take 1 tablet by mouth 3 (Three) Times a Day With Meals., Disp: , Rfl:     topiramate (TOPAMAX) 100 MG tablet, Take 1 tablet by mouth Every Night., Disp: 30 tablet, Rfl: 0    ipratropium-albuterol (DUO-NEB) 0.5-2.5 mg/3 ml nebulizer, Take 3 mL by nebulization Every 4 (Four) Hours As Needed for Wheezing., Disp: , Rfl:     nitroglycerin (NITROSTAT) 0.4 MG SL tablet, Place 1 tablet under the tongue Every 5 (Five) Minutes As Needed for Chest Pain., Disp: , Rfl:     traMADol (ULTRAM) 50 MG tablet, Take 1 tablet by mouth Every 6 (Six) Hours As Needed for Moderate Pain., Disp: , Rfl:     zinc oxide 20 % ointment, Apply 1 Application topically to the appropriate area as directed 2 (Two) Times a Day., Disp: , Rfl:     Allergies:  Allergies   Allergen Reactions    Amoxicillin Shortness Of Breath    Ciprofloxacin Hives    Oxycodone-Acetaminophen Unknown - High Severity     Pt's son stated when pt fell and broke her leg she was put on oxycodone; pt's son stated pt's \"vitals went all out of wack, she couldn't remember things.\"    Penicillins Rash    Sulfa Antibiotics Hives    Cephalexin Other (See Comments)     Has tolerated multiple courses of cephalosporins- cefazolin, ceftriaxone, ceftazidime     Clavulanic Acid Other (See Comments)    Codeine Other (See Comments)    Contrast Dye (Echo Or Unknown Ct/Mr) Other (See Comments)     8/18/22     Doxycycline Other (See Comments)    Fluconazole Other (See Comments)    Iodinated Contrast Media Other (See Comments)     8/18/22    Iodine Hives    Macrobid [Nitrofurantoin] Hives    Tobramycin Other (See Comments)    Trimethoprim Other (See Comments)     Social " "History:   Social History     Tobacco Use    Smoking status: Never     Passive exposure: Never    Smokeless tobacco: Never   Substance Use Topics    Alcohol use: Not Currently      Family History:  Family History   Problem Relation Age of Onset    COPD Father         Review of Systems  UO    Objective:  Vitals:   /72   Pulse 108   Temp 98.2 °F (36.8 °C) (Rectal)   Resp 20   Ht 170.2 cm (67\")   Wt 58.1 kg (128 lb 1.4 oz)   SpO2 99%   BMI 20.06 kg/m²   I/O:   No intake or output data in the 24 hours ending 02/27/25 1700    Exam:  General Appearance:  resting  Head:  Normocephalic, without obvious abnormality, atraumatic  Eyes:  PERRL, conjunctiva/corneas clear     Neck:  Supple,  no adenopathy;      Lungs:  Decreased BS occasion ronchi  Heart:  Regular rate and rhythm, S1 and S2 normal  Abdomen:  Soft, non-tender, bowel sounds active   Extremities: trace edema  Pulses: 2+ and symmetric all extremities  Skin:  No rashes or lesions  Data Review:  All labs (24hrs):   Recent Results (from the past 24 hours)   ECG 12 Lead Altered Mental Status    Collection Time: 02/27/25 10:58 AM   Result Value Ref Range    QT Interval 371 ms    QTC Interval 491 ms   Comprehensive Metabolic Panel    Collection Time: 02/27/25 11:40 AM    Specimen: Blood   Result Value Ref Range    Glucose 100 (H) 65 - 99 mg/dL    BUN 42 (H) 8 - 23 mg/dL    Creatinine 4.54 (H) 0.57 - 1.00 mg/dL    Sodium 139 136 - 145 mmol/L    Potassium 4.2 3.5 - 5.2 mmol/L    Chloride 100 98 - 107 mmol/L    CO2 27.1 22.0 - 29.0 mmol/L    Calcium 9.0 8.6 - 10.5 mg/dL    Total Protein 7.0 6.0 - 8.5 g/dL    Albumin 3.7 3.5 - 5.2 g/dL    ALT (SGPT) 10 1 - 33 U/L    AST (SGOT) 32 1 - 32 U/L    Alkaline Phosphatase 143 (H) 39 - 117 U/L    Total Bilirubin 0.2 0.0 - 1.2 mg/dL    Globulin 3.3 gm/dL    A/G Ratio 1.1 g/dL    BUN/Creatinine Ratio 9.3 7.0 - 25.0    Anion Gap 11.9 5.0 - 15.0 mmol/L    eGFR 9.1 (L) >60.0 mL/min/1.73   Green Top (Gel)    Collection Time: " 02/27/25 11:40 AM   Result Value Ref Range    Extra Tube Hold for add-ons.    Lavender Top    Collection Time: 02/27/25 11:40 AM   Result Value Ref Range    Extra Tube hold for add-on    Gold Top - SST    Collection Time: 02/27/25 11:40 AM   Result Value Ref Range    Extra Tube Hold for add-ons.    Light Blue Top    Collection Time: 02/27/25 11:40 AM   Result Value Ref Range    Extra Tube Hold for add-ons.    CBC Auto Differential    Collection Time: 02/27/25 11:40 AM    Specimen: Blood   Result Value Ref Range    WBC 7.59 3.40 - 10.80 10*3/mm3    RBC 3.39 (L) 3.77 - 5.28 10*6/mm3    Hemoglobin 10.9 (L) 12.0 - 15.9 g/dL    Hematocrit 35.9 34.0 - 46.6 %    .9 (H) 79.0 - 97.0 fL    MCH 32.2 26.6 - 33.0 pg    MCHC 30.4 (L) 31.5 - 35.7 g/dL    RDW 15.1 12.3 - 15.4 %    RDW-SD 58.5 (H) 37.0 - 54.0 fl    MPV 9.5 6.0 - 12.0 fL    Platelets 192 140 - 450 10*3/mm3    Neutrophil % 72.9 42.7 - 76.0 %    Lymphocyte % 15.2 (L) 19.6 - 45.3 %    Monocyte % 8.6 5.0 - 12.0 %    Eosinophil % 2.6 0.3 - 6.2 %    Basophil % 0.3 0.0 - 1.5 %    Immature Grans % 0.4 0.0 - 0.5 %    Neutrophils, Absolute 5.54 1.70 - 7.00 10*3/mm3    Lymphocytes, Absolute 1.15 0.70 - 3.10 10*3/mm3    Monocytes, Absolute 0.65 0.10 - 0.90 10*3/mm3    Eosinophils, Absolute 0.20 0.00 - 0.40 10*3/mm3    Basophils, Absolute 0.02 0.00 - 0.20 10*3/mm3    Immature Grans, Absolute 0.03 0.00 - 0.05 10*3/mm3    nRBC 0.0 0.0 - 0.2 /100 WBC   POC Lactate    Collection Time: 02/27/25 11:44 AM    Specimen: Blood   Result Value Ref Range    Lactate 1.3 0.3 - 2.0 mmol/L   Urinalysis With Microscopic If Indicated (No Culture) - Urine, Catheter    Collection Time: 02/27/25  1:37 PM    Specimen: Urine, Catheter   Result Value Ref Range    Color, UA Yellow Yellow, Straw    Appearance, UA Cloudy (A) Clear    pH, UA 8.0 5.0 - 8.0    Specific Gravity, UA 1.014 1.005 - 1.030    Glucose, UA Negative Negative    Ketones, UA Trace (A) Negative    Bilirubin, UA Negative Negative     Blood, UA Trace (A) Negative    Protein,  mg/dL (2+) (A) Negative    Leuk Esterase, UA Large (3+) (A) Negative    Nitrite, UA Negative Negative    Urobilinogen, UA 1.0 E.U./dL 0.2 - 1.0 E.U./dL   Urinalysis, Microscopic Only - Urine, Catheter    Collection Time: 02/27/25  1:37 PM    Specimen: Urine, Catheter   Result Value Ref Range    RBC, UA None Seen None Seen, 0-2 /HPF    WBC, UA 6-10 (A) None Seen, 0-2 /HPF    Bacteria, UA 1+ (A) None Seen /HPF    Squamous Epithelial Cells, UA None Seen None Seen, 0-2 /HPF    Hyaline Casts, UA None Seen None Seen /LPF    Methodology Manual Light Microscopy        Current Facility-Administered Medications:     sennosides-docusate (PERICOLACE) 8.6-50 MG per tablet 2 tablet, 2 tablet, Oral, BID PRN **AND** polyethylene glycol (MIRALAX) packet 17 g, 17 g, Oral, Daily PRN **AND** bisacodyl (DULCOLAX) EC tablet 5 mg, 5 mg, Oral, Daily PRN **AND** bisacodyl (DULCOLAX) suppository 10 mg, 10 mg, Rectal, Daily PRN, Three AffiliatedSeanna M, APRN    hydrALAZINE (APRESOLINE) injection 10 mg, 10 mg, Intravenous, Q6H PRN, [employer] Preeti M, APRN    nitroglycerin (NITROSTAT) SL tablet 0.4 mg, 0.4 mg, Sublingual, Q5 Min PRN, Preeti James M, APRN    ondansetron ODT (ZOFRAN-ODT) disintegrating tablet 4 mg, 4 mg, Oral, Q6H PRN **OR** ondansetron (ZOFRAN) injection 4 mg, 4 mg, Intravenous, Q6H PRN, Sean Jamesna M, APRN    sodium chloride 0.9 % flush 10 mL, 10 mL, Intravenous, PRN, Anitra Quinteros M, APRN    sodium chloride 0.9 % flush 10 mL, 10 mL, Intravenous, Q12H, Three AffiliatedSeanna M, APRN    sodium chloride 0.9 % flush 10 mL, 10 mL, Intravenous, PRN, [employer], Preeti M, APRN    sodium chloride 0.9 % infusion 40 mL, 40 mL, Intravenous, PRN, Preeti James M, APRN    Current Outpatient Medications:     acetaminophen (TYLENOL) 325 MG tablet, Take 2 tablets by mouth 4 (Four) Times a Day As Needed for Mild Pain., Disp: , Rfl:     apixaban (ELIQUIS) 2.5 MG tablet tablet, Take 1 tablet by mouth Every 12 (Twelve)  Hours. Indications: Atrial Fibrillation, Disp: 60 tablet, Rfl: 0    atorvastatin (LIPITOR) 20 MG tablet, Take 1 tablet by mouth Every Night., Disp: , Rfl:     cyclobenzaprine (FLEXERIL) 5 MG tablet, Take 1 tablet by mouth 3 times a day., Disp: , Rfl:     ertapenem (INVanz) 1000 mg/100 mL 0.9% NS VTB (mbp), Infuse 100 mL into a venous catheter Daily., Disp: , Rfl:     famotidine (PEPCID) 10 MG tablet, Take 1 tablet by mouth Every Other Day., Disp: 30 tablet, Rfl: 0    gabapentin (NEURONTIN) 100 MG capsule, Take 1 capsule by mouth Every Night., Disp: 3 capsule, Rfl: 0    metoprolol tartrate (LOPRESSOR) 25 MG tablet, Take 0.5 tablets by mouth 2 (Two) Times a Day., Disp: , Rfl:     midodrine (PROAMATINE) 2.5 MG tablet, Take 1 tablet by mouth 3 (Three) Times a Day Before Meals., Disp: , Rfl:     polyethylene glycol (MiraLax) 17 GM/SCOOP powder, Take 17 g by mouth Every Morning., Disp: , Rfl:     sertraline (ZOLOFT) 25 MG tablet, Take 1 tablet by mouth every night at bedtime., Disp: , Rfl:     sevelamer (RENVELA) 800 MG tablet, Take 1 tablet by mouth 3 (Three) Times a Day With Meals., Disp: , Rfl:     topiramate (TOPAMAX) 100 MG tablet, Take 1 tablet by mouth Every Night., Disp: 30 tablet, Rfl: 0    ipratropium-albuterol (DUO-NEB) 0.5-2.5 mg/3 ml nebulizer, Take 3 mL by nebulization Every 4 (Four) Hours As Needed for Wheezing., Disp: , Rfl:     nitroglycerin (NITROSTAT) 0.4 MG SL tablet, Place 1 tablet under the tongue Every 5 (Five) Minutes As Needed for Chest Pain., Disp: , Rfl:     traMADol (ULTRAM) 50 MG tablet, Take 1 tablet by mouth Every 6 (Six) Hours As Needed for Moderate Pain., Disp: , Rfl:     zinc oxide 20 % ointment, Apply 1 Application topically to the appropriate area as directed 2 (Two) Times a Day., Disp: , Rfl:     Assessment:  ESRD HD dependent  AMS  UTI  CAD  HTN  Bladder ca  DM    Recommendations:  HD in am and MWF  No need for HD today  Abx per primary team      June Keller MD  2/27/2025  17:00  EST

## 2025-02-27 NOTE — ED PROVIDER NOTES
Subjective   History of Present Illness  Pleasantly demented 84-year-old female presents via EMS for altered mental status.  Extended-care facility reported by EMS that patient had some garbled speech at approximately 7 AM.  Patient has known history of both dementia and speech problems.  She is currently being treated for UTI with IV antibiotics.  She also is a dialysis patient and was not dialyzed today.  The patient was able to verbalize to me that she has felt weak over the last couple of days and is only sitting in her wheelchair.     History provided by:  Patient and EMS personnel      Review of Systems    Past Medical History:   Diagnosis Date    Acquired absence of kidney 10/04/2021    Acquired absence of other specified parts of digestive tract 09/09/2022    Athscl heart disease of native coronary 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 09/09/2022    Atrial fibrillation with rapid ventricular response     Bladder cancer     Cancer     breast, bladder    Cholecystitis with cholelithiasis     Chronic insomnia 08/27/2020    Deep vein thrombosis     Dyslipidemia 01/17/2017    E coli bacteremia     ESRD on dialysis 10/18/2023    Essential hypertension 01/17/2017    Fracture tibia/fibula, right, closed, initial encounter 08/27/2020    Gastro-esophageal reflux disease without esophagitis 09/14/2020    GERD (gastroesophageal reflux disease)     History of bladder cancer     History of falling     History of urostomy     Hyperkalemia     Immobility 08/27/2020    r/t broken Tibia     Irritable bowel syndrome without diarrhea     Kidney carcinoma, right     removed     Long term current use of anticoagulant 01/09/2015    Mild cognitive impairment of uncertain or unknown etiology 08/31/2023    Myalgia due to statin     Other specified abdominal hernia without obstruction or gangrene     PAF (paroxysmal atrial fibrillation) 06/26/2019    Permanent atrial fibrillation 06/26/2019    Personal history of other venous thrombosis and embolism   "   Presence of cardiac pacemaker 11/03/2014    BS dual chamber PM placed 10/2014 with pocket revision 1/25/17.  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       Allergies   Allergen Reactions    Amoxicillin Shortness Of Breath    Ciprofloxacin Hives    Oxycodone-Acetaminophen Unknown - High Severity     Pt's son stated when pt fell and broke her leg she was put on oxycodone; pt's son stated pt's \"vitals went all out of wack, she couldn't remember things.\"    Penicillins Rash    Sulfa Antibiotics Hives    Cephalexin Other (See Comments)     Has tolerated multiple courses of cephalosporins- cefazolin, ceftriaxone, ceftazidime     Clavulanic Acid Other (See Comments)    Codeine Other (See Comments)    Contrast Dye (Echo Or Unknown Ct/Mr) Other (See Comments)     8/18/22     Doxycycline Other (See Comments)    Fluconazole Other (See Comments)    Iodinated Contrast Media Other (See Comments)     8/18/22    Iodine Hives    Macrobid [Nitrofurantoin] Hives    Tobramycin Other (See Comments)    Trimethoprim Other (See Comments)       Past Surgical History:   Procedure Laterality Date    BREAST LUMPECTOMY      CARDIAC CATHETERIZATION N/A 10/18/2023    Procedure: Left Heart Cath possible PCI, atherectomy, hemodynamic support;  Surgeon: Augustin Ellsworth MD;  Location: [facility] CATH INVASIVE LOCATION;  Service: Cardiology;  Laterality: N/A;    CARDIAC ELECTROPHYSIOLOGY PROCEDURE N/A 4/28/2023    Procedure: Pacemaker gen change, [place] AWARE $;  Surgeon: Jose Carlos Cheng MD;  Location: [facility] CATH INVASIVE LOCATION;  Service: Cardiovascular;  Laterality: N/A;    CHOLECYSTECTOMY N/A 10/12/2020    Procedure: CHOLECYSTECTOMY LAPAROSCOPIC;  Surgeon: Go Pereira DO;  Location: [facility] MAIN OR;  Service: General;  Laterality: N/A;    CYSTECTOMY " W/ URETEROILEAL CONDUIT      FEMUR IM NAILING RETROGRADE Right 7/25/2024    Procedure: RIGHT FEMUR INTRAMEDULLARY NAILING RETROGRADE AND OPEN REDUCTION INTERNAL FIXATION;  Surgeon: Elieser Diggs MD;  Location: [facility] MAIN OR;  Service: Orthopedics;  Laterality: Right;    HARDWARE REMOVAL Right 6/11/2021    Procedure: TIBIA HARDWARE REMOVAL;  Surgeon: Geoff Shah II, MD;  Location: [facility] MAIN OR;  Service: Orthopedics;  Laterality: Right;    HERNIA REPAIR      HYSTERECTOMY      INSERT / REPLACE / REMOVE PACEMAKER      NEPHRECTOMY Right     TIBIA IM NAILING/RODDING Right 8/28/2020    Procedure: TIBIA INTRAMEDULLARY NAIL/ABRAHAM INSERTION;  Surgeon: Geoff Shah II, MD;  Location: [facility] MAIN OR;  Service: Orthopedics;  Laterality: Right;       Family History   Problem Relation Age of Onset    COPD Father        Social History     Socioeconomic History    Marital status:    Tobacco Use    Smoking status: Never     Passive exposure: Never    Smokeless tobacco: Never   Vaping Use    Vaping status: Never Used   Substance and Sexual Activity    Alcohol use: Not Currently    Drug use: Never    Sexual activity: Defer           Objective   Physical Exam  Vitals and nursing note reviewed.   Constitutional:       General: She is awake. She is not in acute distress.     Appearance: Normal appearance. She is well-developed and normal weight. She is not ill-appearing or toxic-appearing.   HENT:      Head: Normocephalic and atraumatic.      Right Ear: Tympanic membrane, ear canal and external ear normal.      Left Ear: Tympanic membrane, ear canal and external ear normal.      Nose: Nose normal.      Mouth/Throat:      Mouth: Mucous membranes are moist.      Pharynx: Oropharynx is clear.   Eyes:      Extraocular Movements: Extraocular movements intact.      Conjunctiva/sclera: Conjunctivae normal.      Pupils: Pupils are equal, round, and reactive to light.   Neck:      Trachea: Trachea and phonation  normal.      Meningeal: Brudzinski's sign and Kernig's sign absent.   Cardiovascular:      Rate and Rhythm: Normal rate and regular rhythm.      Pulses: Normal pulses.      Heart sounds: Normal heart sounds, S1 normal and S2 normal. Heart sounds not distant. No murmur heard.     No friction rub. No gallop.   Pulmonary:      Effort: Pulmonary effort is normal.      Breath sounds: Normal breath sounds and air entry.   Musculoskeletal:         General: Normal range of motion.      Cervical back: Full passive range of motion without pain, normal range of motion and neck supple. No erythema or rigidity. No spinous process tenderness. Normal range of motion.   Skin:     General: Skin is warm and dry.      Capillary Refill: Capillary refill takes less than 2 seconds.   Neurological:      General: No focal deficit present.      Mental Status: She is alert. Mental status is at baseline. She is confused.      GCS: GCS eye subscore is 4. GCS verbal subscore is 5. GCS motor subscore is 6.      Cranial Nerves: Cranial nerves 2-12 are intact. No cranial nerve deficit.      Sensory: Sensation is intact. No sensory deficit.      Motor: Motor function is intact. Weakness (Bilateral lower extremities) present. No abnormal muscle tone.      Coordination: Heel to Shin Test abnormal (Too weak in bilateral lower extremities). Finger-Nose-Finger Test normal.      Comments: Patient is alert and oriented x 2 which is her baseline.  She is not oriented to time.   Psychiatric:         Behavior: Behavior is cooperative.         ECG 12 Lead      Date/Time: 2/27/2025 10:58 AM    Performed by: Anitra Quinteros APRN  Authorized by: Anitra Quinteros APRN  Interpreted by ED physician  Previous ECG: no previous ECG available  Rhythm: atrial fibrillation  BPM: 105  Clinical impression: abnormal ECG             ED Course  ED Course as of 02/27/25 1411   Thu Feb 27, 2025   1225 Awaiting labs and CT results. [AL]      ED Course User Index  [AL] Aldair  "CHELLE Gomez                                                       Medical Decision Making  Problems Addressed:  Acute cystitis with hematuria: complicated acute illness or injury  Altered mental status, unspecified altered mental status type: complicated acute illness or injury  Noncompliance with renal dialysis: complicated acute illness or injury    Amount and/or Complexity of Data Reviewed  Labs: ordered.  Radiology: ordered.  ECG/medicine tests: ordered and independent interpretation performed.    Risk  Prescription drug management.  Decision regarding hospitalization.    Interpreted by radiologist as below:     CT Head Without Contrast    Result Date: 2/27/2025  Impression: 1. No acute appearing abnormality. 2. Stable chronic findings above. Electronically Signed: Jacoby Elise MD  2/27/2025 12:36 PM EST  Workstation ID: [id number]    XR Chest 1 View    Result Date: 2/27/2025  Impression: No acute cardiopulmonary abnormality is identified. Electronically Signed: Floridalma East  2/27/2025 12:15 PM EST  Workstation ID: [id number]       /72   Pulse 108   Temp 98.2 °F (36.8 °C) (Rectal)   Resp 20   Ht 170.2 cm (67\")   Wt 58.1 kg (128 lb 1.4 oz)   SpO2 99%   BMI 20.06 kg/m²      Lab Results (last 24 hours)       Procedure Component Value Units Date/Time    CBC & Differential [[id number]]  (Abnormal) Collected: 02/27/25 1140    Specimen: Blood Updated: 02/27/25 1150    Narrative:      The following orders were created for panel order CBC & Differential.  Procedure                               Abnormality         Status                     ---------                               -----------         ------                     CBC Auto Differential[[id number]]        Abnormal            Final result               Scan Slide[[id number]]                                                                    Please view results for these tests on the individual orders.    Comprehensive Metabolic Panel [[id number]]  " (Abnormal) Collected: 02/27/25 1140    Specimen: Blood Updated: 02/27/25 1226     Glucose 100 mg/dL      BUN 42 mg/dL      Creatinine 4.54 mg/dL      Sodium 139 mmol/L      Potassium 4.2 mmol/L      Comment: Slight hemolysis detected by analyzer. Result may be falsely elevated.        Chloride 100 mmol/L      CO2 27.1 mmol/L      Calcium 9.0 mg/dL      Total Protein 7.0 g/dL      Albumin 3.7 g/dL      ALT (SGPT) 10 U/L      AST (SGOT) 32 U/L      Alkaline Phosphatase 143 U/L      Total Bilirubin 0.2 mg/dL      Globulin 3.3 gm/dL      A/G Ratio 1.1 g/dL      BUN/Creatinine Ratio 9.3     Anion Gap 11.9 mmol/L      eGFR 9.1 mL/min/1.73     Narrative:      GFR Categories in Chronic Kidney Disease (CKD)      GFR Category          GFR (mL/min/1.73)    Interpretation  G1                     90 or greater         Normal or high (1)  G2                      60-89                Mild decrease (1)  G3a                   45-59                Mild to moderate decrease  G3b                   30-44                Moderate to severe decrease  G4                    15-29                Severe decrease  G5                    14 or less           Kidney failure          (1)In the absence of evidence of kidney disease, neither GFR category G1 or G2 fulfill the criteria for CKD.    eGFR calculation 2021 CKD-EPI creatinine equation, which does not include race as a factor    Blood Culture - Blood, Arm, Left [[id number]] Collected: 02/27/25 1140    Specimen: Blood from Arm, Left Updated: 02/27/25 1144    Blood Culture - Blood, Arm, Left [[id number]] Collected: 02/27/25 1140    Specimen: Blood from Arm, Left Updated: 02/27/25 1144    CBC Auto Differential [[id number]]  (Abnormal) Collected: 02/27/25 1140    Specimen: Blood Updated: 02/27/25 1150     WBC 7.59 10*3/mm3      RBC 3.39 10*6/mm3      Hemoglobin 10.9 g/dL      Hematocrit 35.9 %      .9 fL      MCH 32.2 pg      MCHC 30.4 g/dL      RDW 15.1 %      RDW-SD 58.5 fl      MPV 9.5 fL       Platelets 192 10*3/mm3      Neutrophil % 72.9 %      Lymphocyte % 15.2 %      Monocyte % 8.6 %      Eosinophil % 2.6 %      Basophil % 0.3 %      Immature Grans % 0.4 %      Neutrophils, Absolute 5.54 10*3/mm3      Lymphocytes, Absolute 1.15 10*3/mm3      Monocytes, Absolute 0.65 10*3/mm3      Eosinophils, Absolute 0.20 10*3/mm3      Basophils, Absolute 0.02 10*3/mm3      Immature Grans, Absolute 0.03 10*3/mm3      nRBC 0.0 /100 WBC     POC Lactate [[id number]]  (Normal) Collected: 02/27/25 1144    Specimen: Blood Updated: 02/27/25 1146     Lactate 1.3 mmol/L      Comment: Serial Number: [id number]:  [id number]       Urinalysis With Microscopic If Indicated (No Culture) - Urine, Catheter [[id number]]  (Abnormal) Collected: 02/27/25 1337    Specimen: Urine, Catheter Updated: 02/27/25 1343     Color, UA Yellow     Appearance, UA Cloudy     Comment: Result checked          pH, UA 8.0     Specific Gravity, UA 1.014     Glucose, UA Negative     Ketones, UA Trace     Bilirubin, UA Negative     Blood, UA Trace     Protein,  mg/dL (2+)     Leuk Esterase, UA Large (3+)     Nitrite, UA Negative     Urobilinogen, UA 1.0 E.U./dL    Urinalysis, Microscopic Only - Urine, Catheter [[id number]]  (Abnormal) Collected: 02/27/25 1337    Specimen: Urine, Catheter Updated: 02/27/25 1357     RBC, UA None Seen /HPF      WBC, UA 6-10 /HPF      Bacteria, UA 1+ /HPF      Squamous Epithelial Cells, UA None Seen /HPF      Hyaline Casts, UA None Seen /LPF      Methodology Manual Light Microscopy             Medications   sodium chloride 0.9 % flush 10 mL (has no administration in time range)        Appropriate PPE worn during patient exam.  Appropriate monitoring initiated. Patient is alert and oriented x 2 which is her baseline.  No acute distress noted.  Patient is able to follow commands.  She is equally weak in bilateral lower extremities unable to lift from the bed, otherwise nonfocal.  S1-S2 heart sounds on exam.  Lungs are  clear to auscultation.  She has noted to have pacemaker left upper chest.  IV established and labs obtained.  Altered mental status protocol initiated.  Chest x-ray and CT of the head without IV contrast obtained with the above findings.  CBC CMP similar to previous.  Patient did miss dialysis today.  Her potassium is within normal limits at 4.2 UA was significant for blood leuk esterase 6-10 white cells 1+ bacteria.  Lactic was normal at 1.3 blood cultures are pending.  Spoke with Preeti Egan, NP with [facility] who agrees patient should be admitted for management of altered mental status and UTI.    I reviewed chart 2/13/2025 patient was admitted at this facility for hypotension and chest pain. My radiology interpretation of chest x-ray shows no cardiomegaly pulmonary edema infiltrates.  CT head shows no intracranial hemorrhage mass shift. Imaging was independently interpreted by Dr. Guo. Differential Diagnoses, not all-inclusive and does not constitute entirety of all causes:     Disposition/Treatment: Discussed results with patient, verbalized understanding. Agreeable with plan of care. Patient was stable upon disposition.    Upon reassessment, patient is flesh tone warm and dry no acute distress noted.  Vital signs are stable.     Part of this note may be an electronic transcription/translation of spoken language to printed text using the Dragon Dictation System.   Final diagnoses:   Altered mental status, unspecified altered mental status type   Noncompliance with renal dialysis   Acute cystitis with hematuria       ED Disposition  ED Disposition       ED Disposition   Decision to Admit    Condition   --    Comment   Level of Care: Telemetry [5]   Admitting Physician: BRIELLE KNOWLES [[id number]]   Attending Physician: BRIELLE KNOWLES [[id number]]                 No follow-up provider specified.       Medication List      No changes were made to your prescriptions during this visit.            Anitra Quinteros, APRN  02/27/25  [id number]

## 2025-02-27 NOTE — LETTER
EMS Transport Request  For use at [facility], [place], [place], [place], and [place] only   Patient Name: Hazel Bucio : 1940   Weight:58.1 kg (128 lb 1.4 oz) Pick-up Location: [facility] BLS/ALS: BLS/ALS: BLS   Insurance: MEDICARE Auth End Date:    Pre-Cert #: D/C Summary complete:    Destination: Other [facility]. Room # 0   Contact Precautions: None   Equipment (O2, Fluids, etc.): O2, settings 3L   Arrive By Date/Time: 3/5/25 Stretcher/WC: Stretcher   CM Requesting: Lavern Guzman RN Ext: [phone]   Notes/Medical Necessity: Poor trunk control; Unable to sit unsupported; Max assist of 2 for transfers.      ______________________________________________________________________    *Only 2 patient bags OR 1 carry-on size bag are permitted.  Wheelchairs and walkers CANNOT transported with the patient. Acknowledge: Yes

## 2025-02-28 ENCOUNTER — APPOINTMENT (OUTPATIENT)
Dept: NEPHROLOGY | Facility: HOSPITAL | Age: 85
End: 2025-02-28
Payer: MEDICARE

## 2025-02-28 ENCOUNTER — APPOINTMENT (OUTPATIENT)
Dept: GENERAL RADIOLOGY | Facility: HOSPITAL | Age: 85
End: 2025-02-28
Payer: MEDICARE

## 2025-02-28 PROBLEM — R41.82 ALTERED MENTAL STATUS: Status: ACTIVE | Noted: 2025-02-28

## 2025-02-28 LAB
ANION GAP SERPL CALCULATED.3IONS-SCNC: 14.2 MMOL/L (ref 5–15)
BASOPHILS # BLD AUTO: 0.02 10*3/MM3 (ref 0–0.2)
BASOPHILS NFR BLD AUTO: 0.3 % (ref 0–1.5)
BUN SERPL-MCNC: 47 MG/DL (ref 8–23)
BUN/CREAT SERPL: 9.4 (ref 7–25)
CA-I SERPL ISE-MCNC: 1.12 MMOL/L (ref 1.15–1.3)
CALCIUM SPEC-SCNC: 9.1 MG/DL (ref 8.6–10.5)
CHLORIDE SERPL-SCNC: 101 MMOL/L (ref 98–107)
CO2 SERPL-SCNC: 24.8 MMOL/L (ref 22–29)
CREAT SERPL-MCNC: 5.02 MG/DL (ref 0.57–1)
DEPRECATED RDW RBC AUTO: 58.4 FL (ref 37–54)
EGFRCR SERPLBLD CKD-EPI 2021: 8.1 ML/MIN/1.73
EOSINOPHIL # BLD AUTO: 0.27 10*3/MM3 (ref 0–0.4)
EOSINOPHIL NFR BLD AUTO: 3.9 % (ref 0.3–6.2)
ERYTHROCYTE [DISTWIDTH] IN BLOOD BY AUTOMATED COUNT: 15.1 % (ref 12.3–15.4)
GLUCOSE BLDC GLUCOMTR-MCNC: 73 MG/DL (ref 70–105)
GLUCOSE SERPL-MCNC: 91 MG/DL (ref 65–99)
HCT VFR BLD AUTO: 33.3 % (ref 34–46.6)
HGB BLD-MCNC: 10.3 G/DL (ref 12–15.9)
IMM GRANULOCYTES # BLD AUTO: 0.02 10*3/MM3 (ref 0–0.05)
IMM GRANULOCYTES NFR BLD AUTO: 0.3 % (ref 0–0.5)
LYMPHOCYTES # BLD AUTO: 1.29 10*3/MM3 (ref 0.7–3.1)
LYMPHOCYTES NFR BLD AUTO: 18.9 % (ref 19.6–45.3)
MAGNESIUM SERPL-MCNC: 2.4 MG/DL (ref 1.6–2.4)
MCH RBC QN AUTO: 32.2 PG (ref 26.6–33)
MCHC RBC AUTO-ENTMCNC: 30.9 G/DL (ref 31.5–35.7)
MCV RBC AUTO: 104.1 FL (ref 79–97)
MONOCYTES # BLD AUTO: 0.76 10*3/MM3 (ref 0.1–0.9)
MONOCYTES NFR BLD AUTO: 11.1 % (ref 5–12)
NEUTROPHILS NFR BLD AUTO: 4.48 10*3/MM3 (ref 1.7–7)
NEUTROPHILS NFR BLD AUTO: 65.5 % (ref 42.7–76)
NRBC BLD AUTO-RTO: 0 /100 WBC (ref 0–0.2)
PHOSPHATE SERPL-MCNC: 4.8 MG/DL (ref 2.5–4.5)
PLATELET # BLD AUTO: 185 10*3/MM3 (ref 140–450)
PMV BLD AUTO: 9.6 FL (ref 6–12)
POTASSIUM SERPL-SCNC: 4.4 MMOL/L (ref 3.5–5.2)
QT INTERVAL: 371 MS
QTC INTERVAL: 491 MS
RBC # BLD AUTO: 3.2 10*6/MM3 (ref 3.77–5.28)
SODIUM SERPL-SCNC: 140 MMOL/L (ref 136–145)
WBC NRBC COR # BLD AUTO: 6.84 10*3/MM3 (ref 3.4–10.8)

## 2025-02-28 PROCEDURE — 25810000003 SODIUM CHLORIDE 0.9 % SOLUTION

## 2025-02-28 PROCEDURE — 25010000002 HEPARIN (PORCINE) PER 1000 UNITS: Performed by: INTERNAL MEDICINE

## 2025-02-28 PROCEDURE — 5A1D70Z PERFORMANCE OF URINARY FILTRATION, INTERMITTENT, LESS THAN 6 HOURS PER DAY: ICD-10-PCS | Performed by: FAMILY MEDICINE

## 2025-02-28 PROCEDURE — G0378 HOSPITAL OBSERVATION PER HR: HCPCS

## 2025-02-28 PROCEDURE — 74230 X-RAY XM SWLNG FUNCJ C+: CPT

## 2025-02-28 PROCEDURE — 87481 CANDIDA DNA AMP PROBE: CPT | Performed by: INTERNAL MEDICINE

## 2025-02-28 PROCEDURE — 92611 MOTION FLUOROSCOPY/SWALLOW: CPT

## 2025-02-28 PROCEDURE — 92610 EVALUATE SWALLOWING FUNCTION: CPT

## 2025-02-28 PROCEDURE — 82948 REAGENT STRIP/BLOOD GLUCOSE: CPT

## 2025-02-28 PROCEDURE — 82330 ASSAY OF CALCIUM: CPT

## 2025-02-28 PROCEDURE — 83735 ASSAY OF MAGNESIUM: CPT

## 2025-02-28 PROCEDURE — 80048 BASIC METABOLIC PNL TOTAL CA: CPT

## 2025-02-28 PROCEDURE — 25010000002 ERTAPENEM PER 500 MG: Performed by: NURSE PRACTITIONER

## 2025-02-28 PROCEDURE — 84100 ASSAY OF PHOSPHORUS: CPT

## 2025-02-28 PROCEDURE — 85025 COMPLETE CBC W/AUTO DIFF WBC: CPT

## 2025-02-28 PROCEDURE — 25010000002 ONDANSETRON PER 1 MG: Performed by: NURSE PRACTITIONER

## 2025-02-28 RX ORDER — HEPARIN SODIUM 1000 [USP'U]/ML
5000 INJECTION, SOLUTION INTRAVENOUS; SUBCUTANEOUS AS NEEDED
Status: DISCONTINUED | OUTPATIENT
Start: 2025-02-28 | End: 2025-03-05 | Stop reason: HOSPADM

## 2025-02-28 RX ADMIN — Medication 12.5 MG: at 20:22

## 2025-02-28 RX ADMIN — Medication 10 ML: at 09:02

## 2025-02-28 RX ADMIN — ERTAPENEM SODIUM 1000 MG: 1 INJECTION, POWDER, LYOPHILIZED, FOR SOLUTION INTRAMUSCULAR; INTRAVENOUS at 15:04

## 2025-02-28 RX ADMIN — ONDANSETRON 4 MG: 2 INJECTION, SOLUTION INTRAMUSCULAR; INTRAVENOUS at 20:30

## 2025-02-28 RX ADMIN — MIDODRINE HYDROCHLORIDE 2.5 MG: 2.5 TABLET ORAL at 13:01

## 2025-02-28 RX ADMIN — FAMOTIDINE 10 MG: 20 TABLET, FILM COATED ORAL at 08:51

## 2025-02-28 RX ADMIN — HEPARIN SODIUM 4500 UNITS: 1000 INJECTION INTRAVENOUS; SUBCUTANEOUS at 16:50

## 2025-02-28 RX ADMIN — TOPIRAMATE 100 MG: 100 TABLET, FILM COATED ORAL at 20:22

## 2025-02-28 RX ADMIN — SODIUM CHLORIDE 250 ML: 9 INJECTION, SOLUTION INTRAVENOUS at 01:52

## 2025-02-28 RX ADMIN — SERTRALINE HYDROCHLORIDE 25 MG: 25 TABLET, FILM COATED ORAL at 20:22

## 2025-02-28 RX ADMIN — Medication 12.5 MG: at 07:23

## 2025-02-28 RX ADMIN — APIXABAN 2.5 MG: 2.5 TABLET, FILM COATED ORAL at 08:51

## 2025-02-28 RX ADMIN — MIDODRINE HYDROCHLORIDE 2.5 MG: 2.5 TABLET ORAL at 18:20

## 2025-02-28 RX ADMIN — BARIUM SULFATE 135 ML: 980 POWDER, FOR SUSPENSION ORAL at 11:46

## 2025-02-28 RX ADMIN — BARIUM SULFATE 50 ML: 400 SUSPENSION ORAL at 11:46

## 2025-02-28 RX ADMIN — MIDODRINE HYDROCHLORIDE 2.5 MG: 2.5 TABLET ORAL at 07:23

## 2025-02-28 RX ADMIN — APIXABAN 2.5 MG: 2.5 TABLET, FILM COATED ORAL at 20:22

## 2025-02-28 RX ADMIN — SEVELAMER CARBONATE 800 MG: 800 TABLET, FILM COATED ORAL at 18:20

## 2025-02-28 NOTE — PROGRESS NOTES
"                                                                                                                                      Nephrology  Progress Note                                        [facility]    Patient Identification    Name: Hazel Bucio  Age: 84 y.o.  Sex: female  :  1940  MRN: [id number]      DATE OF SERVICE:  2025        Subective    Pt is restng     Objective   Scheduled Meds:apixaban, 2.5 mg, Oral, Q12H  [Held by provider] atorvastatin, 20 mg, Oral, Nightly  ertapenem, 1,000 mg, Intravenous, Once in Dialysis   Followed by  [START ON 3/1/2025] ertapenem, 1,000 mg, Intravenous, Once in Dialysis  famotidine, 10 mg, Oral, Q48H  metoprolol tartrate, 12.5 mg, Oral, BID  midodrine, 2.5 mg, Oral, TID AC  polyethylene glycol, 17 g, Oral, Daily  sertraline, 25 mg, Oral, Nightly  sevelamer, 800 mg, Oral, TID With Meals  sodium chloride, 10 mL, Intravenous, Q12H  topiramate, 100 mg, Oral, Nightly          Continuous Infusions:     PRN Meds:  senna-docusate sodium **AND** polyethylene glycol **AND** bisacodyl **AND** bisacodyl    hydrALAZINE    ipratropium-albuterol    nitroglycerin    ondansetron ODT **OR** ondansetron    sodium chloride    sodium chloride    sodium chloride     Exam:  BP 99/61 (BP Location: Right leg, Patient Position: Lying)   Pulse 112   Temp 97.5 °F (36.4 °C) (Axillary)   Resp 20   Ht 170.2 cm (67\")   Wt 58.1 kg (128 lb 1.4 oz)   SpO2 100%   BMI 20.06 kg/m²     Intake/Output last 3 shifts:  I/O last 3 completed shifts:  In: 250 [IV Piggyback:250]  Out: -     Intake/Output this shift:  No intake/output data recorded.    Physical exam:  General Appearance:  Alert  Head:  Normocephalic, without obvious abnormality, atraumatic  Eyes:  PERRL, conjunctiva/corneas clear     Neck:  Supple,  no adenopathy;      Lungs:  Decreased BS occasion ronchi  Heart:  Regular rate and rhythm, S1 and S2 normal  Abdomen:  Soft, non-tender, bowel sounds active "   Extremities: trace edema  Pulses: 2+ and symmetric all extremities  Skin:  No rashes or lesions       Data Review:  All labs (24hrs):   Recent Results (from the past 24 hours)   ECG 12 Lead Altered Mental Status    Collection Time: 02/27/25 10:58 AM   Result Value Ref Range    QT Interval 371 ms    QTC Interval 491 ms   Comprehensive Metabolic Panel    Collection Time: 02/27/25 11:40 AM    Specimen: Blood   Result Value Ref Range    Glucose 100 (H) 65 - 99 mg/dL    BUN 42 (H) 8 - 23 mg/dL    Creatinine 4.54 (H) 0.57 - 1.00 mg/dL    Sodium 139 136 - 145 mmol/L    Potassium 4.2 3.5 - 5.2 mmol/L    Chloride 100 98 - 107 mmol/L    CO2 27.1 22.0 - 29.0 mmol/L    Calcium 9.0 8.6 - 10.5 mg/dL    Total Protein 7.0 6.0 - 8.5 g/dL    Albumin 3.7 3.5 - 5.2 g/dL    ALT (SGPT) 10 1 - 33 U/L    AST (SGOT) 32 1 - 32 U/L    Alkaline Phosphatase 143 (H) 39 - 117 U/L    Total Bilirubin 0.2 0.0 - 1.2 mg/dL    Globulin 3.3 gm/dL    A/G Ratio 1.1 g/dL    BUN/Creatinine Ratio 9.3 7.0 - 25.0    Anion Gap 11.9 5.0 - 15.0 mmol/L    eGFR 9.1 (L) >60.0 mL/min/1.73   Green Top (Gel)    Collection Time: 02/27/25 11:40 AM   Result Value Ref Range    Extra Tube Hold for add-ons.    Lavender Top    Collection Time: 02/27/25 11:40 AM   Result Value Ref Range    Extra Tube hold for add-on    Gold Top - SST    Collection Time: 02/27/25 11:40 AM   Result Value Ref Range    Extra Tube Hold for add-ons.    Light Blue Top    Collection Time: 02/27/25 11:40 AM   Result Value Ref Range    Extra Tube Hold for add-ons.    CBC Auto Differential    Collection Time: 02/27/25 11:40 AM    Specimen: Blood   Result Value Ref Range    WBC 7.59 3.40 - 10.80 10*3/mm3    RBC 3.39 (L) 3.77 - 5.28 10*6/mm3    Hemoglobin 10.9 (L) 12.0 - 15.9 g/dL    Hematocrit 35.9 34.0 - 46.6 %    .9 (H) 79.0 - 97.0 fL    MCH 32.2 26.6 - 33.0 pg    MCHC 30.4 (L) 31.5 - 35.7 g/dL    RDW 15.1 12.3 - 15.4 %    RDW-SD 58.5 (H) 37.0 - 54.0 fl    MPV 9.5 6.0 - 12.0 fL    Platelets 192  140 - 450 10*3/mm3    Neutrophil % 72.9 42.7 - 76.0 %    Lymphocyte % 15.2 (L) 19.6 - 45.3 %    Monocyte % 8.6 5.0 - 12.0 %    Eosinophil % 2.6 0.3 - 6.2 %    Basophil % 0.3 0.0 - 1.5 %    Immature Grans % 0.4 0.0 - 0.5 %    Neutrophils, Absolute 5.54 1.70 - 7.00 10*3/mm3    Lymphocytes, Absolute 1.15 0.70 - 3.10 10*3/mm3    Monocytes, Absolute 0.65 0.10 - 0.90 10*3/mm3    Eosinophils, Absolute 0.20 0.00 - 0.40 10*3/mm3    Basophils, Absolute 0.02 0.00 - 0.20 10*3/mm3    Immature Grans, Absolute 0.03 0.00 - 0.05 10*3/mm3    nRBC 0.0 0.0 - 0.2 /100 WBC   POC Lactate    Collection Time: 02/27/25 11:44 AM    Specimen: Blood   Result Value Ref Range    Lactate 1.3 0.3 - 2.0 mmol/L   Urinalysis With Microscopic If Indicated (No Culture) - Urine, Catheter    Collection Time: 02/27/25  1:37 PM    Specimen: Urine, Catheter   Result Value Ref Range    Color, UA Yellow Yellow, Straw    Appearance, UA Cloudy (A) Clear    pH, UA 8.0 5.0 - 8.0    Specific Gravity, UA 1.014 1.005 - 1.030    Glucose, UA Negative Negative    Ketones, UA Trace (A) Negative    Bilirubin, UA Negative Negative    Blood, UA Trace (A) Negative    Protein,  mg/dL (2+) (A) Negative    Leuk Esterase, UA Large (3+) (A) Negative    Nitrite, UA Negative Negative    Urobilinogen, UA 1.0 E.U./dL 0.2 - 1.0 E.U./dL   Urinalysis, Microscopic Only - Urine, Catheter    Collection Time: 02/27/25  1:37 PM    Specimen: Urine, Catheter   Result Value Ref Range    RBC, UA None Seen None Seen, 0-2 /HPF    WBC, UA 6-10 (A) None Seen, 0-2 /HPF    Bacteria, UA 1+ (A) None Seen /HPF    Squamous Epithelial Cells, UA None Seen None Seen, 0-2 /HPF    Hyaline Casts, UA None Seen None Seen /LPF    Methodology Manual Light Microscopy    POC Glucose Once    Collection Time: 02/28/25  2:00 AM    Specimen: Blood   Result Value Ref Range    Glucose 73 70 - 105 mg/dL   Basic Metabolic Panel    Collection Time: 02/28/25  3:16 AM    Specimen: Blood   Result Value Ref Range    Glucose  91 65 - 99 mg/dL    BUN 47 (H) 8 - 23 mg/dL    Creatinine 5.02 (H) 0.57 - 1.00 mg/dL    Sodium 140 136 - 145 mmol/L    Potassium 4.4 3.5 - 5.2 mmol/L    Chloride 101 98 - 107 mmol/L    CO2 24.8 22.0 - 29.0 mmol/L    Calcium 9.1 8.6 - 10.5 mg/dL    BUN/Creatinine Ratio 9.4 7.0 - 25.0    Anion Gap 14.2 5.0 - 15.0 mmol/L    eGFR 8.1 (L) >60.0 mL/min/1.73   Magnesium    Collection Time: 02/28/25  3:16 AM    Specimen: Blood   Result Value Ref Range    Magnesium 2.4 1.6 - 2.4 mg/dL   Phosphorus    Collection Time: 02/28/25  3:16 AM    Specimen: Blood   Result Value Ref Range    Phosphorus 4.8 (H) 2.5 - 4.5 mg/dL   Calcium, Ionized    Collection Time: 02/28/25  3:16 AM    Specimen: Blood   Result Value Ref Range    Ionized Calcium 1.12 (L) 1.15 - 1.30 mmol/L   CBC Auto Differential    Collection Time: 02/28/25  3:16 AM    Specimen: Blood   Result Value Ref Range    WBC 6.84 3.40 - 10.80 10*3/mm3    RBC 3.20 (L) 3.77 - 5.28 10*6/mm3    Hemoglobin 10.3 (L) 12.0 - 15.9 g/dL    Hematocrit 33.3 (L) 34.0 - 46.6 %    .1 (H) 79.0 - 97.0 fL    MCH 32.2 26.6 - 33.0 pg    MCHC 30.9 (L) 31.5 - 35.7 g/dL    RDW 15.1 12.3 - 15.4 %    RDW-SD 58.4 (H) 37.0 - 54.0 fl    MPV 9.6 6.0 - 12.0 fL    Platelets 185 140 - 450 10*3/mm3    Neutrophil % 65.5 42.7 - 76.0 %    Lymphocyte % 18.9 (L) 19.6 - 45.3 %    Monocyte % 11.1 5.0 - 12.0 %    Eosinophil % 3.9 0.3 - 6.2 %    Basophil % 0.3 0.0 - 1.5 %    Immature Grans % 0.3 0.0 - 0.5 %    Neutrophils, Absolute 4.48 1.70 - 7.00 10*3/mm3    Lymphocytes, Absolute 1.29 0.70 - 3.10 10*3/mm3    Monocytes, Absolute 0.76 0.10 - 0.90 10*3/mm3    Eosinophils, Absolute 0.27 0.00 - 0.40 10*3/mm3    Basophils, Absolute 0.02 0.00 - 0.20 10*3/mm3    Immature Grans, Absolute 0.02 0.00 - 0.05 10*3/mm3    nRBC 0.0 0.0 - 0.2 /100 WBC          Imaging:  CT Head Without Contrast    Result Date: 2/27/2025  Impression: 1. No acute appearing abnormality. 2. Stable chronic findings above. Electronically Signed: Jacoby  MD Gosia  2/27/2025 12:36 PM EST  Workstation ID: [id number]    XR Chest 1 View    Result Date: 2/27/2025  Impression: No acute cardiopulmonary abnormality is identified. Electronically Signed: Floridalma East  2/27/2025 12:15 PM EST  Workstation ID: [id number]     Assessment/Plan:     AMS (altered mental status)         ESRD HD dependent  AMS  UTI  CAD  HTN  Bladder ca  DM    Recommendations:      HD today and MWF  Abx per primary team

## 2025-02-28 NOTE — SIGNIFICANT NOTE
02/28/25 1423   OTHER   Discipline occupational therapist   Rehab Time/Intention   Session Not Performed patient unavailable for evaluation;other (see comments)  (Pt receiving dialysis, OT will follow-up as appropriate.)   Therapy Assessment/Plan (PT)   Criteria for Skilled Interventions Met (PT) yes;meets criteria;skilled treatment is necessary   Recommendation   OT - Next Appointment 03/02/25

## 2025-02-28 NOTE — PLAN OF CARE
Problem: Adult Inpatient Plan of Care  Goal: Absence of Hospital-Acquired Illness or Injury  Intervention: Identify and Manage Fall Risk  Recent Flowsheet Documentation  Taken 2/28/2025 1600 by Tyesha Hackett RN  Safety Promotion/Fall Prevention:   safety round/check completed   room organization consistent   nonskid shoes/slippers when out of bed   fall prevention program maintained   clutter free environment maintained   assistive device/personal items within reach   activity supervised  Taken 2/28/2025 1400 by Tyesha Hackett RN  Safety Promotion/Fall Prevention:   safety round/check completed   room organization consistent   nonskid shoes/slippers when out of bed   fall prevention program maintained   clutter free environment maintained   assistive device/personal items within reach   activity supervised  Taken 2/28/2025 1200 by Tyesha Hackett RN  Safety Promotion/Fall Prevention:   safety round/check completed   room organization consistent   nonskid shoes/slippers when out of bed   fall prevention program maintained   clutter free environment maintained   assistive device/personal items within reach   activity supervised  Taken 2/28/2025 1000 by Tyesha Hackett RN  Safety Promotion/Fall Prevention:   safety round/check completed   room organization consistent   nonskid shoes/slippers when out of bed   fall prevention program maintained   clutter free environment maintained   assistive device/personal items within reach   activity supervised  Taken 2/28/2025 0800 by Tyesha Hackett, RN  Safety Promotion/Fall Prevention:   safety round/check completed   room organization consistent   nonskid shoes/slippers when out of bed   fall prevention program maintained   clutter free environment maintained   assistive device/personal items within reach   activity supervised  Taken 2/28/2025 0700 by Tyesha Hackett, RN  Safety Promotion/Fall Prevention:   safety round/check completed   room organization  consistent   nonskid shoes/slippers when out of bed   fall prevention program maintained   clutter free environment maintained   assistive device/personal items within reach   activity supervised  Intervention: Prevent Skin Injury  Recent Flowsheet Documentation  Taken 2/28/2025 1743 by Tyesha Hackett RN  Body Position:   weight shifting   turned   left  Taken 2/28/2025 1600 by Tyesha Hackett RN  Body Position:   weight shifting   tilted  Skin Protection:   transparent dressing maintained   incontinence pads utilized  Taken 2/28/2025 1200 by Tyesha Hackett RN  Body Position: weight shifting  Skin Protection:   transparent dressing maintained   incontinence pads utilized  Taken 2/28/2025 0800 by Tyesha Hackett RN  Body Position:   weight shifting   turned   right  Taken 2/28/2025 0700 by Tyesha Hackett RN  Body Position:   weight shifting   supine  Skin Protection:   transparent dressing maintained   incontinence pads utilized  Intervention: Prevent and Manage VTE (Venous Thromboembolism) Risk  Recent Flowsheet Documentation  Taken 2/28/2025 0700 by Tyesha Hackett RN  VTE Prevention/Management:   bilateral   SCDs (sequential compression devices) off   patient refused intervention  Intervention: Prevent Infection  Recent Flowsheet Documentation  Taken 2/28/2025 1600 by Tyesha Hackett RN  Infection Prevention:   single patient room provided   rest/sleep promoted   personal protective equipment utilized   hand hygiene promoted  Taken 2/28/2025 1400 by Tyesha Hackett RN  Infection Prevention:   single patient room provided   rest/sleep promoted   personal protective equipment utilized   hand hygiene promoted  Taken 2/28/2025 1200 by Tyesha Hackett RN  Infection Prevention:   single patient room provided   rest/sleep promoted   personal protective equipment utilized   hand hygiene promoted  Taken 2/28/2025 1000 by Tyesha Hackett RN  Infection Prevention:   single patient room  provided   rest/sleep promoted   personal protective equipment utilized   hand hygiene promoted  Taken 2/28/2025 0800 by Tyesha Hackett RN  Infection Prevention:   single patient room provided   rest/sleep promoted   personal protective equipment utilized   hand hygiene promoted  Taken 2/28/2025 0700 by Tyesha Hackett RN  Infection Prevention:   single patient room provided   rest/sleep promoted   personal protective equipment utilized   hand hygiene promoted  Goal: Optimal Comfort and Wellbeing  Intervention: Provide Person-Centered Care  Recent Flowsheet Documentation  Taken 2/28/2025 1600 by Tyesha Hackett RN  Trust Relationship/Rapport:   care explained   choices provided   questions answered  Taken 2/28/2025 1200 by Tyesha Hackett RN  Trust Relationship/Rapport:   care explained   choices provided   questions answered  Taken 2/28/2025 0700 by Tyesha Hackett RN  Trust Relationship/Rapport:   care explained   choices provided   questions answered   Goal Outcome Evaluation:

## 2025-02-28 NOTE — NURSING NOTE
Pt will not keep head probe on, due to confusion , intermitted sats monitor, multple attempt to replace but once out of the room pt has removed,

## 2025-02-28 NOTE — PLAN OF CARE
Goal Outcome Evaluation:   VFSS EXPLANATION/ OUTCOME:     MBSS performed in the lateral projection with the following consistencies respectively: thin by tsp x1, thin by straw x2, puree x2, mechanical-soft x1, NTL by tsp x2. All trials with adequate barium concentration for fluoroscopy view. All trials administered via pt.      Baseline diet: Unknown  Dentition: Upper dentures, natural bottom dentition  PMH: No prior history in chart of dysphagia, severe dementia      Oral phase: Marked by poor bolus cohesion with premature spillage to the vallecular level secondary to weak BOT strength. Prolonged manipulation w/ tongue pumping. 4-5 second swallow initiation. There is reduced epiglottic deflection. Slightly reduced mastication during mechanical soft trial however no oral residue noted.     Pharyngeal phase:  Deep penetration of thin liquids via straw that touched the cords and does not clear from laryngeal vestibule. No protective response initiated despite cue. Deep penetration of thin liquids via spoon that does not clear from laryngeal vestibule. No other instances of penetration and or aspiration noted during trials. Adequate pharyngeal squeeze. Cued double swallows initiated x1 during trials of puree and mechanical-soft which cleared pharyngeal coating/residue.      Esophageal view: unremarkable      Pt demonstrates a MODERATE-SEVERE swallow impairment posing a significant risk of aspiration and malnutrition risk. Dysphagia is most prominently characterized by lack of absent airway protective reactions, reduced epiglottic deflection, reduced mastication & manipulation. This results in open/patent laryngeal vestibule, and deep penetration of thin liquids.     Strategies attempted: n/a     Education provided: Nursing staff made aware of recommendations.           Rosenbeck's 8- Point Penetration Aspiration Scale  Material does not enter airway  Material enters the airway, remains above the vocal folds and is  ejected from the airway  Material enters the airway, remains above the vocal folds, and is not ejected from the airway  Material enters the airway, contacts the vocal folds and is ejected from the airway  Material enters the airway, contacts the vocal folds, and is not ejected from the airway  Material enters the airway, passes below the vocal folds, and is ejected out of the trachea  Material enters the airway, passes below the vocal folds and is not ejected from the trachea despite effort  Material enters the airway, passes below the vocal folds, and no effort is made to eject.        SLP Recommendation and Plan:     - Mechanical soft diet, no mixed consistency & NTL no straw w/ FWP only 30 minutes after consumption of P.O or NTL. Feeding assistance.   - Only feed when pt is fully awake and alert  - Meds: crushed in puree   - Safe swallow strategies: HOB at a 90 degree angle, slow rate of intake, small bites/sips  -Oral care at least x2 daily   -ST will continue to follow as per current plan of care and with additional goals/recommendations as indicated    The rationale to recommend water/ice chips when a PO diet cannot appropriately/functionally sustain nutrition is because water is low risk for aspiration pna when compared to aspiration of food or other liquids.       Benefits of a water protocol include but are not limited to:      Oral gratification   Engagement of oropharyngeal swallow musculature   Decrease likelihood of dehydration       Guidelines for proper implementation include:  Thorough oral care prior to consuming water  Upright at 90 degree hip flexion  Small sips at slow rate     Monitor for any changes in respiratory status and discontinue if distress noted            Anticipated Discharge Disposition (SLP): unknown          SLP Swallowing Diagnosis: oral dysphagia, pharyngeal dysphagia (02/28/25 1145)  Treatment Assessment (SLP): oral dysphagia, pharyngeal dysphagia (02/28/25 1145)     Plan for  Continued Treatment (SLP): continue treatment per plan of care (02/28/25 8725)

## 2025-02-28 NOTE — PLAN OF CARE
Problem: Adult Inpatient Plan of Care  Goal: Plan of Care Review  Outcome: Progressing  Flowsheets (Taken 2/28/2025 0552)  Progress: improving  Plan of Care Reviewed With: patient  Goal: Patient-Specific Goal (Individualized)  Outcome: Progressing  Goal: Absence of Hospital-Acquired Illness or Injury  Outcome: Progressing  Intervention: Identify and Manage Fall Risk  Recent Flowsheet Documentation  Taken 2/28/2025 0335 by Lindsay Hernandez, RN  Safety Promotion/Fall Prevention:   assistive device/personal items within reach   clutter free environment maintained   fall prevention program maintained   lighting adjusted   mobility aid in reach   nonskid shoes/slippers when out of bed   room organization consistent   safety round/check completed  Taken 2/28/2025 0200 by Lindsay Hernandez RN  Safety Promotion/Fall Prevention:   assistive device/personal items within reach   clutter free environment maintained   fall prevention program maintained   lighting adjusted   mobility aid in reach   nonskid shoes/slippers when out of bed   room organization consistent   safety round/check completed  Taken 2/28/2025 0000 by Lindsay Hernandez RN  Safety Promotion/Fall Prevention:   assistive device/personal items within reach   clutter free environment maintained   fall prevention program maintained   lighting adjusted   mobility aid in reach   nonskid shoes/slippers when out of bed   room organization consistent   safety round/check completed  Taken 2/27/2025 2215 by Lindsay Hernandez, RN  Safety Promotion/Fall Prevention:   assistive device/personal items within reach   clutter free environment maintained   fall prevention program maintained   lighting adjusted   mobility aid in reach   nonskid shoes/slippers when out of bed   room organization consistent   safety round/check completed  Intervention: Prevent Skin Injury  Recent Flowsheet Documentation  Taken 2/28/2025 0335 by Lindsay Hernandez, RN  Skin Protection: transparent dressing  maintained  Taken 2/28/2025 0000 by Lindsay Hernandez RN  Body Position:   turned   left  Skin Protection: transparent dressing maintained  Taken 2/27/2025 2215 by Lindsay Hernandez RN  Body Position:   weight shifting   supine  Skin Protection:   transparent dressing maintained   silicone foam dressing in place   incontinence pads utilized   pouching devices used  Intervention: Prevent and Manage VTE (Venous Thromboembolism) Risk  Recent Flowsheet Documentation  Taken 2/27/2025 2215 by Lindsay Hernandze RN  VTE Prevention/Management:   bilateral   SCDs (sequential compression devices) off   patient refused intervention  Intervention: Prevent Infection  Recent Flowsheet Documentation  Taken 2/28/2025 0335 by Lindsay Hernandez RN  Infection Prevention:   equipment surfaces disinfected   hand hygiene promoted   personal protective equipment utilized   rest/sleep promoted   single patient room provided  Taken 2/28/2025 0200 by Lindsay Hernandez RN  Infection Prevention:   equipment surfaces disinfected   hand hygiene promoted   personal protective equipment utilized   rest/sleep promoted   single patient room provided  Taken 2/28/2025 0000 by Lindsay Hernandez RN  Infection Prevention:   equipment surfaces disinfected   hand hygiene promoted   personal protective equipment utilized   rest/sleep promoted   single patient room provided  Taken 2/27/2025 2215 by Lindsay Hernandez RN  Infection Prevention:   equipment surfaces disinfected   hand hygiene promoted   personal protective equipment utilized   rest/sleep promoted   single patient room provided  Goal: Optimal Comfort and Wellbeing  Outcome: Progressing  Intervention: Provide Person-Centered Care  Recent Flowsheet Documentation  Taken 2/27/2025 2215 by Lindsay Hernandez RN  Trust Relationship/Rapport: care explained  Goal: Readiness for Transition of Care  Outcome: Progressing     Problem: Skin Injury Risk Increased  Goal: Skin Health and Integrity  Outcome: Progressing  Intervention:  Optimize Skin Protection  Recent Flowsheet Documentation  Taken 2/28/2025 0335 by Lindsay Hernandez RN  Pressure Reduction Techniques:   frequent weight shift encouraged   weight shift assistance provided  Pressure Reduction Devices: pressure-redistributing mattress utilized  Skin Protection: transparent dressing maintained  Taken 2/28/2025 0000 by Lindsay Hernandez RN  Pressure Reduction Techniques:   frequent weight shift encouraged   weight shift assistance provided  Head of Bed (HOB) Positioning: HOB elevated  Pressure Reduction Devices: pressure-redistributing mattress utilized  Skin Protection: transparent dressing maintained  Taken 2/27/2025 2215 by Lindsay Hernandez RN  Activity Management: activity encouraged  Pressure Reduction Techniques:   weight shift assistance provided   frequent weight shift encouraged   pressure points protected  Head of Bed (HOB) Positioning: HOB elevated  Pressure Reduction Devices: pressure-redistributing mattress utilized  Skin Protection:   transparent dressing maintained   silicone foam dressing in place   incontinence pads utilized   pouching devices used     Problem: Violence Risk or Actual  Goal: Anger and Impulse Control  Outcome: Progressing  Intervention: Minimize Safety Risk  Recent Flowsheet Documentation  Taken 2/28/2025 0335 by Lindsay Hernandez RN  Behavior Management: impulse control promoted  Sensory Stimulation Regulation: quiet environment promoted  Enhanced Safety Measures: bed alarm set  Taken 2/28/2025 0200 by Lindsay Hernandez RN  Enhanced Safety Measures: bed alarm set  Taken 2/28/2025 0000 by Lindsay Hernandez RN  Behavior Management: boundaries reinforced  Sensory Stimulation Regulation: quiet environment promoted  Enhanced Safety Measures: bed alarm set  Taken 2/27/2025 2215 by Lindsay Hernandez RN  Behavior Management:   boundaries reinforced   impulse control promoted  Sensory Stimulation Regulation: quiet environment promoted  Enhanced Safety Measures: bed alarm  set  Intervention: Promote Self-Control  Recent Flowsheet Documentation  Taken 2/28/2025 0335 by Lindsay Hernandez RN  Supportive Measures: verbalization of feelings encouraged  Taken 2/28/2025 0000 by Lindsay Hernandez RN  Supportive Measures: verbalization of feelings encouraged  Environmental Support: calm environment promoted  Taken 2/27/2025 2215 by Lindsay Hernandez RN  Supportive Measures:   active listening utilized   verbalization of feelings encouraged  Environmental Support:   calm environment promoted   rest periods encouraged     Problem: UTI (Urinary Tract Infection)  Goal: Improved Infection Symptoms  Outcome: Progressing     Problem: Fall Injury Risk  Goal: Absence of Fall and Fall-Related Injury  Outcome: Progressing  Intervention: Identify and Manage Contributors  Recent Flowsheet Documentation  Taken 2/28/2025 0335 by Lindsay Hernandez RN  Medication Review/Management: medications reviewed  Taken 2/28/2025 0200 by Lindsay Hernandez RN  Medication Review/Management: medications reviewed  Taken 2/28/2025 0000 by Lindsay eHrnandez RN  Medication Review/Management: medications reviewed  Taken 2/27/2025 2215 by Lindsay Hernandez RN  Medication Review/Management: medications reviewed  Intervention: Promote Injury-Free Environment  Recent Flowsheet Documentation  Taken 2/28/2025 0335 by Lindsay Hernandez RN  Safety Promotion/Fall Prevention:   assistive device/personal items within reach   clutter free environment maintained   fall prevention program maintained   lighting adjusted   mobility aid in reach   nonskid shoes/slippers when out of bed   room organization consistent   safety round/check completed  Taken 2/28/2025 0200 by Lindsay Hernandez RN  Safety Promotion/Fall Prevention:   assistive device/personal items within reach   clutter free environment maintained   fall prevention program maintained   lighting adjusted   mobility aid in reach   nonskid shoes/slippers when out of bed   room organization consistent   safety  round/check completed  Taken 2/28/2025 0000 by Lindsay Hernandez RN  Safety Promotion/Fall Prevention:   assistive device/personal items within reach   clutter free environment maintained   fall prevention program maintained   lighting adjusted   mobility aid in reach   nonskid shoes/slippers when out of bed   room organization consistent   safety round/check completed  Taken 2/27/2025 2215 by Lindsay Hernandez RN  Safety Promotion/Fall Prevention:   assistive device/personal items within reach   clutter free environment maintained   fall prevention program maintained   lighting adjusted   mobility aid in reach   nonskid shoes/slippers when out of bed   room organization consistent   safety round/check completed     Problem: Confusion Acute  Goal: Optimal Cognitive Function  Outcome: Progressing  Intervention: Minimize Contributing Factors  Recent Flowsheet Documentation  Taken 2/28/2025 0335 by Lindsay Hernandez RN  Sensory Stimulation Regulation: quiet environment promoted  Taken 2/28/2025 0000 by Lindsay Hernandez RN  Sensory Stimulation Regulation: quiet environment promoted  Taken 2/27/2025 2215 by Lindsay Hernandez RN  Sensory Stimulation Regulation: quiet environment promoted   Goal Outcome Evaluation:  Plan of Care Reviewed With: patient        Progress: improving

## 2025-02-28 NOTE — PLAN OF CARE
Goal Outcome Evaluation:   Clinical swallow evaluation completed. Pt was awake and alert, not able to follow commands, very confused. Baseline of dementia. Able to verbalize w/ severe dysarthria. Upon room entry, pt was lying in bed asleep, awoken w/ SLP stimuli. Pt on room air. SLP adjusted pt to sitting at 90 degrees for swallow evaluation. Pt was NPO at the time of evaluation. Upper dentures in place, natural bottom dentition. Pt is poor historian, therefore SLP not aware of pt's baseline diet. No prior ST notes in chart from past. Oral motor exam revealed overall generalized oral motor weakness. Trials assessed: ice chips x1, thin by cup x2, thin by straw x5, puree x2. Pt had poor bolus control of ice chip, all trials given via SLP. Adequate labial seal w/straw. No anterior loss. Oral transit appeared delayed w/ oral holding. SLP cued pt x1 to swallow during thin liquid trials.No pocketing or residue noted. Pt demonstrated very weak cough x3 during trials of thin liquids and ice chip. SLP provided recommendations for VFSS, however not sure if pt was able to comprehend due to dementia. Nursing staff made aware. Per above, pt presents w/ MOD oral stage dysphagia and suspecting pharyngeal phase at this time. It is recommended pt remain NPO w/ FWP. Meds via alternate route. VFSS pending.     SLP Plan & Recommendation:      - NPO w/ exception of FWP- see below   - Water/ice only when pt is fully awake and alert  - Meds: via alternate route  - Safe swallow strategies: HOB at a 90 degree angle, slow rate of intake, small sips  -Oral care at least x2 daily     The rationale to recommend water/ice chips when a PO diet cannot appropriately/functionally sustain nutrition is because water is low risk for aspiration pna when compared to aspiration of food or other liquids.       Benefits of a water protocol include but are not limited to:      Oral gratification   Engagement of oropharyngeal swallow musculature   Decrease  likelihood of dehydration       Guidelines for proper implementation include:  Thorough oral care prior to consuming water  Upright at 90 degree hip flexion  Small sips at slow rate     Monitor for any changes in respiratory status and discontinue if distress noted      Anticipated Discharge Disposition (SLP): unknown          SLP Swallowing Diagnosis: oral dysphagia, suspected pharyngeal dysphagia (02/28/25 1100)        Plan for Continued Treatment (SLP): continue treatment per plan of care (02/28/25 1100)

## 2025-02-28 NOTE — CASE MANAGEMENT/SOCIAL WORK
Discharge Planning Assessment   Luis Felipe     Patient Name: Hazel Bucio  MRN: [id number]  Today's Date: 2/27/2025    Admit Date: 2/27/2025    Plan: Return to [facility] , established HD   Discharge Needs Assessment       Row Name 02/1940       Discharge Needs Assessment    Outpatient/Agency/Support Group Needs outpatient hemodialysis      Row Name 02/27/25 1936       Living Environment    People in Home facility resident  [facility]    Current Living Arrangements extended care facility  [facility]    Potentially Unsafe Housing Conditions none    In the past 12 months has the electric, gas, oil, or water company threatened to shut off services in your home? No    Primary Care Provided by other (see comments)  Manish Gutierrez    Provides Primary Care For no one    Family Caregiver if Needed other (see comments)    Quality of Family Relationships helpful    Able to Return to Prior Arrangements yes       Resource/Environmental Concerns    Resource/Environmental Concerns none    Transportation Concerns --  will need ambulance       Transportation Needs    In the past 12 months, has lack of transportation kept you from medical appointments or from getting medications? no    In the past 12 months, has lack of transportation kept you from meetings, work, or from getting things needed for daily living? No       Food Insecurity    Within the past 12 months, you worried that your food would run out before you got the money to buy more. Never true    Within the past 12 months, the food you bought just didn't last and you didn't have money to get more. Never true       Transition Planning    Patient/Family Anticipates Transition to long-term care facility    Transportation Anticipated health plan transportation  Will need transportation to return to        Discharge Needs Assessment    Readmission Within the Last 30 Days no previous admission in last 30 days    Equipment Currently Used at Home  wheelchair                   Discharge Plan       Row Name 02/27/25 1942       Plan    Plan Return to [facility] , established HD    Plan Comments Spoke with patient ANTHONY Reed, states plans is for patient to return to [facility]. Spoke with Ottoniel Santana at [facility] said LTC bed is held. Will need assitance with transportation upon dischage. DC Barriers: IVAB's                  Continued Care and Services - Admitted Since 2/27/2025       Destination       Service Provider Request Status Services Address Phone Fax Patient Preferred    [facility] [place] Accepted -- [address] [facility] [place] [address] [phone] --               Expected Discharge Date and Time       Expected Discharge Date Expected Discharge Time    Feb 28, 2025            Demographic Summary       Row Name 02/27/25 1932       General Information    Admission Type observation    Arrived From [facility]    Required Notices Provided Observation Status Notice    Referral Source other (see comments)    Reason for Consult discharge planning    Preferred Language English                   Functional Status       Row Name 02/27/25 1935       Functional Status    Usual Activity Tolerance fair    Current Activity Tolerance fair       Functional Status, IADL    Medications completely dependent    Meal Preparation completely dependent    Housekeeping completely dependent    Laundry completely dependent    Shopping completely dependent       Mental Status    General Appearance WDL WDL                Patient Forms       Row Name 02/27/25 1939       Patient Forms    Patient Observation Letter Delivered  2/27 Registration           Met with patient in room wearing PPE: mask, face shield/goggles, gloves, gown.      Maintained distance greater than six feet and spent less than 15 minutes in the room.     Gretel Winchester RN

## 2025-02-28 NOTE — MBS/VFSS/FEES
Acute Care - Speech Language Pathology   Swallow Initial Evaluation  Luis Felipe     Patient Name: Hazel Bucio  : 1940  MRN: [id number]  Today's Date: 2025               Admit Date: 2025    Visit Dx:     ICD-10-CM ICD-9-CM   1. Altered mental status, unspecified altered mental status type  R41.82 780.97   2. Noncompliance with renal dialysis  Z91.158 V45.12   3. Acute cystitis with hematuria  N30.01 595.0     Patient Active Problem List   Diagnosis    Permanent atrial fibrillation    Dyslipidemia    Essential hypertension    Long term current use of anticoagulant    Presence of cardiac pacemaker    Type 2 diabetes mellitus    Ureteral cancer    Chronic insomnia    Seasonal allergies    Acute UTI (urinary tract infection)    Acquired absence of kidney    Athscl heart disease of native coronary 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    Acquired absence of other specified parts of digestive tract    Gastro-esophageal reflux disease without esophagitis    Chest pain, atypical    ESRD on dialysis    Hypotension    Mild cognitive impairment of uncertain or unknown etiology    Femur fracture, right    Severe malnutrition    Hard of hearing    UTI (urinary tract infection)    AMS (altered mental status)     Past Medical History:   Diagnosis Date    Acquired absence of kidney 10/04/2021    Acquired absence of other specified parts of digestive tract 2022    Athscl heart disease of native coronary 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 2022    Atrial fibrillation with rapid ventricular response     Bladder cancer     Cancer     breast, bladder    Cholecystitis with cholelithiasis     Chronic insomnia 2020    Deep vein thrombosis     Dyslipidemia 2017    E coli bacteremia     ESRD on dialysis 10/18/2023    Essential hypertension 2017    Fracture tibia/fibula, right, closed, initial encounter 2020    Gastro-esophageal reflux disease without esophagitis 2020    GERD (gastroesophageal reflux disease)      History of bladder cancer     History of falling     History of urostomy     Hyperkalemia     Immobility 08/27/2020    r/t broken Tibia     Irritable bowel syndrome without diarrhea     Kidney carcinoma, right     removed     Long term current use of anticoagulant 01/09/2015    Mild cognitive impairment of uncertain or unknown etiology 08/31/2023    Myalgia due to statin     Other specified abdominal hernia without obstruction or gangrene     PAF (paroxysmal atrial fibrillation) 06/26/2019    Permanent atrial fibrillation 06/26/2019    Personal history of other venous thrombosis and embolism     Presence of cardiac pacemaker 11/03/2014    BS dual chamber PM placed 10/2014 with pocket revision 1/25/17.  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     Past Surgical History:   Procedure Laterality Date    BREAST LUMPECTOMY      CARDIAC CATHETERIZATION N/A 10/18/2023    Procedure: Left Heart Cath possible PCI, atherectomy, hemodynamic support;  Surgeon: Augustin Ellsworth MD;  Location: [facility] CATH INVASIVE LOCATION;  Service: Cardiology;  Laterality: N/A;    CARDIAC ELECTROPHYSIOLOGY PROCEDURE N/A 4/28/2023    Procedure: Pacemaker gen change, [place] AWARE $;  Surgeon: Jose Carlos Cheng MD;  Location: [facility] CATH INVASIVE LOCATION;  Service: Cardiovascular;  Laterality: N/A;    CHOLECYSTECTOMY N/A 10/12/2020    Procedure: CHOLECYSTECTOMY LAPAROSCOPIC;  Surgeon: Go Pereira DO;  Location: [facility] MAIN OR;  Service: General;  Laterality: N/A;    CYSTECTOMY W/ URETEROILEAL CONDUIT      FEMUR IM NAILING RETROGRADE Right 7/25/2024    Procedure: RIGHT FEMUR INTRAMEDULLARY NAILING RETROGRADE AND OPEN REDUCTION INTERNAL FIXATION;  Surgeon: Elieser Diggs MD;  Location: [facility] MAIN OR;  Service: Orthopedics;  Laterality: Right;    HARDWARE  REMOVAL Right 6/11/2021    Procedure: TIBIA HARDWARE REMOVAL;  Surgeon: Geoff Shah II, MD;  Location: [facility] MAIN OR;  Service: Orthopedics;  Laterality: Right;    HERNIA REPAIR      HYSTERECTOMY      INSERT / REPLACE / REMOVE PACEMAKER      NEPHRECTOMY Right     TIBIA IM NAILING/RODDING Right 8/28/2020    Procedure: TIBIA INTRAMEDULLARY NAIL/ABRAHAM INSERTION;  Surgeon: Geoff Shah II, MD;  Location: [facility] MAIN OR;  Service: Orthopedics;  Laterality: Right;       SLP Recommendation and Plan  SLP Swallowing Diagnosis: oral dysphagia, pharyngeal dysphagia (02/28/25 1145)  SLP Diet Recommendation: nectar thick liquids, mechanical ground textures, no mixed consistencies (02/28/25 1145)  Recommended Precautions and Strategies: general aspiration precautions, assist with feeding, no straw, upright posture during/after eating (02/28/25 1145)  SLP Rec. for Method of Medication Administration: with puree, meds crushed (02/28/25 1145)        Recommended Diagnostics: VFSS (MBS) (02/28/25 1100)  Swallow Criteria for Skilled Therapeutic Interventions Met: demonstrates skilled criteria (02/28/25 1145)  Anticipated Discharge Disposition (SLP): unknown (02/28/25 1145)  Rehab Potential/Prognosis, Swallowing: good, to achieve stated therapy goals (02/28/25 1145)  Therapy Frequency (Swallow): PRN (02/28/25 1145)  Predicted Duration Therapy Intervention (Days): until discharge (02/28/25 1145)  Oral Care Recommendations: Oral Care before breakfast, after meals and PRN (02/28/25 1145)           Treatment Assessment (SLP): oral dysphagia, pharyngeal dysphagia (02/28/25 1145)     Plan for Continued Treatment (SLP): continue treatment per plan of care (02/28/25 1145)         SWALLOW EVALUATION (Last 72 Hours)       SLP Adult Swallow Evaluation       Row Name 02/28/25 1145       Rehab Evaluation    Document Type evaluation  -MS    Subjective Information no complaints  -MS    Patient Observations decreased  LOC;obtunded  -MS    Patient/Family/Caregiver Comments/Observations n/a  -MS    Patient Effort adequate  -MS    Symptoms Noted During/After Treatment none  -MS       General Information    Patient Profile Reviewed yes  -MS    Pertinent History Of Current Problem Pleasantly demented 84-year-old female presents via EMS for [facility] status[facility] patient had some garbled speech at approximately 7 AM. Patient has known history of both dementia and speech problems. She is currently being treated for UTI with IV antibiotics. She also is a dialysis patient and was not dialyzed today. The patient was able to verbalize to me that she has felt weak over the last couple of days and is only sitting in her wheelchair. VFSS completed.                      -MS    Current Method of Nutrition --    Precautions/Limitations, Vision difficult to assess  -MS    Precautions/Limitations, Hearing for purposes of eval  -MS    Prior Level of Function-Communication unknown  -MS    Prior Level of Function-Swallowing unknown  -MS    Plans/Goals Discussed with patient  -MS       Oral Motor Structure and Function    Dentition Assessment upper dentures/partial in place  -MS    Secretion Management WNL/WFL  -MS    Mucosal Quality dry  -MS    Volitional Swallow --    Volitional Cough --       Oral Musculature and Cranial Nerve Assessment    Oral Motor General Assessment generalized oral motor weakness  -MS       General Eating/Swallowing Observations    Respiratory Support Currently in Use room air  -MS    Eating/Swallowing Skills --    Positioning During Eating --    Utensils Used --    Consistencies Trialed --       Respiratory    Respiratory Status WFL  -MS       MBS/VFSS    Utensils Used spoon;cup;straw  -MS    Consistencies Trialed mechanical ground textures;pureed;thin liquids;nectar/syrup-thick liquids  -MS       MBS/VFSS Interpretation    Oral Prep Phase impaired oral phase of swallowing  -MS     Oral Transit Phase WFL  -MS    Oral Residue WFL  -MS    VFSS Summary VFSS EXPLANATION/ OUTCOME:     MBSS performed in the lateral projection with the following consistencies respectively: thin by tsp x1, thin by straw x2, puree x2, mechanical-soft x1, NTL by tsp x2. All trials with adequate barium concentration for fluoroscopy view. All trials administered via pt.      Baseline diet: Unknown  Dentition: Upper dentures, natural bottom dentition  PMH: No prior history in chart of dysphagia, severe dementia      Oral phase: Marked by poor bolus cohesion with premature spillage to the vallecular level secondary to weak BOT strength. Prolonged manipulation w/ tongue pumping. 4-5 second swallow initiation. There is reduced epiglottic deflection. Slightly reduced mastication during mechanical soft trial however no oral residue noted.     Pharyngeal phase:  Deep penetration of thin liquids via straw that touched the cords and does not clear from laryngeal vestibule. No protective response initiated despite cue. Deep penetration of thin liquids via spoon that does not clear from laryngeal vestibule. No other instances of penetration and or aspiration noted during trials. Adequate pharyngeal squeeze. Cued double swallows initiated x1 during trials of puree and mechanical-soft which cleared pharyngeal coating/residue.      Esophageal view: unremarkable      Pt demonstrates a MODERATE-SEVERE swallow impairment posing a significant risk of aspiration and malnutrition risk. Dysphagia is most prominently characterized by lack of absent airway protective reactions, reduced epiglottic deflection, reduced mastication & manipulation. This results in open/patent laryngeal vestibule, and deep penetration of thin liquids.     Strategies attempted: n/a     Education provided: Nursing staff made aware of recommendations.           Rosenbeck's 8- Point Penetration Aspiration Scale  Material does not enter airway  Material enters the airway,  remains above the vocal folds and is ejected from the airway  Material enters the airway, remains above the vocal folds, and is not ejected from the airway  Material enters the airway, contacts the vocal folds and is ejected from the airway  Material enters the airway, contacts the vocal folds, and is not ejected from the airway  Material enters the airway, passes below the vocal folds, and is ejected out of the trachea  Material enters the airway, passes below the vocal folds and is not ejected from the trachea despite effort  Material enters the airway, passes below the vocal folds, and no effort is made to eject.        SLP Recommendation and Plan:     - Mechanical soft diet, no mixed consistency & NTL no straw w/ FWP only 30 minutes after consumption of P.O or NTL. Feeding assistance.   - Only feed when pt is fully awake and alert  - Meds: crushed in puree   - Safe swallow strategies: HOB at a 90 degree angle, slow rate of intake, small bites/sips  -Oral care at least x2 daily   -ST will continue to follow as per current plan of care and with additional goals/recommendations as indicated    The rationale to recommend water/ice chips when a PO diet cannot appropriately/functionally sustain nutrition is because water is low risk for aspiration pna when compared to aspiration of food or other liquids.       Benefits of a water protocol include but are not limited to:      Oral gratification   Engagement of oropharyngeal swallow musculature   Decrease likelihood of dehydration       Guidelines for proper implementation include:  Thorough oral care prior to consuming water  Upright at 90 degree hip flexion  Small sips at slow rate     Monitor for any changes in respiratory status and discontinue if distress noted                  -MS       Oral Preparatory Phase    Oral Preparatory Phase prolonged manipulation  -MS       Initiation of Pharyngeal Swallow    Initiation of Pharyngeal Swallow bolus in valleculae  -MS     Pharyngeal Phase impaired pharyngeal phase of swallowing  -MS    Penetration During the Swallow thin liquids  -MS    Depth of Penetration deep  -MS    Response to Penetration No  -MS    No spontaneous response to penetration and non-effective laryngeal clearance with cue (see comments)  -MS    Pharyngeal Residue valleculae  -MS    Response to Residue partial residue clearance  -MS       Esophageal Phase    Esophageal Phase no impairments  -MS       SLP Communication to Radiology    Severity Level of Dysphagia mod-severe dysphagia  -MS    Consistencies Aspirated/Penetrated thin liquids;penetrated  -MS       SLP Evaluation Clinical Impression    SLP Swallowing Diagnosis oral dysphagia;pharyngeal dysphagia  -MS    Functional Impact risk of aspiration/pneumonia;risk of malnutrition;risk of dehydration  -MS    Rehab Potential/Prognosis, Swallowing good, to achieve stated therapy goals  -MS    Swallow Criteria for Skilled Therapeutic Interventions Met demonstrates skilled criteria  -MS       SLP Treatment Clinical Impressions    Treatment Assessment (SLP) oral dysphagia;pharyngeal dysphagia  -MS    Barriers to Overall Progress (SLP) Cognitive status  -MS    Plan for Continued Treatment (SLP) continue treatment per plan of care  -MS    Care Plan Review evaluation/treatment results reviewed  -MS       Recommendations    Therapy Frequency (Swallow) PRN  -MS    Predicted Duration Therapy Intervention (Days) until discharge  -MS    SLP Diet Recommendation nectar thick liquids;mechanical ground textures;no mixed consistencies  -MS    Recommended Diagnostics --    Recommended Precautions and Strategies general aspiration precautions;assist with feeding;no straw;upright posture during/after eating  -MS    Oral Care Recommendations Oral Care before breakfast, after meals and PRN  -MS    SLP Rec. for Method of Medication Administration with puree;meds crushed  -MS    Anticipated Discharge Disposition (SLP) unknown  -MS        Swallow Goals (SLP)    Swallow LTGs Patient will demonstrate functional swallow for  -MS    Swallow STGs diet tolerance goal selection (SLP)  -MS    Diet Tolerance Goal Selection (SLP) Patient will tolerate trials of  -MS       (LTG) Patient will demonstrate functional swallow for    Diet Texture (Demonstrate functional swallow) mechanical ground textures  -MS    Liquid viscosity (Demonstrate functional swallow) nectar/ mildly thick liquids  -MS    [place] (Demonstrate functional swallow) independently (over 90% accuracy)  -MS    Time Frame (Demonstrate functional swallow) by discharge  -MS    Progress/Outcomes (Demonstrate functional swallow) new goal  -MS       (STG) Patient will tolerate trials of    Consistencies Trialed (Tolerate trials) mechanical ground textures;nectar/ mildly thick liquids;mixed consistencies  -MS    Desired Outcome (Tolerate trials) without signs/symptoms of aspiration;without signs of distress;with adequate oral prep/transit/clearance  -MS    [place] (Tolerate trials) independently (over 90% accuracy)  -MS    Time Frame (Tolerate trials) by discharge  -MS    Progress/Outcomes (Tolerate trials) new goal  -MS              User Key  (r) = Recorded By, (t) = Taken By, (c) = Cosigned By      Initials Name Effective Dates    Simone Olvera, SLP 04/22/24 -                     EDUCATION  The patient has been educated in the following areas:   Dysphagia (Swallowing Impairment).        SLP GOALS       Row Name 02/28/25 1145       (LTG) Patient will demonstrate functional swallow for    Diet Texture (Demonstrate functional swallow) mechanical ground textures  -MS    Liquid viscosity (Demonstrate functional swallow) nectar/ mildly thick liquids  -MS    [place] (Demonstrate functional swallow) independently (over 90% accuracy)  -MS    Time Frame (Demonstrate functional swallow) by discharge  -MS    Progress/Outcomes (Demonstrate functional swallow) new goal  -MS       (STG) Patient  will tolerate trials of    Consistencies Trialed (Tolerate trials) mechanical ground textures;nectar/ mildly thick liquids;mixed consistencies  -MS    Desired Outcome (Tolerate trials) without signs/symptoms of aspiration;without signs of distress;with adequate oral prep/transit/clearance  -MS    [place] (Tolerate trials) independently (over 90% accuracy)  -MS    Time Frame (Tolerate trials) by discharge  -MS    Progress/Outcomes (Tolerate trials) new goal  -MS              User Key  (r) = Recorded By, (t) = Taken By, (c) = Cosigned By      Initials Name Provider Type    Simone Olvera, SLP Speech and Language Pathologist                    MARCIE Shabazz  2/28/2025

## 2025-02-28 NOTE — PROGRESS NOTES
LOS: 0 days   Patient Care Team:  John Cano MD as PCP - General (Family Medicine)  Jose Carlos Cheng MD as Consulting Physician (Cardiology)    Subjective     Interval History:Hemodynamically stable,results of VFSS noted    Patient Complaints: fatigue, no other complaints    History taken from: patient    Review of Systems   Constitutional:  Positive for activity change and fatigue. Negative for appetite change and diaphoresis.   HENT:  Negative for facial swelling.    Eyes:  Negative for visual disturbance.   Respiratory:  Negative for cough, shortness of breath, wheezing and stridor.    Cardiovascular:  Negative for chest pain, palpitations and leg swelling.   Gastrointestinal:  Negative for abdominal pain, constipation, diarrhea and nausea.   Genitourinary:  Negative for dysuria.   Musculoskeletal:  Negative for arthralgias and back pain.   Neurological:  Positive for speech difficulty. Negative for dizziness, facial asymmetry and headaches.   Psychiatric/Behavioral:  Positive for confusion.            Objective     Vital Signs  Temp:  [97.5 °F (36.4 °C)-98 °F (36.7 °C)] 97.8 °F (36.6 °C)  Heart Rate:  [110-135] 110  Resp:  [13-24] 13  BP: ()/(51-91) 139/91    Physical Exam:     General Appearance:    Alert, cooperative, in no acute distress, dazed, Hard of hearing   Head:    Normocephalic, without obvious abnormality, atraumatic   Eyes:            Lids and lashes normal, conjunctivae and sclerae normal, no   icterus, no pallor, corneas clear, PERRLA   Ears:    Ears appear intact with no abnormalities noted   Throat:   No oral lesions, no thrush, oral mucosa moist   Neck:   No adenopathy, supple, trachea midline, no thyromegaly, no   carotid bruit, no JVD   Lungs:     Clear to auscultation,respirations regular, even and                  unlabored    Heart:    Irregularly irregular   Chest Wall:    No abnormalities observed   Abdomen:     Urostomy   Extremities:   Moves all extremities well, no  edema, no cyanosis, no             Redness   Pulses:   Pulses palpable and equal bilaterally   Skin:   No bleeding, bruising or rash   Lymph nodes:   No palpable adenopathy   Neurologic:   No         Results Review:    Lab Results (last 24 hours)       Procedure Component Value Units Date/Time    Urine Culture - Urine, Urine, Catheter [[id number]] Collected: 02/27/25 1337    Specimen: Urine, Catheter Updated: 02/28/25 1318    Blood Culture - Blood, Arm, Left [[id number]]  (Normal) Collected: 02/27/25 1140    Specimen: Blood from Arm, Left Updated: 02/28/25 1145     Blood Culture No growth at 24 hours    Blood Culture - Blood, Arm, Left [[id number]]  (Normal) Collected: 02/27/25 1140    Specimen: Blood from Arm, Left Updated: 02/28/25 1145     Blood Culture No growth at 24 hours    Basic Metabolic Panel [[id number]]  (Abnormal) Collected: 02/28/25 0316    Specimen: Blood Updated: 02/28/25 0403     Glucose 91 mg/dL      BUN 47 mg/dL      Creatinine 5.02 mg/dL      Sodium 140 mmol/L      Potassium 4.4 mmol/L      Chloride 101 mmol/L      CO2 24.8 mmol/L      Calcium 9.1 mg/dL      BUN/Creatinine Ratio 9.4     Anion Gap 14.2 mmol/L      eGFR 8.1 mL/min/1.73     Narrative:      GFR Categories in Chronic Kidney Disease (CKD)      GFR Category          GFR (mL/min/1.73)    Interpretation  G1                     90 or greater         Normal or high (1)  G2                      60-89                Mild decrease (1)  G3a                   45-59                Mild to moderate decrease  G3b                   30-44                Moderate to severe decrease  G4                    15-29                Severe decrease  G5                    14 or less           Kidney failure          (1)In the absence of evidence of kidney disease, neither GFR category G1 or G2 fulfill the criteria for CKD.    eGFR calculation 2021 CKD-EPI creatinine equation, which does not include race as a factor    Magnesium [[id number]]  (Normal) Collected:  02/28/25 0316    Specimen: Blood Updated: 02/28/25 0403     Magnesium 2.4 mg/dL     Phosphorus [[id number]]  (Abnormal) Collected: 02/28/25 0316    Specimen: Blood Updated: 02/28/25 0403     Phosphorus 4.8 mg/dL     Calcium, Ionized [[id number]]  (Abnormal) Collected: 02/28/25 0316    Specimen: Blood Updated: 02/28/25 0334     Ionized Calcium 1.12 mmol/L     CBC & Differential [[id number]]  (Abnormal) Collected: 02/28/25 0316    Specimen: Blood Updated: 02/28/25 0332    Narrative:      The following orders were created for panel order CBC & Differential.  Procedure                               Abnormality         Status                     ---------                               -----------         ------                     CBC Auto Differential[[id number]]        Abnormal            Final result                 Please view results for these tests on the individual orders.    CBC Auto Differential [[id number]]  (Abnormal) Collected: 02/28/25 0316    Specimen: Blood Updated: 02/28/25 0332     WBC 6.84 10*3/mm3      RBC 3.20 10*6/mm3      Hemoglobin 10.3 g/dL      Hematocrit 33.3 %      .1 fL      MCH 32.2 pg      MCHC 30.9 g/dL      RDW 15.1 %      RDW-SD 58.4 fl      MPV 9.6 fL      Platelets 185 10*3/mm3      Neutrophil % 65.5 %      Lymphocyte % 18.9 %      Monocyte % 11.1 %      Eosinophil % 3.9 %      Basophil % 0.3 %      Immature Grans % 0.3 %      Neutrophils, Absolute 4.48 10*3/mm3      Lymphocytes, Absolute 1.29 10*3/mm3      Monocytes, Absolute 0.76 10*3/mm3      Eosinophils, Absolute 0.27 10*3/mm3      Basophils, Absolute 0.02 10*3/mm3      Immature Grans, Absolute 0.02 10*3/mm3      nRBC 0.0 /100 WBC     CANDIDA AURIS PCR - Swab, Axilla Right, Axilla Left and Groin [[id number]] Collected: 02/28/25 0321    Specimen: Swab from Axilla Right, Axilla Left and Groin Updated: 02/28/25 0328    POC Glucose Once [[id number]]  (Normal) Collected: 02/28/25 0200    Specimen: Blood Updated: 02/28/25 0202     Glucose  73 mg/dL      Comment: Serial Number: [id number]:  [id number]       Urinalysis, Microscopic Only - Urine, Catheter [[id number]]  (Abnormal) Collected: 02/27/25 1337    Specimen: Urine, Catheter Updated: 02/27/25 1357     RBC, UA None Seen /HPF      WBC, UA 6-10 /HPF      Bacteria, UA 1+ /HPF      Squamous Epithelial Cells, UA None Seen /HPF      Hyaline Casts, UA None Seen /LPF      Methodology Manual Light Microscopy    Urinalysis With Microscopic If Indicated (No Culture) - Urine, Catheter [[id number]]  (Abnormal) Collected: 02/27/25 1337    Specimen: Urine, Catheter Updated: 02/27/25 1343     Color, UA Yellow     Appearance, UA Cloudy     Comment: Result checked          pH, UA 8.0     Specific Gravity, UA 1.014     Glucose, UA Negative     Ketones, UA Trace     Bilirubin, UA Negative     Blood, UA Trace     Protein,  mg/dL (2+)     Leuk Esterase, UA Large (3+)     Nitrite, UA Negative     Urobilinogen, UA 1.0 E.U./dL             Imaging Results (Last 24 Hours)       Procedure Component Value Units Date/Time    FL Video Swallow With Speech Single Contrast [[id number]] Resulted: 02/28/25 1146     Updated: 02/28/25 1146    Narrative:      This procedure was auto-finalized with no dictation required.                 I reviewed the patient's new clinical results.    Medication Review:   Scheduled Meds:apixaban, 2.5 mg, Oral, Q12H  [Held by provider] atorvastatin, 20 mg, Oral, Nightly  ertapenem, 1,000 mg, Intravenous, Once in Dialysis   Followed by  [START ON 3/1/2025] ertapenem, 1,000 mg, Intravenous, Once in Dialysis  famotidine, 10 mg, Oral, Q48H  metoprolol tartrate, 12.5 mg, Oral, BID  midodrine, 2.5 mg, Oral, TID AC  polyethylene glycol, 17 g, Oral, Daily  sertraline, 25 mg, Oral, Nightly  sevelamer, 800 mg, Oral, TID With Meals  sodium chloride, 10 mL, Intravenous, Q12H  topiramate, 100 mg, Oral, Nightly      Continuous Infusions:   PRN Meds:.  senna-docusate sodium **AND** polyethylene glycol **AND**  bisacodyl **AND** bisacodyl    heparin (porcine)    hydrALAZINE    ipratropium-albuterol    nitroglycerin    ondansetron ODT **OR** ondansetron    sodium chloride    sodium chloride    sodium chloride     Assessment & Plan       AMS (altered mental status)    Permanent atrial fibrillation    Long term current use of anticoagulant    Presence of cardiac pacemaker    Type 2 diabetes mellitus    Acquired absence of kidney    ESRD (end stage renal disease) on dialysis    Hypotension    Mild cognitive impairment of uncertain or unknown etiology    Hard of hearing    UTI (urinary tract infection)    - continue ertapenem based on previous outpatient cultures; culture added to sample collected yesterday  - continue routine dialysis schedule  -altered diet and recommended by ST  - mental status is below baseline - head ct normal on admission; awaiting urine culture  -heart rate is controlled; on renally-dosed Eliquis    Plan for disposition:Return to SNF    Lora Henderson MD  02/28/25  13:23 EST

## 2025-02-28 NOTE — THERAPY EVALUATION
Acute Care - Speech Language Pathology   Swallow Initial Evaluation  Luis Felipe     Patient Name: Hazel Bucio  : 1940  MRN: [id number]  Today's Date: 2025               Admit Date: 2025    Visit Dx:     ICD-10-CM ICD-9-CM   1. Altered mental status, unspecified altered mental status type  R41.82 780.97   2. Noncompliance with renal dialysis  Z91.158 V45.12   3. Acute cystitis with hematuria  N30.01 595.0     Patient Active Problem List   Diagnosis    Permanent atrial fibrillation    Dyslipidemia    Essential hypertension    Long term current use of anticoagulant    Presence of cardiac pacemaker    Type 2 diabetes mellitus    Ureteral cancer    Chronic insomnia    Seasonal allergies    Acute UTI (urinary tract infection)    Acquired absence of kidney    Athscl heart disease of native coronary 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    Acquired absence of other specified parts of digestive tract    Gastro-esophageal reflux disease without esophagitis    Chest pain, atypical    ESRD on dialysis    Hypotension    Mild cognitive impairment of uncertain or unknown etiology    Femur fracture, right    Severe malnutrition    Hard of hearing    UTI (urinary tract infection)    AMS (altered mental status)     Past Medical History:   Diagnosis Date    Acquired absence of kidney 10/04/2021    Acquired absence of other specified parts of digestive tract 2022    Athscl heart disease of native coronary 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 2022    Atrial fibrillation with rapid ventricular response     Bladder cancer     Cancer     breast, bladder    Cholecystitis with cholelithiasis     Chronic insomnia 2020    Deep vein thrombosis     Dyslipidemia 2017    E coli bacteremia     ESRD on dialysis 10/18/2023    Essential hypertension 2017    Fracture tibia/fibula, right, closed, initial encounter 2020    Gastro-esophageal reflux disease without esophagitis 2020    GERD (gastroesophageal reflux disease)      History of bladder cancer     History of falling     History of urostomy     Hyperkalemia     Immobility 08/27/2020    r/t broken Tibia     Irritable bowel syndrome without diarrhea     Kidney carcinoma, right     removed     Long term current use of anticoagulant 01/09/2015    Mild cognitive impairment of uncertain or unknown etiology 08/31/2023    Myalgia due to statin     Other specified abdominal hernia without obstruction or gangrene     PAF (paroxysmal atrial fibrillation) 06/26/2019    Permanent atrial fibrillation 06/26/2019    Personal history of other venous thrombosis and embolism     Presence of cardiac pacemaker 11/03/2014    BS dual chamber PM placed 10/2014 with pocket revision 1/25/17.  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     Past Surgical History:   Procedure Laterality Date    BREAST LUMPECTOMY      CARDIAC CATHETERIZATION N/A 10/18/2023    Procedure: Left Heart Cath possible PCI, atherectomy, hemodynamic support;  Surgeon: Augustin Ellsworth MD;  Location: [facility] CATH INVASIVE LOCATION;  Service: Cardiology;  Laterality: N/A;    CARDIAC ELECTROPHYSIOLOGY PROCEDURE N/A 4/28/2023    Procedure: Pacemaker gen change, [place] AWARE $;  Surgeon: Jose Carlos Cheng MD;  Location: [facility] CATH INVASIVE LOCATION;  Service: Cardiovascular;  Laterality: N/A;    CHOLECYSTECTOMY N/A 10/12/2020    Procedure: CHOLECYSTECTOMY LAPAROSCOPIC;  Surgeon: Go Pereira DO;  Location: [facility] MAIN OR;  Service: General;  Laterality: N/A;    CYSTECTOMY W/ URETEROILEAL CONDUIT      FEMUR IM NAILING RETROGRADE Right 7/25/2024    Procedure: RIGHT FEMUR INTRAMEDULLARY NAILING RETROGRADE AND OPEN REDUCTION INTERNAL FIXATION;  Surgeon: Elieser Diggs MD;  Location: [facility] MAIN OR;  Service: Orthopedics;  Laterality: Right;    HARDWARE  REMOVAL Right 6/11/2021    Procedure: TIBIA HARDWARE REMOVAL;  Surgeon: Geoff Shah II, MD;  Location: [facility] MAIN OR;  Service: Orthopedics;  Laterality: Right;    HERNIA REPAIR      HYSTERECTOMY      INSERT / REPLACE / REMOVE PACEMAKER      NEPHRECTOMY Right     TIBIA IM NAILING/RODDING Right 8/28/2020    Procedure: TIBIA INTRAMEDULLARY NAIL/ABRAHAM INSERTION;  Surgeon: Geoff Shah II, MD;  Location: [facility] MAIN OR;  Service: Orthopedics;  Laterality: Right;       SLP Recommendation and Plan  SLP Swallowing Diagnosis: oral dysphagia, suspected pharyngeal dysphagia (02/28/25 1100)  SLP Diet Recommendation: NPO, ice chips between meals after oral care, with supervision, water between meals after oral care, with supervision (02/28/25 1100)  Recommended Precautions and Strategies: general aspiration precautions, upright posture during/after eating (02/28/25 1100)  SLP Rec. for Method of Medication Administration: meds via alternate route (02/28/25 1100)        Recommended Diagnostics: VFSS ([facility]) (02/28/25 1100)  Swallow Criteria for Skilled Therapeutic Interventions Met: demonstrates skilled criteria (02/28/25 1100)  Anticipated Discharge Disposition (SLP): unknown (02/28/25 1100)  Rehab Potential/Prognosis, Swallowing: good, to achieve stated therapy goals (02/28/25 1100)  Therapy Frequency (Swallow): PRN (02/28/25 1100)  Predicted Duration Therapy Intervention (Days): until discharge (02/28/25 1100)  Oral Care Recommendations: Oral Care before breakfast, after meals and PRN (02/28/25 1100)                              Plan for Continued Treatment (SLP): continue treatment per plan of care (02/28/25 1100)                SWALLOW EVALUATION (Last 72 Hours)       SLP Adult Swallow Evaluation       Row Name 02/28/25 1100       Rehab Evaluation    Document Type evaluation  -MS    Subjective Information no complaints  -MS    Patient Observations decreased LOC;obtunded;alert  -MS     Patient/Family/Caregiver Comments/Observations n/a  -MS    Patient Effort adequate  -MS    Symptoms Noted During/After Treatment none  -MS       General Information    Patient Profile Reviewed yes  -MS    Pertinent History Of Current Problem Pleasantly demented 84-year-old female presents via EMS for altered mental status. [facility] that patient had some garbled speech at approximately 7 AM. Patient has known history of both dementia and speech problems. She is currently being treated for UTI with IV antibiotics. She also is a dialysis patient and was not dialyzed today. The patient was able to verbalize to me that she has felt weak over the last couple of days and is only sitting in her wheelchair. ST consulted for dysphagia evaluation.              -MS    Current Method of Nutrition NPO  -MS    Precautions/Limitations, Vision difficult to assess  -MS    Precautions/Limitations, Hearing difficult to assess  -MS    Prior Level of Function-Communication unknown  -MS    Prior Level of Function-Swallowing unknown  -MS    Plans/Goals Discussed with patient  -MS       Oral Motor Structure and Function    Dentition Assessment upper dentures/partial in place  -MS    Secretion Management WNL/WFL  -MS    Mucosal Quality dry  -MS    Volitional Swallow delayed  -MS    Volitional Cough weak  -MS       Oral Musculature and Cranial Nerve Assessment    Oral Motor General Assessment generalized oral motor weakness  -MS       General Eating/Swallowing Observations    Respiratory Support Currently in Use room air  -MS    Eating/Swallowing Skills fed by SLP  -MS    Positioning During Eating upright 90 degree;upright in bed  -MS    Utensils Used spoon;cup;straw  -MS    Consistencies Trialed ice chips;pureed;thin liquids  -MS       Respiratory    Respiratory Status WFL  -MS       Clinical Swallow Eval    Oral Prep Phase impaired  -MS    Oral Transit impaired  -MS    Oral Residue WFL  -MS    Pharyngeal Phase  suspected pharyngeal impairment  -MS    Esophageal Phase unremarkable  -MS    Clinical Swallow Evaluation Summary Clinical swallow evaluation completed. Pt was awake and alert, not able to follow commands, very confused. Baseline of dementia. Able to verbalize w/ severe dysarthria. Upon room entry, pt was lying in bed asleep, awoken w/ SLP stimuli. Pt on room air. SLP adjusted pt to sitting at 90 degrees for swallow evaluation. Pt was NPO at the time of evaluation. Upper dentures in place, natural bottom dentition. Pt is poor historian, therefore SLP not aware of pt's baseline diet. No prior ST notes in chart from past. Oral motor exam revealed overall generalized oral motor weakness. Trials assessed: ice chips x1, thin by cup x2, thin by straw x5, puree x2. Pt had poor bolus control of ice chip, all trials given via SLP. Adequate labial seal w/straw. No anterior loss. Oral transit appeared delayed w/ oral holding. SLP cued pt x1 to swallow during thin liquid trials.No pocketing or residue noted. Pt demonstrated very weak cough x3 during trials of thin liquids and ice chip. SLP provided recommendations for VFSS, however not sure if pt was able to comprehend due to dementia. Nursing staff made aware. Per above, pt presents w/ MOD oral stage dysphagia and suspecting pharyngeal phase at this time. It is recommended pt remain NPO w/ FWP. Meds via alternate route. VFSS pending.     SLP Plan & Recommendation:      - NPO w/ exception of FWP- see below   - Water/ice only when pt is fully awake and alert  - Meds: via alternate route  - Safe swallow strategies: HOB at a 90 degree angle, slow rate of intake, small sips  -Oral care at least x2 daily     The rationale to recommend water/ice chips when a PO diet cannot appropriately/functionally sustain nutrition is because water is low risk for aspiration pna when compared to aspiration of food or other liquids.       Benefits of a water protocol include but are not limited to:       Oral gratification   Engagement of oropharyngeal swallow musculature   Decrease likelihood of dehydration       Guidelines for proper implementation include:  Thorough oral care prior to consuming water  Upright at 90 degree hip flexion  Small sips at slow rate     Monitor for any changes in respiratory status and discontinue if distress noted          -MS       Oral Prep Concerns    Oral Prep Concerns oral holding  -MS    Oral Holding thin  -MS       Oral Transit Concerns    Oral Transit Concerns delayed initiation of bolus transit  -MS    Delayed Intiation of Bolus Transit thin  -MS       Pharyngeal Phase Concerns    Pharyngeal Phase Concerns cough  -MS    Cough thin  -MS       SLP Evaluation Clinical Impression    SLP Swallowing Diagnosis oral dysphagia;suspected pharyngeal dysphagia  -MS    Functional Impact risk of aspiration/pneumonia;risk of malnutrition;risk of dehydration  -MS    Rehab Potential/Prognosis, Swallowing good, to achieve stated therapy goals  -MS    Swallow Criteria for Skilled Therapeutic Interventions Met demonstrates skilled criteria  -MS       SLP Treatment Clinical Impressions    Barriers to Overall Progress (SLP) Cognitive status  -MS    Plan for Continued Treatment (SLP) continue treatment per plan of care  -MS    Care Plan Review evaluation/treatment results reviewed  -MS       Recommendations    Therapy Frequency (Swallow) PRN  -MS    Predicted Duration Therapy Intervention (Days) until discharge  -MS    SLP Diet Recommendation NPO;ice chips between meals after oral care, with supervision;water between meals after oral care, with supervision  -MS    Recommended Diagnostics VFSS ([facility])  -MS    Recommended Precautions and Strategies general aspiration precautions;upright posture during/after eating  -MS    Oral Care Recommendations Oral Care before breakfast, after meals and PRN  -MS    SLP Rec. for Method of Medication Administration meds via alternate route  -MS    Anticipated  Discharge Disposition (SLP) unknown  -MS       Swallow Goals (SLP)    Swallow LTGs Patient will demonstrate functional swallow for  -MS    Swallow STGs diet tolerance goal selection (SLP)  -MS    Diet Tolerance Goal Selection (SLP) Patient will tolerate trials of  -MS       (LTG) Patient will demonstrate functional swallow for    Diet Texture (Demonstrate functional swallow) pureed textures  -MS    Liquid viscosity (Demonstrate functional swallow) thin liquids  -MS    [place] (Demonstrate functional swallow) independently (over 90% accuracy)  -MS    Time Frame (Demonstrate functional swallow) by discharge  -MS    Progress/Outcomes (Demonstrate functional swallow) new goal  -MS       (STG) Patient will tolerate trials of    Consistencies Trialed (Tolerate trials) thin liquids;pureed textures  -MS    Desired Outcome (Tolerate trials) without signs/symptoms of aspiration;without signs of distress;with adequate oral prep/transit/clearance  -MS    [place] (Tolerate trials) independently (over 90% accuracy)  -MS    Time Frame (Tolerate trials) by discharge  -MS    Progress/Outcomes (Tolerate trials) new goal  -MS              User Key  (r) = Recorded By, (t) = Taken By, (c) = Cosigned By      Initials Name Effective Dates    Simone Olvera, SLP 04/22/24 -                     EDUCATION  The patient has been educated in the following areas:   Dysphagia (Swallowing Impairment) Oral Care/Hydration NPO rationale.        SLP GOALS       Row Name 02/28/25 1100       (LTG) Patient will demonstrate functional swallow for    Diet Texture (Demonstrate functional swallow) pureed textures  -MS    Liquid viscosity (Demonstrate functional swallow) thin liquids  -MS    [place] (Demonstrate functional swallow) independently (over 90% accuracy)  -MS    Time Frame (Demonstrate functional swallow) by discharge  -MS    Progress/Outcomes (Demonstrate functional swallow) new goal  -MS       (STG) Patient will tolerate trials  of    Consistencies Trialed (Tolerate trials) thin liquids;pureed textures  -MS    Desired Outcome (Tolerate trials) without signs/symptoms of aspiration;without signs of distress;with adequate oral prep/transit/clearance  -MS    [place] (Tolerate trials) independently (over 90% accuracy)  -MS    Time Frame (Tolerate trials) by discharge  -MS    Progress/Outcomes (Tolerate trials) new goal  -MS              User Key  (r) = Recorded By, (t) = Taken By, (c) = Cosigned By      Initials Name Provider Type    Simone Olvera, SLP Speech and Language Pathologist                  MARCIE Shabazz  2/28/2025

## 2025-02-28 NOTE — NURSING NOTE
84-year-old female who presents to the hospital with altered mental status changes from her ECF.  Patient consult received for pressure injury to her sacrum.  Patient also has a history of having a urostomy.  The stoma itself is viable yellow urine with slight mucus noted.        Patient presenting with a stage II sacral pressure injury there is a silicone border foam dressing in place.  The base itself is pink clean.  No overt symptoms of infection.  Scant amount of serous exudate noted.  Patient on a Darren surface and this should be appropriate for her.  Recommend implementing pressure injury prevention strategies will follow as needed

## 2025-03-01 LAB
ANION GAP SERPL CALCULATED.3IONS-SCNC: 11.1 MMOL/L (ref 5–15)
BASOPHILS # BLD AUTO: 0.02 10*3/MM3 (ref 0–0.2)
BASOPHILS NFR BLD AUTO: 0.3 % (ref 0–1.5)
BUN SERPL-MCNC: 20 MG/DL (ref 8–23)
BUN/CREAT SERPL: 6.5 (ref 7–25)
CALCIUM SPEC-SCNC: 8.8 MG/DL (ref 8.6–10.5)
CHLORIDE SERPL-SCNC: 100 MMOL/L (ref 98–107)
CO2 SERPL-SCNC: 25.9 MMOL/L (ref 22–29)
CREAT SERPL-MCNC: 3.1 MG/DL (ref 0.57–1)
DEPRECATED RDW RBC AUTO: 57.7 FL (ref 37–54)
EGFRCR SERPLBLD CKD-EPI 2021: 14.4 ML/MIN/1.73
EOSINOPHIL # BLD AUTO: 0.17 10*3/MM3 (ref 0–0.4)
EOSINOPHIL NFR BLD AUTO: 2.9 % (ref 0.3–6.2)
ERYTHROCYTE [DISTWIDTH] IN BLOOD BY AUTOMATED COUNT: 15.3 % (ref 12.3–15.4)
GLUCOSE SERPL-MCNC: 71 MG/DL (ref 65–99)
HCT VFR BLD AUTO: 31.7 % (ref 34–46.6)
HGB BLD-MCNC: 9.9 G/DL (ref 12–15.9)
IMM GRANULOCYTES # BLD AUTO: 0.03 10*3/MM3 (ref 0–0.05)
IMM GRANULOCYTES NFR BLD AUTO: 0.5 % (ref 0–0.5)
LYMPHOCYTES # BLD AUTO: 1.68 10*3/MM3 (ref 0.7–3.1)
LYMPHOCYTES NFR BLD AUTO: 28.6 % (ref 19.6–45.3)
MCH RBC QN AUTO: 32.5 PG (ref 26.6–33)
MCHC RBC AUTO-ENTMCNC: 31.2 G/DL (ref 31.5–35.7)
MCV RBC AUTO: 103.9 FL (ref 79–97)
MONOCYTES # BLD AUTO: 0.69 10*3/MM3 (ref 0.1–0.9)
MONOCYTES NFR BLD AUTO: 11.7 % (ref 5–12)
NEUTROPHILS NFR BLD AUTO: 3.29 10*3/MM3 (ref 1.7–7)
NEUTROPHILS NFR BLD AUTO: 56 % (ref 42.7–76)
NRBC BLD AUTO-RTO: 0 /100 WBC (ref 0–0.2)
PLATELET # BLD AUTO: 213 10*3/MM3 (ref 140–450)
PMV BLD AUTO: 9.8 FL (ref 6–12)
POTASSIUM SERPL-SCNC: 4.2 MMOL/L (ref 3.5–5.2)
RBC # BLD AUTO: 3.05 10*6/MM3 (ref 3.77–5.28)
SODIUM SERPL-SCNC: 137 MMOL/L (ref 136–145)
WBC NRBC COR # BLD AUTO: 5.88 10*3/MM3 (ref 3.4–10.8)

## 2025-03-01 PROCEDURE — 85025 COMPLETE CBC W/AUTO DIFF WBC: CPT

## 2025-03-01 PROCEDURE — 80048 BASIC METABOLIC PNL TOTAL CA: CPT

## 2025-03-01 RX ORDER — ACETAMINOPHEN 325 MG/1
650 TABLET ORAL EVERY 6 HOURS PRN
Status: DISCONTINUED | OUTPATIENT
Start: 2025-03-01 | End: 2025-03-05 | Stop reason: HOSPADM

## 2025-03-01 RX ADMIN — Medication 10 ML: at 08:21

## 2025-03-01 RX ADMIN — MIDODRINE HYDROCHLORIDE 2.5 MG: 2.5 TABLET ORAL at 08:18

## 2025-03-01 RX ADMIN — Medication 12.5 MG: at 20:12

## 2025-03-01 RX ADMIN — APIXABAN 2.5 MG: 2.5 TABLET, FILM COATED ORAL at 20:12

## 2025-03-01 RX ADMIN — TOPIRAMATE 100 MG: 100 TABLET, FILM COATED ORAL at 20:12

## 2025-03-01 RX ADMIN — Medication 10 ML: at 03:53

## 2025-03-01 RX ADMIN — MIDODRINE HYDROCHLORIDE 2.5 MG: 2.5 TABLET ORAL at 11:38

## 2025-03-01 RX ADMIN — SEVELAMER CARBONATE 800 MG: 800 TABLET, FILM COATED ORAL at 11:38

## 2025-03-01 RX ADMIN — SERTRALINE HYDROCHLORIDE 25 MG: 25 TABLET, FILM COATED ORAL at 20:12

## 2025-03-01 RX ADMIN — ACETAMINOPHEN 650 MG: 325 TABLET, FILM COATED ORAL at 03:37

## 2025-03-01 RX ADMIN — APIXABAN 2.5 MG: 2.5 TABLET, FILM COATED ORAL at 08:18

## 2025-03-01 RX ADMIN — SEVELAMER CARBONATE 800 MG: 800 TABLET, FILM COATED ORAL at 08:18

## 2025-03-01 RX ADMIN — Medication 12.5 MG: at 08:18

## 2025-03-01 RX ADMIN — POLYETHYLENE GLYCOL 3350 17 G: 17 POWDER, FOR SOLUTION ORAL at 08:18

## 2025-03-01 RX ADMIN — ACETAMINOPHEN 650 MG: 325 TABLET, FILM COATED ORAL at 11:38

## 2025-03-01 RX ADMIN — Medication 10 ML: at 20:13

## 2025-03-01 NOTE — PLAN OF CARE
Problem: Adult Inpatient Plan of Care  Goal: Plan of Care Review  Outcome: Not Progressing  Goal: Patient-Specific Goal (Individualized)  Outcome: Not Progressing  Goal: Absence of Hospital-Acquired Illness or Injury  Outcome: Not Progressing  Intervention: Identify and Manage Fall Risk  Recent Flowsheet Documentation  Taken 3/1/2025 0200 by Lilo Henriquez RN  Safety Promotion/Fall Prevention:   activity supervised   assistive device/personal items within reach   clutter free environment maintained   fall prevention program maintained   lighting adjusted   nonskid shoes/slippers when out of bed   safety round/check completed   room organization consistent  Taken 3/1/2025 0000 by Lilo Henriquez RN  Safety Promotion/Fall Prevention:   activity supervised   assistive device/personal items within reach   clutter free environment maintained   fall prevention program maintained   lighting adjusted   nonskid shoes/slippers when out of bed   room organization consistent   safety round/check completed  Taken 2/28/2025 2200 by Lilo Henriquez RN  Safety Promotion/Fall Prevention:   activity supervised   assistive device/personal items within reach   clutter free environment maintained   fall prevention program maintained   lighting adjusted   nonskid shoes/slippers when out of bed   safety round/check completed   room organization consistent  Taken 2/28/2025 2000 by Lilo Henriquez RN  Safety Promotion/Fall Prevention:   activity supervised   assistive device/personal items within reach   clutter free environment maintained   fall prevention program maintained   lighting adjusted   nonskid shoes/slippers when out of bed   room organization consistent   safety round/check completed  Intervention: Prevent Skin Injury  Recent Flowsheet Documentation  Taken 3/1/2025 0000 by Lilo Henriquez RN  Body Position: weight shifting  Taken 2/28/2025 2000 by Lilo Henriquez RN  Body Position:   turned   weight shifting  Skin  Protection:   incontinence pads utilized   transparent dressing maintained  Intervention: Prevent and Manage VTE (Venous Thromboembolism) Risk  Recent Flowsheet Documentation  Taken 3/1/2025 0000 by Lilo Henriquez RN  VTE Prevention/Management:   SCDs (sequential compression devices) off   patient refused intervention  Taken 2/28/2025 2000 by Lilo Henriquez RN  VTE Prevention/Management:   SCDs (sequential compression devices) off   patient refused intervention  Intervention: Prevent Infection  Recent Flowsheet Documentation  Taken 3/1/2025 0200 by Lilo Henriquez RN  Infection Prevention:   environmental surveillance performed   equipment surfaces disinfected   hand hygiene promoted   personal protective equipment utilized   rest/sleep promoted   single patient room provided  Taken 3/1/2025 0000 by Lilo Henriquez RN  Infection Prevention:   single patient room provided   rest/sleep promoted   personal protective equipment utilized   hand hygiene promoted   equipment surfaces disinfected   environmental surveillance performed  Taken 2/28/2025 2200 by Lilo Henriquez RN  Infection Prevention:   environmental surveillance performed   equipment surfaces disinfected   hand hygiene promoted   personal protective equipment utilized   rest/sleep promoted   single patient room provided  Taken 2/28/2025 2000 by Lilo Henriquez RN  Infection Prevention:   single patient room provided   rest/sleep promoted   personal protective equipment utilized   hand hygiene promoted   equipment surfaces disinfected   environmental surveillance performed  Goal: Optimal Comfort and Wellbeing  Outcome: Not Progressing  Intervention: Monitor Pain and Promote Comfort  Recent Flowsheet Documentation  Taken 3/1/2025 0000 by Lilo Henriquez RN  Pain Management Interventions:   care clustered   pillow support provided   position adjusted   prayer utilized   no interventions per patient request   quiet environment facilitated   relaxation  techniques promoted  Taken 2/28/2025 2000 by Lilo Henriquez RN  Pain Management Interventions:   care clustered   prayer utilized   position adjusted   pillow support provided   no interventions per patient request   quiet environment facilitated   relaxation techniques promoted  Intervention: Provide Person-Centered Care  Recent Flowsheet Documentation  Taken 3/1/2025 0000 by Lilo Henriquez RN  Trust Relationship/Rapport:   care explained   choices provided   emotional support provided   empathic listening provided   questions answered   questions encouraged   reassurance provided   thoughts/feelings acknowledged  Taken 2/28/2025 2000 by Lilo Henriquez RN  Trust Relationship/Rapport:   care explained   choices provided   emotional support provided   empathic listening provided   questions answered   questions encouraged   reassurance provided   thoughts/feelings acknowledged  Goal: Readiness for Transition of Care  Outcome: Not Progressing     Problem: Skin Injury Risk Increased  Goal: Skin Health and Integrity  Outcome: Not Progressing  Intervention: Optimize Skin Protection  Recent Flowsheet Documentation  Taken 3/1/2025 0000 by Lilo Henriquez RN  Activity Management: activity minimized  Head of Bed (HOB) Positioning: HOB elevated  Taken 2/28/2025 2000 by Lilo Henriquez RN  Activity Management: activity minimized  Pressure Reduction Techniques: frequent weight shift encouraged  Head of Bed (HOB) Positioning: HOB elevated  Pressure Reduction Devices: pressure-redistributing mattress utilized  Skin Protection:   incontinence pads utilized   transparent dressing maintained     Problem: Violence Risk or Actual  Goal: Anger and Impulse Control  Outcome: Not Progressing  Intervention: Minimize Safety Risk  Recent Flowsheet Documentation  Taken 3/1/2025 0200 by Lilo Henriquez RN  Enhanced Safety Measures: bed alarm set  Taken 3/1/2025 0000 by Lilo Henriquez RN  Behavior Management: impulse control  promoted  Sensory Stimulation Regulation:   music on   quiet environment promoted   care clustered  Enhanced Safety Measures: bed alarm set  Taken 2/28/2025 2200 by Lilo Henriquez RN  Enhanced Safety Measures: bed alarm set  Taken 2/28/2025 2000 by Lilo Henriquez RN  Behavior Management: impulse control promoted  Sensory Stimulation Regulation:   quiet environment promoted   tactile stimulation provided   care clustered  Enhanced Safety Measures: bed alarm set  Intervention: Promote Self-Control  Recent Flowsheet Documentation  Taken 3/1/2025 0000 by Lilo Henriquez RN  Supportive Measures:   active listening utilized   verbalization of feelings encouraged  Environmental Support: calm environment promoted  Taken 2/28/2025 2000 by Lilo Henriquez RN  Supportive Measures: verbalization of feelings encouraged  Environmental Support:   calm environment promoted   rest periods encouraged     Problem: UTI (Urinary Tract Infection)  Goal: Improved Infection Symptoms  Outcome: Not Progressing  Intervention: Prevent Infection Progression  Recent Flowsheet Documentation  Taken 3/1/2025 0000 by Lilo Henriquez RN  Fever Reduction/Comfort Measures: lightweight bedding  Taken 2/28/2025 2000 by Lilo Henrqiuez RN  Fever Reduction/Comfort Measures: lightweight bedding     Problem: Fall Injury Risk  Goal: Absence of Fall and Fall-Related Injury  Outcome: Not Progressing  Intervention: Identify and Manage Contributors  Recent Flowsheet Documentation  Taken 3/1/2025 0200 by Lilo Henriquez RN  Medication Review/Management: medications reviewed  Taken 3/1/2025 0000 by Lilo Henriquez RN  Medication Review/Management: medications reviewed  Taken 2/28/2025 2200 by Lilo Henriquez RN  Medication Review/Management: medications reviewed  Taken 2/28/2025 2000 by Lilo Henriquez RN  Medication Review/Management: medications reviewed  Intervention: Promote Injury-Free Environment  Recent Flowsheet Documentation  Taken 3/1/2025  0200 by Lilo Henrqiuez RN  Safety Promotion/Fall Prevention:   activity supervised   assistive device/personal items within reach   clutter free environment maintained   fall prevention program maintained   lighting adjusted   nonskid shoes/slippers when out of bed   safety round/check completed   room organization consistent  Taken 3/1/2025 0000 by Lilo Henriquez RN  Safety Promotion/Fall Prevention:   activity supervised   assistive device/personal items within reach   clutter free environment maintained   fall prevention program maintained   lighting adjusted   nonskid shoes/slippers when out of bed   room organization consistent   safety round/check completed  Taken 2/28/2025 2200 by Lilo Henriquez RN  Safety Promotion/Fall Prevention:   activity supervised   assistive device/personal items within reach   clutter free environment maintained   fall prevention program maintained   lighting adjusted   nonskid shoes/slippers when out of bed   safety round/check completed   room organization consistent  Taken 2/28/2025 2000 by Lilo Henriquez RN  Safety Promotion/Fall Prevention:   activity supervised   assistive device/personal items within reach   clutter free environment maintained   fall prevention program maintained   lighting adjusted   nonskid shoes/slippers when out of bed   room organization consistent   safety round/check completed     Problem: Confusion Acute  Goal: Optimal Cognitive Function  Outcome: Not Progressing  Intervention: Minimize Contributing Factors  Recent Flowsheet Documentation  Taken 3/1/2025 0000 by Lilo Henriquez RN  Sensory Stimulation Regulation:   music on   quiet environment promoted   care clustered  Reorientation Measures: clock in view  Communication Support Strategies: active listening utilized  Taken 2/28/2025 2000 by Lilo Henriquez RN  Sensory Stimulation Regulation:   quiet environment promoted   tactile stimulation provided   care clustered  Reorientation Measures:  clock in view  Communication Support Strategies: active listening utilized   Goal Outcome Evaluation:

## 2025-03-01 NOTE — PLAN OF CARE
Problem: Adult Inpatient Plan of Care  Goal: Plan of Care Review  Outcome: Progressing  Goal: Patient-Specific Goal (Individualized)  Outcome: Progressing  Goal: Absence of Hospital-Acquired Illness or Injury  Outcome: Progressing  Intervention: Identify and Manage Fall Risk  Recent Flowsheet Documentation  Taken 3/1/2025 1400 by Ciara Goodson LPN  Safety Promotion/Fall Prevention:   safety round/check completed   room organization consistent   assistive device/personal items within reach   clutter free environment maintained  Taken 3/1/2025 1153 by Ciara Goodson LPN  Safety Promotion/Fall Prevention:   safety round/check completed   room organization consistent   clutter free environment maintained   assistive device/personal items within reach  Taken 3/1/2025 1000 by Ciara Goodson LPN  Safety Promotion/Fall Prevention:   safety round/check completed   room organization consistent   clutter free environment maintained   assistive device/personal items within reach  Taken 3/1/2025 0815 by Ciara Goodson LPN  Safety Promotion/Fall Prevention:   safety round/check completed   room organization consistent   assistive device/personal items within reach   clutter free environment maintained  Intervention: Prevent Skin Injury  Recent Flowsheet Documentation  Taken 3/1/2025 1153 by Ciara Goodson LPN  Body Position:   weight shifting   turned  Skin Protection: incontinence pads utilized  Taken 3/1/2025 0815 by Ciara Goodson LPN  Body Position:   weight shifting   turned  Skin Protection: incontinence pads utilized  Intervention: Prevent and Manage VTE (Venous Thromboembolism) Risk  Recent Flowsheet Documentation  Taken 3/1/2025 1153 by Ciara Goodson LPN  VTE Prevention/Management:   SCDs (sequential compression devices) off   patient refused intervention  Taken 3/1/2025 0815 by Ciara Goodson LPN  VTE Prevention/Management:   SCDs (sequential compression devices) off   patient refused intervention  Intervention:  Prevent Infection  Recent Flowsheet Documentation  Taken 3/1/2025 1400 by Ciara Goodson LPN  Infection Prevention:   environmental surveillance performed   equipment surfaces disinfected   rest/sleep promoted   single patient room provided  Taken 3/1/2025 1153 by Ciara Goodson LPN  Infection Prevention:   environmental surveillance performed   equipment surfaces disinfected   rest/sleep promoted   single patient room provided  Taken 3/1/2025 1000 by Ciara Goodson LPN  Infection Prevention:   environmental surveillance performed   equipment surfaces disinfected   single patient room provided   rest/sleep promoted  Taken 3/1/2025 0815 by Ciara Goodson LPN  Infection Prevention:   environmental surveillance performed   equipment surfaces disinfected   rest/sleep promoted   single patient room provided  Goal: Optimal Comfort and Wellbeing  Outcome: Progressing  Intervention: Monitor Pain and Promote Comfort  Recent Flowsheet Documentation  Taken 3/1/2025 1153 by Ciara Goodson LPN  Pain Management Interventions:   pain medication given   quiet environment facilitated  Intervention: Provide Person-Centered Care  Recent Flowsheet Documentation  Taken 3/1/2025 1153 by Ciara Goodson LPN  Trust Relationship/Rapport:   care explained   choices provided   questions encouraged   thoughts/feelings acknowledged  Taken 3/1/2025 0815 by Ciara Goodson LPN  Trust Relationship/Rapport:   care explained   choices provided   questions encouraged  Goal: Readiness for Transition of Care  Outcome: Progressing     Problem: Skin Injury Risk Increased  Goal: Skin Health and Integrity  Outcome: Progressing  Intervention: Optimize Skin Protection  Recent Flowsheet Documentation  Taken 3/1/2025 1153 by Ciara Goodson LPN  Pressure Reduction Techniques: frequent weight shift encouraged  Head of Bed (HOB) Positioning: HOB elevated  Pressure Reduction Devices: pressure-redistributing mattress utilized  Skin Protection: incontinence pads  utilized  Taken 3/1/2025 0815 by Ciara Goodson LPN  Pressure Reduction Techniques: frequent weight shift encouraged  Head of Bed (HOB) Positioning: HOB elevated  Pressure Reduction Devices: pressure-redistributing mattress utilized  Skin Protection: incontinence pads utilized     Problem: Violence Risk or Actual  Goal: Anger and Impulse Control  Outcome: Progressing  Intervention: Minimize Safety Risk  Recent Flowsheet Documentation  Taken 3/1/2025 1400 by Ciara Goodson LPN  Enhanced Safety Measures: chair alarm set  Taken 3/1/2025 1153 by Ciara Goodson LPN  Sensory Stimulation Regulation:   care clustered   television on  Enhanced Safety Measures: bed alarm set  Taken 3/1/2025 1000 by Ciara Goodson LPN  Enhanced Safety Measures: bed alarm set  Taken 3/1/2025 0815 by Ciara Goodson LPN  Sensory Stimulation Regulation:   care clustered   television on  Enhanced Safety Measures: bed alarm set  Intervention: Promote Self-Control  Recent Flowsheet Documentation  Taken 3/1/2025 0815 by Ciara Goodson LPN  Supportive Measures: active listening utilized  Environmental Support: calm environment promoted     Problem: UTI (Urinary Tract Infection)  Goal: Improved Infection Symptoms  Outcome: Progressing     Problem: Fall Injury Risk  Goal: Absence of Fall and Fall-Related Injury  Outcome: Progressing  Intervention: Identify and Manage Contributors  Recent Flowsheet Documentation  Taken 3/1/2025 1400 by Ciara Goodson LPN  Medication Review/Management: medications reviewed  Taken 3/1/2025 1153 by Ciara Goodson LPN  Medication Review/Management: medications reviewed  Taken 3/1/2025 1000 by Ciara Goodson LPN  Medication Review/Management: medications reviewed  Taken 3/1/2025 0815 by Ciara Goodson LPN  Medication Review/Management: medications reviewed  Intervention: Promote Injury-Free Environment  Recent Flowsheet Documentation  Taken 3/1/2025 1400 by Ciara Goodson LPN  Safety Promotion/Fall Prevention:   safety  round/check completed   room organization consistent   assistive device/personal items within reach   clutter free environment maintained  Taken 3/1/2025 1153 by Ciara Goodson LPN  Safety Promotion/Fall Prevention:   safety round/check completed   room organization consistent   clutter free environment maintained   assistive device/personal items within reach  Taken 3/1/2025 1000 by Ciara Goodson LPN  Safety Promotion/Fall Prevention:   safety round/check completed   room organization consistent   clutter free environment maintained   assistive device/personal items within reach  Taken 3/1/2025 0815 by Ciara Goodson LPN  Safety Promotion/Fall Prevention:   safety round/check completed   room organization consistent   assistive device/personal items within reach   clutter free environment maintained     Problem: Confusion Acute  Goal: Optimal Cognitive Function  Outcome: Progressing  Intervention: Minimize Contributing Factors  Recent Flowsheet Documentation  Taken 3/1/2025 1153 by Ciara Goodson LPN  Sensory Stimulation Regulation:   care clustered   television on  Reorientation Measures: clock in view  Communication Support Strategies: active listening utilized  Taken 3/1/2025 0815 by Ciara Goodson LPN  Sensory Stimulation Regulation:   care clustered   television on  Reorientation Measures: clock in view  Communication Support Strategies: active listening utilized   Goal Outcome Evaluation:

## 2025-03-01 NOTE — PROGRESS NOTES
"                                                                                                                                      Nephrology  Progress Note                                        [facility]    Patient Identification    Name: Hazel Bucio  Age: 84 y.o.  Sex: female  :  1940  MRN: [id number]      DATE OF SERVICE:  3/1/2025        Subective    Pt is restng     Objective   Scheduled Meds:apixaban, 2.5 mg, Oral, Q12H  [Held by provider] atorvastatin, 20 mg, Oral, Nightly  ertapenem, 1,000 mg, Intravenous, Once in Dialysis  famotidine, 10 mg, Oral, Q48H  metoprolol tartrate, 12.5 mg, Oral, BID  midodrine, 2.5 mg, Oral, TID AC  polyethylene glycol, 17 g, Oral, Daily  sertraline, 25 mg, Oral, Nightly  sevelamer, 800 mg, Oral, TID With Meals  sodium chloride, 10 mL, Intravenous, Q12H  topiramate, 100 mg, Oral, Nightly          Continuous Infusions:     PRN Meds:  acetaminophen    senna-docusate sodium **AND** polyethylene glycol **AND** bisacodyl **AND** bisacodyl    heparin (porcine)    hydrALAZINE    ipratropium-albuterol    nitroglycerin    ondansetron ODT **OR** ondansetron    sodium chloride    sodium chloride    sodium chloride     Exam:  BP 99/46 (BP Location: Right leg, Patient Position: Lying)   Pulse 116   Temp 98.1 °F (36.7 °C) (Oral)   Resp 17   Ht 170.2 cm (67\")   Wt 58.1 kg (128 lb 1.4 oz)   SpO2 100%   BMI 20.06 kg/m²     Intake/Output last 3 shifts:  I/O last 3 completed shifts:  In: 370 [P.O.:120; IV Piggyback:250]  Out: 950 [Urine:150]    Intake/Output this shift:  No intake/output data recorded.    Physical exam:  General Appearance:  Alert  Head:  Normocephalic, without obvious abnormality, atraumatic  Eyes:  PERRL, conjunctiva/corneas clear     Neck:  Supple,  no adenopathy;      Lungs:  Decreased BS occasion ronchi  Heart:  Regular rate and rhythm, S1 and S2 normal  Abdomen:  Soft, non-tender, bowel sounds active   Extremities: trace edema  Pulses: " 2+ and symmetric all extremities  Skin:  No rashes or lesions       Data Review:  All labs (24hrs):   Recent Results (from the past 24 hours)   Basic Metabolic Panel    Collection Time: 03/01/25  3:44 AM    Specimen: Blood   Result Value Ref Range    Glucose 71 65 - 99 mg/dL    BUN 20 8 - 23 mg/dL    Creatinine 3.10 (H) 0.57 - 1.00 mg/dL    Sodium 137 136 - 145 mmol/L    Potassium 4.2 3.5 - 5.2 mmol/L    Chloride 100 98 - 107 mmol/L    CO2 25.9 22.0 - 29.0 mmol/L    Calcium 8.8 8.6 - 10.5 mg/dL    BUN/Creatinine Ratio 6.5 (L) 7.0 - 25.0    Anion Gap 11.1 5.0 - 15.0 mmol/L    eGFR 14.4 (L) >60.0 mL/min/1.73   CBC Auto Differential    Collection Time: 03/01/25  3:44 AM    Specimen: Blood   Result Value Ref Range    WBC 5.88 3.40 - 10.80 10*3/mm3    RBC 3.05 (L) 3.77 - 5.28 10*6/mm3    Hemoglobin 9.9 (L) 12.0 - 15.9 g/dL    Hematocrit 31.7 (L) 34.0 - 46.6 %    .9 (H) 79.0 - 97.0 fL    MCH 32.5 26.6 - 33.0 pg    MCHC 31.2 (L) 31.5 - 35.7 g/dL    RDW 15.3 12.3 - 15.4 %    RDW-SD 57.7 (H) 37.0 - 54.0 fl    MPV 9.8 6.0 - 12.0 fL    Platelets 213 140 - 450 10*3/mm3    Neutrophil % 56.0 42.7 - 76.0 %    Lymphocyte % 28.6 19.6 - 45.3 %    Monocyte % 11.7 5.0 - 12.0 %    Eosinophil % 2.9 0.3 - 6.2 %    Basophil % 0.3 0.0 - 1.5 %    Immature Grans % 0.5 0.0 - 0.5 %    Neutrophils, Absolute 3.29 1.70 - 7.00 10*3/mm3    Lymphocytes, Absolute 1.68 0.70 - 3.10 10*3/mm3    Monocytes, Absolute 0.69 0.10 - 0.90 10*3/mm3    Eosinophils, Absolute 0.17 0.00 - 0.40 10*3/mm3    Basophils, Absolute 0.02 0.00 - 0.20 10*3/mm3    Immature Grans, Absolute 0.03 0.00 - 0.05 10*3/mm3    nRBC 0.0 0.0 - 0.2 /100 WBC          Imaging:  CT Head Without Contrast    Result Date: 2/27/2025  Impression: 1. No acute appearing abnormality. 2. Stable chronic findings above. Electronically Signed: Jacoby Elise MD  2/27/2025 12:36 PM EST  Workstation ID: [id number]    XR Chest 1 View    Result Date: 2/27/2025  Impression: No acute cardiopulmonary  abnormality is identified. Electronically Signed: Floridalma East  2/27/2025 12:15 PM EST  Workstation ID: [id number]     Assessment/Plan:     AMS (altered mental status)    Permanent atrial fibrillation    Long term current use of anticoagulant    Presence of cardiac pacemaker    Type 2 diabetes mellitus    Acquired absence of kidney    ESRD (end stage renal disease) on dialysis    Hypotension    Mild cognitive impairment of uncertain or unknown etiology    Hard of hearing    UTI (urinary tract infection)    Altered mental status         ESRD HD dependent  AMS  UTI  CAD  HTN  Bladder ca  DM    Recommendations:  Had dialysis yesterday  No need for dialysis today    HD MWF  Abx per primary team

## 2025-03-02 LAB
ANION GAP SERPL CALCULATED.3IONS-SCNC: 12.6 MMOL/L (ref 5–15)
BACTERIA SPEC AEROBE CULT: NORMAL
BASOPHILS # BLD AUTO: 0.02 10*3/MM3 (ref 0–0.2)
BASOPHILS NFR BLD AUTO: 0.3 % (ref 0–1.5)
BUN SERPL-MCNC: 28 MG/DL (ref 8–23)
BUN/CREAT SERPL: 7 (ref 7–25)
C AURIS DNA SPEC QL NAA+NON-PROBE: NOT DETECTED
CALCIUM SPEC-SCNC: 8.9 MG/DL (ref 8.6–10.5)
CHLORIDE SERPL-SCNC: 102 MMOL/L (ref 98–107)
CO2 SERPL-SCNC: 24.4 MMOL/L (ref 22–29)
CREAT SERPL-MCNC: 4.02 MG/DL (ref 0.57–1)
DEPRECATED RDW RBC AUTO: 56.4 FL (ref 37–54)
EGFRCR SERPLBLD CKD-EPI 2021: 10.5 ML/MIN/1.73
EOSINOPHIL # BLD AUTO: 0.2 10*3/MM3 (ref 0–0.4)
EOSINOPHIL NFR BLD AUTO: 3.1 % (ref 0.3–6.2)
ERYTHROCYTE [DISTWIDTH] IN BLOOD BY AUTOMATED COUNT: 15 % (ref 12.3–15.4)
GLUCOSE SERPL-MCNC: 83 MG/DL (ref 65–99)
HBV SURFACE AG SERPL QL IA: NORMAL
HCT VFR BLD AUTO: 32.5 % (ref 34–46.6)
HGB BLD-MCNC: 10.1 G/DL (ref 12–15.9)
IMM GRANULOCYTES # BLD AUTO: 0.02 10*3/MM3 (ref 0–0.05)
IMM GRANULOCYTES NFR BLD AUTO: 0.3 % (ref 0–0.5)
LYMPHOCYTES # BLD AUTO: 1.64 10*3/MM3 (ref 0.7–3.1)
LYMPHOCYTES NFR BLD AUTO: 25.7 % (ref 19.6–45.3)
MCH RBC QN AUTO: 32.2 PG (ref 26.6–33)
MCHC RBC AUTO-ENTMCNC: 31.1 G/DL (ref 31.5–35.7)
MCV RBC AUTO: 103.5 FL (ref 79–97)
MONOCYTES # BLD AUTO: 0.72 10*3/MM3 (ref 0.1–0.9)
MONOCYTES NFR BLD AUTO: 11.3 % (ref 5–12)
NEUTROPHILS NFR BLD AUTO: 3.78 10*3/MM3 (ref 1.7–7)
NEUTROPHILS NFR BLD AUTO: 59.3 % (ref 42.7–76)
NRBC BLD AUTO-RTO: 0 /100 WBC (ref 0–0.2)
PLATELET # BLD AUTO: 200 10*3/MM3 (ref 140–450)
PMV BLD AUTO: 9 FL (ref 6–12)
POTASSIUM SERPL-SCNC: 4.4 MMOL/L (ref 3.5–5.2)
RBC # BLD AUTO: 3.14 10*6/MM3 (ref 3.77–5.28)
SODIUM SERPL-SCNC: 139 MMOL/L (ref 136–145)
WBC NRBC COR # BLD AUTO: 6.38 10*3/MM3 (ref 3.4–10.8)

## 2025-03-02 PROCEDURE — 85025 COMPLETE CBC W/AUTO DIFF WBC: CPT

## 2025-03-02 PROCEDURE — 80048 BASIC METABOLIC PNL TOTAL CA: CPT

## 2025-03-02 PROCEDURE — 87340 HEPATITIS B SURFACE AG IA: CPT | Performed by: INTERNAL MEDICINE

## 2025-03-02 RX ORDER — METOPROLOL TARTRATE 1 MG/ML
5 INJECTION, SOLUTION INTRAVENOUS ONCE
Status: COMPLETED | OUTPATIENT
Start: 2025-03-02 | End: 2025-03-02

## 2025-03-02 RX ADMIN — MIDODRINE HYDROCHLORIDE 2.5 MG: 2.5 TABLET ORAL at 17:28

## 2025-03-02 RX ADMIN — Medication 10 ML: at 09:29

## 2025-03-02 RX ADMIN — POLYETHYLENE GLYCOL 3350 17 G: 17 POWDER, FOR SOLUTION ORAL at 09:32

## 2025-03-02 RX ADMIN — MIDODRINE HYDROCHLORIDE 2.5 MG: 2.5 TABLET ORAL at 13:51

## 2025-03-02 RX ADMIN — Medication 12.5 MG: at 09:28

## 2025-03-02 RX ADMIN — Medication 10 ML: at 20:18

## 2025-03-02 RX ADMIN — TOPIRAMATE 100 MG: 100 TABLET, FILM COATED ORAL at 20:16

## 2025-03-02 RX ADMIN — APIXABAN 2.5 MG: 2.5 TABLET, FILM COATED ORAL at 20:16

## 2025-03-02 RX ADMIN — Medication 12.5 MG: at 23:41

## 2025-03-02 RX ADMIN — METOPROLOL TARTRATE 5 MG: 1 INJECTION, SOLUTION INTRAVENOUS at 03:46

## 2025-03-02 RX ADMIN — SEVELAMER CARBONATE 800 MG: 800 TABLET, FILM COATED ORAL at 13:50

## 2025-03-02 RX ADMIN — SEVELAMER CARBONATE 800 MG: 800 TABLET, FILM COATED ORAL at 09:28

## 2025-03-02 RX ADMIN — MIDODRINE HYDROCHLORIDE 2.5 MG: 2.5 TABLET ORAL at 09:28

## 2025-03-02 RX ADMIN — FAMOTIDINE 10 MG: 20 TABLET, FILM COATED ORAL at 09:28

## 2025-03-02 RX ADMIN — ACETAMINOPHEN 650 MG: 325 TABLET, FILM COATED ORAL at 09:28

## 2025-03-02 RX ADMIN — SEVELAMER CARBONATE 800 MG: 800 TABLET, FILM COATED ORAL at 17:28

## 2025-03-02 RX ADMIN — APIXABAN 2.5 MG: 2.5 TABLET, FILM COATED ORAL at 09:28

## 2025-03-02 RX ADMIN — SERTRALINE HYDROCHLORIDE 25 MG: 25 TABLET, FILM COATED ORAL at 20:16

## 2025-03-02 NOTE — PROGRESS NOTES
"                                                                                                                                      Nephrology  Progress Note                                        [facility]    Patient Identification    Name: Hazel Bucio  Age: 84 y.o.  Sex: female  :  1940  MRN: [id number]      DATE OF SERVICE:  3/2/2025        Subective    Pt is restng     Objective   Scheduled Meds:apixaban, 2.5 mg, Oral, Q12H  [Held by provider] atorvastatin, 20 mg, Oral, Nightly  ertapenem, 500 mg, Intravenous, Once  famotidine, 10 mg, Oral, Q48H  metoprolol tartrate, 12.5 mg, Oral, BID  midodrine, 2.5 mg, Oral, TID AC  polyethylene glycol, 17 g, Oral, Daily  sertraline, 25 mg, Oral, Nightly  sevelamer, 800 mg, Oral, TID With Meals  sodium chloride, 10 mL, Intravenous, Q12H  topiramate, 100 mg, Oral, Nightly          Continuous Infusions:     PRN Meds:  acetaminophen    senna-docusate sodium **AND** polyethylene glycol **AND** bisacodyl **AND** bisacodyl    heparin (porcine)    hydrALAZINE    ipratropium-albuterol    nitroglycerin    ondansetron ODT **OR** ondansetron    sodium chloride    sodium chloride    sodium chloride     Exam:  /96 (BP Location: Right leg, Patient Position: Lying)   Pulse (!) 130   Temp 97.6 °F (36.4 °C) (Oral)   Resp 12   Ht 170.2 cm (67\")   Wt 58.1 kg (128 lb 1.4 oz)   SpO2 98%   BMI 20.06 kg/m²     Intake/Output last 3 shifts:  I/O last 3 completed shifts:  In: 120 [P.O.:120]  Out: 250 [Urine:250]    Intake/Output this shift:  No intake/output data recorded.    Physical exam:  General Appearance:  Alert  Head:  Normocephalic, without obvious abnormality, atraumatic  Eyes:  PERRL, conjunctiva/corneas clear     Neck:  Supple,  no adenopathy;      Lungs:  Decreased BS occasion ronchi  Heart:  Regular rate and rhythm, S1 and S2 normal  Abdomen:  Soft, non-tender, bowel sounds active   Extremities: trace edema  Pulses: 2+ and symmetric all " extremities  Skin:  No rashes or lesions       Data Review:  All labs (24hrs):   Recent Results (from the past 24 hours)   Basic Metabolic Panel    Collection Time: 03/02/25 12:05 AM    Specimen: Blood   Result Value Ref Range    Glucose 83 65 - 99 mg/dL    BUN 28 (H) 8 - 23 mg/dL    Creatinine 4.02 (H) 0.57 - 1.00 mg/dL    Sodium 139 136 - 145 mmol/L    Potassium 4.4 3.5 - 5.2 mmol/L    Chloride 102 98 - 107 mmol/L    CO2 24.4 22.0 - 29.0 mmol/L    Calcium 8.9 8.6 - 10.5 mg/dL    BUN/Creatinine Ratio 7.0 7.0 - 25.0    Anion Gap 12.6 5.0 - 15.0 mmol/L    eGFR 10.5 (L) >60.0 mL/min/1.73   CBC Auto Differential    Collection Time: 03/02/25 12:05 AM    Specimen: Blood   Result Value Ref Range    WBC 6.38 3.40 - 10.80 10*3/mm3    RBC 3.14 (L) 3.77 - 5.28 10*6/mm3    Hemoglobin 10.1 (L) 12.0 - 15.9 g/dL    Hematocrit 32.5 (L) 34.0 - 46.6 %    .5 (H) 79.0 - 97.0 fL    MCH 32.2 26.6 - 33.0 pg    MCHC 31.1 (L) 31.5 - 35.7 g/dL    RDW 15.0 12.3 - 15.4 %    RDW-SD 56.4 (H) 37.0 - 54.0 fl    MPV 9.0 6.0 - 12.0 fL    Platelets 200 140 - 450 10*3/mm3    Neutrophil % 59.3 42.7 - 76.0 %    Lymphocyte % 25.7 19.6 - 45.3 %    Monocyte % 11.3 5.0 - 12.0 %    Eosinophil % 3.1 0.3 - 6.2 %    Basophil % 0.3 0.0 - 1.5 %    Immature Grans % 0.3 0.0 - 0.5 %    Neutrophils, Absolute 3.78 1.70 - 7.00 10*3/mm3    Lymphocytes, Absolute 1.64 0.70 - 3.10 10*3/mm3    Monocytes, Absolute 0.72 0.10 - 0.90 10*3/mm3    Eosinophils, Absolute 0.20 0.00 - 0.40 10*3/mm3    Basophils, Absolute 0.02 0.00 - 0.20 10*3/mm3    Immature Grans, Absolute 0.02 0.00 - 0.05 10*3/mm3    nRBC 0.0 0.0 - 0.2 /100 WBC          Imaging:  CT Head Without Contrast    Result Date: 2/27/2025  Impression: 1. No acute appearing abnormality. 2. Stable chronic findings above. Electronically Signed: Jacoby Elise MD  2/27/2025 12:36 PM EST  Workstation ID: [id number]    XR Chest 1 View    Result Date: 2/27/2025  Impression: No acute cardiopulmonary abnormality is identified.  Electronically Signed: Floridalma East  2/27/2025 12:15 PM EST  Workstation ID: [id number]     Assessment/Plan:     AMS (altered mental status)    Permanent atrial fibrillation    Long term current use of anticoagulant    Presence of cardiac pacemaker    Type 2 diabetes mellitus    Acquired absence of kidney    ESRD (end stage renal disease) on dialysis    Hypotension    Mild cognitive impairment of uncertain or unknown etiology    Hard of hearing    UTI (urinary tract infection)    Altered mental status         ESRD HD dependent  AMS  UTI  CAD  HTN  Bladder ca  DM    Recommendations:   Hd in am  No need for dialysis today    HD MWF  Abx per primary team

## 2025-03-02 NOTE — PLAN OF CARE
Problem: Adult Inpatient Plan of Care  Goal: Plan of Care Review  Outcome: Progressing  Goal: Patient-Specific Goal (Individualized)  Outcome: Progressing  Goal: Absence of Hospital-Acquired Illness or Injury  Outcome: Progressing  Intervention: Identify and Manage Fall Risk  Recent Flowsheet Documentation  Taken 3/2/2025 0200 by Lilo Henriquez RN  Safety Promotion/Fall Prevention:   activity supervised   assistive device/personal items within reach   clutter free environment maintained   fall prevention program maintained   lighting adjusted   nonskid shoes/slippers when out of bed   safety round/check completed   room organization consistent  Taken 3/2/2025 0000 by Lilo Henriquez RN  Safety Promotion/Fall Prevention:   safety round/check completed   room organization consistent   nonskid shoes/slippers when out of bed   fall prevention program maintained   clutter free environment maintained   assistive device/personal items within reach   activity supervised  Taken 3/1/2025 2200 by Lilo Henriquez RN  Safety Promotion/Fall Prevention:   activity supervised   assistive device/personal items within reach   clutter free environment maintained   fall prevention program maintained   lighting adjusted   nonskid shoes/slippers when out of bed   safety round/check completed   room organization consistent  Intervention: Prevent Skin Injury  Recent Flowsheet Documentation  Taken 3/2/2025 0000 by Lilo Henriquez RN  Body Position:   weight shifting   turned  Taken 3/1/2025 2000 by Lilo Henriquez RN  Skin Protection:   incontinence pads utilized   transparent dressing maintained  Intervention: Prevent and Manage VTE (Venous Thromboembolism) Risk  Recent Flowsheet Documentation  Taken 3/2/2025 0000 by Lilo Henriquez RN  VTE Prevention/Management:   SCDs (sequential compression devices) off   patient refused intervention  Taken 3/1/2025 2000 by Lilo Henriquez RN  VTE Prevention/Management:   SCDs (sequential  compression devices) off   patient refused intervention  Intervention: Prevent Infection  Recent Flowsheet Documentation  Taken 3/2/2025 0200 by Lilo Henriquez RN  Infection Prevention:   environmental surveillance performed   equipment surfaces disinfected   hand hygiene promoted   personal protective equipment utilized   rest/sleep promoted   single patient room provided  Taken 3/2/2025 0000 by Lilo Henriquez RN  Infection Prevention:   rest/sleep promoted   single patient room provided   personal protective equipment utilized   hand hygiene promoted   equipment surfaces disinfected   environmental surveillance performed  Taken 3/1/2025 2200 by Lilo Henriquez RN  Infection Prevention:   environmental surveillance performed   equipment surfaces disinfected   hand hygiene promoted   personal protective equipment utilized   rest/sleep promoted   single patient room provided  Goal: Optimal Comfort and Wellbeing  Outcome: Progressing  Intervention: Monitor Pain and Promote Comfort  Recent Flowsheet Documentation  Taken 3/2/2025 0000 by Lilo Henriquez RN  Pain Management Interventions:   care clustered   position adjusted   pillow support provided   prayer utilized   no interventions per patient request   quiet environment facilitated   relaxation techniques promoted  Taken 3/1/2025 2000 by Lilo Henriquez RN  Pain Management Interventions:   care clustered   pain management plan reviewed with patient/caregiver   pillow support provided   position adjusted   prayer utilized   no interventions per patient request   quiet environment facilitated   relaxation techniques promoted  Intervention: Provide Person-Centered Care  Recent Flowsheet Documentation  Taken 3/2/2025 0000 by Lilo Henriquez RN  Trust Relationship/Rapport:   care explained   choices provided   emotional support provided   empathic listening provided   questions answered   questions encouraged   reassurance provided   thoughts/feelings  acknowledged  Taken 3/1/2025 2000 by Lilo Henriquez RN  Trust Relationship/Rapport:   care explained   choices provided   emotional support provided   empathic listening provided   questions encouraged   questions answered   reassurance provided   thoughts/feelings acknowledged  Goal: Readiness for Transition of Care  Outcome: Progressing     Problem: Skin Injury Risk Increased  Goal: Skin Health and Integrity  Outcome: Progressing  Intervention: Optimize Skin Protection  Recent Flowsheet Documentation  Taken 3/2/2025 0000 by Lilo Henriquez RN  Activity Management: activity encouraged  Head of Bed (HOB) Positioning: HOB elevated  Taken 3/1/2025 2000 by Lilo Henriquez RN  Activity Management: activity encouraged  Pressure Reduction Techniques: frequent weight shift encouraged  Head of Bed (HOB) Positioning: HOB elevated  Pressure Reduction Devices: pressure-redistributing mattress utilized  Skin Protection:   incontinence pads utilized   transparent dressing maintained     Problem: Violence Risk or Actual  Goal: Anger and Impulse Control  Outcome: Progressing  Intervention: Minimize Safety Risk  Recent Flowsheet Documentation  Taken 3/2/2025 0200 by Lilo Henriquez RN  Enhanced Safety Measures: bed alarm set  Taken 3/2/2025 0000 by Lilo Henriquez RN  Behavior Management: impulse control promoted  Sensory Stimulation Regulation:   care clustered   television on  Enhanced Safety Measures: bed alarm set  Taken 3/1/2025 2200 by Lilo Henriquez RN  Enhanced Safety Measures: bed alarm set  Taken 3/1/2025 2000 by Lilo Henriquez RN  Behavior Management: impulse control promoted  Sensory Stimulation Regulation:   care clustered   lighting decreased   quiet environment promoted   television on  Intervention: Promote Self-Control  Recent Flowsheet Documentation  Taken 3/2/2025 0000 by Lilo Henriquez RN  Supportive Measures:   active listening utilized   counseling provided   self-care encouraged   relaxation  techniques promoted  Environmental Support: calm environment promoted  Taken 3/1/2025 2000 by Lilo Henriquez RN  Supportive Measures:   active listening utilized   relaxation techniques promoted  Environmental Support:   calm environment promoted   rest periods encouraged     Problem: UTI (Urinary Tract Infection)  Goal: Improved Infection Symptoms  Outcome: Progressing  Intervention: Prevent Infection Progression  Recent Flowsheet Documentation  Taken 3/2/2025 0000 by Lilo Henriquez RN  Fever Reduction/Comfort Measures: lightweight bedding  Taken 3/1/2025 2000 by Lilo Henriquez RN  Fever Reduction/Comfort Measures: lightweight bedding     Problem: Fall Injury Risk  Goal: Absence of Fall and Fall-Related Injury  Outcome: Progressing  Intervention: Identify and Manage Contributors  Recent Flowsheet Documentation  Taken 3/2/2025 0200 by Lilo Henriquez RN  Medication Review/Management: medications reviewed  Taken 3/2/2025 0000 by Lilo Henriquez RN  Medication Review/Management: medications reviewed  Taken 3/1/2025 2200 by Lilo Henriquez RN  Medication Review/Management: medications reviewed  Taken 3/1/2025 2000 by Lilo Henriquez RN  Medication Review/Management: medications reviewed  Intervention: Promote Injury-Free Environment  Recent Flowsheet Documentation  Taken 3/2/2025 0200 by Lilo Henriquez RN  Safety Promotion/Fall Prevention:   activity supervised   assistive device/personal items within reach   clutter free environment maintained   fall prevention program maintained   lighting adjusted   nonskid shoes/slippers when out of bed   safety round/check completed   room organization consistent  Taken 3/2/2025 0000 by Lilo Henriquez RN  Safety Promotion/Fall Prevention:   safety round/check completed   room organization consistent   nonskid shoes/slippers when out of bed   fall prevention program maintained   clutter free environment maintained   assistive device/personal items within reach    activity supervised  Taken 3/1/2025 2200 by Lilo Henriquez, RN  Safety Promotion/Fall Prevention:   activity supervised   assistive device/personal items within reach   clutter free environment maintained   fall prevention program maintained   lighting adjusted   nonskid shoes/slippers when out of bed   safety round/check completed   room organization consistent     Problem: Confusion Acute  Goal: Optimal Cognitive Function  Outcome: Progressing  Intervention: Minimize Contributing Factors  Recent Flowsheet Documentation  Taken 3/2/2025 0000 by Lilo Henriquez, RN  Sensory Stimulation Regulation:   care clustered   television on  Reorientation Measures: clock in view  Communication Support Strategies: active listening utilized  Taken 3/1/2025 2000 by Lilo Henriquez, RN  Sensory Stimulation Regulation:   care clustered   lighting decreased   quiet environment promoted   television on  Reorientation Measures: clock in view  Communication Support Strategies: active listening utilized   Goal Outcome Evaluation:

## 2025-03-02 NOTE — PROGRESS NOTES
LOS: 2 days   Patient Care Team:  John Cano MD as PCP - General (Family Medicine)  Jose Carlos Cheng MD as Consulting Physician (Cardiology)    Subjective         Review of Systems   Unable to perform ROS: Other           Objective     Vital Signs  Temp:  [97.6 °F (36.4 °C)-98 °F (36.7 °C)] 97.6 °F (36.4 °C)  Heart Rate:  [101-130] 130  Resp:  [12-22] 12  BP: (106-145)/(52-96) 121/96      Physical Exam  Vitals reviewed.   Constitutional:       Appearance: She is not ill-appearing.   HENT:      Head: Normocephalic and atraumatic.      Right Ear: External ear normal.      Left Ear: External ear normal.      Nose: Nose normal.      Mouth/Throat:      Mouth: Mucous membranes are dry.   Eyes:      General:         Right eye: No discharge.         Left eye: No discharge.   Cardiovascular:      Rate and Rhythm: Normal rate and regular rhythm.      Pulses: Normal pulses.      Heart sounds: Normal heart sounds.   Pulmonary:      Effort: Pulmonary effort is normal.      Breath sounds: Normal breath sounds.   Abdominal:      General: Bowel sounds are normal.      Palpations: Abdomen is soft.   Musculoskeletal:         General: Normal range of motion.      Cervical back: Normal range of motion.   Skin:     General: Skin is warm.      Coloration: Skin is pale.   Neurological:      Mental Status: She is alert. Mental status is at baseline.   Psychiatric:         Behavior: Behavior normal.              Results Review:    Lab Results (last 24 hours)       Procedure Component Value Units Date/Time    Basic Metabolic Panel [[id number]]  (Abnormal) Collected: 03/02/25 0005    Specimen: Blood Updated: 03/02/25 0043     Glucose 83 mg/dL      BUN 28 mg/dL      Creatinine 4.02 mg/dL      Sodium 139 mmol/L      Potassium 4.4 mmol/L      Chloride 102 mmol/L      CO2 24.4 mmol/L      Calcium 8.9 mg/dL      BUN/Creatinine Ratio 7.0     Anion Gap 12.6 mmol/L      eGFR 10.5 mL/min/1.73     Narrative:      GFR Categories in Chronic  Kidney Disease (CKD)      GFR Category          GFR (mL/min/1.73)    Interpretation  G1                     90 or greater         Normal or high (1)  G2                      60-89                Mild decrease (1)  G3a                   45-59                Mild to moderate decrease  G3b                   30-44                Moderate to severe decrease  G4                    15-29                Severe decrease  G5                    14 or less           Kidney failure          (1)In the absence of evidence of kidney disease, neither GFR category G1 or G2 fulfill the criteria for CKD.    eGFR calculation 2021 CKD-EPI creatinine equation, which does not include race as a factor    CBC & Differential [[id number]]  (Abnormal) Collected: 03/02/25 0005    Specimen: Blood Updated: 03/02/25 0016    Narrative:      The following orders were created for panel order CBC & Differential.  Procedure                               Abnormality         Status                     ---------                               -----------         ------                     CBC Auto Differential[[id number]]        Abnormal            Final result                 Please view results for these tests on the individual orders.    CBC Auto Differential [[id number]]  (Abnormal) Collected: 03/02/25 0005    Specimen: Blood Updated: 03/02/25 0016     WBC 6.38 10*3/mm3      RBC 3.14 10*6/mm3      Hemoglobin 10.1 g/dL      Hematocrit 32.5 %      .5 fL      MCH 32.2 pg      MCHC 31.1 g/dL      RDW 15.0 %      RDW-SD 56.4 fl      MPV 9.0 fL      Platelets 200 10*3/mm3      Neutrophil % 59.3 %      Lymphocyte % 25.7 %      Monocyte % 11.3 %      Eosinophil % 3.1 %      Basophil % 0.3 %      Immature Grans % 0.3 %      Neutrophils, Absolute 3.78 10*3/mm3      Lymphocytes, Absolute 1.64 10*3/mm3      Monocytes, Absolute 0.72 10*3/mm3      Eosinophils, Absolute 0.20 10*3/mm3      Basophils, Absolute 0.02 10*3/mm3      Immature Grans, Absolute 0.02  10*3/mm3      nRBC 0.0 /100 WBC     Urine Culture - Urine, Urine, Catheter [[id number]]  (Normal) Collected: 02/27/25 1337    Specimen: Urine, Catheter Updated: 03/01/25 1325     Urine Culture Culture in progress    Blood Culture - Blood, Arm, Left [[id number]]  (Normal) Collected: 02/27/25 1140    Specimen: Blood from Arm, Left Updated: 03/01/25 1145     Blood Culture No growth at 2 days    Blood Culture - Blood, Arm, Left [[id number]]  (Normal) Collected: 02/27/25 1140    Specimen: Blood from Arm, Left Updated: 03/01/25 1145     Blood Culture No growth at 2 days             Imaging Results (Last 24 Hours)       ** No results found for the last 24 hours. **                 I reviewed the patient's new clinical results.    Medication Review:   Scheduled Meds:apixaban, 2.5 mg, Oral, Q12H  [Held by provider] atorvastatin, 20 mg, Oral, Nightly  ertapenem, 500 mg, Intravenous, Once  famotidine, 10 mg, Oral, Q48H  metoprolol tartrate, 12.5 mg, Oral, BID  midodrine, 2.5 mg, Oral, TID AC  polyethylene glycol, 17 g, Oral, Daily  sertraline, 25 mg, Oral, Nightly  sevelamer, 800 mg, Oral, TID With Meals  sodium chloride, 10 mL, Intravenous, Q12H  topiramate, 100 mg, Oral, Nightly      Continuous Infusions:   PRN Meds:.  acetaminophen    senna-docusate sodium **AND** polyethylene glycol **AND** bisacodyl **AND** bisacodyl    heparin (porcine)    hydrALAZINE    ipratropium-albuterol    nitroglycerin    ondansetron ODT **OR** ondansetron    sodium chloride    sodium chloride    sodium chloride     Interval History:    Assessment & Plan     AMS  Acute UTI (urinary tract infection)  Permanent atrial fibrillation  Long term current use of anticoagulant  Presence of cardiac pacemaker  Type 2 diabetes mellitus  Acquired absence of kidney  ESRD on dialysis  Mild cognitive impairment of uncertain or unknown etiology  Hard of hearing     Plan of care  Nephrology for HD orders. Monitor overnight. No leukocytosis or fever. Antibiotics  completed. Continue home medications.     Almost near mental baseline; had tachycardia last night; monitor today, poss back tomorrow    Plan for disposition:CHELLE Babin  03/02/25  08:00 EST

## 2025-03-02 NOTE — PLAN OF CARE
Problem: Adult Inpatient Plan of Care  Goal: Plan of Care Review  Outcome: Progressing  Goal: Patient-Specific Goal (Individualized)  Outcome: Progressing  Goal: Absence of Hospital-Acquired Illness or Injury  Outcome: Progressing  Intervention: Identify and Manage Fall Risk  Recent Flowsheet Documentation  Taken 3/2/2025 1400 by Ciara Goodson LPN  Safety Promotion/Fall Prevention:   safety round/check completed   room organization consistent   clutter free environment maintained   assistive device/personal items within reach  Taken 3/2/2025 1220 by Ciara Goodson LPN  Safety Promotion/Fall Prevention:   safety round/check completed   room organization consistent   clutter free environment maintained   assistive device/personal items within reach  Taken 3/2/2025 1000 by Ciara Goodson LPN  Safety Promotion/Fall Prevention:   safety round/check completed   room organization consistent   clutter free environment maintained   assistive device/personal items within reach   activity supervised  Taken 3/2/2025 0810 by Ciara Goodson LPN  Safety Promotion/Fall Prevention:   safety round/check completed   room organization consistent   assistive device/personal items within reach   clutter free environment maintained  Intervention: Prevent Skin Injury  Recent Flowsheet Documentation  Taken 3/2/2025 1220 by Ciara Goodson LPN  Body Position:   weight shifting   turned  Taken 3/2/2025 0810 by Ciara Goodson LPN  Body Position:   weight shifting   turned  Skin Protection: incontinence pads utilized  Intervention: Prevent and Manage VTE (Venous Thromboembolism) Risk  Recent Flowsheet Documentation  Taken 3/2/2025 1220 by Ciara Goodson LPN  VTE Prevention/Management:   SCDs (sequential compression devices) off   patient refused intervention  Taken 3/2/2025 0810 by Ciara Goodson LPN  VTE Prevention/Management:   SCDs (sequential compression devices) off   patient refused intervention  Intervention: Prevent Infection  Recent  Flowsheet Documentation  Taken 3/2/2025 1400 by Ciara Goodson LPN  Infection Prevention:   environmental surveillance performed   equipment surfaces disinfected   rest/sleep promoted   single patient room provided  Taken 3/2/2025 1220 by Ciara Goodson LPN  Infection Prevention:   environmental surveillance performed   equipment surfaces disinfected   single patient room provided   rest/sleep promoted  Taken 3/2/2025 1000 by Ciara Goodson LPN  Infection Prevention:   environmental surveillance performed   equipment surfaces disinfected   rest/sleep promoted   single patient room provided  Taken 3/2/2025 0810 by Ciara Goodson LPN  Infection Prevention:   environmental surveillance performed   equipment surfaces disinfected   single patient room provided   rest/sleep promoted  Goal: Optimal Comfort and Wellbeing  Outcome: Progressing  Intervention: Provide Person-Centered Care  Recent Flowsheet Documentation  Taken 3/2/2025 1220 by Ciara Goodson LPN  Trust Relationship/Rapport:   care explained   choices provided   empathic listening provided   emotional support provided   thoughts/feelings acknowledged  Taken 3/2/2025 0810 by Ciara Goodson LPN  Trust Relationship/Rapport:   care explained   choices provided   emotional support provided   thoughts/feelings acknowledged   questions answered  Goal: Readiness for Transition of Care  Outcome: Progressing     Problem: Skin Injury Risk Increased  Goal: Skin Health and Integrity  Outcome: Progressing  Intervention: Optimize Skin Protection  Recent Flowsheet Documentation  Taken 3/2/2025 1220 by Ciara Goodson LPN  Head of Bed (HOB) Positioning: HOB elevated  Taken 3/2/2025 0810 by Ciara Goodson LPN  Activity Management: activity encouraged  Pressure Reduction Techniques: frequent weight shift encouraged  Head of Bed (HOB) Positioning: HOB elevated  Pressure Reduction Devices: pressure-redistributing mattress utilized  Skin Protection: incontinence pads utilized      Problem: Violence Risk or Actual  Goal: Anger and Impulse Control  Outcome: Progressing  Intervention: Minimize Safety Risk  Recent Flowsheet Documentation  Taken 3/2/2025 1400 by Ciara Goodson LPN  Enhanced Safety Measures: bed alarm set  Taken 3/2/2025 1220 by Ciara Goodson LPN  Sensory Stimulation Regulation:   care clustered   television on  Enhanced Safety Measures: bed alarm set  Taken 3/2/2025 1000 by Ciara Goodson LPN  Enhanced Safety Measures: bed alarm set  Taken 3/2/2025 0810 by Ciara Goodson LPN  Sensory Stimulation Regulation:   care clustered   television on  Enhanced Safety Measures: bed alarm set     Problem: UTI (Urinary Tract Infection)  Goal: Improved Infection Symptoms  Outcome: Progressing     Problem: Fall Injury Risk  Goal: Absence of Fall and Fall-Related Injury  Outcome: Progressing  Intervention: Identify and Manage Contributors  Recent Flowsheet Documentation  Taken 3/2/2025 1400 by Ciara Goodosn LPN  Medication Review/Management: medications reviewed  Taken 3/2/2025 1220 by Ciara Goodson LPN  Medication Review/Management: medications reviewed  Taken 3/2/2025 1000 by Ciara Goodson LPN  Medication Review/Management: medications reviewed  Taken 3/2/2025 0810 by Ciara Goodson LPN  Medication Review/Management: medications reviewed  Intervention: Promote Injury-Free Environment  Recent Flowsheet Documentation  Taken 3/2/2025 1400 by Ciara Goodson LPN  Safety Promotion/Fall Prevention:   safety round/check completed   room organization consistent   clutter free environment maintained   assistive device/personal items within reach  Taken 3/2/2025 1220 by Ciara Goodson LPN  Safety Promotion/Fall Prevention:   safety round/check completed   room organization consistent   clutter free environment maintained   assistive device/personal items within reach  Taken 3/2/2025 1000 by Ciara Goodson LPN  Safety Promotion/Fall Prevention:   safety round/check completed   room organization  consistent   clutter free environment maintained   assistive device/personal items within reach   activity supervised  Taken 3/2/2025 0810 by Ciara Goodson LPN  Safety Promotion/Fall Prevention:   safety round/check completed   room organization consistent   assistive device/personal items within reach   clutter free environment maintained     Problem: Confusion Acute  Goal: Optimal Cognitive Function  Outcome: Progressing  Intervention: Minimize Contributing Factors  Recent Flowsheet Documentation  Taken 3/2/2025 1220 by Ciara Goodson LPN  Sensory Stimulation Regulation:   care clustered   television on  Reorientation Measures: clock in view  Communication Support Strategies: active listening utilized  Taken 3/2/2025 0810 by Ciara Goodson LPN  Sensory Stimulation Regulation:   care clustered   television on  Reorientation Measures: clock in view  Communication Support Strategies: active listening utilized   Goal Outcome Evaluation:

## 2025-03-03 LAB
ANION GAP SERPL CALCULATED.3IONS-SCNC: 14.3 MMOL/L (ref 5–15)
BASOPHILS # BLD AUTO: 0.03 10*3/MM3 (ref 0–0.2)
BASOPHILS NFR BLD AUTO: 0.4 % (ref 0–1.5)
BUN SERPL-MCNC: 31 MG/DL (ref 8–23)
BUN/CREAT SERPL: 6.5 (ref 7–25)
CALCIUM SPEC-SCNC: 8.7 MG/DL (ref 8.6–10.5)
CHLORIDE SERPL-SCNC: 97 MMOL/L (ref 98–107)
CHOLEST SERPL-MCNC: 82 MG/DL (ref 0–200)
CO2 SERPL-SCNC: 22.7 MMOL/L (ref 22–29)
CREAT SERPL-MCNC: 4.76 MG/DL (ref 0.57–1)
DEPRECATED RDW RBC AUTO: 57.8 FL (ref 37–54)
EGFRCR SERPLBLD CKD-EPI 2021: 8.6 ML/MIN/1.73
EOSINOPHIL # BLD AUTO: 0.18 10*3/MM3 (ref 0–0.4)
EOSINOPHIL NFR BLD AUTO: 2.4 % (ref 0.3–6.2)
ERYTHROCYTE [DISTWIDTH] IN BLOOD BY AUTOMATED COUNT: 15.1 % (ref 12.3–15.4)
GLUCOSE BLDC GLUCOMTR-MCNC: 89 MG/DL (ref 70–105)
GLUCOSE SERPL-MCNC: 87 MG/DL (ref 65–99)
HCT VFR BLD AUTO: 34.1 % (ref 34–46.6)
HDLC SERPL-MCNC: 53 MG/DL (ref 40–60)
HGB BLD-MCNC: 10.5 G/DL (ref 12–15.9)
IMM GRANULOCYTES # BLD AUTO: 0.02 10*3/MM3 (ref 0–0.05)
IMM GRANULOCYTES NFR BLD AUTO: 0.3 % (ref 0–0.5)
LDLC SERPL CALC-MCNC: 12 MG/DL (ref 0–100)
LDLC/HDLC SERPL: 0.23 {RATIO}
LYMPHOCYTES # BLD AUTO: 1.56 10*3/MM3 (ref 0.7–3.1)
LYMPHOCYTES NFR BLD AUTO: 21.2 % (ref 19.6–45.3)
MCH RBC QN AUTO: 32 PG (ref 26.6–33)
MCHC RBC AUTO-ENTMCNC: 30.8 G/DL (ref 31.5–35.7)
MCV RBC AUTO: 104 FL (ref 79–97)
MONOCYTES # BLD AUTO: 0.77 10*3/MM3 (ref 0.1–0.9)
MONOCYTES NFR BLD AUTO: 10.4 % (ref 5–12)
NEUTROPHILS NFR BLD AUTO: 4.81 10*3/MM3 (ref 1.7–7)
NEUTROPHILS NFR BLD AUTO: 65.3 % (ref 42.7–76)
NRBC BLD AUTO-RTO: 0 /100 WBC (ref 0–0.2)
PLATELET # BLD AUTO: 236 10*3/MM3 (ref 140–450)
PMV BLD AUTO: 9 FL (ref 6–12)
POTASSIUM SERPL-SCNC: 3.6 MMOL/L (ref 3.5–5.2)
QT INTERVAL: 351 MS
QTC INTERVAL: 501 MS
RBC # BLD AUTO: 3.28 10*6/MM3 (ref 3.77–5.28)
SODIUM SERPL-SCNC: 134 MMOL/L (ref 136–145)
TRIGL SERPL-MCNC: 84 MG/DL (ref 0–150)
VLDLC SERPL-MCNC: 17 MG/DL (ref 5–40)
WBC NRBC COR # BLD AUTO: 7.37 10*3/MM3 (ref 3.4–10.8)

## 2025-03-03 PROCEDURE — 93010 ELECTROCARDIOGRAM REPORT: CPT | Performed by: INTERNAL MEDICINE

## 2025-03-03 PROCEDURE — 25810000003 SODIUM CHLORIDE 0.9 % SOLUTION

## 2025-03-03 PROCEDURE — 93005 ELECTROCARDIOGRAM TRACING: CPT | Performed by: FAMILY MEDICINE

## 2025-03-03 PROCEDURE — 85025 COMPLETE CBC W/AUTO DIFF WBC: CPT

## 2025-03-03 PROCEDURE — 80061 LIPID PANEL: CPT

## 2025-03-03 PROCEDURE — 82948 REAGENT STRIP/BLOOD GLUCOSE: CPT

## 2025-03-03 PROCEDURE — 80048 BASIC METABOLIC PNL TOTAL CA: CPT

## 2025-03-03 PROCEDURE — 99222 1ST HOSP IP/OBS MODERATE 55: CPT | Performed by: INTERNAL MEDICINE

## 2025-03-03 RX ORDER — SODIUM CHLORIDE 9 MG/ML
100 INJECTION, SOLUTION INTRAVENOUS CONTINUOUS
Status: DISPENSED | OUTPATIENT
Start: 2025-03-03 | End: 2025-03-04

## 2025-03-03 RX ADMIN — MIDODRINE HYDROCHLORIDE 2.5 MG: 2.5 TABLET ORAL at 09:58

## 2025-03-03 RX ADMIN — Medication 10 ML: at 10:04

## 2025-03-03 RX ADMIN — Medication 12.5 MG: at 09:56

## 2025-03-03 RX ADMIN — MIDODRINE HYDROCHLORIDE 2.5 MG: 2.5 TABLET ORAL at 16:53

## 2025-03-03 RX ADMIN — SEVELAMER CARBONATE 800 MG: 800 TABLET, FILM COATED ORAL at 12:54

## 2025-03-03 RX ADMIN — Medication 12.5 MG: at 16:52

## 2025-03-03 RX ADMIN — SODIUM CHLORIDE 100 ML/HR: 9 INJECTION, SOLUTION INTRAVENOUS at 18:11

## 2025-03-03 RX ADMIN — SEVELAMER CARBONATE 800 MG: 800 TABLET, FILM COATED ORAL at 18:14

## 2025-03-03 RX ADMIN — Medication 10 ML: at 21:21

## 2025-03-03 RX ADMIN — MIDODRINE HYDROCHLORIDE 2.5 MG: 2.5 TABLET ORAL at 12:53

## 2025-03-03 RX ADMIN — Medication 12.5 MG: at 21:20

## 2025-03-03 RX ADMIN — SERTRALINE HYDROCHLORIDE 25 MG: 25 TABLET, FILM COATED ORAL at 21:20

## 2025-03-03 RX ADMIN — TOPIRAMATE 100 MG: 100 TABLET, FILM COATED ORAL at 21:21

## 2025-03-03 RX ADMIN — APIXABAN 2.5 MG: 2.5 TABLET, FILM COATED ORAL at 09:57

## 2025-03-03 RX ADMIN — APIXABAN 2.5 MG: 2.5 TABLET, FILM COATED ORAL at 21:20

## 2025-03-03 NOTE — NURSING NOTE
Dr. Keller (nephro) contacted via text msg re: rapid response HD TX; states he will see patient/review labs.

## 2025-03-03 NOTE — CONSULTS
Cardiology [place]    Subjective:     Encounter Date:02/27/2025      Patient ID: Hazel Bucio is a 84 y.o. female     Referring Physician: Rajesh    Chief Complaint: Mental status change    Reason for Consult: A-fib RVR  Seen and examined on date of service, agree with narrative as discussed with nurse practitioner after face-to-face encounter with scribed findings below, greater than 50% of total encounter time in medical decision making performed on 8  HPI:  Patient has dementia and is poor historian, no family at bedside, HPI taken from EMR.    Hazel Bucio is a 84 y.o. female patient of Dr. Ellsworth with a history of sick sinus syndrome with PPM, CAD, essential hypertension, permanent A-fib, orthostatic hypotension (multiple hospitalizations for dehydration, falls, weakness, generalized debility), hyperlipidemia, type 2 diabetes mellitus, dementia, bladder cancer, RCC, ESRD on HD Monday Wednesday Friday, who presented to [facility] ED 2/27/2025 for altered mental status.     Patient was brought from [facility] facility by EMS reporting she had garbled speech.  It was noted she was currently being treated for UTI with IV antibiotics.    Cardiology was consulted today as patient had rapid response called while in dialysis.  Patient does have permanent A-fib.  During dialysis today, patient had hypotensive systolic in the 70s and heart rate went up to 150s.  Dialysis was discontinued and rescheduled for tomorrow.    On exam, patient is confused, oriented to self and place, disoriented to time or situation.  She is able to relay that she does not have any chest pain.  Patient currently A-fib rates 110s.  Blood pressure 120/71.  Patient is on low-dose beta-blocker due to significant history of hypotension.  Will give extra 12.5 mg Lopressor x 1 and evaluate effect on heart rate and blood pressure.    EKG and telemetry atrial fibrillation    Review of systems unable to be obtained completely secondary to underlying  confusion    Historical data copied forward from previous encounters in EMR is unchanged    Past Medical History:   Diagnosis Date    Acquired absence of kidney 10/04/2021    Acquired absence of other specified parts of digestive tract 09/09/2022    Athscl heart disease of native coronary 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 09/09/2022    Atrial fibrillation with rapid ventricular response     Bladder cancer     Cancer     breast, bladder    Cholecystitis with cholelithiasis     Chronic insomnia 08/27/2020    Deep vein thrombosis     Dyslipidemia 01/17/2017    E coli bacteremia     ESRD on dialysis 10/18/2023    Essential hypertension 01/17/2017    Fracture tibia/fibula, right, closed, initial encounter 08/27/2020    Gastro-esophageal reflux disease without esophagitis 09/14/2020    GERD (gastroesophageal reflux disease)     History of bladder cancer     History of falling     History of urostomy     Hyperkalemia     Immobility 08/27/2020    r/t broken Tibia     Irritable bowel syndrome without diarrhea     Kidney carcinoma, right     removed     Long term current use of anticoagulant 01/09/2015    Mild cognitive impairment of uncertain or unknown etiology 08/31/2023    Myalgia due to statin     Other specified abdominal hernia without obstruction or gangrene     PAF (paroxysmal atrial fibrillation) 06/26/2019    Permanent atrial fibrillation 06/26/2019    Personal history of other venous thrombosis and embolism     Presence of cardiac pacemaker 11/03/2014    BS dual chamber PM placed 10/2014 with pocket revision 1/25/17.  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       Past Surgical History:   Procedure Laterality Date    BREAST LUMPECTOMY      CARDIAC CATHETERIZATION N/A 10/18/2023    Procedure: Left Heart Cath possible PCI, atherectomy, hemodynamic  "support;  Surgeon: Augustin Ellsworth MD;  Location: [facility] CATH INVASIVE LOCATION;  Service: Cardiology;  Laterality: N/A;    CARDIAC ELECTROPHYSIOLOGY PROCEDURE N/A 4/28/2023    Procedure: Pacemaker gen change, [place] AWARE $;  Surgeon: Jose Carlos Chegn MD;  Location: [facility] CATH INVASIVE LOCATION;  Service: Cardiovascular;  Laterality: N/A;    CHOLECYSTECTOMY N/A 10/12/2020    Procedure: CHOLECYSTECTOMY LAPAROSCOPIC;  Surgeon: Go Pereira DO;  Location: [facility] MAIN OR;  Service: General;  Laterality: N/A;    CYSTECTOMY W/ URETEROILEAL CONDUIT      FEMUR IM NAILING RETROGRADE Right 7/25/2024    Procedure: RIGHT FEMUR INTRAMEDULLARY NAILING RETROGRADE AND OPEN REDUCTION INTERNAL FIXATION;  Surgeon: Elieser Diggs MD;  Location: [facility] MAIN OR;  Service: Orthopedics;  Laterality: Right;    HARDWARE REMOVAL Right 6/11/2021    Procedure: TIBIA HARDWARE REMOVAL;  Surgeon: Geoff Shah II, MD;  Location: [facility] MAIN OR;  Service: Orthopedics;  Laterality: Right;    HERNIA REPAIR      HYSTERECTOMY      INSERT / REPLACE / REMOVE PACEMAKER      NEPHRECTOMY Right     TIBIA IM NAILING/RODDING Right 8/28/2020    Procedure: TIBIA INTRAMEDULLARY NAIL/ABRAHAM INSERTION;  Surgeon: Geoff Shah II, MD;  Location: [facility] MAIN OR;  Service: Orthopedics;  Laterality: Right;       Family History   Problem Relation Age of Onset    COPD Father        Social History     Socioeconomic History    Marital status:    Tobacco Use    Smoking status: Never     Passive exposure: Never    Smokeless tobacco: Never   Vaping Use    Vaping status: Never Used   Substance and Sexual Activity    Alcohol use: Not Currently    Drug use: Never    Sexual activity: Defer         Review of Systems   Unable to perform ROS: Dementia            Objective:         /71 (BP Location: Left arm, Patient Position: Lying)   Pulse 110   Temp 97 °F (36.1 °C) (Axillary)   Resp 18   Ht 170.2 cm (67\")   Wt 58.1 kg (128 lb " "1.4 oz)   SpO2 95%   BMI 20.06 kg/m²     Physical Exam  Vitals reviewed.   Constitutional:       Appearance: She is not ill-appearing.   HENT:      Head: Normocephalic and atraumatic.   Eyes:      General: PERRLA, EOM intact  Cardiovascular:     Rate and Rhythm: A-fib rates 110s.      Pulses: Normal pulses.       Edema: No edema   Pulmonary:      Effort: Pulmonary effort is normal.      Breath sounds: Normal breath sounds.      Room air  Abdominal:      General: Bowel sounds are normal.      Palpations: Abdomen is soft.    Skin:     Coloration: No erythema or cyanosis.   Neurological:      Mental Status: She is alert. She is disoriented.  Oriented to person and place only  Psychiatric:         Behavior: Behavior normal.      ASCVD Risk Score::  The ASCVD Risk score (Chelly ANGEL, et al., 2019) failed to calculate for the following reasons:    The 2019 ASCVD risk score is only valid for ages 40 to 79    Risk score cannot be calculated because patient has a medical history suggesting prior/existing ASCVD      Lab Review:     Results from last 7 days   Lab Units 03/03/25  0244 03/02/25  0005 02/28/25  0316 02/27/25  1140   SODIUM mmol/L 134* 139   < > 139   POTASSIUM mmol/L 3.6 4.4   < > 4.2   CHLORIDE mmol/L 97* 102   < > 100   CO2 mmol/L 22.7 24.4   < > 27.1   BUN mg/dL 31* 28*   < > 42*   CREATININE mg/dL 4.76* 4.02*   < > 4.54*   GLUCOSE mg/dL 87 83   < > 100*   CALCIUM mg/dL 8.7 8.9   < > 9.0   AST (SGOT) U/L  --   --   --  32   ALT (SGPT) U/L  --   --   --  10    < > = values in this interval not displayed.         Results from last 7 days   Lab Units 03/03/25  0244 03/02/25  0005   WBC 10*3/mm3 7.37 6.38   HEMOGLOBIN g/dL 10.5* 10.1*   HEMATOCRIT % 34.1 32.5*   PLATELETS 10*3/mm3 236 200         Results from last 7 days   Lab Units 02/28/25  0316   MAGNESIUM mg/dL 2.4           Invalid input(s): \"LDLCALC\"            Recent Radiology:  Imaging Results (Most Recent)       Procedure Component Value Units Date/Time "    FL Video Swallow With Speech Single Contrast [[id number]] Resulted: 02/28/25 1146     Updated: 02/28/25 1146    Narrative:      This procedure was auto-finalized with no dictation required.    CT Head Without Contrast [[id number]] Collected: 02/27/25 1230     Updated: 02/27/25 1238    Narrative:      CT HEAD WO CONTRAST    Date of Exam: 2/27/2025 12:15 PM EST    Indication: Left posterior headache.    Comparison: CT head without contrast 7/23/2024    Technique: Axial CT images were obtained of the head without contrast administration.  Coronal reconstructions were performed.  Automated exposure control and iterative reconstruction methods were used.    Findings:  No intracranial hemorrhage. Mild cerebral atrophy. Moderate white matter findings most suggestive of chronic microvascular disease. No abnormal extra-axial fluid collection. Posterior fossa without acute abnormality. No intraventricular hemorrhage.   Globes are symmetric. No retro-orbital abnormality. The mastoid air cells are well-aerated. Negative for calvarial fracture. 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 Small rounded 4 mm of nodularity abutting left   aspect of the falx cerebri again could relate to a small DAVID pericallosal aneurysm or developing dural calcification, unchanged from prior studies (image 38).      Impression:      Impression:  1. No acute appearing abnormality.  2. Stable chronic findings above.          Electronically Signed: Jacoby Elise MD    2/27/2025 12:36 PM EST    Workstation ID: [id number]    XR Chest 1 View [[id number]] Collected: 02/27/25 1212     Updated: 02/27/25 1217    Narrative:      XR CHEST 1 VW    Date of Exam: 2/27/2025 11:56 AM EST    Indication: AMS Protocol    Comparison: AP chest x-ray 2/13/2025    Findings:  Cardiac silhouette remains mildly enlarged. Pacemaker leads appear stable. Tip of the right internal jugular central venous catheter is stable, terminating in the right  atrium. Lungs are adequately expanded and appear clear. No pneumothorax or large   pleural effusion is seen.      Impression:      Impression:  No acute cardiopulmonary abnormality is identified.      Electronically Signed: Floridalma East    2/27/2025 12:15 PM EST    Workstation ID: [id number]              ECHOCARDIOGRAM:  Results for orders placed during the hospital encounter of 02/04/25    Adult Transthoracic Echo Complete W/ Cont if Necessary Per Protocol    Interpretation Summary    Left ventricular systolic function is normal. Left ventricular ejection fraction appears to be 61 - 65%.    Left ventricular diastolic function was indeterminate.    The right ventricular cavity is mildly dilated.    The left atrial cavity is mild to moderately dilated.    Left atrial volume is mildly increased.    The right atrial cavity is mildly  dilated.    Estimated right ventricular systolic pressure from tricuspid regurgitation is normal (<35 mmHg).      Stress Test:  Results for orders placed during the hospital encounter of 04/18/23    Stress Test With Myocardial Perfusion One Day    Interpretation Summary    Left ventricular ejection fraction is normal (Calculated EF = 60%).    Myocardial perfusion imaging indicates a normal myocardial perfusion study with no evidence of ischemia.    Impressions are consistent with a low risk study.    Diaphragmatic attenuation and GI artifacts are present.    There is no prior study available for comparison.    Findings consistent with an equivocal ECG stress test.    Underlying atrial fibrillation during the study, normal ST-T wave findings  Heart rates reached 1 20-1 30 with pharmacologic stress, no changes at submaximal stress with respect to ischemic ST-T wave findings  No arrhythmias seen other than baseline atrial fibrillation  Nuclear perfusion demonstrated fixed small basal inferolateral defect with no reversibility, possible diaphragmatic GI attenuation, normal EF 60% with normal LV  size and geometry and LV function    Low risk study for any reversible ischemia, appears to have normal perfusion other than diaphragmatic GI attenuation affected diminished inferolateral counts observed in stress and rest with normal EF 60%        Cardiac Catheterization:  Results for orders placed during the hospital encounter of 10/13/23    Cardiac Catheterization/Vascular Study    Conclusion  Primary operative Augustin Ellsworth MD, PhD  [facility] cardiology  Date of service 10-    Procedure  1.  Left and right heart cath coronary angiography, LV pressure and functional assessment, LV gram, Abdulaziz cardiac output calculations with PA and FA saturations    Indication  Refractory chest pain medical therapy, estimation of filling pressures to guide dialysis and volume removal with recurrent hospitalization    After informed consent patient brought to the Cath Lab sterilely prepped and draped in usual fashion with exposure of the right groin for right common femoral arterial and venous access via micropuncture and modified cylinder technique with placement of 6 Syriac sheaths.  [place] catheter was advanced to the wedge position followed by pullback to the PA with PA artery saturation obtained, pullback the RV and RA with pressures recorded at each level.  FA saturation was also obtained.  [place] was removed followed by coronary angiography with JL 4 and JR4 catheters for selective left and right coronary angiography respectively, JR4 was used to cross the aortic valve with hand-injection for LV gram EDP assessment and pullback assessment of the transaortic valve gradient.  There was no CAD of any angiographic significance maximally 30% in the RCA otherwise left-sided.  Without angiographic disease.  Filling pressures were normal with normal LVEDP, normal EF at 60%, right atrial pressure was 5 with adequate  dialysis efforts daily over the last 3 to 4 days, normal pulmonary pressures, normal aortic  "transvalvular gradient.  All catheters\" removed.  She left Cath Lab chest pain-free hemodynamically electrically stable returning to staff neurologically grossly intact bilaterally    Complications none  Blood loss less than 5 cc  Contrast 65 cc  Sedation time of 45 minutes    Findings  1.  Wedge pressure of 9  2.  PA pressure 28/11 with a mean of 20  3.  RV pressure 27/1 with an EDP of 3  4.  LV pressure of 91/0 with an EDP of 5-6  5.  Closing pressure 96/48 with a mean of 72  6.  Aortic saturation 100%  7.  PA saturation 80%  8.  Abdulaziz cardiac output calculation 7.7 L/min with index of 4.7 which is normal  9.  Normal cardiac output,  which is normal,  which is normal less than 2 Wood unit    Angiography  1.  Left main normal no disease  Over 2 the LAD is a medium caliber vessel with 1 diagonal branch on its course to around the apex with no angiographic disease throughout the LAD  3.  The circumflex is nondominant with 2 obtuse marginal branches with no angiographic disease throughout  4.  The RCA is a very large caliber dominant vessel with mild diffuse but nonobstructive disease 30% maximally in the proximal to mid segment with PLV and PDA with no obstructive disease less than 20%    Conclusions recommendations  1.  Normal epicardial coronary anatomy with no obstructive atherosclerotic disease of any significance maximally 30% with ANNA-3 flow in all vessels  2.  Normal LV filling pressures with normal LV function with normal transaortic valve gradient  3.  Normal pulmonary pressures with no evidence of pulmonary hypertension, normal cardiac output with normal peripheral and systemic vascular resistance, normal right atrial pressure with adequate dialysis efforts over the last few days    Continue dialysis efforts, dry weight has been established, note this for future dialysis efforts to avoid volume accumulation volume overload, 2D echo on presentation revealed severely elevated IVC diameter with " "minimal collapsibility right toe pressure was greater than 20 at that time by noninvasive assessment    Augustin Ellsworth MD, PhD      Results Review:  I have personally reviewed the results from the time of this admission to 3/3/2025 15:43 EST and agree with these findings:  []  Laboratory  []  Microbiology  []  Radiology  []  EKG/Telemetry   []  Cardiology/Vascular   []  Pathology  []  Old records  []  Other:    Most notable findings include:     Allergies   Allergen Reactions    Amoxicillin Shortness Of Breath    Ciprofloxacin Hives    Oxycodone-Acetaminophen Unknown - High Severity     Pt's son stated when pt fell and broke her leg she was put on oxycodone; pt's son stated pt's \"vitals went all out of wack, she couldn't remember things.\"    Penicillins Rash    Sulfa Antibiotics Hives    Cephalexin Other (See Comments)     Has tolerated multiple courses of cephalosporins- cefazolin, ceftriaxone, ceftazidime     Clavulanic Acid Other (See Comments)    Codeine Other (See Comments)    Contrast Dye (Echo Or Unknown Ct/Mr) Other (See Comments)     8/18/22     Doxycycline Other (See Comments)    Fluconazole Other (See Comments)    Iodinated Contrast Media Other (See Comments)     8/18/22    Iodine Hives    Macrobid [Nitrofurantoin] Hives    Tobramycin Other (See Comments)    Trimethoprim Other (See Comments)       Current Medications:   Scheduled Meds:apixaban, 2.5 mg, Oral, Q12H  [Held by provider] atorvastatin, 20 mg, Oral, Nightly  ertapenem, 500 mg, Intravenous, Once  famotidine, 10 mg, Oral, Q48H  metoprolol tartrate, 12.5 mg, Oral, BID  midodrine, 2.5 mg, Oral, TID AC  polyethylene glycol, 17 g, Oral, Daily  sertraline, 25 mg, Oral, Nightly  sevelamer, 800 mg, Oral, TID With Meals  sodium chloride, 10 mL, Intravenous, Q12H  topiramate, 100 mg, Oral, Nightly      Continuous Infusions:         Assessment:         Active Hospital Problems    Diagnosis  POA    **AMS (altered mental status) [R41.82]  Yes    Altered " mental status [R41.82]  Yes    UTI (urinary tract infection) [N39.0]  Yes    Hard of hearing [H91.90]  Yes    Hypotension [I95.9]  Yes    ESRD (end stage renal disease) on dialysis [N18.6, Z99.2]  Not Applicable    Mild cognitive impairment of uncertain or unknown etiology [G31.84]  Yes    Acquired absence of kidney [Z90.5]  Not Applicable    Permanent atrial fibrillation [I48.21]  Yes    Long term current use of anticoagulant [Z79.01]  Not Applicable    Presence of cardiac pacemaker [Z95.0]  Yes     BS dual chamber PM placed 10/2014 with pocket revision  1/25/17.  HX tachy rob syndrome      Type 2 diabetes mellitus [E11.9]  Yes          Plan:   Permanent A-fib  Rapid response called on patient today in dialysis, she got hypotensive and HR went to 150-160, usually secondary to volume depletion with hypotension, no significant orthostasis  currently running 110-115   was previously on Cardizem, DC'd due to hypotension  Rate control with low-dose beta-blocker  Known to have recurrent RVR, rate therapy limited with hypotension  On Eliquis 2.5 mg twice daily, Lopressor 12.5 mg twice daily, midodrine 2.5 mg 3 times daily  Will give one-time dose of Lopressor 12.5 mg extra now to see if heart rate will come back into normal range  Evaluate effect on blood pressures    If we are unable to control rates would pursue AV sukh ablation with continuation of pacemaker given recurrent A-fib RVR, high risk of recurrence, but blood pressure limiting rate control agents    Presence of Bocom dual-chamber pacemaker  With last device interrogation 9.5 years battery life, minimal pacing with VVI setting    Essential hypertension/orthostatic hypotension  SBP largely running 120s at this time  Blood pressure during dialysis today 74/42 with heart rate 136  Patient is on Lopressor for rate control of A-fib, blood pressure support with midodrine 2.5 mg 3 times daily  , Add midodrine if needed    Nonobstructive  CAD/dyslipidemia  No aspirin, on Eliquis for A-fib  Atorvastatin 10 mg daily  Recheck lipid panel  Beta-blocker continues  Chest pain-free    End-stage renal disease on dialysis  Seen by Dr. Arianna Bellamy this a.m.    Continue to follow, follow hemodynamics, adjust medicines as indicated  Controlled atrial fibrillation  Consult EP if necessary routine refractory rates to medical therapy limited by blood pressure      Augustin Ellsworth MD, PhD

## 2025-03-03 NOTE — NURSING NOTE
Unable to start dialysis. Tachycardic, +Afib, BP: 74/42, O2 sat not reading. Appeared confused; +garbled words.  Rapid response called. BP cuff transferred from left leg to left arm; NX=447/84 & 122/74.   Blood glucose: 89. Patient is oriented to self. Hooked to oxygen via nasal cannula.  Nephrologist was notified.   Will dialyze later, waiting for Nephrologist's approval.  Patient was transported via bed back to her room.

## 2025-03-03 NOTE — CONSULTS
Nutrition Services    Patient Name: Hazel Bucio  YOB: 1940  MRN: [id number]  Admission date: 2/27/2025    Comment:  -- Diet per SLP    -- Add Magic Cups at lunch/dinner (Provides 580 kcals, 18 g protein if consumed)       CLINICAL NUTRITION ASSESSMENT      Reason for Assessment 3/3: MST of 2, wound     H&P      Past Medical History:   Diagnosis Date    Acquired absence of kidney 10/04/2021    Acquired absence of other specified parts of digestive tract 09/09/2022    Athscl heart disease of native coronary 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 09/09/2022    Atrial fibrillation with rapid ventricular response     Bladder cancer     Cancer     breast, bladder    Cholecystitis with cholelithiasis     Chronic insomnia 08/27/2020    Deep vein thrombosis     Dyslipidemia 01/17/2017    E coli bacteremia     ESRD on dialysis 10/18/2023    Essential hypertension 01/17/2017    Fracture tibia/fibula, right, closed, initial encounter 08/27/2020    Gastro-esophageal reflux disease without esophagitis 09/14/2020    GERD (gastroesophageal reflux disease)     History of bladder cancer     History of falling     History of urostomy     Hyperkalemia     Immobility 08/27/2020    r/t broken Tibia     Irritable bowel syndrome without diarrhea     Kidney carcinoma, right     removed     Long term current use of anticoagulant 01/09/2015    Mild cognitive impairment of uncertain or unknown etiology 08/31/2023    Myalgia due to statin     Other specified abdominal hernia without obstruction or gangrene     PAF (paroxysmal atrial fibrillation) 06/26/2019    Permanent atrial fibrillation 06/26/2019    Personal history of other venous thrombosis and embolism     Presence of cardiac pacemaker 11/03/2014    BS dual chamber PM placed 10/2014 with pocket revision 1/25/17.  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       Past Surgical History:   Procedure Laterality Date    BREAST LUMPECTOMY      CARDIAC CATHETERIZATION N/A 10/18/2023    Procedure: Left Heart Cath possible PCI, atherectomy, hemodynamic support;  Surgeon: Augustin Ellsworth MD;  Location: [facility] CATH INVASIVE LOCATION;  Service: Cardiology;  Laterality: N/A;    CARDIAC ELECTROPHYSIOLOGY PROCEDURE N/A 4/28/2023    Procedure: Pacemaker gen change, [place] AWARE $;  Surgeon: Jose Carlos Cheng MD;  Location: [facility] CATH INVASIVE LOCATION;  Service: Cardiovascular;  Laterality: N/A;    CHOLECYSTECTOMY N/A 10/12/2020    Procedure: CHOLECYSTECTOMY LAPAROSCOPIC;  Surgeon: Go Pereira DO;  Location: [facility] MAIN OR;  Service: General;  Laterality: N/A;    CYSTECTOMY W/ URETEROILEAL CONDUIT      FEMUR IM NAILING RETROGRADE Right 7/25/2024    Procedure: RIGHT FEMUR INTRAMEDULLARY NAILING RETROGRADE AND OPEN REDUCTION INTERNAL FIXATION;  Surgeon: Elieser Diggs MD;  Location: [facility] MAIN OR;  Service: Orthopedics;  Laterality: Right;    HARDWARE REMOVAL Right 6/11/2021    Procedure: TIBIA HARDWARE REMOVAL;  Surgeon: Geoff Shah II, MD;  Location: [facility] MAIN OR;  Service: Orthopedics;  Laterality: Right;    HERNIA REPAIR      HYSTERECTOMY      INSERT / REPLACE / REMOVE PACEMAKER      NEPHRECTOMY Right     TIBIA IM NAILING/RODDING Right 8/28/2020    Procedure: TIBIA INTRAMEDULLARY NAIL/ABRAHAM INSERTION;  Surgeon: Geoff Shah II, MD;  Location: [facility] MAIN OR;  Service: Orthopedics;  Laterality: Right;        Current Problems   AMS  - SLP following  - S/P VFSS 2/28, mechanical ground, no mixed consistencies, NTL    Acute UTI (urinary tract infection)    Permanent atrial fibrillation    Long term current use of anticoagulant    Presence of cardiac pacemaker    Type 2 diabetes mellitus    Acquired absence of kidney    ESRD on dialysis MWF  - Nephrology following    Mild cognitive impairment of  "uncertain or unknown etiology    Hard of hearing       Encounter Information        Trending Narrative     3/3: Admitted for AMS.  SLP following, along with Nephrology for ESRD.  Rapid response 3/3 during HD.  RD visited patient at bedside post HD.  Patient restless in bed.  Breakfast tray at bedside.  NFPE completed and not consistent with nutrition diagnosis of malnutrition at this time using AND/ASPEN criteria.         Anthropometrics        Current Height, Weight Height: 170.2 cm (67\")  Weight: 58.1 kg (128 lb 1.4 oz) (02/27/25 1040)       Usual Body Weight (UBW) Unable to obtain from patient        Trending Weight Hx     This admission: 3/3: 128#             PTA: 4.4% weight loss x 3 months  7.2% weight loss x 11 months     Wt Readings from Last 30 Encounters:   02/27/25 1040 58.1 kg (128 lb 1.4 oz)   02/13/25 0701 58.1 kg (128 lb)   02/05/25 0824 58.1 kg (128 lb)   02/05/25 0053 58.1 kg (128 lb 1.4 oz)   07/23/24 1729 55.3 kg (121 lb 14.6 oz)   06/26/24 1450 55.3 kg (122 lb)   01/31/24 0700 56.6 kg (124 lb 12.5 oz)   01/30/24 0600 56 kg (123 lb 7.3 oz)   01/30/24 0015 56 kg (123 lb 7.3 oz)   01/28/24 2125 65.6 kg (144 lb 10 oz)   01/28/24 1517 65.8 kg (145 lb)   12/26/23 1418 65.8 kg (145 lb)   12/10/23 0916 65.8 kg (145 lb)   11/11/23 0416 60.9 kg (134 lb 4.2 oz)   11/10/23 0352 58.8 kg (129 lb 10.1 oz)   11/08/23 1328 56.2 kg (124 lb)   10/18/23 0413 56.5 kg (124 lb 9 oz)   10/13/23 2035 54.6 kg (120 lb 5.9 oz)   10/13/23 1453 57.6 kg (127 lb)   09/26/23 0954 57.6 kg (127 lb)   06/30/23 0714 68.6 kg (151 lb 3.8 oz)   06/26/23 0700 69.7 kg (153 lb 10.6 oz)   06/26/23 0450 69.5 kg (153 lb 3.5 oz)   06/25/23 0445 69.6 kg (153 lb 7 oz)   06/24/23 0500 69.6 kg (153 lb 7 oz)   06/23/23 0544 68.8 kg (151 lb 10.8 oz)   06/22/23 0553 70 kg (154 lb 5.2 oz)   06/21/23 0454 67.4 kg (148 lb 9.4 oz)   06/20/23 0526 68 kg (149 lb 14.6 oz)   06/19/23 0455 67.4 kg (148 lb 9.4 oz)   06/18/23 0500 64.9 kg (143 lb 1.3 oz) "   06/16/23 0303 59 kg (130 lb)   04/28/23 1003 59 kg (130 lb 1.1 oz)   04/18/23 1100 61.2 kg (135 lb)   04/04/23 0953 59.4 kg (131 lb)   03/24/23 0333 61.9 kg (136 lb 7.4 oz)   03/22/23 2354 60.4 kg (133 lb 2.5 oz)   03/22/23 0505 60 kg (132 lb 4.4 oz)   03/21/23 0516 61.4 kg (135 lb 5.8 oz)   03/20/23 0513 61 kg (134 lb 7.7 oz)   03/19/23 1204 63.4 kg (139 lb 12.4 oz)   03/19/23 0538 58.1 kg (128 lb 1.4 oz)   03/18/23 2312 67.8 kg (149 lb 7.6 oz)   03/18/23 2200 69.1 kg (152 lb 5.4 oz)   03/18/23 2010 62.8 kg (138 lb 7.2 oz)   12/07/22 1204 65.8 kg (145 lb)   09/01/22 1239 68 kg (150 lb)   06/28/22 1410 69.9 kg (154 lb 3.2 oz)   06/17/22 0310 69.4 kg (153 lb)   06/16/22 0350 70.8 kg (156 lb 1.4 oz)   06/15/22 0517 69.8 kg (153 lb 14.1 oz)   06/14/22 0458 70.8 kg (156 lb 1.4 oz)   06/11/22 2235 65.8 kg (145 lb)   03/22/22 1350 69.9 kg (154 lb)   02/22/22 1344 69.2 kg (152 lb 9.6 oz)   12/09/21 1927 71.2 kg (157 lb)   12/09/21 0339 69.1 kg (152 lb 6.4 oz)   12/08/21 0353 67 kg (147 lb 9.6 oz)   12/07/21 1955 67.4 kg (148 lb 9.6 oz)   12/06/21 1958 69.4 kg (153 lb)   11/02/21 1507 69.5 kg (153 lb 3.2 oz)   09/30/21 0318 70.9 kg (156 lb 4.9 oz)   09/29/21 0310 73.4 kg (161 lb 13.1 oz)   09/28/21 1736 72.6 kg (160 lb)   09/28/21 0000 73 kg (160 lb 15 oz)   09/27/21 0329 68 kg (149 lb 14.6 oz)   09/26/21 0307 68.2 kg (150 lb 5.7 oz)   09/24/21 2014 63.5 kg (140 lb)   08/17/21 1454 68.5 kg (151 lb)   06/03/21 1046 65.8 kg (145 lb)   11/17/20 1300 64.4 kg (142 lb)   11/12/20 1555 72.6 kg (160 lb)   11/08/20 1027 73.9 kg (163 lb)      BMI kg/m2 Body mass index is 20.06 kg/m².       Labs        Pertinent Labs    Results from last 7 days   Lab Units 03/03/25  0244 03/02/25  0005 03/01/25  0344 02/28/25  0316 02/27/25  1140   SODIUM mmol/L 134* 139 137   < > 139   POTASSIUM mmol/L 3.6 4.4 4.2   < > 4.2   CHLORIDE mmol/L 97* 102 100   < > 100   CO2 mmol/L 22.7 24.4 25.9   < > 27.1   BUN mg/dL 31* 28* 20   < > 42*   CREATININE  mg/dL 4.76* 4.02* 3.10*   < > 4.54*   CALCIUM mg/dL 8.7 8.9 8.8   < > 9.0   BILIRUBIN mg/dL  --   --   --   --  0.2   ALK PHOS U/L  --   --   --   --  143*   ALT (SGPT) U/L  --   --   --   --  10   AST (SGOT) U/L  --   --   --   --  32   GLUCOSE mg/dL 87 83 71   < > 100*    < > = values in this interval not displayed.     Results from last 7 days   Lab Units 03/03/25  0244 03/01/25  0344 02/28/25  0316   MAGNESIUM mg/dL  --   --  2.4   PHOSPHORUS mg/dL  --   --  4.8*   HEMOGLOBIN g/dL 10.5*   < > 10.3*   HEMATOCRIT % 34.1   < > 33.3*    < > = values in this interval not displayed.     Lab Results   Component Value Date    HGBA1C 4.90 01/30/2024        Medications    Scheduled Medications apixaban, 2.5 mg, Oral, Q12H  [Held by provider] atorvastatin, 20 mg, Oral, Nightly  ertapenem, 500 mg, Intravenous, Once  famotidine, 10 mg, Oral, Q48H  metoprolol tartrate, 12.5 mg, Oral, BID  midodrine, 2.5 mg, Oral, TID AC  polyethylene glycol, 17 g, Oral, Daily  sertraline, 25 mg, Oral, Nightly  sevelamer, 800 mg, Oral, TID With Meals  sodium chloride, 10 mL, Intravenous, Q12H  topiramate, 100 mg, Oral, Nightly        Infusions      PRN Medications   acetaminophen    senna-docusate sodium **AND** polyethylene glycol **AND** bisacodyl **AND** bisacodyl    heparin (porcine)    hydrALAZINE    ipratropium-albuterol    nitroglycerin    ondansetron ODT **OR** ondansetron    sodium chloride    sodium chloride    sodium chloride     Physical Findings        Trending Physical   Appearance, NFPE 3/3: NFPE completed and not consistent with nutrition diagnosis of malnutrition at this time using AND/ASPEN criteria      --  Edema  No edema documented      Bowel Function Last documented BM 3/3 (today)     Tubes No feeding tube      Chewing/Swallowing SLP following, current diet mechanical ground, NTL, no mixed consistencies      Skin Per WOCN note:  - stage 2 to sacral     --  Current Nutrition Orders & Evaluation of Intake       Oral  Nutrition     Food Allergies NKFA   Current PO Diet Diet: Regular/House, Cardiac; Healthy Heart (2-3 Na+); No Straw, Feeding Assistance - Nursing, No Mixed Consistencies; Texture: Mechanical Ground (NDD 2); Fluid Consistency: Nectar Thick   Supplement    PO Evaluation     Trending % PO Intake 3/3: variable 0-75%   --  Nutritional Risk Screening        NRS-2002 Score          Nutrition Diagnosis         Nutrition Dx Problem 1 Chewing/swallowing difficulty related to past medical history as evidenced by dysphagia.        Nutrition Dx Problem 2        Intervention Goal         Intervention Goal(s) Accept ONS  No weight loss  PO intakes at least 60%     Nutrition Intervention        RD Action Add ONS  Completed NFPE     Nutrition Prescription          Diet Prescription Heart Healthy, Mechanical Ground, No Mixed Consistencies, NTL   Supplement Prescription Magic Cup BID   --  Monitor/Evaluation        Monitor Per protocol, I&O, PO intake, Supplement intake, Pertinent labs, Weight, Swallow function       Electronically signed by:  Luz Medina RD  03/03/25 11:24 EST

## 2025-03-03 NOTE — SIGNIFICANT NOTE
03/03/25 1105   OTHER   Discipline occupational therapist   Rehab Time/Intention   Session Not Performed unable to evaluate, medical status change  (Nurse requested OT to hold due to inc HR.)   Recommendation   OT - Next Appointment 03/04/25

## 2025-03-03 NOTE — CODE DOCUMENTATION
Rapid response called to 1E dialysis unit. Upon arrival of rapid response team, nurse reports that patient had increased confusion, elevated HR, and hypotension. Primary RN from the floor states that patient has been confused since admission with a history of dementia. BP being checked on lower legs and not reading. BP cuff moved to L lower arm and reading 120/80s. HR varies between 110s-150s. EKG completed. Per Dr. Keller HD to be completed in patients room. Patient to be transported to [facility].

## 2025-03-03 NOTE — PLAN OF CARE
Problem: Adult Inpatient Plan of Care  Goal: Plan of Care Review  Outcome: Progressing  Goal: Patient-Specific Goal (Individualized)  Outcome: Progressing  Goal: Absence of Hospital-Acquired Illness or Injury  Outcome: Progressing  Intervention: Identify and Manage Fall Risk  Recent Flowsheet Documentation  Taken 3/3/2025 0200 by Lilo Henriquez RN  Safety Promotion/Fall Prevention:   activity supervised   assistive device/personal items within reach   clutter free environment maintained   fall prevention program maintained   lighting adjusted   nonskid shoes/slippers when out of bed   safety round/check completed   room organization consistent  Taken 3/3/2025 0000 by Lilo Henriquez RN  Safety Promotion/Fall Prevention:   activity supervised   assistive device/personal items within reach   clutter free environment maintained   fall prevention program maintained   lighting adjusted   nonskid shoes/slippers when out of bed   room organization consistent   safety round/check completed  Taken 3/2/2025 2200 by Lilo Henriquez RN  Safety Promotion/Fall Prevention:   activity supervised   assistive device/personal items within reach   clutter free environment maintained   fall prevention program maintained   lighting adjusted   nonskid shoes/slippers when out of bed   safety round/check completed   room organization consistent  Taken 3/2/2025 2000 by Lilo Henriquez RN  Safety Promotion/Fall Prevention:   activity supervised   assistive device/personal items within reach   clutter free environment maintained   fall prevention program maintained   lighting adjusted   nonskid shoes/slippers when out of bed   room organization consistent   safety round/check completed  Intervention: Prevent Skin Injury  Recent Flowsheet Documentation  Taken 3/3/2025 0000 by Lilo Henriquez RN  Body Position: weight shifting  Skin Protection:   incontinence pads utilized   transparent dressing maintained  Taken 3/2/2025 2000 by  Lilo Henriquez RN  Body Position:   weight shifting   turned  Skin Protection:   incontinence pads utilized   transparent dressing maintained  Intervention: Prevent and Manage VTE (Venous Thromboembolism) Risk  Recent Flowsheet Documentation  Taken 3/3/2025 0000 by Lilo Henriquez RN  VTE Prevention/Management:   SCDs (sequential compression devices) off   patient refused intervention  Taken 3/2/2025 2000 by Lilo Henriquez RN  VTE Prevention/Management:   SCDs (sequential compression devices) off   patient refused intervention  Intervention: Prevent Infection  Recent Flowsheet Documentation  Taken 3/3/2025 0200 by Lilo Henriquez RN  Infection Prevention:   environmental surveillance performed   equipment surfaces disinfected   hand hygiene promoted   personal protective equipment utilized   rest/sleep promoted   single patient room provided  Taken 3/3/2025 0000 by Lilo Henriquez RN  Infection Prevention:   environmental surveillance performed   equipment surfaces disinfected   hand hygiene promoted   personal protective equipment utilized   rest/sleep promoted   single patient room provided  Taken 3/2/2025 2200 by Lilo Henriquez RN  Infection Prevention:   environmental surveillance performed   equipment surfaces disinfected   hand hygiene promoted   personal protective equipment utilized   rest/sleep promoted   single patient room provided  Taken 3/2/2025 2000 by Lilo Henriquez RN  Infection Prevention:   environmental surveillance performed   equipment surfaces disinfected   hand hygiene promoted   personal protective equipment utilized   rest/sleep promoted   single patient room provided  Goal: Optimal Comfort and Wellbeing  Outcome: Progressing  Intervention: Monitor Pain and Promote Comfort  Recent Flowsheet Documentation  Taken 3/3/2025 0000 by Lilo Henriquez RN  Pain Management Interventions:   quiet environment facilitated   no interventions per patient request   prayer utilized   position  adjusted   pillow support provided   relaxation techniques promoted   care clustered  Taken 3/2/2025 2000 by Lilo Henriquez RN  Pain Management Interventions:   quiet environment facilitated   no interventions per patient request   relaxation techniques promoted   position adjusted   pillow support provided   care clustered  Intervention: Provide Person-Centered Care  Recent Flowsheet Documentation  Taken 3/3/2025 0000 by Lilo Henriquez RN  Trust Relationship/Rapport:   care explained   choices provided   emotional support provided   empathic listening provided   questions answered   questions encouraged   reassurance provided   thoughts/feelings acknowledged  Taken 3/2/2025 2000 by Lilo Henriquez RN  Trust Relationship/Rapport:   care explained   choices provided   emotional support provided   empathic listening provided   questions answered   questions encouraged   reassurance provided   thoughts/feelings acknowledged  Goal: Readiness for Transition of Care  Outcome: Progressing     Problem: Skin Injury Risk Increased  Goal: Skin Health and Integrity  Outcome: Progressing  Intervention: Optimize Skin Protection  Recent Flowsheet Documentation  Taken 3/3/2025 0000 by Lilo Henriquez RN  Activity Management: activity encouraged  Pressure Reduction Techniques: frequent weight shift encouraged  Head of Bed (HOB) Positioning: HOB elevated  Pressure Reduction Devices: pressure-redistributing mattress utilized  Skin Protection:   incontinence pads utilized   transparent dressing maintained  Taken 3/2/2025 2000 by Lilo Henriquez RN  Activity Management: activity encouraged  Pressure Reduction Techniques: frequent weight shift encouraged  Head of Bed (HOB) Positioning: HOB elevated  Pressure Reduction Devices: pressure-redistributing mattress utilized  Skin Protection:   incontinence pads utilized   transparent dressing maintained     Problem: Violence Risk or Actual  Goal: Anger and Impulse Control  Outcome:  Progressing  Intervention: Minimize Safety Risk  Recent Flowsheet Documentation  Taken 3/3/2025 0200 by Lilo Henriquez RN  Enhanced Safety Measures: bed alarm set  Taken 3/3/2025 0000 by Lilo Henriquez RN  Behavior Management: impulse control promoted  Sensory Stimulation Regulation:   care clustered   lighting decreased   quiet environment promoted   television on  Enhanced Safety Measures: bed alarm set  Taken 3/2/2025 2200 by Lilo Henriquez RN  Enhanced Safety Measures: bed alarm set  Taken 3/2/2025 2000 by Lilo Henriquez RN  Sensory Stimulation Regulation:   care clustered   television on  Enhanced Safety Measures: bed alarm set  Intervention: Promote Self-Control  Recent Flowsheet Documentation  Taken 3/3/2025 0000 by Lilo Henriquez RN  Supportive Measures: active listening utilized  Environmental Support:   calm environment promoted   rest periods encouraged     Problem: UTI (Urinary Tract Infection)  Goal: Improved Infection Symptoms  Outcome: Progressing  Intervention: Prevent Infection Progression  Recent Flowsheet Documentation  Taken 3/3/2025 0200 by Lilo Henriquez RN  Infection Management: aseptic technique maintained  Taken 3/3/2025 0000 by Lilo Henriquez RN  Infection Management: aseptic technique maintained  Fever Reduction/Comfort Measures: lightweight bedding  Taken 3/2/2025 2200 by Lilo Henriquez RN  Infection Management: aseptic technique maintained  Taken 3/2/2025 2000 by Lilo Henriquez RN  Fever Reduction/Comfort Measures: lightweight bedding     Problem: Fall Injury Risk  Goal: Absence of Fall and Fall-Related Injury  Outcome: Progressing  Intervention: Identify and Manage Contributors  Recent Flowsheet Documentation  Taken 3/3/2025 0200 by Lilo Henriquez RN  Medication Review/Management: medications reviewed  Taken 3/3/2025 0000 by Lilo Henriquez RN  Medication Review/Management: medications reviewed  Taken 3/2/2025 2200 by Lilo Henriquez RN  Medication  Review/Management: medications reviewed  Taken 3/2/2025 2000 by Lilo Henriquez RN  Medication Review/Management: medications reviewed  Intervention: Promote Injury-Free Environment  Recent Flowsheet Documentation  Taken 3/3/2025 0200 by Lilo Henriquez RN  Safety Promotion/Fall Prevention:   activity supervised   assistive device/personal items within reach   clutter free environment maintained   fall prevention program maintained   lighting adjusted   nonskid shoes/slippers when out of bed   safety round/check completed   room organization consistent  Taken 3/3/2025 0000 by Lilo Henriquez RN  Safety Promotion/Fall Prevention:   activity supervised   assistive device/personal items within reach   clutter free environment maintained   fall prevention program maintained   lighting adjusted   nonskid shoes/slippers when out of bed   room organization consistent   safety round/check completed  Taken 3/2/2025 2200 by Lilo Henriquez RN  Safety Promotion/Fall Prevention:   activity supervised   assistive device/personal items within reach   clutter free environment maintained   fall prevention program maintained   lighting adjusted   nonskid shoes/slippers when out of bed   safety round/check completed   room organization consistent  Taken 3/2/2025 2000 by Lilo Henriquez RN  Safety Promotion/Fall Prevention:   activity supervised   assistive device/personal items within reach   clutter free environment maintained   fall prevention program maintained   lighting adjusted   nonskid shoes/slippers when out of bed   room organization consistent   safety round/check completed     Problem: Confusion Acute  Goal: Optimal Cognitive Function  Outcome: Progressing  Intervention: Minimize Contributing Factors  Recent Flowsheet Documentation  Taken 3/3/2025 0000 by Lilo Henriquez RN  Sensory Stimulation Regulation:   care clustered   lighting decreased   quiet environment promoted   television on  Reorientation Measures:  clock in view  Communication Support Strategies:   active listening utilized   extra time allowed for response  Taken 3/2/2025 2000 by Lilo Henriquez, RN  Sensory Stimulation Regulation:   care clustered   television on  Reorientation Measures: clock in view  Communication Support Strategies: active listening utilized   Goal Outcome Evaluation:

## 2025-03-03 NOTE — PLAN OF CARE
Problem: Adult Inpatient Plan of Care  Goal: Absence of Hospital-Acquired Illness or Injury  Intervention: Identify and Manage Fall Risk  Recent Flowsheet Documentation  Taken 3/3/2025 1222 by Tyesha Hackett RN  Safety Promotion/Fall Prevention:   safety round/check completed   room organization consistent   nonskid shoes/slippers when out of bed   fall prevention program maintained   clutter free environment maintained   assistive device/personal items within reach   activity supervised  Taken 3/3/2025 1000 by Tyesha Hackett RN  Safety Promotion/Fall Prevention:   safety round/check completed   room organization consistent   nonskid shoes/slippers when out of bed   fall prevention program maintained   clutter free environment maintained   assistive device/personal items within reach   activity supervised  Taken 3/3/2025 0800 by Tyesha Hackett RN  Safety Promotion/Fall Prevention: patient off unit  Intervention: Prevent Skin Injury  Recent Flowsheet Documentation  Taken 3/3/2025 1222 by Tyesha Hackett RN  Body Position: weight shifting  Skin Protection:   incontinence pads utilized   transparent dressing maintained  Taken 3/3/2025 0729 by Tyesha Hackett RN  Body Position:   supine   weight shifting  Skin Protection:   incontinence pads utilized   transparent dressing maintained  Intervention: Prevent and Manage VTE (Venous Thromboembolism) Risk  Recent Flowsheet Documentation  Taken 3/3/2025 0729 by Tyesha Hackett RN  VTE Prevention/Management:   bilateral   SCDs (sequential compression devices) off   patient refused intervention  Intervention: Prevent Infection  Recent Flowsheet Documentation  Taken 3/3/2025 1222 by Tyesha Hackett RN  Infection Prevention:   single patient room provided   rest/sleep promoted   personal protective equipment utilized   hand hygiene promoted  Taken 3/3/2025 1000 by Tyesha Hackett RN  Infection Prevention:   single patient room provided   rest/sleep  promoted   personal protective equipment utilized   hand hygiene promoted  Taken 3/3/2025 0729 by Tyesha Hackett RN  Infection Prevention:   single patient room provided   rest/sleep promoted   personal protective equipment utilized   hand hygiene promoted  Goal: Optimal Comfort and Wellbeing  Intervention: Monitor Pain and Promote Comfort  Recent Flowsheet Documentation  Taken 3/3/2025 1222 by Tyesha Hackett RN  Pain Management Interventions: position adjusted  Taken 3/3/2025 0729 by Tyesha Hackett RN  Pain Management Interventions:   care clustered   position adjusted  Intervention: Provide Person-Centered Care  Recent Flowsheet Documentation  Taken 3/3/2025 1222 by Tyesha Hackett RN  Trust Relationship/Rapport:   care explained   choices provided   questions answered  Taken 3/3/2025 0729 by Tyesha Hackett RN  Trust Relationship/Rapport:   care explained   choices provided   questions answered   Goal Outcome Evaluation:

## 2025-03-03 NOTE — CASE MANAGEMENT/SOCIAL WORK
Discharge Planning Assessment   Luis Felipe     Patient Name: Hazel Bucio  MRN: [id number]  Today's Date: 3/3/2025    Admit Date: 2/27/2025    Plan: Return to [facility] , established    Discharge Needs Assessment       Row Name 03/03/25 1506       Living Environment    People in Home other (see comments)    Unique Family Situation LTC    Current Living Arrangements extended care facility    Potentially Unsafe Housing Conditions none    In the past 12 months has the electric, gas, oil, or water company threatened to shut off services in your home? No    Provides Primary Care For no one    Family Caregiver if Needed other (see comments)    Quality of Family Relationships helpful;involved;supportive    Able to Return to Prior Arrangements yes       Resource/Environmental Concerns    Resource/Environmental Concerns none    Transportation Concerns none       Transportation Needs    In the past 12 months, has lack of transportation kept you from medical appointments or from getting medications? no    In the past 12 months, has lack of transportation kept you from meetings, work, or from getting things needed for daily living? No       Food Insecurity    Within the past 12 months, you worried that your food would run out before you got the money to buy more. Never true    Within the past 12 months, the food you bought just didn't last and you didn't have money to get more. Never true       Transition Planning    Patient/Family Anticipates Transition to long-term care facility    Patient/Family Anticipated Services at Transition none    Transportation Anticipated health plan transportation;family or friend will provide       Discharge Needs Assessment    Readmission Within the Last 30 Days no previous admission in last 30 days    Concerns to be Addressed discharge planning    Do you want help finding or keeping work or a job? Patient unable to answer    Do you want help with school or training? For  example, starting or completing job training or getting a high school diploma, GED or equivalent Patient unable to answer    Anticipated Changes Related to Illness none    Equipment Needed After Discharge none    Outpatient/Agency/Support Group Needs long term acute care facility    Discharge Facility/Level of Care Needs nursing facility, intermediate    Patient's Choice of Community Agency(s) Return to [facility]    Current Discharge Risk cognitively impaired;chronically ill                   Discharge Plan    No documentation.                 Continued Care and Services - Admitted Since 2/27/2025       Destination       Service Provider Request Status Services Address Phone Fax Patient Preferred    [facility] [place] Accepted -- [address] [place] [address] [phone] --                  Selected Continued Care - Prior Encounters Includes continued care and service providers with selected services from prior encounters from 11/29/2024 to 3/3/2025      Discharged on 2/7/2025 Admission date: 2/4/2025 - Discharge disposition: Home or Self Care      Destination       Service Provider Services Address Phone Fax Patient Preferred    [facility] [place] [facility] [address] [place] [address] [phone] --                          Expected Discharge Date and Time       Expected Discharge Date Expected Discharge Time    Mar 4, 2025            Demographic Summary       Row Name 03/03/25 1504       General Information    Admission Type inpatient    Arrived From emergency department    Required Notices Provided Important Message from Medicare    Referral Source admission list    Reason for Consult discharge planning    Preferred Language English                   Functional Status       Row Name 03/03/25 1505       Functional Status    Usual Activity Tolerance poor    Current Activity Tolerance poor       Functional  Status, IADL    Medications completely dependent    Meal Preparation completely dependent    Housekeeping completely dependent    Laundry completely dependent    Shopping completely dependent    If for any reason you need help with day-to-day activities such as bathing, preparing meals, shopping, managing finances, etc., do you get the help you need? Patient unable to answer       Mental Status    General Appearance WDL WDL       Mental Status Summary    Recent Changes in Mental Status/Cognitive Functioning mental status    Mental Status Comments Very confused       Employment/    Employment Status retired    Current or Previous Occupation not applicable                          Lavern Guzman RN  RN/.  Office Ph. [phone]  Cell Ph.  [phone]

## 2025-03-03 NOTE — PROGRESS NOTES
"                                                                                                                                      Nephrology  Progress Note                                        [facility]    Patient Identification    Name: Hazel Bucio  Age: 84 y.o.  Sex: female  :  1940  MRN: [id number]      DATE OF SERVICE:  3/3/2025        Subective    Events noted from this morning with hypotension and tachycardia  Currently resting no distress     Objective   Scheduled Meds:apixaban, 2.5 mg, Oral, Q12H  [Held by provider] atorvastatin, 20 mg, Oral, Nightly  ertapenem, 500 mg, Intravenous, Once  famotidine, 10 mg, Oral, Q48H  metoprolol tartrate, 12.5 mg, Oral, BID  midodrine, 2.5 mg, Oral, TID AC  polyethylene glycol, 17 g, Oral, Daily  sertraline, 25 mg, Oral, Nightly  sevelamer, 800 mg, Oral, TID With Meals  sodium chloride, 10 mL, Intravenous, Q12H  topiramate, 100 mg, Oral, Nightly          Continuous Infusions:     PRN Meds:  acetaminophen    senna-docusate sodium **AND** polyethylene glycol **AND** bisacodyl **AND** bisacodyl    heparin (porcine)    hydrALAZINE    ipratropium-albuterol    nitroglycerin    ondansetron ODT **OR** ondansetron    sodium chloride    sodium chloride    sodium chloride     Exam:  /64   Pulse 113   Temp 97.5 °F (36.4 °C) (Oral)   Resp 21   Ht 170.2 cm (67\")   Wt 58.1 kg (128 lb 1.4 oz)   SpO2 100%   BMI 20.06 kg/m²     Intake/Output last 3 shifts:  I/O last 3 completed shifts:  In: 260 [P.O.:260]  Out: 250 [Urine:250]    Intake/Output this shift:  I/O this shift:  In: -   Out: 120 [Urine:120]    Physical exam:  General Appearance:  Alert  Head:  Normocephalic, without obvious abnormality, atraumatic  Eyes:  PERRL, conjunctiva/corneas clear     Neck:  Supple,  no adenopathy;      Lungs:  Decreased BS occasion ronchi  Heart:  Regular rate and rhythm, S1 and S2 normal  Abdomen:  Soft, non-tender, bowel sounds active   Extremities: trace " edema  Pulses: 2+ and symmetric all extremities  Skin:  No rashes or lesions       Data Review:  All labs (24hrs):   Recent Results (from the past 24 hours)   Hepatitis B Surface Antigen    Collection Time: 03/02/25  9:15 AM    Specimen: Blood   Result Value Ref Range    Hepatitis B Surface Ag Non-Reactive Non-Reactive   Basic Metabolic Panel    Collection Time: 03/03/25  2:44 AM    Specimen: Blood   Result Value Ref Range    Glucose 87 65 - 99 mg/dL    BUN 31 (H) 8 - 23 mg/dL    Creatinine 4.76 (H) 0.57 - 1.00 mg/dL    Sodium 134 (L) 136 - 145 mmol/L    Potassium 3.6 3.5 - 5.2 mmol/L    Chloride 97 (L) 98 - 107 mmol/L    CO2 22.7 22.0 - 29.0 mmol/L    Calcium 8.7 8.6 - 10.5 mg/dL    BUN/Creatinine Ratio 6.5 (L) 7.0 - 25.0    Anion Gap 14.3 5.0 - 15.0 mmol/L    eGFR 8.6 (L) >60.0 mL/min/1.73   CBC Auto Differential    Collection Time: 03/03/25  2:44 AM    Specimen: Blood   Result Value Ref Range    WBC 7.37 3.40 - 10.80 10*3/mm3    RBC 3.28 (L) 3.77 - 5.28 10*6/mm3    Hemoglobin 10.5 (L) 12.0 - 15.9 g/dL    Hematocrit 34.1 34.0 - 46.6 %    .0 (H) 79.0 - 97.0 fL    MCH 32.0 26.6 - 33.0 pg    MCHC 30.8 (L) 31.5 - 35.7 g/dL    RDW 15.1 12.3 - 15.4 %    RDW-SD 57.8 (H) 37.0 - 54.0 fl    MPV 9.0 6.0 - 12.0 fL    Platelets 236 140 - 450 10*3/mm3    Neutrophil % 65.3 42.7 - 76.0 %    Lymphocyte % 21.2 19.6 - 45.3 %    Monocyte % 10.4 5.0 - 12.0 %    Eosinophil % 2.4 0.3 - 6.2 %    Basophil % 0.4 0.0 - 1.5 %    Immature Grans % 0.3 0.0 - 0.5 %    Neutrophils, Absolute 4.81 1.70 - 7.00 10*3/mm3    Lymphocytes, Absolute 1.56 0.70 - 3.10 10*3/mm3    Monocytes, Absolute 0.77 0.10 - 0.90 10*3/mm3    Eosinophils, Absolute 0.18 0.00 - 0.40 10*3/mm3    Basophils, Absolute 0.03 0.00 - 0.20 10*3/mm3    Immature Grans, Absolute 0.02 0.00 - 0.05 10*3/mm3    nRBC 0.0 0.0 - 0.2 /100 WBC          Imaging:  CT Head Without Contrast    Result Date: 2/27/2025  Impression: 1. No acute appearing abnormality. 2. Stable chronic findings  above. Electronically Signed: Jacoby Elise MD  2/27/2025 12:36 PM EST  Workstation ID: [id number]    XR Chest 1 View    Result Date: 2/27/2025  Impression: No acute cardiopulmonary abnormality is identified. Electronically Signed: Floridalma East  2/27/2025 12:15 PM EST  Workstation ID: [id number]     Assessment/Plan:     AMS (altered mental status)    Permanent atrial fibrillation    Long term current use of anticoagulant    Presence of cardiac pacemaker    Type 2 diabetes mellitus    Acquired absence of kidney    ESRD (end stage renal disease) on dialysis    Hypotension    Mild cognitive impairment of uncertain or unknown etiology    Hard of hearing    UTI (urinary tract infection)    Altered mental status         ESRD HD dependent  AMS  UTI  CAD  HTN  Bladder ca  DM    Recommendations:   Will hold off dialysis for today  Will reevaluate again tomorrow morning  No urgent need for dialysis  Blood pressure medications and heart rate control has been adjusted

## 2025-03-03 NOTE — CASE MANAGEMENT/SOCIAL WORK
Continued Stay Note  NYDIA Briscoe     Patient Name: Hazel Bucio  MRN: [id number]  Today's Date: 3/3/2025    Admit Date: 2/27/2025    Plan: Return to [facility] , established HD   Discharge Plan       Row Name 03/03/25 0953       Plan    Plan Comments Barrier to d/c: Dialysis 3/3/25 afternoon               Expected Discharge Date and Time       Expected Discharge Date Expected Discharge Time    Mar 2, 2025               Lavern Guzman RN  RN/.  Office Ph. [phone]  Cell Ph.  [phone]

## 2025-03-03 NOTE — CASE MANAGEMENT/SOCIAL WORK
Case Management Readmission Assessment Note    Case Management Readmission Assessment (all recorded)       Readmission Interview       Row Name 03/03/25 1507             Readmission Indications    Is the patient and/or family able to complete the readmission assessment questions? No  Pt confused; POA in [place]. Left VM        Row Name 03/03/25 1507             Recommendation for rehospitalization    Did you speak with your physician prior to coming to the hospital No        Row Name 03/03/25 1507             Follow-up Appointments    Do you have a PCP? No      Did you have an appointment with PCP after your hospitalization? No      Did you have an appointment with a Specialist? No      Are you current with the Pulmonary Clinic? No      Are you current with the CHF Clinic? No        Row Name 03/03/25 1507             Medications    Did you have newly prescribed medications at discharge? Yes  per facility      Did you understand the reasons for your medications at discharge and how to take them? No      Did you understand the side effects of your medications? No      Are you taking all of you prescribed medications? Yes  Per facility      What pharmacy was used to fill prescription(s)? [facility]      Were medications picked up? Yes  Per facility pharm        Row Name 03/03/25 1508             Discharge Instructions    Did you understand your discharge instructions? No      Did your family/caregiver hear your instructions? No      Were you told to eat a special diet? No      Did you adhere to the diet? No      Were you given a number of someone to call if you had questions or concerns? No        Row Name 03/03/25 1509             Index discharge location/services    Where did you go upon discharge? Other (comment)  LTC resident      Do you have supportive family or friends in the home? No      What services were arranged at discharge? Other (comment)  Return to LTC        Row Name 03/03/25 150              Discharge Readiness    On a scale of 1-5 (5 being well prepared), how ready were you for discharge --  Unable to answer      Recommendation based on interview Other (comment)  Facility suggested comfort care        Row Name 03/03/25 1507             Palliative Care/Hospice    Are you current with Palliative Care? No      Are you current with Hospice Care? No        Row Name 02/27/25 1610 02/27/25 1608          Advance Directives (For Healthcare)    Pre-existing AND/MOST/POLST Order Yes, notify physician for order --     Advance Directive Status Patient has advance directive, copy requested Patient has advance directive, copy requested     Type of Advance Directive Other (Comment) Other (Comment)  DNR Post Form     Have you reviewed your Advance Directive and is it valid for this stay? Yes Yes     Literature Provided on Advance Directives No No     Patient Requests Assistance on Advance Directives Patient Declined Patient Declined       Row Name 03/03/25 1507             Readmission Assessment Final Comments    Final Comments Previous admission. 2/4-2/7/25. Came to ER on 2/4. Pt was unable to give accurate description of complaints, except she did have some chest pain. She is a LTC resident at [facility]. Cxray was neg. BNP was 13,498. Cr was 3.33/BUN 28, GFR 13.2. UA was obtained with green cloudy urine. Urine culture was pending. She received 500cc bolus NS, 0.5mg of dilaudid and 1g of Rocephin. She ws admitted. Acute cardiac event was ruled out. She was followed by nephrology and received her HD per usual schedule. Her BP remained low, her lasix was held and beta blocker dose was decreased. She was deemed at baseline on 2/7/25 and was sent back to the LTC facility with new medication of po Omnicef.   (2/27/25-Current) Pt was in HD at the LTC facility. Stated she needed to have a BM and HD was paused for care.  She then began to have AMS, slurred speech and her 02Stas dropped to the lower 80's.  Dr Keller ordered for her  to be sent to [facility] for further evaluation. CT of head did not show any acute appearing abnormality. Cxray was neg for cardiopulmonary abnormailty.  On 3/3 in dialysis, she became tachycardic with hypotension, a fast team was called, and HD was aborted. Cardiology consult was ordered. The LTC facility reached out to  suggesting comfort care. Pt was confused, and this writer reached out to POA who lives in [place].  Awaiting return call. Pallative was consulted.

## 2025-03-03 NOTE — PROGRESS NOTES
LOS: 3 days   Patient Care Team:  John Cano MD as PCP - General (Family Medicine)  Jose Carlos Cheng MD as Consulting Physician (Cardiology)    Subjective     Interval History:     Patient Complaints: rapid response was called on pt while in dialysis.  Pt had increased confusion, tachycardia, hypotension.     History taken from: patient    Review of Systems   Unable to perform ROS: Other           Objective     Vital Signs  Temp:  [97.5 °F (36.4 °C)] 97.5 °F (36.4 °C)  Heart Rate:  [102-136] 128  Resp:  [14-21] 16  BP: ()/() 122/75    Physical Exam:     General Appearance:    Alert, cooperative, in no acute distress   Head:    Normocephalic, without obvious abnormality, atraumatic   Eyes:            Lids and lashes normal, conjunctivae and sclerae normal, no   icterus, no pallor, corneas clear, PERRLA   Ears:    Ears appear intact with no abnormalities noted   Throat:   No oral lesions, no thrush, oral mucosa moist   Neck:   No adenopathy, supple, trachea midline, no thyromegaly, no   carotid bruit, no JVD   Lungs:     Clear to auscultation,respirations regular, even and                  unlabored    Heart:    Regular rhythm and normal rate, normal S1 and S2, no            murmur, no gallop, no rub, no click   Chest Wall:    No abnormalities observed   Abdomen:     Normal bowel sounds, no masses, no organomegaly, soft        non-tender, non-distended, no guarding, no rebound                tenderness   Extremities:   Moves all extremities well, no edema, no cyanosis, no             redness   Pulses:   Pulses palpable and equal bilaterally   Skin:   No bleeding, bruising or rash   Lymph nodes:   No palpable adenopathy   Neurologic:   Cranial nerves 2 - 12 grossly intact, sensation intact, DTR       present and equal bilaterally        Results Review:    Lab Results (last 24 hours)       Procedure Component Value Units Date/Time    POC Glucose Once [[id number]]  (Normal) Collected: 03/03/25 0812     Specimen: Blood Updated: 03/03/25 0814     Glucose 89 mg/dL      Comment: Serial Number: [id number]:  [id number]       Basic Metabolic Panel [[id number]]  (Abnormal) Collected: 03/03/25 0244    Specimen: Blood Updated: 03/03/25 0337     Glucose 87 mg/dL      BUN 31 mg/dL      Creatinine 4.76 mg/dL      Sodium 134 mmol/L      Potassium 3.6 mmol/L      Chloride 97 mmol/L      CO2 22.7 mmol/L      Calcium 8.7 mg/dL      BUN/Creatinine Ratio 6.5     Anion Gap 14.3 mmol/L      eGFR 8.6 mL/min/1.73     Narrative:      GFR Categories in Chronic Kidney Disease (CKD)      GFR Category          GFR (mL/min/1.73)    Interpretation  G1                     90 or greater         Normal or high (1)  G2                      60-89                Mild decrease (1)  G3a                   45-59                Mild to moderate decrease  G3b                   30-44                Moderate to severe decrease  G4                    15-29                Severe decrease  G5                    14 or less           Kidney failure          (1)In the absence of evidence of kidney disease, neither GFR category G1 or G2 fulfill the criteria for CKD.    eGFR calculation 2021 CKD-EPI creatinine equation, which does not include race as a factor    CBC & Differential [[id number]]  (Abnormal) Collected: 03/03/25 0244    Specimen: Blood Updated: 03/03/25 0306    Narrative:      The following orders were created for panel order CBC & Differential.  Procedure                               Abnormality         Status                     ---------                               -----------         ------                     CBC Auto Differential[[id number]]        Abnormal            Final result                 Please view results for these tests on the individual orders.    CBC Auto Differential [[id number]]  (Abnormal) Collected: 03/03/25 0244    Specimen: Blood Updated: 03/03/25 0306     WBC 7.37 10*3/mm3      RBC 3.28 10*6/mm3      Hemoglobin 10.5 g/dL       Hematocrit 34.1 %      .0 fL      MCH 32.0 pg      MCHC 30.8 g/dL      RDW 15.1 %      RDW-SD 57.8 fl      MPV 9.0 fL      Platelets 236 10*3/mm3      Neutrophil % 65.3 %      Lymphocyte % 21.2 %      Monocyte % 10.4 %      Eosinophil % 2.4 %      Basophil % 0.4 %      Immature Grans % 0.3 %      Neutrophils, Absolute 4.81 10*3/mm3      Lymphocytes, Absolute 1.56 10*3/mm3      Monocytes, Absolute 0.77 10*3/mm3      Eosinophils, Absolute 0.18 10*3/mm3      Basophils, Absolute 0.03 10*3/mm3      Immature Grans, Absolute 0.02 10*3/mm3      nRBC 0.0 /100 WBC     CANDIDA AURIS PCR - Swab, Axilla Right, Axilla Left and Groin [[id number]]  (Normal) Collected: 02/28/25 0321    Specimen: Swab from Axilla Right, Axilla Left and Groin Updated: 03/02/25 1417     CANDIDA AURIS PCR Not Detected    Urine Culture - Urine, Urine, Catheter [[id number]] Collected: 02/27/25 1337    Specimen: Urine, Catheter Updated: 03/02/25 1307     Urine Culture >100,000 CFU/mL Normal Urogenital Hien    Narrative:      Colonization of the urinary tract without infection is common. Treatment is discouraged unless the patient is symptomatic, pregnant, or undergoing an invasive urologic procedure.    Blood Culture - Blood, Arm, Left [[id number]]  (Normal) Collected: 02/27/25 1140    Specimen: Blood from Arm, Left Updated: 03/02/25 1145     Blood Culture No growth at 3 days    Blood Culture - Blood, Arm, Left [[id number]]  (Normal) Collected: 02/27/25 1140    Specimen: Blood from Arm, Left Updated: 03/02/25 1145     Blood Culture No growth at 3 days             Imaging Results (Last 24 Hours)       ** No results found for the last 24 hours. **                 I reviewed the patient's new clinical results.    Medication Review:   Scheduled Meds:apixaban, 2.5 mg, Oral, Q12H  [Held by provider] atorvastatin, 20 mg, Oral, Nightly  ertapenem, 500 mg, Intravenous, Once  famotidine, 10 mg, Oral, Q48H  metoprolol tartrate, 12.5 mg, Oral, BID  midodrine,  2.5 mg, Oral, TID AC  polyethylene glycol, 17 g, Oral, Daily  sertraline, 25 mg, Oral, Nightly  sevelamer, 800 mg, Oral, TID With Meals  sodium chloride, 10 mL, Intravenous, Q12H  topiramate, 100 mg, Oral, Nightly      Continuous Infusions:   PRN Meds:.  acetaminophen    senna-docusate sodium **AND** polyethylene glycol **AND** bisacodyl **AND** bisacodyl    heparin (porcine)    hydrALAZINE    ipratropium-albuterol    nitroglycerin    ondansetron ODT **OR** ondansetron    sodium chloride    sodium chloride    sodium chloride     Assessment & Plan       AMS (altered mental status)    Permanent atrial fibrillation    Long term current use of anticoagulant    Presence of cardiac pacemaker    Type 2 diabetes mellitus    Acquired absence of kidney    ESRD (end stage renal disease) on dialysis    Hypotension    Mild cognitive impairment of uncertain or unknown etiology    Hard of hearing    UTI (urinary tract infection)    Altered mental status    Plan of care  Nephrology for HD orders.  No leukocytosis or fever. Antibiotics completed. Continue home medications. Pt has had tachycardia with hypotension (during dialysis).  HR ranging 120-140.  Pt with afib.  Will consult cardiology        Plan for disposition:back to  when able    Bethanie Mena MD  03/03/25  10:58 EST

## 2025-03-04 ENCOUNTER — APPOINTMENT (OUTPATIENT)
Dept: NEPHROLOGY | Facility: HOSPITAL | Age: 85
End: 2025-03-04
Payer: MEDICARE

## 2025-03-04 LAB
ANION GAP SERPL CALCULATED.3IONS-SCNC: 12.6 MMOL/L (ref 5–15)
ANION GAP SERPL CALCULATED.3IONS-SCNC: 13.7 MMOL/L (ref 5–15)
BACTERIA SPEC AEROBE CULT: NORMAL
BACTERIA SPEC AEROBE CULT: NORMAL
BASOPHILS # BLD AUTO: 0.02 10*3/MM3 (ref 0–0.2)
BASOPHILS # BLD AUTO: 0.03 10*3/MM3 (ref 0–0.2)
BASOPHILS NFR BLD AUTO: 0.2 % (ref 0–1.5)
BASOPHILS NFR BLD AUTO: 0.4 % (ref 0–1.5)
BUN SERPL-MCNC: 14 MG/DL (ref 8–23)
BUN SERPL-MCNC: 36 MG/DL (ref 8–23)
BUN/CREAT SERPL: 5.1 (ref 7–25)
BUN/CREAT SERPL: 7 (ref 7–25)
CALCIUM SPEC-SCNC: 8.3 MG/DL (ref 8.6–10.5)
CALCIUM SPEC-SCNC: 8.6 MG/DL (ref 8.6–10.5)
CHLORIDE SERPL-SCNC: 103 MMOL/L (ref 98–107)
CHLORIDE SERPL-SCNC: 103 MMOL/L (ref 98–107)
CO2 SERPL-SCNC: 21.3 MMOL/L (ref 22–29)
CO2 SERPL-SCNC: 22.4 MMOL/L (ref 22–29)
CREAT SERPL-MCNC: 2.75 MG/DL (ref 0.57–1)
CREAT SERPL-MCNC: 5.12 MG/DL (ref 0.57–1)
DEPRECATED RDW RBC AUTO: 57.7 FL (ref 37–54)
DEPRECATED RDW RBC AUTO: 58.8 FL (ref 37–54)
EGFRCR SERPLBLD CKD-EPI 2021: 16.6 ML/MIN/1.73
EGFRCR SERPLBLD CKD-EPI 2021: 7.9 ML/MIN/1.73
EOSINOPHIL # BLD AUTO: 0.17 10*3/MM3 (ref 0–0.4)
EOSINOPHIL # BLD AUTO: 0.18 10*3/MM3 (ref 0–0.4)
EOSINOPHIL NFR BLD AUTO: 1.9 % (ref 0.3–6.2)
EOSINOPHIL NFR BLD AUTO: 2.4 % (ref 0.3–6.2)
ERYTHROCYTE [DISTWIDTH] IN BLOOD BY AUTOMATED COUNT: 15.3 % (ref 12.3–15.4)
ERYTHROCYTE [DISTWIDTH] IN BLOOD BY AUTOMATED COUNT: 15.6 % (ref 12.3–15.4)
GLUCOSE SERPL-MCNC: 81 MG/DL (ref 65–99)
GLUCOSE SERPL-MCNC: 93 MG/DL (ref 65–99)
HCT VFR BLD AUTO: 32.7 % (ref 34–46.6)
HCT VFR BLD AUTO: 33.1 % (ref 34–46.6)
HGB BLD-MCNC: 10.1 G/DL (ref 12–15.9)
HGB BLD-MCNC: 10.2 G/DL (ref 12–15.9)
IMM GRANULOCYTES # BLD AUTO: 0.03 10*3/MM3 (ref 0–0.05)
IMM GRANULOCYTES # BLD AUTO: 0.03 10*3/MM3 (ref 0–0.05)
IMM GRANULOCYTES NFR BLD AUTO: 0.3 % (ref 0–0.5)
IMM GRANULOCYTES NFR BLD AUTO: 0.4 % (ref 0–0.5)
LYMPHOCYTES # BLD AUTO: 1.13 10*3/MM3 (ref 0.7–3.1)
LYMPHOCYTES # BLD AUTO: 1.87 10*3/MM3 (ref 0.7–3.1)
LYMPHOCYTES NFR BLD AUTO: 15 % (ref 19.6–45.3)
LYMPHOCYTES NFR BLD AUTO: 21.2 % (ref 19.6–45.3)
MCH RBC QN AUTO: 32 PG (ref 26.6–33)
MCH RBC QN AUTO: 32 PG (ref 26.6–33)
MCHC RBC AUTO-ENTMCNC: 30.8 G/DL (ref 31.5–35.7)
MCHC RBC AUTO-ENTMCNC: 30.9 G/DL (ref 31.5–35.7)
MCV RBC AUTO: 103.5 FL (ref 79–97)
MCV RBC AUTO: 103.8 FL (ref 79–97)
MONOCYTES # BLD AUTO: 0.71 10*3/MM3 (ref 0.1–0.9)
MONOCYTES # BLD AUTO: 1.06 10*3/MM3 (ref 0.1–0.9)
MONOCYTES NFR BLD AUTO: 12 % (ref 5–12)
MONOCYTES NFR BLD AUTO: 9.4 % (ref 5–12)
NEUTROPHILS NFR BLD AUTO: 5.45 10*3/MM3 (ref 1.7–7)
NEUTROPHILS NFR BLD AUTO: 5.69 10*3/MM3 (ref 1.7–7)
NEUTROPHILS NFR BLD AUTO: 64.4 % (ref 42.7–76)
NEUTROPHILS NFR BLD AUTO: 72.4 % (ref 42.7–76)
NRBC BLD AUTO-RTO: 0 /100 WBC (ref 0–0.2)
NRBC BLD AUTO-RTO: 0 /100 WBC (ref 0–0.2)
PLATELET # BLD AUTO: 184 10*3/MM3 (ref 140–450)
PLATELET # BLD AUTO: 214 10*3/MM3 (ref 140–450)
PMV BLD AUTO: 9.2 FL (ref 6–12)
PMV BLD AUTO: 9.2 FL (ref 6–12)
POTASSIUM SERPL-SCNC: 3.7 MMOL/L (ref 3.5–5.2)
POTASSIUM SERPL-SCNC: 3.8 MMOL/L (ref 3.5–5.2)
RBC # BLD AUTO: 3.16 10*6/MM3 (ref 3.77–5.28)
RBC # BLD AUTO: 3.19 10*6/MM3 (ref 3.77–5.28)
SODIUM SERPL-SCNC: 138 MMOL/L (ref 136–145)
SODIUM SERPL-SCNC: 138 MMOL/L (ref 136–145)
WBC NRBC COR # BLD AUTO: 7.53 10*3/MM3 (ref 3.4–10.8)
WBC NRBC COR # BLD AUTO: 8.84 10*3/MM3 (ref 3.4–10.8)

## 2025-03-04 PROCEDURE — 80048 BASIC METABOLIC PNL TOTAL CA: CPT

## 2025-03-04 PROCEDURE — 99497 ADVNCD CARE PLAN 30 MIN: CPT | Performed by: NURSE PRACTITIONER

## 2025-03-04 PROCEDURE — 99221 1ST HOSP IP/OBS SF/LOW 40: CPT | Performed by: NURSE PRACTITIONER

## 2025-03-04 PROCEDURE — 85025 COMPLETE CBC W/AUTO DIFF WBC: CPT

## 2025-03-04 PROCEDURE — 99233 SBSQ HOSP IP/OBS HIGH 50: CPT | Performed by: INTERNAL MEDICINE

## 2025-03-04 PROCEDURE — 25010000002 HEPARIN (PORCINE) PER 1000 UNITS: Performed by: INTERNAL MEDICINE

## 2025-03-04 RX ORDER — METOPROLOL TARTRATE 25 MG/1
25 TABLET, FILM COATED ORAL EVERY 6 HOURS
Status: DISCONTINUED | OUTPATIENT
Start: 2025-03-04 | End: 2025-03-05 | Stop reason: HOSPADM

## 2025-03-04 RX ORDER — MIDODRINE HYDROCHLORIDE 5 MG/1
5 TABLET ORAL
Status: DISCONTINUED | OUTPATIENT
Start: 2025-03-04 | End: 2025-03-05 | Stop reason: HOSPADM

## 2025-03-04 RX ORDER — AMIODARONE HYDROCHLORIDE 200 MG/1
200 TABLET ORAL EVERY 12 HOURS SCHEDULED
Status: DISCONTINUED | OUTPATIENT
Start: 2025-03-04 | End: 2025-03-05 | Stop reason: HOSPADM

## 2025-03-04 RX ADMIN — METOPROLOL TARTRATE 25 MG: 25 TABLET, FILM COATED ORAL at 18:23

## 2025-03-04 RX ADMIN — APIXABAN 2.5 MG: 2.5 TABLET, FILM COATED ORAL at 10:00

## 2025-03-04 RX ADMIN — ACETAMINOPHEN 650 MG: 325 TABLET, FILM COATED ORAL at 13:15

## 2025-03-04 RX ADMIN — MIDODRINE HYDROCHLORIDE 5 MG: 5 TABLET ORAL at 18:23

## 2025-03-04 RX ADMIN — SEVELAMER CARBONATE 800 MG: 800 TABLET, FILM COATED ORAL at 18:23

## 2025-03-04 RX ADMIN — Medication 10 ML: at 21:11

## 2025-03-04 RX ADMIN — SEVELAMER CARBONATE 800 MG: 800 TABLET, FILM COATED ORAL at 10:12

## 2025-03-04 RX ADMIN — AMIODARONE HYDROCHLORIDE 200 MG: 200 TABLET ORAL at 21:11

## 2025-03-04 RX ADMIN — Medication 12.5 MG: at 09:59

## 2025-03-04 RX ADMIN — MIDODRINE HYDROCHLORIDE 2.5 MG: 2.5 TABLET ORAL at 10:00

## 2025-03-04 RX ADMIN — METOPROLOL TARTRATE 25 MG: 25 TABLET, FILM COATED ORAL at 21:11

## 2025-03-04 RX ADMIN — TOPIRAMATE 100 MG: 100 TABLET, FILM COATED ORAL at 21:11

## 2025-03-04 RX ADMIN — HEPARIN SODIUM 4500 UNITS: 1000 INJECTION INTRAVENOUS; SUBCUTANEOUS at 11:12

## 2025-03-04 RX ADMIN — SERTRALINE HYDROCHLORIDE 25 MG: 25 TABLET, FILM COATED ORAL at 21:11

## 2025-03-04 RX ADMIN — FAMOTIDINE 10 MG: 20 TABLET, FILM COATED ORAL at 10:00

## 2025-03-04 RX ADMIN — POLYETHYLENE GLYCOL 3350 17 G: 17 POWDER, FOR SOLUTION ORAL at 09:59

## 2025-03-04 RX ADMIN — APIXABAN 2.5 MG: 2.5 TABLET, FILM COATED ORAL at 21:11

## 2025-03-04 RX ADMIN — SEVELAMER CARBONATE 800 MG: 800 TABLET, FILM COATED ORAL at 13:15

## 2025-03-04 NOTE — SIGNIFICANT NOTE
03/04/25 1304   OTHER   Discipline occupational therapist   Rehab Time/Intention   Session Not Performed unable to evaluate, medical status change  (Pt is not medically stable at this time to participate with OT eval & CM is seeking to speak with family member re: comfort care. OT will complete orders at this time.)

## 2025-03-04 NOTE — PROGRESS NOTES
"Cardiology [place]        LOS:  LOS: 4 days   Patient Name: Hazel Bucio  Age/Sex: 84 y.o. female  : 1940  MRN: [id number]    Day of Service: 25   Length of Stay: 4  Encounter Provider: CHELLE Lerner  Place of Service: [facility] CARDIOLOGY  Patient Care Team:  John Cano MD as PCP - General (Family Medicine)  Jsoe Carlos Cheng MD as Consulting Physician (Cardiology)    Subjective:     Chief Complaint: f/u AFib  Seen and examined, greater than 50% of total encounter time in medical decision making performed me    Subjective: remains confused, rates elevated. She is on midodrine for hypotension, palliative care is evaluated the patient, systolics 120s on encounter    Current Medications:   Scheduled Meds:apixaban, 2.5 mg, Oral, Q12H  [Held by provider] atorvastatin, 20 mg, Oral, Nightly  ertapenem, 500 mg, Intravenous, Once  famotidine, 10 mg, Oral, Q48H  metoprolol tartrate, 25 mg, Oral, Q6H  midodrine, 5 mg, Oral, TID AC  polyethylene glycol, 17 g, Oral, Daily  sertraline, 25 mg, Oral, Nightly  sevelamer, 800 mg, Oral, TID With Meals  sodium chloride, 10 mL, Intravenous, Q12H  topiramate, 100 mg, Oral, Nightly      Continuous Infusions:     Allergies:  Allergies   Allergen Reactions    Amoxicillin Shortness Of Breath    Ciprofloxacin Hives    Oxycodone-Acetaminophen Unknown - High Severity     Pt's son stated when pt fell and broke her leg she was put on oxycodone; pt's son stated pt's \"vitals went all out of wack, she couldn't remember things.\"    Penicillins Rash    Sulfa Antibiotics Hives    Cephalexin Other (See Comments)     Has tolerated multiple courses of cephalosporins- cefazolin, ceftriaxone, ceftazidime     Clavulanic Acid Other (See Comments)    Codeine Other (See Comments)    Contrast Dye (Echo Or Unknown Ct/Mr) Other (See Comments)     22     Doxycycline Other (See Comments)    Fluconazole Other (See Comments)    Iodinated Contrast Media " Other (See Comments)     8/18/22    Iodine Hives    Macrobid [Nitrofurantoin] Hives    Tobramycin Other (See Comments)    Trimethoprim Other (See Comments)       Review of Systems   Unable to perform ROS: Mental status change         Objective:     Temp:  [96.6 °F (35.9 °C)-98.3 °F (36.8 °C)] 97.8 °F (36.6 °C)  Heart Rate:  [110-141] 114  Resp:  [14-24] 14  BP: ()/(57-99) 156/62     Intake/Output Summary (Last 24 hours) at 3/4/2025 1252  Last data filed at 3/4/2025 1117  Gross per 24 hour   Intake 120 ml   Output 1000 ml   Net -880 ml     Body mass index is 20.06 kg/m².      02/27/25  1040   Weight: 58.1 kg (128 lb 1.4 oz)         General Appearance:    confused                                Head: Atraumatic, normocephalic, PERRLA               Neck:   supple, no JVD   Lungs:     Clear to auscultation, respirations regular, even and               unlabored    Heart:    irregular rhythm and normal rate, normal S1 and S2   Abdomen:     Normal bowel sounds, no masses, no organomegaly, soft  nontender, nondistended, no guarding, no rebound  tenderness   Extremities:   Moves all extremities well, no edema, no cyanosis, no  redness   Pulses:   Pulses palpable and equal bilaterally   Skin:   No bleeding, bruising or rash   Neurologic:   confused, moves extremities     Scribe findings above    Lab Review:   Results from last 7 days   Lab Units 03/04/25  0344 03/03/25  0244 02/28/25  0316 02/27/25  1140   SODIUM mmol/L 138 134*   < > 139   POTASSIUM mmol/L 3.8 3.6   < > 4.2   CHLORIDE mmol/L 103 97*   < > 100   CO2 mmol/L 21.3* 22.7   < > 27.1   BUN mg/dL 36* 31*   < > 42*   CREATININE mg/dL 5.12* 4.76*   < > 4.54*   GLUCOSE mg/dL 93 87   < > 100*   CALCIUM mg/dL 8.3* 8.7   < > 9.0   AST (SGOT) U/L  --   --   --  32   ALT (SGPT) U/L  --   --   --  10    < > = values in this interval not displayed.         Results from last 7 days   Lab Units 03/04/25  0344 03/03/25  0244   WBC 10*3/mm3 8.84 7.37   HEMOGLOBIN g/dL  10.1* 10.5*   HEMATOCRIT % 32.7* 34.1   PLATELETS 10*3/mm3 214 236         Results from last 7 days   Lab Units 02/28/25  0316   MAGNESIUM mg/dL 2.4     Results from last 7 days   Lab Units 03/03/25  0244   CHOLESTEROL mg/dL 82   TRIGLYCERIDES mg/dL 84   HDL CHOL mg/dL 53               Recent Radiology:  Imaging Results (Most Recent)       Procedure Component Value Units Date/Time    FL Video Swallow With Speech Single Contrast [[id number]] Resulted: 02/28/25 1146     Updated: 02/28/25 1146    Narrative:      This procedure was auto-finalized with no dictation required.    CT Head Without Contrast [[id number]] Collected: 02/27/25 1230     Updated: 02/27/25 1238    Narrative:      CT HEAD WO CONTRAST    Date of Exam: 2/27/2025 12:15 PM EST    Indication: Left posterior headache.    Comparison: CT head without contrast 7/23/2024    Technique: Axial CT images were obtained of the head without contrast administration.  Coronal reconstructions were performed.  Automated exposure control and iterative reconstruction methods were used.    Findings:  No intracranial hemorrhage. Mild cerebral atrophy. Moderate white matter findings most suggestive of chronic microvascular disease. No abnormal extra-axial fluid collection. Posterior fossa without acute abnormality. No intraventricular hemorrhage.   Globes are symmetric. No retro-orbital abnormality. The mastoid air cells are well-aerated. Negative for calvarial fracture. 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 Small rounded 4 mm of nodularity abutting left   aspect of the falx cerebri again could relate to a small DAVID pericallosal aneurysm or developing dural calcification, unchanged from prior studies (image 38).      Impression:      Impression:  1. No acute appearing abnormality.  2. Stable chronic findings above.          Electronically Signed: Jacoby Elise MD    2/27/2025 12:36 PM EST    Workstation ID: [id number]    XR Chest 1 View  [[id number]] Collected: 02/27/25 1212     Updated: 02/27/25 1217    Narrative:      XR CHEST 1 VW    Date of Exam: 2/27/2025 11:56 AM EST    Indication: AMS Protocol    Comparison: AP chest x-ray 2/13/2025    Findings:  Cardiac silhouette remains mildly enlarged. Pacemaker leads appear stable. Tip of the right internal jugular central venous catheter is stable, terminating in the right atrium. Lungs are adequately expanded and appear clear. No pneumothorax or large   pleural effusion is seen.      Impression:      Impression:  No acute cardiopulmonary abnormality is identified.      Electronically Signed: Floridalma East    2/27/2025 12:15 PM EST    Workstation ID: [id number]            ECHOCARDIOGRAM:    Results for orders placed during the hospital encounter of 02/04/25    Adult Transthoracic Echo Complete W/ Cont if Necessary Per Protocol    Interpretation Summary    Left ventricular systolic function is normal. Left ventricular ejection fraction appears to be 61 - 65%.    Left ventricular diastolic function was indeterminate.    The right ventricular cavity is mildly dilated.    The left atrial cavity is mild to moderately dilated.    Left atrial volume is mildly increased.    The right atrial cavity is mildly  dilated.    Estimated right ventricular systolic pressure from tricuspid regurgitation is normal (<35 mmHg).        I reviewed the patient's new clinical results.    EKG:      Assessment:       AMS (altered mental status)    Permanent atrial fibrillation    Long term current use of anticoagulant    Presence of cardiac pacemaker    Type 2 diabetes mellitus    Acquired absence of kidney    ESRD (end stage renal disease) on dialysis    Hypotension    Mild cognitive impairment of uncertain or unknown etiology    Hard of hearing    UTI (urinary tract infection)    Altered mental status    Permanent A-fib  Will increase beta blocker  Increase midodrine  Add amiodarone with low blood pressures, not a candidate for  calcium channel blockers, poor candidate for digoxin on dialysis     Presence of [employer] dual-chamber pacemaker  With last device interrogation 9.5 years battery life, minimal pacing with VVI setting     Essential hypertension/orthostatic hypotension     Nonobstructive CAD/dyslipidemia  No aspirin, on Eliquis for A-fib  Atorvastatin 10 mg daily  Recheck lipid panel  Beta-blocker continues     End-stage renal disease on dialysis  Seen by Dr. Keller   Dialysis this a.m.       Plan:   Patient with variable rates, metoprolol on board  Increase midodrine for blood pressure support  Allow higher volume status with dialysis and slow rates  Start amiodarone twice daily  Continue Eliquis for anticoagulation        Consult COLE Keith, CHELLE  03/04/25  12:52 EST

## 2025-03-04 NOTE — PLAN OF CARE
Problem: Adult Inpatient Plan of Care  Goal: Absence of Hospital-Acquired Illness or Injury  Intervention: Identify and Manage Fall Risk  Recent Flowsheet Documentation  Taken 3/4/2025 1800 by Tyesha Hackett RN  Safety Promotion/Fall Prevention:   safety round/check completed   room organization consistent   nonskid shoes/slippers when out of bed   fall prevention program maintained   clutter free environment maintained   assistive device/personal items within reach   activity supervised  Taken 3/4/2025 1600 by Tyesha Hackett RN  Safety Promotion/Fall Prevention:   safety round/check completed   room organization consistent   nonskid shoes/slippers when out of bed   fall prevention program maintained   clutter free environment maintained   assistive device/personal items within reach   activity supervised  Taken 3/4/2025 1400 by Tyesha Hackett RN  Safety Promotion/Fall Prevention:   safety round/check completed   room organization consistent   nonskid shoes/slippers when out of bed   fall prevention program maintained   clutter free environment maintained   assistive device/personal items within reach   activity supervised  Taken 3/4/2025 1200 by Tyesha Hackett RN  Safety Promotion/Fall Prevention:   safety round/check completed   room organization consistent   nonskid shoes/slippers when out of bed   fall prevention program maintained   clutter free environment maintained   assistive device/personal items within reach   activity supervised  Taken 3/4/2025 1000 by Tyesha Hackett RN  Safety Promotion/Fall Prevention:   safety round/check completed   room organization consistent   nonskid shoes/slippers when out of bed   fall prevention program maintained   clutter free environment maintained   assistive device/personal items within reach   activity supervised  Taken 3/4/2025 0800 by Tyesha Hackett, RN  Safety Promotion/Fall Prevention:   safety round/check completed   room organization  consistent   nonskid shoes/slippers when out of bed   fall prevention program maintained   clutter free environment maintained   assistive device/personal items within reach   activity supervised  Intervention: Prevent Skin Injury  Recent Flowsheet Documentation  Taken 3/4/2025 1600 by Tyesha Hackett RN  Body Position: weight shifting  Skin Protection:   incontinence pads utilized   transparent dressing maintained  Taken 3/4/2025 1200 by Tyesha Hackett RN  Body Position: weight shifting  Skin Protection:   incontinence pads utilized   transparent dressing maintained  Taken 3/4/2025 0700 by Tyesha Hackett RN  Body Position: weight shifting  Skin Protection:   incontinence pads utilized   transparent dressing maintained  Intervention: Prevent Infection  Recent Flowsheet Documentation  Taken 3/4/2025 1800 by Tyesha Hackett RN  Infection Prevention:   single patient room provided   rest/sleep promoted   personal protective equipment utilized   hand hygiene promoted  Taken 3/4/2025 1600 by Tyesha Hackett RN  Infection Prevention:   single patient room provided   rest/sleep promoted   personal protective equipment utilized   hand hygiene promoted  Taken 3/4/2025 1400 by Tyesha Hackett RN  Infection Prevention:   single patient room provided   rest/sleep promoted   personal protective equipment utilized   hand hygiene promoted  Taken 3/4/2025 1200 by Tyesha Hackett RN  Infection Prevention:   single patient room provided   rest/sleep promoted   personal protective equipment utilized   hand hygiene promoted  Taken 3/4/2025 1000 by Tyesha Hackett RN  Infection Prevention:   single patient room provided   rest/sleep promoted   personal protective equipment utilized   hand hygiene promoted  Taken 3/4/2025 0800 by Tyesha Hackett RN  Infection Prevention:   single patient room provided   rest/sleep promoted   personal protective equipment utilized   hand hygiene promoted  Goal: Optimal Comfort  and Wellbeing  Intervention: Provide Person-Centered Care  Recent Flowsheet Documentation  Taken 3/4/2025 1600 by Tyehsa Hackett, JORDAN  Trust Relationship/Rapport:   care explained   choices provided   questions answered  Taken 3/4/2025 1200 by Tyesha Hackett, RN  Trust Relationship/Rapport:   care explained   choices provided   questions answered  Taken 3/4/2025 0700 by Tyesha Hackett, JORDAN  Trust Relationship/Rapport:   care explained   choices provided   questions answered   Goal Outcome Evaluation:

## 2025-03-04 NOTE — PROGRESS NOTES
LOS: 4 days   Patient Care Team:  John Cano MD as PCP - General (Family Medicine)  Jose Carlos Cheng MD as Consulting Physician (Cardiology)    Subjective         Review of Systems   Unable to perform ROS: Other           Objective     Vital Signs  Temp:  [96.6 °F (35.9 °C)-98.3 °F (36.8 °C)] 97.8 °F (36.6 °C)  Heart Rate:  [110-141] 114  Resp:  [14-24] 14  BP: ()/(57-99) 156/62      Physical Exam  Vitals reviewed.   Constitutional:       Appearance: She is not ill-appearing.   HENT:      Head: Normocephalic and atraumatic.      Right Ear: External ear normal.      Left Ear: External ear normal.      Nose: Nose normal.      Mouth/Throat:      Mouth: Mucous membranes are dry.   Eyes:      General:         Right eye: No discharge.         Left eye: No discharge.   Cardiovascular:      Rate and Rhythm: Normal rate and regular rhythm.      Pulses: Normal pulses.      Heart sounds: Normal heart sounds.   Pulmonary:      Effort: Pulmonary effort is normal.      Breath sounds: Normal breath sounds.   Abdominal:      General: Bowel sounds are normal.      Palpations: Abdomen is soft.   Musculoskeletal:         General: Normal range of motion.      Cervical back: Normal range of motion.   Skin:     General: Skin is warm.      Coloration: Skin is pale.   Neurological:      Mental Status: She is alert. Mental status is at baseline.   Psychiatric:         Behavior: Behavior normal.              Results Review:    Lab Results (last 24 hours)       Procedure Component Value Units Date/Time    Blood Culture - Blood, Arm, Left [[id number]]  (Normal) Collected: 02/27/25 1140    Specimen: Blood from Arm, Left Updated: 03/04/25 1145     Blood Culture No growth at 5 days    Blood Culture - Blood, Arm, Left [[id number]]  (Normal) Collected: 02/27/25 1140    Specimen: Blood from Arm, Left Updated: 03/04/25 1145     Blood Culture No growth at 5 days    Basic Metabolic Panel [[id number]]  (Abnormal) Collected: 03/04/25 0349     Specimen: Blood Updated: 03/04/25 0459     Glucose 93 mg/dL      BUN 36 mg/dL      Creatinine 5.12 mg/dL      Sodium 138 mmol/L      Potassium 3.8 mmol/L      Chloride 103 mmol/L      CO2 21.3 mmol/L      Calcium 8.3 mg/dL      BUN/Creatinine Ratio 7.0     Anion Gap 13.7 mmol/L      eGFR 7.9 mL/min/1.73     Narrative:      GFR Categories in Chronic Kidney Disease (CKD)      GFR Category          GFR (mL/min/1.73)    Interpretation  G1                     90 or greater         Normal or high (1)  G2                      60-89                Mild decrease (1)  G3a                   45-59                Mild to moderate decrease  G3b                   30-44                Moderate to severe decrease  G4                    15-29                Severe decrease  G5                    14 or less           Kidney failure          (1)In the absence of evidence of kidney disease, neither GFR category G1 or G2 fulfill the criteria for CKD.    eGFR calculation 2021 CKD-EPI creatinine equation, which does not include race as a factor    CBC & Differential [[id number]]  (Abnormal) Collected: 03/04/25 0344    Specimen: Blood Updated: 03/04/25 0434    Narrative:      The following orders were created for panel order CBC & Differential.  Procedure                               Abnormality         Status                     ---------                               -----------         ------                     CBC Auto Differential[[id number]]        Abnormal            Final result                 Please view results for these tests on the individual orders.    CBC Auto Differential [[id number]]  (Abnormal) Collected: 03/04/25 0344    Specimen: Blood Updated: 03/04/25 0434     WBC 8.84 10*3/mm3      RBC 3.16 10*6/mm3      Hemoglobin 10.1 g/dL      Hematocrit 32.7 %      .5 fL      MCH 32.0 pg      MCHC 30.9 g/dL      RDW 15.3 %      RDW-SD 57.7 fl      MPV 9.2 fL      Platelets 214 10*3/mm3      Neutrophil % 64.4 %       Lymphocyte % 21.2 %      Monocyte % 12.0 %      Eosinophil % 1.9 %      Basophil % 0.2 %      Immature Grans % 0.3 %      Neutrophils, Absolute 5.69 10*3/mm3      Lymphocytes, Absolute 1.87 10*3/mm3      Monocytes, Absolute 1.06 10*3/mm3      Eosinophils, Absolute 0.17 10*3/mm3      Basophils, Absolute 0.02 10*3/mm3      Immature Grans, Absolute 0.03 10*3/mm3      nRBC 0.0 /100 WBC     Lipid Panel [[id number]] Collected: 03/03/25 0244    Specimen: Blood Updated: 03/03/25 1637     Total Cholesterol 82 mg/dL      Triglycerides 84 mg/dL      HDL Cholesterol 53 mg/dL      LDL Cholesterol  12 mg/dL      VLDL Cholesterol 17 mg/dL      LDL/HDL Ratio 0.23    Narrative:      Cholesterol Reference Ranges  (U.S. Department of Health and Human Services ATP III Classifications)    Desirable          <200 mg/dL  Borderline High    200-239 mg/dL  High Risk          >240 mg/dL      Triglyceride Reference Ranges  (U.S. Department of Health and Human Services ATP III Classifications)    Normal           <150 mg/dL  Borderline High  150-199 mg/dL  High             200-499 mg/dL  Very High        >500 mg/dL    HDL Reference Ranges  (U.S. Department of Health and Human Services ATP III Classifications)    Low     <40 mg/dl (major risk factor for CHD)  High    >60 mg/dl ('negative' risk factor for CHD)        LDL Reference Ranges  (U.S. Department of Health and Human Services ATP III Classifications)    Optimal          <100 mg/dL  Near Optimal     100-129 mg/dL  Borderline High  130-159 mg/dL  High             160-189 mg/dL  Very High        >189 mg/dL    LDL is calculated using the NIH LDL-C calculation.               Imaging Results (Last 24 Hours)       ** No results found for the last 24 hours. **                 I reviewed the patient's new clinical results.    Medication Review:   Scheduled Meds:apixaban, 2.5 mg, Oral, Q12H  [Held by provider] atorvastatin, 20 mg, Oral, Nightly  ertapenem, 500 mg, Intravenous, Once  famotidine, 10  mg, Oral, Q48H  metoprolol tartrate, 25 mg, Oral, Q6H  midodrine, 5 mg, Oral, TID AC  polyethylene glycol, 17 g, Oral, Daily  sertraline, 25 mg, Oral, Nightly  sevelamer, 800 mg, Oral, TID With Meals  sodium chloride, 10 mL, Intravenous, Q12H  topiramate, 100 mg, Oral, Nightly      Continuous Infusions:   PRN Meds:.  acetaminophen    senna-docusate sodium **AND** polyethylene glycol **AND** bisacodyl **AND** bisacodyl    heparin (porcine)    hydrALAZINE    ipratropium-albuterol    nitroglycerin    ondansetron ODT **OR** ondansetron    sodium chloride    sodium chloride    sodium chloride     Interval History:    Assessment & Plan     AMS  Acute UTI (urinary tract infection)  Permanent atrial fibrillation  Long term current use of anticoagulant  Presence of cardiac pacemaker  Type 2 diabetes mellitus  Acquired absence of kidney  ESRD on dialysis  Mild cognitive impairment of uncertain or unknown etiology  Hard of hearing     Plan of care  Nephrology for HD orders. No leukocytosis or fever. Antibiotics completed. Continue home medications.     Patient is DNR/DNI and has had multiple UTI with exacerbated confusion; however on this admission UTI is cleared up and patient mentation is not returning. She is more confused, has had hallucinations. Most likely this will be more of her new baseline. Will ask palliative to speak with son in regard future care. Once decisions are made she will return to [facility]    Plan for disposition:    Preeti James, CHELLE  03/04/25  14:11 EST

## 2025-03-04 NOTE — PROGRESS NOTES
"                                                                                                                                      Nephrology  Progress Note                                        [facility]    Patient Identification    Name: Hazel Bucio  Age: 84 y.o.  Sex: female  :  1940  MRN: [id number]      DATE OF SERVICE:  3/4/2025        Subective       Currently resting no distress     Objective   Scheduled Meds:apixaban, 2.5 mg, Oral, Q12H  [Held by provider] atorvastatin, 20 mg, Oral, Nightly  ertapenem, 500 mg, Intravenous, Once  famotidine, 10 mg, Oral, Q48H  metoprolol tartrate, 12.5 mg, Oral, BID  midodrine, 2.5 mg, Oral, TID AC  polyethylene glycol, 17 g, Oral, Daily  sertraline, 25 mg, Oral, Nightly  sevelamer, 800 mg, Oral, TID With Meals  sodium chloride, 10 mL, Intravenous, Q12H  topiramate, 100 mg, Oral, Nightly          Continuous Infusions:     PRN Meds:  acetaminophen    senna-docusate sodium **AND** polyethylene glycol **AND** bisacodyl **AND** bisacodyl    heparin (porcine)    hydrALAZINE    ipratropium-albuterol    nitroglycerin    ondansetron ODT **OR** ondansetron    sodium chloride    sodium chloride    sodium chloride     Exam:  /60 (BP Location: Left leg, Patient Position: Lying)   Pulse 118   Temp 98 °F (36.7 °C) (Oral)   Resp 22   Ht 170.2 cm (67\")   Wt 58.1 kg (128 lb 1.4 oz)   SpO2 94%   BMI 20.06 kg/m²     Intake/Output last 3 shifts:  I/O last 3 completed shifts:  In: 180 [P.O.:180]  Out: 520 [Urine:520]    Intake/Output this shift:  No intake/output data recorded.    Physical exam:  General Appearance:  Alert  Head:  Normocephalic, without obvious abnormality, atraumatic  Eyes:  PERRL, conjunctiva/corneas clear     Neck:  Supple,  no adenopathy;      Lungs:  Decreased BS occasion ronchi  Heart:  Regular rate and rhythm, S1 and S2 normal  Abdomen:  Soft, non-tender, bowel sounds active   Extremities: trace edema  Pulses: 2+ and symmetric " all extremities  Skin:  No rashes or lesions       Data Review:  All labs (24hrs):   Recent Results (from the past 24 hours)   POC Glucose Once    Collection Time: 03/03/25  8:12 AM    Specimen: Blood   Result Value Ref Range    Glucose 89 70 - 105 mg/dL   ECG 12 Lead Altered Mental Status    Collection Time: 03/03/25  8:14 AM   Result Value Ref Range    QT Interval 351 ms    QTC Interval 501 ms   Basic Metabolic Panel    Collection Time: 03/04/25  3:44 AM    Specimen: Blood   Result Value Ref Range    Glucose 93 65 - 99 mg/dL    BUN 36 (H) 8 - 23 mg/dL    Creatinine 5.12 (H) 0.57 - 1.00 mg/dL    Sodium 138 136 - 145 mmol/L    Potassium 3.8 3.5 - 5.2 mmol/L    Chloride 103 98 - 107 mmol/L    CO2 21.3 (L) 22.0 - 29.0 mmol/L    Calcium 8.3 (L) 8.6 - 10.5 mg/dL    BUN/Creatinine Ratio 7.0 7.0 - 25.0    Anion Gap 13.7 5.0 - 15.0 mmol/L    eGFR 7.9 (L) >60.0 mL/min/1.73   CBC Auto Differential    Collection Time: 03/04/25  3:44 AM    Specimen: Blood   Result Value Ref Range    WBC 8.84 3.40 - 10.80 10*3/mm3    RBC 3.16 (L) 3.77 - 5.28 10*6/mm3    Hemoglobin 10.1 (L) 12.0 - 15.9 g/dL    Hematocrit 32.7 (L) 34.0 - 46.6 %    .5 (H) 79.0 - 97.0 fL    MCH 32.0 26.6 - 33.0 pg    MCHC 30.9 (L) 31.5 - 35.7 g/dL    RDW 15.3 12.3 - 15.4 %    RDW-SD 57.7 (H) 37.0 - 54.0 fl    MPV 9.2 6.0 - 12.0 fL    Platelets 214 140 - 450 10*3/mm3    Neutrophil % 64.4 42.7 - 76.0 %    Lymphocyte % 21.2 19.6 - 45.3 %    Monocyte % 12.0 5.0 - 12.0 %    Eosinophil % 1.9 0.3 - 6.2 %    Basophil % 0.2 0.0 - 1.5 %    Immature Grans % 0.3 0.0 - 0.5 %    Neutrophils, Absolute 5.69 1.70 - 7.00 10*3/mm3    Lymphocytes, Absolute 1.87 0.70 - 3.10 10*3/mm3    Monocytes, Absolute 1.06 (H) 0.10 - 0.90 10*3/mm3    Eosinophils, Absolute 0.17 0.00 - 0.40 10*3/mm3    Basophils, Absolute 0.02 0.00 - 0.20 10*3/mm3    Immature Grans, Absolute 0.03 0.00 - 0.05 10*3/mm3    nRBC 0.0 0.0 - 0.2 /100 WBC          Imaging:  CT Head Without Contrast    Result Date:  2/27/2025  Impression: 1. No acute appearing abnormality. 2. Stable chronic findings above. Electronically Signed: Jacoby Elise MD  2/27/2025 12:36 PM EST  Workstation ID: [id number]    XR Chest 1 View    Result Date: 2/27/2025  Impression: No acute cardiopulmonary abnormality is identified. Electronically Signed: Floridalma East  2/27/2025 12:15 PM EST  Workstation ID: [id number]     Assessment/Plan:     AMS (altered mental status)    Permanent atrial fibrillation    Long term current use of anticoagulant    Presence of cardiac pacemaker    Type 2 diabetes mellitus    Acquired absence of kidney    ESRD (end stage renal disease) on dialysis    Hypotension    Mild cognitive impairment of uncertain or unknown etiology    Hard of hearing    UTI (urinary tract infection)    Altered mental status         ESRD HD dependent  AMS  UTI  CAD  HTN  Bladder ca  DM    Recommendations:  Hemodialysis today     Blood pressure medications and heart rate control has been adjusted

## 2025-03-04 NOTE — CONSULTS
Please [facility] documentation[facility]

## 2025-03-04 NOTE — CONSULTS
Palliative Care Consultation    Patient Name: Hazel Bucio  : 1940  MRN: [id number]  Allergies: Amoxicillin, Ciprofloxacin, Oxycodone-acetaminophen, Penicillins, Sulfa antibiotics, Cephalexin, Clavulanic acid, Codeine, Contrast dye (echo or unknown ct/mr), Doxycycline, Fluconazole, Iodinated contrast media, Iodine, Macrobid [nitrofurantoin], Tobramycin, and Trimethoprim    Requesting clinician:  Rajesh   Reason for consult: Consultation for clarification of goals of care and code status.      Patient Code Status:   Code Status and Medical Interventions: No CPR (Do Not Attempt to Resuscitate); Limited Support; No intubation (DNI)   Ordered at: 25     Medical Intervention Limits:    No intubation (DNI)     Code Status (Patient has no pulse and is not breathing):    No CPR (Do Not Attempt to Resuscitate)     Medical Interventions (Patient has pulse or is breathing):    Limited Support           Chief Complaint:    Altered mental status     History of Present Illness    Hazel Bucio is a 84 y.o. female who presented to [facility] ED on  with reports of altered mental status. Last known 0700 on day of presentation. On arrival, patient is able to state his name, but became combative when nursing attempted IV placement. Of note, patient currently on IV antibiotics for UTI.     In ED: Glucose 100, Creatinine 4.54, Alk phos 143, WBC 7.59, Hgb 10.9    UA trace blood, protein, large leuks    Nephrology consulted for dialysis management. Started on IV antibiotics for UTI.     Rapid response on 3/3 due to worsening confusion, elevated HR, and hypotension. Cardiology consulted for afib.     Requiring drip. Patient remains disoriented.     3/4 Palliative consult for goals of care discussion in an acutely ill patient with dementia.     VITAL SIGNS:   Temp:  [96.6 °F (35.9 °C)-98.3 °F (36.8 °C)] 97.4 °F (36.3 °C)  Heart Rate:  [110-141] 134  Resp:  [16-24] 16  BP: ()/(46-99) 118/57       PMH: Dementia, CKD,  Afib, HLD, DM    Past Surgical History:   Procedure Laterality Date    BREAST LUMPECTOMY      CARDIAC CATHETERIZATION N/A 10/18/2023    Procedure: Left Heart Cath possible PCI, atherectomy, hemodynamic support;  Surgeon: Augustin Ellsworth MD;  Location: [facility] CATH INVASIVE LOCATION;  Service: Cardiology;  Laterality: N/A;    CARDIAC ELECTROPHYSIOLOGY PROCEDURE N/A 4/28/2023    Procedure: Pacemaker gen change, [place] AWARE $;  Surgeon: Jose Carlos Cheng MD;  Location: [facility] CATH INVASIVE LOCATION;  Service: Cardiovascular;  Laterality: N/A;    CHOLECYSTECTOMY N/A 10/12/2020    Procedure: CHOLECYSTECTOMY LAPAROSCOPIC;  Surgeon: Go Pereira DO;  Location: [facility] MAIN OR;  Service: General;  Laterality: N/A;    CYSTECTOMY W/ URETEROILEAL CONDUIT      FEMUR IM NAILING RETROGRADE Right 7/25/2024    Procedure: RIGHT FEMUR INTRAMEDULLARY NAILING RETROGRADE AND OPEN REDUCTION INTERNAL FIXATION;  Surgeon: Elieser Diggs MD;  Location: [facility] MAIN OR;  Service: Orthopedics;  Laterality: Right;    HARDWARE REMOVAL Right 6/11/2021    Procedure: TIBIA HARDWARE REMOVAL;  Surgeon: Geoff Shah II, MD;  Location: [facility] MAIN OR;  Service: Orthopedics;  Laterality: Right;    HERNIA REPAIR      HYSTERECTOMY      INSERT / REPLACE / REMOVE PACEMAKER      NEPHRECTOMY Right     TIBIA IM NAILING/RODDING Right 8/28/2020    Procedure: TIBIA INTRAMEDULLARY NAIL/ABRAHAM INSERTION;  Surgeon: Geoff Shah II, MD;  Location: [facility] MAIN OR;  Service: Orthopedics;  Laterality: Right;       Family History   Problem Relation Age of Onset    COPD Father        Social History     Tobacco Use    Smoking status: Never     Passive exposure: Never    Smokeless tobacco: Never   Vaping Use    Vaping status: Never Used   Substance Use Topics    Alcohol use: Not Currently    Drug use: Never           LABS:    Results from last 7 days   Lab Units 03/04/25  0344   WBC 10*3/mm3 8.84   HEMOGLOBIN g/dL 10.1*   HEMATOCRIT % 32.7*    PLATELETS 10*3/mm3 214     Results from last 7 days   Lab Units 03/04/25  0344   SODIUM mmol/L 138   POTASSIUM mmol/L 3.8   CHLORIDE mmol/L 103   CO2 mmol/L 21.3*   BUN mg/dL 36*   CREATININE mg/dL 5.12*   GLUCOSE mg/dL 93   CALCIUM mg/dL 8.3*     Results from last 7 days   Lab Units 03/04/25  0344 02/28/25  0316 02/27/25  1140   SODIUM mmol/L 138   < > 139   POTASSIUM mmol/L 3.8   < > 4.2   CHLORIDE mmol/L 103   < > 100   CO2 mmol/L 21.3*   < > 27.1   BUN mg/dL 36*   < > 42*   CREATININE mg/dL 5.12*   < > 4.54*   CALCIUM mg/dL 8.3*   < > 9.0   BILIRUBIN mg/dL  --   --  0.2   ALK PHOS U/L  --   --  143*   ALT (SGPT) U/L  --   --  10   AST (SGOT) U/L  --   --  32   GLUCOSE mg/dL 93   < > 100*    < > = values in this interval not displayed.         IMAGING STUDIES:  No radiology results for the last day      I reviewed the patient's new clinical results including labs, imaging, and vitals.        Scheduled Meds:  apixaban, 2.5 mg, Oral, Q12H  [Held by provider] atorvastatin, 20 mg, Oral, Nightly  ertapenem, 500 mg, Intravenous, Once  famotidine, 10 mg, Oral, Q48H  metoprolol tartrate, 12.5 mg, Oral, BID  midodrine, 2.5 mg, Oral, TID AC  polyethylene glycol, 17 g, Oral, Daily  sertraline, 25 mg, Oral, Nightly  sevelamer, 800 mg, Oral, TID With Meals  sodium chloride, 10 mL, Intravenous, Q12H  topiramate, 100 mg, Oral, Nightly      Continuous Infusions:       I have reviewed patient's current medication list.     Review of Systems   Unable to perform ROS: Mental status change   All other systems reviewed and are negative.        Physical Exam  Vitals and nursing note reviewed.   Constitutional:       Appearance: She is ill-appearing.   HENT:      Head: Normocephalic and atraumatic.      Nose: Nose normal.      Mouth/Throat:      Mouth: Mucous membranes are dry.      Pharynx: Oropharynx is clear.   Eyes:      Conjunctiva/sclera: Conjunctivae normal.   Cardiovascular:      Rate and Rhythm: Normal rate.      Pulses:  Normal pulses.   Pulmonary:      Effort: Pulmonary effort is normal.   Abdominal:      Palpations: Abdomen is soft.   Musculoskeletal:         General: Normal range of motion.      Cervical back: Normal range of motion.   Skin:     General: Skin is dry.      Coloration: Skin is pale.   Neurological:      General: No focal deficit present.      Mental Status: She is disoriented.             PROBLEM LIST:    AMS (altered mental status)    Permanent atrial fibrillation    Long term current use of anticoagulant    Presence of cardiac pacemaker    Type 2 diabetes mellitus    Acquired absence of kidney    ESRD (end stage renal disease) on dialysis    Hypotension    Mild cognitive impairment of uncertain or unknown etiology    Hard of hearing    UTI (urinary tract infection)    Altered mental status          ASSESSMENT/PLAN:    UTI: Noted on admission UA. On IV antibiotics. Cultures obtained.     ESRD: On dialysis. Nephrology consulted for management.     Afib: during dialysis. Hx of afib. Cardiology consulted.     Dementia/DM/CKD: chronic conditions per primary.     These illnesses and their management contribute to the need for a palliative consult and advanced care planning.      ADVANCED CARE PLANNING:     3/4 Met with patient at bedside. She is awake, alert, but not oriented. She is able to tell me her name, and that she is in [facility], but unsure of the year or her situation. I called and spoke with son this afternoon. He states that he lives in [place] and has been aware that his mothers medical status has declined. He states that he has recently had conversations with multiple family members regarding discontinuing dialysis and pursuing hospice vs continuation of aggressive treatment. He shares that the patient has refused dialysis numerous times and they have considered stopping it. We discussed hospice services and how it would benefit his mother. He is agreeable to an EOS from [facility]. He affirms  DNR/DNI status. We have POST on file affirming this. Referral made. Case management and nursing made aware.       Advanced Directives: Patient has advance directive, copy requested  Health Care Directive on file: No  Health Care Surrogate:      Palliative Performance Scale Score:    Comments:           Decisional Capacity: no  Patient's understanding of illness: unknown  Patient goals of care:  DNR/DNI      Thank you for this consult and allowing us to participate in patient's plan of care. Palliative Care Team will continue to follow patient.       I spent 45 minutes reviewing providers documentation, medication records, assessing and examining patient, discussing with family, answering questions, providing guidance about a plan of care, and coordinating care with other healthcare members. More than 50% of time spent face to face discussing disease education, current clinical status, and medication management.     I spent an additional 24 minutes on advanced care planning, goals of care, and code status discussion.  I obtained consent for ACP discussion.     Yuni Jenkins, APRN  3/4/2025

## 2025-03-04 NOTE — NURSING NOTE
Upon arrival this morning, nightshift reported patient banging and yelling. When nightshift nurse and myself walked in for report we noticed the patient had the room call light in both hands and a new left sided facial/cheek bruise . Safe report entered.

## 2025-03-05 VITALS
HEIGHT: 67 IN | SYSTOLIC BLOOD PRESSURE: 134 MMHG | HEART RATE: 115 BPM | RESPIRATION RATE: 22 BRPM | BODY MASS INDEX: 20.1 KG/M2 | TEMPERATURE: 98.5 F | DIASTOLIC BLOOD PRESSURE: 74 MMHG | OXYGEN SATURATION: 100 % | WEIGHT: 128.09 LBS

## 2025-03-05 PROCEDURE — 92526 ORAL FUNCTION THERAPY: CPT

## 2025-03-05 RX ORDER — MIDODRINE HYDROCHLORIDE 5 MG/1
5 TABLET ORAL
Qty: 90 TABLET | Refills: 1 | Status: SHIPPED | OUTPATIENT
Start: 2025-03-05

## 2025-03-05 RX ORDER — AMIODARONE HYDROCHLORIDE 200 MG/1
200 TABLET ORAL EVERY 12 HOURS SCHEDULED
Qty: 30 TABLET | Refills: 1 | Status: SHIPPED | OUTPATIENT
Start: 2025-03-05

## 2025-03-05 RX ORDER — METOPROLOL TARTRATE 25 MG/1
25 TABLET, FILM COATED ORAL EVERY 6 HOURS
Qty: 120 TABLET | Refills: 1 | Status: SHIPPED | OUTPATIENT
Start: 2025-03-05

## 2025-03-05 RX ADMIN — Medication 10 ML: at 08:42

## 2025-03-05 RX ADMIN — METOPROLOL TARTRATE 25 MG: 25 TABLET, FILM COATED ORAL at 08:41

## 2025-03-05 RX ADMIN — SEVELAMER CARBONATE 800 MG: 800 TABLET, FILM COATED ORAL at 08:41

## 2025-03-05 RX ADMIN — MIDODRINE HYDROCHLORIDE 5 MG: 5 TABLET ORAL at 08:42

## 2025-03-05 RX ADMIN — AMIODARONE HYDROCHLORIDE 200 MG: 200 TABLET ORAL at 08:41

## 2025-03-05 RX ADMIN — ACETAMINOPHEN 650 MG: 325 TABLET, FILM COATED ORAL at 08:42

## 2025-03-05 RX ADMIN — APIXABAN 2.5 MG: 2.5 TABLET, FILM COATED ORAL at 08:41

## 2025-03-05 NOTE — DISCHARGE SUMMARY
Date of Discharge:  3/5/2025    Discharge Diagnosis:   AMS  Acute UTI (urinary tract infection)  Permanent atrial fibrillation  Long term current use of anticoagulant  Presence of cardiac pacemaker  Type 2 diabetes mellitus  Acquired absence of kidney  ESRD on dialysis  Mild cognitive impairment of uncertain or unknown etiology  Hard of hearing    Presenting Problem/History of Present Illness  Active Hospital Problems    Diagnosis  POA    **AMS (altered mental status) [R41.82]  Yes    Altered mental status [R41.82]  Yes    UTI (urinary tract infection) [N39.0]  Yes    Hard of hearing [H91.90]  Yes    Hypotension [I95.9]  Yes    ESRD (end stage renal disease) on dialysis [N18.6, Z99.2]  Not Applicable    Mild cognitive impairment of uncertain or unknown etiology [G31.84]  Yes    Acquired absence of kidney [Z90.5]  Not Applicable    Permanent atrial fibrillation [I48.21]  Yes    Long term current use of anticoagulant [Z79.01]  Not Applicable    Presence of cardiac pacemaker [Z95.0]  Yes    Type 2 diabetes mellitus [E11.9]  Yes      Resolved Hospital Problems   No resolved problems to display.          Hospital Course    Patient is a 84 y.o. female presented with presents with presents with past cardiac history of permanent afib (anticoaguled with Eliquis) and SSS s/p PPM, HTN, CKD stage 3, bladder cancer, and renal cancer, HD, [place], mild cognitive impairment . She presents from [facility] due to AMS and was currently being treated for UTI which she frequently has and its accompanied by AMS. She was being treated for UTI at the facility with  entrapenum 1g daily and the culture did not grow anything. She completed her antibiotics this admission and culture did not grow anything. She was slow to regain baseline mentation. Patient is DNR/DNI and has had multiple UTI with exacerbated confusion; this admission more confused  with hallucinations. Palliative was consulted and spoke with son who is aware of patient's overall  decline and is agreement to have hospice follow patient. She will return to [facility].     Procedures Performed         Consults:   Consults       Date and Time Order Name Status Description    3/3/2025  9:06 AM Inpatient Cardiology Consult Completed     2/27/2025  3:51 PM Inpatient Nephrology Consult Completed     2/27/2025  1:31 PM Family Medicine Consult      2/5/2025 11:37 AM Inpatient Nephrology Consult Completed             Pertinent Test Results:    Lab Results (most recent)       Procedure Component Value Units Date/Time    Basic Metabolic Panel [[id number]]  (Abnormal) Collected: 03/04/25 2220    Specimen: Blood Updated: 03/04/25 2316     Glucose 81 mg/dL      BUN 14 mg/dL      Creatinine 2.75 mg/dL      Sodium 138 mmol/L      Potassium 3.7 mmol/L      Chloride 103 mmol/L      CO2 22.4 mmol/L      Calcium 8.6 mg/dL      BUN/Creatinine Ratio 5.1     Anion Gap 12.6 mmol/L      eGFR 16.6 mL/min/1.73     Narrative:      GFR Categories in Chronic Kidney Disease (CKD)      GFR Category          GFR (mL/min/1.73)    Interpretation  G1                     90 or greater         Normal or high (1)  G2                      60-89                Mild decrease (1)  G3a                   45-59                Mild to moderate decrease  G3b                   30-44                Moderate to severe decrease  G4                    15-29                Severe decrease  G5                    14 or less           Kidney failure          (1)In the absence of evidence of kidney disease, neither GFR category G1 or G2 fulfill the criteria for CKD.    eGFR calculation 2021 CKD-EPI creatinine equation, which does not include race as a factor    CBC & Differential [[id number]]  (Abnormal) Collected: 03/04/25 2220    Specimen: Blood Updated: 03/04/25 2241    Narrative:      The following orders were created for panel order CBC & Differential.  Procedure                               Abnormality         Status                     ---------                                -----------         ------                     CBC Auto Differential[[id number]]        Abnormal            Final result                 Please view results for these tests on the individual orders.    CBC Auto Differential [[id number]]  (Abnormal) Collected: 03/04/25 2220    Specimen: Blood Updated: 03/04/25 2241     WBC 7.53 10*3/mm3      RBC 3.19 10*6/mm3      Hemoglobin 10.2 g/dL      Hematocrit 33.1 %      .8 fL      MCH 32.0 pg      MCHC 30.8 g/dL      RDW 15.6 %      RDW-SD 58.8 fl      MPV 9.2 fL      Platelets 184 10*3/mm3      Neutrophil % 72.4 %      Lymphocyte % 15.0 %      Monocyte % 9.4 %      Eosinophil % 2.4 %      Basophil % 0.4 %      Immature Grans % 0.4 %      Neutrophils, Absolute 5.45 10*3/mm3      Lymphocytes, Absolute 1.13 10*3/mm3      Monocytes, Absolute 0.71 10*3/mm3      Eosinophils, Absolute 0.18 10*3/mm3      Basophils, Absolute 0.03 10*3/mm3      Immature Grans, Absolute 0.03 10*3/mm3      nRBC 0.0 /100 WBC     Blood Culture - Blood, Arm, Left [[id number]]  (Normal) Collected: 02/27/25 1140    Specimen: Blood from Arm, Left Updated: 03/04/25 1145     Blood Culture No growth at 5 days    Blood Culture - Blood, Arm, Left [[id number]]  (Normal) Collected: 02/27/25 1140    Specimen: Blood from Arm, Left Updated: 03/04/25 1145     Blood Culture No growth at 5 days    Basic Metabolic Panel [[id number]]  (Abnormal) Collected: 03/04/25 0344    Specimen: Blood Updated: 03/04/25 0459     Glucose 93 mg/dL      BUN 36 mg/dL      Creatinine 5.12 mg/dL      Sodium 138 mmol/L      Potassium 3.8 mmol/L      Chloride 103 mmol/L      CO2 21.3 mmol/L      Calcium 8.3 mg/dL      BUN/Creatinine Ratio 7.0     Anion Gap 13.7 mmol/L      eGFR 7.9 mL/min/1.73     Narrative:      GFR Categories in Chronic Kidney Disease (CKD)      GFR Category          GFR (mL/min/1.73)    Interpretation  G1                     90 or greater         Normal or high (1)  G2                      60-89                 Mild decrease (1)  G3a                   45-59                Mild to moderate decrease  G3b                   30-44                Moderate to severe decrease  G4                    15-29                Severe decrease  G5                    14 or less           Kidney failure          (1)In the absence of evidence of kidney disease, neither GFR category G1 or G2 fulfill the criteria for CKD.    eGFR calculation 2021 CKD-EPI creatinine equation, which does not include race as a factor    CBC & Differential [[id number]]  (Abnormal) Collected: 03/04/25 0344    Specimen: Blood Updated: 03/04/25 0434    Narrative:      The following orders were created for panel order CBC & Differential.  Procedure                               Abnormality         Status                     ---------                               -----------         ------                     CBC Auto Differential[[id number]]        Abnormal            Final result                 Please view results for these tests on the individual orders.    CBC Auto Differential [[id number]]  (Abnormal) Collected: 03/04/25 0344    Specimen: Blood Updated: 03/04/25 0434     WBC 8.84 10*3/mm3      RBC 3.16 10*6/mm3      Hemoglobin 10.1 g/dL      Hematocrit 32.7 %      .5 fL      MCH 32.0 pg      MCHC 30.9 g/dL      RDW 15.3 %      RDW-SD 57.7 fl      MPV 9.2 fL      Platelets 214 10*3/mm3      Neutrophil % 64.4 %      Lymphocyte % 21.2 %      Monocyte % 12.0 %      Eosinophil % 1.9 %      Basophil % 0.2 %      Immature Grans % 0.3 %      Neutrophils, Absolute 5.69 10*3/mm3      Lymphocytes, Absolute 1.87 10*3/mm3      Monocytes, Absolute 1.06 10*3/mm3      Eosinophils, Absolute 0.17 10*3/mm3      Basophils, Absolute 0.02 10*3/mm3      Immature Grans, Absolute 0.03 10*3/mm3      nRBC 0.0 /100 WBC     Lipid Panel [[id number]] Collected: 03/03/25 0244    Specimen: Blood Updated: 03/03/25 1637     Total Cholesterol 82 mg/dL      Triglycerides 84 mg/dL       HDL Cholesterol 53 mg/dL      LDL Cholesterol  12 mg/dL      VLDL Cholesterol 17 mg/dL      LDL/HDL Ratio 0.23    Narrative:      Cholesterol Reference Ranges  (U.S. Department of Health and Human Services ATP III Classifications)    Desirable          <200 mg/dL  Borderline High    200-239 mg/dL  High Risk          >240 mg/dL      Triglyceride Reference Ranges  (U.S. Department of Health and Human Services ATP III Classifications)    Normal           <150 mg/dL  Borderline High  150-199 mg/dL  High             200-499 mg/dL  Very High        >500 mg/dL    HDL Reference Ranges  (U.S. Department of Health and Human Services ATP III Classifications)    Low     <40 mg/dl (major risk factor for CHD)  High    >60 mg/dl ('negative' risk factor for CHD)        LDL Reference Ranges  (U.S. Department of Health and Human Services ATP III Classifications)    Optimal          <100 mg/dL  Near Optimal     100-129 mg/dL  Borderline High  130-159 mg/dL  High             160-189 mg/dL  Very High        >189 mg/dL    LDL is calculated using the NIH LDL-C calculation.      POC Glucose Once [[id number]]  (Normal) Collected: 03/03/25 0812    Specimen: Blood Updated: 03/03/25 0814     Glucose 89 mg/dL      Comment: Serial Number: [id number]:  [id number]       CANDIDA AURIS PCR - Swab, Axilla Right, Axilla Left and Groin [[id number]]  (Normal) Collected: 02/28/25 0321    Specimen: Swab from Axilla Right, Axilla Left and Groin Updated: 03/02/25 1417     CANDIDA AURIS PCR Not Detected    Urine Culture - Urine, Urine, Catheter [[id number]] Collected: 02/27/25 1337    Specimen: Urine, Catheter Updated: 03/02/25 1307     Urine Culture >100,000 CFU/mL Normal Urogenital Hien    Narrative:      Colonization of the urinary tract without infection is common. Treatment is discouraged unless the patient is symptomatic, pregnant, or undergoing an invasive urologic procedure.    Hepatitis B Surface Antigen [[id number]]  (Normal) Collected: 03/02/25  0915    Specimen: Blood Updated: 03/02/25 1030     Hepatitis B Surface Ag Non-Reactive    Magnesium [[id number]]  (Normal) Collected: 02/28/25 0316    Specimen: Blood Updated: 02/28/25 0403     Magnesium 2.4 mg/dL     Phosphorus [[id number]]  (Abnormal) Collected: 02/28/25 0316    Specimen: Blood Updated: 02/28/25 0403     Phosphorus 4.8 mg/dL     Calcium, Ionized [[id number]]  (Abnormal) Collected: 02/28/25 0316    Specimen: Blood Updated: 02/28/25 0334     Ionized Calcium 1.12 mmol/L     POC Glucose Once [[id number]]  (Normal) Collected: 02/28/25 0200    Specimen: Blood Updated: 02/28/25 0202     Glucose 73 mg/dL      Comment: Serial Number: [id number]:  [id number]       Urinalysis, Microscopic Only - Urine, Catheter [[id number]]  (Abnormal) Collected: 02/27/25 1337    Specimen: Urine, Catheter Updated: 02/27/25 1357     RBC, UA None Seen /HPF      WBC, UA 6-10 /HPF      Bacteria, UA 1+ /HPF      Squamous Epithelial Cells, UA None Seen /HPF      Hyaline Casts, UA None Seen /LPF      Methodology Manual Light Microscopy    Urinalysis With Microscopic If Indicated (No Culture) - Urine, Catheter [[id number]]  (Abnormal) Collected: 02/27/25 1337    Specimen: Urine, Catheter Updated: 02/27/25 1343     Color, UA Yellow     Appearance, UA Cloudy     Comment: Result checked          pH, UA 8.0     Specific Gravity, UA 1.014     Glucose, UA Negative     Ketones, UA Trace     Bilirubin, UA Negative     Blood, UA Trace     Protein,  mg/dL (2+)     Leuk Esterase, UA Large (3+)     Nitrite, UA Negative     Urobilinogen, UA 1.0 E.U./dL    Comprehensive Metabolic Panel [[id number]]  (Abnormal) Collected: 02/27/25 1140    Specimen: Blood Updated: 02/27/25 1226     Glucose 100 mg/dL      BUN 42 mg/dL      Creatinine 4.54 mg/dL      Sodium 139 mmol/L      Potassium 4.2 mmol/L      Comment: Slight hemolysis detected by analyzer. Result may be falsely elevated.        Chloride 100 mmol/L      CO2 27.1 mmol/L      Calcium 9.0  mg/dL      Total Protein 7.0 g/dL      Albumin 3.7 g/dL      ALT (SGPT) 10 U/L      AST (SGOT) 32 U/L      Alkaline Phosphatase 143 U/L      Total Bilirubin 0.2 mg/dL      Globulin 3.3 gm/dL      A/G Ratio 1.1 g/dL      BUN/Creatinine Ratio 9.3     Anion Gap 11.9 mmol/L      eGFR 9.1 mL/min/1.73     Narrative:      GFR Categories in Chronic Kidney Disease (CKD)      GFR Category          GFR (mL/min/1.73)    Interpretation  G1                     90 or greater         Normal or high (1)  G2                      60-89                Mild decrease (1)  G3a                   45-59                Mild to moderate decrease  G3b                   30-44                Moderate to severe decrease  G4                    15-29                Severe decrease  G5                    14 or less           Kidney failure          (1)In the absence of evidence of kidney disease, neither GFR category G1 or G2 fulfill the criteria for CKD.    eGFR calculation 2021 CKD-EPI creatinine equation, which does not include race as a factor    POC Lactate [[id number]]  (Normal) Collected: 02/27/25 1144    Specimen: Blood Updated: 02/27/25 1146     Lactate 1.3 mmol/L      Comment: Serial Number: [id number]:  [id number]       [place] Draw [[id number]] Collected: 02/27/25 1140    Specimen: Blood Updated: 02/27/25 1145    Narrative:      The following orders were created for panel order [place] Draw.  Procedure                               Abnormality         Status                     ---------                               -----------         ------                     Green Top (Gel)[[id number]]                                  Final result               Lavender Top[[id number]]                                     Final result               Gold Top - SST[[id number]]                                   Final result               Light Blue Top[[id number]]                                   Final result                 Please view results for  these tests on the individual orders.    Green Top (Gel) [[id number]] Collected: 02/27/25 1140    Specimen: Blood Updated: 02/27/25 1145     Extra Tube Hold for add-ons.     Comment: Auto resulted.       Lavender Top [[id number]] Collected: 02/27/25 1140    Specimen: Blood Updated: 02/27/25 1145     Extra Tube hold for add-on     Comment: Auto resulted       Gold Top - SST [[id number]] Collected: 02/27/25 1140    Specimen: Blood Updated: 02/27/25 1145     Extra Tube Hold for add-ons.     Comment: Auto resulted.       Light Blue Top [[id number]] Collected: 02/27/25 1140    Specimen: Blood Updated: 02/27/25 1145     Extra Tube Hold for add-ons.     Comment: Auto resulted                Results for orders placed during the hospital encounter of 02/04/25    Adult Transthoracic Echo Complete W/ Cont if Necessary Per Protocol    Interpretation Summary    Left ventricular systolic function is normal. Left ventricular ejection fraction appears to be 61 - 65%.    Left ventricular diastolic function was indeterminate.    The right ventricular cavity is mildly dilated.    The left atrial cavity is mild to moderately dilated.    Left atrial volume is mildly increased.    The right atrial cavity is mildly  dilated.    Estimated right ventricular systolic pressure from tricuspid regurgitation is normal (<35 mmHg).       Condition on Discharge:  Stable    Vital Signs  Temp:  [97.8 °F (36.6 °C)-98.5 °F (36.9 °C)] 98.5 °F (36.9 °C)  Heart Rate:  [101-137] 115  Resp:  [14-22] 22  BP: ()/(50-92) 134/74      Physical Exam  Vitals reviewed.   Constitutional:       Appearance: She is not ill-appearing.   HENT:      Head: Normocephalic and atraumatic.      Right Ear: External ear normal.      Left Ear: External ear normal.      Nose: Nose normal.      Mouth/Throat:      Mouth: Mucous membranes are dry.   Eyes:      General:         Right eye: No discharge.         Left eye: No discharge.   Cardiovascular:      Rate and Rhythm: Normal  rate and regular rhythm.      Pulses: Normal pulses.      Heart sounds: Normal heart sounds.   Pulmonary:      Effort: Pulmonary effort is normal.      Breath sounds: Normal breath sounds.   Abdominal:      General: Bowel sounds are normal.      Palpations: Abdomen is soft.   Musculoskeletal:         General: Normal range of motion.      Cervical back: Normal range of motion.   Skin:     General: Skin is warm.   Neurological:      Mental Status: She is alert. Mental status is at baseline.   Psychiatric:         Behavior: Behavior normal.              Discharge Disposition      Discharge Medications     Discharge Medications        New Medications        Instructions Start Date   amiodarone 200 MG tablet  Commonly known as: PACERONE   200 mg, Oral, Every 12 Hours Scheduled             Changes to Medications        Instructions Start Date   metoprolol tartrate 25 MG tablet  Commonly known as: LOPRESSOR  What changed:   how much to take  when to take this   25 mg, Oral, Every 6 Hours      midodrine 5 MG tablet  Commonly known as: PROAMATINE  What changed:   medication strength  how much to take   5 mg, Oral, 3 Times Daily Before Meals             Continue These Medications        Instructions Start Date   acetaminophen 325 MG tablet  Commonly known as: TYLENOL   650 mg, 4 Times Daily PRN      apixaban 2.5 MG tablet tablet  Commonly known as: ELIQUIS   2.5 mg, Oral, Every 12 Hours Scheduled      atorvastatin 20 MG tablet  Commonly known as: LIPITOR   20 mg, Nightly      famotidine 10 MG tablet  Commonly known as: PEPCID   10 mg, Oral, Every 48 Hours      gabapentin 100 MG capsule  Commonly known as: NEURONTIN   100 mg, Oral, Nightly      ipratropium-albuterol 0.5-2.5 mg/3 ml nebulizer  Commonly known as: DUO-NEB   3 mL, Every 4 Hours PRN      MiraLax 17 GM/SCOOP powder  Generic drug: polyethylene glycol   17 g, Every Morning      nitroglycerin 0.4 MG SL tablet  Commonly known as: NITROSTAT   0.4 mg, Every 5 Minutes  PRN      sertraline 25 MG tablet  Commonly known as: ZOLOFT   Take 1 tablet by mouth every night at bedtime.      sevelamer 800 MG tablet  Commonly known as: RENVELA   800 mg, 3 Times Daily With Meals      traMADol 50 MG tablet  Commonly known as: ULTRAM   50 mg, Every 6 Hours PRN      zinc oxide 20 % ointment   1 Application, 2 Times Daily             Stop These Medications      cyclobenzaprine 5 MG tablet  Commonly known as: FLEXERIL     ertapenem 1000 mg/100ml solution IV  Commonly known as: INVanz     topiramate 100 MG tablet  Commonly known as: TOPAMAX              Discharge Diet:   Diet Instructions       Diet: Regular/House Diet; [place] (NDD 2); [place] Thick; No Straw      Discharge Diet: Regular/House Diet    Texture: [place] (NDD 2)    Fluid Consistency: Nectar Thick    Diet Modifiers / Additional Diets: No Straw            Activity at Discharge:   Activity Instructions       Activity as Tolerated              Follow-up Appointments  No future appointments.      Test Results Pending at Discharge  Pending Results       None             CEHLLE Harrison  03/05/25  10:24 EST    Time: Discharge 25 min

## 2025-03-05 NOTE — CASE MANAGEMENT/SOCIAL WORK
"Physicians Statement of Medical Necessity for  Ambulance Transportation    GENERAL INFORMATION     Name: Hazel Bucio  YOB: 1940  Medicare #: [id number]  Transport Date: 3/5/25 (Valid for round trips this date, or for scheduled repetitive trips for 60 days from the date signed below.)  Origin: [facility]  Destination: [place] [address] [address] [place]  Is the Patient's stay covered under Medicare Part A (PPS/DRG?)Yes  Closest appropriate facility? Yes  If this a hosp-hosp transfer? No  Is this a hospice patient? No    MEDICAL NECESSITY QUESTIONAIRE    Ambulance Transportation is medically necessary only if other means of transportation are contraindicated or would be potentially harmful to the patient.  To meet this requirement, the patient must be either \"bed confined\" or suffer from a condition such that transport by means other than an ambulance is contraindicated by the patient's condition.  The following questions must be answered by the healthcare professional signing below for this form to be valid:     1) Describe the MEDICAL CONDITION (physical and/or mental) of this patient AT THE TIME OF AMBULANCE TRANSPORT that requires the patient to be transported in an ambulance, and why transport by other means is contraindicated by the patient's condition: confused    Past Medical History:   Diagnosis Date    Acquired absence of kidney 10/04/2021    Acquired absence of other specified parts of digestive tract 09/09/2022    Athscl heart disease of native coronary 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 09/09/2022    Atrial fibrillation with rapid ventricular response     Bladder cancer     Cancer     breast, bladder    Cholecystitis with cholelithiasis     Chronic insomnia 08/27/2020    Deep vein thrombosis     Dyslipidemia 01/17/2017    E coli bacteremia     ESRD on dialysis 10/18/2023    Essential hypertension 01/17/2017    Fracture tibia/fibula, right, closed, initial encounter 08/27/2020    " Gastro-esophageal reflux disease without esophagitis 09/14/2020    GERD (gastroesophageal reflux disease)     History of bladder cancer     History of falling     History of urostomy     Hyperkalemia     Immobility 08/27/2020    r/t broken Tibia     Irritable bowel syndrome without diarrhea     Kidney carcinoma, right     removed     Long term current use of anticoagulant 01/09/2015    Mild cognitive impairment of uncertain or unknown etiology 08/31/2023    Myalgia due to statin     Other specified abdominal hernia without obstruction or gangrene     PAF (paroxysmal atrial fibrillation) 06/26/2019    Permanent atrial fibrillation 06/26/2019    Personal history of other venous thrombosis and embolism     Presence of cardiac pacemaker 11/03/2014    BS dual chamber PM placed 10/2014 with pocket revision 1/25/17.  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      Past Surgical History:   Procedure Laterality Date    BREAST LUMPECTOMY      CARDIAC CATHETERIZATION N/A 10/18/2023    Procedure: Left Heart Cath possible PCI, atherectomy, hemodynamic support;  Surgeon: Augustin Ellsworth MD;  Location: [facility] CATH INVASIVE LOCATION;  Service: Cardiology;  Laterality: N/A;    CARDIAC ELECTROPHYSIOLOGY PROCEDURE N/A 4/28/2023    Procedure: Pacemaker gen change, [place] AWARE $;  Surgeon: Jose Carlos Cheng MD;  Location: [facility] CATH INVASIVE LOCATION;  Service: Cardiovascular;  Laterality: N/A;    CHOLECYSTECTOMY N/A 10/12/2020    Procedure: CHOLECYSTECTOMY LAPAROSCOPIC;  Surgeon: Go Pereira DO;  Location: [facility] MAIN OR;  Service: General;  Laterality: N/A;    CYSTECTOMY W/ URETEROILEAL CONDUIT      FEMUR IM NAILING RETROGRADE Right 7/25/2024    Procedure: RIGHT FEMUR INTRAMEDULLARY NAILING RETROGRADE AND OPEN REDUCTION INTERNAL FIXATION;   "Surgeon: Elieser Diggs MD;  Location: [facility] MAIN OR;  Service: Orthopedics;  Laterality: Right;    HARDWARE REMOVAL Right 6/11/2021    Procedure: TIBIA HARDWARE REMOVAL;  Surgeon: Geoff Shah II, MD;  Location: [facility] MAIN OR;  Service: Orthopedics;  Laterality: Right;    HERNIA REPAIR      HYSTERECTOMY      INSERT / REPLACE / REMOVE PACEMAKER      NEPHRECTOMY Right     TIBIA IM NAILING/RODDING Right 8/28/2020    Procedure: TIBIA INTRAMEDULLARY NAIL/ABRAHAM INSERTION;  Surgeon: Geoff Shah II, MD;  Location: [facility] MAIN OR;  Service: Orthopedics;  Laterality: Right;      2) Is this patient \"bed confined\" as defined below?Yes   To be \"bed confined\" the patient must satisfy all three of the following criteria:  (1) unable to get up from bed without assistance; AND (2) unable to ambulate;  AND (3) unable to sit in a chair or wheelchair.  3) Can this patient safely be transported by car or wheelchair van (I.e., may safely sit during transport, without an attendant or monitoring?)No   4. In addition to completing questions 1-3 above, please check any of the following conditions that apply*:          *Note: supporting documentation for any boxes checked must be maintained in the patient's medical records Patient is confused, Need or possible need for restraints, Requires oxygen - unable to self administer, and Unable to tolerate seated position for time needed to transport      SIGNATURE OF PHYSICIAN OR OTHER AUTHORIZED HEALTHCARE PROFESSIONAL    I certify that the above information is true and correct based on my evaluation of this patient, and represent that the patient requires transport by ambulance and that other forms of transport are contraindicated.  I understand that this information will be used by the Centers for Medicare and Medicaid Services (CMS) to support the determiniation of medical necessity for ambulance services, and I represent that I have personal knowledge of the patient's " condition at the time of transport.    DS   If this box is checked, I also certify that the patient is physically or mentally incapable of signing the ambulance service's claim form and that the institution with which I am affiliated has furnished care, services or assistance to the patient.  My signature below is made on behalf of the patient pursuant to 42 .36(b)(4). In accordance with 42 .37, the specific reason(s) that the patient is physically or mentally incapable of signing the claim for is as follows: Confused    Signature of Physician or Healthcare Professional   Lavern Guzman RN Date/Time:   3/5/25  (1100)     (For Scheduled repetitive transport, this form is not valid for transports performed more than 60 days after this date).                                                                                                                                            --------------------------------------------------------------------------------------------  Printed Name and Credentials of Physician or Authorized Healthcare Professional     *Form must be signed by patient's attending physician for scheduled, repetitive transports,.  For non-repetitive ambulance transports, if unable to obtain the signature of the attending physician, any of the following may sign (please select below):     Physician  Clinical Nurse Specialist x Registered Nurse     Physician Assistant  Discharge Planner  Licensed Practical Nurse     Nurse Practitioner x

## 2025-03-05 NOTE — PLAN OF CARE
Problem: Adult Inpatient Plan of Care  Goal: Plan of Care Review  Outcome: Progressing  Goal: Patient-Specific Goal (Individualized)  Outcome: Progressing  Goal: Absence of Hospital-Acquired Illness or Injury  Outcome: Progressing  Intervention: Identify and Manage Fall Risk  Recent Flowsheet Documentation  Taken 3/4/2025 2000 by Margaret Benoit RN  Safety Promotion/Fall Prevention:   fall prevention program maintained   safety round/check completed  Intervention: Prevent and Manage VTE (Venous Thromboembolism) Risk  Recent Flowsheet Documentation  Taken 3/4/2025 2000 by Margaret Benoit RN  VTE Prevention/Management:   bilateral   SCDs (sequential compression devices) off  Intervention: Prevent Infection  Recent Flowsheet Documentation  Taken 3/4/2025 2000 by Margaret Benoit RN  Infection Prevention: rest/sleep promoted  Goal: Optimal Comfort and Wellbeing  Outcome: Progressing  Goal: Readiness for Transition of Care  Outcome: Progressing     Problem: Skin Injury Risk Increased  Goal: Skin Health and Integrity  Outcome: Progressing     Problem: Violence Risk or Actual  Goal: Anger and Impulse Control  Outcome: Progressing  Intervention: Minimize Safety Risk  Recent Flowsheet Documentation  Taken 3/4/2025 2000 by Margaret Benoit RN  Enhanced Safety Measures: bed alarm set     Problem: UTI (Urinary Tract Infection)  Goal: Improved Infection Symptoms  Outcome: Progressing     Problem: Fall Injury Risk  Goal: Absence of Fall and Fall-Related Injury  Outcome: Progressing  Intervention: Identify and Manage Contributors  Recent Flowsheet Documentation  Taken 3/4/2025 2000 by Margaret Benoit RN  Medication Review/Management: medications reviewed  Intervention: Promote Injury-Free Environment  Recent Flowsheet Documentation  Taken 3/4/2025 2000 by Margaret Benoit RN  Safety Promotion/Fall Prevention:   fall prevention program maintained   safety round/check completed     Problem: Confusion Acute  Goal: Optimal  Cognitive Function  Outcome: Progressing   Goal Outcome Evaluation:

## 2025-03-05 NOTE — PROGRESS NOTES
"                                                                                                                                      Nephrology  Progress Note                                        [facility]    Patient Identification    Name: Hazel Bucio  Age: 84 y.o.  Sex: female  :  1940  MRN: [id number]      DATE OF SERVICE:  3/5/2025        Subective       Currently resting no distress     Objective   Scheduled Meds:amiodarone, 200 mg, Oral, Q12H  apixaban, 2.5 mg, Oral, Q12H  ertapenem, 500 mg, Intravenous, Once  famotidine, 10 mg, Oral, Q48H  metoprolol tartrate, 25 mg, Oral, Q6H  midodrine, 5 mg, Oral, TID AC  polyethylene glycol, 17 g, Oral, Daily  sertraline, 25 mg, Oral, Nightly  sevelamer, 800 mg, Oral, TID With Meals  sodium chloride, 10 mL, Intravenous, Q12H  topiramate, 100 mg, Oral, Nightly          Continuous Infusions:     PRN Meds:  acetaminophen    senna-docusate sodium **AND** polyethylene glycol **AND** bisacodyl **AND** bisacodyl    heparin (porcine)    hydrALAZINE    ipratropium-albuterol    nitroglycerin    ondansetron ODT **OR** ondansetron    sodium chloride    sodium chloride    sodium chloride     Exam:  /52 (BP Location: Right leg, Patient Position: Lying)   Pulse 107   Temp 97.9 °F (36.6 °C) (Oral)   Resp 17   Ht 170.2 cm (67\")   Wt 58.1 kg (128 lb 1.4 oz)   SpO2 100%   BMI 20.06 kg/m²     Intake/Output last 3 shifts:  I/O last 3 completed shifts:  In: 180 [P.O.:180]  Out: 1400 [Urine:400]    Intake/Output this shift:  No intake/output data recorded.    Physical exam:  General Appearance:  Alert  Head:  Normocephalic, without obvious abnormality, atraumatic  Eyes:  PERRL, conjunctiva/corneas clear     Neck:  Supple,  no adenopathy;      Lungs:  Decreased BS occasion ronchi  Heart:  Regular rate and rhythm, S1 and S2 normal  Abdomen:  Soft, non-tender, bowel sounds active   Extremities: trace edema  Pulses: 2+ and symmetric all " extremities  Skin:  No rashes or lesions       Data Review:  All labs (24hrs):   Recent Results (from the past 24 hours)   Basic Metabolic Panel    Collection Time: 03/04/25 10:20 PM    Specimen: Blood   Result Value Ref Range    Glucose 81 65 - 99 mg/dL    BUN 14 8 - 23 mg/dL    Creatinine 2.75 (H) 0.57 - 1.00 mg/dL    Sodium 138 136 - 145 mmol/L    Potassium 3.7 3.5 - 5.2 mmol/L    Chloride 103 98 - 107 mmol/L    CO2 22.4 22.0 - 29.0 mmol/L    Calcium 8.6 8.6 - 10.5 mg/dL    BUN/Creatinine Ratio 5.1 (L) 7.0 - 25.0    Anion Gap 12.6 5.0 - 15.0 mmol/L    eGFR 16.6 (L) >60.0 mL/min/1.73   CBC Auto Differential    Collection Time: 03/04/25 10:20 PM    Specimen: Blood   Result Value Ref Range    WBC 7.53 3.40 - 10.80 10*3/mm3    RBC 3.19 (L) 3.77 - 5.28 10*6/mm3    Hemoglobin 10.2 (L) 12.0 - 15.9 g/dL    Hematocrit 33.1 (L) 34.0 - 46.6 %    .8 (H) 79.0 - 97.0 fL    MCH 32.0 26.6 - 33.0 pg    MCHC 30.8 (L) 31.5 - 35.7 g/dL    RDW 15.6 (H) 12.3 - 15.4 %    RDW-SD 58.8 (H) 37.0 - 54.0 fl    MPV 9.2 6.0 - 12.0 fL    Platelets 184 140 - 450 10*3/mm3    Neutrophil % 72.4 42.7 - 76.0 %    Lymphocyte % 15.0 (L) 19.6 - 45.3 %    Monocyte % 9.4 5.0 - 12.0 %    Eosinophil % 2.4 0.3 - 6.2 %    Basophil % 0.4 0.0 - 1.5 %    Immature Grans % 0.4 0.0 - 0.5 %    Neutrophils, Absolute 5.45 1.70 - 7.00 10*3/mm3    Lymphocytes, Absolute 1.13 0.70 - 3.10 10*3/mm3    Monocytes, Absolute 0.71 0.10 - 0.90 10*3/mm3    Eosinophils, Absolute 0.18 0.00 - 0.40 10*3/mm3    Basophils, Absolute 0.03 0.00 - 0.20 10*3/mm3    Immature Grans, Absolute 0.03 0.00 - 0.05 10*3/mm3    nRBC 0.0 0.0 - 0.2 /100 WBC          Imaging:  CT Head Without Contrast    Result Date: 2/27/2025  Impression: 1. No acute appearing abnormality. 2. Stable chronic findings above. Electronically Signed: Jacoby Elise MD  2/27/2025 12:36 PM EST  Workstation ID: [id number]    XR Chest 1 View    Result Date: 2/27/2025  Impression: No acute cardiopulmonary abnormality is  identified. Electronically Signed: Floridalmahoang East  2/27/2025 12:15 PM EST  Workstation ID: [id number]     Assessment/Plan:     AMS (altered mental status)    Permanent atrial fibrillation    Long term current use of anticoagulant    Presence of cardiac pacemaker    Type 2 diabetes mellitus    Acquired absence of kidney    ESRD (end stage renal disease) on dialysis    Hypotension    Mild cognitive impairment of uncertain or unknown etiology    Hard of hearing    UTI (urinary tract infection)    Altered mental status         ESRD HD dependent  AMS  UTI  CAD  HTN  Bladder ca  DM    Recommendations:  Noted possible hospice  Blood pressure medications and heart rate control has been adjusted

## 2025-03-05 NOTE — THERAPY TREATMENT NOTE
Acute Care - Speech Language Pathology   Swallow Treatment Note NYDIA Briscoe     Patient Name: Hazel Bucio  : 1940  MRN: [id number]  Today's Date: 3/5/2025               Admit Date: 2025    Visit Dx:     ICD-10-CM ICD-9-CM   1. Altered mental status, unspecified altered mental status type  R41.82 780.97   2. Noncompliance with renal dialysis  Z91.158 V45.12   3. Acute cystitis with hematuria  N30.01 595.0     Patient Active Problem List   Diagnosis    Permanent atrial fibrillation    Dyslipidemia    Essential hypertension    Long term current use of anticoagulant    Presence of cardiac pacemaker    Type 2 diabetes mellitus    Ureteral cancer    Chronic insomnia    Seasonal allergies    Acute UTI (urinary tract infection)    Acquired absence of kidney    Athscl heart disease of native coronary 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    Acquired absence of other specified parts of digestive tract    Gastro-esophageal reflux disease without esophagitis    Chest pain, atypical    ESRD (end stage renal disease) on dialysis    Hypotension    Mild cognitive impairment of uncertain or unknown etiology    Femur fracture, right    Severe malnutrition    Hard of hearing    UTI (urinary tract infection)    AMS (altered mental status)    Altered mental status     Past Medical History:   Diagnosis Date    Acquired absence of kidney 10/04/2021    Acquired absence of other specified parts of digestive tract 2022    Athscl heart disease of native coronary 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 2022    Atrial fibrillation with rapid ventricular response     Bladder cancer     Cancer     breast, bladder    Cholecystitis with cholelithiasis     Chronic insomnia 2020    Deep vein thrombosis     Dyslipidemia 2017    E coli bacteremia     ESRD on dialysis 10/18/2023    Essential hypertension 2017    Fracture tibia/fibula, right, closed, initial encounter 2020    Gastro-esophageal reflux disease without esophagitis 2020     GERD (gastroesophageal reflux disease)     History of bladder cancer     History of falling     History of urostomy     Hyperkalemia     Immobility 08/27/2020    r/t broken Tibia     Irritable bowel syndrome without diarrhea     Kidney carcinoma, right     removed     Long term current use of anticoagulant 01/09/2015    Mild cognitive impairment of uncertain or unknown etiology 08/31/2023    Myalgia due to statin     Other specified abdominal hernia without obstruction or gangrene     PAF (paroxysmal atrial fibrillation) 06/26/2019    Permanent atrial fibrillation 06/26/2019    Personal history of other venous thrombosis and embolism     Presence of cardiac pacemaker 11/03/2014    BS dual chamber PM placed 10/2014 with pocket revision 1/25/17.  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     Past Surgical History:   Procedure Laterality Date    BREAST LUMPECTOMY      CARDIAC CATHETERIZATION N/A 10/18/2023    Procedure: Left Heart Cath possible PCI, atherectomy, hemodynamic support;  Surgeon: Augustin Ellsworth MD;  Location: [facility] CATH INVASIVE LOCATION;  Service: Cardiology;  Laterality: N/A;    CARDIAC ELECTROPHYSIOLOGY PROCEDURE N/A 4/28/2023    Procedure: Pacemaker gen change, [place] AWARE $;  Surgeon: Jose Carlos Cheng MD;  Location: [facility] CATH INVASIVE LOCATION;  Service: Cardiovascular;  Laterality: N/A;    CHOLECYSTECTOMY N/A 10/12/2020    Procedure: CHOLECYSTECTOMY LAPAROSCOPIC;  Surgeon: Go Pereira DO;  Location: [facility] MAIN OR;  Service: General;  Laterality: N/A;    CYSTECTOMY W/ URETEROILEAL CONDUIT      FEMUR IM NAILING RETROGRADE Right 7/25/2024    Procedure: RIGHT FEMUR INTRAMEDULLARY NAILING RETROGRADE AND OPEN REDUCTION INTERNAL FIXATION;  Surgeon: Elieser Diggs MD;  Location: [facility] MAIN OR;  Service:  Orthopedics;  Laterality: Right;    HARDWARE REMOVAL Right 6/11/2021    Procedure: TIBIA HARDWARE REMOVAL;  Surgeon: Geoff Shah II, MD;  Location: [facility] MAIN OR;  Service: Orthopedics;  Laterality: Right;    HERNIA REPAIR      HYSTERECTOMY      INSERT / REPLACE / REMOVE PACEMAKER      NEPHRECTOMY Right     TIBIA IM NAILING/RODDING Right 8/28/2020    Procedure: TIBIA INTRAMEDULLARY NAIL/ABRAHAM INSERTION;  Surgeon: Geoff Shah II, MD;  Location: [facility] MAIN OR;  Service: Orthopedics;  Laterality: Right;       SLP Recommendation and Plan                                                                                          EDUCATION  The patient has been educated in the following areas:   Dysphagia (Swallowing Impairment) Modified Diet Instruction.        SLP GOALS       Row Name 03/05/25 0800       (LTG) Patient will demonstrate functional swallow for    Diet Texture (Demonstrate functional swallow) mechanical ground textures  -MB    Liquid viscosity (Demonstrate functional swallow) nectar/ mildly thick liquids  -MB    Runnels (Demonstrate functional swallow) independently (over 90% accuracy)  -MB    Time Frame (Demonstrate functional swallow) by discharge  -MB    Progress/Outcomes (Demonstrate functional swallow) goal ongoing  -MB       (STG) Patient will tolerate trials of    Consistencies Trialed (Tolerate trials) mechanical ground textures;nectar/ mildly thick liquids;mixed consistencies  -MB    Desired Outcome (Tolerate trials) without signs/symptoms of aspiration;without signs of distress;with adequate oral prep/transit/clearance  -MB    Runnels (Tolerate trials) independently (over 90% accuracy)  -MB    Time Frame (Tolerate trials) by discharge  -MB    Progress/Outcomes (Tolerate trials) goal ongoing  -MB    Comment (Tolerate trials) Pt seen for skilled ST targeting meal assessment. Pt asleep on 3L nasal cannula. Awakened upon verbalization and agreeable for intake. HOB  positioned 90 degrees. Meal tray present, which consisted of scrambled eggs, ground turkey sausage, oatmeal and nectar thick liquid drinks. Removed straw from tray. SLP cleaned dentures, and pt able self-don after roughly x7-8 attempts. SLP assisted in cutting food into bite-sized pieces; pt self-fed. Assessed x5 bites puree, x2 bites mech soft; x2 drinks nectar thick by cup. x1 throat clear assessed; minimal residue present in oral cavity after swallow, clear voice appreciated in each elicitation; adequate labial seal with no spillage; functional rotary mastication.    Recommend:   - Continue mechanical ground/ nectar thick liquids- no mixed consistencies  - no straws  - feeding assistance as needed  - ST will continue to follow to ensure safety and tolerance to current diet and make further recs as indicated.     -MB              User Key  (r) = Recorded By, (t) = Taken By, (c) = Cosigned By      Initials Name Provider Type    Angelito River, SLP Speech and Language Pathologist                         Time Calculation:                MARCIE Schofield  3/5/2025

## 2025-03-06 NOTE — CASE MANAGEMENT/SOCIAL WORK
Case Management Discharge Note      Final Note: [facility]         Selected Continued Care - Discharged on 3/5/2025 Admission date: 2/27/2025 - Discharge disposition: Skilled Nursing Facility (DC - External)      Destination Coordination complete.      Service Provider Services Address Phone Fax Patient Preferred    [facility] [place] [facility] [address] [place] [address] [place] [address] [phone] --                     Transportation Services  Ambulance: [facility]    Final Discharge Disposition Code: 04 - intermediate care facility

## 2025-06-18 LAB
MC_CV_MDC_IDC_RATE_1: 160
MC_CV_MDC_IDC_ZONE_ID: 1
MDC_IDC_MSMT_BATTERY_REMAINING_LONGEVITY: 108 MO
MDC_IDC_MSMT_BATTERY_REMAINING_PERCENTAGE: 100 %
MDC_IDC_MSMT_BATTERY_STATUS: NORMAL
MDC_IDC_MSMT_LEADCHNL_RA_DTM: NORMAL
MDC_IDC_MSMT_LEADCHNL_RA_IMPEDANCE_VALUE: 362
MDC_IDC_MSMT_LEADCHNL_RV_DTM: NORMAL
MDC_IDC_MSMT_LEADCHNL_RV_IMPEDANCE_VALUE: 591
MDC_IDC_MSMT_LEADCHNL_RV_PACING_THRESHOLD_AMPLITUDE: 0.7
MDC_IDC_MSMT_LEADCHNL_RV_PACING_THRESHOLD_POLARITY: NORMAL
MDC_IDC_MSMT_LEADCHNL_RV_PACING_THRESHOLD_PULSEWIDTH: 0.4
MDC_IDC_MSMT_LEADCHNL_RV_SENSING_INTR_AMPL: 13.6
MDC_IDC_PG_IMPLANT_DTM: NORMAL
MDC_IDC_PG_MFG: NORMAL
MDC_IDC_PG_MODEL: NORMAL
MDC_IDC_PG_SERIAL: NORMAL
MDC_IDC_PG_TYPE: NORMAL
MDC_IDC_SESS_DTM: NORMAL
MDC_IDC_SESS_TYPE: NORMAL
MDC_IDC_SET_BRADY_AT_MODE_SWITCH_RATE: 170
MDC_IDC_SET_BRADY_LOWRATE: 50
MDC_IDC_SET_BRADY_MODE: NORMAL
MDC_IDC_SET_LEADCHNL_RA_SENSING_SENSITIVITY: 0.75
MDC_IDC_SET_LEADCHNL_RV_PACING_AMPLITUDE: 2.5
MDC_IDC_SET_LEADCHNL_RV_PACING_POLARITY: NORMAL
MDC_IDC_SET_LEADCHNL_RV_PACING_PULSEWIDTH: 0.4
MDC_IDC_SET_LEADCHNL_RV_SENSING_POLARITY: NORMAL
MDC_IDC_SET_LEADCHNL_RV_SENSING_SENSITIVITY: 2.5
MDC_IDC_SET_ZONE_STATUS: NORMAL
MDC_IDC_SET_ZONE_TYPE: NORMAL
MDC_IDC_STAT_BRADY_RA_PERCENT_PACED: 0
MDC_IDC_STAT_BRADY_RV_PERCENT_PACED: 0

## 2025-07-02 ENCOUNTER — OFFICE VISIT (OUTPATIENT)
Dept: CARDIOLOGY | Facility: CLINIC | Age: 85
End: 2025-07-02
Payer: MEDICARE

## 2025-07-02 ENCOUNTER — CLINICAL SUPPORT NO REQUIREMENTS (OUTPATIENT)
Dept: CARDIOLOGY | Facility: CLINIC | Age: 85
End: 2025-07-02
Payer: MEDICARE

## 2025-07-02 VITALS — DIASTOLIC BLOOD PRESSURE: 57 MMHG | RESPIRATION RATE: 18 BRPM | SYSTOLIC BLOOD PRESSURE: 87 MMHG

## 2025-07-02 DIAGNOSIS — I48.21 PERMANENT ATRIAL FIBRILLATION: Primary | Chronic | ICD-10-CM

## 2025-07-02 DIAGNOSIS — Z95.0 PRESENCE OF CARDIAC PACEMAKER: Chronic | ICD-10-CM

## 2025-07-02 DIAGNOSIS — E78.5 DYSLIPIDEMIA: Chronic | ICD-10-CM

## 2025-07-02 DIAGNOSIS — I10 ESSENTIAL HYPERTENSION: Chronic | ICD-10-CM

## 2025-07-02 RX ORDER — HYDROXYZINE HYDROCHLORIDE 25 MG/1
25 TABLET, FILM COATED ORAL EVERY 8 HOURS SCHEDULED
COMMUNITY
Start: 2025-03-25

## 2025-07-02 RX ORDER — MIRTAZAPINE 7.5 MG/1
7.5 TABLET, FILM COATED ORAL
COMMUNITY
Start: 2025-06-04

## 2025-07-02 RX ORDER — METOPROLOL TARTRATE 25 MG/1
25 TABLET, FILM COATED ORAL EVERY 8 HOURS
Qty: 30 TABLET | Refills: 1 | Status: SHIPPED | OUTPATIENT
Start: 2025-07-02

## 2025-07-02 NOTE — ASSESSMENT & PLAN NOTE
Device check today demonstrated minimal pacing 1% V-paced, 0% A-paced; 8.5 years battery life; no significant arrhythmias  Continue with pacemaker surveillance every 6 months   Follow up in 6 months

## 2025-07-02 NOTE — ASSESSMENT & PLAN NOTE
Discussed with patient regarding the importance of hydration.  Blood pressures were reviewed and patient has no symptoms of lightheadedness with low normal blood pressures.  She has no lightheadedness today.  Will continue on current medications.

## 2025-07-02 NOTE — PROGRESS NOTES
Date of Office Visit: 2025  Encounter Provider: Sharon Vasquez MD  Place of Service: [facility] CARDIOLOGY   Patient Name: Hazel Bucio  :1940  John Cano MD    Chief Complaint   Patient presents with    Follow-up     yearly       History of Present Illness:  Patient is an 84 year old female with a history of sick sinus syndrome with [employer] dual-chamber pacemaker in place which is normal functioning on repeat interrogations.  She has essential hypertension, permanent A-fib, long-term use of anticoagulants on Eliquis, orthostatic hypotension on midodrine, dyslipidemia, type 2 diabetes, mild cognitive impairment as well with underlying dementia and lives at [facility]. She is here for annual follow-up.  She did bring in blood pressure recordings from  which demonstrated blood pressures in the 90s to low 100s over 50s and 1 systolic blood pressure reading of 160.  She denies any lightheadedness, falls or syncope.  She does report back pain today.  She does note some shortness of breath when she tries to walk quickly.  She denies any shortness of breath at rest or with lying down.  She denies any chest pain or discomfort with activity.  She is on anticoagulation and denies any bleeding.  She does note bruising.  She states that she did bump into something which resulted in a cut on her left leg.  She did not have to go to the hospital for this.  She denies any orthopnea, PND, edema or palpitations.  She denies any heart racing.    Review of systems otherwise negative x 14 point review of systems except as mentioned above    Device check completed on 2025 for remote check, battery life of 9 years.    Prior History:  I the pleasure to see this 84-year-old female back in clinic today well-known to us from prior encounters and hospitalizations.  She has been admitted multiple times over the last 6 months to the hospital for various reasons including  dehydration, falls weakness debility, abdominal pain, she has a history of sick sinus syndrome with [employer] dual-chamber pacemaker in place which is normal functioning on repeat interrogations.  She has essential hypertension, permanent A-fib, long-term use of anticoagulants on Eliquis, orthostatic hypotension on midodrine, dyslipidemia, type 2 diabetes, mild cognitive impairment as well with underlying dementia and lives at [facility].  She has underlying CKD which is progressed to ESRD now on hemodialysis.  She continues on low-dose beta-blocker for rate control, she is off Cardizem with hypotension, she has had recurrent RVR with limitations and rate control medicines limited by blood pressure previously.  She is 91/54 today with heart rates in the 80s and she is 122 pounds.  She denies any chest pain she has chronic shortness of breath, volume status stable, she has a caregiver with her today and we reviewed all of her medicines, device interrogation, last 2D echo reports and findings as well as heart cath results with recurrent chest pain from October 2023.  This had minimal atherosclerotic disease less than 30% in any distribution with ANNA-3 flow throughout, right heart catheterization and normal findings.    Patient will continue current therapy  Device interrogation with 9.5 years of battery life, minimal pacing with VVI settings  Continue beta-blocker for rate control of atrial fibrillation  40 daily of Lasix  Atorvastatin will be decreased to 10 daily, LDL level was 28  Recheck would be advised, may consider complete cessation  Aspirin not recommended is on Eliquis which will be continued     Follow-up 6 months for device interrogation, encourage good nutrition and fluid intake                     Past Medical History:   Diagnosis Date    Acquired absence of kidney 10/04/2021    Acquired absence of other specified parts of digestive tract 09/09/2022    Athscl heart disease of  native coronary 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 09/09/2022    Atrial fibrillation with rapid ventricular response 11/9/2023    Bladder cancer     Cancer     breast, bladder    Cholecystitis with cholelithiasis 11/12/2020    Chronic insomnia 08/27/2020    Deep vein thrombosis     Dyslipidemia 01/17/2017    E coli bacteremia 3/24/2023    ESRD on dialysis 10/18/2023    Essential hypertension 01/17/2017    Fracture tibia/fibula, right, closed, initial encounter 08/27/2020    Gastro-esophageal reflux disease without esophagitis 09/14/2020    GERD (gastroesophageal reflux disease)     History of bladder cancer 10/4/2021    History of falling 4/11/2023    History of urostomy     Hyperkalemia 8/31/2023    Immobility 08/27/2020    r/t broken Tibia     Irritable bowel syndrome without diarrhea 8/31/2023    Kidney carcinoma, right     removed     Long term current use of anticoagulant 01/09/2015    Mild cognitive impairment of uncertain or unknown etiology 08/31/2023    Myalgia due to statin 6/30/2023    Other specified abdominal hernia without obstruction or gangrene 9/2/2022    PAF (paroxysmal atrial fibrillation) 06/26/2019    Permanent atrial fibrillation 06/26/2019    Personal history of other venous thrombosis and embolism 4/11/2023    Presence of cardiac pacemaker 11/03/2014    BS dual chamber PM placed 10/2014 with pocket revision 1/25/17.  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         Past Surgical History:   Procedure Laterality Date    BREAST LUMPECTOMY      CARDIAC CATHETERIZATION N/A 10/18/2023    Procedure: Left Heart Cath possible PCI, atherectomy, hemodynamic support;  Surgeon: Augustin Ellsworth MD;  Location: [facility] CATH INVASIVE LOCATION;  Service: Cardiology;  Laterality: N/A;    CARDIAC ELECTROPHYSIOLOGY  PROCEDURE N/A 4/28/2023    Procedure: Pacemaker gen change, [employer] AWARE $;  Surgeon: Jose Carlos Cheng MD;  Location: [facility] CATH INVASIVE LOCATION;  Service: Cardiovascular;  Laterality: N/A;    CHOLECYSTECTOMY N/A 10/12/2020    Procedure: CHOLECYSTECTOMY LAPAROSCOPIC;  Surgeon: Go Pereira DO;  Location: [facility] MAIN OR;  Service: General;  Laterality: N/A;    CYSTECTOMY W/ URETEROILEAL CONDUIT      FEMUR IM NAILING RETROGRADE Right 7/25/2024    Procedure: RIGHT FEMUR INTRAMEDULLARY NAILING RETROGRADE AND OPEN REDUCTION INTERNAL FIXATION;  Surgeon: Elieser Diggs MD;  Location: [facility] MAIN OR;  Service: Orthopedics;  Laterality: Right;    HARDWARE REMOVAL Right 6/11/2021    Procedure: TIBIA HARDWARE REMOVAL;  Surgeon: Geoff Shah II, MD;  Location: [facility] MAIN OR;  Service: Orthopedics;  Laterality: Right;    HERNIA REPAIR      HYSTERECTOMY      INSERT / REPLACE / REMOVE PACEMAKER      NEPHRECTOMY Right     TIBIA IM NAILING/RODDING Right 8/28/2020    Procedure: TIBIA INTRAMEDULLARY NAIL/ABRAHAM INSERTION;  Surgeon: Geoff Shah II, MD;  Location: [facility] MAIN OR;  Service: Orthopedics;  Laterality: Right;           Current Outpatient Medications:     acetaminophen (TYLENOL) 325 MG tablet, Take 2 tablets by mouth 4 (Four) Times a Day As Needed for Mild Pain., Disp: , Rfl:     amiodarone (PACERONE) 200 MG tablet, Take 1 tablet by mouth Every 12 (Twelve) Hours., Disp: 30 tablet, Rfl: 1    apixaban (ELIQUIS) 2.5 MG tablet tablet, Take 1 tablet by mouth Every 12 (Twelve) Hours. Indications: Atrial Fibrillation, Disp: 60 tablet, Rfl: 0    famotidine (PEPCID) 10 MG tablet, Take 1 tablet by mouth Every Other Day., Disp: 30 tablet, Rfl: 0    gabapentin (NEURONTIN) 100 MG capsule, Take 1 capsule by mouth Every Night., Disp: 3 capsule, Rfl: 0    hydrOXYzine (ATARAX) 25 MG tablet, Take 1 tablet by mouth Every 8 (Eight) Hours., Disp: , Rfl:     ipratropium-albuterol (DUO-NEB) 0.5-2.5 mg/3 ml  nebulizer, Take 3 mL by nebulization Every 4 (Four) Hours As Needed for Wheezing., Disp: , Rfl:     metoprolol tartrate (LOPRESSOR) 25 MG tablet, Take 1 tablet by mouth Every 8 (Eight) Hours., Disp: 30 tablet, Rfl: 1    midodrine (PROAMATINE) 5 MG tablet, Take 1 tablet by mouth 3 (Three) Times a Day Before Meals., Disp: 90 tablet, Rfl: 1    mirtazapine (REMERON) 7.5 MG tablet, 1 tablet., Disp: , Rfl:     nitroglycerin (NITROSTAT) 0.4 MG SL tablet, Place 1 tablet under the tongue Every 5 (Five) Minutes As Needed for Chest Pain., Disp: , Rfl:     polyethylene glycol (MiraLax) 17 GM/SCOOP powder, Take 17 g by mouth Every Morning., Disp: , Rfl:     sertraline (ZOLOFT) 25 MG tablet, Take 1 tablet by mouth every night at bedtime., Disp: , Rfl:     sevelamer (RENVELA) 800 MG tablet, Take 1 tablet by mouth 3 (Three) Times a Day With Meals., Disp: , Rfl:     topiramate (TOPAMAX) 100 MG tablet, Take 1 tablet by mouth Every Night., Disp: 30 tablet, Rfl: 0    traMADol (ULTRAM) 50 MG tablet, Take 1 tablet by mouth Every 6 (Six) Hours As Needed for Moderate Pain., Disp: , Rfl:     zinc oxide 20 % ointment, Apply 1 Application topically to the appropriate area as directed 2 (Two) Times a Day., Disp: , Rfl:       Social History     Socioeconomic History    Marital status:    Tobacco Use    Smoking status: Never     Passive exposure: Never    Smokeless tobacco: Never   Vaping Use    Vaping status: Never Used   Substance and Sexual Activity    Alcohol use: Not Currently    Drug use: Never    Sexual activity: Defer         Procedures      ECG 12 Lead    Date/Time: 7/2/2025 2:23 PM  Performed by: Sharon Vasquez MD    Authorized by: Sharon Vasquez MD  Comparison: compared with previous ECG   Rhythm: atrial fibrillation  Rate: normal  Conduction: non-specific intraventricular conduction delay  ST Segments: ST segments normal  QRS axis: right  Other findings: non-specific ST-T wave changes    Clinical impression: abnormal  EKG      Device check today in office: minimal pacing- 1% V-paced, 0% A-paced; 8.5 years battery life; no significant arrhythmias    ECG 12 Lead    (Results Pending)           Objective:    BP (!) 87/57 (BP Location: Left arm, Patient Position: Sitting, Cuff Size: Adult) Comment: manual  Resp 18         Vitals reviewed.   Constitutional:       Appearance: Not in distress.   Pulmonary:      Effort: Pulmonary effort is normal.      Breath sounds: Normal breath sounds.   Cardiovascular:      PMI at left midclavicular line. Normal rate. Irregularly irregular rhythm.      Murmurs: There is no murmur.      No gallop.  No click. No rub.   Pulses:     Intact distal pulses.   Edema:     Peripheral edema absent.   Abdominal:      General: Bowel sounds are normal.      Palpations: Abdomen is soft.      Tenderness: There is no abdominal tenderness.   Musculoskeletal: Normal range of motion. Skin:     General: Skin is dry.      Findings: Ecchymosis present.      Comments: Positive scattered ecchymosis on arms and legs  Bandage in place on the left mid-shin   Neurological:      General: No focal deficit present.      Mental Status: Oriented to person, place and time.       CMP          3/2/2025    00:05 3/3/2025    02:44 3/4/2025    03:44 3/4/2025    22:20   CMP   Glucose 83  87  93  81    BUN 28  31  36  14    Creatinine 4.02  4.76  5.12  2.75    EGFR 10.5  8.6  7.9  16.6    Sodium 139  134  138  138    Potassium 4.4  3.6  3.8  3.7    Chloride 102  97  103  103    Calcium 8.9  8.7  8.3  8.6    BUN/Creatinine Ratio 7.0  6.5  7.0  5.1    Anion Gap 12.6  14.3  13.7  12.6       CBC          3/2/2025    00:05 3/3/2025    02:44 3/4/2025    03:44 3/4/2025    22:20   CBC   WBC 6.38  7.37  8.84  7.53    RBC 3.14  3.28  3.16  3.19    Hemoglobin 10.1  10.5  10.1  10.2    Hematocrit 32.5  34.1  32.7  33.1    .5  104.0  103.5  103.8    MCH 32.2  32.0  32.0  32.0    MCHC 31.1  30.8  30.9  30.8    RDW 15.0  15.1  15.3  15.6     Platelets 200  236  214  184       Lipid Panel          3/3/2025    02:44   Lipid Panel   Total Cholesterol 82    Triglycerides 84    HDL Cholesterol 53    VLDL Cholesterol 17    LDL Cholesterol  12    LDL/HDL Ratio 0.23             Assessment & Plan:   Diagnoses and all orders for this visit:    1. Permanent atrial fibrillation (Primary)  Overview:  Previously on on diltiazem  mg plus sotalol 120 mg p.o. twice daily and has permanent AV sequential pacemaker.   Patient has been tolerating all her medications without any issues    Assessment & Plan:  Rate controlled we will continue on amiodarone and metoprolol  Continue on apixaban 2.5mg bid, no bleeding issues   Continue on current medicatons    Orders:  -     ECG 12 Lead    2. Essential hypertension    3. Dyslipidemia  Overview:  During last visit her atorvastatin was decreased to 10mg daily, patient is unsure if the dose was decreased, according to her medication list she provided today it is still at 20mg     Assessment & Plan:  LDL has further decreased to 12 on her most recent labs  Recommend discontinuation of atorvastatin      4. Presence of cardiac pacemaker  Overview:  BS dual chamber PM placed 10/2014 with pocket revision  1/25/17.  HX tachy rob syndrome    Assessment & Plan:  Device check today demonstrated minimal pacing 1% V-paced, 0% A-paced; 8.5 years battery life; no significant arrhythmias  Continue with pacemaker surveillance every 6 months   Follow up in 6 months     Orders:  -     ECG 12 Lead    Other orders  -     metoprolol tartrate (LOPRESSOR) 25 MG tablet; Take 1 tablet by mouth Every 8 (Eight) Hours.  Dispense: 30 tablet; Refill: 1

## 2025-07-02 NOTE — ASSESSMENT & PLAN NOTE
Blood drawn by lab.    LDL has further decreased to 12 on her most recent labs  Recommend discontinuation of atorvastatin

## 2025-07-02 NOTE — ASSESSMENT & PLAN NOTE
Rate controlled we will continue on amiodarone and metoprolol  Continue on apixaban 2.5mg bid, no bleeding issues   Continue on current medicatons

## 2025-07-03 LAB
MC_CV_MDC_IDC_RATE_1: 160
MC_CV_MDC_IDC_ZONE_ID: 1
MC_CV_MDC_IDC_ZONE_ID: 2
MC_CV_MDC_IDC_ZONE_ID: 3
MDC_IDC_MSMT_BATTERY_STATUS: NORMAL
MDC_IDC_MSMT_LEADCHNL_RA_IMPEDANCE_VALUE: 370
MDC_IDC_MSMT_LEADCHNL_RV_DTM: NORMAL
MDC_IDC_MSMT_LEADCHNL_RV_IMPEDANCE_VALUE: 446
MDC_IDC_MSMT_LEADCHNL_RV_PACING_THRESHOLD_AMPLITUDE: 0.9
MDC_IDC_MSMT_LEADCHNL_RV_PACING_THRESHOLD_PULSEWIDTH: 0.4
MDC_IDC_MSMT_LEADCHNL_RV_SENSING_INTR_AMPL: 13.7
MDC_IDC_PG_IMPLANT_DTM: NORMAL
MDC_IDC_PG_MFG: NORMAL
MDC_IDC_PG_MODEL: NORMAL
MDC_IDC_PG_SERIAL: NORMAL
MDC_IDC_PG_TYPE: NORMAL
MDC_IDC_SESS_DTM: NORMAL
MDC_IDC_SET_BRADY_LOWRATE: 50
MDC_IDC_SET_BRADY_MODE: NORMAL
MDC_IDC_SET_BRADY_PAV_DELAY: 180
MDC_IDC_SET_LEADCHNL_RA_SENSING_SENSITIVITY: 0.75
MDC_IDC_SET_LEADCHNL_RV_PACING_AMPLITUDE: 2.5
MDC_IDC_SET_LEADCHNL_RV_PACING_PULSEWIDTH: 0.4
MDC_IDC_SET_LEADCHNL_RV_SENSING_SENSITIVITY: 2.5
MDC_IDC_SET_ZONE_STATUS: NORMAL
MDC_IDC_SET_ZONE_TYPE: NORMAL
MDC_IDC_STAT_BRADY_RV_PERCENT_PACED: 1

## 2025-07-14 LAB
MC_CV_MDC_IDC_RATE_1: 160
MC_CV_MDC_IDC_ZONE_ID: 1
MDC_IDC_MSMT_BATTERY_REMAINING_LONGEVITY: 102 MO
MDC_IDC_MSMT_BATTERY_REMAINING_PERCENTAGE: 100 %
MDC_IDC_MSMT_BATTERY_STATUS: NORMAL
MDC_IDC_MSMT_LEADCHNL_RA_DTM: NORMAL
MDC_IDC_MSMT_LEADCHNL_RA_IMPEDANCE_VALUE: 360
MDC_IDC_MSMT_LEADCHNL_RV_DTM: NORMAL
MDC_IDC_MSMT_LEADCHNL_RV_IMPEDANCE_VALUE: 556
MDC_IDC_MSMT_LEADCHNL_RV_PACING_THRESHOLD_AMPLITUDE: 1.2
MDC_IDC_MSMT_LEADCHNL_RV_PACING_THRESHOLD_POLARITY: NORMAL
MDC_IDC_MSMT_LEADCHNL_RV_PACING_THRESHOLD_PULSEWIDTH: 0.4
MDC_IDC_MSMT_LEADCHNL_RV_SENSING_INTR_AMPL: 25
MDC_IDC_PG_IMPLANT_DTM: NORMAL
MDC_IDC_PG_MFG: NORMAL
MDC_IDC_PG_MODEL: NORMAL
MDC_IDC_PG_SERIAL: NORMAL
MDC_IDC_PG_TYPE: NORMAL
MDC_IDC_SESS_DTM: NORMAL
MDC_IDC_SESS_TYPE: NORMAL
MDC_IDC_SET_BRADY_AT_MODE_SWITCH_RATE: 170
MDC_IDC_SET_BRADY_LOWRATE: 50
MDC_IDC_SET_BRADY_MODE: NORMAL
MDC_IDC_SET_LEADCHNL_RA_SENSING_SENSITIVITY: 0.75
MDC_IDC_SET_LEADCHNL_RV_PACING_AMPLITUDE: 2.5
MDC_IDC_SET_LEADCHNL_RV_PACING_POLARITY: NORMAL
MDC_IDC_SET_LEADCHNL_RV_PACING_PULSEWIDTH: 0.4
MDC_IDC_SET_LEADCHNL_RV_SENSING_POLARITY: NORMAL
MDC_IDC_SET_LEADCHNL_RV_SENSING_SENSITIVITY: 2.5
MDC_IDC_SET_ZONE_STATUS: NORMAL
MDC_IDC_SET_ZONE_TYPE: NORMAL
MDC_IDC_STAT_BRADY_RA_PERCENT_PACED: 0
MDC_IDC_STAT_BRADY_RV_PERCENT_PACED: 0

## (undated) DEVICE — SPNG GZ WOVN 4X4IN 12PLY 10/BX STRL

## (undated) DEVICE — BANDAGE,GAUZE,BULKEE II,4.5"X4.1YD,STRL: Brand: MEDLINE

## (undated) DEVICE — 3M™ IOBAN™ 2 ANTIMICROBIAL INCISE DRAPE 6650EZ: Brand: IOBAN™ 2

## (undated) DEVICE — SUT VIC 3/0 SH 27IN J416H

## (undated) DEVICE — SUT VIC 3/0 CTI 36IN J944H

## (undated) DEVICE — 4.0MM LONG AO PILOT DRILL: Brand: TRIGEN

## (undated) DEVICE — STPLR SKIN VISISTAT WD 35CT

## (undated) DEVICE — CORTEX SCREW
Type: IMPLANTABLE DEVICE | Site: FEMUR | Status: NON-FUNCTIONAL
Brand: AXSOS
Removed: 2024-07-25

## (undated) DEVICE — SUT VIC 2/0 CT1 36IN

## (undated) DEVICE — CUFF SCD HEMOFORCE SEQ CALF STD MD

## (undated) DEVICE — ELECTRD DEFIB M/FUNC PROPADZ RADIOL 2PK

## (undated) DEVICE — DRAPE,U/ SHT,SPLIT,PLAS,STERIL: Brand: MEDLINE

## (undated) DEVICE — STCKNT IMPERV 9X36IN STRL

## (undated) DEVICE — 2-PIECE OSTOMY SKIN BARRIER, FORMAFLEX: Brand: NEW IMAGE

## (undated) DEVICE — SOL ISO/ALC 70PCT 4OZ

## (undated) DEVICE — 3M™ PATIENT PLATE, CORDED, SPLIT, LARGE, 40 PER CASE, 1179: Brand: 3M™

## (undated) DEVICE — ENDOPATH PNEUMONEEDLE INSUFFLATION NEEDLES WITH LUER LOCK CONNECTORS 120MM: Brand: ENDOPATH

## (undated) DEVICE — SOL IRRIG H2O 1000ML STRL

## (undated) DEVICE — OCCLUSIVE GAUZE STRIP OVERWRAP,3% BISMUTH TRIBROMOPHENATE IN PETROLATUM BLEND: Brand: XEROFORM

## (undated) DEVICE — GLV SURG BIOGEL LTX PF 8

## (undated) DEVICE — GLV SURG SENSICARE PI ORTHO PF SZ7 LF STRL

## (undated) DEVICE — LEGGINGS, PAIR, CLEAR, STERILE: Brand: MEDLINE

## (undated) DEVICE — DRP C/ARMOR

## (undated) DEVICE — REAMER SHAFT, MOD.TRINKLE: Brand: BIXCUT

## (undated) DEVICE — 1010 S-DRAPE TOWEL DRAPE 10/BX: Brand: STERI-DRAPE™

## (undated) DEVICE — DRSNG SURESITE WNDW 4X4.5

## (undated) DEVICE — DRSNG SURESITE WNDW 2.38X2.75

## (undated) DEVICE — DRSNG WND GZ CURAD OIL EMULSION 3X8IN LF STRL 1PK

## (undated) DEVICE — GLV SURG SENSICARE PI PF LF 7 GRN STRL

## (undated) DEVICE — SOL ISO/ALC RUB 70PCT 4OZ

## (undated) DEVICE — 3.0MM X 1000MM BALL TIP GUIDE ROD: Brand: TRIGEN

## (undated) DEVICE — GLV SURG SENSICARE PI ORTHO SZ8.5 LF STRL

## (undated) DEVICE — KT SURG TURNOVER 050

## (undated) DEVICE — PAD UNDERCAST WYTEX 4IN 4YD LF STRL

## (undated) DEVICE — COVADERM PLUS: Brand: DEROYAL

## (undated) DEVICE — C-ARM: Brand: UNBRANDED

## (undated) DEVICE — OPENING REAMER

## (undated) DEVICE — 3M™ TEGADERM™ HIGH PERFORMANCE FOAM ADHESIVE DRESSING 90619, HEELDESIGN, 5 EACH/CARTON, 4 CARTON/CASE: Brand: 3M™ TEGADERM™

## (undated) DEVICE — CALIBRATED DRILL BIT , AO: Brand: AXSOS

## (undated) DEVICE — COVER,TABLE,HEAVY DUTY,79"X110",STRL: Brand: MEDLINE

## (undated) DEVICE — PAD,ABDOMINAL,5"X9",STERILE,LF,1/PK: Brand: MEDLINE INDUSTRIES, INC.

## (undated) DEVICE — SHEET, DRAPE, SPLIT, STERILE: Brand: MEDLINE

## (undated) DEVICE — RETRV BG SPECI GENISTRONG MD 240ML

## (undated) DEVICE — PINNACLE INTRODUCER SHEATH: Brand: PINNACLE

## (undated) DEVICE — ENDOPATH XCEL DILATING TIP TROCARS WITH STABILITY SLEEVES: Brand: ENDOPATH XCEL

## (undated) DEVICE — SUT MNCRYL PLS ANTIB UD 4/0 PS2 18IN

## (undated) DEVICE — 3M™ IOBAN™ 2 ANTIMICROBIAL INCISE DRAPE 6648EZ: Brand: IOBAN™ 2

## (undated) DEVICE — INTENDED FOR TISSUE SEPARATION, AND OTHER PROCEDURES THAT REQUIRE A SHARP SURGICAL BLADE TO PUNCTURE OR CUT.: Brand: BARD-PARKER ® CARBON RIB-BACK BLADES

## (undated) DEVICE — DRILL BIT  TI LOCKING, SHORT,: Brand: AXSOS

## (undated) DEVICE — GOWN,REINFORCE,POLY,SIRUS,BREATH SLV,XLG: Brand: MEDLINE

## (undated) DEVICE — BNDG ESMARK 6INX9FT STRL

## (undated) DEVICE — 4.0MM SHORT PILOT DRILL WITH AO CONNECTOR: Brand: TRIGEN

## (undated) DEVICE — GLV SURG TRIUMPH CLASSIC PF LTX 8 STRL

## (undated) DEVICE — ADHS SKIN SURG TISS VISC PREMIERPRO EXOFIN HI/VISC FAST/DRY

## (undated) DEVICE — 2-PIECE UROSTOMY POUCH: Brand: NEW IMAGE

## (undated) DEVICE — GLV SURG DERMASSURE GRN LF PF 8.5

## (undated) DEVICE — UNDYED BRAIDED (POLYGLACTIN 910), SYNTHETIC ABSORBABLE SUTURE: Brand: COATED VICRYL

## (undated) DEVICE — DRAPE SHEET ULTRAGARD: Brand: MEDLINE

## (undated) DEVICE — LAPAROSCOPIC GAS CONDITIONING DEVICE.: Brand: INSUFLOW

## (undated) DEVICE — GW PTFE EMERALD HEPCOAT FC J TIP STD .035 3MM 150CM

## (undated) DEVICE — LOCKING DRILL

## (undated) DEVICE — SUT VIC 0/0 UR6 27IN DYED J603H

## (undated) DEVICE — CUFF TOURNI 1BLADDER 1PRT 34IN STRL

## (undated) DEVICE — GLV SURG BIOGEL SENSR LTX PF SZ7.5

## (undated) DEVICE — ADHS LIQ MASTISOL 2/3ML

## (undated) DEVICE — GENERAL LAPAROSCOPY CDS: Brand: MEDLINE INDUSTRIES, INC.

## (undated) DEVICE — PK TRY HEART CATH 50

## (undated) DEVICE — FREEHAND DRILL

## (undated) DEVICE — MAT FLR ABSORBENT LG 4FT 10 2.5FT

## (undated) DEVICE — 2, DISPOSABLE SUCTION/IRRIGATOR WITH DISPOSABLE TIP: Brand: STRYKEFLOW

## (undated) DEVICE — ENDOPATH 5MM CURVED SCISSORS WITH MONOPOLAR CAUTERY: Brand: ENDOPATH

## (undated) DEVICE — T-DRAPE,EXTREMITY,STERILE: Brand: MEDLINE

## (undated) DEVICE — SOL LACTATED RINGER 1000ML

## (undated) DEVICE — Device

## (undated) DEVICE — BALN PRESS WEDGE 6F 110CM

## (undated) DEVICE — SUT VIC 0 CT1 36IN J946H

## (undated) DEVICE — GLV SURG SIGNATURE ESSENTIAL PF LTX SZ8

## (undated) DEVICE — GUIDE PIN 3.2MM X 343MM: Brand: TRIGEN

## (undated) DEVICE — APPL CHLORAPREP HI/LITE 26ML ORNG

## (undated) DEVICE — PENCL SMOKE/EVAC MEGADYNE TELESCP 10FT

## (undated) DEVICE — GUIDE WIRE, BALL-TIPPED, STERILE

## (undated) DEVICE — FLTR ULPA W/FLD TRAP

## (undated) DEVICE — 3M™ STERI-STRIP™ BLEND TONE SKIN CLOSURES, B1557, TAN, 1/2 IN X 4 IN (12MM X 100MM), 6 STRIPS/ENVELOPE: Brand: 3M™ STERI-STRIP™

## (undated) DEVICE — ST ACC MICROPUNCTURE STFF/CANN PLAT/TP 4F 21G 40CM

## (undated) DEVICE — ANTIBACTERIAL UNDYED BRAIDED (POLYGLACTIN 910), SYNTHETIC ABSORBABLE SUTURE: Brand: COATED VICRYL

## (undated) DEVICE — PACEMAKER CDS: Brand: MEDLINE INDUSTRIES, INC.

## (undated) DEVICE — PK KN TOTL 40

## (undated) DEVICE — CATH MPAC STP 6F: Brand: SUPER TORQUE

## (undated) DEVICE — GLV SURG PREMIERPRO ORTHO LTX PF SZ8 BRN

## (undated) DEVICE — SYR LL TP 10ML STRL

## (undated) DEVICE — GLV SURG SENSICARE SLT PF LF 8 STRL

## (undated) DEVICE — PK GAM NAIL 50

## (undated) DEVICE — TRAP FLD MINIVAC MEGADYNE 100ML

## (undated) DEVICE — OPTIFOAM GENTLE SA, POSTOP, 4X8: Brand: MEDLINE

## (undated) DEVICE — PROXIMATE RH ROTATING HEAD SKIN STAPLERS (35 WIDE) CONTAINS 35 STAINLESS STEEL STAPLES: Brand: PROXIMATE

## (undated) DEVICE — BNDG ELAS ELITE V/CLOSE 6IN 5YD LF STRL

## (undated) DEVICE — VIOLET BRAIDED (POLYGLACTIN 910), SYNTHETIC ABSORBABLE SUTURE: Brand: COATED VICRYL

## (undated) DEVICE — PENCL EVAC ULTRAVAC SMOKE W/BLD

## (undated) DEVICE — SUT VIC 0 SUTUPAK TIES 18IN J906G

## (undated) DEVICE — WET SKIN PREP TRAY: Brand: MEDLINE INDUSTRIES, INC.

## (undated) DEVICE — K-WIRE
Type: IMPLANTABLE DEVICE | Site: FEMUR | Status: NON-FUNCTIONAL
Removed: 2024-07-25

## (undated) DEVICE — 3M™ STERI-DRAPE™ U-DRAPE 1015: Brand: STERI-DRAPE™

## (undated) DEVICE — PK EXTREM 50

## (undated) DEVICE — ENDOPATH XCEL WITH OPTIVIEW TECHNOLOGY UNIVERSAL TROCAR STABILITY SLEEVES: Brand: ENDOPATH XCEL OPTIVIEW

## (undated) DEVICE — CABL BIPOL W/ALLGTR CLIP/SM 12FT